# Patient Record
Sex: FEMALE | Race: WHITE | NOT HISPANIC OR LATINO | Employment: OTHER | URBAN - METROPOLITAN AREA
[De-identification: names, ages, dates, MRNs, and addresses within clinical notes are randomized per-mention and may not be internally consistent; named-entity substitution may affect disease eponyms.]

---

## 2017-01-18 ENCOUNTER — TELEPHONE (OUTPATIENT)
Dept: FAMILY MEDICINE | Facility: CLINIC | Age: 52
End: 2017-01-18

## 2017-01-18 NOTE — TELEPHONE ENCOUNTER
"----- Message from Tiff Levin sent at 1/18/2017  3:32 PM CST -----  Contact: Pt   Pt called and stated she needed to speak to the nurse. She stated that her blood sugar meter is not giving her a blood sugar number reading but stating a reading of "high". She also stated she is feeling dizzy and has made an appt to see the doctor in the morning. She can be reached at [phone].    Thanks,  TF     "

## 2017-01-19 ENCOUNTER — OFFICE VISIT (OUTPATIENT)
Dept: FAMILY MEDICINE | Facility: CLINIC | Age: 52
End: 2017-01-19
Payer: MEDICARE

## 2017-01-19 ENCOUNTER — HOSPITAL ENCOUNTER (OUTPATIENT)
Dept: RADIOLOGY | Facility: HOSPITAL | Age: 52
Discharge: HOME | End: 2017-01-19
Attending: FAMILY MEDICINE
Payer: MEDICARE

## 2017-01-19 VITALS
OXYGEN SATURATION: 97 % | TEMPERATURE: 97 F | HEIGHT: 64 IN | BODY MASS INDEX: 29.57 KG/M2 | HEART RATE: 130 BPM | WEIGHT: 173.19 LBS | DIASTOLIC BLOOD PRESSURE: 80 MMHG | SYSTOLIC BLOOD PRESSURE: 134 MMHG
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
## 2017-01-19 DIAGNOSIS — I20.9 ISCHEMIC CHEST PAIN: ICD-10-CM

## 2017-01-19 DIAGNOSIS — F31.4 BIPOLAR DISORDER WITH SEVERE DEPRESSION: ICD-10-CM

## 2017-01-19 DIAGNOSIS — Z79.4 TYPE 2 DIABETES MELLITUS WITH HYPERGLYCEMIA, WITH LONG-TERM CURRENT USE OF INSULIN: ICD-10-CM

## 2017-01-19 DIAGNOSIS — Z79.4 TYPE 2 DIABETES MELLITUS WITH HYPERGLYCEMIA, WITH LONG-TERM CURRENT USE OF INSULIN: Primary | ICD-10-CM

## 2017-01-19 DIAGNOSIS — E11.65 TYPE 2 DIABETES MELLITUS WITH HYPERGLYCEMIA, WITH LONG-TERM CURRENT USE OF INSULIN: Primary | ICD-10-CM

## 2017-01-19 DIAGNOSIS — E11.65 TYPE 2 DIABETES MELLITUS WITH HYPERGLYCEMIA, WITH LONG-TERM CURRENT USE OF INSULIN: ICD-10-CM

## 2017-01-19 DIAGNOSIS — R31.9 HEMATURIA: ICD-10-CM

## 2017-01-19 LAB
BACTERIA #/AREA URNS AUTO: ABNORMAL /HPF
BILIRUB SERPL-MCNC: ABNORMAL MG/DL
BLOOD URINE, POC: ABNORMAL
COLOR, POC UA: ABNORMAL
GLUCOSE UR QL STRIP: ABNORMAL
KETONES UR QL STRIP: ABNORMAL
LEUKOCYTE ESTERASE URINE, POC: ABNORMAL
MICROSCOPIC COMMENT: ABNORMAL
NITRITE, POC UA: ABNORMAL
PH, POC UA: 5
PROTEIN, POC: ABNORMAL
RBC #/AREA URNS AUTO: 1 /HPF (ref 0–4)
SPECIFIC GRAVITY, POC UA: 1.01
SQUAMOUS #/AREA URNS AUTO: 1 /HPF
UROBILINOGEN, POC UA: ABNORMAL
WBC #/AREA URNS AUTO: 1 /HPF (ref 0–5)

## 2017-01-19 PROCEDURE — 1159F MED LIST DOCD IN RCRD: CPT | Mod: S$GLB,,, | Performed by: FAMILY MEDICINE

## 2017-01-19 PROCEDURE — 72100 X-RAY EXAM L-S SPINE 2/3 VWS: CPT | Mod: 26,,, | Performed by: RADIOLOGY

## 2017-01-19 PROCEDURE — 72100 X-RAY EXAM L-S SPINE 2/3 VWS: CPT | Mod: TC,PO

## 2017-01-19 PROCEDURE — 3046F HEMOGLOBIN A1C LEVEL >9.0%: CPT | Mod: S$GLB,,, | Performed by: FAMILY MEDICINE

## 2017-01-19 PROCEDURE — 2022F DILAT RTA XM EVC RTNOPTHY: CPT | Mod: S$GLB,,, | Performed by: FAMILY MEDICINE

## 2017-01-19 PROCEDURE — 4010F ACE/ARB THERAPY RXD/TAKEN: CPT | Mod: S$GLB,,, | Performed by: FAMILY MEDICINE

## 2017-01-19 PROCEDURE — 99499 UNLISTED E&M SERVICE: CPT | Mod: S$GLB,,, | Performed by: FAMILY MEDICINE

## 2017-01-19 PROCEDURE — 81002 URINALYSIS NONAUTO W/O SCOPE: CPT | Mod: S$GLB,,, | Performed by: FAMILY MEDICINE

## 2017-01-19 PROCEDURE — 93010 ELECTROCARDIOGRAM REPORT: CPT | Mod: S$GLB,,, | Performed by: INTERNAL MEDICINE

## 2017-01-19 PROCEDURE — 99999 PR PBB SHADOW E&M-EST. PATIENT-LVL IV: CPT | Mod: PBBFAC,,, | Performed by: FAMILY MEDICINE

## 2017-01-19 PROCEDURE — 99214 OFFICE O/P EST MOD 30 MIN: CPT | Mod: 25,S$GLB,, | Performed by: FAMILY MEDICINE

## 2017-01-19 PROCEDURE — 3075F SYST BP GE 130 - 139MM HG: CPT | Mod: S$GLB,,, | Performed by: FAMILY MEDICINE

## 2017-01-19 PROCEDURE — 93005 ELECTROCARDIOGRAM TRACING: CPT | Mod: S$GLB,,, | Performed by: FAMILY MEDICINE

## 2017-01-19 PROCEDURE — 3079F DIAST BP 80-89 MM HG: CPT | Mod: S$GLB,,, | Performed by: FAMILY MEDICINE

## 2017-01-19 RX ORDER — HYDROCODONE BITARTRATE AND ACETAMINOPHEN 5; 325 MG/1; MG/1
1 TABLET ORAL EVERY 6 HOURS PRN
Qty: 12 TABLET | Refills: 0 | Status: SHIPPED | OUTPATIENT
Start: 2017-01-19 | End: 2018-05-31

## 2017-01-19 RX ORDER — GLUCOSAMINE/CHONDR SU A SOD 167-133 MG
100 CAPSULE ORAL NIGHTLY
COMMUNITY
End: 2018-05-31

## 2017-01-19 RX ORDER — UBIDECARENONE 75 MG
500 CAPSULE ORAL DAILY
COMMUNITY
End: 2018-05-31

## 2017-01-19 RX ORDER — VITAMIN E 268 MG
400 CAPSULE ORAL DAILY
COMMUNITY
End: 2018-05-31

## 2017-01-19 RX ORDER — VENLAFAXINE 75 MG/1
75 TABLET ORAL
COMMUNITY
End: 2018-02-01 | Stop reason: SDUPTHER

## 2017-01-19 NOTE — MR AVS SNAPSHOT
[facility]  [address]  [facility] [address]  Phone: [phone]  Fax: [phone]                  Alexandra Goins   2017 10:00 AM   Office Visit    Description:  Female : 1965   Provider:  Kika Betancur MD   Department:  [facility]           Reason for Visit     Diabetes           Diagnoses this Visit        Comments    Type 2 diabetes mellitus with hyperglycemia, with long-term current use of insulin    -  Primary     Bipolar disorder with severe depression         Fall, initial encounter         Ischemic chest pain                To Do List           Future Appointments        Provider Department Dept Phone    2017 9:00 AM Amelia Santana, SAMSON-C, CDE [facility] [phone]    2017 9:20 AM Nabor Kolb MD [facility] [facility] [phone]      Goals (5 Years of Data)     None       These Medications        Disp Refills Start End    hydrocodone-acetaminophen 5-325mg (NORCO) 5-325 mg per tablet 12 tablet 0 2017     Take 1 tablet by mouth every 6 (six) hours as needed for Pain. - Oral    Pharmacy: [facility] [address] [facility] [address] [facility] #: [phone]         [facility]     [facility] -  Assistance  Registered nurses in the [facility] Center provide clinical advisement, health education, appointment booking, and other advisory services.  Call for this free service at [phone].             Medications           Message regarding Medications     Verify the changes and/or additions to your medication regime listed below are the same as discussed with your clinician today.  If any of these changes or additions are incorrect, please notify your healthcare provider.        START taking these NEW medications        Refills    hydrocodone-acetaminophen 5-325mg (NORCO) 5-325 mg per tablet 0    Sig: Take 1 tablet by mouth every 6 (six)  hours as needed for Pain.    Class: Print    Route: Oral      STOP taking these medications     venlafaxine (EFFEXOR-XR) 37.5 MG 24 hr capsule 75 mg.    venlafaxine (EFFEXOR-XR) 150 MG Cp24 Take 75 mg by mouth once daily.           Verify that the below list of medications is an accurate representation of the medications you are currently taking.  If none reported, the list may be blank. If incorrect, please contact your healthcare provider. Carry this list with you in case of emergency.           Current Medications     aspirin (ECOTRIN) 81 MG EC tablet Take 1 tablet (81 mg total) by mouth once daily.    atorvastatin (LIPITOR) 40 MG tablet Take 1 tablet (40 mg total) by mouth once daily.    cyanocobalamin (VITAMIN B-12) 500 MCG tablet Take 500 mcg by mouth once daily.    glipiZIDE (GLUCOTROL) 10 MG tablet Take 10 mg by mouth once daily.    insulin aspart (NOVOLOG) 100 unit/mL injection As directed    insulin glargine (LANTUS SOLOSTAR) 100 unit/mL (3 mL) InPn pen 38 units sq qhs    levothyroxine (SYNTHROID) 75 MCG tablet Take 75 mcg by mouth before breakfast.     niacin 500 MG Tab Take 100 mg by mouth every evening.    quetiapine (SEROQUEL) 100 MG Tab Take 1 tablet (100 mg total) by mouth once daily.    ramipril (ALTACE) 2.5 MG capsule Take 2.5 mg by mouth once daily.    ranitidine (ZANTAC) 75 MG tablet Take 300 mg by mouth.     rizatriptan (MAXALT) 10 MG tablet One tablet with onset of migraine and may repeat one dose in 2 hours if needed    topiramate (TOPAMAX) 200 MG Tab Take by mouth 2 (two) times daily.    venlafaxine (EFFEXOR) 75 MG tablet Take 75 mg by mouth. Qid    vitamin E 400 UNIT capsule Take 400 Units by mouth once daily.    vitamin E 400 UNIT capsule Take 400 Units by mouth once daily.    hydrocodone-acetaminophen 5-325mg (NORCO) 5-325 mg per tablet Take 1 tablet by mouth every 6 (six) hours as needed for Pain.           Clinical Reference Information           Vital Signs - Last Recorded  Most recent  "update: 1/19/2017 10:03 AM by Ashlie Dozier MA    BP Pulse Temp Ht    134/80 (BP Location: Right arm, Patient Position: Sitting, BP Method: Manual) (!) 130 96.7 °F (35.9 °C) (Tympanic) 5' 4" (1.626 m)    Wt SpO2 BMI    78.5 kg (173 lb 2.7 oz) 97% 29.72 kg/m2      Blood Pressure          Most Recent Value    BP  134/80      Allergies as of 1/19/2017     Celestone [Betamethasone]    Ultram [Tramadol]      Immunizations Administered on Date of Encounter - 1/19/2017     None      Orders Placed During Today's Visit      Normal Orders This Visit    Ambulatory consult to Cardiology     IN OFFICE EKG 12-LEAD (to [place])     POCT URINE DIPSTICK WITHOUT MICROSCOPE     Future Labs/Procedures Expected by Expires    BETA - HYDROXYBUTYRATE, SERUM  1/19/2017 3/20/2018    CBC auto differential  1/19/2017 3/20/2018    Comprehensive metabolic panel  1/19/2017 3/20/2018    Hemoglobin A1c  1/19/2017 3/20/2018    X-Ray Lumbar Spine Ap And Lateral  1/19/2017 4/19/2017    X-Ray Lumbar Spine Ap And Lateral  1/19/2017 4/19/2017 1/19/2017 11:37 AM - Dianne Lynch LPN      Component Results     Component    Color, UA    yellow/hazy    Spec Grav, UA    1.015    pH, UA    5    WBC, UA    neg    Nitrite, UA    neg    Protein, UA    trace    Glucose, UA    1000++    Ketones, UA    neg    Urobilinogen, UA    norm    Bilirubin, UA    neg    Blood, UA    250++            [facility] Sign-Up     Activating your [facility] account is as easy as 1-2-3!     1) Visit [email or web address], select Sign Up Now, enter this activation code and your date of birth, then select Next.  [email or web address]  Expires: 3/5/2017 11:53 AM      2) Create a username and password to use when you visit [facility] in the future and select a security question in case you lose your password and select Next.    3) Enter your e-mail address and click Sign Up!    Additional Information  If you have questions, please e-mail [email or web address] or call [phone] to talk to " our [facility] staff. Remember, [facility] is NOT to be used for urgent needs. For medical emergencies, dial 911.
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
## 2017-01-21 LAB — BACTERIA UR CULT: NORMAL

## 2017-01-23 ENCOUNTER — TELEPHONE (OUTPATIENT)
Dept: FAMILY MEDICINE | Facility: CLINIC | Age: 52
End: 2017-01-23

## 2017-01-23 RX ORDER — SULFAMETHOXAZOLE AND TRIMETHOPRIM 800; 160 MG/1; MG/1
1 TABLET ORAL 2 TIMES DAILY
Qty: 14 TABLET | Refills: 0 | Status: SHIPPED | OUTPATIENT
Start: 2017-01-23 | End: 2017-01-30
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
## 2017-01-23 NOTE — TELEPHONE ENCOUNTER
"For the record :   "The law grants immunity to any citizen who reports in good mitul"   Please send warning letter for being NON compliant   "
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
## 2017-01-24 ENCOUNTER — OFFICE VISIT (OUTPATIENT)
Dept: CARDIOLOGY | Facility: CLINIC | Age: 52
End: 2017-01-24
Payer: MEDICARE

## 2017-01-24 VITALS
DIASTOLIC BLOOD PRESSURE: 64 MMHG | HEIGHT: 64 IN | BODY MASS INDEX: 28.53 KG/M2 | OXYGEN SATURATION: 100 % | SYSTOLIC BLOOD PRESSURE: 110 MMHG | WEIGHT: 167.13 LBS | HEART RATE: 98 BPM

## 2017-01-24 DIAGNOSIS — E78.00 PURE HYPERCHOLESTEROLEMIA: Chronic | ICD-10-CM

## 2017-01-24 DIAGNOSIS — R07.89 OTHER CHEST PAIN: ICD-10-CM

## 2017-01-24 DIAGNOSIS — I10 ESSENTIAL HYPERTENSION: Primary | Chronic | ICD-10-CM

## 2017-01-24 DIAGNOSIS — Z72.0 TOBACCO ABUSE: Chronic | ICD-10-CM

## 2017-01-24 DIAGNOSIS — R06.02 SOB (SHORTNESS OF BREATH): ICD-10-CM

## 2017-01-24 DIAGNOSIS — Z82.49 FAMILY HISTORY OF ARTERIOSCLEROTIC CARDIOVASCULAR DISEASE: ICD-10-CM

## 2017-01-24 DIAGNOSIS — R07.9 CHEST PAIN, MODERATE CORONARY ARTERY RISK: ICD-10-CM

## 2017-01-24 DIAGNOSIS — I10 BENIGN ESSENTIAL HTN: Primary | ICD-10-CM

## 2017-01-24 DIAGNOSIS — E78.2 MIXED HYPERLIPIDEMIA: ICD-10-CM

## 2017-01-24 PROCEDURE — 3078F DIAST BP <80 MM HG: CPT | Mod: S$GLB,,, | Performed by: INTERNAL MEDICINE

## 2017-01-24 PROCEDURE — 99999 PR PBB SHADOW E&M-EST. PATIENT-LVL III: CPT | Mod: PBBFAC,,, | Performed by: INTERNAL MEDICINE

## 2017-01-24 PROCEDURE — 99499 UNLISTED E&M SERVICE: CPT | Mod: S$GLB,,, | Performed by: INTERNAL MEDICINE

## 2017-01-24 PROCEDURE — 1159F MED LIST DOCD IN RCRD: CPT | Mod: S$GLB,,, | Performed by: INTERNAL MEDICINE

## 2017-01-24 PROCEDURE — 99205 OFFICE O/P NEW HI 60 MIN: CPT | Mod: S$GLB,,, | Performed by: INTERNAL MEDICINE

## 2017-01-24 PROCEDURE — 3074F SYST BP LT 130 MM HG: CPT | Mod: S$GLB,,, | Performed by: INTERNAL MEDICINE

## 2017-01-24 NOTE — MR AVS SNAPSHOT
[facility]  [address] [facility] [address]  Phone: [phone]  Fax: [phone]                  Alexandra Goins   2017 9:20 AM   Office Visit    Description:  Female : 1965   Provider:  Nabor Kolb MD   Department:  [facility]           Diagnoses this Visit        Comments    Essential hypertension    -  Primary     Pure hypercholesterolemia         Tobacco abuse         Chest pain, moderate coronary artery risk         Family history of arteriosclerotic cardiovascular disease         SOB (shortness of breath)                To Do List           Future Appointments        Provider Department Dept Phone    2017 10:00 AM ITFFANIE Luna, [facility] - Diabetes [phone]      Goals (5 Years of Data)     None      Follow-Up and Disposition     Return in about 2 months (around 3/24/2017).      [facility]     [facility] On Call Nurse [facility] -  Assistance  Registered nurses in the [facility] On Call Center provide clinical advisement, health education, appointment booking, and other advisory services.  Call for this free service at [phone].             Medications           Message regarding Medications     Verify the changes and/or additions to your medication regime listed below are the same as discussed with your clinician today.  If any of these changes or additions are incorrect, please notify your healthcare provider.             Verify that the below list of medications is an accurate representation of the medications you are currently taking.  If none reported, the list may be blank. If incorrect, please contact your healthcare provider. Carry this list with you in case of emergency.           Current Medications     atorvastatin (LIPITOR) 40 MG tablet Take 1 tablet (40 mg total) by mouth once daily.    cyanocobalamin (VITAMIN B-12) 500 MCG tablet Take 500 mcg by mouth once daily.    glipiZIDE (GLUCOTROL)  "10 MG tablet Take 10 mg by mouth once daily.    hydrocodone-acetaminophen 5-325mg (NORCO) 5-325 mg per tablet Take 1 tablet by mouth every 6 (six) hours as needed for Pain.    insulin aspart (NOVOLOG) 100 unit/mL injection As directed    insulin glargine (LANTUS SOLOSTAR) 100 unit/mL (3 mL) InPn pen 38 units sq qhs    levothyroxine (SYNTHROID) 75 MCG tablet Take 75 mcg by mouth before breakfast.     niacin 500 MG Tab Take 100 mg by mouth every evening.    quetiapine (SEROQUEL) 100 MG Tab Take 1 tablet (100 mg total) by mouth once daily.    ramipril (ALTACE) 2.5 MG capsule Take 2.5 mg by mouth once daily.    ranitidine (ZANTAC) 75 MG tablet Take 300 mg by mouth.     rizatriptan (MAXALT) 10 MG tablet One tablet with onset of migraine and may repeat one dose in 2 hours if needed    sulfamethoxazole-trimethoprim 800-160mg (BACTRIM DS) 800-160 mg Tab Take 1 tablet by mouth 2 (two) times daily.    topiramate (TOPAMAX) 200 MG Tab Take by mouth 2 (two) times daily.    venlafaxine (EFFEXOR) 75 MG tablet Take 75 mg by mouth. Qid    vitamin E 400 UNIT capsule Take 400 Units by mouth once daily.    vitamin E 400 UNIT capsule Take 400 Units by mouth once daily.    aspirin (ECOTRIN) 81 MG EC tablet Take 1 tablet (81 mg total) by mouth once daily.           Clinical Reference Information           Vital Signs - Last Recorded  Most recent update: 1/24/2017  9:26 AM by Woody Lai LPN    BP Pulse Ht Wt SpO2 BMI    110/64 (BP Location: Right arm, Patient Position: Sitting, BP Method: Manual) 98 5' 4" (1.626 m) 75.8 kg (167 lb 1.7 oz) 100% 28.68 kg/m2      Blood Pressure          Most Recent Value    Right Arm BP - Sitting  110/64    Left Arm BP - Sitting  112/70    BP  110/64      Allergies as of 1/24/2017     Celestone [Betamethasone]    Ultram [Tramadol]      Immunizations Administered on Date of Encounter - 1/24/2017     None      [facility] Sign-Up     Activating your [facility] account is as easy as 1-2-3!     1) Visit " [email or web address], select Sign Up Now, enter this activation code and your date of birth, then select Next.  [email or web address]  Expires: 3/5/2017 11:53 AM      2) Create a username and password to use when you visit [facility] in the future and select a security question in case you lose your password and select Next.    3) Enter your e-mail address and click Sign Up!    Additional Information  If you have questions, please e-mail [email or web address] or call [phone] to talk to our [facility] staff. Remember, [facility] is NOT to be used for urgent needs. For medical emergencies, dial 911.

## 2017-01-24 NOTE — LETTER
January 24, 2017      Kika Betancur [place]  [address] [address] [address]           [facility]  [address] [place] [address]  Phone: [phone]  Fax: [phone]          Patient: Alexandra Goins   MR Number: [id number]   YOB: 1965   Date of Visit: 1/24/2017       Dear Dr. Kika Betancur:    Thank you for referring Alexandra Goins to me for evaluation. Attached you will find relevant portions of my assessment and plan of care.    If you have questions, please do not hesitate to call me. I look forward to following Alexandra Goins along with you.    Sincerely,    Nabor Kolb MD    Enclosure  CC:  No Recipients    If you would like to receive this communication electronically, please contact [email or web address] or [phone] to request more information on [facility] access.    For providers and/or their staff who would like to refer a patient to [facility], please contact us through our one-stop-shop provider referral line, [facility], at [phone].    If you feel you have received this communication in error or would no longer like to receive these types of communications, please e-mail [email or web address]

## 2017-01-25 ENCOUNTER — OFFICE VISIT (OUTPATIENT)
Dept: DIABETES | Facility: CLINIC | Age: 52
End: 2017-01-25
Payer: MEDICARE

## 2017-01-25 ENCOUNTER — LAB VISIT (OUTPATIENT)
Dept: LAB | Facility: HOSPITAL | Age: 52
End: 2017-01-25
Attending: NURSE PRACTITIONER
Payer: MEDICARE

## 2017-01-25 VITALS
WEIGHT: 169.63 LBS | BODY MASS INDEX: 28.96 KG/M2 | DIASTOLIC BLOOD PRESSURE: 76 MMHG | HEIGHT: 64 IN | SYSTOLIC BLOOD PRESSURE: 136 MMHG

## 2017-01-25 DIAGNOSIS — I10 ESSENTIAL HYPERTENSION: Chronic | ICD-10-CM

## 2017-01-25 DIAGNOSIS — E11.9 TYPE 2 DIABETES MELLITUS WITHOUT COMPLICATION, WITH LONG-TERM CURRENT USE OF INSULIN: Primary | ICD-10-CM

## 2017-01-25 DIAGNOSIS — E11.9 TYPE 2 DIABETES MELLITUS WITHOUT COMPLICATION, WITH LONG-TERM CURRENT USE OF INSULIN: ICD-10-CM

## 2017-01-25 DIAGNOSIS — Z79.4 TYPE 2 DIABETES MELLITUS WITHOUT COMPLICATION, WITH LONG-TERM CURRENT USE OF INSULIN: Primary | ICD-10-CM

## 2017-01-25 DIAGNOSIS — E78.00 PURE HYPERCHOLESTEROLEMIA: Chronic | ICD-10-CM

## 2017-01-25 DIAGNOSIS — Z79.4 TYPE 2 DIABETES MELLITUS WITHOUT COMPLICATION, WITH LONG-TERM CURRENT USE OF INSULIN: ICD-10-CM

## 2017-01-25 LAB — GLUCOSE SERPL-MCNC: 371 MG/DL (ref 70–110)

## 2017-01-25 PROCEDURE — 3075F SYST BP GE 130 - 139MM HG: CPT | Mod: S$GLB,,, | Performed by: NURSE PRACTITIONER

## 2017-01-25 PROCEDURE — 3046F HEMOGLOBIN A1C LEVEL >9.0%: CPT | Mod: S$GLB,,, | Performed by: NURSE PRACTITIONER

## 2017-01-25 PROCEDURE — 36415 COLL VENOUS BLD VENIPUNCTURE: CPT | Mod: PO

## 2017-01-25 PROCEDURE — 84681 ASSAY OF C-PEPTIDE: CPT

## 2017-01-25 PROCEDURE — 1159F MED LIST DOCD IN RCRD: CPT | Mod: S$GLB,,, | Performed by: NURSE PRACTITIONER

## 2017-01-25 PROCEDURE — 99999 PR PBB SHADOW E&M-EST. PATIENT-LVL IV: CPT | Mod: PBBFAC,,, | Performed by: NURSE PRACTITIONER

## 2017-01-25 PROCEDURE — 99214 OFFICE O/P EST MOD 30 MIN: CPT | Mod: S$GLB,,, | Performed by: NURSE PRACTITIONER

## 2017-01-25 PROCEDURE — 2022F DILAT RTA XM EVC RTNOPTHY: CPT | Mod: S$GLB,,, | Performed by: NURSE PRACTITIONER

## 2017-01-25 PROCEDURE — 3078F DIAST BP <80 MM HG: CPT | Mod: S$GLB,,, | Performed by: NURSE PRACTITIONER

## 2017-01-25 PROCEDURE — 4010F ACE/ARB THERAPY RXD/TAKEN: CPT | Mod: S$GLB,,, | Performed by: NURSE PRACTITIONER

## 2017-01-25 PROCEDURE — 86341 ISLET CELL ANTIBODY: CPT

## 2017-01-25 PROCEDURE — 82948 REAGENT STRIP/BLOOD GLUCOSE: CPT | Mod: S$GLB,,, | Performed by: NURSE PRACTITIONER

## 2017-01-25 PROCEDURE — 86337 INSULIN ANTIBODIES: CPT

## 2017-01-25 RX ORDER — DEXTROSE 4 G
1 TABLET,CHEWABLE ORAL AS DIRECTED
Qty: 1 EACH | Refills: 0 | Status: SHIPPED | OUTPATIENT
Start: 2017-01-25 | End: 2019-05-29 | Stop reason: ALTCHOICE

## 2017-01-25 RX ORDER — LANCETS
1 EACH MISCELLANEOUS 3 TIMES DAILY
Qty: 100 EACH | Refills: 11 | Status: SHIPPED | OUTPATIENT
Start: 2017-01-25 | End: 2019-05-29 | Stop reason: ALTCHOICE
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
## 2017-01-25 NOTE — MR AVS SNAPSHOT
[facility]  [address]  [facility] [address]  Phone: [phone]  Fax: [phone]                  Alexandra Goins   2017 10:00 AM   Office Visit    Description:  Female : 1965   Provider:  TIFFANIE Luna, MAHAD   Department:  [facility]           Reason for Visit     Diabetes           Diagnoses this Visit        Comments    Type 2 diabetes mellitus without complication, with long-term current use of insulin    -  Primary     Essential hypertension         Pure hypercholesterolemia                To Do List           Future Appointments        Provider Department Dept Phone    2017 11:10 AM LABORATORY, [facility]     2/15/2017 10:00 AM TIFFANIE Luna, MAHAD [facility] [phone]    2017 1:00 PM ECHOCARDIOGRAM, [facility] [phone]    2017 2:00 PM CARDIOVASCULAR, [facility] [phone]    3/29/2017 9:00 AM Nabor Kolb MD [facility] [phone]      Goals (5 Years of Data)     None      Follow-Up and Disposition     Return in about 2 weeks (around 2017) for needs meter.      [facility]     [facility] Care [facility] - / Assistance  Registered nurses in the [facility] provide clinical advisement, health education, appointment booking, and other advisory services.  Call for this free service at [phone].             Medications           Message regarding Medications     Verify the changes and/or additions to your medication regime listed below are the same as discussed with your clinician today.  If any of these changes or additions are incorrect, please notify your healthcare provider.             Verify that the below list of medications is an accurate representation of the medications you are currently taking.  If none reported, the list may be blank. If  "incorrect, please contact your healthcare provider. Carry this list with you in case of emergency.           Current Medications     aspirin (ECOTRIN) 81 MG EC tablet Take 1 tablet (81 mg total) by mouth once daily.    atorvastatin (LIPITOR) 40 MG tablet Take 1 tablet (40 mg total) by mouth once daily.    cyanocobalamin (VITAMIN B-12) 500 MCG tablet Take 500 mcg by mouth once daily.    glipiZIDE (GLUCOTROL) 10 MG tablet Take 10 mg by mouth once daily.    hydrocodone-acetaminophen 5-325mg (NORCO) 5-325 mg per tablet Take 1 tablet by mouth every 6 (six) hours as needed for Pain.    insulin aspart (NOVOLOG) 100 unit/mL injection As directed    insulin glargine (LANTUS SOLOSTAR) 100 unit/mL (3 mL) InPn pen 38 units sq qhs    levothyroxine (SYNTHROID) 75 MCG tablet Take 75 mcg by mouth before breakfast.     niacin 500 MG Tab Take 100 mg by mouth every evening.    quetiapine (SEROQUEL) 100 MG Tab Take 1 tablet (100 mg total) by mouth once daily.    ramipril (ALTACE) 2.5 MG capsule Take 2.5 mg by mouth once daily.    ranitidine (ZANTAC) 75 MG tablet Take 300 mg by mouth.     rizatriptan (MAXALT) 10 MG tablet One tablet with onset of migraine and may repeat one dose in 2 hours if needed    sulfamethoxazole-trimethoprim 800-160mg (BACTRIM DS) 800-160 mg Tab Take 1 tablet by mouth 2 (two) times daily.    topiramate (TOPAMAX) 200 MG Tab Take by mouth 2 (two) times daily.    venlafaxine (EFFEXOR) 75 MG tablet Take 75 mg by mouth. Qid    vitamin E 400 UNIT capsule Take 400 Units by mouth once daily.    vitamin E 400 UNIT capsule Take 400 Units by mouth once daily.           Clinical Reference Information           Vital Signs - Last Recorded  Most recent update: 1/25/2017 10:13 AM by Chandrakant Alexis LPN    BP Ht Wt BMI       136/76 5' 4" (1.626 m) 76.9 kg (169 lb 10.3 oz) 29.12 kg/m2       Blood Pressure          Most Recent Value    BP  136/76      Allergies as of 1/25/2017     Celestone [Betamethasone]    Ultram [Tramadol]    "   Immunizations Administered on Date of Encounter - 1/25/2017     None      Orders Placed During Today's Visit      Normal Orders This Visit    Ambulatory referral to Optometry     POCT glucose     Future Labs/Procedures Expected by Expires    C-peptide  1/25/2017 3/26/2018    Glutamic acid decarboxylase  1/25/2017 3/26/2018    Insulin antibody  1/25/2017 3/26/2018         1/25/2017 10:30 AM - Chandrakant Alexis LPN      Component Results     Component Value Flag Ref Range Units Status    POC Glucose 371 (A) 70 - 110 mg/dL Final            [facility] Sign-Up     Activating your [facility] account is as easy as 1-2-3!     1) Visit [email or web address], select Sign Up Now, enter this activation code and your date of birth, then select Next.  [email or web address]  Expires: 3/5/2017 11:53 AM      2) Create a username and password to use when you visit [facility] in the future and select a security question in case you lose your password and select Next.    3) Enter your e-mail address and click Sign Up!    Additional Information  If you have questions, please e-mail [email or web address] or call [phone] to talk to our [facility] staff. Remember, [facility] is NOT to be used for urgent needs. For medical emergencies, dial 911.         Instructions    Intensive insulin Therapy with correction factor:     You are on Intensive insulin therapy with Basal and Bolus insulin. Lantus, is your basal insulin and will help maintain your fasting and between meal blood sugars. Your fast acting or rescue insulin is Novolog insulin and will control your after meal blood sugars.      You will take 40 units of  Lantus once a day. This will be adjusted up or down depending on your fasting blood sugars before breakfast.     Novolog will follow this pre meal schedule: Please take a base unit of 10 units with meals and add correction using the following scale:     If blood sugar is below 70 eat first then check your blood sugar 2 hours later and make  correction.  If blood pre-meal sugar is 151-200 Add +2 units of Novolog;  If blood pre-meal sugar is 201-250 Add +4 units of Novolog;  If blood pre-meal sugar is 251-300 Add +6 units of Novolog;  If blood pre-meal sugar is 301-350 Add +8 units of Novolog;  If blood pre-meal sugar is 351-400  Add +10 units of Novolog;    Also increase water intake and call for appointment.

## 2017-01-25 NOTE — PATIENT INSTRUCTIONS
Intensive insulin Therapy with correction factor:     You are on Intensive insulin therapy with Basal and Bolus insulin. Lantus, is your basal insulin and will help maintain your fasting and between meal blood sugars. Your fast acting or rescue insulin is Novolog insulin and will control your after meal blood sugars.      You will take 40 units of  Lantus once a day. This will be adjusted up or down depending on your fasting blood sugars before breakfast.     Novolog will follow this pre meal schedule: Please take a base unit of 10 units with meals and add correction using the following scale:     If blood sugar is below 70 eat first then check your blood sugar 2 hours later and make correction.  If blood pre-meal sugar is 151-200 Add +2 units of Novolog;  If blood pre-meal sugar is 201-250 Add +4 units of Novolog;  If blood pre-meal sugar is 251-300 Add +6 units of Novolog;  If blood pre-meal sugar is 301-350 Add +8 units of Novolog;  If blood pre-meal sugar is 351-400  Add +10 units of Novolog;    Also increase water intake and call for appointment.

## 2017-01-26 ENCOUNTER — TELEPHONE (OUTPATIENT)
Dept: DIABETES | Facility: CLINIC | Age: 52
End: 2017-01-26

## 2017-01-26 LAB — C PEPTIDE SERPL-MCNC: 3.4 NG/ML
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
## 2017-01-26 NOTE — PROGRESS NOTES
Subjective:   Patient ID:  Alexandra Goins is a 51 y.o. female who presents for follow-up of No chief complaint on file.      Hypertension   This is a chronic problem. The current episode started more than 1 year ago. The problem has been gradually improving since onset. The problem is controlled. Associated symptoms include chest pain and shortness of breath. Pertinent negatives include no palpitations. Past treatments include ACE inhibitors. The current treatment provides moderate improvement. Compliance problems include exercise.    Chest Pain    This is a new problem. The current episode started 1 to 4 weeks ago. The onset quality is gradual. The problem occurs intermittently. The problem has been waxing and waning. The pain is present in the substernal region. The quality of the pain is described as sharp. Associated symptoms include shortness of breath. Pertinent negatives include no dizziness or palpitations. The pain is aggravated by emotional upset. She has tried rest for the symptoms. The treatment provided mild relief.   Pertinent negatives for past medical history include no muscle weakness.   Shortness of Breath   This is a new problem. The current episode started 1 to 4 weeks ago. The problem occurs intermittently. The problem has been gradually improving. Associated symptoms include chest pain. Pertinent negatives include no leg swelling. The symptoms are aggravated by any activity. Risk factors include smoking.       Review of Systems   Constitution: Negative. Negative for weight gain.   HENT: Negative.    Eyes: Negative.    Cardiovascular: Positive for chest pain. Negative for leg swelling and palpitations.   Respiratory: Positive for shortness of breath.    Endocrine: Negative.    Hematologic/Lymphatic: Negative.    Skin: Negative.    Musculoskeletal: Negative for muscle weakness.   Gastrointestinal: Negative.    Genitourinary: Negative.    Neurological: Negative.  Negative for dizziness.    Psychiatric/Behavioral: Negative.    Allergic/Immunologic: Negative.      No family history on file.  Past Medical History   Diagnosis Date    Anxiety     Bipolar 1 disorder     Depression     Diabetes mellitus     Diverticular disease     GERD (gastroesophageal reflux disease)     Hyperlipemia     Hypertension      Current Outpatient Prescriptions on File Prior to Visit   Medication Sig Dispense Refill    atorvastatin (LIPITOR) 40 MG tablet Take 1 tablet (40 mg total) by mouth once daily. 90 tablet 0    cyanocobalamin (VITAMIN B-12) 500 MCG tablet Take 500 mcg by mouth once daily.      glipiZIDE (GLUCOTROL) 10 MG tablet Take 10 mg by mouth once daily.      hydrocodone-acetaminophen 5-325mg (NORCO) 5-325 mg per tablet Take 1 tablet by mouth every 6 (six) hours as needed for Pain. 12 tablet 0    insulin aspart (NOVOLOG) 100 unit/mL injection As directed      insulin glargine (LANTUS SOLOSTAR) 100 unit/mL (3 mL) InPn pen 38 units sq qhs 1 Box 0    levothyroxine (SYNTHROID) 75 MCG tablet Take 75 mcg by mouth before breakfast.       niacin 500 MG Tab Take 100 mg by mouth every evening.      quetiapine (SEROQUEL) 100 MG Tab Take 1 tablet (100 mg total) by mouth once daily. (Patient taking differently: Take 100 mg by mouth. 1/2 tablet q a.m  1 .5 qhs) 30 tablet 0    ramipril (ALTACE) 2.5 MG capsule Take 2.5 mg by mouth once daily.      ranitidine (ZANTAC) 75 MG tablet Take 300 mg by mouth.       rizatriptan (MAXALT) 10 MG tablet One tablet with onset of migraine and may repeat one dose in 2 hours if needed      sulfamethoxazole-trimethoprim 800-160mg (BACTRIM DS) 800-160 mg Tab Take 1 tablet by mouth 2 (two) times daily. 14 tablet 0    topiramate (TOPAMAX) 200 MG Tab Take by mouth 2 (two) times daily.      venlafaxine (EFFEXOR) 75 MG tablet Take 75 mg by mouth. Qid      vitamin E 400 UNIT capsule Take 400 Units by mouth once daily.      vitamin E 400 UNIT capsule Take 400 Units by mouth once  daily.      aspirin (ECOTRIN) 81 MG EC tablet Take 1 tablet (81 mg total) by mouth once daily.  0     No current facility-administered medications on file prior to visit.      Review of patient's allergies indicates:   Allergen Reactions    Celestone [betamethasone] Hives    Ultram [tramadol] Hives       Objective:     Physical Exam   Constitutional: She is oriented to person, place, and time. She appears well-developed and well-nourished.   HENT:   Head: Normocephalic and atraumatic.   Eyes: Conjunctivae and EOM are normal. Pupils are equal, round, and reactive to light.   Neck: Normal range of motion. Neck supple.   Cardiovascular: Normal rate, regular rhythm, normal heart sounds and intact distal pulses.    Pulmonary/Chest: Effort normal and breath sounds normal.   Abdominal: Soft. Bowel sounds are normal.   Musculoskeletal: Normal range of motion.   Neurological: She is alert and oriented to person, place, and time.   Skin: Skin is warm and dry.   Psychiatric: She has a normal mood and affect.   Nursing note and vitals reviewed.      Assessment:     1. Essential hypertension    2. Pure hypercholesterolemia    3. Tobacco abuse    4. Chest pain, moderate coronary artery risk    5. Family history of arteriosclerotic cardiovascular disease    6. SOB (shortness of breath)        Plan:     Essential hypertension    Pure hypercholesterolemia    Tobacco abuse    Chest pain, moderate coronary artery risk    Family history of arteriosclerotic cardiovascular disease    SOB (shortness of breath)    stress test  Echo  Continue statin, niacin-hlp  Continue ramipril-htn     2017
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
## 2017-01-28 LAB — GAD65 AB SER-SCNC: 0.01 NMOL/L

## 2017-02-01 ENCOUNTER — TELEPHONE (OUTPATIENT)
Dept: DIABETES | Facility: CLINIC | Age: 52
End: 2017-02-01

## 2017-02-01 NOTE — TELEPHONE ENCOUNTER
----- Message from Sravani Bernardo sent at 2/1/2017  9:53 AM CST -----  Contact: pt  Pt wants to give blood sugar reading request to speak with nurse, pt can be reached at [phone]//thanksMW

## 2017-02-01 NOTE — TELEPHONE ENCOUNTER
Patient is taking Novolog ( [per sliding scale) and  Lantus to 40 units in the morning  Novolog is dosed as follows :10 units TID AC, and additional correction using the following scale: ( If blood pre-meal sugar is 151 - 200 Add + 2 units of Novolog; If blood pre-meal sugar is 201 - 250 Add + 4 units of Novolog; If blood pre-meal sugar is 251 - 300 Add + 6 units of Novolog; If blood pre-meal sugar is 301 - 350 Add + 8 units of Novolog; If blood pre-meal sugar is 351 - 400 Add + 10 units of Novolog. )      1/26/17  Breakfast 317, Lunch 278, Dinner 207  1/27/17  216, 203, 201  1/28/17  205, 200, 217  1/29/17  217, 173, 200   1/30/17  180, 185, 181  1/31/17  203, 198, 189   2/1/17   203

## 2017-02-02 NOTE — TELEPHONE ENCOUNTER
I am somewhat concerned about the accuracy of these BG readings.  I suspect that these readings are not valid.  When the professional version of FREESTYLE Summer is available, would consider putting patient on CGM.  For now, please advise patient to increase Lantus to 45 units every morning.  She should continue Novolog as prescribed.

## 2017-02-09 LAB — INSULIN AB SER-SCNC: 0 NMOL/L (ref 0–0.02)

## 2017-02-13 RX ORDER — INSULIN GLARGINE 100 [IU]/ML
INJECTION, SOLUTION SUBCUTANEOUS
Qty: 1 BOX | Refills: 3 | Status: SHIPPED | OUTPATIENT
Start: 2017-02-13 | End: 2017-02-15 | Stop reason: ALTCHOICE

## 2017-02-13 RX ORDER — PEN NEEDLE, DIABETIC 30 GX3/16"
1 NEEDLE, DISPOSABLE MISCELLANEOUS 2 TIMES DAILY
Qty: 100 EACH | Refills: 11 | Status: ON HOLD | OUTPATIENT
Start: 2017-02-13 | End: 2019-06-29 | Stop reason: HOSPADM

## 2017-02-13 NOTE — TELEPHONE ENCOUNTER
----- Message from Amara Anderson sent at 2/13/2017 11:28 AM CST -----  Would like a refill on insulin. Pt uses.  [id number] - [place], [place] - [address] [address]  [address] [place] [address]  [place] [address]  Phone: [phone] Fax: [phone]    States she is out. Please call back at [phone]. Thanks//cdb

## 2017-02-13 NOTE — TELEPHONE ENCOUNTER
Patient stated she needed a refill on Lantus and a new prescription for pen needles. Both Rx's sent in per pt's request.

## 2017-02-15 ENCOUNTER — OFFICE VISIT (OUTPATIENT)
Dept: DIABETES | Facility: CLINIC | Age: 52
End: 2017-02-15
Payer: MEDICARE

## 2017-02-15 VITALS
DIASTOLIC BLOOD PRESSURE: 82 MMHG | WEIGHT: 172.38 LBS | BODY MASS INDEX: 29.43 KG/M2 | HEIGHT: 64 IN | SYSTOLIC BLOOD PRESSURE: 102 MMHG

## 2017-02-15 DIAGNOSIS — I10 ESSENTIAL HYPERTENSION: Chronic | ICD-10-CM

## 2017-02-15 DIAGNOSIS — E78.00 PURE HYPERCHOLESTEROLEMIA: Chronic | ICD-10-CM

## 2017-02-15 DIAGNOSIS — E11.9 TYPE 2 DIABETES MELLITUS WITHOUT COMPLICATION, WITH LONG-TERM CURRENT USE OF INSULIN: Primary | ICD-10-CM

## 2017-02-15 DIAGNOSIS — Z72.0 TOBACCO ABUSE: Chronic | ICD-10-CM

## 2017-02-15 DIAGNOSIS — Z79.4 TYPE 2 DIABETES MELLITUS WITHOUT COMPLICATION, WITH LONG-TERM CURRENT USE OF INSULIN: Primary | ICD-10-CM

## 2017-02-15 LAB — GLUCOSE SERPL-MCNC: 277 MG/DL (ref 70–110)

## 2017-02-15 PROCEDURE — 82948 REAGENT STRIP/BLOOD GLUCOSE: CPT | Mod: S$GLB,,, | Performed by: NURSE PRACTITIONER

## 2017-02-15 PROCEDURE — 3046F HEMOGLOBIN A1C LEVEL >9.0%: CPT | Mod: S$GLB,,, | Performed by: NURSE PRACTITIONER

## 2017-02-15 PROCEDURE — 99999 PR PBB SHADOW E&M-EST. PATIENT-LVL III: CPT | Mod: PBBFAC,,, | Performed by: NURSE PRACTITIONER

## 2017-02-15 PROCEDURE — 99214 OFFICE O/P EST MOD 30 MIN: CPT | Mod: S$GLB,,, | Performed by: NURSE PRACTITIONER

## 2017-02-15 PROCEDURE — 3074F SYST BP LT 130 MM HG: CPT | Mod: S$GLB,,, | Performed by: NURSE PRACTITIONER

## 2017-02-15 PROCEDURE — 2022F DILAT RTA XM EVC RTNOPTHY: CPT | Mod: S$GLB,,, | Performed by: NURSE PRACTITIONER

## 2017-02-15 PROCEDURE — 3079F DIAST BP 80-89 MM HG: CPT | Mod: S$GLB,,, | Performed by: NURSE PRACTITIONER

## 2017-02-15 PROCEDURE — 4010F ACE/ARB THERAPY RXD/TAKEN: CPT | Mod: S$GLB,,, | Performed by: NURSE PRACTITIONER

## 2017-02-15 RX ORDER — SYRINGE-NEEDLE,INSULIN,0.5 ML 30 GX5/16"
1 SYRINGE, EMPTY DISPOSABLE MISCELLANEOUS 4 TIMES DAILY
Qty: 200 EACH | Refills: 11 | Status: ON HOLD | OUTPATIENT
Start: 2017-02-15 | End: 2019-06-29 | Stop reason: HOSPADM

## 2017-02-15 RX ORDER — INSULIN GLARGINE 100 [IU]/ML
55 INJECTION, SOLUTION SUBCUTANEOUS NIGHTLY
Qty: 2 VIAL | Refills: 3 | Status: SHIPPED | OUTPATIENT
Start: 2017-02-15 | End: 2017-08-09 | Stop reason: SDUPTHER

## 2017-02-15 NOTE — PROGRESS NOTES
"PCP: Kika Betancur MD    Subjective:     Chief Complaint: Diabetes, follow up    HISTORY OF PRESENT ILLNESS: 51 year old  female presenting to follow up for diabetes.  Patient has had Type II diabetes since 1985 and has the following complications from diabetes: neuropathy,  She  has attended diabetes education in the past.  Has been on Janumet, metformin in past.   Blood glucose testing is performed 3 x a day.  Per records, fasting  - 283; ac lunch 211 - 248 ; ac dinner 110, 237 - 290.     She denies any recent hospital admissions, emergency room visits, hypoglycemia, syncope, or diaphoresis.  Further diabetes review: has noted excessive thirstiness and frequent urination, blurry vision.     Height: 5' 4" (162.6 cm)  //  Weight: 78.2 kg (172 lb 6.4 oz), Body mass index is 29.59 kg/(m^2).  Home Blood Glucose reading this AM: 286 mg/dl fasting  Her blood sugar in clinic today is: 277 mg/dl at 10:11 am     Labs Reviewed. ADA recommends A1C of less than 7 %. Her most recent A1C is:     Lab Results   Component Value Date    HGBA1C 16.8 (H) 01/19/2017      DM MEDICATIONS:   Lantus 48 units at bedtime  Novolog, she is taking 10 units TID ac but often forgets to add sliding scale    REVIEW OF SYSTEMS:  General: Denies fever, nausea, vomiting, chills, or change in appetite.  HEENT: Denies impaired hearing, dysphagia.  Respiratory: Denies shortness of breath, cough, or wheezing.  Cardiovascular: Denies chest pain, palpitations.  GI: Denies hematochezia.  : Denies hematuria, diarrhea, dysuria or frequency.  MS:  Normal gait. Denies difficulty with mobility, muscle or joint pain.  SKIN: Denies rashes and lesions.  Neuro: Has numbness or tingling in the hands or feet.   PSYCH: Has chronic anxiety, Bipolar disorder. No suicidal ideations.  ENDO: See HPI.    STANDARDS OF CARE:  Eye doctor: None. Plans to make an upcoming appointment.   Dental exam: Recommend regular exams; denies gums bleeding.  Podiatry " "doctor: None    ACTIVITY LEVEL: No routine exercise.  MEAL PLANNING: Number of meals per day - 3. Breakfast can be yogurt, mid morning snack can be carrots, celery, mushrooms; lunch can be corn dog OR frozen burrito. Mid afternoon snack can be carrots, apple. Supper can be grilled pork chops with rice, gravy OR 1/2 cup corn. Bedtime snack can be yogurt OR yanira cracker. Number of snacks per day - 3.      Patient is encouraged to consume 45 - 60 grams of carbohydrates in each meal, and 1800 k / gloria per day.  Per dietary recall, patient is not limiting carbohydrates, saturated fats and sodium.     BLOOD GLUCOSE TESTING: Not Self-monitoring, requests a new meter  SOCIAL HISTORY: . Lives with spouse. Disabled, due to Bipolar Disorder, chronic anxiety.  Former smoker.    Objective:     PHYSICAL EXAMINATION:  GENERAL: WDWN  female in no acute distress, ambulatory, responds appropriately. AAO X 3.   NECK: Supple, no thyromegaly, no cervical or supraclavicular lymphadenopathy, no carotid bruit.  HEART: Regular rate and rhythm. No rubs, murmurs or gallops.  LUNGS: CTA bilaterally. Unlabored breathing, no use of accessory muscles.  MUSCULOSKELETAL: Normal gait and muscle strength.  ABDOMEN: Active bowel sounds X 4, no masses or tenderness.   SKIN: Warm, dry skin. No lesions or abrasions. Clean, dry well-healed injection sites.   NEUROLOGIC: Cranial nerves II-XII grossly intact.   EXTREMITIES: Healing 4 X 4 " abrasions to anterior side of bilateral legs.  Posterior side of left leg with three, perfectly demarcated longitudinal incisions about 12 inches long - healing.  FOOT EXAMINATION: Deferred, Last exam 2 weeks ago.     Assessment:     EDUCATIONAL ASSESSMENT:  1. Impediments in learning class environment - NONE.  2. Needs improvement in self-care management skills.    1) Diabetes Type II, neuropathy - per records, uncontrolled on Lantus, Novolog, A1C 16.8  2) Hyperlipidemia - continue Lipitor  3) " Hypertension - continue ramipril  4) Overweight, BMI 29.59  5) Hypothyroidism - on replacement therapy    Plan:     1.) Patient was instructed to monitor blood glucose 2 - 3 x daily, fasting and ac dinner or at bedtime. Discussed ADA goal for fasting blood sugar, 80 - 130 mg/dL; pp blood sugars below 180 mg/dl. Also, discussed prevention of hypoglycemia and the need to adjust goals to higher levels if persistent hypoglycemia.  Reminded to bring blood glucose records or meter to each visit for review.  2.) Reviewed pathophysiology of Type II diabetes, complications related to the disease, importance of annual dilated eye exam and daily foot examination.  3.) We discussed the ADA recommendations: Hemoglobin A1c below 7.0 %.   All patients with diabetes should be on statins unless contraindicated.  ACE or ARB therapy if not contraindicated.    4.) Increase Lantus to 55 units in the morning.  She often forgets to use the sliding scale and would prefer set doses of bolus insulin so advised patient to take Novolog 15 units TID ac.  I advised patient to send BG reading weekly for adjustment of insulin as needed.  Patient voices that she is on a fixed budget and needs insulin that is affordable.  Will try the Lantus vials and see if this option is cheaper.  If not, would consider switching patient to RELI ON brand insulin, which is 25.00 a vial at [facility].    5.) Meal planning teaching: Carbohydrate definition - one serving is 15 gms. Carbohydrate spacing - carbohydrates should be spaced into approximately 3 meals with 2 snacks ( of one carbohydrate ) between meals or at bedtime. Increase vegetable intake to 2 or more cups of vegetables per day as well as 2 fruit servings. Recommended low saturated fat, low sodium diet to aid in control of hypertension and cholesterol.  6.) Discussed activity, benefits, methods, and precautions. Recommended patient start or continue some form of exercise and increase as tolerated to 30  minutes per day to facilitate weight loss and aid in control of BGs.  7.) Return to clinic in 2 months for follow up.  She was explained the above plan and given opportunity to ask questions.  Advised to call clinic with any further questions or concerns.    Amelia Santana, NP-C, CDE

## 2017-04-17 ENCOUNTER — OFFICE VISIT (OUTPATIENT)
Dept: DIABETES | Facility: CLINIC | Age: 52
End: 2017-04-17
Payer: MEDICARE

## 2017-04-17 VITALS
DIASTOLIC BLOOD PRESSURE: 82 MMHG | SYSTOLIC BLOOD PRESSURE: 148 MMHG | HEIGHT: 63 IN | WEIGHT: 167.13 LBS | BODY MASS INDEX: 29.61 KG/M2

## 2017-04-17 DIAGNOSIS — I10 ESSENTIAL HYPERTENSION: Chronic | ICD-10-CM

## 2017-04-17 DIAGNOSIS — E11.9 TYPE 2 DIABETES MELLITUS WITHOUT COMPLICATION, WITH LONG-TERM CURRENT USE OF INSULIN: Primary | ICD-10-CM

## 2017-04-17 DIAGNOSIS — E78.00 PURE HYPERCHOLESTEROLEMIA: Chronic | ICD-10-CM

## 2017-04-17 DIAGNOSIS — Z79.4 TYPE 2 DIABETES MELLITUS WITHOUT COMPLICATION, WITH LONG-TERM CURRENT USE OF INSULIN: Primary | ICD-10-CM

## 2017-04-17 PROCEDURE — 3077F SYST BP >= 140 MM HG: CPT | Mod: S$GLB,,, | Performed by: NURSE PRACTITIONER

## 2017-04-17 PROCEDURE — 1160F RVW MEDS BY RX/DR IN RCRD: CPT | Mod: S$GLB,,, | Performed by: NURSE PRACTITIONER

## 2017-04-17 PROCEDURE — 99999 PR PBB SHADOW E&M-EST. PATIENT-LVL III: CPT | Mod: PBBFAC,,, | Performed by: NURSE PRACTITIONER

## 2017-04-17 PROCEDURE — 99214 OFFICE O/P EST MOD 30 MIN: CPT | Mod: S$GLB,,, | Performed by: NURSE PRACTITIONER

## 2017-04-17 PROCEDURE — 4010F ACE/ARB THERAPY RXD/TAKEN: CPT | Mod: S$GLB,,, | Performed by: NURSE PRACTITIONER

## 2017-04-17 PROCEDURE — 3046F HEMOGLOBIN A1C LEVEL >9.0%: CPT | Mod: S$GLB,,, | Performed by: NURSE PRACTITIONER

## 2017-04-17 PROCEDURE — 3079F DIAST BP 80-89 MM HG: CPT | Mod: S$GLB,,, | Performed by: NURSE PRACTITIONER

## 2017-04-17 NOTE — PROGRESS NOTES
"PCP: Kika Betancur MD    Subjective:     Chief Complaint: Diabetes, follow up    HISTORY OF PRESENT ILLNESS: 51 year old  female presenting to follow up for diabetes.  Patient has had Type II diabetes since 1985 and has the following complications from diabetes: neuropathy.  She  has attended diabetes education in the past.  Has been on Janumet, and plain metformin in past, but was discontinued.   She forgot her meter and BG readings today. Per recall, fasting BG are 70 - 190; afternoon > 200.       She denies any recent hospital admissions, ER visits, hypoglycemia, syncope, or diaphoresis.  However, she does complain of recent nausea after eating at a chinese buffet on Friday.  Further diabetes review: has noted excessive thirstiness and frequent urination, blurry vision.     Of note, for some reason patient self-adjusted her insulin to 38 units. She states that was the dose listed on the prescription box.  However, on her last visit, we discussed increasing to 55 units.      Height: 5' 3" (160 cm)  //  Weight: 75.8 kg (167 lb 1.7 oz), Body mass index is 29.6 kg/(m^2).  Home Blood Glucose reading this AM: Not Taken  Her blood sugar in clinic today is: 370 mg/dl at 10:00 am  ( had maggy ac )     Labs Reviewed. ADA recommends A1C of less than 7 %. Her most recent A1C is:     Lab Results   Component Value Date    HGBA1C 16.8 (H) 01/19/2017      DM MEDICATIONS:   Lantus 38 units at bedtime  Novolog 15 units TID ac    REVIEW OF SYSTEMS:  General: Denies fever, nausea, vomiting, chills, or change in appetite.  HEENT: Denies impaired hearing, dysphagia.  Respiratory: Denies shortness of breath, cough, or wheezing.  Cardiovascular: Denies chest pain, palpitations.  GI: Denies hematochezia.  : Denies hematuria, diarrhea, dysuria or frequency.  MS:  Normal gait. Denies difficulty with mobility, muscle or joint pain.  SKIN: Denies rashes and lesions.  Neuro: Has numbness or tingling in the hands or " feet.   PSYCH: Has chronic anxiety, Bipolar disorder. No suicidal ideations.  ENDO: See HPI.    STANDARDS OF CARE:  Eye doctor: [facility] ( in [place] ) -  Unsure of MD name,  Has cataracts both eyes.   Dental exam: Recommend regular exams; denies gums bleeding.  Podiatry doctor: None    ACTIVITY LEVEL: No routine exercise.  MEAL PLANNING: Number of meals per day - 3. Breakfast can be yogurt, mid morning snack can be carrots, celery, mushrooms; lunch can be corn dog OR frozen burrito. Mid afternoon snack can be carrots, apple. Supper can be grilled pork chops with rice, gravy OR 1/2 cup corn. Bedtime snack can be yogurt OR yanira cracker. Number of snacks per day - 3.      Patient is encouraged to consume 45 - 60 grams of carbohydrates in each meal, and 1800 k / gloria per day.  Per dietary recall, patient is not limiting carbohydrates, saturated fats and sodium.     BLOOD GLUCOSE TESTING: Self-monitoring  SOCIAL HISTORY: .  Lives with spouse.  Disabled, due to Bipolar Disorder, chronic anxiety.  Former smoker.    Objective:     PHYSICAL EXAMINATION:  GENERAL: WDWN  female in no acute distress, ambulatory, responds appropriately. AAO X 3.   NECK: Supple, no thyromegaly, no cervical or supraclavicular lymphadenopathy, no carotid bruit.  HEART: Regular rate and rhythm. No rubs, murmurs or gallops.  LUNGS: CTA bilaterally. Unlabored breathing, no use of accessory muscles.  MUSCULOSKELETAL: Normal gait and muscle strength.  ABDOMEN: Active bowel sounds X 4, no masses or tenderness.   SKIN: Warm, dry skin. No lesions or abrasions. Clean, dry well-healed injection sites.   NEUROLOGIC: Cranial nerves II-XII grossly intact.   FOOT EXAMINATION: Protective Sensation (w/ 10 gram monofilament):  Right: Absent  Left: Absent //  Visual Inspection: Normal -  Bilateral  //  Pedal Pulses:   Right: Present  Left: Present    Assessment:     1) Diabetes Type II, neuropathy - per records, uncontrolled on  Lantus, Novolog, A1C 16.8  2) Hyperlipidemia - continue Lipitor  3) Hypertension - continue ramipril  4) Overweight, BMI 29.60    Plan:     1.) Patient was instructed to monitor blood glucose 2 - 3 x daily, fasting and ac dinner or at bedtime.  Discussed ADA goal for fasting blood sugar, 80 - 130 mg/dL; pp blood sugars below 180 mg/dl. Also, discussed prevention of hypoglycemia and the need to adjust goals to higher levels if persistent hypoglycemia.  Reminded to bring blood glucose records or meter to each visit for review.  2.) Reviewed pathophysiology of Type II diabetes, complications related to the disease, importance of annual dilated eye exam and daily foot examination.  3.) We discussed the ADA recommendations: Hemoglobin A1c below 7.0 %.   All patients with diabetes should be on statins unless contraindicated.  ACE or ARB therapy if not contraindicated.    4.) For some reason, patient adjusted her insulin Lantus to 38 units at bedtime, when she should have been taking 55 units. She agrees to start taking 55 units at bedtime..  Continue Novolog 15 units TID ac. Phone review of BG readings in one week with adjustment of insulin as needed.  In the future, may consider restarting metformin as patient tolerated medication.  5.) Meal planning teaching: Carbohydrate definition - one serving is 15 gms. Carbohydrate spacing - carbohydrates should be spaced into approximately 3 meals with 2 snacks ( of one carbohydrate ) between meals or at bedtime. Increase vegetable intake to 2 or more cups of vegetables per day as well as 2 fruit servings. Recommended low saturated fat, low sodium diet to aid in control of hypertension and cholesterol.  6.) Discussed activity, benefits, methods, and precautions. Recommended patient start or continue some form of exercise and increase as tolerated to 30 minutes per day to facilitate weight loss and aid in control of BGs.  7.) Future Labs: A1C and comprehensive metabolic panel.    8.) Return to clinic in 3 months for follow up.  She was explained the above plan and given opportunity to ask questions.  Advised to call clinic with any further questions or concerns.    Amelia Santana, SAMSON-C, CDE

## 2017-04-19 ENCOUNTER — TELEPHONE (OUTPATIENT)
Dept: DIABETES | Facility: CLINIC | Age: 52
End: 2017-04-19

## 2017-04-19 NOTE — TELEPHONE ENCOUNTER
----- Message from TIFFANIE Luna, MAHAD sent at 4/19/2017  8:49 AM CDT -----  Contact: patient      ----- Message -----     From: Angela Arreola     Sent: 4/19/2017   8:15 AM       To: TIFFANIE Luna, MAHAD    Patient needs to speak with the nurse about her insulin cost of this one, patient wants to discuss a less expensive insulin.  Patient's # is [phone]

## 2017-04-19 NOTE — TELEPHONE ENCOUNTER
Spoke with patient. Stated the Novolog she was prescribed is too much for her to afford, and patient wants to know if she can be on Novolin 70/30 instead since it is a more cost effective option for her. Pt said she knows 70/30 won't work as fast as Novolog, however it is what she can afford to buy at this time. Please advise.

## 2017-04-19 NOTE — TELEPHONE ENCOUNTER
If patient is going to stay on the Lantus, I would suggest doing OTC Novolin R to replace her Novolog.  There is no need to be on mixed 70/30, if she is going to continue the Lantus. However, if she needs to stop both the Lantus and Novolog, then we can switch to mixed Novolin 70/30.

## 2017-04-20 NOTE — TELEPHONE ENCOUNTER
Spoke with patient, and she voiced understanding. Said she will discontinue Novolog next month due to expensive copay, and  Novolin R from [email or web address]. Pt would like to know what dosage of the Novolin R she should take. Please advise.

## 2017-06-23 ENCOUNTER — TELEPHONE (OUTPATIENT)
Dept: FAMILY MEDICINE | Facility: CLINIC | Age: 52
End: 2017-06-23

## 2017-06-23 DIAGNOSIS — E11.9 TYPE 2 DIABETES MELLITUS WITHOUT COMPLICATION: ICD-10-CM

## 2017-06-23 NOTE — TELEPHONE ENCOUNTER
----- Message from Marion Azevedo LPN sent at 6/23/2017 12:16 PM CDT -----  Regarding: Care Else Where  Dr Lee stated this patient had been fired from her clinic. I have orders postponed until July 31st. It will release July 31st and roll back into the bulk ordering at that point. My Supervisor has asked me to ask [place] to please remove Dr Lee as her PCP once seek care elsewhere form was completed. Kalli can you please verify paperwork is in, received, ect? I am not sure where in the process it is. Thank you.

## 2017-07-21 DIAGNOSIS — E11.9 DIABETES MELLITUS WITHOUT COMPLICATION: ICD-10-CM

## 2017-07-24 ENCOUNTER — OUTPATIENT CASE MANAGEMENT (OUTPATIENT)
Dept: ADMINISTRATIVE | Facility: OTHER | Age: 52
End: 2017-07-24

## 2017-07-25 ENCOUNTER — HOSPITAL ENCOUNTER (OUTPATIENT)
Dept: RADIOLOGY | Facility: HOSPITAL | Age: 52
Discharge: HOME | End: 2017-07-25
Attending: FAMILY MEDICINE
Payer: MEDICARE

## 2017-07-25 ENCOUNTER — TELEPHONE (OUTPATIENT)
Dept: FAMILY MEDICINE | Facility: CLINIC | Age: 52
End: 2017-07-25

## 2017-07-25 ENCOUNTER — OFFICE VISIT (OUTPATIENT)
Dept: FAMILY MEDICINE | Facility: CLINIC | Age: 52
End: 2017-07-25
Payer: MEDICARE

## 2017-07-25 VITALS
HEIGHT: 63 IN | HEART RATE: 119 BPM | TEMPERATURE: 97 F | WEIGHT: 175.25 LBS | OXYGEN SATURATION: 97 % | BODY MASS INDEX: 31.05 KG/M2 | SYSTOLIC BLOOD PRESSURE: 122 MMHG | DIASTOLIC BLOOD PRESSURE: 64 MMHG

## 2017-07-25 DIAGNOSIS — E03.4 HYPOTHYROIDISM DUE TO ACQUIRED ATROPHY OF THYROID: ICD-10-CM

## 2017-07-25 DIAGNOSIS — I10 ESSENTIAL HYPERTENSION: ICD-10-CM

## 2017-07-25 DIAGNOSIS — Z79.4 TYPE 2 DIABETES MELLITUS WITHOUT COMPLICATION, WITH LONG-TERM CURRENT USE OF INSULIN: ICD-10-CM

## 2017-07-25 DIAGNOSIS — S99.911A RIGHT ANKLE INJURY, INITIAL ENCOUNTER: ICD-10-CM

## 2017-07-25 DIAGNOSIS — R00.0 SINUS TACHYCARDIA: ICD-10-CM

## 2017-07-25 DIAGNOSIS — R42 DIZZINESS: Primary | ICD-10-CM

## 2017-07-25 DIAGNOSIS — F31.5 BIPOLAR DISORDER, CURRENT EPISODE DEPRESSED, SEVERE, WITH PSYCHOTIC FEATURES: ICD-10-CM

## 2017-07-25 DIAGNOSIS — E11.9 TYPE 2 DIABETES MELLITUS WITHOUT COMPLICATION, WITH LONG-TERM CURRENT USE OF INSULIN: ICD-10-CM

## 2017-07-25 PROCEDURE — 99999 PR PBB SHADOW E&M-EST. PATIENT-LVL III: CPT | Mod: PBBFAC,,, | Performed by: FAMILY MEDICINE

## 2017-07-25 PROCEDURE — 73610 X-RAY EXAM OF ANKLE: CPT | Mod: 26,RT,, | Performed by: RADIOLOGY

## 2017-07-25 PROCEDURE — 99499 UNLISTED E&M SERVICE: CPT | Mod: S$GLB,,, | Performed by: FAMILY MEDICINE

## 2017-07-25 PROCEDURE — 99214 OFFICE O/P EST MOD 30 MIN: CPT | Mod: 25,S$GLB,, | Performed by: FAMILY MEDICINE

## 2017-07-25 PROCEDURE — 3046F HEMOGLOBIN A1C LEVEL >9.0%: CPT | Mod: S$GLB,,, | Performed by: FAMILY MEDICINE

## 2017-07-25 PROCEDURE — 96372 THER/PROPH/DIAG INJ SC/IM: CPT | Mod: S$GLB,,, | Performed by: FAMILY MEDICINE

## 2017-07-25 PROCEDURE — 4010F ACE/ARB THERAPY RXD/TAKEN: CPT | Mod: S$GLB,,, | Performed by: FAMILY MEDICINE

## 2017-07-25 PROCEDURE — 73610 X-RAY EXAM OF ANKLE: CPT | Mod: TC,PO,RT

## 2017-07-25 RX ORDER — CLONAZEPAM 1 MG/1
1 TABLET ORAL 3 TIMES DAILY
COMMUNITY
End: 2018-02-01 | Stop reason: SDUPTHER

## 2017-07-25 RX ORDER — RAMIPRIL 2.5 MG/1
2.5 CAPSULE ORAL DAILY
Qty: 90 CAPSULE | Refills: 3 | Status: SHIPPED | OUTPATIENT
Start: 2017-07-25 | End: 2019-04-11

## 2017-07-25 RX ORDER — ATORVASTATIN CALCIUM 40 MG/1
40 TABLET, FILM COATED ORAL DAILY
Qty: 90 TABLET | Refills: 0 | Status: SHIPPED | OUTPATIENT
Start: 2017-07-25 | End: 2017-11-20 | Stop reason: SINTOL

## 2017-07-25 RX ORDER — KETOROLAC TROMETHAMINE 30 MG/ML
30 INJECTION, SOLUTION INTRAMUSCULAR; INTRAVENOUS
Status: COMPLETED | OUTPATIENT
Start: 2017-07-25 | End: 2017-07-25

## 2017-07-25 RX ADMIN — KETOROLAC TROMETHAMINE 30 MG: 30 INJECTION, SOLUTION INTRAMUSCULAR; INTRAVENOUS at 09:07

## 2017-07-25 NOTE — PROGRESS NOTES
Subjective:       Patient ID: Alexandra Goins is a 51 y.o. female.    Chief Complaint: Establish Care; Annual Exam; and Medication Refill      HPI Comments:     This patient is well-known to us with history of diabetes and neuropathy, however this is my first time seeing her.  She has been seeing Dr. Fragoso but says she wants to switch to me.  During the visit she got somewhat irritated with me when I suggested we try nonsteroidals for her ankle pain (see below).  She also has been followed carefully by Ms. Santana in the diabetic team, last time being in April, when the there was some confusion about the patient's insulin doses.  Patient herself admits that she gets confused a lot.    Today she's complaining of right ankle pain which occurred a few days ago when she missed a step and twisted her ankle.  She has pain on the dorsum of the hindfoot and the lateral malleolus.  She has not sought any care for because she was pretty sure she didn't break it.  However it does hurt to walk.  She described in general some loss of balance particularly last week.  She's also fallen in the shower twice, and though she didn't lose consciousness she didn't really remember going down and wonders if she had blackouts.  When such events occur she checks her blood sugar and she is not hypoglycemic.    She also needs med refills.  She is no longer on a blood pressure medication although our records show that she was recently on an ACE inhibitor, without any clear indication for stopping it.  She says her blood pressure and a pulse of both been elevated at home.  She has chronic constipation and today's complaining of right upper quadrant discomfort she takes Metamucil and stool softeners.  Lifelong her bowel movements have only occurred once or twice a week    She was supposed to have some cardiac tests done earlier this year for shortness of breath and chest pain.  Both of these are occurring less now than he did before.  When she  gets chest pain is usually relieved by taking Klonopin    She's had a few colonoscopies in her life.  Polyps have been found.  She thinks she may be due for her next one.        Review of Systems   Constitutional: Negative for activity change, appetite change, fatigue and fever.   HENT: Negative for sore throat.    Respiratory: Negative for cough, chest tightness and shortness of breath.    Cardiovascular: Negative for chest pain, palpitations and leg swelling.   Gastrointestinal: Positive for constipation. Negative for nausea.   Genitourinary: Negative for difficulty urinating and dysuria.   Musculoskeletal: Negative for back pain.   Neurological: Positive for weakness and light-headedness. Negative for headaches.   Hematological: Negative for adenopathy. Does not bruise/bleed easily.   Psychiatric/Behavioral: Negative for confusion, self-injury and suicidal ideas. The patient is nervous/anxious.        Objective:      Physical Exam   Constitutional: She is oriented to person, place, and time. She appears well-developed and well-nourished. No distress.   HENT:   Head: Normocephalic.   Right Ear: External ear normal.   Left Ear: External ear normal.   Mouth/Throat: Oropharynx is clear and moist. No oropharyngeal exudate.   Eyes: Conjunctivae are normal. Right eye exhibits no discharge. Left eye exhibits no discharge. No scleral icterus.   Neck: Normal range of motion. Neck supple. No JVD present. No thyromegaly present.   Cardiovascular: Normal rate, regular rhythm, normal heart sounds and intact distal pulses.  Exam reveals no gallop and no friction rub.    No murmur heard.  Pulmonary/Chest: Effort normal and breath sounds normal. No respiratory distress. She has no wheezes. She has no rales.   Abdominal: Soft. She exhibits no distension and no mass. There is no hepatosplenomegaly. There is tenderness in the right upper quadrant. There is no rigidity, no rebound, no guarding, no tenderness at McBurney's point and  negative Mccloud's sign.   Musculoskeletal: Normal range of motion. She exhibits no edema.   Lymphadenopathy:     She has no cervical adenopathy.   Neurological: She is alert and oriented to person, place, and time. She has normal strength. No cranial nerve deficit. She displays a negative Romberg sign. Coordination abnormal.   Skin: Skin is warm and dry. No rash noted. She is not diaphoretic.   Psychiatric: She has a normal mood and affect. Her behavior is normal. Judgment and thought content normal.   Nursing note and vitals reviewed.      Assessment:       1. Dizziness    2. Type 2 diabetes mellitus without complication, with long-term current use of insulin    3. Hypothyroidism due to acquired atrophy of thyroid    4. Essential hypertension    5. Right ankle injury, initial encounter    6. Bipolar disorder, current episode depressed, severe, with psychotic features    7. Sinus tachycardia        Plan:   Dizziness  Comments:  likely multifactoral. Check CBC  Orders:  -     CBC auto differential; Future; Expected date: 07/25/2017    Type 2 diabetes mellitus without complication, with long-term current use of insulin  Comments:  f/u with diabetic team soon.    Hypothyroidism due to acquired atrophy of thyroid  Comments:  check TSH  Orders:  -     TSH; Future; Expected date: 07/25/2017    Essential hypertension  Comments:  restart ACE    Right ankle injury, initial encounter  Comments:  xray neg.   Orders:  -     X-Ray Ankle Complete 3 View Right; Future; Expected date: 07/25/2017  -     ketorolac injection 30 mg; Inject 1 mL (30 mg total) into the muscle one time.    Bipolar disorder, current episode depressed, severe, with psychotic features  Comments:  on med    Sinus tachycardia  Comments:  chronic. Card eval pending    Other orders  -     atorvastatin (LIPITOR) 40 MG tablet; Take 1 tablet (40 mg total) by mouth once daily.  Dispense: 90 tablet; Refill: 0  -     ramipril (ALTACE) 2.5 MG capsule; Take 1 capsule  (2.5 mg total) by mouth once daily.  Dispense: 90 capsule; Refill: 3

## 2017-07-25 NOTE — TELEPHONE ENCOUNTER
----- Message from Perez Casiano MD sent at 7/25/2017  1:00 PM CDT -----  Call patient. No fracture

## 2017-07-25 NOTE — TELEPHONE ENCOUNTER
Left message for pt to call back and inform her that her xray this morning showed no fracture. /blc

## 2017-07-25 NOTE — PROGRESS NOTES
Please note the following patient has been assigned to Toyin Mathew RN in Outpatient Case Management for Diabetes Disease Management with a hbA1C greater than 10.0.    Please contact [facility] at Ext. [phone] with any questions.    Thank you,    Allison Davidson, SSC

## 2017-07-25 NOTE — TELEPHONE ENCOUNTER
Spoke with pt and informed her that xray results were negative and scheduled a follow up appt with Amelia Santana for diabetes. Pt verbalized understanding. /blc

## 2017-07-26 ENCOUNTER — TELEPHONE (OUTPATIENT)
Dept: FAMILY MEDICINE | Facility: CLINIC | Age: 52
End: 2017-07-26

## 2017-07-26 RX ORDER — ONDANSETRON 4 MG/1
4 TABLET, FILM COATED ORAL EVERY 6 HOURS PRN
Qty: 20 TABLET | Refills: 0 | Status: SHIPPED | OUTPATIENT
Start: 2017-07-26 | End: 2018-05-31

## 2017-07-26 NOTE — TELEPHONE ENCOUNTER
Informed pt of lab results and was told to continue to take her thyroid medication. Pt states that she is still feeling nauseous after receiving her Toradol shot yesterday and would like something sent in to [place] [facility]. /blc

## 2017-07-26 NOTE — TELEPHONE ENCOUNTER
----- Message from Perez Casiano MD sent at 7/26/2017 10:35 AM CDT -----  Blood work is okay.  Thyroid was just a tiny bit low.   take be sure to thyroid medicine every day, and we will recheck it in a few months

## 2017-07-27 ENCOUNTER — OUTPATIENT CASE MANAGEMENT (OUTPATIENT)
Dept: ADMINISTRATIVE | Facility: OTHER | Age: 52
End: 2017-07-27

## 2017-07-27 NOTE — PROGRESS NOTES
First attempt to perform initial assessment for OPCM/Disease Management; unable to leave voice message because voice mail has not been set up yet.  DAYSI Mathew, OPCM-RN

## 2017-07-28 ENCOUNTER — TELEPHONE (OUTPATIENT)
Dept: ADMINISTRATIVE | Facility: OTHER | Age: 52
End: 2017-07-28

## 2017-07-28 ENCOUNTER — OUTPATIENT CASE MANAGEMENT (OUTPATIENT)
Dept: ADMINISTRATIVE | Facility: OTHER | Age: 52
End: 2017-07-28

## 2017-07-28 NOTE — PROGRESS NOTES
"7/28/17 - Phone contact with pt today for completion of Initial Screen for OPCM. She reports good compliance with diab diet and meds, but states she used to be on an oral anti-diab med along with her Lantus, but Diab NP changed it on last visit to short acting insulin. She voices desire to go back to an oral med with Lantus if that is an option, stating "I don't do good with short acting insulin". She reports performing finger sticks to check blood sugars qod, stating her fingers get sore. She reports blood sugars are running in the 200's recently. She reports ongoing symptoms of nausea. Advised CM will message her ROXANN Santana, Diab NP, to advise her of pt's desire to go back to oral diab agent and will follow up with her next Friday to perform Nursing Assessment for OPCM. Sunni Mccall RN  "

## 2017-08-01 ENCOUNTER — TELEPHONE (OUTPATIENT)
Dept: CARDIOLOGY | Facility: CLINIC | Age: 52
End: 2017-08-01

## 2017-08-04 ENCOUNTER — OUTPATIENT CASE MANAGEMENT (OUTPATIENT)
Dept: ADMINISTRATIVE | Facility: OTHER | Age: 52
End: 2017-08-04

## 2017-08-04 NOTE — PROGRESS NOTES
8/4/17 - Phone contact with pt this AM for completion of Nursing Assessment for OPCM. She completed Nursing Assessment, answering questions with increasingly hostile tone. After completion she became more and more agitated with ongoing conversation, frequently swearing and blaming health care providers for not taking adequate care of her and charging her too much money for the services she needs. CM made repeated attempts to reassure pt that OPCM referral was in support of the care being given by PCP and Kush NP, not in place of. She remained agitated and CM finally requested that she stop yelling and swearing and she told me not to call her back and hung up. CM will d/c from OPCM today and advise Dr Casiano and Kush Lyman NP. Sunni Mccall RN

## 2017-08-07 DIAGNOSIS — E11.9 TYPE 2 DIABETES MELLITUS WITHOUT COMPLICATION: ICD-10-CM

## 2017-08-09 ENCOUNTER — LAB VISIT (OUTPATIENT)
Dept: LAB | Facility: HOSPITAL | Age: 52
End: 2017-08-09
Attending: NURSE PRACTITIONER
Payer: MEDICARE

## 2017-08-09 ENCOUNTER — OFFICE VISIT (OUTPATIENT)
Dept: DIABETES | Facility: CLINIC | Age: 52
End: 2017-08-09
Payer: MEDICARE

## 2017-08-09 VITALS
SYSTOLIC BLOOD PRESSURE: 138 MMHG | WEIGHT: 170.63 LBS | HEIGHT: 64 IN | BODY MASS INDEX: 29.13 KG/M2 | DIASTOLIC BLOOD PRESSURE: 86 MMHG

## 2017-08-09 DIAGNOSIS — E11.9 TYPE 2 DIABETES MELLITUS WITHOUT COMPLICATION, WITH LONG-TERM CURRENT USE OF INSULIN: Primary | ICD-10-CM

## 2017-08-09 DIAGNOSIS — E11.9 TYPE 2 DIABETES MELLITUS WITHOUT COMPLICATION, WITH LONG-TERM CURRENT USE OF INSULIN: ICD-10-CM

## 2017-08-09 DIAGNOSIS — Z79.4 TYPE 2 DIABETES MELLITUS WITHOUT COMPLICATION, WITH LONG-TERM CURRENT USE OF INSULIN: Primary | ICD-10-CM

## 2017-08-09 DIAGNOSIS — E78.00 PURE HYPERCHOLESTEROLEMIA: Chronic | ICD-10-CM

## 2017-08-09 DIAGNOSIS — I10 ESSENTIAL HYPERTENSION: Chronic | ICD-10-CM

## 2017-08-09 DIAGNOSIS — Z79.4 TYPE 2 DIABETES MELLITUS WITHOUT COMPLICATION, WITH LONG-TERM CURRENT USE OF INSULIN: ICD-10-CM

## 2017-08-09 LAB
ALBUMIN SERPL BCP-MCNC: 4.3 G/DL
ALP SERPL-CCNC: 110 U/L
ALT SERPL W/O P-5'-P-CCNC: 40 U/L
ANION GAP SERPL CALC-SCNC: 14 MMOL/L
AST SERPL-CCNC: 27 U/L
BILIRUB SERPL-MCNC: 0.5 MG/DL
BUN SERPL-MCNC: 14 MG/DL
CALCIUM SERPL-MCNC: 10 MG/DL
CHLORIDE SERPL-SCNC: 103 MMOL/L
CHOLEST/HDLC SERPL: 3.9 {RATIO}
CO2 SERPL-SCNC: 21 MMOL/L
CREAT SERPL-MCNC: 1 MG/DL
CREAT UR-MCNC: 82 MG/DL
EST. GFR  (AFRICAN AMERICAN): >60 ML/MIN/1.73 M^2
EST. GFR  (NON AFRICAN AMERICAN): >60 ML/MIN/1.73 M^2
GLUCOSE SERPL-MCNC: 279 MG/DL (ref 70–110)
GLUCOSE SERPL-MCNC: 279 MG/DL (ref 70–110)
GLUCOSE SERPL-MCNC: 305 MG/DL
HDL/CHOLESTEROL RATIO: 25.9 %
HDLC SERPL-MCNC: 189 MG/DL
HDLC SERPL-MCNC: 49 MG/DL
LDLC SERPL CALC-MCNC: 76 MG/DL
MICROALBUMIN UR DL<=1MG/L-MCNC: 40 UG/ML
MICROALBUMIN/CREATININE RATIO: 48.8 UG/MG
NONHDLC SERPL-MCNC: 140 MG/DL
POTASSIUM SERPL-SCNC: 4 MMOL/L
PROT SERPL-MCNC: 8.2 G/DL
SODIUM SERPL-SCNC: 138 MMOL/L
TRIGL SERPL-MCNC: 320 MG/DL

## 2017-08-09 PROCEDURE — 82948 REAGENT STRIP/BLOOD GLUCOSE: CPT | Mod: S$GLB,,, | Performed by: NURSE PRACTITIONER

## 2017-08-09 PROCEDURE — 80061 LIPID PANEL: CPT

## 2017-08-09 PROCEDURE — 80053 COMPREHEN METABOLIC PANEL: CPT

## 2017-08-09 PROCEDURE — 3046F HEMOGLOBIN A1C LEVEL >9.0%: CPT | Mod: S$GLB,,, | Performed by: NURSE PRACTITIONER

## 2017-08-09 PROCEDURE — 99214 OFFICE O/P EST MOD 30 MIN: CPT | Mod: S$GLB,,, | Performed by: NURSE PRACTITIONER

## 2017-08-09 PROCEDURE — 3008F BODY MASS INDEX DOCD: CPT | Mod: S$GLB,,, | Performed by: NURSE PRACTITIONER

## 2017-08-09 PROCEDURE — 36415 COLL VENOUS BLD VENIPUNCTURE: CPT | Mod: PO

## 2017-08-09 PROCEDURE — 4010F ACE/ARB THERAPY RXD/TAKEN: CPT | Mod: S$GLB,,, | Performed by: NURSE PRACTITIONER

## 2017-08-09 PROCEDURE — 83036 HEMOGLOBIN GLYCOSYLATED A1C: CPT

## 2017-08-09 PROCEDURE — 3079F DIAST BP 80-89 MM HG: CPT | Mod: S$GLB,,, | Performed by: NURSE PRACTITIONER

## 2017-08-09 PROCEDURE — 3075F SYST BP GE 130 - 139MM HG: CPT | Mod: S$GLB,,, | Performed by: NURSE PRACTITIONER

## 2017-08-09 PROCEDURE — 99999 PR PBB SHADOW E&M-EST. PATIENT-LVL IV: CPT | Mod: PBBFAC,,, | Performed by: NURSE PRACTITIONER

## 2017-08-09 RX ORDER — METFORMIN HYDROCHLORIDE 500 MG/1
500 TABLET ORAL 2 TIMES DAILY WITH MEALS
Qty: 60 TABLET | Refills: 3 | Status: SHIPPED | OUTPATIENT
Start: 2017-08-09 | End: 2017-11-08 | Stop reason: SDUPTHER

## 2017-08-09 RX ORDER — INSULIN GLARGINE 100 [IU]/ML
60 INJECTION, SOLUTION SUBCUTANEOUS NIGHTLY
Qty: 2 VIAL | Refills: 3 | Status: SHIPPED | OUTPATIENT
Start: 2017-08-09 | End: 2018-08-29 | Stop reason: ALTCHOICE

## 2017-08-09 NOTE — PROGRESS NOTES
"PCP: Perez Casiano MD    Subjective:     Chief Complaint: Diabetes, follow up    HISTORY OF PRESENT ILLNESS: 51 year old  female presenting to follow up for diabetes.  Patient has had Type II diabetes since 1985 and has the following complications from diabetes: neuropathy.  She  has attended diabetes education in the past.  Has been on Janumet, and plain metformin in past, but was discontinued.   Again, she forgot her meter and BG readings today.  Per recall, fasting BG are 200 - 220.   Due to cost, she had to purchase OTC Novolin R.         She denies any recent hospital admissions, ER visits, hypoglycemia, syncope, or diaphoresis.  However, she does complain of occasional nausea.  Has noted excessive thirstiness and frequent urination, blurry vision.     Height: 5' 4" (162.6 cm)  //  Weight: 77.4 kg (170 lb 10.2 oz), Body mass index is 29.29 kg/m².  Home Blood Glucose reading this AM: Not Taken  Her blood sugar in clinic today is: 279 mg/dl at 11:6 am     Labs Reviewed. ADA recommends A1C of less than 7 %. Her most recent A1C is:     Lab Results   Component Value Date    HGBA1C 16.8 (H) 01/19/2017      DM MEDICATIONS:   Lantus 58 units at bedtime  Novolin 12 units TID ac    REVIEW OF SYSTEMS:  General: Denies fever, nausea, vomiting, chills, or change in appetite.  HEENT: Denies impaired hearing, dysphagia.  Respiratory: Denies shortness of breath, cough, or wheezing.  Cardiovascular: Denies chest pain, palpitations.  GI: Denies hematochezia.  : Denies hematuria, diarrhea, dysuria or frequency.  MS:  Normal gait. Denies difficulty with mobility, muscle or joint pain.  SKIN: Denies rashes and lesions.  Neuro: Has numbness or tingling in the hands or feet.   PSYCH: Has chronic anxiety, Bipolar disorder. No suicidal ideations.  ENDO: See HPI.    STANDARDS OF CARE:  Eye doctor: [facility] ( in [place] ) -  Unsure of MD name,  Has cataracts both eyes.   Dental exam: Recommend regular " exams; denies gums bleeding.  Podiatry doctor: None    ACTIVITY LEVEL: No routine exercise.  MEAL PLANNING: Number of meals per day - 3. Breakfast can be yogurt, mid morning snack can be carrots, celery, mushrooms; lunch can be corn dog OR frozen burrito. Mid afternoon snack can be carrots, apple. Supper can be grilled pork chops with rice, gravy OR 1/2 cup corn. Bedtime snack can be yogurt OR yanira cracker. Number of snacks per day - 3.      Patient is encouraged to consume 45 - 60 grams of carbohydrates in each meal, and 1800 k / gloria per day.  Per dietary recall, patient is not limiting carbohydrates, saturated fats and sodium.     BLOOD GLUCOSE TESTING: Self-monitoring  SOCIAL HISTORY: .  Lives with spouse.  Disabled, due to Bipolar Disorder, chronic anxiety.  Former smoker.    Objective:     PHYSICAL EXAMINATION:  GENERAL: WDWN  female in no acute distress, ambulatory, responds appropriately. AAO X 3.   NECK: Supple, no thyromegaly, no cervical or supraclavicular lymphadenopathy, no carotid bruit.  HEART: Regular rate and rhythm. No rubs, murmurs or gallops.  LUNGS: CTA bilaterally. Unlabored breathing, no use of accessory muscles.  MUSCULOSKELETAL: Normal gait and muscle strength.  ABDOMEN: Active bowel sounds X 4, no masses or tenderness.   SKIN: Warm, dry skin. No lesions or abrasions. Clean, dry well-healed injection sites.   NEUROLOGIC: Cranial nerves II-XII grossly intact.   FOOT EXAMINATION: Protective Sensation (w/ 10 gram monofilament):  Right: Absent  Left: Absent //  Visual Inspection: Normal -  Bilateral  //  Pedal Pulses:   Right: Present  Left: Present    Assessment:     1) Diabetes Type II, neuropathy - per records, uncontrolled on Lantus, Novolog, A1C 16.8  2) Hyperlipidemia - continue Lipitor  3) Hypertension - continue ramipril  4) Obese, BMI 29.29    Plan:     1.) Patient was instructed to monitor blood glucose 2 - 3 x daily, fasting and ac dinner or at bedtime.  Discussed ADA  goal for fasting blood sugar, 80 - 130 mg/dL; pp blood sugars below 180 mg/dl. Also, discussed prevention of hypoglycemia and the need to adjust goals to higher levels if persistent hypoglycemia.  Reminded to bring blood glucose records or meter to each visit for review.  2.) Reviewed pathophysiology of Type II diabetes, complications related to the disease, importance of annual dilated eye exam and daily foot examination.  3.) We discussed the ADA recommendations: Hemoglobin A1c below 7.0 %.   All patients with diabetes should be on statins unless contraindicated.  ACE or ARB therapy if not contraindicated.    4.) Increase Lantus 60 units daily, same time every day.  Increase Novolin R 20 units TID ac.  We are going to restart metformin, 500 mg BID.  Phone review of blood sugar readings in one week with adjustment of medications as needed.  5.) Meal planning teaching: Carbohydrate definition - one serving is 15 gms. Carbohydrate spacing - carbohydrates should be spaced into approximately 3 meals with 2 snacks ( of one carbohydrate ) between meals or at bedtime. Increase vegetable intake to 2 or more cups of vegetables per day as well as 2 fruit servings. Recommended low saturated fat, low sodium diet to aid in control of hypertension and cholesterol.  6.) Discussed activity, benefits, methods, and precautions. Recommended patient start or continue some form of exercise and increase as tolerated to 30 minutes per day to facilitate weight loss and aid in control of BGs.  7.) Labs Today: A1C and CMP, Lipids, micral  8.) Return to clinic in 6 weeks for follow up.  She was explained the above plan and given opportunity to ask questions.  Advised to call clinic with any further questions or concerns.    Amelia Santana, SAMSON-C, CDE

## 2017-08-09 NOTE — PATIENT INSTRUCTIONS
Continue Lantus 60 units daily.    Increase Novolin R 20 units with meals    Start metformin 500 twice a day

## 2017-08-10 ENCOUNTER — TELEPHONE (OUTPATIENT)
Dept: DIABETES | Facility: CLINIC | Age: 52
End: 2017-08-10

## 2017-08-10 DIAGNOSIS — Z79.4 TYPE 2 DIABETES MELLITUS WITHOUT COMPLICATION, WITH LONG-TERM CURRENT USE OF INSULIN: Primary | ICD-10-CM

## 2017-08-10 DIAGNOSIS — E11.9 TYPE 2 DIABETES MELLITUS WITHOUT COMPLICATION, WITH LONG-TERM CURRENT USE OF INSULIN: Primary | ICD-10-CM

## 2017-08-10 LAB
ESTIMATED AVG GLUCOSE: ABNORMAL MG/DL
HBA1C MFR BLD HPLC: >14 %

## 2017-08-10 NOTE — TELEPHONE ENCOUNTER
----- Message from Ralf Rivas sent at 8/10/2017 10:16 AM CDT -----  Contact: pt  She's calling in regards to a missed call, please advise, [phone] (cell)

## 2017-08-10 NOTE — TELEPHONE ENCOUNTER
A1C is still very uncontrolled.  Case management has already been consulted, but patient refused to work with them.  Due to our protocol, patient has 2 consecutive A1C > 10 %, so referral placed for endocrinology.

## 2017-08-11 ENCOUNTER — TELEPHONE (OUTPATIENT)
Dept: FAMILY MEDICINE | Facility: CLINIC | Age: 52
End: 2017-08-11

## 2017-08-11 NOTE — TELEPHONE ENCOUNTER
----- Message from Luna Bernardo sent at 8/11/2017 11:19 AM CDT -----  Pt states she is unable to take Rx Lipitor. It has her feeling like she has the flu. Pt is requesting that it be changed. Pt can be reached at [phone]    [facility]  [place], [place] - [address] [address]  [address] [address]  [place] [address]  Phone: [phone] Fax: [phone]

## 2017-08-11 NOTE — TELEPHONE ENCOUNTER
Pt states the Lipitor is making her weak, her muscles hurt, and like she has the flu. Pt would like a different medication. Pt states that she is going to stop taking medication until something different is called in. Pt uses [facility] in [place].

## 2017-08-18 ENCOUNTER — TELEPHONE (OUTPATIENT)
Dept: FAMILY MEDICINE | Facility: CLINIC | Age: 52
End: 2017-08-18

## 2017-08-18 NOTE — TELEPHONE ENCOUNTER
----- Message from Luna Bernardo sent at 8/11/2017 11:19 AM CDT -----  Pt states she is unable to take Rx Lipitor. It has her feeling like she has the flu. Pt is requesting that it be changed. Pt can be reached at [phone]    [facility]  [place], [place] - [address]  [address]  [place] [address]  Phone: [phone] Fax: [phone]

## 2017-08-18 NOTE — TELEPHONE ENCOUNTER
Spoke with pt and advised her that it is okay to stop the medication. Pt verbalized understanding. Dr. Casiano also wanted her to follow up in one month, appt was scheduled.

## 2017-08-18 NOTE — TELEPHONE ENCOUNTER
Pt states the Lipitor is making her weak, her muscles hurt, and like she has the flu. Pt would like a different medication. Pt states that she is going to stop taking medication until something different is called in. Pt uses [facility] in [place].

## 2017-08-22 ENCOUNTER — CLINICAL SUPPORT (OUTPATIENT)
Dept: CARDIOLOGY | Facility: CLINIC | Age: 52
End: 2017-08-22
Payer: MEDICARE

## 2017-08-22 DIAGNOSIS — R06.02 SOB (SHORTNESS OF BREATH): ICD-10-CM

## 2017-08-22 DIAGNOSIS — E78.2 MIXED HYPERLIPIDEMIA: ICD-10-CM

## 2017-08-22 DIAGNOSIS — I10 BENIGN ESSENTIAL HTN: ICD-10-CM

## 2017-08-22 DIAGNOSIS — R07.89 OTHER CHEST PAIN: ICD-10-CM

## 2017-08-22 LAB
DIASTOLIC DYSFUNCTION: NO
RETIRED EF AND QEF - SEE NOTES: 60 (ref 55–65)

## 2017-08-22 PROCEDURE — 93015 CV STRESS TEST SUPVJ I&R: CPT | Mod: S$GLB,,, | Performed by: INTERNAL MEDICINE

## 2017-08-22 PROCEDURE — 93306 TTE W/DOPPLER COMPLETE: CPT | Mod: S$GLB,,, | Performed by: INTERNAL MEDICINE

## 2017-08-23 ENCOUNTER — OFFICE VISIT (OUTPATIENT)
Dept: ENDOCRINOLOGY | Facility: CLINIC | Age: 52
End: 2017-08-23
Payer: MEDICARE

## 2017-08-23 VITALS
BODY MASS INDEX: 22.58 KG/M2 | HEIGHT: 64 IN | WEIGHT: 132.25 LBS | DIASTOLIC BLOOD PRESSURE: 76 MMHG | TEMPERATURE: 96 F | SYSTOLIC BLOOD PRESSURE: 124 MMHG | HEART RATE: 80 BPM

## 2017-08-23 DIAGNOSIS — E11.9 TYPE 2 DIABETES MELLITUS WITHOUT COMPLICATION, WITH LONG-TERM CURRENT USE OF INSULIN: ICD-10-CM

## 2017-08-23 DIAGNOSIS — Z79.4 TYPE 2 DIABETES MELLITUS WITHOUT COMPLICATION, WITH LONG-TERM CURRENT USE OF INSULIN: ICD-10-CM

## 2017-08-23 LAB — DIASTOLIC DYSFUNCTION: NO

## 2017-08-23 PROCEDURE — 3078F DIAST BP <80 MM HG: CPT | Mod: S$GLB,,, | Performed by: INTERNAL MEDICINE

## 2017-08-23 PROCEDURE — 99204 OFFICE O/P NEW MOD 45 MIN: CPT | Mod: S$GLB,,, | Performed by: INTERNAL MEDICINE

## 2017-08-23 PROCEDURE — 3008F BODY MASS INDEX DOCD: CPT | Mod: S$GLB,,, | Performed by: INTERNAL MEDICINE

## 2017-08-23 PROCEDURE — 99499 UNLISTED E&M SERVICE: CPT | Mod: S$GLB,,, | Performed by: INTERNAL MEDICINE

## 2017-08-23 PROCEDURE — 4010F ACE/ARB THERAPY RXD/TAKEN: CPT | Mod: S$GLB,,, | Performed by: INTERNAL MEDICINE

## 2017-08-23 PROCEDURE — 99999 PR PBB SHADOW E&M-EST. PATIENT-LVL III: CPT | Mod: PBBFAC,,, | Performed by: INTERNAL MEDICINE

## 2017-08-23 PROCEDURE — 3046F HEMOGLOBIN A1C LEVEL >9.0%: CPT | Mod: S$GLB,,, | Performed by: INTERNAL MEDICINE

## 2017-08-23 PROCEDURE — 3074F SYST BP LT 130 MM HG: CPT | Mod: S$GLB,,, | Performed by: INTERNAL MEDICINE

## 2017-08-23 NOTE — LETTER
August 23, 2017      Amelia Santana, ABNERC, CDE  [address]  [place] [address]           [facility]  [address]  [address]  [place] [address]  Phone: [phone]  Fax: [phone]          Patient: Alexandra Goins   MR Number: [id number]   YOB: 1965   Date of Visit: 8/23/2017       Dear Amelia Santana:    Thank you for referring Alexandra Goins to me for evaluation. Attached you will find relevant portions of my assessment and plan of care.    If you have questions, please do not hesitate to call me. I look forward to following Alexandra Goins along with you.    Sincerely,    Carlene Langston MD    Enclosure  CC:  No Recipients    If you would like to receive this communication electronically, please contact [email or web address] or [phone] to request more information on [facility] Link access.    For providers and/or their staff who would like to refer a patient to [facility], please contact us through our one-stop-shop provider referral line, [facility], at [phone].    If you feel you have received this communication in error or would no longer like to receive these types of communications, please e-mail [email or web address]

## 2017-08-23 NOTE — PROGRESS NOTES
""This note will be shared with the patient"Subjective:       Patient ID: Alexandra Goins is a 51 y.o. female.    Patient is new to me    Records were reviewed  Chief Complaint: Diabetes Mellitus    HPI   Alexandra Goins is here for initial evaluation of type 2 Diabetes Mellitus    Consultation requested by Amelia Santana    PCP: Perez Casiano MD      Diagnosed: 1987      Lab Results   Component Value Date    HGBA1C >14.0 (H) 08/09/2017    HGBA1C 16.8 (H) 01/19/2017    HGBA1C 14.7 (H) 06/21/2016       Diabetes medications include: Lantus 60 units qhs(states was only taking 38 units because this is what prescription said)  Novolog(using Novolin R due to cost) was on  12 u- increased to 20u tid  Recently  Metformin 500mg bid added recently  Had been on this and janumet in past    Patient became very tearful when discussing her financial situation and says the insulin is very expensive-she has a history of depression, she specifically denies suicidal or homicidal ideation and is under the care of psychiatry and on medication  Diabetes Complications:none    Hypoglycemia: NO      Meal Planning: she either eats healthy or sometimes not enough    Diabetes education: YES:        SMBG: Tests BG Not checking latetly times a day    Log or meter available: no    Home values if available:  FBG:  Pre-lunch:  Pre-dinner:  Bedtime:  Post Breakfast:  Post Lunch  Post Dinner  Ranges:    Exercise: No     STANDARDS of CARE:        ACE inhibitor or angiotensin II receptor blocker:  Yes        Statin drug:  Yes        Eye exam within last year: Yes        Influenza vaccine up to date: Yes        Pneumonia vaccine: Yes -           I have reviewed the past medical, family and social history    Review of Systems   Constitutional: Positive for fatigue. Negative for appetite change, fever and unexpected weight change.   HENT: Negative for sore throat and trouble swallowing.    Eyes: Positive for visual disturbance.   Respiratory: " Negative for shortness of breath and wheezing.    Cardiovascular: Negative for chest pain, palpitations and leg swelling.   Gastrointestinal: Positive for nausea. Negative for diarrhea and vomiting.   Endocrine: Negative for cold intolerance, heat intolerance, polydipsia, polyphagia and polyuria.   Genitourinary: Negative for difficulty urinating, dysuria and menstrual problem.   Musculoskeletal: Negative for arthralgias and joint swelling.   Skin: Negative for rash.   Neurological: Positive for numbness. Negative for dizziness, weakness and headaches.   Psychiatric/Behavioral: Positive for dysphoric mood. Negative for confusion and sleep disturbance.       Objective:      Physical Exam   Constitutional: She is oriented to person, place, and time. She appears well-developed and well-nourished. No distress.   HENT:   Head: Normocephalic and atraumatic.   Right Ear: External ear normal.   Left Ear: External ear normal.   Nose: Nose normal.   Mouth/Throat: Oropharynx is clear and moist. No oropharyngeal exudate.   Eyes: Conjunctivae and EOM are normal. Pupils are equal, round, and reactive to light. No scleral icterus.   Neck: No JVD present. No tracheal deviation present. No thyromegaly present.   Cardiovascular: Normal rate, regular rhythm, normal heart sounds and intact distal pulses.  Exam reveals no gallop and no friction rub.    No murmur heard.  Pulmonary/Chest: Effort normal and breath sounds normal. No respiratory distress. She has no wheezes. She has no rales.   Abdominal: Soft. Bowel sounds are normal. She exhibits no distension and no mass. There is no tenderness. There is no rebound and no guarding. No hernia.   Musculoskeletal: She exhibits no edema or deformity.   Lymphadenopathy:     She has no cervical adenopathy.   Neurological: She is alert and oriented to person, place, and time. She has normal reflexes. No cranial nerve deficit.   Skin: Skin is warm. No rash noted. No erythema.   Psychiatric: She  has a normal mood and affect. Her behavior is normal.   Vitals reviewed.      Protective Sensation (w/ 10 gram monofilament):  Right: Intact  Left: Intact    Visual Inspection:  Normal -  Bilateral    Pedal Pulses:   Right: Present  Left: Present    Posterior tibialis:   Right:Present  Left: Present      Lab Review:     No components found for: AGBA1C  Lab Results   Component Value Date    CHOL 189 08/09/2017    TRIG 320 (H) 08/09/2017    HDL 49 08/09/2017    CHOLHDL 25.9 08/09/2017    TOTALCHOLEST 3.9 08/09/2017    NONHDLCHOL 140 08/09/2017     BMP  Lab Results   Component Value Date     08/09/2017    K 4.0 08/09/2017     08/09/2017    CO2 21 (L) 08/09/2017    BUN 14 08/09/2017    CREATININE 1.0 08/09/2017    CALCIUM 10.0 08/09/2017    ANIONGAP 14 08/09/2017    ESTGFRAFRICA >60.0 08/09/2017    EGFRNONAA >60.0 08/09/2017     Lab Results   Component Value Date    WBC 9.65 07/25/2017    HGB 16.2 (H) 07/25/2017    HCT 48.8 (H) 07/25/2017    MCV 91 07/25/2017     07/25/2017     Lab Results   Component Value Date    CREATRANDUR 82.0 08/09/2017    MICALBCREAT 48.8 (H) 08/09/2017           Assessment:     1. Type 2 diabetes mellitus without complication, with long-term current use of insulin          Discussed the pathophysiology, complications, and management of diabetes and the importance of adhering to treatment goals and plans     Patient is very frustrated and concerned about the cost of the insulin -when I suggested working with our pharmacy team to look into assistance she declined because she says her  makes too much money and they also have bills-she has 2 bottles of Lantus left so she will use which she has until follow up -may have to consider Novolin N    In review of records she was discharged from case management due to hostile reaction    I recommended that she sees her psychiatrist soon and that she must start logging her sugars as well as her insulin doses and that uncontrolled  diabetes may worsen her depression  Plan:   Type 2 diabetes mellitus without complication, with long-term current use of insulin        1.  Rx changes: none  2.  Education: Reviewed ABCs of diabetes management (respective goals in parentheses):  A1C (<7), blood pressure (<130/80), and cholesterol (LDL <100).  3.  Compliance at present is estimated to be inadequate. Efforts to improve compliance (if necessary) will be directed at dietary modifications: eat balanced diet and regular blood sugar monitoring: 3 to 4 times daily.  4. Discussed Hypoglycemia management  5.Discussed meal planning and went over plate method and healthy snack guidelines    Return in about 4 weeks (around 9/20/2017).       Thank you to Amelia Santana for this consultation

## 2017-09-08 ENCOUNTER — TELEPHONE (OUTPATIENT)
Dept: CARDIOLOGY | Facility: CLINIC | Age: 52
End: 2017-09-08

## 2017-09-08 ENCOUNTER — TELEPHONE (OUTPATIENT)
Dept: FAMILY MEDICINE | Facility: CLINIC | Age: 52
End: 2017-09-08

## 2017-09-08 NOTE — TELEPHONE ENCOUNTER
Patient states that she has spoken with the diabetic team and she states that her blood sugar has been under 100, she has a follow up appointment with them on 09/27/17 with Amelia Santana as well as 11/15/17.

## 2017-09-08 NOTE — TELEPHONE ENCOUNTER
----- Message from Ralf Rivas sent at 9/8/2017  9:53 AM CDT -----  Contact: pt  She's caling in regards to her test & lab results, please advise, [phone] (cell)

## 2017-09-08 NOTE — TELEPHONE ENCOUNTER
Spoke with patient and she has concerns in regards to not having taking lipitor any longer due to it making her feel weak and very sick. Patient states that she is also concerned about being taken off of Synthroid a little while back by Dr. Fragoso and still has not been put on any other medication or given an explanation why. Patient also called in regards to echo and stress test, explained to patient that they had tried to call her with results and was unable to leave messages. Gave patient results over the phone, she would like a call to discuss the medication. Please advise.    She has a follow up appointment with you 09/18/2017.       Please call patient back; [phone]

## 2017-09-08 NOTE — TELEPHONE ENCOUNTER
----- Message from Jeanne Gan sent at 9/8/2017 10:24 AM CDT -----  Please contact pt with cardiac test results.

## 2017-09-08 NOTE — TELEPHONE ENCOUNTER
We can go over much of this at her appt.     What is VERY important is that she is working with the diabetic team to get her sugars under control. This is likley the MAIN reason for not feeling well. Did she ever get together with them?

## 2017-09-14 DIAGNOSIS — I10 ESSENTIAL HYPERTENSION: Primary | ICD-10-CM

## 2017-09-14 RX ORDER — RANOLAZINE 500 MG/1
500 TABLET, EXTENDED RELEASE ORAL 2 TIMES DAILY
COMMUNITY
End: 2017-09-14 | Stop reason: SDUPTHER

## 2017-09-14 RX ORDER — RANOLAZINE 500 MG/1
500 TABLET, EXTENDED RELEASE ORAL 2 TIMES DAILY
Qty: 30 TABLET | Refills: 2 | Status: SHIPPED | OUTPATIENT
Start: 2017-09-14 | End: 2017-09-14 | Stop reason: SDUPTHER

## 2017-09-14 NOTE — TELEPHONE ENCOUNTER
----- Message from Bernie Ahumada sent at 9/14/2017  9:03 AM CDT -----  Pt at [phone]///states she had a Stress test done on 8/22/17 and medication for chest pains was suppose to be sent to the pharmacy//// they have not received it//uses//[facility] in [place]/please call to discuss//chance/isac

## 2017-09-15 RX ORDER — RANOLAZINE 500 MG/1
500 TABLET, EXTENDED RELEASE ORAL 2 TIMES DAILY
Qty: 60 TABLET | Refills: 3 | Status: SHIPPED | OUTPATIENT
Start: 2017-09-15 | End: 2017-11-21 | Stop reason: ALTCHOICE

## 2017-09-18 ENCOUNTER — LAB VISIT (OUTPATIENT)
Dept: LAB | Facility: HOSPITAL | Age: 52
End: 2017-09-18
Attending: FAMILY MEDICINE
Payer: MEDICARE

## 2017-09-18 ENCOUNTER — OFFICE VISIT (OUTPATIENT)
Dept: FAMILY MEDICINE | Facility: CLINIC | Age: 52
End: 2017-09-18
Payer: MEDICARE

## 2017-09-18 VITALS
OXYGEN SATURATION: 98 % | DIASTOLIC BLOOD PRESSURE: 78 MMHG | BODY MASS INDEX: 31.09 KG/M2 | HEART RATE: 101 BPM | WEIGHT: 181.13 LBS | SYSTOLIC BLOOD PRESSURE: 130 MMHG | TEMPERATURE: 98 F

## 2017-09-18 DIAGNOSIS — J30.1 CHRONIC ALLERGIC RHINITIS DUE TO POLLEN, UNSPECIFIED SEASONALITY: ICD-10-CM

## 2017-09-18 DIAGNOSIS — E03.4 HYPOTHYROIDISM DUE TO ACQUIRED ATROPHY OF THYROID: Primary | ICD-10-CM

## 2017-09-18 DIAGNOSIS — Z79.4 TYPE 2 DIABETES MELLITUS WITHOUT COMPLICATION, WITH LONG-TERM CURRENT USE OF INSULIN: ICD-10-CM

## 2017-09-18 DIAGNOSIS — E11.9 TYPE 2 DIABETES MELLITUS WITHOUT COMPLICATION: ICD-10-CM

## 2017-09-18 DIAGNOSIS — Z72.0 TOBACCO ABUSE: ICD-10-CM

## 2017-09-18 DIAGNOSIS — E11.9 TYPE 2 DIABETES MELLITUS WITHOUT COMPLICATION, WITH LONG-TERM CURRENT USE OF INSULIN: ICD-10-CM

## 2017-09-18 DIAGNOSIS — I10 ESSENTIAL HYPERTENSION: ICD-10-CM

## 2017-09-18 DIAGNOSIS — R07.9 CHEST PAIN, MODERATE CORONARY ARTERY RISK: ICD-10-CM

## 2017-09-18 DIAGNOSIS — F32.A DEPRESSION, UNSPECIFIED DEPRESSION TYPE: ICD-10-CM

## 2017-09-18 LAB
CHOLEST SERPL-MCNC: 239 MG/DL
CHOLEST/HDLC SERPL: 3.6 {RATIO}
HDLC SERPL-MCNC: 67 MG/DL
HDLC SERPL: 28 %
LDLC SERPL CALC-MCNC: 120 MG/DL
NONHDLC SERPL-MCNC: 172 MG/DL
TRIGL SERPL-MCNC: 260 MG/DL

## 2017-09-18 PROCEDURE — 99214 OFFICE O/P EST MOD 30 MIN: CPT | Mod: S$GLB,,, | Performed by: FAMILY MEDICINE

## 2017-09-18 PROCEDURE — 3046F HEMOGLOBIN A1C LEVEL >9.0%: CPT | Mod: S$GLB,,, | Performed by: FAMILY MEDICINE

## 2017-09-18 PROCEDURE — 3008F BODY MASS INDEX DOCD: CPT | Mod: S$GLB,,, | Performed by: FAMILY MEDICINE

## 2017-09-18 PROCEDURE — 99999 PR PBB SHADOW E&M-EST. PATIENT-LVL III: CPT | Mod: PBBFAC,,, | Performed by: FAMILY MEDICINE

## 2017-09-18 PROCEDURE — 80061 LIPID PANEL: CPT

## 2017-09-18 PROCEDURE — 4010F ACE/ARB THERAPY RXD/TAKEN: CPT | Mod: S$GLB,,, | Performed by: FAMILY MEDICINE

## 2017-09-18 PROCEDURE — 36415 COLL VENOUS BLD VENIPUNCTURE: CPT | Mod: PO

## 2017-09-18 PROCEDURE — 3078F DIAST BP <80 MM HG: CPT | Mod: S$GLB,,, | Performed by: FAMILY MEDICINE

## 2017-09-18 PROCEDURE — 99499 UNLISTED E&M SERVICE: CPT | Mod: S$GLB,,, | Performed by: FAMILY MEDICINE

## 2017-09-18 PROCEDURE — 3075F SYST BP GE 130 - 139MM HG: CPT | Mod: S$GLB,,, | Performed by: FAMILY MEDICINE

## 2017-09-18 RX ORDER — LEVOTHYROXINE SODIUM 75 UG/1
75 TABLET ORAL
Qty: 30 TABLET | Refills: 1 | Status: SHIPPED | OUTPATIENT
Start: 2017-09-18 | End: 2019-06-28

## 2017-09-18 NOTE — PROGRESS NOTES
Subjective:       Patient ID: Alexandra Goins is a 51 y.o. female.    Chief Complaint: Follow-up      HPI Comments:     He is here for follow-up on diabetes, depression,chest pain. Had a treadmill stress test and echo done 3 weeks ago that were basically normal, although she was not able to get above a low workload.  She continues to have atypical chest pain episodes.  She says that she called cardiology about them and was told that they would call in a medication.  She doesn't know what it is or if it was called in.  I see that Ranexa was called in just a few days ago.    The most dramatic change for her is that her blood sugars have improved dramatically since her A1c was found to be above 14 just 2 months ago.  She has seen both diabetic education as well as endocrinologist each once, and reports that her blood sugars are now consistently less than the 100.  She takes Lantus 60 units at bedtime, and regular insulin 20 units 3 times a day.  She says she feels a bit sluggish from time to time on this much tighter diabetic control.  She sees endocrine again in a few days.    She says she stopped her Lipitor because of body aches.  She has not had any more falls but she did hit her head bending over, hitting it on the end of the table and had to have one staple put in, which she removed herself.  She says her mood is good and she is taking Topamax and Klonopin    C/O intermittent bilat ear pain. Chronic sinus issues      Review of Systems   Constitutional: Positive for fatigue. Negative for activity change, appetite change and fever.   HENT: Positive for congestion, ear pain, postnasal drip, rhinorrhea and sinus pressure. Negative for sore throat.    Respiratory: Negative for cough and shortness of breath.    Cardiovascular: Negative for chest pain.   Gastrointestinal: Negative for abdominal pain, constipation and diarrhea.   Genitourinary: Negative for dysuria.   Musculoskeletal: Negative for arthralgias and  myalgias.   Neurological: Negative for dizziness and headaches.   Psychiatric/Behavioral: Negative for dysphoric mood and sleep disturbance. The patient is not nervous/anxious.        Objective:      Physical Exam   Constitutional: She is oriented to person, place, and time. She appears well-developed and well-nourished. No distress.   HENT:   Head: Normocephalic.   Right Ear: Tympanic membrane, external ear and ear canal normal.   Left Ear: Tympanic membrane, external ear and ear canal normal.   Nose: Mucosal edema present.   Mouth/Throat: Posterior oropharyngeal edema present. No oropharyngeal exudate, posterior oropharyngeal erythema or tonsillar abscesses.   Eyes: Conjunctivae are normal. Right eye exhibits no discharge. Left eye exhibits no discharge. No scleral icterus.   Neck: Normal range of motion. Neck supple. No JVD present. No thyromegaly present.   Cardiovascular: Normal rate, regular rhythm, normal heart sounds and intact distal pulses.  Exam reveals no gallop and no friction rub.    No murmur heard.  Pulmonary/Chest: Effort normal and breath sounds normal. No respiratory distress. She has no wheezes. She has no rales.   Abdominal: Soft. She exhibits no distension and no mass. There is no tenderness. There is no guarding.   Musculoskeletal: Normal range of motion. She exhibits no edema.   Lymphadenopathy:     She has no cervical adenopathy.   Neurological: She is alert and oriented to person, place, and time.   Skin: Skin is warm and dry. No rash noted. She is not diaphoretic.   Psychiatric: She has a normal mood and affect. Her behavior is normal. Judgment and thought content normal.   Nursing note and vitals reviewed.      Assessment:       1. Hypothyroidism due to acquired atrophy of thyroid    2. Chronic allergic rhinitis due to pollen, unspecified seasonality    3. Depression, unspecified depression type    4. Chest pain, moderate coronary artery risk    5. Type 2 diabetes mellitus without  complication, with long-term current use of insulin    6. Essential hypertension    7. Tobacco abuse        Plan:   Hypothyroidism due to acquired atrophy of thyroid  Comments:  pt had stopped her med before last TSH. Will restart at prev dose and recheck TSH next visit.   Orders:  -     levothyroxine (SYNTHROID) 75 MCG tablet; Take 1 tablet (75 mcg total) by mouth before breakfast.  Dispense: 30 tablet; Refill: 1    Chronic allergic rhinitis due to pollen, unspecified seasonality  Comments:  claritin and/or coricidin.    Depression, unspecified depression type  Comments:  stable    Chest pain, moderate coronary artery risk  Comments:  persistent. Showed her that ranexa has been rx'd (by cards?)    Type 2 diabetes mellitus without complication, with long-term current use of insulin  Comments:  much improved per pt report. Sees endocrine in few days.     Essential hypertension  Comments:  controlled. f/u 2 mo.    Tobacco abuse  Comments:  quit last year.

## 2017-09-19 ENCOUNTER — TELEPHONE (OUTPATIENT)
Dept: CARDIOLOGY | Facility: CLINIC | Age: 52
End: 2017-09-19

## 2017-09-19 NOTE — TELEPHONE ENCOUNTER
----- Message from Amelia Erazo sent at 9/19/2017  1:44 PM CDT -----  Pt states insurance will not cover medication you prescribe in august. Need something else that insurance will cover.pt can be reach at [phone].

## 2017-09-27 ENCOUNTER — OFFICE VISIT (OUTPATIENT)
Dept: DIABETES | Facility: CLINIC | Age: 52
End: 2017-09-27
Payer: MEDICARE

## 2017-09-27 VITALS
HEIGHT: 64 IN | WEIGHT: 185.19 LBS | SYSTOLIC BLOOD PRESSURE: 116 MMHG | BODY MASS INDEX: 31.62 KG/M2 | DIASTOLIC BLOOD PRESSURE: 76 MMHG

## 2017-09-27 DIAGNOSIS — Z79.4 TYPE 2 DIABETES MELLITUS WITHOUT COMPLICATION, WITH LONG-TERM CURRENT USE OF INSULIN: Primary | ICD-10-CM

## 2017-09-27 DIAGNOSIS — E11.9 TYPE 2 DIABETES MELLITUS WITHOUT COMPLICATION, WITH LONG-TERM CURRENT USE OF INSULIN: Primary | ICD-10-CM

## 2017-09-27 DIAGNOSIS — E78.00 PURE HYPERCHOLESTEROLEMIA: Chronic | ICD-10-CM

## 2017-09-27 DIAGNOSIS — I10 ESSENTIAL HYPERTENSION: Chronic | ICD-10-CM

## 2017-09-27 DIAGNOSIS — Z72.0 TOBACCO ABUSE: Chronic | ICD-10-CM

## 2017-09-27 LAB — GLUCOSE SERPL-MCNC: 134 MG/DL (ref 70–110)

## 2017-09-27 PROCEDURE — 99214 OFFICE O/P EST MOD 30 MIN: CPT | Mod: S$GLB,,, | Performed by: NURSE PRACTITIONER

## 2017-09-27 PROCEDURE — 82948 REAGENT STRIP/BLOOD GLUCOSE: CPT | Mod: S$GLB,,, | Performed by: NURSE PRACTITIONER

## 2017-09-27 PROCEDURE — 3074F SYST BP LT 130 MM HG: CPT | Mod: S$GLB,,, | Performed by: NURSE PRACTITIONER

## 2017-09-27 PROCEDURE — 3008F BODY MASS INDEX DOCD: CPT | Mod: S$GLB,,, | Performed by: NURSE PRACTITIONER

## 2017-09-27 PROCEDURE — 3078F DIAST BP <80 MM HG: CPT | Mod: S$GLB,,, | Performed by: NURSE PRACTITIONER

## 2017-09-27 PROCEDURE — 99999 PR PBB SHADOW E&M-EST. PATIENT-LVL IV: CPT | Mod: PBBFAC,,, | Performed by: NURSE PRACTITIONER

## 2017-09-27 NOTE — PROGRESS NOTES
"PCP: Perez Casiano MD    Subjective:     Chief Complaint: Diabetes, follow up    HISTORY OF PRESENT ILLNESS: 51 year old  female presenting to follow up for diabetes. She has a very flat affect today and is not very engaging today.  Patient has had Type II diabetes since 1985 and has the following complications: neuropathy.  She has attended diabetes education in the past.  She has a history of bipolar disorder and depression, but denies suicidal ideation and is under the care of psychiatry and on medication.     She presents with blood glucose readings today; however, I am concerned about the accuracy of the readings since most end in a 0, 1, or 7. She voices compliance with MDI.  Due to cost, she had to purchase OTC Novolin R.  She continues to take Lantus at bedtime.          She denies any recent hospital admissions, ER visits, hypoglycemia, syncope, or diaphoresis.  However, she does complain of occasional nausea.  Has noted excessive thirstiness and frequent urination, blurry vision.     Height: 5' 4" (162.6 cm)  //  Weight: 84 kg (185 lb 3 oz), Body mass index is 31.79 kg/m².  Home Blood Glucose reading this AM: Not Taken  Her blood sugar in clinic today is: 137 mg/dl at 11:40 am     Labs Reviewed. ADA recommends A1C of less than 7 %. Her most recent A1C is:     Lab Results   Component Value Date    HGBA1C >14.0 (H) 08/09/2017      DM MEDICATIONS:   Lantus 60 units at bedtime  Novolin 20 units TID ac    REVIEW OF SYSTEMS:  General: Denies fever, nausea, vomiting, chills, or change in appetite.  HEENT: Denies impaired hearing, dysphagia.  Respiratory: Denies shortness of breath, cough, or wheezing.  Cardiovascular: Denies chest pain, palpitations.  GI: Denies hematochezia.  : Denies hematuria, diarrhea, dysuria or frequency.  MS:  Normal gait. Denies difficulty with mobility, muscle or joint pain.  SKIN: Denies rashes and lesions.  Neuro: Has numbness or tingling in the hands or feet.   PSYCH: " Has chronic anxiety, Bipolar disorder. No suicidal ideations.  ENDO: See HPI.    STANDARDS OF CARE:  Eye doctor: [facility] ( in [place] ) -  Unsure of MD name,  Has cataracts both eyes.   Dental exam: Recommend regular exams; denies gums bleeding.  Podiatry doctor: None    ACTIVITY LEVEL: No routine exercise.  MEAL PLANNING: Number of meals per day - 3. Breakfast can be yogurt, mid morning snack can be carrots, celery, mushrooms; lunch can be corn dog OR frozen burrito. Mid afternoon snack can be carrots, apple. Supper can be grilled pork chops with rice, gravy OR 1/2 cup corn. Bedtime snack can be yogurt OR yanira cracker. Number of snacks per day - 3.      Patient is encouraged to consume 45 - 60 grams of carbohydrates in each meal, and 1800 k / gloria per day.  Per dietary recall, patient is not limiting carbohydrates, saturated fats and sodium.     BLOOD GLUCOSE TESTING: Self-monitoring  SOCIAL HISTORY: .  Lives with spouse.  Disabled, due to Bipolar Disorder, chronic anxiety.  Former smoker.    Objective:     PHYSICAL EXAMINATION:  GENERAL: WDWN  female in no acute distress, ambulatory, responds appropriately. AAO X 3.   NECK: Supple, no thyromegaly, no cervical or supraclavicular lymphadenopathy, no carotid bruit.  HEART: Regular rate and rhythm. No rubs, murmurs or gallops.  LUNGS: CTA bilaterally. Unlabored breathing, no use of accessory muscles.  MUSCULOSKELETAL: Normal gait and muscle strength.  ABDOMEN: Active bowel sounds X 4, no masses or tenderness.   SKIN: Warm, dry skin. No lesions or abrasions. Clean, dry well-healed injection sites.   NEUROLOGIC: Cranial nerves II-XII grossly intact.   FOOT EXAMINATION: Protective Sensation (w/ 10 gram monofilament):  Right: Absent  Left: Absent //  Visual Inspection: Normal -  Bilateral  //  Pedal Pulses:   Right: Present  Left: Present    Assessment:     1) Diabetes Type II, neuropathy - per records, uncontrolled on Lantus, Novolog,  A1C > 14.0  2) Hyperlipidemia - continue Lipitor  3) Hypertension - continue ramipril  4) Obese, BMI 31.79    Plan:     1.) Patient was instructed to monitor blood glucose 2 - 3 x daily, fasting and ac dinner or at bedtime.  Discussed ADA goal for fasting blood sugar, 80 - 130 mg/dL; pp blood sugars below 180 mg/dl. Also, discussed prevention of hypoglycemia and the need to adjust goals to higher levels if persistent hypoglycemia.  Reminded to bring blood glucose records or meter to each visit for review.  2.) Reviewed pathophysiology of Type II diabetes, complications related to the disease, importance of annual dilated eye exam and daily foot examination.  3.) We discussed the ADA recommendations: Hemoglobin A1c below 7.0 %.   All patients with diabetes should be on statins unless contraindicated.  ACE or ARB therapy if not contraindicated.    4.) Patient presents with BG readings today, but I am concerned about the accuracy of readings.  So, no adjustments were made to insulin.  Continue Lantus 60 units daily, same time every day.  Continue Novolin R 20 units TID ac.  Order placed for a CGM.  Indications for CGMS: Elevated A1C, Unexplained blood glucose excursions, Discrepancy between readings and A1C, and Annual Screening.  Continue metformin, 500 mg BID.  We discussed possibly doing a disposable VGO insulin pump that will at least ensure she is getting basal insulin.  Will send prescription to pharmacy and see if this device covered.  5.) Meal planning teaching: Carbohydrate definition - one serving is 15 gms. Carbohydrate spacing - carbohydrates should be spaced into approximately 3 meals with 2 snacks ( of one carbohydrate ) between meals or at bedtime. Increase vegetable intake to 2 or more cups of vegetables per day as well as 2 fruit servings. Recommended low saturated fat, low sodium diet to aid in control of hypertension and cholesterol.  6.) Discussed activity, benefits, methods, and precautions.  Recommended patient start or continue some form of exercise and increase as tolerated to 30 minutes per day to facilitate weight loss and aid in control of BGs.  8.) Return to clinic in 2 weeks for follow up.  She was explained the above plan and given opportunity to ask questions.  Advised to call clinic with any further questions or concerns.    Amelia Santana, NP-C, CDE

## 2017-10-02 ENCOUNTER — TELEPHONE (OUTPATIENT)
Dept: DIABETES | Facility: CLINIC | Age: 52
End: 2017-10-02

## 2017-10-02 NOTE — TELEPHONE ENCOUNTER
Spoke with patient and she said she cannot afford the V-go because the copay is 300 plus dollars.

## 2017-10-02 NOTE — TELEPHONE ENCOUNTER
----- Message from Amara Bernardo sent at 10/2/2017  9:31 AM CDT -----  Patient requesting a call back regarding her diabetes. Please adv/call [phone].//thanks. cw

## 2017-10-10 ENCOUNTER — TELEPHONE (OUTPATIENT)
Dept: DIABETES | Facility: CLINIC | Age: 52
End: 2017-10-10

## 2017-10-10 NOTE — TELEPHONE ENCOUNTER
----- Message from Moriah White sent at 10/10/2017  9:20 AM CDT -----  Contact: pt  Pt returning nurse call, states she need to speak with nurse before coming to appt tomorrow. Please call pt @ [phone].

## 2017-10-10 NOTE — TELEPHONE ENCOUNTER
----- Message from Amara Bernardo sent at 10/9/2017  2:56 PM CDT -----  Patient have questions regarding her up coming appt. Please adv/call [phone].//thanks. cw

## 2017-10-10 NOTE — TELEPHONE ENCOUNTER
Spoke with patient and informed her CGM is being placed as a nurse visit in clinic, and the copay is free for her and covered by her insurance. Patient voiced understanding.

## 2017-11-08 ENCOUNTER — OFFICE VISIT (OUTPATIENT)
Dept: ENDOCRINOLOGY | Facility: CLINIC | Age: 52
End: 2017-11-08
Payer: MEDICARE

## 2017-11-08 ENCOUNTER — LAB VISIT (OUTPATIENT)
Dept: LAB | Facility: HOSPITAL | Age: 52
End: 2017-11-08
Attending: INTERNAL MEDICINE
Payer: MEDICARE

## 2017-11-08 VITALS
HEIGHT: 64 IN | SYSTOLIC BLOOD PRESSURE: 108 MMHG | HEART RATE: 99 BPM | DIASTOLIC BLOOD PRESSURE: 76 MMHG | TEMPERATURE: 98 F | WEIGHT: 111.13 LBS | BODY MASS INDEX: 18.97 KG/M2

## 2017-11-08 DIAGNOSIS — Z79.4 TYPE 2 DIABETES MELLITUS WITHOUT COMPLICATION, WITH LONG-TERM CURRENT USE OF INSULIN: ICD-10-CM

## 2017-11-08 DIAGNOSIS — E11.9 TYPE 2 DIABETES MELLITUS WITHOUT COMPLICATION, WITH LONG-TERM CURRENT USE OF INSULIN: Primary | ICD-10-CM

## 2017-11-08 DIAGNOSIS — Z79.4 TYPE 2 DIABETES MELLITUS WITHOUT COMPLICATION, WITH LONG-TERM CURRENT USE OF INSULIN: Primary | ICD-10-CM

## 2017-11-08 DIAGNOSIS — E11.9 TYPE 2 DIABETES MELLITUS WITHOUT COMPLICATION, WITH LONG-TERM CURRENT USE OF INSULIN: ICD-10-CM

## 2017-11-08 LAB
ALBUMIN SERPL BCP-MCNC: 3.5 G/DL
ALP SERPL-CCNC: 87 U/L
ALT SERPL W/O P-5'-P-CCNC: 38 U/L
ANION GAP SERPL CALC-SCNC: 10 MMOL/L
AST SERPL-CCNC: 27 U/L
BILIRUB SERPL-MCNC: 0.3 MG/DL
BUN SERPL-MCNC: 16 MG/DL
CALCIUM SERPL-MCNC: 9.6 MG/DL
CHLORIDE SERPL-SCNC: 103 MMOL/L
CO2 SERPL-SCNC: 25 MMOL/L
CREAT SERPL-MCNC: 0.8 MG/DL
EST. GFR  (AFRICAN AMERICAN): >60 ML/MIN/1.73 M^2
EST. GFR  (NON AFRICAN AMERICAN): >60 ML/MIN/1.73 M^2
ESTIMATED AVG GLUCOSE: 237 MG/DL
GLUCOSE SERPL-MCNC: 223 MG/DL
HBA1C MFR BLD HPLC: 9.9 %
POTASSIUM SERPL-SCNC: 4.1 MMOL/L
PROT SERPL-MCNC: 7.7 G/DL
SODIUM SERPL-SCNC: 138 MMOL/L

## 2017-11-08 PROCEDURE — 99499 UNLISTED E&M SERVICE: CPT | Mod: S$GLB,,, | Performed by: INTERNAL MEDICINE

## 2017-11-08 PROCEDURE — 36415 COLL VENOUS BLD VENIPUNCTURE: CPT | Mod: PO

## 2017-11-08 PROCEDURE — 80053 COMPREHEN METABOLIC PANEL: CPT

## 2017-11-08 PROCEDURE — 83036 HEMOGLOBIN GLYCOSYLATED A1C: CPT

## 2017-11-08 PROCEDURE — 99214 OFFICE O/P EST MOD 30 MIN: CPT | Mod: S$GLB,,, | Performed by: INTERNAL MEDICINE

## 2017-11-08 PROCEDURE — 99999 PR PBB SHADOW E&M-EST. PATIENT-LVL III: CPT | Mod: PBBFAC,,, | Performed by: INTERNAL MEDICINE

## 2017-11-08 RX ORDER — AMOXICILLIN 500 MG
2 CAPSULE ORAL DAILY
COMMUNITY
End: 2018-05-31

## 2017-11-08 RX ORDER — METFORMIN HYDROCHLORIDE 500 MG/1
1000 TABLET ORAL 2 TIMES DAILY WITH MEALS
Qty: 120 TABLET | Refills: 3 | Status: SHIPPED | OUTPATIENT
Start: 2017-11-08 | End: 2018-08-15

## 2017-11-08 RX ORDER — CHOLECALCIFEROL (VITAMIN D3) 25 MCG
1000 TABLET ORAL DAILY
Status: ON HOLD | COMMUNITY
End: 2019-06-29 | Stop reason: HOSPADM

## 2017-11-08 NOTE — PROGRESS NOTES
Patient ID: Alexandra Goins is a 52 y.o. female.  Patient is here for follow up        Chief Complaint: Diabetes Mellitus      HPI  Alexandra Goins is here for follow-up of type 2 Diabetes Mellitus    Consultation requested by Amelia Santana    PCP: Perez Casiano MD      Diagnosed:       Lab Results   Component Value Date    HGBA1C >14.0 (H) 2017    HGBA1C 16.8 (H) 2017    HGBA1C 14.7 (H) 2016       Diabetes medications include: Lantus 60 units qhs-she recently increased to 80 units because her sugars were higher last week and she is now taking regular insulin 20 units 3 times a day before meals because she can't afford the NovoLog and she is managing with the Lantus that this is expensive.  She is taking metformin 500 mg twice a day.  Had been on  janumet in the past    She became tearful because she is wanting to know what she can take to help her diabetes because she is thinking her A1c is still 14 but I reminded her that her last A1c was .  Her log of her sugars shows her sugars are much better at least this week however she does admit she is eating mostly soup and not much carbs and she's been having some nausea and stress       She reports she will have to stop seeing her current psychiatrist because of her new insurance but she will be starting with our psychiatry department and has an appointment     In spite of the metformin she suffers from chronic constipation and has tried multiple things including MiraLAX and sodium citrate    Diabetes Complications:none    Hypoglycemia: Yes, once before lunch was 69 this week      Meal Planning: Most recently eating healthy, lower carbs    Diabetes education: YES:        SMBG: Tests BG 3 times a day    Log or meter available: Yes her log for past week and she admits her caloric intake is much less than last week  Home values if available:  [id number]  Pre-lunch:   Pre-dinner:   Bedtime:  Post  Breakfast:  Post Lunch  Post Dinner  Ranges:    Exercise: No     STANDARDS of CARE:        ACE inhibitor or angiotensin II receptor blocker:  Yes        Statin drug:  Yes        Eye exam within last year: Yes        Influenza vaccine up to date: Yes        Pneumonia vaccine: Yes -           I have reviewed the past medical, family and social history    Review of Systems   Constitutional: Positive for fatigue. Negative for appetite change, fever and unexpected weight change.   HENT: Negative for sore throat and trouble swallowing.    Eyes: Negative for visual disturbance.   Respiratory: Negative for shortness of breath and wheezing.    Cardiovascular: Negative for chest pain, palpitations and leg swelling.   Gastrointestinal: Positive for nausea. Negative for diarrhea and vomiting.   Endocrine: Negative for cold intolerance, heat intolerance, polydipsia, polyphagia and polyuria.   Genitourinary: Negative for difficulty urinating, dysuria and menstrual problem.   Musculoskeletal: Negative for arthralgias and joint swelling.   Skin: Negative for rash.   Neurological: Negative for dizziness, weakness, numbness and headaches.   Psychiatric/Behavioral: Positive for dysphoric mood. Negative for confusion and sleep disturbance. The patient is nervous/anxious.        Objective:      Physical Exam   Constitutional: She appears well-developed and well-nourished. No distress.   HENT:   Head: Normocephalic and atraumatic.   Eyes: Conjunctivae are normal.   Musculoskeletal: She exhibits no edema.   Neurological: She is alert. No sensory deficit.        Skin: Skin is warm and dry. No rash noted. She is not diaphoretic. No erythema.   Psychiatric: She has a normal mood and affect. Her behavior is normal.   Vitals reviewed.        Lab Review:   Office Visit on 09/27/2017   Component Date Value    POC Glucose 09/27/2017 134*   Lab Visit on 09/18/2017   Component Date Value    Cholesterol 09/18/2017 239*    Triglycerides 09/18/2017  260*    HDL 09/18/2017 67     LDL Cholesterol 09/18/2017 120.0     HDL/Chol Ratio 09/18/2017 28.0     Total Cholesterol/HDL Ra* 09/18/2017 3.6     Non-HDL Cholesterol 09/18/2017 172        Assessment:     1. Type 2 diabetes mellitus without complication, with long-term current use of insulin  Hemoglobin A1c    Comprehensive metabolic panel    metFORMIN (GLUCOPHAGE) 500 MG tablet        I reassured patient that at least according to the sugars on her log her diabetes seems to have improved and I will check an A1c and chemistries today to confirm.  Because of the constipation she is having and issues with her weight I will increase her metformin to 2 tablets twice a day but cautioned that because her sugars are running so much better that she must look out for hypoglycemia and adjust her regular insulin downward to avoid low sugars    She will fax in a log of her sugars in between visits    She is concerned about her weight so I have asked her to check with her insurance to look into multiple GLP 1 agonist to see which one her insurance prefers and I will discuss these at her follow-up visit  Plan:   Type 2 diabetes mellitus without complication, with long-term current use of insulin  -     Hemoglobin A1c; Future; Expected date: 11/08/2017  -     Comprehensive metabolic panel; Future; Expected date: 11/08/2017  -     metFORMIN (GLUCOPHAGE) 500 MG tablet; Take 2 tablets (1,000 mg total) by mouth 2 (two) times daily with meals.  Dispense: 120 tablet; Refill: 3      A total of 30 minutes were spent face to face with the patient during this encounter and over half of that time was spent on discussing diagnosis, symptoms, and treatment. Also time was spent on counseling and coordination of care.      Return in about 3 months (around 2/1/2018).    Labs prior to appointment? not applicable     Disclaimer:  This note may have been partially prepared using voice recognition software and  it may have not been extensively  proofed, as such there could be errors within the text such as sound alike errors.

## 2017-11-15 ENCOUNTER — TELEPHONE (OUTPATIENT)
Dept: ENDOCRINOLOGY | Facility: CLINIC | Age: 52
End: 2017-11-15

## 2017-11-15 NOTE — TELEPHONE ENCOUNTER
Attempted to inform pt the following:A1c did improve down to 9.9 from greater than 14 and will keep working added along with her efforts in her kidney and liver function remain normal

## 2017-11-15 NOTE — TELEPHONE ENCOUNTER
Pt advised the following, per MD orders:A1c did improve down to 9.9 from greater than 14 and will keep working added along with her efforts in her kidney and liver function remain normal. Pt verbalized understanding.

## 2017-11-20 ENCOUNTER — OFFICE VISIT (OUTPATIENT)
Dept: FAMILY MEDICINE | Facility: CLINIC | Age: 52
End: 2017-11-20
Payer: MEDICARE

## 2017-11-20 ENCOUNTER — LAB VISIT (OUTPATIENT)
Dept: LAB | Facility: HOSPITAL | Age: 52
End: 2017-11-20
Attending: FAMILY MEDICINE
Payer: MEDICARE

## 2017-11-20 VITALS
TEMPERATURE: 96 F | HEIGHT: 64 IN | BODY MASS INDEX: 31.02 KG/M2 | DIASTOLIC BLOOD PRESSURE: 86 MMHG | WEIGHT: 181.69 LBS | SYSTOLIC BLOOD PRESSURE: 138 MMHG | HEART RATE: 102 BPM | OXYGEN SATURATION: 98 %

## 2017-11-20 DIAGNOSIS — K63.5 POLYP OF COLON, UNSPECIFIED PART OF COLON, UNSPECIFIED TYPE: ICD-10-CM

## 2017-11-20 DIAGNOSIS — E03.4 HYPOTHYROIDISM DUE TO ACQUIRED ATROPHY OF THYROID: ICD-10-CM

## 2017-11-20 DIAGNOSIS — E11.9 TYPE 2 DIABETES MELLITUS WITHOUT COMPLICATION, WITH LONG-TERM CURRENT USE OF INSULIN: Primary | ICD-10-CM

## 2017-11-20 DIAGNOSIS — F32.A DEPRESSION, UNSPECIFIED DEPRESSION TYPE: ICD-10-CM

## 2017-11-20 DIAGNOSIS — R11.0 CHRONIC NAUSEA: ICD-10-CM

## 2017-11-20 DIAGNOSIS — I10 ESSENTIAL HYPERTENSION: ICD-10-CM

## 2017-11-20 DIAGNOSIS — Z12.39 SCREENING FOR BREAST CANCER: ICD-10-CM

## 2017-11-20 DIAGNOSIS — Z00.00 ROUTINE HEALTH MAINTENANCE: ICD-10-CM

## 2017-11-20 DIAGNOSIS — L20.9 ATOPIC DERMATITIS, UNSPECIFIED TYPE: ICD-10-CM

## 2017-11-20 DIAGNOSIS — Z79.4 TYPE 2 DIABETES MELLITUS WITHOUT COMPLICATION, WITH LONG-TERM CURRENT USE OF INSULIN: Primary | ICD-10-CM

## 2017-11-20 LAB — TSH SERPL DL<=0.005 MIU/L-ACNC: 3.08 UIU/ML

## 2017-11-20 PROCEDURE — 99499 UNLISTED E&M SERVICE: CPT | Mod: S$GLB,,, | Performed by: FAMILY MEDICINE

## 2017-11-20 PROCEDURE — G0009 ADMIN PNEUMOCOCCAL VACCINE: HCPCS | Mod: S$GLB,,, | Performed by: FAMILY MEDICINE

## 2017-11-20 PROCEDURE — 36415 COLL VENOUS BLD VENIPUNCTURE: CPT | Mod: PO

## 2017-11-20 PROCEDURE — 99999 PR PBB SHADOW E&M-EST. PATIENT-LVL V: CPT | Mod: PBBFAC,,, | Performed by: FAMILY MEDICINE

## 2017-11-20 PROCEDURE — 99214 OFFICE O/P EST MOD 30 MIN: CPT | Mod: S$GLB,,, | Performed by: FAMILY MEDICINE

## 2017-11-20 PROCEDURE — 90732 PPSV23 VACC 2 YRS+ SUBQ/IM: CPT | Mod: S$GLB,,, | Performed by: FAMILY MEDICINE

## 2017-11-20 PROCEDURE — 84443 ASSAY THYROID STIM HORMONE: CPT

## 2017-11-20 RX ORDER — PROMETHAZINE HYDROCHLORIDE 12.5 MG/1
12.5 TABLET ORAL EVERY 6 HOURS PRN
Qty: 30 TABLET | Refills: 0 | Status: SHIPPED | OUTPATIENT
Start: 2017-11-20 | End: 2018-07-03

## 2017-11-20 NOTE — PROGRESS NOTES
Subjective:       Patient ID: Alexandra Goins is a 52 y.o. female.    Chief Complaint: Follow-up      HPI Comments:       Current Outpatient Prescriptions:     blood sugar diagnostic (ACCU-CHEK JOSE PLUS TEST STRP) Strp, 1 strip by Misc.(Non-Drug; Combo Route) route 3 (three) times daily. Accu-chek Strips, Disp: 100 strip, Rfl: 11    blood-glucose meter (ACCU-CHEK JOSE PLUS METER) Misc, 1 each by Misc.(Non-Drug; Combo Route) route as directed. Accu-chek Meter, Disp: 1 each, Rfl: 0    clonazePAM (KLONOPIN) 1 MG tablet, Take 1 mg by mouth 3 (three) times daily., Disp: , Rfl:     cyanocobalamin (VITAMIN B-12) 500 MCG tablet, Take 500 mcg by mouth once daily., Disp: , Rfl:     fish oil-omega-3 fatty acids 300-1,000 mg capsule, Take 2 g by mouth once daily., Disp: , Rfl:     GINGER ROOT, BULK, MISC, by Misc.(Non-Drug; Combo Route) route., Disp: , Rfl:     hydrocodone-acetaminophen 5-325mg (NORCO) 5-325 mg per tablet, Take 1 tablet by mouth every 6 (six) hours as needed for Pain., Disp: 12 tablet, Rfl: 0    insulin aspart (NOVOLOG) 100 unit/mL injection, As directed, Disp: , Rfl:     insulin glargine (LANTUS) 100 unit/mL injection, Inject 60 Units into the skin every evening., Disp: 2 vial, Rfl: 3    insulin syringe-needle U-100 1 mL 30 gauge x 5/16 Syrg, 1 each by Misc.(Non-Drug; Combo Route) route 4 (four) times daily., Disp: 200 each, Rfl: 11    lancets (ACCU-CHEK SOFTCLIX LANCETS) Misc, 1 each by Misc.(Non-Drug; Combo Route) route 3 (three) times daily., Disp: 100 each, Rfl: 11    levothyroxine (SYNTHROID) 75 MCG tablet, Take 1 tablet (75 mcg total) by mouth before breakfast., Disp: 30 tablet, Rfl: 1    metFORMIN (GLUCOPHAGE) 500 MG tablet, Take 2 tablets (1,000 mg total) by mouth 2 (two) times daily with meals., Disp: 120 tablet, Rfl: 3    multivitamin capsule, Take 1 capsule by mouth once daily., Disp: , Rfl:     multivitamin capsule, Take 1 capsule by mouth once daily., Disp: , Rfl:     niacin  "500 MG Tab, Take 100 mg by mouth every evening., Disp: , Rfl:     ondansetron (ZOFRAN) 4 MG tablet, Take 1 tablet (4 mg total) by mouth every 6 (six) hours as needed for Nausea., Disp: 20 tablet, Rfl: 0    pen needle, diabetic (BD ULTRA-FINE ARNOLDO PEN NEEDLES) 32 gauge x 5/32" Ndle, 1 each by Misc.(Non-Drug; Combo Route) route 2 (two) times daily., Disp: 100 each, Rfl: 11    quetiapine (SEROQUEL) 100 MG Tab, Take 1 tablet (100 mg total) by mouth once daily. (Patient taking differently: Take 100 mg by mouth. 1/2 tablet q a.m 1 .5 qhs), Disp: 30 tablet, Rfl: 0    ramipril (ALTACE) 2.5 MG capsule, Take 2.5 mg by mouth once daily., Disp: , Rfl:     ramipril (ALTACE) 2.5 MG capsule, Take 1 capsule (2.5 mg total) by mouth once daily., Disp: 90 capsule, Rfl: 3    ranitidine (ZANTAC) 75 MG tablet, Take 300 mg by mouth. , Disp: , Rfl:     ranolazine (RANEXA) 500 MG Tb12, Take 1 tablet (500 mg total) by mouth 2 (two) times daily., Disp: 60 tablet, Rfl: 3    rizatriptan (MAXALT) 10 MG tablet, One tablet with onset of migraine and may repeat one dose in 2 hours if needed, Disp: , Rfl:     sub-q insulin device, 20 unit Katie, 1 Device by Misc.(Non-Drug; Combo Route) route once daily., Disp: 30 Device, Rfl: 3    topiramate (TOPAMAX) 200 MG Tab, Take by mouth 2 (two) times daily., Disp: , Rfl:     venlafaxine (EFFEXOR) 75 MG tablet, Take 75 mg by mouth. Qid, Disp: , Rfl:     vitamin D 1000 units Tab, Take 1,000 Units by mouth once daily., Disp: , Rfl:     vitamin E 400 UNIT capsule, Take 400 Units by mouth once daily., Disp: , Rfl:     vitamin E 400 UNIT capsule, Take 400 Units by mouth once daily., Disp: , Rfl:     promethazine (PHENERGAN) 12.5 MG Tab, Take 1 tablet (12.5 mg total) by mouth every 6 (six) hours as needed., Disp: 30 tablet, Rfl: 0      Follow-up today on diabetes, hypertension, hyperlipidemia, depression/anxiety, and chest pains.      Today however the patient discussed her digestive issues: She's had " "a "stomach bug off and on the last few weeks, and is left now with nausea throughout the day but very little about vomiting.  However she says the nausea goes back for months on a daily basis.  In the past she's taken Zofran that gives her headache.  Requesting something else today.  She carries no diagnosis of gastroparesis but has had a history of gastric mucosal problems on previous EGD.  Currently she's taking Zantac twice a day on a regular basis and has been doing so for a while.  Her last colonoscopy was about 3 years ago she has a history of polyps.  She's having no diarrhea.  She has a bowel movement every 7-10 days, which she considers a normal interval, but has felt somewhat constipated lately.  Finally she has been on metformin and increase dose from endocrinology, but has not noticed any worsening in her GI symptoms since she is done so.    She still gets some chest pains 2-3 times a week.  The medicine that cardiology called in for her would cost her 100s of dollars out of pocket (this is probably Ranexa) she says she called them to ask for an alternative, and there was no call back..    Shows complains of itching of her right forearm, which he often scratches at night and causes abrasions.  Not using any regular lotion.    She is still off the Lipitor after caused body aches and, she states, bruising.    She is due to change her psychiatrist because of insurance problems.  She is not happy about this because she is doing well with Dr. Guthrie for a few years.  She takes Klonopin 1-3 times a day, as well as Seroquel      Review of Systems   Constitutional: Positive for appetite change. Negative for activity change, fever and unexpected weight change.   HENT: Negative for sore throat.    Respiratory: Negative for cough and shortness of breath.    Cardiovascular: Negative for chest pain.   Gastrointestinal: Positive for abdominal pain, constipation and diarrhea. Negative for nausea and vomiting. " "  Genitourinary: Negative for difficulty urinating and dysuria.   Musculoskeletal: Negative for arthralgias and myalgias.   Skin: Positive for rash.   Neurological: Negative for dizziness and headaches.   Psychiatric/Behavioral: Negative for dysphoric mood. The patient is not nervous/anxious.        Objective:      Vitals:    11/20/17 0942   BP: 138/86   Pulse: 102   Temp: 96.1 °F (35.6 °C)   TempSrc: Tympanic   SpO2: 98%   Weight: 82.4 kg (181 lb 10.5 oz)   Height: 5' 4" (1.626 m)   PainSc: 0-No pain     Physical Exam   Constitutional: She is oriented to person, place, and time. She appears well-developed and well-nourished. No distress.   HENT:   Head: Normocephalic.   Mouth/Throat: No oropharyngeal exudate.   Neck: Neck supple. No thyromegaly present.   Cardiovascular: Normal rate, regular rhythm and normal heart sounds.    No murmur heard.  Pulmonary/Chest: Effort normal and breath sounds normal. She has no wheezes. She has no rales.   Abdominal: Soft. She exhibits no distension. There is no hepatosplenomegaly. There is tenderness in the right upper quadrant and epigastric area.   Musculoskeletal: She exhibits no edema.   Lymphadenopathy:     She has no cervical adenopathy.   Neurological: She is alert and oriented to person, place, and time.   Skin: Skin is warm and dry. She is not diaphoretic.   Excoriations of right forearm   Psychiatric: She has a normal mood and affect. Her behavior is normal. Judgment and thought content normal.   Nursing note and vitals reviewed.      Assessment:       1. Type 2 diabetes mellitus without complication, with long-term current use of insulin    2. Chronic nausea    3. Hypothyroidism due to acquired atrophy of thyroid    4. Essential hypertension    5. Polyp of colon, unspecified part of colon, unspecified type    6. Routine health maintenance    7. Depression, unspecified depression type    8. Screening for breast cancer    9. Atopic dermatitis, unspecified type        Plan: "   Type 2 diabetes mellitus without complication, with long-term current use of insulin  Comments:  Improved control, now followed by endocrinology  Orders:  -     Ambulatory consult to Gastroenterology    Chronic nausea  Comments:  Possible gastroparesis, or disorder gastric mucosa, +/- constipation.  GI referral  Orders:  -     Ambulatory consult to Gastroenterology  -     promethazine (PHENERGAN) 12.5 MG Tab; Take 1 tablet (12.5 mg total) by mouth every 6 (six) hours as needed.  Dispense: 30 tablet; Refill: 0    Hypothyroidism due to acquired atrophy of thyroid  Comments:  Now taking medicine regularly.  Check TSH today  Orders:  -     TSH; Future; Expected date: 11/20/2017    Essential hypertension  Comments:  Controlled.  Follow-up 3 months    Polyp of colon, unspecified part of colon, unspecified type  Comments:  Due for colonoscopy.  GI referral  Orders:  -     Ambulatory consult to Gastroenterology    Routine health maintenance  Comments:  Pneumonia vaccine, mammogram ordered  Orders:  -     Pneumococcal Polysaccharide Vaccine (23 Valent) (SQ/IM)    Depression, unspecified depression type  Comments:  klonipen thru psych.  review ok.    Screening for breast cancer  Comments:  Mammogram ordered  Orders:  -     Mammo Digital Screening Bilat with CAD; Future; Expected date: 11/20/2017    Atopic dermatitis, unspecified type  Comments:  Heavy moisturizer applied twice a day to affected areas

## 2017-11-21 ENCOUNTER — TELEPHONE (OUTPATIENT)
Dept: FAMILY MEDICINE | Facility: CLINIC | Age: 52
End: 2017-11-21

## 2017-11-21 DIAGNOSIS — I10 ESSENTIAL HYPERTENSION: ICD-10-CM

## 2017-11-21 DIAGNOSIS — I20.89 OTHER FORMS OF ANGINA PECTORIS: Primary | ICD-10-CM

## 2017-11-21 NOTE — TELEPHONE ENCOUNTER
----- Message from Katelyn Palmer sent at 11/21/2017  1:55 PM CST -----  Contact: pt   Pt received a missed call from [facility] and she believes it came from this number,,, please call pt back at [phone]

## 2017-11-21 NOTE — TELEPHONE ENCOUNTER
I received message Dr Kolb wanted patient to take Imdur in place of Ranexa since patient stated too expensive.The Rx was sent to patients pharmacy and to dr Kolb to sign.Attempted to reach patient and no answer at her number.Left message of instructions and to check with her pharmacy.

## 2017-11-21 NOTE — TELEPHONE ENCOUNTER
----- Message from Nabor Kolb MD sent at 11/21/2017  4:56 PM CST -----  Add imdur 30 mg q day  ----- Message -----  From: Perez Casiano MD  Sent: 11/21/2017   8:00 AM  To: Nabor Kolb MD    I saw this patient yesterday and she says she has been waiting to hear from y'all about a replacement med for ranexa which was too expensive. She still has chest pains. I see her nuclear study was OK, but apparently you wanted her to be on an angina med.  Thanks.

## 2017-11-22 RX ORDER — ISOSORBIDE MONONITRATE 30 MG/1
30 TABLET, EXTENDED RELEASE ORAL DAILY
Qty: 30 TABLET | Refills: 11 | Status: ON HOLD | OUTPATIENT
Start: 2017-11-22 | End: 2019-09-18 | Stop reason: CLARIF

## 2017-11-27 ENCOUNTER — TELEPHONE (OUTPATIENT)
Dept: CARDIOLOGY | Facility: CLINIC | Age: 52
End: 2017-11-27

## 2017-11-27 NOTE — TELEPHONE ENCOUNTER
Called patient to check if she started her Imdur.She stated yes and we discussed side effects.She was doing well except for a little tiredness. She may change to night schedule and observe.

## 2017-12-11 ENCOUNTER — INITIAL CONSULT (OUTPATIENT)
Dept: GASTROENTEROLOGY | Facility: CLINIC | Age: 52
End: 2017-12-11
Payer: MEDICARE

## 2017-12-11 VITALS
BODY MASS INDEX: 29.99 KG/M2 | WEIGHT: 175.69 LBS | SYSTOLIC BLOOD PRESSURE: 130 MMHG | HEIGHT: 64 IN | DIASTOLIC BLOOD PRESSURE: 80 MMHG | HEART RATE: 98 BPM

## 2017-12-11 DIAGNOSIS — R63.0 ANOREXIA: ICD-10-CM

## 2017-12-11 DIAGNOSIS — Z12.11 COLON CANCER SCREENING: ICD-10-CM

## 2017-12-11 DIAGNOSIS — K57.30 DIVERTICULOSIS OF LARGE INTESTINE WITHOUT HEMORRHAGE: ICD-10-CM

## 2017-12-11 DIAGNOSIS — G43.A0 CYCLICAL VOMITING WITH NAUSEA, INTRACTABILITY OF VOMITING NOT SPECIFIED: ICD-10-CM

## 2017-12-11 DIAGNOSIS — R10.13 EPIGASTRIC ABDOMINAL PAIN: Primary | ICD-10-CM

## 2017-12-11 DIAGNOSIS — R68.81 EARLY SATIETY: ICD-10-CM

## 2017-12-11 PROCEDURE — 99499 UNLISTED E&M SERVICE: CPT | Mod: S$GLB,,, | Performed by: INTERNAL MEDICINE

## 2017-12-11 PROCEDURE — 99204 OFFICE O/P NEW MOD 45 MIN: CPT | Mod: S$GLB,,, | Performed by: INTERNAL MEDICINE

## 2017-12-11 PROCEDURE — 99999 PR PBB SHADOW E&M-EST. PATIENT-LVL III: CPT | Mod: PBBFAC,,, | Performed by: INTERNAL MEDICINE

## 2017-12-11 RX ORDER — SODIUM, POTASSIUM,MAG SULFATES 17.5-3.13G
SOLUTION, RECONSTITUTED, ORAL ORAL
Qty: 254 ML | Refills: 0 | Status: ON HOLD | OUTPATIENT
Start: 2017-12-11 | End: 2018-01-12 | Stop reason: HOSPADM

## 2017-12-11 NOTE — PROGRESS NOTES
Subjective:       Patient ID: Alexandra Goins is a 52 y.o. female.    Chief Complaint: Abdominal Pain and Nausea    The patient has a history of epigastric abdominal pain ongoing for over a year. The pain is cramping in character as well as sharp pains in between. The pain is constant, but at times worse than others. It seems to exacerbate with meals to the point where she has to eat broth instead of usual food. She also has significant nausea and it present almost all the time. There is associated vomiting of food eaten and bilious liquids. There has been no BRBPR or melena. Her appetite is decreased and she says her weight has not decreased despite all this. This is confirmed as she weighed 167 during a visit to cardiology in January, and now she is 175 pounds.     There is an aversion to the smell of food. She also admits to early satiety. She denies NSAID use because they make her nose bleed. They also make her stomach burn. She has a FH of esophageal cancer in her father. The patient says she had a colonoscopy a few years ago; we will send for those records. The patient also is a poorly controlled diabetic but there is no documented associated complications.       Review of Systems   Constitutional: Negative for activity change, appetite change, chills, diaphoresis, fatigue, fever and unexpected weight change.   HENT: Positive for postnasal drip. Negative for congestion, ear discharge, ear pain, hearing loss, nosebleeds and tinnitus.    Eyes: Negative for photophobia and visual disturbance.   Respiratory: Positive for cough and wheezing. Negative for apnea, choking, chest tightness and shortness of breath.    Cardiovascular: Positive for chest pain. Negative for palpitations and leg swelling.   Gastrointestinal: Negative for abdominal distention, abdominal pain, anal bleeding, blood in stool, constipation, diarrhea, nausea, rectal pain and vomiting.   Genitourinary: Negative for difficulty urinating,  dyspareunia, dysuria, flank pain, frequency, hematuria, menstrual problem, pelvic pain, urgency, vaginal bleeding and vaginal discharge.   Musculoskeletal: Negative for arthralgias, back pain, gait problem, joint swelling, myalgias and neck stiffness.   Skin: Negative for pallor and rash.   Neurological: Positive for headaches. Negative for dizziness, tremors, seizures, syncope, speech difficulty, weakness and numbness.   Hematological: Negative for adenopathy.   Psychiatric/Behavioral: Negative for agitation, confusion, hallucinations, sleep disturbance and suicidal ideas.       Objective:      Physical Exam   Constitutional: She is oriented to person, place, and time. She appears well-developed and well-nourished.   HENT:   Head: Normocephalic and atraumatic.   Bilateral turbinate congestion   Eyes: Conjunctivae and EOM are normal. Pupils are equal, round, and reactive to light. Right eye exhibits no discharge. Left eye exhibits no discharge. No scleral icterus.   Neck: Normal range of motion. Neck supple. No JVD present. No thyromegaly present.   Cardiovascular: Normal rate, regular rhythm, normal heart sounds and intact distal pulses.  Exam reveals no gallop and no friction rub.    No murmur heard.  Pulmonary/Chest: Effort normal. No respiratory distress. She has wheezes. She has no rales. She exhibits no tenderness.   Abdominal: Soft. Bowel sounds are normal. She exhibits no distension and no mass. There is tenderness. There is no rebound and no guarding.   Tenderness is diffuse; mainly epigastric, RUQ, LUQ and lindy-umbilical. Very soft; no rigidity or rebound.   Musculoskeletal: Normal range of motion. She exhibits no edema.   Lymphadenopathy:     She has no cervical adenopathy.   Neurological: She is alert and oriented to person, place, and time. She has normal reflexes. She exhibits normal muscle tone. Coordination normal.   Skin: Skin is warm and dry. No rash noted. No erythema. No pallor.   Psychiatric:  She has a normal mood and affect. Her behavior is normal. Judgment and thought content normal.   Vitals reviewed.      Assessment:     Epigastric abdominal pain    Nausea and vomiting     Early satiety    Anorexia    History of colon polyps      No diagnosis found.    Plan:     Obtain records from [facility].    scheduled for EGD and colonoscopy.    Addendum: Received records from [facility].  The patient's last colonoscopy was in March 2005, and was done because of diverticulitis.  The patient had no polyps.  She also has a history of having an EGD in January 2004.  This was done following an upper GI bleed believed to be a result of a Catherine-Parikh tear.  It appeared this lesion had healed by the time her EGD was done.

## 2017-12-11 NOTE — LETTER
December 11, 2017      Perez Casiano MD  [address] [place] [id number]           [facility]  [address] [place] [id number]  Phone: [phone]  Fax: [phone]          Patient: Alexandar Goins   MR Number: [id number]   YOB: 1965   Date of Visit: 12/11/2017       Dear Dr. Perez Casiano:    Thank you for referring Alexandra Goins to me for evaluation. Attached you will find relevant portions of my assessment and plan of care.    If you have questions, please do not hesitate to call me. I look forward to following Alexandra Goins along with you.    Sincerely,    Roque Mahajan III, MD    Enclosure  CC:  No Recipients    If you would like to receive this communication electronically, please contact [email or web address] or [phone] to request more information on [employer] Link access.    For providers and/or their staff who would like to refer a patient to [facility], please contact us through our one-stop-shop provider referral line, [facility], at [phone].    If you feel you have received this communication in error or would no longer like to receive these types of communications, please e-mail [email or web address]

## 2018-01-11 NOTE — H&P
Short Stay Endoscopy History and Physical    PCP - Perez Casiano MD    Procedure - EGD and Colonoscopy  ASA - 2  Mallampati - per anesthesia  History of Anesthesia problems - no  Family history Anesthesia problems -  no     HPI:  This is a 52 y.o.female here for evaluation of : Epigastric Abdominal Pain; Nausea and vomiting; Colon Ca Screen    Reflux - no  Dysphagia - no  Abdominal pain - yes  Diarrhea - no  Anemia - no  GI bleeding - no  Nausea and vomiting-yes  Early satiety-yes  aversion to sight or smell of food-no    ROS:  Constitutional: No fevers, chills, No weight loss  ENT: No allergies  CV: No chest pain  Pulm: No cough, No shortness of breath  Ophtho: No vision changes  GI: see HPI  Derm: No rash  Heme: No lymphadenopathy, No bruising  MSK: No arthritis  : No dysuria, No hematuria  Endo: No hot or cold intolerance  Neuro: No syncope, No seizure  Psych: No anxiety, No depression    Medical History:  Past Medical History:   Diagnosis Date    Anxiety     Bipolar 1 disorder     Depression     Diabetes mellitus     Diabetes mellitus, type 2     Diverticular disease     GERD (gastroesophageal reflux disease)     Hyperlipemia     Hypertension     Hypothyroidism        Surgical History:  Past Surgical History:   Procedure Laterality Date    CHOLECYSTECTOMY      COLON SURGERY      HYSTERECTOMY      TONSILLECTOMY         Family History:  Family History   Problem Relation Age of Onset    Alcohol abuse Mother     COPD Mother     Depression Mother     Drug abuse Mother     Alcohol abuse Father     Arthritis Father     Cancer Father     Heart disease Father     Hypertension Father     COPD Sister     Kidney failure Sister     Heart disease Brother     Hypertension Brother     Cancer Maternal Aunt     Cancer Maternal Uncle     Diabetes Maternal Uncle     Drug abuse Maternal Uncle     Cancer Paternal Aunt     Cancer Paternal Uncle     Arthritis Maternal Grandmother      Hypertension Maternal Grandmother     Arthritis Paternal Grandmother     Cancer Paternal Grandmother     Hypertension Paternal Grandfather        Social History:  Social History     Social History    Marital status:      Spouse name: N/A    Number of children: N/A    Years of education: N/A     Occupational History    Not on file.     Social History Main Topics    Smoking status: Former Smoker     Quit date: 12/19/2016    Smokeless tobacco: Never Used    Alcohol use Yes      Comment: occassionally    Drug use: No    Sexual activity: No     Other Topics Concern    Not on file     Social History Narrative    Minor grandchild living with pt.       Allergies:   Review of patient's allergies indicates:   Allergen Reactions    Ultram [tramadol] Hives and Anaphylaxis    Asa [aspirin]      Nosebleeds  Nosebleeds    Lipitor [atorvastatin]      Leg Cramps    Celestone [betamethasone] Hives, Itching and Rash       Medications:   No current facility-administered medications on file prior to encounter.      Current Outpatient Prescriptions on File Prior to Encounter   Medication Sig Dispense Refill    blood sugar diagnostic (ACCU-CHEK JOSE PLUS TEST STRP) Strp 1 strip by Misc.(Non-Drug; Combo Route) route 3 (three) times daily. Accu-chek Strips 100 strip 11    blood-glucose meter (ACCU-CHEK JOSE PLUS METER) Misc 1 each by Misc.(Non-Drug; Combo Route) route as directed. Accu-chek Meter 1 each 0    clonazePAM (KLONOPIN) 1 MG tablet Take 1 mg by mouth 3 (three) times daily.      cyanocobalamin (VITAMIN B-12) 500 MCG tablet Take 500 mcg by mouth once daily.      fish oil-omega-3 fatty acids 300-1,000 mg capsule Take 2 g by mouth once daily.      GINGER ROOT, BULK, MISC by Misc.(Non-Drug; Combo Route) route.      hydrocodone-acetaminophen 5-325mg (NORCO) 5-325 mg per tablet Take 1 tablet by mouth every 6 (six) hours as needed for Pain. 12 tablet 0    insulin aspart (NOVOLOG) 100 unit/mL injection As  "directed      insulin glargine (LANTUS) 100 unit/mL injection Inject 60 Units into the skin every evening. 2 vial 3    insulin syringe-needle U-100 1 mL 30 gauge x 5/16 Syrg 1 each by Misc.(Non-Drug; Combo Route) route 4 (four) times daily. 200 each 11    isosorbide mononitrate (IMDUR) 30 MG 24 hr tablet Take 1 tablet (30 mg total) by mouth once daily. 30 tablet 11    lancets (ACCU-CHEK SOFTCLIX LANCETS) Misc 1 each by Misc.(Non-Drug; Combo Route) route 3 (three) times daily. 100 each 11    levothyroxine (SYNTHROID) 75 MCG tablet Take 1 tablet (75 mcg total) by mouth before breakfast. 30 tablet 1    metFORMIN (GLUCOPHAGE) 500 MG tablet Take 2 tablets (1,000 mg total) by mouth 2 (two) times daily with meals. 120 tablet 3    multivitamin capsule Take 1 capsule by mouth once daily.      multivitamin capsule Take 1 capsule by mouth once daily.      niacin 500 MG Tab Take 100 mg by mouth every evening.      ondansetron (ZOFRAN) 4 MG tablet Take 1 tablet (4 mg total) by mouth every 6 (six) hours as needed for Nausea. 20 tablet 0    pen needle, diabetic (BD ULTRA-FINE ARNOLDO PEN NEEDLES) 32 gauge x 5/32" Ndle 1 each by Misc.(Non-Drug; Combo Route) route 2 (two) times daily. 100 each 11    promethazine (PHENERGAN) 12.5 MG Tab Take 1 tablet (12.5 mg total) by mouth every 6 (six) hours as needed. 30 tablet 0    quetiapine (SEROQUEL) 100 MG Tab Take 1 tablet (100 mg total) by mouth once daily. (Patient taking differently: Take 100 mg by mouth. 1/2 tablet q a.m  1 .5 qhs) 30 tablet 0    ramipril (ALTACE) 2.5 MG capsule Take 2.5 mg by mouth once daily.      ramipril (ALTACE) 2.5 MG capsule Take 1 capsule (2.5 mg total) by mouth once daily. 90 capsule 3    ranitidine (ZANTAC) 75 MG tablet Take 300 mg by mouth.       rizatriptan (MAXALT) 10 MG tablet One tablet with onset of migraine and may repeat one dose in 2 hours if needed      sodium,potassium,mag sulfates (SUPREP BOWEL PREP KIT) 17.5-3.13-1.6 gram SolR As " directed for colonoscopy 254 mL 0    sub-q insulin device, 20 unit Katie 1 Device by Misc.(Non-Drug; Combo Route) route once daily. 30 Device 3    topiramate (TOPAMAX) 200 MG Tab Take by mouth 2 (two) times daily.      venlafaxine (EFFEXOR) 75 MG tablet Take 75 mg by mouth. Qid      vitamin D 1000 units Tab Take 1,000 Units by mouth once daily.      vitamin E 400 UNIT capsule Take 400 Units by mouth once daily.      vitamin E 400 UNIT capsule Take 400 Units by mouth once daily.         Objective Findings:    Vital Signs:There were no vitals filed for this visit.        Physical Exam:  General Appearance: Well appearing in no acute distress  Eyes:    No scleral icterus  ENT: Neck supple, Lips, mucosa, and tongue normal; teeth and gums normal  Lungs: CTA bilaterally in anterior and posterior fields, no wheezes, no crackles.  Heart:  Regular rate, S1, S2 normal, no murmurs heard.  Abdomen: Soft, non tender, non distended with normal bowel sounds. No hepatosplenomegaly, ascites, or mass.  Extremities: No clubbing, cyanosis or edema  Skin: No rash    Labs:  Reviewed    Plan:EGD and Colonoscopy    I have explained the risks and benefits of endoscopy procedures to the patient including but not limited to bleeding, perforation, infection, and death. The patient wishes to proceed.

## 2018-01-12 ENCOUNTER — ANESTHESIA (OUTPATIENT)
Dept: ENDOSCOPY | Facility: HOSPITAL | Age: 53
End: 2018-01-12
Payer: MEDICARE

## 2018-01-12 ENCOUNTER — SURGERY (OUTPATIENT)
Age: 53
End: 2018-01-12

## 2018-01-12 ENCOUNTER — HOSPITAL ENCOUNTER (OUTPATIENT)
Facility: HOSPITAL | Age: 53
Discharge: HOME | End: 2018-01-12
Attending: INTERNAL MEDICINE | Admitting: INTERNAL MEDICINE
Payer: MEDICARE

## 2018-01-12 ENCOUNTER — ANESTHESIA EVENT (OUTPATIENT)
Dept: ENDOSCOPY | Facility: HOSPITAL | Age: 53
End: 2018-01-12
Payer: MEDICARE

## 2018-01-12 VITALS
TEMPERATURE: 98 F | HEIGHT: 64 IN | DIASTOLIC BLOOD PRESSURE: 66 MMHG | BODY MASS INDEX: 30.56 KG/M2 | OXYGEN SATURATION: 96 % | RESPIRATION RATE: 18 BRPM | SYSTOLIC BLOOD PRESSURE: 125 MMHG | HEART RATE: 76 BPM | WEIGHT: 179 LBS

## 2018-01-12 DIAGNOSIS — K57.30 DIVERTICULOSIS OF LARGE INTESTINE WITHOUT HEMORRHAGE: ICD-10-CM

## 2018-01-12 DIAGNOSIS — R10.13 EPIGASTRIC ABDOMINAL PAIN: ICD-10-CM

## 2018-01-12 DIAGNOSIS — D12.6 ADENOMATOUS POLYP OF COLON, UNSPECIFIED PART OF COLON: Primary | ICD-10-CM

## 2018-01-12 DIAGNOSIS — K29.90 GASTRODUODENITIS: ICD-10-CM

## 2018-01-12 LAB
GLUCOSE SERPL-MCNC: 71 MG/DL (ref 70–110)
POCT GLUCOSE: 71 MG/DL (ref 70–110)

## 2018-01-12 PROCEDURE — 43239 EGD BIOPSY SINGLE/MULTIPLE: CPT | Mod: 51,,, | Performed by: INTERNAL MEDICINE

## 2018-01-12 PROCEDURE — 25000003 PHARM REV CODE 250: Performed by: INTERNAL MEDICINE

## 2018-01-12 PROCEDURE — 63600175 PHARM REV CODE 636 W HCPCS: Performed by: NURSE ANESTHETIST, CERTIFIED REGISTERED

## 2018-01-12 PROCEDURE — 27201012 HC FORCEPS, HOT/COLD, DISP: Performed by: INTERNAL MEDICINE

## 2018-01-12 PROCEDURE — 88305 TISSUE EXAM BY PATHOLOGIST: CPT | Performed by: PATHOLOGY

## 2018-01-12 PROCEDURE — 45380 COLONOSCOPY AND BIOPSY: CPT | Performed by: INTERNAL MEDICINE

## 2018-01-12 PROCEDURE — 45380 COLONOSCOPY AND BIOPSY: CPT | Mod: ,,, | Performed by: INTERNAL MEDICINE

## 2018-01-12 PROCEDURE — 25000003 PHARM REV CODE 250: Performed by: NURSE ANESTHETIST, CERTIFIED REGISTERED

## 2018-01-12 PROCEDURE — 37000008 HC ANESTHESIA 1ST 15 MINUTES: Performed by: INTERNAL MEDICINE

## 2018-01-12 PROCEDURE — 37000009 HC ANESTHESIA EA ADD 15 MINS: Performed by: INTERNAL MEDICINE

## 2018-01-12 PROCEDURE — 43239 EGD BIOPSY SINGLE/MULTIPLE: CPT | Performed by: INTERNAL MEDICINE

## 2018-01-12 PROCEDURE — 88305 TISSUE EXAM BY PATHOLOGIST: CPT | Mod: 26,,, | Performed by: PATHOLOGY

## 2018-01-12 RX ORDER — ONDANSETRON 2 MG/ML
INJECTION INTRAMUSCULAR; INTRAVENOUS
Status: DISCONTINUED | OUTPATIENT
Start: 2018-01-12 | End: 2018-01-12

## 2018-01-12 RX ORDER — PROPOFOL 10 MG/ML
VIAL (ML) INTRAVENOUS
Status: DISCONTINUED | OUTPATIENT
Start: 2018-01-12 | End: 2018-01-12

## 2018-01-12 RX ORDER — SODIUM CHLORIDE, SODIUM LACTATE, POTASSIUM CHLORIDE, CALCIUM CHLORIDE 600; 310; 30; 20 MG/100ML; MG/100ML; MG/100ML; MG/100ML
INJECTION, SOLUTION INTRAVENOUS CONTINUOUS
Status: DISCONTINUED | OUTPATIENT
Start: 2018-01-12 | End: 2018-01-12 | Stop reason: HOSPADM

## 2018-01-12 RX ORDER — LIDOCAINE HYDROCHLORIDE 20 MG/ML
INJECTION, SOLUTION EPIDURAL; INFILTRATION; INTRACAUDAL; PERINEURAL
Status: DISCONTINUED | OUTPATIENT
Start: 2018-01-12 | End: 2018-01-12

## 2018-01-12 RX ORDER — SODIUM CHLORIDE, SODIUM LACTATE, POTASSIUM CHLORIDE, CALCIUM CHLORIDE 600; 310; 30; 20 MG/100ML; MG/100ML; MG/100ML; MG/100ML
INJECTION, SOLUTION INTRAVENOUS CONTINUOUS PRN
Status: DISCONTINUED | OUTPATIENT
Start: 2018-01-12 | End: 2018-01-12

## 2018-01-12 RX ADMIN — PROPOFOL 40 MG: 10 INJECTION, EMULSION INTRAVENOUS at 07:01

## 2018-01-12 RX ADMIN — SODIUM CHLORIDE, SODIUM LACTATE, POTASSIUM CHLORIDE, AND CALCIUM CHLORIDE: 600; 310; 30; 20 INJECTION, SOLUTION INTRAVENOUS at 07:01

## 2018-01-12 RX ADMIN — LIDOCAINE HYDROCHLORIDE 40 MG: 20 INJECTION, SOLUTION EPIDURAL; INFILTRATION; INTRACAUDAL; PERINEURAL at 07:01

## 2018-01-12 RX ADMIN — ONDANSETRON 4 MG: 2 INJECTION, SOLUTION INTRAMUSCULAR; INTRAVENOUS at 07:01

## 2018-01-12 RX ADMIN — SODIUM CHLORIDE, SODIUM LACTATE, POTASSIUM CHLORIDE, AND CALCIUM CHLORIDE: .6; .31; .03; .02 INJECTION, SOLUTION INTRAVENOUS at 06:01

## 2018-01-12 RX ADMIN — PROPOFOL 60 MG: 10 INJECTION, EMULSION INTRAVENOUS at 07:01

## 2018-01-12 RX ADMIN — PROPOFOL 20 MG: 10 INJECTION, EMULSION INTRAVENOUS at 07:01

## 2018-01-12 NOTE — ANESTHESIA POSTPROCEDURE EVALUATION
"Anesthesia Post Evaluation    Patient: Alexandra Goins    Procedure(s) Performed: Procedure(s) (LRB):  ESOPHAGOGASTRODUODENOSCOPY (EGD) (N/A)  COLONOSCOPY (N/A)    Final Anesthesia Type: MAC  Patient location during evaluation: GI PACU  Patient participation: Yes- Able to Participate  Level of consciousness: awake and alert and oriented  Post-procedure vital signs: reviewed and stable  Pain management: adequate  Airway patency: patent  PONV status at discharge: No PONV  Anesthetic complications: no      Cardiovascular status: hemodynamically stable  Respiratory status: unassisted, spontaneous ventilation and room air  Hydration status: euvolemic  Follow-up not needed.        Visit Vitals  /84 (BP Location: Left arm, Patient Position: Lying)   Pulse 87   Temp 36.7 °C (98 °F) (Oral)   Resp 17   Ht 5' 4" (1.626 m)   Wt 81.2 kg (179 lb)   SpO2 (!) 94%   Breastfeeding? No   BMI 30.73 kg/m²       Pain/Chan Score: Pain Assessment Performed: Yes (1/12/2018  6:37 AM)  Presence of Pain: denies (1/12/2018  6:37 AM)      "

## 2018-01-12 NOTE — DISCHARGE SUMMARY
[facility]  Brief Operative Note     SUMMARY     Surgery Date: 1/12/2018     Surgeon(s) and Role:     * Roque Mahajan III, MD - Primary    Assisting Surgeon: None    Pre-op Diagnosis:  Anorexia [R63.0]  Early satiety [R68.81]  Epigastric abdominal pain [R10.13]  Hx of colonic polyps [Z86.010]  Cyclical vomiting with nausea, intractability of vomiting not specified [G43.A0]    Post-op Diagnosis:  Post-Op Diagnosis Codes:     * Anorexia [R63.0]     * Early satiety [R68.81]     * Epigastric abdominal pain [R10.13]     * Hx of colonic polyps [Z86.010]     * Cyclical vomiting with nausea, intractability of vomiting not specified [G43.A0]      - Gastroduodenitis      - Colon Polyp      - Diverticulosis  Procedure(s) (LRB):  ESOPHAGOGASTRODUODENOSCOPY (EGD) (N/A)  COLONOSCOPY (N/A)    Anesthesia: Monitor Anesthesia Care    Description of the findings of the procedure: Procedures completed. See Procedure note for full details.    Findings/Key Components: Procedures completed. See Procedure note for full details.    Prosthetics/Devices: None    Estimated Blood Loss: * No values recorded between 1/12/2018 12:00 AM and 1/12/2018  8:31 AM *         Specimens:   Specimen (12h ago through future)    Start     Ordered    01/12/18 0754  Specimen to Pathology - Surgery  Once     Comments:  1. Antrum biopsies- gastritis, r/o H pylori2. Cecum polyp      01/12/18 0753          Discharge Note    SUMMARY     Admit Date: 1/12/2018    Discharge Date and Time: 1/12/2018    Hospital Course (synopsis of major diagnoses, care, treatment, and services provided during the course of the hospital stay):  Procedures completed. See Procedure note for full details. Discharge patient when discharge criteria met.    Final Diagnosis: Post-Op Diagnosis Codes:     * Anorexia [R63.0]     * Early satiety [R68.81]     * Epigastric abdominal pain [R10.13]     * Hx of colonic polyps [Z86.010]     * Cyclical vomiting with nausea,  intractability of vomiting not specified [G43.A0]      - Gastroduodenitis      - Diverticulosis      - Colon Polyp  Disposition: Discharge patient when discharge criteria met.    Follow Up/Patient Instructions:       Medications:  Reconciled Home Medications: Current Discharge Medication List      CONTINUE these medications which have NOT CHANGED    Details   blood sugar diagnostic (ACCU-CHEK JOSE PLUS TEST STRP) Strp 1 strip by Misc.(Non-Drug; Combo Route) route 3 (three) times daily. Accu-chek Strips  Qty: 100 strip, Refills: 11      blood-glucose meter (ACCU-CHEK JOSE PLUS METER) Misc 1 each by Misc.(Non-Drug; Combo Route) route as directed. Accu-chek Meter  Qty: 1 each, Refills: 0      clonazePAM (KLONOPIN) 1 MG tablet Take 1 mg by mouth 3 (three) times daily.      fish oil-omega-3 fatty acids 300-1,000 mg capsule Take 2 g by mouth once daily.      JENNIFER ROOT, BULK, MISC by Misc.(Non-Drug; Combo Route) route.      insulin aspart (NOVOLOG) 100 unit/mL injection As directed      insulin glargine (LANTUS) 100 unit/mL injection Inject 60 Units into the skin every evening.  Qty: 2 vial, Refills: 3    Associated Diagnoses: Type 2 diabetes mellitus without complication, with long-term current use of insulin      insulin syringe-needle U-100 1 mL 30 gauge x 5/16 Syrg 1 each by Misc.(Non-Drug; Combo Route) route 4 (four) times daily.  Qty: 200 each, Refills: 11    Associated Diagnoses: Type 2 diabetes mellitus without complication, with long-term current use of insulin      isosorbide mononitrate (IMDUR) 30 MG 24 hr tablet Take 1 tablet (30 mg total) by mouth once daily.  Qty: 30 tablet, Refills: 11    Associated Diagnoses: Other forms of angina pectoris; Essential hypertension      lancets (ACCU-CHEK SOFTCLIX LANCETS) Misc 1 each by Misc.(Non-Drug; Combo Route) route 3 (three) times daily.  Qty: 100 each, Refills: 11      levothyroxine (SYNTHROID) 75 MCG tablet Take 1 tablet (75 mcg total) by mouth before  "breakfast.  Qty: 30 tablet, Refills: 1    Associated Diagnoses: Hypothyroidism due to acquired atrophy of thyroid      metFORMIN (GLUCOPHAGE) 500 MG tablet Take 2 tablets (1,000 mg total) by mouth 2 (two) times daily with meals.  Qty: 120 tablet, Refills: 3    Associated Diagnoses: Type 2 diabetes mellitus without complication, with long-term current use of insulin      !! multivitamin capsule Take 1 capsule by mouth once daily.      !! multivitamin capsule Take 1 capsule by mouth once daily.      niacin 500 MG Tab Take 100 mg by mouth every evening.      ondansetron (ZOFRAN) 4 MG tablet Take 1 tablet (4 mg total) by mouth every 6 (six) hours as needed for Nausea.  Qty: 20 tablet, Refills: 0      pen needle, diabetic (BD ULTRA-FINE ARNOLDO PEN NEEDLES) 32 gauge x 5/32" Ndle 1 each by Misc.(Non-Drug; Combo Route) route 2 (two) times daily.  Qty: 100 each, Refills: 11      promethazine (PHENERGAN) 12.5 MG Tab Take 1 tablet (12.5 mg total) by mouth every 6 (six) hours as needed.  Qty: 30 tablet, Refills: 0    Associated Diagnoses: Chronic nausea      quetiapine (SEROQUEL) 100 MG Tab Take 1 tablet (100 mg total) by mouth once daily.  Qty: 30 tablet, Refills: 0      !! ramipril (ALTACE) 2.5 MG capsule Take 2.5 mg by mouth once daily.      !! ramipril (ALTACE) 2.5 MG capsule Take 1 capsule (2.5 mg total) by mouth once daily.  Qty: 90 capsule, Refills: 3      ranitidine (ZANTAC) 75 MG tablet Take 300 mg by mouth.       rizatriptan (MAXALT) 10 MG tablet One tablet with onset of migraine and may repeat one dose in 2 hours if needed      sub-q insulin device, 20 unit Katie 1 Device by Misc.(Non-Drug; Combo Route) route once daily.  Qty: 30 Device, Refills: 3      topiramate (TOPAMAX) 200 MG Tab Take by mouth 2 (two) times daily.      venlafaxine (EFFEXOR) 75 MG tablet Take 75 mg by mouth. Qid      vitamin D 1000 units Tab Take 1,000 Units by mouth once daily.      !! vitamin E 400 UNIT capsule Take 400 Units by mouth once daily. "      !! vitamin E 400 UNIT capsule Take 400 Units by mouth once daily.      cyanocobalamin (VITAMIN B-12) 500 MCG tablet Take 500 mcg by mouth once daily.      hydrocodone-acetaminophen 5-325mg (NORCO) 5-325 mg per tablet Take 1 tablet by mouth every 6 (six) hours as needed for Pain.  Qty: 12 tablet, Refills: 0       !! - Potential duplicate medications found. Please discuss with provider.      STOP taking these medications       sodium,potassium,mag sulfates (SUPREP BOWEL PREP KIT) 17.5-3.13-1.6 gram SolR Comments:   Reason for Stopping:                Discharge Procedure Orders  Diet general     Activity as tolerated

## 2018-01-12 NOTE — ANESTHESIA RELEASE NOTE
"Anesthesia Release from PACU Note    Patient: Alexandra Goins    Procedure(s) Performed: Procedure(s) (LRB):  ESOPHAGOGASTRODUODENOSCOPY (EGD) (N/A)  COLONOSCOPY (N/A)    Anesthesia type: MAC    Post pain: Adequate analgesia    Post assessment: no apparent anesthetic complications and tolerated procedure well    Last Vitals:   Visit Vitals  /84 (BP Location: Left arm, Patient Position: Lying)   Pulse 87   Temp 36.7 °C (98 °F) (Oral)   Resp 17   Ht 5' 4" (1.626 m)   Wt 81.2 kg (179 lb)   SpO2 (!) 94%   Breastfeeding? No   BMI 30.73 kg/m²       Post vital signs: stable    Level of consciousness: awake, alert  and oriented    Nausea/Vomiting: no nausea/no vomiting    Complications: none    Airway Patency: patent    Respiratory: unassisted    Cardiovascular: stable and blood pressure at baseline    Hydration: euvolemic  "

## 2018-01-12 NOTE — ANESTHESIA PREPROCEDURE EVALUATION
01/12/2018  Alexandra Goins is a 52 y.o., female.    Anesthesia Evaluation    I have reviewed the Patient Summary Reports.    I have reviewed the Nursing Notes.   I have reviewed the Medications.     Review of Systems  Anesthesia Hx:  No problems with previous Anesthesia    Social:  No Alcohol Use, Smoker 1/4 pack per day   Hematology/Oncology:  Hematology Normal   Oncology Normal     EENT/Dental:EENT/Dental Normal   Cardiovascular:   Hypertension Angina CONCLUSIONS     1 - Normal left ventricular systolic function (EF 60-65%).     2 - Normal left ventricular diastolic function.     3 - Normal right ventricular systolic function .   Pulmonary:   Asthma moderate Denies Shortness of breath.    Renal/:  Renal/ Normal     Hepatic/GI:   GERD, well controlled    Musculoskeletal:   Arthritis     Neurological:  Neurology Normal    Endocrine:   Diabetes, poorly controlled, type 2 Hypothyroidism    Dermatological:  Skin Normal    Psych:   Psychiatric History anxiety bipolar         Physical Exam  General:  Well nourished    Airway/Jaw/Neck:  Airway Findings: Mouth Opening: Normal Tongue: Normal  General Airway Assessment: Adult  Mallampati: II  TM Distance: Normal, at least 6 cm  Jaw/Neck Findings:  Neck ROM: Normal ROM      Dental:  Dental Findings: upper partial dentures, lower partial denturesPartials at home   Chest/Lungs:  Chest/Lungs Findings: Clear to auscultation, Normal Respiratory Rate     Heart/Vascular:  Heart Findings: Rate: Normal  Rhythm: Regular Rhythm  Sounds: Normal     Abdomen:  Abdomen Findings:  Normal       Mental Status:  Mental Status Findings:  Cooperative, Alert and Oriented         Anesthesia Plan  Type of Anesthesia, risks & benefits discussed:  Anesthesia Type:  MAC  Patient's Preference:   Intra-op Monitoring Plan: standard ASA monitors  Intra-op Monitoring Plan Comments:   Post Op  Pain Control Plan:   Post Op Pain Control Plan Comments:   Induction:   IV  Beta Blocker:  Patient is not currently on a Beta-Blocker (No further documentation required).       Informed Consent: Patient understands risks and agrees with Anesthesia plan.  Questions answered. Anesthesia consent signed with patient.  ASA Score: 2     Day of Surgery Review of History & Physical: I have interviewed and examined the patient. I have reviewed the patient's H&P dated: 01/12/2018. There are no significant changes.          Ready For Surgery From Anesthesia Perspective.

## 2018-01-12 NOTE — TRANSFER OF CARE
"Anesthesia Transfer of Care Note    Patient: Alexandra Goins    Procedure(s) Performed: Procedure(s) (LRB):  ESOPHAGOGASTRODUODENOSCOPY (EGD) (N/A)  COLONOSCOPY (N/A)    Patient location: GI    Anesthesia Type: MAC    Transport from OR: Transported from OR on room air with adequate spontaneous ventilation    Post pain: adequate analgesia    Post assessment: no apparent anesthetic complications and tolerated procedure well    Post vital signs: stable    Level of consciousness: awake, alert and oriented    Nausea/Vomiting: no nausea/vomiting    Complications: none    Transfer of care protocol was followed      Last vitals:   Visit Vitals  /84 (BP Location: Left arm, Patient Position: Lying)   Pulse 87   Temp 36.7 °C (98 °F) (Oral)   Resp 17   Ht 5' 4" (1.626 m)   Wt 81.2 kg (179 lb)   SpO2 (!) 94%   Breastfeeding? No   BMI 30.73 kg/m²     "

## 2018-01-12 NOTE — DISCHARGE INSTRUCTIONS
Diverticulosis    Diverticulosis means that small pouches have formed in the wall of your large intestine (colon). Most often, this problem causes no symptoms and is common as people age. But the pouches in the colon are at risk of becoming infected. When this happens, the condition is called diverticulitis. Although most people with diverticulosis never develop diverticulitis, it is still not uncommon. Rectal bleeding can also occur and in less common situations, a type of colon inflammation called colitis.  While most people do not have symptoms, some people with diverticulosis may have:  · Abdominal cramps and pain  · Bloating  · Constipation  · Change in bowel habits  Causes  The exact cause of diverticulosis (and diverticulitis) has not been proved, but a few things are associated with the condition:  · Low-fiber diet  · Constipation  · Lack of exercise  Your healthcare provider will talk with you about how to manage your condition. Diet changes may be all that are needed to help control diverticulosis and prevent progression to diverticulitis. If you develop diverticulitis, you will likely need other treatments.  Home care  You may be told to take fiber supplements daily. Fiber adds bulk to the stool so that it passes through the colon more easily. Stool softeners may be recommended. You may also be given medications for pain relief. Be sure to take all medications as directed.  In the past, people were told to avoid corn, nuts, and seeds. This is no longer necessary.  Follow these guidelines when caring for yourself at home:  · Eat unprocessed foods that are high in fiber. Whole grains, fruits, and vegetables are good choices.  · Drink 6 to 8 glasses of water every day unless your healthcare provider has you limit how much fluid you should have.  · Watch for changes in your bowel movements. Tell your provider if you notice any changes.  · Begin an exercise program. Ask your provider how to get started.  Generally, walking is the best.  · Get plenty of rest and sleep.  Follow-up care  Follow up with your healthcare provider, or as advised. Regular visits may be needed to check on your health. Sometimes special procedures such as colonoscopy, are needed after an episode of diverticulitis or blooding. Be sure to keep all your appointments.  If a stool sample was taken, or cultures were done, you should be told if they are positive, or if your treatment needs to be changed. You can call as directed for the results.  If X-rays were done, a radiologist will look at them. You will be told if there is a change in your treatment.  If antibiotics were prescribed, be sure to finish them all.  When to seek medical advice  Call your healthcare provider right away if any of these occur:  · Fever of 100.4°F (38°C) or higher, or as directed by your healthcare provider  · Severe cramps in the lower left side of the abdomen or pain that is getting worse  · Tenderness in the lower left side of the abdomen or worsening pain throughout the abdomen  · Diarrhea or constipation that doesn't get better within 24 hours  · Nausea and vomiting  · Bleeding from the rectum  Call 911  Call emergency services if any of the following occur:  · Trouble breathing  · Confusion  · Very drowsy or trouble awakening  · Fainting or loss of consciousness  · Rapid heart rate  · Chest pain  Date Last Reviewed: 12/30/2015 © 2000-2017 [employer]. [address] [place] [address]. All rights reserved. This information is not intended as a substitute for professional medical care. Always follow your healthcare professional's instructions.        Understanding Colon and Rectal Polyps    The colon (also called the large intestine) is a muscular tube that forms the last part of the digestive tract. It absorbs water and stores food waste. The colon is about 4 to 6 feet long. The rectum is the last 6 inches of the colon. The colon and rectum  have a smooth lining composed of millions of cells. Changes in these cells can lead to growths in the colon that can become cancerous and should be removed. Multiple tests are available to screen for colon cancer, but the colonoscopy is the most recommended test. During colonoscopy, these polyps can be removed. How often you need this test depends on many things including your condition, your family history, symptoms, and what the findings were at the previous colonoscopy.   When the colon lining changes  Changes that happen in the cells that line the colon or rectum can lead to growths called polyps. Over a period of years, polyps can turn cancerous. Removing polyps early may prevent cancer from ever forming.  Polyps  Polyps are fleshy clumps of tissue that form on the lining of the colon or rectum. Small polyps are usually benign (not cancerous). However, over time, cells in a polyp can change and become cancerous. Certain types of polyps known as adenomatous polyps are premalignant. The risk for invasive cancer increases with the size of the polyp and certain cell and gene features. This means that they can become cancerous if they're not removed. Hyperplastic polyps are benign. They can grow quite large and not turn cancerous.   Cancer  Almost all colorectal cancers start when polyp cells begin growing abnormally. As a cancerous tumor grows, it may involve more and more of the colon or rectum. In time, cancer can also grow beyond the colon or rectum and spread to nearby organs or to glands called lymph nodes. The cells can also travel to other parts of the body. This is known as metastasis. The earlier a cancerous tumor is removed, the better the chance of preventing its spread.    Date Last Reviewed: 8/1/2016  © 0308-7344 [employer]. [address] [place] [address], [place] [address]. All rights reserved. This information is not intended as a substitute for professional medical care. Always follow your  healthcare professional's instructions.        Esophagitis     With esophagitis, the lining of the esophagus is inflamed.   Do you often have burning pain in your chest? You may have esophagitis. This is when the lining of the esophagus becomes red and swollen (inflamed). The esophagus is the tube that connects your throat to your stomach. This sheet tells you more about esophagitis. It also explains your treatment options.  Main types of esophagitis  Reflux esophagitis. This is the more common type. It is caused by GERD (gastroesophageal reflux disease). Stomach contents with stomach acid flow back up into the esophagus. This happens over and over. It leads to inflammation. Risk factors can include:  · Being overweight  · Asthma  · Smoking  · Pregnancy  · Frequent vomiting  · Certain medicines (such as aspirin and other anti-inflammatories)  · Hiatal hernia  Infectious esophagitis. This is caused by an infection. You are more at risk for this if you have a weakened immune system and poor nutrition. Antibiotic use can also be a factor. The infection is often due to the following:  · A type of fungus (typically candida)  · A virus, such as herpes simplex virus 1 (HSV-1) or cytomegalovirus (CMV)  Eosinophilic esophagitis. Foods or other things around you can give you an allergic reaction. This triggers an immune response and leads to esophagitis.  Pill-induced esophagitis. Certain types of medicines can cause inflammation and ulcers in the esophagus. These include doxycycline, aspirin, NSAIDs, alendronate, potassium, quinidine, iron.  Symptoms of esophagitis  The following symptoms can occur with esophagitis:  · Pain when swallowing, or trouble swallowing  · Pain behind your breastbone (heartburn)  · Acid regurgitation  · Chronic sore throat  · Gum Inflammation  · Cavities  · Bad breath  · Nausea  · Pain in your upper belly (abdomen)  · Bleeding (indicated by bright red vomit or black, tarry stool)  These symptoms  occur more often with reflux esophagitis:  · Coughing, wheezing, or asthma  · Hoarseness  Diagnosis of esophagitis  Your healthcare provider will ask about your health history and symptoms. Youll also be examined. Sometimes certain tests are needed. These may include:  · Upper endoscopy. A thin, flexible tube with a tiny light and camera is used. It is inserted through the mouth down into the esophagus. This lets the provider look for damage. A small sample of tissue (biopsy) may also be removed. The sample is sent to a lab for testing.  · Upper GI X-ray with barium. An X-ray is done after you drink a substance called barium. Barium may make problems in the esophagus easier to see on an x-ray.  · Esophageal pH. A soft, thin tube is passed into the esophagus through the nose or mouth for 24 hours. It measures the acid level in the esophagus.  · Esophageal manometry. A soft, thin tube is passed into the esophagus through the nose or mouth. It measures muscle contractions in the esophagus.  Treatment of esophagitis  Medicines. Different medicines can help treat esophagitis. The medicine used will depend on the type of esophagitis you have. Talk with your healthcare provider.  Lifestyle changes. Making the following changes can help reduce irritation and ease your symptoms:  · Avoid spicy foods (pepper, chili powder, lee). Also avoid hard foods (nuts, crackers, raw vegetables) and acidic foods and drinks (tomatoes, citrus fruits and juices). Other problem foods include chocolate, peppermint, nutmeg, and foods high in fat.  · Until you can swallow without pain, follow a combined liquid and soft diet. Try foods such as cooked cereals, mashed potatoes, and soups.  · Take small bites and chew your food thoroughly.  · Avoid large meals and heavy evening meals. Don't lie down within 2 to 3 hours of eating.  · Get to or stay at a healthy weight.  · Avoid alcohol, caffeine, and smoking or tobacco products.  · Brush and floss  your teeth  · Raise your upper body by 4 to 6 inches when lying in bed. This can be done using a foam wedge. Or put blocks under the legs at the head of your bed.  Surgery. This may be needed for severe reflux esophagitis. Other noninvasive procedures to treat GERD and esophagitis are being studied. Your provider can tell you more.  Why treatment Is important  Without treatment, esophagitis can get worse. This is especially true with severe reflux esophagitis. For instance, continued symptoms can cause scarring of the esophagus. Over time, this can cause a narrowing the esophagus (stricture). This can make it hard to pass food down to the stomach. As symptoms go on they can also cause changes in the lining of the esophagus. These changes can put you at a slightly higher risk of cancer of the esophagus.   Date Last Reviewed: 7/1/2016 © 2000-2017 [employer]. [address] [place] [address]. All rights reserved. This information is not intended as a substitute for professional medical care. Always follow your healthcare professional's instructions.        Gastritis (Adult)    Gastritis is inflammation and irritation of the stomach lining. It can be present for a short time (acute) or be long lasting (chronic). Gastritis is often caused by infection with bacteria called H pylori. More than a third of people in the [place] have this bacteria in their bodies. In many cases, H pylori causes no problems or symptoms. In some people, though, the infection irritates the stomach lining and causes gastritis. Other causes of stomach irritation include drinking alcohol or taking pain-relieving medicines called NSAIDs (such as aspirin or ibuprofen).   Symptoms of gastritis can include:  · Abdominal pain or bloating  · Loss of appetite  · Nausea or vomiting  · Vomiting blood or having black stools  · Feeling more tired than usual  An inflamed and irritated stomach lining is more likely to develop a sore called  an ulcer. To help prevent this, gastritis should be treated.  Home care  If needed, medicines may be prescribed. If you have H pylori infection, treating it will likely relieve your symptoms. Other changes can help reduce stomach irritation and help it heal.  · If you have been prescribed medicines for H pylori infection, take them as directed. Take all of the medicine until it is finished or your healthcare provider tells you to stop, even if you feel better.  · Your healthcare provider may recommend avoiding NSAIDs. If you take daily aspirin for your heart or other medical reasons, do not stop without talking to your healthcare provider first.  · Avoid drinking alcohol.  · Stop smoking. Smoking can irritate the stomach and delay healing. As much as possible, stay away from second hand smoke.  Follow-up care  Follow up with your healthcare provider, or as advised by our staff. Testing may be needed to check for inflammation or an ulcer.  When to seek medical advice  Call your healthcare provider for any of the following:  · Stomach pain that gets worse or moves to the lower right abdomen (appendix area)  · Chest pain that appears or gets worse, or spreads to the back, neck, shoulder, or arm  · Frequent vomiting (cant keep down liquids)  · Blood in the stool or vomit (red or black in color)  · Feeling weak or dizzy  · Fever of 100.4ºF (38ºC) or higher, or as directed by your healthcare provider  Date Last Reviewed: 6/22/2015  © 2151-3770 [employer]. [address] [place] [address], [place] [address]. All rights reserved. This information is not intended as a substitute for professional medical care. Always follow your healthcare professional's instructions.

## 2018-01-12 NOTE — ADDENDUM NOTE
Addendum  created 01/12/18 0808 by Bart Huston CRNA    Anesthesia Intra Meds edited, Orders acknowledged in Narrator
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
## 2018-02-01 ENCOUNTER — OFFICE VISIT (OUTPATIENT)
Dept: PSYCHIATRY | Facility: CLINIC | Age: 53
End: 2018-02-01
Payer: MEDICARE

## 2018-02-01 DIAGNOSIS — F31.4 BIPOLAR DISORDER, CURRENT EPISODE DEPRESSED, SEVERE, WITHOUT PSYCHOTIC FEATURES: Primary | Chronic | ICD-10-CM

## 2018-02-01 DIAGNOSIS — F41.9 ANXIETY: ICD-10-CM

## 2018-02-01 PROCEDURE — 90792 PSYCH DIAG EVAL W/MED SRVCS: CPT | Mod: S$GLB,,, | Performed by: PSYCHIATRY & NEUROLOGY

## 2018-02-01 PROCEDURE — 99499 UNLISTED E&M SERVICE: CPT | Mod: S$GLB,,, | Performed by: PSYCHIATRY & NEUROLOGY

## 2018-02-01 RX ORDER — TOPIRAMATE 200 MG/1
200 TABLET ORAL 2 TIMES DAILY
Qty: 60 TABLET | Refills: 2 | Status: SHIPPED | OUTPATIENT
Start: 2018-02-01 | End: 2018-03-21 | Stop reason: SDUPTHER

## 2018-02-01 RX ORDER — VENLAFAXINE 75 MG/1
75 TABLET ORAL 3 TIMES DAILY
Qty: 90 TABLET | Refills: 2 | Status: SHIPPED | OUTPATIENT
Start: 2018-02-01 | End: 2018-03-21 | Stop reason: SDUPTHER

## 2018-02-01 RX ORDER — QUETIAPINE FUMARATE 200 MG/1
200 TABLET, FILM COATED ORAL NIGHTLY
Qty: 30 TABLET | Refills: 2 | Status: SHIPPED | OUTPATIENT
Start: 2018-02-01 | End: 2018-03-21 | Stop reason: SDUPTHER

## 2018-02-01 RX ORDER — CLONAZEPAM 1 MG/1
1 TABLET ORAL 3 TIMES DAILY
Qty: 90 TABLET | Refills: 1 | Status: SHIPPED | OUTPATIENT
Start: 2018-02-01 | End: 2018-05-31

## 2018-02-15 PROBLEM — F41.9 ANXIETY: Status: ACTIVE | Noted: 2018-02-15

## 2018-02-20 ENCOUNTER — PATIENT OUTREACH (OUTPATIENT)
Dept: ADMINISTRATIVE | Facility: HOSPITAL | Age: 53
End: 2018-02-20

## 2018-02-21 ENCOUNTER — OFFICE VISIT (OUTPATIENT)
Dept: FAMILY MEDICINE | Facility: CLINIC | Age: 53
End: 2018-02-21
Payer: MEDICARE

## 2018-02-21 VITALS
OXYGEN SATURATION: 96 % | TEMPERATURE: 97 F | HEIGHT: 64 IN | DIASTOLIC BLOOD PRESSURE: 86 MMHG | HEART RATE: 118 BPM | SYSTOLIC BLOOD PRESSURE: 133 MMHG

## 2018-02-21 DIAGNOSIS — E11.9 TYPE 2 DIABETES MELLITUS WITHOUT COMPLICATION, WITH LONG-TERM CURRENT USE OF INSULIN: ICD-10-CM

## 2018-02-21 DIAGNOSIS — I10 ESSENTIAL HYPERTENSION: ICD-10-CM

## 2018-02-21 DIAGNOSIS — J01.00 ACUTE NON-RECURRENT MAXILLARY SINUSITIS: ICD-10-CM

## 2018-02-21 DIAGNOSIS — E11.65 TYPE 2 DIABETES MELLITUS WITH HYPERGLYCEMIA, WITH LONG-TERM CURRENT USE OF INSULIN: ICD-10-CM

## 2018-02-21 DIAGNOSIS — Z79.4 TYPE 2 DIABETES MELLITUS WITH HYPERGLYCEMIA, WITH LONG-TERM CURRENT USE OF INSULIN: ICD-10-CM

## 2018-02-21 DIAGNOSIS — Z79.4 TYPE 2 DIABETES MELLITUS WITHOUT COMPLICATION, WITH LONG-TERM CURRENT USE OF INSULIN: ICD-10-CM

## 2018-02-21 DIAGNOSIS — Z00.00 HEALTHCARE MAINTENANCE: ICD-10-CM

## 2018-02-21 DIAGNOSIS — Z12.31 ENCOUNTER FOR SCREENING MAMMOGRAM FOR MALIGNANT NEOPLASM OF BREAST: ICD-10-CM

## 2018-02-21 DIAGNOSIS — R10.13 EPIGASTRIC ABDOMINAL PAIN: ICD-10-CM

## 2018-02-21 DIAGNOSIS — F31.4 BIPOLAR DISORDER, CURRENT EPISODE DEPRESSED, SEVERE, WITHOUT PSYCHOTIC FEATURES: Primary | ICD-10-CM

## 2018-02-21 DIAGNOSIS — E03.4 HYPOTHYROIDISM DUE TO ACQUIRED ATROPHY OF THYROID: ICD-10-CM

## 2018-02-21 DIAGNOSIS — Z72.0 TOBACCO ABUSE: ICD-10-CM

## 2018-02-21 PROCEDURE — 99999 PR PBB SHADOW E&M-EST. PATIENT-LVL III: CPT | Mod: PBBFAC,,, | Performed by: FAMILY MEDICINE

## 2018-02-21 PROCEDURE — 3008F BODY MASS INDEX DOCD: CPT | Mod: S$GLB,,, | Performed by: FAMILY MEDICINE

## 2018-02-21 PROCEDURE — 99215 OFFICE O/P EST HI 40 MIN: CPT | Mod: S$GLB,,, | Performed by: FAMILY MEDICINE

## 2018-02-21 PROCEDURE — 99499 UNLISTED E&M SERVICE: CPT | Mod: S$GLB,,, | Performed by: FAMILY MEDICINE

## 2018-02-21 RX ORDER — PANTOPRAZOLE SODIUM 20 MG/1
20 TABLET, DELAYED RELEASE ORAL DAILY
Qty: 30 TABLET | Refills: 1 | Status: SHIPPED | OUTPATIENT
Start: 2018-02-21 | End: 2019-04-11

## 2018-02-21 RX ORDER — AMOXICILLIN AND CLAVULANATE POTASSIUM 875; 125 MG/1; MG/1
1 TABLET, FILM COATED ORAL EVERY 12 HOURS
Qty: 20 TABLET | Refills: 0 | Status: SHIPPED | OUTPATIENT
Start: 2018-02-21 | End: 2018-05-31

## 2018-02-21 NOTE — PROGRESS NOTES
Subjective:       Patient ID: Alexandra Goins is a 52 y.o. female.    Chief Complaint: Follow-up      HPI Comments:       Current Outpatient Prescriptions:     amoxicillin-clavulanate 875-125mg (AUGMENTIN) 875-125 mg per tablet, Take 1 tablet by mouth every 12 (twelve) hours., Disp: 20 tablet, Rfl: 0    blood sugar diagnostic (ACCU-CHEK JOSE PLUS TEST STRP) Strp, 1 strip by Misc.(Non-Drug; Combo Route) route 3 (three) times daily. Accu-chek Strips, Disp: 100 strip, Rfl: 11    blood-glucose meter (ACCU-CHEK JOSE PLUS METER) Misc, 1 each by Misc.(Non-Drug; Combo Route) route as directed. Accu-chek Meter, Disp: 1 each, Rfl: 0    clonazePAM (KLONOPIN) 1 MG tablet, Take 1 tablet (1 mg total) by mouth 3 (three) times daily., Disp: 90 tablet, Rfl: 1    cyanocobalamin (VITAMIN B-12) 500 MCG tablet, Take 500 mcg by mouth once daily., Disp: , Rfl:     fish oil-omega-3 fatty acids 300-1,000 mg capsule, Take 2 g by mouth once daily., Disp: , Rfl:     GINGER ROOT, BULK, MISC, by Misc.(Non-Drug; Combo Route) route., Disp: , Rfl:     hydrocodone-acetaminophen 5-325mg (NORCO) 5-325 mg per tablet, Take 1 tablet by mouth every 6 (six) hours as needed for Pain., Disp: 12 tablet, Rfl: 0    insulin aspart (NOVOLOG) 100 unit/mL injection, As directed, Disp: , Rfl:     insulin glargine (LANTUS) 100 unit/mL injection, Inject 60 Units into the skin every evening., Disp: 2 vial, Rfl: 3    insulin syringe-needle U-100 1 mL 30 gauge x 5/16 Syrg, 1 each by Misc.(Non-Drug; Combo Route) route 4 (four) times daily., Disp: 200 each, Rfl: 11    isosorbide mononitrate (IMDUR) 30 MG 24 hr tablet, Take 1 tablet (30 mg total) by mouth once daily., Disp: 30 tablet, Rfl: 11    lancets (ACCU-CHEK SOFTCLIX LANCETS) Misc, 1 each by Misc.(Non-Drug; Combo Route) route 3 (three) times daily., Disp: 100 each, Rfl: 11    levothyroxine (SYNTHROID) 75 MCG tablet, Take 1 tablet (75 mcg total) by mouth before breakfast., Disp: 30 tablet, Rfl: 1     "metFORMIN (GLUCOPHAGE) 500 MG tablet, Take 2 tablets (1,000 mg total) by mouth 2 (two) times daily with meals., Disp: 120 tablet, Rfl: 3    multivitamin capsule, Take 1 capsule by mouth once daily., Disp: , Rfl:     multivitamin capsule, Take 1 capsule by mouth once daily., Disp: , Rfl:     niacin 500 MG Tab, Take 100 mg by mouth every evening., Disp: , Rfl:     ondansetron (ZOFRAN) 4 MG tablet, Take 1 tablet (4 mg total) by mouth every 6 (six) hours as needed for Nausea., Disp: 20 tablet, Rfl: 0    pantoprazole (PROTONIX) 20 MG tablet, Take 1 tablet (20 mg total) by mouth once daily., Disp: 30 tablet, Rfl: 1    pen needle, diabetic (BD ULTRA-FINE ARNOLDO PEN NEEDLES) 32 gauge x 5/32" Ndle, 1 each by Misc.(Non-Drug; Combo Route) route 2 (two) times daily., Disp: 100 each, Rfl: 11    promethazine (PHENERGAN) 12.5 MG Tab, Take 1 tablet (12.5 mg total) by mouth every 6 (six) hours as needed., Disp: 30 tablet, Rfl: 0    QUEtiapine (SEROQUEL) 200 MG Tab, Take 1 tablet (200 mg total) by mouth nightly., Disp: 30 tablet, Rfl: 2    ramipril (ALTACE) 2.5 MG capsule, Take 2.5 mg by mouth once daily., Disp: , Rfl:     ramipril (ALTACE) 2.5 MG capsule, Take 1 capsule (2.5 mg total) by mouth once daily., Disp: 90 capsule, Rfl: 3    ranitidine (ZANTAC) 75 MG tablet, Take 300 mg by mouth. , Disp: , Rfl:     rizatriptan (MAXALT) 10 MG tablet, One tablet with onset of migraine and may repeat one dose in 2 hours if needed, Disp: , Rfl:     sub-q insulin device, 20 unit Katie, 1 Device by Misc.(Non-Drug; Combo Route) route once daily., Disp: 30 Device, Rfl: 3    topiramate (TOPAMAX) 200 MG Tab, Take 1 tablet (200 mg total) by mouth 2 (two) times daily., Disp: 60 tablet, Rfl: 2    venlafaxine (EFFEXOR) 75 MG tablet, Take 1 tablet (75 mg total) by mouth 3 (three) times daily., Disp: 90 tablet, Rfl: 2    vitamin D 1000 units Tab, Take 1,000 Units by mouth once daily., Disp: , Rfl:     vitamin E 400 UNIT capsule, Take 400 " "Units by mouth once daily., Disp: , Rfl:     vitamin E 400 UNIT capsule, Take 400 Units by mouth once daily., Disp: , Rfl:     Three-month follow-up:    #1.  GI: Since I saw her last she had both an EGD and colonoscopy.  Only finding was a mild gastritis and a polyp.  Still taking Zantac.  Still having symptoms.  Epigastric and right upper quarter pain.      #2. Sinus: Sinus congestion and cough for the last 2 weeks with green nasal discharge and sputum. Taking over-the-counter medication only.  MAXIMUM TEMPERATURE 102.  Congested, ear pressure, sinus pressure, 3 migraines during this time.  History of asthma and is wheezing.  Uses only nebulizer at home because of the cost of inhaler.    #3.  Diabetes: Blood sugars have been elevated the last 2 weeks because of her sinus symptoms.  Even has registered "high" at times.  Stop the metformin because it was continue cause nausea and diarrhea.  Has an appointment with endocrinology next month. Insurer sent a "home A1C" test, which she sent off.      Review of Systems   Constitutional: Positive for fever. Negative for activity change and appetite change.   HENT: Positive for congestion, postnasal drip, rhinorrhea and sinus pressure. Negative for sore throat.    Respiratory: Positive for cough, shortness of breath and wheezing.    Cardiovascular: Negative for chest pain.   Gastrointestinal: Negative for abdominal pain, diarrhea and nausea.   Genitourinary: Negative for difficulty urinating.   Musculoskeletal: Negative for arthralgias and myalgias.   Neurological: Positive for headaches. Negative for dizziness.   Hematological: Negative for adenopathy.   Psychiatric/Behavioral: Negative for confusion.       Objective:      Vitals:    02/21/18 0921   BP: 133/86   Pulse: (!) 118   Temp: 97.4 °F (36.3 °C)   TempSrc: Tympanic   SpO2: 96%   Height: 5' 4" (1.626 m)     Physical Exam   Constitutional: She is oriented to person, place, and time. She appears well-developed and " well-nourished. She does not appear ill. No distress.   HENT:   Head: Normocephalic.   Right Ear: Tympanic membrane, external ear and ear canal normal.   Left Ear: Tympanic membrane, external ear and ear canal normal.   Nose: No mucosal edema or rhinorrhea. Right sinus exhibits maxillary sinus tenderness. Right sinus exhibits no frontal sinus tenderness. Left sinus exhibits maxillary sinus tenderness. Left sinus exhibits no frontal sinus tenderness.   Mouth/Throat: Mucous membranes are normal. Posterior oropharyngeal edema present. No oropharyngeal exudate or posterior oropharyngeal erythema.   Neck: Neck supple. No thyromegaly present.   Cardiovascular: Normal rate, regular rhythm and normal heart sounds.    No murmur heard.  Pulmonary/Chest: Effort normal. She has rhonchi. She has no rales.   Abdominal: Soft. She exhibits no distension. There is no hepatosplenomegaly. There is tenderness in the right upper quadrant.   Musculoskeletal: She exhibits no edema.   Lymphadenopathy:     She has no cervical adenopathy.   Neurological: She is alert and oriented to person, place, and time.   Skin: Skin is warm and dry. She is not diaphoretic.   Psychiatric: She has a normal mood and affect. Her behavior is normal. Judgment and thought content normal.   Nursing note and vitals reviewed.      Assessment:       1. Bipolar disorder, current episode depressed, severe, without psychotic features    2. Epigastric abdominal pain    3. Essential hypertension    4. Tobacco abuse    5. Type 2 diabetes mellitus without complication, with long-term current use of insulin    6. Healthcare maintenance    7. Acute non-recurrent maxillary sinusitis    8. Encounter for screening mammogram for malignant neoplasm of breast     9. Hypothyroidism due to acquired atrophy of thyroid    10. Type 2 diabetes mellitus with hyperglycemia, with long-term current use of insulin        Plan:   Bipolar disorder, current episode depressed, severe, without  psychotic features  Comments:   review ok. She likes her new psychiatrist.    Epigastric abdominal pain  Comments:  persistent. Change from H2 blocker to PPI    Essential hypertension  Comments:  controlled    Tobacco abuse  Comments:  Now vaping    Type 2 diabetes mellitus without complication, with long-term current use of insulin  Comments:  home A1C test pending    Healthcare maintenance  Comments:  MG re-ordered  Orders:  -     Mammo Digital Screening Bilat with CAD; Future; Expected date: 02/21/2018    Acute non-recurrent maxillary sinusitis  Comments:  augmentin.     Encounter for screening mammogram for malignant neoplasm of breast   -     Mammo Digital Screening Bilat with CAD; Future; Expected date: 02/21/2018    Hypothyroidism due to acquired atrophy of thyroid    Type 2 diabetes mellitus with hyperglycemia, with long-term current use of insulin  Comments:  endocrine f/u soon.    Other orders  -     amoxicillin-clavulanate 875-125mg (AUGMENTIN) 875-125 mg per tablet; Take 1 tablet by mouth every 12 (twelve) hours.  Dispense: 20 tablet; Refill: 0  -     pantoprazole (PROTONIX) 20 MG tablet; Take 1 tablet (20 mg total) by mouth once daily.  Dispense: 30 tablet; Refill: 1

## 2018-03-13 ENCOUNTER — TELEPHONE (OUTPATIENT)
Dept: FAMILY MEDICINE | Facility: CLINIC | Age: 53
End: 2018-03-13

## 2018-03-13 NOTE — TELEPHONE ENCOUNTER
Spoke with patient and informed that she believes she has pulled a muscle in her back, advised her to be seen for an appointment, scheduled it for 03/14/2018 @ 840am. Patient verbalized understanding and did not have any further questions at this time.

## 2018-03-13 NOTE — TELEPHONE ENCOUNTER
----- Message from Juani More sent at 3/13/2018  9:38 AM CDT -----  Contact: pt  States she needs to speak to the nurse, its personal. Please call pt at [phone]. Thank you

## 2018-03-13 NOTE — TELEPHONE ENCOUNTER
----- Message from Harrison Ridley sent at 3/13/2018  9:49 AM CDT -----  The pt is requesting a call back from the staff she recently missed a call.  The pt can be reached at [phone]

## 2018-03-14 ENCOUNTER — OFFICE VISIT (OUTPATIENT)
Dept: FAMILY MEDICINE | Facility: CLINIC | Age: 53
End: 2018-03-14
Payer: MEDICARE

## 2018-03-14 ENCOUNTER — PATIENT OUTREACH (OUTPATIENT)
Dept: ADMINISTRATIVE | Facility: HOSPITAL | Age: 53
End: 2018-03-14

## 2018-03-14 VITALS
OXYGEN SATURATION: 97 % | HEIGHT: 64 IN | BODY MASS INDEX: 30.86 KG/M2 | HEART RATE: 104 BPM | WEIGHT: 180.75 LBS | DIASTOLIC BLOOD PRESSURE: 74 MMHG | TEMPERATURE: 97 F | SYSTOLIC BLOOD PRESSURE: 138 MMHG

## 2018-03-14 DIAGNOSIS — E11.9 TYPE 2 DIABETES MELLITUS WITHOUT COMPLICATION, WITH LONG-TERM CURRENT USE OF INSULIN: ICD-10-CM

## 2018-03-14 DIAGNOSIS — I10 ESSENTIAL HYPERTENSION: ICD-10-CM

## 2018-03-14 DIAGNOSIS — M54.50 ACUTE RIGHT-SIDED LOW BACK PAIN WITHOUT SCIATICA: Primary | ICD-10-CM

## 2018-03-14 DIAGNOSIS — Z79.4 TYPE 2 DIABETES MELLITUS WITHOUT COMPLICATION, WITH LONG-TERM CURRENT USE OF INSULIN: ICD-10-CM

## 2018-03-14 PROCEDURE — 99999 PR PBB SHADOW E&M-EST. PATIENT-LVL V: CPT | Mod: PBBFAC,,, | Performed by: FAMILY MEDICINE

## 2018-03-14 PROCEDURE — 99499 UNLISTED E&M SERVICE: CPT | Mod: S$GLB,,, | Performed by: FAMILY MEDICINE

## 2018-03-14 PROCEDURE — 3075F SYST BP GE 130 - 139MM HG: CPT | Mod: CPTII,S$GLB,, | Performed by: FAMILY MEDICINE

## 2018-03-14 PROCEDURE — 96372 THER/PROPH/DIAG INJ SC/IM: CPT | Mod: S$GLB,,, | Performed by: FAMILY MEDICINE

## 2018-03-14 PROCEDURE — 99214 OFFICE O/P EST MOD 30 MIN: CPT | Mod: 25,S$GLB,, | Performed by: FAMILY MEDICINE

## 2018-03-14 PROCEDURE — 3078F DIAST BP <80 MM HG: CPT | Mod: CPTII,S$GLB,, | Performed by: FAMILY MEDICINE

## 2018-03-14 RX ORDER — TIZANIDINE HYDROCHLORIDE 2 MG/1
2 CAPSULE, GELATIN COATED ORAL 3 TIMES DAILY PRN
Qty: 30 CAPSULE | Refills: 0 | Status: SHIPPED | OUTPATIENT
Start: 2018-03-14 | End: 2018-03-24

## 2018-03-14 RX ORDER — KETOROLAC TROMETHAMINE 30 MG/ML
60 INJECTION, SOLUTION INTRAMUSCULAR; INTRAVENOUS
Status: COMPLETED | OUTPATIENT
Start: 2018-03-14 | End: 2018-03-14

## 2018-03-14 RX ORDER — TRAMADOL HYDROCHLORIDE 50 MG/1
50 TABLET ORAL EVERY 8 HOURS PRN
Qty: 12 TABLET | Refills: 0 | Status: SHIPPED | OUTPATIENT
Start: 2018-03-14 | End: 2018-03-24

## 2018-03-14 RX ORDER — ATORVASTATIN CALCIUM 20 MG/1
20 TABLET, FILM COATED ORAL DAILY
Qty: 90 TABLET | Refills: 3 | Status: SHIPPED | OUTPATIENT
Start: 2018-03-14 | End: 2019-04-11 | Stop reason: SDUPTHER

## 2018-03-14 RX ADMIN — KETOROLAC TROMETHAMINE 60 MG: 30 INJECTION, SOLUTION INTRAMUSCULAR; INTRAVENOUS at 08:03

## 2018-03-14 NOTE — PROGRESS NOTES
He he Subjective:       Patient ID: Alexandra Goins is a 52 y.o. female.    Chief Complaint: Back Pain      HPI Comments:       Current Outpatient Prescriptions:     blood sugar diagnostic (ACCU-CHEK JOSE PLUS TEST STRP) Strp, 1 strip by Misc.(Non-Drug; Combo Route) route 3 (three) times daily. Accu-chek Strips, Disp: 100 strip, Rfl: 11    blood-glucose meter (ACCU-CHEK JOSE PLUS METER) Misc, 1 each by Misc.(Non-Drug; Combo Route) route as directed. Accu-chek Meter, Disp: 1 each, Rfl: 0    clonazePAM (KLONOPIN) 1 MG tablet, Take 1 tablet (1 mg total) by mouth 3 (three) times daily., Disp: 90 tablet, Rfl: 1    cyanocobalamin (VITAMIN B-12) 500 MCG tablet, Take 500 mcg by mouth once daily., Disp: , Rfl:     fish oil-omega-3 fatty acids 300-1,000 mg capsule, Take 2 g by mouth once daily., Disp: , Rfl:     GINGER ROOT, BULK, MISC, by Misc.(Non-Drug; Combo Route) route., Disp: , Rfl:     hydrocodone-acetaminophen 5-325mg (NORCO) 5-325 mg per tablet, Take 1 tablet by mouth every 6 (six) hours as needed for Pain., Disp: 12 tablet, Rfl: 0    insulin aspart (NOVOLOG) 100 unit/mL injection, As directed, Disp: , Rfl:     insulin glargine (LANTUS) 100 unit/mL injection, Inject 60 Units into the skin every evening., Disp: 2 vial, Rfl: 3    insulin syringe-needle U-100 1 mL 30 gauge x 5/16 Syrg, 1 each by Misc.(Non-Drug; Combo Route) route 4 (four) times daily., Disp: 200 each, Rfl: 11    isosorbide mononitrate (IMDUR) 30 MG 24 hr tablet, Take 1 tablet (30 mg total) by mouth once daily., Disp: 30 tablet, Rfl: 11    lancets (ACCU-CHEK SOFTCLIX LANCETS) Misc, 1 each by Misc.(Non-Drug; Combo Route) route 3 (three) times daily., Disp: 100 each, Rfl: 11    levothyroxine (SYNTHROID) 75 MCG tablet, Take 1 tablet (75 mcg total) by mouth before breakfast., Disp: 30 tablet, Rfl: 1    metFORMIN (GLUCOPHAGE) 500 MG tablet, Take 2 tablets (1,000 mg total) by mouth 2 (two) times daily with meals., Disp: 120 tablet, Rfl: 3     "multivitamin capsule, Take 1 capsule by mouth once daily., Disp: , Rfl:     multivitamin capsule, Take 1 capsule by mouth once daily., Disp: , Rfl:     niacin 500 MG Tab, Take 100 mg by mouth every evening., Disp: , Rfl:     ondansetron (ZOFRAN) 4 MG tablet, Take 1 tablet (4 mg total) by mouth every 6 (six) hours as needed for Nausea., Disp: 20 tablet, Rfl: 0    pantoprazole (PROTONIX) 20 MG tablet, Take 1 tablet (20 mg total) by mouth once daily., Disp: 30 tablet, Rfl: 1    pen needle, diabetic (BD ULTRA-FINE ARNOLDO PEN NEEDLES) 32 gauge x 5/32" Ndle, 1 each by Misc.(Non-Drug; Combo Route) route 2 (two) times daily., Disp: 100 each, Rfl: 11    promethazine (PHENERGAN) 12.5 MG Tab, Take 1 tablet (12.5 mg total) by mouth every 6 (six) hours as needed., Disp: 30 tablet, Rfl: 0    QUEtiapine (SEROQUEL) 200 MG Tab, Take 1 tablet (200 mg total) by mouth nightly., Disp: 30 tablet, Rfl: 2    ramipril (ALTACE) 2.5 MG capsule, Take 2.5 mg by mouth once daily., Disp: , Rfl:     ramipril (ALTACE) 2.5 MG capsule, Take 1 capsule (2.5 mg total) by mouth once daily., Disp: 90 capsule, Rfl: 3    ranitidine (ZANTAC) 75 MG tablet, Take 300 mg by mouth. , Disp: , Rfl:     rizatriptan (MAXALT) 10 MG tablet, One tablet with onset of migraine and may repeat one dose in 2 hours if needed, Disp: , Rfl:     sub-q insulin device, 20 unit Katie, 1 Device by Misc.(Non-Drug; Combo Route) route once daily., Disp: 30 Device, Rfl: 3    topiramate (TOPAMAX) 200 MG Tab, Take 1 tablet (200 mg total) by mouth 2 (two) times daily., Disp: 60 tablet, Rfl: 2    venlafaxine (EFFEXOR) 75 MG tablet, Take 1 tablet (75 mg total) by mouth 3 (three) times daily., Disp: 90 tablet, Rfl: 2    vitamin D 1000 units Tab, Take 1,000 Units by mouth once daily., Disp: , Rfl:     vitamin E 400 UNIT capsule, Take 400 Units by mouth once daily., Disp: , Rfl:     vitamin E 400 UNIT capsule, Take 400 Units by mouth once daily., Disp: , Rfl:     " amoxicillin-clavulanate 875-125mg (AUGMENTIN) 875-125 mg per tablet, Take 1 tablet by mouth every 12 (twelve) hours., Disp: 20 tablet, Rfl: 0    atorvastatin (LIPITOR) 20 MG tablet, Take 1 tablet (20 mg total) by mouth once daily., Disp: 90 tablet, Rfl: 3    tiZANidine 2 mg Cap, Take 1 capsule (2 mg total) by mouth 3 (three) times daily as needed., Disp: 30 capsule, Rfl: 0    traMADol (ULTRAM) 50 mg tablet, Take 1 tablet (50 mg total) by mouth every 8 (eight) hours as needed for Pain., Disp: 12 tablet, Rfl: 0  No current facility-administered medications for this visit.     Presents complaining of two-week history of right lumbar pain.  No specific injury.  Had an x-ray done a year ago after she had fallen on her back, which showed some degenerative changes.  Since that time she's had only short courses of pain, until now when it's been more prolonged.  She's tried Naprosyn, ibuprofen, heat.  Seems to be getting worse.  No radiation or bladder/bowel incontinence, or saddle anesthesia.  No urinary symptoms    Recently saw her eye doctor, Dr. dolan on the time away.  Has mammogram scheduled for next week.  He is to be on a statin but was taken off for unclear reason.  Okay with her to restart again given her diabetes.  She's followed by endocrinology otherwise sugars.      Back Pain   This is a new problem. The current episode started 1 to 4 weeks ago. The problem occurs constantly. The problem has been gradually worsening since onset. The pain is present in the lumbar spine. The quality of the pain is described as aching. The pain does not radiate. The pain is at a severity of 9/10. The pain is severe. The pain is the same all the time. The symptoms are aggravated by bending and standing. Pertinent negatives include no abdominal pain, bladder incontinence, bowel incontinence, chest pain, dysuria, fever, headaches, leg pain, numbness, paresis, paresthesias, pelvic pain, perianal numbness, tingling or weakness. She  "has tried heat and NSAIDs for the symptoms. The treatment provided mild relief.     Review of Systems   Constitutional: Negative for activity change, appetite change and fever.   HENT: Negative for sore throat.    Respiratory: Negative for cough and shortness of breath.    Cardiovascular: Negative for chest pain.   Gastrointestinal: Negative for abdominal pain, bowel incontinence, diarrhea and nausea.   Genitourinary: Negative for bladder incontinence, difficulty urinating, dysuria and pelvic pain.   Musculoskeletal: Positive for back pain. Negative for arthralgias and myalgias.   Neurological: Negative for dizziness, tingling, weakness, numbness, headaches and paresthesias.       Objective:      Vitals:    03/14/18 0812 03/14/18 0858   BP: (!) 154/62 138/74   Pulse: 104    Temp: 97.4 °F (36.3 °C)    TempSrc: Tympanic    SpO2: 97%    Weight: 82 kg (180 lb 12.4 oz)    Height: 5' 4" (1.626 m)    PainSc:   9    PainLoc: Back      Physical Exam   Constitutional: She is oriented to person, place, and time. She appears well-developed and well-nourished. No distress.   Very uncomfortable when moving and changing positions   HENT:   Head: Normocephalic.   Mouth/Throat: No oropharyngeal exudate.   Neck: Neck supple. No thyromegaly present.   Cardiovascular: Normal rate, regular rhythm and normal heart sounds.    No murmur heard.  Pulmonary/Chest: Effort normal and breath sounds normal. She has no wheezes. She has no rales.   Abdominal: Soft. She exhibits no distension.   Musculoskeletal: She exhibits no edema.        Lumbar back: She exhibits decreased range of motion, tenderness, pain and spasm. She exhibits no bony tenderness.        Back:    Lymphadenopathy:     She has no cervical adenopathy.   Neurological: She is alert and oriented to person, place, and time.   Skin: Skin is warm and dry. She is not diaphoretic.   Psychiatric: She has a normal mood and affect. Her behavior is normal. Judgment and thought content " normal.   Nursing note and vitals reviewed.      Assessment:       1. Acute right-sided low back pain without sciatica    2. Type 2 diabetes mellitus without complication, with long-term current use of insulin    3. Essential hypertension        Plan:   Acute right-sided low back pain without sciatica  Comments:   review ok.  Toradol IM.  Muscle relaxants use sparingly.  Physical therapy consult.  Acute supply of Ultram (#12) for short-term use only.  Orders:  -     Ambulatory consult to Physical Therapy    Type 2 diabetes mellitus without complication, with long-term current use of insulin  Comments:  add statin. Eye exam UTD. Will obtain records.    Essential hypertension  Comments:  elevated in pain today. F/U 3 mo    Other orders  -     tiZANidine 2 mg Cap; Take 1 capsule (2 mg total) by mouth 3 (three) times daily as needed.  Dispense: 30 capsule; Refill: 0  -     ketorolac injection 60 mg; Inject 2 mLs (60 mg total) into the muscle one time.  -     traMADol (ULTRAM) 50 mg tablet; Take 1 tablet (50 mg total) by mouth every 8 (eight) hours as needed for Pain.  Dispense: 12 tablet; Refill: 0  -     atorvastatin (LIPITOR) 20 MG tablet; Take 1 tablet (20 mg total) by mouth once daily.  Dispense: 90 tablet; Refill: 3

## 2018-03-15 ENCOUNTER — TELEPHONE (OUTPATIENT)
Dept: FAMILY MEDICINE | Facility: CLINIC | Age: 53
End: 2018-03-15

## 2018-03-15 NOTE — TELEPHONE ENCOUNTER
----- Message from Princess Isabel sent at 3/15/2018 11:44 AM CDT -----  Contact: [facility]  J[facility]zy called to speak with the nurse; they aren't in Network with her [employer] insurance. She can be contacted at [phone].    Thanks,  Princess

## 2018-03-16 ENCOUNTER — PATIENT OUTREACH (OUTPATIENT)
Dept: ADMINISTRATIVE | Facility: HOSPITAL | Age: 53
End: 2018-03-16

## 2018-03-16 NOTE — PROGRESS NOTES
Fax for sent for Diabetic Eye Exam result. Received DM Eye Exam from Dr Chay Dubon. Last DM Eye Exam on 03/14/2018. Repeat in 1 year. NDR.

## 2018-03-21 ENCOUNTER — OFFICE VISIT (OUTPATIENT)
Dept: PSYCHIATRY | Facility: CLINIC | Age: 53
End: 2018-03-21
Payer: MEDICARE

## 2018-03-21 DIAGNOSIS — F31.4 BIPOLAR DISORDER, CURRENT EPISODE DEPRESSED, SEVERE, WITHOUT PSYCHOTIC FEATURES: Primary | Chronic | ICD-10-CM

## 2018-03-21 DIAGNOSIS — F41.9 ANXIETY: ICD-10-CM

## 2018-03-21 PROCEDURE — 99499 UNLISTED E&M SERVICE: CPT | Mod: S$GLB,,, | Performed by: PSYCHIATRY & NEUROLOGY

## 2018-03-21 PROCEDURE — 99213 OFFICE O/P EST LOW 20 MIN: CPT | Mod: S$GLB,,, | Performed by: PSYCHIATRY & NEUROLOGY

## 2018-03-21 RX ORDER — VENLAFAXINE 75 MG/1
150 TABLET ORAL 2 TIMES DAILY
Qty: 90 TABLET | Refills: 2 | Status: SHIPPED | OUTPATIENT
Start: 2018-03-21 | End: 2018-05-17 | Stop reason: SDUPTHER

## 2018-03-21 RX ORDER — DIAZEPAM 10 MG/1
10 TABLET ORAL 3 TIMES DAILY PRN
Qty: 90 TABLET | Refills: 1 | Status: SHIPPED | OUTPATIENT
Start: 2018-03-21 | End: 2018-05-17 | Stop reason: SDUPTHER

## 2018-03-21 RX ORDER — QUETIAPINE FUMARATE 200 MG/1
TABLET, FILM COATED ORAL
Qty: 45 TABLET | Refills: 2 | Status: SHIPPED | OUTPATIENT
Start: 2018-03-21 | End: 2018-05-17 | Stop reason: SDUPTHER

## 2018-03-21 RX ORDER — TOPIRAMATE 200 MG/1
200 TABLET ORAL 2 TIMES DAILY
Qty: 60 TABLET | Refills: 2 | Status: SHIPPED | OUTPATIENT
Start: 2018-03-21 | End: 2018-05-17 | Stop reason: SINTOL
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
## 2018-03-28 ENCOUNTER — TELEPHONE (OUTPATIENT)
Dept: FAMILY MEDICINE | Facility: CLINIC | Age: 53
End: 2018-03-28

## 2018-03-28 NOTE — TELEPHONE ENCOUNTER
----- Message from Minnie Ferreira sent at 3/28/2018 12:05 PM CDT -----  Contact: pt   Pt requested a callback in regards to what insurance said in regards to physical therapy callback number is [phone]

## 2018-03-28 NOTE — TELEPHONE ENCOUNTER
Spoke with patient and she has been in contact with her insurance and the closest PT that she could go to is in [place] and is $45 every visit, patient states that she cannot afford that.

## 2018-04-12 ENCOUNTER — PES CALL (OUTPATIENT)
Dept: ADMINISTRATIVE | Facility: CLINIC | Age: 53
End: 2018-04-12

## 2018-04-20 ENCOUNTER — HOSPITAL ENCOUNTER (OUTPATIENT)
Dept: RADIOLOGY | Facility: HOSPITAL | Age: 53
Discharge: HOME | End: 2018-04-20
Attending: FAMILY MEDICINE
Payer: MEDICARE

## 2018-04-20 VITALS — HEIGHT: 64 IN | WEIGHT: 180 LBS | BODY MASS INDEX: 30.73 KG/M2

## 2018-04-20 DIAGNOSIS — Z00.00 HEALTHCARE MAINTENANCE: ICD-10-CM

## 2018-04-20 DIAGNOSIS — Z12.31 ENCOUNTER FOR SCREENING MAMMOGRAM FOR MALIGNANT NEOPLASM OF BREAST: ICD-10-CM

## 2018-04-20 PROCEDURE — 77067 SCR MAMMO BI INCL CAD: CPT | Mod: TC

## 2018-04-20 PROCEDURE — 77063 BREAST TOMOSYNTHESIS BI: CPT | Mod: 26,,, | Performed by: RADIOLOGY

## 2018-04-20 PROCEDURE — 77067 SCR MAMMO BI INCL CAD: CPT | Mod: 26,,, | Performed by: RADIOLOGY

## 2018-05-17 ENCOUNTER — OFFICE VISIT (OUTPATIENT)
Dept: PSYCHIATRY | Facility: CLINIC | Age: 53
End: 2018-05-17
Payer: MEDICARE

## 2018-05-17 DIAGNOSIS — F41.9 ANXIETY: ICD-10-CM

## 2018-05-17 DIAGNOSIS — F31.32 BIPOLAR AFFECTIVE DISORDER, CURRENTLY DEPRESSED, MODERATE: Primary | Chronic | ICD-10-CM

## 2018-05-17 PROCEDURE — 99214 OFFICE O/P EST MOD 30 MIN: CPT | Mod: S$GLB,,, | Performed by: PSYCHIATRY & NEUROLOGY

## 2018-05-17 PROCEDURE — 99499 UNLISTED E&M SERVICE: CPT | Mod: S$GLB,,, | Performed by: PSYCHIATRY & NEUROLOGY

## 2018-05-17 RX ORDER — LAMOTRIGINE 25 MG/1
TABLET ORAL
Qty: 84 TABLET | Refills: 0 | Status: SHIPPED | OUTPATIENT
Start: 2018-05-17 | End: 2018-07-06

## 2018-05-17 RX ORDER — QUETIAPINE FUMARATE 200 MG/1
TABLET, FILM COATED ORAL
Qty: 45 TABLET | Refills: 1 | Status: SHIPPED | OUTPATIENT
Start: 2018-05-17 | End: 2018-07-17 | Stop reason: SDUPTHER

## 2018-05-17 RX ORDER — LAMOTRIGINE 100 MG/1
100 TABLET ORAL DAILY
Qty: 30 TABLET | Refills: 1 | Status: SHIPPED | OUTPATIENT
Start: 2018-06-27 | End: 2018-08-13 | Stop reason: SDUPTHER

## 2018-05-17 RX ORDER — DOXEPIN HYDROCHLORIDE 10 MG/1
10 CAPSULE ORAL NIGHTLY PRN
Qty: 30 CAPSULE | Refills: 2 | Status: SHIPPED | OUTPATIENT
Start: 2018-05-17 | End: 2018-08-13 | Stop reason: SDUPTHER

## 2018-05-17 RX ORDER — DIAZEPAM 10 MG/1
10 TABLET ORAL 3 TIMES DAILY PRN
Qty: 90 TABLET | Refills: 1 | Status: SHIPPED | OUTPATIENT
Start: 2018-05-17 | End: 2018-07-17 | Stop reason: SDUPTHER

## 2018-05-17 RX ORDER — VENLAFAXINE 75 MG/1
150 TABLET ORAL 2 TIMES DAILY
Qty: 120 TABLET | Refills: 2 | Status: SHIPPED | OUTPATIENT
Start: 2018-05-17 | End: 2018-07-06
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
## 2018-05-31 ENCOUNTER — OFFICE VISIT (OUTPATIENT)
Dept: FAMILY MEDICINE | Facility: CLINIC | Age: 53
End: 2018-05-31
Payer: MEDICARE

## 2018-05-31 VITALS
HEART RATE: 102 BPM | SYSTOLIC BLOOD PRESSURE: 109 MMHG | HEIGHT: 64 IN | DIASTOLIC BLOOD PRESSURE: 71 MMHG | RESPIRATION RATE: 20 BRPM | BODY MASS INDEX: 28.19 KG/M2 | TEMPERATURE: 98 F | WEIGHT: 165.13 LBS

## 2018-05-31 DIAGNOSIS — E11.40 TYPE 2 DIABETES MELLITUS WITH DIABETIC NEUROPATHY, WITHOUT LONG-TERM CURRENT USE OF INSULIN: Primary | ICD-10-CM

## 2018-05-31 DIAGNOSIS — E11.69 DYSLIPIDEMIA ASSOCIATED WITH TYPE 2 DIABETES MELLITUS: ICD-10-CM

## 2018-05-31 DIAGNOSIS — E78.5 DYSLIPIDEMIA ASSOCIATED WITH TYPE 2 DIABETES MELLITUS: ICD-10-CM

## 2018-05-31 DIAGNOSIS — F12.10 MARIJUANA ABUSE: Chronic | ICD-10-CM

## 2018-05-31 DIAGNOSIS — F32.A DEPRESSION, UNSPECIFIED DEPRESSION TYPE: Chronic | ICD-10-CM

## 2018-05-31 DIAGNOSIS — F31.70 BIPOLAR AFFECTIVE DISORDER IN REMISSION: Chronic | ICD-10-CM

## 2018-05-31 DIAGNOSIS — Z72.0 TOBACCO ABUSE: Chronic | ICD-10-CM

## 2018-05-31 DIAGNOSIS — I10 ESSENTIAL HYPERTENSION: Chronic | ICD-10-CM

## 2018-05-31 DIAGNOSIS — E03.4 HYPOTHYROIDISM DUE TO ACQUIRED ATROPHY OF THYROID: Chronic | ICD-10-CM

## 2018-05-31 DIAGNOSIS — F41.9 ANXIETY: ICD-10-CM

## 2018-05-31 DIAGNOSIS — Z79.4 TYPE 2 DIABETES MELLITUS WITH HYPERGLYCEMIA, WITH LONG-TERM CURRENT USE OF INSULIN: ICD-10-CM

## 2018-05-31 DIAGNOSIS — E11.65 TYPE 2 DIABETES MELLITUS WITH HYPERGLYCEMIA, WITH LONG-TERM CURRENT USE OF INSULIN: ICD-10-CM

## 2018-05-31 PROCEDURE — 99999 PR PBB SHADOW E&M-EST. PATIENT-LVL V: CPT | Mod: PBBFAC,,, | Performed by: NURSE PRACTITIONER

## 2018-05-31 PROCEDURE — 96160 PT-FOCUSED HLTH RISK ASSMT: CPT | Mod: S$GLB,,, | Performed by: NURSE PRACTITIONER

## 2018-05-31 PROCEDURE — 99499 UNLISTED E&M SERVICE: CPT | Mod: S$GLB,,, | Performed by: NURSE PRACTITIONER

## 2018-06-01 PROBLEM — E11.9 TYPE 2 DIABETES MELLITUS WITHOUT COMPLICATION: Status: RESOLVED | Noted: 2017-01-25 | Resolved: 2018-06-01

## 2018-06-01 NOTE — PROGRESS NOTES
"Alexandra Goins presented for a  Medicare AWV and comprehensive Health Risk Assessment today. The following components were reviewed and updated:    · Medical history  · Family History  · Social history  · Allergies and Current Medications  · Health Risk Assessment  · Health Maintenance  · Care Team     ** See Completed Assessments for Annual Wellness Visit within the encounter summary.**       The following assessments were completed:  · Living Situation  · CAGE  · Depression Screening  · Timed Get Up and Go  · Whisper Test  · Cognitive Function Screening  · Nutrition Screening  · ADL Screening  · PAQ Screening    Vitals:    05/31/18 0951 05/31/18 0952   BP: 109/71    BP Location: Right arm    Patient Position: Sitting    BP Method: Small (Automatic)    Pulse: (!) 112 102   Resp: 20    Temp: 97.6 °F (36.4 °C)    TempSrc: Tympanic    Weight: 74.9 kg (165 lb 2 oz)    Height: 5' 4" (1.626 m)    PF: 96 L/min      Body mass index is 28.34 kg/m².  Physical Exam      Diagnoses and health risks identified today and associated recommendations/orders:    1. Type 2 diabetes mellitus with diabetic neuropathy, without long-term current use of insulin  Uncontrolled. Follow up with DM management     2. Bipolar affective disorder in remission  stable    3. Essential hypertension  stable    4. Hypothyroidism due to acquired atrophy of thyroid  stable    5. Marijuana abuse  Smoking cessation    6. Tobacco abuse  Smoking cessation    7. Type 2 diabetes mellitus with hyperglycemia, with long-term current use of insulin  -uncontrolled.     8. Dyslipidemia associated with type 2 diabetes mellitus  Stable. Continue statin therapy. Low fat diet and exercise    9. Anxiety  stable    10. Depression, unspecified depression type  stable      Provided Alexandra with a 5-10 year written screening schedule and personal prevention plan. Recommendations were developed using the USPSTF age appropriate recommendations. Education, counseling, and " referrals were provided as needed. After Visit Summary printed and given to patient which includes a list of additional screenings\tests needed.    No Follow-up on file.    Barbi Cam NP  I offered to discuss end of life issues, including information on how to make advance directives that the patient could use to name someone who would make medical decisions on their behalf if they became too ill to make themselves.    _X_Patient declined  ___Patient is interested, I provided paper work and offered to discuss.

## 2018-06-18 ENCOUNTER — PATIENT OUTREACH (OUTPATIENT)
Dept: ADMINISTRATIVE | Facility: HOSPITAL | Age: 53
End: 2018-06-18

## 2018-06-20 ENCOUNTER — HOSPITAL ENCOUNTER (INPATIENT)
Facility: HOSPITAL | Age: 53
LOS: 3 days | Discharge: HOME | DRG: 690 | End: 2018-06-23
Attending: EMERGENCY MEDICINE | Admitting: HOSPITALIST
Payer: MEDICARE

## 2018-06-20 DIAGNOSIS — E11.9 TYPE 2 DIABETES MELLITUS: ICD-10-CM

## 2018-06-20 DIAGNOSIS — R01.1 MURMUR: ICD-10-CM

## 2018-06-20 DIAGNOSIS — R31.9 URINARY TRACT INFECTION WITH HEMATURIA, SITE UNSPECIFIED: ICD-10-CM

## 2018-06-20 DIAGNOSIS — R73.9 HYPERGLYCEMIA: ICD-10-CM

## 2018-06-20 DIAGNOSIS — N15.1 RENAL ABSCESS, RIGHT: Primary | ICD-10-CM

## 2018-06-20 DIAGNOSIS — N39.0 URINARY TRACT INFECTION WITH HEMATURIA, SITE UNSPECIFIED: ICD-10-CM

## 2018-06-20 LAB
ALBUMIN SERPL BCP-MCNC: 2.9 G/DL
ALP SERPL-CCNC: 94 U/L
ALT SERPL W/O P-5'-P-CCNC: 13 U/L
AMYLASE SERPL-CCNC: 80 U/L
ANION GAP SERPL CALC-SCNC: 11 MMOL/L
APTT BLDCRRT: 27.9 SEC
AST SERPL-CCNC: 13 U/L
BACTERIA #/AREA URNS HPF: ABNORMAL /HPF
BASOPHILS # BLD AUTO: 0.03 K/UL
BASOPHILS NFR BLD: 0.2 %
BILIRUB SERPL-MCNC: 0.2 MG/DL
BILIRUB UR QL STRIP: NEGATIVE
BUN SERPL-MCNC: 15 MG/DL
CALCIUM SERPL-MCNC: 9.4 MG/DL
CHLORIDE SERPL-SCNC: 100 MMOL/L
CLARITY UR: CLEAR
CO2 SERPL-SCNC: 23 MMOL/L
COLOR UR: YELLOW
CREAT SERPL-MCNC: 0.9 MG/DL
DIFFERENTIAL METHOD: ABNORMAL
EOSINOPHIL # BLD AUTO: 0.2 K/UL
EOSINOPHIL NFR BLD: 1.2 %
ERYTHROCYTE [DISTWIDTH] IN BLOOD BY AUTOMATED COUNT: 13.8 %
EST. GFR  (AFRICAN AMERICAN): >60 ML/MIN/1.73 M^2
EST. GFR  (NON AFRICAN AMERICAN): >60 ML/MIN/1.73 M^2
GLUCOSE SERPL-MCNC: 626 MG/DL
GLUCOSE UR QL STRIP: ABNORMAL
HCT VFR BLD AUTO: 40.2 %
HGB BLD-MCNC: 13.4 G/DL
HGB UR QL STRIP: NEGATIVE
INR PPP: 1
KETONES UR QL STRIP: NEGATIVE
LEUKOCYTE ESTERASE UR QL STRIP: NEGATIVE
LIPASE SERPL-CCNC: 34 U/L
LYMPHOCYTES # BLD AUTO: 3.9 K/UL
LYMPHOCYTES NFR BLD: 30.2 %
MCH RBC QN AUTO: 30.1 PG
MCHC RBC AUTO-ENTMCNC: 33.3 G/DL
MCV RBC AUTO: 90 FL
MICROSCOPIC COMMENT: ABNORMAL
MONOCYTES # BLD AUTO: 1.1 K/UL
MONOCYTES NFR BLD: 8.1 %
NEUTROPHILS # BLD AUTO: 7.8 K/UL
NEUTROPHILS NFR BLD: 60.3 %
NITRITE UR QL STRIP: NEGATIVE
PH UR STRIP: 6 [PH] (ref 5–8)
PLATELET # BLD AUTO: 355 K/UL
PMV BLD AUTO: 10.2 FL
POTASSIUM SERPL-SCNC: 4.2 MMOL/L
PROT SERPL-MCNC: 7.1 G/DL
PROT UR QL STRIP: NEGATIVE
PROTHROMBIN TIME: 10.4 SEC
RBC # BLD AUTO: 4.45 M/UL
RBC #/AREA URNS HPF: 2 /HPF (ref 0–4)
SODIUM SERPL-SCNC: 134 MMOL/L
SP GR UR STRIP: <=1.005 (ref 1–1.03)
URN SPEC COLLECT METH UR: ABNORMAL
UROBILINOGEN UR STRIP-ACNC: NEGATIVE EU/DL
WBC # BLD AUTO: 12.9 K/UL
WBC #/AREA URNS HPF: 7 /HPF (ref 0–5)
YEAST URNS QL MICRO: ABNORMAL

## 2018-06-20 PROCEDURE — 11000001 HC ACUTE MED/SURG PRIVATE ROOM

## 2018-06-20 PROCEDURE — 96372 THER/PROPH/DIAG INJ SC/IM: CPT | Mod: 59

## 2018-06-20 PROCEDURE — 96361 HYDRATE IV INFUSION ADD-ON: CPT

## 2018-06-20 PROCEDURE — 96376 TX/PRO/DX INJ SAME DRUG ADON: CPT

## 2018-06-20 PROCEDURE — 80053 COMPREHEN METABOLIC PANEL: CPT

## 2018-06-20 PROCEDURE — 96375 TX/PRO/DX INJ NEW DRUG ADDON: CPT

## 2018-06-20 PROCEDURE — 81000 URINALYSIS NONAUTO W/SCOPE: CPT

## 2018-06-20 PROCEDURE — 83690 ASSAY OF LIPASE: CPT

## 2018-06-20 PROCEDURE — 25500020 PHARM REV CODE 255: Performed by: EMERGENCY MEDICINE

## 2018-06-20 PROCEDURE — 82962 GLUCOSE BLOOD TEST: CPT

## 2018-06-20 PROCEDURE — 96365 THER/PROPH/DIAG IV INF INIT: CPT

## 2018-06-20 PROCEDURE — 85610 PROTHROMBIN TIME: CPT

## 2018-06-20 PROCEDURE — 63600175 PHARM REV CODE 636 W HCPCS: Performed by: EMERGENCY MEDICINE

## 2018-06-20 PROCEDURE — 96367 TX/PROPH/DG ADDL SEQ IV INF: CPT

## 2018-06-20 PROCEDURE — 82150 ASSAY OF AMYLASE: CPT

## 2018-06-20 PROCEDURE — 85730 THROMBOPLASTIN TIME PARTIAL: CPT

## 2018-06-20 PROCEDURE — 25000003 PHARM REV CODE 250: Performed by: EMERGENCY MEDICINE

## 2018-06-20 PROCEDURE — 99285 EMERGENCY DEPT VISIT HI MDM: CPT | Mod: 25

## 2018-06-20 PROCEDURE — 36415 COLL VENOUS BLD VENIPUNCTURE: CPT

## 2018-06-20 PROCEDURE — 85025 COMPLETE CBC W/AUTO DIFF WBC: CPT

## 2018-06-20 RX ORDER — MORPHINE SULFATE 4 MG/ML
4 INJECTION, SOLUTION INTRAMUSCULAR; INTRAVENOUS
Status: COMPLETED | OUTPATIENT
Start: 2018-06-20 | End: 2018-06-20

## 2018-06-20 RX ORDER — ONDANSETRON 2 MG/ML
4 INJECTION INTRAMUSCULAR; INTRAVENOUS
Status: COMPLETED | OUTPATIENT
Start: 2018-06-20 | End: 2018-06-20

## 2018-06-20 RX ORDER — SODIUM CHLORIDE 9 MG/ML
1000 INJECTION, SOLUTION INTRAVENOUS
Status: COMPLETED | OUTPATIENT
Start: 2018-06-20 | End: 2018-06-21

## 2018-06-20 RX ADMIN — SODIUM CHLORIDE 1000 ML: 0.9 INJECTION, SOLUTION INTRAVENOUS at 10:06

## 2018-06-20 RX ADMIN — IOHEXOL 30 ML: 350 INJECTION, SOLUTION INTRAVENOUS at 10:06

## 2018-06-20 RX ADMIN — INSULIN HUMAN 10 UNITS: 100 INJECTION, SOLUTION PARENTERAL at 11:06

## 2018-06-20 RX ADMIN — ONDANSETRON 4 MG: 2 INJECTION INTRAMUSCULAR; INTRAVENOUS at 10:06

## 2018-06-20 RX ADMIN — MORPHINE SULFATE 4 MG: 4 INJECTION INTRAVENOUS at 10:06

## 2018-06-20 RX ADMIN — SODIUM CHLORIDE 1000 ML: 0.9 INJECTION, SOLUTION INTRAVENOUS at 11:06

## 2018-06-20 RX ADMIN — PROMETHAZINE HYDROCHLORIDE 12.5 MG: 25 INJECTION INTRAMUSCULAR; INTRAVENOUS at 11:06

## 2018-06-21 PROBLEM — N15.1 RENAL ABSCESS, RIGHT: Status: ACTIVE | Noted: 2018-06-21

## 2018-06-21 PROBLEM — G47.00 INSOMNIA: Status: ACTIVE | Noted: 2018-06-21

## 2018-06-21 PROBLEM — E11.9 TYPE 2 DIABETES MELLITUS: Status: ACTIVE | Noted: 2018-06-21

## 2018-06-21 LAB
ALBUMIN SERPL BCP-MCNC: 2.7 G/DL
ANION GAP SERPL CALC-SCNC: 8 MMOL/L
BASOPHILS # BLD AUTO: 0.03 K/UL
BASOPHILS NFR BLD: 0.3 %
BUN SERPL-MCNC: 8 MG/DL
CALCIUM SERPL-MCNC: 8.6 MG/DL
CHLORIDE SERPL-SCNC: 104 MMOL/L
CO2 SERPL-SCNC: 24 MMOL/L
CREAT SERPL-MCNC: 0.7 MG/DL
DIFFERENTIAL METHOD: NORMAL
EOSINOPHIL # BLD AUTO: 0.2 K/UL
EOSINOPHIL NFR BLD: 1.8 %
ERYTHROCYTE [DISTWIDTH] IN BLOOD BY AUTOMATED COUNT: 13.8 %
EST. GFR  (AFRICAN AMERICAN): >60 ML/MIN/1.73 M^2
EST. GFR  (NON AFRICAN AMERICAN): >60 ML/MIN/1.73 M^2
ESTIMATED AVG GLUCOSE: ABNORMAL MG/DL
GLUCOSE SERPL-MCNC: 336 MG/DL (ref 70–110)
GLUCOSE SERPL-MCNC: 341 MG/DL
GLUCOSE SERPL-MCNC: 346 MG/DL (ref 70–110)
HBA1C MFR BLD HPLC: >14 %
HCT VFR BLD AUTO: 39.1 %
HGB BLD-MCNC: 12.9 G/DL
INR PPP: 1
LYMPHOCYTES # BLD AUTO: 3.9 K/UL
LYMPHOCYTES NFR BLD: 35.1 %
MAGNESIUM SERPL-MCNC: 1.4 MG/DL
MCH RBC QN AUTO: 29.8 PG
MCHC RBC AUTO-ENTMCNC: 33 G/DL
MCV RBC AUTO: 90 FL
MONOCYTES # BLD AUTO: 0.8 K/UL
MONOCYTES NFR BLD: 7.1 %
NEUTROPHILS # BLD AUTO: 6.2 K/UL
NEUTROPHILS NFR BLD: 55.7 %
PHOSPHATE SERPL-MCNC: 2.9 MG/DL
PHOSPHATE SERPL-MCNC: 2.9 MG/DL
PLATELET # BLD AUTO: 337 K/UL
PMV BLD AUTO: 10 FL
POCT GLUCOSE: 180 MG/DL (ref 70–110)
POCT GLUCOSE: 284 MG/DL (ref 70–110)
POCT GLUCOSE: 294 MG/DL (ref 70–110)
POCT GLUCOSE: 336 MG/DL (ref 70–110)
POCT GLUCOSE: 346 MG/DL (ref 70–110)
POCT GLUCOSE: >500 MG/DL (ref 70–110)
POTASSIUM SERPL-SCNC: 3.6 MMOL/L
PROTHROMBIN TIME: 10.3 SEC
RBC # BLD AUTO: 4.33 M/UL
SODIUM SERPL-SCNC: 136 MMOL/L
WBC # BLD AUTO: 11.03 K/UL

## 2018-06-21 PROCEDURE — 87075 CULTR BACTERIA EXCEPT BLOOD: CPT

## 2018-06-21 PROCEDURE — 25500020 PHARM REV CODE 255: Performed by: EMERGENCY MEDICINE

## 2018-06-21 PROCEDURE — 99223 1ST HOSP IP/OBS HIGH 75: CPT | Mod: ,,, | Performed by: UROLOGY

## 2018-06-21 PROCEDURE — 63600175 PHARM REV CODE 636 W HCPCS: Performed by: HOSPITALIST

## 2018-06-21 PROCEDURE — 25000003 PHARM REV CODE 250: Performed by: NURSE PRACTITIONER

## 2018-06-21 PROCEDURE — 87077 CULTURE AEROBIC IDENTIFY: CPT

## 2018-06-21 PROCEDURE — 63600175 PHARM REV CODE 636 W HCPCS: Performed by: NURSE PRACTITIONER

## 2018-06-21 PROCEDURE — 25000003 PHARM REV CODE 250: Performed by: INTERNAL MEDICINE

## 2018-06-21 PROCEDURE — 87205 SMEAR GRAM STAIN: CPT

## 2018-06-21 PROCEDURE — 25000003 PHARM REV CODE 250: Performed by: HOSPITALIST

## 2018-06-21 PROCEDURE — 21400001 HC TELEMETRY ROOM

## 2018-06-21 PROCEDURE — 63600175 PHARM REV CODE 636 W HCPCS: Performed by: EMERGENCY MEDICINE

## 2018-06-21 PROCEDURE — 36415 COLL VENOUS BLD VENIPUNCTURE: CPT

## 2018-06-21 PROCEDURE — 87070 CULTURE OTHR SPECIMN AEROBIC: CPT

## 2018-06-21 PROCEDURE — 63600175 PHARM REV CODE 636 W HCPCS: Performed by: INTERNAL MEDICINE

## 2018-06-21 PROCEDURE — 97802 MEDICAL NUTRITION INDIV IN: CPT

## 2018-06-21 PROCEDURE — 25000003 PHARM REV CODE 250: Performed by: EMERGENCY MEDICINE

## 2018-06-21 PROCEDURE — 80069 RENAL FUNCTION PANEL: CPT

## 2018-06-21 PROCEDURE — 63600175 PHARM REV CODE 636 W HCPCS: Performed by: RADIOLOGY

## 2018-06-21 PROCEDURE — 11000001 HC ACUTE MED/SURG PRIVATE ROOM

## 2018-06-21 PROCEDURE — 83036 HEMOGLOBIN GLYCOSYLATED A1C: CPT

## 2018-06-21 PROCEDURE — 87186 SC STD MICRODIL/AGAR DIL: CPT

## 2018-06-21 PROCEDURE — 85610 PROTHROMBIN TIME: CPT

## 2018-06-21 PROCEDURE — 96372 THER/PROPH/DIAG INJ SC/IM: CPT

## 2018-06-21 PROCEDURE — 83735 ASSAY OF MAGNESIUM: CPT

## 2018-06-21 PROCEDURE — 87040 BLOOD CULTURE FOR BACTERIA: CPT

## 2018-06-21 PROCEDURE — 0T9030Z DRAINAGE OF RIGHT KIDNEY WITH DRAINAGE DEVICE, PERCUTANEOUS APPROACH: ICD-10-PCS

## 2018-06-21 PROCEDURE — 85025 COMPLETE CBC W/AUTO DIFF WBC: CPT

## 2018-06-21 RX ORDER — MAGNESIUM SULFATE HEPTAHYDRATE 40 MG/ML
2 INJECTION, SOLUTION INTRAVENOUS ONCE
Status: COMPLETED | OUTPATIENT
Start: 2018-06-21 | End: 2018-06-21

## 2018-06-21 RX ORDER — ACETAMINOPHEN 325 MG/1
650 TABLET ORAL EVERY 4 HOURS PRN
Status: DISCONTINUED | OUTPATIENT
Start: 2018-06-21 | End: 2018-06-23 | Stop reason: HOSPADM

## 2018-06-21 RX ORDER — SODIUM CHLORIDE 9 MG/ML
1000 INJECTION, SOLUTION INTRAVENOUS
Status: COMPLETED | OUTPATIENT
Start: 2018-06-21 | End: 2018-06-21

## 2018-06-21 RX ORDER — QUETIAPINE FUMARATE 100 MG/1
200 TABLET, FILM COATED ORAL DAILY
Status: DISCONTINUED | OUTPATIENT
Start: 2018-06-21 | End: 2018-06-23 | Stop reason: HOSPADM

## 2018-06-21 RX ORDER — SODIUM CHLORIDE 0.9 % (FLUSH) 0.9 %
5 SYRINGE (ML) INJECTION
Status: DISCONTINUED | OUTPATIENT
Start: 2018-06-21 | End: 2018-06-23 | Stop reason: HOSPADM

## 2018-06-21 RX ORDER — HYDROMORPHONE HYDROCHLORIDE 1 MG/ML
0.5 INJECTION, SOLUTION INTRAMUSCULAR; INTRAVENOUS; SUBCUTANEOUS EVERY 6 HOURS PRN
Status: DISCONTINUED | OUTPATIENT
Start: 2018-06-21 | End: 2018-06-21

## 2018-06-21 RX ORDER — DIPHENHYDRAMINE HCL 12.5MG/5ML
12.5 ELIXIR ORAL EVERY 6 HOURS PRN
Status: DISCONTINUED | OUTPATIENT
Start: 2018-06-21 | End: 2018-06-23

## 2018-06-21 RX ORDER — HYDROMORPHONE HYDROCHLORIDE 1 MG/ML
1 INJECTION, SOLUTION INTRAMUSCULAR; INTRAVENOUS; SUBCUTANEOUS EVERY 6 HOURS PRN
Status: DISCONTINUED | OUTPATIENT
Start: 2018-06-21 | End: 2018-06-21

## 2018-06-21 RX ORDER — DOXEPIN HYDROCHLORIDE 10 MG/1
10 CAPSULE ORAL NIGHTLY PRN
Status: DISCONTINUED | OUTPATIENT
Start: 2018-06-21 | End: 2018-06-21

## 2018-06-21 RX ORDER — IBUPROFEN 200 MG
16 TABLET ORAL
Status: DISCONTINUED | OUTPATIENT
Start: 2018-06-21 | End: 2018-06-23 | Stop reason: HOSPADM

## 2018-06-21 RX ORDER — VANCOMYCIN HCL IN 5 % DEXTROSE 1G/250ML
1000 PLASTIC BAG, INJECTION (ML) INTRAVENOUS
Status: DISCONTINUED | OUTPATIENT
Start: 2018-06-21 | End: 2018-06-22

## 2018-06-21 RX ORDER — LAMOTRIGINE 25 MG/1
75 TABLET ORAL DAILY
Status: DISCONTINUED | OUTPATIENT
Start: 2018-06-21 | End: 2018-06-23 | Stop reason: HOSPADM

## 2018-06-21 RX ORDER — LORAZEPAM 1 MG/1
1 TABLET ORAL
Status: COMPLETED | OUTPATIENT
Start: 2018-06-21 | End: 2018-06-21

## 2018-06-21 RX ORDER — ONDANSETRON 2 MG/ML
4 INJECTION INTRAMUSCULAR; INTRAVENOUS EVERY 6 HOURS PRN
Status: DISCONTINUED | OUTPATIENT
Start: 2018-06-21 | End: 2018-06-23 | Stop reason: HOSPADM

## 2018-06-21 RX ORDER — INSULIN ASPART 100 [IU]/ML
1-10 INJECTION, SOLUTION INTRAVENOUS; SUBCUTANEOUS EVERY 6 HOURS PRN
Status: DISCONTINUED | OUTPATIENT
Start: 2018-06-21 | End: 2018-06-23 | Stop reason: HOSPADM

## 2018-06-21 RX ORDER — HYDROCODONE BITARTRATE AND ACETAMINOPHEN 10; 325 MG/1; MG/1
1 TABLET ORAL EVERY 8 HOURS PRN
Status: DISCONTINUED | OUTPATIENT
Start: 2018-06-21 | End: 2018-06-23

## 2018-06-21 RX ORDER — VENLAFAXINE 75 MG/1
150 TABLET ORAL 2 TIMES DAILY
Status: DISCONTINUED | OUTPATIENT
Start: 2018-06-21 | End: 2018-06-23 | Stop reason: HOSPADM

## 2018-06-21 RX ORDER — HYDROMORPHONE HYDROCHLORIDE 1 MG/ML
0.5 INJECTION, SOLUTION INTRAMUSCULAR; INTRAVENOUS; SUBCUTANEOUS EVERY 6 HOURS PRN
Status: DISCONTINUED | OUTPATIENT
Start: 2018-06-21 | End: 2018-06-22

## 2018-06-21 RX ORDER — PANTOPRAZOLE SODIUM 40 MG/1
20 TABLET, DELAYED RELEASE ORAL DAILY
Status: DISCONTINUED | OUTPATIENT
Start: 2018-06-21 | End: 2018-06-23 | Stop reason: HOSPADM

## 2018-06-21 RX ORDER — IBUPROFEN 200 MG
24 TABLET ORAL
Status: DISCONTINUED | OUTPATIENT
Start: 2018-06-21 | End: 2018-06-23 | Stop reason: HOSPADM

## 2018-06-21 RX ORDER — GLUCAGON 1 MG
1 KIT INJECTION
Status: DISCONTINUED | OUTPATIENT
Start: 2018-06-21 | End: 2018-06-23 | Stop reason: HOSPADM

## 2018-06-21 RX ORDER — FENTANYL CITRATE 50 UG/ML
INJECTION, SOLUTION INTRAMUSCULAR; INTRAVENOUS CODE/TRAUMA/SEDATION MEDICATION
Status: COMPLETED | OUTPATIENT
Start: 2018-06-21 | End: 2018-06-21

## 2018-06-21 RX ORDER — ATORVASTATIN CALCIUM 10 MG/1
20 TABLET, FILM COATED ORAL DAILY
Status: DISCONTINUED | OUTPATIENT
Start: 2018-06-21 | End: 2018-06-23 | Stop reason: HOSPADM

## 2018-06-21 RX ORDER — MIDAZOLAM HYDROCHLORIDE 1 MG/ML
INJECTION INTRAMUSCULAR; INTRAVENOUS CODE/TRAUMA/SEDATION MEDICATION
Status: COMPLETED | OUTPATIENT
Start: 2018-06-21 | End: 2018-06-21

## 2018-06-21 RX ORDER — MORPHINE SULFATE 4 MG/ML
4 INJECTION, SOLUTION INTRAMUSCULAR; INTRAVENOUS
Status: COMPLETED | OUTPATIENT
Start: 2018-06-21 | End: 2018-06-21

## 2018-06-21 RX ADMIN — FENTANYL CITRATE 25 MCG: 50 INJECTION, SOLUTION INTRAMUSCULAR; INTRAVENOUS at 12:06

## 2018-06-21 RX ADMIN — PIPERACILLIN AND TAZOBACTAM 4.5 G: 4; .5 INJECTION, POWDER, LYOPHILIZED, FOR SOLUTION INTRAVENOUS; PARENTERAL at 05:06

## 2018-06-21 RX ADMIN — VANCOMYCIN HYDROCHLORIDE 1000 MG: 1 INJECTION, POWDER, LYOPHILIZED, FOR SOLUTION INTRAVENOUS at 10:06

## 2018-06-21 RX ADMIN — LORAZEPAM 1 MG: 1 TABLET ORAL at 02:06

## 2018-06-21 RX ADMIN — PIPERACILLIN AND TAZOBACTAM 4.5 G: 4; .5 INJECTION, POWDER, LYOPHILIZED, FOR SOLUTION INTRAVENOUS; PARENTERAL at 01:06

## 2018-06-21 RX ADMIN — HYDROCODONE BITARTRATE AND ACETAMINOPHEN 1 TABLET: 10; 325 TABLET ORAL at 06:06

## 2018-06-21 RX ADMIN — IOHEXOL 75 ML: 350 INJECTION, SOLUTION INTRAVENOUS at 12:06

## 2018-06-21 RX ADMIN — INSULIN ASPART 4 UNITS: 100 INJECTION, SOLUTION INTRAVENOUS; SUBCUTANEOUS at 03:06

## 2018-06-21 RX ADMIN — PROMETHAZINE HYDROCHLORIDE 12.5 MG: 25 INJECTION INTRAMUSCULAR; INTRAVENOUS at 09:06

## 2018-06-21 RX ADMIN — HYDROMORPHONE HYDROCHLORIDE 1 MG: 1 INJECTION, SOLUTION INTRAMUSCULAR; INTRAVENOUS; SUBCUTANEOUS at 01:06

## 2018-06-21 RX ADMIN — LEVOTHYROXINE SODIUM 75 MCG: 25 TABLET ORAL at 05:06

## 2018-06-21 RX ADMIN — INSULIN ASPART 6 UNITS: 100 INJECTION, SOLUTION INTRAVENOUS; SUBCUTANEOUS at 11:06

## 2018-06-21 RX ADMIN — INSULIN ASPART 3 UNITS: 100 INJECTION, SOLUTION INTRAVENOUS; SUBCUTANEOUS at 10:06

## 2018-06-21 RX ADMIN — MAGNESIUM SULFATE IN WATER 2 G: 40 INJECTION, SOLUTION INTRAVENOUS at 03:06

## 2018-06-21 RX ADMIN — MIDAZOLAM HYDROCHLORIDE 0.5 MG: 1 INJECTION, SOLUTION INTRAMUSCULAR; INTRAVENOUS at 12:06

## 2018-06-21 RX ADMIN — INSULIN DETEMIR 35 UNITS: 100 INJECTION, SOLUTION SUBCUTANEOUS at 06:06

## 2018-06-21 RX ADMIN — SODIUM CHLORIDE 1000 ML: 0.9 INJECTION, SOLUTION INTRAVENOUS at 02:06

## 2018-06-21 RX ADMIN — HYDROMORPHONE HYDROCHLORIDE 1 MG: 1 INJECTION, SOLUTION INTRAMUSCULAR; INTRAVENOUS; SUBCUTANEOUS at 05:06

## 2018-06-21 RX ADMIN — HYDROMORPHONE HYDROCHLORIDE 0.5 MG: 1 INJECTION, SOLUTION INTRAMUSCULAR; INTRAVENOUS; SUBCUTANEOUS at 10:06

## 2018-06-21 RX ADMIN — VENLAFAXINE 150 MG: 75 TABLET ORAL at 08:06

## 2018-06-21 RX ADMIN — PROMETHAZINE HYDROCHLORIDE 12.5 MG: 25 INJECTION INTRAMUSCULAR; INTRAVENOUS at 04:06

## 2018-06-21 RX ADMIN — MORPHINE SULFATE 4 MG: 4 INJECTION INTRAVENOUS at 01:06

## 2018-06-21 RX ADMIN — PIPERACILLIN AND TAZOBACTAM 4.5 G: 4; .5 INJECTION, POWDER, LYOPHILIZED, FOR SOLUTION INTRAVENOUS; PARENTERAL at 08:06

## 2018-06-21 NOTE — ED NOTES
Pt resting in bed with eyes closed. Respirations even and unlabored. Awakens easily to verbal stimuli. Denies any complaints at this time.

## 2018-06-21 NOTE — PROGRESS NOTES
[facility] Hospital Medicine  Progress Note    Patient Name: Alexandra Goins  MRN: [id number]  Patient Class: IP- Inpatient   Admission Date: 6/20/2018  Length of Stay: 0 days  Attending Physician: Dmitri Burton MD  Primary Care Provider: Perez Casiano MD        Subjective:     Principal Problem:Renal abscess, right    HPI:  52F h/o bipolar d/o, T2DM, HTN, and hypothyroidism presents with ab pain x 1 day.   Located in RLQ, gradual onset. Worse with palpation.  Associated with n/v, fever, and chills.  Denies dysuria, hematuria, hematochezia, hematochezia, diarrhea, constipation, vaginal discharge.  In ER, VSS. Labs wnl. UA show many bacteria, 7 wbc, negative nitrite negative leuko.   CT abdomen/pelvis with IV contrast show 5cm right renal lesion with surrounding fat stranding concerning for possible renal abscess.  Dr. Becerril was called in ER and recommended CT ab/pelvis with and without IV contrast and plans to see in AM.  Patient was started on zosyn. Hospital medicine called for admission.       Hospital Course:  6/21  Patient had dental/ear abscess last month . Ordered blood cultures . Perinephric abscess was drained today by ir with catheter . Cultures were send .        Review of Systems   Constitutional: Positive for chills and fever. Negative for diaphoresis, fatigue and unexpected weight change.   HENT: Negative for congestion, facial swelling, sore throat and trouble swallowing.    Eyes: Negative for photophobia, redness and visual disturbance.   Respiratory: Negative for apnea, cough, chest tightness, shortness of breath and wheezing.    Cardiovascular: Negative for chest pain, palpitations and leg swelling.   Gastrointestinal: Positive for abdominal pain and nausea. Negative for abdominal distention, blood in stool, constipation, diarrhea and vomiting.   Endocrine: Negative for polydipsia, polyphagia and polyuria.   Genitourinary: Negative for difficulty urinating, dysuria, flank  pain, frequency, hematuria and urgency.   Musculoskeletal: Negative for arthralgias, back pain, joint swelling, myalgias and neck stiffness.   Skin: Negative for pallor and rash.   Allergic/Immunologic: Negative for immunocompromised state.   Neurological: Negative for dizziness, tremors, syncope, weakness, light-headedness and headaches.   Psychiatric/Behavioral: Negative for agitation, confusion and suicidal ideas.   All other systems reviewed and are negative.    Objective:     Vital Signs (Most Recent):  Temp: 97.9 °F (36.6 °C) (06/21/18 0735)  Pulse: 79 (06/21/18 1242)  Resp: 16 (06/21/18 1242)  BP: 108/71 (06/21/18 1242)  SpO2: 98 % (06/21/18 1242) Vital Signs (24h Range):  Temp:  [97.9 °F (36.6 °C)-98.6 °F (37 °C)] 97.9 °F (36.6 °C)  Pulse:  [] 79  Resp:  [12-20] 16  SpO2:  [92 %-99 %] 98 %  BP: (106-154)/(58-77) 108/71     Weight: 73.4 kg (161 lb 11.3 oz)  Body mass index is 26.91 kg/m².    Intake/Output Summary (Last 24 hours) at 06/21/18 1539  Last data filed at 06/21/18 1150   Gross per 24 hour   Intake             2600 ml   Output                0 ml   Net             2600 ml      Physical Exam   Constitutional: She is oriented to person, place, and time. She appears well-developed and well-nourished. No distress.   HENT:   Head: Normocephalic and atraumatic.   Mouth/Throat: Oropharynx is clear and moist.   Eyes: Conjunctivae and EOM are normal. Pupils are equal, round, and reactive to light. No scleral icterus.   Neck: Normal range of motion. Neck supple. No JVD present. No thyromegaly present.   Cardiovascular: Normal rate, regular rhythm and intact distal pulses.  Exam reveals no gallop and no friction rub.    No murmur heard.  Pulmonary/Chest: Effort normal and breath sounds normal. No respiratory distress. She has no wheezes. She has no rales. She exhibits no tenderness.   Abdominal: Soft. Bowel sounds are normal. She exhibits no distension and no mass. There is tenderness (RLQ). There is no  guarding.   Drain with bloody secretions    Musculoskeletal: Normal range of motion. She exhibits no tenderness.   Neurological: She is alert and oriented to person, place, and time. No cranial nerve deficit.   Skin: Skin is warm and dry. Capillary refill takes 2 to 3 seconds. No rash noted. She is not diaphoretic. No erythema.   Psychiatric: She has a normal mood and affect.   Nursing note and vitals reviewed.      Significant Labs: All pertinent labs within the past 24 hours have been reviewed.    Significant Imaging: I have reviewed all pertinent imaging results/findings within the past 24 hours.    Assessment/Plan:      * Renal abscess, right    - CT abdomen show 5 cm R renal lesion with surrounding fat stranding, concerning for R renal abscess  - Dr. Becerril (urology) called in ER and plans to see in AM    - f/u CT ab/pelv w/wo contrast  - continue zosyn  - keep NPO  - f/u urology recs          Type 2 diabetes mellitus    - glucose 626, was given 10 units iv regular insulin  - hold home meds    - start poc glucose q6h, ssi prn          Hypothyroidism due to acquired atrophy of thyroid    - continue synthroid at home dose        Dyslipidemia associated with type 2 diabetes mellitus    - continue statin        Essential hypertension    - hold home meds  - restart per day team          Depression    - continue venlafaxine        Bipolar disorder    - cont seroquel and lamotrigine            VTE Risk Mitigation         Ordered     Place XIOMARA hose  Until discontinued      06/21/18 0125     IP VTE LOW RISK PATIENT  Once      06/21/18 0125              Nithin De Jesus MD  Department of Hospital Medicine   [facility]

## 2018-06-21 NOTE — SUBJECTIVE & OBJECTIVE
Past Medical History:   Diagnosis Date    Anxiety     Arthritis     Asthma     Bipolar 1 disorder     Depression     Diabetes mellitus     Diabetes mellitus, type 2     Diverticular disease     GERD (gastroesophageal reflux disease)     Hyperlipemia     Hypertension     Hypothyroidism     Pneumonia     Renal manifestation of secondary diabetes mellitus     Tobacco dependence     Trouble in sleeping        Past Surgical History:   Procedure Laterality Date    CHOLECYSTECTOMY      COLON SURGERY      COLONOSCOPY N/A 1/12/2018    Procedure: COLONOSCOPY;  Surgeon: Roque Mahajan III, MD;  Location: [facility];  Service: Endoscopy;  Laterality: N/A;    DENTAL SURGERY  05/21/2018    removal of 8 top teeth    HYSTERECTOMY      TONSILLECTOMY         Review of patient's allergies indicates:   Allergen Reactions    Ultram [tramadol] Rash    Asa [aspirin]      Nosebleeds  Nosebleeds    Lipitor [atorvastatin]      Leg Cramps    Celestone [betamethasone] Hives, Itching and Rash       No current facility-administered medications on file prior to encounter.      Current Outpatient Prescriptions on File Prior to Encounter   Medication Sig    atorvastatin (LIPITOR) 20 MG tablet Take 1 tablet (20 mg total) by mouth once daily.    blood sugar diagnostic (ACCU-CHEK JOSE PLUS TEST STRP) Strp 1 strip by Misc.(Non-Drug; Combo Route) route 3 (three) times daily. Accu-chek Strips    doxepin (SINEQUAN) 10 MG capsule Take 1 capsule (10 mg total) by mouth nightly as needed.    insulin aspart (NOVOLOG) 100 unit/mL injection As directed    insulin glargine (LANTUS) 100 unit/mL injection Inject 60 Units into the skin every evening.    insulin syringe-needle U-100 1 mL 30 gauge x 5/16 Syrg 1 each by Misc.(Non-Drug; Combo Route) route 4 (four) times daily.    [START ON 6/27/2018] lamoTRIgine (LAMICTAL) 100 MG tablet Take 1 tablet (100 mg total) by mouth once daily.    lamoTRIgine (LAMICTAL) 25 MG tablet Take 1 tab  "daily x 14 days x 2 tabs daily x 14 days then 3 tabs daily x 14 days then start 100 mg bottle.    lancets (ACCU-CHEK SOFTCLIX LANCETS) Misc 1 each by Misc.(Non-Drug; Combo Route) route 3 (three) times daily.    levothyroxine (SYNTHROID) 75 MCG tablet Take 1 tablet (75 mcg total) by mouth before breakfast.    metFORMIN (GLUCOPHAGE) 500 MG tablet Take 2 tablets (1,000 mg total) by mouth 2 (two) times daily with meals.    pantoprazole (PROTONIX) 20 MG tablet Take 1 tablet (20 mg total) by mouth once daily.    pen needle, diabetic (BD ULTRA-FINE ARNOLDO PEN NEEDLES) 32 gauge x 5/32" Ndle 1 each by Misc.(Non-Drug; Combo Route) route 2 (two) times daily.    promethazine (PHENERGAN) 12.5 MG Tab Take 1 tablet (12.5 mg total) by mouth every 6 (six) hours as needed.    QUEtiapine (SEROQUEL) 200 MG Tab Take 1 and 1/2 tablets at bedtime    ramipril (ALTACE) 2.5 MG capsule Take 1 capsule (2.5 mg total) by mouth once daily.    rizatriptan (MAXALT) 10 MG tablet One tablet with onset of migraine and may repeat one dose in 2 hours if needed    sub-q insulin device, 20 unit Katie 1 Device by Misc.(Non-Drug; Combo Route) route once daily.    venlafaxine (EFFEXOR) 75 MG tablet Take 2 tablets (150 mg total) by mouth 2 (two) times daily.    vitamin D 1000 units Tab Take 1,000 Units by mouth once daily.    blood-glucose meter (ACCU-CHEK JOSE PLUS METER) Misc 1 each by Misc.(Non-Drug; Combo Route) route as directed. Accu-chek Meter    diazePAM (VALIUM) 10 MG Tab Take 1 tablet (10 mg total) by mouth 3 (three) times daily as needed.    isosorbide mononitrate (IMDUR) 30 MG 24 hr tablet Take 1 tablet (30 mg total) by mouth once daily.     Family History     Problem Relation (Age of Onset)    Alcohol abuse Mother, Father    Arthritis Father, Maternal Grandmother, Paternal Grandmother    Breast cancer Maternal Grandmother    COPD Mother, Sister    Cancer Father, Maternal Aunt, Maternal Uncle, Paternal Aunt, Paternal Uncle, Paternal " Grandmother    Depression Mother    Diabetes Maternal Uncle    Drug abuse Mother, Maternal Uncle    Heart disease Father, Brother    Hypertension Father, Brother, Maternal Grandmother, Paternal Grandfather    Kidney failure Sister        Social History Main Topics    Smoking status: Current Every Day Smoker     Years: 35.00     Types: Cigarettes, Vaping w/o nicotine    Smokeless tobacco: Never Used    Alcohol use Yes      Comment: occassionally    Drug use: Yes     Types: Marijuana    Sexual activity: Yes     Review of Systems   Constitutional: Positive for chills and fever. Negative for diaphoresis, fatigue and unexpected weight change.   HENT: Negative for congestion, facial swelling, sore throat and trouble swallowing.    Eyes: Negative for photophobia, redness and visual disturbance.   Respiratory: Negative for apnea, cough, chest tightness, shortness of breath and wheezing.    Cardiovascular: Negative for chest pain, palpitations and leg swelling.   Gastrointestinal: Positive for abdominal pain and nausea. Negative for abdominal distention, blood in stool, constipation, diarrhea and vomiting.   Endocrine: Negative for polydipsia, polyphagia and polyuria.   Genitourinary: Negative for difficulty urinating, dysuria, flank pain, frequency, hematuria and urgency.   Musculoskeletal: Negative for arthralgias, back pain, joint swelling, myalgias and neck stiffness.   Skin: Negative for pallor and rash.   Allergic/Immunologic: Negative for immunocompromised state.   Neurological: Negative for dizziness, tremors, syncope, weakness, light-headedness and headaches.   Psychiatric/Behavioral: Negative for agitation, confusion and suicidal ideas.   All other systems reviewed and are negative.    Objective:     Vital Signs (Most Recent):  Temp: 98.6 °F (37 °C) (06/20/18 2002)  Pulse: 78 (06/21/18 0116)  Resp: 20 (06/20/18 2002)  BP: 129/71 (06/21/18 0116)  SpO2: 95 % (06/21/18 0116) Vital Signs (24h Range):  Temp:  [98.6  °F (37 °C)] 98.6 °F (37 °C)  Pulse:  [] 78  Resp:  [20] 20  SpO2:  [93 %-97 %] 95 %  BP: (128-154)/(71-77) 129/71     Weight: 73.4 kg (161 lb 11.3 oz)  Body mass index is 26.91 kg/m².    Physical Exam   Constitutional: She is oriented to person, place, and time. She appears well-developed and well-nourished. No distress.   HENT:   Head: Normocephalic and atraumatic.   Mouth/Throat: Oropharynx is clear and moist.   Eyes: Conjunctivae and EOM are normal. Pupils are equal, round, and reactive to light. No scleral icterus.   Neck: Normal range of motion. Neck supple. No JVD present. No thyromegaly present.   Cardiovascular: Normal rate, regular rhythm and intact distal pulses.  Exam reveals no gallop and no friction rub.    No murmur heard.  Pulmonary/Chest: Effort normal and breath sounds normal. No respiratory distress. She has no wheezes. She has no rales. She exhibits no tenderness.   Abdominal: Soft. Bowel sounds are normal. She exhibits no distension and no mass. There is tenderness (RLQ). There is no guarding.   Musculoskeletal: Normal range of motion. She exhibits no tenderness.   Neurological: She is alert and oriented to person, place, and time. No cranial nerve deficit.   Skin: Skin is warm and dry. Capillary refill takes 2 to 3 seconds. No rash noted. She is not diaphoretic. No erythema.   Psychiatric: She has a normal mood and affect.   Nursing note and vitals reviewed.        CRANIAL NERVES     CN III, IV, VI   Pupils are equal, round, and reactive to light.  Extraocular motions are normal.        Significant Labs:   CBC:   Recent Labs  Lab 06/20/18  2228   WBC 12.90*   HGB 13.4   HCT 40.2   *     CMP:   Recent Labs  Lab 06/20/18  2228   *   K 4.2      CO2 23   *   BUN 15   CREATININE 0.9   CALCIUM 9.4   PROT 7.1   ALBUMIN 2.9*   BILITOT 0.2   ALKPHOS 94   AST 13   ALT 13   ANIONGAP 11   EGFRNONAA >60     Lactic Acid: No results for input(s): LACTATE in the last 48  hours.  Magnesium: No results for input(s): MG in the last 48 hours.  Urine Studies:   Recent Labs  Lab 06/20/18  2228   COLORU Yellow   APPEARANCEUA Clear   PHUR 6.0   SPECGRAV <=1.005*   PROTEINUA Negative   GLUCUA 3+*   KETONESU Negative   BILIRUBINUA Negative   OCCULTUA Negative   NITRITE Negative   UROBILINOGEN Negative   LEUKOCYTESUR Negative   RBCUA 2   WBCUA 7*   BACTERIA Many*     All pertinent labs within the past 24 hours have been reviewed.    Significant Imaging: I have reviewed all pertinent imaging results/findings within the past 24 hours.   Imaging Results          CT Abdomen Pelvis With Contrast (In process)

## 2018-06-21 NOTE — HOSPITAL COURSE
6/21  Patient had dental/ear abscess last month . Ordered blood cultures . Perinephric abscess was drained today by ir with catheter . Cultures were send .  6/22  Patient had mri of spine done today no abscess and ct maxillary was negative no dental abscess. Waiting on cultures . Patient feels better today   6/23  Patient was seen and examined today . Stable to discharge home . 14 days of levaquin and follow up on culture s still pending . Blood cultures are negative. Follow up with ros[facility] and remove drain with outpatient ct abd/pelvis.follow up infectious disease and pcp

## 2018-06-21 NOTE — ED PROVIDER NOTES
SCRIBE #1 NOTE: I, Darrion Martínez, am scribing for, and in the presence of, Carolyn Hoffman Do, MD. I have scribed the entire note.      History      Chief Complaint   Patient presents with    Abdominal Pain     pt reports flu like symptoms last week and last night started having R lower abd pain, fever, nausea and vomiting        Review of patient's allergies indicates:   Allergen Reactions    Ultram [tramadol] Rash    Asa [aspirin]      Nosebleeds  Nosebleeds    Lipitor [atorvastatin]      Leg Cramps    Celestone [betamethasone] Hives, Itching and Rash        HPI   HPI    6/20/2018, 10:47 PM   History obtained from the patient      History of Present Illness: Alexandra Goins is a 52 y.o. female patient who presents to the Emergency Department for RLQ abd pain which onset gradually today. Symptoms are constant and moderate in severity. Palpation exacerbates. Nothing mitigates. Associated sxs include n/v, subjective fever, chills. Patient denies any dysuria, hematuria, hematochezia, hematochezia, diarrhea, constipation, vaginal discharge, and all other sxs at this time. No prior tx. She denies appendectomy. No further complaints or concerns at this time.         Arrival mode: Personal vehicle     PCP: Perez Casiano MD       Past Medical History:  Past Medical History:   Diagnosis Date    Anxiety     Arthritis     Asthma     Bipolar 1 disorder     Depression     Diabetes mellitus     Diabetes mellitus, type 2     Diverticular disease     GERD (gastroesophageal reflux disease)     Hyperlipemia     Hypertension     Hypothyroidism     Pneumonia     Renal manifestation of secondary diabetes mellitus     Tobacco dependence     Trouble in sleeping        Past Surgical History:  Past Surgical History:   Procedure Laterality Date    CHOLECYSTECTOMY      COLON SURGERY      COLONOSCOPY N/A 1/12/2018    Procedure: COLONOSCOPY;  Surgeon: Roque Mahajan III, MD;  Location[facility];  Service:  Endoscopy;  Laterality: N/A;    DENTAL SURGERY  05/21/2018    removal of 8 top teeth    HYSTERECTOMY      TONSILLECTOMY           Family History:  Family History   Problem Relation Age of Onset    Alcohol abuse Mother     COPD Mother     Depression Mother     Drug abuse Mother     Alcohol abuse Father     Arthritis Father     Cancer Father     Heart disease Father     Hypertension Father     COPD Sister     Kidney failure Sister     Heart disease Brother     Hypertension Brother     Cancer Maternal Aunt     Cancer Maternal Uncle     Diabetes Maternal Uncle     Drug abuse Maternal Uncle     Cancer Paternal Aunt     Cancer Paternal Uncle     Arthritis Maternal Grandmother     Hypertension Maternal Grandmother     Breast cancer Maternal Grandmother     Arthritis Paternal Grandmother     Cancer Paternal Grandmother     Hypertension Paternal Grandfather        Social History:  Social History     Social History Main Topics    Smoking status: Current Every Day Smoker     Years: 35.00     Types: Cigarettes, Vaping w/o nicotine    Smokeless tobacco: Never Used    Alcohol use Yes      Comment: occassionally    Drug use: Yes     Types: Marijuana    Sexual activity: Yes       ROS   Review of Systems   Constitutional: Positive for chills and fever.   HENT: Negative for sore throat.    Respiratory: Negative for shortness of breath.    Cardiovascular: Negative for chest pain.   Gastrointestinal: Positive for abdominal pain (RLQ), nausea and vomiting. Negative for blood in stool, constipation and diarrhea.   Genitourinary: Negative for difficulty urinating, dysuria and hematuria.   Musculoskeletal: Negative for back pain.   Skin: Negative for rash.   Neurological: Negative for weakness.   Hematological: Does not bruise/bleed easily.   All other systems reviewed and are negative.      Physical Exam      Initial Vitals [06/20/18 2002]   BP Pulse Resp Temp SpO2   139/77 102 20 98.6 °F (37 °C) (!) 93 %  "     MAP       --          Physical Exam  Nursing Notes and Vital Signs Reviewed.  Constitutional: Patient is in mild distress. Well-developed and well-nourished.  Head: Atraumatic. Normocephalic.  Eyes: PERRL. EOM intact. Conjunctivae are not pale. No scleral icterus.  ENT: Mucous membranes are moist. Oropharynx is clear and symmetric.    Neck: Supple. Full ROM. No lymphadenopathy.  Cardiovascular: Regular rate. Regular rhythm. No murmurs, rubs, or gallops. Distal pulses are 2+ and symmetric.  Pulmonary/Chest: No respiratory distress. Clear to auscultation bilaterally. No wheezing or rales.  Abdominal:  Exquisite RLQ tenderness with rebound and some guarding.   Musculoskeletal: Moves all extremities. No obvious deformities. No edema. No calf tenderness.  Skin: Warm and dry. No cellulitis.   Neurological:  Alert, awake, and appropriate.  Normal speech.  No acute focal neurological deficits are appreciated.  Psychiatric: Normal affect. Good eye contact. Appropriate in content.    ED Course    Procedures  ED Vital Signs:  Vitals:    06/20/18 2002 06/20/18 2231 06/20/18 2232 06/20/18 2234   BP: 139/77  (!) 154/71    Pulse: 102 81 82 78   Resp: 20      Temp: 98.6 °F (37 °C)      TempSrc: Axillary      SpO2: (!) 93% (!) 94% 97%    Weight: 73.4 kg (161 lb 11.3 oz)      Height: 5' 5" (1.651 m)       06/21/18 0046 06/21/18 0116   BP: 128/71 129/71   Pulse: 81 78   Resp:     Temp:     TempSrc:     SpO2: 96% 95%   Weight:     Height:         Abnormal Lab Results:  Labs Reviewed   CBC W/ AUTO DIFFERENTIAL - Abnormal; Notable for the following:        Result Value    WBC 12.90 (*)     Platelets 355 (*)     Gran # (ANC) 7.8 (*)     Mono # 1.1 (*)     All other components within normal limits   COMPREHENSIVE METABOLIC PANEL - Abnormal; Notable for the following:     Sodium 134 (*)     Glucose 626 (*)     Albumin 2.9 (*)     All other components within normal limits    Narrative:       GLUC critical result(s) called and verbal " readback obtained from   Jayne Olivares RN, 06/20/2018 23:02   URINALYSIS - Abnormal; Notable for the following:     Specific Gravity, UA <=1.005 (*)     Glucose, UA 3+ (*)     All other components within normal limits   URINALYSIS MICROSCOPIC - Abnormal; Notable for the following:     WBC, UA 7 (*)     Bacteria, UA Many (*)     Yeast, UA Rare (*)     All other components within normal limits   POCT GLUCOSE MONITORING CONTINUOUS - Abnormal; Notable for the following:     POC Glucose 346 (*)     All other components within normal limits   POCT GLUCOSE - Abnormal; Notable for the following:     POCT Glucose 346 (*)     All other components within normal limits   LIPASE   AMYLASE   APTT   PROTIME-INR   APTT   PROTIME-INR        All Lab Results:  Results for orders placed or performed during the hospital encounter of 06/20/18   CBC auto differential   Result Value Ref Range    WBC 12.90 (H) 3.90 - 12.70 K/uL    RBC 4.45 4.00 - 5.40 M/uL    Hemoglobin 13.4 12.0 - 16.0 g/dL    Hematocrit 40.2 37.0 - 48.5 %    MCV 90 82 - 98 fL    MCH 30.1 27.0 - 31.0 pg    MCHC 33.3 32.0 - 36.0 g/dL    RDW 13.8 11.5 - 14.5 %    Platelets 355 (H) 150 - 350 K/uL    MPV 10.2 9.2 - 12.9 fL    Gran # (ANC) 7.8 (H) 1.8 - 7.7 K/uL    Lymph # 3.9 1.0 - 4.8 K/uL    Mono # 1.1 (H) 0.3 - 1.0 K/uL    Eos # 0.2 0.0 - 0.5 K/uL    Baso # 0.03 0.00 - 0.20 K/uL    Gran% 60.3 38.0 - 73.0 %    Lymph% 30.2 18.0 - 48.0 %    Mono% 8.1 4.0 - 15.0 %    Eosinophil% 1.2 0.0 - 8.0 %    Basophil% 0.2 0.0 - 1.9 %    Differential Method Automated    Comprehensive metabolic panel   Result Value Ref Range    Sodium 134 (L) 136 - 145 mmol/L    Potassium 4.2 3.5 - 5.1 mmol/L    Chloride 100 95 - 110 mmol/L    CO2 23 23 - 29 mmol/L    Glucose 626 (HH) 70 - 110 mg/dL    BUN, Bld 15 6 - 20 mg/dL    Creatinine 0.9 0.5 - 1.4 mg/dL    Calcium 9.4 8.7 - 10.5 mg/dL    Total Protein 7.1 6.0 - 8.4 g/dL    Albumin 2.9 (L) 3.5 - 5.2 g/dL    Total Bilirubin 0.2 0.1 - 1.0 mg/dL     Alkaline Phosphatase 94 55 - 135 U/L    AST 13 10 - 40 U/L    ALT 13 10 - 44 U/L    Anion Gap 11 8 - 16 mmol/L    eGFR if African American >60 >60 mL/min/1.73 m^2    eGFR if non African American >60 >60 mL/min/1.73 m^2   Lipase   Result Value Ref Range    Lipase 34 4 - 60 U/L   Amylase   Result Value Ref Range    Amylase 80 20 - 110 U/L   Urinalysis   Result Value Ref Range    Specimen UA Urine, Clean Catch     Color, UA Yellow Yellow, Straw, Joann    Appearance, UA Clear Clear    pH, UA 6.0 5.0 - 8.0    Specific Gravity, UA <=1.005 (A) 1.005 - 1.030    Protein, UA Negative Negative    Glucose, UA 3+ (A) Negative    Ketones, UA Negative Negative    Bilirubin (UA) Negative Negative    Occult Blood UA Negative Negative    Nitrite, UA Negative Negative    Urobilinogen, UA Negative <2.0 EU/dL    Leukocytes, UA Negative Negative   Urinalysis Microscopic   Result Value Ref Range    RBC, UA 2 0 - 4 /hpf    WBC, UA 7 (H) 0 - 5 /hpf    Bacteria, UA Many (A) None-Occ /hpf    Yeast, UA Rare (A) None    Microscopic Comment SEE COMMENT    APTT   Result Value Ref Range    aPTT 27.9 21.0 - 32.0 sec   Protime-INR   Result Value Ref Range    Prothrombin Time 10.4 9.0 - 12.5 sec    INR 1.0 0.8 - 1.2   POCT glucose   Result Value Ref Range    POC Glucose 346 (A) 70 - 110 MG/DL   POCT glucose   Result Value Ref Range    POCT Glucose 346 (H) 70 - 110 mg/dL         Imaging Results:  Imaging Results          CT Abdomen Pelvis With Contrast (In process)                  1:17 AM Per STAT radiology, Pt's abdomen/pelvis CT results: low density 5 cm right renal lesion with peripheral enhancement and adjacent fat stranding is suspicious for abscess. Adjacent 2 cm low density renal lesion is nonspecific.             The Emergency Provider reviewed the vital signs and test results, which are outlined above.    ED Discussion     1:21 AM: Discussed case with Marti Bajwa NP, ([facility]). Dr. Burton agrees with current care and management of  pt and accepts admission.   Admitting Service: Hospital medicine   Admitting Physician: Dr. Burton  Admit to: Med/tele    1:30 AM: Re-evaluated pt. I have discussed test results, shared treatment plan, and the need for admission with patient and family at bedside. Pt and family express understanding at this time and agree with all information. All questions answered. Pt and family have no further questions or concerns at this time. Pt is ready for admit.    1:33 AM: Dr. Washington discussed the pt's case with Dr. Becerril (Urology) who recommends a CT Abdomen/pelvis with and w/o contrast and states that he will see her.       ED Medication(s):  Medications   sodium chloride 0.9% bolus 1,000 mL (0 mLs Intravenous Stopped 6/21/18 0220)   0.9%  NaCl infusion (0 mLs Intravenous Stopped 6/21/18 0220)   morphine injection 4 mg (4 mg Intravenous Given 6/20/18 2244)   ondansetron injection 4 mg (4 mg Intravenous Given 6/20/18 2244)   sodium chloride 0.9% bolus 1,000 mL (0 mLs Intravenous Stopped 6/21/18 0220)   insulin regular injection 10 Units (10 Units Intravenous Given 6/20/18 2327)   promethazine (PHENERGAN) 12.5 mg in dextrose 5 % 50 mL IVPB (0 mg Intravenous Stopped 6/20/18 2354)   omnipaque 350 iohexol 30 mL (30 mLs Oral Given 6/20/18 2245)   omnipaque 350 iohexol 75 mL (75 mLs Intravenous Given 6/21/18 0032)   piperacillin-tazobactam 4.5 g in dextrose 5 % 100 mL IVPB (ready to mix system) (0 g Intravenous Stopped 6/21/18 0201)   morphine injection 4 mg (4 mg Intravenous Given 6/21/18 0130)   0.9%  NaCl infusion (1,000 mLs Intravenous New Bag 6/21/18 0220)   LORazepam tablet 1 mg (1 mg Oral Given 6/21/18 0251)       New Prescriptions    No medications on file             Medical Decision Making    Medical Decision Making:   Clinical Tests:   Lab Tests: Ordered and Reviewed  Radiological Study: Ordered and Reviewed           Scribe Attestation:   Scribe #1: I performed the above scribed service and the documentation accurately  describes the services I performed. I attest to the accuracy of the note.    Attending:   Physician Attestation Statement for Scribe #1: I, Carolyn Hoffman Do, MD, personally performed the services described in this documentation, as scribed by Darrion Martínez, in my presence, and it is both accurate and complete.          Clinical Impression       ICD-10-CM ICD-9-CM   1. Renal abscess, right N15.1 590.2   2. Hyperglycemia R73.9 790.29   3. Urinary tract infection with hematuria, site unspecified N39.0 599.0    R31.9        Disposition:   Disposition: Admitted  Condition: Fair         Carolyn Hoffman Do, MD  06/21/18 9551

## 2018-06-21 NOTE — PLAN OF CARE
Assessment completed.  Met with the patient/ family. CM explained and left info in blue transition of care folder regarding Advance Directives, Living Will ( FULL CODE ) ,  Smoking Cessation - smoke 1 ppd of cigarettes,  Pamphlet on D/C planning on admission and Pharmacy bedside delivery.  The role of CM explained for  transitions of care/ discharge planning. Patient stated admitted because ABD PAIN TO RT LOWER ABD, N/V FEVER 104.Patient his disable due to Bipolar D/O ( nervous break down x2 per the patient stated.   Patient has family support of his or  her spouse and Dtr.  Patient has [employer]  insurance. Patient has no needs at this time.  Plans to return home post hosp stay. CM to f/u for safe transition       06/21/18 0988   Discharge Assessment   Assessment Type Discharge Planning Assessment   Confirmed/corrected address and phone number on facesheet? Yes   Assessment information obtained from? Patient;Medical Record   Expected Length of Stay (days) (TBD)   Communicated expected length of stay with patient/caregiver no   Prior to hospitilization cognitive status: Alert/Oriented   Prior to hospitalization functional status: Independent   Current cognitive status: Alert/Oriented   Current Functional Status: Independent   Lives With spouse   Able to Return to Prior Arrangements yes   Is patient able to care for self after discharge? Yes   Who are your caregiver(s) and their phone number(s)? (Kaiden Goins [phone] ( spouse ) )   Patient's perception of discharge disposition home or selfcare   Readmission Within The Last 30 Days no previous admission in last 30 days   Patient currently being followed by outpatient case management? No   Patient currently receives any other outside agency services? No   Equipment Currently Used at Home none   Do you have any problems affording any of your prescribed medications? No   Is the patient taking medications as prescribed? yes   Does the patient have  transportation home? Yes   Transportation Available family or friend will provide   Does the patient receive services at the [facility]mad[facility] Clinic? No   Discharge Plan A Home with family   Discharge Plan B Home with family   Patient/Family In Agreement With Plan yes

## 2018-06-21 NOTE — PLAN OF CARE
Problem: Patient Care Overview  Goal: Plan of Care Review  Outcome: Ongoing (interventions implemented as appropriate)  Fall precautions maintained. Pt free from falls/injuries.  Patient complains of pain. Pain controlled with PRN meds.   Antibiotics given as prescribed.  Ambulates and repositions independently.   Telemetry monitor running normal sinus rhythm.  Accucheck done, coverage given as needed.  Plan of care and medications discussed with patient.  Patient verbalized understanding.  Bed locked and low, call bell within reach.  Chart check done. Will continue to monitor.

## 2018-06-21 NOTE — SUBJECTIVE & OBJECTIVE
Review of Systems   Constitutional: Positive for chills and fever. Negative for diaphoresis, fatigue and unexpected weight change.   HENT: Negative for congestion, facial swelling, sore throat and trouble swallowing.    Eyes: Negative for photophobia, redness and visual disturbance.   Respiratory: Negative for apnea, cough, chest tightness, shortness of breath and wheezing.    Cardiovascular: Negative for chest pain, palpitations and leg swelling.   Gastrointestinal: Positive for abdominal pain and nausea. Negative for abdominal distention, blood in stool, constipation, diarrhea and vomiting.   Endocrine: Negative for polydipsia, polyphagia and polyuria.   Genitourinary: Negative for difficulty urinating, dysuria, flank pain, frequency, hematuria and urgency.   Musculoskeletal: Negative for arthralgias, back pain, joint swelling, myalgias and neck stiffness.   Skin: Negative for pallor and rash.   Allergic/Immunologic: Negative for immunocompromised state.   Neurological: Negative for dizziness, tremors, syncope, weakness, light-headedness and headaches.   Psychiatric/Behavioral: Negative for agitation, confusion and suicidal ideas.   All other systems reviewed and are negative.    Objective:     Vital Signs (Most Recent):  Temp: 97.9 °F (36.6 °C) (06/21/18 0735)  Pulse: 79 (06/21/18 1242)  Resp: 16 (06/21/18 1242)  BP: 108/71 (06/21/18 1242)  SpO2: 98 % (06/21/18 1242) Vital Signs (24h Range):  Temp:  [97.9 °F (36.6 °C)-98.6 °F (37 °C)] 97.9 °F (36.6 °C)  Pulse:  [] 79  Resp:  [12-20] 16  SpO2:  [92 %-99 %] 98 %  BP: (106-154)/(58-77) 108/71     Weight: 73.4 kg (161 lb 11.3 oz)  Body mass index is 26.91 kg/m².    Intake/Output Summary (Last 24 hours) at 06/21/18 1539  Last data filed at 06/21/18 1150   Gross per 24 hour   Intake             2600 ml   Output                0 ml   Net             2600 ml      Physical Exam   Constitutional: She is oriented to person, place, and time. She appears  well-developed and well-nourished. No distress.   HENT:   Head: Normocephalic and atraumatic.   Mouth/Throat: Oropharynx is clear and moist.   Eyes: Conjunctivae and EOM are normal. Pupils are equal, round, and reactive to light. No scleral icterus.   Neck: Normal range of motion. Neck supple. No JVD present. No thyromegaly present.   Cardiovascular: Normal rate, regular rhythm and intact distal pulses.  Exam reveals no gallop and no friction rub.    No murmur heard.  Pulmonary/Chest: Effort normal and breath sounds normal. No respiratory distress. She has no wheezes. She has no rales. She exhibits no tenderness.   Abdominal: Soft. Bowel sounds are normal. She exhibits no distension and no mass. There is tenderness (RLQ). There is no guarding.   Drain with bloody secretions    Musculoskeletal: Normal range of motion. She exhibits no tenderness.   Neurological: She is alert and oriented to person, place, and time. No cranial nerve deficit.   Skin: Skin is warm and dry. Capillary refill takes 2 to 3 seconds. No rash noted. She is not diaphoretic. No erythema.   Psychiatric: She has a normal mood and affect.   Nursing note and vitals reviewed.      Significant Labs: All pertinent labs within the past 24 hours have been reviewed.    Significant Imaging: I have reviewed all pertinent imaging results/findings within the past 24 hours.

## 2018-06-21 NOTE — ASSESSMENT & PLAN NOTE
- glucose 626, was given 10 units iv regular insulin  - hold home meds    - start poc glucose q6h, ssi prn

## 2018-06-21 NOTE — PLAN OF CARE
Problem: Patient Care Overview  Goal: Plan of Care Review  Outcome: Ongoing (interventions implemented as appropriate)  Recommendations     Recommendation/Intervention: 1. When medically able, ADAT to Diabetic Low Na. 2. If patient does not tolerate oral diet with advancement to at least full liquids within 72 hrs.  Consider Parenteral Nutrition Support ~ PPN, Clinimix E 4.25/5 at 110 ml/hr + 20 % 250 ml lipids daily (1398 kcal, 112 g protein, 1.24 mg/kg/min dextrose infusion rate).  3. Will continue to monitor.   Goals: Diet advancement within 72 hrs or nutrition  support  Nutrition Goal Status: new  Communication of RD Recs:  (POC, sticky note)

## 2018-06-21 NOTE — SEDATION DOCUMENTATION
Pt in ct on table prone with bilateral arms above head. Cm in place, vss.  Pt verbalized understanding of procedure.

## 2018-06-21 NOTE — ASSESSMENT & PLAN NOTE
- CT abdomen show 5 cm R renal lesion with surrounding fat stranding, concerning for R renal abscess  - Dr. Becerril (urology) called in ER and plans to see in AM    - f/u CT ab/pelv w/wo contrast  - continue zosyn  - keep NPO  - f/u urology recs

## 2018-06-21 NOTE — CONSULTS
Alexandra Goins [id number] is a 52 y.o. female who has been consulted for vancomycin dosing.    Dx: Renal abscess, intra-abdominal  Goal trough: 15-20     The patient has the following labs:     Date Creatinine (mg/dl)    BUN WBC Count   6/21/2018 Estimated Creatinine Clearance: 92.2 mL/min (based on SCr of 0.7 mg/dL). Lab Results   Component Value Date    BUN 8 06/21/2018     Lab Results   Component Value Date    WBC 11.03 06/21/2018        Current weight is 73.4 kg (161 lb 11.3 oz)    The patient will be started on vancomycin at a dose of 1,000 mg (16 mg/kg x adj BW) every 12 hours. Patient will be followed by pharmacy for changes in renal function, toxicity, and efficacy.  The vancomycin trough has been ordered for 6/23 at 0430, prior to 4th dose.      Thank you for allowing us to participate in this patient's care.     Radha Chamberlain

## 2018-06-21 NOTE — PROGRESS NOTES
"  [facility]  Adult Nutrition  Consult Note    SUMMARY     Recommendations    Recommendation/Intervention: 1. When medically able, ADAT to Diabetic Low Na. 2. If patient does not tolerate oral diet with advancement to at least full liquids within 72 hrs.  Consider Parenteral Nutrition Support ~ PPN, Clinimix E 4.25/5 at 110 ml/hr + 20 % 250 ml lipids daily (1398 kcal, 112 g protein, 1.24 mg/kg/min dextrose infusion rate).  3. Will continue to monitor.   Goals: Diet advancement within 72 hrs or nutrition  support  Nutrition Goal Status: new  Communication of RD Recs:  (POC, sticky note)    Reason for Assessment    Reason for Assessment: identified at risk by screening criteria (MST score)  Dx:  1. Renal abscess, right    2. Hyperglycemia    3. Urinary tract infection with hematuria, site unspecified      Past Medical History:   Diagnosis Date    Anxiety     Arthritis     Asthma     Bipolar 1 disorder     Depression     Diabetes mellitus     Diabetes mellitus, type 2     Diverticular disease     GERD (gastroesophageal reflux disease)     Hyperlipemia     Hypertension     Hypothyroidism     Pneumonia     Renal manifestation of secondary diabetes mellitus     Tobacco dependence     Trouble in sleeping        General Information Comments: Attempted to talk to the patient today x 3.  Patient was not in the room (CT abdomen/pelvis).  Currently NPO. Noted weight loss of 4 lbs within the last 3 weeks. Noted last A1c ~14.  Will attempt diet education next visit.   Nutrition Discharge Planning: Diabetic, Low Na diet     Nutrition Risk Screen    Nutrition Risk Screen: no indicators present    Nutrition/Diet History    Do you have any cultural, spiritual, Christianity conflicts, given your current situation?: none    Anthropometrics    Temp: 97.9 °F (36.6 °C)  Height Method: Stated  Height: 5' 5" (165.1 cm)  Height (inches): 65 in  Weight Method: Standard Scale  Weight: 73.4 kg (161 lb 11.3 " oz)  Weight (lb): 161.71 lb  Ideal Body Weight (IBW), Female: 125 lb  % Ideal Body Weight, Female (lb): 129.37 lb  BMI (Calculated): 27  Usual Body Weight (UBW), k kg  % Usual Body Weight: 98  % Weight Change From Usual Weight: -2.2 %       Lab/Procedures/Meds    Pertinent Labs Reviewed: reviewed  BMP  Lab Results   Component Value Date     2018    K 3.6 2018     2018    CO2 24 2018    BUN 8 2018    CREATININE 0.7 2018    CALCIUM 8.6 (L) 2018    ANIONGAP 8 2018    ESTGFRAFRICA >60 2018    EGFRNONAA >60 2018     Lab Results   Component Value Date    CALCIUM 8.6 (L) 2018    PHOS 2.9 2018    PHOS 2.9 2018     Lab Results   Component Value Date    ALBUMIN 2.7 (L) 2018       Recent Labs  Lab 18  1119   POCTGLUCOSE 294*     Lab Results   Component Value Date    HGBA1C >14.0 (H) 2018       Pertinent Medications Reviewed: reviewed    Physical Findings/Assessment    Overall Physical Appearance: overweight (per BMI)  Oral/Mouth Cavity:  (dental appliance present)  Skin:  (Brandt Score 18)    Estimated/Assessed Needs    Weight Used For Calorie Calculations: 73.4 kg (161 lb 13.1 oz)  Energy Calorie Requirements (kcal): 1601   Energy Need Method: [place] (x1.2  )  Protein Requirements: 88 - 112 g/day  Weight Used For Protein Calculations: 73.4 kg (161 lb 13.1 oz)     Fluid Need Method: RDA Method (or per MD)  RDA Method (mL): 1601  CHO Requirement: 50 % EEN      Nutrition Prescription Ordered    Current Diet Order: NPO    Evaluation of Received Nutrient/Fluid Intake      Intake/Output Summary (Last 24 hours) at 18 1240  Last data filed at 18 1150   Gross per 24 hour   Intake             2600 ml   Output                0 ml   Net             2600 ml     % Intake of Estimated Energy Needs: 0 - 25 %  % Meal Intake: NPO    Nutrition Risk      2xweekly    Assessment and Plan      Nutrition  Problem  Inadequate energy intake     Related to (etiology):   Inability to consume sufficient energy     Signs and Symptoms (as evidenced by):   Currently NPO, no alternative means of nutrition.      Interventions/Recommendations (treatment strategy):  Please see RD recs above.     Nutrition Diagnosis Status:   New       Monitor and Evaluation    Food and Nutrient Intake: energy intake, food and beverage intake, parenteral nutrition intake  Food and Nutrient Adminstration: enteral and parenteral nutrition administration, diet order  Anthropometric Measurements: weight  Biochemical Data, Medical Tests and Procedures: electrolyte and renal panel, glucose/endocrine profile  Nutrition-Focused Physical Findings: overall appearance     Nutrition Follow-Up    RD Follow-up?: Yes (2xweekly)

## 2018-06-21 NOTE — HPI
52F h/o bipolar d/o, T2DM, HTN, and hypothyroidism presents with ab pain x 1 day.   Located in RLQ, gradual onset. Worse with palpation.  Associated with n/v, fever, and chills.  Denies dysuria, hematuria, hematochezia, hematochezia, diarrhea, constipation, vaginal discharge.  In ER, VSS. Labs wnl. UA show many bacteria, 7 wbc, negative nitrite negative leuko.   CT abdomen/pelvis with IV contrast show 5cm right renal lesion with surrounding fat stranding concerning for possible renal abscess.  Dr. Becerril was called in ER and recommended CT ab/pelvis with and without IV contrast and plans to see in AM.  Patient was started on zosyn. Hospital medicine called for admission.

## 2018-06-21 NOTE — INTERVAL H&P NOTE
Most recent H&P has been reviewed and updated by radiology  Risks and benefits were explained to the patient prior to the procedure.  Written and informed consent was obtained.      Active Hospital Problems    Diagnosis  POA    *Renal abscess, right [N15.1]  Yes    Type 2 diabetes mellitus [E11.9]  Yes    Depression [F32.9]  Yes     Chronic    Essential hypertension [I10]  Yes     Chronic    Hypothyroidism due to acquired atrophy of thyroid [E03.4]  Yes     Chronic    Dyslipidemia associated with type 2 diabetes mellitus [E11.69, E78.5]  Yes    Bipolar disorder [F31.9]  Yes     Chronic      Resolved Hospital Problems    Diagnosis Date Resolved POA   No resolved problems to display.

## 2018-06-21 NOTE — H&P
[facility] Hospital Medicine  History & Physical    Patient Name: Alexandra Goins  MRN: [id number]  Admission Date: 6/20/2018  Attending Physician: Dmitri Burton MD  Primary Care Provider: Perez Casiano MD         Patient information was obtained from patient and ER records.     Subjective:     Principal Problem:Renal abscess, right    Chief Complaint:   Chief Complaint   Patient presents with    Abdominal Pain     pt reports flu like symptoms last week and last night started having R lower abd pain, fever, nausea and vomiting         HPI: 52F h/o bipolar d/o, T2DM, HTN, and hypothyroidism presents with ab pain x 1 day.   Located in RLQ, gradual onset. Worse with palpation.  Associated with n/v, fever, and chills.  Denies dysuria, hematuria, hematochezia, hematochezia, diarrhea, constipation, vaginal discharge.  In ER, VSS. Labs wnl. UA show many bacteria, 7 wbc, negative nitrite negative leuko.   CT abdomen/pelvis with IV contrast show 5cm right renal lesion with surrounding fat stranding concerning for possible renal abscess.  Dr. Becerril was called in ER and recommended CT ab/pelvis with and without IV contrast and plans to see in AM.  Patient was started on zosyn. Hospital medicine called for admission.       Past Medical History:   Diagnosis Date    Anxiety     Arthritis     Asthma     Bipolar 1 disorder     Depression     Diabetes mellitus     Diabetes mellitus, type 2     Diverticular disease     GERD (gastroesophageal reflux disease)     Hyperlipemia     Hypertension     Hypothyroidism     Pneumonia     Renal manifestation of secondary diabetes mellitus     Tobacco dependence     Trouble in sleeping        Past Surgical History:   Procedure Laterality Date    CHOLECYSTECTOMY      COLON SURGERY      COLONOSCOPY N/A 1/12/2018    Procedure: COLONOSCOPY;  Surgeon: Roque Mahajan III, MD;  Location: [facility];  Service: Endoscopy;  Laterality: N/A;    DENTAL SURGERY   "05/21/2018    removal of 8 top teeth    HYSTERECTOMY      TONSILLECTOMY         Review of patient's allergies indicates:   Allergen Reactions    Ultram [tramadol] Rash    Asa [aspirin]      Nosebleeds  Nosebleeds    Lipitor [atorvastatin]      Leg Cramps    Celestone [betamethasone] Hives, Itching and Rash       No current facility-administered medications on file prior to encounter.      Current Outpatient Prescriptions on File Prior to Encounter   Medication Sig    atorvastatin (LIPITOR) 20 MG tablet Take 1 tablet (20 mg total) by mouth once daily.    blood sugar diagnostic (ACCU-CHEK JOSE PLUS TEST STRP) Strp 1 strip by Misc.(Non-Drug; Combo Route) route 3 (three) times daily. Accu-chek Strips    doxepin (SINEQUAN) 10 MG capsule Take 1 capsule (10 mg total) by mouth nightly as needed.    insulin aspart (NOVOLOG) 100 unit/mL injection As directed    insulin glargine (LANTUS) 100 unit/mL injection Inject 60 Units into the skin every evening.    insulin syringe-needle U-100 1 mL 30 gauge x 5/16 Syrg 1 each by Misc.(Non-Drug; Combo Route) route 4 (four) times daily.    [START ON 6/27/2018] lamoTRIgine (LAMICTAL) 100 MG tablet Take 1 tablet (100 mg total) by mouth once daily.    lamoTRIgine (LAMICTAL) 25 MG tablet Take 1 tab daily x 14 days x 2 tabs daily x 14 days then 3 tabs daily x 14 days then start 100 mg bottle.    lancets (ACCU-CHEK SOFTCLIX LANCETS) Misc 1 each by Misc.(Non-Drug; Combo Route) route 3 (three) times daily.    levothyroxine (SYNTHROID) 75 MCG tablet Take 1 tablet (75 mcg total) by mouth before breakfast.    metFORMIN (GLUCOPHAGE) 500 MG tablet Take 2 tablets (1,000 mg total) by mouth 2 (two) times daily with meals.    pantoprazole (PROTONIX) 20 MG tablet Take 1 tablet (20 mg total) by mouth once daily.    pen needle, diabetic (BD ULTRA-FINE ARNOLDO PEN NEEDLES) 32 gauge x 5/32" Ndle 1 each by Misc.(Non-Drug; Combo Route) route 2 (two) times daily.    promethazine (PHENERGAN) " 12.5 MG Tab Take 1 tablet (12.5 mg total) by mouth every 6 (six) hours as needed.    QUEtiapine (SEROQUEL) 200 MG Tab Take 1 and 1/2 tablets at bedtime    ramipril (ALTACE) 2.5 MG capsule Take 1 capsule (2.5 mg total) by mouth once daily.    rizatriptan (MAXALT) 10 MG tablet One tablet with onset of migraine and may repeat one dose in 2 hours if needed    sub-q insulin device, 20 unit Katie 1 Device by Misc.(Non-Drug; Combo Route) route once daily.    venlafaxine (EFFEXOR) 75 MG tablet Take 2 tablets (150 mg total) by mouth 2 (two) times daily.    vitamin D 1000 units Tab Take 1,000 Units by mouth once daily.    blood-glucose meter (ACCU-CHEK JOSE PLUS METER) Misc 1 each by Misc.(Non-Drug; Combo Route) route as directed. Accu-chek Meter    diazePAM (VALIUM) 10 MG Tab Take 1 tablet (10 mg total) by mouth 3 (three) times daily as needed.    isosorbide mononitrate (IMDUR) 30 MG 24 hr tablet Take 1 tablet (30 mg total) by mouth once daily.     Family History     Problem Relation (Age of Onset)    Alcohol abuse Mother, Father    Arthritis Father, Maternal Grandmother, Paternal Grandmother    Breast cancer Maternal Grandmother    COPD Mother, Sister    Cancer Father, Maternal Aunt, Maternal Uncle, Paternal Aunt, Paternal Uncle, Paternal Grandmother    Depression Mother    Diabetes Maternal Uncle    Drug abuse Mother, Maternal Uncle    Heart disease Father, Brother    Hypertension Father, Brother, Maternal Grandmother, Paternal Grandfather    Kidney failure Sister        Social History Main Topics    Smoking status: Current Every Day Smoker     Years: 35.00     Types: Cigarettes, Vaping w/o nicotine    Smokeless tobacco: Never Used    Alcohol use Yes      Comment: occassionally    Drug use: Yes     Types: Marijuana    Sexual activity: Yes     Review of Systems   Constitutional: Positive for chills and fever. Negative for diaphoresis, fatigue and unexpected weight change.   HENT: Negative for congestion,  facial swelling, sore throat and trouble swallowing.    Eyes: Negative for photophobia, redness and visual disturbance.   Respiratory: Negative for apnea, cough, chest tightness, shortness of breath and wheezing.    Cardiovascular: Negative for chest pain, palpitations and leg swelling.   Gastrointestinal: Positive for abdominal pain and nausea. Negative for abdominal distention, blood in stool, constipation, diarrhea and vomiting.   Endocrine: Negative for polydipsia, polyphagia and polyuria.   Genitourinary: Negative for difficulty urinating, dysuria, flank pain, frequency, hematuria and urgency.   Musculoskeletal: Negative for arthralgias, back pain, joint swelling, myalgias and neck stiffness.   Skin: Negative for pallor and rash.   Allergic/Immunologic: Negative for immunocompromised state.   Neurological: Negative for dizziness, tremors, syncope, weakness, light-headedness and headaches.   Psychiatric/Behavioral: Negative for agitation, confusion and suicidal ideas.   All other systems reviewed and are negative.    Objective:     Vital Signs (Most Recent):  Temp: 98.6 °F (37 °C) (06/20/18 2002)  Pulse: 78 (06/21/18 0116)  Resp: 20 (06/20/18 2002)  BP: 129/71 (06/21/18 0116)  SpO2: 95 % (06/21/18 0116) Vital Signs (24h Range):  Temp:  [98.6 °F (37 °C)] 98.6 °F (37 °C)  Pulse:  [] 78  Resp:  [20] 20  SpO2:  [93 %-97 %] 95 %  BP: (128-154)/(71-77) 129/71     Weight: 73.4 kg (161 lb 11.3 oz)  Body mass index is 26.91 kg/m².    Physical Exam   Constitutional: She is oriented to person, place, and time. She appears well-developed and well-nourished. No distress.   HENT:   Head: Normocephalic and atraumatic.   Mouth/Throat: Oropharynx is clear and moist.   Eyes: Conjunctivae and EOM are normal. Pupils are equal, round, and reactive to light. No scleral icterus.   Neck: Normal range of motion. Neck supple. No JVD present. No thyromegaly present.   Cardiovascular: Normal rate, regular rhythm and intact distal  pulses.  Exam reveals no gallop and no friction rub.    No murmur heard.  Pulmonary/Chest: Effort normal and breath sounds normal. No respiratory distress. She has no wheezes. She has no rales. She exhibits no tenderness.   Abdominal: Soft. Bowel sounds are normal. She exhibits no distension and no mass. There is tenderness (RLQ). There is no guarding.   Musculoskeletal: Normal range of motion. She exhibits no tenderness.   Neurological: She is alert and oriented to person, place, and time. No cranial nerve deficit.   Skin: Skin is warm and dry. Capillary refill takes 2 to 3 seconds. No rash noted. She is not diaphoretic. No erythema.   Psychiatric: She has a normal mood and affect.   Nursing note and vitals reviewed.        CRANIAL NERVES     CN III, IV, VI   Pupils are equal, round, and reactive to light.  Extraocular motions are normal.        Significant Labs:   CBC:   Recent Labs  Lab 06/20/18 2228   WBC 12.90*   HGB 13.4   HCT 40.2   *     CMP:   Recent Labs  Lab 06/20/18 2228   *   K 4.2      CO2 23   *   BUN 15   CREATININE 0.9   CALCIUM 9.4   PROT 7.1   ALBUMIN 2.9*   BILITOT 0.2   ALKPHOS 94   AST 13   ALT 13   ANIONGAP 11   EGFRNONAA >60     Lactic Acid: No results for input(s): LACTATE in the last 48 hours.  Magnesium: No results for input(s): MG in the last 48 hours.  Urine Studies:   Recent Labs  Lab 06/20/18 2228   COLORU Yellow   APPEARANCEUA Clear   PHUR 6.0   SPECGRAV <=1.005*   PROTEINUA Negative   GLUCUA 3+*   KETONESU Negative   BILIRUBINUA Negative   OCCULTUA Negative   NITRITE Negative   UROBILINOGEN Negative   LEUKOCYTESUR Negative   RBCUA 2   WBCUA 7*   BACTERIA Many*     All pertinent labs within the past 24 hours have been reviewed.    Significant Imaging: I have reviewed all pertinent imaging results/findings within the past 24 hours.   Imaging Results          CT Abdomen Pelvis With Contrast (In process)                   Assessment/Plan:     * Renal abscess,  right    - CT abdomen show 5 cm R renal lesion with surrounding fat stranding, concerning for R renal abscess  - Dr. Becerril (urology) called in ER and plans to see in AM    - f/u CT ab/pelv w/wo contrast  - continue zosyn  - keep NPO  - f/u urology recs          Type 2 diabetes mellitus    - glucose 626, was given 10 units iv regular insulin  - hold home meds    - start poc glucose q6h, ssi prn          Hypothyroidism due to acquired atrophy of thyroid    - continue synthroid at home dose        Dyslipidemia associated with type 2 diabetes mellitus    - continue statin        Essential hypertension    - hold home meds  - restart per day team          Depression    - continue venlafaxine        Bipolar disorder    - cont seroquel and lamotrigine            VTE Risk Mitigation         Ordered     Place XIOMARA hose  Until discontinued      06/21/18 0125     IP VTE LOW RISK PATIENT  Once      06/21/18 0125             Dmitri Burton MD  Department of Hospital Medicine   [facility]

## 2018-06-21 NOTE — H&P (VIEW-ONLY)
Chief Complaint: Right perirenal abscess    HPI:   6/2/18: 53 yo woman one month ago had a full upper dental prosthetic surgery.  Had an oral abscess after, and two weeks ago had severe right back pain and fevers 103-104 managed at home waxed/waned.  Last two days felt especially poorly and came to ER where a CT showed a right posterior perirenal abscess.  RLQ pain but no other abd/pelvic pain and no exac/rel factors.  No gross hematuria.  No urolithiasis.  No urinary bother.  No  history.  Normal sexual function.    Allergies:  Ultram [tramadol]; Asa [aspirin]; Lipitor [atorvastatin]; and Celestone [betamethasone]    Medications: see MAR    Review of Systems:  General: No fever, chills, fatigability, or weight loss.  Skin: No rashes, itching, or changes in color or texture of skin.  Chest: Denies AYALA, cyanosis, wheezing, cough, and sputum production.  Abdomen: Appetite fine. No weight loss. Denies diarrhea, abdominal pain, hematemesis, or blood in stool.  Musculoskeletal: No joint stiffness or swelling. Denies back pain.  : As above.  All other review of systems negative.    PMH:   has a past medical history of Anxiety; Arthritis; Asthma; Bipolar 1 disorder; Depression; Diabetes mellitus; Diabetes mellitus, type 2; Diverticular disease; GERD (gastroesophageal reflux disease); Hyperlipemia; Hypertension; Hypothyroidism; Pneumonia; Renal manifestation of secondary diabetes mellitus; Tobacco dependence; and Trouble in sleeping.    PSH:   has a past surgical history that includes Hysterectomy; Tonsillectomy; Cholecystectomy; Colon surgery; Colonoscopy (N/A, 1/12/2018); and Dental surgery (05/21/2018).    FamHx: family history includes Alcohol abuse in her father and mother; Arthritis in her father, maternal grandmother, and paternal grandmother; Breast cancer in her maternal grandmother; COPD in her mother and sister; Cancer in her father, maternal aunt, maternal uncle, paternal aunt, paternal grandmother, and  paternal uncle; Depression in her mother; Diabetes in her maternal uncle; Drug abuse in her maternal uncle and mother; Heart disease in her brother and father; Hypertension in her brother, father, maternal grandmother, and paternal grandfather; Kidney failure in her sister.    SocHx:  reports that she has been smoking Cigarettes and Vaping w/o nicotine.  She has smoked for the past 35.00 years. She has never used smokeless tobacco. She reports that she drinks alcohol. She reports that she uses drugs, including Marijuana.     Physical Exam:  Vitals:   Vitals:    06/21/18 0735   BP: 114/63   Pulse: 76   Resp: 20   Temp: 97.9 °F (36.6 °C)     General: A&Ox3. No apparent distress. No deformities.  Neck: No masses. Normal thyroid.  Lungs: normal inspiration. No use of accessory muscles.  Heart: normal pulse. No arrhythmias.  Abdomen: Soft. NT. ND. No masses. No hernias. No hepatosplenomegaly.  Lymphatic: Neck and groin nodes negative.  Skin: The skin is warm and dry. No jaundice.  Ext: No c/c/e.  : deferred    Labs/Studies: see chart    Impression/Plan:   1. Admitted to Hosp Med, broad spectrum Abx.  Ordered percutaneous abscess drainage.  Will follow.

## 2018-06-21 NOTE — OP NOTE
Procedure was performed by radioloigist.  Sterile technique was performed in the right flank, lidocaine was used as a local anesthetic. Utilizing Ct guidance, pigtail catheter was advanced into the perinephric abscess and removed 12 cc of blood tinged purulent fluid.  Pt tolerated the procedure well without immediate complications.  Please see radiologist report for details. F/u with PCP and/or ordering physician.

## 2018-06-22 ENCOUNTER — TELEPHONE (OUTPATIENT)
Dept: UROLOGY | Facility: CLINIC | Age: 53
End: 2018-06-22

## 2018-06-22 DIAGNOSIS — N15.1 RENAL ABSCESS: Primary | ICD-10-CM

## 2018-06-22 PROBLEM — K04.7 DENTAL ABSCESS: Status: ACTIVE | Noted: 2018-06-22

## 2018-06-22 LAB
ALBUMIN SERPL BCP-MCNC: 2.9 G/DL
ANION GAP SERPL CALC-SCNC: 11 MMOL/L
BASOPHILS # BLD AUTO: 0.02 K/UL
BASOPHILS NFR BLD: 0.2 %
BUN SERPL-MCNC: 6 MG/DL
CALCIUM SERPL-MCNC: 9.3 MG/DL
CHLORIDE SERPL-SCNC: 100 MMOL/L
CO2 SERPL-SCNC: 24 MMOL/L
CREAT SERPL-MCNC: 0.7 MG/DL
DIFFERENTIAL METHOD: ABNORMAL
EOSINOPHIL # BLD AUTO: 0.1 K/UL
EOSINOPHIL NFR BLD: 1.1 %
ERYTHROCYTE [DISTWIDTH] IN BLOOD BY AUTOMATED COUNT: 13.8 %
EST. GFR  (AFRICAN AMERICAN): >60 ML/MIN/1.73 M^2
EST. GFR  (NON AFRICAN AMERICAN): >60 ML/MIN/1.73 M^2
ESTIMATED AVG GLUCOSE: ABNORMAL MG/DL
GLUCOSE SERPL-MCNC: 270 MG/DL
GRAM STN SPEC: NORMAL
GRAM STN SPEC: NORMAL
HBA1C MFR BLD HPLC: >14 %
HCT VFR BLD AUTO: 42.6 %
HGB BLD-MCNC: 14.2 G/DL
LYMPHOCYTES # BLD AUTO: 2.3 K/UL
LYMPHOCYTES NFR BLD: 20.2 %
MAGNESIUM SERPL-MCNC: 1.6 MG/DL
MCH RBC QN AUTO: 29.7 PG
MCHC RBC AUTO-ENTMCNC: 33.3 G/DL
MCV RBC AUTO: 89 FL
MONOCYTES # BLD AUTO: 0.9 K/UL
MONOCYTES NFR BLD: 7.7 %
NEUTROPHILS # BLD AUTO: 8.1 K/UL
NEUTROPHILS NFR BLD: 70.8 %
PHOSPHATE SERPL-MCNC: 3.3 MG/DL
PHOSPHATE SERPL-MCNC: 3.3 MG/DL
PLATELET # BLD AUTO: 279 K/UL
PMV BLD AUTO: 10.8 FL
POCT GLUCOSE: 156 MG/DL (ref 70–110)
POCT GLUCOSE: 157 MG/DL (ref 70–110)
POCT GLUCOSE: 249 MG/DL (ref 70–110)
POCT GLUCOSE: 256 MG/DL (ref 70–110)
POCT GLUCOSE: 282 MG/DL (ref 70–110)
POTASSIUM SERPL-SCNC: 3.8 MMOL/L
RBC # BLD AUTO: 4.78 M/UL
SODIUM SERPL-SCNC: 135 MMOL/L
WBC # BLD AUTO: 11.37 K/UL

## 2018-06-22 PROCEDURE — 80069 RENAL FUNCTION PANEL: CPT

## 2018-06-22 PROCEDURE — 96372 THER/PROPH/DIAG INJ SC/IM: CPT | Mod: 59

## 2018-06-22 PROCEDURE — 21400001 HC TELEMETRY ROOM

## 2018-06-22 PROCEDURE — 83036 HEMOGLOBIN GLYCOSYLATED A1C: CPT

## 2018-06-22 PROCEDURE — 25500020 PHARM REV CODE 255: Performed by: INTERNAL MEDICINE

## 2018-06-22 PROCEDURE — 83735 ASSAY OF MAGNESIUM: CPT

## 2018-06-22 PROCEDURE — 63600175 PHARM REV CODE 636 W HCPCS: Performed by: NURSE PRACTITIONER

## 2018-06-22 PROCEDURE — 85025 COMPLETE CBC W/AUTO DIFF WBC: CPT

## 2018-06-22 PROCEDURE — 25000003 PHARM REV CODE 250: Performed by: INTERNAL MEDICINE

## 2018-06-22 PROCEDURE — 63600175 PHARM REV CODE 636 W HCPCS: Performed by: HOSPITALIST

## 2018-06-22 PROCEDURE — 11000001 HC ACUTE MED/SURG PRIVATE ROOM

## 2018-06-22 PROCEDURE — A9585 GADOBUTROL INJECTION: HCPCS | Performed by: INTERNAL MEDICINE

## 2018-06-22 PROCEDURE — 36415 COLL VENOUS BLD VENIPUNCTURE: CPT

## 2018-06-22 PROCEDURE — 25000003 PHARM REV CODE 250: Performed by: HOSPITALIST

## 2018-06-22 PROCEDURE — 25000003 PHARM REV CODE 250: Performed by: NURSE PRACTITIONER

## 2018-06-22 PROCEDURE — 63600175 PHARM REV CODE 636 W HCPCS: Performed by: INTERNAL MEDICINE

## 2018-06-22 RX ORDER — VANCOMYCIN HCL IN 5 % DEXTROSE 1G/250ML
1000 PLASTIC BAG, INJECTION (ML) INTRAVENOUS
Status: DISCONTINUED | OUTPATIENT
Start: 2018-06-23 | End: 2018-06-23

## 2018-06-22 RX ORDER — HYDROMORPHONE HYDROCHLORIDE 1 MG/ML
0.5 INJECTION, SOLUTION INTRAMUSCULAR; INTRAVENOUS; SUBCUTANEOUS 3 TIMES DAILY PRN
Status: DISCONTINUED | OUTPATIENT
Start: 2018-06-22 | End: 2018-06-23

## 2018-06-22 RX ORDER — GADOBUTROL 604.72 MG/ML
7 INJECTION INTRAVENOUS
Status: COMPLETED | OUTPATIENT
Start: 2018-06-22 | End: 2018-06-22

## 2018-06-22 RX ADMIN — QUETIAPINE FUMARATE 200 MG: 100 TABLET ORAL at 09:06

## 2018-06-22 RX ADMIN — VENLAFAXINE 150 MG: 75 TABLET ORAL at 09:06

## 2018-06-22 RX ADMIN — HYDROMORPHONE HYDROCHLORIDE 0.5 MG: 1 INJECTION, SOLUTION INTRAMUSCULAR; INTRAVENOUS; SUBCUTANEOUS at 07:06

## 2018-06-22 RX ADMIN — LAMOTRIGINE 75 MG: 25 TABLET ORAL at 09:06

## 2018-06-22 RX ADMIN — PIPERACILLIN AND TAZOBACTAM 4.5 G: 4; .5 INJECTION, POWDER, LYOPHILIZED, FOR SOLUTION INTRAVENOUS; PARENTERAL at 09:06

## 2018-06-22 RX ADMIN — PANTOPRAZOLE SODIUM 40 MG: 40 TABLET, DELAYED RELEASE ORAL at 09:06

## 2018-06-22 RX ADMIN — HYDROCODONE BITARTRATE AND ACETAMINOPHEN 1 TABLET: 10; 325 TABLET ORAL at 03:06

## 2018-06-22 RX ADMIN — INSULIN ASPART 6 UNITS: 100 INJECTION, SOLUTION INTRAVENOUS; SUBCUTANEOUS at 02:06

## 2018-06-22 RX ADMIN — ATORVASTATIN CALCIUM 20 MG: 10 TABLET, FILM COATED ORAL at 09:06

## 2018-06-22 RX ADMIN — INSULIN ASPART 2 UNITS: 100 INJECTION, SOLUTION INTRAVENOUS; SUBCUTANEOUS at 05:06

## 2018-06-22 RX ADMIN — PROMETHAZINE HYDROCHLORIDE 12.5 MG: 25 INJECTION INTRAMUSCULAR; INTRAVENOUS at 04:06

## 2018-06-22 RX ADMIN — HYDROCODONE BITARTRATE AND ACETAMINOPHEN 1 TABLET: 10; 325 TABLET ORAL at 04:06

## 2018-06-22 RX ADMIN — PIPERACILLIN AND TAZOBACTAM 4.5 G: 4; .5 INJECTION, POWDER, LYOPHILIZED, FOR SOLUTION INTRAVENOUS; PARENTERAL at 01:06

## 2018-06-22 RX ADMIN — PIPERACILLIN AND TAZOBACTAM 4.5 G: 4; .5 INJECTION, POWDER, LYOPHILIZED, FOR SOLUTION INTRAVENOUS; PARENTERAL at 02:06

## 2018-06-22 RX ADMIN — LEVOTHYROXINE SODIUM 75 MCG: 25 TABLET ORAL at 06:06

## 2018-06-22 RX ADMIN — INSULIN DETEMIR 35 UNITS: 100 INJECTION, SOLUTION SUBCUTANEOUS at 09:06

## 2018-06-22 RX ADMIN — HYDROMORPHONE HYDROCHLORIDE 0.5 MG: 1 INJECTION, SOLUTION INTRAMUSCULAR; INTRAVENOUS; SUBCUTANEOUS at 05:06

## 2018-06-22 RX ADMIN — IOHEXOL 75 ML: 350 INJECTION, SOLUTION INTRAVENOUS at 08:06

## 2018-06-22 RX ADMIN — INSULIN ASPART 1 UNITS: 100 INJECTION, SOLUTION INTRAVENOUS; SUBCUTANEOUS at 09:06

## 2018-06-22 RX ADMIN — INSULIN ASPART 4 UNITS: 100 INJECTION, SOLUTION INTRAVENOUS; SUBCUTANEOUS at 06:06

## 2018-06-22 RX ADMIN — GADOBUTROL 7 ML: 604.72 INJECTION INTRAVENOUS at 11:06

## 2018-06-22 RX ADMIN — VANCOMYCIN HYDROCHLORIDE 1000 MG: 1 INJECTION, POWDER, LYOPHILIZED, FOR SOLUTION INTRAVENOUS at 01:06

## 2018-06-22 NOTE — SUBJECTIVE & OBJECTIVE
Past Medical History:   Diagnosis Date    Anxiety     Arthritis     Asthma     Bipolar 1 disorder     Depression     Diabetes mellitus     Diabetes mellitus, type 2     Diverticular disease     GERD (gastroesophageal reflux disease)     Hyperlipemia     Hypertension     Hypothyroidism     Pneumonia     Renal manifestation of secondary diabetes mellitus     Tobacco dependence     Trouble in sleeping        Past Surgical History:   Procedure Laterality Date    CHOLECYSTECTOMY      COLON SURGERY      COLONOSCOPY N/A 1/12/2018    Procedure: COLONOSCOPY;  Surgeon: Roque Mahajan III, MD;  Location: [facility];  Service: Endoscopy;  Laterality: N/A;    DENTAL SURGERY  05/21/2018    removal of 8 top teeth    HYSTERECTOMY      TONSILLECTOMY         Review of patient's allergies indicates:   Allergen Reactions    Ultram [tramadol] Rash    Asa [aspirin]      Nosebleeds  Nosebleeds    Lipitor [atorvastatin]      Leg Cramps    Celestone [betamethasone] Hives, Itching and Rash       Medications:  Prescriptions Prior to Admission   Medication Sig    atorvastatin (LIPITOR) 20 MG tablet Take 1 tablet (20 mg total) by mouth once daily.    blood sugar diagnostic (ACCU-CHEK JOSE PLUS TEST STRP) Strp 1 strip by Misc.(Non-Drug; Combo Route) route 3 (three) times daily. Accu-chek Strips    doxepin (SINEQUAN) 10 MG capsule Take 1 capsule (10 mg total) by mouth nightly as needed.    insulin aspart (NOVOLOG) 100 unit/mL injection As directed    insulin glargine (LANTUS) 100 unit/mL injection Inject 60 Units into the skin every evening.    insulin syringe-needle U-100 1 mL 30 gauge x 5/16 Syrg 1 each by Misc.(Non-Drug; Combo Route) route 4 (four) times daily.    [START ON 6/27/2018] lamoTRIgine (LAMICTAL) 100 MG tablet Take 1 tablet (100 mg total) by mouth once daily.    lamoTRIgine (LAMICTAL) 25 MG tablet Take 1 tab daily x 14 days x 2 tabs daily x 14 days then 3 tabs daily x 14 days then start 100 mg  "bottle.    lancets (ACCU-CHEK SOFTCLIX LANCETS) Misc 1 each by Misc.(Non-Drug; Combo Route) route 3 (three) times daily.    levothyroxine (SYNTHROID) 75 MCG tablet Take 1 tablet (75 mcg total) by mouth before breakfast.    metFORMIN (GLUCOPHAGE) 500 MG tablet Take 2 tablets (1,000 mg total) by mouth 2 (two) times daily with meals.    pantoprazole (PROTONIX) 20 MG tablet Take 1 tablet (20 mg total) by mouth once daily.    pen needle, diabetic (BD ULTRA-FINE ARNOLDO PEN NEEDLES) 32 gauge x 5/32" Ndle 1 each by Misc.(Non-Drug; Combo Route) route 2 (two) times daily.    promethazine (PHENERGAN) 12.5 MG Tab Take 1 tablet (12.5 mg total) by mouth every 6 (six) hours as needed.    QUEtiapine (SEROQUEL) 200 MG Tab Take 1 and 1/2 tablets at bedtime    ramipril (ALTACE) 2.5 MG capsule Take 1 capsule (2.5 mg total) by mouth once daily.    rizatriptan (MAXALT) 10 MG tablet One tablet with onset of migraine and may repeat one dose in 2 hours if needed    sub-q insulin device, 20 unit Katie 1 Device by Misc.(Non-Drug; Combo Route) route once daily.    venlafaxine (EFFEXOR) 75 MG tablet Take 2 tablets (150 mg total) by mouth 2 (two) times daily.    vitamin D 1000 units Tab Take 1,000 Units by mouth once daily.    blood-glucose meter (ACCU-CHEK JOSE PLUS METER) Misc 1 each by Misc.(Non-Drug; Combo Route) route as directed. Accu-chek Meter    diazePAM (VALIUM) 10 MG Tab Take 1 tablet (10 mg total) by mouth 3 (three) times daily as needed.    isosorbide mononitrate (IMDUR) 30 MG 24 hr tablet Take 1 tablet (30 mg total) by mouth once daily.     Antibiotics     Start     Stop Route Frequency Ordered    06/21/18 1730  vancomycin in dextrose 5 % 1 gram/250 mL IVPB 1,000 mg      -- IV Every 12 hours (non-standard times) 06/21/18 1641    06/21/18 0930  piperacillin-tazobactam 4.5 g in dextrose 5 % 100 mL IVPB (ready to mix system)      -- IV Every 8 hours (non-standard times) 06/21/18 0323        Antifungals     None    "     Antivirals     None           Immunization History   Administered Date(s) Administered    Hepatitis B, Pediatric/Adolescent 06/20/2016    Influenza 10/18/2010    Influenza - Quadrivalent - PF 10/26/2011, 09/30/2016, 10/09/2017    Influenza - Trivalent (ADULT) 10/18/2010, 10/26/2011, 11/01/2014    Pneumococcal Conjugate - 13 Valent 07/22/2015    Pneumococcal Polysaccharide - 23 Valent 11/20/2017    Tdap 06/20/2016    influenza - Quadrivalent 10/29/2014       Family History     Problem Relation (Age of Onset)    Alcohol abuse Mother, Father    Arthritis Father, Maternal Grandmother, Paternal Grandmother    Breast cancer Maternal Grandmother    COPD Mother, Sister    Cancer Father, Maternal Aunt, Maternal Uncle, Paternal Aunt, Paternal Uncle, Paternal Grandmother    Depression Mother    Diabetes Maternal Uncle    Drug abuse Mother, Maternal Uncle    Heart disease Father, Brother    Hypertension Father, Brother, Maternal Grandmother, Paternal Grandfather    Kidney failure Sister        Social History     Social History    Marital status:      Spouse name: N/A    Number of children: N/A    Years of education: N/A     Social History Main Topics    Smoking status: Current Every Day Smoker     Years: 35.00     Types: Cigarettes, Vaping w/o nicotine    Smokeless tobacco: Never Used    Alcohol use Yes      Comment: occassionally    Drug use: Yes     Types: Marijuana    Sexual activity: Yes     Other Topics Concern    None     Social History Narrative    Minor grandchild living with pt.     Review of Systems   Constitutional: Positive for chills and fever. Negative for diaphoresis, fatigue and unexpected weight change.   HENT: Negative for congestion, facial swelling, sore throat and trouble swallowing.    Eyes: Negative for photophobia, redness and visual disturbance.   Respiratory: Negative for apnea, cough, chest tightness, shortness of breath and wheezing.    Cardiovascular: Negative for chest  pain, palpitations and leg swelling.   Gastrointestinal: Positive for abdominal pain and nausea. Negative for abdominal distention, blood in stool, constipation, diarrhea and vomiting.   Endocrine: Negative for polydipsia, polyphagia and polyuria.   Genitourinary: Negative for difficulty urinating, dysuria, flank pain, frequency, hematuria and urgency.   Musculoskeletal: Negative for arthralgias, back pain, joint swelling, myalgias and neck stiffness.   Skin: Negative for pallor and rash.   Allergic/Immunologic: Negative for immunocompromised state.   Neurological: Negative for dizziness, tremors, syncope, weakness, light-headedness and headaches.   Psychiatric/Behavioral: Negative for agitation, confusion and suicidal ideas.   All other systems reviewed and are negative.    Objective:     Vital Signs (Most Recent):  Temp: 98 °F (36.7 °C) (06/22/18 0307)  Pulse: 85 (06/22/18 0307)  Resp: 16 (06/22/18 0307)  BP: 124/62 (06/22/18 0307)  SpO2: 95 % (06/22/18 0307) Vital Signs (24h Range):  Temp:  [97.8 °F (36.6 °C)-98.5 °F (36.9 °C)] 98 °F (36.7 °C)  Pulse:  [72-85] 85  Resp:  [12-16] 16  SpO2:  [95 %-99 %] 95 %  BP: (106-143)/(59-72) 124/62     Weight: 73.4 kg (161 lb 11.3 oz)  Body mass index is 26.91 kg/m².    Estimated Creatinine Clearance: 92.2 mL/min (based on SCr of 0.7 mg/dL).    Physical Exam   Constitutional: She is oriented to person, place, and time. She appears well-developed and well-nourished. No distress.   HENT:   Head: Normocephalic and atraumatic.   Mouth/Throat: Oropharynx is clear and moist.   Eyes: Conjunctivae and EOM are normal. Pupils are equal, round, and reactive to light. No scleral icterus.   Neck: Normal range of motion. Neck supple. No JVD present. No thyromegaly present.   Cardiovascular: Normal rate, regular rhythm and intact distal pulses.  Exam reveals no gallop and no friction rub.    No murmur heard.  Pulmonary/Chest: Effort normal and breath sounds normal. No respiratory distress.  She has no wheezes. She has no rales. She exhibits no tenderness.   Abdominal: Soft. Bowel sounds are normal. She exhibits no distension and no mass. There is tenderness (RLQ). There is no guarding.   Drain with bloody secretions    Musculoskeletal: Normal range of motion. She exhibits no tenderness.   Neurological: She is alert and oriented to person, place, and time. No cranial nerve deficit.   Skin: Skin is warm and dry. Capillary refill takes 2 to 3 seconds. No rash noted. She is not diaphoretic. No erythema.   Psychiatric: She has a normal mood and affect.   Nursing note and vitals reviewed.      Significant Labs:   Blood Culture:   Recent Labs  Lab 06/21/18  1549 06/21/18  1555   LABBLOO No Growth to date No Growth to date     BMP:   Recent Labs  Lab 06/22/18  0435   *   *   K 3.8      CO2 24   BUN 6   CREATININE 0.7   CALCIUM 9.3   MG 1.6     CBC:   Recent Labs  Lab 06/20/18  2228 06/21/18  0558 06/22/18  0435   WBC 12.90* 11.03 11.37   HGB 13.4 12.9 14.2   HCT 40.2 39.1 42.6   * 337 279     All pertinent labs within the past 24 hours have been reviewed.    Significant Imaging: I have reviewed all pertinent imaging results/findings within the past 24 hours.

## 2018-06-22 NOTE — TELEPHONE ENCOUNTER
"---Secure Chat from Dr. Becerril---   6/22/18 at 11:06AM - Dr. Nithin De Jesus included  "Looks like the drain was placed. Dispo per dr Dejesus and yourself. CT w and wo 1 wk with rtc after to d/c drain"  "

## 2018-06-22 NOTE — CONSULTS
[facility]  Infectious Disease  Consult Note    Patient Name: Alexandra Goins  MRN: [id number]  Admission Date: 6/20/2018  Hospital Length of Stay: 1 days  Attending Physician: Nithin De Jesus MD  Primary Care Provider: Perez Casiano MD     Isolation Status: No active isolations    Patient information was obtained from patient, past medical records and ER records.      Consults  Assessment/Plan:     * Renal abscess, right    S/p ct guided drainage -will plan to follow cultures to guide therapy .  Will add vanco and continue zosyn.  She presented with back pain and fever -will need to rule out epidural abscess -will do MRI of the lumbar /thoracic region.       Type 2 diabetes mellitus    Will continue insulin levemir and closely monitor glucose.         Bipolar disorder      Continue Venlafaxine,Quetiapine            Thank you for your consult. I will follow-up with patient. Please contact us if you have any additional questions.    Jamie Dejesus MD  Infectious Disease  [facility]     Subjective:     Principal Problem: Renal abscess, right    HPI: 52 year old woman with history of upper dental surgery ,oral abscess ,fever - T max 103-104. .CT scan of the abdomen showed -2 x 3.4 cm hypodense rim enhancing lesion with adjacent fat stranding within the interpolar region of the right kidney concerning for renal abscess. She had  CT-guided abscess drain with placement of 8.5 [place]yan[place] catheter.  All cultures remain negative.      Past Medical History:   Diagnosis Date    Anxiety     Arthritis     Asthma     Bipolar 1 disorder     Depression     Diabetes mellitus     Diabetes mellitus, type 2     Diverticular disease     GERD (gastroesophageal reflux disease)     Hyperlipemia     Hypertension     Hypothyroidism     Pneumonia     Renal manifestation of secondary diabetes mellitus     Tobacco dependence     Trouble in sleeping        Past Surgical History:   Procedure  Laterality Date    CHOLECYSTECTOMY      COLON SURGERY      COLONOSCOPY N/A 1/12/2018    Procedure: COLONOSCOPY;  Surgeon: Roque Mahajan III, MD;  Location: [facility];  Service: Endoscopy;  Laterality: N/A;    DENTAL SURGERY  05/21/2018    removal of 8 top teeth    HYSTERECTOMY      TONSILLECTOMY         Review of patient's allergies indicates:   Allergen Reactions    Ultram [tramadol] Rash    Asa [aspirin]      Nosebleeds  Nosebleeds    Lipitor [atorvastatin]      Leg Cramps    Celestone [betamethasone] Hives, Itching and Rash       Medications:  Prescriptions Prior to Admission   Medication Sig    atorvastatin (LIPITOR) 20 MG tablet Take 1 tablet (20 mg total) by mouth once daily.    blood sugar diagnostic (ACCU-CHEK JOSE PLUS TEST STRP) Strp 1 strip by Misc.(Non-Drug; Combo Route) route 3 (three) times daily. Accu-chek Strips    doxepin (SINEQUAN) 10 MG capsule Take 1 capsule (10 mg total) by mouth nightly as needed.    insulin aspart (NOVOLOG) 100 unit/mL injection As directed    insulin glargine (LANTUS) 100 unit/mL injection Inject 60 Units into the skin every evening.    insulin syringe-needle U-100 1 mL 30 gauge x 5/16 Syrg 1 each by Misc.(Non-Drug; Combo Route) route 4 (four) times daily.    [START ON 6/27/2018] lamoTRIgine (LAMICTAL) 100 MG tablet Take 1 tablet (100 mg total) by mouth once daily.    lamoTRIgine (LAMICTAL) 25 MG tablet Take 1 tab daily x 14 days x 2 tabs daily x 14 days then 3 tabs daily x 14 days then start 100 mg bottle.    lancets (ACCU-CHEK SOFTCLIX LANCETS) Misc 1 each by Misc.(Non-Drug; Combo Route) route 3 (three) times daily.    levothyroxine (SYNTHROID) 75 MCG tablet Take 1 tablet (75 mcg total) by mouth before breakfast.    metFORMIN (GLUCOPHAGE) 500 MG tablet Take 2 tablets (1,000 mg total) by mouth 2 (two) times daily with meals.    pantoprazole (PROTONIX) 20 MG tablet Take 1 tablet (20 mg total) by mouth once daily.    pen needle, diabetic (BD  "ULTRA-FINE ARNOLDO PEN NEEDLES) 32 gauge x 5/32" Ndle 1 each by Misc.(Non-Drug; Combo Route) route 2 (two) times daily.    promethazine (PHENERGAN) 12.5 MG Tab Take 1 tablet (12.5 mg total) by mouth every 6 (six) hours as needed.    QUEtiapine (SEROQUEL) 200 MG Tab Take 1 and 1/2 tablets at bedtime    ramipril (ALTACE) 2.5 MG capsule Take 1 capsule (2.5 mg total) by mouth once daily.    rizatriptan (MAXALT) 10 MG tablet One tablet with onset of migraine and may repeat one dose in 2 hours if needed    sub-q insulin device, 20 unit Katie 1 Device by Misc.(Non-Drug; Combo Route) route once daily.    venlafaxine (EFFEXOR) 75 MG tablet Take 2 tablets (150 mg total) by mouth 2 (two) times daily.    vitamin D 1000 units Tab Take 1,000 Units by mouth once daily.    blood-glucose meter (ACCU-CHEK JOSE PLUS METER) Misc 1 each by Misc.(Non-Drug; Combo Route) route as directed. Accu-chek Meter    diazePAM (VALIUM) 10 MG Tab Take 1 tablet (10 mg total) by mouth 3 (three) times daily as needed.    isosorbide mononitrate (IMDUR) 30 MG 24 hr tablet Take 1 tablet (30 mg total) by mouth once daily.     Antibiotics     Start     Stop Route Frequency Ordered    06/21/18 1730  vancomycin in dextrose 5 % 1 gram/250 mL IVPB 1,000 mg      -- IV Every 12 hours (non-standard times) 06/21/18 1641    06/21/18 0930  piperacillin-tazobactam 4.5 g in dextrose 5 % 100 mL IVPB (ready to mix system)      -- IV Every 8 hours (non-standard times) 06/21/18 0323        Antifungals     None        Antivirals     None           Immunization History   Administered Date(s) Administered    Hepatitis B, Pediatric/Adolescent 06/20/2016    Influenza 10/18/2010    Influenza - Quadrivalent - PF 10/26/2011, 09/30/2016, 10/09/2017    Influenza - Trivalent (ADULT) 10/18/2010, 10/26/2011, 11/01/2014    Pneumococcal Conjugate - 13 Valent 07/22/2015    Pneumococcal Polysaccharide - 23 Valent 11/20/2017    Tdap 06/20/2016    influenza - Quadrivalent " 10/29/2014       Family History     Problem Relation (Age of Onset)    Alcohol abuse Mother, Father    Arthritis Father, Maternal Grandmother, Paternal Grandmother    Breast cancer Maternal Grandmother    COPD Mother, Sister    Cancer Father, Maternal Aunt, Maternal Uncle, Paternal Aunt, Paternal Uncle, Paternal Grandmother    Depression Mother    Diabetes Maternal Uncle    Drug abuse Mother, Maternal Uncle    Heart disease Father, Brother    Hypertension Father, Brother, Maternal Grandmother, Paternal Grandfather    Kidney failure Sister        Social History     Social History    Marital status:      Spouse name: N/A    Number of children: N/A    Years of education: N/A     Social History Main Topics    Smoking status: Current Every Day Smoker     Years: 35.00     Types: Cigarettes, Vaping w/o nicotine    Smokeless tobacco: Never Used    Alcohol use Yes      Comment: occassionally    Drug use: Yes     Types: Marijuana    Sexual activity: Yes     Other Topics Concern    None     Social History Narrative    Minor grandchild living with pt.     Review of Systems   Constitutional: Positive for chills and fever. Negative for diaphoresis, fatigue and unexpected weight change.   HENT: Negative for congestion, facial swelling, sore throat and trouble swallowing.    Eyes: Negative for photophobia, redness and visual disturbance.   Respiratory: Negative for apnea, cough, chest tightness, shortness of breath and wheezing.    Cardiovascular: Negative for chest pain, palpitations and leg swelling.   Gastrointestinal: Positive for abdominal pain and nausea. Negative for abdominal distention, blood in stool, constipation, diarrhea and vomiting.   Endocrine: Negative for polydipsia, polyphagia and polyuria.   Genitourinary: Negative for difficulty urinating, dysuria, flank pain, frequency, hematuria and urgency.   Musculoskeletal: Negative for arthralgias, back pain, joint swelling, myalgias and neck stiffness.    Skin: Negative for pallor and rash.   Allergic/Immunologic: Negative for immunocompromised state.   Neurological: Negative for dizziness, tremors, syncope, weakness, light-headedness and headaches.   Psychiatric/Behavioral: Negative for agitation, confusion and suicidal ideas.   All other systems reviewed and are negative.    Objective:     Vital Signs (Most Recent):  Temp: 98 °F (36.7 °C) (06/22/18 0307)  Pulse: 85 (06/22/18 0307)  Resp: 16 (06/22/18 0307)  BP: 124/62 (06/22/18 0307)  SpO2: 95 % (06/22/18 0307) Vital Signs (24h Range):  Temp:  [97.8 °F (36.6 °C)-98.5 °F (36.9 °C)] 98 °F (36.7 °C)  Pulse:  [72-85] 85  Resp:  [12-16] 16  SpO2:  [95 %-99 %] 95 %  BP: (106-143)/(59-72) 124/62     Weight: 73.4 kg (161 lb 11.3 oz)  Body mass index is 26.91 kg/m².    Estimated Creatinine Clearance: 92.2 mL/min (based on SCr of 0.7 mg/dL).    Physical Exam   Constitutional: She is oriented to person, place, and time. She appears well-developed and well-nourished. No distress.   HENT:   Head: Normocephalic and atraumatic.   Mouth/Throat: Oropharynx is clear and moist.   Eyes: Conjunctivae and EOM are normal. Pupils are equal, round, and reactive to light. No scleral icterus.   Neck: Normal range of motion. Neck supple. No JVD present. No thyromegaly present.   Cardiovascular: Normal rate, regular rhythm and intact distal pulses.  Exam reveals no gallop and no friction rub.    No murmur heard.  Pulmonary/Chest: Effort normal and breath sounds normal. No respiratory distress. She has no wheezes. She has no rales. She exhibits no tenderness.   Abdominal: Soft. Bowel sounds are normal. She exhibits no distension and no mass. There is tenderness (RLQ). There is no guarding.   Drain with bloody secretions    Musculoskeletal: Normal range of motion. She exhibits no tenderness.   Neurological: She is alert and oriented to person, place, and time. No cranial nerve deficit.   Skin: Skin is warm and dry. Capillary refill takes 2 to  3 seconds. No rash noted. She is not diaphoretic. No erythema.   Psychiatric: She has a normal mood and affect.   Nursing note and vitals reviewed.      Significant Labs:   Blood Culture:   Recent Labs  Lab 06/21/18  1549 06/21/18  1555   LABBLOO No Growth to date No Growth to date     BMP:   Recent Labs  Lab 06/22/18  0435   *   *   K 3.8      CO2 24   BUN 6   CREATININE 0.7   CALCIUM 9.3   MG 1.6     CBC:   Recent Labs  Lab 06/20/18  2228 06/21/18  0558 06/22/18  0435   WBC 12.90* 11.03 11.37   HGB 13.4 12.9 14.2   HCT 40.2 39.1 42.6   * 337 279     All pertinent labs within the past 24 hours have been reviewed.    Significant Imaging: I have reviewed all pertinent imaging results/findings within the past 24 hours.

## 2018-06-22 NOTE — PROGRESS NOTES
[facility] Hospital Medicine  Progress Note    Patient Name: Alexandra Goins  MRN: [id number]  Patient Class: IP- Inpatient   Admission Date: 6/20/2018  Length of Stay: 1 days  Attending Physician: Nithin De Jesus MD  Primary Care Provider: Perez Casiano MD        Subjective:     Principal Problem:Renal abscess, right    HPI:  52F h/o bipolar d/o, T2DM, HTN, and hypothyroidism presents with ab pain x 1 day.   Located in RLQ, gradual onset. Worse with palpation.  Associated with n/v, fever, and chills.  Denies dysuria, hematuria, hematochezia, hematochezia, diarrhea, constipation, vaginal discharge.  In ER, VSS. Labs wnl. UA show many bacteria, 7 wbc, negative nitrite negative leuko.   CT abdomen/pelvis with IV contrast show 5cm right renal lesion with surrounding fat stranding concerning for possible renal abscess.  Dr. Becerril was called in ER and recommended CT ab/pelvis with and without IV contrast and plans to see in AM.  Patient was started on zosyn. [facility] medicine called for admission.       Hospital Course:  6/21  Patient had dental/ear abscess last month . Ordered blood cultures . Perinephric abscess was drained today by ir with catheter . Cultures were send .  6/22  Patient had mri of spine done today no abscess and ct maxillary was negative no dental abscess. Waiting on cultures . Patient feels better today         Review of Systems   Constitutional: Positive for chills and fever. Negative for diaphoresis, fatigue and unexpected weight change.   HENT: Negative for congestion, facial swelling, sore throat and trouble swallowing.    Eyes: Negative for photophobia, redness and visual disturbance.   Respiratory: Negative for apnea, cough, chest tightness, shortness of breath and wheezing.    Cardiovascular: Negative for chest pain, palpitations and leg swelling.   Gastrointestinal: Positive for abdominal pain and nausea. Negative for abdominal distention, blood in stool,  constipation, diarrhea and vomiting.   Endocrine: Negative for polydipsia, polyphagia and polyuria.   Genitourinary: Negative for difficulty urinating, dysuria, flank pain, frequency, hematuria and urgency.   Musculoskeletal: Negative for arthralgias, back pain, joint swelling, myalgias and neck stiffness.   Skin: Negative for pallor and rash.   Allergic/Immunologic: Negative for immunocompromised state.   Neurological: Negative for dizziness, tremors, syncope, weakness, light-headedness and headaches.   Psychiatric/Behavioral: Negative for agitation, confusion and suicidal ideas.   All other systems reviewed and are negative.    Objective:     Vital Signs (Most Recent):  Temp: 97.6 °F (36.4 °C) (06/22/18 1315)  Pulse: 73 (06/22/18 1315)  Resp: 12 (06/22/18 1315)  BP: 123/66 (06/22/18 1315)  SpO2: (!) 92 % (06/22/18 1315) Vital Signs (24h Range):  Temp:  [97.6 °F (36.4 °C)-98.5 °F (36.9 °C)] 97.6 °F (36.4 °C)  Pulse:  [72-85] 73  Resp:  [12-16] 12  SpO2:  [92 %-95 %] 92 %  BP: (117-143)/(59-71) 123/66     Weight: 73.4 kg (161 lb 11.3 oz)  Body mass index is 26.91 kg/m².    Intake/Output Summary (Last 24 hours) at 06/22/18 1511  Last data filed at 06/22/18 0749   Gross per 24 hour   Intake             2110 ml   Output             2040 ml   Net               70 ml      Physical Exam   Constitutional: She is oriented to person, place, and time. She appears well-developed and well-nourished. No distress.   HENT:   Head: Normocephalic and atraumatic.   Mouth/Throat: Oropharynx is clear and moist.   Eyes: Conjunctivae and EOM are normal. Pupils are equal, round, and reactive to light. No scleral icterus.   Neck: Normal range of motion. Neck supple. No JVD present. No thyromegaly present.   Cardiovascular: Normal rate, regular rhythm and intact distal pulses.  Exam reveals no gallop and no friction rub.    No murmur heard.  Pulmonary/Chest: Effort normal and breath sounds normal. No respiratory distress. She has no  wheezes. She has no rales. She exhibits no tenderness.   Abdominal: Soft. Bowel sounds are normal. She exhibits no distension and no mass. There is tenderness (RLQ). There is no guarding.   Drain with bloody secretions    Musculoskeletal: Normal range of motion. She exhibits no tenderness.   Neurological: She is alert and oriented to person, place, and time. No cranial nerve deficit.   Skin: Skin is warm and dry. Capillary refill takes 2 to 3 seconds. No rash noted. She is not diaphoretic. No erythema.   Psychiatric: She has a normal mood and affect.   Nursing note and vitals reviewed.      Significant Labs: All pertinent labs within the past 24 hours have been reviewed.    Significant Imaging: I have reviewed all pertinent imaging results/findings within the past 24 hours.    Assessment/Plan:      * Renal abscess, right    - CT abdomen show 5 cm R renal lesion with surrounding fat stranding, concerning for R renal abscess  - Dr. Becerril (urology) called in ER and plans to see in AM    - f/u CT ab/pelv w/wo contrast  - continue zosyn  - keep NPO  - f/u urology recs  6/22  S/p drain of perinephric abcess waiting on culture   Continue with iv antibotics         Type 2 diabetes mellitus    - glucose 626, was given 10 units iv regular insulin  - hold home meds    - start poc glucose q6h, ssi prn          Hypothyroidism due to acquired atrophy of thyroid    - continue synthroid at home dose        Dyslipidemia associated with type 2 diabetes mellitus    - continue statin        Essential hypertension    - hold home meds  - restart per day team          Depression    - continue venlafaxine        Bipolar disorder    - cont seroquel and lamotrigine            VTE Risk Mitigation         Ordered     Place XIOMARA hose  Until discontinued      06/21/18 0125     IP VTE LOW RISK PATIENT  Once      06/21/18 0125              Nithin De Jesus MD  Department of Hospital Medicine   [facility]

## 2018-06-22 NOTE — ASSESSMENT & PLAN NOTE
S/p ct guided drainage -will plan to follow cultures to guide therapy .  Will add vanco and continue zosyn.  She presented with back pain and fever -will need to rule out epidural abscess -will do MRI of the lumbar /thoracic region.

## 2018-06-22 NOTE — ASSESSMENT & PLAN NOTE
- CT abdomen show 5 cm R renal lesion with surrounding fat stranding, concerning for R renal abscess  - Dr. Becerril (urology) called in ER and plans to see in AM    - f/u CT ab/pelv w/wo contrast  - continue zosyn  - keep NPO  - f/u urology recs  6/22  S/p drain of perinephric abcess waiting on culture   Continue with iv antibotics

## 2018-06-22 NOTE — PLAN OF CARE
Problem: Patient Care Overview  Goal: Plan of Care Review  Outcome: Ongoing (interventions implemented as appropriate)  Pt remained free of injury during shift, stable condition, pain adequately controlled with PRN medications, no acute distress, bed alarm set to prevent falls, dressing to ETHAN drain on R flank CDI with serosanguinous milky output, receiving antibiotics, blood glucose monitoring performed, on cardiac monitoring (SR, HR 70-90s), and will continue to monitor. 24hr chart review performed.

## 2018-06-22 NOTE — HPI
52 year old woman with history of upper dental surgery ,oral abscess ,fever - T max 103-104. .CT scan of the abdomen showed -2 x 3.4 cm hypodense rim enhancing lesion with adjacent fat stranding within the interpolar region of the right kidney concerning for renal abscess. She had  CT-guided abscess drain with placement of 8.5 [place] catheter.  All cultures remain negative.

## 2018-06-22 NOTE — ASSESSMENT & PLAN NOTE
S/p ct guided drainage -will plan to follow cultures to guide therapy .  Will add vanco and continue zosyn.

## 2018-06-22 NOTE — SUBJECTIVE & OBJECTIVE
Review of Systems   Constitutional: Positive for chills and fever. Negative for diaphoresis, fatigue and unexpected weight change.   HENT: Negative for congestion, facial swelling, sore throat and trouble swallowing.    Eyes: Negative for photophobia, redness and visual disturbance.   Respiratory: Negative for apnea, cough, chest tightness, shortness of breath and wheezing.    Cardiovascular: Negative for chest pain, palpitations and leg swelling.   Gastrointestinal: Positive for abdominal pain and nausea. Negative for abdominal distention, blood in stool, constipation, diarrhea and vomiting.   Endocrine: Negative for polydipsia, polyphagia and polyuria.   Genitourinary: Negative for difficulty urinating, dysuria, flank pain, frequency, hematuria and urgency.   Musculoskeletal: Negative for arthralgias, back pain, joint swelling, myalgias and neck stiffness.   Skin: Negative for pallor and rash.   Allergic/Immunologic: Negative for immunocompromised state.   Neurological: Negative for dizziness, tremors, syncope, weakness, light-headedness and headaches.   Psychiatric/Behavioral: Negative for agitation, confusion and suicidal ideas.   All other systems reviewed and are negative.    Objective:     Vital Signs (Most Recent):  Temp: 97.6 °F (36.4 °C) (06/22/18 1315)  Pulse: 73 (06/22/18 1315)  Resp: 12 (06/22/18 1315)  BP: 123/66 (06/22/18 1315)  SpO2: (!) 92 % (06/22/18 1315) Vital Signs (24h Range):  Temp:  [97.6 °F (36.4 °C)-98.5 °F (36.9 °C)] 97.6 °F (36.4 °C)  Pulse:  [72-85] 73  Resp:  [12-16] 12  SpO2:  [92 %-95 %] 92 %  BP: (117-143)/(59-71) 123/66     Weight: 73.4 kg (161 lb 11.3 oz)  Body mass index is 26.91 kg/m².    Intake/Output Summary (Last 24 hours) at 06/22/18 1511  Last data filed at 06/22/18 0749   Gross per 24 hour   Intake             2110 ml   Output             2040 ml   Net               70 ml      Physical Exam   Constitutional: She is oriented to person, place, and time. She appears  well-developed and well-nourished. No distress.   HENT:   Head: Normocephalic and atraumatic.   Mouth/Throat: Oropharynx is clear and moist.   Eyes: Conjunctivae and EOM are normal. Pupils are equal, round, and reactive to light. No scleral icterus.   Neck: Normal range of motion. Neck supple. No JVD present. No thyromegaly present.   Cardiovascular: Normal rate, regular rhythm and intact distal pulses.  Exam reveals no gallop and no friction rub.    No murmur heard.  Pulmonary/Chest: Effort normal and breath sounds normal. No respiratory distress. She has no wheezes. She has no rales. She exhibits no tenderness.   Abdominal: Soft. Bowel sounds are normal. She exhibits no distension and no mass. There is tenderness (RLQ). There is no guarding.   Drain with bloody secretions    Musculoskeletal: Normal range of motion. She exhibits no tenderness.   Neurological: She is alert and oriented to person, place, and time. No cranial nerve deficit.   Skin: Skin is warm and dry. Capillary refill takes 2 to 3 seconds. No rash noted. She is not diaphoretic. No erythema.   Psychiatric: She has a normal mood and affect.   Nursing note and vitals reviewed.      Significant Labs: All pertinent labs within the past 24 hours have been reviewed.    Significant Imaging: I have reviewed all pertinent imaging results/findings within the past 24 hours.

## 2018-06-22 NOTE — CONSULTS
[facility]   Infectious Disease  Consult Note    Patient Name: Alexandra Goins  MRN: [id number]  Admission Date: 6/20/2018  Hospital Length of Stay: 1 days  Attending Physician: Nithin De Jesus MD  Primary Care Provider: Preez Casiano MD     Isolation Status: No active isolations    Patient information was obtained from patient, caregiver / friend and ER records.      Consults  Assessment/Plan:     * Renal abscess, right     S/p ct guided drainage -will plan to follow cultures to guide therapy .  Will add vanco and continue zosyn.  She presented with back pain and fever -will need to rule out epidural abscess -will do MRI of the lumbar /thoracic region.               Dental abscess    Will do CT maxillary facial to rule out abscess        Type 2 diabetes mellitus    Will continue insulin levemir and closely monitor glucose.             Thank you for your consult. I will follow-up with patient. Please contact us if you have any additional questions.    Jamie Dejesus MD  Infectious Disease  [facility]    Subjective:     Principal Problem: Renal abscess, right    HPI: 52 year old woman with history of upper dental surgery ,oral abscess ,fever - T max 103-104. .CT scan of the abdomen showed -2 x 3.4 cm hypodense rim enhancing lesion with adjacent fat stranding within the interpolar region of the right kidney concerning for renal abscess. She had  CT-guided abscess drain with placement of 8.5 [place] catheter.  All cultures remain negative.      No new subjective & objective note has been filed under this hospital service since the last note was generated.

## 2018-06-23 VITALS
WEIGHT: 161.69 LBS | BODY MASS INDEX: 26.94 KG/M2 | HEIGHT: 65 IN | OXYGEN SATURATION: 95 % | RESPIRATION RATE: 18 BRPM | HEART RATE: 70 BPM | TEMPERATURE: 97 F | SYSTOLIC BLOOD PRESSURE: 101 MMHG | DIASTOLIC BLOOD PRESSURE: 63 MMHG

## 2018-06-23 LAB
ALBUMIN SERPL BCP-MCNC: 3 G/DL
ANION GAP SERPL CALC-SCNC: 12 MMOL/L
BASOPHILS # BLD AUTO: 0.04 K/UL
BASOPHILS NFR BLD: 0.3 %
BUN SERPL-MCNC: 8 MG/DL
CALCIUM SERPL-MCNC: 9.5 MG/DL
CHLORIDE SERPL-SCNC: 97 MMOL/L
CO2 SERPL-SCNC: 28 MMOL/L
CREAT SERPL-MCNC: 0.8 MG/DL
DIFFERENTIAL METHOD: ABNORMAL
EOSINOPHIL # BLD AUTO: 0.2 K/UL
EOSINOPHIL NFR BLD: 1.8 %
ERYTHROCYTE [DISTWIDTH] IN BLOOD BY AUTOMATED COUNT: 14 %
EST. GFR  (AFRICAN AMERICAN): >60 ML/MIN/1.73 M^2
EST. GFR  (NON AFRICAN AMERICAN): >60 ML/MIN/1.73 M^2
ESTIMATED AVG GLUCOSE: ABNORMAL MG/DL
GLUCOSE SERPL-MCNC: 307 MG/DL
HBA1C MFR BLD HPLC: >14 %
HCT VFR BLD AUTO: 40.7 %
HGB BLD-MCNC: 13.3 G/DL
LYMPHOCYTES # BLD AUTO: 3.2 K/UL
LYMPHOCYTES NFR BLD: 26.1 %
MAGNESIUM SERPL-MCNC: 1.6 MG/DL
MCH RBC QN AUTO: 29.9 PG
MCHC RBC AUTO-ENTMCNC: 32.7 G/DL
MCV RBC AUTO: 92 FL
MONOCYTES # BLD AUTO: 1 K/UL
MONOCYTES NFR BLD: 8.6 %
NEUTROPHILS # BLD AUTO: 7.6 K/UL
NEUTROPHILS NFR BLD: 63.5 %
PHOSPHATE SERPL-MCNC: 3.9 MG/DL
PHOSPHATE SERPL-MCNC: 3.9 MG/DL
PLATELET # BLD AUTO: 401 K/UL
PLATELET BLD QL SMEAR: ABNORMAL
PMV BLD AUTO: 10.4 FL
POCT GLUCOSE: 250 MG/DL (ref 70–110)
POCT GLUCOSE: 349 MG/DL (ref 70–110)
POTASSIUM SERPL-SCNC: 4 MMOL/L
RBC # BLD AUTO: 4.45 M/UL
SODIUM SERPL-SCNC: 137 MMOL/L
VANCOMYCIN TROUGH SERPL-MCNC: 9.4 UG/ML
WBC # BLD AUTO: 12.09 K/UL

## 2018-06-23 PROCEDURE — 25000003 PHARM REV CODE 250: Performed by: HOSPITALIST

## 2018-06-23 PROCEDURE — 36415 COLL VENOUS BLD VENIPUNCTURE: CPT

## 2018-06-23 PROCEDURE — 63600175 PHARM REV CODE 636 W HCPCS: Performed by: NURSE PRACTITIONER

## 2018-06-23 PROCEDURE — 96372 THER/PROPH/DIAG INJ SC/IM: CPT

## 2018-06-23 PROCEDURE — 25000003 PHARM REV CODE 250: Performed by: INTERNAL MEDICINE

## 2018-06-23 PROCEDURE — 63600175 PHARM REV CODE 636 W HCPCS: Performed by: INTERNAL MEDICINE

## 2018-06-23 PROCEDURE — 63600175 PHARM REV CODE 636 W HCPCS: Performed by: HOSPITALIST

## 2018-06-23 PROCEDURE — 25000003 PHARM REV CODE 250: Performed by: NURSE PRACTITIONER

## 2018-06-23 PROCEDURE — 80202 ASSAY OF VANCOMYCIN: CPT

## 2018-06-23 PROCEDURE — 80069 RENAL FUNCTION PANEL: CPT

## 2018-06-23 PROCEDURE — 83735 ASSAY OF MAGNESIUM: CPT

## 2018-06-23 PROCEDURE — 85025 COMPLETE CBC W/AUTO DIFF WBC: CPT

## 2018-06-23 PROCEDURE — 83036 HEMOGLOBIN GLYCOSYLATED A1C: CPT

## 2018-06-23 RX ORDER — HYDROMORPHONE HYDROCHLORIDE 1 MG/ML
0.5 INJECTION, SOLUTION INTRAMUSCULAR; INTRAVENOUS; SUBCUTANEOUS EVERY 6 HOURS PRN
Status: DISCONTINUED | OUTPATIENT
Start: 2018-06-23 | End: 2018-06-23

## 2018-06-23 RX ORDER — HYDROCODONE BITARTRATE AND ACETAMINOPHEN 5; 325 MG/1; MG/1
1 TABLET ORAL EVERY 8 HOURS PRN
Qty: 30 TABLET | Refills: 0 | Status: SHIPPED | OUTPATIENT
Start: 2018-06-23 | End: 2018-07-03

## 2018-06-23 RX ORDER — LEVOFLOXACIN 500 MG/1
500 TABLET, FILM COATED ORAL DAILY
Qty: 14 TABLET | Refills: 0 | Status: SHIPPED | OUTPATIENT
Start: 2018-06-23 | End: 2018-06-25

## 2018-06-23 RX ADMIN — HYDROCODONE BITARTRATE AND ACETAMINOPHEN 1 TABLET: 10; 325 TABLET ORAL at 12:06

## 2018-06-23 RX ADMIN — PROMETHAZINE HYDROCHLORIDE 12.5 MG: 25 INJECTION INTRAMUSCULAR; INTRAVENOUS at 08:06

## 2018-06-23 RX ADMIN — PIPERACILLIN AND TAZOBACTAM 4.5 G: 4; .5 INJECTION, POWDER, LYOPHILIZED, FOR SOLUTION INTRAVENOUS; PARENTERAL at 05:06

## 2018-06-23 RX ADMIN — VANCOMYCIN HYDROCHLORIDE 1000 MG: 1 INJECTION, POWDER, LYOPHILIZED, FOR SOLUTION INTRAVENOUS at 01:06

## 2018-06-23 RX ADMIN — LEVOTHYROXINE SODIUM 75 MCG: 25 TABLET ORAL at 05:06

## 2018-06-23 RX ADMIN — INSULIN ASPART 4 UNITS: 100 INJECTION, SOLUTION INTRAVENOUS; SUBCUTANEOUS at 11:06

## 2018-06-23 RX ADMIN — VENLAFAXINE 150 MG: 75 TABLET ORAL at 08:06

## 2018-06-23 RX ADMIN — HYDROCODONE BITARTRATE AND ACETAMINOPHEN 1 TABLET: 10; 325 TABLET ORAL at 09:06

## 2018-06-23 RX ADMIN — Medication 0.5 MG: at 08:06

## 2018-06-23 RX ADMIN — ATORVASTATIN CALCIUM 20 MG: 10 TABLET, FILM COATED ORAL at 08:06

## 2018-06-23 RX ADMIN — LAMOTRIGINE 75 MG: 25 TABLET ORAL at 08:06

## 2018-06-23 RX ADMIN — INSULIN ASPART 8 UNITS: 100 INJECTION, SOLUTION INTRAVENOUS; SUBCUTANEOUS at 05:06

## 2018-06-23 RX ADMIN — QUETIAPINE FUMARATE 200 MG: 100 TABLET ORAL at 08:06

## 2018-06-23 RX ADMIN — INSULIN DETEMIR 35 UNITS: 100 INJECTION, SOLUTION SUBCUTANEOUS at 08:06

## 2018-06-23 RX ADMIN — PROMETHAZINE HYDROCHLORIDE 12.5 MG: 25 INJECTION INTRAMUSCULAR; INTRAVENOUS at 12:06

## 2018-06-23 RX ADMIN — PANTOPRAZOLE SODIUM 40 MG: 40 TABLET, DELAYED RELEASE ORAL at 08:06

## 2018-06-23 RX ADMIN — Medication 0.5 MG: at 01:06

## 2018-06-23 NOTE — PROGRESS NOTES
Clinical Pharmacy Review    Pharmacy Vancomycin Consult day #3  Vancomycin trough 6/23 at 1137 = 9.4 (drawn approximately 9.5 hours after dose given)  Continue dose of 1,000 mg (15 mg/kg x adj BW) every 12 hours  Next trough due 6/25 at 1200   Indication: renal abscess, dental abscess, r/o epidural abscess  Goal trough 15-20   WBC slight increase to 12.09   Scr stable, 0.8, CrCl 80.7 ml/min  Temp WNL  Patient is also on Zosyn (day #3)  Aerobic culture, right kidney aspirate (6/21): G - rods  Blood cultures x 1 (6/21): NGTD  Anaerobic culture, right kidney aspirate (6/21): NGTD  Gram stain (6/21): rare WBCs    Pharmacy will continue to closely monitor patient and make adjustments as necessary.   Thank you for allowing us to participate in this patient's care,   Radha Chamberlain 6/23/2018 1:00 PM

## 2018-06-23 NOTE — PLAN OF CARE
Problem: Patient Care Overview  Goal: Plan of Care Review  Outcome: Ongoing (interventions implemented as appropriate)  Fall prevention precautions maintained, pt remained free of falls throughout shift, call bell and personal items within reach, pt's pain moderatly controlled by prn pain medication, ETHAN drain care done, Iv antibiotics continued. 24 hour chart check completed. Will continue to monitor

## 2018-06-23 NOTE — NURSING
Discharge instructions, procedure info, and medication info with follow up appointments and medicaton prescriptions reviewed with patient. Gave pt paper rx for Norco 5mg. Pt. Removed IV and heart monitor her self. Pt. Educated pt on drain care. Advised we are leaving it in until her follow up appt. Wheeled out by LOTUS Rainey with all personal belongings to private vehicle. No acute distress noted. Pt asked for additional dose of pain medication before discharge, per Dr. De Jesus pt seems over medicated and hold all pain meds.

## 2018-06-23 NOTE — PLAN OF CARE
06/23/18 1840   Final Note   Assessment Type Final Discharge Note   Discharge Disposition Home   Right Care Referral Info   Post Acute Recommendation No Care

## 2018-06-23 NOTE — DISCHARGE INSTRUCTIONS
Abscess Drainage  An abscess is a pocket of pus that forms around an infection. Pus is a fluid made up of germs (bacteria), white blood cells, and other matter. Draining pus from an infected area or organ inside the body may be needed. This helps heal the infection. The procedure is often done by a specially trained doctor called an interventional radiologist.  How do I get ready for abscess drainage?  Follow any instructions you are given on how to prepare, including:  · Do not eat or drink anything for 6 hours before the procedure.  · Tell the technologist if you are, or could be, pregnant or if you are breastfeeding.  · Tell your doctor and the technologist if you are allergic to X-ray dye (contrast medium) or other medicines.  · Be sure your doctor knows about all medicines you take. You may be told to stop taking some or all of them before the test. This includes:  ¨ All prescription medicines  ¨ Over-the-counter medicines that don't need a prescription  ¨ Any street drugs you may use   ¨ Herbs, vitamins, kelp, seaweed, cough syrups, and other supplements  What happens during abscess drainage?  · You will change into a hospital gown and lie on an X-ray table. You may lie on your back, front, or side, depending on the site of the abscess.  · An IV (intravenous) line is put into a vein to give you fluids and medicines. You may be given medicine through the IV to help you relax.  · The skin over the abscess is cleaned. A local anesthetic is applied to numb the skin.  · Using CT, X-ray, or ultrasound images as a guide, the radiologist puts a needle through the skin and guides it to the abscess. The needle is then replaced with a catheter (thin, flexible tube).  · Pus drains from the abscess through the catheter. A bag or suction bulb will be attached to the catheter to hold the pus as it drains.  · The drainage catheter may be temporarily sutured or taped to your skin to help secure it and prevent it from  moving.  · The entire procedure may take 30 minutes or longer, depending on the location of the abscess.  What happens after abscess drainage?  · A slight fever is normal for the first 24 hours after the procedure.  · The catheter and drainage bag will likely remain in place for several days. Follow any instructions you are given for caring for the catheter and drainage site.  · See your doctor for a follow-up appointment to assess the infection and to have the catheter removed.  When to call the healthcare provider  Call your healthcare provider if you have:  · Fever (1°F above your normal temperature) lasting for 24 to 48 hours, or whatever your healthcare provider told you to report based on your health condition  · You feel new or worsened pain, fluid stops draining from the tube, or the tube moves or comes out.   Date Last Reviewed: 6/19/2015 © 2000-2017 [employer]. [address] [place] [address]. All rights reserved. This information is not intended as a substitute for professional medical care. Always follow your healthcare professional's instructions.        Levofloxacin tablets  What is this medicine?  LEVOFLOXACIN (clara voe FLOX a sin) is a quinolone antibiotic. It is used to treat certain kinds of bacterial infections. It will not work for colds, flu, or other viral infections.  How should I use this medicine?  Take this medicine by mouth with a full glass of water. Follow the directions on the prescription label. This medicine can be taken with or without food. Take your medicine at regular intervals. Do not take your medicine more often than directed. Do not skip doses or stop your medicine early even if you feel better. Do not stop taking except on your doctor's advice.  A special MedGuide will be given to you by the pharmacist with each prescription and refill. Be sure to read this information carefully each time.  Talk to your pediatrician regarding the use of this medicine  in children. While this drug may be prescribed for children as young as 6 months for selected conditions, precautions do apply.  What side effects may I notice from receiving this medicine?  Side effects that you should report to your doctor or health care professional as soon as possible:  · allergic reactions like skin rash or hives, swelling of the face, lips, or tongue  · anxious  · confusion  · depressed mood  · diarrhea  · fast, irregular heartbeat  · hallucination, loss of contact with reality  · joint, muscle, or tendon pain or swelling  · pain, tingling, numbness in the hands or feet  · suicidal thoughts or other mood changes  · sunburn  · unusually weak or tired  Side effects that usually do not require medical attention (report to your doctor or health care professional if they continue or are bothersome):  · dry mouth  · headache  · nausea  · trouble sleeping  What may interact with this medicine?  Do not take this medicine with any of the following medications:  · arsenic trioxide  · chloroquine  · droperidol  · medicines for irregular heart rhythm like amiodarone, disopyramide, dofetilide, flecainide, quinidine, procainamide, sotalol  · some medicines for depression or mental problems like phenothiazines, pimozide, and ziprasidone  This medicine may also interact with the following medications:  · amoxapine  · antacids  · birth control pills  · cisapride  · dairy products  · didanosine (ddI) buffered tablets or powder  · haloperidol  · multivitamins  · NSAIDS, medicines for pain and inflammation, like ibuprofen or naproxen  · retinoid products like tretinoin or isotretinoin  · risperidone  · some other antibiotics like clarithromycin or erythromycin  · sucralfate  · theophylline  · warfarin  What if I miss a dose?  If you miss a dose, take it as soon as you remember. If it is almost time for your next dose, take only that dose. Do not take double or extra doses.  Where should I keep my medicine?  Keep  out of the reach of children.  Store at room temperature between 15 and 30 degrees C (59 and 86 degrees F). Keep in a tightly closed container. Throw away any unused medicine after the expiration date.  What should I tell my health care provider before I take this medicine?  They need to know if you have any of these conditions:  · bone problems  · cerebral disease  · history of low levels of potassium in the blood  · irregular heartbeat  · joint problems  · kidney disease  · myasthenia gravis  · seizures  · tendon problems  · tingling of the fingers or toes, or other nerve disorder  · an unusual or allergic reaction to levofloxacin, other quinolone antibiotics, foods, dyes, or preservatives  · pregnant or trying to get pregnant  · breast-feeding  What should I watch for while using this medicine?  Tell your doctor or health care professional if your symptoms do not improve or if they get worse. Drink several glasses of water a day and cut down on drinks that contain caffeine. You must not get dehydrated while taking this medicine.  You may get drowsy or dizzy. Do not drive, use machinery, or do anything that needs mental alertness until you know how this medicine affects you. Do not sit or stand up quickly, especially if you are an older patient. This reduces the risk of dizzy or fainting spells.  This medicine can make you more sensitive to the sun. Keep out of the sun. If you cannot avoid being in the sun, wear protective clothing and use a sunscreen. Do not use sun lamps or tanning beds/booths. Contact your doctor if you get a sunburn.  If you are a diabetic monitor your blood glucose carefully. If you get an unusual reading stop taking this medicine and call your doctor right away.  Do not treat diarrhea with over-the-counter products. Contact your doctor if you have diarrhea that lasts more than 2 days or if the diarrhea is severe and watery.  Avoid antacids, calcium, iron, and zinc products for 2 hours before  and 2 hours after taking a dose of this medicine.  NOTE:This sheet is a summary. It may not cover all possible information. If you have questions about this medicine, talk to your doctor, pharmacist, or health care provider. Copyright© 2017 [employer]

## 2018-06-24 ENCOUNTER — TELEPHONE (OUTPATIENT)
Dept: HEPATOLOGY | Facility: HOSPITAL | Age: 53
End: 2018-06-24

## 2018-06-24 RX ORDER — AMOXICILLIN AND CLAVULANATE POTASSIUM 875; 125 MG/1; MG/1
1 TABLET, FILM COATED ORAL EVERY 12 HOURS
Qty: 28 TABLET | Refills: 0 | Status: SHIPPED | OUTPATIENT
Start: 2018-06-24 | End: 2018-07-03 | Stop reason: ALTCHOICE

## 2018-06-24 NOTE — TELEPHONE ENCOUNTER
I called miss ronka kelley on her cell phone and ask her to take augmentin  875 bid for 14 days . Advised her not to take levaquin as e coli is resistant it . She agreed and will  the scripts .

## 2018-06-24 NOTE — DISCHARGE SUMMARY
[facility] Hospital Medicine  Discharge Summary      Patient Name: Alexandra Goins  MRN: [id number]  Admission Date: 6/20/2018  Hospital Length of Stay: 2 days  Discharge Date and Time:  06/24/2018 8:07 AM  Attending Physician: Skylar att. providers found   Discharging Provider: Nithin De Jesus MD  Primary Care Provider: Perez Casiano MD      HPI:   52F h/o bipolar d/o, T2DM, HTN, and hypothyroidism presents with ab pain x 1 day.   Located in RLQ, gradual onset. Worse with palpation.  Associated with n/v, fever, and chills.  Denies dysuria, hematuria, hematochezia, hematochezia, diarrhea, constipation, vaginal discharge.  In ER, VSS. Labs wnl. UA show many bacteria, 7 wbc, negative nitrite negative leuko.   CT abdomen/pelvis with IV contrast show 5cm right renal lesion with surrounding fat stranding concerning for possible renal abscess.  Dr. Becerril was called in ER and recommended CT ab/pelvis with and without IV contrast and plans to see in AM.  Patient was started on zosyn. [facility] medicine called for admission.       * No surgery found *      Hospital Course:   6/21  Patient had dental/ear abscess last month . Ordered blood cultures . Perinephric abscess was drained today by ir with catheter . Cultures were send .  6/22  Patient had mri of spine done today no abscess and ct maxillary was negative no dental abscess. Waiting on cultures . Patient feels better today   6/23  Patient was seen and examined today . Stable to discharge home . 14 days of levaquin and follow up on culture s still pending . Blood cultures are negative. Follow up with alison and remove drain with outpatient ct abd/pelvis.follow up infectious disease and pcp     Consults:   Consults         Status Ordering Provider     Inpatient consult to Urology  Once     Provider:  Darrin Becerril IV, MD    Completed [facility] new Assessment & Plan notes have been filed under this hospital service since the  last note was generated.  Service: Hospital Medicine    Final Active Diagnoses:    Diagnosis Date Noted POA    PRINCIPAL PROBLEM:  Renal abscess, right [N15.1] 06/21/2018 Yes    Type 2 diabetes mellitus [E11.9] 06/21/2018 Yes    Depression [F32.9] 08/31/2015 Yes     Chronic    Essential hypertension [I10] 08/31/2015 Yes     Chronic    Hypothyroidism due to acquired atrophy of thyroid [E03.4] 08/31/2015 Yes     Chronic    Dyslipidemia associated with type 2 diabetes mellitus [E11.69, E78.5] 08/31/2015 Yes    Bipolar disorder [F31.9] 08/30/2015 Yes     Chronic      Problems Resolved During this Admission:    Diagnosis Date Noted Date Resolved POA       Discharged Condition: stable    Disposition: Home or Self Care    Follow Up:  Follow-up Information     Perez Casiano MD In 1 week.    Specialty:  Family Medicine  Contact information:  [address] [facility] [address]  [phone]             Jamie Dejesus MD In 1 week.    Specialty:  Infectious Diseases  Contact information:  [id number] [place] [address] [phone]             Darrin Becerril IV, MD In 1 week.    Specialty:  Urology  Contact information:  [address] [facility] [address] [phone]                 Patient Instructions:   No discharge procedures on file.    Significant Diagnostic Studies: Labs:   BMP:   Recent Labs  Lab 06/23/18  0535   *      K 4.0   CL 97   CO2 28   BUN 8   CREATININE 0.8   CALCIUM 9.5   MG 1.6    and CMP   Recent Labs  Lab 06/23/18  0535      K 4.0   CL 97   CO2 28   *   BUN 8   CREATININE 0.8   CALCIUM 9.5   ALBUMIN 3.0*   ANIONGAP 12   ESTGFRAFRICA >60   EGFRNONAA >60     Microbiology: Sputum Culture No results found for: GSRESP, RESPIRATORYC    Pending Diagnostic Studies:     None         Medications:  Reconciled Home Medications:      Medication List      START taking these medications    HYDROcodone-acetaminophen 5-325 mg per tablet  Commonly known  as:  NORCO  Take 1 tablet by mouth every 8 (eight) hours as needed for Pain.     levoFLOXacin 500 MG tablet  Commonly known as:  LEVAQUIN  Take 1 tablet (500 mg total) by mouth once daily. for 14 days        CONTINUE taking these medications    atorvastatin 20 MG tablet  Commonly known as:  LIPITOR  Take 1 tablet (20 mg total) by mouth once daily.     blood sugar diagnostic Strp  Commonly known as:  ACCU-CHEK JOSE PLUS TEST STRP  1 strip by Misc.(Non-Drug; Combo Route) route 3 (three) times daily. Accu-chek Strips     blood-glucose meter Misc  Commonly known as:  ACCU-CHEK JOSE PLUS METER  1 each by Misc.(Non-Drug; Combo Route) route as directed. Accu-chek Meter     diazePAM 10 MG Tab  Commonly known as:  VALIUM  Take 1 tablet (10 mg total) by mouth 3 (three) times daily as needed.     doxepin 10 MG capsule  Commonly known as:  SINEQUAN  Take 1 capsule (10 mg total) by mouth nightly as needed.     insulin aspart U-100 100 unit/mL injection  Commonly known as:  NOVOLOG  As directed     insulin glargine 100 unit/mL injection  Commonly known as:  LANTUS  Inject 60 Units into the skin every evening.     insulin syringe-needle U-100 1 mL 30 gauge x 5/16 Syrg  1 each by Misc.(Non-Drug; Combo Route) route 4 (four) times daily.     isosorbide mononitrate 30 MG 24 hr tablet  Commonly known as:  IMDUR  Take 1 tablet (30 mg total) by mouth once daily.     * lamoTRIgine 25 MG tablet  Commonly known as:  LAMICTAL  Take 1 tab daily x 14 days x 2 tabs daily x 14 days then 3 tabs daily x 14 days then start 100 mg bottle.     * lamoTRIgine 100 MG tablet  Commonly known as:  LAMICTAL  Take 1 tablet (100 mg total) by mouth once daily.  Start taking on:  6/27/2018     lancets Misc  Commonly known as:  ACCU-CHEK SOFTCLIX LANCETS  1 each by Misc.(Non-Drug; Combo Route) route 3 (three) times daily.     levothyroxine 75 MCG tablet  Commonly known as:  SYNTHROID  Take 1 tablet (75 mcg total) by mouth before breakfast.     MAXALT 10 MG  "tablet  Generic drug:  rizatriptan  One tablet with onset of migraine and may repeat one dose in 2 hours if needed     metFORMIN 500 MG tablet  Commonly known as:  GLUCOPHAGE  Take 2 tablets (1,000 mg total) by mouth 2 (two) times daily with meals.     pantoprazole 20 MG tablet  Commonly known as:  PROTONIX  Take 1 tablet (20 mg total) by mouth once daily.     pen needle, diabetic 32 gauge x 5/32" Ndle  Commonly known as:  BD ULTRA-FINE ARNOLDO PEN NEEDLE  1 each by Misc.(Non-Drug; Combo Route) route 2 (two) times daily.     promethazine 12.5 MG Tab  Commonly known as:  PHENERGAN  Take 1 tablet (12.5 mg total) by mouth every 6 (six) hours as needed.     QUEtiapine 200 MG Tab  Commonly known as:  SEROQUEL  Take 1 and 1/2 tablets at bedtime     ramipril 2.5 MG capsule  Commonly known as:  ALTACE  Take 1 capsule (2.5 mg total) by mouth once daily.     sub-q insulin device, 20 unit Katie  1 Device by Misc.(Non-Drug; Combo Route) route once daily.     venlafaxine 75 MG tablet  Commonly known as:  EFFEXOR  Take 2 tablets (150 mg total) by mouth 2 (two) times daily.     vitamin D 1000 units Tab  Take 1,000 Units by mouth once daily.        * This list has 2 medication(s) that are the same as other medications prescribed for you. Read the directions carefully, and ask your doctor or other care provider to review them with you.                Indwelling Lines/Drains at time of discharge:   Lines/Drains/Airways     Drain                 Closed/Suction Drain 06/21/18 1235 Right Back Bulb 8.5 Fr. 2 days                Time spent on the discharge of patient: 40 minutes  Patient was seen and examined on the date of discharge and determined to be suitable for discharge.         Nithin De Jesus MD  Department of Hospital Medicine  [facility]  "

## 2018-06-25 ENCOUNTER — OFFICE VISIT (OUTPATIENT)
Dept: UROLOGY | Facility: CLINIC | Age: 53
End: 2018-06-25
Payer: MEDICARE

## 2018-06-25 VITALS
HEIGHT: 65 IN | HEART RATE: 94 BPM | SYSTOLIC BLOOD PRESSURE: 108 MMHG | BODY MASS INDEX: 26.96 KG/M2 | WEIGHT: 161.81 LBS | DIASTOLIC BLOOD PRESSURE: 62 MMHG

## 2018-06-25 DIAGNOSIS — N15.1 PERIRENAL ABSCESS: Primary | ICD-10-CM

## 2018-06-25 LAB — BACTERIA SPEC AEROBE CULT: NORMAL

## 2018-06-25 PROCEDURE — 99214 OFFICE O/P EST MOD 30 MIN: CPT | Mod: 25,S$GLB,, | Performed by: UROLOGY

## 2018-06-25 PROCEDURE — 3078F DIAST BP <80 MM HG: CPT | Mod: CPTII,S$GLB,, | Performed by: UROLOGY

## 2018-06-25 PROCEDURE — 99999 PR PBB SHADOW E&M-EST. PATIENT-LVL II: CPT | Mod: PBBFAC,,, | Performed by: UROLOGY

## 2018-06-25 PROCEDURE — 3008F BODY MASS INDEX DOCD: CPT | Mod: CPTII,S$GLB,, | Performed by: UROLOGY

## 2018-06-25 PROCEDURE — 3074F SYST BP LT 130 MM HG: CPT | Mod: CPTII,S$GLB,, | Performed by: UROLOGY

## 2018-06-25 NOTE — PROGRESS NOTES
Chief Complaint: Perirenal Abscess    HPI:   6/25/18: Pt was seen in ER last week for right perirenal abscess.  Drain placed and purulent today.  CT planned for later this week.  Still feeling poorly.    Allergies:  Ultram [tramadol]; Asa [aspirin]; Lipitor [atorvastatin]; and Celestone [betamethasone]    Medications: has a current medication list which includes the following prescription(s): amoxicillin-clavulanate 875-125mg, atorvastatin, blood sugar diagnostic, blood-glucose meter, diazepam, doxepin, hydrocodone-acetaminophen, insulin aspart u-100, insulin glargine, insulin syringe-needle u-100, isosorbide mononitrate, lamotrigine, lamotrigine, levothyroxine, metformin, pantoprazole, pen needle, diabetic, promethazine, quetiapine, ramipril, rizatriptan, sub-q insulin device, 20 unit, venlafaxine, vitamin d, and lancets.    Review of Systems:  General: No fever, chills, fatigability, or weight loss.  Skin: No rashes, itching, or changes in color or texture of skin.  Chest: Denies AYALA, cyanosis, wheezing, cough, and sputum production.  Abdomen: Appetite fine. No weight loss. Denies diarrhea, abdominal pain, hematemesis, or blood in stool.  Musculoskeletal: No joint stiffness or swelling. Denies back pain.  : As above.  All other review of systems negative.    PMH:   has a past medical history of Anxiety; Arthritis; Asthma; Bipolar 1 disorder; Depression; Diabetes mellitus; Diabetes mellitus, type 2; Diverticular disease; GERD (gastroesophageal reflux disease); Hyperlipemia; Hypertension; Hypothyroidism; Pneumonia; Renal manifestation of secondary diabetes mellitus; Tobacco dependence; and Trouble in sleeping.    PSH:   has a past surgical history that includes Hysterectomy; Tonsillectomy; Cholecystectomy; Colon surgery; Colonoscopy (N/A, 1/12/2018); and Dental surgery (05/21/2018).    FamHx: family history includes Alcohol abuse in her father and mother; Arthritis in her father, maternal grandmother, and paternal  grandmother; Breast cancer in her maternal grandmother; COPD in her mother and sister; Cancer in her father, maternal aunt, maternal uncle, paternal aunt, paternal grandmother, and paternal uncle; Depression in her mother; Diabetes in her maternal uncle; Drug abuse in her maternal uncle and mother; Heart disease in her brother and father; Hypertension in her brother, father, maternal grandmother, and paternal grandfather; Kidney failure in her sister.    SocHx:  reports that she has been smoking Cigarettes and Vaping w/o nicotine.  She has smoked for the past 35.00 years. She has never used smokeless tobacco. She reports that she drinks alcohol. She reports that she uses drugs, including Marijuana.     Physical Exam:  Vitals:   Vitals:    06/25/18 0942   BP: 108/62   Pulse: 94     General: A&Ox3. No apparent distress. No deformities.  Neck: No masses. Normal thyroid.  Lungs: normal inspiration. No use of accessory muscles.  Heart: normal pulse. No arrhythmias.  Abdomen: Soft. NT. ND.   Skin: The skin is warm and dry. No jaundice.  Ext: No c/c/e.  : deferred    Labs/Studies:     Impression/Plan:   1. Ct Friday and RTC 1 week.  Drain instruction provided.

## 2018-06-25 NOTE — LETTER
June 25, 2018      Nithin De Jseus MD  [address] [facility] [address]           [facility]  [address] [facility] [address]  Phone: [phone]  Fax: [phone]          Patient: Alexandra Goins   MR Number: [id number]   YOB: 1965   Date of Visit: 6/25/2018       Dear Dr. Nithin De Jesus:    Thank you for referring Alexandra Goins to me for evaluation. Attached you will find relevant portions of my assessment and plan of care.    If you have questions, please do not hesitate to call me. I look forward to following Alexandra Goins along with you.    Sincerely,    Darrin Becerril IV, MD    Enclosure  CC:  No Recipients    If you would like to receive this communication electronically, please contact [email or web address] or [phone] to request more information on [facility] access.    For providers and/or their staff who would like to refer a patient to [facility], please contact us through our one-stop-shop provider referral line, [facility], at [phone].    If you feel you have received this communication in error or would no longer like to receive these types of communications, please e-mail [email or web address]

## 2018-06-26 ENCOUNTER — TELEPHONE (OUTPATIENT)
Dept: UROLOGY | Facility: CLINIC | Age: 53
End: 2018-06-26

## 2018-06-26 LAB
BACTERIA BLD CULT: NORMAL
BACTERIA BLD CULT: NORMAL
BACTERIA SPEC ANAEROBE CULT: NORMAL

## 2018-06-28 ENCOUNTER — TELEPHONE (OUTPATIENT)
Dept: FAMILY MEDICINE | Facility: CLINIC | Age: 53
End: 2018-06-28

## 2018-06-28 NOTE — TELEPHONE ENCOUNTER
Spoke to pt regarding hospital f/u with Dr Casiano. Pt states she does not feel she need to see PCP Dr Casiano at this time because she is busy seeing the doctors that seen her in the hospital for the problem she's having and that Dr Casiano can check the records for her progress. I explained to her the reasoning behind seeing PCP for hospital visit 1 week after hospital stay pt verbalized understanding but insisted on  rescheduling  for later date. Pt is rescheduled for 07/24/2018 per pt request.

## 2018-06-28 NOTE — TELEPHONE ENCOUNTER
----- Message from Jade Rodney sent at 6/28/2018  8:47 AM CDT -----  Contact: patient  Calling concerning why she need to have a hospital fu with PCP. Please call patient @ [phone]. Thanksparis

## 2018-06-29 ENCOUNTER — HOSPITAL ENCOUNTER (OUTPATIENT)
Dept: RADIOLOGY | Facility: HOSPITAL | Age: 53
Discharge: HOME | End: 2018-06-29
Attending: UROLOGY
Payer: MEDICARE

## 2018-06-29 DIAGNOSIS — N15.1 RENAL ABSCESS: ICD-10-CM

## 2018-06-29 PROCEDURE — 63600175 PHARM REV CODE 636 W HCPCS: Performed by: RADIOLOGY

## 2018-06-29 PROCEDURE — 74178 CT ABD&PLV WO CNTR FLWD CNTR: CPT | Mod: TC

## 2018-06-29 PROCEDURE — 25500020 PHARM REV CODE 255: Performed by: UROLOGY

## 2018-06-29 RX ORDER — HYDROMORPHONE HYDROCHLORIDE 2 MG/ML
1 INJECTION, SOLUTION INTRAMUSCULAR; INTRAVENOUS; SUBCUTANEOUS ONCE
Status: COMPLETED | OUTPATIENT
Start: 2018-06-29 | End: 2018-06-29

## 2018-06-29 RX ADMIN — HYDROMORPHONE HYDROCHLORIDE 1 MG: 2 INJECTION, SOLUTION INTRAMUSCULAR; INTRAVENOUS; SUBCUTANEOUS at 03:06

## 2018-06-29 RX ADMIN — IOHEXOL 75 ML: 350 INJECTION, SOLUTION INTRAVENOUS at 02:06

## 2018-06-29 NOTE — PROGRESS NOTES
Right flank catheter removed in its entirety.  Gauze and tegaderm dressing applied and pt informed to removed in 48 hours.  Pt demonstrated understanding to return to ER if increase pain or discomfort in this area.

## 2018-07-03 ENCOUNTER — HOSPITAL ENCOUNTER (EMERGENCY)
Facility: HOSPITAL | Age: 53
Discharge: HOME | End: 2018-07-03
Attending: EMERGENCY MEDICINE
Payer: MEDICARE

## 2018-07-03 ENCOUNTER — OFFICE VISIT (OUTPATIENT)
Dept: UROLOGY | Facility: CLINIC | Age: 53
End: 2018-07-03
Payer: MEDICARE

## 2018-07-03 VITALS
DIASTOLIC BLOOD PRESSURE: 88 MMHG | BODY MASS INDEX: 26.63 KG/M2 | TEMPERATURE: 98 F | HEART RATE: 100 BPM | HEIGHT: 65 IN | RESPIRATION RATE: 20 BRPM | WEIGHT: 159.81 LBS | SYSTOLIC BLOOD PRESSURE: 165 MMHG | OXYGEN SATURATION: 96 %

## 2018-07-03 VITALS
HEIGHT: 65 IN | DIASTOLIC BLOOD PRESSURE: 70 MMHG | BODY MASS INDEX: 26.82 KG/M2 | WEIGHT: 161 LBS | SYSTOLIC BLOOD PRESSURE: 118 MMHG | HEART RATE: 103 BPM

## 2018-07-03 DIAGNOSIS — R10.9 RIGHT FLANK PAIN: Primary | ICD-10-CM

## 2018-07-03 DIAGNOSIS — N15.1 PERIRENAL ABSCESS: Primary | ICD-10-CM

## 2018-07-03 DIAGNOSIS — F41.9 ANXIETY: ICD-10-CM

## 2018-07-03 DIAGNOSIS — E11.65 TYPE 2 DIABETES MELLITUS WITH HYPERGLYCEMIA, WITHOUT LONG-TERM CURRENT USE OF INSULIN: ICD-10-CM

## 2018-07-03 LAB
ALBUMIN SERPL BCP-MCNC: 4 G/DL
ALP SERPL-CCNC: 152 U/L
ALT SERPL W/O P-5'-P-CCNC: 38 U/L
ANION GAP SERPL CALC-SCNC: 15 MMOL/L
AST SERPL-CCNC: 30 U/L
BACTERIA #/AREA URNS HPF: ABNORMAL /HPF
BASOPHILS # BLD AUTO: 0.03 K/UL
BASOPHILS NFR BLD: 0.3 %
BILIRUB SERPL-MCNC: 0.3 MG/DL
BILIRUB SERPL-MCNC: NORMAL MG/DL
BILIRUB UR QL STRIP: NEGATIVE
BLOOD URINE, POC: 50
BUN SERPL-MCNC: 10 MG/DL
CALCIUM SERPL-MCNC: 10.3 MG/DL
CHLORIDE SERPL-SCNC: 100 MMOL/L
CLARITY UR: CLEAR
CO2 SERPL-SCNC: 23 MMOL/L
COLOR UR: YELLOW
COLOR, POC UA: YELLOW
CREAT SERPL-MCNC: 0.8 MG/DL
DIFFERENTIAL METHOD: ABNORMAL
EOSINOPHIL # BLD AUTO: 0.3 K/UL
EOSINOPHIL NFR BLD: 2.1 %
ERYTHROCYTE [DISTWIDTH] IN BLOOD BY AUTOMATED COUNT: 14.3 %
EST. GFR  (AFRICAN AMERICAN): >60 ML/MIN/1.73 M^2
EST. GFR  (NON AFRICAN AMERICAN): >60 ML/MIN/1.73 M^2
GLUCOSE SERPL-MCNC: 319 MG/DL
GLUCOSE UR QL STRIP: 500
GLUCOSE UR QL STRIP: ABNORMAL
HCT VFR BLD AUTO: 45 %
HGB BLD-MCNC: 14.9 G/DL
HGB UR QL STRIP: ABNORMAL
KETONES UR QL STRIP: NEGATIVE
KETONES UR QL STRIP: NORMAL
LACTATE SERPL-SCNC: 2.2 MMOL/L
LEUKOCYTE ESTERASE UR QL STRIP: NEGATIVE
LEUKOCYTE ESTERASE URINE, POC: NORMAL
LYMPHOCYTES # BLD AUTO: 5.5 K/UL
LYMPHOCYTES NFR BLD: 46.8 %
MCH RBC QN AUTO: 29.9 PG
MCHC RBC AUTO-ENTMCNC: 33.1 G/DL
MCV RBC AUTO: 90 FL
MICROSCOPIC COMMENT: ABNORMAL
MONOCYTES # BLD AUTO: 0.6 K/UL
MONOCYTES NFR BLD: 5.5 %
NEUTROPHILS # BLD AUTO: 5.3 K/UL
NEUTROPHILS NFR BLD: 45.3 %
NITRITE UR QL STRIP: NEGATIVE
NITRITE, POC UA: NORMAL
PH UR STRIP: 6 [PH] (ref 5–8)
PH, POC UA: 5
PLATELET # BLD AUTO: 320 K/UL
PMV BLD AUTO: 9.9 FL
POTASSIUM SERPL-SCNC: 3.9 MMOL/L
PROCALCITONIN SERPL IA-MCNC: 0.03 NG/ML
PROT SERPL-MCNC: 8.7 G/DL
PROT UR QL STRIP: NEGATIVE
PROTEIN, POC: NORMAL
RBC # BLD AUTO: 4.99 M/UL
RBC #/AREA URNS HPF: 8 /HPF (ref 0–4)
SODIUM SERPL-SCNC: 138 MMOL/L
SP GR UR STRIP: >=1.03 (ref 1–1.03)
SPECIFIC GRAVITY, POC UA: 1.05
SQUAMOUS #/AREA URNS HPF: 5 /HPF
URN SPEC COLLECT METH UR: ABNORMAL
UROBILINOGEN UR STRIP-ACNC: NEGATIVE EU/DL
UROBILINOGEN, POC UA: NORMAL
WBC # BLD AUTO: 11.68 K/UL
WBC #/AREA URNS HPF: 2 /HPF (ref 0–5)
YEAST URNS QL MICRO: ABNORMAL

## 2018-07-03 PROCEDURE — 96375 TX/PRO/DX INJ NEW DRUG ADDON: CPT

## 2018-07-03 PROCEDURE — 99284 EMERGENCY DEPT VISIT MOD MDM: CPT | Mod: 25

## 2018-07-03 PROCEDURE — 99999 PR PBB SHADOW E&M-EST. PATIENT-LVL III: CPT | Mod: PBBFAC,,, | Performed by: UROLOGY

## 2018-07-03 PROCEDURE — 99499 UNLISTED E&M SERVICE: CPT | Mod: S$GLB,,, | Performed by: UROLOGY

## 2018-07-03 PROCEDURE — 96374 THER/PROPH/DIAG INJ IV PUSH: CPT

## 2018-07-03 PROCEDURE — 81000 URINALYSIS NONAUTO W/SCOPE: CPT

## 2018-07-03 PROCEDURE — 25000003 PHARM REV CODE 250: Performed by: EMERGENCY MEDICINE

## 2018-07-03 PROCEDURE — 96361 HYDRATE IV INFUSION ADD-ON: CPT

## 2018-07-03 PROCEDURE — 83605 ASSAY OF LACTIC ACID: CPT

## 2018-07-03 PROCEDURE — 84145 PROCALCITONIN (PCT): CPT

## 2018-07-03 PROCEDURE — 80053 COMPREHEN METABOLIC PANEL: CPT

## 2018-07-03 PROCEDURE — 25500020 PHARM REV CODE 255: Performed by: EMERGENCY MEDICINE

## 2018-07-03 PROCEDURE — 81002 URINALYSIS NONAUTO W/O SCOPE: CPT | Mod: S$GLB,,, | Performed by: UROLOGY

## 2018-07-03 PROCEDURE — 63600175 PHARM REV CODE 636 W HCPCS: Performed by: EMERGENCY MEDICINE

## 2018-07-03 PROCEDURE — 87040 BLOOD CULTURE FOR BACTERIA: CPT

## 2018-07-03 PROCEDURE — 85025 COMPLETE CBC W/AUTO DIFF WBC: CPT

## 2018-07-03 RX ORDER — PROMETHAZINE HYDROCHLORIDE 25 MG/1
25 TABLET ORAL EVERY 6 HOURS PRN
Qty: 15 TABLET | Refills: 0 | Status: SHIPPED | OUTPATIENT
Start: 2018-07-03 | End: 2019-09-16 | Stop reason: SDUPTHER

## 2018-07-03 RX ORDER — PROMETHAZINE HYDROCHLORIDE 25 MG/1
25 TABLET ORAL
Status: COMPLETED | OUTPATIENT
Start: 2018-07-03 | End: 2018-07-03

## 2018-07-03 RX ORDER — ONDANSETRON 2 MG/ML
4 INJECTION INTRAMUSCULAR; INTRAVENOUS
Status: COMPLETED | OUTPATIENT
Start: 2018-07-03 | End: 2018-07-03

## 2018-07-03 RX ORDER — HYDROCODONE BITARTRATE AND ACETAMINOPHEN 5; 325 MG/1; MG/1
1 TABLET ORAL EVERY 8 HOURS PRN
Qty: 15 TABLET | Refills: 0 | Status: SHIPPED | OUTPATIENT
Start: 2018-07-03 | End: 2018-07-13

## 2018-07-03 RX ORDER — MORPHINE SULFATE 4 MG/ML
4 INJECTION, SOLUTION INTRAMUSCULAR; INTRAVENOUS
Status: COMPLETED | OUTPATIENT
Start: 2018-07-03 | End: 2018-07-03

## 2018-07-03 RX ORDER — HYDROCODONE BITARTRATE AND ACETAMINOPHEN 10; 325 MG/1; MG/1
1 TABLET ORAL
Status: COMPLETED | OUTPATIENT
Start: 2018-07-03 | End: 2018-07-03

## 2018-07-03 RX ADMIN — SODIUM CHLORIDE 1000 ML: 0.9 INJECTION, SOLUTION INTRAVENOUS at 04:07

## 2018-07-03 RX ADMIN — MORPHINE SULFATE 4 MG: 4 INJECTION INTRAVENOUS at 05:07

## 2018-07-03 RX ADMIN — IOHEXOL 75 ML: 350 INJECTION, SOLUTION INTRAVENOUS at 06:07

## 2018-07-03 RX ADMIN — PROMETHAZINE HYDROCHLORIDE 25 MG: 25 TABLET ORAL at 07:07

## 2018-07-03 RX ADMIN — HYDROCODONE BITARTRATE AND ACETAMINOPHEN 1 TABLET: 10; 325 TABLET ORAL at 07:07

## 2018-07-03 RX ADMIN — ONDANSETRON 4 MG: 2 INJECTION, SOLUTION INTRAMUSCULAR; INTRAVENOUS at 05:07

## 2018-07-03 NOTE — ED PROVIDER NOTES
SCRIBE #1 NOTE: ISharon, am scribing for, and in the presence of, Amparo Cleary MD. I have scribed the entire note.      History      Chief Complaint   Patient presents with    Flank Pain     Hospitalized last week for for R kidney abscess. Stent removed friday. Pain 10/10 and +fever at home yesterday. Sent here for further eval by Dr. Becerril.        Review of patient's allergies indicates:   Allergen Reactions    Ultram [tramadol] Rash    Asa [aspirin]      Nosebleeds  Nosebleeds    Levaquin [levofloxacin]     Levomenol analogues     Lipitor [atorvastatin]      Leg Cramps    Celestone [betamethasone] Hives, Itching and Rash        HPI   HPI    7/3/2018, 4:13 PM   History obtained from the patient      History of Present Illness: Alexandra Goins is a 52 y.o. female patient who presents to the Emergency Department for R flank pain. Patient had a R perirenal abscess I&D performed by Dr. Becerril (Urology) on 6/20 and had a drain placed. The drain was removed 4 days ago. Patient f/u with Dr. Becerril today, was c/o worsening pain and was sent to the ED for further evaluation. Pain is constant and rated 8/10 in severity. She reports having fever (101.2) last night and today which she treated with Ibuprofen. She also c/o having decreased urine output despite staying hydrated. No mitigating or exacerbating factors reported. Patient denies chills, abd pain, N/V, dysuria, hematuria, and all other sxs at this time. No further complaints or concerns at this time.     Arrival mode: Personal vehicle    PCP: Perez Casiano MD       Past Medical History:  Past Medical History:   Diagnosis Date    Anxiety     Arthritis     Asthma     Bipolar 1 disorder     Depression     Diabetes mellitus     Diabetes mellitus, type 2     Diverticular disease     GERD (gastroesophageal reflux disease)     Hyperlipemia     Hypertension     Hypothyroidism     Pneumonia     Renal manifestation of secondary  diabetes mellitus     Tobacco dependence     Trouble in sleeping        Past Surgical History:  Past Surgical History:   Procedure Laterality Date    CHOLECYSTECTOMY      COLON SURGERY      COLONOSCOPY N/A 1/12/2018    Procedure: COLONOSCOPY;  Surgeon: Roque Mahajan III, MD;  Location: [facility];  Service: Endoscopy;  Laterality: N/A;    DENTAL SURGERY  05/21/2018    removal of 8 top teeth    HYSTERECTOMY      TONSILLECTOMY           Family History:  Family History   Problem Relation Age of Onset    Alcohol abuse Mother     COPD Mother     Depression Mother     Drug abuse Mother     Alcohol abuse Father     Arthritis Father     Cancer Father     Heart disease Father     Hypertension Father     COPD Sister     Kidney failure Sister     Heart disease Brother     Hypertension Brother     Cancer Maternal Aunt     Cancer Maternal Uncle     Diabetes Maternal Uncle     Drug abuse Maternal Uncle     Cancer Paternal Aunt     Cancer Paternal Uncle     Arthritis Maternal Grandmother     Hypertension Maternal Grandmother     Breast cancer Maternal Grandmother     Arthritis Paternal Grandmother     Cancer Paternal Grandmother     Hypertension Paternal Grandfather        Social History:  Social History     Social History Main Topics    Smoking status: Current Every Day Smoker     Years: 35.00     Types: Cigarettes, Vaping w/o nicotine    Smokeless tobacco: Never Used    Alcohol use Yes      Comment: occassionally    Drug use: Yes     Types: Marijuana    Sexual activity: Yes       ROS   Review of Systems   Constitutional: Negative for chills and fever.   HENT: Negative for sore throat.    Respiratory: Negative for shortness of breath.    Cardiovascular: Negative for chest pain.   Gastrointestinal: Negative for abdominal pain, constipation, nausea and vomiting.   Genitourinary: Positive for decreased urine volume and flank pain. Negative for dysuria, frequency and hematuria.  "  Musculoskeletal: Negative for back pain.   Skin: Negative for rash.   Neurological: Negative for weakness.   Hematological: Does not bruise/bleed easily.   All other systems reviewed and are negative.      Physical Exam      Initial Vitals [07/03/18 1606]   BP Pulse Resp Temp SpO2   (!) 165/88 100 20 98.2 °F (36.8 °C) 96 %      MAP       --          Physical Exam  Nursing Notes and Vital Signs Reviewed.  Constitutional: Patient is in no acute distress. Awake and alert. Well-developed and well-nourished.  Head: Atraumatic. Normocephalic.  Eyes: PERRL. EOM intact. Conjunctivae are not pale. No scleral icterus.  ENT: Mucous membranes are moist. Oropharynx is clear and symmetric.    Neck: Supple. Full ROM. No lymphadenopathy.  Cardiovascular: Regular rate. Regular rhythm. No murmurs, rubs, or gallops. Distal pulses are 2+ and symmetric.  Pulmonary/Chest: No respiratory distress. Clear to auscultation bilaterally. No wheezing, rales, or rhonchi.  Abdominal: Soft and non-distended.  There is suprapubic tenderness.  No rebound, guarding, or rigidity.  Good bowel sounds.  :R CVA TTP.  Musculoskeletal: Moves all extremities. No obvious deformities. No edema.   Skin: Warm and dry. The site where patient's drain was on her back appears normal.  Neurological:  Alert, awake, and appropriate.  Normal speech.  No acute focal neurological deficits are appreciated.  Psychiatric: Anxious. Good eye contact. Appropriate in content.    ED Course    Procedures  ED Vital Signs:  Vitals:    07/03/18 1606   BP: (!) 165/88   Pulse: 100   Resp: 20   Temp: 98.2 °F (36.8 °C)   TempSrc: Oral   SpO2: 96%   Weight: 72.5 kg (159 lb 13.3 oz)   Height: 5' 5" (1.651 m)       Abnormal Lab Results:  Labs Reviewed   CBC W/ AUTO DIFFERENTIAL - Abnormal; Notable for the following:        Result Value    Lymph # 5.5 (*)     All other components within normal limits   COMPREHENSIVE METABOLIC PANEL - Abnormal; Notable for the following:     Glucose 319 " (*)     Total Protein 8.7 (*)     Alkaline Phosphatase 152 (*)     All other components within normal limits   URINALYSIS - Abnormal; Notable for the following:     Specific Gravity, UA >=1.030 (*)     Glucose, UA 3+ (*)     Occult Blood UA Trace (*)     All other components within normal limits   URINALYSIS MICROSCOPIC - Abnormal; Notable for the following:     RBC, UA 8 (*)     All other components within normal limits   CULTURE, BLOOD   CULTURE, BLOOD   LACTIC ACID, PLASMA   PROCALCITONIN        All Lab Results:  Results for orders placed or performed during the hospital encounter of 07/03/18   CBC auto differential   Result Value Ref Range    WBC 11.68 3.90 - 12.70 K/uL    RBC 4.99 4.00 - 5.40 M/uL    Hemoglobin 14.9 12.0 - 16.0 g/dL    Hematocrit 45.0 37.0 - 48.5 %    MCV 90 82 - 98 fL    MCH 29.9 27.0 - 31.0 pg    MCHC 33.1 32.0 - 36.0 g/dL    RDW 14.3 11.5 - 14.5 %    Platelets 320 150 - 350 K/uL    MPV 9.9 9.2 - 12.9 fL    Gran # (ANC) 5.3 1.8 - 7.7 K/uL    Lymph # 5.5 (H) 1.0 - 4.8 K/uL    Mono # 0.6 0.3 - 1.0 K/uL    Eos # 0.3 0.0 - 0.5 K/uL    Baso # 0.03 0.00 - 0.20 K/uL    Gran% 45.3 38.0 - 73.0 %    Lymph% 46.8 18.0 - 48.0 %    Mono% 5.5 4.0 - 15.0 %    Eosinophil% 2.1 0.0 - 8.0 %    Basophil% 0.3 0.0 - 1.9 %    Differential Method Automated    Comprehensive metabolic panel   Result Value Ref Range    Sodium 138 136 - 145 mmol/L    Potassium 3.9 3.5 - 5.1 mmol/L    Chloride 100 95 - 110 mmol/L    CO2 23 23 - 29 mmol/L    Glucose 319 (H) 70 - 110 mg/dL    BUN, Bld 10 6 - 20 mg/dL    Creatinine 0.8 0.5 - 1.4 mg/dL    Calcium 10.3 8.7 - 10.5 mg/dL    Total Protein 8.7 (H) 6.0 - 8.4 g/dL    Albumin 4.0 3.5 - 5.2 g/dL    Total Bilirubin 0.3 0.1 - 1.0 mg/dL    Alkaline Phosphatase 152 (H) 55 - 135 U/L    AST 30 10 - 40 U/L    ALT 38 10 - 44 U/L    Anion Gap 15 8 - 16 mmol/L    eGFR if African American >60 >60 mL/min/1.73 m^2    eGFR if non African American >60 >60 mL/min/1.73 m^2   Lactic acid, plasma    Result Value Ref Range    Lactate (Lactic Acid) 2.2 0.5 - 2.2 mmol/L   Procalcitonin   Result Value Ref Range    Procalcitonin 0.03 <0.25 ng/mL   Urinalysis Clean Catch   Result Value Ref Range    Specimen UA Urine, Clean Catch     Color, UA Yellow Yellow, Straw, Joann    Appearance, UA Clear Clear    pH, UA 6.0 5.0 - 8.0    Specific Gravity, UA >=1.030 (A) 1.005 - 1.030    Protein, UA Negative Negative    Glucose, UA 3+ (A) Negative    Ketones, UA Negative Negative    Bilirubin (UA) Negative Negative    Occult Blood UA Trace (A) Negative    Nitrite, UA Negative Negative    Urobilinogen, UA Negative <2.0 EU/dL    Leukocytes, UA Negative Negative   Urinalysis Microscopic   Result Value Ref Range    RBC, UA 8 (H) 0 - 4 /hpf    WBC, UA 2 0 - 5 /hpf    Bacteria, UA Occasional None-Occ /hpf    Yeast, UA None None    Squam Epithel, UA 5 /hpf    Microscopic Comment SEE COMMENT      Imaging Results:  Imaging Results          CT Abdomen Pelvis With Contrast (Final result)  Result time 07/03/18 18:17:59    Final result by Harrison Yanes MD (07/03/18 18:17:59)                 Impression:      1. Previous identified right renal abscess is improved with minimal adjacent residual inflammatory change.  Pigtail drainage catheter has been removed.  2. Diverticulosis descending colon.  Negative for acute diverticulitis.  No change.  All CT scans at [facility] use dose modulation, iterative reconstruction and/or weight based dosing in order to achieve the lowest radiation dose possible.      Electronically signed by: Harrison Yanes MD  Date:    07/03/2018  Time:    18:17             Narrative:    EXAMINATION:  CT ABDOMEN PELVIS WITH CONTRAST    CLINICAL HISTORY:  Abd pain, fever, abscess suspected;right flank pain s/p renal abscess drainage;    TECHNIQUE:  Standard axial imaging performed extending from the lung bases through the pubic symphysis, following administration of intravenous contrast.  Additional  2D  reformatted sagittal and coronal images obtained.    COMPARISON:  06/29/2018.    FINDINGS:  Lung bases are clear.    The liver is normal. Gallbladder is surgically absent.  There is diffuse dilatation of the common bile duct most likely representing postsurgical physiologic dilatation.  Portal vein is patent.    The spleen is normal in size and appearance.  The pancreas is normal.  Diffuse thickening of the left adrenal gland.  The right adrenal gland is normal.  The aorta and IVC are normal.  No retroperitoneal adenopathy.    The left kidney and left ureter are normal.  Previous identified drainage catheter posterior to the right kidney has been removed.  There is minimal residual focal inflammatory change is identified abutting the posterior margins of the mid right kidney.  There is minimal residual low density identified posterior aspect of the mid right kidney, but no evidence of recurrent abscess.  Right ureter is normal.    The stomach is normal.  The small intestine is normal.  The appendix is normal.  Large amount of stool is present throughout the colon.  Diverticulosis of the descending colon but no evidence of acute diverticulitis.  The rectum is normal.    The pelvis demonstrates a collapsed bladder.  Uterus is surgically absent.  Adnexal regions are normal..    Regional bones are normal.  Supporting soft tissues of the abdomen and pelvis are also normal.                                 The Emergency Provider reviewed the vital signs and test results, which are outlined above.    ED Discussion     6:38 PM: Reassessed pt at this time, unclear source of pain at this time, CT shows resolution of abscess with mild inflammation, wbc count normal, urine normal, blood sugar mildly elevated, procal normal, VSS, advised to follow back up with Dr. Becerril or her pcp for re-evaluation. Discussed with pt all pertinent ED information and results. Discussed pt dx and plan of tx. Instructed patient to f/u with  Dr. Becerril (Urology). All questions and concerns were addressed at this time. Pt expresses understanding of information and instructions, and is comfortable with plan to discharge. Pt is stable for discharge.    I discussed with patient and/or family/caretaker that evaluation in the ED does not suggest any emergent or life threatening medical conditions requiring immediate intervention beyond what was provided in the ED, and I believe patient is safe for discharge.  Regardless, an unremarkable evaluation in the ED does not preclude the development or presence of a serious of life threatening condition. As such, patient was instructed to return immediately for any worsening or change in current symptoms.    ED Medication(s):  Medications   morphine injection 4 mg (4 mg Intravenous Given 7/3/18 1705)   ondansetron injection 4 mg (4 mg Intravenous Given 7/3/18 1705)   sodium chloride 0.9% bolus 1,000 mL (1,000 mLs Intravenous New Bag 7/3/18 1655)   omnipaque 350 iohexol 75 mL (75 mLs Intravenous Given 7/3/18 1803)   HYDROcodone-acetaminophen  mg per tablet 1 tablet (1 tablet Oral Given 7/3/18 1903)   promethazine tablet 25 mg (25 mg Oral Given 7/3/18 1903)       New Prescriptions    PROMETHAZINE (PHENERGAN) 25 MG TABLET    Take 1 tablet (25 mg total) by mouth every 6 (six) hours as needed for Nausea.       Follow-up Information     Perez Casiano MD. Schedule an appointment as soon as possible for a visit in 2 days.    Specialty:  Family Medicine  Why:  REturn to the Emergency Room, If symptoms worsen  Contact information:  [address] [facility] [address]  [phone]             Darrin Becerril IV, MD. Schedule an appointment as soon as possible for a visit in 2 days.    Specialty:  Urology  Contact information:  [address] [facility] [address] [phone]                    Medical Decision Making    Medical Decision Making:   Clinical Tests:   Lab Tests: Reviewed and  Ordered  Radiological Study: Ordered and Reviewed           Scribe Attestation:   Scribe #1: I performed the above scribed service and the documentation accurately describes the services I performed. I attest to the accuracy of the note.    Attending:   Physician Attestation Statement for Scribe #1: I, Amparo Cleary MD, personally performed the services described in this documentation, as scribed by Sharon Erazo, in my presence, and it is both accurate and complete.          Clinical Impression       ICD-10-CM ICD-9-CM   1. Right flank pain R10.9 789.09   2. Anxiety F41.9 300.00   3. Type 2 diabetes mellitus with hyperglycemia, without long-term current use of insulin E11.65 250.00     790.29       Disposition:   Disposition: Discharged  Condition: Stable         Amparo Cleary MD  07/03/18 1922

## 2018-07-03 NOTE — PROGRESS NOTES
Chief Complaint: Perirenal Abscess    HPI:   7/3/18: Drain was removed.  Pt complaining of pain.  Says she had a 101 fever last night and this morning.  Says the only reason she isn't crying is because she is holding it together to talk to me.  Putting an ice pack on her back caused shooting pains.    6/25/18: Pt was seen in ER last week for right perirenal abscess.  Drain placed and purulent today.  CT planned for later this week.  Still feeling poorly.    Allergies:  Ultram [tramadol]; Asa [aspirin]; Levaquin [levofloxacin]; Levomenol analogues; Lipitor [atorvastatin]; and Celestone [betamethasone]    Medications: has a current medication list which includes the following prescription(s): atorvastatin, blood sugar diagnostic, blood-glucose meter, diazepam, doxepin, hydrocodone-acetaminophen, insulin aspart u-100, insulin glargine, insulin syringe-needle u-100, isosorbide mononitrate, lamotrigine, lamotrigine, lancets, levothyroxine, metformin, pantoprazole, pen needle, diabetic, promethazine, quetiapine, ramipril, rizatriptan, sub-q insulin device, 20 unit, venlafaxine, and vitamin d.    Review of Systems:  General: No fever, chills, fatigability, or weight loss.  Skin: No rashes, itching, or changes in color or texture of skin.  Chest: Denies AYALA, cyanosis, wheezing, cough, and sputum production.  Abdomen: Appetite fine. No weight loss. Denies diarrhea, abdominal pain, hematemesis, or blood in stool.  Musculoskeletal: No joint stiffness or swelling. Denies back pain.  : As above.  All other review of systems negative.    PMH:   has a past medical history of Anxiety; Arthritis; Asthma; Bipolar 1 disorder; Depression; Diabetes mellitus; Diabetes mellitus, type 2; Diverticular disease; GERD (gastroesophageal reflux disease); Hyperlipemia; Hypertension; Hypothyroidism; Pneumonia; Renal manifestation of secondary diabetes mellitus; Tobacco dependence; and Trouble in sleeping.    PSH:   has a past surgical history  that includes Hysterectomy; Tonsillectomy; Cholecystectomy; Colon surgery; Colonoscopy (N/A, 1/12/2018); and Dental surgery (05/21/2018).    FamHx: family history includes Alcohol abuse in her father and mother; Arthritis in her father, maternal grandmother, and paternal grandmother; Breast cancer in her maternal grandmother; COPD in her mother and sister; Cancer in her father, maternal aunt, maternal uncle, paternal aunt, paternal grandmother, and paternal uncle; Depression in her mother; Diabetes in her maternal uncle; Drug abuse in her maternal uncle and mother; Heart disease in her brother and father; Hypertension in her brother, father, maternal grandmother, and paternal grandfather; Kidney failure in her sister.    SocHx:  reports that she has been smoking Cigarettes and Vaping w/o nicotine.  She has smoked for the past 35.00 years. She has never used smokeless tobacco. She reports that she drinks alcohol. She reports that she uses drugs, including Marijuana.     Physical Exam:  Vitals:   Vitals:    07/03/18 1510   BP: 118/70   Pulse: 103     General: A&Ox3. No apparent distress. No deformities.  Neck: No masses. Normal thyroid.  Lungs: normal inspiration. No use of accessory muscles.  Heart: normal pulse. No arrhythmias.  Abdomen: Soft. NT. ND.   Skin: The skin is warm and dry. No jaundice.  Ext: No c/c/e.  : deferred    Labs/Studies:     Impression/Plan:   1. Pt has a pain level that is more than anticipated for a normal resolution of her complaint.  Fevers are concerning.  Will refer to ER for labs and further management; may need imaging to ensure no recurrent fluid collection.

## 2018-07-06 ENCOUNTER — TELEPHONE (OUTPATIENT)
Dept: FAMILY MEDICINE | Facility: CLINIC | Age: 53
End: 2018-07-06

## 2018-07-06 ENCOUNTER — OFFICE VISIT (OUTPATIENT)
Dept: FAMILY MEDICINE | Facility: CLINIC | Age: 53
End: 2018-07-06
Payer: MEDICARE

## 2018-07-06 VITALS
TEMPERATURE: 97 F | HEIGHT: 65 IN | BODY MASS INDEX: 26.52 KG/M2 | SYSTOLIC BLOOD PRESSURE: 120 MMHG | OXYGEN SATURATION: 97 % | WEIGHT: 159.19 LBS | DIASTOLIC BLOOD PRESSURE: 78 MMHG | HEART RATE: 121 BPM

## 2018-07-06 DIAGNOSIS — Z79.4 TYPE 2 DIABETES MELLITUS WITH COMPLICATION, WITH LONG-TERM CURRENT USE OF INSULIN: Primary | ICD-10-CM

## 2018-07-06 DIAGNOSIS — E11.8 TYPE 2 DIABETES MELLITUS WITH COMPLICATION, WITH LONG-TERM CURRENT USE OF INSULIN: Primary | ICD-10-CM

## 2018-07-06 DIAGNOSIS — F31.70 BIPOLAR AFFECTIVE DISORDER IN REMISSION: ICD-10-CM

## 2018-07-06 DIAGNOSIS — N15.1 RENAL ABSCESS, RIGHT: ICD-10-CM

## 2018-07-06 PROCEDURE — 96372 THER/PROPH/DIAG INJ SC/IM: CPT | Mod: S$GLB,,, | Performed by: FAMILY MEDICINE

## 2018-07-06 PROCEDURE — 3078F DIAST BP <80 MM HG: CPT | Mod: CPTII,S$GLB,, | Performed by: FAMILY MEDICINE

## 2018-07-06 PROCEDURE — 99999 PR PBB SHADOW E&M-EST. PATIENT-LVL III: CPT | Mod: PBBFAC,,, | Performed by: FAMILY MEDICINE

## 2018-07-06 PROCEDURE — 99214 OFFICE O/P EST MOD 30 MIN: CPT | Mod: 25,S$GLB,, | Performed by: FAMILY MEDICINE

## 2018-07-06 PROCEDURE — 99499 UNLISTED E&M SERVICE: CPT | Mod: S$GLB,,, | Performed by: FAMILY MEDICINE

## 2018-07-06 PROCEDURE — 3008F BODY MASS INDEX DOCD: CPT | Mod: CPTII,S$GLB,, | Performed by: FAMILY MEDICINE

## 2018-07-06 PROCEDURE — 3074F SYST BP LT 130 MM HG: CPT | Mod: CPTII,S$GLB,, | Performed by: FAMILY MEDICINE

## 2018-07-06 PROCEDURE — 3046F HEMOGLOBIN A1C LEVEL >9.0%: CPT | Mod: CPTII,S$GLB,, | Performed by: FAMILY MEDICINE

## 2018-07-06 RX ORDER — KETOROLAC TROMETHAMINE 30 MG/ML
30 INJECTION, SOLUTION INTRAMUSCULAR; INTRAVENOUS
Status: COMPLETED | OUTPATIENT
Start: 2018-07-06 | End: 2018-07-06

## 2018-07-06 RX ORDER — PIROXICAM 10 MG/1
10 CAPSULE ORAL DAILY
Qty: 30 CAPSULE | Refills: 0 | Status: SHIPPED | OUTPATIENT
Start: 2018-07-06 | End: 2018-08-02

## 2018-07-06 RX ORDER — KETOROLAC TROMETHAMINE 10 MG/1
10 TABLET, FILM COATED ORAL EVERY 6 HOURS
Qty: 20 TABLET | Refills: 0 | Status: SHIPPED | OUTPATIENT
Start: 2018-07-06 | End: 2019-06-28

## 2018-07-06 RX ADMIN — KETOROLAC TROMETHAMINE 30 MG: 30 INJECTION, SOLUTION INTRAMUSCULAR; INTRAVENOUS at 08:07

## 2018-07-06 NOTE — PROGRESS NOTES
"Subjective:       Patient ID: Alexandra Goins is a 52 y.o. female.    Chief Complaint: Hospital Follow Up      HPI Comments:       Current Outpatient Prescriptions:     atorvastatin (LIPITOR) 20 MG tablet, Take 1 tablet (20 mg total) by mouth once daily., Disp: 90 tablet, Rfl: 3    blood sugar diagnostic (ACCU-CHEK JOSE PLUS TEST STRP) Strp, 1 strip by Misc.(Non-Drug; Combo Route) route 3 (three) times daily. Accu-chek Strips, Disp: 100 strip, Rfl: 11    doxepin (SINEQUAN) 10 MG capsule, Take 1 capsule (10 mg total) by mouth nightly as needed., Disp: 30 capsule, Rfl: 2    HYDROcodone-acetaminophen (NORCO) 5-325 mg per tablet, Take 1 tablet by mouth every 8 (eight) hours as needed for Pain., Disp: 15 tablet, Rfl: 0    insulin aspart (NOVOLOG) 100 unit/mL injection, As directed, Disp: , Rfl:     insulin glargine (LANTUS) 100 unit/mL injection, Inject 60 Units into the skin every evening., Disp: 2 vial, Rfl: 3    insulin syringe-needle U-100 1 mL 30 gauge x 5/16 Syrg, 1 each by Misc.(Non-Drug; Combo Route) route 4 (four) times daily., Disp: 200 each, Rfl: 11    isosorbide mononitrate (IMDUR) 30 MG 24 hr tablet, Take 1 tablet (30 mg total) by mouth once daily., Disp: 30 tablet, Rfl: 11    lamoTRIgine (LAMICTAL) 100 MG tablet, Take 1 tablet (100 mg total) by mouth once daily., Disp: 30 tablet, Rfl: 1    lancets (ACCU-CHEK SOFTCLIX LANCETS) Misc, 1 each by Misc.(Non-Drug; Combo Route) route 3 (three) times daily., Disp: 100 each, Rfl: 11    levothyroxine (SYNTHROID) 75 MCG tablet, Take 1 tablet (75 mcg total) by mouth before breakfast., Disp: 30 tablet, Rfl: 1    metFORMIN (GLUCOPHAGE) 500 MG tablet, Take 2 tablets (1,000 mg total) by mouth 2 (two) times daily with meals., Disp: 120 tablet, Rfl: 3    pantoprazole (PROTONIX) 20 MG tablet, Take 1 tablet (20 mg total) by mouth once daily., Disp: 30 tablet, Rfl: 1    pen needle, diabetic (BD ULTRA-FINE ARNOLDO PEN NEEDLES) 32 gauge x 5/32" Ndle, 1 each by " Misc.(Non-Drug; Combo Route) route 2 (two) times daily., Disp: 100 each, Rfl: 11    promethazine (PHENERGAN) 25 MG tablet, Take 1 tablet (25 mg total) by mouth every 6 (six) hours as needed for Nausea., Disp: 15 tablet, Rfl: 0    QUEtiapine (SEROQUEL) 200 MG Tab, Take 1 and 1/2 tablets at bedtime, Disp: 45 tablet, Rfl: 1    ramipril (ALTACE) 2.5 MG capsule, Take 1 capsule (2.5 mg total) by mouth once daily., Disp: 90 capsule, Rfl: 3    vitamin D 1000 units Tab, Take 1,000 Units by mouth once daily., Disp: , Rfl:     blood-glucose meter (ACCU-CHEK JOSE PLUS METER) Misc, 1 each by Misc.(Non-Drug; Combo Route) route as directed. Accu-chek Meter, Disp: 1 each, Rfl: 0    diazePAM (VALIUM) 10 MG Tab, Take 1 tablet (10 mg total) by mouth 3 (three) times daily as needed., Disp: 90 tablet, Rfl: 1    ketorolac (TORADOL) 10 mg tablet, Take 1 tablet (10 mg total) by mouth every 6 (six) hours., Disp: 20 tablet, Rfl: 0    rizatriptan (MAXALT) 10 MG tablet, One tablet with onset of migraine and may repeat one dose in 2 hours if needed, Disp: , Rfl:     sub-q insulin device, 20 unit Katie, 1 Device by Misc.(Non-Drug; Combo Route) route once daily., Disp: 30 Device, Rfl: 3  No current facility-administered medications for this visit.       Follow-up after her drainage and treatment of a right perinephric abscess last week.  Was discharged on Levaquin but was subsequently changed to Augmentin based on culture results.  She has been having trouble tolerating Augmentin but his continued taking it and has a few more days to go.  Subsequently she went back to the emergency room on 07/03 because of ongoing right flank pain.  CT scan was done and it was felt that the abscess had been adequately treated.  Since then she has had ongoing flank pain but not as severe.  She does have a history of back pain chronically in an adjacent area.  No fever chills.  Feels tired but otherwise a lot better than she did previously.    During  "hospitalization her A1c was found to be 14.  This is where it had been previously last year.  Later in the year we were able to get it down to 9.9.  She has not seen the diabetic team in about 9 months, but made an appointment to see them late August.  She is taking her insulin as instructed although her preprandial doses of regular insulin are only 12 units instead of 20 as outlined by Ms. Reneees in November.  She is taking 70 units of Lantus every evening.  Her blood sugars are generally in the 300s, were in the 500s during her infection.  She takes metformin only once a day because of diarrhea      Review of Systems   Constitutional: Positive for fatigue. Negative for activity change, appetite change and fever.   HENT: Negative for sore throat.    Respiratory: Negative for cough and shortness of breath.    Cardiovascular: Negative for chest pain.   Gastrointestinal: Positive for diarrhea and nausea. Negative for abdominal pain.   Genitourinary: Negative for difficulty urinating.   Musculoskeletal: Positive for back pain. Negative for arthralgias and myalgias.   Neurological: Negative for dizziness and headaches.       Objective:      Vitals:    07/06/18 0751   BP: 120/78   Pulse: (!) 121   Temp: 96.5 °F (35.8 °C)   TempSrc: Tympanic   SpO2: 97%   Weight: 72.2 kg (159 lb 2.8 oz)   Height: 5' 5" (1.651 m)   PainSc:   6   PainLoc: Back     Physical Exam   Constitutional: She is oriented to person, place, and time. She appears well-developed and well-nourished. She does not appear ill. No distress.   HENT:   Head: Normocephalic.   Mouth/Throat: No oropharyngeal exudate.   Neck: Neck supple. No thyromegaly present.   Cardiovascular: Normal rate, regular rhythm and normal heart sounds.    No murmur heard.  Pulmonary/Chest: Effort normal and breath sounds normal. She has no wheezes. She has no rales.   Abdominal: Soft. She exhibits no distension.   Musculoskeletal: She exhibits no edema.        Back:    Lymphadenopathy: "     She has no cervical adenopathy.   Neurological: She is alert and oriented to person, place, and time.   Skin: Skin is warm and dry. She is not diaphoretic.   Psychiatric: She has a normal mood and affect. Her behavior is normal. Judgment and thought content normal.   Nursing note and vitals reviewed.      Assessment:       1. Type 2 diabetes mellitus with complication, with long-term current use of insulin    2. Renal abscess, right    3. Bipolar affective disorder in remission        Plan:   Type 2 diabetes mellitus with complication, with long-term current use of insulin  Comments:  Uncontrolled.  Follow-up with Ms. Santana in late August.  Until then go back to 20 units on the preprandial doses and the 80 units on Lantus    Renal abscess, right  Comments:  Resolving.  Finishing antibiotics in the next few days  Orders:  -     ketorolac injection 30 mg; Inject 1 mL (30 mg total) into the muscle one time.    Bipolar affective disorder in remission  Comments:  Followed closely by Psychiatry.  She is compliant with medications, which no doubt are contributing to her fatigue.    Other orders  -     ketorolac (TORADOL) 10 mg tablet; Take 1 tablet (10 mg total) by mouth every 6 (six) hours.  Dispense: 20 tablet; Refill: 0

## 2018-07-06 NOTE — TELEPHONE ENCOUNTER
----- Message from Abhishek Carl sent at 7/6/2018 12:58 PM CDT -----  Contact: pt   Pt is requesting a call back from the nurse in regards to pt insurance  not coving her med.  [phone] (home)

## 2018-07-09 LAB
BACTERIA BLD CULT: NORMAL
BACTERIA BLD CULT: NORMAL

## 2018-07-16 ENCOUNTER — TELEPHONE (OUTPATIENT)
Dept: FAMILY MEDICINE | Facility: CLINIC | Age: 53
End: 2018-07-16

## 2018-07-16 NOTE — TELEPHONE ENCOUNTER
----- Message from Abhishek Carl sent at 7/16/2018 10:17 AM CDT -----  Contact: pt   Pt is requesting a call back from the nurse in regards to having a lot of kidney pain.  [phone] ([place])

## 2018-07-17 RX ORDER — QUETIAPINE FUMARATE 200 MG/1
TABLET, FILM COATED ORAL
Qty: 45 TABLET | Refills: 0 | Status: SHIPPED | OUTPATIENT
Start: 2018-07-17 | End: 2018-08-13 | Stop reason: SDUPTHER

## 2018-07-17 RX ORDER — DIAZEPAM 10 MG/1
10 TABLET ORAL 3 TIMES DAILY PRN
Qty: 90 TABLET | Refills: 0 | Status: SHIPPED | OUTPATIENT
Start: 2018-07-17 | End: 2018-08-13 | Stop reason: SDUPTHER

## 2018-07-17 NOTE — TELEPHONE ENCOUNTER
Patient informed of the information regarding the tramadol prescription. Patient declined pain management and was upset about not being able to get the tramadol called in. Patient verbalized understanding of the information given.

## 2018-07-17 NOTE — TELEPHONE ENCOUNTER
Patient called in regards to the pain medication that she was prescribed not working and she is still in pain. She also states that the medication has her stomach upset and makes her heart race. Patient wants to know if she can be prescribed the tramadol instead.    Please Advise

## 2018-07-17 NOTE — TELEPHONE ENCOUNTER
----- Message from Trista Mathis sent at 7/17/2018 10:11 AM CDT -----  Contact: pt  She's calling stating that the medication that she was given is not working and she is still in pain, please advise [phone] (home)

## 2018-08-02 ENCOUNTER — OFFICE VISIT (OUTPATIENT)
Dept: FAMILY MEDICINE | Facility: CLINIC | Age: 53
End: 2018-08-02
Payer: MEDICARE

## 2018-08-02 ENCOUNTER — LAB VISIT (OUTPATIENT)
Dept: LAB | Facility: HOSPITAL | Age: 53
End: 2018-08-02
Attending: FAMILY MEDICINE
Payer: MEDICARE

## 2018-08-02 VITALS
DIASTOLIC BLOOD PRESSURE: 76 MMHG | SYSTOLIC BLOOD PRESSURE: 122 MMHG | WEIGHT: 162.81 LBS | HEIGHT: 65 IN | TEMPERATURE: 96 F | BODY MASS INDEX: 27.13 KG/M2 | OXYGEN SATURATION: 98 % | HEART RATE: 135 BPM

## 2018-08-02 DIAGNOSIS — M54.50 CHRONIC RIGHT-SIDED LOW BACK PAIN WITHOUT SCIATICA: ICD-10-CM

## 2018-08-02 DIAGNOSIS — R10.9 RIGHT FLANK PAIN: Primary | ICD-10-CM

## 2018-08-02 DIAGNOSIS — Z79.4 TYPE 2 DIABETES MELLITUS WITH COMPLICATION, WITH LONG-TERM CURRENT USE OF INSULIN: ICD-10-CM

## 2018-08-02 DIAGNOSIS — R10.31 RLQ ABDOMINAL PAIN: ICD-10-CM

## 2018-08-02 DIAGNOSIS — R10.9 RIGHT FLANK PAIN: ICD-10-CM

## 2018-08-02 DIAGNOSIS — G89.29 CHRONIC RIGHT-SIDED LOW BACK PAIN WITHOUT SCIATICA: ICD-10-CM

## 2018-08-02 DIAGNOSIS — E11.8 TYPE 2 DIABETES MELLITUS WITH COMPLICATION, WITH LONG-TERM CURRENT USE OF INSULIN: ICD-10-CM

## 2018-08-02 LAB
ALBUMIN SERPL BCP-MCNC: 4.1 G/DL
ALP SERPL-CCNC: 86 U/L
ALT SERPL W/O P-5'-P-CCNC: 32 U/L
ANION GAP SERPL CALC-SCNC: 13 MMOL/L
AST SERPL-CCNC: 26 U/L
BASOPHILS # BLD AUTO: 0.07 K/UL
BASOPHILS NFR BLD: 0.6 %
BILIRUB SERPL-MCNC: 0.4 MG/DL
BILIRUB SERPL-MCNC: NEGATIVE MG/DL
BLOOD URINE, POC: NEGATIVE
BUN SERPL-MCNC: 17 MG/DL
CALCIUM SERPL-MCNC: 9.7 MG/DL
CHLORIDE SERPL-SCNC: 101 MMOL/L
CO2 SERPL-SCNC: 25 MMOL/L
COLOR, POC UA: YELLOW
CREAT SERPL-MCNC: 0.8 MG/DL
CRP SERPL-MCNC: 5.5 MG/L
DIFFERENTIAL METHOD: ABNORMAL
EOSINOPHIL # BLD AUTO: 0.4 K/UL
EOSINOPHIL NFR BLD: 3.4 %
ERYTHROCYTE [DISTWIDTH] IN BLOOD BY AUTOMATED COUNT: 14.6 %
ERYTHROCYTE [SEDIMENTATION RATE] IN BLOOD BY WESTERGREN METHOD: 13 MM/HR
EST. GFR  (AFRICAN AMERICAN): >60 ML/MIN/1.73 M^2
EST. GFR  (NON AFRICAN AMERICAN): >60 ML/MIN/1.73 M^2
GLUCOSE SERPL-MCNC: 219 MG/DL
GLUCOSE UR QL STRIP: NORMAL
HCT VFR BLD AUTO: 47.8 %
HGB BLD-MCNC: 15.1 G/DL
IMM GRANULOCYTES # BLD AUTO: 0.05 K/UL
IMM GRANULOCYTES NFR BLD AUTO: 0.4 %
KETONES UR QL STRIP: NEGATIVE
LEUKOCYTE ESTERASE URINE, POC: NORMAL
LYMPHOCYTES # BLD AUTO: 4.9 K/UL
LYMPHOCYTES NFR BLD: 43.6 %
MCH RBC QN AUTO: 29.3 PG
MCHC RBC AUTO-ENTMCNC: 31.6 G/DL
MCV RBC AUTO: 93 FL
MONOCYTES # BLD AUTO: 0.5 K/UL
MONOCYTES NFR BLD: 4.7 %
NEUTROPHILS # BLD AUTO: 5.3 K/UL
NEUTROPHILS NFR BLD: 47.3 %
NITRITE, POC UA: NEGATIVE
NRBC BLD-RTO: 0 /100 WBC
PH, POC UA: 5
PLATELET # BLD AUTO: 280 K/UL
PMV BLD AUTO: 9.9 FL
POTASSIUM SERPL-SCNC: 4.2 MMOL/L
PROT SERPL-MCNC: 7.7 G/DL
PROTEIN, POC: NORMAL
RBC # BLD AUTO: 5.16 M/UL
SODIUM SERPL-SCNC: 139 MMOL/L
SPECIFIC GRAVITY, POC UA: 1
UROBILINOGEN, POC UA: NORMAL
WBC # BLD AUTO: 11.25 K/UL

## 2018-08-02 PROCEDURE — 87077 CULTURE AEROBIC IDENTIFY: CPT

## 2018-08-02 PROCEDURE — 85025 COMPLETE CBC W/AUTO DIFF WBC: CPT

## 2018-08-02 PROCEDURE — 3046F HEMOGLOBIN A1C LEVEL >9.0%: CPT | Mod: CPTII,S$GLB,, | Performed by: FAMILY MEDICINE

## 2018-08-02 PROCEDURE — 87086 URINE CULTURE/COLONY COUNT: CPT

## 2018-08-02 PROCEDURE — 87088 URINE BACTERIA CULTURE: CPT

## 2018-08-02 PROCEDURE — 99214 OFFICE O/P EST MOD 30 MIN: CPT | Mod: 25,S$GLB,, | Performed by: FAMILY MEDICINE

## 2018-08-02 PROCEDURE — 3078F DIAST BP <80 MM HG: CPT | Mod: CPTII,S$GLB,, | Performed by: FAMILY MEDICINE

## 2018-08-02 PROCEDURE — 3074F SYST BP LT 130 MM HG: CPT | Mod: CPTII,S$GLB,, | Performed by: FAMILY MEDICINE

## 2018-08-02 PROCEDURE — 80053 COMPREHEN METABOLIC PANEL: CPT

## 2018-08-02 PROCEDURE — 3008F BODY MASS INDEX DOCD: CPT | Mod: CPTII,S$GLB,, | Performed by: FAMILY MEDICINE

## 2018-08-02 PROCEDURE — 85651 RBC SED RATE NONAUTOMATED: CPT

## 2018-08-02 PROCEDURE — 99499 UNLISTED E&M SERVICE: CPT | Mod: S$GLB,,, | Performed by: FAMILY MEDICINE

## 2018-08-02 PROCEDURE — 36415 COLL VENOUS BLD VENIPUNCTURE: CPT | Mod: PO

## 2018-08-02 PROCEDURE — 96372 THER/PROPH/DIAG INJ SC/IM: CPT | Mod: S$GLB,,, | Performed by: FAMILY MEDICINE

## 2018-08-02 PROCEDURE — 99999 PR PBB SHADOW E&M-EST. PATIENT-LVL III: CPT | Mod: PBBFAC,,, | Performed by: FAMILY MEDICINE

## 2018-08-02 PROCEDURE — 86140 C-REACTIVE PROTEIN: CPT

## 2018-08-02 PROCEDURE — 87186 SC STD MICRODIL/AGAR DIL: CPT

## 2018-08-02 PROCEDURE — 81002 URINALYSIS NONAUTO W/O SCOPE: CPT | Mod: S$GLB,,, | Performed by: FAMILY MEDICINE

## 2018-08-02 RX ORDER — KETOROLAC TROMETHAMINE 30 MG/ML
60 INJECTION, SOLUTION INTRAMUSCULAR; INTRAVENOUS ONCE
Status: COMPLETED | OUTPATIENT
Start: 2018-08-02 | End: 2018-08-02

## 2018-08-02 RX ADMIN — KETOROLAC TROMETHAMINE 60 MG: 30 INJECTION, SOLUTION INTRAMUSCULAR; INTRAVENOUS at 08:08

## 2018-08-02 NOTE — PROGRESS NOTES
Subjective:       Patient ID: Alexandra Goins is a 52 y.o. female.    Chief Complaint: Flank Pain (right ); Fatigue; and wants blood work      HPI Comments:       Current Outpatient Prescriptions:     atorvastatin (LIPITOR) 20 MG tablet, Take 1 tablet (20 mg total) by mouth once daily., Disp: 90 tablet, Rfl: 3    blood sugar diagnostic (ACCU-CHEK JOSE PLUS TEST STRP) Strp, 1 strip by Misc.(Non-Drug; Combo Route) route 3 (three) times daily. Accu-chek Strips, Disp: 100 strip, Rfl: 11    diazePAM (VALIUM) 10 MG Tab, Take 1 tablet (10 mg total) by mouth 3 (three) times daily as needed., Disp: 90 tablet, Rfl: 0    doxepin (SINEQUAN) 10 MG capsule, Take 1 capsule (10 mg total) by mouth nightly as needed., Disp: 30 capsule, Rfl: 2    insulin aspart (NOVOLOG) 100 unit/mL injection, As directed, Disp: , Rfl:     insulin glargine (LANTUS) 100 unit/mL injection, Inject 60 Units into the skin every evening., Disp: 2 vial, Rfl: 3    insulin syringe-needle U-100 1 mL 30 gauge x 5/16 Syrg, 1 each by Misc.(Non-Drug; Combo Route) route 4 (four) times daily., Disp: 200 each, Rfl: 11    isosorbide mononitrate (IMDUR) 30 MG 24 hr tablet, Take 1 tablet (30 mg total) by mouth once daily., Disp: 30 tablet, Rfl: 11    ketorolac (TORADOL) 10 mg tablet, Take 1 tablet (10 mg total) by mouth every 6 (six) hours., Disp: 20 tablet, Rfl: 0    lamoTRIgine (LAMICTAL) 100 MG tablet, Take 1 tablet (100 mg total) by mouth once daily., Disp: 30 tablet, Rfl: 1    lancets (ACCU-CHEK SOFTCLIX LANCETS) Misc, 1 each by Misc.(Non-Drug; Combo Route) route 3 (three) times daily., Disp: 100 each, Rfl: 11    levothyroxine (SYNTHROID) 75 MCG tablet, Take 1 tablet (75 mcg total) by mouth before breakfast., Disp: 30 tablet, Rfl: 1    metFORMIN (GLUCOPHAGE) 500 MG tablet, Take 2 tablets (1,000 mg total) by mouth 2 (two) times daily with meals., Disp: 120 tablet, Rfl: 3    pantoprazole (PROTONIX) 20 MG tablet, Take 1 tablet (20 mg total) by mouth  "once daily., Disp: 30 tablet, Rfl: 1    pen needle, diabetic (BD ULTRA-FINE ARNOLDO PEN NEEDLES) 32 gauge x 5/32" Ndle, 1 each by Misc.(Non-Drug; Combo Route) route 2 (two) times daily., Disp: 100 each, Rfl: 11    promethazine (PHENERGAN) 25 MG tablet, Take 1 tablet (25 mg total) by mouth every 6 (six) hours as needed for Nausea., Disp: 15 tablet, Rfl: 0    QUEtiapine (SEROQUEL) 200 MG Tab, Take 1 and 1/2 tablets at bedtime, Disp: 45 tablet, Rfl: 0    rizatriptan (MAXALT) 10 MG tablet, One tablet with onset of migraine and may repeat one dose in 2 hours if needed, Disp: , Rfl:     sub-q insulin device, 20 unit Katie, 1 Device by Misc.(Non-Drug; Combo Route) route once daily., Disp: 30 Device, Rfl: 3    vitamin D 1000 units Tab, Take 1,000 Units by mouth once daily., Disp: , Rfl:     blood-glucose meter (ACCU-CHEK JOSE PLUS METER) Misc, 1 each by Misc.(Non-Drug; Combo Route) route as directed. Accu-chek Meter, Disp: 1 each, Rfl: 0    ramipril (ALTACE) 2.5 MG capsule, Take 1 capsule (2.5 mg total) by mouth once daily., Disp: 90 capsule, Rfl: 3  No current facility-administered medications for this visit.       She continues to have daily right flank pain and now right lower quadrant pain.  More less consistently since her admission for renal abscess.  Intensity is now more of a 6-7/10  instead of 9-10/10 when she was in the hospital.  The anti-inflammatories I gave her do not seem to help.  She refused to see chronic pain management last month when I saw her for ongoing flank pain that I thought was more likely coming from her low back.  Follow-up CT scan later in July showed no residual infection around the kidney.  Subsequently her stent was removed.  She is urinating normally.  She has no fever but thinks she might have chills.  Appetite is diminished, but fluid intake is okay.  She is status post hysterectomy      Review of Systems   Constitutional: Positive for activity change, appetite change and chills. " "Negative for fever.   HENT: Negative for sore throat.    Respiratory: Negative for cough and shortness of breath.    Cardiovascular: Negative for chest pain.   Gastrointestinal: Positive for abdominal pain. Negative for diarrhea and nausea.   Genitourinary: Positive for flank pain. Negative for difficulty urinating, dysuria and urgency.   Musculoskeletal: Negative for arthralgias and myalgias.   Neurological: Negative for dizziness and headaches.       Objective:      Vitals:    08/02/18 0731   BP: 122/76   Pulse: (!) 135   Temp: 96.2 °F (35.7 °C)   TempSrc: Tympanic   SpO2: 98%   Weight: 73.9 kg (162 lb 13 oz)   Height: 5' 5" (1.651 m)   PainSc:   4   PainLoc: Abdomen     Physical Exam   Constitutional: She is oriented to person, place, and time. She appears well-developed and well-nourished.  Non-toxic appearance. She appears ill. No distress.   HENT:   Head: Normocephalic.   Mouth/Throat: No oropharyngeal exudate.   Neck: Neck supple. No thyromegaly present.   Cardiovascular: Normal rate, regular rhythm and normal heart sounds.    No murmur heard.  Pulmonary/Chest: Effort normal and breath sounds normal. She has no wheezes. She has no rales.   Abdominal: Soft. She exhibits no distension. There is no hepatosplenomegaly. There is tenderness in the right lower quadrant, suprapubic area and left lower quadrant. There is CVA tenderness. There is no rigidity, no rebound and no guarding.   Musculoskeletal: She exhibits no edema.        Lumbar back: She exhibits decreased range of motion, tenderness, bony tenderness, pain and spasm.        Back:    Lymphadenopathy:     She has no cervical adenopathy.   Neurological: She is alert and oriented to person, place, and time.   Skin: Skin is warm and dry. She is not diaphoretic.   Psychiatric: She has a normal mood and affect. Her behavior is normal. Judgment and thought content normal.   Nursing note and vitals reviewed.      Assessment:       1. Right flank pain    2. RLQ " abdominal pain    3. Type 2 diabetes mellitus with complication, with long-term current use of insulin    4. Chronic right-sided low back pain without sciatica        Plan:   Right flank pain  Comments:  Recent perinephric abscess.  Follow-up CT was negative.  UA neg. UC sent. Urology follow-up. IM Toradol  Orders:  -     CBC auto differential; Future; Expected date: 08/02/2018  -     Urine culture; Future; Expected date: 08/02/2018  -     Comprehensive metabolic panel; Future; Expected date: 08/02/2018  -     Sedimentation rate; Future; Expected date: 08/02/2018  -     C-reactive protein; Future; Expected date: 08/02/2018  -     POCT URINE DIPSTICK WITHOUT MICROSCOPE  -     ketorolac injection 60 mg; Inject 2 mLs (60 mg total) into the muscle once.    RLQ abdominal pain  Comments:  No signs of acute abdomen. No hematuria  Orders:  -     Urine culture; Future; Expected date: 08/02/2018  -     Sedimentation rate; Future; Expected date: 08/02/2018  -     C-reactive protein; Future; Expected date: 08/02/2018    Type 2 diabetes mellitus with complication, with long-term current use of insulin  Comments:  Uncontrolled.  Sees diabetic education end this month    Chronic right-sided low back pain without sciatica  Comments:  Discussed again pain management.  Will wait till current tests and consults are completed and then refer if needed

## 2018-08-03 ENCOUNTER — TELEPHONE (OUTPATIENT)
Dept: FAMILY MEDICINE | Facility: CLINIC | Age: 53
End: 2018-08-03

## 2018-08-03 NOTE — TELEPHONE ENCOUNTER
----- Message from Perez Casiano MD sent at 8/3/2018  8:15 AM CDT -----  All tests okay.  Follow up with urologist as planned

## 2018-08-05 LAB — BACTERIA UR CULT: NORMAL

## 2018-08-06 RX ORDER — AMOXICILLIN AND CLAVULANATE POTASSIUM 875; 125 MG/1; MG/1
1 TABLET, FILM COATED ORAL EVERY 12 HOURS
Qty: 20 TABLET | Refills: 0 | Status: SHIPPED | OUTPATIENT
Start: 2018-08-06 | End: 2019-06-12

## 2018-08-13 ENCOUNTER — OFFICE VISIT (OUTPATIENT)
Dept: PSYCHIATRY | Facility: CLINIC | Age: 53
End: 2018-08-13
Payer: MEDICARE

## 2018-08-13 DIAGNOSIS — F41.9 ANXIETY: ICD-10-CM

## 2018-08-13 DIAGNOSIS — F31.31 BIPOLAR AFFECTIVE DISORDER, CURRENTLY DEPRESSED, MILD: Primary | Chronic | ICD-10-CM

## 2018-08-13 PROCEDURE — 99212 OFFICE O/P EST SF 10 MIN: CPT | Mod: PBBFAC,PO | Performed by: PSYCHIATRY & NEUROLOGY

## 2018-08-13 PROCEDURE — 99499 UNLISTED E&M SERVICE: CPT | Mod: S$GLB,,, | Performed by: PSYCHIATRY & NEUROLOGY

## 2018-08-13 PROCEDURE — 99213 OFFICE O/P EST LOW 20 MIN: CPT | Mod: S$GLB,,, | Performed by: PSYCHIATRY & NEUROLOGY

## 2018-08-13 PROCEDURE — 99999 PR PBB SHADOW E&M-EST. PATIENT-LVL II: CPT | Mod: PBBFAC,,, | Performed by: PSYCHIATRY & NEUROLOGY

## 2018-08-13 RX ORDER — DOXEPIN HYDROCHLORIDE 10 MG/1
10 CAPSULE ORAL NIGHTLY PRN
Qty: 30 CAPSULE | Refills: 2 | Status: SHIPPED | OUTPATIENT
Start: 2018-08-13 | End: 2018-11-13 | Stop reason: SDUPTHER

## 2018-08-13 RX ORDER — DIAZEPAM 10 MG/1
10 TABLET ORAL 3 TIMES DAILY PRN
Qty: 90 TABLET | Refills: 2 | Status: SHIPPED | OUTPATIENT
Start: 2018-08-13 | End: 2018-11-13 | Stop reason: SDUPTHER

## 2018-08-13 RX ORDER — LAMOTRIGINE 100 MG/1
100 TABLET ORAL DAILY
Qty: 30 TABLET | Refills: 2 | Status: SHIPPED | OUTPATIENT
Start: 2018-08-13 | End: 2018-11-13 | Stop reason: SDUPTHER

## 2018-08-13 RX ORDER — QUETIAPINE FUMARATE 200 MG/1
TABLET, FILM COATED ORAL
Qty: 45 TABLET | Refills: 2 | Status: SHIPPED | OUTPATIENT
Start: 2018-08-13 | End: 2018-11-13 | Stop reason: DRUGHIGH
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
## 2018-08-15 DIAGNOSIS — Z79.4 TYPE 2 DIABETES MELLITUS WITHOUT COMPLICATION, WITH LONG-TERM CURRENT USE OF INSULIN: ICD-10-CM

## 2018-08-15 DIAGNOSIS — E11.9 TYPE 2 DIABETES MELLITUS WITHOUT COMPLICATION, WITH LONG-TERM CURRENT USE OF INSULIN: ICD-10-CM

## 2018-08-15 RX ORDER — METFORMIN HYDROCHLORIDE 500 MG/1
TABLET ORAL
Qty: 60 TABLET | Refills: 0 | Status: SHIPPED | OUTPATIENT
Start: 2018-08-15 | End: 2018-08-29 | Stop reason: SDUPTHER

## 2018-08-20 PROBLEM — F41.9 ANXIETY: Status: ACTIVE | Noted: 2018-08-20

## 2018-08-26 ENCOUNTER — HOSPITAL ENCOUNTER (EMERGENCY)
Facility: HOSPITAL | Age: 53
Discharge: HOME | End: 2018-08-26
Attending: INTERNAL MEDICINE
Payer: MEDICARE

## 2018-08-26 VITALS
BODY MASS INDEX: 26.66 KG/M2 | RESPIRATION RATE: 17 BRPM | TEMPERATURE: 98 F | OXYGEN SATURATION: 97 % | WEIGHT: 160 LBS | HEART RATE: 97 BPM | DIASTOLIC BLOOD PRESSURE: 60 MMHG | SYSTOLIC BLOOD PRESSURE: 126 MMHG | HEIGHT: 65 IN

## 2018-08-26 DIAGNOSIS — R10.9 FLANK PAIN: Primary | ICD-10-CM

## 2018-08-26 DIAGNOSIS — A41.9 SEPSIS: ICD-10-CM

## 2018-08-26 DIAGNOSIS — K59.01 SLOW TRANSIT CONSTIPATION: ICD-10-CM

## 2018-08-26 LAB
ALBUMIN SERPL BCP-MCNC: 3.6 G/DL
ALP SERPL-CCNC: 84 U/L
ALT SERPL W/O P-5'-P-CCNC: 36 U/L
ANION GAP SERPL CALC-SCNC: 11 MMOL/L
AST SERPL-CCNC: 19 U/L
BASOPHILS # BLD AUTO: 0.04 K/UL
BASOPHILS NFR BLD: 0.4 %
BILIRUB SERPL-MCNC: 0.2 MG/DL
BNP SERPL-MCNC: 23 PG/ML
BUN SERPL-MCNC: 15 MG/DL
CALCIUM SERPL-MCNC: 9 MG/DL
CHLORIDE SERPL-SCNC: 109 MMOL/L
CO2 SERPL-SCNC: 19 MMOL/L
CREAT SERPL-MCNC: 0.7 MG/DL
DIFFERENTIAL METHOD: ABNORMAL
EOSINOPHIL # BLD AUTO: 0.4 K/UL
EOSINOPHIL NFR BLD: 4.2 %
ERYTHROCYTE [DISTWIDTH] IN BLOOD BY AUTOMATED COUNT: 15.6 %
EST. GFR  (AFRICAN AMERICAN): >60 ML/MIN/1.73 M^2
EST. GFR  (NON AFRICAN AMERICAN): >60 ML/MIN/1.73 M^2
GLUCOSE SERPL-MCNC: 173 MG/DL
HCT VFR BLD AUTO: 44.4 %
HGB BLD-MCNC: 15 G/DL
LACTATE SERPL-SCNC: 1.9 MMOL/L
LYMPHOCYTES # BLD AUTO: 4.7 K/UL
LYMPHOCYTES NFR BLD: 48.2 %
MCH RBC QN AUTO: 31.6 PG
MCHC RBC AUTO-ENTMCNC: 33.8 G/DL
MCV RBC AUTO: 94 FL
MONOCYTES # BLD AUTO: 0.5 K/UL
MONOCYTES NFR BLD: 4.9 %
NEUTROPHILS # BLD AUTO: 4.1 K/UL
NEUTROPHILS NFR BLD: 42.3 %
PLATELET # BLD AUTO: 250 K/UL
PMV BLD AUTO: 9.6 FL
POTASSIUM SERPL-SCNC: 3.9 MMOL/L
PROCALCITONIN SERPL IA-MCNC: 0.07 NG/ML
PROT SERPL-MCNC: 7 G/DL
RBC # BLD AUTO: 4.75 M/UL
SODIUM SERPL-SCNC: 139 MMOL/L
TROPONIN I SERPL DL<=0.01 NG/ML-MCNC: <0.006 NG/ML
WBC # BLD AUTO: 9.65 K/UL

## 2018-08-26 PROCEDURE — 63600175 PHARM REV CODE 636 W HCPCS: Performed by: INTERNAL MEDICINE

## 2018-08-26 PROCEDURE — 25500020 PHARM REV CODE 255: Performed by: INTERNAL MEDICINE

## 2018-08-26 PROCEDURE — 83880 ASSAY OF NATRIURETIC PEPTIDE: CPT

## 2018-08-26 PROCEDURE — 87040 BLOOD CULTURE FOR BACTERIA: CPT

## 2018-08-26 PROCEDURE — 85025 COMPLETE CBC W/AUTO DIFF WBC: CPT

## 2018-08-26 PROCEDURE — 84145 PROCALCITONIN (PCT): CPT

## 2018-08-26 PROCEDURE — 83605 ASSAY OF LACTIC ACID: CPT

## 2018-08-26 PROCEDURE — 84484 ASSAY OF TROPONIN QUANT: CPT

## 2018-08-26 PROCEDURE — 93005 ELECTROCARDIOGRAM TRACING: CPT

## 2018-08-26 PROCEDURE — 25000003 PHARM REV CODE 250: Performed by: INTERNAL MEDICINE

## 2018-08-26 PROCEDURE — 96365 THER/PROPH/DIAG IV INF INIT: CPT | Mod: 59

## 2018-08-26 PROCEDURE — 80053 COMPREHEN METABOLIC PANEL: CPT

## 2018-08-26 PROCEDURE — 99284 EMERGENCY DEPT VISIT MOD MDM: CPT | Mod: 25

## 2018-08-26 PROCEDURE — 93010 ELECTROCARDIOGRAM REPORT: CPT | Mod: ,,, | Performed by: INTERNAL MEDICINE

## 2018-08-26 PROCEDURE — 96361 HYDRATE IV INFUSION ADD-ON: CPT

## 2018-08-26 PROCEDURE — 96375 TX/PRO/DX INJ NEW DRUG ADDON: CPT

## 2018-08-26 RX ORDER — ONDANSETRON 8 MG/1
8 TABLET, ORALLY DISINTEGRATING ORAL
Status: COMPLETED | OUTPATIENT
Start: 2018-08-26 | End: 2018-08-26

## 2018-08-26 RX ORDER — ZIPRASIDONE HYDROCHLORIDE 20 MG/1
20 CAPSULE ORAL
Status: DISCONTINUED | OUTPATIENT
Start: 2018-08-26 | End: 2018-08-26 | Stop reason: HOSPADM

## 2018-08-26 RX ORDER — HYDROMORPHONE HYDROCHLORIDE 2 MG/ML
1 INJECTION, SOLUTION INTRAMUSCULAR; INTRAVENOUS; SUBCUTANEOUS
Status: COMPLETED | OUTPATIENT
Start: 2018-08-26 | End: 2018-08-26

## 2018-08-26 RX ORDER — HYDROMORPHONE HYDROCHLORIDE 2 MG/ML
1 INJECTION, SOLUTION INTRAMUSCULAR; INTRAVENOUS; SUBCUTANEOUS
Status: DISCONTINUED | OUTPATIENT
Start: 2018-08-26 | End: 2018-08-26

## 2018-08-26 RX ADMIN — IOHEXOL 75 ML: 350 INJECTION, SOLUTION INTRAVENOUS at 11:08

## 2018-08-26 RX ADMIN — SODIUM CHLORIDE 2178 ML: 0.9 INJECTION, SOLUTION INTRAVENOUS at 10:08

## 2018-08-26 RX ADMIN — ONDANSETRON 8 MG: 8 TABLET, ORALLY DISINTEGRATING ORAL at 11:08

## 2018-08-26 RX ADMIN — PROMETHAZINE HYDROCHLORIDE 25 MG: 25 INJECTION INTRAMUSCULAR; INTRAVENOUS at 12:08

## 2018-08-26 RX ADMIN — HYDROMORPHONE HYDROCHLORIDE 1 MG: 2 INJECTION, SOLUTION INTRAMUSCULAR; INTRAVENOUS; SUBCUTANEOUS at 11:08

## 2018-08-26 NOTE — DISCHARGE INSTRUCTIONS
Drink plenty of fluids to make urine look like water     Come back to ER if cannot keep fluid down or with Nausea vomiting or diarrhea or any of the current symptoms get worse  MiraLax for constipation    There is no evidence of recurrent abscess in the right kidney    No evidence of severe infection in the urine or anywhere in the body      tylenol 500 mg 2- tablets every 6 hours for pain

## 2018-08-26 NOTE — ED PROVIDER NOTES
SCRIBE #1 NOTE: ISharon, am scribing for, and in the presence of, Fritz Hampton MD. I have scribed the entire note.      History      Chief Complaint   Patient presents with    Abdominal Pain     c/o right sided abdominal pain , right flank pain, and N/V, patient states she is seeing a urologist for a abscessed kidney        Review of patient's allergies indicates:   Allergen Reactions    Piroxicam Diarrhea and Nausea Only    Ultram [tramadol] Rash    Asa [aspirin]      Nosebleeds  Nosebleeds    Levaquin [levofloxacin]     Levomenol analogues     Lipitor [atorvastatin]      Leg Cramps    Celestone [betamethasone] Hives, Itching and Rash        HPI   HPI    8/26/2018, 9:56 AM   History obtained from the patient      History of Present Illness: Alexandra Goins is a 52 y.o. female patient with HTN, DM, Hx of R renal abscess (dx 2 months ago, followed by Dr. Becerril, Urology) who presents to the Emergency Department for R flank pain radiating to her R groin. She has been having pain since dx with renal abscess 2 months ago. She had a drain placed approx 8 weeks ago which was removed on 6/29. The pain is constant and moderate in severity. No mitigating or exacerbating factors reported.  She has had fever which she treated with OTC meds at home, decreased appetite, and N/V. Patient denies CP, SOB, diarrhea, dysuria, hematuria, dizziness, lightheadedness, syncope, and all other sxs at this time. No further complaints or concerns at this time.        Arrival mode: Personal vehicle    PCP: Perez Casiano MD       Past Medical History:  Past Medical History:   Diagnosis Date    Anxiety     Arthritis     Asthma     Bipolar 1 disorder     Depression     Diabetes mellitus     Diabetes mellitus, type 2     Diverticular disease     GERD (gastroesophageal reflux disease)     Hyperlipemia     Hypertension     Hypothyroidism     Pneumonia     Renal manifestation of secondary diabetes mellitus      Tobacco dependence     Trouble in sleeping        Past Surgical History:  Past Surgical History:   Procedure Laterality Date    CHOLECYSTECTOMY      COLON SURGERY      DENTAL SURGERY  05/21/2018    removal of 8 top teeth    HYSTERECTOMY      TONSILLECTOMY           Family History:  Family History   Problem Relation Age of Onset    Alcohol abuse Mother     COPD Mother     Depression Mother     Drug abuse Mother     Alcohol abuse Father     Arthritis Father     Cancer Father     Heart disease Father     Hypertension Father     COPD Sister     Kidney failure Sister     Heart disease Brother     Hypertension Brother     Cancer Maternal Aunt     Cancer Maternal Uncle     Diabetes Maternal Uncle     Drug abuse Maternal Uncle     Cancer Paternal Aunt     Cancer Paternal Uncle     Arthritis Maternal Grandmother     Hypertension Maternal Grandmother     Breast cancer Maternal Grandmother     Arthritis Paternal Grandmother     Cancer Paternal Grandmother     Hypertension Paternal Grandfather        Social History:  Social History     Tobacco Use    Smoking status: Current Every Day Smoker     Years: 35.00     Types: Cigarettes, Vaping w/o nicotine    Smokeless tobacco: Never Used   Substance and Sexual Activity    Alcohol use: Yes     Comment: occassionally    Drug use: Yes     Types: Marijuana    Sexual activity: Yes       ROS   Review of Systems   Constitutional: Positive for appetite change (decreased) and fever. Negative for chills.   HENT: Negative for sore throat.    Respiratory: Negative for shortness of breath.    Cardiovascular: Negative for chest pain.   Gastrointestinal: Positive for nausea and vomiting. Negative for anal bleeding, blood in stool, constipation and diarrhea.   Genitourinary: Positive for flank pain. Negative for difficulty urinating, dysuria and hematuria.   Musculoskeletal: Negative for back pain.   Skin: Negative for rash.   Neurological: Negative for  "weakness.   Hematological: Does not bruise/bleed easily.   All other systems reviewed and are negative.      Physical Exam      Initial Vitals [08/26/18 0936]   BP Pulse Resp Temp SpO2   124/68 (!) 125 20 98 °F (36.7 °C) 97 %      MAP       --          Physical Exam  Nursing Notes and Vital Signs Reviewed.  Constitutional: Patient is in no acute distress. Awake and alert. Well-developed and well-nourished. Toxic-appearing.  Head: Atraumatic. Normocephalic.  Eyes: PERRL. EOM intact. Conjunctivae are not pale. No scleral icterus.  ENT: Mucous membranes are moist. Oropharynx is clear and symmetric.    Neck: Supple. Full ROM. No lymphadenopathy.  Cardiovascular: Tachycardic. Regular rhythm. No murmurs, rubs, or gallops. Distal pulses are 2+ and symmetric.  Pulmonary/Chest: No respiratory distress. Clear to auscultation bilaterally. No wheezing, rales, or rhonchi.  Abdominal: Soft and non-distended.  There is no tenderness.  No rebound, guarding, or rigidity.  Good bowel sounds.    :TTP to R flank radiating to R groin.  Musculoskeletal: Moves all extremities. No obvious deformities. No edema. No calf tenderness.  Skin: Warm and dry.  Neurological:  Alert, awake, and appropriate.  Normal speech.  No acute focal neurological deficits are appreciated.  Psychiatric: Normal affect. Good eye contact. Appropriate in content.    ED Course    Procedures  ED Vital Signs:  Vitals:    08/26/18 0936 08/26/18 1042 08/26/18 1049 08/26/18 1101   BP: 124/68  (!) 118/57 103/60   Pulse: (!) 125 108 101 101   Resp: 20  (!) 24 (!) 22   Temp: 98 °F (36.7 °C)      TempSrc: Oral      SpO2: 97%      Weight: 72.6 kg (160 lb)      Height: 5' 5" (1.651 m)       08/26/18 1116 08/26/18 1201 08/26/18 1216 08/26/18 1346   BP: 107/65 112/62 (!) 105/59 126/60   Pulse: 98 93 91 97   Resp: (!) 21 16 12 17   Temp:       TempSrc:       SpO2:       Weight:       Height:           Abnormal Lab Results:  Labs Reviewed   CBC W/ AUTO DIFFERENTIAL - Abnormal; " Notable for the following components:       Result Value    MCH 31.6 (*)     RDW 15.6 (*)     Lymph% 48.2 (*)     All other components within normal limits   COMPREHENSIVE METABOLIC PANEL - Abnormal; Notable for the following components:    CO2 19 (*)     Glucose 173 (*)     All other components within normal limits   CULTURE, BLOOD   CULTURE, BLOOD   LACTIC ACID, PLASMA   B-TYPE NATRIURETIC PEPTIDE   TROPONIN I   PROCALCITONIN        All Lab Results:  Results for orders placed or performed during the hospital encounter of 08/26/18   CBC auto differential   Result Value Ref Range    WBC 9.65 3.90 - 12.70 K/uL    RBC 4.75 4.00 - 5.40 M/uL    Hemoglobin 15.0 12.0 - 16.0 g/dL    Hematocrit 44.4 37.0 - 48.5 %    MCV 94 82 - 98 fL    MCH 31.6 (H) 27.0 - 31.0 pg    MCHC 33.8 32.0 - 36.0 g/dL    RDW 15.6 (H) 11.5 - 14.5 %    Platelets 250 150 - 350 K/uL    MPV 9.6 9.2 - 12.9 fL    Gran # (ANC) 4.1 1.8 - 7.7 K/uL    Lymph # 4.7 1.0 - 4.8 K/uL    Mono # 0.5 0.3 - 1.0 K/uL    Eos # 0.4 0.0 - 0.5 K/uL    Baso # 0.04 0.00 - 0.20 K/uL    Gran% 42.3 38.0 - 73.0 %    Lymph% 48.2 (H) 18.0 - 48.0 %    Mono% 4.9 4.0 - 15.0 %    Eosinophil% 4.2 0.0 - 8.0 %    Basophil% 0.4 0.0 - 1.9 %    Differential Method Automated    Comprehensive metabolic panel   Result Value Ref Range    Sodium 139 136 - 145 mmol/L    Potassium 3.9 3.5 - 5.1 mmol/L    Chloride 109 95 - 110 mmol/L    CO2 19 (L) 23 - 29 mmol/L    Glucose 173 (H) 70 - 110 mg/dL    BUN, Bld 15 6 - 20 mg/dL    Creatinine 0.7 0.5 - 1.4 mg/dL    Calcium 9.0 8.7 - 10.5 mg/dL    Total Protein 7.0 6.0 - 8.4 g/dL    Albumin 3.6 3.5 - 5.2 g/dL    Total Bilirubin 0.2 0.1 - 1.0 mg/dL    Alkaline Phosphatase 84 55 - 135 U/L    AST 19 10 - 40 U/L    ALT 36 10 - 44 U/L    Anion Gap 11 8 - 16 mmol/L    eGFR if African American >60 >60 mL/min/1.73 m^2    eGFR if non African American >60 >60 mL/min/1.73 m^2   Lactic acid, plasma #1   Result Value Ref Range    Lactate (Lactic Acid) 1.9 0.5 - 2.2  mmol/L   Brain natriuretic peptide   Result Value Ref Range    BNP 23 0 - 99 pg/mL   Troponin I   Result Value Ref Range    Troponin I <0.006 0.000 - 0.026 ng/mL   Procalcitonin   Result Value Ref Range    Procalcitonin 0.07 <0.25 ng/mL     Imaging Results:  Imaging Results          CT Urogram Abd Pelvis W WO (Final result)  Result time 08/26/18 12:05:35   Procedure changed from CT Abdomen Pelvis With Contrast     Final result by Homer Gomez MD (08/26/18 12:05:35)                 Impression:      No evidence of renal abscess.    Normal appendix.    No evidence of obstructive uropathy.    Moderate constipation.    All CT scans at this facility use dose modulation, iterative reconstruction, and/or weight based dosing when appropriate to reduce radiation dose to as low as reasonable achievable.      Electronically signed by: Homer Gomez MD  Date:    08/26/2018  Time:    12:05             Narrative:    EXAMINATION:  CT UROGRAM ABD PELVIS W WO    CLINICAL HISTORY:  Abd pain, fever, abscess suspected;right kidney abscess;    TECHNIQUE:  Low dose axial, sagittal and coronal reformations were obtained from the lung bases to the pubic symphysis before and following the IV administration of 100 mL of Omnipaque 350.  Timing was optimized for nephrogram and excretory renal phases.    COMPARISON:  06/21/2018    FINDINGS:  Lung bases are clear.  Heart sizes normal.  No pleural effusion or pericardial effusion.    Liver is normal in size with no focal abnormalities.  Decreased attenuation of the liver is consistent with hepatic steatosis.  Gallbladder is surgically absent..  Spleen is normal.  Pancreas are unremarkable.  Bilateral adrenals demonstrates diffuse thickening and decreased attenuation which can be seen with adrenal hyperplasia.  This is a stable finding.    Kidneys are normal in size without mass.  4 mm nonobstructing stone lower pole right kidney.  No evidence of hydronephrosis or ureteral calculus.  Bladder is  nondistended which limits evaluation.    Shotty nodes are seen within the retroperitoneum.  Aorta demonstrates no significant atherosclerotic disease.    Small and large bowel demonstrate no evidence of obstruction or focal abnormality.  Moderate constipation.  Diverticulosis of the descending sigmoid colon without evidence of diverticulitis.  Postoperative changes are suspected within the sigmoid colon.  Appendix is normal.  Small fat containing umbilical hernia.    No evidence of free-fluid or free-air within the abdomen or pelvis.    Bones demonstrate mild degenerative changes.                               X-Ray Chest AP Portable (Final result)  Result time 08/26/18 11:03:02    Final result by Homer Gomez MD (08/26/18 11:03:02)                 Impression:      No acute process seen.      Electronically signed by: Homer Gomez MD  Date:    08/26/2018  Time:    11:03             Narrative:    EXAMINATION:  XR CHEST AP PORTABLE    CLINICAL HISTORY:  Sepsis;    FINDINGS:  Single view of the chest.  Aorta demonstrates atherosclerotic disease.    Cardiac silhouette is normal.  The lungs demonstrate no evidence of active disease.  No evidence of pleural effusion or pneumothorax.  Bones appear intact.                               The EKG was ordered, reviewed, and independently interpreted by the ED provider.  Interpretation time: 10:38  Rate: 110 BPM  Rhythm: sinus tachycardia  Interpretation: No acute ST changes.  No STEMI.  When compared to EKG performed in 2017, there are no significant changes.    The Emergency Provider reviewed the vital signs and test results, which are outlined above.    ED Discussion     10:30 AM: Dr. Hampton at bedside performing US. R kidney is enlarged and swollen. There is suspicion of hydronephrosis. There is no gallbladder visualized. Inferior vena cava collapses on breathing, a sign of dehydration.    1:32 PM: Reassessed pt at this time.  Pt states her condition has improved at this  time. Discussed with pt all pertinent ED information and results. Discussed pt dx and plan of tx. Gave pt all f/u and return to the ED instructions. All questions and concerns were addressed at this time. Pt expresses understanding of information and instructions, and is comfortable with plan to discharge. Pt is stable for discharge.    I discussed with patient and/or family/caretaker that evaluation in the ED does not suggest any emergent or life threatening medical conditions requiring immediate intervention beyond what was provided in the ED, and I believe patient is safe for discharge.  Regardless, an unremarkable evaluation in the ED does not preclude the development or presence of a serious of life threatening condition. As such, patient was instructed to return immediately for any worsening or change in current symptoms.    ED Medication(s):  Medications   sodium chloride 0.9% bolus 2,178 mL (0 mL/kg × 72.6 kg Intravenous Stopped 8/26/18 1342)   ondansetron disintegrating tablet 8 mg (8 mg Oral Given 8/26/18 1111)   hydromorphone (PF) injection 1 mg (1 mg Intravenous Given 8/26/18 1130)   omnipaque 350 iohexol 75 mL (75 mLs Intravenous Given 8/26/18 1158)   promethazine (PHENERGAN) 25 mg in dextrose 5 % 50 mL IVPB (0 mg Intravenous Stopped 8/26/18 1300)       Discharge Medication List as of 8/26/2018  1:30 PM          Follow-up Information     Perez Casiano MD. Schedule an appointment as soon as possible for a visit in 1 week.    Specialty:  Family Medicine  Contact information:  [address] [facility] [id number] [phone]             [facility]    Specialty:  Emergency Medicine  Why:  If symptoms worsen  Contact information:  [address] [facility] [address] [phone]                  Medical Decision Making    Medical Decision Making:   Clinical Tests:   Lab Tests: Reviewed and Ordered  Radiological Study: Reviewed and Ordered  Medical Tests:  Ordered and Reviewed           Scribe Attestation:   Scribe #1: I performed the above scribed service and the documentation accurately describes the services I performed. I attest to the accuracy of the note.    Attending:   Physician Attestation Statement for Scribe #1: I, Fritz Hampton MD, personally performed the services described in this documentation, as scribed by Sharon Erazo, in my presence, and it is both accurate and complete.          Clinical Impression       ICD-10-CM ICD-9-CM   1. Flank pain R10.9 789.09   2. Sepsis A41.9 038.9     995.91   3. Slow transit constipation K59.01 564.01     After detailed evaluation seems like the light sided kidney which is enlarged seems to be recovering from the previous abscess.  Most of the pain is neurological in nature and no evidence of active infection was found in today's evaluation.  Disposition:   Disposition: Discharged  Condition: Stable         Fritz Hampton MD  08/26/18 7158

## 2018-08-29 ENCOUNTER — OFFICE VISIT (OUTPATIENT)
Dept: DIABETES | Facility: CLINIC | Age: 53
End: 2018-08-29
Payer: MEDICARE

## 2018-08-29 VITALS
WEIGHT: 180.31 LBS | HEIGHT: 65 IN | DIASTOLIC BLOOD PRESSURE: 70 MMHG | SYSTOLIC BLOOD PRESSURE: 116 MMHG | BODY MASS INDEX: 30.04 KG/M2

## 2018-08-29 DIAGNOSIS — E11.9 TYPE 2 DIABETES MELLITUS WITHOUT COMPLICATION, WITH LONG-TERM CURRENT USE OF INSULIN: ICD-10-CM

## 2018-08-29 DIAGNOSIS — Z79.4 TYPE 2 DIABETES MELLITUS WITH COMPLICATION, WITH LONG-TERM CURRENT USE OF INSULIN: Primary | ICD-10-CM

## 2018-08-29 DIAGNOSIS — Z79.4 TYPE 2 DIABETES MELLITUS WITHOUT COMPLICATION, WITH LONG-TERM CURRENT USE OF INSULIN: ICD-10-CM

## 2018-08-29 DIAGNOSIS — I10 ESSENTIAL HYPERTENSION: Chronic | ICD-10-CM

## 2018-08-29 DIAGNOSIS — E78.5 DYSLIPIDEMIA ASSOCIATED WITH TYPE 2 DIABETES MELLITUS: ICD-10-CM

## 2018-08-29 DIAGNOSIS — E11.8 TYPE 2 DIABETES MELLITUS WITH COMPLICATION, WITH LONG-TERM CURRENT USE OF INSULIN: Primary | ICD-10-CM

## 2018-08-29 DIAGNOSIS — E11.69 DYSLIPIDEMIA ASSOCIATED WITH TYPE 2 DIABETES MELLITUS: ICD-10-CM

## 2018-08-29 LAB — GLUCOSE SERPL-MCNC: 236 MG/DL (ref 70–110)

## 2018-08-29 PROCEDURE — 99999 PR PBB SHADOW E&M-EST. PATIENT-LVL IV: CPT | Mod: PBBFAC,,, | Performed by: NURSE PRACTITIONER

## 2018-08-29 PROCEDURE — 82948 REAGENT STRIP/BLOOD GLUCOSE: CPT | Mod: S$GLB,,, | Performed by: NURSE PRACTITIONER

## 2018-08-29 PROCEDURE — 99214 OFFICE O/P EST MOD 30 MIN: CPT | Mod: S$GLB,,, | Performed by: NURSE PRACTITIONER

## 2018-08-29 RX ORDER — METFORMIN HYDROCHLORIDE 500 MG/1
1000 TABLET ORAL 2 TIMES DAILY WITH MEALS
Qty: 120 TABLET | Refills: 3 | Status: SHIPPED | OUTPATIENT
Start: 2018-08-29 | End: 2019-04-11 | Stop reason: SDUPTHER

## 2018-08-29 RX ORDER — INSULIN GLARGINE 300 [IU]/ML
50 INJECTION, SOLUTION SUBCUTANEOUS 2 TIMES DAILY
Qty: 4 SYRINGE | Refills: 3 | Status: SHIPPED | OUTPATIENT
Start: 2018-08-29 | End: 2019-04-10 | Stop reason: ALTCHOICE

## 2018-08-29 NOTE — PATIENT INSTRUCTIONS
Stop Lantus.  Start Toujeo 50 units twice a day.    Increase metformin 500 mg, 2 tabs twice a day.    Increase Novolin R 25 units three times a day.      Check blood sugars twice a day before breakfast and before dinner.

## 2018-08-29 NOTE — PROGRESS NOTES
"PCP: Perez Casiano MD    Subjective:     Chief Complaint: Diabetes, follow up    HISTORY OF PRESENT ILLNESS: 52 year old  female presenting, with family member, to follow up for diabetes.  She has not been seen by me or Dr. Langston since 11 / 2017.  Patient has had Type II diabetes since 1985 and has the following complications: neuropathy.  She has attended diabetes education in the past.  She has a history of bipolar disorder and depression, with previous suicide attempt - follows with psychiatry.      Patient reports being in and out of the hospital for the past several months due to kidney abscess with infection.  Has noted excessive thirstiness and frequent urination, blurry vision.     She has a week 's worth of blood glucose readings today. Per records, fasting BG 46, 98 - 315 - not monitoring other times. She voices compliance with MDI, but states that the insulin is not working.  Due to cost, she had to purchase OTC Novolin R, but continues to take Lantus at bedtime.           Height: 5' 5" (165.1 cm)  //  Weight: 81.8 kg (180 lb 5.4 oz), Body mass index is 30.01 kg/m².  Home Blood Glucose reading this AM: 234 mg/dl fasting  Her blood sugar in clinic today is: 236 mg/dl at 9:07 am     Labs Reviewed. ADA recommends A1C of less than 7 %. Her most recent A1C is:     Lab Results   Component Value Date    HGBA1C >14.0 (H) 06/23/2018      DM MEDICATIONS:   Lantus 80 units at bedtime  Novolin 15 - 20 units TID ac  Metformin 500 mg BID    REVIEW OF SYSTEMS:  General: Denies fever, nausea, vomiting, chills, or change in appetite.  HEENT: Denies impaired hearing, dysphagia.  Respiratory: Denies shortness of breath, cough, or wheezing.  Cardiovascular: Denies chest pain, palpitations.  GI: Denies hematochezia.  : Denies hematuria, diarrhea, dysuria or frequency.  MS:  Normal gait. Denies difficulty with mobility, muscle or joint pain.  SKIN: Denies rashes and lesions.  Neuro: Has numbness or tingling in " the hands or feet.   PSYCH: Has chronic anxiety, Bipolar disorder. No suicidal ideations.  ENDO: See HPI.    STANDARDS OF CARE:  Eye doctor: [facility] - Unsure of Optometrist name, Has cataracts -  Last exam 03 / 2018  Dental exam: Recommend regular exams; denies gums bleeding.  Podiatry doctor: None  ACE / ARB: Yes  Statin: Yes    ACTIVITY LEVEL: No routine exercise.  MEAL PLANNING: Number of meals per day - 3. Breakfast can be yogurt, mid morning snack can be carrots, celery, mushrooms; lunch can be corn dog OR frozen burrito. Mid afternoon snack can be carrots, apple. Supper can be grilled pork chops with rice, gravy OR 1/2 cup corn. Bedtime snack can be yogurt OR yanira cracker. Number of snacks per day - 3.      Patient is encouraged to consume 45 - 60 grams of carbohydrates in each meal, and 1800 k / gloria per day.  Per dietary recall, patient is not limiting carbohydrates, saturated fats and sodium.     BLOOD GLUCOSE TESTING: Self-monitoring  SOCIAL HISTORY: .  Lives with spouse.  Disabled, due to Bipolar Disorder, chronic anxiety.  Former smoker.    Objective:     PHYSICAL EXAMINATION:  GENERAL: WDWN  female in no acute distress, ambulatory, responds appropriately. AAO X 3.   NECK: Supple, no thyromegaly, no cervical or supraclavicular lymphadenopathy, no carotid bruit.  HEART: Regular rate and rhythm. No rubs, murmurs or gallops.  LUNGS: CTA bilaterally. Unlabored breathing, no use of accessory muscles.  MUSCULOSKELETAL: Normal gait and muscle strength.  ABDOMEN: Active bowel sounds X 4, no masses or tenderness.   SKIN: Warm, dry skin. No lesions or abrasions. Clean, dry well-healed injection sites.   NEUROLOGIC: Cranial nerves II-XII grossly intact.   FOOT EXAMINATION: Protective Sensation (w/ 10 gram monofilament):  Right: Intact  Left: Intact  // Visual Inspection:  Normal -  Bilateral  //  Pedal Pulses:   Right: Present  Left: Present    Assessment:     1) Diabetes Type II,  neuropathy - per records, uncontrolled on Lantus, Novolin R, A1C > 14.0  2) Hyperlipidemia - continue Lipitor  3) Hypertension - continue ramipril  4) Obese, BMI 30.01    Plan:     1.) Patient was instructed to monitor blood glucose 2 - 3 x daily, fasting and ac dinner or at bedtime.  Discussed ADA goal for fasting blood sugar, 80 - 130 mg/dL; pp blood sugars below 180 mg/dl. Also, discussed prevention of hypoglycemia and the need to adjust goals to higher levels if persistent hypoglycemia.  Reminded to bring blood glucose records or meter to each visit for review.  2.) Reviewed pathophysiology of Type II diabetes, complications related to the disease, importance of annual dilated eye exam and daily foot examination.  3.) We discussed the ADA recommendations: Hemoglobin A1c below 7.0 %.   All patients with diabetes should be on statins unless contraindicated.  ACE or ARB therapy if not contraindicated.    4.) I discussed starting a more concentrated basal insulin.  I explained MOA and side effects.  Stop Lantus.  Start Toujeo 50 units BID.   Due to cost, she had to purchase OTC Novolin R.  She cannot afford GLP-1 class.  Increase Novolin R 25 units TID ac.  Order placed for a CGM.  Indications for CGMS: Elevated A1C, Unexplained blood glucose excursions, Discrepancy between readings and A1C, and Annual Screening.  Increase metformin, 500 mg, 2 tabs BID.  In the past, have attempted to do VGO, but device was too expense - would consider Tier Exception.  Advised patient to call with BG readings in 2 weeks for review and adjustment of meds as needed.  5.) Meal planning teaching: Carbohydrate definition - one serving is 15 gms. Carbohydrate spacing - carbohydrates should be spaced into approximately 3 meals with 2 snacks ( of one carbohydrate ) between meals or at bedtime. Increase vegetable intake to 2 or more cups of vegetables per day as well as 2 fruit servings. Recommended low saturated fat, low sodium diet to aid  in control of hypertension and cholesterol.  6.) Discussed activity, benefits, methods, and precautions. Recommended patient start or continue some form of exercise and increase as tolerated to 30 minutes per day to facilitate weight loss and aid in control of BGs.  8.) Return to clinic in 4 weeks for follow up.  She was explained the above plan and given opportunity to ask questions.  Advised to call clinic with any further questions or concerns.    Amelia Santana, SAMSON-C, CDE

## 2018-08-31 LAB
BACTERIA BLD CULT: NORMAL
BACTERIA BLD CULT: NORMAL

## 2018-09-19 ENCOUNTER — TELEPHONE (OUTPATIENT)
Dept: INTERNAL MEDICINE | Facility: CLINIC | Age: 53
End: 2018-09-19

## 2018-09-19 NOTE — TELEPHONE ENCOUNTER
Spoke w' pt, she explained there was a family emergency which did not allow her to come in today.    Rescheduled to 09/24/2018 @8a

## 2018-09-19 NOTE — TELEPHONE ENCOUNTER
----- Message from Angela Ho sent at 9/19/2018 11:04 AM CDT -----  Patient needs call back to r/s appointment..[phone]

## 2018-09-21 DIAGNOSIS — E11.9 TYPE 2 DIABETES MELLITUS WITHOUT COMPLICATION: ICD-10-CM

## 2018-09-24 ENCOUNTER — OFFICE VISIT (OUTPATIENT)
Dept: PSYCHIATRY | Facility: CLINIC | Age: 53
End: 2018-09-24
Payer: MEDICARE

## 2018-09-24 DIAGNOSIS — F43.10 POSTTRAUMATIC STRESS DISORDER: ICD-10-CM

## 2018-09-24 DIAGNOSIS — F31.32 BIPOLAR AFFECTIVE DISORDER, CURRENTLY DEPRESSED, MODERATE: Primary | ICD-10-CM

## 2018-09-24 PROCEDURE — 90791 PSYCH DIAGNOSTIC EVALUATION: CPT | Mod: S$PBB,,, | Performed by: SOCIAL WORKER

## 2018-09-24 PROCEDURE — 90791 PSYCH DIAGNOSTIC EVALUATION: CPT | Mod: PBBFAC,PO | Performed by: SOCIAL WORKER

## 2018-10-30 ENCOUNTER — TELEPHONE (OUTPATIENT)
Dept: PSYCHIATRY | Facility: CLINIC | Age: 53
End: 2018-10-30

## 2018-11-13 ENCOUNTER — OFFICE VISIT (OUTPATIENT)
Dept: PSYCHIATRY | Facility: CLINIC | Age: 53
End: 2018-11-13
Payer: MEDICARE

## 2018-11-13 DIAGNOSIS — F41.9 ANXIETY: ICD-10-CM

## 2018-11-13 DIAGNOSIS — F31.31 BIPOLAR AFFECTIVE DISORDER, CURRENTLY DEPRESSED, MILD: Primary | Chronic | ICD-10-CM

## 2018-11-13 PROCEDURE — 99499 UNLISTED E&M SERVICE: CPT | Mod: S$GLB,,, | Performed by: PSYCHIATRY & NEUROLOGY

## 2018-11-13 PROCEDURE — 99999 PR PBB SHADOW E&M-EST. PATIENT-LVL I: CPT | Mod: PBBFAC,,, | Performed by: PSYCHIATRY & NEUROLOGY

## 2018-11-13 PROCEDURE — 99213 OFFICE O/P EST LOW 20 MIN: CPT | Mod: S$GLB,,, | Performed by: PSYCHIATRY & NEUROLOGY

## 2018-11-13 RX ORDER — DIAZEPAM 10 MG/1
10 TABLET ORAL 3 TIMES DAILY PRN
Qty: 90 TABLET | Refills: 3 | Status: SHIPPED | OUTPATIENT
Start: 2018-11-13 | End: 2018-12-13

## 2018-11-13 RX ORDER — LAMOTRIGINE 100 MG/1
100 TABLET ORAL DAILY
Qty: 30 TABLET | Refills: 3 | Status: SHIPPED | OUTPATIENT
Start: 2018-11-13 | End: 2019-01-28 | Stop reason: ALTCHOICE

## 2018-11-13 RX ORDER — QUETIAPINE FUMARATE 300 MG/1
300 TABLET, FILM COATED ORAL NIGHTLY
Qty: 30 TABLET | Refills: 3 | Status: SHIPPED | OUTPATIENT
Start: 2018-11-13 | End: 2019-06-17 | Stop reason: SDUPTHER

## 2018-11-13 RX ORDER — DOXEPIN HYDROCHLORIDE 10 MG/1
10 CAPSULE ORAL NIGHTLY PRN
Qty: 30 CAPSULE | Refills: 3 | Status: SHIPPED | OUTPATIENT
Start: 2018-11-13 | End: 2019-04-06 | Stop reason: SDUPTHER
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
## 2019-01-28 ENCOUNTER — OFFICE VISIT (OUTPATIENT)
Dept: PSYCHIATRY | Facility: CLINIC | Age: 54
End: 2019-01-28
Payer: MEDICARE

## 2019-01-28 VITALS
DIASTOLIC BLOOD PRESSURE: 78 MMHG | BODY MASS INDEX: 29.2 KG/M2 | WEIGHT: 175.5 LBS | SYSTOLIC BLOOD PRESSURE: 121 MMHG | HEART RATE: 101 BPM

## 2019-01-28 DIAGNOSIS — F41.9 ANXIETY: ICD-10-CM

## 2019-01-28 DIAGNOSIS — F31.9 BIPOLAR AFFECTIVE DISORDER, REMISSION STATUS UNSPECIFIED: Primary | Chronic | ICD-10-CM

## 2019-01-28 PROCEDURE — 3078F DIAST BP <80 MM HG: CPT | Mod: CPTII,S$GLB,, | Performed by: PSYCHIATRY & NEUROLOGY

## 2019-01-28 PROCEDURE — 3074F PR MOST RECENT SYSTOLIC BLOOD PRESSURE < 130 MM HG: ICD-10-PCS | Mod: CPTII,S$GLB,, | Performed by: PSYCHIATRY & NEUROLOGY

## 2019-01-28 PROCEDURE — 3078F PR MOST RECENT DIASTOLIC BLOOD PRESSURE < 80 MM HG: ICD-10-PCS | Mod: CPTII,S$GLB,, | Performed by: PSYCHIATRY & NEUROLOGY

## 2019-01-28 PROCEDURE — 3008F BODY MASS INDEX DOCD: CPT | Mod: CPTII,S$GLB,, | Performed by: PSYCHIATRY & NEUROLOGY

## 2019-01-28 PROCEDURE — 99999 PR PBB SHADOW E&M-EST. PATIENT-LVL II: CPT | Mod: PBBFAC,,, | Performed by: PSYCHIATRY & NEUROLOGY

## 2019-01-28 PROCEDURE — 99214 PR OFFICE/OUTPT VISIT, EST, LEVL IV, 30-39 MIN: ICD-10-PCS | Mod: S$GLB,,, | Performed by: PSYCHIATRY & NEUROLOGY

## 2019-01-28 PROCEDURE — 3074F SYST BP LT 130 MM HG: CPT | Mod: CPTII,S$GLB,, | Performed by: PSYCHIATRY & NEUROLOGY

## 2019-01-28 PROCEDURE — 99999 PR PBB SHADOW E&M-EST. PATIENT-LVL II: ICD-10-PCS | Mod: PBBFAC,,, | Performed by: PSYCHIATRY & NEUROLOGY

## 2019-01-28 PROCEDURE — 3008F PR BODY MASS INDEX (BMI) DOCUMENTED: ICD-10-PCS | Mod: CPTII,S$GLB,, | Performed by: PSYCHIATRY & NEUROLOGY

## 2019-01-28 PROCEDURE — 99214 OFFICE O/P EST MOD 30 MIN: CPT | Mod: S$GLB,,, | Performed by: PSYCHIATRY & NEUROLOGY
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
## 2019-01-29 RX ORDER — DIAZEPAM 10 MG/1
10 TABLET ORAL 3 TIMES DAILY
Qty: 90 TABLET | Refills: 3 | Status: SHIPPED | OUTPATIENT
Start: 2019-01-29 | End: 2019-06-17 | Stop reason: SDUPTHER

## 2019-01-29 RX ORDER — QUETIAPINE FUMARATE 300 MG/1
300 TABLET, FILM COATED ORAL NIGHTLY
Qty: 30 TABLET | Refills: 2 | Status: SHIPPED | OUTPATIENT
Start: 2019-01-29 | End: 2019-06-17 | Stop reason: SDUPTHER

## 2019-01-29 RX ORDER — VENLAFAXINE HYDROCHLORIDE 75 MG/1
CAPSULE, EXTENDED RELEASE ORAL
Qty: 60 CAPSULE | Refills: 2 | Status: SHIPPED | OUTPATIENT
Start: 2019-01-29 | End: 2019-06-17 | Stop reason: SDUPTHER

## 2019-01-29 RX ORDER — TOPIRAMATE 200 MG/1
TABLET ORAL
Qty: 60 TABLET | Refills: 2 | Status: SHIPPED | OUTPATIENT
Start: 2019-01-29 | End: 2019-06-17 | Stop reason: SDUPTHER

## 2019-02-15 DIAGNOSIS — E11.9 TYPE 2 DIABETES MELLITUS WITHOUT COMPLICATION: ICD-10-CM

## 2019-02-27 ENCOUNTER — PATIENT OUTREACH (OUTPATIENT)
Dept: ADMINISTRATIVE | Facility: HOSPITAL | Age: 54
End: 2019-02-27

## 2019-04-08 RX ORDER — DOXEPIN HYDROCHLORIDE 10 MG/1
CAPSULE ORAL
Qty: 30 CAPSULE | Refills: 1 | Status: SHIPPED | OUTPATIENT
Start: 2019-04-08 | End: 2019-06-17 | Stop reason: SDUPTHER

## 2019-04-10 ENCOUNTER — OFFICE VISIT (OUTPATIENT)
Dept: DIABETES | Facility: CLINIC | Age: 54
End: 2019-04-10
Payer: MEDICARE

## 2019-04-10 ENCOUNTER — LAB VISIT (OUTPATIENT)
Dept: LAB | Facility: HOSPITAL | Age: 54
End: 2019-04-10
Attending: NURSE PRACTITIONER
Payer: MEDICARE

## 2019-04-10 VITALS
HEIGHT: 64 IN | WEIGHT: 157.06 LBS | BODY MASS INDEX: 26.81 KG/M2 | DIASTOLIC BLOOD PRESSURE: 88 MMHG | SYSTOLIC BLOOD PRESSURE: 144 MMHG

## 2019-04-10 DIAGNOSIS — E11.65 TYPE 2 DIABETES MELLITUS WITH HYPERGLYCEMIA, WITH LONG-TERM CURRENT USE OF INSULIN: Primary | ICD-10-CM

## 2019-04-10 DIAGNOSIS — Z79.4 TYPE 2 DIABETES MELLITUS WITH HYPERGLYCEMIA, WITH LONG-TERM CURRENT USE OF INSULIN: Primary | ICD-10-CM

## 2019-04-10 DIAGNOSIS — E11.69 DYSLIPIDEMIA ASSOCIATED WITH TYPE 2 DIABETES MELLITUS: ICD-10-CM

## 2019-04-10 DIAGNOSIS — Z79.4 TYPE 2 DIABETES MELLITUS WITH HYPERGLYCEMIA, WITH LONG-TERM CURRENT USE OF INSULIN: ICD-10-CM

## 2019-04-10 DIAGNOSIS — E78.5 DYSLIPIDEMIA ASSOCIATED WITH TYPE 2 DIABETES MELLITUS: ICD-10-CM

## 2019-04-10 DIAGNOSIS — E11.65 TYPE 2 DIABETES MELLITUS WITH HYPERGLYCEMIA, WITH LONG-TERM CURRENT USE OF INSULIN: ICD-10-CM

## 2019-04-10 DIAGNOSIS — I10 ESSENTIAL HYPERTENSION: Chronic | ICD-10-CM

## 2019-04-10 LAB
ALBUMIN SERPL BCP-MCNC: 4 G/DL (ref 3.5–5.2)
ALP SERPL-CCNC: 109 U/L (ref 55–135)
ALT SERPL W/O P-5'-P-CCNC: 44 U/L (ref 10–44)
ANION GAP SERPL CALC-SCNC: 14 MMOL/L (ref 8–16)
AST SERPL-CCNC: 38 U/L (ref 10–40)
BILIRUB SERPL-MCNC: 0.7 MG/DL (ref 0.1–1)
BUN SERPL-MCNC: 14 MG/DL (ref 6–20)
CALCIUM SERPL-MCNC: 10 MG/DL (ref 8.7–10.5)
CHLORIDE SERPL-SCNC: 97 MMOL/L (ref 95–110)
CHOLEST SERPL-MCNC: 236 MG/DL (ref 120–199)
CHOLEST/HDLC SERPL: 5.4 {RATIO} (ref 2–5)
CO2 SERPL-SCNC: 24 MMOL/L (ref 23–29)
CREAT SERPL-MCNC: 0.9 MG/DL (ref 0.5–1.4)
EST. GFR  (AFRICAN AMERICAN): >60 ML/MIN/1.73 M^2
EST. GFR  (NON AFRICAN AMERICAN): >60 ML/MIN/1.73 M^2
ESTIMATED AVG GLUCOSE: ABNORMAL MG/DL (ref 68–131)
GLUCOSE SERPL-MCNC: 353 MG/DL (ref 70–110)
GLUCOSE SERPL-MCNC: 408 MG/DL (ref 70–110)
HBA1C MFR BLD HPLC: >14 % (ref 4–5.6)
HDLC SERPL-MCNC: 44 MG/DL (ref 40–75)
HDLC SERPL: 18.6 % (ref 20–50)
LDLC SERPL CALC-MCNC: 134.8 MG/DL (ref 63–159)
NONHDLC SERPL-MCNC: 192 MG/DL
POTASSIUM SERPL-SCNC: 4.5 MMOL/L (ref 3.5–5.1)
PROT SERPL-MCNC: 8 G/DL (ref 6–8.4)
SODIUM SERPL-SCNC: 135 MMOL/L (ref 136–145)
TRIGL SERPL-MCNC: 286 MG/DL (ref 30–150)

## 2019-04-10 PROCEDURE — 80053 COMPREHEN METABOLIC PANEL: CPT | Mod: HCNC

## 2019-04-10 PROCEDURE — 82962 GLUCOSE BLOOD TEST: CPT | Mod: HCNC,S$GLB,, | Performed by: NURSE PRACTITIONER

## 2019-04-10 PROCEDURE — 99214 OFFICE O/P EST MOD 30 MIN: CPT | Mod: HCNC,S$GLB,, | Performed by: NURSE PRACTITIONER

## 2019-04-10 PROCEDURE — 99999 PR PBB SHADOW E&M-EST. PATIENT-LVL IV: ICD-10-PCS | Mod: PBBFAC,HCNC,, | Performed by: NURSE PRACTITIONER

## 2019-04-10 PROCEDURE — 36415 COLL VENOUS BLD VENIPUNCTURE: CPT | Mod: HCNC,PO

## 2019-04-10 PROCEDURE — 82962 POCT GLUCOSE, HAND-HELD DEVICE: ICD-10-PCS | Mod: HCNC,S$GLB,, | Performed by: NURSE PRACTITIONER

## 2019-04-10 PROCEDURE — 99999 PR PBB SHADOW E&M-EST. PATIENT-LVL IV: CPT | Mod: PBBFAC,HCNC,, | Performed by: NURSE PRACTITIONER

## 2019-04-10 PROCEDURE — 99214 PR OFFICE/OUTPT VISIT, EST, LEVL IV, 30-39 MIN: ICD-10-PCS | Mod: HCNC,S$GLB,, | Performed by: NURSE PRACTITIONER

## 2019-04-10 PROCEDURE — 83036 HEMOGLOBIN GLYCOSYLATED A1C: CPT | Mod: HCNC

## 2019-04-10 PROCEDURE — 80061 LIPID PANEL: CPT | Mod: HCNC

## 2019-04-10 NOTE — PROGRESS NOTES
"PCP: Perez Casiano MD    Subjective:     Chief Complaint: Diabetes Follow Up    HISTORY OF PRESENT ILLNESS: 53 y.o.   female presenting for diabetes follow up. Patient has had Type II diabetes since 1985 and has the following complications: peripheral neuropathy. She  has attended diabetes education in the past. She has a history of bipolar disorder and depression, with previous suicide attempt - follows with psychiatry. Blood glucose testing is performed at random times.  Per recall, BGs range from 125 - 180.     She denies any recent hospital admissions or emergency room visits. She is having hypoglycemia.  Due to expensive cost, she has been without Toujeo insulin for > 1 month, but continues to take OTC Novolin Regular.     Height: 5' 4" (162.6 cm)  //  Weight: 71.2 kg (157 lb 1.2 oz), Body mass index is 26.96 kg/m².  Home Blood Glucose reading this AM: Not Taken  Her blood sugar in clinic today is: 353 mg/dl at 7:52 am      Labs Reviewed.       Lab Results   Component Value Date    CPEPTIDE 3.4 01/25/2017     Lab Results   Component Value Date    GLUTAMICACID 0.01 01/25/2017     Lab Results   Component Value Date    HUMANINSULIN 0.00 01/25/2017     DM MEDICATIONS:   OTC Novolin Regular, per sliding scale 8 - 10 units before breakfast 15 - 20 units TID ac  Metformin 500 mg BID    Review of Systems   Constitutional: Negative for appetite change, fatigue and unexpected weight change.   HENT: Positive for congestion.    Eyes: Negative for visual disturbance.   Respiratory: Positive for cough.    Gastrointestinal: Negative for abdominal pain, constipation, diarrhea and nausea.   Endocrine: Positive for polydipsia and polyuria. Negative for polyphagia.   Neurological: Negative for dizziness, numbness and headaches.   Psychiatric/Behavioral: Negative for confusion and decreased concentration. The patient is nervous/anxious.        Diabetes Management Status  Statin: Not taking  ACE/ARB: Not " taking    Screening or Prevention Patient's value Goal Complete/Controlled?   HgA1C Testing and Control   Lab Results   Component Value Date    HGBA1C >14.0 (H) 2018      Annually/Less than 8% Yes   Lipid profile : 2017 Annually No   LDL control Lab Results   Component Value Date    LDLCALC 120.0 2017    Annually/Less than 100 mg/dl  No   Nephropathy screening Lab Results   Component Value Date    MICALBCREAT 48.8 (H) 2017     Lab Results   Component Value Date    PROTEINUA Negative 2018    Annually Yes   Blood pressure BP Readings from Last 1 Encounters:   04/10/19 (!) 144/88    Less than 140/90 Yes   Dilated retinal exam : 2019 Annually Yes, Dr. Dubon   Foot exam   : 2018 Annually Yes     ACTIVITY LEVEL: Sedentary. Discussed activities, benefits, methods, and precautions.  MEAL PLANNING: Patient reports number of meals per day to be 3 and number of snacks per day to be 0.   Patient is encouraged to carb count and consume no more than 45 - 60 grams of carbohydrates in each meal, and 1800 k / gloria per day.      BLOOD GLUCOSE TESTIN x daily  SOCIAL HISTORY: .  Lives with spouse.  Disabled, due to Bipolar Disorder, chronic anxiety.  Former smoker.    Objective:      Physical Exam   Constitutional: She appears well-developed and well-nourished.   HENT:   Head: Normocephalic and atraumatic.   Eyes: Pupils are equal, round, and reactive to light. EOM are normal.   Neck: Normal range of motion.   Cardiovascular: Normal rate and regular rhythm.   Pulses:       Dorsalis pedis pulses are 2+ on the right side, and 2+ on the left side.   Pulmonary/Chest: Effort normal and breath sounds normal.   Musculoskeletal: Normal range of motion.   Feet:   Right Foot:   Protective Sensation: 6 sites tested. 6 sites sensed.   Skin Integrity: Negative for ulcer, callus or dry skin.   Left Foot:   Protective Sensation: 6 sites tested. 6 sites sensed.   Skin Integrity: Negative for ulcer,  blister, callus or dry skin.   Skin: Skin is warm and dry. No rash noted.   Psychiatric: She has a normal mood and affect. Her behavior is normal. Judgment and thought content normal.       Assessment / Plan:     1.) Type 2 diabetes mellitus with hyperglycemia, with long-term current use of insulin  Comments:  Uncontrolled.  Patient is having difficulty with affording her long acting insulin. In fact, she stopped her insulin greater than a month ago.  Will stop Toujeo. We discussed starting mixed insulin. I explained MOA and side effects.  Prescription sent for Novolin 70 / 30, 40 units BID ac.  Continue metformin 500 mg BID - she cannot tolerate higher doses.    Orders:  -     POCT Glucose, Hand-Held Device  -     Labs Today: A1C, Lipid, CMP   -     insulin NPH-insulin regular, 70/30, (NOVOLIN 70/30 U-100 INSULIN) 100 unit/mL (70-30) injection; Inject 40 Units into the skin 2 (two) times daily before meals.  Dispense: 3 vial; Refill: 3    2.) Dyslipidemia associated with type 2 diabetes mellitus - continue meds as prescribed    3.) Essential hypertension - continue meds as prescribed    Additional Plan Details:    1.) Continue monitoring blood sugar 2 x daily, fasting and ac dinner or at bedtime. Discussed ADA goal for fasting blood sugar, 80 - 130 mg/dL; pp blood sugars below 180 mg/dl. Also, discussed prevention of hypoglycemia and the need to adjust goals to higher levels if persistent hypoglycemia.  Reminded to bring BG records or meter to each visit for review.  2.) Return to clinic in 4weeks for follow up. The patient was explained the above plan and given opportunity to ask questions.  She understands, chooses and consents to this plan and accepts all the risks, which include but are not limited to the risks mentioned above. She understands the alternative of having no testing, interventions or treatments at this time. She left content and without further questions.     Amelia Santana, NP-C, CDE    A  total of 30 minutes was spent in face to face time, of which over 50 % was spent in counseling patient on disease process, complications, treatment, and side effects of medications.

## 2019-04-11 ENCOUNTER — LAB VISIT (OUTPATIENT)
Dept: LAB | Facility: HOSPITAL | Age: 54
End: 2019-04-11
Attending: FAMILY MEDICINE
Payer: MEDICARE

## 2019-04-11 ENCOUNTER — OFFICE VISIT (OUTPATIENT)
Dept: FAMILY MEDICINE | Facility: CLINIC | Age: 54
End: 2019-04-11
Payer: MEDICARE

## 2019-04-11 ENCOUNTER — TELEPHONE (OUTPATIENT)
Dept: FAMILY MEDICINE | Facility: CLINIC | Age: 54
End: 2019-04-11

## 2019-04-11 VITALS
HEIGHT: 64 IN | DIASTOLIC BLOOD PRESSURE: 67 MMHG | WEIGHT: 158.94 LBS | OXYGEN SATURATION: 98 % | BODY MASS INDEX: 27.13 KG/M2 | TEMPERATURE: 96 F | SYSTOLIC BLOOD PRESSURE: 135 MMHG | HEART RATE: 116 BPM

## 2019-04-11 DIAGNOSIS — R10.13 EPIGASTRIC PAIN: ICD-10-CM

## 2019-04-11 DIAGNOSIS — E03.4 HYPOTHYROIDISM DUE TO ACQUIRED ATROPHY OF THYROID: ICD-10-CM

## 2019-04-11 DIAGNOSIS — Z72.0 TOBACCO ABUSE: ICD-10-CM

## 2019-04-11 DIAGNOSIS — I10 ESSENTIAL HYPERTENSION: ICD-10-CM

## 2019-04-11 DIAGNOSIS — K31.84 DIABETIC GASTROPARESIS: ICD-10-CM

## 2019-04-11 DIAGNOSIS — E11.43 DIABETIC GASTROPARESIS: ICD-10-CM

## 2019-04-11 DIAGNOSIS — E11.65 UNCONTROLLED TYPE 2 DIABETES MELLITUS WITH HYPERGLYCEMIA: Primary | ICD-10-CM

## 2019-04-11 DIAGNOSIS — F31.9 BIPOLAR AFFECTIVE DISORDER, REMISSION STATUS UNSPECIFIED: ICD-10-CM

## 2019-04-11 LAB
BASOPHILS # BLD AUTO: 0.04 K/UL (ref 0–0.2)
BASOPHILS NFR BLD: 0.6 % (ref 0–1.9)
DIFFERENTIAL METHOD: NORMAL
EOSINOPHIL # BLD AUTO: 0 K/UL (ref 0–0.5)
EOSINOPHIL NFR BLD: 0 % (ref 0–8)
ERYTHROCYTE [DISTWIDTH] IN BLOOD BY AUTOMATED COUNT: 13.2 % (ref 11.5–14.5)
HCT VFR BLD AUTO: 47.7 % (ref 37–48.5)
HGB BLD-MCNC: 15.9 G/DL (ref 12–16)
IMM GRANULOCYTES # BLD AUTO: 0.02 K/UL (ref 0–0.04)
IMM GRANULOCYTES NFR BLD AUTO: 0.3 % (ref 0–0.5)
LYMPHOCYTES # BLD AUTO: 3.2 K/UL (ref 1–4.8)
LYMPHOCYTES NFR BLD: 45.6 % (ref 18–48)
MCH RBC QN AUTO: 29.8 PG (ref 27–31)
MCHC RBC AUTO-ENTMCNC: 33.3 G/DL (ref 32–36)
MCV RBC AUTO: 90 FL (ref 82–98)
MONOCYTES # BLD AUTO: 0.5 K/UL (ref 0.3–1)
MONOCYTES NFR BLD: 6.7 % (ref 4–15)
NEUTROPHILS # BLD AUTO: 3.3 K/UL (ref 1.8–7.7)
NEUTROPHILS NFR BLD: 46.8 % (ref 38–73)
NRBC BLD-RTO: 0 /100 WBC
PLATELET # BLD AUTO: 247 K/UL (ref 150–350)
PMV BLD AUTO: 10.8 FL (ref 9.2–12.9)
RBC # BLD AUTO: 5.33 M/UL (ref 4–5.4)
WBC # BLD AUTO: 6.97 K/UL (ref 3.9–12.7)

## 2019-04-11 PROCEDURE — 99214 OFFICE O/P EST MOD 30 MIN: CPT | Mod: HCNC,S$GLB,, | Performed by: FAMILY MEDICINE

## 2019-04-11 PROCEDURE — 99999 PR PBB SHADOW E&M-EST. PATIENT-LVL III: CPT | Mod: PBBFAC,HCNC,, | Performed by: FAMILY MEDICINE

## 2019-04-11 PROCEDURE — 3008F BODY MASS INDEX DOCD: CPT | Mod: HCNC,CPTII,S$GLB, | Performed by: FAMILY MEDICINE

## 2019-04-11 PROCEDURE — 99214 PR OFFICE/OUTPT VISIT, EST, LEVL IV, 30-39 MIN: ICD-10-PCS | Mod: HCNC,S$GLB,, | Performed by: FAMILY MEDICINE

## 2019-04-11 PROCEDURE — 99499 UNLISTED E&M SERVICE: CPT | Mod: HCNC,S$GLB,, | Performed by: FAMILY MEDICINE

## 2019-04-11 PROCEDURE — 99999 PR PBB SHADOW E&M-EST. PATIENT-LVL III: ICD-10-PCS | Mod: PBBFAC,HCNC,, | Performed by: FAMILY MEDICINE

## 2019-04-11 PROCEDURE — 99499 RISK ADDL DX/OHS AUDIT: ICD-10-PCS | Mod: HCNC,S$GLB,, | Performed by: FAMILY MEDICINE

## 2019-04-11 PROCEDURE — 3078F DIAST BP <80 MM HG: CPT | Mod: HCNC,CPTII,S$GLB, | Performed by: FAMILY MEDICINE

## 2019-04-11 PROCEDURE — 3078F PR MOST RECENT DIASTOLIC BLOOD PRESSURE < 80 MM HG: ICD-10-PCS | Mod: HCNC,CPTII,S$GLB, | Performed by: FAMILY MEDICINE

## 2019-04-11 PROCEDURE — 85025 COMPLETE CBC W/AUTO DIFF WBC: CPT | Mod: HCNC

## 2019-04-11 PROCEDURE — 3075F SYST BP GE 130 - 139MM HG: CPT | Mod: HCNC,CPTII,S$GLB, | Performed by: FAMILY MEDICINE

## 2019-04-11 PROCEDURE — 3075F PR MOST RECENT SYSTOLIC BLOOD PRESS GE 130-139MM HG: ICD-10-PCS | Mod: HCNC,CPTII,S$GLB, | Performed by: FAMILY MEDICINE

## 2019-04-11 PROCEDURE — 83690 ASSAY OF LIPASE: CPT | Mod: HCNC

## 2019-04-11 PROCEDURE — 84443 ASSAY THYROID STIM HORMONE: CPT | Mod: HCNC

## 2019-04-11 PROCEDURE — 36415 COLL VENOUS BLD VENIPUNCTURE: CPT | Mod: HCNC,PO

## 2019-04-11 PROCEDURE — 3008F PR BODY MASS INDEX (BMI) DOCUMENTED: ICD-10-PCS | Mod: HCNC,CPTII,S$GLB, | Performed by: FAMILY MEDICINE

## 2019-04-11 RX ORDER — METFORMIN HYDROCHLORIDE 500 MG/1
500 TABLET ORAL 2 TIMES DAILY WITH MEALS
Qty: 180 TABLET | Refills: 3 | Status: SHIPPED | OUTPATIENT
Start: 2019-04-11 | End: 2019-06-12 | Stop reason: SDUPTHER

## 2019-04-11 RX ORDER — LOSARTAN POTASSIUM 25 MG/1
25 TABLET ORAL DAILY
Qty: 90 TABLET | Refills: 3 | Status: SHIPPED | OUTPATIENT
Start: 2019-04-11 | End: 2019-05-23 | Stop reason: SDUPTHER

## 2019-04-11 RX ORDER — ATORVASTATIN CALCIUM 20 MG/1
20 TABLET, FILM COATED ORAL DAILY
Qty: 90 TABLET | Refills: 3 | Status: SHIPPED | OUTPATIENT
Start: 2019-04-11 | End: 2019-05-23

## 2019-04-11 RX ORDER — BUTALBITAL, ACETAMINOPHEN AND CAFFEINE 50; 325; 40 MG/1; MG/1; MG/1
1 TABLET ORAL EVERY 6 HOURS PRN
Qty: 20 TABLET | Refills: 0 | Status: SHIPPED | OUTPATIENT
Start: 2019-04-11 | End: 2019-05-11

## 2019-04-11 NOTE — TELEPHONE ENCOUNTER
----- Message from Moriah White sent at 4/11/2019 12:23 PM CDT -----  Contact: pt  Type:  Test Results    Who Called: Patient  Name of Test (Lab/Mammo/Etc): Lab Results  Date of Test: 4/9/19  Ordering Provider: Dr Casiano  Where the test was performed: [facility]  Would the patient rather a call back or a response via [facility]? Call back  Best Call Back Number: [phone]  Additional Information:  Pt need to know how high was the lab

## 2019-04-11 NOTE — PROGRESS NOTES
"Subjective:       Patient ID: Alexandra Goins is a 53 y.o. female.    Chief Complaint: Annual Exam      HPI Comments:       Current Outpatient Medications:     blood sugar diagnostic (ACCU-CHEK JOSE PLUS TEST STRP) Strp, 1 strip by Misc.(Non-Drug; Combo Route) route 3 (three) times daily. Accu-chek Strips, Disp: 100 strip, Rfl: 11    doxepin (SINEQUAN) 10 MG capsule, TAKE 1 CAPSULE BY MOUTH NIGHTLY AS NEEDED, Disp: 30 capsule, Rfl: 1    insulin aspart (NOVOLOG) 100 unit/mL injection, As directed, Disp: , Rfl:     insulin NPH-insulin regular, 70/30, (NOVOLIN 70/30 U-100 INSULIN) 100 unit/mL (70-30) injection, Inject 40 Units into the skin 2 (two) times daily before meals., Disp: 3 vial, Rfl: 3    insulin syringe-needle U-100 1 mL 30 gauge x 5/16 Syrg, 1 each by Misc.(Non-Drug; Combo Route) route 4 (four) times daily., Disp: 200 each, Rfl: 11    lancets (ACCU-CHEK SOFTCLIX LANCETS) Misc, 1 each by Misc.(Non-Drug; Combo Route) route 3 (three) times daily., Disp: 100 each, Rfl: 11    levothyroxine (SYNTHROID) 75 MCG tablet, Take 1 tablet (75 mcg total) by mouth before breakfast., Disp: 30 tablet, Rfl: 1    metFORMIN (GLUCOPHAGE) 500 MG tablet, Take 1 tablet (500 mg total) by mouth 2 (two) times daily with meals., Disp: 180 tablet, Rfl: 3    pen needle, diabetic (BD ULTRA-FINE ARNOLDO PEN NEEDLES) 32 gauge x 5/32" Ndle, 1 each by Misc.(Non-Drug; Combo Route) route 2 (two) times daily., Disp: 100 each, Rfl: 11    QUEtiapine (SEROQUEL) 300 MG Tab, Take 1 tablet (300 mg total) by mouth every evening., Disp: 30 tablet, Rfl: 3    QUEtiapine (SEROQUEL) 300 MG Tab, Take 1 tablet (300 mg total) by mouth every evening., Disp: 30 tablet, Rfl: 2    topiramate (TOPAMAX) 200 MG Tab, Take 1/2 tablet twice daily for 5 days then 1 tablet daily thereafter, Disp: 60 tablet, Rfl: 2    venlafaxine (EFFEXOR-XR) 75 MG 24 hr capsule, Take 1 capsule daily for seven days then 2 capsules daily thereafter, Disp: 60 capsule, Rfl: 2    " amoxicillin-clavulanate 875-125mg (AUGMENTIN) 875-125 mg per tablet, Take 1 tablet by mouth every 12 (twelve) hours., Disp: 20 tablet, Rfl: 0    atorvastatin (LIPITOR) 20 MG tablet, Take 1 tablet (20 mg total) by mouth once daily., Disp: 90 tablet, Rfl: 3    blood-glucose meter (ACCU-CHEK JOSE PLUS METER) Misc, 1 each by Misc.(Non-Drug; Combo Route) route as directed. Accu-chek Meter, Disp: 1 each, Rfl: 0    butalbital-acetaminophen-caffeine -40 mg (FIORICET, ESGIC) -40 mg per tablet, Take 1 tablet by mouth every 6 (six) hours as needed for Pain., Disp: 20 tablet, Rfl: 0    diazePAM (VALIUM) 10 MG Tab, Take 1 tablet (10 mg total) by mouth 3 (three) times daily., Disp: 90 tablet, Rfl: 3    isosorbide mononitrate (IMDUR) 30 MG 24 hr tablet, Take 1 tablet (30 mg total) by mouth once daily., Disp: 30 tablet, Rfl: 11    ketorolac (TORADOL) 10 mg tablet, Take 1 tablet (10 mg total) by mouth every 6 (six) hours., Disp: 20 tablet, Rfl: 0    losartan (COZAAR) 25 MG tablet, Take 1 tablet (25 mg total) by mouth once daily., Disp: 90 tablet, Rfl: 3    promethazine (PHENERGAN) 25 MG tablet, Take 1 tablet (25 mg total) by mouth every 6 (six) hours as needed for Nausea., Disp: 15 tablet, Rfl: 0    rizatriptan (MAXALT) 10 MG tablet, One tablet with onset of migraine and may repeat one dose in 2 hours if needed, Disp: , Rfl:     vitamin D 1000 units Tab, Take 1,000 Units by mouth once daily., Disp: , Rfl:       She is here to get re-established.  Realizes that she has fallen off the rales with respect to her medications, particularly for diabetes.  Saw diabetic education yesterday we did labs:  A1c was greater than 14.  Spot blood sugar over 400. Plans to get her prescriptions today.  Says she has ran out of everything.  Looks like her thyroid medication has not been filled for a while.  She is also no longer on atorvastatin because of some leg cramps prior.  She is willing to try this again.  No longer on  "an ACE-inhibitor.  I am switching her to an ARB today    Today she complains of chronic epigastric pain with nausea.  Constipation.  Frequent migraine headaches.  Have been helped in the past by Fioricet.  EGD from 2018 head showed chronic gastritis.  I suspect she also has significant amount of gastroparesis.    For follow-up, she says, because of lots of family health problems per    Review of Systems   Constitutional: Negative for activity change, appetite change and fever.   HENT: Negative for sore throat.    Respiratory: Negative for cough and shortness of breath.    Cardiovascular: Negative for chest pain.   Gastrointestinal: Positive for abdominal pain and constipation. Negative for diarrhea and nausea.   Genitourinary: Negative for difficulty urinating.   Musculoskeletal: Negative for arthralgias and myalgias.   Neurological: Positive for headaches. Negative for dizziness.   Psychiatric/Behavioral: Negative for confusion.       Objective:      Vitals:    04/11/19 0751   BP: 135/67   Pulse: (!) 116   Temp: 96.3 °F (35.7 °C)   SpO2: 98%   Weight: 72.1 kg (158 lb 15.2 oz)   Height: 5' 4" (1.626 m)   PainSc: 0-No pain     Physical Exam   Constitutional: She is oriented to person, place, and time. She appears well-developed and well-nourished. No distress.   HENT:   Head: Normocephalic.   Neck: Neck supple. No thyromegaly present.   Cardiovascular: Normal rate, regular rhythm and normal heart sounds.   No murmur heard.  Pulmonary/Chest: Effort normal and breath sounds normal. She has no wheezes. She has no rales.   Abdominal: Soft. She exhibits no distension and no mass. There is no hepatosplenomegaly. There is tenderness in the epigastric area. There is no rigidity and no guarding.   Musculoskeletal: She exhibits no edema.   Lymphadenopathy:     She has no cervical adenopathy.   Neurological: She is alert and oriented to person, place, and time.   Skin: Skin is warm and dry. She is not diaphoretic. "   Psychiatric: She has a normal mood and affect. Her behavior is normal. Judgment and thought content normal.   Nursing note and vitals reviewed.      Assessment:       1. Uncontrolled type 2 diabetes mellitus with hyperglycemia    2. Bipolar affective disorder, remission status unspecified    3. Essential hypertension    4. Tobacco abuse    5. Diabetic gastroparesis    6. Epigastric pain    7. Hypothyroidism due to acquired atrophy of thyroid        Plan:   Uncontrolled type 2 diabetes mellitus with hyperglycemia  Comments:  A1c greater than 14.  Will restart all of her new medication today.  I am lowering her metformin dose to 500 mg b.i.d..  Follow up 1 month with diabetic educati  Orders:  -     metFORMIN (GLUCOPHAGE) 500 MG tablet; Take 1 tablet (500 mg total) by mouth 2 (two) times daily with meals.  Dispense: 180 tablet; Refill: 3    Bipolar affective disorder, remission status unspecified  Comments:  On atypical antipsychotics which will raise her blood sugar.  Followed regularly by Psychiatry and therapist now    Essential hypertension  Comments:  Controlled.  Add low-dose losartan    Tobacco abuse    Diabetic gastroparesis  Comments:  At this point, clinically suspected    Epigastric pain  Comments:  History of gastritis; suspect gastroparesis.  Emphasized glycemic control as he  Orders:  -     CBC auto differential; Future; Expected date: 04/11/2019  -     Lipase; Future; Expected date: 04/11/2019    Hypothyroidism due to acquired atrophy of thyroid  Comments:  Off medication.  TSH today  Orders:  -     TSH; Future; Expected date: 04/11/2019    Other orders  -     atorvastatin (LIPITOR) 20 MG tablet; Take 1 tablet (20 mg total) by mouth once daily.  Dispense: 90 tablet; Refill: 3  -     losartan (COZAAR) 25 MG tablet; Take 1 tablet (25 mg total) by mouth once daily.  Dispense: 90 tablet; Refill: 3  -     butalbital-acetaminophen-caffeine -40 mg (FIORICET, ESGIC) -40 mg per tablet; Take 1  tablet by mouth every 6 (six) hours as needed for Pain.  Dispense: 20 tablet; Refill: 0

## 2019-04-12 LAB
LIPASE SERPL-CCNC: 23 U/L (ref 4–60)
TSH SERPL DL<=0.005 MIU/L-ACNC: 1.8 UIU/ML (ref 0.4–4)

## 2019-04-26 ENCOUNTER — PES CALL (OUTPATIENT)
Dept: ADMINISTRATIVE | Facility: CLINIC | Age: 54
End: 2019-04-26

## 2019-05-22 ENCOUNTER — OFFICE VISIT (OUTPATIENT)
Dept: DIABETES | Facility: CLINIC | Age: 54
End: 2019-05-22
Payer: MEDICARE

## 2019-05-22 VITALS
WEIGHT: 168.13 LBS | DIASTOLIC BLOOD PRESSURE: 70 MMHG | HEIGHT: 64 IN | BODY MASS INDEX: 28.7 KG/M2 | SYSTOLIC BLOOD PRESSURE: 118 MMHG

## 2019-05-22 DIAGNOSIS — E11.69 DYSLIPIDEMIA ASSOCIATED WITH TYPE 2 DIABETES MELLITUS: ICD-10-CM

## 2019-05-22 DIAGNOSIS — Z79.4 TYPE 2 DIABETES MELLITUS WITH COMPLICATION, WITH LONG-TERM CURRENT USE OF INSULIN: Primary | ICD-10-CM

## 2019-05-22 DIAGNOSIS — E11.8 TYPE 2 DIABETES MELLITUS WITH COMPLICATION, WITH LONG-TERM CURRENT USE OF INSULIN: Primary | ICD-10-CM

## 2019-05-22 DIAGNOSIS — I10 ESSENTIAL HYPERTENSION: Chronic | ICD-10-CM

## 2019-05-22 DIAGNOSIS — E78.5 DYSLIPIDEMIA ASSOCIATED WITH TYPE 2 DIABETES MELLITUS: ICD-10-CM

## 2019-05-22 LAB
GLUCOSE SERPL-MCNC: 59 MG/DL (ref 70–110)
GLUCOSE SERPL-MCNC: 59 MG/DL (ref 70–110)

## 2019-05-22 PROCEDURE — 99999 PR PBB SHADOW E&M-EST. PATIENT-LVL IV: ICD-10-PCS | Mod: PBBFAC,HCNC,, | Performed by: NURSE PRACTITIONER

## 2019-05-22 PROCEDURE — 99999 PR PBB SHADOW E&M-EST. PATIENT-LVL IV: CPT | Mod: PBBFAC,HCNC,, | Performed by: NURSE PRACTITIONER

## 2019-05-22 PROCEDURE — 82962 GLUCOSE BLOOD TEST: CPT | Mod: HCNC,S$GLB,, | Performed by: NURSE PRACTITIONER

## 2019-05-22 PROCEDURE — 99214 OFFICE O/P EST MOD 30 MIN: CPT | Mod: HCNC,S$GLB,, | Performed by: NURSE PRACTITIONER

## 2019-05-22 PROCEDURE — 82962 POCT GLUCOSE, HAND-HELD DEVICE: ICD-10-PCS | Mod: HCNC,S$GLB,, | Performed by: NURSE PRACTITIONER

## 2019-05-22 PROCEDURE — 99214 PR OFFICE/OUTPT VISIT, EST, LEVL IV, 30-39 MIN: ICD-10-PCS | Mod: HCNC,S$GLB,, | Performed by: NURSE PRACTITIONER

## 2019-05-22 NOTE — PROGRESS NOTES
"PCP: Perez Casiano MD    Subjective:     Chief Complaint: Diabetes Follow Up    HISTORY OF PRESENT ILLNESS: 53 y.o.   female presenting for diabetes follow up. Patient has had Type II diabetes since 1985 and has the following complications: peripheral neuropathy. She  has attended diabetes education in the past. She has a history of bipolar disorder and depression, with previous suicide attempt - follows with psychiatry.     Blood glucose testing is performed at least 4 times a day. She forgot her records today, but recalls, fasting  - 149 < 279 >; ac lunch 140 s; ac dinner 69 - 160 s.  She recently started a Mediterranean / Atkins eating plan, which is based on a low carb diet and since then sh is having more hypoglycemia, BG in 30 - 60 s, when she treats with caramel popcorn.     She denies any recent hospital admissions or emergency room visits.     Height: 5' 4" (162.6 cm)  //  Weight: 76.2 kg (168 lb 1.6 oz), Body mass index is 28.85 kg/m².  Home Blood Glucose reading this AM: Not Taken  Her blood sugar in clinic today is: 59 mg/dl at 9:01 am; OJ and 2 packs of crackers given, 75 mg/dl ( advised to go eat )      Labs Reviewed.       Lab Results   Component Value Date    CPEPTIDE 3.4 01/25/2017     Lab Results   Component Value Date    GLUTAMICACID 0.01 01/25/2017     Lab Results   Component Value Date    HUMANINSULIN 0.00 01/25/2017     DM MEDICATIONS:   Novolin 70 / 30, 40 units BID  Metformin 500 mg BID    Review of Systems   Constitutional: Negative for appetite change, fatigue and unexpected weight change.   Eyes: Negative for visual disturbance.   Respiratory: Negative for cough.    Gastrointestinal: Negative for abdominal pain, constipation, diarrhea and nausea.   Endocrine: Negative for polydipsia, polyphagia and polyuria.   Neurological: Negative for dizziness, numbness and headaches.   Psychiatric/Behavioral: Negative for confusion and decreased concentration. The patient is " nervous/anxious.        Diabetes Management Status  Statin: Not taking  ACE/ARB: Not taking    Screening or Prevention Patient's value Goal Complete/Controlled?   HgA1C Testing and Control   Lab Results   Component Value Date    HGBA1C >14.0 (H) 04/10/2019      Annually/Less than 8% Yes   Lipid profile : 04/10/2019 Annually No   LDL control Lab Results   Component Value Date    LDLCALC 134.8 04/10/2019    Annually/Less than 100 mg/dl  No   Nephropathy screening Lab Results   Component Value Date    MICALBCREAT 48.8 (H) 2017     Lab Results   Component Value Date    PROTEINUA Negative 2018    Annually Yes   Blood pressure BP Readings from Last 1 Encounters:   19 118/70    Less than 140/90 Yes   Dilated retinal exam : 2019 Annually Yes, Dr. Dubon   Foot exam   : 2019 Annually Yes     ACTIVITY LEVEL: Sedentary. Discussed activities, benefits, methods, and precautions.  MEAL PLANNING: Patient reports number of meals per day to be 3 and number of snacks per day to be 0.   Patient is encouraged to carb count and consume no more than 45 - 60 grams of carbohydrates in each meal, and 1800 k / gloria per day.      BLOOD GLUCOSE TESTIN x daily  SOCIAL HISTORY: .  Lives with spouse.  Disabled, due to Bipolar Disorder, chronic anxiety.  Former smoker.    Objective:      Physical Exam   Constitutional: She appears well-developed and well-nourished.   HENT:   Head: Normocephalic and atraumatic.   Eyes: Pupils are equal, round, and reactive to light. EOM are normal.   Neck: Normal range of motion.   Cardiovascular: Normal rate and regular rhythm.   Pulses:       Dorsalis pedis pulses are 2+ on the right side, and 2+ on the left side.   Pulmonary/Chest: Effort normal and breath sounds normal.   Musculoskeletal: Normal range of motion.   Feet:   Right Foot:   Protective Sensation: 6 sites tested. 6 sites sensed.   Skin Integrity: Negative for ulcer, callus or dry skin.   Left Foot:   Protective  Sensation: 6 sites tested. 6 sites sensed.   Skin Integrity: Negative for ulcer, blister, callus or dry skin.   Skin: Skin is warm and dry. No rash noted.   Psychiatric: She has a normal mood and affect. Her behavior is normal. Judgment and thought content normal.       Assessment / Plan:     1.) Type 2 diabetes mellitus with hyperglycemia, with long-term current use of insulin  Comments:  - Per patient, blood sugars are improving with dietary changes; however, she is experiencing hypoglycemia due to low carb diet plan.  She is following a more Mediterranean / Atkins eating planning which consist of more fish,  vegetables, and beans. For now, will lower Novolin 70 / 30, 30 units BID ac.  Continue metformin 500 mg BID - she cannot tolerate higher doses.     - Order placed for professional [employer] CGM.  Indications for CGMS: Elevated A1C, Hypoglycemic episodes and Annual Screening.  Depending on results, will make changes to diabetes regimen.      Orders:  -     POCT Glucose, Hand-Held Device    2.) Dyslipidemia associated with type 2 diabetes mellitus - continue meds as prescribed    3.) Essential hypertension - continue meds as prescribed    Additional Plan Details:    1.) Continue monitoring blood sugar 4 x daily, fasting and ac dinner or at bedtime. Discussed ADA goal for fasting blood sugar, 80 - 130 mg/dL; pp blood sugars below 180 mg/dl. Also, discussed prevention of hypoglycemia and the need to adjust goals to higher levels if persistent hypoglycemia.  Reminded to bring BG records or meter to each visit for review.  2.) Return to clinic in 2 weeks for follow up, CGM results. The patient was explained the above plan and given opportunity to ask questions.  She understands, chooses and consents to this plan and accepts all the risks, which include but are not limited to the risks mentioned above. She understands the alternative of having no testing, interventions or treatments at this time. She left  content and without further questions.     Amelia Santana, SAMSON-C, CDE    A total of 30 minutes was spent in face to face time, of which over 50 % was spent in counseling patient on disease process, complications, treatment, and side effects of medications.

## 2019-05-23 ENCOUNTER — LAB VISIT (OUTPATIENT)
Dept: LAB | Facility: HOSPITAL | Age: 54
End: 2019-05-23
Attending: FAMILY MEDICINE
Payer: MEDICARE

## 2019-05-23 ENCOUNTER — OFFICE VISIT (OUTPATIENT)
Dept: FAMILY MEDICINE | Facility: CLINIC | Age: 54
End: 2019-05-23
Payer: MEDICARE

## 2019-05-23 VITALS
OXYGEN SATURATION: 96 % | TEMPERATURE: 96 F | HEART RATE: 99 BPM | SYSTOLIC BLOOD PRESSURE: 130 MMHG | HEIGHT: 64 IN | BODY MASS INDEX: 28.86 KG/M2 | WEIGHT: 169.06 LBS | DIASTOLIC BLOOD PRESSURE: 65 MMHG

## 2019-05-23 DIAGNOSIS — E11.8 TYPE 2 DIABETES MELLITUS WITH COMPLICATION, WITH LONG-TERM CURRENT USE OF INSULIN: Primary | ICD-10-CM

## 2019-05-23 DIAGNOSIS — Z79.4 TYPE 2 DIABETES MELLITUS WITH COMPLICATION, WITH LONG-TERM CURRENT USE OF INSULIN: Primary | ICD-10-CM

## 2019-05-23 DIAGNOSIS — I10 ESSENTIAL HYPERTENSION: ICD-10-CM

## 2019-05-23 DIAGNOSIS — E03.4 HYPOTHYROIDISM DUE TO ACQUIRED ATROPHY OF THYROID: ICD-10-CM

## 2019-05-23 DIAGNOSIS — Z12.39 SCREENING FOR BREAST CANCER: ICD-10-CM

## 2019-05-23 PROCEDURE — 99999 PR PBB SHADOW E&M-EST. PATIENT-LVL III: CPT | Mod: PBBFAC,HCNC,, | Performed by: FAMILY MEDICINE

## 2019-05-23 PROCEDURE — 3078F PR MOST RECENT DIASTOLIC BLOOD PRESSURE < 80 MM HG: ICD-10-PCS | Mod: HCNC,CPTII,S$GLB, | Performed by: FAMILY MEDICINE

## 2019-05-23 PROCEDURE — 84443 ASSAY THYROID STIM HORMONE: CPT | Mod: HCNC

## 2019-05-23 PROCEDURE — 3075F PR MOST RECENT SYSTOLIC BLOOD PRESS GE 130-139MM HG: ICD-10-PCS | Mod: HCNC,CPTII,S$GLB, | Performed by: FAMILY MEDICINE

## 2019-05-23 PROCEDURE — 3075F SYST BP GE 130 - 139MM HG: CPT | Mod: HCNC,CPTII,S$GLB, | Performed by: FAMILY MEDICINE

## 2019-05-23 PROCEDURE — 3078F DIAST BP <80 MM HG: CPT | Mod: HCNC,CPTII,S$GLB, | Performed by: FAMILY MEDICINE

## 2019-05-23 PROCEDURE — 3008F BODY MASS INDEX DOCD: CPT | Mod: HCNC,CPTII,S$GLB, | Performed by: FAMILY MEDICINE

## 2019-05-23 PROCEDURE — 3008F PR BODY MASS INDEX (BMI) DOCUMENTED: ICD-10-PCS | Mod: HCNC,CPTII,S$GLB, | Performed by: FAMILY MEDICINE

## 2019-05-23 PROCEDURE — 36415 COLL VENOUS BLD VENIPUNCTURE: CPT | Mod: HCNC,PO

## 2019-05-23 PROCEDURE — 99999 PR PBB SHADOW E&M-EST. PATIENT-LVL III: ICD-10-PCS | Mod: PBBFAC,HCNC,, | Performed by: FAMILY MEDICINE

## 2019-05-23 PROCEDURE — 99214 OFFICE O/P EST MOD 30 MIN: CPT | Mod: HCNC,S$GLB,, | Performed by: FAMILY MEDICINE

## 2019-05-23 PROCEDURE — 99214 PR OFFICE/OUTPT VISIT, EST, LEVL IV, 30-39 MIN: ICD-10-PCS | Mod: HCNC,S$GLB,, | Performed by: FAMILY MEDICINE

## 2019-05-23 RX ORDER — LOSARTAN POTASSIUM 25 MG/1
25 TABLET ORAL DAILY
Qty: 90 TABLET | Refills: 3 | Status: SHIPPED | OUTPATIENT
Start: 2019-05-23 | End: 2019-06-28

## 2019-05-23 RX ORDER — LOVASTATIN 10 MG/1
10 TABLET ORAL NIGHTLY
Qty: 90 TABLET | Refills: 3 | Status: SHIPPED | OUTPATIENT
Start: 2019-05-23 | End: 2019-06-28

## 2019-05-23 NOTE — PROGRESS NOTES
"Subjective:       Patient ID: Alexandra Goins is a 53 y.o. female.    Chief Complaint: Follow-up      HPI Comments:       Current Outpatient Medications:     blood sugar diagnostic (ACCU-CHEK JOSE PLUS TEST STRP) Strp, 1 strip by Misc.(Non-Drug; Combo Route) route 3 (three) times daily. Accu-chek Strips, Disp: 100 strip, Rfl: 11    insulin aspart (NOVOLOG) 100 unit/mL injection, As directed, Disp: , Rfl:     insulin NPH-insulin regular, 70/30, (NOVOLIN 70/30 U-100 INSULIN) 100 unit/mL (70-30) injection, Inject 40 Units into the skin 2 (two) times daily before meals., Disp: 3 vial, Rfl: 3    insulin syringe-needle U-100 1 mL 30 gauge x 5/16 Syrg, 1 each by Misc.(Non-Drug; Combo Route) route 4 (four) times daily., Disp: 200 each, Rfl: 11    lancets (ACCU-CHEK SOFTCLIX LANCETS) Misc, 1 each by Misc.(Non-Drug; Combo Route) route 3 (three) times daily., Disp: 100 each, Rfl: 11    losartan (COZAAR) 25 MG tablet, Take 1 tablet (25 mg total) by mouth once daily., Disp: 90 tablet, Rfl: 3    metFORMIN (GLUCOPHAGE) 500 MG tablet, Take 1 tablet (500 mg total) by mouth 2 (two) times daily with meals., Disp: 180 tablet, Rfl: 3    pen needle, diabetic (BD ULTRA-FINE ARNOLDO PEN NEEDLES) 32 gauge x 5/32" Ndle, 1 each by Misc.(Non-Drug; Combo Route) route 2 (two) times daily., Disp: 100 each, Rfl: 11    QUEtiapine (SEROQUEL) 300 MG Tab, Take 1 tablet (300 mg total) by mouth every evening., Disp: 30 tablet, Rfl: 3    QUEtiapine (SEROQUEL) 300 MG Tab, Take 1 tablet (300 mg total) by mouth every evening., Disp: 30 tablet, Rfl: 2    rizatriptan (MAXALT) 10 MG tablet, One tablet with onset of migraine and may repeat one dose in 2 hours if needed, Disp: , Rfl:     topiramate (TOPAMAX) 200 MG Tab, Take 1/2 tablet twice daily for 5 days then 1 tablet daily thereafter, Disp: 60 tablet, Rfl: 2    venlafaxine (EFFEXOR-XR) 75 MG 24 hr capsule, Take 1 capsule daily for seven days then 2 capsules daily thereafter, Disp: 60 capsule, " Rfl: 2    vitamin D 1000 units Tab, Take 1,000 Units by mouth once daily., Disp: , Rfl:     amoxicillin-clavulanate 875-125mg (AUGMENTIN) 875-125 mg per tablet, Take 1 tablet by mouth every 12 (twelve) hours., Disp: 20 tablet, Rfl: 0    blood-glucose meter (ACCU-CHEK JOSE PLUS METER) Misc, 1 each by Misc.(Non-Drug; Combo Route) route as directed. Accu-chek Meter, Disp: 1 each, Rfl: 0    diazePAM (VALIUM) 10 MG Tab, Take 1 tablet (10 mg total) by mouth 3 (three) times daily., Disp: 90 tablet, Rfl: 3    doxepin (SINEQUAN) 10 MG capsule, TAKE 1 CAPSULE BY MOUTH NIGHTLY AS NEEDED, Disp: 30 capsule, Rfl: 1    isosorbide mononitrate (IMDUR) 30 MG 24 hr tablet, Take 1 tablet (30 mg total) by mouth once daily., Disp: 30 tablet, Rfl: 11    ketorolac (TORADOL) 10 mg tablet, Take 1 tablet (10 mg total) by mouth every 6 (six) hours., Disp: 20 tablet, Rfl: 0    levothyroxine (SYNTHROID) 75 MCG tablet, Take 1 tablet (75 mcg total) by mouth before breakfast., Disp: 30 tablet, Rfl: 1    lovastatin (MEVACOR) 10 MG tablet, Take 1 tablet (10 mg total) by mouth every evening., Disp: 90 tablet, Rfl: 3    promethazine (PHENERGAN) 25 MG tablet, Take 1 tablet (25 mg total) by mouth every 6 (six) hours as needed for Nausea., Disp: 15 tablet, Rfl: 0      She has been back on her medications since I last saw her in April.  At that time she had been off all of her medications for quite some time.  She is now back taking her insulin and metformin.  She has not, however, restarted her Synthroid, since we saw TSH normal last month, presumably off the medication.  We will check this again today.  She also has not been able to tolerate Lipitor and had to stop it because of muscle cramps.  She has also been on 1 other statin in the past which she did not tolerate.  We will try Mevacor.    She has been having some hypoglycemia recently.  Saw diabetic education yesterday who reduced her Novolin 70/30 doses in both the evening and the  "afternoon.  She has been on a Mediterranean Atkins diet for several weeks.  Her stomach is doing somewhat better.  Not as much bloating.  Having better bowel movements on her new diet.  Is taking meant metformin 1000 mg b.i.d..  (Not 500 b.i.d. as per diabetic notes.  This is because she says the bottle tells me to take 2 at a time).  Historically she has had trouble with higher doses of metformin.  Continue with the way she is taking it currently.  In the last 2 weeks he says her blood sugar has never been higher than 180.  She has not had any hypoglycemia since yesterday.  Made some dietary modifications during her diabetic visit yesterday    Review of Systems   Constitutional: Negative for activity change, appetite change and fever.   HENT: Negative for sore throat.    Respiratory: Negative for cough and shortness of breath.    Cardiovascular: Negative for chest pain.   Gastrointestinal: Positive for abdominal distention. Negative for abdominal pain, diarrhea and nausea.   Genitourinary: Negative for difficulty urinating.   Musculoskeletal: Negative for arthralgias and myalgias.   Neurological: Positive for headaches. Negative for dizziness.       Objective:      Vitals:    05/23/19 0847   BP: 130/65   Pulse: 99   Temp: 96.3 °F (35.7 °C)   SpO2: 96%   Weight: 76.7 kg (169 lb 1.5 oz)   Height: 5' 4" (1.626 m)   PainSc: 0-No pain     Physical Exam   Constitutional: She is oriented to person, place, and time. She appears well-developed and well-nourished. No distress.   HENT:   Head: Normocephalic.   Neck: Neck supple. No thyromegaly present.   Cardiovascular: Normal rate, regular rhythm and normal heart sounds.   No murmur heard.  Pulmonary/Chest: Effort normal and breath sounds normal. She has no wheezes. She has no rales.   Abdominal: Soft. She exhibits no distension.   Musculoskeletal: She exhibits no edema.   Lymphadenopathy:     She has no cervical adenopathy.   Neurological: She is alert and oriented to " person, place, and time.   Skin: Skin is warm and dry. She is not diaphoretic.   Psychiatric: She has a normal mood and affect. Her behavior is normal. Judgment and thought content normal.   Nursing note and vitals reviewed.      Assessment:       1. Type 2 diabetes mellitus with complication, with long-term current use of insulin    2. Screening for breast cancer    3. Essential hypertension    4. Hypothyroidism due to acquired atrophy of thyroid        Plan:   Type 2 diabetes mellitus with complication, with long-term current use of insulin  Comments:  Much improved blood sugar reports.  Better diet.  Some hypoglycemia which we are working through.  Follow up with diabetic education in 3 weeks    Screening for breast cancer  Comments:  Mammogram ordered  Orders:  -     Mammo Digital Screening Bilat; Future; Expected date: 05/23/2019    Essential hypertension  Comments:  Controlled.  Once again sent in losartan for renal protection    Hypothyroidism due to acquired atrophy of thyroid  Comments:  Has been off medications for some weeks/months.  Recheck TSH today  Orders:  -     TSH; Future; Expected date: 05/23/2019    Other orders  -     losartan (COZAAR) 25 MG tablet; Take 1 tablet (25 mg total) by mouth once daily.  Dispense: 90 tablet; Refill: 3  -     lovastatin (MEVACOR) 10 MG tablet; Take 1 tablet (10 mg total) by mouth every evening.  Dispense: 90 tablet; Refill: 3

## 2019-05-24 LAB — TSH SERPL DL<=0.005 MIU/L-ACNC: 1.76 UIU/ML (ref 0.4–4)

## 2019-05-29 ENCOUNTER — CLINICAL SUPPORT (OUTPATIENT)
Dept: DIABETES | Facility: CLINIC | Age: 54
End: 2019-05-29
Payer: MEDICARE

## 2019-05-29 DIAGNOSIS — Z79.4 TYPE 2 DIABETES MELLITUS WITH COMPLICATION, WITH LONG-TERM CURRENT USE OF INSULIN: ICD-10-CM

## 2019-05-29 DIAGNOSIS — E11.8 TYPE 2 DIABETES MELLITUS WITH COMPLICATION, WITH LONG-TERM CURRENT USE OF INSULIN: ICD-10-CM

## 2019-05-29 PROCEDURE — 95250 PR GLUCOSE MONITORING,72 HRS,SUB-Q SENSOR: ICD-10-PCS | Mod: HCNC,S$GLB,, | Performed by: NURSE PRACTITIONER

## 2019-05-29 PROCEDURE — 95250 CONT GLUC MNTR PHYS/QHP EQP: CPT | Mod: HCNC,S$GLB,, | Performed by: NURSE PRACTITIONER

## 2019-05-29 RX ORDER — INSULIN PUMP SYRINGE, 3 ML
EACH MISCELLANEOUS
Qty: 1 EACH | Refills: 0 | Status: ON HOLD | OUTPATIENT
Start: 2019-05-29 | End: 2019-06-29 | Stop reason: HOSPADM

## 2019-05-29 RX ORDER — LANCETS 33 GAUGE
1 EACH MISCELLANEOUS 3 TIMES DAILY
Qty: 100 EACH | Refills: 11 | Status: ON HOLD | OUTPATIENT
Start: 2019-05-29 | End: 2019-06-29 | Stop reason: HOSPADM

## 2019-05-29 NOTE — PROGRESS NOTES
Patient is here in clinic today for placement of continuous glucose monitoring system (CGMS) Freestyle Summer. Site on back of patient's right upper arm was cleaned and sensor was placed and started. Explained to patient that this is a blind procedure and that she will not actually see her BG in real time. Instructed pt. she could shower and informed her of need to check blood sugars as prescribed. Patient was also informed to avoid all imaging procedures and acetaminophen during her procedure. Voiced understanding of all instructions given. No further questions. She will come back to have sensor downloaded

## 2019-05-31 ENCOUNTER — TELEPHONE (OUTPATIENT)
Dept: PSYCHIATRY | Facility: CLINIC | Age: 54
End: 2019-05-31

## 2019-06-12 ENCOUNTER — OFFICE VISIT (OUTPATIENT)
Dept: DIABETES | Facility: CLINIC | Age: 54
End: 2019-06-12
Payer: MEDICARE

## 2019-06-12 ENCOUNTER — OFFICE VISIT (OUTPATIENT)
Dept: FAMILY MEDICINE | Facility: CLINIC | Age: 54
End: 2019-06-12
Payer: MEDICARE

## 2019-06-12 ENCOUNTER — CLINICAL SUPPORT (OUTPATIENT)
Dept: DIABETES | Facility: CLINIC | Age: 54
End: 2019-06-12
Payer: MEDICARE

## 2019-06-12 VITALS
OXYGEN SATURATION: 95 % | BODY MASS INDEX: 28.91 KG/M2 | BODY MASS INDEX: 28.79 KG/M2 | DIASTOLIC BLOOD PRESSURE: 80 MMHG | HEART RATE: 105 BPM | TEMPERATURE: 98 F | DIASTOLIC BLOOD PRESSURE: 78 MMHG | WEIGHT: 169.31 LBS | HEIGHT: 64 IN | SYSTOLIC BLOOD PRESSURE: 116 MMHG | SYSTOLIC BLOOD PRESSURE: 132 MMHG | WEIGHT: 168.63 LBS | HEIGHT: 64 IN

## 2019-06-12 DIAGNOSIS — E11.65 UNCONTROLLED TYPE 2 DIABETES MELLITUS WITH HYPERGLYCEMIA: ICD-10-CM

## 2019-06-12 DIAGNOSIS — Z79.4 TYPE 2 DIABETES MELLITUS WITH COMPLICATION, WITH LONG-TERM CURRENT USE OF INSULIN: Primary | ICD-10-CM

## 2019-06-12 DIAGNOSIS — R10.9 RIGHT FLANK PAIN: Primary | ICD-10-CM

## 2019-06-12 DIAGNOSIS — Z72.0 TOBACCO ABUSE: ICD-10-CM

## 2019-06-12 DIAGNOSIS — I10 ESSENTIAL HYPERTENSION: ICD-10-CM

## 2019-06-12 DIAGNOSIS — I10 ESSENTIAL HYPERTENSION: Chronic | ICD-10-CM

## 2019-06-12 DIAGNOSIS — F31.9 BIPOLAR AFFECTIVE DISORDER, REMISSION STATUS UNSPECIFIED: ICD-10-CM

## 2019-06-12 DIAGNOSIS — E11.8 TYPE 2 DIABETES MELLITUS WITH COMPLICATION, WITH LONG-TERM CURRENT USE OF INSULIN: ICD-10-CM

## 2019-06-12 DIAGNOSIS — E78.5 DYSLIPIDEMIA ASSOCIATED WITH TYPE 2 DIABETES MELLITUS: ICD-10-CM

## 2019-06-12 DIAGNOSIS — Z79.4 TYPE 2 DIABETES MELLITUS WITH COMPLICATION, WITH LONG-TERM CURRENT USE OF INSULIN: ICD-10-CM

## 2019-06-12 DIAGNOSIS — Z72.0 TOBACCO ABUSE: Chronic | ICD-10-CM

## 2019-06-12 DIAGNOSIS — E11.69 DYSLIPIDEMIA ASSOCIATED WITH TYPE 2 DIABETES MELLITUS: ICD-10-CM

## 2019-06-12 DIAGNOSIS — E11.8 TYPE 2 DIABETES MELLITUS WITH COMPLICATION, WITH LONG-TERM CURRENT USE OF INSULIN: Primary | ICD-10-CM

## 2019-06-12 DIAGNOSIS — E03.4 HYPOTHYROIDISM DUE TO ACQUIRED ATROPHY OF THYROID: ICD-10-CM

## 2019-06-12 LAB
BILIRUB SERPL-MCNC: NEGATIVE MG/DL
BLOOD URINE, POC: ABNORMAL
COLOR, POC UA: YELLOW
GLUCOSE SERPL-MCNC: 123 MG/DL (ref 70–110)
GLUCOSE UR QL STRIP: NORMAL
KETONES UR QL STRIP: NEGATIVE
LEUKOCYTE ESTERASE URINE, POC: ABNORMAL
NITRITE, POC UA: NEGATIVE
PH, POC UA: 5
PROTEIN, POC: ABNORMAL
SPECIFIC GRAVITY, POC UA: 1.02
UROBILINOGEN, POC UA: NORMAL

## 2019-06-12 PROCEDURE — 99214 OFFICE O/P EST MOD 30 MIN: CPT | Mod: HCNC,S$GLB,, | Performed by: NURSE PRACTITIONER

## 2019-06-12 PROCEDURE — 99214 PR OFFICE/OUTPT VISIT, EST, LEVL IV, 30-39 MIN: ICD-10-PCS | Mod: HCNC,S$GLB,, | Performed by: NURSE PRACTITIONER

## 2019-06-12 PROCEDURE — 3079F PR MOST RECENT DIASTOLIC BLOOD PRESSURE 80-89 MM HG: ICD-10-PCS | Mod: HCNC,CPTII,S$GLB, | Performed by: FAMILY MEDICINE

## 2019-06-12 PROCEDURE — 99999 PR PBB SHADOW E&M-EST. PATIENT-LVL III: CPT | Mod: PBBFAC,HCNC,, | Performed by: FAMILY MEDICINE

## 2019-06-12 PROCEDURE — 99214 PR OFFICE/OUTPT VISIT, EST, LEVL IV, 30-39 MIN: ICD-10-PCS | Mod: HCNC,25,S$GLB, | Performed by: FAMILY MEDICINE

## 2019-06-12 PROCEDURE — 3008F PR BODY MASS INDEX (BMI) DOCUMENTED: ICD-10-PCS | Mod: HCNC,CPTII,S$GLB, | Performed by: FAMILY MEDICINE

## 2019-06-12 PROCEDURE — 99999 PR PBB SHADOW E&M-EST. PATIENT-LVL III: ICD-10-PCS | Mod: PBBFAC,HCNC,, | Performed by: FAMILY MEDICINE

## 2019-06-12 PROCEDURE — 87086 URINE CULTURE/COLONY COUNT: CPT | Mod: HCNC

## 2019-06-12 PROCEDURE — 3008F BODY MASS INDEX DOCD: CPT | Mod: HCNC,CPTII,S$GLB, | Performed by: FAMILY MEDICINE

## 2019-06-12 PROCEDURE — 95251 CONT GLUC MNTR ANALYSIS I&R: CPT | Mod: HCNC,S$GLB,, | Performed by: NURSE PRACTITIONER

## 2019-06-12 PROCEDURE — 87077 CULTURE AEROBIC IDENTIFY: CPT | Mod: HCNC

## 2019-06-12 PROCEDURE — 95251 PR GLUCOSE MONITOR, 72 HOUR, PHYS INTERP: ICD-10-PCS | Mod: HCNC,S$GLB,, | Performed by: NURSE PRACTITIONER

## 2019-06-12 PROCEDURE — 3079F DIAST BP 80-89 MM HG: CPT | Mod: HCNC,CPTII,S$GLB, | Performed by: FAMILY MEDICINE

## 2019-06-12 PROCEDURE — 82962 POCT GLUCOSE, HAND-HELD DEVICE: ICD-10-PCS | Mod: HCNC,S$GLB,, | Performed by: NURSE PRACTITIONER

## 2019-06-12 PROCEDURE — 3074F PR MOST RECENT SYSTOLIC BLOOD PRESSURE < 130 MM HG: ICD-10-PCS | Mod: HCNC,CPTII,S$GLB, | Performed by: FAMILY MEDICINE

## 2019-06-12 PROCEDURE — 87088 URINE BACTERIA CULTURE: CPT | Mod: HCNC

## 2019-06-12 PROCEDURE — 99999 PR PBB SHADOW E&M-EST. PATIENT-LVL IV: CPT | Mod: PBBFAC,HCNC,, | Performed by: NURSE PRACTITIONER

## 2019-06-12 PROCEDURE — 87186 SC STD MICRODIL/AGAR DIL: CPT | Mod: HCNC

## 2019-06-12 PROCEDURE — 81002 POCT URINE DIPSTICK WITHOUT MICROSCOPE: ICD-10-PCS | Mod: HCNC,S$GLB,, | Performed by: FAMILY MEDICINE

## 2019-06-12 PROCEDURE — 99999 PR PBB SHADOW E&M-EST. PATIENT-LVL IV: ICD-10-PCS | Mod: PBBFAC,HCNC,, | Performed by: NURSE PRACTITIONER

## 2019-06-12 PROCEDURE — 3074F SYST BP LT 130 MM HG: CPT | Mod: HCNC,CPTII,S$GLB, | Performed by: FAMILY MEDICINE

## 2019-06-12 PROCEDURE — 99214 OFFICE O/P EST MOD 30 MIN: CPT | Mod: HCNC,25,S$GLB, | Performed by: FAMILY MEDICINE

## 2019-06-12 PROCEDURE — 82962 GLUCOSE BLOOD TEST: CPT | Mod: HCNC,S$GLB,, | Performed by: NURSE PRACTITIONER

## 2019-06-12 PROCEDURE — 81002 URINALYSIS NONAUTO W/O SCOPE: CPT | Mod: HCNC,S$GLB,, | Performed by: FAMILY MEDICINE

## 2019-06-12 RX ORDER — SULFAMETHOXAZOLE AND TRIMETHOPRIM 800; 160 MG/1; MG/1
1 TABLET ORAL 2 TIMES DAILY
Qty: 14 TABLET | Refills: 0 | Status: ON HOLD | OUTPATIENT
Start: 2019-06-12 | End: 2019-06-29 | Stop reason: HOSPADM

## 2019-06-12 RX ORDER — METFORMIN HYDROCHLORIDE 500 MG/1
500 TABLET ORAL 2 TIMES DAILY WITH MEALS
Qty: 180 TABLET | Refills: 1 | Status: SHIPPED | OUTPATIENT
Start: 2019-06-12 | End: 2019-06-12 | Stop reason: SDUPTHER

## 2019-06-12 RX ORDER — METFORMIN HYDROCHLORIDE 500 MG/1
500 TABLET ORAL 2 TIMES DAILY WITH MEALS
Qty: 180 TABLET | Refills: 1 | Status: SHIPPED | OUTPATIENT
Start: 2019-06-12 | End: 2020-02-17 | Stop reason: SDUPTHER

## 2019-06-12 NOTE — PROGRESS NOTES
Subjective:       Patient ID: Alexandra Goins is a 53 y.o. female.    Chief Complaint: Back Pain      HPI Comments:       Current Outpatient Medications:     blood sugar diagnostic (TRUE METRIX GLUCOSE TEST STRIP) Strp, 1 strip by Misc.(Non-Drug; Combo Route) route 3 (three) times daily. True Metrix Strips, Disp: 100 strip, Rfl: 11    blood-glucose meter (TRUE METRIX AIR GLUCOSE METER) kit, Use as instructed- True Metrix Meter, Disp: 1 each, Rfl: 0    diazePAM (VALIUM) 10 MG Tab, Take 1 tablet (10 mg total) by mouth 3 (three) times daily., Disp: 90 tablet, Rfl: 3    doxepin (SINEQUAN) 10 MG capsule, TAKE 1 CAPSULE BY MOUTH NIGHTLY AS NEEDED, Disp: 30 capsule, Rfl: 1    insulin aspart (NOVOLOG) 100 unit/mL injection, As directed, Disp: , Rfl:     insulin NPH-insulin regular, 70/30, (NOVOLIN 70/30 U-100 INSULIN) 100 unit/mL (70-30) injection, Inject 40 Units into the skin 2 (two) times daily before meals., Disp: 3 vial, Rfl: 3    insulin syringe-needle U-100 1 mL 30 gauge x 5/16 Syrg, 1 each by Misc.(Non-Drug; Combo Route) route 4 (four) times daily., Disp: 200 each, Rfl: 11    isosorbide mononitrate (IMDUR) 30 MG 24 hr tablet, Take 1 tablet (30 mg total) by mouth once daily., Disp: 30 tablet, Rfl: 11    ketorolac (TORADOL) 10 mg tablet, Take 1 tablet (10 mg total) by mouth every 6 (six) hours., Disp: 20 tablet, Rfl: 0    lancets (TRUEPLUS LANCETS) 33 gauge Misc, 1 lancet by Misc.(Non-Drug; Combo Route) route 3 (three) times daily., Disp: 100 each, Rfl: 11    levothyroxine (SYNTHROID) 75 MCG tablet, Take 1 tablet (75 mcg total) by mouth before breakfast., Disp: 30 tablet, Rfl: 1    losartan (COZAAR) 25 MG tablet, Take 1 tablet (25 mg total) by mouth once daily., Disp: 90 tablet, Rfl: 3    lovastatin (MEVACOR) 10 MG tablet, Take 1 tablet (10 mg total) by mouth every evening., Disp: 90 tablet, Rfl: 3    metFORMIN (GLUCOPHAGE) 500 MG tablet, Take 1 tablet (500 mg total) by mouth 2 (two) times daily with  "meals., Disp: 180 tablet, Rfl: 1    pen needle, diabetic (BD ULTRA-FINE ARNOLDO PEN NEEDLES) 32 gauge x 5/32" Ndle, 1 each by Misc.(Non-Drug; Combo Route) route 2 (two) times daily., Disp: 100 each, Rfl: 11    promethazine (PHENERGAN) 25 MG tablet, Take 1 tablet (25 mg total) by mouth every 6 (six) hours as needed for Nausea., Disp: 15 tablet, Rfl: 0    QUEtiapine (SEROQUEL) 300 MG Tab, Take 1 tablet (300 mg total) by mouth every evening., Disp: 30 tablet, Rfl: 3    QUEtiapine (SEROQUEL) 300 MG Tab, Take 1 tablet (300 mg total) by mouth every evening., Disp: 30 tablet, Rfl: 2    rizatriptan (MAXALT) 10 MG tablet, One tablet with onset of migraine and may repeat one dose in 2 hours if needed, Disp: , Rfl:     topiramate (TOPAMAX) 200 MG Tab, Take 1/2 tablet twice daily for 5 days then 1 tablet daily thereafter, Disp: 60 tablet, Rfl: 2    venlafaxine (EFFEXOR-XR) 75 MG 24 hr capsule, Take 1 capsule daily for seven days then 2 capsules daily thereafter, Disp: 60 capsule, Rfl: 2    vitamin D 1000 units Tab, Take 1,000 Units by mouth once daily., Disp: , Rfl:     sulfamethoxazole-trimethoprim 800-160mg (BACTRIM DS) 800-160 mg Tab, Take 1 tablet by mouth 2 (two) times daily., Disp: 14 tablet, Rfl: 0      Complains of a 2-3 day history of right-sided flank pain.  Radiates down to the right side and right lower quadrant of abdomen.  No nausea vomiting.  No fever chills.  Having urinary frequency and abdominal pain during urination.  Some change in the odor.  No hematuria or dysuria per se.  Also having some body aches during the same.    Blood sugars have been monitored through the CGM system.  Average blood sugar 116.  Some hypoglycemia reported including symptomatic.  Insulin Adjustments have been made today through diabetic education consult.    Review of Systems   Constitutional: Negative for activity change, appetite change, chills and fever.   HENT: Negative for sore throat.    Respiratory: Negative for cough " "and shortness of breath.    Cardiovascular: Positive for palpitations. Negative for chest pain.   Gastrointestinal: Positive for abdominal pain. Negative for diarrhea, nausea and vomiting.   Genitourinary: Positive for flank pain, frequency and urgency. Negative for difficulty urinating, dysuria and hematuria.   Musculoskeletal: Negative for arthralgias and myalgias.   Neurological: Negative for dizziness and headaches.   Psychiatric/Behavioral: Negative for confusion.       Objective:      Vitals:    06/12/19 0941   BP: 116/80   Pulse: 105   Temp: 97.7 °F (36.5 °C)   TempSrc: Tympanic   SpO2: 95%   Weight: 76.8 kg (169 lb 5 oz)   Height: 5' 4" (1.626 m)   PainSc:   7   PainLoc: Back     Physical Exam   Constitutional: She is oriented to person, place, and time. She appears well-developed and well-nourished. No distress.   HENT:   Head: Normocephalic.   Mouth/Throat: No oropharyngeal exudate.   Neck: Neck supple. No thyromegaly present.   Cardiovascular: Normal rate, regular rhythm and normal heart sounds.   No murmur heard.  Pulmonary/Chest: Effort normal and breath sounds normal. She has no wheezes. She has no rales.   Abdominal: Soft. She exhibits no distension and no mass. There is no hepatosplenomegaly. There is tenderness in the right lower quadrant. There is CVA tenderness. There is no rigidity and no guarding.   Musculoskeletal: She exhibits no edema.        Lumbar back: She exhibits decreased range of motion and tenderness. She exhibits no bony tenderness and no spasm.        Back:    Lymphadenopathy:     She has no cervical adenopathy.   Neurological: She is alert and oriented to person, place, and time.   Skin: Skin is warm and dry. She is not diaphoretic.   Psychiatric: She has a normal mood and affect. Her behavior is normal. Judgment and thought content normal.   Nursing note and vitals reviewed.      Assessment:       1. Right flank pain    2. Bipolar affective disorder, remission status unspecified  "   3. Hypothyroidism due to acquired atrophy of thyroid    4. Type 2 diabetes mellitus with complication, with long-term current use of insulin    5. Essential hypertension    6. Tobacco abuse        Plan:   Right flank pain  Comments:  Urinalysis positive for leukocytes.  Urine culture sent.  Bactrim double strength x7 days.  Increase fluids.  Pyelonephritis precautions given  Orders:  -     Urine culture; Future; Expected date: 06/12/2019  -     POCT URINE DIPSTICK WITHOUT MICROSCOPE    Bipolar affective disorder, remission status unspecified  Comments:  Mood improved with medications and glycemic control    Hypothyroidism due to acquired atrophy of thyroid  Comments:  Euthyroid off medication    Type 2 diabetes mellitus with complication, with long-term current use of insulin  Comments:  Improving glycemic control through closer monitoring.  Some adjustments in insulin today for hypoglycemia    Essential hypertension  Comments:  Controlled    Tobacco abuse    Other orders  -     sulfamethoxazole-trimethoprim 800-160mg (BACTRIM DS) 800-160 mg Tab; Take 1 tablet by mouth 2 (two) times daily.  Dispense: 14 tablet; Refill: 0

## 2019-06-12 NOTE — PROGRESS NOTES
"PCP: Perez Casiano MD    Subjective:     Chief Complaint: Diabetes Follow Up    HISTORY OF PRESENT ILLNESS: 53 y.o.  female presenting for diabetes follow up. Patient has had Type II diabetes since 1985 and has the following complications: peripheral neuropathy. She  has attended diabetes education in the past. She has a history of bipolar disorder and depression, with previous suicide attempt - follows with psychiatry.     The patient professional Freestyle CGM was downloaded and reviewed. The report was technically satisfactory. For the past 14 days, patient average glucose was 116 mg/dL, with standard deviation of 51.1. She was above range 12% of the time, in range 70 % of the time, and below range 18 % of the time. The target range for this patient was 70 - 180 mg/dL.  There were 16 low events with average duration of 242 minutes.  Overall, there was a pattern of nocturnal hypoglycemia, as well as throughout the day, particularly around dinner time 6 pm and bedtime.      She continues to eat a Mediterranean / Atkins eating plan, which is based on a low carb diet, which may be contributing to hypoglycemia.  She denies any recent hospital admissions or emergency room visits.     Height: 5' 4" (162.6 cm)  //  Weight: 76.5 kg (168 lb 10.4 oz), Body mass index is 28.95 kg/m².  Home Blood Glucose reading this AM: Not Taken  Her blood sugar in clinic today is: 123 mg/dl at 9:04 am      Labs Reviewed.       Lab Results   Component Value Date    CPEPTIDE 3.4 01/25/2017     Lab Results   Component Value Date    GLUTAMICACID 0.01 01/25/2017     Lab Results   Component Value Date    HUMANINSULIN 0.00 01/25/2017     DM MEDICATIONS:   Novolin 70 / 30, 30 units BID  Metformin 500 mg BID    Review of Systems   Constitutional: Negative for appetite change, fatigue and unexpected weight change.   Eyes: Negative for visual disturbance.   Respiratory: Negative for cough.    Gastrointestinal: Negative for abdominal pain, " constipation, diarrhea and nausea.   Endocrine: Negative for polydipsia, polyphagia and polyuria.   Genitourinary: Positive for dysuria, frequency and urgency.   Neurological: Negative for dizziness, numbness and headaches.   Psychiatric/Behavioral: Negative for confusion and decreased concentration. The patient is nervous/anxious.        Diabetes Management Status  Statin: Not taking  ACE/ARB: Not taking    Screening or Prevention Patient's value Goal Complete/Controlled?   HgA1C Testing and Control   Lab Results   Component Value Date    HGBA1C >14.0 (H) 04/10/2019      Annually/Less than 8% Yes   Lipid profile : 04/10/2019 Annually No   LDL control Lab Results   Component Value Date    LDLCALC 134.8 04/10/2019    Annually/Less than 100 mg/dl  No   Nephropathy screening Lab Results   Component Value Date    MICALBCREAT 48.8 (H) 2017     Lab Results   Component Value Date    PROTEINUA Negative 2018    Annually Yes   Blood pressure BP Readings from Last 1 Encounters:   19 116/80    Less than 140/90 Yes   Dilated retinal exam : 2019 Annually Yes, Dr. Dubon   Foot exam   : 2019 Annually Yes     ACTIVITY LEVEL: Sedentary. Discussed activities, benefits, methods, and precautions.  MEAL PLANNING: Patient reports number of meals per day to be 3 and number of snacks per day to be 0.   Patient is encouraged to carb count and consume no more than 45 - 60 grams of carbohydrates in each meal, and 1800 k / gloria per day.      BLOOD GLUCOSE TESTIN x daily  SOCIAL HISTORY: .  Lives with spouse.  Disabled, due to Bipolar Disorder, chronic anxiety.  Former smoker.    Objective:      Physical Exam   Constitutional: She appears well-developed and well-nourished.   HENT:   Head: Normocephalic and atraumatic.   Eyes: Pupils are equal, round, and reactive to light. EOM are normal.   Neck: Normal range of motion.   Cardiovascular: Normal rate and regular rhythm.   Pulmonary/Chest: Effort normal and  breath sounds normal.   Musculoskeletal: Normal range of motion.   Skin: Skin is warm and dry. No rash noted.   Psychiatric: She has a normal mood and affect. Her behavior is normal. Judgment and thought content normal.       Assessment / Plan:     1.) Type 2 diabetes mellitus with hyperglycemia, with long-term current use of insulin  Comments:  - Per CGM, patient is having nocturnal hypoglcyemia, as well as throughout the day around dinner and bedtime. She is following a more Mediterranean / Atkins eating planning which consist of more fish,  vegetables, and beans.  Decrease Novolin 70 / 30, 15 units before breakfast, 12 units before lunch, and 12 units before dinner. Continue metformin 500 mg BID - she cannot tolerate higher doses.     - AOB signed for Dexcom.  Scheduled patient to see PCP for evaluation for possible UTI.    Orders:  -     POCT Glucose, Hand-Held Device    2.) Dyslipidemia associated with type 2 diabetes mellitus - continue meds as prescribed    3.) Essential hypertension - continue meds as prescribed    Additional Plan Details:    1.) Continue monitoring blood sugar 4 x daily, fasting and ac dinner or at bedtime. Discussed ADA goal for fasting blood sugar, 80 - 130 mg/dL; pp blood sugars below 180 mg/dl. Also, discussed prevention of hypoglycemia and the need to adjust goals to higher levels if persistent hypoglycemia.  Reminded to bring BG records or meter to each visit for review.  2.) Return to clinic in 4 weeks for follow up, CGM results. The patient was explained the above plan and given opportunity to ask questions.  She understands, chooses and consents to this plan and accepts all the risks, which include but are not limited to the risks mentioned above. She understands the alternative of having no testing, interventions or treatments at this time. She left content and without further questions.     Amelia Santana, [employer], CDE    A total of 30 minutes was spent in face to face time,  of which over 50 % was spent in counseling patient on disease process, complications, treatment, and side effects of medications.

## 2019-06-12 NOTE — PROGRESS NOTES
Patient returned to clinic today to return glucose sensor  used in CMGS procedure. The CGMS sensor will be scanned and downloaded. All reports will be imported into the patient's electronic medical record. VILLA Santana NP will complete data interpretation and make recommendations.

## 2019-06-12 NOTE — PATIENT INSTRUCTIONS
Adjust Novolin 70 / 30, 15 units before breakfast, 12 units lunch, and 12 untis for dinner    Continue metformin.

## 2019-06-13 ENCOUNTER — TELEPHONE (OUTPATIENT)
Dept: FAMILY MEDICINE | Facility: CLINIC | Age: 54
End: 2019-06-13

## 2019-06-13 NOTE — TELEPHONE ENCOUNTER
----- Message from Angela Ho sent at 6/13/2019 10:50 AM CDT -----  Patient needs call back to get test results..[phone] (home)

## 2019-06-13 NOTE — TELEPHONE ENCOUNTER
Spoke to pt and told her I haven't got any results back on her urine yet but when I get them I would call her bk. Pt is wanting something called in for the pain

## 2019-06-14 ENCOUNTER — TELEPHONE (OUTPATIENT)
Dept: FAMILY MEDICINE | Facility: CLINIC | Age: 54
End: 2019-06-14

## 2019-06-14 RX ORDER — HYDROCODONE BITARTRATE AND ACETAMINOPHEN 5; 325 MG/1; MG/1
1 TABLET ORAL EVERY 6 HOURS PRN
Qty: 6 TABLET | Refills: 0 | Status: ON HOLD | OUTPATIENT
Start: 2019-06-14 | End: 2019-06-29 | Stop reason: SDUPTHER

## 2019-06-14 NOTE — TELEPHONE ENCOUNTER
----- Message from Perez Casiano MD sent at 6/14/2019  3:30 PM CDT -----  Contact: Self   Please document that you gave her the ER message  ----- Message -----  From: Amara Lion MA  Sent: 6/14/2019   3:12 PM  To: Perez Casiano MD    Please call pt and speak to her about the results and her needing to go to the er. She said she cant afford to go and is very upset.  ----- Message -----  From: Perez Casiano MD  Sent: 6/14/2019   3:06 PM  To: Amara Lion MA    She should be getting better. Needs to go to ER to get checked out there  ----- Message -----  From: Amara Lion MA  Sent: 6/14/2019   2:10 PM  To: Perez Casiano MD    Pt stated that the pain is in her kidneys. Drinking water and taking the antibiotic you prescribed and taking tylenol and ibuprofen nothing is wrking.  ----- Message -----  From: Barbara Arango  Sent: 6/14/2019   1:51 PM  To: [facility] Staff    Pt calling to speak to a nurse regarding health. [phone]

## 2019-06-14 NOTE — TELEPHONE ENCOUNTER
----- Message from Perez Casiano MD sent at 6/14/2019  3:30 PM CDT -----  Contact: Self   Please document that you gave her the ER message  ----- Message -----  From: Amara Lion MA  Sent: 6/14/2019   3:12 PM  To: Perez Casiano MD    Please call pt and speak to her about the results and her needing to go to the er. She said she cant afford to go and is very upset.  ----- Message -----  From: Perez Casiano MD  Sent: 6/14/2019   3:06 PM  To: Amara Lion MA    She should be getting better. Needs to go to ER to get checked out there  ----- Message -----  From: Amara Lion MA  Sent: 6/14/2019   2:10 PM  To: Perez Casiano MD    Pt stated that the pain is in her kidneys. Drinking water and taking the antibiotic you prescribed and taking tylenol and ibuprofen nothing is wrking.  ----- Message -----  From: Barbara Arango  Sent: 6/14/2019   1:51 PM  To: [facility] Staff    Pt calling to speak to a nurse regarding health. [phone]

## 2019-06-14 NOTE — TELEPHONE ENCOUNTER
Gave her a few hydrocodone.  Please let her know that if the pain gets worse over the weekend, she needs to go to the emergency room

## 2019-06-14 NOTE — TELEPHONE ENCOUNTER
----- Message from Amara Lion MA sent at 6/14/2019  3:12 PM CDT -----  Contact: Self   Please call pt and speak to her about the results and her needing to go to the er. She said she cant afford to go and is very upset.  ----- Message -----  From: Perez Casiano MD  Sent: 6/14/2019   3:06 PM  To: Amara Lion MA    She should be getting better. Needs to go to ER to get checked out there  ----- Message -----  From: Amara Lion MA  Sent: 6/14/2019   2:10 PM  To: Perez Casiano MD    Pt stated that the pain is in her kidneys. Drinking water and taking the antibiotic you prescribed and taking tylenol and ibuprofen nothing is wrking.  ----- Message -----  From: Barbara Arango  Sent: 6/14/2019   1:51 PM  To: [facility] Staff    Pt calling to speak to a nurse regarding health. [phone]

## 2019-06-15 LAB — BACTERIA UR CULT: NORMAL

## 2019-06-17 RX ORDER — DIAZEPAM 10 MG/1
10 TABLET ORAL 3 TIMES DAILY
Qty: 90 TABLET | Refills: 1 | Status: SHIPPED | OUTPATIENT
Start: 2019-06-17 | End: 2019-07-01 | Stop reason: SDUPTHER

## 2019-06-17 RX ORDER — TOPIRAMATE 200 MG/1
TABLET ORAL
Qty: 60 TABLET | Refills: 1 | Status: SHIPPED | OUTPATIENT
Start: 2019-06-17 | End: 2019-07-01 | Stop reason: SDUPTHER

## 2019-06-17 RX ORDER — DOXEPIN HYDROCHLORIDE 10 MG/1
10 CAPSULE ORAL NIGHTLY PRN
Qty: 30 CAPSULE | Refills: 1 | Status: SHIPPED | OUTPATIENT
Start: 2019-06-17 | End: 2019-07-01 | Stop reason: SDUPTHER

## 2019-06-17 RX ORDER — VENLAFAXINE HYDROCHLORIDE 75 MG/1
150 CAPSULE, EXTENDED RELEASE ORAL DAILY
Qty: 60 CAPSULE | Refills: 1 | Status: SHIPPED | OUTPATIENT
Start: 2019-06-17 | End: 2019-07-01 | Stop reason: SDUPTHER

## 2019-06-17 RX ORDER — QUETIAPINE FUMARATE 300 MG/1
300 TABLET, FILM COATED ORAL NIGHTLY
Qty: 30 TABLET | Refills: 1 | Status: SHIPPED | OUTPATIENT
Start: 2019-06-17 | End: 2019-07-01 | Stop reason: SDUPTHER

## 2019-06-27 ENCOUNTER — TELEPHONE (OUTPATIENT)
Dept: FAMILY MEDICINE | Facility: CLINIC | Age: 54
End: 2019-06-27

## 2019-06-27 NOTE — TELEPHONE ENCOUNTER
----- Message from Bobbi Butler sent at 6/27/2019  3:01 PM CDT -----  Contact: pt  She's calling in regards to appt for kidney  Infection ,pls call pt back at [phone] (home)

## 2019-06-28 ENCOUNTER — HOSPITAL ENCOUNTER (OUTPATIENT)
Facility: HOSPITAL | Age: 54
Discharge: HOME | End: 2019-06-29
Attending: EMERGENCY MEDICINE | Admitting: EMERGENCY MEDICINE
Payer: MEDICARE

## 2019-06-28 ENCOUNTER — OFFICE VISIT (OUTPATIENT)
Dept: FAMILY MEDICINE | Facility: CLINIC | Age: 54
End: 2019-06-28
Payer: MEDICARE

## 2019-06-28 VITALS
DIASTOLIC BLOOD PRESSURE: 69 MMHG | OXYGEN SATURATION: 98 % | WEIGHT: 166.44 LBS | HEIGHT: 64 IN | HEART RATE: 116 BPM | TEMPERATURE: 98 F | BODY MASS INDEX: 28.42 KG/M2 | SYSTOLIC BLOOD PRESSURE: 136 MMHG

## 2019-06-28 DIAGNOSIS — E11.8 TYPE 2 DIABETES MELLITUS WITH COMPLICATION, WITH LONG-TERM CURRENT USE OF INSULIN: ICD-10-CM

## 2019-06-28 DIAGNOSIS — Z79.4 TYPE 2 DIABETES MELLITUS WITH COMPLICATION, WITH LONG-TERM CURRENT USE OF INSULIN: ICD-10-CM

## 2019-06-28 DIAGNOSIS — N12 PYELONEPHRITIS: Primary | ICD-10-CM

## 2019-06-28 DIAGNOSIS — R50.9 FEVER: ICD-10-CM

## 2019-06-28 DIAGNOSIS — I10 ESSENTIAL HYPERTENSION: ICD-10-CM

## 2019-06-28 DIAGNOSIS — Z78.9 FAILURE OF OUTPATIENT TREATMENT: ICD-10-CM

## 2019-06-28 DIAGNOSIS — R10.9 RIGHT FLANK PAIN: Primary | ICD-10-CM

## 2019-06-28 LAB
ALBUMIN SERPL BCP-MCNC: 4.3 G/DL (ref 3.5–5.2)
ALP SERPL-CCNC: 80 U/L (ref 55–135)
ALT SERPL W/O P-5'-P-CCNC: 25 U/L (ref 10–44)
ANION GAP SERPL CALC-SCNC: 10 MMOL/L (ref 8–16)
AST SERPL-CCNC: 25 U/L (ref 10–40)
BACTERIA #/AREA URNS HPF: ABNORMAL /HPF
BASOPHILS # BLD AUTO: 0.02 K/UL (ref 0–0.2)
BASOPHILS NFR BLD: 0.2 % (ref 0–1.9)
BILIRUB SERPL-MCNC: 0.3 MG/DL (ref 0.1–1)
BILIRUB UR QL STRIP: NEGATIVE
BUN SERPL-MCNC: 14 MG/DL (ref 6–20)
CALCIUM SERPL-MCNC: 10.3 MG/DL (ref 8.7–10.5)
CHLORIDE SERPL-SCNC: 104 MMOL/L (ref 95–110)
CLARITY UR: CLEAR
CO2 SERPL-SCNC: 25 MMOL/L (ref 23–29)
COLOR UR: YELLOW
CREAT SERPL-MCNC: 0.7 MG/DL (ref 0.5–1.4)
DIFFERENTIAL METHOD: ABNORMAL
EOSINOPHIL # BLD AUTO: 0.1 K/UL (ref 0–0.5)
EOSINOPHIL NFR BLD: 0.9 % (ref 0–8)
ERYTHROCYTE [DISTWIDTH] IN BLOOD BY AUTOMATED COUNT: 15.8 % (ref 11.5–14.5)
EST. GFR  (AFRICAN AMERICAN): >60 ML/MIN/1.73 M^2
EST. GFR  (NON AFRICAN AMERICAN): >60 ML/MIN/1.73 M^2
GLUCOSE SERPL-MCNC: 110 MG/DL (ref 70–110)
GLUCOSE UR QL STRIP: NEGATIVE
HCT VFR BLD AUTO: 45.9 % (ref 37–48.5)
HGB BLD-MCNC: 15.6 G/DL (ref 12–16)
HGB UR QL STRIP: NEGATIVE
KETONES UR QL STRIP: NEGATIVE
LACTATE SERPL-SCNC: 0.9 MMOL/L (ref 0.5–2.2)
LACTATE SERPL-SCNC: 1 MMOL/L (ref 0.5–2.2)
LEUKOCYTE ESTERASE UR QL STRIP: ABNORMAL
LYMPHOCYTES # BLD AUTO: 4.4 K/UL (ref 1–4.8)
LYMPHOCYTES NFR BLD: 37.4 % (ref 18–48)
MCH RBC QN AUTO: 31.2 PG (ref 27–31)
MCHC RBC AUTO-ENTMCNC: 34 G/DL (ref 32–36)
MCV RBC AUTO: 92 FL (ref 82–98)
MICROSCOPIC COMMENT: ABNORMAL
MONOCYTES # BLD AUTO: 0.9 K/UL (ref 0.3–1)
MONOCYTES NFR BLD: 7.6 % (ref 4–15)
NEUTROPHILS # BLD AUTO: 6.3 K/UL (ref 1.8–7.7)
NEUTROPHILS NFR BLD: 54.2 % (ref 38–73)
NITRITE UR QL STRIP: POSITIVE
PH UR STRIP: 6 [PH] (ref 5–8)
PLATELET # BLD AUTO: 333 K/UL (ref 150–350)
PMV BLD AUTO: 9.2 FL (ref 9.2–12.9)
POCT GLUCOSE: 166 MG/DL (ref 70–110)
POCT GLUCOSE: 57 MG/DL (ref 70–110)
POTASSIUM SERPL-SCNC: 3.7 MMOL/L (ref 3.5–5.1)
PROCALCITONIN SERPL IA-MCNC: 0.06 NG/ML
PROT SERPL-MCNC: 8.2 G/DL (ref 6–8.4)
PROT UR QL STRIP: NEGATIVE
RBC # BLD AUTO: 5 M/UL (ref 4–5.4)
SODIUM SERPL-SCNC: 139 MMOL/L (ref 136–145)
SP GR UR STRIP: <=1.005 (ref 1–1.03)
SQUAMOUS #/AREA URNS HPF: 2 /HPF
TSH SERPL DL<=0.005 MIU/L-ACNC: 3.73 UIU/ML (ref 0.4–4)
URN SPEC COLLECT METH UR: ABNORMAL
UROBILINOGEN UR STRIP-ACNC: NEGATIVE EU/DL
WBC # BLD AUTO: 11.62 K/UL (ref 3.9–12.7)
WBC #/AREA URNS HPF: 3 /HPF (ref 0–5)

## 2019-06-28 PROCEDURE — 99214 PR OFFICE/OUTPT VISIT, EST, LEVL IV, 30-39 MIN: ICD-10-PCS | Mod: HCNC,S$GLB,, | Performed by: FAMILY MEDICINE

## 2019-06-28 PROCEDURE — 93010 ELECTROCARDIOGRAM REPORT: CPT | Mod: HCNC,,, | Performed by: INTERNAL MEDICINE

## 2019-06-28 PROCEDURE — 25000003 PHARM REV CODE 250: Mod: HCNC | Performed by: EMERGENCY MEDICINE

## 2019-06-28 PROCEDURE — 3078F PR MOST RECENT DIASTOLIC BLOOD PRESSURE < 80 MM HG: ICD-10-PCS | Mod: HCNC,CPTII,S$GLB, | Performed by: FAMILY MEDICINE

## 2019-06-28 PROCEDURE — 81000 URINALYSIS NONAUTO W/SCOPE: CPT | Mod: HCNC

## 2019-06-28 PROCEDURE — 84443 ASSAY THYROID STIM HORMONE: CPT | Mod: HCNC

## 2019-06-28 PROCEDURE — 83605 ASSAY OF LACTIC ACID: CPT | Mod: 91,HCNC

## 2019-06-28 PROCEDURE — 87040 BLOOD CULTURE FOR BACTERIA: CPT | Mod: HCNC

## 2019-06-28 PROCEDURE — 96361 HYDRATE IV INFUSION ADD-ON: CPT | Mod: HCNC

## 2019-06-28 PROCEDURE — 99499 UNLISTED E&M SERVICE: CPT | Mod: HCNC,S$GLB,, | Performed by: FAMILY MEDICINE

## 2019-06-28 PROCEDURE — 99499 RISK ADDL DX/OHS AUDIT: ICD-10-PCS | Mod: HCNC,S$GLB,, | Performed by: FAMILY MEDICINE

## 2019-06-28 PROCEDURE — 99285 EMERGENCY DEPT VISIT HI MDM: CPT | Mod: 25,HCNC

## 2019-06-28 PROCEDURE — 96375 TX/PRO/DX INJ NEW DRUG ADDON: CPT | Mod: HCNC

## 2019-06-28 PROCEDURE — G0378 HOSPITAL OBSERVATION PER HR: HCPCS | Mod: HCNC

## 2019-06-28 PROCEDURE — 25000003 PHARM REV CODE 250: Mod: HCNC | Performed by: NURSE PRACTITIONER

## 2019-06-28 PROCEDURE — 99999 PR PBB SHADOW E&M-EST. PATIENT-LVL III: CPT | Mod: PBBFAC,HCNC,, | Performed by: FAMILY MEDICINE

## 2019-06-28 PROCEDURE — 3008F PR BODY MASS INDEX (BMI) DOCUMENTED: ICD-10-PCS | Mod: HCNC,CPTII,S$GLB, | Performed by: FAMILY MEDICINE

## 2019-06-28 PROCEDURE — 3075F PR MOST RECENT SYSTOLIC BLOOD PRESS GE 130-139MM HG: ICD-10-PCS | Mod: HCNC,CPTII,S$GLB, | Performed by: FAMILY MEDICINE

## 2019-06-28 PROCEDURE — 99999 PR PBB SHADOW E&M-EST. PATIENT-LVL III: ICD-10-PCS | Mod: PBBFAC,HCNC,, | Performed by: FAMILY MEDICINE

## 2019-06-28 PROCEDURE — 86703 HIV-1/HIV-2 1 RESULT ANTBDY: CPT | Mod: HCNC

## 2019-06-28 PROCEDURE — 84145 PROCALCITONIN (PCT): CPT | Mod: HCNC

## 2019-06-28 PROCEDURE — 83036 HEMOGLOBIN GLYCOSYLATED A1C: CPT | Mod: HCNC

## 2019-06-28 PROCEDURE — 93010 EKG 12-LEAD: ICD-10-PCS | Mod: HCNC,,, | Performed by: INTERNAL MEDICINE

## 2019-06-28 PROCEDURE — 80053 COMPREHEN METABOLIC PANEL: CPT | Mod: HCNC

## 2019-06-28 PROCEDURE — 96368 THER/DIAG CONCURRENT INF: CPT | Performed by: EMERGENCY MEDICINE

## 2019-06-28 PROCEDURE — 96376 TX/PRO/DX INJ SAME DRUG ADON: CPT | Performed by: EMERGENCY MEDICINE

## 2019-06-28 PROCEDURE — 63600175 PHARM REV CODE 636 W HCPCS: Mod: HCNC | Performed by: NURSE PRACTITIONER

## 2019-06-28 PROCEDURE — 3075F SYST BP GE 130 - 139MM HG: CPT | Mod: HCNC,CPTII,S$GLB, | Performed by: FAMILY MEDICINE

## 2019-06-28 PROCEDURE — 3078F DIAST BP <80 MM HG: CPT | Mod: HCNC,CPTII,S$GLB, | Performed by: FAMILY MEDICINE

## 2019-06-28 PROCEDURE — 96365 THER/PROPH/DIAG IV INF INIT: CPT | Performed by: EMERGENCY MEDICINE

## 2019-06-28 PROCEDURE — 99214 OFFICE O/P EST MOD 30 MIN: CPT | Mod: HCNC,S$GLB,, | Performed by: FAMILY MEDICINE

## 2019-06-28 PROCEDURE — 93005 ELECTROCARDIOGRAM TRACING: CPT | Mod: HCNC

## 2019-06-28 PROCEDURE — 63600175 PHARM REV CODE 636 W HCPCS: Mod: HCNC | Performed by: EMERGENCY MEDICINE

## 2019-06-28 PROCEDURE — 96366 THER/PROPH/DIAG IV INF ADDON: CPT | Performed by: EMERGENCY MEDICINE

## 2019-06-28 PROCEDURE — 86803 HEPATITIS C AB TEST: CPT | Mod: HCNC

## 2019-06-28 PROCEDURE — 36415 COLL VENOUS BLD VENIPUNCTURE: CPT | Mod: HCNC

## 2019-06-28 PROCEDURE — 3008F BODY MASS INDEX DOCD: CPT | Mod: HCNC,CPTII,S$GLB, | Performed by: FAMILY MEDICINE

## 2019-06-28 PROCEDURE — 85025 COMPLETE CBC W/AUTO DIFF WBC: CPT | Mod: HCNC

## 2019-06-28 RX ORDER — IBUPROFEN 200 MG
16 TABLET ORAL
Status: DISCONTINUED | OUTPATIENT
Start: 2019-06-28 | End: 2019-06-29 | Stop reason: HOSPADM

## 2019-06-28 RX ORDER — QUETIAPINE FUMARATE 100 MG/1
300 TABLET, FILM COATED ORAL NIGHTLY
Status: DISCONTINUED | OUTPATIENT
Start: 2019-06-28 | End: 2019-06-29 | Stop reason: HOSPADM

## 2019-06-28 RX ORDER — INSULIN ASPART 100 [IU]/ML
1-10 INJECTION, SOLUTION INTRAVENOUS; SUBCUTANEOUS
Status: DISCONTINUED | OUTPATIENT
Start: 2019-06-28 | End: 2019-06-29 | Stop reason: HOSPADM

## 2019-06-28 RX ORDER — SODIUM CHLORIDE 0.9 % (FLUSH) 0.9 %
10 SYRINGE (ML) INJECTION
Status: DISCONTINUED | OUTPATIENT
Start: 2019-06-28 | End: 2019-06-29 | Stop reason: HOSPADM

## 2019-06-28 RX ORDER — HYDROMORPHONE HYDROCHLORIDE 2 MG/ML
1 INJECTION, SOLUTION INTRAMUSCULAR; INTRAVENOUS; SUBCUTANEOUS
Status: COMPLETED | OUTPATIENT
Start: 2019-06-28 | End: 2019-06-28

## 2019-06-28 RX ORDER — DIAZEPAM 5 MG/1
5 TABLET ORAL EVERY 8 HOURS PRN
Status: DISCONTINUED | OUTPATIENT
Start: 2019-06-28 | End: 2019-06-29 | Stop reason: HOSPADM

## 2019-06-28 RX ORDER — METOCLOPRAMIDE 10 MG/1
10 TABLET ORAL EVERY 6 HOURS PRN
Status: DISCONTINUED | OUTPATIENT
Start: 2019-06-28 | End: 2019-06-28

## 2019-06-28 RX ORDER — VENLAFAXINE HYDROCHLORIDE 75 MG/1
150 CAPSULE, EXTENDED RELEASE ORAL DAILY
Status: DISCONTINUED | OUTPATIENT
Start: 2019-06-28 | End: 2019-06-29 | Stop reason: HOSPADM

## 2019-06-28 RX ORDER — GLUCAGON 1 MG
1 KIT INJECTION
Status: DISCONTINUED | OUTPATIENT
Start: 2019-06-28 | End: 2019-06-29 | Stop reason: HOSPADM

## 2019-06-28 RX ORDER — DOXEPIN HYDROCHLORIDE 10 MG/1
10 CAPSULE ORAL NIGHTLY PRN
Status: DISCONTINUED | OUTPATIENT
Start: 2019-06-28 | End: 2019-06-29 | Stop reason: HOSPADM

## 2019-06-28 RX ORDER — HYDROCODONE BITARTRATE AND ACETAMINOPHEN 10; 325 MG/1; MG/1
1 TABLET ORAL EVERY 6 HOURS PRN
Status: DISCONTINUED | OUTPATIENT
Start: 2019-06-28 | End: 2019-06-29 | Stop reason: HOSPADM

## 2019-06-28 RX ORDER — ONDANSETRON 2 MG/ML
4 INJECTION INTRAMUSCULAR; INTRAVENOUS EVERY 8 HOURS PRN
Status: DISCONTINUED | OUTPATIENT
Start: 2019-06-28 | End: 2019-06-28

## 2019-06-28 RX ORDER — IBUPROFEN 200 MG
24 TABLET ORAL
Status: DISCONTINUED | OUTPATIENT
Start: 2019-06-28 | End: 2019-06-29 | Stop reason: HOSPADM

## 2019-06-28 RX ORDER — TOPIRAMATE 100 MG/1
200 TABLET, FILM COATED ORAL DAILY
Status: DISCONTINUED | OUTPATIENT
Start: 2019-06-28 | End: 2019-06-29 | Stop reason: HOSPADM

## 2019-06-28 RX ORDER — HYDROCODONE BITARTRATE AND ACETAMINOPHEN 5; 325 MG/1; MG/1
1 TABLET ORAL EVERY 6 HOURS PRN
Status: DISCONTINUED | OUTPATIENT
Start: 2019-06-28 | End: 2019-06-28

## 2019-06-28 RX ADMIN — HYDROCODONE BITARTRATE AND ACETAMINOPHEN 1 TABLET: 10; 325 TABLET ORAL at 07:06

## 2019-06-28 RX ADMIN — HYDROMORPHONE HYDROCHLORIDE 1 MG: 2 INJECTION, SOLUTION INTRAMUSCULAR; INTRAVENOUS; SUBCUTANEOUS at 11:06

## 2019-06-28 RX ADMIN — VENLAFAXINE HYDROCHLORIDE 150 MG: 75 CAPSULE, EXTENDED RELEASE ORAL at 01:06

## 2019-06-28 RX ADMIN — DOXEPIN HYDROCHLORIDE 10 MG: 10 CAPSULE ORAL at 09:06

## 2019-06-28 RX ADMIN — PROMETHAZINE HYDROCHLORIDE 12.5 MG: 25 INJECTION INTRAMUSCULAR; INTRAVENOUS at 12:06

## 2019-06-28 RX ADMIN — HYDROCODONE BITARTRATE AND ACETAMINOPHEN 1 TABLET: 5; 325 TABLET ORAL at 01:06

## 2019-06-28 RX ADMIN — PROMETHAZINE HYDROCHLORIDE 12.5 MG: 25 INJECTION INTRAMUSCULAR; INTRAVENOUS at 03:06

## 2019-06-28 RX ADMIN — SODIUM CHLORIDE 2202 ML: 0.9 INJECTION, SOLUTION INTRAVENOUS at 09:06

## 2019-06-28 RX ADMIN — CEFTRIAXONE 1 G: 1 INJECTION, SOLUTION INTRAVENOUS at 12:06

## 2019-06-28 RX ADMIN — PROMETHAZINE HYDROCHLORIDE 12.5 MG: 25 INJECTION INTRAMUSCULAR; INTRAVENOUS at 09:06

## 2019-06-28 RX ADMIN — TOPIRAMATE 200 MG: 100 TABLET, FILM COATED ORAL at 01:06

## 2019-06-28 RX ADMIN — QUETIAPINE FUMARATE 300 MG: 100 TABLET ORAL at 08:06

## 2019-06-28 RX ADMIN — DIAZEPAM 5 MG: 5 TABLET ORAL at 09:06

## 2019-06-28 RX ADMIN — DIAZEPAM 5 MG: 5 TABLET ORAL at 01:06

## 2019-06-28 NOTE — ED NOTES
Fall precautions in place at this time: Patient side rails are up x 2, bed is low and locked, call light is in reach of patient, and fall risk band applied to right wrist. patient educated on fall risk and verbalized understanding.

## 2019-06-28 NOTE — H&P
[facility] Hospital Medicine  History & Physical    Patient Name: Alexandra Goins  MRN: [id number]  Admission Date: 6/28/2019  Attending Physician: Louis Plummer MD   Primary Care Provider: Perez Casiano MD         Patient information was obtained from patient and ER records.     Subjective:     Principal Problem:Pyelonephritis    Chief Complaint:   Chief Complaint   Patient presents with    Abdominal Pain     c/o abdominal pain, bilateral flank pain, dysuria and N/V         HPI: Alexandra Goins is a 53 y.o. female patient with a PMHx of DM, HLD, HTN, and GERD who presents to the Emergency Department for evaluation of R sided flank pain with radiation to R abdomen which onset gradually. Patient states she was dx w/ kidney infection 2 weeks ago. Sxs improved with bactrim but returned 3 days ago. Symptoms are constant and moderate in severity. No mitigating or exacerbating factors reported. Associated sxs include n/v, dysuria, and subjective fever. Patient denies any chills, constipation, hematochezia, hematuria, urinary frequency, diarrhea, and all other sxs at this time. ED evaluation resulted positive UA, otherwise labs unremarkable. In the ED patient was given IV rocephin. Hospital medicine called for admission.       Past Medical History:   Diagnosis Date    Anxiety     Arthritis     Asthma     Bipolar 1 disorder     Depression     Diabetes mellitus, type 2     Diverticular disease     GERD (gastroesophageal reflux disease)     Hyperlipemia     Hypertension     Hypothyroidism     Pneumonia     Renal manifestation of secondary diabetes mellitus     Trouble in sleeping        Past Surgical History:   Procedure Laterality Date    CHOLECYSTECTOMY      COLON SURGERY      COLONOSCOPY N/A 1/12/2018    Performed by [facility]    DENTAL SURGERY  05/21/2018    removal of 8 top teeth    ESOPHAGOGASTRODUODENOSCOPY (EGD) N/A 1/12/2018    Performed by Roque  "DOMITILA [facility]ajan III, MD at [facility]    HYSTERECTOMY      TONSILLECTOMY         Review of patient's allergies indicates:   Allergen Reactions    Piroxicam Diarrhea and Nausea Only    Ultram [tramadol] Rash    Aspirin      Nosebleeds  Nosebleeds  Nosebleeds    Levaquin [levofloxacin]     Levomenol analogues     Lipitor [atorvastatin]      Leg Cramps    Celestone [betamethasone] Hives, Itching and Rash       No current facility-administered medications on file prior to encounter.      Current Outpatient Medications on File Prior to Encounter   Medication Sig    blood sugar diagnostic (TRUE METRIX GLUCOSE TEST STRIP) Strp 1 strip by Misc.(Non-Drug; Combo Route) route 3 (three) times daily. True Metrix Strips    blood-glucose meter (TRUE METRIX AIR GLUCOSE METER) kit Use as instructed- True Metrix Meter    diazePAM (VALIUM) 10 MG Tab Take 1 tablet (10 mg total) by mouth 3 (three) times daily.    doxepin (SINEQUAN) 10 MG capsule Take 1 capsule (10 mg total) by mouth nightly as needed.    insulin NPH-insulin regular, 70/30, (NOVOLIN 70/30 U-100 INSULIN) 100 unit/mL (70-30) injection Inject 40 Units into the skin 2 (two) times daily before meals.    insulin syringe-needle U-100 1 mL 30 gauge x 5/16 Syrg 1 each by Misc.(Non-Drug; Combo Route) route 4 (four) times daily.    lancets (TRUEPLUS LANCETS) 33 gauge Misc 1 lancet by Misc.(Non-Drug; Combo Route) route 3 (three) times daily.    metFORMIN (GLUCOPHAGE) 500 MG tablet Take 1 tablet (500 mg total) by mouth 2 (two) times daily with meals.    pen needle, diabetic (BD ULTRA-FINE ARNOLDO PEN NEEDLES) 32 gauge x 5/32" Ndle 1 each by Misc.(Non-Drug; Combo Route) route 2 (two) times daily.    promethazine (PHENERGAN) 25 MG tablet Take 1 tablet (25 mg total) by mouth every 6 (six) hours as needed for Nausea.    QUEtiapine (SEROQUEL) 300 MG Tab Take 1 tablet (300 mg total) by mouth every evening.    sulfamethoxazole-trimethoprim 800-160mg (BACTRIM DS) 800-160 mg Tab " Take 1 tablet by mouth 2 (two) times daily.    topiramate (TOPAMAX) 200 MG Tab Take 1/2 tablet twice daily for 5 days then 1 tablet daily thereafter    venlafaxine (EFFEXOR-XR) 75 MG 24 hr capsule Take 2 capsules (150 mg total) by mouth once daily. Take 1 capsule daily for seven days then 2 capsules daily thereafter    HYDROcodone-acetaminophen (NORCO) 5-325 mg per tablet Take 1 tablet by mouth every 6 (six) hours as needed for Pain.    isosorbide mononitrate (IMDUR) 30 MG 24 hr tablet Take 1 tablet (30 mg total) by mouth once daily.    rizatriptan (MAXALT) 10 MG tablet One tablet with onset of migraine and may repeat one dose in 2 hours if needed    vitamin D 1000 units Tab Take 1,000 Units by mouth once daily.    [DISCONTINUED] insulin aspart (NOVOLOG) 100 unit/mL injection As directed    [DISCONTINUED] ketorolac (TORADOL) 10 mg tablet Take 1 tablet (10 mg total) by mouth every 6 (six) hours.    [DISCONTINUED] levothyroxine (SYNTHROID) 75 MCG tablet Take 1 tablet (75 mcg total) by mouth before breakfast.    [DISCONTINUED] losartan (COZAAR) 25 MG tablet Take 1 tablet (25 mg total) by mouth once daily.    [DISCONTINUED] lovastatin (MEVACOR) 10 MG tablet Take 1 tablet (10 mg total) by mouth every evening.     Family History     Problem Relation (Age of Onset)    Alcohol abuse Mother, Father    Arthritis Father, Maternal Grandmother, Paternal Grandmother    Breast cancer Maternal Grandmother    COPD Mother, Sister    Cancer Father, Maternal Aunt, Maternal Uncle, Paternal Aunt, Paternal Uncle, Paternal Grandmother    Depression Mother    Diabetes Maternal Uncle    Drug abuse Mother, Maternal Uncle    Heart disease Father, Brother    Hypertension Father, Brother, Maternal Grandmother, Paternal Grandfather    Kidney failure Sister        Tobacco Use    Smoking status: Current Every Day Smoker     Years: 35.00     Types: Cigarettes, Vaping w/o nicotine    Smokeless tobacco: Never Used   Substance and Sexual  Activity    Alcohol use: Yes     Comment: occassionally    Drug use: Yes     Types: Marijuana    Sexual activity: Yes     Review of Systems   Constitutional: Positive for fatigue and fever. Negative for activity change and appetite change.   HENT: Negative.    Eyes: Negative.    Respiratory: Negative.  Negative for cough and shortness of breath.    Cardiovascular: Negative.  Negative for chest pain, palpitations and leg swelling.   Gastrointestinal: Positive for nausea and vomiting. Negative for abdominal pain and diarrhea.   Endocrine: Negative.    Genitourinary: Positive for dysuria. Negative for frequency and urgency.   Musculoskeletal: Positive for back pain (Right flank ).   Skin: Negative.    Allergic/Immunologic: Negative.    Neurological: Negative.    Hematological: Negative.    Psychiatric/Behavioral: Negative.      Objective:     Vital Signs (Most Recent):  Temp: 98.2 °F (36.8 °C) (06/28/19 1315)  Pulse: 81 (06/28/19 1315)  Resp: 14 (06/28/19 1315)  BP: 111/65 (06/28/19 1315)  SpO2: 98 % (06/28/19 1315) Vital Signs (24h Range):  Temp:  [97.5 °F (36.4 °C)-98.9 °F (37.2 °C)] 98.2 °F (36.8 °C)  Pulse:  [] 81  Resp:  [14-20] 14  SpO2:  [96 %-98 %] 98 %  BP: (111-155)/(60-79) 111/65     Weight: 74.8 kg (164 lb 14.5 oz)  Body mass index is 28.31 kg/m².    Physical Exam   Constitutional: She is oriented to person, place, and time. She appears well-developed and well-nourished.   HENT:   Head: Normocephalic and atraumatic.   Eyes: Pupils are equal, round, and reactive to light. Conjunctivae and EOM are normal.   Neck: Normal range of motion. Neck supple.   Cardiovascular: Normal rate, regular rhythm, normal heart sounds and intact distal pulses.   Pulmonary/Chest: Effort normal and breath sounds normal.   Abdominal: Soft. Bowel sounds are normal. There is no CVA tenderness.   Musculoskeletal: Normal range of motion.   Neurological: She is alert and oriented to person, place, and time. She has normal  reflexes.   Skin: Skin is warm and dry.   Psychiatric: She has a normal mood and affect. Her behavior is normal. Judgment and thought content normal.   Nursing note and vitals reviewed.       Significant Labs:   Blood Culture: No results for input(s): LABBLOO in the last 48 hours.  BMP:   Recent Labs   Lab 06/28/19  0935         K 3.7      CO2 25   BUN 14   CREATININE 0.7   CALCIUM 10.3     CBC:   Recent Labs   Lab 06/28/19  0935   WBC 11.62   HGB 15.6   HCT 45.9        CMP:   Recent Labs   Lab 06/28/19  0935      K 3.7      CO2 25      BUN 14   CREATININE 0.7   CALCIUM 10.3   PROT 8.2   ALBUMIN 4.3   BILITOT 0.3   ALKPHOS 80   AST 25   ALT 25   ANIONGAP 10   EGFRNONAA >60     Urine Culture: No results for input(s): LABURIN in the last 48 hours.  Urine Studies:   Recent Labs   Lab 06/28/19  1109   COLORU Yellow   APPEARANCEUA Clear   PHUR 6.0   SPECGRAV <=1.005*   PROTEINUA Negative   GLUCUA Negative   KETONESU Negative   BILIRUBINUA Negative   OCCULTUA Negative   NITRITE Positive*   UROBILINOGEN Negative   LEUKOCYTESUR 1+*   WBCUA 3   BACTERIA Many*   SQUAMEPITHEL 2     All pertinent labs within the past 24 hours have been reviewed.    Significant Imaging:   Imaging Results          X-Ray Chest AP Portable (Final result)  Result time 06/28/19 10:58:14    Final result by Homer Gomez MD (06/28/19 10:58:14)                 Impression:      No acute process seen.      Electronically signed by: Homer Gomez MD  Date:    06/28/2019  Time:    10:58             Narrative:    EXAMINATION:  XR CHEST AP PORTABLE    CLINICAL HISTORY:  Sepsis;    FINDINGS:  Single view of the chest.  Aorta demonstrates atherosclerotic disease.    Cardiac silhouette is normal.  The lungs demonstrate no evidence of active disease.  No evidence of pleural effusion or pneumothorax.  Bones appear intact.                               CT Renal Stone Study ABD Pelvis WO (Final result)  Result time  06/28/19 10:46:04    Final result by Adair Guthrie MD (06/28/19 10:46:04)                 Impression:      1. There is no CT evidence of obstructive uropathy.  Again, there is a 3 mm calculus lower pole right kidney.  2. There is no CT evidence of an acute intra-abdominal inflammatory process.  Advanced descending/proximal sigmoid diverticulosis.  Status post partial sigmoidectomy.  Normal appendix.  3. Hysterectomy.  Appendectomy.  Atherosclerosis.  Stable bilateral diffuse adrenal gland thickening characteristic of hyperplasia.  All CT scans at this facility are performed  using dose modulation techniques as appropriate to performed exam including the following:  automated exposure control; adjustment of mA and/or kV according to the patients size (this includes techniques or standardized protocols for targeted exams where dose is matched to indication/reason for exam: i.e. extremities or head);  iterative reconstruction technique.      Electronically signed by: Adair Guthrie MD  Date:    06/28/2019  Time:    10:46             Narrative:    EXAMINATION:  CT RENAL STONE STUDY ABD PELVIS WO    CLINICAL HISTORY:  Flank pain, stone disease suspected;    TECHNIQUE:  Abdominal and pelvic CT performed without contrast.  Coronal and sagittal reformations.    COMPARISON:  08/26/2018    FINDINGS:  Abdominal CT.  The lung bases are clear.  Coronary artery calcification.  Normal size heart.    Noncontrast evaluation liver, spleen, and pancreas unremarkable other than the right LC and figure a shin right lobe liver, 22 cm.  There has been interval resolution of the previously seen marked fatty liver.    There is a stable pattern of diffuse thickening bilateral adrenal glands, characteristic of hyperplasia.    Again, there is a nonobstructing calculus lower pole right kidney, 3 mm.  No obstructive uropathy.  The kidneys are otherwise unremarkable.    Normal caliber atherosclerotic aorta.  No lymphadenopathy.  Cholecystectomy.   Stable common bile duct without significant dilatation.    No bowel obstruction.  The appendix is normal.  There is advanced diverticulosis descending/proximal sigmoid colon with a sigmoid staple line characteristic of partial sigmoidectomy.  The GI tract is otherwise unremarkable.    Mild degenerative lumbar spine.    Pelvic CT.  The pelvic ring is intact.  There is no additional abnormality of the pelvic bowel loops.  Hysterectomy.  Normal size ovaries.  Ureters and urinary bladder are normal.  Numerous pelvic phleboliths.                              Assessment/Plan:     * Pyelonephritis  UA positive for nitrites and leukocytes   Urine culture pending  Blood cx pending   IV rocephin   Ct renal showed a nonobstructing calculus lower pole right kidney. No obstructive uropathy.      Anxiety  Continue Valium prn       Tobacco abuse  Counseled on smoking cessation       Marijuana abuse  Counseled on drug cessation   Social work consult to provide resources on discharge       Depression  Continue effexor       Bipolar disorder  Continue Seroquel       Hyperglycemia due to type 2 diabetes mellitus  Accuchecks   SSI-moderate dose   HA1c pending         VTE Risk Mitigation (From admission, onward)        Ordered     IP VTE LOW RISK PATIENT  Once      06/28/19 1232     Place sequential compression device  Until discontinued      06/28/19 1232     Place XIOMARA hose  Until discontinued      06/28/19 1232             Alexia Triplett NP  Department of Hospital Medicine   [facility]

## 2019-06-28 NOTE — PROGRESS NOTES
Subjective:       Patient ID: Alexandra Goins is a 53 y.o. female.    Chief Complaint: Pyelonephritis and Nausea      HPI Comments:       Current Outpatient Medications:     blood sugar diagnostic (TRUE METRIX GLUCOSE TEST STRIP) Strp, 1 strip by Misc.(Non-Drug; Combo Route) route 3 (three) times daily. True Metrix Strips, Disp: 100 strip, Rfl: 11    blood-glucose meter (TRUE METRIX AIR GLUCOSE METER) kit, Use as instructed- True Metrix Meter, Disp: 1 each, Rfl: 0    diazePAM (VALIUM) 10 MG Tab, Take 1 tablet (10 mg total) by mouth 3 (three) times daily., Disp: 90 tablet, Rfl: 1    doxepin (SINEQUAN) 10 MG capsule, Take 1 capsule (10 mg total) by mouth nightly as needed., Disp: 30 capsule, Rfl: 1    HYDROcodone-acetaminophen (NORCO) 5-325 mg per tablet, Take 1 tablet by mouth every 6 (six) hours as needed for Pain., Disp: 6 tablet, Rfl: 0    insulin aspart (NOVOLOG) 100 unit/mL injection, As directed, Disp: , Rfl:     insulin NPH-insulin regular, 70/30, (NOVOLIN 70/30 U-100 INSULIN) 100 unit/mL (70-30) injection, Inject 40 Units into the skin 2 (two) times daily before meals., Disp: 3 vial, Rfl: 3    insulin syringe-needle U-100 1 mL 30 gauge x 5/16 Syrg, 1 each by Misc.(Non-Drug; Combo Route) route 4 (four) times daily., Disp: 200 each, Rfl: 11    ketorolac (TORADOL) 10 mg tablet, Take 1 tablet (10 mg total) by mouth every 6 (six) hours., Disp: 20 tablet, Rfl: 0    lancets (TRUEPLUS LANCETS) 33 gauge Misc, 1 lancet by Misc.(Non-Drug; Combo Route) route 3 (three) times daily., Disp: 100 each, Rfl: 11    levothyroxine (SYNTHROID) 75 MCG tablet, Take 1 tablet (75 mcg total) by mouth before breakfast., Disp: 30 tablet, Rfl: 1    losartan (COZAAR) 25 MG tablet, Take 1 tablet (25 mg total) by mouth once daily., Disp: 90 tablet, Rfl: 3    lovastatin (MEVACOR) 10 MG tablet, Take 1 tablet (10 mg total) by mouth every evening., Disp: 90 tablet, Rfl: 3    metFORMIN (GLUCOPHAGE) 500 MG tablet, Take 1 tablet (500  "mg total) by mouth 2 (two) times daily with meals., Disp: 180 tablet, Rfl: 1    pen needle, diabetic (BD ULTRA-FINE ARNOLDO PEN NEEDLES) 32 gauge x 5/32" Ndle, 1 each by Misc.(Non-Drug; Combo Route) route 2 (two) times daily., Disp: 100 each, Rfl: 11    promethazine (PHENERGAN) 25 MG tablet, Take 1 tablet (25 mg total) by mouth every 6 (six) hours as needed for Nausea., Disp: 15 tablet, Rfl: 0    QUEtiapine (SEROQUEL) 300 MG Tab, Take 1 tablet (300 mg total) by mouth every evening., Disp: 30 tablet, Rfl: 1    rizatriptan (MAXALT) 10 MG tablet, One tablet with onset of migraine and may repeat one dose in 2 hours if needed, Disp: , Rfl:     sulfamethoxazole-trimethoprim 800-160mg (BACTRIM DS) 800-160 mg Tab, Take 1 tablet by mouth 2 (two) times daily., Disp: 14 tablet, Rfl: 0    topiramate (TOPAMAX) 200 MG Tab, Take 1/2 tablet twice daily for 5 days then 1 tablet daily thereafter, Disp: 60 tablet, Rfl: 1    venlafaxine (EFFEXOR-XR) 75 MG 24 hr capsule, Take 2 capsules (150 mg total) by mouth once daily. Take 1 capsule daily for seven days then 2 capsules daily thereafter, Disp: 60 capsule, Rfl: 1    vitamin D 1000 units Tab, Take 1,000 Units by mouth once daily., Disp: , Rfl:     isosorbide mononitrate (IMDUR) 30 MG 24 hr tablet, Take 1 tablet (30 mg total) by mouth once daily., Disp: 30 tablet, Rfl: 11      Presents in tears today because of a 2-3 day history of worsening right low back/flank pain. Temperature up to 101.2 this morning.  Having nausea, shaking, sweating.  Blood sugars in the 140-200 range.  Some decreased urine output but no irritative symptoms including dysuria, urinary frequency, hematuria.  Some radiation into the right hip but not down the leg.  Denies any significant abdominal pain.  No recent vomiting    Treated 2 weeks ago for presumed UTI.  E coli.  Treated with Bactrim.  Symptoms seem to resolve after treatment, until now.    Review of Systems   Constitutional: Positive for chills, " "diaphoresis and fatigue. Negative for activity change, appetite change and fever.   HENT: Negative for sore throat.    Respiratory: Negative for cough and shortness of breath.    Cardiovascular: Negative for chest pain.   Gastrointestinal: Negative for abdominal pain, diarrhea and nausea.   Genitourinary: Positive for decreased urine volume and flank pain. Negative for difficulty urinating, dysuria, frequency and urgency.   Musculoskeletal: Positive for back pain. Negative for arthralgias and myalgias.   Neurological: Negative for dizziness and headaches.       Objective:      Vitals:    06/28/19 0823   BP: 136/69   Pulse: (!) 116   Temp: 97.5 °F (36.4 °C)   SpO2: 98%   Weight: 75.5 kg (166 lb 7.2 oz)   Height: 5' 4" (1.626 m)   PainSc:   8     Physical Exam   Constitutional: She is oriented to person, place, and time. She appears well-developed and well-nourished. No distress.   Very uncomfortable.  Hurts to move around and moving to a supine and sitting position.  Crying during the examination   HENT:   Head: Normocephalic.   Mouth/Throat: No oropharyngeal exudate.   Neck: Neck supple. No thyromegaly present.   Cardiovascular: Normal rate, regular rhythm and normal heart sounds.   No murmur heard.  Pulmonary/Chest: Effort normal and breath sounds normal. She has no wheezes. She has no rales.   Abdominal: Soft. She exhibits no distension and no mass. There is no hepatosplenomegaly. There is no tenderness. There is CVA tenderness.   Musculoskeletal: She exhibits no edema.        Lumbar back: She exhibits decreased range of motion, tenderness and bony tenderness. She exhibits no swelling.        Back:    Lymphadenopathy:     She has no cervical adenopathy.   Neurological: She is alert and oriented to person, place, and time.   Skin: Skin is warm and dry. She is not diaphoretic.   Psychiatric: She has a normal mood and affect. Her behavior is normal. Judgment and thought content normal.   Nursing note and vitals " reviewed.      Assessment:       1. Right flank pain    2. Type 2 diabetes mellitus with complication, with long-term current use of insulin    3. Essential hypertension        Plan:   Right flank pain  Comments:  With fever.  Concerned about pyelonephritis.  Pt has no other ride, but feels comfortable driving herself directly the emergency room.  Report called to ER    Type 2 diabetes mellitus with complication, with long-term current use of insulin  Comments:  Improved control recently    Essential hypertension  Comments:  Controlled

## 2019-06-28 NOTE — SUBJECTIVE & OBJECTIVE
Past Medical History:   Diagnosis Date    Anxiety     Arthritis     Asthma     Bipolar 1 disorder     Depression     Diabetes mellitus, type 2     Diverticular disease     GERD (gastroesophageal reflux disease)     Hyperlipemia     Hypertension     Hypothyroidism     Pneumonia     Renal manifestation of secondary diabetes mellitus     Trouble in sleeping        Past Surgical History:   Procedure Laterality Date    CHOLECYSTECTOMY      COLON SURGERY      COLONOSCOPY N/A 1/12/2018    Performed by Roque Mahajan III, MD at [facility]    DENTAL SURGERY  05/21/2018    removal of 8 top teeth    ESOPHAGOGASTRODUODENOSCOPY (EGD) N/A 1/12/2018    Performed by Roque Mahajan III, MD at [facility]    HYSTERECTOMY      TONSILLECTOMY         Review of patient's allergies indicates:   Allergen Reactions    Piroxicam Diarrhea and Nausea Only    Ultram [tramadol] Rash    Aspirin      Nosebleeds  Nosebleeds  Nosebleeds    Levaquin [levofloxacin]     Levomenol analogues     Lipitor [atorvastatin]      Leg Cramps    Celestone [betamethasone] Hives, Itching and Rash       No current facility-administered medications on file prior to encounter.      Current Outpatient Medications on File Prior to Encounter   Medication Sig    blood sugar diagnostic (TRUE METRIX GLUCOSE TEST STRIP) Strp 1 strip by Misc.(Non-Drug; Combo Route) route 3 (three) times daily. True Metrix Strips    blood-glucose meter (TRUE METRIX AIR GLUCOSE METER) kit Use as instructed- True Metrix Meter    diazePAM (VALIUM) 10 MG Tab Take 1 tablet (10 mg total) by mouth 3 (three) times daily.    doxepin (SINEQUAN) 10 MG capsule Take 1 capsule (10 mg total) by mouth nightly as needed.    insulin NPH-insulin regular, 70/30, (NOVOLIN 70/30 U-100 INSULIN) 100 unit/mL (70-30) injection Inject 40 Units into the skin 2 (two) times daily before meals.    insulin syringe-needle U-100 1 mL 30 gauge x 5/16 Syrg 1 each by Misc.(Non-Drug; Combo Route)  "route 4 (four) times daily.    lancets (TRUEPLUS LANCETS) 33 gauge Misc 1 lancet by Misc.(Non-Drug; Combo Route) route 3 (three) times daily.    metFORMIN (GLUCOPHAGE) 500 MG tablet Take 1 tablet (500 mg total) by mouth 2 (two) times daily with meals.    pen needle, diabetic (BD ULTRA-FINE ARNOLDO PEN NEEDLES) 32 gauge x 5/32" Ndle 1 each by Misc.(Non-Drug; Combo Route) route 2 (two) times daily.    promethazine (PHENERGAN) 25 MG tablet Take 1 tablet (25 mg total) by mouth every 6 (six) hours as needed for Nausea.    QUEtiapine (SEROQUEL) 300 MG Tab Take 1 tablet (300 mg total) by mouth every evening.    sulfamethoxazole-trimethoprim 800-160mg (BACTRIM DS) 800-160 mg Tab Take 1 tablet by mouth 2 (two) times daily.    topiramate (TOPAMAX) 200 MG Tab Take 1/2 tablet twice daily for 5 days then 1 tablet daily thereafter    venlafaxine (EFFEXOR-XR) 75 MG 24 hr capsule Take 2 capsules (150 mg total) by mouth once daily. Take 1 capsule daily for seven days then 2 capsules daily thereafter    HYDROcodone-acetaminophen (NORCO) 5-325 mg per tablet Take 1 tablet by mouth every 6 (six) hours as needed for Pain.    isosorbide mononitrate (IMDUR) 30 MG 24 hr tablet Take 1 tablet (30 mg total) by mouth once daily.    rizatriptan (MAXALT) 10 MG tablet One tablet with onset of migraine and may repeat one dose in 2 hours if needed    vitamin D 1000 units Tab Take 1,000 Units by mouth once daily.    [DISCONTINUED] insulin aspart (NOVOLOG) 100 unit/mL injection As directed    [DISCONTINUED] ketorolac (TORADOL) 10 mg tablet Take 1 tablet (10 mg total) by mouth every 6 (six) hours.    [DISCONTINUED] levothyroxine (SYNTHROID) 75 MCG tablet Take 1 tablet (75 mcg total) by mouth before breakfast.    [DISCONTINUED] losartan (COZAAR) 25 MG tablet Take 1 tablet (25 mg total) by mouth once daily.    [DISCONTINUED] lovastatin (MEVACOR) 10 MG tablet Take 1 tablet (10 mg total) by mouth every evening.     Family History     Problem " Relation (Age of Onset)    Alcohol abuse Mother, Father    Arthritis Father, Maternal Grandmother, Paternal Grandmother    Breast cancer Maternal Grandmother    COPD Mother, Sister    Cancer Father, Maternal Aunt, Maternal Uncle, Paternal Aunt, Paternal Uncle, Paternal Grandmother    Depression Mother    Diabetes Maternal Uncle    Drug abuse Mother, Maternal Uncle    Heart disease Father, Brother    Hypertension Father, Brother, Maternal Grandmother, Paternal Grandfather    Kidney failure Sister        Tobacco Use    Smoking status: Current Every Day Smoker     Years: 35.00     Types: Cigarettes, Vaping w/o nicotine    Smokeless tobacco: Never Used   Substance and Sexual Activity    Alcohol use: Yes     Comment: occassionally    Drug use: Yes     Types: Marijuana    Sexual activity: Yes     Review of Systems   Constitutional: Positive for fatigue and fever. Negative for activity change and appetite change.   HENT: Negative.    Eyes: Negative.    Respiratory: Negative.  Negative for cough and shortness of breath.    Cardiovascular: Negative.  Negative for chest pain, palpitations and leg swelling.   Gastrointestinal: Positive for nausea and vomiting. Negative for abdominal pain and diarrhea.   Endocrine: Negative.    Genitourinary: Positive for dysuria. Negative for frequency and urgency.   Musculoskeletal: Positive for back pain (Right flank ).   Skin: Negative.    Allergic/Immunologic: Negative.    Neurological: Negative.    Hematological: Negative.    Psychiatric/Behavioral: Negative.      Objective:     Vital Signs (Most Recent):  Temp: 98.2 °F (36.8 °C) (06/28/19 1315)  Pulse: 81 (06/28/19 1315)  Resp: 14 (06/28/19 1315)  BP: 111/65 (06/28/19 1315)  SpO2: 98 % (06/28/19 1315) Vital Signs (24h Range):  Temp:  [97.5 °F (36.4 °C)-98.9 °F (37.2 °C)] 98.2 °F (36.8 °C)  Pulse:  [] 81  Resp:  [14-20] 14  SpO2:  [96 %-98 %] 98 %  BP: (111-155)/(60-79) 111/65     Weight: 74.8 kg (164 lb 14.5 oz)  Body mass  index is 28.31 kg/m².    Physical Exam   Constitutional: She is oriented to person, place, and time. She appears well-developed and well-nourished.   HENT:   Head: Normocephalic and atraumatic.   Eyes: Pupils are equal, round, and reactive to light. Conjunctivae and EOM are normal.   Neck: Normal range of motion. Neck supple.   Cardiovascular: Normal rate, regular rhythm, normal heart sounds and intact distal pulses.   Pulmonary/Chest: Effort normal and breath sounds normal.   Abdominal: Soft. Bowel sounds are normal. There is no CVA tenderness.   Musculoskeletal: Normal range of motion.   Neurological: She is alert and oriented to person, place, and time. She has normal reflexes.   Skin: Skin is warm and dry.   Psychiatric: She has a normal mood and affect. Her behavior is normal. Judgment and thought content normal.   Nursing note and vitals reviewed.       Significant Labs:   Blood Culture: No results for input(s): LABBLOO in the last 48 hours.  BMP:   Recent Labs   Lab 06/28/19  0935         K 3.7      CO2 25   BUN 14   CREATININE 0.7   CALCIUM 10.3     CBC:   Recent Labs   Lab 06/28/19  0935   WBC 11.62   HGB 15.6   HCT 45.9        CMP:   Recent Labs   Lab 06/28/19  0935      K 3.7      CO2 25      BUN 14   CREATININE 0.7   CALCIUM 10.3   PROT 8.2   ALBUMIN 4.3   BILITOT 0.3   ALKPHOS 80   AST 25   ALT 25   ANIONGAP 10   EGFRNONAA >60     Urine Culture: No results for input(s): LABURIN in the last 48 hours.  Urine Studies:   Recent Labs   Lab 06/28/19  1109   COLORU Yellow   APPEARANCEUA Clear   PHUR 6.0   SPECGRAV <=1.005*   PROTEINUA Negative   GLUCUA Negative   KETONESU Negative   BILIRUBINUA Negative   OCCULTUA Negative   NITRITE Positive*   UROBILINOGEN Negative   LEUKOCYTESUR 1+*   WBCUA 3   BACTERIA Many*   SQUAMEPITHEL 2     All pertinent labs within the past 24 hours have been reviewed.    Significant Imaging:   Imaging Results          X-Ray Chest AP  Portable (Final result)  Result time 06/28/19 10:58:14    Final result by Homer Gomez MD (06/28/19 10:58:14)                 Impression:      No acute process seen.      Electronically signed by: Homer Gomez MD  Date:    06/28/2019  Time:    10:58             Narrative:    EXAMINATION:  XR CHEST AP PORTABLE    CLINICAL HISTORY:  Sepsis;    FINDINGS:  Single view of the chest.  Aorta demonstrates atherosclerotic disease.    Cardiac silhouette is normal.  The lungs demonstrate no evidence of active disease.  No evidence of pleural effusion or pneumothorax.  Bones appear intact.                               CT Renal Stone Study ABD Pelvis WO (Final result)  Result time 06/28/19 10:46:04    Final result by Adair Guthrie MD (06/28/19 10:46:04)                 Impression:      1. There is no CT evidence of obstructive uropathy.  Again, there is a 3 mm calculus lower pole right kidney.  2. There is no CT evidence of an acute intra-abdominal inflammatory process.  Advanced descending/proximal sigmoid diverticulosis.  Status post partial sigmoidectomy.  Normal appendix.  3. Hysterectomy.  Appendectomy.  Atherosclerosis.  Stable bilateral diffuse adrenal gland thickening characteristic of hyperplasia.  All CT scans at this facility are performed  using dose modulation techniques as appropriate to performed exam including the following:  automated exposure control; adjustment of mA and/or kV according to the patients size (this includes techniques or standardized protocols for targeted exams where dose is matched to indication/reason for exam: i.e. extremities or head);  iterative reconstruction technique.      Electronically signed by: Adair Guthrie MD  Date:    06/28/2019  Time:    10:46             Narrative:    EXAMINATION:  CT RENAL STONE STUDY ABD PELVIS WO    CLINICAL HISTORY:  Flank pain, stone disease suspected;    TECHNIQUE:  Abdominal and pelvic CT performed without contrast.  Coronal and sagittal  reformations.    COMPARISON:  08/26/2018    FINDINGS:  Abdominal CT.  The lung bases are clear.  Coronary artery calcification.  Normal size heart.    Noncontrast evaluation liver, spleen, and pancreas unremarkable other than the right LC and figure a shin right lobe liver, 22 cm.  There has been interval resolution of the previously seen marked fatty liver.    There is a stable pattern of diffuse thickening bilateral adrenal glands, characteristic of hyperplasia.    Again, there is a nonobstructing calculus lower pole right kidney, 3 mm.  No obstructive uropathy.  The kidneys are otherwise unremarkable.    Normal caliber atherosclerotic aorta.  No lymphadenopathy.  Cholecystectomy.  Stable common bile duct without significant dilatation.    No bowel obstruction.  The appendix is normal.  There is advanced diverticulosis descending/proximal sigmoid colon with a sigmoid staple line characteristic of partial sigmoidectomy.  The GI tract is otherwise unremarkable.    Mild degenerative lumbar spine.    Pelvic CT.  The pelvic ring is intact.  There is no additional abnormality of the pelvic bowel loops.  Hysterectomy.  Normal size ovaries.  Ureters and urinary bladder are normal.  Numerous pelvic phleboliths.

## 2019-06-28 NOTE — HPI
Alexandra Goins is a 53 y.o. female patient with a PMHx of DM, HLD, HTN, and GERD who presents to the Emergency Department for evaluation of R sided flank pain with radiation to R abdomen which onset gradually. Patient states she was dx w/ kidney infection 2 weeks ago. Sxs improved with bactrim but returned 3 days ago. Symptoms are constant and moderate in severity. No mitigating or exacerbating factors reported. Associated sxs include n/v, dysuria, and subjective fever. Patient denies any chills, constipation, hematochezia, hematuria, urinary frequency, diarrhea, and all other sxs at this time. ED evaluation resulted positive UA, otherwise labs unremarkable. In the ED patient was given IV rocephin. Hospital medicine called for admission.

## 2019-06-28 NOTE — ASSESSMENT & PLAN NOTE
UA positive for nitrites and leukocytes   Urine culture pending  Blood cx pending   IV rocephin   Ct renal showed a nonobstructing calculus lower pole right kidney. No obstructive uropathy.

## 2019-06-28 NOTE — ED PROVIDER NOTES
SCRIBE #1 NOTE: I, Marisela Echeverria, am scribing for, and in the presence of, Fracisco Teixeira Jr., MD. I have scribed the entire note.       History     Chief Complaint   Patient presents with    Abdominal Pain     c/o abdominal pain, bilateral flank pain, dysuria and N/V      Review of patient's allergies indicates:   Allergen Reactions    Piroxicam Diarrhea and Nausea Only    Ultram [tramadol] Rash    Aspirin      Nosebleeds  Nosebleeds  Nosebleeds    Levaquin [levofloxacin]     Levomenol analogues     Lipitor [atorvastatin]      Leg Cramps    Celestone [betamethasone] Hives, Itching and Rash         History of Present Illness     HPI    6/28/2019, 9:11 AM  History obtained from the patient      History of Present Illness: Alexandra Goins is a 53 y.o. female patient with a PMHx of DM, HLD, HTN, and GERD who presents to the Emergency Department for evaluation of R sided flank pain with radiation to R abdomen which onset gradually. Patient states she was dx w/ kidney infection 2 weeks ago. Sxs improved with bactrim but returned 3 days ago. Symptoms are constant and moderate in severity. No mitigating or exacerbating factors reported. Associated sxs include n/v, dysuria, and subjective fever. Patient denies any chills, constipation, hematochezia, hematuria, urinary frequency, diarrhea, and all other sxs at this time. Patient is a smoker. No further complaints or concerns at this time.         Arrival mode: Personal vehicle    PCP: Perez Casiano MD        Past Medical History:  Past Medical History:   Diagnosis Date    Anxiety     Arthritis     Asthma     Bipolar 1 disorder     Depression     Diabetes mellitus, type 2     Diverticular disease     GERD (gastroesophageal reflux disease)     Hyperlipemia     Hypertension     Hypothyroidism     Pneumonia     Renal manifestation of secondary diabetes mellitus     Trouble in sleeping        Past Surgical History:  Past Surgical History:   Procedure  Laterality Date    CHOLECYSTECTOMY      COLON SURGERY      COLONOSCOPY N/A 1/12/2018    Performed by Roque Mahajan III, MD at [facility]    DENTAL SURGERY  05/21/2018    removal of 8 top teeth    ESOPHAGOGASTRODUODENOSCOPY (EGD) N/A 1/12/2018    Performed by Roque Mahajan III, MD at [facility] [place] [facility]    HYSTERECTOMY      TONSILLECTOMY           Family History:  Family History   Problem Relation Age of Onset    Alcohol abuse Mother     COPD Mother     Depression Mother     Drug abuse Mother     Alcohol abuse Father     Arthritis Father     Cancer Father     Heart disease Father     Hypertension Father     COPD Sister     Kidney failure Sister     Heart disease Brother     Hypertension Brother     Cancer Maternal Aunt     Cancer Maternal Uncle     Diabetes Maternal Uncle     Drug abuse Maternal Uncle     Cancer Paternal Aunt     Cancer Paternal Uncle     Arthritis Maternal Grandmother     Hypertension Maternal Grandmother     Breast cancer Maternal Grandmother     Arthritis Paternal Grandmother     Cancer Paternal Grandmother     Hypertension Paternal Grandfather        Social History:  Social History     Tobacco Use    Smoking status: Current Every Day Smoker     Years: 35.00     Types: Cigarettes, Vaping w/o nicotine    Smokeless tobacco: Never Used   Substance and Sexual Activity    Alcohol use: Yes     Comment: occassionally    Drug use: Yes     Types: Marijuana    Sexual activity: Yes        Review of Systems     Review of Systems   Constitutional: Positive for fever. Negative for activity change, appetite change, chills, diaphoresis and fatigue.   HENT: Negative for congestion, ear pain, nosebleeds, rhinorrhea, sinus pain, sore throat and trouble swallowing.    Eyes: Negative for pain and discharge.   Respiratory: Negative for cough, chest tightness, shortness of breath, wheezing and stridor.    Cardiovascular: Negative for chest pain, palpitations and leg swelling.    Gastrointestinal: Positive for abdominal pain (R), nausea and vomiting. Negative for abdominal distention, blood in stool, constipation and diarrhea.   Genitourinary: Positive for dysuria and flank pain (R). Negative for difficulty urinating, frequency, hematuria and urgency.   Musculoskeletal: Negative for arthralgias, back pain, myalgias and neck pain.   Skin: Negative for pallor, rash and wound.   Neurological: Negative for dizziness, syncope, weakness, light-headedness, numbness and headaches.   Hematological: Does not bruise/bleed easily.   Psychiatric/Behavioral: Negative for confusion and self-injury.   All other systems reviewed and are negative.     Physical Exam     Initial Vitals [06/28/19 0909]   BP Pulse Resp Temp SpO2   (!) 155/79 105 20 98.9 °F (37.2 °C) 98 %      MAP       --          Physical Exam  Nursing Notes and Vital Signs Reviewed.  Constitutional: Patient is in no acute distress. Well-developed and well-nourished.  Head: Atraumatic. Normocephalic.  Eyes: PERRL. EOM intact. Conjunctivae are not pale. No scleral icterus.  ENT: Mucous membranes are moist. Oropharynx is clear and symmetric.    Neck: Supple. Full ROM. No lymphadenopathy.  Cardiovascular: Regular rate. Regular rhythm. No murmurs, rubs, or gallops. Distal pulses are 2+ and symmetric.  Pulmonary/Chest: No respiratory distress. Clear to auscultation bilaterally. No wheezing or rales.  Abdominal: Soft and non-distended.  There is no tenderness.  No rebound, guarding, or rigidity. Good bowel sounds.  Genitourinary: No CVA tenderness  Musculoskeletal: Moves all extremities. No obvious deformities. No edema. No calf tenderness.  Skin: Warm and dry.  Neurological:  Alert, awake, and appropriate.  Normal speech.  No acute focal neurological deficits are appreciated.  Psychiatric: Normal affect. Good eye contact. Appropriate in content.     ED Course   Procedures  ED Vital Signs:  Vitals:    06/28/19 0909 06/28/19 0944   BP: (!) 155/79  139/72   Pulse: 105 101   Resp: 20    Temp: 98.9 °F (37.2 °C)    TempSrc: Oral    SpO2: 98% 96%   Weight: 73.4 kg (161 lb 13.1 oz)        Abnormal Lab Results:  Labs Reviewed   CBC W/ AUTO DIFFERENTIAL - Abnormal; Notable for the following components:       Result Value    Mean Corpuscular Hemoglobin 31.2 (*)     RDW 15.8 (*)     All other components within normal limits   URINALYSIS, REFLEX TO URINE CULTURE - Abnormal; Notable for the following components:    Specific Gravity, UA <=1.005 (*)     Nitrite, UA Positive (*)     Leukocytes, UA 1+ (*)     All other components within normal limits    Narrative:     Preferred Collection Type->Urine, Clean Catch   URINALYSIS MICROSCOPIC - Abnormal; Notable for the following components:    Bacteria Many (*)     All other components within normal limits    Narrative:     Preferred Collection Type->Urine, Clean Catch   CULTURE, BLOOD   CULTURE, BLOOD   COMPREHENSIVE METABOLIC PANEL   LACTIC ACID, PLASMA   PROCALCITONIN   HIV 1 / 2 ANTIBODY   HEPATITIS C ANTIBODY   LACTIC ACID, PLASMA        All Lab Results:  Results for orders placed or performed during the hospital encounter of 06/28/19   CBC auto differential   Result Value Ref Range    WBC 11.62 3.90 - 12.70 K/uL    RBC 5.00 4.00 - 5.40 M/uL    Hemoglobin 15.6 12.0 - 16.0 g/dL    Hematocrit 45.9 37.0 - 48.5 %    Mean Corpuscular Volume 92 82 - 98 fL    Mean Corpuscular Hemoglobin 31.2 (H) 27.0 - 31.0 pg    Mean Corpuscular Hemoglobin Conc 34.0 32.0 - 36.0 g/dL    RDW 15.8 (H) 11.5 - 14.5 %    Platelets 333 150 - 350 K/uL    MPV 9.2 9.2 - 12.9 fL    Gran # (ANC) 6.3 1.8 - 7.7 K/uL    Lymph # 4.4 1.0 - 4.8 K/uL    Mono # 0.9 0.3 - 1.0 K/uL    Eos # 0.1 0.0 - 0.5 K/uL    Baso # 0.02 0.00 - 0.20 K/uL    Gran% 54.2 38.0 - 73.0 %    Lymph% 37.4 18.0 - 48.0 %    Mono% 7.6 4.0 - 15.0 %    Eosinophil% 0.9 0.0 - 8.0 %    Basophil% 0.2 0.0 - 1.9 %    Differential Method Automated    Comprehensive metabolic panel   Result Value Ref  Range    Sodium 139 136 - 145 mmol/L    Potassium 3.7 3.5 - 5.1 mmol/L    Chloride 104 95 - 110 mmol/L    CO2 25 23 - 29 mmol/L    Glucose 110 70 - 110 mg/dL    BUN, Bld 14 6 - 20 mg/dL    Creatinine 0.7 0.5 - 1.4 mg/dL    Calcium 10.3 8.7 - 10.5 mg/dL    Total Protein 8.2 6.0 - 8.4 g/dL    Albumin 4.3 3.5 - 5.2 g/dL    Total Bilirubin 0.3 0.1 - 1.0 mg/dL    Alkaline Phosphatase 80 55 - 135 U/L    AST 25 10 - 40 U/L    ALT 25 10 - 44 U/L    Anion Gap 10 8 - 16 mmol/L    eGFR if African American >60 >60 mL/min/1.73 m^2    eGFR if non African American >60 >60 mL/min/1.73 m^2   Lactic acid, plasma #1   Result Value Ref Range    Lactate (Lactic Acid) 1.0 0.5 - 2.2 mmol/L   Urinalysis, Reflex to Urine Culture Urine, Clean Catch   Result Value Ref Range    Specimen UA Urine, Clean Catch     Color, UA Yellow Yellow, Straw, Joann    Appearance, UA Clear Clear    pH, UA 6.0 5.0 - 8.0    Specific Gravity, UA <=1.005 (A) 1.005 - 1.030    Protein, UA Negative Negative    Glucose, UA Negative Negative    Ketones, UA Negative Negative    Bilirubin (UA) Negative Negative    Occult Blood UA Negative Negative    Nitrite, UA Positive (A) Negative    Urobilinogen, UA Negative <2.0 EU/dL    Leukocytes, UA 1+ (A) Negative   Procalcitonin   Result Value Ref Range    Procalcitonin 0.06 <0.25 ng/mL   Urinalysis Microscopic   Result Value Ref Range    WBC, UA 3 0 - 5 /hpf    Bacteria Many (A) None-Occ /hpf    Squam Epithel, UA 2 /hpf    Microscopic Comment SEE COMMENT          Imaging Results:  Imaging Results          X-Ray Chest AP Portable (Final result)  Result time 06/28/19 10:58:14    Final result by Homer Gomez MD (06/28/19 10:58:14)                 Impression:      No acute process seen.      Electronically signed by: Homer Gomez MD  Date:    06/28/2019  Time:    10:58             Narrative:    EXAMINATION:  XR CHEST AP PORTABLE    CLINICAL HISTORY:  Sepsis;    FINDINGS:  Single view of the chest.  Aorta demonstrates  atherosclerotic disease.    Cardiac silhouette is normal.  The lungs demonstrate no evidence of active disease.  No evidence of pleural effusion or pneumothorax.  Bones appear intact.                               CT Renal Stone Study ABD Pelvis WO (Final result)  Result time 06/28/19 10:46:04    Final result by Adair Guthrie MD (06/28/19 10:46:04)                 Impression:      1. There is no CT evidence of obstructive uropathy.  Again, there is a 3 mm calculus lower pole right kidney.  2. There is no CT evidence of an acute intra-abdominal inflammatory process.  Advanced descending/proximal sigmoid diverticulosis.  Status post partial sigmoidectomy.  Normal appendix.  3. Hysterectomy.  Appendectomy.  Atherosclerosis.  Stable bilateral diffuse adrenal gland thickening characteristic of hyperplasia.  All CT scans at this facility are performed  using dose modulation techniques as appropriate to performed exam including the following:  automated exposure control; adjustment of mA and/or kV according to the patients size (this includes techniques or standardized protocols for targeted exams where dose is matched to indication/reason for exam: i.e. extremities or head);  iterative reconstruction technique.      Electronically signed by: Adair Guthrie MD  Date:    06/28/2019  Time:    10:46             Narrative:    EXAMINATION:  CT RENAL STONE STUDY ABD PELVIS WO    CLINICAL HISTORY:  Flank pain, stone disease suspected;    TECHNIQUE:  Abdominal and pelvic CT performed without contrast.  Coronal and sagittal reformations.    COMPARISON:  08/26/2018    FINDINGS:  Abdominal CT.  The lung bases are clear.  Coronary artery calcification.  Normal size heart.    Noncontrast evaluation liver, spleen, and pancreas unremarkable other than the right LC and figure a shin right lobe liver, 22 cm.  There has been interval resolution of the previously seen marked fatty liver.    There is a stable pattern of diffuse thickening  bilateral adrenal glands, characteristic of hyperplasia.    Again, there is a nonobstructing calculus lower pole right kidney, 3 mm.  No obstructive uropathy.  The kidneys are otherwise unremarkable.    Normal caliber atherosclerotic aorta.  No lymphadenopathy.  Cholecystectomy.  Stable common bile duct without significant dilatation.    No bowel obstruction.  The appendix is normal.  There is advanced diverticulosis descending/proximal sigmoid colon with a sigmoid staple line characteristic of partial sigmoidectomy.  The GI tract is otherwise unremarkable.    Mild degenerative lumbar spine.    Pelvic CT.  The pelvic ring is intact.  There is no additional abnormality of the pelvic bowel loops.  Hysterectomy.  Normal size ovaries.  Ureters and urinary bladder are normal.  Numerous pelvic phleboliths.                                 The EKG was ordered, reviewed, and independently interpreted by the ED provider.  Interpretation time: 9:22  Rate: 101 BPM  Rhythm: sinus tachycardia  Interpretation: LAD. Pulmonary disease pattern. No STEMI.             The Emergency Provider reviewed the vital signs and test results, which are outlined above.     ED Discussion     11:49 AM: Discussed case with SULY Kingston ([facility]). Dr. Plummer agrees with current care and management of pt and accepts admission.   Admitting Service: [facility]   Admitting Physician: Kavitha  Admit to: obs med surg    11:50 AM: Re-evaluated pt. I have discussed test results, shared treatment plan, and the need for admission with patient and family at bedside. Pt and family express understanding at this time and agree with all information. All questions answered. Pt and family have no further questions or concerns at this time. Pt is ready for admit.    12:11 PM  Patient is stable nontoxic.  She has an outpatient treatment failure on Bactrim which should have treated her symptoms.  She has pyelonephritis clinically with flank pain  that is significant as well as fever to 102 at home.  Lighted as and her underlying diabetes, patient is being admitted for IV antibiotics and outpatient treatment failure pyelonephritis.  She is high risk for discharge.    ED Medication(s):  Medications   cefTRIAXone (ROCEPHIN) 1 g in dextrose 5 % 50 mL IVPB (has no administration in time range)   sodium chloride 0.9% bolus 2,202 mL (0 mLs Intravenous Stopped 6/28/19 1103)   hydromorphone (PF) injection 1 mg (1 mg Intravenous Given 6/28/19 1107)            Medical Decision Making:   Clinical Tests:   Lab Tests: Ordered and Reviewed  Radiological Study: Ordered and Reviewed  Medical Tests: Ordered and Reviewed             Scribe Attestation:   Scribe #1: I performed the above scribed service and the documentation accurately describes the services I performed. I attest to the accuracy of the note.     Attending:   Physician Attestation Statement for Scribe #1: I, Fracisco Teixeira Jr., MD, personally performed the services described in this documentation, as scribed by Marisela Echeverria, in my presence, and it is both accurate and complete.           Clinical Impression       ICD-10-CM ICD-9-CM   1. Pyelonephritis N12 590.80   2. Fever R50.9 780.60   3. Failure of outpatient treatment Z78.9 V49.89       Disposition:   Disposition: Placed in Observation  Condition: Fair         Fracisco Teixeira Jr., MD  06/28/19 1212

## 2019-06-29 VITALS
OXYGEN SATURATION: 97 % | BODY MASS INDEX: 28.15 KG/M2 | HEIGHT: 64 IN | TEMPERATURE: 98 F | WEIGHT: 164.88 LBS | SYSTOLIC BLOOD PRESSURE: 116 MMHG | RESPIRATION RATE: 16 BRPM | DIASTOLIC BLOOD PRESSURE: 66 MMHG | HEART RATE: 87 BPM

## 2019-06-29 PROBLEM — N39.0 UTI (URINARY TRACT INFECTION): Status: ACTIVE | Noted: 2019-06-28

## 2019-06-29 PROBLEM — M54.9 RIGHT-SIDED BACK PAIN: Status: ACTIVE | Noted: 2019-06-29

## 2019-06-29 LAB
ANION GAP SERPL CALC-SCNC: 10 MMOL/L (ref 8–16)
BASOPHILS # BLD AUTO: 0.03 K/UL (ref 0–0.2)
BASOPHILS NFR BLD: 0.4 % (ref 0–1.9)
BUN SERPL-MCNC: 11 MG/DL (ref 6–20)
CALCIUM SERPL-MCNC: 9.2 MG/DL (ref 8.7–10.5)
CHLORIDE SERPL-SCNC: 108 MMOL/L (ref 95–110)
CO2 SERPL-SCNC: 21 MMOL/L (ref 23–29)
CREAT SERPL-MCNC: 0.7 MG/DL (ref 0.5–1.4)
DIFFERENTIAL METHOD: ABNORMAL
EOSINOPHIL # BLD AUTO: 0.2 K/UL (ref 0–0.5)
EOSINOPHIL NFR BLD: 2.8 % (ref 0–8)
ERYTHROCYTE [DISTWIDTH] IN BLOOD BY AUTOMATED COUNT: 15.9 % (ref 11.5–14.5)
EST. GFR  (AFRICAN AMERICAN): >60 ML/MIN/1.73 M^2
EST. GFR  (NON AFRICAN AMERICAN): >60 ML/MIN/1.73 M^2
ESTIMATED AVG GLUCOSE: 163 MG/DL (ref 68–131)
GLUCOSE SERPL-MCNC: 145 MG/DL (ref 70–110)
HBA1C MFR BLD HPLC: 7.3 % (ref 4–5.6)
HCT VFR BLD AUTO: 43.6 % (ref 37–48.5)
HGB BLD-MCNC: 14.1 G/DL (ref 12–16)
LYMPHOCYTES # BLD AUTO: 3.4 K/UL (ref 1–4.8)
LYMPHOCYTES NFR BLD: 43.6 % (ref 18–48)
MCH RBC QN AUTO: 30.4 PG (ref 27–31)
MCHC RBC AUTO-ENTMCNC: 32.3 G/DL (ref 32–36)
MCV RBC AUTO: 94 FL (ref 82–98)
MONOCYTES # BLD AUTO: 0.7 K/UL (ref 0.3–1)
MONOCYTES NFR BLD: 9 % (ref 4–15)
NEUTROPHILS # BLD AUTO: 3.4 K/UL (ref 1.8–7.7)
NEUTROPHILS NFR BLD: 44.3 % (ref 38–73)
PLATELET # BLD AUTO: 239 K/UL (ref 150–350)
PMV BLD AUTO: 9.2 FL (ref 9.2–12.9)
POCT GLUCOSE: 151 MG/DL (ref 70–110)
POCT GLUCOSE: 159 MG/DL (ref 70–110)
POTASSIUM SERPL-SCNC: 3.9 MMOL/L (ref 3.5–5.1)
RBC # BLD AUTO: 4.64 M/UL (ref 4–5.4)
SODIUM SERPL-SCNC: 139 MMOL/L (ref 136–145)
WBC # BLD AUTO: 7.79 K/UL (ref 3.9–12.7)

## 2019-06-29 PROCEDURE — 87088 URINE BACTERIA CULTURE: CPT | Mod: HCNC

## 2019-06-29 PROCEDURE — 87077 CULTURE AEROBIC IDENTIFY: CPT | Mod: HCNC

## 2019-06-29 PROCEDURE — 85025 COMPLETE CBC W/AUTO DIFF WBC: CPT | Mod: HCNC

## 2019-06-29 PROCEDURE — G0378 HOSPITAL OBSERVATION PER HR: HCPCS | Mod: HCNC

## 2019-06-29 PROCEDURE — 36415 COLL VENOUS BLD VENIPUNCTURE: CPT | Mod: HCNC

## 2019-06-29 PROCEDURE — 96376 TX/PRO/DX INJ SAME DRUG ADON: CPT | Performed by: EMERGENCY MEDICINE

## 2019-06-29 PROCEDURE — 63600175 PHARM REV CODE 636 W HCPCS: Mod: HCNC | Performed by: NURSE PRACTITIONER

## 2019-06-29 PROCEDURE — 87186 SC STD MICRODIL/AGAR DIL: CPT | Mod: HCNC

## 2019-06-29 PROCEDURE — 25000003 PHARM REV CODE 250: Mod: HCNC | Performed by: NURSE PRACTITIONER

## 2019-06-29 PROCEDURE — 96372 THER/PROPH/DIAG INJ SC/IM: CPT | Mod: 59 | Performed by: EMERGENCY MEDICINE

## 2019-06-29 PROCEDURE — 87086 URINE CULTURE/COLONY COUNT: CPT | Mod: HCNC

## 2019-06-29 PROCEDURE — 80048 BASIC METABOLIC PNL TOTAL CA: CPT | Mod: HCNC

## 2019-06-29 RX ORDER — AMOXICILLIN AND CLAVULANATE POTASSIUM 875; 125 MG/1; MG/1
1 TABLET, FILM COATED ORAL EVERY 12 HOURS
Qty: 14 TABLET | Refills: 0 | Status: SHIPPED | OUTPATIENT
Start: 2019-06-29 | End: 2019-07-02 | Stop reason: ALTCHOICE

## 2019-06-29 RX ORDER — HYDROCODONE BITARTRATE AND ACETAMINOPHEN 5; 325 MG/1; MG/1
1 TABLET ORAL EVERY 6 HOURS PRN
Qty: 10 TABLET | Refills: 0 | Status: SHIPPED | OUTPATIENT
Start: 2019-06-29 | End: 2019-07-03 | Stop reason: SDUPTHER

## 2019-06-29 RX ADMIN — PROMETHAZINE HYDROCHLORIDE 12.5 MG: 25 INJECTION INTRAMUSCULAR; INTRAVENOUS at 03:06

## 2019-06-29 RX ADMIN — PROMETHAZINE HYDROCHLORIDE 12.5 MG: 25 INJECTION INTRAMUSCULAR; INTRAVENOUS at 08:06

## 2019-06-29 RX ADMIN — INSULIN ASPART 2 UNITS: 100 INJECTION, SOLUTION INTRAVENOUS; SUBCUTANEOUS at 05:06

## 2019-06-29 RX ADMIN — HYDROCODONE BITARTRATE AND ACETAMINOPHEN 1 TABLET: 10; 325 TABLET ORAL at 01:06

## 2019-06-29 RX ADMIN — VENLAFAXINE HYDROCHLORIDE 150 MG: 75 CAPSULE, EXTENDED RELEASE ORAL at 09:06

## 2019-06-29 RX ADMIN — HYDROCODONE BITARTRATE AND ACETAMINOPHEN 1 TABLET: 10; 325 TABLET ORAL at 07:06

## 2019-06-29 RX ADMIN — DIAZEPAM 5 MG: 5 TABLET ORAL at 09:06

## 2019-06-29 RX ADMIN — TOPIRAMATE 200 MG: 100 TABLET, FILM COATED ORAL at 09:06

## 2019-06-29 NOTE — HOSPITAL COURSE
The pt was placed in observation with right lower back pain, UTI and presumed pyelonephritis on IV Rocephin. Pt failed oral Bactrim. WBC normal, afebrile. CT renal stone showed no pyelonephritis, no obstructive uropathy, 3mm calculus lower pole right kidney, and adrenal hyperplasia. Pt reports she has chronic DDD to lumbar area. Lower right back pain likely due to this. Pt will be discharged on oral Augmentin to follow up with Urology.

## 2019-06-29 NOTE — NURSING
Went over discharge instructions with patient.   Stressed importance of making and keeping all follow ups as well as making prescribed medication changes.   Printed prescription x1 provided to pt upon discharge.  Patient verbalized understanding and has no questions in regards to discharge.  IV and tele box removed per primary nurse DARWIN Roblero.  Patient awaiting personal transportation, instructed to call nurse station once ride has arrived.  Primary nurse notified of pt's discharge status.

## 2019-06-29 NOTE — DISCHARGE SUMMARY
[facility] Hospital Medicine  Discharge Summary      Patient Name: Alexandra Goins  MRN: [id number]  Admission Date: 6/28/2019  Hospital Length of Stay: 0 days  Discharge Date and Time:  06/29/2019 3:06 PM  Attending Physician: Dr. Plummer   Discharging Provider: Luma Graves NP  Primary Care Provider: Perez Casiano MD      HPI:   Alexandra Goins is a 53 y.o. female patient with a PMHx of DM, HLD, HTN, and GERD who presents to the Emergency Department for evaluation of R sided flank pain with radiation to R abdomen which onset gradually. Patient states she was dx w/ kidney infection 2 weeks ago. Sxs improved with bactrim but returned 3 days ago. Symptoms are constant and moderate in severity. No mitigating or exacerbating factors reported. Associated sxs include n/v, dysuria, and subjective fever. Patient denies any chills, constipation, hematochezia, hematuria, urinary frequency, diarrhea, and all other sxs at this time. ED evaluation resulted positive UA, otherwise labs unremarkable. In the ED patient was given IV rocephin. Hospital medicine called for admission.       * No surgery found *      Hospital Course:   The pt was placed in observation with right lower back pain, UTI and presumed pyelonephritis on IV Rocephin. Pt failed oral Bactrim. WBC normal, afebrile. CT renal stone showed no pyelonephritis, no obstructive uropathy, 3mm calculus lower pole right kidney, and adrenal hyperplasia. Pt reports she has chronic DDD to lumbar area. Lower right back pain likely due to this. Pt will be discharged on oral Augmentin to follow up with Urology.          Final Active Diagnoses:    Diagnosis Date Noted POA    PRINCIPAL PROBLEM:  UTI (urinary tract infection) [N39.0] 06/28/2019 Yes    Right-sided back pain [M54.9] 06/29/2019 Yes    Anxiety [F41.9] 08/20/2018 Yes    Marijuana abuse [F12.10] 08/31/2015 Yes     Chronic    Tobacco abuse [Z72.0] 08/31/2015 Yes     Chronic    Depression [F32.9]  08/31/2015 Yes     Chronic    Bipolar disorder [F31.9] 08/30/2015 Yes     Chronic    Hyperglycemia due to type 2 diabetes mellitus [E11.65] 08/30/2015 Yes      Problems Resolved During this Admission:       Discharged Condition: stable    Disposition: Home or Self Care    Follow Up:  Follow-up Information     Perez Casiano MD In 3 days.    Specialty:  Family Medicine  Contact information:  [address]  [place] [address]  [phone]             Darrin Becerril IV, MD In 1 week.    Specialty:  Urology  Contact information:  [address] [place] [address] [address] [place] [address]  [phone]                 Patient Instructions:      Ambulatory consult to Urology   Referral Priority: Routine Referral Type: Consultation   Referral Reason: Specialty Services Required   Requested Specialty: Urology   Number of Visits Requested: 1     Diet Cardiac     Activity as tolerated       Significant Diagnostic Studies:   Imaging Results          X-Ray Chest AP Portable (Final result)  Result time 06/28/19 10:58:14    Final result by Homer Gomez MD (06/28/19 10:58:14)                 Impression:      No acute process seen.      Electronically signed by: Homer Gomez MD  Date:    06/28/2019  Time:    10:58             Narrative:    EXAMINATION:  XR CHEST AP PORTABLE    CLINICAL HISTORY:  Sepsis;    FINDINGS:  Single view of the chest.  Aorta demonstrates atherosclerotic disease.    Cardiac silhouette is normal.  The lungs demonstrate no evidence of active disease.  No evidence of pleural effusion or pneumothorax.  Bones appear intact.                               CT Renal Stone Study ABD Pelvis WO (Final result)  Result time 06/28/19 10:46:04    Final result by Adair Guthrie MD (06/28/19 10:46:04)                 Impression:      1. There is no CT evidence of obstructive uropathy.  Again, there is a 3 mm calculus lower pole right kidney.  2. There is no CT evidence of an acute intra-abdominal inflammatory  process.  Advanced descending/proximal sigmoid diverticulosis.  Status post partial sigmoidectomy.  Normal appendix.  3. Hysterectomy.  Appendectomy.  Atherosclerosis.  Stable bilateral diffuse adrenal gland thickening characteristic of hyperplasia.  All CT scans at this facility are performed  using dose modulation techniques as appropriate to performed exam including the following:  automated exposure control; adjustment of mA and/or kV according to the patients size (this includes techniques or standardized protocols for targeted exams where dose is matched to indication/reason for exam: i.e. extremities or head);  iterative reconstruction technique.      Electronically signed by: Adair Guthrie MD  Date:    06/28/2019  Time:    10:46             Narrative:    EXAMINATION:  CT RENAL STONE STUDY ABD PELVIS WO    CLINICAL HISTORY:  Flank pain, stone disease suspected;    TECHNIQUE:  Abdominal and pelvic CT performed without contrast.  Coronal and sagittal reformations.    COMPARISON:  08/26/2018    FINDINGS:  Abdominal CT.  The lung bases are clear.  Coronary artery calcification.  Normal size heart.    Noncontrast evaluation liver, spleen, and pancreas unremarkable other than the right LC and figure a shin right lobe liver, 22 cm.  There has been interval resolution of the previously seen marked fatty liver.    There is a stable pattern of diffuse thickening bilateral adrenal glands, characteristic of hyperplasia.    Again, there is a nonobstructing calculus lower pole right kidney, 3 mm.  No obstructive uropathy.  The kidneys are otherwise unremarkable.    Normal caliber atherosclerotic aorta.  No lymphadenopathy.  Cholecystectomy.  Stable common bile duct without significant dilatation.    No bowel obstruction.  The appendix is normal.  There is advanced diverticulosis descending/proximal sigmoid colon with a sigmoid staple line characteristic of partial sigmoidectomy.  The GI tract is otherwise  unremarkable.    Mild degenerative lumbar spine.    Pelvic CT.  The pelvic ring is intact.  There is no additional abnormality of the pelvic bowel loops.  Hysterectomy.  Normal size ovaries.  Ureters and urinary bladder are normal.  Numerous pelvic phleboliths.                                Pending Diagnostic Studies:     Procedure Component Value Units Date/Time    HIV 1/2 Ag/Ab (4th Gen) [[id number]] Collected:  06/28/19 0935    Order Status:  Sent Lab Status:  In process Updated:  06/28/19 1753    Specimen:  Blood     Hepatitis C antibody [[id number]] Collected:  06/28/19 0935    Order Status:  Sent Lab Status:  In process Updated:  06/28/19 1753    Specimen:  Blood          Medications:  Reconciled Home Medications:      Medication List      START taking these medications    amoxicillin-clavulanate 875-125mg 875-125 mg per tablet  Commonly known as:  AUGMENTIN  Take 1 tablet by mouth every 12 (twelve) hours. for 7 days        CONTINUE taking these medications    diazePAM 10 MG Tab  Commonly known as:  VALIUM  Take 1 tablet (10 mg total) by mouth 3 (three) times daily.     doxepin 10 MG capsule  Commonly known as:  SINEQUAN  Take 1 capsule (10 mg total) by mouth nightly as needed.     HYDROcodone-acetaminophen 5-325 mg per tablet  Commonly known as:  NORCO  Take 1 tablet by mouth every 6 (six) hours as needed for Pain.     insulin NPH-insulin regular (70/30) 100 unit/mL (70-30) injection  Commonly known as:  NovoLIN 70/30 U-100 Insulin  Inject 40 Units into the skin 2 (two) times daily before meals.     isosorbide mononitrate 30 MG 24 hr tablet  Commonly known as:  IMDUR  Take 1 tablet (30 mg total) by mouth once daily.     MAXALT 10 MG tablet  Generic drug:  rizatriptan  One tablet with onset of migraine and may repeat one dose in 2 hours if needed     metFORMIN 500 MG tablet  Commonly known as:  GLUCOPHAGE  Take 1 tablet (500 mg total) by mouth 2 (two) times daily with meals.     promethazine 25 MG  "tablet  Commonly known as:  PHENERGAN  Take 1 tablet (25 mg total) by mouth every 6 (six) hours as needed for Nausea.     QUEtiapine 300 MG Tab  Commonly known as:  SEROQUEL  Take 1 tablet (300 mg total) by mouth every evening.     topiramate 200 MG Tab  Commonly known as:  TOPAMAX  Take 1/2 tablet twice daily for 5 days then 1 tablet daily thereafter     venlafaxine 75 MG 24 hr capsule  Commonly known as:  EFFEXOR-XR  Take 2 capsules (150 mg total) by mouth once daily. Take 1 capsule daily for seven days then 2 capsules daily thereafter        STOP taking these medications    blood sugar diagnostic Strp  Commonly known as:  TRUE METRIX GLUCOSE TEST STRIP     blood-glucose meter kit  Commonly known as:  TRUE METRIX AIR GLUCOSE METER     insulin aspart U-100 100 unit/mL injection  Commonly known as:  NOVOLOG     insulin syringe-needle U-100 1 mL 30 gauge x 5/16 Syrg     ketorolac 10 mg tablet  Commonly known as:  TORADOL     lancets 33 gauge Misc  Commonly known as:  TRUEPLUS LANCETS     levothyroxine 75 MCG tablet  Commonly known as:  SYNTHROID     losartan 25 MG tablet  Commonly known as:  COZAAR     lovastatin 10 MG tablet  Commonly known as:  MEVACOR     pen needle, diabetic 32 gauge x 5/32" Ndle  Commonly known as:  BD ULTRA-FINE ARNOLDO PEN NEEDLE     sulfamethoxazole-trimethoprim 800-160mg 800-160 mg Tab  Commonly known as:  BACTRIM DS     vitamin D 1000 units Tab  Commonly known as:  VITAMIN D3            Indwelling Lines/Drains at time of discharge:   Lines/Drains/Airways          None          Time spent on the discharge of patient: 40 minutes  Patient was seen and examined on the date of discharge and determined to be suitable for discharge.         Luma Graves NP  Department of Hospital Medicine  [facility]  "

## 2019-06-29 NOTE — NURSING
Patient has been up most of the night constantly requesting pain meds, phenergan but does not show signs of nausea, valium and also to seroquel and doxepin first part of the night shift. Patient seems bipolar daughter at the bedside.unsure the details of patient's history with medications due to none seem to make her drowsy etc. To continue to monitor

## 2019-07-01 ENCOUNTER — OFFICE VISIT (OUTPATIENT)
Dept: PSYCHIATRY | Facility: CLINIC | Age: 54
End: 2019-07-01
Payer: MEDICARE

## 2019-07-01 VITALS
SYSTOLIC BLOOD PRESSURE: 136 MMHG | HEART RATE: 92 BPM | BODY MASS INDEX: 28.87 KG/M2 | WEIGHT: 168.19 LBS | DIASTOLIC BLOOD PRESSURE: 83 MMHG

## 2019-07-01 DIAGNOSIS — F41.9 ANXIETY: Primary | ICD-10-CM

## 2019-07-01 LAB
HCV AB SERPL QL IA: NEGATIVE
HIV 1+2 AB+HIV1 P24 AG SERPL QL IA: NEGATIVE

## 2019-07-01 PROCEDURE — 3079F DIAST BP 80-89 MM HG: CPT | Mod: HCNC,CPTII,S$GLB, | Performed by: PSYCHIATRY & NEUROLOGY

## 2019-07-01 PROCEDURE — 3008F PR BODY MASS INDEX (BMI) DOCUMENTED: ICD-10-PCS | Mod: HCNC,CPTII,S$GLB, | Performed by: PSYCHIATRY & NEUROLOGY

## 2019-07-01 PROCEDURE — 3075F PR MOST RECENT SYSTOLIC BLOOD PRESS GE 130-139MM HG: ICD-10-PCS | Mod: HCNC,CPTII,S$GLB, | Performed by: PSYCHIATRY & NEUROLOGY

## 2019-07-01 PROCEDURE — 99213 OFFICE O/P EST LOW 20 MIN: CPT | Mod: HCNC,S$GLB,, | Performed by: PSYCHIATRY & NEUROLOGY

## 2019-07-01 PROCEDURE — 99999 PR PBB SHADOW E&M-EST. PATIENT-LVL II: ICD-10-PCS | Mod: PBBFAC,HCNC,, | Performed by: PSYCHIATRY & NEUROLOGY

## 2019-07-01 PROCEDURE — 99213 PR OFFICE/OUTPT VISIT, EST, LEVL III, 20-29 MIN: ICD-10-PCS | Mod: HCNC,S$GLB,, | Performed by: PSYCHIATRY & NEUROLOGY

## 2019-07-01 PROCEDURE — 99999 PR PBB SHADOW E&M-EST. PATIENT-LVL II: CPT | Mod: PBBFAC,HCNC,, | Performed by: PSYCHIATRY & NEUROLOGY

## 2019-07-01 PROCEDURE — 3079F PR MOST RECENT DIASTOLIC BLOOD PRESSURE 80-89 MM HG: ICD-10-PCS | Mod: HCNC,CPTII,S$GLB, | Performed by: PSYCHIATRY & NEUROLOGY

## 2019-07-01 PROCEDURE — 3008F BODY MASS INDEX DOCD: CPT | Mod: HCNC,CPTII,S$GLB, | Performed by: PSYCHIATRY & NEUROLOGY

## 2019-07-01 PROCEDURE — 3075F SYST BP GE 130 - 139MM HG: CPT | Mod: HCNC,CPTII,S$GLB, | Performed by: PSYCHIATRY & NEUROLOGY

## 2019-07-01 RX ORDER — TOPIRAMATE 200 MG/1
TABLET ORAL
Qty: 30 TABLET | Refills: 2 | Status: SHIPPED | OUTPATIENT
Start: 2019-07-01 | End: 2019-10-04 | Stop reason: SDUPTHER

## 2019-07-01 RX ORDER — DOXEPIN HYDROCHLORIDE 10 MG/1
10 CAPSULE ORAL NIGHTLY PRN
Qty: 30 CAPSULE | Refills: 2 | Status: SHIPPED | OUTPATIENT
Start: 2019-07-01 | End: 2019-10-04 | Stop reason: SDUPTHER

## 2019-07-01 RX ORDER — QUETIAPINE FUMARATE 300 MG/1
300 TABLET, FILM COATED ORAL NIGHTLY
Qty: 30 TABLET | Refills: 2 | Status: SHIPPED | OUTPATIENT
Start: 2019-07-01 | End: 2019-10-04 | Stop reason: SDUPTHER

## 2019-07-01 RX ORDER — DIAZEPAM 10 MG/1
10 TABLET ORAL 3 TIMES DAILY
Qty: 90 TABLET | Refills: 2 | Status: SHIPPED | OUTPATIENT
Start: 2019-07-01 | End: 2019-09-06 | Stop reason: SDUPTHER

## 2019-07-01 RX ORDER — VENLAFAXINE HYDROCHLORIDE 75 MG/1
150 CAPSULE, EXTENDED RELEASE ORAL DAILY
Qty: 60 CAPSULE | Refills: 2 | Status: ON HOLD | OUTPATIENT
Start: 2019-07-01 | End: 2019-09-18
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
## 2019-07-02 LAB — BACTERIA UR CULT: ABNORMAL

## 2019-07-02 RX ORDER — NITROFURANTOIN 25; 75 MG/1; MG/1
100 CAPSULE ORAL 2 TIMES DAILY
Qty: 14 CAPSULE | Refills: 0 | Status: SHIPPED | OUTPATIENT
Start: 2019-07-02 | End: 2019-07-09

## 2019-07-02 NOTE — SIGNIFICANT EVENT
Urine culture from hospitalization grew <50,000 colonies Enterococcus. Pt was discharged on Augmentin. Pt notified that I sent Macrobid to her pharmacy and to discontinue Augmentin. Instructed pt to follow up with PCP and Urology.

## 2019-07-03 ENCOUNTER — OFFICE VISIT (OUTPATIENT)
Dept: FAMILY MEDICINE | Facility: CLINIC | Age: 54
End: 2019-07-03
Payer: MEDICARE

## 2019-07-03 VITALS
OXYGEN SATURATION: 98 % | TEMPERATURE: 98 F | DIASTOLIC BLOOD PRESSURE: 67 MMHG | HEIGHT: 64 IN | SYSTOLIC BLOOD PRESSURE: 120 MMHG | WEIGHT: 165.13 LBS | BODY MASS INDEX: 28.19 KG/M2 | HEART RATE: 98 BPM

## 2019-07-03 DIAGNOSIS — Z79.4 TYPE 2 DIABETES MELLITUS WITH COMPLICATION, WITH LONG-TERM CURRENT USE OF INSULIN: ICD-10-CM

## 2019-07-03 DIAGNOSIS — R10.9 RIGHT FLANK PAIN: Primary | ICD-10-CM

## 2019-07-03 DIAGNOSIS — E11.8 TYPE 2 DIABETES MELLITUS WITH COMPLICATION, WITH LONG-TERM CURRENT USE OF INSULIN: ICD-10-CM

## 2019-07-03 DIAGNOSIS — I10 ESSENTIAL HYPERTENSION: ICD-10-CM

## 2019-07-03 DIAGNOSIS — F31.9 BIPOLAR AFFECTIVE DISORDER, REMISSION STATUS UNSPECIFIED: ICD-10-CM

## 2019-07-03 LAB
BACTERIA BLD CULT: NORMAL
BACTERIA BLD CULT: NORMAL

## 2019-07-03 PROCEDURE — 3045F PR MOST RECENT HEMOGLOBIN A1C LEVEL 7.0-9.0%: CPT | Mod: HCNC,CPTII,S$GLB, | Performed by: FAMILY MEDICINE

## 2019-07-03 PROCEDURE — 3045F PR MOST RECENT HEMOGLOBIN A1C LEVEL 7.0-9.0%: ICD-10-PCS | Mod: HCNC,CPTII,S$GLB, | Performed by: FAMILY MEDICINE

## 2019-07-03 PROCEDURE — 99999 PR PBB SHADOW E&M-EST. PATIENT-LVL IV: CPT | Mod: PBBFAC,HCNC,, | Performed by: FAMILY MEDICINE

## 2019-07-03 PROCEDURE — 99499 RISK ADDL DX/OHS AUDIT: ICD-10-PCS | Mod: HCNC,S$GLB,, | Performed by: FAMILY MEDICINE

## 2019-07-03 PROCEDURE — 99999 PR PBB SHADOW E&M-EST. PATIENT-LVL IV: ICD-10-PCS | Mod: PBBFAC,HCNC,, | Performed by: FAMILY MEDICINE

## 2019-07-03 PROCEDURE — 3008F PR BODY MASS INDEX (BMI) DOCUMENTED: ICD-10-PCS | Mod: HCNC,CPTII,S$GLB, | Performed by: FAMILY MEDICINE

## 2019-07-03 PROCEDURE — 3074F PR MOST RECENT SYSTOLIC BLOOD PRESSURE < 130 MM HG: ICD-10-PCS | Mod: HCNC,CPTII,S$GLB, | Performed by: FAMILY MEDICINE

## 2019-07-03 PROCEDURE — 3078F DIAST BP <80 MM HG: CPT | Mod: HCNC,CPTII,S$GLB, | Performed by: FAMILY MEDICINE

## 2019-07-03 PROCEDURE — 99499 UNLISTED E&M SERVICE: CPT | Mod: HCNC,S$GLB,, | Performed by: FAMILY MEDICINE

## 2019-07-03 PROCEDURE — 3078F PR MOST RECENT DIASTOLIC BLOOD PRESSURE < 80 MM HG: ICD-10-PCS | Mod: HCNC,CPTII,S$GLB, | Performed by: FAMILY MEDICINE

## 2019-07-03 PROCEDURE — 3074F SYST BP LT 130 MM HG: CPT | Mod: HCNC,CPTII,S$GLB, | Performed by: FAMILY MEDICINE

## 2019-07-03 PROCEDURE — 99214 PR OFFICE/OUTPT VISIT, EST, LEVL IV, 30-39 MIN: ICD-10-PCS | Mod: HCNC,S$GLB,, | Performed by: FAMILY MEDICINE

## 2019-07-03 PROCEDURE — 99214 OFFICE O/P EST MOD 30 MIN: CPT | Mod: HCNC,S$GLB,, | Performed by: FAMILY MEDICINE

## 2019-07-03 PROCEDURE — 3008F BODY MASS INDEX DOCD: CPT | Mod: HCNC,CPTII,S$GLB, | Performed by: FAMILY MEDICINE

## 2019-07-03 RX ORDER — HYDROCODONE BITARTRATE AND ACETAMINOPHEN 5; 325 MG/1; MG/1
1 TABLET ORAL EVERY 6 HOURS PRN
Qty: 10 TABLET | Refills: 0 | Status: SHIPPED | OUTPATIENT
Start: 2019-07-03 | End: 2019-08-24

## 2019-07-03 NOTE — PROGRESS NOTES
Subjective:       Patient ID: Alexandra Goins is a 53 y.o. female.    Chief Complaint: Follow-up      HPI Comments:       Current Outpatient Medications:     diazePAM (VALIUM) 10 MG Tab, Take 1 tablet (10 mg total) by mouth 3 (three) times daily., Disp: 90 tablet, Rfl: 2    doxepin (SINEQUAN) 10 MG capsule, Take 1 capsule (10 mg total) by mouth nightly as needed., Disp: 30 capsule, Rfl: 2    HYDROcodone-acetaminophen (NORCO) 5-325 mg per tablet, Take 1 tablet by mouth every 6 (six) hours as needed for Pain., Disp: 10 tablet, Rfl: 0    insulin NPH-insulin regular, 70/30, (NOVOLIN 70/30 U-100 INSULIN) 100 unit/mL (70-30) injection, Inject 40 Units into the skin 2 (two) times daily before meals., Disp: 3 vial, Rfl: 3    metFORMIN (GLUCOPHAGE) 500 MG tablet, Take 1 tablet (500 mg total) by mouth 2 (two) times daily with meals., Disp: 180 tablet, Rfl: 1    nitrofurantoin, macrocrystal-monohydrate, (MACROBID) 100 MG capsule, Take 1 capsule (100 mg total) by mouth 2 (two) times daily. for 7 days, Disp: 14 capsule, Rfl: 0    promethazine (PHENERGAN) 25 MG tablet, Take 1 tablet (25 mg total) by mouth every 6 (six) hours as needed for Nausea., Disp: 15 tablet, Rfl: 0    QUEtiapine (SEROQUEL) 300 MG Tab, Take 1 tablet (300 mg total) by mouth every evening., Disp: 30 tablet, Rfl: 2    rizatriptan (MAXALT) 10 MG tablet, One tablet with onset of migraine and may repeat one dose in 2 hours if needed, Disp: , Rfl:     topiramate (TOPAMAX) 200 MG Tab, 1 tablet daily, Disp: 30 tablet, Rfl: 2    venlafaxine (EFFEXOR-XR) 75 MG 24 hr capsule, Take 2 capsules (150 mg total) by mouth once daily., Disp: 60 capsule, Rfl: 2    isosorbide mononitrate (IMDUR) 30 MG 24 hr tablet, Take 1 tablet (30 mg total) by mouth once daily., Disp: 30 tablet, Rfl: 11    Follow-up overnight observation for right flank pain. Workup did not yield much findings with respect to the right kidney.  Hospital team concluded the problem may be related to  "some degenerative disc disease in the lumbar area.  His she continues to have right flank/low back pain on the right side.  She did grow less than 50,000 Enterobacter in her urine.  She has been switched from Bactrim, to Augmentin, and now to Macrobid by the hospital team is was given IV Rocephin during hospital stay    Having moderate pain on the right side.  Asking for pain medication.  Tylenol and ibuprofen are not helping.  Some nausea, vomiting.  Blood sugars are stable.  Seizure allergy on 08/15.  Sees diabetic education on 07/24    Review of Systems   Constitutional: Negative for activity change, appetite change and fever.   HENT: Negative for sore throat.    Respiratory: Negative for cough and shortness of breath.    Cardiovascular: Negative for chest pain.   Gastrointestinal: Positive for nausea and vomiting. Negative for abdominal pain and diarrhea.   Genitourinary: Positive for flank pain. Negative for difficulty urinating, dysuria and urgency.   Musculoskeletal: Negative for arthralgias and myalgias.   Neurological: Negative for dizziness and headaches.       Objective:      Vitals:    07/03/19 0949   BP: 120/67   Pulse: 98   Temp: 97.6 °F (36.4 °C)   SpO2: 98%   Weight: 74.9 kg (165 lb 2 oz)   Height: 5' 4" (1.626 m)   PainSc:   7     Physical Exam   Constitutional: She is oriented to person, place, and time. She appears well-developed and well-nourished. No distress.   HENT:   Head: Normocephalic.   Neck: Neck supple. No thyromegaly present.   Cardiovascular: Normal rate, regular rhythm and normal heart sounds.   No murmur heard.  Pulmonary/Chest: Effort normal and breath sounds normal. She has no wheezes. She has no rales.   Abdominal: Soft. She exhibits no distension.   Musculoskeletal: She exhibits no edema.        Back:    Lymphadenopathy:     She has no cervical adenopathy.   Neurological: She is alert and oriented to person, place, and time.   Skin: Skin is warm and dry. She is not diaphoretic. "   Psychiatric: She has a normal mood and affect. Her behavior is normal. Judgment and thought content normal.   Nursing note and vitals reviewed.      Assessment:       1. Right flank pain    2. Essential hypertension    3. Type 2 diabetes mellitus with complication, with long-term current use of insulin    4. Bipolar affective disorder, remission status unspecified        Plan:   Right flank pain  Comments:  Likely 2/2 to lumbar DDD.  Has had some growth of bact in the urine.  Abx will continue for now.  Small refill given on Norco.  Short-term use only.  ok  Orders:  -     Ambulatory consult to Pain Clinic    Essential hypertension  Comments:  Controlled    Type 2 diabetes mellitus with complication, with long-term current use of insulin  Comments:  Improving control continues    Bipolar affective disorder, remission status unspecified  Comments:  Followed closely by Psychiatry.  No change in meds    Other orders  -     HYDROcodone-acetaminophen (NORCO) 5-325 mg per tablet; Take 1 tablet by mouth every 6 (six) hours as needed for Pain.  Dispense: 10 tablet; Refill: 0

## 2019-07-03 NOTE — PROGRESS NOTES
The Transitional Care Note  Subjective:       Patient ID: Alexandra Goins is a 53 y.o. female.  Chief Complaint: Follow-up    Family and/or Caretaker present at visit?  Yes.  Diagnostic tests reviewed/disposition: I have reviewed all completed as well as pending diagnostic tests at the time of discharge.  Disease/illness education: DDD Home health/community services discussion/referrals: Patient does not have home health established from hospital visit.  They do not need home health.  If needed, we will set up home health for the patient.   Establishment or re-establishment of referral orders for community resources: No other necessary community resources.   Discussion with other health care providers: No discussion with other health care providers necessary.   HPI  Review of Systems    Objective:      Physical Exam    Assessment:       1. Right flank pain    2. Essential hypertension    3. Type 2 diabetes mellitus with complication, with long-term current use of insulin    4. Bipolar affective disorder, remission status unspecified        Plan:

## 2019-08-22 ENCOUNTER — TELEPHONE (OUTPATIENT)
Dept: PAIN MEDICINE | Facility: CLINIC | Age: 54
End: 2019-08-22

## 2019-08-22 ENCOUNTER — OFFICE VISIT (OUTPATIENT)
Dept: PAIN MEDICINE | Facility: CLINIC | Age: 54
End: 2019-08-22
Payer: MEDICARE

## 2019-08-22 VITALS
DIASTOLIC BLOOD PRESSURE: 74 MMHG | WEIGHT: 158.75 LBS | HEART RATE: 111 BPM | HEIGHT: 64 IN | SYSTOLIC BLOOD PRESSURE: 121 MMHG | BODY MASS INDEX: 27.1 KG/M2

## 2019-08-22 DIAGNOSIS — M79.18 MYOFASCIAL PAIN: Primary | ICD-10-CM

## 2019-08-22 DIAGNOSIS — M54.16 LUMBAR RADICULOPATHY: ICD-10-CM

## 2019-08-22 PROCEDURE — 99204 PR OFFICE/OUTPT VISIT, NEW, LEVL IV, 45-59 MIN: ICD-10-PCS | Mod: HCNC,S$GLB,, | Performed by: PAIN MEDICINE

## 2019-08-22 PROCEDURE — 3078F DIAST BP <80 MM HG: CPT | Mod: HCNC,CPTII,S$GLB, | Performed by: PAIN MEDICINE

## 2019-08-22 PROCEDURE — 3008F PR BODY MASS INDEX (BMI) DOCUMENTED: ICD-10-PCS | Mod: HCNC,CPTII,S$GLB, | Performed by: PAIN MEDICINE

## 2019-08-22 PROCEDURE — 99204 OFFICE O/P NEW MOD 45 MIN: CPT | Mod: HCNC,S$GLB,, | Performed by: PAIN MEDICINE

## 2019-08-22 PROCEDURE — 3074F PR MOST RECENT SYSTOLIC BLOOD PRESSURE < 130 MM HG: ICD-10-PCS | Mod: HCNC,CPTII,S$GLB, | Performed by: PAIN MEDICINE

## 2019-08-22 PROCEDURE — 3008F BODY MASS INDEX DOCD: CPT | Mod: HCNC,CPTII,S$GLB, | Performed by: PAIN MEDICINE

## 2019-08-22 PROCEDURE — 3078F PR MOST RECENT DIASTOLIC BLOOD PRESSURE < 80 MM HG: ICD-10-PCS | Mod: HCNC,CPTII,S$GLB, | Performed by: PAIN MEDICINE

## 2019-08-22 PROCEDURE — 99999 PR PBB SHADOW E&M-EST. PATIENT-LVL V: ICD-10-PCS | Mod: PBBFAC,HCNC,, | Performed by: PAIN MEDICINE

## 2019-08-22 PROCEDURE — 99999 PR PBB SHADOW E&M-EST. PATIENT-LVL V: CPT | Mod: PBBFAC,HCNC,, | Performed by: PAIN MEDICINE

## 2019-08-22 PROCEDURE — 3074F SYST BP LT 130 MM HG: CPT | Mod: HCNC,CPTII,S$GLB, | Performed by: PAIN MEDICINE

## 2019-08-22 RX ORDER — BACLOFEN 10 MG/1
10 TABLET ORAL NIGHTLY PRN
Qty: 30 TABLET | Refills: 3 | Status: SHIPPED | OUTPATIENT
Start: 2019-08-22 | End: 2019-10-23 | Stop reason: SDUPTHER

## 2019-08-22 RX ORDER — IBUPROFEN 800 MG/1
800 TABLET ORAL 3 TIMES DAILY PRN
Qty: 45 TABLET | Refills: 3 | Status: SHIPPED | OUTPATIENT
Start: 2019-08-22 | End: 2019-09-06

## 2019-08-22 NOTE — PATIENT INSTRUCTIONS
-provide a referral for physical therapy  -will prescribed ibuprofen 800 mg 3 times daily  -provided baclofen 10 mg nightly as needed  -will schedule with physiatry for EMG testing of bilateral lower extremities  -follow up in clinic in 8 weeks

## 2019-08-22 NOTE — LETTER
August 22, 2019      Perez Casiano MD  [address] [facility] [facility] [address]           [facility]  [id number] [place] [address]  Phone: [phone]  Fax: [phone]          Patient: Alexandra Goins   MR Number: [id number]   YOB: 1965   Date of Visit: 8/22/2019       Dear Dr. Perez Casiano:    Thank you for referring Alexandra Goins to me for evaluation. Attached you will find relevant portions of my assessment and plan of care.    If you have questions, please do not hesitate to call me. I look forward to following Alexandra Goins along with you.    Sincerely,    Ronaldo Feldman MD    Enclosure  CC:  No Recipients    If you would like to receive this communication electronically, please contact [email or web address] or [phone] to request more information on [employer] access.    For providers and/or their staff who would like to refer a patient to [facility], please contact us through our one-stop-shop provider referral line, [facility], at [phone].    If you feel you have received this communication in error or would no longer like to receive these types of communications, please e-mail [email or web address]
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
## 2019-08-22 NOTE — TELEPHONE ENCOUNTER
Contacted pharmacy regarding error message our department received for prescriptions.  Spoke to Meghan and she stated that IBU and baclofen are currently being filled.

## 2019-08-23 ENCOUNTER — TELEPHONE (OUTPATIENT)
Dept: PAIN MEDICINE | Facility: CLINIC | Age: 54
End: 2019-08-23

## 2019-08-23 NOTE — TELEPHONE ENCOUNTER
Spoke with patient stating the physical therapy she first pick is not in her network of the [employer] plan she has. Stated to the patient Dr Feldman would print an external referral for physical therapy and she can see which one would take her insurance. Patient stated she does not have the time to call around and find a physical therapy place that her insurance company would take so she just won't go to any. No further questions.

## 2019-08-23 NOTE — TELEPHONE ENCOUNTER
----- Message from Stephenie De La Garza sent at 8/23/2019  8:39 AM CDT -----  Contact: [facility]  Jazzy called in stating that they are not in network with patient's [employer] plan. If needed please call Jazzy back at [phone]

## 2019-08-24 ENCOUNTER — OFFICE VISIT (OUTPATIENT)
Dept: DIABETES | Facility: CLINIC | Age: 54
End: 2019-08-24
Payer: MEDICARE

## 2019-08-24 VITALS
SYSTOLIC BLOOD PRESSURE: 114 MMHG | WEIGHT: 158.94 LBS | TEMPERATURE: 98 F | HEIGHT: 64 IN | DIASTOLIC BLOOD PRESSURE: 62 MMHG | HEART RATE: 92 BPM | BODY MASS INDEX: 27.13 KG/M2

## 2019-08-24 DIAGNOSIS — Z72.0 TOBACCO ABUSE: Chronic | ICD-10-CM

## 2019-08-24 DIAGNOSIS — Z79.4 TYPE 2 DIABETES MELLITUS WITH HYPERGLYCEMIA, WITH LONG-TERM CURRENT USE OF INSULIN: Primary | ICD-10-CM

## 2019-08-24 DIAGNOSIS — E11.65 TYPE 2 DIABETES MELLITUS WITH HYPERGLYCEMIA, WITH LONG-TERM CURRENT USE OF INSULIN: Primary | ICD-10-CM

## 2019-08-24 DIAGNOSIS — E78.5 DYSLIPIDEMIA ASSOCIATED WITH TYPE 2 DIABETES MELLITUS: ICD-10-CM

## 2019-08-24 DIAGNOSIS — I10 ESSENTIAL HYPERTENSION: Chronic | ICD-10-CM

## 2019-08-24 DIAGNOSIS — E11.69 DYSLIPIDEMIA ASSOCIATED WITH TYPE 2 DIABETES MELLITUS: ICD-10-CM

## 2019-08-24 LAB — GLUCOSE SERPL-MCNC: 95 MG/DL (ref 70–110)

## 2019-08-24 PROCEDURE — 99999 PR PBB SHADOW E&M-EST. PATIENT-LVL IV: CPT | Mod: PBBFAC,HCNC,, | Performed by: NURSE PRACTITIONER

## 2019-08-24 PROCEDURE — 99214 OFFICE O/P EST MOD 30 MIN: CPT | Mod: HCNC,S$GLB,, | Performed by: NURSE PRACTITIONER

## 2019-08-24 PROCEDURE — 99999 PR PBB SHADOW E&M-EST. PATIENT-LVL IV: ICD-10-PCS | Mod: PBBFAC,HCNC,, | Performed by: NURSE PRACTITIONER

## 2019-08-24 PROCEDURE — 99214 PR OFFICE/OUTPT VISIT, EST, LEVL IV, 30-39 MIN: ICD-10-PCS | Mod: HCNC,S$GLB,, | Performed by: NURSE PRACTITIONER

## 2019-08-24 PROCEDURE — 82962 POCT GLUCOSE, HAND-HELD DEVICE: ICD-10-PCS | Mod: HCNC,S$GLB,, | Performed by: NURSE PRACTITIONER

## 2019-08-24 PROCEDURE — 82962 GLUCOSE BLOOD TEST: CPT | Mod: HCNC,S$GLB,, | Performed by: NURSE PRACTITIONER

## 2019-08-24 RX ORDER — LOSARTAN POTASSIUM 25 MG/1
25 TABLET ORAL DAILY
Refills: 3 | COMMUNITY
Start: 2019-07-07 | End: 2020-10-15 | Stop reason: SDUPTHER

## 2019-08-24 RX ORDER — CALCIUM CITRATE/VITAMIN D3 200MG-6.25
TABLET ORAL
Refills: 11 | COMMUNITY
Start: 2019-08-01 | End: 2020-06-23

## 2019-08-24 NOTE — PROGRESS NOTES
"PCP: Perez Casiano MD    Subjective:     Chief Complaint: Diabetes Follow Up    HISTORY OF PRESENT ILLNESS: 53 y.o.  female presenting for diabetes follow up. Patient has had Type II diabetes since 1985 and has the following complications: peripheral neuropathy. She  has attended diabetes education in the past. She has a history of bipolar disorder and depression, with previous suicide attempt in the past - follows with psychiatry.      She has lost 15 pounds since last visit and continues to eat a Mediterranean / Atkins eating plan, which is based on a low carb diet. In June, patient was seen in the ER for right flank pain, pyelonephritis.    Blood glucose monitoring is performed 3 x daily.  No readings today.  Per recall, fasting BGs are 70 s - 110 s, occasionally 200 s ; ac meals 110 s - 150 s.     Height: 5' 4" (162.6 cm)  //  Weight: 72.1 kg (158 lb 15.2 oz), Body mass index is 27.28 kg/m².  Home Blood Glucose reading this AM: Not Taken  Her blood sugar in clinic today is: 95 mg/dl at 10:03 am      Labs Reviewed.       Lab Results   Component Value Date    CPEPTIDE 3.4 01/25/2017     Lab Results   Component Value Date    GLUTAMICACID 0.01 01/25/2017     Lab Results   Component Value Date    HUMANINSULIN 0.00 01/25/2017     DM MEDICATIONS:   Novolin 70 / 30, < 70, no insulin ; if > 70, 10 units before breakfast;  110 - 150 s, 3 - 5 units at lunch; ac dinner, 110 - 150 s, 10 - 12 units  Metformin 500 mg, 2 tabs  BID    Review of Systems   Constitutional: Negative for appetite change, fatigue and unexpected weight change.   Eyes: Negative for visual disturbance.   Respiratory: Negative for cough.    Gastrointestinal: Positive for diarrhea and nausea. Negative for abdominal pain and constipation.   Endocrine: Negative for polydipsia, polyphagia and polyuria.   Genitourinary: Negative for dysuria, frequency and urgency.   Neurological: Negative for dizziness, numbness and headaches. "   Psychiatric/Behavioral: Negative for confusion, decreased concentration and suicidal ideas. The patient is not nervous/anxious.        Diabetes Management Status  Statin: Not taking  ACE/ARB: Not taking    Screening or Prevention Patient's value Goal Complete/Controlled?   HgA1C Testing and Control   Lab Results   Component Value Date    HGBA1C 7.3 (H) 2019      Annually/Less than 8% Yes   Lipid profile : 04/10/2019 Annually No   LDL control Lab Results   Component Value Date    LDLCALC 134.8 04/10/2019    Annually/Less than 100 mg/dl  No   Nephropathy screening Lab Results   Component Value Date    MICALBCREAT 48.8 (H) 2017     Lab Results   Component Value Date    PROTEINUA Negative 2019    Annually Yes   Blood pressure BP Readings from Last 1 Encounters:   19 114/62    Less than 140/90 Yes   Dilated retinal exam : 2019 Annually Yes, Dr. Dubon   Foot exam   : 2019 Annually Yes     ACTIVITY LEVEL: Sedentary. Discussed activities, benefits, methods, and precautions.  MEAL PLANNING: Patient reports number of meals per day to be 3 and number of snacks per day to be 0.   Patient is encouraged to carb count and consume no more than 45 - 60 grams of carbohydrates in each meal, and 1800 k / gloria per day.      BLOOD GLUCOSE TESTIN x daily  SOCIAL HISTORY: .  Lives with spouse.  Disabled, due to Bipolar Disorder, chronic anxiety.  Former smoker.    Objective:      Physical Exam   Constitutional: She appears well-developed and well-nourished.   HENT:   Head: Normocephalic and atraumatic.   Eyes: Pupils are equal, round, and reactive to light. EOM are normal.   Neck: Normal range of motion.   Cardiovascular: Normal rate and regular rhythm.   Pulses:       Dorsalis pedis pulses are 2+ on the right side, and 2+ on the left side.   Pulmonary/Chest: Effort normal and breath sounds normal.   Musculoskeletal: Normal range of motion.   Feet:   Right Foot:   Protective Sensation: 6 sites  tested. 6 sites sensed.   Skin Integrity: Negative for ulcer, callus or dry skin.   Left Foot:   Protective Sensation: 6 sites tested. 6 sites sensed.   Skin Integrity: Negative for ulcer, blister, callus or dry skin.   Skin: Skin is warm and dry. No rash noted.   Psychiatric: She has a normal mood and affect. Her behavior is normal. Judgment and thought content normal.       Assessment / Plan:     1.) Type 2 diabetes mellitus with hyperglycemia, with long-term current use of insulin  Comments:  - She is following a more Mediterranean / Atkins eating planning which consist of more fish, vegetables, and beans. Currently, she is using mixed Novolin 70 / 30 with a sliding scale which can be problematic because when she  skips her dose, she also forfeits her intermediate acting insulin, which causes rebound fasting hyperglycemia.  We discussed  insulin, which will allow for more flexibility with dosing.    - She plans to purchase both insulins O[facility] at [facility].  Take Novolin N 10 units BID.  She will take Novolin R with meals using this correction scale:  BG 80 - 150:   2 units  //  - 199: 3 units  //  - 249: 4 units  //   - 299: 5 units  //  - 349: 6 units  // BG Over 350:  7 units. Continue metformin 500 mg, 2 tabs BID.        Orders:  -     POCT Glucose, Hand-Held Device  - [facility]    2.) Dyslipidemia associated with type 2 diabetes mellitus - continue meds as prescribed    3.) Essential hypertension - continue meds as prescribed    4.) Tobacco Abuse    Additional Plan Details:    1.) Continue monitoring blood sugar 4 x daily, fasting and ac dinner or at bedtime. Discussed ADA goal for fasting blood sugar, 80 - 130 mg/dL; pp blood sugars below 180 mg/dl. Also, discussed prevention of hypoglycemia and the need to adjust goals to higher levels if persistent hypoglycemia.  Reminded to bring BG records or meter to each visit for review.  2.) Return to clinic in 3  months for follow up, CGM results. The patient was explained the above plan and given opportunity to ask questions.  She understands, chooses and consents to this plan and accepts all the risks, which include but are not limited to the risks mentioned above. She understands the alternative of having no testing, interventions or treatments at this time. She left content and without further questions.     Amelia Santana, ABNERC, CDE    A total of 30 minutes was spent in face to face time, of which over 50 % was spent in counseling patient on disease process, complications, treatment, and side effects of medications.

## 2019-08-24 NOTE — PATIENT INSTRUCTIONS
Continue metformin.    Stop Novolin mixed 70 / 30.    Start Novolin N,  10 units twice a day ( breakfast and dinner )    Take Novolin R with meals as needed using the following scale:  BG 80 - 150:   2 units   - 199: 3 units   - 249: 4 units   - 299: 5 units    - 349: 6 units   BG Over 350:  7 units

## 2019-08-26 ENCOUNTER — LAB VISIT (OUTPATIENT)
Dept: LAB | Facility: HOSPITAL | Age: 54
End: 2019-08-26
Attending: NURSE PRACTITIONER
Payer: MEDICARE

## 2019-08-26 DIAGNOSIS — E11.65 TYPE 2 DIABETES MELLITUS WITH HYPERGLYCEMIA, WITH LONG-TERM CURRENT USE OF INSULIN: ICD-10-CM

## 2019-08-26 DIAGNOSIS — Z79.4 TYPE 2 DIABETES MELLITUS WITH HYPERGLYCEMIA, WITH LONG-TERM CURRENT USE OF INSULIN: ICD-10-CM

## 2019-08-26 LAB
ALBUMIN/CREAT UR: 41.4 UG/MG (ref 0–30)
CREAT UR-MCNC: 70 MG/DL (ref 15–325)
MICROALBUMIN UR DL<=1MG/L-MCNC: 29 UG/ML

## 2019-08-26 PROCEDURE — 82043 UR ALBUMIN QUANTITATIVE: CPT | Mod: HCNC

## 2019-08-28 ENCOUNTER — OFFICE VISIT (OUTPATIENT)
Dept: FAMILY MEDICINE | Facility: CLINIC | Age: 54
End: 2019-08-28
Payer: MEDICARE

## 2019-08-28 VITALS
OXYGEN SATURATION: 98 % | TEMPERATURE: 97 F | WEIGHT: 162.06 LBS | DIASTOLIC BLOOD PRESSURE: 65 MMHG | HEIGHT: 64 IN | BODY MASS INDEX: 27.67 KG/M2 | SYSTOLIC BLOOD PRESSURE: 127 MMHG | HEART RATE: 112 BPM

## 2019-08-28 DIAGNOSIS — F31.9 BIPOLAR AFFECTIVE DISORDER, REMISSION STATUS UNSPECIFIED: ICD-10-CM

## 2019-08-28 DIAGNOSIS — E86.0 MILD DEHYDRATION: ICD-10-CM

## 2019-08-28 DIAGNOSIS — E11.69 DYSLIPIDEMIA ASSOCIATED WITH TYPE 2 DIABETES MELLITUS: ICD-10-CM

## 2019-08-28 DIAGNOSIS — Z72.0 TOBACCO ABUSE: ICD-10-CM

## 2019-08-28 DIAGNOSIS — E11.8 TYPE 2 DIABETES MELLITUS WITH COMPLICATION, WITH LONG-TERM CURRENT USE OF INSULIN: ICD-10-CM

## 2019-08-28 DIAGNOSIS — I10 ESSENTIAL HYPERTENSION: ICD-10-CM

## 2019-08-28 DIAGNOSIS — E78.5 DYSLIPIDEMIA ASSOCIATED WITH TYPE 2 DIABETES MELLITUS: ICD-10-CM

## 2019-08-28 DIAGNOSIS — R30.0 DYSURIA: Primary | ICD-10-CM

## 2019-08-28 DIAGNOSIS — Z79.4 TYPE 2 DIABETES MELLITUS WITH COMPLICATION, WITH LONG-TERM CURRENT USE OF INSULIN: ICD-10-CM

## 2019-08-28 DIAGNOSIS — E03.4 HYPOTHYROIDISM DUE TO ACQUIRED ATROPHY OF THYROID: ICD-10-CM

## 2019-08-28 LAB
BILIRUB SERPL-MCNC: NEGATIVE MG/DL
BLOOD URINE, POC: NEGATIVE
COLOR, POC UA: ABNORMAL
GLUCOSE UR QL STRIP: NORMAL
KETONES UR QL STRIP: NEGATIVE
LEUKOCYTE ESTERASE URINE, POC: ABNORMAL
NITRITE, POC UA: ABNORMAL
PH, POC UA: 5
PROTEIN, POC: ABNORMAL
SPECIFIC GRAVITY, POC UA: 1.02
UROBILINOGEN, POC UA: NORMAL

## 2019-08-28 PROCEDURE — 81002 URINALYSIS NONAUTO W/O SCOPE: CPT | Mod: HCNC,S$GLB,, | Performed by: FAMILY MEDICINE

## 2019-08-28 PROCEDURE — 87088 URINE BACTERIA CULTURE: CPT | Mod: HCNC

## 2019-08-28 PROCEDURE — 87086 URINE CULTURE/COLONY COUNT: CPT | Mod: HCNC

## 2019-08-28 PROCEDURE — 3074F SYST BP LT 130 MM HG: CPT | Mod: HCNC,CPTII,S$GLB, | Performed by: FAMILY MEDICINE

## 2019-08-28 PROCEDURE — 99999 PR PBB SHADOW E&M-EST. PATIENT-LVL III: ICD-10-PCS | Mod: PBBFAC,HCNC,, | Performed by: FAMILY MEDICINE

## 2019-08-28 PROCEDURE — 87077 CULTURE AEROBIC IDENTIFY: CPT | Mod: HCNC

## 2019-08-28 PROCEDURE — 3074F PR MOST RECENT SYSTOLIC BLOOD PRESSURE < 130 MM HG: ICD-10-PCS | Mod: HCNC,CPTII,S$GLB, | Performed by: FAMILY MEDICINE

## 2019-08-28 PROCEDURE — 81002 POCT URINE DIPSTICK WITHOUT MICROSCOPE: ICD-10-PCS | Mod: HCNC,S$GLB,, | Performed by: FAMILY MEDICINE

## 2019-08-28 PROCEDURE — 87186 SC STD MICRODIL/AGAR DIL: CPT | Mod: HCNC

## 2019-08-28 PROCEDURE — 3045F PR MOST RECENT HEMOGLOBIN A1C LEVEL 7.0-9.0%: CPT | Mod: HCNC,CPTII,S$GLB, | Performed by: FAMILY MEDICINE

## 2019-08-28 PROCEDURE — 99214 OFFICE O/P EST MOD 30 MIN: CPT | Mod: 25,HCNC,S$GLB, | Performed by: FAMILY MEDICINE

## 2019-08-28 PROCEDURE — 3008F BODY MASS INDEX DOCD: CPT | Mod: HCNC,CPTII,S$GLB, | Performed by: FAMILY MEDICINE

## 2019-08-28 PROCEDURE — 99999 PR PBB SHADOW E&M-EST. PATIENT-LVL III: CPT | Mod: PBBFAC,HCNC,, | Performed by: FAMILY MEDICINE

## 2019-08-28 PROCEDURE — 3008F PR BODY MASS INDEX (BMI) DOCUMENTED: ICD-10-PCS | Mod: HCNC,CPTII,S$GLB, | Performed by: FAMILY MEDICINE

## 2019-08-28 PROCEDURE — 3045F PR MOST RECENT HEMOGLOBIN A1C LEVEL 7.0-9.0%: ICD-10-PCS | Mod: HCNC,CPTII,S$GLB, | Performed by: FAMILY MEDICINE

## 2019-08-28 PROCEDURE — 99214 PR OFFICE/OUTPT VISIT, EST, LEVL IV, 30-39 MIN: ICD-10-PCS | Mod: 25,HCNC,S$GLB, | Performed by: FAMILY MEDICINE

## 2019-08-28 PROCEDURE — 3078F DIAST BP <80 MM HG: CPT | Mod: HCNC,CPTII,S$GLB, | Performed by: FAMILY MEDICINE

## 2019-08-28 PROCEDURE — 3078F PR MOST RECENT DIASTOLIC BLOOD PRESSURE < 80 MM HG: ICD-10-PCS | Mod: HCNC,CPTII,S$GLB, | Performed by: FAMILY MEDICINE

## 2019-08-28 RX ORDER — SULFAMETHOXAZOLE AND TRIMETHOPRIM 800; 160 MG/1; MG/1
1 TABLET ORAL 2 TIMES DAILY
Qty: 14 TABLET | Refills: 0 | Status: ON HOLD | OUTPATIENT
Start: 2019-08-28 | End: 2019-09-18 | Stop reason: CLARIF

## 2019-08-28 NOTE — PROGRESS NOTES
Subjective:       Patient ID: Alexandra Goins is a 53 y.o. female.    Chief Complaint: Cystitis      HPI Comments:       Current Outpatient Medications:     baclofen (LIORESAL) 10 MG tablet, Take 1 tablet (10 mg total) by mouth nightly as needed., Disp: 30 tablet, Rfl: 3    diazePAM (VALIUM) 10 MG Tab, Take 1 tablet (10 mg total) by mouth 3 (three) times daily., Disp: 90 tablet, Rfl: 2    doxepin (SINEQUAN) 10 MG capsule, Take 1 capsule (10 mg total) by mouth nightly as needed., Disp: 30 capsule, Rfl: 2    ibuprofen (ADVIL,MOTRIN) 800 MG tablet, Take 1 tablet (800 mg total) by mouth 3 (three) times daily as needed for Pain., Disp: 45 tablet, Rfl: 3    insulin NPH (NOVOLIN N NPH U-100 INSULIN) 100 unit/mL injection, Inject 10 Units into the skin 2 (two) times daily before meals., Disp: 1 vial, Rfl: 3    insulin regular (NOVOLIN R REGULAR U-100 INSULN) 100 unit/mL Inj injection, Inject three times daily with meals using the scale: BG 80 - 150:   2 units  //  - 199: 3 units  //  - 249: 4 units  //  - 299: 5 units  //  - 349: 6 units  // BG Over 350:  7 units., Disp: 10 mL, Rfl: 3    losartan (COZAAR) 25 MG tablet, Take 25 mg by mouth once daily., Disp: , Rfl: 3    metFORMIN (GLUCOPHAGE) 500 MG tablet, Take 1 tablet (500 mg total) by mouth 2 (two) times daily with meals., Disp: 180 tablet, Rfl: 1    promethazine (PHENERGAN) 25 MG tablet, Take 1 tablet (25 mg total) by mouth every 6 (six) hours as needed for Nausea., Disp: 15 tablet, Rfl: 0    QUEtiapine (SEROQUEL) 300 MG Tab, Take 1 tablet (300 mg total) by mouth every evening., Disp: 30 tablet, Rfl: 2    rizatriptan (MAXALT) 10 MG tablet, One tablet with onset of migraine and may repeat one dose in 2 hours if needed, Disp: , Rfl:     topiramate (TOPAMAX) 200 MG Tab, 1 tablet daily, Disp: 30 tablet, Rfl: 2    TRUE METRIX GLUCOSE TEST STRIP Strp, USE 1 STRIP TO CHECK GLUCOSE THREE TIMES DAILY, Disp: , Rfl: 11    venlafaxine  "(EFFEXOR-XR) 75 MG 24 hr capsule, Take 2 capsules (150 mg total) by mouth once daily., Disp: 60 capsule, Rfl: 2    isosorbide mononitrate (IMDUR) 30 MG 24 hr tablet, Take 1 tablet (30 mg total) by mouth once daily., Disp: 30 tablet, Rfl: 11    sulfamethoxazole-trimethoprim 800-160mg (BACTRIM DS) 800-160 mg Tab, Take 1 tablet by mouth 2 (two) times daily., Disp: 14 tablet, Rfl: 0      Presents today complaining of not feeling well all during the last week.  Started having some nausea, vomiting, diarrhea at that time.  Last vomiting was this morning.  No diarrhea today.  Within the last few days she has had pain in her bladder area.  With spasms that increase her pain scale from 05/06/2010 to 7-8/10.  No urinary frequency.  Decreased urine volume..  I feel like I can keep up with my fluids.    Was not able to get her physical therapy covered by insurance.  Follows up with a back doctor in October.  Taking ibuprofen and baclofen currently.  Back pain is about the same or better    Review of Systems   Constitutional: Negative for activity change, appetite change and fever.   HENT: Negative for sore throat.    Respiratory: Negative for cough and shortness of breath.    Cardiovascular: Negative for chest pain.   Gastrointestinal: Positive for diarrhea, nausea and vomiting. Negative for abdominal pain.   Genitourinary: Positive for decreased urine volume and dysuria. Negative for difficulty urinating, frequency, hematuria and urgency.   Musculoskeletal: Positive for back pain. Negative for arthralgias and myalgias.   Neurological: Negative for dizziness and headaches.       Objective:      Vitals:    08/28/19 0748   BP: 127/65   Pulse: (!) 112   Temp: 96.7 °F (35.9 °C)   SpO2: 98%   Weight: 73.5 kg (162 lb 0.6 oz)   Height: 5' 4" (1.626 m)   PainSc:   8     Physical Exam   Constitutional: She is oriented to person, place, and time. She appears well-developed and well-nourished. No distress.   HENT:   Head: " Normocephalic.   Mouth/Throat: Mucous membranes are dry. No oropharyngeal exudate, posterior oropharyngeal edema or posterior oropharyngeal erythema.   Neck: Neck supple. No thyromegaly present.   Cardiovascular: Normal rate, regular rhythm and normal heart sounds.   No murmur heard.  Pulmonary/Chest: Effort normal and breath sounds normal. She has no wheezes. She has no rales.   Abdominal: Soft. Bowel sounds are normal. She exhibits no distension and no mass. There is no hepatosplenomegaly. There is tenderness in the suprapubic area. There is no rigidity and no guarding.   Musculoskeletal: She exhibits no edema.   Lymphadenopathy:     She has no cervical adenopathy.   Neurological: She is alert and oriented to person, place, and time.   Skin: Skin is warm and dry. She is not diaphoretic.   Psychiatric: She has a normal mood and affect. Her behavior is normal. Judgment and thought content normal.   Nursing note and vitals reviewed.      Assessment:       1. Dysuria    2. Mild dehydration    3. Essential hypertension    4. Type 2 diabetes mellitus with complication, with long-term current use of insulin    5. Bipolar affective disorder, remission status unspecified    6. Hypothyroidism due to acquired atrophy of thyroid    7. Tobacco abuse    8. Dyslipidemia associated with type 2 diabetes mellitus        Plan:   Dysuria  Comments:  Urinalysis positive for leukocytes and nitrites.  Bactrim double strength x7 days.  Urine culture sent.  Increase fluids!  Orders:  -     POCT URINE DIPSTICK WITHOUT MICROSCOPE  -     Urine culture; Future; Expected date: 08/28/2019    Mild dehydration  Comments:  Increase fluid intake    Essential hypertension  Comments:  Controlled    Type 2 diabetes mellitus with complication, with long-term current use of insulin  Comments:  Much improved A1c.    Bipolar affective disorder, remission status unspecified  Comments:  On current medications    Hypothyroidism due to acquired atrophy of  thyroid  Comments:  Normal off medication    Tobacco abuse    Dyslipidemia associated with type 2 diabetes mellitus    Other orders  -     sulfamethoxazole-trimethoprim 800-160mg (BACTRIM DS) 800-160 mg Tab; Take 1 tablet by mouth 2 (two) times daily.  Dispense: 14 tablet; Refill: 0

## 2019-08-30 LAB — BACTERIA UR CULT: ABNORMAL

## 2019-09-04 RX ORDER — DIAZEPAM 10 MG/1
10 TABLET ORAL 3 TIMES DAILY
Qty: 90 TABLET | Refills: 0 | Status: CANCELLED | OUTPATIENT
Start: 2019-09-04 | End: 2020-01-16

## 2019-09-06 RX ORDER — DIAZEPAM 10 MG/1
10 TABLET ORAL 3 TIMES DAILY
Qty: 90 TABLET | Refills: 0 | Status: ON HOLD | OUTPATIENT
Start: 2019-09-06 | End: 2019-09-19 | Stop reason: HOSPADM

## 2019-09-16 ENCOUNTER — HOSPITAL ENCOUNTER (EMERGENCY)
Facility: HOSPITAL | Age: 54
Discharge: HOME | End: 2019-09-17
Attending: EMERGENCY MEDICINE
Payer: MEDICARE

## 2019-09-16 DIAGNOSIS — E16.2 HYPOGLYCEMIA: ICD-10-CM

## 2019-09-16 DIAGNOSIS — G43.909 MIGRAINE WITHOUT STATUS MIGRAINOSUS, NOT INTRACTABLE, UNSPECIFIED MIGRAINE TYPE: Primary | ICD-10-CM

## 2019-09-16 LAB
ALBUMIN SERPL BCP-MCNC: 4 G/DL (ref 3.5–5.2)
ALP SERPL-CCNC: 82 U/L (ref 55–135)
ALT SERPL W/O P-5'-P-CCNC: 32 U/L (ref 10–44)
ANION GAP SERPL CALC-SCNC: 12 MMOL/L (ref 8–16)
AST SERPL-CCNC: 22 U/L (ref 10–40)
BASOPHILS # BLD AUTO: 0.05 K/UL (ref 0–0.2)
BASOPHILS NFR BLD: 0.4 % (ref 0–1.9)
BILIRUB SERPL-MCNC: 0.2 MG/DL (ref 0.1–1)
BUN SERPL-MCNC: 17 MG/DL (ref 6–20)
CALCIUM SERPL-MCNC: 9.5 MG/DL (ref 8.7–10.5)
CHLORIDE SERPL-SCNC: 106 MMOL/L (ref 95–110)
CO2 SERPL-SCNC: 25 MMOL/L (ref 23–29)
CREAT SERPL-MCNC: 0.9 MG/DL (ref 0.5–1.4)
DIFFERENTIAL METHOD: ABNORMAL
EOSINOPHIL # BLD AUTO: 0.3 K/UL (ref 0–0.5)
EOSINOPHIL NFR BLD: 2.2 % (ref 0–8)
ERYTHROCYTE [DISTWIDTH] IN BLOOD BY AUTOMATED COUNT: 14.5 % (ref 11.5–14.5)
EST. GFR  (AFRICAN AMERICAN): >60 ML/MIN/1.73 M^2
EST. GFR  (NON AFRICAN AMERICAN): >60 ML/MIN/1.73 M^2
GLUCOSE SERPL-MCNC: 38 MG/DL (ref 70–110)
HCT VFR BLD AUTO: 44.3 % (ref 37–48.5)
HGB BLD-MCNC: 14.1 G/DL (ref 12–16)
LYMPHOCYTES # BLD AUTO: 5 K/UL (ref 1–4.8)
LYMPHOCYTES NFR BLD: 38.5 % (ref 18–48)
MCH RBC QN AUTO: 30.3 PG (ref 27–31)
MCHC RBC AUTO-ENTMCNC: 31.8 G/DL (ref 32–36)
MCV RBC AUTO: 95 FL (ref 82–98)
MONOCYTES # BLD AUTO: 0.9 K/UL (ref 0.3–1)
MONOCYTES NFR BLD: 7.3 % (ref 4–15)
NEUTROPHILS # BLD AUTO: 6.7 K/UL (ref 1.8–7.7)
NEUTROPHILS NFR BLD: 51.6 % (ref 38–73)
PLATELET # BLD AUTO: 337 K/UL (ref 150–350)
PMV BLD AUTO: 9.6 FL (ref 9.2–12.9)
POCT GLUCOSE: 101 MG/DL (ref 70–110)
POCT GLUCOSE: 129 MG/DL (ref 70–110)
POCT GLUCOSE: 44 MG/DL (ref 70–110)
POCT GLUCOSE: 46 MG/DL (ref 70–110)
POCT GLUCOSE: 96 MG/DL (ref 70–110)
POTASSIUM SERPL-SCNC: 3.5 MMOL/L (ref 3.5–5.1)
PROT SERPL-MCNC: 7.8 G/DL (ref 6–8.4)
RBC # BLD AUTO: 4.65 M/UL (ref 4–5.4)
SODIUM SERPL-SCNC: 143 MMOL/L (ref 136–145)
WBC # BLD AUTO: 12.92 K/UL (ref 3.9–12.7)

## 2019-09-16 PROCEDURE — 80053 COMPREHEN METABOLIC PANEL: CPT | Mod: HCNC

## 2019-09-16 PROCEDURE — 96375 TX/PRO/DX INJ NEW DRUG ADDON: CPT | Mod: HCNC

## 2019-09-16 PROCEDURE — 36415 COLL VENOUS BLD VENIPUNCTURE: CPT | Mod: HCNC

## 2019-09-16 PROCEDURE — 96366 THER/PROPH/DIAG IV INF ADDON: CPT | Mod: HCNC

## 2019-09-16 PROCEDURE — 96368 THER/DIAG CONCURRENT INF: CPT | Mod: HCNC

## 2019-09-16 PROCEDURE — 96365 THER/PROPH/DIAG IV INF INIT: CPT | Mod: HCNC

## 2019-09-16 PROCEDURE — 63600175 PHARM REV CODE 636 W HCPCS: Mod: HCNC | Performed by: EMERGENCY MEDICINE

## 2019-09-16 PROCEDURE — 85025 COMPLETE CBC W/AUTO DIFF WBC: CPT | Mod: HCNC

## 2019-09-16 PROCEDURE — 82962 GLUCOSE BLOOD TEST: CPT | Mod: HCNC

## 2019-09-16 PROCEDURE — 99284 EMERGENCY DEPT VISIT MOD MDM: CPT | Mod: 25,HCNC

## 2019-09-16 PROCEDURE — 25000003 PHARM REV CODE 250: Mod: HCNC | Performed by: EMERGENCY MEDICINE

## 2019-09-16 RX ORDER — ONDANSETRON 2 MG/ML
8 INJECTION INTRAMUSCULAR; INTRAVENOUS
Status: DISCONTINUED | OUTPATIENT
Start: 2019-09-16 | End: 2019-09-17 | Stop reason: HOSPADM

## 2019-09-16 RX ORDER — KETOROLAC TROMETHAMINE 30 MG/ML
15 INJECTION, SOLUTION INTRAMUSCULAR; INTRAVENOUS
Status: DISCONTINUED | OUTPATIENT
Start: 2019-09-16 | End: 2019-09-16

## 2019-09-16 RX ORDER — PROMETHAZINE HYDROCHLORIDE 25 MG/1
25 TABLET ORAL EVERY 6 HOURS PRN
Qty: 12 TABLET | Refills: 0 | Status: ON HOLD | OUTPATIENT
Start: 2019-09-16 | End: 2019-09-18 | Stop reason: CLARIF

## 2019-09-16 RX ORDER — HALOPERIDOL 5 MG/ML
5 INJECTION INTRAMUSCULAR
Status: COMPLETED | OUTPATIENT
Start: 2019-09-16 | End: 2019-09-16

## 2019-09-16 RX ORDER — KETOROLAC TROMETHAMINE 30 MG/ML
15 INJECTION, SOLUTION INTRAMUSCULAR; INTRAVENOUS
Status: COMPLETED | OUTPATIENT
Start: 2019-09-16 | End: 2019-09-16

## 2019-09-16 RX ORDER — MORPHINE SULFATE 4 MG/ML
4 INJECTION, SOLUTION INTRAMUSCULAR; INTRAVENOUS
Status: COMPLETED | OUTPATIENT
Start: 2019-09-16 | End: 2019-09-16

## 2019-09-16 RX ORDER — ONDANSETRON 2 MG/ML
4 INJECTION INTRAMUSCULAR; INTRAVENOUS
Status: DISCONTINUED | OUTPATIENT
Start: 2019-09-16 | End: 2019-09-16

## 2019-09-16 RX ADMIN — HALOPERIDOL LACTATE 5 MG: 5 INJECTION, SOLUTION INTRAMUSCULAR at 10:09

## 2019-09-16 RX ADMIN — MORPHINE SULFATE 4 MG: 4 INJECTION, SOLUTION INTRAMUSCULAR; INTRAVENOUS at 07:09

## 2019-09-16 RX ADMIN — KETOROLAC TROMETHAMINE 15 MG: 30 INJECTION, SOLUTION INTRAMUSCULAR at 07:09

## 2019-09-16 RX ADMIN — DEXTROSE 250 ML: 10 SOLUTION INTRAVENOUS at 07:09

## 2019-09-16 RX ADMIN — SODIUM CHLORIDE 1000 ML: 0.9 INJECTION, SOLUTION INTRAVENOUS at 06:09

## 2019-09-16 RX ADMIN — PROMETHAZINE HYDROCHLORIDE 12.5 MG: 25 INJECTION INTRAMUSCULAR; INTRAVENOUS at 11:09

## 2019-09-16 RX ADMIN — PROMETHAZINE HYDROCHLORIDE 12.5 MG: 25 INJECTION INTRAMUSCULAR; INTRAVENOUS at 07:09

## 2019-09-16 NOTE — ED NOTES
Confirmed with Dr. Washington that she wants to give IV fluids with patient CBG of 46. States to give the fluids.

## 2019-09-16 NOTE — ED NOTES
Report received from DARWIN Ngo. Pt refused medications ordered for headache and refused to drink juice. Requested for ER tech to repeat blood glucose.

## 2019-09-16 NOTE — ED NOTES
"Patient provided 2 cups of grape juice for CBG. Patient states "I need something for nausea if you want me to drink that"   "

## 2019-09-17 ENCOUNTER — HOSPITAL ENCOUNTER (EMERGENCY)
Facility: HOSPITAL | Age: 54
Discharge: OTHER INSTITUTION | End: 2019-09-17
Attending: EMERGENCY MEDICINE
Payer: MEDICARE

## 2019-09-17 ENCOUNTER — HOSPITAL ENCOUNTER (INPATIENT)
Facility: HOSPITAL | Age: 54
LOS: 2 days | Discharge: HOME | DRG: 300 | End: 2019-09-19
Attending: EMERGENCY MEDICINE | Admitting: PSYCHIATRY & NEUROLOGY
Payer: MEDICARE

## 2019-09-17 VITALS
WEIGHT: 172.38 LBS | DIASTOLIC BLOOD PRESSURE: 68 MMHG | HEART RATE: 89 BPM | SYSTOLIC BLOOD PRESSURE: 137 MMHG | HEIGHT: 64 IN | TEMPERATURE: 99 F | RESPIRATION RATE: 21 BRPM | BODY MASS INDEX: 29.43 KG/M2 | OXYGEN SATURATION: 96 %

## 2019-09-17 VITALS
WEIGHT: 167.56 LBS | DIASTOLIC BLOOD PRESSURE: 53 MMHG | HEART RATE: 65 BPM | TEMPERATURE: 98 F | BODY MASS INDEX: 27.92 KG/M2 | RESPIRATION RATE: 16 BRPM | HEIGHT: 65 IN | OXYGEN SATURATION: 99 % | SYSTOLIC BLOOD PRESSURE: 98 MMHG

## 2019-09-17 DIAGNOSIS — I63.9 ACUTE ISCHEMIC STROKE: ICD-10-CM

## 2019-09-17 DIAGNOSIS — R47.01 APHASIA: ICD-10-CM

## 2019-09-17 DIAGNOSIS — I72.9 ANEURYSM: Primary | ICD-10-CM

## 2019-09-17 DIAGNOSIS — Z72.0 TOBACCO ABUSE: Chronic | ICD-10-CM

## 2019-09-17 DIAGNOSIS — R53.81 DEBILITY: ICD-10-CM

## 2019-09-17 DIAGNOSIS — I63.9 STROKE: ICD-10-CM

## 2019-09-17 DIAGNOSIS — R53.1 ACUTE RIGHT-SIDED WEAKNESS: ICD-10-CM

## 2019-09-17 DIAGNOSIS — I63.9 CEREBROVASCULAR ACCIDENT (CVA), UNSPECIFIED MECHANISM: Primary | ICD-10-CM

## 2019-09-17 PROBLEM — E78.5 HYPERLIPIDEMIA: Status: ACTIVE | Noted: 2019-09-17

## 2019-09-17 PROBLEM — D68.59 HYPERCOAGULABLE STATE: Status: ACTIVE | Noted: 2019-09-17

## 2019-09-17 PROBLEM — Z92.82 S/P ADMN TPA IN DIFF FAC W/N LAST 24 HR BEF ADM TO CRNT FAC: Status: ACTIVE | Noted: 2019-09-17

## 2019-09-17 LAB
ABO + RH BLD: NORMAL
ALBUMIN SERPL BCP-MCNC: 3.9 G/DL (ref 3.5–5.2)
ALP SERPL-CCNC: 104 U/L (ref 55–135)
ALT SERPL W/O P-5'-P-CCNC: 87 U/L (ref 10–44)
ANION GAP SERPL CALC-SCNC: 9 MMOL/L (ref 8–16)
AST SERPL-CCNC: 55 U/L (ref 10–40)
BACTERIA #/AREA URNS AUTO: ABNORMAL /HPF
BASOPHILS # BLD AUTO: 0.08 K/UL (ref 0–0.2)
BASOPHILS NFR BLD: 0.7 % (ref 0–1.9)
BILIRUB SERPL-MCNC: 0.2 MG/DL (ref 0.1–1)
BILIRUB UR QL STRIP: NEGATIVE
BLD GP AB SCN CELLS X3 SERPL QL: NORMAL
BUN SERPL-MCNC: 14 MG/DL (ref 6–20)
CALCIUM SERPL-MCNC: 9.3 MG/DL (ref 8.7–10.5)
CHLORIDE SERPL-SCNC: 105 MMOL/L (ref 95–110)
CHOLEST SERPL-MCNC: 219 MG/DL (ref 120–199)
CHOLEST/HDLC SERPL: 3.2 {RATIO} (ref 2–5)
CLARITY UR REFRACT.AUTO: CLEAR
CO2 SERPL-SCNC: 23 MMOL/L (ref 23–29)
COLOR UR AUTO: YELLOW
CREAT SERPL-MCNC: 0.6 MG/DL (ref 0.5–1.4)
CREAT SERPL-MCNC: 0.7 MG/DL (ref 0.5–1.4)
DIFFERENTIAL METHOD: ABNORMAL
EOSINOPHIL # BLD AUTO: 0.4 K/UL (ref 0–0.5)
EOSINOPHIL NFR BLD: 3.8 % (ref 0–8)
ERYTHROCYTE [DISTWIDTH] IN BLOOD BY AUTOMATED COUNT: 14.4 % (ref 11.5–14.5)
EST. GFR  (AFRICAN AMERICAN): >60 ML/MIN/1.73 M^2
EST. GFR  (NON AFRICAN AMERICAN): >60 ML/MIN/1.73 M^2
ESTIMATED AVG GLUCOSE: 128 MG/DL (ref 68–131)
GLUCOSE SERPL-MCNC: 117 MG/DL (ref 70–110)
GLUCOSE UR QL STRIP: NEGATIVE
HBA1C MFR BLD HPLC: 6.1 % (ref 4–5.6)
HCT VFR BLD AUTO: 44.5 % (ref 37–48.5)
HDLC SERPL-MCNC: 69 MG/DL (ref 40–75)
HDLC SERPL: 31.5 % (ref 20–50)
HGB BLD-MCNC: 13.6 G/DL (ref 12–16)
HGB UR QL STRIP: NEGATIVE
IMM GRANULOCYTES # BLD AUTO: 0.05 K/UL (ref 0–0.04)
IMM GRANULOCYTES NFR BLD AUTO: 0.4 % (ref 0–0.5)
INR PPP: 1 (ref 0.8–1.2)
KETONES UR QL STRIP: NEGATIVE
LDLC SERPL CALC-MCNC: 95.4 MG/DL (ref 63–159)
LEUKOCYTE ESTERASE UR QL STRIP: ABNORMAL
LYMPHOCYTES # BLD AUTO: 4.6 K/UL (ref 1–4.8)
LYMPHOCYTES NFR BLD: 40.1 % (ref 18–48)
MCH RBC QN AUTO: 30 PG (ref 27–31)
MCHC RBC AUTO-ENTMCNC: 30.6 G/DL (ref 32–36)
MCV RBC AUTO: 98 FL (ref 82–98)
MICROSCOPIC COMMENT: ABNORMAL
MONOCYTES # BLD AUTO: 0.7 K/UL (ref 0.3–1)
MONOCYTES NFR BLD: 6.5 % (ref 4–15)
NEUTROPHILS # BLD AUTO: 5.5 K/UL (ref 1.8–7.7)
NEUTROPHILS NFR BLD: 48.5 % (ref 38–73)
NITRITE UR QL STRIP: POSITIVE
NONHDLC SERPL-MCNC: 150 MG/DL
NRBC BLD-RTO: 0 /100 WBC
PH UR STRIP: 6 [PH] (ref 5–8)
PLATELET # BLD AUTO: 310 K/UL (ref 150–350)
PMV BLD AUTO: 9.5 FL (ref 9.2–12.9)
POC PTINR: 0.9 (ref 0.9–1.2)
POC PTINR: 1 (ref 0.9–1.2)
POC PTWBT: 11.4 SEC (ref 9.7–14.3)
POC PTWBT: 11.4 SEC (ref 9.7–14.3)
POCT GLUCOSE: 110 MG/DL (ref 70–110)
POCT GLUCOSE: 111 MG/DL (ref 70–110)
POCT GLUCOSE: 150 MG/DL (ref 70–110)
POTASSIUM SERPL-SCNC: 4 MMOL/L (ref 3.5–5.1)
PROT SERPL-MCNC: 7.1 G/DL (ref 6–8.4)
PROT UR QL STRIP: NEGATIVE
PROTHROMBIN TIME: 10.2 SEC (ref 9–12.5)
RBC # BLD AUTO: 4.54 M/UL (ref 4–5.4)
RBC #/AREA URNS AUTO: 3 /HPF (ref 0–4)
SAMPLE: NORMAL
SODIUM SERPL-SCNC: 137 MMOL/L (ref 136–145)
SP GR UR STRIP: 1.03 (ref 1–1.03)
SQUAMOUS #/AREA URNS AUTO: 0 /HPF
T4 FREE SERPL-MCNC: 0.81 NG/DL (ref 0.71–1.51)
TRIGL SERPL-MCNC: 273 MG/DL (ref 30–150)
TSH SERPL DL<=0.005 MIU/L-ACNC: 4.53 UIU/ML (ref 0.4–4)
URN SPEC COLLECT METH UR: ABNORMAL
WBC # BLD AUTO: 11.37 K/UL (ref 3.9–12.7)
WBC #/AREA URNS AUTO: 4 /HPF (ref 0–5)

## 2019-09-17 PROCEDURE — 99291 CRITICAL CARE FIRST HOUR: CPT | Mod: HCNC,,, | Performed by: NURSE PRACTITIONER

## 2019-09-17 PROCEDURE — 85610 PROTHROMBIN TIME: CPT | Mod: HCNC

## 2019-09-17 PROCEDURE — 93005 ELECTROCARDIOGRAM TRACING: CPT | Mod: HCNC

## 2019-09-17 PROCEDURE — 93010 EKG 12-LEAD: ICD-10-PCS | Mod: HCNC,76,, | Performed by: INTERNAL MEDICINE

## 2019-09-17 PROCEDURE — 99900035 HC TECH TIME PER 15 MIN (STAT): Mod: HCNC

## 2019-09-17 PROCEDURE — 86901 BLOOD TYPING SEROLOGIC RH(D): CPT | Mod: HCNC

## 2019-09-17 PROCEDURE — 93010 ELECTROCARDIOGRAM REPORT: CPT | Mod: HCNC,,, | Performed by: INTERNAL MEDICINE

## 2019-09-17 PROCEDURE — 81001 URINALYSIS AUTO W/SCOPE: CPT | Mod: HCNC

## 2019-09-17 PROCEDURE — 99291 PR CRITICAL CARE, E/M 30-74 MINUTES: ICD-10-PCS | Mod: ,,, | Performed by: EMERGENCY MEDICINE

## 2019-09-17 PROCEDURE — 84443 ASSAY THYROID STIM HORMONE: CPT | Mod: HCNC

## 2019-09-17 PROCEDURE — 20000000 HC ICU ROOM: Mod: HCNC

## 2019-09-17 PROCEDURE — 82803 BLOOD GASES ANY COMBINATION: CPT | Mod: HCNC

## 2019-09-17 PROCEDURE — 93010 EKG 12-LEAD: ICD-10-PCS | Mod: HCNC,,, | Performed by: INTERNAL MEDICINE

## 2019-09-17 PROCEDURE — 84439 ASSAY OF FREE THYROXINE: CPT | Mod: HCNC

## 2019-09-17 PROCEDURE — 63600175 PHARM REV CODE 636 W HCPCS: Mod: HCNC | Performed by: FAMILY MEDICINE

## 2019-09-17 PROCEDURE — 80053 COMPREHEN METABOLIC PANEL: CPT | Mod: HCNC

## 2019-09-17 PROCEDURE — G0508 PR CRITICAL CARE TELEHLTH INITIAL CONSULT 60MIN: ICD-10-PCS | Mod: GT,HCNC,G0, | Performed by: PSYCHIATRY & NEUROLOGY

## 2019-09-17 PROCEDURE — 25000003 PHARM REV CODE 250: Mod: HCNC | Performed by: NURSE PRACTITIONER

## 2019-09-17 PROCEDURE — G0508 CRIT CARE TELEHEA CONSULT 60: HCPCS | Mod: GT,HCNC,G0, | Performed by: PSYCHIATRY & NEUROLOGY

## 2019-09-17 PROCEDURE — 37195 THROMBOLYTIC THERAPY STROKE: CPT | Mod: HCNC

## 2019-09-17 PROCEDURE — 25500020 PHARM REV CODE 255: Mod: HCNC | Performed by: EMERGENCY MEDICINE

## 2019-09-17 PROCEDURE — 99285 EMERGENCY DEPT VISIT HI MDM: CPT | Mod: 25,27,HCNC

## 2019-09-17 PROCEDURE — 82962 GLUCOSE BLOOD TEST: CPT | Mod: HCNC

## 2019-09-17 PROCEDURE — 82565 ASSAY OF CREATININE: CPT | Mod: HCNC

## 2019-09-17 PROCEDURE — 99291 PR CRITICAL CARE, E/M 30-74 MINUTES: ICD-10-PCS | Mod: HCNC,,, | Performed by: NURSE PRACTITIONER

## 2019-09-17 PROCEDURE — 99285 EMERGENCY DEPT VISIT HI MDM: CPT | Mod: 25,HCNC

## 2019-09-17 PROCEDURE — 85025 COMPLETE CBC W/AUTO DIFF WBC: CPT | Mod: HCNC

## 2019-09-17 PROCEDURE — 99291 CRITICAL CARE FIRST HOUR: CPT | Mod: ,,, | Performed by: EMERGENCY MEDICINE

## 2019-09-17 PROCEDURE — 93010 ELECTROCARDIOGRAM REPORT: CPT | Mod: HCNC,76,, | Performed by: INTERNAL MEDICINE

## 2019-09-17 PROCEDURE — 80061 LIPID PANEL: CPT | Mod: HCNC

## 2019-09-17 PROCEDURE — 83036 HEMOGLOBIN GLYCOSYLATED A1C: CPT | Mod: HCNC

## 2019-09-17 RX ORDER — SODIUM CHLORIDE 9 MG/ML
50 INJECTION, SOLUTION INTRAVENOUS ONCE
Status: COMPLETED | OUTPATIENT
Start: 2019-09-17 | End: 2019-09-17

## 2019-09-17 RX ORDER — SODIUM,POTASSIUM PHOSPHATES 280-250MG
2 POWDER IN PACKET (EA) ORAL
Status: DISCONTINUED | OUTPATIENT
Start: 2019-09-17 | End: 2019-09-19 | Stop reason: HOSPADM

## 2019-09-17 RX ORDER — DIAZEPAM 5 MG/1
10 TABLET ORAL 3 TIMES DAILY
Status: DISCONTINUED | OUTPATIENT
Start: 2019-09-18 | End: 2019-09-19 | Stop reason: HOSPADM

## 2019-09-17 RX ORDER — POTASSIUM CHLORIDE 20 MEQ/15ML
40 SOLUTION ORAL
Status: DISCONTINUED | OUTPATIENT
Start: 2019-09-17 | End: 2019-09-19 | Stop reason: HOSPADM

## 2019-09-17 RX ORDER — ACETAMINOPHEN 325 MG/1
650 TABLET ORAL
Status: COMPLETED | OUTPATIENT
Start: 2019-09-17 | End: 2019-09-17

## 2019-09-17 RX ORDER — LANOLIN ALCOHOL/MO/W.PET/CERES
800 CREAM (GRAM) TOPICAL
Status: DISCONTINUED | OUTPATIENT
Start: 2019-09-17 | End: 2019-09-19 | Stop reason: HOSPADM

## 2019-09-17 RX ORDER — GLUCAGON 1 MG
1 KIT INJECTION
Status: DISCONTINUED | OUTPATIENT
Start: 2019-09-17 | End: 2019-09-18

## 2019-09-17 RX ORDER — OXYCODONE HYDROCHLORIDE 5 MG/1
5 TABLET ORAL EVERY 6 HOURS PRN
Status: DISCONTINUED | OUTPATIENT
Start: 2019-09-17 | End: 2019-09-19 | Stop reason: HOSPADM

## 2019-09-17 RX ORDER — POTASSIUM CHLORIDE 20 MEQ/15ML
60 SOLUTION ORAL
Status: DISCONTINUED | OUTPATIENT
Start: 2019-09-17 | End: 2019-09-19 | Stop reason: HOSPADM

## 2019-09-17 RX ORDER — AMOXICILLIN 250 MG
1 CAPSULE ORAL 2 TIMES DAILY
Status: DISCONTINUED | OUTPATIENT
Start: 2019-09-17 | End: 2019-09-19 | Stop reason: HOSPADM

## 2019-09-17 RX ORDER — SODIUM CHLORIDE 0.9 % (FLUSH) 0.9 %
10 SYRINGE (ML) INJECTION
Status: DISCONTINUED | OUTPATIENT
Start: 2019-09-17 | End: 2019-09-19 | Stop reason: HOSPADM

## 2019-09-17 RX ORDER — TOPIRAMATE 200 MG/1
200 TABLET ORAL DAILY
Status: DISCONTINUED | OUTPATIENT
Start: 2019-09-18 | End: 2019-09-19 | Stop reason: HOSPADM

## 2019-09-17 RX ORDER — INSULIN ASPART 100 [IU]/ML
1-10 INJECTION, SOLUTION INTRAVENOUS; SUBCUTANEOUS EVERY 6 HOURS PRN
Status: DISCONTINUED | OUTPATIENT
Start: 2019-09-17 | End: 2019-09-18

## 2019-09-17 RX ADMIN — ALTEPLASE 7 MG: KIT at 04:09

## 2019-09-17 RX ADMIN — ACETAMINOPHEN 650 MG: 325 TABLET ORAL at 09:09

## 2019-09-17 RX ADMIN — SODIUM CHLORIDE 50 ML: 9 INJECTION, SOLUTION INTRAVENOUS at 05:09

## 2019-09-17 RX ADMIN — IOHEXOL 100 ML: 350 INJECTION, SOLUTION INTRAVENOUS at 07:09

## 2019-09-17 RX ADMIN — ALTEPLASE 63 MG: KIT at 04:09

## 2019-09-17 RX ADMIN — SENNOSIDES AND DOCUSATE SODIUM 1 TABLET: 8.6; 5 TABLET ORAL at 09:09

## 2019-09-17 NOTE — ED NOTES
Labs were discontinued. Unable to obtain second IV or labs at this time per primary RN. Dr. Love aware. Will hold off on any additional IV draws at this time per Dr. Love recommendation.

## 2019-09-17 NOTE — ED PROVIDER NOTES
SCRIBE #1 NOTE: I, Rogelio Timmons, am scribing for, and in the presence of, Carolyn Hoffman Do, MD. I have scribed the HPI, ROS, and PEx.     SCRIBE #2 NOTE: I, Rachid Samayoa, am scribing for, and in the presence of,  Milly Wright MD. I have scribed the remaining portions of the note not scribed by Scribe #1.     History      Chief Complaint   Patient presents with    Migraine     headache that feels like pressure behind her eyes    Diarrhea     Unable to hold fluids and liquid    Hypoglycemia       Review of patient's allergies indicates:   Allergen Reactions    Piroxicam Diarrhea and Nausea Only    Ultram [tramadol] Rash    Aspirin      Nosebleeds  Nosebleeds  Nosebleeds    Levaquin [levofloxacin]     Levomenol analogues     Lipitor [atorvastatin]      Leg Cramps    Zofran [ondansetron hcl] Hives and Other (See Comments)     headaches    Celestone [betamethasone] Hives, Itching and Rash        HPI   HPI    9/16/2019, 7:06 PM   History obtained from the patient      History of Present Illness: Alexandra Goins is a 53 y.o. female patient with a PMHx of HLD, HTN, migraines, and DM2 who presents to the Emergency Department for migraine, onset just PTA. Pt states that her migraines are a chronic problem, with episodes occurring 2-3 times a week. Symptoms are constant and moderate in severity. No mitigating or exacerbating factors reported. Associated sxs include photophobia. Patient denies any fever, chills, n/v/d, SOB, CP, weakness, numbness, dizziness, and all other sxs at this time. Prior Tx Fioricet, with no improvement of sxs. Pt states that she had not taken her insulin this morning and c/o hypoglycemia (Pt's blood sugar was 44 at bedside, even after the pt had finished 2 containers of juice). No further complaints or concerns at this time.     Arrival mode: Personal vehicle     PCP: Perez Casiano MD       Past Medical History:  Past Medical History:   Diagnosis Date    Anxiety     Arthritis      Asthma     Bipolar 1 disorder     Depression     Diabetes mellitus, type 2     Diverticular disease     GERD (gastroesophageal reflux disease)     Hyperlipemia     Hypertension     Hypothyroidism     Pneumonia     Renal manifestation of secondary diabetes mellitus     Right-sided back pain 6/29/2019    Trouble in sleeping        Past Surgical History:  Past Surgical History:   Procedure Laterality Date    CHOLECYSTECTOMY      COLON SURGERY      COLONOSCOPY N/A 1/12/2018    Performed by Roque Mahajan III, MD at [facility]    DENTAL SURGERY  05/21/2018    removal of 8 top teeth    ESOPHAGOGASTRODUODENOSCOPY (EGD) N/A 1/12/2018    Performed by Roque Mahajan III, MD at [facility] ENDO    HYSTERECTOMY      TONSILLECTOMY           Family History:  Family History   Problem Relation Age of Onset    Alcohol abuse Mother     COPD Mother     Depression Mother     Drug abuse Mother     Alcohol abuse Father     Arthritis Father     Cancer Father     Heart disease Father     Hypertension Father     COPD Sister     Kidney failure Sister     Heart disease Brother     Hypertension Brother     Cancer Maternal Aunt     Cancer Maternal Uncle     Diabetes Maternal Uncle     Drug abuse Maternal Uncle     Cancer Paternal Aunt     Cancer Paternal Uncle     Arthritis Maternal Grandmother     Hypertension Maternal Grandmother     Breast cancer Maternal Grandmother     Arthritis Paternal Grandmother     Cancer Paternal Grandmother     Hypertension Paternal Grandfather        Social History:  Social History     Tobacco Use    Smoking status: Current Every Day Smoker     Years: 35.00     Types: Cigarettes, Vaping w/o nicotine    Smokeless tobacco: Never Used   Substance and Sexual Activity    Alcohol use: Yes     Comment: occassionally    Drug use: Yes     Types: Marijuana    Sexual activity: Yes       ROS   Review of Systems   Constitutional: Negative for chills, diaphoresis, fatigue and fever.    HENT: Negative for sore throat.    Eyes: Positive for photophobia.   Respiratory: Negative for shortness of breath.    Cardiovascular: Negative for chest pain.   Gastrointestinal: Negative for diarrhea, nausea and vomiting.   Genitourinary: Negative for dysuria.   Musculoskeletal: Negative for back pain.   Skin: Negative for rash and wound.   Neurological: Positive for headaches (migraine). Negative for dizziness, seizures, weakness and light-headedness.   Hematological: Does not bruise/bleed easily.   All other systems reviewed and are negative.    Physical Exam      Initial Vitals [09/16/19 1716]   BP Pulse Resp Temp SpO2   110/68 (!) 112 18 97.6 °F (36.4 °C) 97 %      MAP       --          Physical Exam  Nursing Notes and Vital Signs Reviewed.  Constitutional: Patient is in mild distress. Well-developed and well-nourished.  Head: Atraumatic. Normocephalic.  Eyes: PERRL. EOM intact. Conjunctivae are not pale. No scleral icterus.  ENT: Mucous membranes are moist. Oropharynx is clear and symmetric.    Neck: Supple. Full ROM. No lymphadenopathy.  Cardiovascular: Regular rate. Regular rhythm. No murmurs, rubs, or gallops. Distal pulses are 2+ and symmetric.  Pulmonary/Chest: No respiratory distress. Clear to auscultation bilaterally. No wheezing or rales.  Abdominal: Soft and non-distended.  There is no tenderness.  No rebound, guarding, or rigidity.   Musculoskeletal: Moves all extremities. No obvious deformities. No edema.  Skin: Warm and dry.  Neurological:  Alert, awake, and appropriate.  Normal speech.  No acute focal neurological deficits are appreciated.  Psychiatric: Normal affect. Good eye contact. Appropriate in content.    ED Course    Procedures  ED Vital Signs:  Vitals:    09/16/19 1716 09/16/19 1851 09/16/19 1931 09/16/19 2002   BP: 110/68 129/71 (!) 115/56 121/71   Pulse: (!) 112 91 91 80   Resp: 18 16     Temp: 97.6 °F (36.4 °C)      TempSrc: Oral      SpO2: 97% 97% 99% 100%   Weight: 76 kg (167 lb  "8.8 oz)      Height: 5' 5" (1.651 m)       09/16/19 2301 09/17/19 0033   BP: (!) 120/56 (!) 98/53   Pulse: 77 65   Resp:     Temp:     TempSrc:     SpO2: (!) 93% 99%   Weight:     Height:         Abnormal Lab Results:  Labs Reviewed   CBC W/ AUTO DIFFERENTIAL - Abnormal; Notable for the following components:       Result Value    WBC 12.92 (*)     Mean Corpuscular Hemoglobin Conc 31.8 (*)     Lymph # 5.0 (*)     All other components within normal limits   COMPREHENSIVE METABOLIC PANEL - Abnormal; Notable for the following components:    Glucose 38 (*)     All other components within normal limits    Narrative:     GLU critical result(s) called and verbal readback obtained from   CLOVIS PUGA RN, 09/16/2019 19:47   POCT GLUCOSE - Abnormal; Notable for the following components:    POCT Glucose 46 (*)     All other components within normal limits   POCT GLUCOSE - Abnormal; Notable for the following components:    POCT Glucose 44 (*)     All other components within normal limits   POCT GLUCOSE - Abnormal; Notable for the following components:    POCT Glucose 129 (*)     All other components within normal limits   POCT GLUCOSE   POCT GLUCOSE        All Lab Results:  Results for orders placed or performed during the hospital encounter of 09/16/19   CBC W/ AUTO DIFFERENTIAL   Result Value Ref Range    WBC 12.92 (H) 3.90 - 12.70 K/uL    RBC 4.65 4.00 - 5.40 M/uL    Hemoglobin 14.1 12.0 - 16.0 g/dL    Hematocrit 44.3 37.0 - 48.5 %    Mean Corpuscular Volume 95 82 - 98 fL    Mean Corpuscular Hemoglobin 30.3 27.0 - 31.0 pg    Mean Corpuscular Hemoglobin Conc 31.8 (L) 32.0 - 36.0 g/dL    RDW 14.5 11.5 - 14.5 %    Platelets 337 150 - 350 K/uL    MPV 9.6 9.2 - 12.9 fL    Gran # (ANC) 6.7 1.8 - 7.7 K/uL    Lymph # 5.0 (H) 1.0 - 4.8 K/uL    Mono # 0.9 0.3 - 1.0 K/uL    Eos # 0.3 0.0 - 0.5 K/uL    Baso # 0.05 0.00 - 0.20 K/uL    Gran% 51.6 38.0 - 73.0 %    Lymph% 38.5 18.0 - 48.0 %    Mono% 7.3 4.0 - 15.0 %    Eosinophil% 2.2 0.0 " - 8.0 %    Basophil% 0.4 0.0 - 1.9 %    Differential Method Automated    Comp. Metabolic Panel   Result Value Ref Range    Sodium 143 136 - 145 mmol/L    Potassium 3.5 3.5 - 5.1 mmol/L    Chloride 106 95 - 110 mmol/L    CO2 25 23 - 29 mmol/L    Glucose 38 (LL) 70 - 110 mg/dL    BUN, Bld 17 6 - 20 mg/dL    Creatinine 0.9 0.5 - 1.4 mg/dL    Calcium 9.5 8.7 - 10.5 mg/dL    Total Protein 7.8 6.0 - 8.4 g/dL    Albumin 4.0 3.5 - 5.2 g/dL    Total Bilirubin 0.2 0.1 - 1.0 mg/dL    Alkaline Phosphatase 82 55 - 135 U/L    AST 22 10 - 40 U/L    ALT 32 10 - 44 U/L    Anion Gap 12 8 - 16 mmol/L    eGFR if African American >60 >60 mL/min/1.73 m^2    eGFR if non African American >60 >60 mL/min/1.73 m^2   POCT glucose   Result Value Ref Range    POCT Glucose 46 (LL) 70 - 110 mg/dL   POCT glucose   Result Value Ref Range    POCT Glucose 44 (LL) 70 - 110 mg/dL   POCT glucose   Result Value Ref Range    POCT Glucose 129 (H) 70 - 110 mg/dL   POCT glucose   Result Value Ref Range    POCT Glucose 101 70 - 110 mg/dL   POCT glucose   Result Value Ref Range    POCT Glucose 96 70 - 110 mg/dL              The Emergency Provider reviewed the vital signs and test results, which are outlined above.    ED Discussion     8:00 PM: Dr. Washington transfers care of pt to Dr. Wright.      10:52 PM: Reassessed pt at this time.  Pt states her condition has improved at this time. Discussed with pt all pertinent ED information and results. Discussed pt dx and plan of tx. Gave pt all f/u and return to the ED instructions. All questions and concerns were addressed at this time. Pt expresses understanding of information and instructions, and is comfortable with plan to discharge. Pt is stable for discharge.    I discussed with patient and/or family/caretaker that evaluation in the ED does not suggest any emergent or life threatening medical conditions requiring immediate intervention beyond what was provided in the ED, and I believe patient is safe for discharge.   Regardless, an unremarkable evaluation in the ED does not preclude the development or presence of a serious of life threatening condition. As such, patient was instructed to return immediately for any worsening or change in current symptoms.    Patient's headache is either consistent with previous headache and/or lacks features concerning for emergent or life threatening condition.  I do not suspect SAH, meningitis, increased IC pressure, infectious, toxic, vascular, CNS, or other EMC.  I have discussed this at length with patient and/or family/caretaker.        ED Medication(s):  Medications   sodium chloride 0.9% bolus 1,000 mL (0 mLs Intravenous Stopped 9/16/19 1905)   dextrose 10% (D10W) Bolus (0 g Intravenous Stopped 9/16/19 2030)   promethazine (PHENERGAN) 12.5 mg in dextrose 5 % 50 mL IVPB (0 mg Intravenous Stopped 9/16/19 1950)   ketorolac injection 15 mg (15 mg Intravenous Given 9/16/19 1919)   morphine injection 4 mg (4 mg Intravenous Given 9/16/19 1923)   haloperidol lactate injection 5 mg (5 mg Intravenous Given 9/16/19 2221)   promethazine (PHENERGAN) 12.5 mg in dextrose 5 % 50 mL IVPB (0 mg Intravenous Stopped 9/17/19 0003)     Discharge Medication List as of 9/16/2019 10:52 PM           Medication List      CONTINUE taking these medications    promethazine 25 MG tablet  Commonly known as:  PHENERGAN  Take 1 tablet (25 mg total) by mouth every 6 (six) hours as needed for Nausea.        ASK your doctor about these medications    baclofen 10 MG tablet  Commonly known as:  LIORESAL  Take 1 tablet (10 mg total) by mouth nightly as needed.     diazePAM 10 MG Tab  Commonly known as:  VALIUM  Take 1 tablet (10 mg total) by mouth 3 (three) times daily.     doxepin 10 MG capsule  Commonly known as:  SINEQUAN  Take 1 capsule (10 mg total) by mouth nightly as needed.     insulin  unit/mL injection  Commonly known as:  NovoLIN N NPH U-100 Insulin  Inject 10 Units into the skin 2 (two) times daily before  meals.     insulin regular 100 unit/mL injection  Commonly known as:  NovoLIN R Regular U-100 Insuln  Inject three times daily with meals using the scale: BG 80 - 150:   2 units  //  - 199: 3 units  //  - 249: 4 units  //  - 299: 5 units  //  - 349: 6 units  // BG Over 350:  7 units.     isosorbide mononitrate 30 MG 24 hr tablet  Commonly known as:  IMDUR  Take 1 tablet (30 mg total) by mouth once daily.     losartan 25 MG tablet  Commonly known as:  COZAAR     MAXALT 10 MG tablet  Generic drug:  rizatriptan     metFORMIN 500 MG tablet  Commonly known as:  GLUCOPHAGE  Take 1 tablet (500 mg total) by mouth 2 (two) times daily with meals.     QUEtiapine 300 MG Tab  Commonly known as:  SEROQUEL  Take 1 tablet (300 mg total) by mouth every evening.     sulfamethoxazole-trimethoprim 800-160mg 800-160 mg Tab  Commonly known as:  BACTRIM DS  Take 1 tablet by mouth 2 (two) times daily.     topiramate 200 MG Tab  Commonly known as:  TOPAMAX  1 tablet daily     TRUE METRIX GLUCOSE TEST STRIP Strp  Generic drug:  blood sugar diagnostic     venlafaxine 75 MG 24 hr capsule  Commonly known as:  EFFEXOR-XR  Take 2 capsules (150 mg total) by mouth once daily.           Where to Get Your Medications      You can get these medications from any pharmacy    Bring a paper prescription for each of these medications  · promethazine 25 MG tablet         Follow-up Information     Perez Casiano MD In 2 days.    Specialty:  Family Medicine  Contact information:  [address] [id number] [phone]             [facility] - .    Specialty:  Emergency Medicine  Why:  As needed, If symptoms worsen  Contact information:  [address]  [facility] [address] [phone]                   Medical Decision Making    Medical Decision Making:   Clinical Tests:   Lab Tests: Ordered and Reviewed           Scribe Attestation:   Scribe #1: I performed the above scribed  service and the documentation accurately describes the services I performed. I attest to the accuracy of the note.    Attending:   Physician Attestation Statement for Scribe #1: I, Carolyn Hoffman Do, MD, personally performed the services described in this documentation, as scribed by Rogelio Timmons, in my presence, and it is both accurate and complete.       Scribe Attestation:   Scribe #2: I performed the above scribed service and the documentation accurately describes the services I performed. I attest to the accuracy of the note.    Attending Attestation:           Physician Attestation for Scribe:    Physician Attestation Statement for Scribe #2: I, Milly Wright MD, reviewed documentation, as scribed by Rachid Samayoa in my presence, and it is both accurate and complete. I also acknowledge and confirm the content of the note done by Scribe #1.          Clinical Impression       ICD-10-CM ICD-9-CM   1. Migraine without status migrainosus, not intractable, unspecified migraine type G43.909 346.90   2. Hypoglycemia E16.2 251.2       Disposition:   Disposition: Discharged  Condition: Stable         Milly Wright MD  09/17/19 0527

## 2019-09-17 NOTE — ED NOTES
[facility] aware of delays in [facility] due to not being a critical care truck. Also aware of the lack of labs.

## 2019-09-17 NOTE — ASSESSMENT & PLAN NOTE
Patient with constellation of symptoms, a portion of which seem functional in nature, however she does have ipsilateral weakness, ataxia and sensation. Dysarthria is unusual given her tongue placement and nature of the speech. [place] [facility] [place] in [place] sensitive manner, at this point recommendation is for alteplase despite concern for mimic.  Glucose within normal limits although hypoglycemic last night.  CT w/ hyperdense basilar artery, although unclear if false positive at this time.    Antithrombotics for secondary stroke prevention: Antiplatelets: None: Hold all Antithrombotics x 24 hours after IV t-PA administration    Statins for secondary stroke prevention and hyperlipidemia, if present:   Statins: Atorvastatin- 40 mg daily    Aggressive risk factor modification: HTN     Rehab efforts: The patient has been evaluated by a stroke team provider and the therapy needs have been fully considered based off the presenting complaints and exam findings. The following therapy evaluations are needed: PT evaluate and treat, OT evaluate and treat, SLP evaluate and treat    Diagnostics ordered/pending: CTA Head to assess vasculature , CTA Neck/Arch to assess vasculature, HgbA1C to assess blood glucose levels, Lipid Profile to assess cholesterol levels, MRI head without contrast to assess brain parenchyma, TTE to assess cardiac function/status     VTE prophylaxis: altelpase    BP parameters: Infarct: Post tPA, SBP <180

## 2019-09-17 NOTE — SUBJECTIVE & OBJECTIVE
"  Woke up with symptoms?: no    Recent bleeding noted: no  Does the patient take any Blood Thinners? no  Medications: No relevant medications      Past Medical History: migraine  Past Medical History:   Diagnosis Date    Anxiety     Arthritis     Asthma     Bipolar 1 disorder     Depression     Diabetes mellitus, type 2     Diverticular disease     GERD (gastroesophageal reflux disease)     Hyperlipemia     Hypertension     Hypothyroidism     Pneumonia     Renal manifestation of secondary diabetes mellitus     Right-sided back pain 6/29/2019    Trouble in sleeping        Past Surgical History: no major surgeries within the last 2 weeks    Family History: no relevant history    Social History: no smoking, no drinking, no drugs    Allergies: Piroxicam  Ultram [Tramadol]  Aspirin  Levaquin [Levofloxacin]  Levomenol Analogues  Lipitor [Atorvastatin]  Zofran [Ondansetron Hcl]  Celestone [Betamethasone] No relevant allergies    Review of Systems   Constitutional: Negative for appetite change.   HENT: Negative for congestion.    Eyes: Negative for discharge.   Respiratory: Negative for shortness of breath.    Cardiovascular: Negative for chest pain.   Gastrointestinal: Negative for abdominal pain.   Endocrine: Negative for cold intolerance.   Genitourinary: Negative for difficulty urinating.   Musculoskeletal: Negative for joint swelling.   Skin: Negative for color change.   Neurological: Positive for speech difficulty and numbness.     Objective:   Vitals: Blood pressure (!) 155/75, pulse 84, temperature 98.7 °F (37.1 °C), temperature source Oral, resp. rate 18, height 5' 4" (1.626 m), SpO2 97 %.     CT READ: Yes  No hemmorhage. No mass effect. No early infarct signs.     Physical Exam   Constitutional: No distress.   HENT:   Head: Normocephalic.   Right Ear: External ear normal.   Left Ear: External ear normal.   Eyes: Conjunctivae are normal.   Neck: Normal range of motion.   Pulmonary/Chest: Effort " normal. No stridor.   Abdominal: There is no tenderness.   Skin: Skin is dry. No rash noted.

## 2019-09-17 NOTE — CONSULTS
[facility]  Vascular Neurology  Comprehensive Stroke Center  Tele-Consultation Note      Consults    Consulting Provider: COURTNEY ASHBY  Current Providers  No providers found    Patient Location:  [facility] EMERGENCY DEPARTMENT Emergency Department  Spoke hospital nurse at bedside with patient assisting consultant.     Patient information was obtained from patient.         Assessment/Plan:     STROKE DOCUMENTATION     Acute Stroke Times:   Acute Stroke Times   Last Known Normal Time: 1500  Stroke Team Called Time: 1537  Stroke Team Arrival Time: 1541  CT Interpretation Time: 1543  Decision to Treat Time for Alteplase: 1559    NIH Scale:  1a. Level of Consciousness: 0-->Alert, keenly responsive  1b. LOC Questions: 1-->Answers one question correctly  1c. LOC Commands: 0-->Performs both tasks correctly  2. Best Gaze: 0-->Normal  3. Visual: 1-->Partial hemianopia  4. Facial Palsy: 1-->Minor paralysis (flattened nasolabial fold, asymmetry on smiling)  5a. Motor Arm, Left: 0-->No drift, limb holds 90 (or 45) degrees for full 10 secs  5b. Motor Arm, Right: 1-->Drift, limb holds 90 (or 45) degrees, but drifts down before full 10 secs, does not hit bed or other support  6a. Motor Leg, Left: 0-->No drift, leg holds 30 degree position for full 5 secs  6b. Motor Leg, Right: 1-->Drift, leg falls by the end of the 5-sec period but does not hit bed  7. Limb Ataxia: 1-->Present in one limb  8. Sensory: 1-->Mild-to-moderate sensory loss, patient feels pinprick is less sharp or is dull on the affected side, or there is a loss of superficial pain with pinprick, but patient is aware of being touched  9. Best Language: 0-->No aphasia, normal  10. Dysarthria: 1-->Mild-to-moderate dysarthria, patient slurs at least some words and, at worst, can be understood with some difficulty  11. Extinction and Inattention (formerly Neglect): 0-->No abnormality  Total (NIH Stroke Scale): 8     Modified [employer]   "  [place] Coma Scale:    ABCD2 Score:    [id number] Score:   HAS -BLED Score:   ICH Score:   Hunt & Goodwin Classification:       Diagnoses:   Acute ischemic stroke  Patient with constellation of symptoms, a portion of which seem functional in nature, however she does have ipsilateral weakness, ataxia and sensation. Dysarthria is unusual given her tongue placement and nature of the speech. No acute/rapid MRI available at [facility] to triage mimic in a time sensitive manner, at this point recommendation is for alteplase despite concern for mimic.  Glucose within normal limits although hypoglycemic last night.  CT w/ hyperdense basilar artery, although unclear if false positive at this time.    Antithrombotics for secondary stroke prevention: Antiplatelets: None: Hold all Antithrombotics x 24 hours after IV t-PA administration    Statins for secondary stroke prevention and hyperlipidemia, if present:   Statins: Atorvastatin- 40 mg daily    Aggressive risk factor modification: HTN     Rehab efforts: The patient has been evaluated by a stroke team provider and the therapy needs have been fully considered based off the presenting complaints and exam findings. The following therapy evaluations are needed: PT evaluate and treat, OT evaluate and treat, SLP evaluate and treat    Diagnostics ordered/pending: CTA Head to assess vasculature , CTA Neck/Arch to assess vasculature, HgbA1C to assess blood glucose levels, Lipid Profile to assess cholesterol levels, MRI head without contrast to assess brain parenchyma, TTE to assess cardiac function/status     VTE prophylaxis: altelpase    BP parameters: Infarct: Post tPA, SBP <180          Blood pressure (!) 155/75, pulse 84, temperature 98.7 °F (37.1 °C), temperature source Oral, resp. rate 18, height 5' 4" (1.626 m), weight 78.2 kg (172 lb 6.4 oz), SpO2 97 %.  Alteplase Eligible?: Yes  Alteplase Recommendation:   Alteplase Total Dose:   Total dose: Alteplase 0.9mg/kg (max dose:90mg)    "                   ** based on acquired weight from facility   Bolus Dose:   10% of total Alteplase dose given intravenously over 1 minute   Continuous Infusion Dose:   Remaining 90% of total Alteplase dose infused intravenously over 60 minutes    **infusion must start at the same time as the bolus dose     Additional Recommendations:   1. Neurological assessment and vital signs (except temperature) every 15 minutes during Altaplase infusion.  2. Frequency of BP assessments may need to be increased if systolic BP stays >= 180 mm Hg or diastolic BP stays >= 105 mm Hg. Administer antihypertensive meds as ordered  3. Continue to monitor and control blood pressure and monitor for neurological deterioration every 15 minutes for the first hour after the infusion is stopped. Then every 30 minutes for the next 6 hours. Perform hourly monitoring from the 8th post-infusion hour until 24 hours post-infusion.  4. Temperature every 4 hours or as required.  5. Follow hospital protocol for further orders re: post tPA infusion patient management.  6. No antithrombotics or anticoagulants (including but not limited to: heparin, warfarin, aspirin, clopidigrel, or dipyridamole) for 24 hours, then start antithrombotics as ordered by treating physician    Adapted from the American Heart Association/American Stroke Association (AHA/ASA) and American Association of Neuroscience Nurses (AANN) Guidelines.   Possible Interventional Revascularization Candidate? unlikely based upon symptoms, hyperdense basilar on CT    Disposition Recommendation: transfer to [facility] by  ground  stat    Subjective:     History of Present Illness:  53F w/ tingling in both hands, more on right than left, having a hard time talking feeling like her jaw is locked up, tongue is out of her mouth and slurring, her vision has a halo only in her R eye described a white halo. The aforementioned symptoms have never happened before. There are no identified  "triggers or modifying factors. There have been no recurrent events. There are no other associated symptoms.          Woke up with symptoms?: no    Recent bleeding noted: no  Does the patient take any Blood Thinners? no  Medications: No relevant medications      Past Medical History: migraine  Past Medical History:   Diagnosis Date    Anxiety     Arthritis     Asthma     Bipolar 1 disorder     Depression     Diabetes mellitus, type 2     Diverticular disease     GERD (gastroesophageal reflux disease)     Hyperlipemia     Hypertension     Hypothyroidism     Pneumonia     Renal manifestation of secondary diabetes mellitus     Right-sided back pain 6/29/2019    Trouble in sleeping        Past Surgical History: no major surgeries within the last 2 weeks    Family History: no relevant history    Social History: no smoking, no drinking, no drugs    Allergies: Piroxicam  Ultram [Tramadol]  Aspirin  Levaquin [Levofloxacin]  Levomenol Analogues  Lipitor [Atorvastatin]  Zofran [Ondansetron Hcl]  Celestone [Betamethasone] No relevant allergies    Review of Systems   Constitutional: Negative for appetite change.   HENT: Negative for congestion.    Eyes: Negative for discharge.   Respiratory: Negative for shortness of breath.    Cardiovascular: Negative for chest pain.   Gastrointestinal: Negative for abdominal pain.   Endocrine: Negative for cold intolerance.   Genitourinary: Negative for difficulty urinating.   Musculoskeletal: Negative for joint swelling.   Skin: Negative for color change.   Neurological: Positive for speech difficulty and numbness.     Objective:   Vitals: Blood pressure (!) 155/75, pulse 84, temperature 98.7 °F (37.1 °C), temperature source Oral, resp. rate 18, height 5' 4" (1.626 m), SpO2 97 %.     CT READ: Yes  No hemmorhage. No mass effect. No early infarct signs.     Physical Exam   Constitutional: No distress.   HENT:   Head: Normocephalic.   Right Ear: External ear normal.   Left Ear: " External ear normal.   Eyes: Conjunctivae are normal.   Neck: Normal range of motion.   Pulmonary/Chest: Effort normal. No stridor.   Abdominal: There is no tenderness.   Skin: Skin is dry. No rash noted.             Recommended the emergency room physician to have a brief discussion with the patient and/or family if available regarding the risks and benefits of treatment, and to briefly document the occurrence of that discussion in his clinical encounter note.     The attending portion of this evaluation, treatment, and documentation was performed per Conner Carpenter MD via audiovisual.    Billing code:  (time dependent stroke, complex case, unstable patient, hemorrhages, any intervention, some mimics)    · This patient has a very critical neurological condition/illness, with very high morbidity and mortality.  · There is a very high probability for acute neurological change leading to clinical and possibly life-threatening deterioration requiring highest level of physician preparedness for urgent intervention.  · There is possibility that this condition will require treatment with high risk medications as quickly as possible.  · There is also a possibility that the patient may benefit from further, more advance complex therapies (e.g. endovascular therapy) that will require prompt diagnosis and care.  · Care was coordinated with other physicians involved in the patient's care.  · Radiologic studies and laboratory data were reviewed and interpreted, and plan of care was re-assessed based on the results.  · Diagnosis, treatment options and prognosis may have been discussed with the patient and/or family members or caregiver.  · Further advanced medical management and further evaluation is warranted for his care.      In your opinion, this was a: Tier 1 Van Negative    Consult End Time: 4:03 PM     Conner Carpenter MD  [facility]  Vascular Neurology   [facility]

## 2019-09-17 NOTE — ED NOTES
"Patient has had several episodes of gagging with red sputum. Patient states she drinks "a lot of red koolaid". Dr. Love notified and has checked patient. Continue tPA per her verbal order.   "

## 2019-09-17 NOTE — ED PROVIDER NOTES
SCRIBE #1 NOTE: I, Marisela Echeverria, am scribing for, and in the presence of, Shruthi Love MD. I have scribed the entire note.       History     Chief Complaint   Patient presents with    Aphasia      reports pt could speak at 1505, L sided facial droop and L sided weakness, pt speaking in triage but slurred      Review of patient's allergies indicates:   Allergen Reactions    Ultram [tramadol] Rash    Aspirin      Nosebleeds    Levaquin [levofloxacin]     Levomenol analogues     Lipitor [atorvastatin]      Leg Cramps    Piroxicam Diarrhea and Nausea Only    Zofran [ondansetron hcl] Hives and Other (See Comments)     headaches    Celestone [betamethasone] Hives, Itching and Rash         History of Present Illness     HPI    9/17/2019, 3:56 PM  History obtained from the patient      History of Present Illness: Alexandra Goins is a 53 y.o. female patient with a PMHx of acute ischemic stroke, bipolar 1 disorder, DM, HLD, and HTN  who presents to the Emergency Department for evaluation of aphasia which onset suddenly this afternoon.  reports last normal at 3:05 PM today. Symptoms are constant and moderate in severity. No mitigating or exacerbating factors reported. Associated sxs include R sided facial droop, R sided weakness, and anxious. Patient denies any SOB, CP, palpitations, HA, vision changes, fever, chills, n/v, dizziness, lighteadedness, and all other sxs at this time. No prior Tx. No further complaints or concerns at this time.         Arrival mode: Personal vehicle    PCP: Perez Casiano MD        Past Medical History:  Past Medical History:   Diagnosis Date    Acute ischemic stroke 9/17/2019    Anxiety     Arthritis     Asthma     Bipolar 1 disorder     Cerebral aneurysm, nonruptured 9/19/2019    Depression     Diabetes mellitus, type 2     Diverticular disease     GERD (gastroesophageal reflux disease)     Hyperlipemia     Hypertension     Hypothyroidism      Pneumonia     Renal manifestation of secondary diabetes mellitus     Right-sided back pain 6/29/2019    Trouble in sleeping        Past Surgical History:  Past Surgical History:   Procedure Laterality Date    CHOLECYSTECTOMY      COLON SURGERY      COLONOSCOPY N/A 1/12/2018    [facility]formed [facility] III[facility]    DENTAL SURGERY  05/21/2018    removal of 8 top teeth    ESOPHAGOGASTRODUODENOSCOPY (EGD) N/A 1/12/2018    Performed by Roque [facility]ajan III, MD at [facility] ENDO    HYSTERECTOMY      TONSILLECTOMY           Family History:  Family History   Problem Relation Age of Onset    Alcohol abuse Mother     COPD Mother     Depression Mother     Drug abuse Mother     Alcohol abuse Father     Arthritis Father     Cancer Father     Heart disease Father     Hypertension Father     COPD Sister     Kidney failure Sister     Heart disease Brother     Hypertension Brother     Cancer Maternal Aunt     Cancer Maternal Uncle     Diabetes Maternal Uncle     Drug abuse Maternal Uncle     Cancer Paternal Aunt     Cancer Paternal Uncle     Arthritis Maternal Grandmother     Hypertension Maternal Grandmother     Breast cancer Maternal Grandmother     Arthritis Paternal Grandmother     Cancer Paternal Grandmother     Hypertension Paternal Grandfather        Social History:  Social History     Tobacco Use    Smoking status: Current Every Day Smoker     Years: 35.00     Types: Cigarettes, Vaping w/o nicotine    Smokeless tobacco: Never Used   Substance and Sexual Activity    Alcohol use: Yes     Comment: occassionally    Drug use: Yes     Types: Marijuana    Sexual activity: Yes        Review of Systems     Review of Systems   Constitutional: Negative for activity change, appetite change, chills, diaphoresis, fatigue and fever.   HENT: Negative for congestion, ear pain, nosebleeds, rhinorrhea, sinus pain, sore throat and trouble swallowing.    Eyes: Negative for pain and discharge.    Respiratory: Negative for cough, chest tightness, shortness of breath, wheezing and stridor.    Cardiovascular: Negative for chest pain, palpitations and leg swelling.   Gastrointestinal: Negative for abdominal distention, abdominal pain, blood in stool, constipation, diarrhea, nausea and vomiting.   Genitourinary: Negative for difficulty urinating, dysuria, flank pain, frequency, hematuria and urgency.   Musculoskeletal: Negative for arthralgias, back pain, myalgias and neck pain.   Skin: Negative for pallor, rash and wound.   Neurological: Positive for facial asymmetry, speech difficulty and weakness (L sided). Negative for dizziness, syncope, light-headedness, numbness and headaches.   Hematological: Does not bruise/bleed easily.   Psychiatric/Behavioral: Negative for confusion and self-injury. The patient is nervous/anxious.    All other systems reviewed and are negative.     Physical Exam     Initial Vitals [09/17/19 1523]   BP Pulse Resp Temp SpO2   (!) 155/75 84 18 98.7 °F (37.1 °C) 97 %      MAP       --          Physical Exam  Nursing Notes and Vital Signs Reviewed.  Constitutional: Patient is in no acute distress. Well-developed and well-nourished.  Head: Atraumatic. Normocephalic.  Eyes: PERRL. EOM intact. Conjunctivae are not pale. No scleral icterus.  ENT: Mucous membranes are moist. Oropharynx is clear and symmetric.    Neck: Supple. Full ROM. No lymphadenopathy.  Cardiovascular: Regular rate. Regular rhythm. No murmurs, rubs, or gallops. Distal pulses are 2+ and symmetric.  Pulmonary/Chest: No respiratory distress. Clear to auscultation bilaterally. No wheezing or rales.  Abdominal: Soft and non-distended.  There is no tenderness.  No rebound, guarding, or rigidity. Good bowel sounds.  Genitourinary: No CVA tenderness  Musculoskeletal: Moves all extremities. No obvious deformities. No edema. No calf tenderness.  Skin: Warm and dry.  Neurological:  Alert, awake, and appropriate. R corner of mouth is  "slightly drooped. Minimal deviation of tongue to R. RUE is 3/5 in strength, LUE is 4/5 in strength, and BLE is 4/5 in strength. Reflexes are normal.   Psychiatric: Normal affect. Good eye contact. Appropriate in content.     ED Course   Procedures  ED Vital Signs:  Vitals:    09/17/19 1523 09/17/19 1601 09/17/19 1602 09/17/19 1615   BP: (!) 155/75   138/66   Pulse: 84  94 94   Resp: 18   19   Temp: 98.7 °F (37.1 °C)      TempSrc: Oral      SpO2: 97%   96%   Weight:  78.2 kg (172 lb 6.4 oz)     Height: 5' 4" (1.626 m)       09/17/19 1623 09/17/19 1631 09/17/19 1646 09/17/19 1701   BP: (!) 148/68 (!) 141/66 139/73 (!) 154/77   Pulse: 91 91 92 94   Resp: (!) 21 20 (!) 21 20   Temp:       TempSrc:       SpO2: 97% 98% 98% 97%   Weight:       Height:        09/17/19 1716 09/17/19 1731 09/17/19 1732   BP: (!) 142/69 137/68    Pulse: 85  89   Resp: 16  (!) 21   Temp:   98.5 °F (36.9 °C)   TempSrc:   Oral   SpO2: 97%  96%   Weight:      Height:          Abnormal Lab Results:  Labs Reviewed   POCT GLUCOSE - Abnormal; Notable for the following components:       Result Value    POCT Glucose 150 (*)     All other components within normal limits   ISTAT PROCEDURE        All Lab Results:  Results for orders placed or performed during the hospital encounter of 09/17/19   POCT glucose   Result Value Ref Range    POCT Glucose 150 (H) 70 - 110 mg/dL   ISTAT PROCEDURE   Result Value Ref Range    POC PTWBT 11.4 9.7 - 14.3 sec    POC PTINR 1.0 0.9 - 1.2    Sample unknown          Imaging Results:  Imaging Results          CT Head Without Contrast (Final result)  Result time 09/17/19 15:49:02    Final result by Homer Gomez MD (09/17/19 15:49:02)                 Impression:      No acute abnormality. If there is additional clinical concern for acute infarct, MRI with diffusion weighted imaging recommended.    COMMUNICATION  This critical result was discovered/received at 15:35 hours.  The critical information above was relayed " directly by me  to Dr. Carmichael on 09/17/2019 at 15:45 hours.    All CT scans at this facility use dose modulation, iterative reconstruction, and/or weight based dosing when appropriate to reduce radiation dose to as low as reasonable achievable.      Electronically signed by: Homer Gomez MD  Date:    09/17/2019  Time:    15:49             Narrative:    EXAMINATION:  CT HEAD WITHOUT CONTRAST    CLINICAL HISTORY:  aphasia;    TECHNIQUE:  Low dose axial CT images obtained throughout the head without intravenous contrast. Sagittal and coronal reconstructions were performed.    All CT scans at this facility use dose modulation, iterative reconstruction, and/or weight based dosing when appropriate to reduce radiation dose to as low as reasonable achievable.    COMPARISON:  7/23/15.    FINDINGS:  Intracranial compartment:    The brain parenchyma appears normal. No parenchymal mass, hemorrhage, edema or major vascular distribution infarct.    Ventricles and sulci are normal in size for age without evidence of hydrocephalus.    No extra-axial blood or fluid collections.    Skull/extracranial contents (limited evaluation): No fracture. Mastoid air cells and paranasal sinuses are essentially clear.                                 The EKG was ordered, reviewed, and independently interpreted by the ED provider.  Interpretation time: 16:06  Rate: 92 BPM  Rhythm: normal sinus rhythm  Interpretation: Possible LAE. Low voltage QRS. Septal infarct. No STEMI.         The Emergency Provider reviewed the vital signs and test results, which are outlined above.     ED Discussion     3:56 PM: Dr. Love discussed the pt's case with Dr. Carpenter (tele stroke) who recommends TPA administration and transfer to [facility] for vascular neurology.    5:05 PM: Consult with Dr. Wolf (emergency medicine) at [facility] concerning pt. There are no vascular neurology services, which the patient requires, offered at [facility] at this time. Dr. Wolf expresses understanding and will accept transfer for vascular neurology.  Accepting Facility: [facility]  Accepting Physician: Maryann    5:07 PM: Re-evaluated pt. Informed pt and family that there are no vascular neurology services available at this time. I have discussed test results, shared treatment plan, and the need for transfer with patient and family at bedside. All historical, clinical, radiographic, and laboratory findings were reviewed with the patient/family in detail. Patient will be transferred by [facility] services with neuro checks required en route. Patient understands that there could be unforeseen motor vehicle accidents or loss of vital signs that could result in potential death or permanent disability. Pt and family express understanding at this time and agree with all information. All questions answered. Pt and family have no further questions or concerns at this time. Pt is ready for transfer.        Medical Decision Making:   Clinical Tests:   Lab Tests: Ordered and Reviewed  Radiological Study: Ordered and Reviewed  Medical Tests: Ordered and Reviewed           ED Medication(s):  Medications   alteplase (ACTIVASE) 100 mg injection 7 mg (0 mg/kg × 78.2 kg Intravenous Stopped 9/17/19 1717)   alteplase (ACTIVASE) 100 mg injection 63 mg (0 mg/kg × 78.2 kg Intravenous Stopped 9/17/19 1717)   0.9%  NaCl infusion (50 mLs Intravenous New Bag 9/17/19 1724)       Scribe Attestation:   Scribe #1: I performed the above scribed service and the documentation accurately describes the services I performed. I attest to the accuracy of the note.     Attending:   Physician Attestation Statement for Scribe #1: I, Shruthi Love MD, personally performed the services described in this documentation, as scribed by Marisela Echeverria, in my presence, and it is both accurate and complete.           Clinical Impression       ICD-10-CM ICD-9-CM   1. Cerebrovascular accident (CVA),  unspecified mechanism I63.9 434.91   2. Aphasia R47.01 784.3       Disposition:   Disposition: Transferred  Condition: Stable         Shruthi Love MD  09/20/19 [id number]

## 2019-09-17 NOTE — HPI
53F w/ tingling in both hands, more on right than left, having a hard time talking feeling like her jaw is locked up, tongue is out of her mouth and slurring, her vision has a halo only in her R eye described a white halo. The aforementioned symptoms have never happened before. There are no identified triggers or modifying factors. There have been no recurrent events. There are no other associated symptoms.

## 2019-09-18 PROBLEM — G43.109 MIGRAINE WITH AURA AND WITHOUT STATUS MIGRAINOSUS, NOT INTRACTABLE: Status: ACTIVE | Noted: 2019-09-18

## 2019-09-18 LAB
ABO + RH BLD: NORMAL
ALBUMIN SERPL BCP-MCNC: 4.1 G/DL (ref 3.5–5.2)
ALP SERPL-CCNC: 100 U/L (ref 55–135)
ALT SERPL W/O P-5'-P-CCNC: 75 U/L (ref 10–44)
ANION GAP SERPL CALC-SCNC: 11 MMOL/L (ref 8–16)
ASCENDING AORTA: 3.08 CM
AST SERPL-CCNC: 43 U/L (ref 10–40)
AV INDEX (PROSTH): 0.72
AV MEAN GRADIENT: 1 MMHG
AV PEAK GRADIENT: 2 MMHG
AV VALVE AREA: 2.41 CM2
AV VELOCITY RATIO: 0.87
BASOPHILS # BLD AUTO: 0.07 K/UL (ref 0–0.2)
BASOPHILS NFR BLD: 0.8 % (ref 0–1.9)
BILIRUB SERPL-MCNC: 0.3 MG/DL (ref 0.1–1)
BLD GP AB SCN CELLS X3 SERPL QL: NORMAL
BSA FOR ECHO PROCEDURE: 1.93 M2
BUN SERPL-MCNC: 10 MG/DL (ref 6–20)
CALCIUM SERPL-MCNC: 9.3 MG/DL (ref 8.7–10.5)
CHLORIDE SERPL-SCNC: 108 MMOL/L (ref 95–110)
CO2 SERPL-SCNC: 22 MMOL/L (ref 23–29)
CREAT SERPL-MCNC: 0.7 MG/DL (ref 0.5–1.4)
CV ECHO LV RWT: 0.46 CM
DIFFERENTIAL METHOD: ABNORMAL
DOP CALC AO PEAK VEL: 0.68 M/S
DOP CALC AO VTI: 12.3 CM
DOP CALC LVOT AREA: 3.4 CM2
DOP CALC LVOT DIAMETER: 2.07 CM
DOP CALC LVOT PEAK VEL: 0.59 M/S
DOP CALC LVOT STROKE VOLUME: 29.67 CM3
DOP CALCLVOT PEAK VEL VTI: 8.82 CM
ECHO LV POSTERIOR WALL: 1 CM (ref 0.6–1.1)
EOSINOPHIL # BLD AUTO: 0.4 K/UL (ref 0–0.5)
EOSINOPHIL NFR BLD: 4.4 % (ref 0–8)
ERYTHROCYTE [DISTWIDTH] IN BLOOD BY AUTOMATED COUNT: 14.2 % (ref 11.5–14.5)
EST. GFR  (AFRICAN AMERICAN): >60 ML/MIN/1.73 M^2
EST. GFR  (NON AFRICAN AMERICAN): >60 ML/MIN/1.73 M^2
FRACTIONAL SHORTENING: 26 % (ref 28–44)
GLUCOSE SERPL-MCNC: 107 MG/DL (ref 70–110)
HCT VFR BLD AUTO: 43.7 % (ref 37–48.5)
HGB BLD-MCNC: 13.9 G/DL (ref 12–16)
IMM GRANULOCYTES # BLD AUTO: 0.03 K/UL (ref 0–0.04)
IMM GRANULOCYTES NFR BLD AUTO: 0.3 % (ref 0–0.5)
INTERVENTRICULAR SEPTUM: 1.01 CM (ref 0.6–1.1)
LA MAJOR: 4.72 CM
LA MINOR: 3.01 CM
LA WIDTH: 2.81 CM
LEFT ATRIUM SIZE: 3.14 CM
LEFT ATRIUM VOLUME INDEX: 14.6 ML/M2
LEFT ATRIUM VOLUME: 27.57 CM3
LEFT INTERNAL DIMENSION IN SYSTOLE: 3.24 CM (ref 2.1–4)
LEFT VENTRICLE DIASTOLIC VOLUME INDEX: 45.62 ML/M2
LEFT VENTRICLE DIASTOLIC VOLUME: 86.43 ML
LEFT VENTRICLE MASS INDEX: 78 G/M2
LEFT VENTRICLE SYSTOLIC VOLUME INDEX: 22.3 ML/M2
LEFT VENTRICLE SYSTOLIC VOLUME: 42.21 ML
LEFT VENTRICULAR INTERNAL DIMENSION IN DIASTOLE: 4.37 CM (ref 3.5–6)
LEFT VENTRICULAR MASS: 147.23 G
LYMPHOCYTES # BLD AUTO: 4.2 K/UL (ref 1–4.8)
LYMPHOCYTES NFR BLD: 47.3 % (ref 18–48)
MAGNESIUM SERPL-MCNC: 1.7 MG/DL (ref 1.6–2.6)
MCH RBC QN AUTO: 30.4 PG (ref 27–31)
MCHC RBC AUTO-ENTMCNC: 31.8 G/DL (ref 32–36)
MCV RBC AUTO: 96 FL (ref 82–98)
MONOCYTES # BLD AUTO: 0.6 K/UL (ref 0.3–1)
MONOCYTES NFR BLD: 7.2 % (ref 4–15)
NEUTROPHILS # BLD AUTO: 3.6 K/UL (ref 1.8–7.7)
NEUTROPHILS NFR BLD: 40 % (ref 38–73)
NRBC BLD-RTO: 0 /100 WBC
PHOSPHATE SERPL-MCNC: 3.7 MG/DL (ref 2.7–4.5)
PLATELET # BLD AUTO: 300 K/UL (ref 150–350)
PMV BLD AUTO: 9.4 FL (ref 9.2–12.9)
POCT GLUCOSE: 115 MG/DL (ref 70–110)
POCT GLUCOSE: 188 MG/DL (ref 70–110)
POCT GLUCOSE: 83 MG/DL (ref 70–110)
POTASSIUM SERPL-SCNC: 4.2 MMOL/L (ref 3.5–5.1)
PROT SERPL-MCNC: 7.8 G/DL (ref 6–8.4)
RA MAJOR: 3.74 CM
RA PRESSURE: 3 MMHG
RA WIDTH: 2.72 CM
RBC # BLD AUTO: 4.57 M/UL (ref 4–5.4)
RIGHT VENTRICULAR END-DIASTOLIC DIMENSION: 2.93 CM
SINUS: 2.85 CM
SODIUM SERPL-SCNC: 141 MMOL/L (ref 136–145)
STJ: 2.81 CM
TDI LATERAL: 0.12 M/S
TDI SEPTAL: 0.08 M/S
TDI: 0.1 M/S
TRICUSPID ANNULAR PLANE SYSTOLIC EXCURSION: 2.05 CM
WBC # BLD AUTO: 8.9 K/UL (ref 3.9–12.7)

## 2019-09-18 PROCEDURE — 92610 EVALUATE SWALLOWING FUNCTION: CPT | Mod: HCNC

## 2019-09-18 PROCEDURE — 97162 PT EVAL MOD COMPLEX 30 MIN: CPT | Mod: HCNC

## 2019-09-18 PROCEDURE — 20000000 HC ICU ROOM: Mod: HCNC

## 2019-09-18 PROCEDURE — 63600175 PHARM REV CODE 636 W HCPCS: Mod: HCNC | Performed by: PHYSICIAN ASSISTANT

## 2019-09-18 PROCEDURE — 84100 ASSAY OF PHOSPHORUS: CPT | Mod: HCNC

## 2019-09-18 PROCEDURE — 97165 OT EVAL LOW COMPLEX 30 MIN: CPT | Mod: HCNC

## 2019-09-18 PROCEDURE — 99223 PR INITIAL HOSPITAL CARE,LEVL III: ICD-10-PCS | Mod: HCNC,,, | Performed by: NEUROLOGICAL SURGERY

## 2019-09-18 PROCEDURE — 99222 1ST HOSP IP/OBS MODERATE 55: CPT | Mod: HCNC,,, | Performed by: NURSE PRACTITIONER

## 2019-09-18 PROCEDURE — 94761 N-INVAS EAR/PLS OXIMETRY MLT: CPT | Mod: HCNC

## 2019-09-18 PROCEDURE — 99222 PR INITIAL HOSPITAL CARE,LEVL II: ICD-10-PCS | Mod: HCNC,,, | Performed by: NURSE PRACTITIONER

## 2019-09-18 PROCEDURE — 99223 1ST HOSP IP/OBS HIGH 75: CPT | Mod: HCNC,,, | Performed by: NEUROLOGICAL SURGERY

## 2019-09-18 PROCEDURE — 85025 COMPLETE CBC W/AUTO DIFF WBC: CPT | Mod: HCNC

## 2019-09-18 PROCEDURE — 80053 COMPREHEN METABOLIC PANEL: CPT | Mod: HCNC

## 2019-09-18 PROCEDURE — 99233 SBSQ HOSP IP/OBS HIGH 50: CPT | Mod: HCNC,GC,, | Performed by: PSYCHIATRY & NEUROLOGY

## 2019-09-18 PROCEDURE — 63600175 PHARM REV CODE 636 W HCPCS: Mod: HCNC | Performed by: INTERNAL MEDICINE

## 2019-09-18 PROCEDURE — 99233 PR SUBSEQUENT HOSPITAL CARE,LEVL III: ICD-10-PCS | Mod: HCNC,GC,, | Performed by: PSYCHIATRY & NEUROLOGY

## 2019-09-18 PROCEDURE — 92523 SPEECH SOUND LANG COMPREHEN: CPT | Mod: HCNC

## 2019-09-18 PROCEDURE — 83735 ASSAY OF MAGNESIUM: CPT | Mod: HCNC

## 2019-09-18 PROCEDURE — 25000003 PHARM REV CODE 250: Mod: HCNC | Performed by: NURSE PRACTITIONER

## 2019-09-18 PROCEDURE — 86850 RBC ANTIBODY SCREEN: CPT | Mod: HCNC

## 2019-09-18 RX ORDER — ACETAMINOPHEN 325 MG/1
650 TABLET ORAL EVERY 6 HOURS PRN
Status: DISCONTINUED | OUTPATIENT
Start: 2019-09-18 | End: 2019-09-19 | Stop reason: HOSPADM

## 2019-09-18 RX ORDER — PROCHLORPERAZINE EDISYLATE 5 MG/ML
10 INJECTION INTRAMUSCULAR; INTRAVENOUS EVERY 6 HOURS PRN
Status: DISCONTINUED | OUTPATIENT
Start: 2019-09-18 | End: 2019-09-19 | Stop reason: HOSPADM

## 2019-09-18 RX ORDER — HEPARIN SODIUM 5000 [USP'U]/ML
5000 INJECTION, SOLUTION INTRAVENOUS; SUBCUTANEOUS EVERY 8 HOURS
Status: DISCONTINUED | OUTPATIENT
Start: 2019-09-18 | End: 2019-09-19 | Stop reason: HOSPADM

## 2019-09-18 RX ORDER — IBUPROFEN 200 MG
24 TABLET ORAL
Status: DISCONTINUED | OUTPATIENT
Start: 2019-09-18 | End: 2019-09-19 | Stop reason: HOSPADM

## 2019-09-18 RX ORDER — NAPROXEN SODIUM 220 MG/1
81 TABLET, FILM COATED ORAL DAILY
Status: DISCONTINUED | OUTPATIENT
Start: 2019-09-19 | End: 2019-09-19 | Stop reason: HOSPADM

## 2019-09-18 RX ORDER — ACETAMINOPHEN 325 MG/1
650 TABLET ORAL
Status: ACTIVE | OUTPATIENT
Start: 2019-09-18 | End: 2019-09-19

## 2019-09-18 RX ORDER — INSULIN ASPART 100 [IU]/ML
1-10 INJECTION, SOLUTION INTRAVENOUS; SUBCUTANEOUS
Status: DISCONTINUED | OUTPATIENT
Start: 2019-09-18 | End: 2019-09-19 | Stop reason: HOSPADM

## 2019-09-18 RX ORDER — GLUCAGON 1 MG
1 KIT INJECTION
Status: DISCONTINUED | OUTPATIENT
Start: 2019-09-18 | End: 2019-09-19 | Stop reason: HOSPADM

## 2019-09-18 RX ORDER — IBUPROFEN 200 MG
16 TABLET ORAL
Status: DISCONTINUED | OUTPATIENT
Start: 2019-09-18 | End: 2019-09-19 | Stop reason: HOSPADM

## 2019-09-18 RX ORDER — MAGNESIUM SULFATE HEPTAHYDRATE 40 MG/ML
2 INJECTION, SOLUTION INTRAVENOUS ONCE
Status: COMPLETED | OUTPATIENT
Start: 2019-09-18 | End: 2019-09-18

## 2019-09-18 RX ADMIN — SENNOSIDES AND DOCUSATE SODIUM 1 TABLET: 8.6; 5 TABLET ORAL at 08:09

## 2019-09-18 RX ADMIN — MAGNESIUM SULFATE IN WATER 2 G: 40 INJECTION, SOLUTION INTRAVENOUS at 11:09

## 2019-09-18 RX ADMIN — INSULIN ASPART 2 UNITS: 100 INJECTION, SOLUTION INTRAVENOUS; SUBCUTANEOUS at 04:09

## 2019-09-18 RX ADMIN — DIAZEPAM 10 MG: 5 TABLET ORAL at 08:09

## 2019-09-18 RX ADMIN — PROCHLORPERAZINE EDISYLATE 10 MG: 5 INJECTION INTRAMUSCULAR; INTRAVENOUS at 11:09

## 2019-09-18 RX ADMIN — INSULIN ASPART 1 UNITS: 100 INJECTION, SOLUTION INTRAVENOUS; SUBCUTANEOUS at 09:09

## 2019-09-18 RX ADMIN — OXYCODONE HYDROCHLORIDE 5 MG: 5 TABLET ORAL at 12:09

## 2019-09-18 RX ADMIN — MAGNESIUM OXIDE TAB 400 MG (241.3 MG ELEMENTAL MG) 800 MG: 400 (241.3 MG) TAB at 06:09

## 2019-09-18 RX ADMIN — DIAZEPAM 10 MG: 5 TABLET ORAL at 02:09

## 2019-09-18 RX ADMIN — HEPARIN SODIUM 5000 UNITS: 5000 INJECTION, SOLUTION INTRAVENOUS; SUBCUTANEOUS at 08:09

## 2019-09-18 RX ADMIN — TOPIRAMATE 200 MG: 200 TABLET, FILM COATED ORAL at 08:09

## 2019-09-18 NOTE — ASSESSMENT & PLAN NOTE
S/p tPA for [facility] 8 on tele-consult, concern for stroke mimic  CTA with no LVO, basilar tip aneurysm   Q1hr Neuro Checks, SBP <160  MRI tomorrow, 1300  PT/OT/SLP

## 2019-09-18 NOTE — PLAN OF CARE
09/18/19 1554   Discharge Assessment   Assessment Type Discharge Planning Assessment   Confirmed/corrected address and phone number on facesheet? Yes   Assessment information obtained from? Patient   Expected Length of Stay (days) 3   Communicated expected length of stay with patient/caregiver yes   Prior to hospitilization cognitive status: Alert/Oriented   Prior to hospitalization functional status: Independent   Current cognitive status: Alert/Oriented   Current Functional Status: Needs Assistance   Facility Arrived From: [facility]   Lives With spouse   Able to Return to Prior Arrangements yes   Is patient able to care for self after discharge? Unable to determine at this time (comments)   Who are your caregiver(s) and their phone number(s)? Kaiden Goins (spouse) [phone]   Patient's perception of discharge disposition home or selfcare;rehab facility   Readmission Within the Last 30 Days no previous admission in last 30 days   Patient currently being followed by outpatient case management? No   Patient currently receives any other outside agency services? No   Equipment Currently Used at Home none   Do you have any problems affording any of your prescribed medications? No   Is the patient taking medications as prescribed? yes   Does the patient have transportation home? Yes   Transportation Anticipated family or friend will provide   Does the patient receive services at the [facility]? No   Discharge Plan A Rehab   Discharge Plan B Home Health   DME Needed Upon Discharge  none   Patient/Family in Agreement with Plan yes         Discharge/ My Health Packet Folder Given to patient/family:      yes      PCP:  Perez Casiano MD        Pharmacy:    [id number]  [place], [place] - [address] [address]  [address] [place] [address]  [place] [address]  Phone: [phone] Fax: [phone]        Emergency Contacts:    Extended Emergency Contact Information  Primary Emergency Contact: Kaiden Goins  Address: [address]  [place] [address] [facility] [place]  Home Phone: [phone]  Mobile Phone: [phone]  Relation: Spouse   needed? No  Secondary Emergency Contact: Melanie Montgomery   [place]  Mobile Phone: [phone]  Relation: Daughter    Insurance:  Payor: HU[employer]NA MANAGED MEDICARE / Plan: HU[employer]NA TOTAL CARE ADVANTAGE / Product Type: Medicare Advantage /     Francine Mane RN, CCRN-K, [facility]  Neuro-Critical Care   X [phone]

## 2019-09-18 NOTE — CONSULTS
"  [facility]  Adult Nutrition  Consult Note    SUMMARY     Recommendations    Recommendation/Intervention:   As medically able, advance diet to Cardiac with texture per SLP recommendations.   Add Diabetic restriction if BG levels elevate.     RD to monitor.    Goals: Pt to receive and tolerate >85% EEN and EPN by RD follow up  Nutrition Goal Status: new  Communication of RD Recs: reviewed with RN    Reason for Assessment    Reason For Assessment: consult  Diagnosis: stroke/CVA  Relevant Medical History: T2DM, HTN, HLD, bipolar  Interdisciplinary Rounds: attended  General Information Comments: Pt remains NPO for MRI scan. Passed NASEEM. Pt reports UBW approx 190lb and intentional 15lb weight loss over previous 4 months 2/2 changing diet and reading about Mediterranean diet. Pt reports seeing dietitian or NP (pt unsure of providers title) outpatient to help with diet changes. Pt does not meet criteria for malnutrition at this time.  Nutrition Discharge Planning: adequate po intake for optimal nutrition    Nutrition Risk Screen    Nutrition Risk Screen: no indicators present    Nutrition/Diet History    Spiritual, Cultural Beliefs, Anabaptist Practices, Values that Affect Care: no  Factors Affecting Nutritional Intake: NPO    Anthropometrics    Temp: 98.5 °F (36.9 °C)  Height Method: Stated  Height: 5' 4" (162.6 cm)  Height (inches): 64 in  Weight Method: Bed Scale  Weight: 80 kg (176 lb 5.9 oz)  Weight (lb): 176.37 lb  Ideal Body Weight (IBW), Female: 120 lb  % Ideal Body Weight, Female (lb): 146.98 lb  BMI (Calculated): 30.3  BMI Grade: 30 - 34.9- obesity - grade I  Weight Loss: intentional  Usual Body Weight (UBW), k.3 kg  % Usual Body Weight: 92.89  % Weight Change From Usual Weight: -7.3 %       Lab/Procedures/Meds    Pertinent Labs Reviewed: reviewed  Pertinent Labs Comments: HgbA1c 6.1, POCT Glu , Chol 219  Pertinent Medications Reviewed: reviewed  Pertinent Medications Comments: " insulin    Estimated/Assessed Needs    Weight Used For Calorie Calculations: 80 kg (176 lb 5.9 oz)  Energy Calorie Requirements (kcal): 1725  Energy Need Method: Paulding-St Jeor(PAL 1.25)  Protein Requirements: 80-96g(1.0-1.2g/kg)  Weight Used For Protein Calculations: 80 kg (176 lb 5.9 oz)  Fluid Requirements (mL): 1mL/kcal or per MD     RDA Method (mL): 1725  CHO Requirement: 50% of total kcals      Nutrition Prescription Ordered    Current Diet Order: NPO    Evaluation of Received Nutrient/Fluid Intake       % Intake of Estimated Energy Needs: 0 - 25 %  % Meal Intake: NPO    Nutrition Risk    Level of Risk/Frequency of Follow-up: low(f/u 1x/week)     Assessment and Plan    Nutrition Problem  Inadequate energy intake    Related to (etiology):   NPO    Signs and Symptoms (as evidenced by):   Pt receiving <85% EEN and EPN.     Interventions(treatment strategy):  Collaboration of care with providers    Nutrition Diagnosis Status:   New      Monitor and Evaluation    Food and Nutrient Intake: energy intake, food and beverage intake  Food and Nutrient Adminstration: diet order  Anthropometric Measurements: weight, weight change, body mass index  Biochemical Data, Medical Tests and Procedures: electrolyte and renal panel, gastrointestinal profile, glucose/endocrine profile, inflammatory profile, lipid profile  Nutrition-Focused Physical Findings: overall appearance        Nutrition Follow-Up    RD Follow-up?: Yes

## 2019-09-18 NOTE — NURSING
Pt transferred to and from CT with portable telemetry, ambu mask, and RN x1. No acute events occurred. Pt stable and back in room. Will continue to monitor.

## 2019-09-18 NOTE — ED TRIAGE NOTES
53 year old female presents to the ED via EMS as a transfer from [facility] for evaluation of CVA. Patient presented there with right sided facial droop, right sided weakness and slurred speech. Last known normal was 1505. Tele stroke performed, decided to administer TPA. 7 mg bolus given at 1616, 63 mg infusion started at 1617.

## 2019-09-18 NOTE — PLAN OF CARE
Problem: Adult Inpatient Plan of Care  Goal: Plan of Care Review  Outcome: Ongoing (interventions implemented as appropriate)  POC reviewed with pt at 0500. Pt verbalized understanding. Questions and concerns addressed. No acute events overnight. Pt still has numbness and tingling right side face right arm and right leg. HA resolved after PRN see MAR,MRI scheduled for 1300  in Pt progressing toward goals. Will continue to monitor. See flowsheets for full assessment and VS info

## 2019-09-18 NOTE — ED TRIAGE NOTES
Alexandra Goins, an 53 y.o. female presents to the ED as a tPA transfer from [place] after developing right sided facial droop, aphasia and flaccidity to the right side of her body at 1450 today.  Prior to leaving , patient's daughter states that the patient was able to recognize her and tell her that she loved her.  Per EMS, patient was coming in and out of lucidity en route.  Patient brought immediately to CT for multiphase imaging.        Review of patient's allergies indicates:   Allergen Reactions    Piroxicam Diarrhea and Nausea Only    Ultram [tramadol] Rash    Aspirin      Nosebleeds  Nosebleeds  Nosebleeds    Levaquin [levofloxacin]     Levomenol analogues     Lipitor [atorvastatin]      Leg Cramps    Zofran [ondansetron hcl] Hives and Other (See Comments)     headaches    Celestone [betamethasone] Hives, Itching and Rash     Chief Complaint   Patient presents with    Transfer     TPA transfer from [place]     Past Medical History:   Diagnosis Date    Acute ischemic stroke 9/17/2019    Anxiety     Arthritis     Asthma     Bipolar 1 disorder     Depression     Diabetes mellitus, type 2     Diverticular disease     GERD (gastroesophageal reflux disease)     Hyperlipemia     Hypertension     Hypothyroidism     Pneumonia     Renal manifestation of secondary diabetes mellitus     Right-sided back pain 6/29/2019    Trouble in sleeping

## 2019-09-18 NOTE — NURSING
Pt complains of h/a, states to prochlorpemazine and magnesium oxide did not work. Thomas with NCC notified, states to give a one time dose of tylenol. Will continue to monitor and notify as needed.

## 2019-09-18 NOTE — ED PROVIDER NOTES
Encounter Date: 9/17/2019       History     Chief Complaint   Patient presents with    Transfer     TPA transfer from [place]     53-year-old female transferred to our facility for vascular neurology evaluation of a possible stroke.  Patient was last seen normal around 230 p.m. she developed a right-sided weakness and facial droop with slurred speech.  She was apparently in and out of consciousness.  CT at outside facility showing a possible basilar occlusion.  TPA was administered and completed prior to arrival here.     Upon arrival to our facility she is awake and alert with clear speech.  Moving the right upper extremity but still very weak to the right lower extremity.  Patient does not recall the intermittent LOC year earlier today.  EMS reports multiple times of unconsciousness and route lasting for few seconds.     Stroke team contacted upon patient arrival.  A CTA multiphase upon patient arrival.        Review of patient's allergies indicates:   Allergen Reactions    Piroxicam Diarrhea and Nausea Only    Ultram [tramadol] Rash    Aspirin      Nosebleeds  Nosebleeds  Nosebleeds    Levaquin [levofloxacin]     Levomenol analogues     Lipitor [atorvastatin]      Leg Cramps    Zofran [ondansetron hcl] Hives and Other (See Comments)     headaches    Celestone [betamethasone] Hives, Itching and Rash     Past Medical History:   Diagnosis Date    Acute ischemic stroke 9/17/2019    Anxiety     Arthritis     Asthma     Bipolar 1 disorder     Depression     Diabetes mellitus, type 2     Diverticular disease     GERD (gastroesophageal reflux disease)     Hyperlipemia     Hypertension     Hypothyroidism     Pneumonia     Renal manifestation of secondary diabetes mellitus     Right-sided back pain 6/29/2019    Trouble in sleeping      Past Surgical History:   Procedure Laterality Date    CHOLECYSTECTOMY      COLON SURGERY      COLONOSCOPY N/A 1/12/2018    Performed by Roque [facility]ajan III,  MD at [facility]    DENTAL SURGERY  05/21/2018    removal of 8 top teeth    ESOPHAGOGASTRODUODENOSCOPY (EGD) N/A 1/12/2018    Performed by Roque [facility]ajan III, MD at [facility]    HYSTERECTOMY      TONSILLECTOMY       Family History   Problem Relation Age of Onset    Alcohol abuse Mother     COPD Mother     Depression Mother     Drug abuse Mother     Alcohol abuse Father     Arthritis Father     Cancer Father     Heart disease Father     Hypertension Father     COPD Sister     Kidney failure Sister     Heart disease Brother     Hypertension Brother     Cancer Maternal Aunt     Cancer Maternal Uncle     Diabetes Maternal Uncle     Drug abuse Maternal Uncle     Cancer Paternal Aunt     Cancer Paternal Uncle     Arthritis Maternal Grandmother     Hypertension Maternal Grandmother     Breast cancer Maternal Grandmother     Arthritis Paternal Grandmother     Cancer Paternal Grandmother     Hypertension Paternal Grandfather      Social History     Tobacco Use    Smoking status: Current Every Day Smoker     Years: 35.00     Types: Cigarettes, Vaping w/o nicotine    Smokeless tobacco: Never Used   Substance Use Topics    Alcohol use: Yes     Comment: occassionally    Drug use: Yes     Types: Marijuana     Review of Systems   Constitutional: Negative for fever.   HENT: Negative for sore throat.    Respiratory: Negative for shortness of breath.    Cardiovascular: Negative for chest pain.   Gastrointestinal: Negative for nausea.   Genitourinary: Negative for dysuria.   Musculoskeletal: Negative for back pain.   Skin: Negative for rash.   Neurological: Positive for facial asymmetry, speech difficulty, weakness and headaches.   Hematological: Does not bruise/bleed easily.       Physical Exam     Initial Vitals [09/17/19 1904]   BP Pulse Resp Temp SpO2   123/74 88 16 98.7 °F (37.1 °C) 98 %      MAP       --         Physical Exam    Constitutional: Vital signs are normal. She appears well-developed  and well-nourished. She is not diaphoretic. No distress.   HENT:   Head: Normocephalic and atraumatic.   Eyes: Conjunctivae are normal.   Cardiovascular: Normal rate and regular rhythm.   Pulmonary/Chest: No respiratory distress. She has no wheezes. She has no rhonchi. She has no rales. She exhibits no tenderness.   Abdominal: Soft. Normal appearance and bowel sounds are normal. She exhibits no distension and no mass. There is no tenderness. There is no rebound and no guarding.   Musculoskeletal: Normal range of motion.   Neurological: She is alert and oriented to person, place, and time.   No facial droop,   Normal speech  R side weakness, worse on lower than upper extremity  GCS 15   Skin: Skin is warm and intact.   Psychiatric: She has a normal mood and affect. Her speech is normal and behavior is normal. Cognition and memory are normal.         ED Course   Procedures  Labs Reviewed   POCT GLUCOSE - Abnormal; Notable for the following components:       Result Value    POCT Glucose 111 (*)     All other components within normal limits   CBC W/ AUTO DIFFERENTIAL   COMPREHENSIVE METABOLIC PANEL   PROTIME-INR   TSH   LIPID PANEL   HEMOGLOBIN A1C   POCT GLUCOSE, HAND-HELD DEVICE   TYPE & SCREEN          Imaging Results          CTA STROKE MULTI-PHASE (In process)                  Medical Decision Making:   History:   Old Medical Records: I decided to obtain old medical records.  Old Records Summarized: records from another hospital.  Initial Assessment:   54 yo F who was transferred here from  for stroke team eval after receiving TPA  Differential Diagnosis:   Stroke, ICH, Electrolyte abnormality,   Clinical Tests:   Lab Tests: Ordered and Reviewed  Radiological Study: Ordered and Reviewed  Medical Tests: Ordered and Reviewed  ED Management:  Case discussed with vascular neurology and with neurocritical care  No acute stroke identified on CTA multiphase  CTA does show a saccular aneurysm  Patient will be admitted to  the neuro critical care service  Her exam remains unchanged from arrival   Other:   I discussed test(s) with the performing physician.  I have discussed this case with another health care provider.              Attending Attestation:     Physician Attestation Statement for NP/PA:   I have conducted a face to face encounter with this patient in addition to the NP/PA, due to Medical Complexity    Other NP/PA Attestation Additions:      Medical Decision Making: Stroke symptoms, TPA given prior to arrival.  CTA shows no lesion to intervene on, will admit to neuro critical care.     Attending Critical Care:   Critical Care Times:   ==============================================================  · Total Critical Care Time - exclusive of procedural time: 35 minutes.  ==============================================================  Critical care was necessary to treat or prevent imminent or life-threatening deterioration of the following conditions: stroke.                  Clinical Impression:       ICD-10-CM ICD-9-CM   1. Stroke I63.9 434.91         Disposition:   Disposition: Admitted  Condition: Critical                        Adair Rivera PA-C  09/17/19 2029       Homer Wolf MD  09/19/19 1327

## 2019-09-18 NOTE — SUBJECTIVE & OBJECTIVE
Review of Systems  Constitutional: Negative for fatigue and fever.   HENT: Negative for tinnitus.    Eyes: Negative for visual disturbance.   Respiratory: Negative for apnea and shortness of breath.    Cardiovascular: Negative for chest pain and palpitations.   Gastrointestinal: Negative for abdominal distention, rectal pain and vomiting.   Musculoskeletal: Negative for neck stiffness.   Skin: Negative for color change.   Neurological: Positive for headaches.   Psychiatric/Behavioral: Negative for confusion.     Objective:     Vitals:  Temp: 98.6 °F (37 °C)  Pulse: 100  Rhythm: normal sinus rhythm  BP: 126/68  MAP (mmHg): 90  Resp: (!) 38  SpO2: 96 %  O2 Device (Oxygen Therapy): room air    Temp  Min: 98 °F (36.7 °C)  Max: 98.7 °F (37.1 °C)  Pulse  Min: 71  Max: 100  BP  Min: 109/63  Max: 155/75  MAP (mmHg)  Min: 82  Max: 117  Resp  Min: 13  Max: 45  SpO2  Min: 95 %  Max: 99 %    09/17 0701 - 09/18 0700  In: 60 [P.O.:60]  Out: 1600 [Urine:1600]   Unmeasured Output  Urine Occurrence: 1  Stool Occurrence: 0       Physical Exam     Neurological:     E4 V5 M6  AAO x 4,  minimal dysarthria  PERRLA, EOMI  RUE/RLE weakness 4/5  LUE/LLE 5/5  Paresthesia RUE/RLE, right side of face       Medications:  Continuous Scheduled  diazePAM 10 mg TID   magnesium sulfate IVPB 2 g Once   senna-docusate 8.6-50 mg 1 tablet BID   topiramate 200 mg Daily   PRN  Dextrose 10% Bolus 12.5 g PRN   glucagon (human recombinant) 1 mg PRN   glucose 16 g PRN   glucose 24 g PRN   insulin aspart U-100 1-10 Units QID (AC + HS) PRN   magnesium oxide 800 mg PRN   magnesium oxide 800 mg PRN   oxyCODONE 5 mg Q6H PRN   potassium chloride 10% 40 mEq PRN   potassium chloride 10% 40 mEq PRN   potassium chloride 10% 60 mEq PRN   potassium, sodium phosphates 2 packet PRN   potassium, sodium phosphates 2 packet PRN   potassium, sodium phosphates 2 packet PRN   prochlorperazine 10 mg Q6H PRN   sodium chloride 0.9% 10 mL PRN     Today I personally reviewed  pertinent medications, lines/drains/airways, imaging, cardiology results, laboratory results, microbiology results, notably:    Diet  Diet diabetic [facility]; 2000 Calorie  Diet diabetic [facility]; 2000 Calorie

## 2019-09-18 NOTE — NURSING
Pt given Magnesium oxide 2 g IV to tx headache. 400 mg PO Mg replacement due at the same time. Mariangel TUBBS with [facility] notified, states to hold 400 mg PO Mg at this time. Will continue to monitor and notify as needed.

## 2019-09-18 NOTE — CONSULTS
[facility]  Physical Medicine & Rehab  Consult Note    Patient Name: Alexandra Goins  MRN: [id number]  Admission Date: 9/17/2019  Hospital Length of Stay: 1 days  Attending Physician: Peter Teran MD     Inpatient consult to Physical Medicine & Rehabilitation  Consult performed by: Elvia Melendez NP  Consult requested by:  Peter Teran MD    Collaborating Physician: Ashley Awan MD  Reason for Consult:  assess rehabilitation needs    Consults  Subjective:     Principal Problem: Right-sided weakness s/p tPA    HPI: Alexandra Goins is a 53-year-old female with PMHx of HTN, HLD, DMII, bipolar I disorder, hypothyroidism, arthritis, and tobacco use.  Patient presented to [facility] with acute right-sided weakness, facial droop, slurred speech, and R vision changes.  On arrival, tele-stroke consult completed.  CTH without acute pathology, and IV tPA administered.  Transferred [place] [facility] [place] for further evaluation and management.  Upon admission, CTA multiphase without large vessel occlusion; therefore, intervention not indicated.  MRI brain pending.      Functional History: Patient lives in [place] with her .  Prior to admission, she was (I) with ADLs and mobility.  She is right handed.  DME: cane.    Hospital Course:   9/17/19:  Therapy evaluations pending.   9/18/19:  Swallow evaluation with SLP completed.  SLP recommendation: regular diet and thin liquids.     Past Medical History:   Diagnosis Date    Acute ischemic stroke 9/17/2019    Anxiety     Arthritis     Asthma     Bipolar 1 disorder     Depression     Diabetes mellitus, type 2     Diverticular disease     GERD (gastroesophageal reflux disease)     Hyperlipemia     Hypertension     Hypothyroidism     Pneumonia     Renal manifestation of secondary diabetes mellitus     Right-sided back pain 6/29/2019    Trouble in sleeping      Past Surgical History:   Procedure Laterality Date     CHOLECYSTECTOMY      COLON SURGERY      COLONOSCOPY N/A 1/12/2018    Performed [facility]ajan [facility]O    DENTAL SURGERY  05/21/2018    removal of 8 top teeth    ESOPHAGOGASTRODUODENOSCOPY (EGD) N/A 1/12/2018    Performed by Roque [facility]ajan III, MD at [facility] ENDO    HYSTERECTOMY      TONSILLECTOMY       Review of patient's allergies indicates:   Allergen Reactions    Piroxicam Diarrhea and Nausea Only    Ultram [tramadol] Rash    Aspirin      Nosebleeds  Nosebleeds  Nosebleeds    Levaquin [levofloxacin]     Levomenol analogues     Lipitor [atorvastatin]      Leg Cramps    Zofran [ondansetron hcl] Hives and Other (See Comments)     headaches    Celestone [betamethasone] Hives, Itching and Rash       Scheduled Medications:    diazePAM  10 mg Oral TID    magnesium sulfate IVPB  2 g Intravenous Once    senna-docusate 8.6-50 mg  1 tablet Oral BID    topiramate  200 mg Oral Daily       PRN Medications: Dextrose 10% Bolus, glucagon (human recombinant), glucose, glucose, insulin aspart U-100, magnesium oxide, magnesium oxide, oxyCODONE, potassium chloride 10%, potassium chloride 10%, potassium chloride 10%, potassium, sodium phosphates, potassium, sodium phosphates, potassium, sodium phosphates, prochlorperazine, sodium chloride 0.9%    Family History     Problem Relation (Age of Onset)    Alcohol abuse Mother, Father    Arthritis Father, Maternal Grandmother, Paternal Grandmother    Breast cancer Maternal Grandmother    COPD Mother, Sister    Cancer Father, Maternal Aunt, Maternal Uncle, Paternal Aunt, Paternal Uncle, Paternal Grandmother    Depression Mother    Diabetes Maternal Uncle    Drug abuse Mother, Maternal Uncle    Heart disease Father, Brother    Hypertension Father, Brother, Maternal Grandmother, Paternal Grandfather    Kidney failure Sister        Tobacco Use    Smoking status: Current Every Day Smoker     Years: 35.00     Types: Cigarettes, Vaping w/o nicotine    Smokeless  tobacco: Never Used   Substance and Sexual Activity    Alcohol use: Yes     Comment: occassionally    Drug use: Yes     Types: Marijuana    Sexual activity: Yes     Review of Systems   Constitutional: Positive for activity change. Negative for chills and fever.   HENT: Negative for drooling, hearing loss, trouble swallowing and voice change.    Eyes: Negative for pain and visual disturbance.   Respiratory: Negative for cough and shortness of breath.    Cardiovascular: Negative for chest pain and palpitations.   Gastrointestinal: Negative for abdominal distention, nausea and vomiting.   Genitourinary: Negative for difficulty urinating and flank pain.   Musculoskeletal: Positive for gait problem. Negative for back pain and neck pain.   Skin: Negative for rash and wound.   Neurological: Positive for weakness and numbness. Negative for headaches.   Psychiatric/Behavioral: Negative for agitation and hallucinations. The patient is nervous/anxious.      Objective:     Vital Signs (Most Recent):  Temp: 98.6 °F (37 °C) (09/18/19 1105)  Pulse: 100 (09/18/19 1217)  Resp: (!) 38 (09/18/19 1217)  BP: 126/68 (09/18/19 1217)  SpO2: 96 % (09/18/19 1217)    Vital Signs (24h Range):  Temp:  [98 °F (36.7 °C)-98.7 °F (37.1 °C)] 98.6 °F (37 °C)  Pulse:  [] 100  Resp:  [13-45] 38  SpO2:  [95 %-99 %] 96 %  BP: (109-155)/(57-97) 126/68     Body mass index is 30.27 kg/m².    Physical Exam   Constitutional: She appears well-developed and well-nourished. No distress.   HENT:   Head: Normocephalic and atraumatic.   Right Ear: External ear normal.   Left Ear: External ear normal.   Nose: Nose normal.   Eyes: Right eye exhibits no discharge. Left eye exhibits no discharge. No scleral icterus.   Neck: Normal range of motion.   Cardiovascular: Normal rate and intact distal pulses.   Pulmonary/Chest: Effort normal. No respiratory distress.   Abdominal: Soft. She exhibits no distension. There is no tenderness.   Musculoskeletal: Normal  range of motion. She exhibits no edema or tenderness.   Neurological: She is alert. A sensory deficit is present. She exhibits normal muscle tone.   Exam inconsistent/fluctuating.  Emotional throughout exam.   -  Mental Status:  AAOx3.  Follows commands.   -  Speech and language:  no aphasia or dysarthria.    -  Motor: RUE: 4/5.  LUE: 5/5.  RLE: 2/5.  LLE: 5/5.  Active tremor RUE during finger to nose exam.     Skin: Skin is warm and dry. No rash noted.   Psychiatric: Her mood appears anxious. Cognition and memory are impaired.   Vitals reviewed.    Diagnostic Results:   Labs: Reviewed  X-Ray: Reviewed  US: Reviewed  CT: Reviewed    Assessment/Plan:     Acute right-sided weakness  -  2/2 stroke  -  PT and OT evaluate and treat     Tobacco abuse  -  Stroke risk factor  -  Encourage cessation     Acute ischemic stroke  -  Presented with acute right-sided weakness, facial droop, slurred speech, and R vision changes  -  CTH without acute pathology s/p tPA  -  CTA multiphase without LVO  -  MRI brain pending  -  Vascular Neurology following  See hospital course for functional, cognitive/speech/language, and nutrition/swallow status.    Recommendations  -  Encourage mobility, OOB in chair, and early ambulation as appropriate   -  PT/OT evaluate and treat  -  SLP speech and cognitive evaluate and treat  -  Monitor mood and sleep disturbances  -  Establish consistent sleep-wake cycle  -  Monitor for bowel and bladder dysfunction  -  Monitor for shoulder pain and subluxation  -  Monitor for spasticity  -  DVT prophylaxis  -  Monitor for and prevent skin breakdown and pressure ulcers  · Early mobility, repositioning/weight shifting every 20-30 minutes when sitting, turn patient every 2 hours, proper mattress/overlay and chair cushioning, pressure relief/heel protector boots    Patient with therapy needs.  Emotional with inconsistent exam on evaluation.  MRI pending.  Will follow therapy progress for final post-acute  recommendation.    Thank you for your consult.     JONO Delcid  Department of Physical Medicine & Rehab  [facility]

## 2019-09-18 NOTE — H&P
"[facility]  Neurocritical Care  History & Physical    Admit Date: 9/17/2019  Service Date: 09/17/2019  Length of Stay: 0    Subjective:     Chief Complaint: <principal problem not specified>    History of Present Illness: 53 year old female with past medical hx of DM II, HTN, HLD, bipolar 1 disorder, and migraines presents as transfer s/p tPA for IR eval. She originally present to [place] with complaints of right sided weakness, right facial droop, and slurred speech. LKN @ 1505. Telestroke was completed and TPA administered. There was also concern for hyperdense basilar seen on CT head at [facility]. Patient transferred to [facility] for higher level of neurological care. Per chart review patient was seen yesterday in the ED for complaints of a migraine X 3 days which improved after migraine "cocktail." She was found to have a glucose level of 38. Patients blood glucose normalized and she was discharged home. This afternoon she was talking to her  when  her  also noticed her speech being slurred and a facial droop. Of note during teleconsult there was concern that symptoms might be functional. However, she did have ipsilateral weakness so TPA was administered despite concern for mimic.  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 NIH 7 on my assessment.        Past Medical History:   Diagnosis Date    Acute ischemic stroke 9/17/2019    Anxiety     Arthritis     Asthma     Bipolar 1 disorder     Depression     Diabetes mellitus, type 2     Diverticular disease     GERD (gastroesophageal reflux disease)     Hyperlipemia     Hypertension     Hypothyroidism     Pneumonia     Renal manifestation of secondary diabetes mellitus     Right-sided back pain 6/29/2019    Trouble in sleeping      Past Surgical History:   Procedure Laterality Date    CHOLECYSTECTOMY      COLON SURGERY      COLONOSCOPY N/A 1/12/2018    Performed by [facility], [facility] at [facility] ENDO    DENTAL SURGERY  05/21/2018    removal of 8 top teeth    ESOPHAGOGASTRODUODENOSCOPY (EGD) N/A 1/12/2018    Performed by [facility] III, [facility] at [facility] ENDO    HYSTERECTOMY      TONSILLECTOMY        Current Facility-Administered Medications on File Prior to Encounter   Medication Dose Route Frequency Provider Last Rate Last Dose    [COMPLETED] 0.9%  NaCl infusion  50 mL Intravenous Once Shruthi Love MD 63 mL/hr at 09/17/19 1724 50 mL at 09/17/19 1724    [COMPLETED] alteplase (ACTIVASE) 100 mg injection 63 mg  0.81 mg/kg Intravenous Once Shruthi Love MD   Stopped at 09/17/19 1717    [COMPLETED] alteplase (ACTIVASE) 100 mg injection 7 mg  0.09 mg/kg Intravenous Once Shruthi Love MD   Stopped at 09/17/19 1717    [COMPLETED] promethazine (PHENERGAN) 12.5 mg in dextrose 5 % 50 mL IVPB  12.5 mg Intravenous ED 1 Time Milly Wright MD   Stopped at 09/17/19 0003    [DISCONTINUED] ondansetron injection 8 mg  8 mg Intravenous ED 1 Time Milly Wright MD         Current Outpatient Medications on File Prior to Encounter   Medication Sig Dispense Refill    baclofen (LIORESAL) 10 MG tablet Take 1 tablet (10 mg total) by mouth nightly as needed. 30 tablet 3    diazePAM (VALIUM) 10 MG Tab Take 1 tablet (10 mg total) by mouth 3 (three) times daily. 90 tablet 0    doxepin (SINEQUAN) 10 MG capsule Take 1 capsule (10 mg total) by mouth nightly as needed. 30 capsule 2    insulin NPH (NOVOLIN N NPH U-100 INSULIN) 100 unit/mL injection Inject 10 Units into the skin 2 (two) times daily before meals. 1 vial 3    insulin regular (NOVOLIN R REGULAR U-100 INSULN) 100 unit/mL Inj injection Inject three times daily with meals using the scale: BG 80 - 150:   2 units  //  - 199: 3 units  //  - 249: 4 units  //  - 299: 5 units  //  - 349: 6 units  // BG Over 350:  7 units. 10 mL 3    isosorbide mononitrate (IMDUR) 30 MG 24 hr tablet Take 1 tablet (30 mg total) by mouth once daily. 30  tablet 11    losartan (COZAAR) 25 MG tablet Take 25 mg by mouth once daily.  3    metFORMIN (GLUCOPHAGE) 500 MG tablet Take 1 tablet (500 mg total) by mouth 2 (two) times daily with meals. 180 tablet 1    promethazine (PHENERGAN) 25 MG tablet Take 1 tablet (25 mg total) by mouth every 6 (six) hours as needed for Nausea. 12 tablet 0    QUEtiapine (SEROQUEL) 300 MG Tab Take 1 tablet (300 mg total) by mouth every evening. 30 tablet 2    rizatriptan (MAXALT) 10 MG tablet One tablet with onset of migraine and may repeat one dose in 2 hours if needed      sulfamethoxazole-trimethoprim 800-160mg (BACTRIM DS) 800-160 mg Tab Take 1 tablet by mouth 2 (two) times daily. 14 tablet 0    topiramate (TOPAMAX) 200 MG Tab 1 tablet daily 30 tablet 2    TRUE METRIX GLUCOSE TEST STRIP Strp USE 1 STRIP TO CHECK GLUCOSE THREE TIMES DAILY  11    [DISCONTINUED] venlafaxine (EFFEXOR-XR) 75 MG 24 hr capsule Take 2 capsules (150 mg total) by mouth once daily. 60 capsule 2      Allergies: Piroxicam; Ultram [tramadol]; Aspirin; Levaquin [levofloxacin]; Levomenol analogues; Lipitor [atorvastatin]; Zofran [ondansetron hcl]; and Celestone [betamethasone]    Family History   Problem Relation Age of Onset    Alcohol abuse Mother     COPD Mother     Depression Mother     Drug abuse Mother     Alcohol abuse Father     Arthritis Father     Cancer Father     Heart disease Father     Hypertension Father     COPD Sister     Kidney failure Sister     Heart disease Brother     Hypertension Brother     Cancer Maternal Aunt     Cancer Maternal Uncle     Diabetes Maternal Uncle     Drug abuse Maternal Uncle     Cancer Paternal Aunt     Cancer Paternal Uncle     Arthritis Maternal Grandmother     Hypertension Maternal Grandmother     Breast cancer Maternal Grandmother     Arthritis Paternal Grandmother     Cancer Paternal Grandmother     Hypertension Paternal Grandfather      Social History     Tobacco Use    Smoking status:  Current Every Day Smoker     Years: 35.00     Types: Cigarettes, Vaping w/o nicotine    Smokeless tobacco: Never Used   Substance Use Topics    Alcohol use: Yes     Comment: occassionally    Drug use: Yes     Types: Marijuana     Review of Systems   Constitutional: Negative for fatigue and fever.   HENT: Negative for tinnitus.    Eyes: Negative for visual disturbance.   Respiratory: Negative for apnea and shortness of breath.    Cardiovascular: Negative for chest pain and palpitations.   Gastrointestinal: Negative for abdominal distention, rectal pain and vomiting.   Musculoskeletal: Negative for neck stiffness.   Skin: Negative for color change.   Neurological: Positive for headaches.   Psychiatric/Behavioral: Negative for confusion.     Objective:     Vitals:    Temp: 98.4 °F (36.9 °C)  Pulse: 79  Rhythm: normal sinus rhythm  BP: 121/85  MAP (mmHg): 99  Resp: 18  SpO2: 97 %  O2 Device (Oxygen Therapy): room air    Temp  Min: 98.4 °F (36.9 °C)  Max: 98.7 °F (37.1 °C)  Pulse  Min: 65  Max: 94  BP  Min: 98/53  Max: 155/75  MAP (mmHg)  Min: 71  Max: 99  Resp  Min: 16  Max: 22  SpO2  Min: 96 %  Max: 99 %    No intake/output data recorded.           Physical Exam   Constitutional: She appears well-developed.   Cardiovascular: Normal rate, regular rhythm, normal heart sounds and intact distal pulses.   Pulmonary/Chest: Effort normal and breath sounds normal.   Abdominal: Soft. Bowel sounds are normal.   Neurological:   E4 V5 M6  AAO x 4, dysarthria  PERRLA, EOMI  RUE/RLE weakness 3/5  LUE/LLE 5/5  Ataxia  Paresthesia RUE/RLE, right side of face   Skin: Skin is warm. Capillary refill takes less than 2 seconds.   Vitals reviewed.        Today I personally reviewed pertinent medications, lines/drains/airways, imaging, cardiology results, laboratory results, microbiology results,       Assessment/Plan:     Neuro  Acute ischemic stroke  S/p tPA for NIH 8 on tele-consult, concern for stroke mimic  CTA with no LVO, basilar  tip aneurysm   Q1hr Neuro Checks, SBP <160  MRI tomorrow, 1300  PT/OT/SLP    Psychiatric  Bipolar disorder  topomax and valium resumed. 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    Concern for rupture of aneurysm and hemophage with recent tPA admin     Essential hypertension  SBP <180  EKG/Echo pending  monitor BP given basilar aneurysm     Hyperlipidemia  Allergic to lipitor, need to eval other statins     Hematology  Hypercoagulable state  US Lower to eval for DVT given hypercoag. state    Endocrine  Type 2 diabetes mellitus  ISS, oral antiglycemics held in setting of CT contrast    Orthopedic  Acute right-sided weakness  Pt/OT    Other  Debility  PT/OT          The patient is being Prophylaxed for:  Venous Thromboembolism with: Mechanical  Stress Ulcer with: None  Ventilator Pneumonia with: not applicable    Activity Orders          Diet NPO: NPO starting at 09/17 2044        Full Code   Critical Care: 36 min    Adair Taylor NP  Neurocritical Care  [facility]

## 2019-09-18 NOTE — NURSING
Patient arrived to [facility] from [facility]>> ED >> [facility]    Type of stroke/diagnosis: ischemic L MCA, aneurysm    TPA start and end time: start 1617, end 1717    Thrombectomy start and end time: N/A    Current symptoms: R facial droop, numbness and tingling on RUE and RLE, drift on  RUE and RLE, AAOx4, pupils brisk and reactive    Skin assessment done: Y  Wounds noted: scattered bruising     NCC notified: SAMSON Taylor

## 2019-09-18 NOTE — PLAN OF CARE
Problem: Occupational Therapy Goal  Goal: Occupational Therapy Goal  Goals set on 9/18, with expiration date 9/25:  Patient will increase functional independence with ADLs by performing:    Grooming while standing at sink with Minimal Assistance.  UB Dressing with Minimal Assistance.  LB Dressing with Minimal Assistance.  Toileting from toilet with Contact Guard Assistance for hygiene and clothing management.   Rolling to Bilateral with Stand-by Assistance.   Supine <> Sit with Stand-by Assistance.  Step transfer with Contact Guard Assistance  Toilet transfer to toilet with Minimal Assistance.    Eval complete. Pt tolerated session well. Initiate OT POC.  LELE Orozco  09/18/2019

## 2019-09-18 NOTE — SUBJECTIVE & OBJECTIVE
Medications Prior to Admission   Medication Sig Dispense Refill Last Dose    baclofen (LIORESAL) 10 MG tablet Take 1 tablet (10 mg total) by mouth nightly as needed. 30 tablet 3     diazePAM (VALIUM) 10 MG Tab Take 1 tablet (10 mg total) by mouth 3 (three) times daily. 90 tablet 0     doxepin (SINEQUAN) 10 MG capsule Take 1 capsule (10 mg total) by mouth nightly as needed. 30 capsule 2     losartan (COZAAR) 25 MG tablet Take 25 mg by mouth once daily.  3     metFORMIN (GLUCOPHAGE) 500 MG tablet Take 1 tablet (500 mg total) by mouth 2 (two) times daily with meals. 180 tablet 1     QUEtiapine (SEROQUEL) 300 MG Tab Take 1 tablet (300 mg total) by mouth every evening. 30 tablet 2     topiramate (TOPAMAX) 200 MG Tab 1 tablet daily 30 tablet 2 Taking    [DISCONTINUED] venlafaxine (EFFEXOR-XR) 75 MG 24 hr capsule Take 2 capsules (150 mg total) by mouth once daily. 60 capsule 2 Taking    insulin NPH (NOVOLIN N NPH U-100 INSULIN) 100 unit/mL injection Inject 10 Units into the skin 2 (two) times daily before meals. 1 vial 3 Unknown at Unknown time    insulin regular (NOVOLIN R REGULAR U-100 INSULN) 100 unit/mL Inj injection Inject three times daily with meals using the scale: BG 80 - 150:   2 units  //  - 199: 3 units  //  - 249: 4 units  //  - 299: 5 units  //  - 349: 6 units  // BG Over 350:  7 units. 10 mL 3 Unknown at Unknown time    rizatriptan (MAXALT) 10 MG tablet One tablet with onset of migraine and may repeat one dose in 2 hours if needed   Unknown at Unknown time    TRUE METRIX GLUCOSE TEST STRIP Strp USE 1 STRIP TO CHECK GLUCOSE THREE TIMES DAILY  11 Taking       Review of patient's allergies indicates:   Allergen Reactions    Piroxicam Diarrhea and Nausea Only    Ultram [tramadol] Rash    Aspirin      Nosebleeds  Nosebleeds  Nosebleeds    Levaquin [levofloxacin]     Levomenol analogues     Lipitor [atorvastatin]      Leg Cramps    Zofran [ondansetron hcl] Hives and Other  (See Comments)     headaches    Celestone [betamethasone] Hives, Itching and Rash       Past Medical History:   Diagnosis Date    Acute ischemic stroke 9/17/2019    Anxiety     Arthritis     Asthma     Bipolar 1 disorder     Depression     Diabetes mellitus, type 2     Diverticular disease     GERD (gastroesophageal reflux disease)     Hyperlipemia     Hypertension     Hypothyroidism     Pneumonia     Renal manifestation of secondary diabetes mellitus     Right-sided back pain 6/29/2019    Trouble in sleeping      Past Surgical History:   Procedure Laterality Date    CHOLECYSTECTOMY      COLON SURGERY      COLONOSCOPY N/A 1/12/2018    [facility] ENDO    DENTAL SURGERY  05/21/2018    removal of 8 top teeth    ESOPHAGOGASTRODUODENOSCOPY (EGD) N/A 1/12/2018    Performed by [facility] III, [facility] at [facility] ENDO    HYSTERECTOMY      TONSILLECTOMY       Family History     Problem Relation (Age of Onset)    Alcohol abuse Mother, Father    Arthritis Father, Maternal Grandmother, Paternal Grandmother    Breast cancer Maternal Grandmother    COPD Mother, Sister    Cancer Father, Maternal Aunt, Maternal Uncle, Paternal Aunt, Paternal Uncle, Paternal Grandmother    Depression Mother    Diabetes Maternal Uncle    Drug abuse Mother, Maternal Uncle    Heart disease Father, Brother    Hypertension Father, Brother, Maternal Grandmother, Paternal Grandfather    Kidney failure Sister        Tobacco Use    Smoking status: Current Every Day Smoker     Years: 35.00     Types: Cigarettes, Vaping w/o nicotine    Smokeless tobacco: Never Used   Substance and Sexual Activity    Alcohol use: Yes     Comment: occassionally    Drug use: Yes     Types: Marijuana    Sexual activity: Yes     Review of Systems  Objective:     Weight: 80 kg (176 lb 5.9 oz)  Body mass index is 30.27 kg/m².  Vital Signs (Most Recent):  Temp: 98.7 °F (37.1 °C) (09/18/19 1505)  Pulse: 82 (09/18/19  1517)  Resp: (!) 31 (09/18/19 1517)  BP: (!) 111/59 (09/18/19 1517)  SpO2: 100 % (09/18/19 1517) Vital Signs (24h Range):  Temp:  [98 °F (36.7 °C)-98.7 °F (37.1 °C)] 98.7 °F (37.1 °C)  Pulse:  [] 82  Resp:  [13-49] 31  SpO2:  [95 %-100 %] 100 %  BP: (109-158)/(57-97) 111/59     Date 09/18/19 0700 - 09/19/19 0659   Shift 0868-4792 8832-8680 1870-1472 24 Hour Total   INTAKE   I.V.(mL/kg) 65(0.8)   65(0.8)   Shift Total(mL/kg) 65(0.8)   65(0.8)   OUTPUT   Urine(mL/kg/hr) 300(0.5)   300   Shift Total(mL/kg) 300(3.8)   300(3.8)   Weight (kg) 80 80 80 80                        Neurosurgery Physical Exam  GCS [id number] AOx3  PERRL, EOMI, Face Symmetric, Tongue Midline  RUE: 4/5 LUE 5/5  RLE: 4/5 LLE: 5/5  SILT  No pronator drift    Significant Labs:  Recent Labs   Lab 09/16/19 1830 09/17/19 1913 09/18/19  0331   GLU 38* 117* 107    137 141   K 3.5 4.0 4.2    105 108   CO2 25 23 22*   BUN 17 14 10   CREATININE 0.9 0.7 0.7   CALCIUM 9.5 9.3 9.3   MG  --   --  1.7     Recent Labs   Lab 09/16/19 1830 09/17/19 1913 09/18/19  0331   WBC 12.92* 11.37 8.90   HGB 14.1 13.6 13.9   HCT 44.3 44.5 43.7    310 300     Recent Labs   Lab 09/17/19 1913   INR 1.0     Microbiology Results (last 7 days)     ** No results found for the last 168 hours. **        All pertinent labs from the last 24 hours have been reviewed.    Significant Diagnostics:  I have reviewed all pertinent imaging results/findings within the past 24 hours.

## 2019-09-18 NOTE — ASSESSMENT & PLAN NOTE
Stroke risk factor   On NPH at home  Hypoglycemic episode 9/16 - glucose 38  Management per primary

## 2019-09-18 NOTE — PT/OT/SLP EVAL
"Speech Language Pathology Evaluation  Cognitive/Bedside Swallow    Patient Name:  Alexandra Goins   MRN:  [id number]  Admitting Diagnosis: <principal problem not specified>    Recommendations:                  General Recommendations:  Cognitive-linguistic therapy  Diet recommendations:  Regular, Thin   Aspiration Precautions: Standard aspiration precautions   General Precautions: Standard, aspiration  Communication strategies:  none    History:     Past Medical History:   Diagnosis Date    Acute ischemic stroke 9/17/2019    Anxiety     Arthritis     Asthma     Bipolar 1 disorder     Depression     Diabetes mellitus, type 2     Diverticular disease     GERD (gastroesophageal reflux disease)     Hyperlipemia     Hypertension     Hypothyroidism     Pneumonia     Renal manifestation of secondary diabetes mellitus     Right-sided back pain 6/29/2019    Trouble in sleeping        Past Surgical History:   Procedure Laterality Date    CHOLECYSTECTOMY      COLON SURGERY      COLONOSCOPY N/A 1/12/2018    Performed by [facility]que [facility]ajan III, MD at [facility]    DENTAL SURGERY  05/21/2018    removal of 8 top teeth    ESOPHAGOGASTRODUODENOSCOPY (EGD) N/A 1/12/2018    Performed by Roque Mahajan III, MD at [facility]    HYSTERECTOMY      TONSILLECTOMY         Social History: Patient lives with spouse.      Prior diet: regular with thin        Subjective     "I am not at 100%"  Patient goals: home    Pain/Comfort:  · Pain Rating 1: 0/10  · Pain Rating Post-Intervention 1: 0/10    Objective:     Cognitive Status:    Pt. was oriented x3 with good recall of recent and remote temporal and general information.  Immediate/delayed verbal recall was impaired  with pt. Repeating 5 digits and 4 words without difficulty.    Responses to hypothetical verbal problem solving tasks were accurate and complete.  Pt. Compared and contrasted objects and generated multiple solutions to problems.  Thought organization and " categorization skills were wfl with pt. Naming 17 animals given one minute when 15-20 are wnl.  Pt. Responded to functional math and time calculations with 500% accuracy   Receptive Language:   Comprehension:   Pt. Responded to complex yes/no questions and multi step commands with 100% accuracy and no delays in responding.        Pragmatics:    wfl    Expressive Language:  Verbal:    Verbal language skills were wfl with no evidence of aphasia.  Pt. Expressed their thoughts coherently in conversation with no evidence of confusion or word finding deficits        Motor Speech:  Speech was 100% intelligible in conversation    Voice:   wfl    Visual-Spatial:  tba    Reading:   tba     Written Expression:   tba    Oral Musculature Evaluation  · Oral Musculature: WFL  · Dentition: partials  · Secretion Management: adequate  · Mucosal Quality: adequate  · Mandibular Strength and Mobility: WFL  · Oral Labial Strength and Mobility: WFL  · Lingual Strength and Mobility: WFL  · Velar Elevation: WFL  · Buccal Strength and Mobility: WFL  · Volitional Cough: strong  · Volitional Swallow: no delay  · Voice Prior to PO Intake: wfl    Bedside Swallow Eval:   Consistencies Assessed:  · Thin liquids 4 ounces water  · Puree 1 teaspoon  · Solids 1/4 cracker     Oral Phase:   · WFL    Pharyngeal Phase:   · no overt clinical signs/symptoms of aspiration  · no overt clinical signs/symptoms of pharyngeal dysphagia    Compensatory Strategies  · None    Treatment:     Assessment:     Alexandra Goins is a 53 y.o. female with no s/s of aspiration.  Goals:   Multidisciplinary Problems     SLP Goals        Problem: SLP Goal    Goal Priority Disciplines Outcome   SLP Goal     SLP Ongoing (interventions implemented as appropriate)   Description:  Goals due 9/25  1.  Assess functional reading and writing skills  2.  Respond to mental manipulation tasks with 90% accuracy  3.  Recall memory strategies with no cues                    Plan:      · Patient to be seen:  3 x/week   · Plan of Care expires:  10/16/19  · Plan of Care reviewed with:  patient, daughter   · SLP Follow-Up:  Yes       Discharge recommendations:  Discharge Facility/Level of Care Needs: outpatient speech therapy     Time Tracking:     SLP Treatment Date:   09/18/19  Speech Start Time:  0840  Speech Stop Time:  0900     Speech Total Time (min):  20 min    Billable Minutes: Eval 12  and Eval Swallow and Oral Function 8    Kerri Shine MA, CCC-SLP  09/18/2019

## 2019-09-18 NOTE — ASSESSMENT & PLAN NOTE
-  Presented with acute right-sided weakness, facial droop, slurred speech, and R vision changes  -  CTH without acute pathology s/p tPA  -  CTA multiphase without LVO  -  MRI brain pending  -  Vascular Neurology following  See hospital course for functional, cognitive/speech/language, and nutrition/swallow status.    Recommendations  -  Encourage mobility, OOB in chair, and early ambulation as appropriate   -  PT/OT evaluate and treat  -  SLP speech and cognitive evaluate and treat  -  Monitor mood and sleep disturbances  -  Establish consistent sleep-wake cycle  -  Monitor for bowel and bladder dysfunction  -  Monitor for shoulder pain and subluxation  -  Monitor for spasticity  -  DVT prophylaxis  -  Monitor for and prevent skin breakdown and pressure ulcers  · Early mobility, repositioning/weight shifting every 20-30 minutes when sitting, turn patient every 2 hours, proper mattress/overlay and chair cushioning, pressure relief/heel protector boots

## 2019-09-18 NOTE — HPI
lAexandra Goins is a 53-year-old female with PMHx of HTN, HLD, DMII, bipolar I disorder, hypothyroidism, arthritis, and tobacco use.  Patient presented to [facility] with acute right-sided weakness, facial droop, slurred speech, and R vision changes.  On arrival, tele-stroke consult completed.  CTH without acute pathology, and IV tPA administered.  Transferred to [facility] on 9/17/19 for further evaluation and management.  Upon admission, CTA multiphase without large vessel occlusion; therefore, intervention not indicated.  MRI brain pending.      Functional History: Patient lives in [place] with her .  Prior to admission, she was (I) with ADLs and mobility.  She is right handed.  DME: cane.

## 2019-09-18 NOTE — PT/OT/SLP EVAL
"Occupational Therapy   Evaluation    Name: Alexandra Goins  MRN: [id number]  Admitting Diagnosis:  <principal problem not specified>      Recommendations:     Discharge Recommendations: home health OT  Discharge Equipment Recommendations:  other (see comments)(TBD)  Barriers to discharge:  Decreased caregiver support    Assessment:     Alexandra Goins is a 53 y.o. female with a medical diagnosis of <principal problem not specified>.  She presents with performance deficits affecting function: weakness, impaired endurance, impaired self care skills, impaired balance, impaired functional mobilty, gait instability, impaired cognition, decreased coordination, decreased lower extremity function, decreased safety awareness, impaired cardiopulmonary response to activity.      Rehab Prognosis: Good; patient would benefit from acute skilled OT services to address these deficits and reach maximum level of function.       Plan:     Patient to be seen 3 x/week to address the above listed problems via self-care/home management, therapeutic activities, therapeutic exercises, neuromuscular re-education, cognitive retraining, sensory integration  · Plan of Care Expires: 10/17/19  · Plan of Care Reviewed with: patient, family    Subjective     Chief Complaint: "I just want to know what happened, one doctor walked in and said I had a stroke, the next one says I made it all up"   Patient/Family Comments/goals: safe return home, improved health status    Occupational Profile:  Living Environment: Pt lives with  in [place] in a mobile home w/ 8 TOM and 0 steps within the home, and has a WIS and tub shower combo  Previous level of function: Independent  Roles and Routines: mother, wife, grandmother  Equipment Used at Home:  none  Assistance upon Discharge: supportive family, assistance limited one daughter has seizures and cannot assist with care, other daughter works all days but [employer].    Pain/Comfort:  · Pain Rating 1: " 0/10  · Pain Rating Post-Intervention 1: 0/10    Patients cultural, spiritual, Yazdanism conflicts given the current situation: no    Objective:     Communicated with: RN prior to session.  Patient found supine crying with peripheral IV, bed alarm, SCD, telemetry upon OT entry to room. Family at bedside    General Precautions: Standard, aspiration, fall   Orthopedic Precautions:N/A   Braces: N/A     Occupational Performance:    Bed Mobility:    · Patient completed Rolling/Turning to Left with  contact guard assistance  · Patient completed Scooting/Bridging with contact guard assistance  · Patient completed Supine to Sit with contact guard assistance  · Patient completed Sit to Supine with contact guard assistance    Functional Mobility/Transfers:  · Patient completed Sit <> Stand Transfer with minimum assistance  with  no assistive device and hand-held assist   · Patient completed Bed <> Chair Transfer using Step Transfer technique with minimum assistance with hand-held assist  · Functional Mobility: Stand step transfer into wheelchair with Min A, no LOB just general unsteadiness     Activities of Daily Living:  · Lower Body Dressing: stand by assistance seated EOB, donning doffing socks    Cognitive/Visual Perceptual:  Cognitive/Psychosocial Skills:     -       Oriented to: Person, Place, Time and Situation   -       Follows Commands/attention:Follows one-step commands and teary and upset at difficult conversation with previous doctor  -       Communication: clear/fluent  -       Memory: No Deficits noted  -       Safety awareness/insight to disability: impaired   -       Mood/Affect/Coping skills/emotional control: Appropriate to situation, Tearful and Cooperative  Visual/Perceptual:      -Intact      Physical Exam:  Postural examination/scapula alignment:    -       Rounded shoulders  -       Forward head  Skin integrity: Visible skin intact  Edema:  None noted  Sensation:    -       Intact  Motor Planning:    -      No deficits noted in FM or GM  Dominant hand:    -       R handed  Upper Extremity Range of Motion:     -       Right Upper Extremity: WFL  -       Left Upper Extremity: WFL  Upper Extremity Strength:    -       Right Upper Extremity: WFL, difficulty fully assessing 2* multiple bruises from previous IV attemtps  -       Left Upper Extremity: WFL   Strength:    -       Right Upper Extremity: WFL  -       Left Upper Extremity: WFL    AMPAC 6 Click ADL:  AMPAC Total Score: 18    Treatment & Education:  -Pt education on OT role and POC,  OT Eval complete  -Importance of OOB activity with staff assistance  -Safety during functional transfer and mobility  -White board updated  -Multiple self-care tasks and functional mobility completed -- assistance level noted above  -All questions and concerns answered within OT scope of practice.     Education:    Patient left up in chair with all lines intact, call button in reach, RN notified and family and RN present    GOALS:   Multidisciplinary Problems     Occupational Therapy Goals        Problem: Occupational Therapy Goal    Goal Priority Disciplines Outcome Interventions   Occupational Therapy Goal     OT, PT/OT     Description:  Goals set on 9/18, with expiration date 9/25:  Patient will increase functional independence with ADLs by performing:    Grooming while standing at sink with Minimal Assistance.  UB Dressing with Minimal Assistance.  LB Dressing with Minimal Assistance.  Toileting from toilet with Contact Guard Assistance for hygiene and clothing management.   Rolling to Bilateral with Stand-by Assistance.   Supine <> Sit with Stand-by Assistance.  Step transfer with Contact Guard Assistance  Toilet transfer to toilet with Minimal Assistance.                      History:     Past Medical History:   Diagnosis Date    Acute ischemic stroke 9/17/2019    Anxiety     Arthritis     Asthma     Bipolar 1 disorder     Depression     Diabetes mellitus, type 2      Diverticular disease     GERD (gastroesophageal reflux disease)     Hyperlipemia     Hypertension     Hypothyroidism     Pneumonia     Renal manifestation of secondary diabetes mellitus     Right-sided back pain 6/29/2019    Trouble in sleeping        Past Surgical History:   Procedure Laterality Date    CHOLECYSTECTOMY      COLON SURGERY      COLONOSCOPY N/A 1/12/2018    [facility], [facility] at [facility]    DENTAL SURGERY  05/21/2018    removal of 8 top teeth    ESOPHAGOGASTRODUODENOSCOPY (EGD) N/A 1/12/2018    Performed by Roque [facility]ajan III, [facility] at [facility]    HYSTERECTOMY      TONSILLECTOMY         Time Tracking:     OT Date of Treatment: 09/18/19  OT Start Time: 1342  OT Stop Time: 1401  OT Total Time (min): 19 min    Billable Minutes:Evaluation 19     Ayesha Colmenares, OT  9/18/2019

## 2019-09-18 NOTE — PT/OT/SLP EVAL
Physical Therapy Evaluation    Patient Name:  Alexandra Goins  MRN: [id number]  Recent Surgery: * No surgery found *      Recommendations:     Discharge Recommendations: Inpatient Rehabilitation Facility  (pending progress)  Equipment recommendations: TBD  Barriers to discharge: Inaccessible home, Increased level of skilled assistance required and Fall risk     Assessment:     Alexandra Goins is a 53 y.o. female [facility] with medical diagnosis of Acute ischemic stroke. Pt presents with weakness, impaired balance, altered gait mechanics and impaired functional mobility . Pt states that prior to admin that she was independent with all activities and did not require an AD. Pt demonstrated good functional mobility, however, pt was unable to achieve full AROM on R LE on command. Pt required min A (handheld) during gait activities and increased response time for standing marching, side stepping, and forward/backward stepping. Alexandra Goins would benefit from acute PT intervention to address listed functional deficits, provide patient/caregiver education, reduce fall risk, and maximize (I) and safety with functional mobility. Once medically stable, recommending pt discharge to rehab facility.     Rehab Prognosis: Good; based on positive indicators including strong caregiver support, potential for functional gains within an intensive rehab program , pt motivation to participate and able to tolerate therapy intervention and actively participate    Plan:     During this hospitalization, patient to be seen 4 x/week to address the identified rehab impairments via gait training, therapeutic activities, therapeutic exercises, neuromuscular re-education and progress towards stated goals.     Plan of Care Expires:  10/18/19  Plan of Care reviewed with: patient and family    This plan of care has been discussed with the patient/caregiver, who was included in its development and is in agreement with the  identified goals and treatment plan.     Subjective     Communicated with RN prior to session.  Patient agreeable to participate. Pt found lying supine HOB elevated.    Pain/Comfort:  · Location: Headache (pt did not report pain on numeric scale)    Patients cultural, spiritual, Religion conflicts given the current situation: No known conflicts     Patient History: information obtained from patient and family     Living Environment: Pt lives with  in single story mobile home with 8 TOM.   Prior Level of Function: independent with mobility and ADLs  DME owned: single point cane and shower chair  Caregiver Assistance: Pt states that she has limited assistance from spouse at discharge due to work. Pt states that she will have assistance from family (daughter) and grand-daughters at discharge.    Objective:     Patient found with: peripheral IV , bed alarm, telemetry, blood pressure cuff, SCD and continuous pulse oximeter    General Precautions: Fall and Standard  Orthopedic Precautions: N/A  Braces: N/A  Oxygen Device: room air    Exams:     Cognition:  o Pt is alert and oriented to person, place, time, and situation     Sensation:   o Pt reports diminished sensation on R LE below knee. Pt's light touch and deep pressure sensations intact B LE      Lower Extremity Range of Motion:  o Right Lower Extremity: Deficits: PROM knee, ankle, and hip = WNL; ~10 degree AROM knee ext and ankle DF observed.  o Left Lower Extremity: WFL     Lower Extremity Strength:  o Right Lower Extremity: Deficits: 3/5 hip flex, knee flex/ext, and ankle PF/DF; based on functional assessment pt able to perform sit <> sit transfer  o Left Lower Extremity: WFL    Functional Mobility:    Bed Mobility:  · Supine > Sit: Stand-by Assistance  · Sit > Supine: Stand-by Assistance    Sitting Balance:   · Assistance level: Stand-by Assistance  · Duration: 10min  · Postural deviation(s) noted: Pt able to maintain midline posture    Transfers:    · Sit <> Stand Transfer: Minimal Assistance x 1 trials from EOB with handheld assist    Standing Balance:   · Assistance level: Minimal Assistance with handheld assist                 Gait:  · Distance: Pt demonstrated B LE marching and side stepping to R/L sides requiring min A (handheld) without an AD and increased response time to complete tasks. Pt amb ~4ft forward and back requiring min A (handheld) without an AD.      Outcome Measure: AM-PAC 6 CLICK MOBILITY  Total Score:19     Patient/Caregiver Education:     Therapist educated pt/caregiver regarding:    PT POC and goals for therapy    Pt educated on proper technique during transfers   Pt educated to perform B LE AROM within tolerable limits to prevent negative effects of immobility    Safety with mobility and fall risk    Instruction on use of call button and importance of calling nursing staff for assistance with mobility     Patient/caregiver able to verbalize understanding; will follow-up with pt/caregiver during current admit for additional questions/concerns within scope of practice.     White board updated.     Additional Therapeutic Activity/Exercises:     Patient left HOB elevated with all lines intact, call button in reach, bed alarm on and RN notified.    GOALS:   Multidisciplinary Problems     Physical Therapy Goals        Problem: Physical Therapy Goal    Goal Priority Disciplines Outcome Goal Variances Interventions   Physical Therapy Goal     PT, PT/OT Ongoing (interventions implemented as appropriate)     Description:  Goals to be met by: 19     Patient will increase functional independence with mobility by performin. Sit to stand transfer with Stand-by assistance  2. Gait  x 100 feet with Contact Guard Assistance using LRAD or without an AD.  3. Ascend/descend 8 stairs with Contact Guard Assistance using LRAD or without an AD.  4. Stand for 5 minutes performing static balance activities with Contact Guard Assistance using  LRAD or without an AD.  5. Lower extremity exercise program x15 reps per handout, with assistance as needed                        History:     Past Medical History:   Diagnosis Date    Acute ischemic stroke 9/17/2019    Anxiety     Arthritis     Asthma     Bipolar 1 disorder     Depression     Diabetes mellitus, type 2     Diverticular disease     GERD (gastroesophageal reflux disease)     Hyperlipemia     Hypertension     Hypothyroidism     Pneumonia     Renal manifestation of secondary diabetes mellitus     Right-sided back pain 6/29/2019    Trouble in sleeping        Past Surgical History:   Procedure Laterality Date    CHOLECYSTECTOMY      COLON SURGERY      COLONOSCOPY N/A 1/12/2018    [facility]    DENTAL SURGERY  05/21/2018    removal of 8 top teeth    ESOPHAGOGASTRODUODENOSCOPY (EGD) N/A 1/12/2018    Performed by [facility], [facility] at [facility]    HYSTERECTOMY      TONSILLECTOMY         Time Tracking:     PT Received On: 09/18/19  PT Start Time: 1115     PT Stop Time: 1142  PT Total Time (min): 27 min     Billable Minutes: Evaluation 27    Sherley Pfeiffer [facility] [email or web address]]  09/18/2019

## 2019-09-18 NOTE — ASSESSMENT & PLAN NOTE
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    Neurologically stable  No immediate neurosurgical intervention required  All imaging reviewed, no evidence of subarachnoid hemorrhage to indicate rupture  Recommend neurology workup of possible TIAs to explain ongoing intermittent symptoms reported by the patient.  We will arrange for outpatient follow up with Dr. Levin on a Monday in clinic within the next 2-3 weeks or so  Neurosurgery will continue to follow for now    Discussed with Dr. Wayne and Dr. Levin

## 2019-09-18 NOTE — PLAN OF CARE
Problem: Adult Inpatient Plan of Care  Goal: Plan of Care Review  Outcome: Ongoing (interventions implemented as appropriate)  POC reviewed with pt and family. Pt did not verbalize understanding. Post-tpa monitoring followed. Magnesium oxide 2 g IV and prochlorazine prn given for migraine. Diabetic diet initiated. MRI of head done. Questions and concerns addressed. No acute events today. Pt progressing toward goals. Will continue to monitor. See flowsheets for full assessment and VS info.

## 2019-09-18 NOTE — CONSULTS
Inpatient consult to Physical Medicine Rehab  Consult performed by: JONO Delgado  Consult ordered by: Adair Taylor NP  Reason for consult: assess rehab needs      Consult received.  Reviewed patient history and current admission.  Rehab team following.  Full consult to follow.    JEFFREY Rizvi, DIMAP-C  Physical Medicine & Rehabilitation   09/18/2019  Spectralink: [id number]

## 2019-09-18 NOTE — SUBJECTIVE & OBJECTIVE
Past Medical History:   Diagnosis Date    Acute ischemic stroke 9/17/2019    Anxiety     Arthritis     Asthma     Bipolar 1 disorder     Depression     Diabetes mellitus, type 2     Diverticular disease     GERD (gastroesophageal reflux disease)     Hyperlipemia     Hypertension     Hypothyroidism     Pneumonia     Renal manifestation of secondary diabetes mellitus     Right-sided back pain 6/29/2019    Trouble in sleeping      Past Surgical History:   Procedure Laterality Date    CHOLECYSTECTOMY      COLON SURGERY      COLONOSCOPY N/A 1/12/2018    Performed by [facility]que [facility]ajan III, [facility] at [facility] ENDO    DENTAL SURGERY  05/21/2018    removal of 8 top teeth    ESOPHAGOGASTRODUODENOSCOPY (EGD) N/A 1/12/2018    Performed by Roque [facility]ajan III, MD at [facility] ENDO    HYSTERECTOMY      TONSILLECTOMY       Family History   Problem Relation Age of Onset    Alcohol abuse Mother     COPD Mother     Depression Mother     Drug abuse Mother     Alcohol abuse Father     Arthritis Father     Cancer Father     Heart disease Father     Hypertension Father     COPD Sister     Kidney failure Sister     Heart disease Brother     Hypertension Brother     Cancer Maternal Aunt     Cancer Maternal Uncle     Diabetes Maternal Uncle     Drug abuse Maternal Uncle     Cancer Paternal Aunt     Cancer Paternal Uncle     Arthritis Maternal Grandmother     Hypertension Maternal Grandmother     Breast cancer Maternal Grandmother     Arthritis Paternal Grandmother     Cancer Paternal Grandmother     Hypertension Paternal Grandfather      Social History     Tobacco Use    Smoking status: Current Every Day Smoker     Years: 35.00     Types: Cigarettes, Vaping w/o nicotine    Smokeless tobacco: Never Used   Substance Use Topics    Alcohol use: Yes     Comment: occassionally    Drug use: Yes     Types: Marijuana     Review of patient's allergies indicates:   Allergen Reactions    Piroxicam Diarrhea  and Nausea Only    Ultram [tramadol] Rash    Aspirin      Nosebleeds  Nosebleeds  Nosebleeds    Levaquin [levofloxacin]     Levomenol analogues     Lipitor [atorvastatin]      Leg Cramps    Zofran [ondansetron hcl] Hives and Other (See Comments)     headaches    Celestone [betamethasone] Hives, Itching and Rash       Medications: I have reviewed the current medication administration record.      (Not in a hospital admission)    Review of Systems   Constitutional: Negative for fever.   HENT: Negative for trouble swallowing.    Eyes: Positive for visual disturbance.   Respiratory: Negative for shortness of breath.    Cardiovascular: Negative for chest pain.   Gastrointestinal: Negative for nausea and vomiting.   Neurological: Positive for facial asymmetry, speech difficulty, weakness and headaches.   Psychiatric/Behavioral: Negative for agitation and confusion.     Objective:     Vital Signs (Most Recent):  Temp: 98.7 °F (37.1 °C) (09/17/19 1904)  Pulse: 82 (09/17/19 1940)  Resp: 18 (09/17/19 1923)  BP: (!) 140/97 (09/17/19 1923)  SpO2: 98 % (09/17/19 1940)    Vital Signs Range (Last 24H):  Temp:  [98.5 °F (36.9 °C)-98.7 °F (37.1 °C)]   Pulse:  [65-94]   Resp:  [16-21]   BP: ()/(53-97)   SpO2:  [93 %-100 %]     Physical Exam   Constitutional: She is oriented to person, place, and time.   HENT:   Head: Normocephalic and atraumatic.   Eyes: Pupils are equal, round, and reactive to light. EOM are normal.   Cardiovascular: Normal rate.   Pulmonary/Chest: Effort normal.   Abdominal: Soft.   Musculoskeletal: She exhibits no edema.   Right sided weakness   Neurological: She is alert and oriented to person, place, and time.   Skin: Skin is warm. Capillary refill takes less than 2 seconds.   Psychiatric: She has a normal mood and affect.   Vitals reviewed.      Neurological Exam:   LOC: alert  Attention Span: Good   Language: No aphasia  Articulation: Dysarthria  Orientation: Person, Place, Time   Visual Fields:  Full  EOM (CN III, IV, VI): Full/intact  Pupils (CN II, III): PERRL  Facial Movement (CN VII): Lower facial weakness on the Right  Motor: Arm left  Normal 5/5  Leg left  Normal 5/5  Arm right  Paresis: 4/5  Leg right Paresis: 3/5  Cebellar: Upper Extremity Appendicular Ataxia (Finger Nose Finger)  Right  Sensation: Intact to light touch, temperature and vibration      Laboratory:  CMP: No results for input(s): GLUCOSE, CALCIUM, ALBUMIN, PROT, NA, K, CO2, CL, BUN, CREATININE, ALKPHOS, ALT, AST, BILITOT in the last 24 hours.  CBC:   Recent Labs   Lab 09/17/19 1913   WBC 11.37   RBC 4.54   HGB 13.6   HCT 44.5      MCV 98   MCH 30.0   MCHC 30.6*     Lipid Panel: No results for input(s): CHOL, LDLCALC, HDL, TRIG in the last 168 hours.  Coagulation:   Recent Labs   Lab 09/17/19 1913   INR 1.0     Hgb A1C: No results for input(s): HGBA1C in the last 168 hours.  TSH: No results for input(s): TSH in the last 168 hours.    Diagnostic Results:      Brain imaging:  CTA head and neck 9/17 - official read pending; No LVO    CT head 9/17  No acute abnormality. If there is additional clinical concern for acute infarct, MRI with diffusion weighted imaging recommended.      Cardiac Evaluation:   TTE pending

## 2019-09-18 NOTE — ASSESSMENT & PLAN NOTE
Last seen my psychiatry 7/1/2019  Continue recommend psych medications - topiramate, quetiapine 300 mg po qhs. venlafaxine 75 tid

## 2019-09-18 NOTE — HOSPITAL COURSE
"  53 year old female with past medical hx of DM II, HTN, HLD, bipolar 1 disorder, and migraines presented to [place] with complaints of right sided weakness, right facial droop, and slurred speech. LKN @ 1505. Telestroke completed and TPA administered. Patient complained of right eye "milky halo" which later improved. Of note during teleconsult there was concern that symptoms might be functional. However, she did have ipsilateral weakness so TPA was administered despite concern for mimic.   CTA MP completed and no LVO noted, + 4 mm non ruptured basilar aneurysm. Aneurysm seem on maxillofacial CT in 2018 - appears stable in size. Patient admitted to [facility] s/p TPA administration and close neurological monitoring.   Deficits on exam were not consistent. RUE weakness was noted on exam initially but patient able to move extremity without issue when not being examined. Patient having difficulty raising her RLE initially but was seen moving it spontaneously without issue. When patient questioned about her speech she started to slur her words more. Patient tearful and emotional on exam. On exam 9/18 no difficulty with speech was noticed. MRI Brain performed without evidence for acute infarction. On exam 9/19 No motor deficit was noticed. Pt able to walk around without difficulties. Etiology likely Functional neurological symptom disorder.  Neurosurgery was consulted. No acute intervention needed. Pt discharged on her home psychiatric meds (Effexor, quetiapine, Topamax) with outpatient follow up with Dr. Levin on a Monday vascular clinic within the next 2-3 weeks.   "

## 2019-09-18 NOTE — PLAN OF CARE
Problem: Adult Inpatient Plan of Care  Goal: Plan of Care Review  Outcome: Ongoing (interventions implemented as appropriate)  Nutrition assessment completed. Please see RD note for details.    Recommendation/Intervention:   As medically able, advance diet to Cardiac with texture per SLP recommendations.   Add Diabetic restriction if BG levels elevate.     RD to monitor.    Goals: Pt to receive and tolerate >85% EEN and EPN by RD follow up  Nutrition Goal Status: new  Communication of RD Recs: reviewed with RN

## 2019-09-18 NOTE — ASSESSMENT & PLAN NOTE
"53 year old female with past medical hx of DM II, HTN, HLD, bipolar 1 disorder, and migraines presented to [place] with complaints of right sided weakness, right facial droop, and slurred speech. LKN @ 1505. Telestroke completed and TPA administered. Patient complained of right eye "milky halo" which patient states has now improved. Of note during teleconsult there was concern that symptoms might be functional. However, she did have ipsilateral weakness so TPA was administered despite concern for mimic.   CTA MP completed and no LVO noted. Patient admitted to [facility] s/p TPA administration and close neurological monitoring.   Deficits on exam are not consistent. RUE weakness was noted on exam initially but patient able to move extremity without issue when not being examined. Patient having difficulty raising her RLE initially but was seen moving it spontaneously without issue. When patient questioned about her speech she started to slur her words more. Patient is tearful and emotional on exam. On exam 9/18 no difficulty with speech was noticed.    Antithrombotics for secondary stroke prevention: Antiplatelets: None: Hold all Antithrombotics x 24 hours after IV t-PA administration    Statins for secondary stroke prevention and hyperlipidemia, if present:   Statins: Atorvastatin- 40 mg daily if not contraindicated  (patient reports allergy to statin)    Aggressive risk factor modification: HTN, Smoking, DM, HLD, Diet, Exercise, Obesity     Rehab efforts: The patient has been evaluated by a stroke team provider and the therapy needs have been fully considered based off the presenting complaints and exam findings. The following therapy evaluations are needed: PT evaluate and treat, OT evaluate and treat, SLP evaluate and treat, PM&R evaluate for appropriate placement    Diagnostics ordered/pending:  MRI head negative for stroke, pending final report. TTE to assess cardiac function/status.    VTE prophylaxis: None: Hold " all Antithrombotics x 24 hours after IV t-PA administration; mechanical SCDs    BP parameters: Infarct: Post tPA, SBP <180

## 2019-09-18 NOTE — HPI
"53 year old female with past medical hx of DM II, HTN, HLD, bipolar 1 disorder, and migraines presents as transfer s/p tPA for IR eval. She originally present to [place] with complaints of right sided weakness, right facial droop, and slurred speech. LKN @ 1505. Telestroke was completed and TPA administered. There was also concern for hyperdense basilar seen on CT head at [facility]. Patient transferred to [facility] for higher level of neurological care. Per chart review patient was seen yesterday in the ED for complaints of a migraine X 3 days which improved after migraine "cocktail." She was found to have a glucose level of 38. Patients blood glucose normalized and she was discharged home. This afternoon she was talking to her  when  her  also noticed her speech being slurred and a facial droop. Of note during teleconsult there was concern that symptoms might be functional. However, she did have ipsilateral weakness so TPA was administered despite concern for mimic. CTA on arrival to [facility] showed non ruptured basilar aneurysm, no LVO. NIH 7 on my assessment.      "

## 2019-09-18 NOTE — ASSESSMENT & PLAN NOTE
basilar tip aneurysm not on CTA, did not appear to ruptue  SBP goal 160, follow neuro exam closely given recent tPA admin.

## 2019-09-18 NOTE — SUBJECTIVE & OBJECTIVE
Past Medical History:   Diagnosis Date    Acute ischemic stroke 9/17/2019    Anxiety     Arthritis     Asthma     Bipolar 1 disorder     Depression     Diabetes mellitus, type 2     Diverticular disease     GERD (gastroesophageal reflux disease)     Hyperlipemia     Hypertension     Hypothyroidism     Pneumonia     Renal manifestation of secondary diabetes mellitus     Right-sided back pain 6/29/2019    Trouble in sleeping      Past Surgical History:   Procedure Laterality Date    CHOLECYSTECTOMY      COLON SURGERY      COLONOSCOPY N/A 1/12/2018    Performed by Roque [facility]ajan III, MD at [facility] ENDO    DENTAL SURGERY  05/21/2018    removal of 8 top teeth    ESOPHAGOGASTRODUODENOSCOPY (EGD) N/A 1/12/2018    Performed by Roque [facility]ajan III, MD at [facility] ENDO    HYSTERECTOMY      TONSILLECTOMY       Review of patient's allergies indicates:   Allergen Reactions    Piroxicam Diarrhea and Nausea Only    Ultram [tramadol] Rash    Aspirin      Nosebleeds  Nosebleeds  Nosebleeds    Levaquin [levofloxacin]     Levomenol analogues     Lipitor [atorvastatin]      Leg Cramps    Zofran [ondansetron hcl] Hives and Other (See Comments)     headaches    Celestone [betamethasone] Hives, Itching and Rash       Scheduled Medications:    diazePAM  10 mg Oral TID    magnesium sulfate IVPB  2 g Intravenous Once    senna-docusate 8.6-50 mg  1 tablet Oral BID    topiramate  200 mg Oral Daily       PRN Medications: Dextrose 10% Bolus, glucagon (human recombinant), glucose, glucose, insulin aspart U-100, magnesium oxide, magnesium oxide, oxyCODONE, potassium chloride 10%, potassium chloride 10%, potassium chloride 10%, potassium, sodium phosphates, potassium, sodium phosphates, potassium, sodium phosphates, prochlorperazine, sodium chloride 0.9%    Family History     Problem Relation (Age of Onset)    Alcohol abuse Mother, Father    Arthritis Father, Maternal Grandmother, Paternal Grandmother    Breast cancer  Maternal Grandmother    COPD Mother, Sister    Cancer Father, Maternal Aunt, Maternal Uncle, Paternal Aunt, Paternal Uncle, Paternal Grandmother    Depression Mother    Diabetes Maternal Uncle    Drug abuse Mother, Maternal Uncle    Heart disease Father, Brother    Hypertension Father, Brother, Maternal Grandmother, Paternal Grandfather    Kidney failure Sister        Tobacco Use    Smoking status: Current Every Day Smoker     Years: 35.00     Types: Cigarettes, Vaping w/o nicotine    Smokeless tobacco: Never Used   Substance and Sexual Activity    Alcohol use: Yes     Comment: occassionally    Drug use: Yes     Types: Marijuana    Sexual activity: Yes     Review of Systems   Constitutional: Positive for activity change. Negative for chills and fever.   HENT: Negative for drooling, hearing loss, trouble swallowing and voice change.    Eyes: Negative for pain and visual disturbance.   Respiratory: Negative for cough and shortness of breath.    Cardiovascular: Negative for chest pain and palpitations.   Gastrointestinal: Negative for abdominal distention, nausea and vomiting.   Genitourinary: Negative for difficulty urinating and flank pain.   Musculoskeletal: Positive for gait problem. Negative for back pain and neck pain.   Skin: Negative for rash and wound.   Neurological: Positive for weakness and numbness. Negative for headaches.   Psychiatric/Behavioral: Negative for agitation and hallucinations. The patient is nervous/anxious.      Objective:     Vital Signs (Most Recent):  Temp: 98.6 °F (37 °C) (09/18/19 1105)  Pulse: 100 (09/18/19 1217)  Resp: (!) 38 (09/18/19 1217)  BP: 126/68 (09/18/19 1217)  SpO2: 96 % (09/18/19 1217)    Vital Signs (24h Range):  Temp:  [98 °F (36.7 °C)-98.7 °F (37.1 °C)] 98.6 °F (37 °C)  Pulse:  [] 100  Resp:  [13-45] 38  SpO2:  [95 %-99 %] 96 %  BP: (109-155)/(57-97) 126/68     Body mass index is 30.27 kg/m².    Physical Exam   Constitutional: She appears well-developed and  well-nourished. No distress.   HENT:   Head: Normocephalic and atraumatic.   Right Ear: External ear normal.   Left Ear: External ear normal.   Nose: Nose normal.   Eyes: Right eye exhibits no discharge. Left eye exhibits no discharge. No scleral icterus.   Neck: Normal range of motion.   Cardiovascular: Normal rate and intact distal pulses.   Pulmonary/Chest: Effort normal. No respiratory distress.   Abdominal: Soft. She exhibits no distension. There is no tenderness.   Musculoskeletal: Normal range of motion. She exhibits no edema or tenderness.   Neurological: She is alert. A sensory deficit is present. She exhibits normal muscle tone.   Exam inconsistent/fluctuating.  Emotional throughout exam.   -  Mental Status:  AAOx3.  Follows commands.   -  Speech and language:  no aphasia or dysarthria.    -  Motor: RUE: 4/5.  LUE: 5/5.  RLE: 2/5.  LLE: 5/5.  Active tremor RUE during finger to nose exam.     Skin: Skin is warm and dry. No rash noted.   Psychiatric: Her mood appears anxious. Cognition and memory are impaired.   Vitals reviewed.         Diagnostic Results:   Labs: Reviewed  X-Ray: Reviewed  US: Reviewed  CT: Reviewed

## 2019-09-18 NOTE — PLAN OF CARE
Problem: Physical Therapy Goal  Goal: Physical Therapy Goal  Goals to be met by: 19     Patient will increase functional independence with mobility by performin. Sit to stand transfer with Stand-by assistance  2. Gait  x 100 feet with Contact Guard Assistance using LRAD or without an AD.  3. Ascend/descend 8 stairs with Contact Guard Assistance using LRAD or without an AD.  4. Stand for 5 minutes performing static balance activities with Contact Guard Assistance using LRAD or without an AD.  5. Lower extremity exercise program x15 reps per handout, with assistance as needed    Outcome: Ongoing (interventions implemented as appropriate)  PT evaluation completed - see note for details. Goals established and POC initiated.     JORDON Ladneros  2019

## 2019-09-18 NOTE — NURSING
Pt complains of H/A. Mariangel TUBBS with NCC notified, states to hold OxyContin prn until after rounds. Will continue to monitor and notify as needed.

## 2019-09-18 NOTE — HOSPITAL COURSE
9/17/19:  Therapy evaluations pending.   9/18/19:  Evaluated by PT and OT.  Bed mobility SBA-CGA.  EOB SBA.  Sit to stand and transfers Bren.  Standing balance Bren.  Ambulated ~4 ft forward and backwards Bren.  LBD SBA.  Swallow evaluation with SLP completed.  SLP recommendation: regular diet and thin liquids.

## 2019-09-18 NOTE — SUBJECTIVE & OBJECTIVE
Neurologic Chief Complaint: RSW, visual and speech difficulties.    Subjective:     Interval History: Patient is seen for follow-up neurological assessment and treatment recommendations:   LINDA. Pt main compliant is headache described as right temporal shooting pain radiated to right frontal area. CTA MP negative for LVO, showed 4mm basilar aneurysm.  MRI Brain and TTE pending.    HPI, Past Medical, Family, and Social History remains the same as documented in the initial encounter.     Review of Systems  Scheduled Meds:   diazePAM  10 mg Oral TID    magnesium sulfate IVPB  2 g Intravenous Once    senna-docusate 8.6-50 mg  1 tablet Oral BID    topiramate  200 mg Oral Daily     Continuous Infusions:  PRN Meds:Dextrose 10% Bolus, glucagon (human recombinant), glucose, glucose, insulin aspart U-100, magnesium oxide, magnesium oxide, oxyCODONE, potassium chloride 10%, potassium chloride 10%, potassium chloride 10%, potassium, sodium phosphates, potassium, sodium phosphates, potassium, sodium phosphates, prochlorperazine, sodium chloride 0.9%    Objective:     Vital Signs (Most Recent):  Temp: 98.6 °F (37 °C) (09/18/19 1105)  Pulse: 100 (09/18/19 1217)  Resp: (!) 38 (09/18/19 1217)  BP: 126/68 (09/18/19 1217)  SpO2: 96 % (09/18/19 1217)  BP Location: Left arm    Vital Signs Range (Last 24H):  Temp:  [98 °F (36.7 °C)-98.7 °F (37.1 °C)]   Pulse:  []   Resp:  [13-45]   BP: (109-155)/(57-97)   SpO2:  [95 %-99 %]   BP Location: Left arm    Physical Exam   Constitutional: She is oriented to person, place, and time.   HENT:   Head: Normocephalic and atraumatic.   Eyes: Pupils are equal, round, and reactive to light. EOM are normal.   Cardiovascular: Normal rate.   Pulmonary/Chest: Effort normal.   Abdominal: Soft.   Musculoskeletal: She exhibits no edema.   Right sided weakness   Neurological: She is alert and oriented to person, place, and time.   Skin: Skin is warm. Capillary refill takes less than 2 seconds.    Psychiatric: She has a normal mood and affect    Neurological Exam:   LOC: alert  Attention Span: Good   Language: No aphasia  Articulation: No Dysarthria  Orientation: Person, Place, Time   Visual Fields: Full  EOM (CN III, IV, VI): Full/intact  Pupils (CN II, III): PERRL  Facial Movement (CN VII): no asymmetry noticed, however pt not full cooperative  Motor: Arm left  Normal 5/5  Leg left  Normal 5/5  Arm right  Paresis: 4/5  Leg right Paresis: 3/5  Cebellar: No Appendicular Ataxia   Sensation: Decreased to light touch in RUE/RLE    Laboratory:  CMP:   Recent Labs   Lab 09/18/19  0331   CALCIUM 9.3   ALBUMIN 4.1   PROT 7.8      K 4.2   CO2 22*      BUN 10   CREATININE 0.7   ALKPHOS 100   ALT 75*   AST 43*   BILITOT 0.3     CBC:   Recent Labs   Lab 09/18/19 0331   WBC 8.90   RBC 4.57   HGB 13.9   HCT 43.7      MCV 96   MCH 30.4   MCHC 31.8*     Lipid Panel:   Recent Labs   Lab 09/17/19 1913   CHOL 219*   LDLCALC 95.4   HDL 69   TRIG 273*     Hgb A1C:   Recent Labs   Lab 09/17/19 1913   HGBA1C 6.1*     TSH:   Recent Labs   Lab 09/17/19 1913   TSH 4.530*       Diagnostic Results     Brain Imaging     Brain MRI pending    CT head 9/17  No acute abnormality. If there is additional clinical concern for acute infarct, MRI with diffusion weighted imaging recommended.     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  No CT finding of subarachnoid hemorrhage or hydrocephalus.  Dedicated conventional angiogram is suggested.    No major vascular distribution infarct, focal high-grade stenosis, or occlusion.    Cardiac Imaging   TTE pending

## 2019-09-18 NOTE — ASSESSMENT & PLAN NOTE
Last seen my psychiatry 7/1/2019  Continue recommend psych medications - topriamate, quetiapine 300 mg po qhs. venlafaxine 75 tid

## 2019-09-18 NOTE — SUBJECTIVE & OBJECTIVE
Past Medical History:   Diagnosis Date    Acute ischemic stroke 9/17/2019    Anxiety     Arthritis     Asthma     Bipolar 1 disorder     Depression     Diabetes mellitus, type 2     Diverticular disease     GERD (gastroesophageal reflux disease)     Hyperlipemia     Hypertension     Hypothyroidism     Pneumonia     Renal manifestation of secondary diabetes mellitus     Right-sided back pain 6/29/2019    Trouble in sleeping      Past Surgical History:   Procedure Laterality Date    CHOLECYSTECTOMY      COLON SURGERY      COLONOSCOPY N/A 1/12/2018    Performed by Roque Mahajan III, MD at [facility] ENDO    DENTAL SURGERY  05/21/2018    removal of 8 top teeth    ESOPHAGOGASTRODUODENOSCOPY (EGD) N/A 1/12/2018    Performed by Roque Mahajan III, MD at [facility] ENDO    HYSTERECTOMY      TONSILLECTOMY        Current Facility-Administered Medications on File Prior to Encounter   Medication Dose Route Frequency Provider Last Rate Last Dose    [COMPLETED] 0.9%  NaCl infusion  50 mL Intravenous Once Shruthi Love MD 63 mL/hr at 09/17/19 1724 50 mL at 09/17/19 1724    [COMPLETED] alteplase (ACTIVASE) 100 mg injection 63 mg  0.81 mg/kg Intravenous Once Shruthi Love MD   Stopped at 09/17/19 1717    [COMPLETED] alteplase (ACTIVASE) 100 mg injection 7 mg  0.09 mg/kg Intravenous Once Shruthi Love MD   Stopped at 09/17/19 1717    [COMPLETED] promethazine (PHENERGAN) 12.5 mg in dextrose 5 % 50 mL IVPB  12.5 mg Intravenous ED 1 Time Milly Wright MD   Stopped at 09/17/19 0003    [DISCONTINUED] ondansetron injection 8 mg  8 mg Intravenous ED 1 Time Milly Wright MD         Current Outpatient Medications on File Prior to Encounter   Medication Sig Dispense Refill    baclofen (LIORESAL) 10 MG tablet Take 1 tablet (10 mg total) by mouth nightly as needed. 30 tablet 3    diazePAM (VALIUM) 10 MG Tab Take 1 tablet (10 mg total) by mouth 3 (three) times daily. 90 tablet 0    doxepin (SINEQUAN) 10 MG  capsule Take 1 capsule (10 mg total) by mouth nightly as needed. 30 capsule 2    insulin NPH (NOVOLIN N NPH U-100 INSULIN) 100 unit/mL injection Inject 10 Units into the skin 2 (two) times daily before meals. 1 vial 3    insulin regular (NOVOLIN R REGULAR U-100 INSULN) 100 unit/mL Inj injection Inject three times daily with meals using the scale: BG 80 - 150:   2 units  //  - 199: 3 units  //  - 249: 4 units  //  - 299: 5 units  //  - 349: 6 units  // BG Over 350:  7 units. 10 mL 3    isosorbide mononitrate (IMDUR) 30 MG 24 hr tablet Take 1 tablet (30 mg total) by mouth once daily. 30 tablet 11    losartan (COZAAR) 25 MG tablet Take 25 mg by mouth once daily.  3    metFORMIN (GLUCOPHAGE) 500 MG tablet Take 1 tablet (500 mg total) by mouth 2 (two) times daily with meals. 180 tablet 1    promethazine (PHENERGAN) 25 MG tablet Take 1 tablet (25 mg total) by mouth every 6 (six) hours as needed for Nausea. 12 tablet 0    QUEtiapine (SEROQUEL) 300 MG Tab Take 1 tablet (300 mg total) by mouth every evening. 30 tablet 2    rizatriptan (MAXALT) 10 MG tablet One tablet with onset of migraine and may repeat one dose in 2 hours if needed      sulfamethoxazole-trimethoprim 800-160mg (BACTRIM DS) 800-160 mg Tab Take 1 tablet by mouth 2 (two) times daily. 14 tablet 0    topiramate (TOPAMAX) 200 MG Tab 1 tablet daily 30 tablet 2    TRUE METRIX GLUCOSE TEST STRIP Strp USE 1 STRIP TO CHECK GLUCOSE THREE TIMES DAILY  11    [DISCONTINUED] venlafaxine (EFFEXOR-XR) 75 MG 24 hr capsule Take 2 capsules (150 mg total) by mouth once daily. 60 capsule 2      Allergies: Piroxicam; Ultram [tramadol]; Aspirin; Levaquin [levofloxacin]; Levomenol analogues; Lipitor [atorvastatin]; Zofran [ondansetron hcl]; and Celestone [betamethasone]    Family History   Problem Relation Age of Onset    Alcohol abuse Mother     COPD Mother     Depression Mother     Drug abuse Mother     Alcohol abuse Father     Arthritis  Father     Cancer Father     Heart disease Father     Hypertension Father     COPD Sister     Kidney failure Sister     Heart disease Brother     Hypertension Brother     Cancer Maternal Aunt     Cancer Maternal Uncle     Diabetes Maternal Uncle     Drug abuse Maternal Uncle     Cancer Paternal Aunt     Cancer Paternal Uncle     Arthritis Maternal Grandmother     Hypertension Maternal Grandmother     Breast cancer Maternal Grandmother     Arthritis Paternal Grandmother     Cancer Paternal Grandmother     Hypertension Paternal Grandfather      Social History     Tobacco Use    Smoking status: Current Every Day Smoker     Years: 35.00     Types: Cigarettes, Vaping w/o nicotine    Smokeless tobacco: Never Used   Substance Use Topics    Alcohol use: Yes     Comment: occassionally    Drug use: Yes     Types: Marijuana     Review of Systems   Constitutional: Negative for fatigue and fever.   HENT: Negative for tinnitus.    Eyes: Negative for visual disturbance.   Respiratory: Negative for apnea and shortness of breath.    Cardiovascular: Negative for chest pain and palpitations.   Gastrointestinal: Negative for abdominal distention, rectal pain and vomiting.   Musculoskeletal: Negative for neck stiffness.   Skin: Negative for color change.   Neurological: Positive for headaches.   Psychiatric/Behavioral: Negative for confusion.     Objective:     Vitals:    Temp: 98.4 °F (36.9 °C)  Pulse: 79  Rhythm: normal sinus rhythm  BP: 121/85  MAP (mmHg): 99  Resp: 18  SpO2: 97 %  O2 Device (Oxygen Therapy): room air    Temp  Min: 98.4 °F (36.9 °C)  Max: 98.7 °F (37.1 °C)  Pulse  Min: 65  Max: 94  BP  Min: 98/53  Max: 155/75  MAP (mmHg)  Min: 71  Max: 99  Resp  Min: 16  Max: 22  SpO2  Min: 96 %  Max: 99 %    No intake/output data recorded.           Physical Exam   Constitutional: She appears well-developed.   Cardiovascular: Normal rate, regular rhythm, normal heart sounds and intact distal pulses.    Pulmonary/Chest: Effort normal and breath sounds normal.   Abdominal: Soft. Bowel sounds are normal.   Neurological:   E4 V5 M6  AAO x 4, dysarthria  PERRLA, EOMI  RUE/RLE weakness 3/5  LUE/LLE 5/5  Ataxia  Paresthesia RUE/RLE, right side of face   Skin: Skin is warm. Capillary refill takes less than 2 seconds.   Vitals reviewed.        Today I personally reviewed pertinent medications, lines/drains/airways, imaging, cardiology results, laboratory results, microbiology results,

## 2019-09-18 NOTE — HPI
53 F with history of HTN, HLD, DM had a witnessed sudden onset of right sided hemiplegia, right facial droop, and slurred speech yesterday. Her  reportedly saw this happen and got her to the ED within 11 minutes. She received tPA almost immediately upon arrival with significant improvement of her symptoms. 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 She has a family history of aneurysm in both her mother and daughter. She does report some ongoing intermittent symptoms during which she develops worsening dysarthria and some facial droop, but this reportedly resolves after a few mintues.

## 2019-09-18 NOTE — MEDICAL/APP STUDENT
"ICU Progress Note  Neurocritical Care    Admit Date: 9/17/2019  LOS: 1    CC: <principal problem not specified>    SUBJECTIVE:     Chief Complaint: <principal problem not specified>     History of Present Illness: 53 year old female with past medical hx of DM II, HTN, HLD, bipolar 1 disorder, and migraines presents as transfer s/p tPA + IR evaluation on 9/17/19. She originally present to [facility] with complaints of right sided weakness, right facial droop, and slurred speech. LKN @ 1505. Telestroke was completed and TPA administered. There was also concern for hyperdense basilar seen on CT head at [facility]. Patient transferred to [facility] for higher level of neurological care. Per chart review patient was seen yesterday in the ED for complaints of a migraine X 3 days which improved after migraine "cocktail." She was found to have a glucose level of 38. Patients blood glucose normalized and she was discharged home. On 9/17 in the afternoon she was talking to her  when  her  also noticed her speech being slurred and a facial droop. Of note during teleconsult there was concern that symptoms might be functional. However, she did have R sided weakness so TPA was administered despite concern for mimic.  CTA on arrival to [facility] showed non ruptured basilar aneurysm, no LVO. NIH 7 on 9/17 assessment. It was 4 in the AM today.     Review of patient's allergies indicates:   Allergen Reactions    Piroxicam Diarrhea and Nausea Only    Ultram [tramadol] Rash    Aspirin         Nosebleeds  Nosebleeds  Nosebleeds    Levaquin [levofloxacin]      Levomenol analogues      Lipitor [atorvastatin]         Leg Cramps    Zofran [ondansetron hcl] Hives and Other (See Comments)       headaches    Celestone [betamethasone] Hives, Itching and Rash           Past Medical History:   Diagnosis Date    Acute ischemic stroke 9/17/2019    Anxiety      Arthritis      Asthma      Bipolar 1 disorder      Depression      Diabetes " mellitus, type 2      Diverticular disease      GERD (gastroesophageal reflux disease)      Hyperlipemia      Hypertension      Hypothyroidism      Pneumonia      Renal manifestation of secondary diabetes mellitus      Right-sided back pain 6/29/2019    Trouble in sleeping              Past Surgical History:   Procedure Laterality Date    CHOLECYSTECTOMY        COLON SURGERY        COLONOSCOPY N/A 1/12/2018     Performed by [facility] at [facility] ENDO    DENTAL SURGERY   05/21/2018     removal of 8 top teeth    ESOPHAGOGASTRODUODENOSCOPY (EGD) N/A 1/12/2018     Performed by Ro[facility]ajan III, MD at [facility] ENDO    HYSTERECTOMY        TONSILLECTOMY                Family History   Problem Relation Age of Onset    Alcohol abuse Mother      COPD Mother      Depression Mother      Drug abuse Mother      Alcohol abuse Father      Arthritis Father      Cancer Father      Heart disease Father      Hypertension Father      COPD Sister      Kidney failure Sister      Heart disease Brother      Hypertension Brother      Cancer Maternal Aunt      Cancer Maternal Uncle      Diabetes Maternal Uncle      Drug abuse Maternal Uncle      Cancer Paternal Aunt      Cancer Paternal Uncle      Arthritis Maternal Grandmother      Hypertension Maternal Grandmother      Breast cancer Maternal Grandmother      Arthritis Paternal Grandmother      Cancer Paternal Grandmother      Hypertension Paternal Grandfather        Social History            Tobacco Use    Smoking status: Current Every Day Smoker       Years: 35.00       Types: Cigarettes, Vaping w/o nicotine    Smokeless tobacco: Never Used   Substance Use Topics    Alcohol use: Yes       Comment: occassionally    Drug use: Yes       Types: Marijuana        Review of Symptoms:   Constitutional: Denies fevers or chills.  Pulmonary: Denies shortness of breath or cough.  Cardiology: Denies chest pain or palpitations.  GI: Denies  abdominal pain or constipation.  Neurologic: Denies new weakness, headaches, or paresthesias. States her weakness is improving. Reports increasing emotional lability.    Medications:  Continuous Infusions:  Scheduled Meds:   diazePAM  10 mg Oral TID    senna-docusate 8.6-50 mg  1 tablet Oral BID    topiramate  200 mg Oral Daily     PRN Meds:.Dextrose 10% Bolus, glucagon (human recombinant), insulin aspart U-100, magnesium oxide, magnesium oxide, oxyCODONE, potassium chloride 10%, potassium chloride 10%, potassium chloride 10%, potassium, sodium phosphates, potassium, sodium phosphates, potassium, sodium phosphates, sodium chloride 0.9%    OBJECTIVE:     I & O (24h):    Intake/Output Summary (Last 24 hours) at 9/18/2019 0804  Last data filed at 9/18/2019 0617  Gross per 24 hour   Intake 60 ml   Output 1600 ml   Net -1540 ml       Physical Exam:  Last Vitals:  Vitals:    09/18/19 0747   BP:    Pulse: 76   Resp: 18   Temp:      Vital Signs (24h Range):   Temp:  [98 °F (36.7 °C)-98.7 °F (37.1 °C)] 98 °F (36.7 °C)  Pulse:  [71-94] 76  Resp:  [13-39] 18  SpO2:  [96 %-99 %] 96 %  BP: (109-155)/(57-97) 122/64  GA: Alert, comfortable, no acute distress.   HEENT: No scleral icterus or JVD.   Pulmonary: Clear to auscultation A/P/L. No wheezing, crackles, or rhonchi.  Cardiac: RRR S1 & S2 w/o rubs/murmurs/gallops.   Abdominal: Bowel sounds present x 4. No appreciable hepatosplenomegaly.  Skin: No jaundice, rashes, or visible lesions.  Neuro:  --GCS: 15  --Mental Status:  Alert and oriented x 4.  --CN II-XII grossly intact.   --Pupils 5mm, PERRL.   --Corneal reflex, gag, cough intact.  --LUE strength: 5/5  --RUE strength: 4/5  --LLE strength: 5/5  --RLE strength: 4/5 (does not dorsiflex, but can deon/invert R foot)  --Sensory: Reports altered sensation throughout the R side of body and face. Exam findings are inconsistent     Other: Patient states her migraine aura are different from the R sided halo she has been  "experiencing since yesterday. There is FHx of migraine. Of note, patient is a long term smoker but is not on HRT.    Vent Settings:      Vent Day: **    Invasive Lines:  --Central Line:      --Arterial Line:      --Surgical Drains/Rose:      Nutrition:     Labs:  ABG: No results for input(s): PH, PO2, PCO2, HCO3, POCSATURATED, BE in the last 24 hours.    BMP:  Recent Labs   Lab 09/18/19  0331      K 4.2      CO2 22*   BUN 10   CREATININE 0.7      MG 1.7   PHOS 3.7       LFT:   Lab Results   Component Value Date    AST 43 (H) 09/18/2019    ALT 75 (H) 09/18/2019    ALKPHOS 100 09/18/2019    BILITOT 0.3 09/18/2019    ALBUMIN 4.1 09/18/2019    PROT 7.8 09/18/2019       CBC:   Lab Results   Component Value Date    WBC 8.90 09/18/2019    HGB 13.9 09/18/2019    HCT 43.7 09/18/2019    MCV 96 09/18/2019     09/18/2019       Microbiology x 7d:   Microbiology Results (last 7 days)     ** No results found for the last 168 hours. 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  No CT finding of subarachnoid hemorrhage or hydrocephalus.  Dedicated conventional angiogram is suggested.    No major vascular distribution infarct, focal high-grade stenosis, or occlusion."    ASSESSMENT/PLAN:     Patient Active Problem List   Diagnosis    Suicide attempt    Hyperglycemia due to type 2 diabetes mellitus    Bipolar disorder    Depression    Essential hypertension    Dyslipidemia associated with type 2 diabetes mellitus    Hypothyroidism due to acquired atrophy of thyroid    Marijuana abuse    Tobacco abuse    Chest pain, moderate coronary artery risk    Family history of arteriosclerotic cardiovascular disease    SOB (shortness of breath)    Epigastric abdominal pain    Renal abscess, right    Type 2 diabetes mellitus    Anxiety    UTI (urinary tract infection)    Right-sided back pain "    Acute ischemic stroke    S/P admn tPA in diff fac w/n last 24 hr bef adm to crnt fac    Acute right-sided weakness    Stroke    Hypercoagulable state    Aneurysm    Debility    Hyperlipidemia     Neuro  Hx of bipolar disorder and migraine HA, now presenting s/p TPA for suspected stroke.    CTA shows 4mm basilar aneurysm   Exam this AM is incosistent (weakness has been improving; only mild drift of R arm this AM + inability to dorsiflex R foot (but can deon/invert). Altered sensation throughout R side)  S/p tPA for NIH 8 on tele-consult, concern for stroke mimic  CTA with no LVO, basilar tip aneurysm   Q1hr Neuro Checks, SBP <160  MRI tomorrow, 1300  PT/OT/SLP  Concern for migraine symptoms contributing to current findings (patient has strong Hx + FHx of migraine)      --MRI recommended to evaluate basilar anneurysm  --Topomax and valium resumed   --Regular diet with thin liquids recommended (per SLP). Currently NPO    Cardiac  -155 SBP <180  EKG/Echo pending  monitor BP given basilar aneurysm   HLD:  Chol 219  Allergic to lipitor,     --Need to eval other statins          Endocrine  T2DM   On gabbapentin  HgbA1c 6.1, POCT Glu ,     --ISS, oral antiglycemics held in setting of CT contrast    Prophylaxis:  Venous Thromboembolism with: Mechanical (Hospital stay + familial migraine + smoking Hx)    Stress Ulcer with: None    Ventilator Pneumonia with: not applicable    Roc Dunlap  09/18/2019 11:00 AM

## 2019-09-18 NOTE — PLAN OF CARE
Problem: SLP Goal  Goal: SLP Goal  Outcome: Ongoing (interventions implemented as appropriate)  Regular diet with thin liquids recommended  Kerri Shine MA/CARMELO-SLP  Speech Language Pathologist  Pager [phone]  9/18/2019

## 2019-09-18 NOTE — PROGRESS NOTES
"[facility]  Vascular Neurology  Comprehensive Stroke Center  Progress Note    Assessment/Plan:     Acute right-sided weakness  53 year old female with past medical hx of DM II, HTN, HLD, bipolar 1 disorder, and migraines presented to [place] with complaints of right sided weakness, right facial droop, and slurred speech. LKN @ 1505. Telestroke completed and TPA administered. Patient complained of right eye "milky halo" which patient states has now improved. Of note during teleconsult there was concern that symptoms might be functional. However, she did have ipsilateral weakness so TPA was administered despite concern for mimic.   CTA MP completed and no LVO noted. Patient admitted to [facility] s/p TPA administration and close neurological monitoring.   Deficits on exam are not consistent. RUE weakness was noted on exam initially but patient able to move extremity without issue when not being examined. Patient having difficulty raising her RLE initially but was seen moving it spontaneously without issue. When patient questioned about her speech she started to slur her words more. Patient is tearful and emotional on exam. On exam 9/18 no difficulty with speech was noticed.    Antithrombotics for secondary stroke prevention: Antiplatelets: None: Hold all Antithrombotics x 24 hours after IV t-PA administration    Statins for secondary stroke prevention and hyperlipidemia, if present:   Statins: Atorvastatin- 40 mg daily if not contraindicated  (patient reports allergy to statin)    Aggressive risk factor modification: HTN, Smoking, DM, HLD, Diet, Exercise, Obesity     Rehab efforts: The patient has been evaluated by a stroke team provider and the therapy needs have been fully considered based off the presenting complaints and exam findings. The following therapy evaluations are needed: PT evaluate and treat, OT evaluate and treat, SLP evaluate and treat, PM&R evaluate for appropriate " placement    Diagnostics ordered/pending:  MRI head negative for stroke, pending final report. TTE to assess cardiac function/status.    VTE prophylaxis: None: Hold all Antithrombotics x 24 hours after IV t-PA administration; mechanical SCDs    BP parameters: Infarct: Post tPA, SBP <180            S/P admn tPA in diff fac w/n last 24 hr bef adm to crnt fac  Admit to [facility] s/p TPA administration for 24 hours   TPA ended 17:17 9/17      Type 2 diabetes mellitus  Stroke risk factor   On NPH at home  Hypoglycemic episode 9/16 - glucose 38  Management per primary     Tobacco abuse  Stroke risk factor  Encourage smoking cessation  Nicotine patch as needed     Hypothyroidism due to acquired atrophy of thyroid  Stroke risk factor  TSH 4.53 Free T4 0.81      Dyslipidemia associated with type 2 diabetes mellitus  Stroke risk factor  Chol 19 Trig 273 HDL 69 LDL 95  Patient reports allergy to Lipitor     Essential hypertension  Stroke risk factor  SBP <180  Currently not requiring antihypertensives at this time    Depression  Continue Effexor     Bipolar disorder  Last seen my psychiatry 7/1/2019  Continue recommend psych medications - topiramate, quetiapine 300 mg po qhs. venlafaxine 75 tid         9/18 Inconsistent PE. CTA MP no LVO, showed 4mm basilar aneurysm. Pending brain MRI.     STROKE DOCUMENTATION   Acute Stroke Times   Last Known Normal Date: 09/17/19  Last Known Normal Time: 1505  Symptom Onset Date: 09/17/19  Symptom Onset Time: 1505  Stroke Team Called Date: 09/17/19  Stroke Team Called Time: 1905  Stroke Team Arrival Date: 09/17/19  Stroke Team Arrival Time: 1910  CT Interpretation Time: 1930  Decision to Treat Time for Alteplase: (NA)  Decision to Treat Time for IR: (NA)    NIH Scale:  1a. Level of Consciousness: 0-->Alert, keenly responsive  1b. LOC Questions: 0-->Answers both questions correctly  1c. LOC Commands: 0-->Performs both tasks correctly  2. Best Gaze: 0-->Normal  3. Visual: 0-->No visual loss  4.  Facial Palsy: 0-->Normal symmetrical movements  5a. Motor Arm, Left: 0-->No drift, limb holds 90 (or 45) degrees for full 10 secs  5b. Motor Arm, Right: 0-->No drift, limb holds 90 (or 45) degrees for full 10 secs  6a. Motor Leg, Left: 0-->No drift, leg holds 30 degree position for full 5 secs  6b. Motor Leg, Right: 2-->Some effort against gravity, leg falls to bed by 5 secs, but has some effort against gravity  7. Limb Ataxia: 0-->Absent  8. Sensory: 1-->Mild-to-moderate sensory loss, patient feels pinprick is less sharp or is dull on the affected side, or there is a loss of superficial pain with pinprick, but patient is aware of being touched  9. Best Language: 0-->No aphasia, normal  10. Dysarthria: 0-->Normal  11. Extinction and Inattention (formerly Neglect): 0-->No abnormality  Total (NIH Stroke Scale): 3       Modified [place] Score: 0  Inga Coma Scale:    ABCD2 Score:    [id number] Score:   HAS -BLED Score:   ICH Score:   Hunt & Goodwin Classification:      Hemorrhagic change of an Ischemic Stroke: Does this patient have an ischemic stroke with hemorrhagic changes? No     Neurologic Chief Complaint: RSW, visual and speech difficulties.    Subjective:     Interval History: Patient is seen for follow-up neurological assessment and treatment recommendations:   KAROLYNEON. Pt main compliant is headache described as right temporal shooting pain radiated to right frontal area. CTA MP negative for LVO, showed 4mm basilar aneurysm.  MRI Brain and TTE pending.    HPI, Past Medical, Family, and Social History remains the same as documented in the initial encounter.     Review of Systems  Scheduled Meds:   diazePAM  10 mg Oral TID    magnesium sulfate IVPB  2 g Intravenous Once    senna-docusate 8.6-50 mg  1 tablet Oral BID    topiramate  200 mg Oral Daily     Continuous Infusions:  PRN Meds:Dextrose 10% Bolus, glucagon (human recombinant), glucose, glucose, insulin aspart U-100, magnesium oxide, magnesium oxide,  oxyCODONE, potassium chloride 10%, potassium chloride 10%, potassium chloride 10%, potassium, sodium phosphates, potassium, sodium phosphates, potassium, sodium phosphates, prochlorperazine, sodium chloride 0.9%    Objective:     Vital Signs (Most Recent):  Temp: 98.6 °F (37 °C) (09/18/19 1105)  Pulse: 100 (09/18/19 1217)  Resp: (!) 38 (09/18/19 1217)  BP: 126/68 (09/18/19 1217)  SpO2: 96 % (09/18/19 1217)  BP Location: Left arm    Vital Signs Range (Last 24H):  Temp:  [98 °F (36.7 °C)-98.7 °F (37.1 °C)]   Pulse:  []   Resp:  [13-45]   BP: (109-155)/(57-97)   SpO2:  [95 %-99 %]   BP Location: Left arm    Physical Exam   Constitutional: She is oriented to person, place, and time.   HENT:   Head: Normocephalic and atraumatic.   Eyes: Pupils are equal, round, and reactive to light. EOM are normal.   Cardiovascular: Normal rate.   Pulmonary/Chest: Effort normal.   Abdominal: Soft.   Musculoskeletal: She exhibits no edema.   Right sided weakness   Neurological: She is alert and oriented to person, place, and time.   Skin: Skin is warm. Capillary refill takes less than 2 seconds.   Psychiatric: She has a normal mood and affect    Neurological Exam:   LOC: alert  Attention Span: Good   Language: No aphasia  Articulation: No Dysarthria  Orientation: Person, Place, Time   Visual Fields: Full  EOM (CN III, IV, VI): Full/intact  Pupils (CN II, III): PERRL  Facial Movement (CN VII): no asymmetry noticed, however pt not full cooperative  Motor: Arm left  Normal 5/5  Leg left  Normal 5/5  Arm right  Paresis: 4/5  Leg right Paresis: 3/5  Cebellar: No Appendicular Ataxia   Sensation: Decreased to light touch in RUE/RLE    Laboratory:  CMP:   Recent Labs   Lab 09/18/19  0331   CALCIUM 9.3   ALBUMIN 4.1   PROT 7.8      K 4.2   CO2 22*      BUN 10   CREATININE 0.7   ALKPHOS 100   ALT 75*   AST 43*   BILITOT 0.3     CBC:   Recent Labs   Lab 09/18/19  0331   WBC 8.90   RBC 4.57   HGB 13.9   HCT 43.7      MCV 96    MCH 30.4   MCHC 31.8*     Lipid Panel:   Recent Labs   Lab 09/17/19 1913   CHOL 219*   LDLCALC 95.4   HDL 69   TRIG 273*     Hgb A1C:   Recent Labs   Lab 09/17/19 1913   HGBA1C 6.1*     TSH:   Recent Labs   Lab 09/17/19 1913   TSH 4.530*       Diagnostic Results     Brain Imaging     Brain MRI pending    CT head 9/17  No acute abnormality. If there is additional clinical concern for acute infarct, MRI with diffusion weighted imaging recommended.     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  No CT finding of subarachnoid hemorrhage or hydrocephalus.  Dedicated conventional angiogram is suggested.    No major vascular distribution infarct, focal high-grade stenosis, or occlusion.    Cardiac Imaging   TTE pending      Joanne Salas MD  Comprehensive Stroke Center  Department of Vascular Neurology   [facility]

## 2019-09-18 NOTE — CONSULTS
"[facility]  Vascular Neurology  Comprehensive Stroke Center  Consult Note    Inpatient consult to Vascular (Stroke) Neurology  Consult performed by: Marisela Christensen NP  Consult ordered by: Adair Rivera PA-C  Reason for consult: stroke         Assessment/Plan:     Patient is a 53 y.o. year old female with:    Acute right-sided weakness  53 year old female with past medical hx of DM II, HTN, HLD, bipolar 1 disorder, and migraines presented to [place] with complaints of right sided weakness, right facial droop, and slurred speech. LKN @ 1505. Telestroke completed and TPA administered. Patient complained of right eye "milky halo" which patient states has now improved. Of note during teleconsult there was concern that symptoms might be functional. However, she did have ipsilateral weakness so TPA was administered despite concern for mimic.     CTA MP completed and no LVO noted. Patient to be admitted to [facility] s/p TPA administration and close neurological monitoring.     Of note deficits on exam are not consistent. RUE weakness is noted on exam but patient able to move extremity without issue when not being examined. Patient having difficulty raising her RLE but is seen moving it spontaneously without issue. Per daughter patients speech does seem to be more slurred than baseline. When patient questioned about her speech she started to slur her words more. Patient is tearful and emotional on exam.     Antithrombotics for secondary stroke prevention: Antiplatelets: None: Hold all Antithrombotics x 24 hours after IV t-PA administration    Statins for secondary stroke prevention and hyperlipidemia, if present:   Statins: Atorvastatin- 40 mg daily if not contraindicated  (patient reports allergy to statin)    Aggressive risk factor modification: HTN, Smoking, DM, HLD, Diet, Exercise, Obesity     Rehab efforts: The patient has been evaluated by a stroke team provider and the therapy needs have been " fully considered based off the presenting complaints and exam findings. The following therapy evaluations are needed: PT evaluate and treat, OT evaluate and treat, SLP evaluate and treat, PM&R evaluate for appropriate placement    Diagnostics ordered/pending: HgbA1C to assess blood glucose levels, Lipid Profile to assess cholesterol levels, MRI head without contrast to assess brain parenchyma, TTE to assess cardiac function/status , TSH to assess thyroid function    VTE prophylaxis: None: Hold all Antithrombotics x 24 hours after IV t-PA administration; mechanical SCDs    BP parameters: Infarct: Post tPA, SBP <180            S/P admn tPA in diff fac w/n last 24 hr bef adm to crnt fac  Admit to [facility] s/p TPA administration for 24 hours     Type 2 diabetes mellitus  Stroke risk factor   On NPH at home  Hypoglycemic episode 9/16 - glucose 38  Management per primary     Tobacco abuse  Stroke risk factor  Encourage smoking cessation  Nicotine patch as needed     Hypothyroidism due to acquired atrophy of thyroid  Stroke risk factor  TSH pending      Dyslipidemia associated with type 2 diabetes mellitus  Stroke risk factor  LDL pending  Patient reports allergy to Lipitor     Essential hypertension  Stroke risk factor  SBP <180  Currently not requiring antihypertensives at this time    Depression  Continue Effexor     Bipolar disorder  Last seen my psychiatry 7/1/2019  Continue recommend psych medications - topriamate, quetiapine 300 mg po qhs. venlafaxine 75 tid        STROKE DOCUMENTATION     Acute Stroke Times   Last Known Normal Date: 09/17/19  Last Known Normal Time: 1505  Symptom Onset Date: 09/17/19  Symptom Onset Time: 1505  Stroke Team Called Date: 09/17/19  Stroke Team Called Time: 1905  Stroke Team Arrival Date: 09/17/19  Stroke Team Arrival Time: 1910  CT Interpretation Time: 1930  Decision to Treat Time for Alteplase: (NA)  Decision to Treat Time for IR: (NA)    NIH Scale:  1a. Level of Consciousness: 0-->Alert,  keenly responsive  1b. LOC Questions: 0-->Answers both questions correctly  1c. LOC Commands: 0-->Performs both tasks correctly  2. Best Gaze: 0-->Normal  3. Visual: 0-->No visual loss  4. Facial Palsy: 0-->Normal symmetrical movements  5a. Motor Arm, Left: 0-->No drift, limb holds 90 (or 45) degrees for full 10 secs  5b. Motor Arm, Right: 1-->Drift, limb holds 90 (or 45) degrees, but drifts down before full 10 secs, does not hit bed or other support  6a. Motor Leg, Left: 0-->No drift, leg holds 30 degree position for full 5 secs  6b. Motor Leg, Right: 2-->Some effort against gravity, leg falls to bed by 5 secs, but has some effort against gravity  7. Limb Ataxia: 1-->Present in one limb  8. Sensory: 0-->Normal, no sensory loss  9. Best Language: 0-->No aphasia, normal  10. Dysarthria: 1-->Mild-to-moderate dysarthria, patient slurs at least some words and, at worst, can be understood with some difficulty  11. Extinction and Inattention (formerly Neglect): 0-->No abnormality  Total (NIH Stroke Scale): 5    Modified Galdino Score: 0  Inga Coma Scale:    ABCD2 Score:    [id number] Score:   HAS -BLED Score:   ICH Score:   Hunt & Goodwin Classification:       Thrombolysis Candidate? Yes, given prior to arrival at outside hospital    Delays to Thrombolysis?  No    Interventional Revascularization Candidate?   Is the patient eligible for mechanical endovascular reperfusion (SADIE)?  No; No large vessel occlusion      Hemorrhagic change of an Ischemic Stroke: Does this patient have an ischemic stroke with hemorrhagic changes? No     Subjective:     History of Present Illness:  53 year old female with past medical hx of DM II, HTN, HLD, bipolar 1 disorder, and migraines presented to [place] with complaints of right sided weakness, right facial droop, and slurred speech. LKN @ 1505. Telestroke was completed and TPA administered. There was also concern for hyperdense basilar seen on CT head at [facility]. Patient transferred to  "[facility] for higher level of neurological care. Per chart review patient was seen yesterday in the ED for complaints of a migraine X 3 days which improved after migraine "cocktail." She was found to have a glucose level of 38. Patients blood glucose normalized and she was discharged home. This afternoon she was talking to her  when she noted her speech sounded different. She reports her  also noticed her speech being slurred and a facial droop. Of note during teleconsult there was concern that symptoms might be functional. However, she did have ipsilateral weakness so TPA was administered despite concern for mimic.         Past Medical History:   Diagnosis Date    Acute ischemic stroke 9/17/2019    Anxiety     Arthritis     Asthma     Bipolar 1 disorder     Depression     Diabetes mellitus, type 2     Diverticular disease     GERD (gastroesophageal reflux disease)     Hyperlipemia     Hypertension     Hypothyroidism     Pneumonia     Renal manifestation of secondary diabetes mellitus     Right-sided back pain 6/29/2019    Trouble in sleeping      Past Surgical History:   Procedure Laterality Date    CHOLECYSTECTOMY      COLON SURGERY      COLONOSCOPY N/A 1/12/2018    Performed by [facility] III, [facility] at [facility]O    DENTAL SURGERY  05/21/2018    removal of 8 top teeth    ESOPHAGOGASTRODUODENOSCOPY (EGD) N/A 1/12/2018    Performed by [facility] III[facility] at [facility] ENDO    HYSTERECTOMY      TONSILLECTOMY       Family History   Problem Relation Age of Onset    Alcohol abuse Mother     COPD Mother     Depression Mother     Drug abuse Mother     Alcohol abuse Father     Arthritis Father     Cancer Father     Heart disease Father     Hypertension Father     COPD Sister     Kidney failure Sister     Heart disease Brother     Hypertension Brother     Cancer Maternal Aunt     Cancer Maternal Uncle     Diabetes Maternal Uncle     Drug abuse Maternal Uncle     Cancer " Paternal Aunt     Cancer Paternal Uncle     Arthritis Maternal Grandmother     Hypertension Maternal Grandmother     Breast cancer Maternal Grandmother     Arthritis Paternal Grandmother     Cancer Paternal Grandmother     Hypertension Paternal Grandfather      Social History     Tobacco Use    Smoking status: Current Every Day Smoker     Years: 35.00     Types: Cigarettes, Vaping w/o nicotine    Smokeless tobacco: Never Used   Substance Use Topics    Alcohol use: Yes     Comment: occassionally    Drug use: Yes     Types: Marijuana     Review of patient's allergies indicates:   Allergen Reactions    Piroxicam Diarrhea and Nausea Only    Ultram [tramadol] Rash    Aspirin      Nosebleeds  Nosebleeds  Nosebleeds    Levaquin [levofloxacin]     Levomenol analogues     Lipitor [atorvastatin]      Leg Cramps    Zofran [ondansetron hcl] Hives and Other (See Comments)     headaches    Celestone [betamethasone] Hives, Itching and Rash       Medications: I have reviewed the current medication administration record.      (Not in a hospital admission)    Review of Systems   Constitutional: Negative for fever.   HENT: Negative for trouble swallowing.    Eyes: Positive for visual disturbance.   Respiratory: Negative for shortness of breath.    Cardiovascular: Negative for chest pain.   Gastrointestinal: Negative for nausea and vomiting.   Neurological: Positive for facial asymmetry, speech difficulty, weakness and headaches.   Psychiatric/Behavioral: Negative for agitation and confusion.     Objective:     Vital Signs (Most Recent):  Temp: 98.7 °F (37.1 °C) (09/17/19 1904)  Pulse: 82 (09/17/19 1940)  Resp: 18 (09/17/19 1923)  BP: (!) 140/97 (09/17/19 1923)  SpO2: 98 % (09/17/19 1940)    Vital Signs Range (Last 24H):  Temp:  [98.5 °F (36.9 °C)-98.7 °F (37.1 °C)]   Pulse:  [65-94]   Resp:  [16-21]   BP: ()/(53-97)   SpO2:  [93 %-100 %]     Physical Exam   Constitutional: She is oriented to person, place, and  time.   HENT:   Head: Normocephalic and atraumatic.   Eyes: Pupils are equal, round, and reactive to light. EOM are normal.   Cardiovascular: Normal rate.   Pulmonary/Chest: Effort normal.   Abdominal: Soft.   Musculoskeletal: She exhibits no edema.   Right sided weakness   Neurological: She is alert and oriented to person, place, and time.   Skin: Skin is warm. Capillary refill takes less than 2 seconds.   Psychiatric: She has a normal mood and affect.   Vitals reviewed.      Neurological Exam:   LOC: alert  Attention Span: Good   Language: No aphasia  Articulation: Dysarthria  Orientation: Person, Place, Time   Visual Fields: Full  EOM (CN III, IV, VI): Full/intact  Pupils (CN II, III): PERRL  Facial Movement (CN VII): Lower facial weakness on the Right  Motor: Arm left  Normal 5/5  Leg left  Normal 5/5  Arm right  Paresis: 4/5  Leg right Paresis: 3/5  Cebellar: Upper Extremity Appendicular Ataxia (Finger Nose Finger)  Right  Sensation: Intact to light touch, temperature and vibration      Laboratory:  CMP: No results for input(s): GLUCOSE, CALCIUM, ALBUMIN, PROT, NA, K, CO2, CL, BUN, CREATININE, ALKPHOS, ALT, AST, BILITOT in the last 24 hours.  CBC:   Recent Labs   Lab 09/17/19 1913   WBC 11.37   RBC 4.54   HGB 13.6   HCT 44.5      MCV 98   MCH 30.0   MCHC 30.6*     Lipid Panel: No results for input(s): CHOL, LDLCALC, HDL, TRIG in the last 168 hours.  Coagulation:   Recent Labs   Lab 09/17/19 1913   INR 1.0     Hgb A1C: No results for input(s): HGBA1C in the last 168 hours.  TSH: No results for input(s): TSH in the last 168 hours.    Diagnostic Results:      Brain imaging:  CTA head and neck 9/17 - official read pending; No LVO    CT head 9/17  No acute abnormality. If there is additional clinical concern for acute infarct, MRI with diffusion weighted imaging recommended.      Cardiac Evaluation:   TTE pending       Marisela Christensen NP  [facility] Stroke [place]  Department of Vascular Neurology    [facility]

## 2019-09-18 NOTE — ASSESSMENT & PLAN NOTE
"53 year old female with past medical hx of DM II, HTN, HLD, bipolar 1 disorder, and migraines presented to [place] with complaints of right sided weakness, right facial droop, and slurred speech. LKN @ 1505. Telestroke completed and TPA administered. Patient complained of right eye "milky halo" which patient states has now improved. Of note during teleconsult there was concern that symptoms might be functional. However, she did have ipsilateral weakness so TPA was administered despite concern for mimic.     CTA MP completed and no LVO noted. Patient to be admitted to [facility] s/p TPA administration and close neurological monitoring.     Of note deficits on exam are not consistent. RUE weakness is noted on exam but patient able to move extremity without issue when not being examined. Patient having difficulty raising her RLE but is seen moving it spontaneously without issue. Per daughter patients speech does seem to be more slurred than baseline. When patient questioned about her speech she started to slur her words more. Patient is tearful and emotional on exam.     Antithrombotics for secondary stroke prevention: Antiplatelets: None: Hold all Antithrombotics x 24 hours after IV t-PA administration    Statins for secondary stroke prevention and hyperlipidemia, if present:   Statins: Atorvastatin- 40 mg daily if not contraindicated  (patient reports allergy to statin)    Aggressive risk factor modification: HTN, Smoking, DM, HLD, Diet, Exercise, Obesity     Rehab efforts: The patient has been evaluated by a stroke team provider and the therapy needs have been fully considered based off the presenting complaints and exam findings. The following therapy evaluations are needed: PT evaluate and treat, OT evaluate and treat, SLP evaluate and treat, PM&R evaluate for appropriate placement    Diagnostics ordered/pending: HgbA1C to assess blood glucose levels, Lipid Profile to assess cholesterol levels, MRI head without " contrast to assess brain parenchyma, TTE to assess cardiac function/status , TSH to assess thyroid function    VTE prophylaxis: None: Hold all Antithrombotics x 24 hours after IV t-PA administration; mechanical SCDs    BP parameters: Infarct: Post tPA, SBP <180

## 2019-09-18 NOTE — HPI
"53 year old female with past medical hx of DM II, HTN, HLD, bipolar 1 disorder, and migraines presented to [place] with complaints of right sided weakness, right facial droop, and slurred speech. LKN @ 1505. Telestroke was completed and TPA administered. There was also concern for hyperdense basilar seen on CT head at [facility]. Patient transferred to [facility] for higher level of neurological care. Per chart review patient was seen yesterday in the ED for complaints of a migraine X 3 days which improved after migraine "cocktail." She was found to have a glucose level of 38. Patients blood glucose normalized and she was discharged home. This afternoon she was talking to her  when she noted her speech sounded different. She reports her  also noticed her speech being slurred and a facial droop. Of note during teleconsult there was concern that symptoms might be functional. However, she did have ipsilateral weakness so TPA was administered despite concern for mimic.   "

## 2019-09-18 NOTE — ED NOTES
Patient sitting up in bed. Tearful at times. Call light in reach. I will continue to stay at bedside. Denies needs at this time.

## 2019-09-18 NOTE — PROGRESS NOTES
"[facility]  Neurocritical Care  Progress Note    Admit Date: 9/17/2019  Service Date: 09/18/2019  Length of Stay: 1    Subjective:     Chief Complaint: <principal problem not specified>    History of Present Illness: 53 year old female with past medical hx of DM II, HTN, HLD, bipolar 1 disorder, and migraines presents as transfer s/p tPA for IR eval. She originally present to [facility] with complaints of right sided weakness, right facial droop, and slurred speech. LKN @ 1505. Telestroke was completed and TPA administered. There was also concern for hyperdense basilar seen on CT head at [facility]. [place] [facility] [place] level of neurological care. Per chart review patient was seen yesterday in the ED for complaints of a migraine X 3 days which improved after migraine "cocktail." She was found to have a glucose level of 38. Patients blood glucose normalized and she was discharged home. This afternoon she was talking to her  when  her  also noticed her speech being slurred and a facial droop. Of note during teleconsult there was concern that symptoms might be functional. However, she did have ipsilateral weakness so TPA was administered despite concern for mimic.  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 NIH 7 on my assessment.        Hospital Course: 9/17: admit NCC s/p tPA      Review of Systems  Constitutional: Negative for fatigue and fever.   HENT: Negative for tinnitus.    Eyes: Negative for visual disturbance.   Respiratory: Negative for apnea and shortness of breath.    Cardiovascular: Negative for chest pain and palpitations.   Gastrointestinal: Negative for abdominal distention, rectal pain and vomiting.   Musculoskeletal: Negative for neck stiffness.   Skin: Negative for color change.   Neurological: Positive for headaches.   Psychiatric/Behavioral: Negative for confusion.     Objective:     Vitals:  Temp: 98.6 °F (37 °C)  Pulse: " 100  Rhythm: normal sinus rhythm  BP: 126/68  MAP (mmHg): 90  Resp: (!) 38  SpO2: 96 %  O2 Device (Oxygen Therapy): room air    Temp  Min: 98 °F (36.7 °C)  Max: 98.7 °F (37.1 °C)  Pulse  Min: 71  Max: 100  BP  Min: 109/63  Max: 155/75  MAP (mmHg)  Min: 82  Max: 117  Resp  Min: 13  Max: 45  SpO2  Min: 95 %  Max: 99 %    09/17 0701 - 09/18 0700  In: 60 [P.O.:60]  Out: 1600 [Urine:1600]   Unmeasured Output  Urine Occurrence: 1  Stool Occurrence: 0       Physical Exam     Neurological:     E4 V5 M6  AAO x 4,  minimal dysarthria  PERRLA, EOMI  RUE/RLE weakness 4/5  LUE/LLE 5/5  Paresthesia RUE/RLE, right side of face       Medications:  Continuous Scheduled  diazePAM 10 mg TID   magnesium sulfate IVPB 2 g Once   senna-docusate 8.6-50 mg 1 tablet BID   topiramate 200 mg Daily   PRN  Dextrose 10% Bolus 12.5 g PRN   glucagon (human recombinant) 1 mg PRN   glucose 16 g PRN   glucose 24 g PRN   insulin aspart U-100 1-10 Units QID (AC + HS) PRN   magnesium oxide 800 mg PRN   magnesium oxide 800 mg PRN   oxyCODONE 5 mg Q6H PRN   potassium chloride 10% 40 mEq PRN   potassium chloride 10% 40 mEq PRN   potassium chloride 10% 60 mEq PRN   potassium, sodium phosphates 2 packet PRN   potassium, sodium phosphates 2 packet PRN   potassium, sodium phosphates 2 packet PRN   prochlorperazine 10 mg Q6H PRN   sodium chloride 0.9% 10 mL PRN     Today I personally reviewed pertinent medications, lines/drains/airways, imaging, cardiology results, laboratory results, microbiology results, notably:    Diet  Diet diabetic [facility]; 2000 Calorie  Diet diabetic [facility]; 2000 Calorie        Assessment/Plan:     Neuro  Acute ischemic stroke  S/p tPA for NIH 8 on tele-consult, concern for stroke Vs stroke mimic  CTA with no LVO, basilar tip aneurysm noted  Q1hr Neuro Checks, SBP <160  MRI Pending  PT/OT/SLP     Psychiatric  Bipolar disorder  topomax and valium resumed.     Cardiac/Vascular  Aneurysm  Incidental basilar tip aneurysm  not on CTA.  SBP goal <160.    Neurosurgery consulted     Essential hypertension  SBP <160  EKG/Echo pending  monitor BP given basilar aneurysm      Hyperlipidemia  Allergic to lipitor, need to eval other statins      Hematology  Hypercoagulable state  US B/L LE with no DVT     Endocrine  Type 2 diabetes mellitus  ISS, oral antiglycemics held in setting of CT contrast     Orthopedic  Acute right-sided weakness  Pt/OT     Other  Debility  PT/OT        The patient is being Prophylaxed for:  Venous Thromboembolism with: Mechanical  Stress Ulcer with: None  Ventilator Pneumonia with: not applicable    Activity Orders          Diet diabetic [facility]; 2000 Calorie: Diabetic starting at 09/18 1037        Full Code    Rene Esquivel MD  Neurocritical Care  [facility]

## 2019-09-19 VITALS
DIASTOLIC BLOOD PRESSURE: 73 MMHG | OXYGEN SATURATION: 99 % | HEIGHT: 66 IN | SYSTOLIC BLOOD PRESSURE: 152 MMHG | WEIGHT: 176 LBS | BODY MASS INDEX: 28.28 KG/M2 | TEMPERATURE: 99 F | HEART RATE: 79 BPM | RESPIRATION RATE: 40 BRPM

## 2019-09-19 PROBLEM — I67.1 CEREBRAL ANEURYSM, NONRUPTURED: Status: ACTIVE | Noted: 2019-09-19

## 2019-09-19 PROBLEM — E86.1 HYPOVOLEMIA: Status: ACTIVE | Noted: 2019-09-19

## 2019-09-19 LAB
ALBUMIN SERPL BCP-MCNC: 4 G/DL (ref 3.5–5.2)
ALP SERPL-CCNC: 96 U/L (ref 55–135)
ALT SERPL W/O P-5'-P-CCNC: 54 U/L (ref 10–44)
AMPHET+METHAMPHET UR QL: NEGATIVE
ANION GAP SERPL CALC-SCNC: 12 MMOL/L (ref 8–16)
AST SERPL-CCNC: 22 U/L (ref 10–40)
BARBITURATES UR QL SCN>200 NG/ML: NORMAL
BASOPHILS # BLD AUTO: 0.07 K/UL (ref 0–0.2)
BASOPHILS NFR BLD: 0.9 % (ref 0–1.9)
BENZODIAZ UR QL SCN>200 NG/ML: NORMAL
BILIRUB SERPL-MCNC: 0.3 MG/DL (ref 0.1–1)
BUN SERPL-MCNC: 13 MG/DL (ref 6–20)
BZE UR QL SCN: NEGATIVE
CALCIUM SERPL-MCNC: 9.3 MG/DL (ref 8.7–10.5)
CANNABINOIDS UR QL SCN: NORMAL
CHLORIDE SERPL-SCNC: 108 MMOL/L (ref 95–110)
CO2 SERPL-SCNC: 22 MMOL/L (ref 23–29)
CREAT SERPL-MCNC: 0.8 MG/DL (ref 0.5–1.4)
CREAT UR-MCNC: 102 MG/DL (ref 15–325)
DIFFERENTIAL METHOD: ABNORMAL
EOSINOPHIL # BLD AUTO: 0.3 K/UL (ref 0–0.5)
EOSINOPHIL NFR BLD: 3.3 % (ref 0–8)
ERYTHROCYTE [DISTWIDTH] IN BLOOD BY AUTOMATED COUNT: 14.2 % (ref 11.5–14.5)
EST. GFR  (AFRICAN AMERICAN): >60 ML/MIN/1.73 M^2
EST. GFR  (NON AFRICAN AMERICAN): >60 ML/MIN/1.73 M^2
ETHANOL UR-MCNC: <10 MG/DL
GLUCOSE SERPL-MCNC: 180 MG/DL (ref 70–110)
HCT VFR BLD AUTO: 44.8 % (ref 37–48.5)
HGB BLD-MCNC: 14.1 G/DL (ref 12–16)
IMM GRANULOCYTES # BLD AUTO: 0.03 K/UL (ref 0–0.04)
IMM GRANULOCYTES NFR BLD AUTO: 0.4 % (ref 0–0.5)
LYMPHOCYTES # BLD AUTO: 3.7 K/UL (ref 1–4.8)
LYMPHOCYTES NFR BLD: 45.8 % (ref 18–48)
MAGNESIUM SERPL-MCNC: 2 MG/DL (ref 1.6–2.6)
MCH RBC QN AUTO: 30.1 PG (ref 27–31)
MCHC RBC AUTO-ENTMCNC: 31.5 G/DL (ref 32–36)
MCV RBC AUTO: 96 FL (ref 82–98)
METHADONE UR QL SCN>300 NG/ML: NEGATIVE
MONOCYTES # BLD AUTO: 0.7 K/UL (ref 0.3–1)
MONOCYTES NFR BLD: 8.5 % (ref 4–15)
NEUTROPHILS # BLD AUTO: 3.3 K/UL (ref 1.8–7.7)
NEUTROPHILS NFR BLD: 41.1 % (ref 38–73)
NRBC BLD-RTO: 0 /100 WBC
OPIATES UR QL SCN: NEGATIVE
PCP UR QL SCN>25 NG/ML: NEGATIVE
PHOSPHATE SERPL-MCNC: 3.6 MG/DL (ref 2.7–4.5)
PLATELET # BLD AUTO: 280 K/UL (ref 150–350)
PMV BLD AUTO: 9.4 FL (ref 9.2–12.9)
POCT GLUCOSE: 191 MG/DL (ref 70–110)
POCT GLUCOSE: 192 MG/DL (ref 70–110)
POCT GLUCOSE: 195 MG/DL (ref 70–110)
POTASSIUM SERPL-SCNC: 3.9 MMOL/L (ref 3.5–5.1)
PROT SERPL-MCNC: 7.7 G/DL (ref 6–8.4)
RBC # BLD AUTO: 4.68 M/UL (ref 4–5.4)
SODIUM SERPL-SCNC: 142 MMOL/L (ref 136–145)
TOXICOLOGY INFORMATION: NORMAL
WBC # BLD AUTO: 7.97 K/UL (ref 3.9–12.7)

## 2019-09-19 PROCEDURE — 99232 PR SUBSEQUENT HOSPITAL CARE,LEVL II: ICD-10-PCS | Mod: HCNC,GC,, | Performed by: ANESTHESIOLOGY

## 2019-09-19 PROCEDURE — 80053 COMPREHEN METABOLIC PANEL: CPT | Mod: HCNC

## 2019-09-19 PROCEDURE — 80307 DRUG TEST PRSMV CHEM ANLYZR: CPT | Mod: HCNC

## 2019-09-19 PROCEDURE — 85025 COMPLETE CBC W/AUTO DIFF WBC: CPT | Mod: HCNC

## 2019-09-19 PROCEDURE — 83735 ASSAY OF MAGNESIUM: CPT | Mod: HCNC

## 2019-09-19 PROCEDURE — 99232 SBSQ HOSP IP/OBS MODERATE 35: CPT | Mod: HCNC,,, | Performed by: NURSE PRACTITIONER

## 2019-09-19 PROCEDURE — 84100 ASSAY OF PHOSPHORUS: CPT | Mod: HCNC

## 2019-09-19 PROCEDURE — 99232 SBSQ HOSP IP/OBS MODERATE 35: CPT | Mod: HCNC,,, | Performed by: NEUROLOGICAL SURGERY

## 2019-09-19 PROCEDURE — 63600175 PHARM REV CODE 636 W HCPCS: Mod: HCNC | Performed by: PHYSICIAN ASSISTANT

## 2019-09-19 PROCEDURE — 99232 PR SUBSEQUENT HOSPITAL CARE,LEVL II: ICD-10-PCS | Mod: HCNC,,, | Performed by: NURSE PRACTITIONER

## 2019-09-19 PROCEDURE — 99232 PR SUBSEQUENT HOSPITAL CARE,LEVL II: ICD-10-PCS | Mod: HCNC,,, | Performed by: NEUROLOGICAL SURGERY

## 2019-09-19 PROCEDURE — 97535 SELF CARE MNGMENT TRAINING: CPT | Mod: HCNC

## 2019-09-19 PROCEDURE — 99238 PR HOSPITAL DISCHARGE DAY,<30 MIN: ICD-10-PCS | Mod: HCNC,,, | Performed by: PSYCHIATRY & NEUROLOGY

## 2019-09-19 PROCEDURE — 99232 SBSQ HOSP IP/OBS MODERATE 35: CPT | Mod: HCNC,GC,, | Performed by: ANESTHESIOLOGY

## 2019-09-19 PROCEDURE — 25000003 PHARM REV CODE 250: Mod: HCNC | Performed by: NURSE PRACTITIONER

## 2019-09-19 PROCEDURE — 99238 HOSP IP/OBS DSCHRG MGMT 30/<: CPT | Mod: HCNC,,, | Performed by: PSYCHIATRY & NEUROLOGY

## 2019-09-19 PROCEDURE — 97530 THERAPEUTIC ACTIVITIES: CPT | Mod: HCNC

## 2019-09-19 PROCEDURE — 25000003 PHARM REV CODE 250: Mod: HCNC | Performed by: PHYSICIAN ASSISTANT

## 2019-09-19 RX ORDER — QUETIAPINE FUMARATE 300 MG/1
300 TABLET, FILM COATED ORAL NIGHTLY
Status: DISCONTINUED | OUTPATIENT
Start: 2019-09-19 | End: 2019-09-19 | Stop reason: HOSPADM

## 2019-09-19 RX ORDER — VENLAFAXINE HYDROCHLORIDE 75 MG/1
150 CAPSULE, EXTENDED RELEASE ORAL DAILY
Qty: 60 CAPSULE | Refills: 2 | Status: SHIPPED | OUTPATIENT
Start: 2019-09-19 | End: 2019-11-14 | Stop reason: SDUPTHER

## 2019-09-19 RX ADMIN — SODIUM CHLORIDE 500 ML: 0.9 INJECTION, SOLUTION INTRAVENOUS at 11:09

## 2019-09-19 RX ADMIN — INSULIN ASPART 2 UNITS: 100 INJECTION, SOLUTION INTRAVENOUS; SUBCUTANEOUS at 06:09

## 2019-09-19 RX ADMIN — SENNOSIDES AND DOCUSATE SODIUM 1 TABLET: 8.6; 5 TABLET ORAL at 08:09

## 2019-09-19 RX ADMIN — TOPIRAMATE 200 MG: 200 TABLET, FILM COATED ORAL at 08:09

## 2019-09-19 RX ADMIN — HEPARIN SODIUM 5000 UNITS: 5000 INJECTION, SOLUTION INTRAVENOUS; SUBCUTANEOUS at 06:09

## 2019-09-19 RX ADMIN — DIAZEPAM 10 MG: 5 TABLET ORAL at 08:09

## 2019-09-19 RX ADMIN — DIAZEPAM 10 MG: 5 TABLET ORAL at 02:09

## 2019-09-19 RX ADMIN — ACETAMINOPHEN 650 MG: 325 TABLET ORAL at 12:09

## 2019-09-19 RX ADMIN — ACETAMINOPHEN 650 MG: 325 TABLET ORAL at 06:09

## 2019-09-19 RX ADMIN — INSULIN ASPART 2 UNITS: 100 INJECTION, SOLUTION INTRAVENOUS; SUBCUTANEOUS at 11:09

## 2019-09-19 RX ADMIN — ASPIRIN 81 MG CHEWABLE TABLET 81 MG: 81 TABLET CHEWABLE at 08:09

## 2019-09-19 NOTE — PLAN OF CARE
Problem: Occupational Therapy Goal  Goal: Occupational Therapy Goal  Goals set on 9/18, with expiration date 9/25:  Patient will increase functional independence with ADLs by performing:    Grooming while standing at sink with Minimal Assistance. MET: now with SBA  UB Dressing with Minimal Assistance. MET: now with SBA  LB Dressing with Minimal Assistance. MET: now with SBA  Toileting from toilet with Contact Guard Assistance for hygiene and clothing management.  MET: now with SBA  Rolling to Bilateral with Stand-by Assistance.  MET: now with SBA  Supine <> Sit with Stand-by Assistance. MET  Step transfer with Contact Guard Assistance MET: now with SBA  Toilet transfer to toilet with Minimal Assistance. MET: now with SBA      Outcome: Outcome(s) achieved Date Met: 09/19/19  Goals Met. No OT needs at this time.  Safe to return home when medically clear.  Reorder OT if status deteriorates.  LELE Orozco  09/19/2019

## 2019-09-19 NOTE — ASSESSMENT & PLAN NOTE
"53 year old female with past medical hx of DM II, HTN, HLD, bipolar 1 disorder, and migraines presented to [facility] with complaints of right sided weakness, right facial droop, and slurred speech. LKN @ 1505. Telestroke completed and TPA administered. Patient complained of right eye "milky halo" which patient states has now improved. Of note during teleconsult there was concern that symptoms might be functional. However, she did have ipsilateral weakness so TPA was administered despite concern for mimic.   CTA MP completed and no LVO noted. Patient admitted to [facility] s/p TPA administration and close neurological monitoring.   Deficits on exam are not consistent. RUE weakness was noted on exam initially but patient able to move extremity without issue when not being examined. Patient having difficulty raising her RLE initially but was seen moving it spontaneously without issue. When patient questioned about her speech she started to slur her words more. Patient is tearful and emotional on exam. On exam 9/18 no difficulty with speech was noticed. MRI Brain performed without evidence for acute infarction. Etiology: Functional neurological symptom disorder.    Antithrombotics for secondary stroke prevention: Antiplatelets: None    Statins for secondary stroke prevention and hyperlipidemia, if present:   Statins: Atorvastatin- 40 mg daily if not contraindicated  (patient reports allergy to statin)    Aggressive risk factor modification: HTN, Smoking, DM, HLD, Diet, Exercise, Obesity     Rehab efforts: The patient has been evaluated by a stroke team provider and the therapy needs have been fully considered based off the presenting complaints and exam findings. The following therapy evaluations are needed: PT evaluate and treat, OT evaluate and treat, SLP evaluate and treat, PM&R evaluate for appropriate placement. Pending final recommendations.    Diagnostics ordered/pending:  none  VTE prophylaxis: None: Hold all " Antithrombotics x 24 hours after IV t-PA administration; mechanical SCDs    BP parameters: Infarct: Post tPA, SBP <180

## 2019-09-19 NOTE — PROGRESS NOTES
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  Pt with Fx Hx of aneurysm (grandmother) Also her daughter has Hereditary hemorrhagic telangiectasia and is S/p surgical treatment of a non ruptured aneurysm  on 1/19.  Neurosurgery was consulted. No acute intervention. They will arrange for outpatient follow up with Dr. Levin on a Monday vascular clinic within the next 2-3 weeks or so.   Family is also considering f/u with Neurosurgery at [place] (with her daugther neurosurgeon)     Acute right-sided weakness  53 year old female with past medical hx of DM II, HTN, HLD, bipolar 1 disorder, and migraines presented to [place] with complaints of right sided weakness, right facial droop, and slurred speech. LKN @ 1505. Telestroke completed and TPA administered. Patient complained of right eye "milky halo" which patient states has now improved. Of note during teleconsult there was concern that symptoms might be functional. However, she did have ipsilateral weakness so TPA was administered despite concern for mimic.   CTA MP completed and no LVO noted. Patient admitted to [facility] s/p TPA administration and close neurological monitoring.   Deficits on exam are not consistent. RUE weakness was noted on exam initially but patient able to move extremity without issue when not being examined. Patient having difficulty raising her RLE initially but was seen moving it spontaneously without issue. When patient questioned about her speech she started to slur her words more. Patient is tearful and emotional on exam. On exam 9/18 no difficulty with speech was noticed. MRI Brain performed without evidence for acute infarction. On exam 9/19 No motor deficit was noticed. Pt able to walk around without difficulties. Etiology: Functional neurological symptom disorder.    Antithrombotics " for secondary stroke prevention: Antiplatelets: None    Statins for secondary stroke prevention and hyperlipidemia, if present:   Statins: Pt will benefit with hyperlipidemia treatment. She does report atorvastatin gave her cramps in the past. Will defer to PCP trial with other statin.    Aggressive risk factor modification: HTN, Smoking, DM, HLD, Diet, Exercise, Obesity     Rehab efforts: The patient has been evaluated by a stroke team provider and the therapy needs have been fully considered based off the presenting complaints and exam findings. The following therapy evaluations are needed: PT evaluate and treat, OT evaluate and treat, SLP evaluate and treat, PM&R evaluate for appropriate placement. Pending final recommendations.    Diagnostics ordered/pending:  none  VTE prophylaxis: None: Hold all Antithrombotics x 24 hours after IV t-PA administration; mechanical SCDs    BP parameters: Infarct: Post tPA, SBP <180            S/P admn tPA in diff fac w/n last 24 hr bef adm to crnt fac  Admit to NCC s/p TPA administration for 24 hours   TPA ended 17:17 9/17  No complications      Type 2 diabetes mellitus  Stroke risk factor   On NPH at home  Hypoglycemic episode 9/16 - glucose 38      Tobacco abuse  Stroke risk factor  Encouraged smoking cessation  Nicotine patch as needed     Hypothyroidism due to acquired atrophy of thyroid  Stroke risk factor  TSH 4.53 Free T4 0.81      Dyslipidemia associated with type 2 diabetes mellitus  Stroke risk factor  Chol 19 Trig 273 HDL 69 LDL 95  Patient reports allergy to Lipitor     Essential hypertension  Stroke risk factor  SBP <180  Currently not requiring antihypertensives at this time    Depression  Continue Effexor     Bipolar disorder  Last seen my psychiatry 7/1/2019  Continue recommend psych medications - topiramate, quetiapine 300 mg po qhs. venlafaxine 75 tid  Pt counseled in keep her appt with Psychiatry         9/18 Inconsistent PE. CTA MP no LVO, showed 4mm basilar  aneurysm. Pending brain MRI.     STROKE DOCUMENTATION   Acute Stroke Times   Last Known Normal Date: 09/17/19  Last Known Normal Time: 1505  Symptom Onset Date: 09/17/19  Symptom Onset Time: 1505  Stroke Team Called Date: 09/17/19  Stroke Team Called Time: 1905  Stroke Team Arrival Date: 09/17/19  Stroke Team Arrival Time: 1910  CT Interpretation Time: 1930  Decision to Treat Time for Alteplase: (NA)  Decision to Treat Time for IR: (NA)    NIH Scale:  1a. Level of Consciousness: 0-->Alert, keenly responsive  1b. LOC Questions: 0-->Answers both questions correctly  1c. LOC Commands: 0-->Performs both tasks correctly  2. Best Gaze: 0-->Normal  3. Visual: 0-->No visual loss  4. Facial Palsy: 0-->Normal symmetrical movements  5a. Motor Arm, Left: 0-->No drift, limb holds 90 (or 45) degrees for full 10 secs  5b. Motor Arm, Right: 0-->No drift, limb holds 90 (or 45) degrees for full 10 secs  6a. Motor Leg, Left: 0-->No drift, leg holds 30 degree position for full 5 secs  6b. Motor Leg, Right: 0-->No drift, leg holds 30 degree position for full 5 secs  7. Limb Ataxia: 0-->Absent  8. Sensory: 0-->Normal, no sensory loss  9. Best Language: 0-->No aphasia, normal  10. Dysarthria: 0-->Normal  11. Extinction and Inattention (formerly Neglect): 0-->No abnormality  Total (NIH Stroke Scale): 0       Modified Polk Score: 0  Inga Coma Scale:    ABCD2 Score:    [id number] Score:   HAS -BLED Score:   ICH Score:   Hunt & Goodwin Classification:      Hemorrhagic change of an Ischemic Stroke: Does this patient have an ischemic stroke with hemorrhagic changes? No     Neurologic Chief Complaint: RSW, visual and speech difficulties.    Subjective:     Interval History: Patient is seen for follow-up neurological assessment and treatment recommendations:   NAEON. MRI negative for Stroke. Denies any weakness or difficulty with speech. NS will work her up as out patient.    HPI, Past Medical, Family, and Social History remains the same as  documented in the initial encounter.     Review of Systems    Scheduled Meds:   aspirin  81 mg Oral Daily    diazePAM  10 mg Oral TID    heparin (porcine)  5,000 Units Subcutaneous Q8H    QUEtiapine  300 mg Oral QHS    senna-docusate 8.6-50 mg  1 tablet Oral BID    sodium chloride 0.9%  500 mL Intravenous Once    topiramate  200 mg Oral Daily     Continuous Infusions:  PRN Meds:acetaminophen, Dextrose 10% Bolus, glucagon (human recombinant), glucose, glucose, insulin aspart U-100, magnesium oxide, magnesium oxide, oxyCODONE, potassium chloride 10%, potassium chloride 10%, potassium chloride 10%, potassium, sodium phosphates, potassium, sodium phosphates, potassium, sodium phosphates, prochlorperazine, sodium chloride 0.9%    Objective:     Vital Signs (Most Recent):  Temp: 98.5 °F (36.9 °C) (09/19/19 0705)  Pulse: 86 (09/19/19 0905)  Resp: (!) 29 (09/19/19 0905)  BP: 128/77 (09/19/19 0905)  SpO2: 96 % (09/19/19 0905)  BP Location: Right arm    Vital Signs Range (Last 24H):  Temp:  [98.2 °F (36.8 °C)-98.7 °F (37.1 °C)]   Pulse:  []   Resp:  [13-49]   BP: (111-158)/(56-81)   SpO2:  [94 %-100 %]   BP Location: Right arm    Physical Exam     Constitutional: She is oriented to person, place, and time.   HENT:   Head: Normocephalic and atraumatic.   Eyes: Pupils are equal, round, and reactive to light. EOM are normal.   Cardiovascular: Normal rate.   Pulmonary/Chest: Effort normal.   Abdominal: Soft.   Musculoskeletal: She exhibits no edema.   Right sided weakness   Neurological: She is alert and oriented to person, place, and time.   Skin: Skin is warm. Capillary refill takes less than 2 seconds.   Psychiatric: She has a normal mood and affect    Neurological Exam:   LOC: alert  Attention Span: Good   Language: No aphasia  Articulation: No Dysarthria  Orientation: Person, Place, Time   Visual Fields: Full  EOM (CN III, IV, VI): Full/intact  Pupils (CN II, III): PERRL  Facial Movement (CN VII): no asymmetry  noticed, however pt not full cooperative  Motor: Arm left  Normal 5/5  Leg left  Normal 5/5  Arm right 5/5  Leg right 5/5  Cebellar: No Appendicular Ataxia   Sensation: Normal to light touch in 4 extremities    Laboratory:  CMP:   Recent Labs   Lab 09/19/19 0056   CALCIUM 9.3   ALBUMIN 4.0   PROT 7.7      K 3.9   CO2 22*      BUN 13   CREATININE 0.8   ALKPHOS 96   ALT 54*   AST 22   BILITOT 0.3     CBC:   Recent Labs   Lab 09/19/19 0056   WBC 7.97   RBC 4.68   HGB 14.1   HCT 44.8      MCV 96   MCH 30.1   MCHC 31.5*     Lipid Panel:   Recent Labs   Lab 09/17/19 1913   CHOL 219*   LDLCALC 95.4   HDL 69   TRIG 273*     Hgb A1C:   Recent Labs   Lab 09/17/19 1913   HGBA1C 6.1*     TSH:   Recent Labs   Lab 09/17/19 1913   TSH 4.530*       Diagnostic Results     Brain Imaging     Brain MRI 9/18/19  Unremarkable MRI brain without evidence for acute infarction    CT head 9/17  No acute abnormality. If there is additional clinical concern for acute infarct, MRI with diffusion weighted imaging recommended.     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  No CT finding of subarachnoid hemorrhage or hydrocephalus.  Dedicated conventional angiogram is suggested.    No major vascular distribution infarct, focal high-grade stenosis, or occlusion.    US Lower Extremity Veins Bilateral  No evidence of deep venous thrombosis in either lower extremity.    Cardiac Imaging   TTE   · Concentric left ventricular remodeling. Normal left ventricular systolic function. The estimated ejection fraction is 55%  · Normal LV diastolic function.  · Normal right ventricular systolic function.  · Normal central venous pressure (3 mm Hg).        Joanne Salas MD  Comprehensive Stroke Center  Department of Vascular Neurology   [facility]

## 2019-09-19 NOTE — ASSESSMENT & PLAN NOTE
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  Pt with Fx Hx of aneurysm (grandmother) Also her daughter has Hereditary hemorrhagic telangiectasia and is S/p surgical treatment of a non ruptured aneurysm  on 1/19.  Neurosurgery was consulted. No acute intervention. They will arrange for outpatient follow up with Dr. Levin on a Monday vascular clinic within the next 2-3 weeks or so.   Family is also considering f/u with Neurosurgery at [place] (with her daugther neurosurgeon)

## 2019-09-19 NOTE — PT/OT/SLP PROGRESS
Occupational Therapy   Treatment    Name: Alexandra Goins  MRN: [id number]  Admitting Diagnosis:  R sided weakness       Recommendations:     Discharge Recommendations: home  Discharge Equipment Recommendations:  none  Barriers to discharge:  None    Assessment:     Alexandra Goins is a 53 y.o. female with a medical diagnosis of R sided weakness.  She presents with performance deficits affecting function are impaired endurance, weakness, impaired cognition.     Rehab Prognosis:  Good; patient would benefit from acute skilled OT services to address these deficits and reach maximum level of function.       Plan:     Patient to be seen 3 x/week to address the above listed problems via self-care/home management, therapeutic activities, therapeutic exercises  · Plan of Care Expires: 10/17/19  · Plan of Care Reviewed with: patient, daughter    Subjective     Pain/Comfort:  · Pain Rating 1: 0/10  · Pain Rating Post-Intervention 1: 0/10    Objective:     Communicated with: RN prior to session.  Patient found supine with pulse ox (continuous), SCD, telemetry, peripheral IV upon OT entry to room. Daughter at bedside.     General Precautions: Standard, aspiration, fall   Orthopedic Precautions:N/A   Braces: N/A     Occupational Performance:     Bed Mobility:    · Patient completed Rolling/Turning to Left with  stand by assistance  · Patient completed Scooting/Bridging with stand by assistance  · Patient completed Supine to Sit with stand by assistance  · Patient completed Sit to Supine with stand by assistance     Functional Mobility/Transfers:  · Patient completed Sit <> Stand Transfer with stand by assistance  with  no assistive device   · Patient completed Toilet Transfer Step Transfer technique with stand by assistance with  no AD  · Functional Mobility: Pt ambulated with SBA and no AD with SBA.     Activities of Daily Living:  · Grooming: stand by assistance in standing at sink, brushing teeth and washing  face  · Toileting: stand by assistance at toilet level      [facility] 6 Click ADL: 24    Treatment & Education:  -Pt education on OT role and POC   -Re-Assessment of pt, pt SBA and supervision - No further OT needs at this time   -Importance of OOB activity with staff assistance  -Safety during functional transfer and mobility  -White board updated  -Multiple self-care tasks and functional mobility completed -- assistance level noted above  -All questions and concerns answered within OT scope of practice.       Patient left supine with all lines intact, call button in reach, bed alarm on and RN notifiedEducation:      GOALS:   Pt with no further OT needs, please reconsult if medically necessary.   Multidisciplinary Problems     Occupational Therapy Goals        Problem: Occupational Therapy Goal    Goal Priority Disciplines Outcome Interventions   Occupational Therapy Goal     OT, PT/OT     Description:  Goals set on 9/18, with expiration date 9/25:  Patient will increase functional independence with ADLs by performing:    Grooming while standing at sink with Minimal Assistance. MET: now with SBA  UB Dressing with Minimal Assistance. MET: now with SBA  LB Dressing with Minimal Assistance. MET: now with SBA  Toileting from toilet with Contact Guard Assistance for hygiene and clothing management.  MET: now with SBA  Rolling to Bilateral with Stand-by Assistance.  MET: now with SBA  Supine <> Sit with Stand-by Assistance. MET  Step transfer with Contact Guard Assistance MET: now with SBA  Toilet transfer to toilet with Minimal Assistance. MET: now with SBA                       Time Tracking:     OT Date of Treatment: 09/19/19  OT Start Time: 1040  OT Stop Time: 1103  OT Total Time (min): 23 min    Billable Minutes:Self Care/Home Management 23    Ayesha Colmenares OT  9/19/2019

## 2019-09-19 NOTE — PLAN OF CARE
Problem: Adult Inpatient Plan of Care  Goal: Plan of Care Review  POC reviewed with pt and daughter at 2300. Pt and daughter both verbalized understanding. PRN tylenol given X 1 for pain. Questions and concerns addressed. No acute events overnight. Pt progressing toward goals. Will continue to monitor. See flowsheets for full assessment and VS info.

## 2019-09-19 NOTE — PT/OT/SLP PROGRESS
Physical Therapy   Progress Note    Patient Name:  Alexandra Goins  MRN: [id number]  Recent Surgery: * No surgery found *      Recommendations:     Discharge Recommendations: Home, no PT needs anticipated  Equipment recommendations: none  Barriers to discharge: none    Assessment:     Alexandra Goins is a 53 y.o. female [place] [facility] [place] with medical diagnosis of right sided weakness. Therapist re-assessed pt's functional mobility and strength this visit-- pt noted to demonstrate 5/5 strength on RLE via MMT which is significantly improved from initial evaluation. Pt able to perform bed mobility and transfers with independence. Supervision provided during gait and dynamic standing activities with no AD use required. Pt upset and tearful during session, expressing concerns re: uncertainty of medical diagnosis and fears for discharge and follow/up treatment-- due to emotional state, therapist providing close SBA during standing activities due to multiple medical lines and risk of falls.  However, no acute gait deficits identified and anticipating pt to mobilize well upon discharge. Therapist provided active listening and re-directed pt to reduce anxiety and relayed pt's concerns with RN. RN present and assumed care of patient at end of session. Also discussed pt's concerns with case management team.  Alexandra Goins would benefit from continued acute PT intervention to address increase pt's independence with functional mobility, provide patient/caregiver education, and reduce fall risk. Once medically stable, recommending pt discharge home with on further PT needs anticipated.     Rehab Prognosis: Good    Plan:     During this hospitalization, patient to be seen 2 x/week to address the identified rehab impairments via gait training, therapeutic activities, therapeutic exercises, neuromuscular re-education and progress towards stated goals.     Plan of Care Expires:  10/18/19  Plan of Care reviewed with:  "patient and daughter    This plan of care has been discussed with the patient/caregiver, who was included in its development and is in agreement with the identified goals and treatment plan.     Subjective     Communicated with RN prior to session.  Patient agreeable to participate. Pt's daughter at bedside.     Patient comments/goals: "What if I go home and die from this aneurysm, I want to talk to neurosurgery before I leave here." "I'm so confused" -- RN aware of pt's concerns     Pain/Comfort:  · Location: pt reported no pain throughout  · Pain Ratin/10      Patients cultural, spiritual, Jainism conflicts given the current situation: No known conflicts     Objective:     Patient found with: peripheral IV , telemetry, blood pressure cuff and continuous pulse oximeter    General Precautions: Fall and Standard  Orthopedic Precautions: N/A  Braces: N/A  Oxygen Device: pt on room air     Cognition:   Pt is alert and oriented x 4   Able to follow commands appropriately     Functional Mobility:    Bed Mobility:  · Supine > Sit: Independent  · Sit > Supine: N/T    Sitting Balance:   · Assistance level: Independent    Transfers:   · Sit <> Stand Transfer: Supervision from EOB x 3 trials with no AD     Standing Balance:   · Assistance level: Supervision   · Maintained static and dynamic standing balance with Super                 Gait:  · Distance: ~30 ft.   · Assistance level: Stand-by Assistance  · Assistive Device: none  · Gait Assessment: pt ambulating with reciprocal gait pattern, no LOB. Mild instability noted during gait but no overt LOB or need for UE support.     Outcome Measure: AM-PAC 6 CLICK MOBILITY  Total Score:21     Patient/Caregiver Education and Therapeutic Activities/Exercises     Discussed therapy recommendations, provided active listening to address pt's concerns within scope of practice.     Patient/caregiver able to verbalize understanding; will follow-up with pt/caregiver during current " admit for additional questions/concerns within scope of practice.     White board updated.     Patient left up in chair with all lines intact, call button in reach and RN and MD present.    Goals:     Multidisciplinary Problems     Physical Therapy Goals        Problem: Physical Therapy Goal    Goal Priority Disciplines Outcome Goal Variances Interventions   Physical Therapy Goal     PT, PT/OT Ongoing (interventions implemented as appropriate)     Description:  Goals to be met by: 19     Patient will increase functional independence with mobility by performin. Sit to stand transfer with Stand-by assistance -- MET   2. Gait  x 100 feet with supervision using LRAD or without an AD.  3. Ascend/descend 8 stairs with supervision using LRAD or without an AD.  4. Stand for 5 minutes performing static balance activities with Contact Guard Assistance using LRAD or without an AD.   5. Lower extremity exercise program x15 reps per handout, with assistance as needed                       Time Tracking:       PT Received On: 19  PT Start Time: 1237     PT Stop Time: 1250  PT Total Time (min): 13 min     Billable Minutes: Therapeutic Exercise 13 min    Vane Mcintyre, PT  2019

## 2019-09-19 NOTE — SUBJECTIVE & OBJECTIVE
Interval History: NAEON. Nonfocal on exam this AM. Tearful regarding new aneurysm diagnosis, significant family history of aneurysms in patient's mother, daughter.     Medications:  Continuous Infusions:  Scheduled Meds:   aspirin  81 mg Oral Daily    diazePAM  10 mg Oral TID    heparin (porcine)  5,000 Units Subcutaneous Q8H    senna-docusate 8.6-50 mg  1 tablet Oral BID    topiramate  200 mg Oral Daily     PRN Meds:acetaminophen, Dextrose 10% Bolus, glucagon (human recombinant), glucose, glucose, insulin aspart U-100, magnesium oxide, magnesium oxide, oxyCODONE, potassium chloride 10%, potassium chloride 10%, potassium chloride 10%, potassium, sodium phosphates, potassium, sodium phosphates, potassium, sodium phosphates, prochlorperazine, sodium chloride 0.9%     Review of Systems  Objective:     Weight: 79.8 kg (176 lb)  Body mass index is 28.41 kg/m².  Vital Signs (Most Recent):  Temp: 98.5 °F (36.9 °C) (09/19/19 0705)  Pulse: 86 (09/19/19 0905)  Resp: (!) 29 (09/19/19 0905)  BP: 128/77 (09/19/19 0905)  SpO2: 96 % (09/19/19 0905) Vital Signs (24h Range):  Temp:  [98.2 °F (36.8 °C)-98.7 °F (37.1 °C)] 98.5 °F (36.9 °C)  Pulse:  [] 86  Resp:  [13-49] 29  SpO2:  [94 %-100 %] 96 %  BP: (111-158)/(56-81) 128/77                   Neurosurgery Physical Exam   GCS [id number] AOx3  PERRL, EOMI, Face Symmetric, Tongue Midline  RUE: 5/5 LUE 5/5  RLE: 5/5 LLE: 5/5  SILT  No pronator drift    Significant Labs:  Recent Labs   Lab 09/17/19  1913 09/18/19  0331 09/19/19  0056   * 107 180*    141 142   K 4.0 4.2 3.9    108 108   CO2 23 22* 22*   BUN 14 10 13   CREATININE 0.7 0.7 0.8   CALCIUM 9.3 9.3 9.3   MG  --  1.7 2.0     Recent Labs   Lab 09/17/19 1913 09/18/19  0331 09/19/19  0056   WBC 11.37 8.90 7.97   HGB 13.6 13.9 14.1   HCT 44.5 43.7 44.8    300 280     Recent Labs   Lab 09/17/19 1913   INR 1.0       Significant Diagnostics:  Mri Brain Without Contrast    Result Date:  9/18/2019  Unremarkable MRI brain as detailed above specifically without evidence for acute infarction. Incidental partial fluid opacification mastoid air cells bilaterally. Electronically signed by: Darrick Fernandez DO Date:    09/18/2019 Time:    15:01    Us Lower Extremity Veins Bilateral    Result Date: 9/18/2019  No evidence of deep venous thrombosis in either lower extremity. Electronically signed by resident: Milvia Bojorquez Date:    09/18/2019 Time:    11:10 Electronically signed by: Darrion Triplett MD Date:    09/18/2019 Time:    11:52

## 2019-09-19 NOTE — PROGRESS NOTES
"[facility]  Neurocritical Care  Progress Note    Admit Date: 9/17/2019  Service Date: 09/19/2019  Length of Stay: 2    Subjective:     Chief Complaint: <principal problem not specified>    History of Present Illness: 53 year old female with past medical hx of DM II, HTN, HLD, bipolar 1 disorder, and migraines presents as transfer s/p tPA for IR eval. She originally present to [facility] with complaints of right sided weakness, right facial droop, and slurred speech. LKN @ 1505. Telestroke was completed and TPA administered. There was also concern for hyperdense basilar seen on CT head at [facility]. Patient transferred to [facility] for higher level of neurological care. Per chart review patient was seen yesterday in the ED for complaints of a migraine X 3 days which improved after migraine "cocktail." She was found to have a glucose level of 38. Patients blood glucose normalized and she was discharged home. This afternoon she was talking to her  when  her  also noticed her speech being slurred and a facial droop. Of note during teleconsult there was concern that symptoms might be functional. However, she did have ipsilateral weakness so TPA was administered despite concern for mimic.  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 NIH 7 on my assessment.        Hospital Course: 9/17: admit NCC s/p tPA   09/19/2019  No stroke on workup, per NSGY no immediate neurosurgical intervention required, patient stable, ASA restarted.    No new subjective & objective note has been filed under this hospital service since the last note was generated.    Assessment/Plan:     Neuro  Acute ischemic stroke  S/p tPA for NIH 8 on tele-consult, concern for stroke Vs stroke mimic  CTA with no LVO, basilar tip aneurysm noted  Q1hr Neuro Checks, SBP <160 (for aneurysm)  MRI No acute stroke  VN following  NSGY following, arranging foe outpatient follow up  Was transferred to VN floor yesterday, to " complete workup and discharge planing, waiting on floor room.  ASA, pending [facility] neuro recs for statin recs, since patient endorsed allergic reaction to lipitor in the past  PT/OT/SLP     Psychiatric  Bipolar disorder  topomax and valium resumed.    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  SBP goal <160.    Neurosurgery following   No immediate intervention, follow up in clinic     Essential hypertension  SBP <160  EKG/Echo pending  monitor BP given basilar aneurysm      Hyperlipidemia  Allergic to lipitor, need to eval other statins      Hematology  Hypercoagulable state  US B/L LE with no DVT     Endocrine  Type 2 diabetes mellitus  ISS, oral antiglycemics held     Orthopedic  Acute right-sided weakness  Pt/OT     Other  Debility  PT/OT        The patient is being Prophylaxed for:  Venous Thromboembolism with: Mechanical  Stress Ulcer with: None  Ventilator Pneumonia with: not applicable    Activity Orders          Diet diabetic [facility]; 2000 Calorie: Diabetic starting at 09/18 1037        Full Code    Rene Esquivel MD  Neurocritical Care  [facility]

## 2019-09-19 NOTE — PLAN OF CARE
Problem: Physical Therapy Goal  Goal: Physical Therapy Goal  Goals to be met by: 19     Patient will increase functional independence with mobility by performin. Sit to stand transfer with Stand-by assistance -- MET   2. Gait  x 100 feet with supervision using LRAD or without an AD.  3. Ascend/descend 8 stairs with supervision using LRAD or without an AD.  4. Stand for 5 minutes performing static balance activities with Contact Guard Assistance using LRAD or without an AD.   5. Lower extremity exercise program x15 reps per handout, with assistance as needed     Outcome: Ongoing (interventions implemented as appropriate)  Goals updated. Following re-assessment this date, discharge recommendations modified to home with no PT needs anticipated at this time.      Vane Mcintyre PT, DPT   2019  Pager: [phone]

## 2019-09-19 NOTE — PROGRESS NOTES
[facility]  Physical Medicine & Rehab  Progress Note    Patient Name: Alexandra Goins  MRN: [id number]  Admission Date: 9/17/2019  Length of Stay: 2 days  Attending Physician: Virgil John MD    Subjective:     Principal Problem: Right-sided weakness    Hospital Course:   9/17/19:  Therapy evaluations pending.   9/18/19:  Evaluated by PT and OT.  Bed mobility SBA-CGA.  EOB SBA.  Sit to stand and transfers Bren.  Standing balance Bren.  Ambulated ~4 ft forward and backwards Bren.  LBD SBA.  Swallow evaluation with SLP completed.  SLP recommendation: regular diet and thin liquids.     Interval History 9/19/2019:  Patient is seen for follow-up rehab evaluation and recommendations: Feels back to baseline.  Restarted on psych meds.      HPI, Past Medical, Family, and Social History remains the same as documented in the initial encounter.    Scheduled Medications:    aspirin  81 mg Oral Daily    diazePAM  10 mg Oral TID    heparin (porcine)  5,000 Units Subcutaneous Q8H    QUEtiapine  300 mg Oral QHS    senna-docusate 8.6-50 mg  1 tablet Oral BID    topiramate  200 mg Oral Daily     PRN Medications: acetaminophen, Dextrose 10% Bolus, glucagon (human recombinant), glucose, glucose, insulin aspart U-100, magnesium oxide, magnesium oxide, oxyCODONE, potassium chloride 10%, potassium chloride 10%, potassium chloride 10%, potassium, sodium phosphates, potassium, sodium phosphates, potassium, sodium phosphates, prochlorperazine, sodium chloride 0.9%    Review of Systems   Constitutional: Positive for activity change. Negative for chills and fever.   HENT: Negative for trouble swallowing and voice change.    Eyes: Negative for pain and visual disturbance.   Respiratory: Negative for cough and shortness of breath.    Cardiovascular: Negative for chest pain and palpitations.   Gastrointestinal: Negative for abdominal distention, nausea and vomiting.   Genitourinary: Negative for difficulty urinating and  flank pain.   Musculoskeletal: Negative for back pain and neck pain.   Skin: Negative for rash and wound.   Neurological: Negative for weakness, numbness and headaches.   Psychiatric/Behavioral: Negative for agitation. The patient is nervous/anxious.      Objective:     Vital Signs (Most Recent):  Temp: 98.6 °F (37 °C) (09/19/19 1105)  Pulse: 79 (09/19/19 1205)  Resp: (!) 40 (09/19/19 1205)  BP: (!) 152/73 (09/19/19 1205)  SpO2: 99 % (09/19/19 1205)    Vital Signs (24h Range):  Temp:  [98.2 °F (36.8 °C)-98.7 °F (37.1 °C)] 98.6 °F (37 °C)  Pulse:  [65-94] 79  Resp:  [13-49] 40  SpO2:  [94 %-100 %] 99 %  BP: (111-158)/(56-81) 152/73     Physical Exam   Constitutional: She is oriented to person, place, and time. She appears well-developed and well-nourished. No distress.   HENT:   Head: Normocephalic and atraumatic.   Right Ear: External ear normal.   Left Ear: External ear normal.   Nose: Nose normal.   Eyes: Right eye exhibits no discharge. Left eye exhibits no discharge. No scleral icterus.   Neck: Normal range of motion.   Cardiovascular: Normal rate and intact distal pulses.   Pulmonary/Chest: Effort normal. No respiratory distress.   Abdominal: Soft. She exhibits no distension. There is no tenderness.   Musculoskeletal: Normal range of motion. She exhibits no edema or tenderness.   Neurological: She is alert and oriented to person, place, and time. She has normal strength. She displays no tremor. No sensory deficit. She exhibits normal muscle tone.   Skin: Skin is warm and dry. No rash noted.   Psychiatric: Her behavior is normal. Her mood appears anxious.   Vitals reviewed.    Diagnostic Results:   Labs: Reviewed  X-Ray: Reviewed  US: Reviewed  CT: Reviewed    Assessment/Plan:      Debility  -  See acute right-sided weakness    Acute right-sided weakness  -  Presented with acute right-sided weakness, facial droop, slurred speech, and R vision changes  -  CTH without acute pathology s/p tPA  -  CTA multiphase  without LVO  -  MRI brain unremarkable   -  Vascular Neurology following  See hospital course for functional, cognitive/speech/language, and nutrition/swallow status.    Recommendations  -  Encourage mobility, OOB in chair, and early ambulation as appropriate   -  PT/OT evaluate and treat  -  SLP speech and cognitive evaluate and treat  -  Monitor mood and sleep disturbances  -  Establish consistent sleep-wake cycle  -  Monitor for bowel and bladder dysfunction  -  Monitor for shoulder pain and subluxation  -  Monitor for spasticity  -  DVT prophylaxis  -  Monitor for and prevent skin breakdown and pressure ulcers  · Early mobility, repositioning/weight shifting every 20-30 minutes when sitting, turn patient every 2 hours, proper mattress/overlay and chair cushioning, pressure relief/heel protector boots    Acute ischemic stroke  -  See acute right-sided weakness    Tobacco abuse  -  Stroke risk factor  -  Encourage cessation     MRI negative, exam improving.  Unlikely to require post-acute placement.  Will follow therapy progress for final post-acute recommendation.    JONO Delcid  Department of Physical Medicine & Rehab   [facility]

## 2019-09-19 NOTE — ASSESSMENT & PLAN NOTE
Last seen my psychiatry 7/1/2019  Continue recommend psych medications - topiramate, quetiapine 300 mg po qhs. venlafaxine 75 tid  Pt counseled in keep her appt with Psychiatry

## 2019-09-19 NOTE — NURSING
Pt scheduled for low dose aspirin, chart states that pt is allergic to aspirin. Asked the pt what her reaction to low dose aspirin is. Pt states she used to get nosebleeds but that it doesn't happen anymore. Notified Ruthann TUBBS with N[facility], states that it is ok to give the low dose aspirin to the pt. Will continue to monitor and notify as needed.

## 2019-09-19 NOTE — SUBJECTIVE & OBJECTIVE
Interval History 9/19/2019:  Patient is seen for follow-up rehab evaluation and recommendations: Feels back to baseline.  Restarted on psych meds.      HPI, Past Medical, Family, and Social History remains the same as documented in the initial encounter.    Scheduled Medications:    aspirin  81 mg Oral Daily    diazePAM  10 mg Oral TID    heparin (porcine)  5,000 Units Subcutaneous Q8H    QUEtiapine  300 mg Oral QHS    senna-docusate 8.6-50 mg  1 tablet Oral BID    topiramate  200 mg Oral Daily     PRN Medications: acetaminophen, Dextrose 10% Bolus, glucagon (human recombinant), glucose, glucose, insulin aspart U-100, magnesium oxide, magnesium oxide, oxyCODONE, potassium chloride 10%, potassium chloride 10%, potassium chloride 10%, potassium, sodium phosphates, potassium, sodium phosphates, potassium, sodium phosphates, prochlorperazine, sodium chloride 0.9%    Review of Systems   Constitutional: Positive for activity change. Negative for chills and fever.   HENT: Negative for trouble swallowing and voice change.    Eyes: Negative for pain and visual disturbance.   Respiratory: Negative for cough and shortness of breath.    Cardiovascular: Negative for chest pain and palpitations.   Gastrointestinal: Negative for abdominal distention, nausea and vomiting.   Genitourinary: Negative for difficulty urinating and flank pain.   Musculoskeletal: Negative for back pain and neck pain.   Skin: Negative for rash and wound.   Neurological: Negative for weakness, numbness and headaches.   Psychiatric/Behavioral: Negative for agitation. The patient is nervous/anxious.      Objective:     Vital Signs (Most Recent):  Temp: 98.6 °F (37 °C) (09/19/19 1105)  Pulse: 79 (09/19/19 1205)  Resp: (!) 40 (09/19/19 1205)  BP: (!) 152/73 (09/19/19 1205)  SpO2: 99 % (09/19/19 1205)    Vital Signs (24h Range):  Temp:  [98.2 °F (36.8 °C)-98.7 °F (37.1 °C)] 98.6 °F (37 °C)  Pulse:  [65-94] 79  Resp:  [13-49] 40  SpO2:  [94 %-100 %] 99  %  BP: (111-158)/(56-81) 152/73     Physical Exam   Constitutional: She is oriented to person, place, and time. She appears well-developed and well-nourished. No distress.   HENT:   Head: Normocephalic and atraumatic.   Right Ear: External ear normal.   Left Ear: External ear normal.   Nose: Nose normal.   Eyes: Right eye exhibits no discharge. Left eye exhibits no discharge. No scleral icterus.   Neck: Normal range of motion.   Cardiovascular: Normal rate and intact distal pulses.   Pulmonary/Chest: Effort normal. No respiratory distress.   Abdominal: Soft. She exhibits no distension. There is no tenderness.   Musculoskeletal: Normal range of motion. She exhibits no edema or tenderness.   Neurological: She is alert and oriented to person, place, and time. She has normal strength. She displays no tremor. No sensory deficit. She exhibits normal muscle tone.   Skin: Skin is warm and dry. No rash noted.   Psychiatric: Her behavior is normal. Her mood appears anxious.   Vitals reviewed.    Diagnostic Results:   Labs: Reviewed  X-Ray: Reviewed  US: Reviewed  CT: Reviewed    NEUROLOGICAL EXAMINATION:     MENTAL STATUS   Oriented to person, place, and time.     MOTOR EXAM     Strength   Strength 5/5 throughout.

## 2019-09-19 NOTE — PLAN OF CARE
IMM in chart.  Signed on admit 09/17/19 at 19:37 pm.        09/19/19 1304   Medicare Message   Important Message from Medicare regarding Discharge Appeal Rights Signed/date by patient/caregiver   Date IMM was signed 09/17/19   Time IMM was signed 1937       Francine Mane, RN, CCRN-K, [facility]  Neuro-Critical Care   [phone]

## 2019-09-19 NOTE — ASSESSMENT & PLAN NOTE
Admit to [facility] s/p TPA administration for 24 hours   TPA ended 17:17 9/17  No complications

## 2019-09-19 NOTE — ASSESSMENT & PLAN NOTE
-  Presented with acute right-sided weakness, facial droop, slurred speech, and R vision changes  -  CTH without acute pathology s/p tPA  -  CTA multiphase without LVO  -  MRI brain unremarkable   -  Vascular Neurology following  See hospital course for functional, cognitive/speech/language, and nutrition/swallow status.    Recommendations  -  Encourage mobility, OOB in chair, and early ambulation as appropriate   -  PT/OT evaluate and treat  -  SLP speech and cognitive evaluate and treat  -  Monitor mood and sleep disturbances  -  Establish consistent sleep-wake cycle  -  Monitor for bowel and bladder dysfunction  -  Monitor for shoulder pain and subluxation  -  Monitor for spasticity  -  DVT prophylaxis  -  Monitor for and prevent skin breakdown and pressure ulcers  · Early mobility, repositioning/weight shifting every 20-30 minutes when sitting, turn patient every 2 hours, proper mattress/overlay and chair cushioning, pressure relief/heel protector boots

## 2019-09-19 NOTE — NURSING
Pt discharged home via wheelchair with 2 daughters. Pt adamant about not wanting to stay in room and wait for ride.  on his way to pick pt up. Discharge instructions and AVS reviewed with pt. Pt aware of at home meds and follow appointments.

## 2019-09-19 NOTE — PLAN OF CARE
09/19/19 1512   Final Note   Assessment Type Final Discharge Note   Anticipated Discharge Disposition Home   What phone number can be called within the next 1-3 days to see how you are doing after discharge? [phone]   Hospital Follow Up  Appt(s) scheduled? Yes   Discharge plans and expectations educations in teach back method with documentation complete? Yes   Right Care Referral Info   Post Acute Recommendation No Care       Future Appointments   Date Time Provider Department Center   9/25/2019  1:40 PM Perez Casiano MD [facility]   10/1/2019 10:30 AM Chang Mullen MD [facility] [place]   10/10/2019  9:45 AM Chance Levin MD [facility] NEUROS7 [facility]   10/17/2019  1:20 PM Jennifer Mauricio, PA-C [facility]   11/27/2019  9:00 AM Amelia Santana, PhD, NP-C [facility]         Follow-up Information     Perez Casiano MD. Go on 9/25/2019.    Specialty:  Family Medicine  Why:  Hospital discharge f/u 1:40 pm   Contact information:  [address] [facility] [address]  [phone]             Chance Levin MD On 10/10/2019.    Specialty:  Neurosurgery  Why:  Hospital follow up at 9:45 am   Contact information:  [address]  [facility] [address]  [phone]                   Francine Mane RN, CCRN-K, [facility]  Neuro-Critical Care   [phone]

## 2019-09-19 NOTE — PT/OT/SLP DISCHARGE
Occupational Therapy Discharge Summary    Alexandra Goins  MRN: [id number]   Principal Problem: R sided weakness    Patient Discharged from acute Occupational Therapy on 9/19.  Please refer to prior OT note dated 9/19 for functional status.    Assessment:      Patient has met all goals and is not appropriate for therapy.    Objective:     GOALS:   Multidisciplinary Problems     Occupational Therapy Goals     Not on file          Multidisciplinary Problems (Resolved)        Problem: Occupational Therapy Goal    Goal Priority Disciplines Outcome Interventions   Occupational Therapy Goal   (Resolved)     OT, PT/OT Outcome(s) achieved    Description:  Goals set on 9/18, with expiration date 9/25:  Patient will increase functional independence with ADLs by performing:    Grooming while standing at sink with Minimal Assistance. MET: now with SBA  UB Dressing with Minimal Assistance. MET: now with SBA  LB Dressing with Minimal Assistance. MET: now with SBA  Toileting from toilet with Contact Guard Assistance for hygiene and clothing management.  MET: now with SBA  Rolling to Bilateral with Stand-by Assistance.  MET: now with SBA  Supine <> Sit with Stand-by Assistance. MET  Step transfer with Contact Guard Assistance MET: now with SBA  Toilet transfer to toilet with Minimal Assistance. MET: now with SBA                       Reasons for Discontinuation of Therapy Services  Satisfactory goal achievement.      Plan:     Patient Discharged to: Home no OT services needed    Ayesha Colmenares, OT  9/19/2019

## 2019-09-19 NOTE — PROGRESS NOTES
[facility]  Neurosurgery  Progress Note    Subjective:     History of Present Illness: 53 F with history of HTN, HLD, DM had a witnessed sudden onset of right sided hemiplegia, right facial droop, and slurred speech yesterday. Her  reportedly saw this happen and got her to the ED within 11 minutes. She received tPA almost immediately upon arrival with significant improvement of her symptoms. 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 She has a family history of aneurysm in both her mother and daughter. She does report some ongoing intermittent symptoms during which she develops worsening dysarthria and some facial droop, but this reportedly resolves after a few mintues.    Post-Op Info:  * No surgery found *         Interval History: NAEON. Nonfocal on exam this AM. Tearful regarding new aneurysm diagnosis, significant family history of aneurysms in patient's mother, daughter.     Medications:  Continuous Infusions:  Scheduled Meds:   aspirin  81 mg Oral Daily    diazePAM  10 mg Oral TID    heparin (porcine)  5,000 Units Subcutaneous Q8H    senna-docusate 8.6-50 mg  1 tablet Oral BID    topiramate  200 mg Oral Daily     PRN Meds:acetaminophen, Dextrose 10% Bolus, glucagon (human recombinant), glucose, glucose, insulin aspart U-100, magnesium oxide, magnesium oxide, oxyCODONE, potassium chloride 10%, potassium chloride 10%, potassium chloride 10%, potassium, sodium phosphates, potassium, sodium phosphates, potassium, sodium phosphates, prochlorperazine, sodium chloride 0.9%     Review of Systems  Objective:     Weight: 79.8 kg (176 lb)  Body mass index is 28.41 kg/m².  Vital Signs (Most Recent):  Temp: 98.5 °F (36.9 °C) (09/19/19 0705)  Pulse: 86 (09/19/19 0905)  Resp: (!) 29 (09/19/19 0905)  BP: 128/77 (09/19/19 0905)  SpO2: 96 % (09/19/19 0905) Vital Signs (24h Range):  Temp:  [98.2 °F (36.8 °C)-98.7 °F (37.1 °C)] 98.5 °F (36.9  °C)  Pulse:  [] 86  Resp:  [13-49] 29  SpO2:  [94 %-100 %] 96 %  BP: (111-158)/(56-81) 128/77                   Neurosurgery Physical Exam   GCS [id number] AOx3  PERRL, EOMI, Face Symmetric, Tongue Midline  RUE: 5/5 LUE 5/5  RLE: 5/5 LLE: 5/5  SILT  No pronator drift    Significant Labs:  Recent Labs   Lab 09/17/19 1913 09/18/19  0331 09/19/19  0056   * 107 180*    141 142   K 4.0 4.2 3.9    108 108   CO2 23 22* 22*   BUN 14 10 13   CREATININE 0.7 0.7 0.8   CALCIUM 9.3 9.3 9.3   MG  --  1.7 2.0     Recent Labs   Lab 09/17/19 1913 09/18/19 0331 09/19/19  0056   WBC 11.37 8.90 7.97   HGB 13.6 13.9 14.1   HCT 44.5 43.7 44.8    300 280     Recent Labs   Lab 09/17/19 1913   INR 1.0       Significant Diagnostics:  Mri Brain Without Contrast    Result Date: 9/18/2019  Unremarkable MRI brain as detailed above specifically without evidence for acute infarction. Incidental partial fluid opacification mastoid air cells bilaterally. Electronically signed by: Darrick Fernandez DO Date:    09/18/2019 Time:    15:01    Us Lower Extremity Veins Bilateral    Result Date: 9/18/2019  No evidence of deep venous thrombosis in either lower extremity. 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    Neurologically stable  No immediate neurosurgical intervention required  All imaging reviewed, no evidence of subarachnoid hemorrhage to indicate rupture  Recommend neurology workup of possible TIAs to explain ongoing intermittent symptoms reported by the patient.  We will arrange for outpatient follow up with Dr. Levin on a Monday vascular clinic within the next 2-3 weeks or so  Neurosurgery will sign off, please call  w/questions.     Discussed with Dr. Wayne and Dr. Ollie Arnold MD  Neurosurgery  [facility]

## 2019-09-19 NOTE — SUBJECTIVE & OBJECTIVE
Neurologic Chief Complaint: RSW, visual and speech difficulties.    Subjective:     Interval History: Patient is seen for follow-up neurological assessment and treatment recommendations:   LINDA. MRI negative for Stroke. Denies any weakness or difficulty with speech. NS will work her up as out patient.    HPI, Past Medical, Family, and Social History remains the same as documented in the initial encounter.     Review of Systems    Scheduled Meds:   aspirin  81 mg Oral Daily    diazePAM  10 mg Oral TID    heparin (porcine)  5,000 Units Subcutaneous Q8H    QUEtiapine  300 mg Oral QHS    senna-docusate 8.6-50 mg  1 tablet Oral BID    sodium chloride 0.9%  500 mL Intravenous Once    topiramate  200 mg Oral Daily     Continuous Infusions:  PRN Meds:acetaminophen, Dextrose 10% Bolus, glucagon (human recombinant), glucose, glucose, insulin aspart U-100, magnesium oxide, magnesium oxide, oxyCODONE, potassium chloride 10%, potassium chloride 10%, potassium chloride 10%, potassium, sodium phosphates, potassium, sodium phosphates, potassium, sodium phosphates, prochlorperazine, sodium chloride 0.9%    Objective:     Vital Signs (Most Recent):  Temp: 98.5 °F (36.9 °C) (09/19/19 0705)  Pulse: 86 (09/19/19 0905)  Resp: (!) 29 (09/19/19 0905)  BP: 128/77 (09/19/19 0905)  SpO2: 96 % (09/19/19 0905)  BP Location: Right arm    Vital Signs Range (Last 24H):  Temp:  [98.2 °F (36.8 °C)-98.7 °F (37.1 °C)]   Pulse:  []   Resp:  [13-49]   BP: (111-158)/(56-81)   SpO2:  [94 %-100 %]   BP Location: Right arm    Physical Exam     Constitutional: She is oriented to person, place, and time.   HENT:   Head: Normocephalic and atraumatic.   Eyes: Pupils are equal, round, and reactive to light. EOM are normal.   Cardiovascular: Normal rate.   Pulmonary/Chest: Effort normal.   Abdominal: Soft.   Musculoskeletal: She exhibits no edema.   Right sided weakness   Neurological: She is alert and oriented to person, place, and time.   Skin:  Skin is warm. Capillary refill takes less than 2 seconds.   Psychiatric: She has a normal mood and affect    Neurological Exam:   LOC: alert  Attention Span: Good   Language: No aphasia  Articulation: No Dysarthria  Orientation: Person, Place, Time   Visual Fields: Full  EOM (CN III, IV, VI): Full/intact  Pupils (CN II, III): PERRL  Facial Movement (CN VII): no asymmetry noticed, however pt not full cooperative  Motor: Arm left  Normal 5/5  Leg left  Normal 5/5  Arm right 5/5  Leg right 5/5  Cebellar: No Appendicular Ataxia   Sensation: Normal to light touch in 4 extremities    Laboratory:  CMP:   Recent Labs   Lab 09/19/19  0056   CALCIUM 9.3   ALBUMIN 4.0   PROT 7.7      K 3.9   CO2 22*      BUN 13   CREATININE 0.8   ALKPHOS 96   ALT 54*   AST 22   BILITOT 0.3     CBC:   Recent Labs   Lab 09/19/19 0056   WBC 7.97   RBC 4.68   HGB 14.1   HCT 44.8      MCV 96   MCH 30.1   MCHC 31.5*     Lipid Panel:   Recent Labs   Lab 09/17/19 1913   CHOL 219*   LDLCALC 95.4   HDL 69   TRIG 273*     Hgb A1C:   Recent Labs   Lab 09/17/19 1913   HGBA1C 6.1*     TSH:   Recent Labs   Lab 09/17/19 1913   TSH 4.530*       Diagnostic Results     Brain Imaging     Brain MRI 9/18/19  Unremarkable MRI brain without evidence for acute infarction    CT head 9/17  No acute abnormality. If there is additional clinical concern for acute infarct, MRI with diffusion weighted imaging recommended.     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  No CT finding of subarachnoid hemorrhage or hydrocephalus.  Dedicated conventional angiogram is suggested.    No major vascular distribution infarct, focal high-grade stenosis, or occlusion.    US Lower Extremity Veins Bilateral  No evidence of deep venous thrombosis in either lower extremity.    Cardiac Imaging   TTE   · Concentric left ventricular remodeling. Normal left ventricular systolic  function. The estimated ejection fraction is 55%  · Normal LV diastolic function.  · Normal right ventricular systolic function.  · Normal central venous pressure (3 mm Hg).

## 2019-09-19 NOTE — PLAN OF CARE
Problem: Occupational Therapy Goal  Goal: Occupational Therapy Goal  Goals set on 9/18, with expiration date 9/25:  Patient will increase functional independence with ADLs by performing:    Grooming while standing at sink with Minimal Assistance. MET: now with SBA  UB Dressing with Minimal Assistance. MET: now with SBA  LB Dressing with Minimal Assistance. MET: now with SBA  Toileting from toilet with Contact Guard Assistance for hygiene and clothing management.  MET: now with SBA  Rolling to Bilateral with Stand-by Assistance.  MET: now with SBA  Supine <> Sit with Stand-by Assistance. MET  Step transfer with Contact Guard Assistance MET: now with SBA  Toilet transfer to toilet with Minimal Assistance. MET: now with SBA     Pt progressing towards goals. Cont OT POC, upgraded 2* improvement in patient performance of ADLs.   Ayesha Colmenares, ROSANAR  09/19/2019

## 2019-09-19 NOTE — ASSESSMENT & PLAN NOTE
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    Neurologically stable  No immediate neurosurgical intervention required  All imaging reviewed, no evidence of subarachnoid hemorrhage to indicate rupture  Recommend neurology workup of possible TIAs to explain ongoing intermittent symptoms reported by the patient.  We will arrange for outpatient follow up with Dr. Levin on a Monday vascular clinic within the next 2-3 weeks or so  Neurosurgery will sign off, please call w/questions.     Discussed with Dr. Wayne and Dr. Levin

## 2019-09-20 NOTE — ASSESSMENT & PLAN NOTE
Stroke risk factor  Chol 19 Trig 273 HDL 69 LDL 95  Patient reports legs cramps while on Lipitor. Deferred to PCP trial with another statin.

## 2019-09-20 NOTE — DISCHARGE SUMMARY
"[facility]  Vascular Neurology  Comprehensive Stroke Center  Discharge Summary     Summary:     Admit Date: 9/17/2019  7:04 PM    Discharge Date and Time: 9/19/2019  3:00 PM    Attending Physician: Dr. Muir.    Discharge Provider: Joanne Salas MD    History of Present Illness: 53 year old female with past medical hx of DM II, HTN, HLD, bipolar 1 disorder, and migraines presented to [facility] with complaints of right sided weakness, right facial droop, and slurred speech. LKN @ 1505. Telestroke was completed and TPA administered. There was also concern for hyperdense basilar seen on CT head at [facility]. Patient transferred to [facility] for higher level of neurological care. Per chart review patient was seen yesterday in the ED for complaints of a migraine X 3 days which improved after migraine "cocktail." She was found to have a glucose level of 38. Patients blood glucose normalized and she was discharged home. This afternoon she was talking to her  when she noted her speech sounded different. She reports her  also noticed her speech being slurred and a facial droop. Of note during teleconsult there was concern that symptoms might be functional. However, she did have ipsilateral weakness so TPA was administered despite concern for mimic.     Hospital Course (synopsis of major diagnoses, care, treatment, and services provided during the course of the hospital stay):   53 year old female with past medical hx of DM II, HTN, HLD, bipolar 1 disorder, and migraines presented to [facility] with complaints of right sided weakness, right facial droop, and slurred speech. LKN @ 1505. Telestroke completed and TPA administered. Patient complained of right eye "milky halo" which later improved. Of note during teleconsult there was concern that symptoms might be functional. However, she did have ipsilateral weakness so TPA was administered despite concern for mimic.   CTA MP completed and no " LVO noted, + 4 mm non ruptured basilar aneurysm. Aneurysm seem on maxillofacial CT in 2018 - appears stable in size. Patient admitted to [facility] s/p TPA administration and close neurological monitoring.   Deficits on exam were not consistent. RUE weakness was noted on exam initially but patient able to move extremity without issue when not being examined. Patient having difficulty raising her RLE initially but was seen moving it spontaneously without issue. When patient questioned about her speech she started to slur her words more. Patient tearful and emotional on exam. On exam 9/18 no difficulty with speech was noticed. MRI Brain performed without evidence for acute infarction. On exam 9/19 No motor deficit was noticed. Pt able to walk around without difficulties. Etiology  likely Functional neurological symptom disorder.  Neurosurgery was consulted. No acute intervention needed. Pt discharged on her home psychiatric meds (Effexor, quetiapine, Topamax) with outpatient follow up with Dr. Levin on a Monday vascular clinic within the next 2-3 weeks.     Stroke Etiology: Not Applicable/Not Ischemic Stroke    STROKE DOCUMENTATION   Acute Stroke Times   Last Known Normal Date: 09/17/19  Last Known Normal Time: 1505  Symptom Onset Date: 09/17/19  Symptom Onset Time: 1505  Stroke Team Called Date: 09/17/19  Stroke Team Called Time: 1905  Stroke Team Arrival Date: 09/17/19  Stroke Team Arrival Time: 1910  CT Interpretation Time: 1930  Decision to Treat Time for Alteplase: (NA)  Decision to Treat Time for IR: (NA)     NIH Scale:  1a. Level of Consciousness: 0-->Alert, keenly responsive  1b. LOC Questions: 0-->Answers both questions correctly  1c. LOC Commands: 0-->Performs both tasks correctly  2. Best Gaze: 0-->Normal  3. Visual: 0-->No visual loss  4. Facial Palsy: 0-->Normal symmetrical movements  5a. Motor Arm, Left: 0-->No drift, limb holds 90 (or 45) degrees for full 10 secs  5b. Motor Arm, Right: 0-->No drift, limb holds  90 (or 45) degrees for full 10 secs  6a. Motor Leg, Left: 0-->No drift, leg holds 30 degree position for full 5 secs  6b. Motor Leg, Right: 0-->No drift, leg holds 30 degree position for full 5 secs  7. Limb Ataxia: 0-->Absent  8. Sensory: 0-->Normal, no sensory loss  9. Best Language: 0-->No aphasia, normal  10. Dysarthria: 0-->Normal  11. Extinction and Inattention (formerly Neglect): 0-->No abnormality  Total (NIH Stroke Scale): 0        Modified [place] Score: 0  [place] Coma Scale:    ABCD2 Score:    [id number] Score:   HAS -BLED Score:   ICH Score:   Hunt & Goodwin Classification:       Assessment/Plan:     Diagnostic Results:      Brain MRI 9/18/19  Unremarkable MRI brain without evidence for acute infarction     CT head 9/17  No acute abnormality. If there is additional clinical concern for acute infarct, MRI with diffusion weighted imaging recommended.     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  No CT finding of subarachnoid hemorrhage or hydrocephalus.  Dedicated conventional angiogram is suggested.    No major vascular distribution infarct, focal high-grade stenosis, or occlusion.     US Lower Extremity Veins Bilateral  No evidence of deep venous thrombosis in either lower extremity.     Cardiac Imaging   TTE   · Concentric left ventricular remodeling. Normal left ventricular systolic function. The estimated ejection fraction is 55%  · Normal LV diastolic function.  · Normal right ventricular systolic function.  · Normal central venous pressure (3 mm Hg).    Interventions: IV t-PA    Complications: None    Disposition: Home or Self Care    Final Active Diagnoses:    Diagnosis Date Noted POA    PRINCIPAL PROBLEM:  Aneurysm [I72.9] 09/17/2019 Yes    Hypovolemia [E86.1] 09/19/2019 Yes    Cerebral aneurysm, nonruptured [I67.1] 09/19/2019 Unknown    Migraine with aura and without status migrainosus, not intractable  "[G43.109] 09/18/2019 Yes    S/P admn tPA in diff fac w/n last 24 hr bef adm to crnt fac [Z92.82] 09/17/2019 Not Applicable    Acute right-sided weakness [M62.89] 09/17/2019 Yes    Stroke [I63.9] 09/17/2019 Yes    Hypercoagulable state [D68.59] 09/17/2019 Yes    Acute ischemic stroke [I63.9] 09/17/2019 Yes    Debility [R53.81] 09/17/2019 Yes    Hyperlipidemia [E78.5] 09/17/2019 Yes    Type 2 diabetes mellitus [E11.9] 06/21/2018 Yes    Depression [F32.9] 08/31/2015 Yes     Chronic    Essential hypertension [I10] 08/31/2015 Yes     Chronic    Hypothyroidism due to acquired atrophy of thyroid [E03.4] 08/31/2015 Yes     Chronic    Tobacco abuse [Z72.0] 08/31/2015 Yes     Chronic    Dyslipidemia associated with type 2 diabetes mellitus [E11.69, E78.5] 08/31/2015 Yes    Bipolar disorder [F31.9] 08/30/2015 Yes     Chronic      Problems Resolved During this Admission:     Acute right-sided weakness  53 year old female with past medical hx of DM II, HTN, HLD, bipolar 1 disorder, and migraines presented to [facility] with complaints of right sided weakness, right facial droop, and slurred speech. LKN @ 1505. Telestroke completed and TPA administered. Patient complained of right eye "milky halo" which patient states has now improved. Of note during teleconsult there was concern that symptoms might be functional. However, she did have ipsilateral weakness so TPA was administered despite concern for mimic.   CTA MP completed and no LVO noted. Patient admitted to [facility] s/p TPA administration and close neurological monitoring.   Deficits on exam are not consistent. RUE weakness was noted on exam initially but patient able to move extremity without issue when not being examined. Patient having difficulty raising her RLE initially but was seen moving it spontaneously without issue. When patient questioned about her speech she started to slur her words more. Patient is tearful and emotional on exam. On exam 9/18 no " difficulty with speech was noticed. MRI Brain performed without evidence for acute infarction. Etiology: Functional neurological symptom disorder.    Antithrombotics for secondary stroke prevention: Antiplatelets: None    Statins for secondary stroke prevention and hyperlipidemia, if present:   Statins: Atorvastatin- 40 mg daily if not contraindicated  (patient reports allergy to statin)    Aggressive risk factor modification: HTN, Smoking, DM, HLD, Diet, Exercise, Obesity     Rehab efforts: The patient has been evaluated by a stroke team provider and the therapy needs have been fully considered based off the presenting complaints and exam findings. The following therapy evaluations are needed: PT evaluate and treat, OT evaluate and treat, SLP evaluate and treat, PM&R evaluate for appropriate placement. Final recommendations:Home, no PT needed    Diagnostics ordered/pending:  none  VTE prophylaxis: None: Hold all Antithrombotics x 24 hours after IV t-PA administration; mechanical SCDs    BP parameters: Post tPA, SBP <180            Tobacco abuse  Stroke risk factor  Encouraged smoking cessation      Hypothyroidism due to acquired atrophy of thyroid  Stroke risk factor  TSH 4.53 Free T4 0.81  Continue home med      Dyslipidemia associated with type 2 diabetes mellitus  Stroke risk factor  Chol 19 Trig 273 HDL 69 LDL 95  Patient reports legs cramps while on Lipitor. Deferred to PCP trial with another statin.    Essential hypertension  Stroke risk factor  SBP <180  Not requiring antihypertensives while inpatient  Resume home med    Depression  Continue home psych meds and f/u with psych    Bipolar disorder  Last seen my psychiatry 7/1/2019  Continue recommend psych medications - topiramate, quetiapine 300 mg po qhs. venlafaxine 75 tid  Pt counseled in keep her appt with Psychiatry      Recommendations:     Post-discharge complication risks: none    Stroke Education given to: patient and family    Follow-up with NS in 2  weeks  Follow up with PCP in 1 week  Follow up with Psych    Discharge Plan:  Smoking Cessation    Follow Up:  Follow-up Information     Perez Casiano MD. Go on 9/25/2019.    Specialty:  Family Medicine  Why:  Hospital discharge f/u 1:40 pm , Hospital follow up  Contact information:  [address]  [place] [address]  [phone]             Chance Levin MD On 10/10/2019.    Specialty:  Neurosurgery  Why:  Hospital follow up at 9:45 am   Contact information:  [address] [facility]  [facility] [address]  [phone]                   Patient Instructions:      Notify your health care provider if you experience any of the following:  increased confusion or weakness     Notify your health care provider if you experience any of the following:  persistent dizziness, light-headedness, or visual disturbances     Notify your health care provider if you experience any of the following:  severe persistent headache     Notify your health care provider if you experience any of the following:  persistent nausea and vomiting or diarrhea     Activity as tolerated       Medications:  Reconciled Home Medications:      Medication List      CONTINUE taking these medications    baclofen 10 MG tablet  Commonly known as:  LIORESAL  Take 1 tablet (10 mg total) by mouth nightly as needed.     doxepin 10 MG capsule  Commonly known as:  SINEQUAN  Take 1 capsule (10 mg total) by mouth nightly as needed.     insulin  unit/mL injection  Commonly known as:  NovoLIN N NPH U-100 Insulin  Inject 10 Units into the skin 2 (two) times daily before meals.     insulin regular 100 unit/mL injection  Commonly known as:  NovoLIN R Regular U-100 Insuln  Inject three times daily with meals using the scale: BG 80 - 150:   2 units  //  - 199: 3 units  //  - 249: 4 units  //  - 299: 5 units  //  - 349: 6 units  // BG Over 350:  7 units.     losartan 25 MG tablet  Commonly known as:  COZAAR  Take 25 mg by mouth once  daily.     metFORMIN 500 MG tablet  Commonly known as:  GLUCOPHAGE  Take 1 tablet (500 mg total) by mouth 2 (two) times daily with meals.     QUEtiapine 300 MG Tab  Commonly known as:  SEROQUEL  Take 1 tablet (300 mg total) by mouth every evening.     topiramate 200 MG Tab  Commonly known as:  TOPAMAX  1 tablet daily     TRUE METRIX GLUCOSE TEST STRIP Strp  Generic drug:  blood sugar diagnostic  USE 1 STRIP TO CHECK GLUCOSE THREE TIMES DAILY     venlafaxine 75 MG 24 hr capsule  Commonly known as:  EFFEXOR-XR  Take 2 capsules (150 mg total) by mouth once daily.        STOP taking these medications    diazePAM 10 MG Tab  Commonly known as:  VALIUM     MAXALT 10 MG tablet  Generic drug:  rizatriptan            Joanne Salas MD  Comprehensive Stroke Center  Department of Vascular Neurology   [facility]

## 2019-09-20 NOTE — ASSESSMENT & PLAN NOTE
"53 year old female with past medical hx of DM II, HTN, HLD, bipolar 1 disorder, and migraines presented to [place] with complaints of right sided weakness, right facial droop, and slurred speech. LKN @ 1505. Telestroke completed and TPA administered. Patient complained of right eye "milky halo" which patient states has now improved. Of note during teleconsult there was concern that symptoms might be functional. However, she did have ipsilateral weakness so TPA was administered despite concern for mimic.   CTA MP completed and no LVO noted. Patient admitted to [facility] s/p TPA administration and close neurological monitoring.   Deficits on exam are not consistent. RUE weakness was noted on exam initially but patient able to move extremity without issue when not being examined. Patient having difficulty raising her RLE initially but was seen moving it spontaneously without issue. When patient questioned about her speech she started to slur her words more. Patient is tearful and emotional on exam. On exam 9/18 no difficulty with speech was noticed. MRI Brain performed without evidence for acute infarction. Etiology: Functional neurological symptom disorder.    Antithrombotics for secondary stroke prevention: Antiplatelets: None    Statins for secondary stroke prevention and hyperlipidemia, if present:   Statins: Atorvastatin- 40 mg daily if not contraindicated  (patient reports allergy to statin)    Aggressive risk factor modification: HTN, Smoking, DM, HLD, Diet, Exercise, Obesity     Rehab efforts: The patient has been evaluated by a stroke team provider and the therapy needs have been fully considered based off the presenting complaints and exam findings. The following therapy evaluations are needed: PT evaluate and treat, OT evaluate and treat, SLP evaluate and treat, PM&R evaluate for appropriate placement. Final recommendations:Home, no PT needed    Diagnostics ordered/pending:  none  VTE prophylaxis: None: Hold " all Antithrombotics x 24 hours after IV t-PA administration; mechanical SCDs    BP parameters: Post tPA, SBP <180

## 2019-09-23 ENCOUNTER — PATIENT OUTREACH (OUTPATIENT)
Dept: ADMINISTRATIVE | Facility: HOSPITAL | Age: 54
End: 2019-09-23

## 2019-09-23 ENCOUNTER — PATIENT OUTREACH (OUTPATIENT)
Dept: ADMINISTRATIVE | Facility: CLINIC | Age: 54
End: 2019-09-23

## 2019-09-23 ENCOUNTER — NURSE TRIAGE (OUTPATIENT)
Dept: ADMINISTRATIVE | Facility: CLINIC | Age: 54
End: 2019-09-23

## 2019-09-23 NOTE — PROGRESS NOTES
"During [facility] post discharge call, patient stated that she has had multiple TIAs (patient called them AMINA) and started the conversation off with slurred/delayed speech.  Triage encounter was initiated.  Patient then became angry and frustrated about where she would go to the hospital because they "gave her nothing" for medications to deal with her problems.  Patient speech was fast at this point and no longer slurred.  Patient then became tearful and described that she is terrified to go to the hospital, describing multiple family members that  around the same chronobiological age as herself, after which she gave the phone to her "daughter" (neighbor across the street's daughter that she considers her daughter), Mary Severio.  Sunni denies any witnessed symptoms of a stroke and had mentioned that the patient probably took Valium.  Triage disposition is to go to the office now, and disposition given to Sunni. Sunni stated that she needs the appointment on 2019 @ 1340 rescheduled so that she can go to the appointment with the patient.  Sunni was then advised that the doctor's staff would be notified of this issue, and probably will call the patient to reschedule.  Sunni then stated that she will be there all of today to monitor patient for new symptoms.    "

## 2019-09-23 NOTE — TELEPHONE ENCOUNTER
Reason for Disposition   Neurologic deficit that was brief (now gone), ANY of the following: * Weakness of the face, arm, or leg on one side of the body * Numbness of the face, arm, or leg on one side of the body * Loss of speech or garbled speech    Additional Information   Negative: Difficult to awaken or acting confused (e.g., disoriented, slurred speech)   Negative: New neurologic deficit that is present NOW, sudden onset of ANY of the following: * Weakness of the face, arm, or leg on one side of the body * Numbness of the face, arm, or leg on one side of the body * Loss of speech or garbled speech   Negative: Sounds like a life-threatening emergency to the triager   Negative: Confusion, disorientation, or hallucinations is the main symptom   Negative: Dizziness is the main symptom   Negative: Followed a head injury within last 3 days   Negative: Headache (with neurologic deficit)   Negative: Unable to urinate (or only a few drops) and bladder feels very full   Negative: Loss of control of bowel or bladder (i.e., incontinence) of new onset   Negative: Back pain with numbness (loss of sensation) in groin or rectal area   Negative: Patient sounds very sick or weak to the triager   Negative: Neurologic deficit of gradual onset, ANY of the following: * Weakness of the face, arm, or leg on one side of the body * Numbness of the face, arm, or leg on one side of the body * Loss of speech or garbled speech   Negative: [place] palsy suspected (i.e., weakness only one side of the face, developing over hours to days, no other symptoms)   Negative: Tingling (e.g., pins and needles) of the face, arm or leg on one side of the body, that is  present now    Protocols used: NEUROLOGIC DEFICIT-A-[place]

## 2019-09-23 NOTE — PATIENT INSTRUCTIONS
Discharge Instructions for Stroke  You have been diagnosed with stroke, also known as a brain attack. During a stroke, blood stops flowing to part of your brain. This can damage areas in the brain that control other parts of the body. Symptoms after a stroke depend on which part of the brain has been affected.  Stroke Risk Factors  Once youve had a stroke, youre at greater risk for another one. Listed below are some other factors that can increase your risk for another stroke:  High blood pressure  High blood cholesterol  Cigarette or cigar smoking  Diabetes  Carotid or other artery disease  Atrial fibrillation, atrial flutter, or other heart disease  Physical inactivity  Obesity  Certain blood disorders (such as sickle cell anemia)  Excessive alcohol use  Abuse of illegal drugs  Race  Gender  Diet high in salty, fried, or greasy foods  Changes in Daily Living  Performing your regular tasks may be difficult after youve had a stroke, but you can learn new ways to manage your daily activities. In fact, doing daily activities may help you to regain muscle strength and bring back function to affected limbs. Be patient, give yourself time to adjust, and appreciate the progress you make.  Daily Activities  You may be at risk of falling. Make changes to your home to help you walk more easily. A therapist will decide if you need an assistive device to walk safely.  You may need to see an occupational or physical therapist to learn new ways of doing things. For example, you may need to make adjustments when bathing or dressing.  Try the following tips for showering or bathing:  Test the water temperature with a hand or foot that was not affected by the stroke.  Use grab bars, a shower seat, a hand-held showerhead, and a long-handled brush.  Try the following tips for dressing:  Dress while sitting, starting with the affected side or limb.  Wear shirts that pull easily over your head and pants or skirts with elastic  waistbands.  Use zippers with loops attached to the pull tabs.  Lifestyle Changes  Take your medications exactly as directed. Dont skip doses.  Change your diet if your doctor tells you to. Your doctor may suggest that you cut back on salt. If so, here are some tips:  Limit canned, dried, packaged, and fast foods. These tend to be high in salt.  When you cook, season foods with herbs instead of salt.  Dont add salt to your food at the table.  Begin an exercise program. Ask your doctor how to get started. You can benefit from simple activities such as walking or gardening.  Have no more than 2 alcoholic drinks a day.  Know your cholesterol level. Follow your doctors recommendations about how to keep cholesterol under control.  If you are a smoker, break the smoking habit. Enroll in a stop-smoking program to improve your chances of success. Ask your doctor about medications or other methods to help you quit.  Follow-Up  Keep your medical appointments. Close follow-up is important to stroke rehabilitation and recovery.  Some medications require blood tests to check for progress or problems. Keep follow-up appointments for any blood tests ordered by your doctors.    When to Seek Medical Care  Call 911 right away if you have any of the following:  Weakness, tingling, or loss of feeling on one side of your face or body  Sudden double vision or trouble seeing in one or both eyes  Sudden trouble talking or slurred speech  Trouble understanding others  Sudden, severe headache  Dizziness, loss of balance, or a sense of falling  Blackouts   © 1952-8916 [employer], [address] [place] [address], [place] [address]. All rights reserved. This information is not intended as a substitute for professional medical care. Always follow your healthcare professional's instructions.

## 2019-09-24 ENCOUNTER — OFFICE VISIT (OUTPATIENT)
Dept: FAMILY MEDICINE | Facility: CLINIC | Age: 54
End: 2019-09-24
Payer: MEDICARE

## 2019-09-24 VITALS
BODY MASS INDEX: 26.96 KG/M2 | HEART RATE: 87 BPM | TEMPERATURE: 97 F | OXYGEN SATURATION: 97 % | DIASTOLIC BLOOD PRESSURE: 58 MMHG | SYSTOLIC BLOOD PRESSURE: 117 MMHG | HEIGHT: 66 IN | WEIGHT: 167.75 LBS

## 2019-09-24 DIAGNOSIS — R53.1 ACUTE RIGHT-SIDED WEAKNESS: Primary | ICD-10-CM

## 2019-09-24 DIAGNOSIS — Z86.69 H/O MIGRAINE: ICD-10-CM

## 2019-09-24 DIAGNOSIS — I72.5 BASILAR ARTERY ANEURYSM: ICD-10-CM

## 2019-09-24 DIAGNOSIS — Z79.4 TYPE 2 DIABETES MELLITUS WITH COMPLICATION, WITH LONG-TERM CURRENT USE OF INSULIN: ICD-10-CM

## 2019-09-24 DIAGNOSIS — R26.81 GAIT INSTABILITY: ICD-10-CM

## 2019-09-24 DIAGNOSIS — I69.952 HEMIPLEGIA AND HEMIPARESIS FOLLOWING UNSPECIFIED CEREBROVASCULAR DISEASE AFFECTING LEFT DOMINANT SIDE: ICD-10-CM

## 2019-09-24 DIAGNOSIS — I10 ESSENTIAL HYPERTENSION: ICD-10-CM

## 2019-09-24 DIAGNOSIS — Z12.39 SCREENING FOR BREAST CANCER: ICD-10-CM

## 2019-09-24 DIAGNOSIS — Z72.0 TOBACCO ABUSE: ICD-10-CM

## 2019-09-24 DIAGNOSIS — E11.8 TYPE 2 DIABETES MELLITUS WITH COMPLICATION, WITH LONG-TERM CURRENT USE OF INSULIN: ICD-10-CM

## 2019-09-24 DIAGNOSIS — F31.9 BIPOLAR AFFECTIVE DISORDER, REMISSION STATUS UNSPECIFIED: ICD-10-CM

## 2019-09-24 PROBLEM — D68.59 HYPERCOAGULABLE STATE: Status: RESOLVED | Noted: 2019-09-17 | Resolved: 2019-09-24

## 2019-09-24 PROCEDURE — 3008F BODY MASS INDEX DOCD: CPT | Mod: HCNC,CPTII,S$GLB, | Performed by: FAMILY MEDICINE

## 2019-09-24 PROCEDURE — 3078F DIAST BP <80 MM HG: CPT | Mod: HCNC,CPTII,S$GLB, | Performed by: FAMILY MEDICINE

## 2019-09-24 PROCEDURE — 3074F PR MOST RECENT SYSTOLIC BLOOD PRESSURE < 130 MM HG: ICD-10-PCS | Mod: HCNC,CPTII,S$GLB, | Performed by: FAMILY MEDICINE

## 2019-09-24 PROCEDURE — 3078F PR MOST RECENT DIASTOLIC BLOOD PRESSURE < 80 MM HG: ICD-10-PCS | Mod: HCNC,CPTII,S$GLB, | Performed by: FAMILY MEDICINE

## 2019-09-24 PROCEDURE — 99999 PR PBB SHADOW E&M-EST. PATIENT-LVL IV: CPT | Mod: PBBFAC,HCNC,, | Performed by: FAMILY MEDICINE

## 2019-09-24 PROCEDURE — 99499 RISK ADDL DX/OHS AUDIT: ICD-10-PCS | Mod: HCNC,S$GLB,, | Performed by: FAMILY MEDICINE

## 2019-09-24 PROCEDURE — 99214 PR OFFICE/OUTPT VISIT, EST, LEVL IV, 30-39 MIN: ICD-10-PCS | Mod: 25,HCNC,S$GLB, | Performed by: FAMILY MEDICINE

## 2019-09-24 PROCEDURE — G0008 FLU VACCINE (QUAD) GREATER THAN OR EQUAL TO 3YO PRESERVATIVE FREE IM: ICD-10-PCS | Mod: HCNC,S$GLB,, | Performed by: FAMILY MEDICINE

## 2019-09-24 PROCEDURE — 99999 PR PBB SHADOW E&M-EST. PATIENT-LVL IV: ICD-10-PCS | Mod: PBBFAC,HCNC,, | Performed by: FAMILY MEDICINE

## 2019-09-24 PROCEDURE — 3044F PR MOST RECENT HEMOGLOBIN A1C LEVEL <7.0%: ICD-10-PCS | Mod: HCNC,CPTII,S$GLB, | Performed by: FAMILY MEDICINE

## 2019-09-24 PROCEDURE — 3074F SYST BP LT 130 MM HG: CPT | Mod: HCNC,CPTII,S$GLB, | Performed by: FAMILY MEDICINE

## 2019-09-24 PROCEDURE — 3044F HG A1C LEVEL LT 7.0%: CPT | Mod: HCNC,CPTII,S$GLB, | Performed by: FAMILY MEDICINE

## 2019-09-24 PROCEDURE — 90686 IIV4 VACC NO PRSV 0.5 ML IM: CPT | Mod: HCNC,S$GLB,, | Performed by: FAMILY MEDICINE

## 2019-09-24 PROCEDURE — G0008 ADMIN INFLUENZA VIRUS VAC: HCPCS | Mod: HCNC,S$GLB,, | Performed by: FAMILY MEDICINE

## 2019-09-24 PROCEDURE — 90686 FLU VACCINE (QUAD) GREATER THAN OR EQUAL TO 3YO PRESERVATIVE FREE IM: ICD-10-PCS | Mod: HCNC,S$GLB,, | Performed by: FAMILY MEDICINE

## 2019-09-24 PROCEDURE — 3008F PR BODY MASS INDEX (BMI) DOCUMENTED: ICD-10-PCS | Mod: HCNC,CPTII,S$GLB, | Performed by: FAMILY MEDICINE

## 2019-09-24 PROCEDURE — 99214 OFFICE O/P EST MOD 30 MIN: CPT | Mod: 25,HCNC,S$GLB, | Performed by: FAMILY MEDICINE

## 2019-09-24 PROCEDURE — 99499 UNLISTED E&M SERVICE: CPT | Mod: HCNC,S$GLB,, | Performed by: FAMILY MEDICINE

## 2019-09-24 RX ORDER — PROMETHAZINE HYDROCHLORIDE 12.5 MG/1
12.5 TABLET ORAL EVERY 6 HOURS PRN
Qty: 20 TABLET | Refills: 0 | Status: SHIPPED | OUTPATIENT
Start: 2019-09-24 | End: 2019-10-22 | Stop reason: SDUPTHER

## 2019-09-24 NOTE — PROGRESS NOTES
Subjective:       Patient ID: Alexandra Goins is a 54 y.o. female.    Chief Complaint: Headache      HPI Comments:       Current Outpatient Medications:     doxepin (SINEQUAN) 10 MG capsule, Take 1 capsule (10 mg total) by mouth nightly as needed., Disp: 30 capsule, Rfl: 2    insulin NPH (NOVOLIN N NPH U-100 INSULIN) 100 unit/mL injection, Inject 10 Units into the skin 2 (two) times daily before meals., Disp: 1 vial, Rfl: 3    insulin regular (NOVOLIN R REGULAR U-100 INSULN) 100 unit/mL Inj injection, Inject three times daily with meals using the scale: BG 80 - 150:   2 units  //  - 199: 3 units  //  - 249: 4 units  //  - 299: 5 units  //  - 349: 6 units  // BG Over 350:  7 units., Disp: 10 mL, Rfl: 3    losartan (COZAAR) 25 MG tablet, Take 25 mg by mouth once daily., Disp: , Rfl: 3    metFORMIN (GLUCOPHAGE) 500 MG tablet, Take 1 tablet (500 mg total) by mouth 2 (two) times daily with meals., Disp: 180 tablet, Rfl: 1    QUEtiapine (SEROQUEL) 300 MG Tab, Take 1 tablet (300 mg total) by mouth every evening., Disp: 30 tablet, Rfl: 2    topiramate (TOPAMAX) 200 MG Tab, 1 tablet daily, Disp: 30 tablet, Rfl: 2    TRUE METRIX GLUCOSE TEST STRIP Strp, USE 1 STRIP TO CHECK GLUCOSE THREE TIMES DAILY, Disp: , Rfl: 11    venlafaxine (EFFEXOR-XR) 75 MG 24 hr capsule, Take 2 capsules (150 mg total) by mouth once daily., Disp: 60 capsule, Rfl: 2    baclofen (LIORESAL) 10 MG tablet, Take 1 tablet (10 mg total) by mouth nightly as needed., Disp: 30 tablet, Rfl: 3    promethazine (PHENERGAN) 12.5 MG Tab, Take 1 tablet (12.5 mg total) by mouth every 6 (six) hours as needed., Disp: 20 tablet, Rfl: 0      Hospital follow-up after developing acute right-sided weakness.  Was given tPA.  No infarct seen on CT scan.  Regained full strength prior to discharge. Has a neurologic follow-up in [place] on 10/10.  Was not discharged on aspirin, apparently because she has a basilar artery aneurysm was found  "incidentally.    Today she is complaining nausea vomiting.  Particularly yesterday.  Borrowed some of her 's Phenergan which helped.  Today she ate some toast.  Blood sugar was mid 200s.  Has had some hypoglycemia recently as well.  Getting around at the home with a cane.  But does not feel like she has regained her balance.  Her speech is slightly altered:  Slower but not particularly slurred    Review of Systems   Constitutional: Negative for activity change, appetite change and fever.   HENT: Negative for sore throat.    Respiratory: Negative for cough and shortness of breath.    Cardiovascular: Negative for chest pain.   Gastrointestinal: Positive for nausea and vomiting. Negative for abdominal pain and diarrhea.   Genitourinary: Negative for difficulty urinating.   Musculoskeletal: Positive for gait problem. Negative for arthralgias and myalgias.   Neurological: Positive for speech difficulty. Negative for dizziness, facial asymmetry and headaches.   Hematological: Bruises/bleeds easily.       Objective:      Vitals:    09/24/19 0804   BP: (!) 117/58   Pulse: 87   Temp: 96.8 °F (36 °C)   SpO2: 97%   Weight: 76.1 kg (167 lb 12.3 oz)   Height: 5' 6" (1.676 m)   PainSc: 0-No pain     Physical Exam   Constitutional: She is oriented to person, place, and time. She appears well-developed and well-nourished. No distress.   HENT:   Head: Normocephalic.   Mouth/Throat: No oropharyngeal exudate.   Neck: Neck supple. No thyromegaly present.   Cardiovascular: Normal rate, regular rhythm and normal heart sounds.   No murmur heard.  Pulmonary/Chest: Effort normal and breath sounds normal. She has no wheezes. She has no rales.   Abdominal: Soft. She exhibits no distension.   Musculoskeletal: She exhibits no edema.   Lymphadenopathy:     She has no cervical adenopathy.   Neurological: She is alert and oriented to person, place, and time. She has normal strength. She displays no tremor. No cranial nerve deficit. She " exhibits normal muscle tone. Coordination abnormal.   Positive Romberg.  No arm drift.  Strength 4+/5 in all extremities, proximally and distally   Skin: Skin is warm and dry. She is not diaphoretic.   Multiple ecchymoses on limbs and abdomen from subcu injections during hospitalization   Psychiatric: She has a normal mood and affect. Her behavior is normal. Judgment and thought content normal.   Nursing note and vitals reviewed.      Assessment:       1. Acute right-sided weakness    2. Essential hypertension    3. Type 2 diabetes mellitus with complication, with long-term current use of insulin    4. Tobacco abuse    5. Bipolar affective disorder, remission status unspecified    6. Basilar artery aneurysm    7. H/O migraine    8. Screening for breast cancer    9. Gait instability    10. Hemiplegia and hemiparesis following unspecified cerebrovascular disease affecting left dominant side         Plan:   Acute right-sided weakness  Comments:  s/p TPA.  No focal weakness currently.  Follow-up with Neurology in [facility] 10/10.  Carotid ultrasounds ordered  Orders:  -     Cardiology Lab Carotid US Bilateral; Future    Essential hypertension  Comments:  Controlled.  Follow-up 3 weeks    Type 2 diabetes mellitus with complication, with long-term current use of insulin  Comments:  Fluctuating blood sugars.  Not fully back on diet    Tobacco abuse  Comments:  Flu shot today    Bipolar affective disorder, remission status unspecified  Comments:  Follow-up appointment with Psychiatry in 1 week    Basilar artery aneurysm  Comments:  No intervention needed at this time.  Patient was discharged without aspirin    H/O migraine    Screening for breast cancer  Comments:  Mammogram rescheduled    Gait instability  Comments:  Physical therapy consult  Orders:  -     Ambulatory consult to Physical Therapy    Hemiplegia and hemiparesis following unspecified cerebrovascular disease affecting left dominant side    Comments:  Resolved  Orders:  -     Cardiology Lab Carotid US Bilateral; Future    Other orders  -     promethazine (PHENERGAN) 12.5 MG Tab; Take 1 tablet (12.5 mg total) by mouth every 6 (six) hours as needed.  Dispense: 20 tablet; Refill: 0

## 2019-09-25 ENCOUNTER — TELEPHONE (OUTPATIENT)
Dept: FAMILY MEDICINE | Facility: CLINIC | Age: 54
End: 2019-09-25

## 2019-09-25 NOTE — TELEPHONE ENCOUNTER
----- Message from Lorena Pride sent at 9/25/2019  1:22 PM CDT -----  Contact: self [phone]  Pt would like return call from nurse regarding question about follow-up appt. Please call back at [phone].  Md Haroldo

## 2019-10-04 RX ORDER — DOXEPIN HYDROCHLORIDE 10 MG/1
10 CAPSULE ORAL NIGHTLY PRN
Qty: 30 CAPSULE | Refills: 1 | Status: SHIPPED | OUTPATIENT
Start: 2019-10-04 | End: 2019-11-14 | Stop reason: SDUPTHER

## 2019-10-04 RX ORDER — TOPIRAMATE 200 MG/1
TABLET ORAL
Qty: 30 TABLET | Refills: 1 | Status: SHIPPED | OUTPATIENT
Start: 2019-10-04 | End: 2020-06-04 | Stop reason: SDUPTHER

## 2019-10-04 RX ORDER — QUETIAPINE FUMARATE 300 MG/1
300 TABLET, FILM COATED ORAL NIGHTLY
Qty: 30 TABLET | Refills: 1 | Status: SHIPPED | OUTPATIENT
Start: 2019-10-04 | End: 2019-11-14 | Stop reason: SDUPTHER

## 2019-10-08 ENCOUNTER — PATIENT OUTREACH (OUTPATIENT)
Dept: ADMINISTRATIVE | Facility: OTHER | Age: 54
End: 2019-10-08

## 2019-10-08 RX ORDER — DIAZEPAM 10 MG/1
10 TABLET ORAL 3 TIMES DAILY PRN
Qty: 90 TABLET | Refills: 1 | Status: SHIPPED | OUTPATIENT
Start: 2019-10-08 | End: 2019-11-14 | Stop reason: SDUPTHER

## 2019-10-10 ENCOUNTER — OFFICE VISIT (OUTPATIENT)
Dept: NEUROSURGERY | Facility: CLINIC | Age: 54
End: 2019-10-10
Payer: MEDICARE

## 2019-10-10 VITALS
HEART RATE: 103 BPM | BODY MASS INDEX: 24.91 KG/M2 | TEMPERATURE: 99 F | DIASTOLIC BLOOD PRESSURE: 71 MMHG | SYSTOLIC BLOOD PRESSURE: 107 MMHG | WEIGHT: 155 LBS | HEIGHT: 66 IN

## 2019-10-10 DIAGNOSIS — I67.1 CEREBRAL ANEURYSM: Primary | ICD-10-CM

## 2019-10-10 PROCEDURE — 99214 OFFICE O/P EST MOD 30 MIN: CPT | Mod: HCNC,S$GLB,, | Performed by: NEUROLOGICAL SURGERY

## 2019-10-10 PROCEDURE — 99214 PR OFFICE/OUTPT VISIT, EST, LEVL IV, 30-39 MIN: ICD-10-PCS | Mod: HCNC,S$GLB,, | Performed by: NEUROLOGICAL SURGERY

## 2019-10-10 PROCEDURE — 3074F PR MOST RECENT SYSTOLIC BLOOD PRESSURE < 130 MM HG: ICD-10-PCS | Mod: HCNC,CPTII,S$GLB, | Performed by: NEUROLOGICAL SURGERY

## 2019-10-10 PROCEDURE — 3078F DIAST BP <80 MM HG: CPT | Mod: HCNC,CPTII,S$GLB, | Performed by: NEUROLOGICAL SURGERY

## 2019-10-10 PROCEDURE — 99999 PR PBB SHADOW E&M-EST. PATIENT-LVL III: ICD-10-PCS | Mod: PBBFAC,HCNC,, | Performed by: NEUROLOGICAL SURGERY

## 2019-10-10 PROCEDURE — 3078F PR MOST RECENT DIASTOLIC BLOOD PRESSURE < 80 MM HG: ICD-10-PCS | Mod: HCNC,CPTII,S$GLB, | Performed by: NEUROLOGICAL SURGERY

## 2019-10-10 PROCEDURE — 3008F PR BODY MASS INDEX (BMI) DOCUMENTED: ICD-10-PCS | Mod: HCNC,CPTII,S$GLB, | Performed by: NEUROLOGICAL SURGERY

## 2019-10-10 PROCEDURE — 99999 PR PBB SHADOW E&M-EST. PATIENT-LVL III: CPT | Mod: PBBFAC,HCNC,, | Performed by: NEUROLOGICAL SURGERY

## 2019-10-10 PROCEDURE — 3074F SYST BP LT 130 MM HG: CPT | Mod: HCNC,CPTII,S$GLB, | Performed by: NEUROLOGICAL SURGERY

## 2019-10-10 PROCEDURE — 3008F BODY MASS INDEX DOCD: CPT | Mod: HCNC,CPTII,S$GLB, | Performed by: NEUROLOGICAL SURGERY

## 2019-10-10 RX ORDER — VIT C/E/ZN/COPPR/LUTEIN/ZEAXAN 250MG-90MG
CAPSULE ORAL DAILY
COMMUNITY
End: 2021-02-11

## 2019-10-10 NOTE — PROGRESS NOTES
Alexandra Goins was seen in neurosurgical consultation at the office this morning.  She is a 54-year-old lady with a history of diabetes and hypertension and also suffers from psychiatric disease.  She has a history of migraine and presented to [facility] last month complaining of a severe right-sided headache and some right eye visual disturbance.  She was treated with medication and discharged But on 09/17/2019 began to have difficulty with her speech and complained of right-sided weakness she was seen back in the emergency room CT was seen by telemedicine it was recommended that she receive tPA. She was then transferred to [facility] for further care.  Her daughter says that she had some repeated attacks of weakness during transit and in the hospital to examination here she remained neurologically intact MRI showed no evidence for acute infarction and she was discharged to home. CTA had been done and there was no significant carotid stenosis but a small basilar bifurcation aneurysm was noted.  At discharge it was recommended that she follow up with Neurosurgery concerning the aneurysm.  At this point she is neurologically stable she does have a fairly strong family history of aneurysm and she has both a smoking history and hypertension which are risk factors for aneurysm rupture.  Past medical history as noted include insulin requiring diabetes and hypertension bipolar disorder and anxiety she is disabled from work and lives at home review of systems is otherwise noncontributory    On physical examination she is a well-developed well-nourished white lady who was awake and cooperative examination of the head shows mild tenderness over the right side of the head showed full extraocular movements there is no nystagmus pupils are equal reactive to light from today show clear disc margins vision seems generally intact hearing is intact to finger rubbing neck is supple neurologic examination she  provides a reasonably good history her daughter is with her today of finger-to-nose was done well in gait is unremarkable cranial nerves otherwise intact she has normal facial sensation and movement tongue protrudes in the midline she shows good strength extremities normal sensation is symmetrical deep tendon reflexes    CT a of the head shows no evidence for carotid stenosis there is a small 3-4 mm aneurysm of the basilar artery bifurcation which is somewhat irregular.  MRI of the brain done on 09/18/2019 shows no evidence for cerebral infarction and is otherwise unremarkable.  The small basilar artery aneurysm can be recognized on T2 studies.     Impression: basilar artery aneurysm     Recommendation:  With a strong family history of aneurysm and history of hypertension and smoking she is at some risk for subarachnoid hemorrhage.  I have told them that about 2% of the population has an aneurysm the statistical probability of an aneurysm this size the rupturing is about 1-2% per year.  I have recommended that we proceed with cerebral angiography to better define the aneurysm and see if treatment is possible.

## 2019-10-18 ENCOUNTER — TELEPHONE (OUTPATIENT)
Dept: NEUROSURGERY | Facility: CLINIC | Age: 54
End: 2019-10-18

## 2019-10-18 DIAGNOSIS — I67.1 CEREBRAL ANEURYSM: Primary | ICD-10-CM

## 2019-10-18 NOTE — TELEPHONE ENCOUNTER
----- Message from Elsi Rubio sent at 10/18/2019 10:58 AM CDT -----  Contact: pt @ [phone]  Calling to speak with someone in Dr. Levin regarding her getting scheduled for surgery. Please call.

## 2019-10-22 ENCOUNTER — OFFICE VISIT (OUTPATIENT)
Dept: FAMILY MEDICINE | Facility: CLINIC | Age: 54
End: 2019-10-22
Payer: MEDICARE

## 2019-10-22 VITALS
TEMPERATURE: 97 F | BODY MASS INDEX: 27.72 KG/M2 | OXYGEN SATURATION: 98 % | DIASTOLIC BLOOD PRESSURE: 68 MMHG | HEIGHT: 66 IN | WEIGHT: 172.5 LBS | HEART RATE: 85 BPM | SYSTOLIC BLOOD PRESSURE: 132 MMHG

## 2019-10-22 DIAGNOSIS — F31.9 BIPOLAR AFFECTIVE DISORDER, REMISSION STATUS UNSPECIFIED: ICD-10-CM

## 2019-10-22 DIAGNOSIS — I72.5 BASILAR ARTERY ANEURYSM: ICD-10-CM

## 2019-10-22 DIAGNOSIS — Z86.69 HX OF MIGRAINE HEADACHES: ICD-10-CM

## 2019-10-22 DIAGNOSIS — E11.8 TYPE 2 DIABETES MELLITUS WITH COMPLICATION, WITH LONG-TERM CURRENT USE OF INSULIN: ICD-10-CM

## 2019-10-22 DIAGNOSIS — Z72.0 TOBACCO ABUSE: ICD-10-CM

## 2019-10-22 DIAGNOSIS — Z79.4 TYPE 2 DIABETES MELLITUS WITH COMPLICATION, WITH LONG-TERM CURRENT USE OF INSULIN: ICD-10-CM

## 2019-10-22 DIAGNOSIS — I10 ESSENTIAL HYPERTENSION: Primary | ICD-10-CM

## 2019-10-22 PROCEDURE — 99499 RISK ADDL DX/OHS AUDIT: ICD-10-PCS | Mod: HCNC,S$GLB,, | Performed by: FAMILY MEDICINE

## 2019-10-22 PROCEDURE — 3044F HG A1C LEVEL LT 7.0%: CPT | Mod: HCNC,CPTII,S$GLB, | Performed by: FAMILY MEDICINE

## 2019-10-22 PROCEDURE — 3008F PR BODY MASS INDEX (BMI) DOCUMENTED: ICD-10-PCS | Mod: HCNC,CPTII,S$GLB, | Performed by: FAMILY MEDICINE

## 2019-10-22 PROCEDURE — 3075F PR MOST RECENT SYSTOLIC BLOOD PRESS GE 130-139MM HG: ICD-10-PCS | Mod: HCNC,CPTII,S$GLB, | Performed by: FAMILY MEDICINE

## 2019-10-22 PROCEDURE — 99214 PR OFFICE/OUTPT VISIT, EST, LEVL IV, 30-39 MIN: ICD-10-PCS | Mod: HCNC,S$GLB,, | Performed by: FAMILY MEDICINE

## 2019-10-22 PROCEDURE — 99999 PR PBB SHADOW E&M-EST. PATIENT-LVL III: ICD-10-PCS | Mod: PBBFAC,HCNC,, | Performed by: FAMILY MEDICINE

## 2019-10-22 PROCEDURE — 99214 OFFICE O/P EST MOD 30 MIN: CPT | Mod: HCNC,S$GLB,, | Performed by: FAMILY MEDICINE

## 2019-10-22 PROCEDURE — 99999 PR PBB SHADOW E&M-EST. PATIENT-LVL III: CPT | Mod: PBBFAC,HCNC,, | Performed by: FAMILY MEDICINE

## 2019-10-22 PROCEDURE — 3008F BODY MASS INDEX DOCD: CPT | Mod: HCNC,CPTII,S$GLB, | Performed by: FAMILY MEDICINE

## 2019-10-22 PROCEDURE — 3078F PR MOST RECENT DIASTOLIC BLOOD PRESSURE < 80 MM HG: ICD-10-PCS | Mod: HCNC,CPTII,S$GLB, | Performed by: FAMILY MEDICINE

## 2019-10-22 PROCEDURE — 3078F DIAST BP <80 MM HG: CPT | Mod: HCNC,CPTII,S$GLB, | Performed by: FAMILY MEDICINE

## 2019-10-22 PROCEDURE — 3075F SYST BP GE 130 - 139MM HG: CPT | Mod: HCNC,CPTII,S$GLB, | Performed by: FAMILY MEDICINE

## 2019-10-22 PROCEDURE — 3044F PR MOST RECENT HEMOGLOBIN A1C LEVEL <7.0%: ICD-10-PCS | Mod: HCNC,CPTII,S$GLB, | Performed by: FAMILY MEDICINE

## 2019-10-22 PROCEDURE — 99499 UNLISTED E&M SERVICE: CPT | Mod: HCNC,S$GLB,, | Performed by: FAMILY MEDICINE

## 2019-10-22 RX ORDER — PROMETHAZINE HYDROCHLORIDE 12.5 MG/1
12.5 TABLET ORAL EVERY 6 HOURS PRN
Qty: 20 TABLET | Refills: 0 | Status: ON HOLD | OUTPATIENT
Start: 2019-10-22 | End: 2023-06-15 | Stop reason: SDUPTHER

## 2019-10-22 NOTE — PROGRESS NOTES
Subjective:       Patient ID: Alexandra Goins is a 54 y.o. female.    Chief Complaint: Follow-up      HPI Comments:       Current Outpatient Medications:     cholecalciferol, vitamin D3, (VITAMIN D3) 1,000 unit capsule, Take by mouth., Disp: , Rfl:     diazePAM (VALIUM) 10 MG Tab, Take 1 tablet (10 mg total) by mouth 3 (three) times daily as needed., Disp: 90 tablet, Rfl: 1    doxepin (SINEQUAN) 10 MG capsule, Take 1 capsule (10 mg total) by mouth nightly as needed., Disp: 30 capsule, Rfl: 1    insulin NPH-insulin regular, 70/30, (NOVOLIN 70/30) 100 unit/mL (70-30) injection, Inject into the skin 2 (two) times daily., Disp: , Rfl:     losartan (COZAAR) 25 MG tablet, Take 25 mg by mouth once daily., Disp: , Rfl: 3    metFORMIN (GLUCOPHAGE) 500 MG tablet, Take 1 tablet (500 mg total) by mouth 2 (two) times daily with meals. (Patient taking differently: Take 500 mg by mouth 2 (two) times daily with meals. Takes 1000mg am and 1000mg pm.), Disp: 180 tablet, Rfl: 1    promethazine (PHENERGAN) 12.5 MG Tab, Take 1 tablet (12.5 mg total) by mouth every 6 (six) hours as needed., Disp: 20 tablet, Rfl: 0    QUEtiapine (SEROQUEL) 300 MG Tab, Take 1 tablet (300 mg total) by mouth every evening., Disp: 30 tablet, Rfl: 1    ranitidine (ZANTAC) 300 MG tablet, Take 300 mg by mouth every evening., Disp: , Rfl:     topiramate (TOPAMAX) 200 MG Tab, 1 tablet daily, Disp: 30 tablet, Rfl: 1    TRUE METRIX GLUCOSE TEST STRIP Strp, USE 1 STRIP TO CHECK GLUCOSE THREE TIMES DAILY, Disp: , Rfl: 11    venlafaxine (EFFEXOR-XR) 75 MG 24 hr capsule, Take 2 capsules (150 mg total) by mouth once daily., Disp: 60 capsule, Rfl: 2    baclofen (LIORESAL) 10 MG tablet, Take 1 tablet (10 mg total) by mouth nightly as needed., Disp: 30 tablet, Rfl: 3    insulin NPH (NOVOLIN N NPH U-100 INSULIN) 100 unit/mL injection, Inject 10 Units into the skin 2 (two) times daily before meals. (Patient not taking: Reported on 10/22/2019), Disp: 1 vial,  "Rfl: 3    insulin regular (NOVOLIN R REGULAR U-100 INSULN) 100 unit/mL Inj injection, Inject three times daily with meals using the scale: BG 80 - 150:   2 units  //  - 199: 3 units  //  - 249: 4 units  //  - 299: 5 units  //  - 349: 6 units  // BG Over 350:  7 units. (Patient not taking: Reported on 10/22/2019), Disp: 10 mL, Rfl: 3      The 3 week follow-up.  Generally doing better.  Blood sugars are stable.    She has an appointment on 10/28 for a cerebral angiogram to further evaluate her aneurysm.  She is asking about treatment for migraine headaches.  In the past she has gotten Fioricet.  I have asked her to review the use of this medication with neuro surgery on the appointment date in November to see if this medication is safe.  She is asking for refill on Phenergan today which he still takes about 3 or 4 times a week.    She has not had any more focal weakness.  She has been afraid to start driving again.  Today she had to take 1 of her Valium so in order to drive here.  She says she is safe to return home    Review of Systems   Constitutional: Negative for activity change, appetite change and fever.   HENT: Negative for sore throat.    Respiratory: Negative for cough and shortness of breath.    Cardiovascular: Negative for chest pain.   Gastrointestinal: Negative for abdominal pain, diarrhea and nausea.   Genitourinary: Negative for difficulty urinating.   Musculoskeletal: Negative for arthralgias and myalgias.   Neurological: Negative for dizziness and headaches.       Objective:      Vitals:    10/22/19 0758 10/22/19 0807   BP: (!) 140/64 132/68   Pulse: 85    Temp: 97.1 °F (36.2 °C)    TempSrc: Temporal    SpO2: 98%    Weight: 78.3 kg (172 lb 8.2 oz)    Height: 5' 6" (1.676 m)    PainSc: 0-No pain      Physical Exam   Constitutional: She is oriented to person, place, and time. She appears well-developed and well-nourished. No distress.   HENT:   Head: Normocephalic.   Neck: Neck " supple. No thyromegaly present.   Cardiovascular: Normal rate, regular rhythm and normal heart sounds.   No murmur heard.  Pulmonary/Chest: Effort normal and breath sounds normal. She has no wheezes. She has no rales.   Abdominal: Soft. She exhibits no distension.   Musculoskeletal: She exhibits no edema.   Lymphadenopathy:     She has no cervical adenopathy.   Neurological: She is alert and oriented to person, place, and time.   Skin: Skin is warm and dry. She is not diaphoretic.   Psychiatric: She has a normal mood and affect. Her behavior is normal. Judgment and thought content normal.   Nursing note and vitals reviewed.      Assessment:       1. Essential hypertension    2. Type 2 diabetes mellitus with complication, with long-term current use of insulin    3. Tobacco abuse    4. Basilar artery aneurysm    5. Bipolar affective disorder, remission status unspecified    6. Hx of migraine headaches        Plan:   Essential hypertension  Comments:  Controlled.  Follow-up 2 months    Type 2 diabetes mellitus with complication, with long-term current use of insulin  Comments:  Improved control over the last few months.  Follow-up with diabetic education next month as scheduled    Tobacco abuse  Comments:  Still not interested in quitting    Basilar artery aneurysm  Comments:  Follow-up with neuro surgery and angiogram in upcoming weeks    Bipolar affective disorder, remission status unspecified  Comments:  Followed by Psychiatry.  Compliant with medications    Hx of migraine headaches  Comments:  Need to review safe treatment options with Neurosurgery.  Consider Neurology consult    Other orders  -     promethazine (PHENERGAN) 12.5 MG Tab; Take 1 tablet (12.5 mg total) by mouth every 6 (six) hours as needed.  Dispense: 20 tablet; Refill: 0

## 2019-10-23 ENCOUNTER — OFFICE VISIT (OUTPATIENT)
Dept: PAIN MEDICINE | Facility: CLINIC | Age: 54
End: 2019-10-23
Payer: MEDICARE

## 2019-10-23 VITALS
HEART RATE: 88 BPM | SYSTOLIC BLOOD PRESSURE: 96 MMHG | WEIGHT: 172.63 LBS | BODY MASS INDEX: 27.74 KG/M2 | HEIGHT: 66 IN | DIASTOLIC BLOOD PRESSURE: 67 MMHG

## 2019-10-23 DIAGNOSIS — M54.16 LUMBAR RADICULOPATHY: ICD-10-CM

## 2019-10-23 DIAGNOSIS — M47.816 LUMBAR SPONDYLOSIS: ICD-10-CM

## 2019-10-23 DIAGNOSIS — M54.9 MID BACK PAIN: ICD-10-CM

## 2019-10-23 DIAGNOSIS — M79.18 MYOFASCIAL PAIN: Primary | ICD-10-CM

## 2019-10-23 PROCEDURE — 3008F BODY MASS INDEX DOCD: CPT | Mod: HCNC,CPTII,S$GLB, | Performed by: PHYSICIAN ASSISTANT

## 2019-10-23 PROCEDURE — 3078F DIAST BP <80 MM HG: CPT | Mod: HCNC,CPTII,S$GLB, | Performed by: PHYSICIAN ASSISTANT

## 2019-10-23 PROCEDURE — 99214 OFFICE O/P EST MOD 30 MIN: CPT | Mod: HCNC,S$GLB,, | Performed by: PHYSICIAN ASSISTANT

## 2019-10-23 PROCEDURE — 99999 PR PBB SHADOW E&M-EST. PATIENT-LVL IV: CPT | Mod: PBBFAC,HCNC,, | Performed by: PHYSICIAN ASSISTANT

## 2019-10-23 PROCEDURE — 99999 PR PBB SHADOW E&M-EST. PATIENT-LVL IV: ICD-10-PCS | Mod: PBBFAC,HCNC,, | Performed by: PHYSICIAN ASSISTANT

## 2019-10-23 PROCEDURE — 3008F PR BODY MASS INDEX (BMI) DOCUMENTED: ICD-10-PCS | Mod: HCNC,CPTII,S$GLB, | Performed by: PHYSICIAN ASSISTANT

## 2019-10-23 PROCEDURE — 99214 PR OFFICE/OUTPT VISIT, EST, LEVL IV, 30-39 MIN: ICD-10-PCS | Mod: HCNC,S$GLB,, | Performed by: PHYSICIAN ASSISTANT

## 2019-10-23 PROCEDURE — 3078F PR MOST RECENT DIASTOLIC BLOOD PRESSURE < 80 MM HG: ICD-10-PCS | Mod: HCNC,CPTII,S$GLB, | Performed by: PHYSICIAN ASSISTANT

## 2019-10-23 PROCEDURE — 3074F SYST BP LT 130 MM HG: CPT | Mod: HCNC,CPTII,S$GLB, | Performed by: PHYSICIAN ASSISTANT

## 2019-10-23 PROCEDURE — 3074F PR MOST RECENT SYSTOLIC BLOOD PRESSURE < 130 MM HG: ICD-10-PCS | Mod: HCNC,CPTII,S$GLB, | Performed by: PHYSICIAN ASSISTANT

## 2019-10-23 RX ORDER — BACLOFEN 10 MG/1
10 TABLET ORAL 2 TIMES DAILY
Qty: 60 TABLET | Refills: 0 | Status: SHIPPED | OUTPATIENT
Start: 2019-10-23 | End: 2021-01-11
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
## 2019-10-25 ENCOUNTER — TELEPHONE (OUTPATIENT)
Dept: INTERVENTIONAL RADIOLOGY/VASCULAR | Facility: HOSPITAL | Age: 54
End: 2019-10-25

## 2019-10-25 DIAGNOSIS — I67.89 OTHER CEREBROVASCULAR DISEASE: ICD-10-CM

## 2019-10-25 DIAGNOSIS — I67.1 CEREBRAL ANEURYSM: Primary | ICD-10-CM

## 2019-10-25 DIAGNOSIS — I10 ESSENTIAL HYPERTENSION: Chronic | ICD-10-CM

## 2019-10-25 RX ORDER — HEPARIN SODIUM 1000 [USP'U]/ML
3000 INJECTION, SOLUTION INTRAVENOUS; SUBCUTANEOUS ONCE
Status: CANCELLED | OUTPATIENT
Start: 2019-10-25 | End: 2019-10-25

## 2019-10-25 RX ORDER — FENTANYL CITRATE 50 UG/ML
50 INJECTION, SOLUTION INTRAMUSCULAR; INTRAVENOUS
Status: CANCELLED | OUTPATIENT
Start: 2019-10-25

## 2019-10-25 RX ORDER — MIDAZOLAM HYDROCHLORIDE 1 MG/ML
1 INJECTION INTRAMUSCULAR; INTRAVENOUS
Status: CANCELLED | OUTPATIENT
Start: 2019-10-25

## 2019-10-28 ENCOUNTER — TELEPHONE (OUTPATIENT)
Dept: INTERVENTIONAL RADIOLOGY/VASCULAR | Facility: CLINIC | Age: 54
End: 2019-10-28

## 2019-10-28 ENCOUNTER — HOSPITAL ENCOUNTER (OUTPATIENT)
Facility: HOSPITAL | Age: 54
Discharge: HOME | End: 2019-10-28
Attending: NEUROLOGICAL SURGERY | Admitting: NEUROLOGICAL SURGERY
Payer: MEDICARE

## 2019-10-28 VITALS
DIASTOLIC BLOOD PRESSURE: 56 MMHG | TEMPERATURE: 98 F | SYSTOLIC BLOOD PRESSURE: 121 MMHG | WEIGHT: 172 LBS | RESPIRATION RATE: 17 BRPM | OXYGEN SATURATION: 100 % | HEART RATE: 69 BPM | BODY MASS INDEX: 27.64 KG/M2 | HEIGHT: 66 IN

## 2019-10-28 DIAGNOSIS — I67.1 CEREBRAL ANEURYSM: ICD-10-CM

## 2019-10-28 LAB — POCT GLUCOSE: 167 MG/DL (ref 70–110)

## 2019-10-28 PROCEDURE — 82962 GLUCOSE BLOOD TEST: CPT | Mod: HCNC

## 2019-10-28 PROCEDURE — 63600175 PHARM REV CODE 636 W HCPCS: Mod: HCNC | Performed by: FAMILY MEDICINE

## 2019-10-28 PROCEDURE — 25500020 PHARM REV CODE 255: Mod: HCNC | Performed by: NEUROLOGICAL SURGERY

## 2019-10-28 PROCEDURE — 63600175 PHARM REV CODE 636 W HCPCS: Mod: HCNC | Performed by: PSYCHIATRY & NEUROLOGY

## 2019-10-28 RX ORDER — FENTANYL CITRATE 50 UG/ML
INJECTION, SOLUTION INTRAMUSCULAR; INTRAVENOUS CODE/TRAUMA/SEDATION MEDICATION
Status: COMPLETED | OUTPATIENT
Start: 2019-10-28 | End: 2019-10-28

## 2019-10-28 RX ORDER — MIDAZOLAM HYDROCHLORIDE 1 MG/ML
INJECTION INTRAMUSCULAR; INTRAVENOUS CODE/TRAUMA/SEDATION MEDICATION
Status: COMPLETED | OUTPATIENT
Start: 2019-10-28 | End: 2019-10-28

## 2019-10-28 RX ORDER — SODIUM CHLORIDE 9 MG/ML
INJECTION, SOLUTION INTRAVENOUS CONTINUOUS
Status: DISCONTINUED | OUTPATIENT
Start: 2019-10-28 | End: 2019-10-28 | Stop reason: HOSPADM

## 2019-10-28 RX ORDER — IODIXANOL 320 MG/ML
200 INJECTION, SOLUTION INTRAVASCULAR
Status: COMPLETED | OUTPATIENT
Start: 2019-10-28 | End: 2019-10-28

## 2019-10-28 RX ORDER — PROCHLORPERAZINE EDISYLATE 5 MG/ML
10 INJECTION INTRAMUSCULAR; INTRAVENOUS ONCE
Status: DISCONTINUED | OUTPATIENT
Start: 2019-10-28 | End: 2019-10-28 | Stop reason: HOSPADM

## 2019-10-28 RX ORDER — SODIUM CHLORIDE 0.9 % (FLUSH) 0.9 %
10 SYRINGE (ML) INJECTION
Status: DISCONTINUED | OUTPATIENT
Start: 2019-10-28 | End: 2019-10-28 | Stop reason: HOSPADM

## 2019-10-28 RX ADMIN — MIDAZOLAM HYDROCHLORIDE 1 MG: 1 INJECTION, SOLUTION INTRAMUSCULAR; INTRAVENOUS at 10:10

## 2019-10-28 RX ADMIN — FENTANYL CITRATE 50 MCG: 50 INJECTION, SOLUTION INTRAMUSCULAR; INTRAVENOUS at 10:10

## 2019-10-28 RX ADMIN — SODIUM CHLORIDE: 0.9 INJECTION, SOLUTION INTRAVENOUS at 08:10

## 2019-10-28 RX ADMIN — IODIXANOL 80 ML: 320 INJECTION, SOLUTION INTRAVASCULAR at 10:10

## 2019-10-28 NOTE — DISCHARGE SUMMARY
Radiology Discharge Summary      Hospital Course: No complications    Admit Date: 10/28/2019  Discharge Date: 10/28/2019     Instructions Given to Patient: Yes  Diet: Resume prior diet  Activity: activity as tolerated    Description of Condition on Discharge: Stable  Vital Signs (Most Recent): Temp: 98.9 °F (37.2 °C) (10/28/19 0819)  Pulse: 76 (10/28/19 1020)  Resp: 17 (10/28/19 1020)  BP: 124/67 (10/28/19 1020)  SpO2: 99 % (10/28/19 1020)    Discharge Disposition: Home    Discharge Diagnosis: basilar tip aneurysm     Follow-up: Dr. Levin in clinic.        Dru Looney MD  Vascular and Interventional Neurology Staff  Director of [facility]  [facility]  [phone]

## 2019-10-28 NOTE — NURSING
Pt to [facility] post procedure bay #4. Recd bedside report from DARWIN Raines. Pt arrived a/o and c/o nausea. Pt denies pain, VSS, procedure site w/o bleeding or hematoma and dsg are CDI. Pt denies headache. Pt instructed to lay flat and not to flex right knee. Pt v/u. Spoke to Dr. Looney about pt c/o nausea and recd orders (see MAR). Pt in no acute distress. Will continue to monitor.

## 2019-10-28 NOTE — PLAN OF CARE
Pt to IR-190. Order,consent and labs reviewed. Pt. Moved self to procedure table. Monitored. Right groin prepped and draped per tech. Safe and comfortable.

## 2019-10-28 NOTE — H&P
Radiology History & Physical      SUBJECTIVE:     Chief Complaint: Cerebral aneurysm    History of Present Illness:  Alexandra Goins is a 54 y.o. female who presents for diagnostic cerebral angiogram.    Past Medical History:   Diagnosis Date    Acute ischemic stroke 9/17/2019    Aneurysm     Anxiety     Arthritis     Asthma     Bipolar 1 disorder     Cerebral aneurysm, nonruptured 9/19/2019    Depression     Diabetes mellitus, type 2     Diverticular disease     GERD (gastroesophageal reflux disease)     Hyperlipemia     Hypertension     Hypothyroidism     Pneumonia     Renal manifestation of secondary diabetes mellitus     Right-sided back pain 6/29/2019    Stroke     Trouble in sleeping      Past Surgical History:   Procedure Laterality Date    CHOLECYSTECTOMY      COLON SURGERY      COLONOSCOPY N/A 1/12/2018    Procedure: COLONOSCOPY;  Surgeon: Roque Mahajan III, MD;  Location: [facility];  Service: Endoscopy;  Laterality: N/A;    DENTAL SURGERY  05/21/2018    removal of 8 top teeth    HYSTERECTOMY      TONSILLECTOMY         Home Meds:   Prior to Admission medications    Medication Sig Start Date End Date Taking? Authorizing Provider   baclofen (LIORESAL) 10 MG tablet Take 1 tablet (10 mg total) by mouth 2 (two) times daily. 10/23/19 11/22/19 Yes Jennifer Mauricio PA-C   cholecalciferol, vitamin D3, (VITAMIN D3) 1,000 unit capsule Take by mouth.   Yes Historical Provider, MD   diazePAM (VALIUM) 10 MG Tab Take 1 tablet (10 mg total) by mouth 3 (three) times daily as needed. 10/8/19 11/7/19 Yes Chang Mullen MD   doxepin (SINEQUAN) 10 MG capsule Take 1 capsule (10 mg total) by mouth nightly as needed. 10/4/19  Yes Chang Mullen MD   insulin NPH-insulin regular, 70/30, (NOVOLIN 70/30) 100 unit/mL (70-30) injection Inject into the skin 2 (two) times daily.   Yes Historical Provider, MD   losartan (COZAAR) 25 MG tablet Take 25 mg by mouth once daily. 7/7/19  Yes  Historical Provider, MD   metFORMIN (GLUCOPHAGE) 500 MG tablet Take 1 tablet (500 mg total) by mouth 2 (two) times daily with meals.  Patient taking differently: Take 500 mg by mouth 2 (two) times daily with meals. Takes 1000mg am and 1000mg pm. 6/12/19  Yes Amelia Santana, PhD, NP-C   promethazine (PHENERGAN) 12.5 MG Tab Take 1 tablet (12.5 mg total) by mouth every 6 (six) hours as needed. 10/22/19  Yes Perez Casiano MD   QUEtiapine (SEROQUEL) 300 MG Tab Take 1 tablet (300 mg total) by mouth every evening. 10/4/19 10/3/20 Yes Chang Mullen MD   ranitidine (ZANTAC) 300 MG tablet Take 300 mg by mouth every evening.   Yes Historical Provider, MD   topiramate (TOPAMAX) 200 MG Tab 1 tablet daily 10/4/19  Yes Chang Mullen MD   venlafaxine (EFFEXOR-XR) 75 MG 24 hr capsule Take 2 capsules (150 mg total) by mouth once daily. 9/19/19  Yes Darwin Hancock NP   insulin NPH (NOVOLIN N NPH U-100 INSULIN) 100 unit/mL injection Inject 10 Units into the skin 2 (two) times daily before meals. 8/24/19 10/28/19 Yes Amelia Santana, PhD, NP-C   TRUE METRIX GLUCOSE TEST STRIP Strp USE 1 STRIP TO CHECK GLUCOSE THREE TIMES DAILY 8/1/19   Historical Provider, MD   insulin regular (NOVOLIN R REGULAR U-100 INSULN) 100 unit/mL Inj injection Inject three times daily with meals using the scale: BG 80 - 150:   2 units  //  - 199: 3 units  //  - 249: 4 units  //  - 299: 5 units  //  - 349: 6 units  // BG Over 350:  7 units. 8/24/19 10/28/19  Amelia Santana, PhD, NP-C     Anticoagulants/Antiplatelets: no anticoagulation    Allergies:   Review of patient's allergies indicates:   Allergen Reactions    Adhesive Blisters     Pt states can't tolerate paper tape    Aspirin      Nosebleeds    Levaquin [levofloxacin]     Levomenol analogues     Lipitor [atorvastatin]      Leg Cramps    Piroxicam Diarrhea and Nausea Only    Zofran [ondansetron hcl] Hives and Other (See  Comments)     headaches    Celestone [betamethasone] Hives, Itching and Rash     Sedation History:  no adverse reactions    Review of Systems:   Hematological: no known coagulopathies  Respiratory: no shortness of breath  Cardiovascular: no chest pain  Gastrointestinal: no abdominal pain  Genito-Urinary: no dysuria  Musculoskeletal: negative  Neurological: negative         OBJECTIVE:     Vital Signs (Most Recent)  Temp: 98.9 °F (37.2 °C) (10/28/19 0819)  Pulse: 72 (10/28/19 0944)  Resp: 19 (10/28/19 0944)  BP: (!) 149/70 (10/28/19 0944)  SpO2: 97 % (10/28/19 0819)    Physical Exam:  ASA: 2  Mallampati: 2    General: no acute distress  Mental Status: alert and oriented to person, place and time  HEENT: normocephalic, atraumatic  Chest: unlabored breathing  Heart: regular heart rate  Abdomen: nondistended  Extremity: moves all extremities    Laboratory  Lab Results   Component Value Date    INR 1.0 10/28/2019       Lab Results   Component Value Date    WBC 11.57 10/28/2019    HGB 15.0 10/28/2019    HCT 46.9 10/28/2019    MCV 95 10/28/2019     10/28/2019      Lab Results   Component Value Date     (H) 10/28/2019     10/28/2019    K 4.6 10/28/2019     10/28/2019    CO2 23 10/28/2019    BUN 14 10/28/2019    CREATININE 0.8 10/28/2019    CALCIUM 10.0 10/28/2019    MG 2.0 09/19/2019    ALT 22 10/28/2019    AST 17 10/28/2019    ALBUMIN 4.2 10/28/2019    BILITOT 0.2 10/28/2019       ASSESSMENT/PLAN:     Sedation Plan: Up to moderate sedation.  Patient will undergo diagnostic cerebral angiogram.      BELTRAN Woods.  Radiology Department/ PGY-2.  [facility].

## 2019-10-28 NOTE — DISCHARGE INSTRUCTIONS
For scheduling: Call Soraya at [phone]    For questions or non emergent concerns call: Radiology clinic  MON-FRI 8 AM- 5PM @ [phone].     For immediate emergent concerns call Radiology resident on call @ [phone].

## 2019-10-28 NOTE — PROCEDURES
Radiology Post-Procedure Note    Pre Op Diagnosis: Basilar tip aneutysm    Post Op Diagnosis: same    Procedure: Cerebral angiogram    Procedure performed by: Dru Looney MD    Written Informed Consent Obtained: Yes    Specimen Removed: NO    Estimated Blood Loss: Minimal    Procedure report:     A 6F sheath was placed into the right femoral artery and a 5F Kulwinder catheter was advanced into the aortic arch.  The bilateral common and internal carotid arteries and bilateral vertebral arteries were subselected and angiography of the brain was performed after injection into each of these vessels.    Preliminary interpretation: basilar tip aneurysm.  Please see Imaging report for full details.    A right femoral artery angiogram was performed, the sheath removed and hemostasis achieved using angioseal.  No hematoma was present at the time of hemostasis.    The patient tolerated the procedure well.     Dru Looney MD  Vascular and Interventional Neurology Staff  Director of [facility]  [facility]  [phone]

## 2019-10-28 NOTE — PLAN OF CARE
Procedure complete. VSS. Pt moved to stretcher via slideboard. Reminded to keep head down and right leg straight. Transferred to [facility] and report to RN.

## 2019-10-28 NOTE — TELEPHONE ENCOUNTER
Return call to patient. Patient had IR cerebral angiogram procedure today 10/28/19 and wanted to know how long she need to keeps bandage on. Explained to pt she can removed bandage on tomorrow with no bending or lifting. Pt understands.

## 2019-10-28 NOTE — PROGRESS NOTES
Cerebral Angiogram is complete. Pt tolerated well. Discharge instructions and handouts provided. Pt verbalized understanding.Pt escorted to lobby via wheelchair.

## 2019-11-04 ENCOUNTER — TELEPHONE (OUTPATIENT)
Dept: NEUROSURGERY | Facility: CLINIC | Age: 54
End: 2019-11-04

## 2019-11-04 NOTE — TELEPHONE ENCOUNTER
----- Message from Analy Max sent at 11/4/2019  2:31 PM CST -----  Contact: Self  Pt stated that she can not make her upcoming appointment and would like to be rescheduled for the week of Thanksgiving when she will have transportation     Pt can be reached @ [phone]

## 2019-11-14 ENCOUNTER — OFFICE VISIT (OUTPATIENT)
Dept: PSYCHIATRY | Facility: CLINIC | Age: 54
End: 2019-11-14
Payer: MEDICARE

## 2019-11-14 VITALS
WEIGHT: 170.88 LBS | DIASTOLIC BLOOD PRESSURE: 93 MMHG | SYSTOLIC BLOOD PRESSURE: 135 MMHG | BODY MASS INDEX: 27.58 KG/M2 | HEART RATE: 102 BPM

## 2019-11-14 DIAGNOSIS — F31.9 BIPOLAR 1 DISORDER: Primary | ICD-10-CM

## 2019-11-14 DIAGNOSIS — G47.00 INSOMNIA, UNSPECIFIED TYPE: ICD-10-CM

## 2019-11-14 DIAGNOSIS — F41.9 ANXIETY: ICD-10-CM

## 2019-11-14 PROCEDURE — 3075F SYST BP GE 130 - 139MM HG: CPT | Mod: HCNC,CPTII,S$GLB, | Performed by: PSYCHIATRY & NEUROLOGY

## 2019-11-14 PROCEDURE — 99999 PR PBB SHADOW E&M-EST. PATIENT-LVL II: CPT | Mod: PBBFAC,HCNC,, | Performed by: PSYCHIATRY & NEUROLOGY

## 2019-11-14 PROCEDURE — 3080F DIAST BP >= 90 MM HG: CPT | Mod: HCNC,CPTII,S$GLB, | Performed by: PSYCHIATRY & NEUROLOGY

## 2019-11-14 PROCEDURE — 99214 OFFICE O/P EST MOD 30 MIN: CPT | Mod: HCNC,S$GLB,, | Performed by: PSYCHIATRY & NEUROLOGY

## 2019-11-14 PROCEDURE — 3080F PR MOST RECENT DIASTOLIC BLOOD PRESSURE >= 90 MM HG: ICD-10-PCS | Mod: HCNC,CPTII,S$GLB, | Performed by: PSYCHIATRY & NEUROLOGY

## 2019-11-14 PROCEDURE — 3008F PR BODY MASS INDEX (BMI) DOCUMENTED: ICD-10-PCS | Mod: HCNC,CPTII,S$GLB, | Performed by: PSYCHIATRY & NEUROLOGY

## 2019-11-14 PROCEDURE — 99214 PR OFFICE/OUTPT VISIT, EST, LEVL IV, 30-39 MIN: ICD-10-PCS | Mod: HCNC,S$GLB,, | Performed by: PSYCHIATRY & NEUROLOGY

## 2019-11-14 PROCEDURE — 3008F BODY MASS INDEX DOCD: CPT | Mod: HCNC,CPTII,S$GLB, | Performed by: PSYCHIATRY & NEUROLOGY

## 2019-11-14 PROCEDURE — 99999 PR PBB SHADOW E&M-EST. PATIENT-LVL II: ICD-10-PCS | Mod: PBBFAC,HCNC,, | Performed by: PSYCHIATRY & NEUROLOGY

## 2019-11-14 PROCEDURE — 3075F PR MOST RECENT SYSTOLIC BLOOD PRESS GE 130-139MM HG: ICD-10-PCS | Mod: HCNC,CPTII,S$GLB, | Performed by: PSYCHIATRY & NEUROLOGY

## 2019-11-14 RX ORDER — DIAZEPAM 10 MG/1
10 TABLET ORAL 3 TIMES DAILY PRN
Qty: 90 TABLET | Refills: 2 | Status: SHIPPED | OUTPATIENT
Start: 2019-11-14 | End: 2020-03-04 | Stop reason: SDUPTHER

## 2019-11-14 RX ORDER — DOXEPIN HYDROCHLORIDE 10 MG/1
CAPSULE ORAL
Qty: 60 CAPSULE | Refills: 2 | Status: SHIPPED | OUTPATIENT
Start: 2019-11-14 | End: 2020-03-04 | Stop reason: SDUPTHER

## 2019-11-14 RX ORDER — QUETIAPINE FUMARATE 300 MG/1
300 TABLET, FILM COATED ORAL NIGHTLY
Qty: 30 TABLET | Refills: 2 | Status: SHIPPED | OUTPATIENT
Start: 2019-11-14 | End: 2020-03-04 | Stop reason: SDUPTHER

## 2019-11-14 RX ORDER — VENLAFAXINE HYDROCHLORIDE 75 MG/1
150 CAPSULE, EXTENDED RELEASE ORAL DAILY
Qty: 60 CAPSULE | Refills: 2 | Status: SHIPPED | OUTPATIENT
Start: 2019-11-14 | End: 2020-03-04 | Stop reason: SDUPTHER
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
## 2019-11-20 ENCOUNTER — PATIENT OUTREACH (OUTPATIENT)
Dept: ADMINISTRATIVE | Facility: OTHER | Age: 54
End: 2019-11-20

## 2019-11-25 ENCOUNTER — OFFICE VISIT (OUTPATIENT)
Dept: NEUROSURGERY | Facility: CLINIC | Age: 54
End: 2019-11-25
Payer: MEDICARE

## 2019-11-25 VITALS
HEART RATE: 113 BPM | SYSTOLIC BLOOD PRESSURE: 96 MMHG | WEIGHT: 172.88 LBS | HEIGHT: 66 IN | TEMPERATURE: 98 F | BODY MASS INDEX: 27.78 KG/M2 | DIASTOLIC BLOOD PRESSURE: 56 MMHG

## 2019-11-25 DIAGNOSIS — I67.1 CEREBRAL ANEURYSM: Primary | ICD-10-CM

## 2019-11-25 PROCEDURE — 3078F DIAST BP <80 MM HG: CPT | Mod: HCNC,CPTII,S$GLB, | Performed by: NEUROLOGICAL SURGERY

## 2019-11-25 PROCEDURE — 99999 PR PBB SHADOW E&M-EST. PATIENT-LVL III: ICD-10-PCS | Mod: PBBFAC,HCNC,, | Performed by: NEUROLOGICAL SURGERY

## 2019-11-25 PROCEDURE — 3008F PR BODY MASS INDEX (BMI) DOCUMENTED: ICD-10-PCS | Mod: HCNC,CPTII,S$GLB, | Performed by: NEUROLOGICAL SURGERY

## 2019-11-25 PROCEDURE — 99499 UNLISTED E&M SERVICE: CPT | Mod: HCNC,S$GLB,, | Performed by: NEUROLOGICAL SURGERY

## 2019-11-25 PROCEDURE — 3078F PR MOST RECENT DIASTOLIC BLOOD PRESSURE < 80 MM HG: ICD-10-PCS | Mod: HCNC,CPTII,S$GLB, | Performed by: NEUROLOGICAL SURGERY

## 2019-11-25 PROCEDURE — 99213 PR OFFICE/OUTPT VISIT, EST, LEVL III, 20-29 MIN: ICD-10-PCS | Mod: HCNC,S$GLB,, | Performed by: NEUROLOGICAL SURGERY

## 2019-11-25 PROCEDURE — 99499 RISK ADDL DX/OHS AUDIT: ICD-10-PCS | Mod: HCNC,S$GLB,, | Performed by: NEUROLOGICAL SURGERY

## 2019-11-25 PROCEDURE — 3074F PR MOST RECENT SYSTOLIC BLOOD PRESSURE < 130 MM HG: ICD-10-PCS | Mod: HCNC,CPTII,S$GLB, | Performed by: NEUROLOGICAL SURGERY

## 2019-11-25 PROCEDURE — 3008F BODY MASS INDEX DOCD: CPT | Mod: HCNC,CPTII,S$GLB, | Performed by: NEUROLOGICAL SURGERY

## 2019-11-25 PROCEDURE — 99999 PR PBB SHADOW E&M-EST. PATIENT-LVL III: CPT | Mod: PBBFAC,HCNC,, | Performed by: NEUROLOGICAL SURGERY

## 2019-11-25 PROCEDURE — 3074F SYST BP LT 130 MM HG: CPT | Mod: HCNC,CPTII,S$GLB, | Performed by: NEUROLOGICAL SURGERY

## 2019-11-25 PROCEDURE — 99213 OFFICE O/P EST LOW 20 MIN: CPT | Mod: HCNC,S$GLB,, | Performed by: NEUROLOGICAL SURGERY

## 2019-11-25 NOTE — PROGRESS NOTES
This office note has been dictated.  Dictation #1  MRN:[id number]  [facility]:[id number]

## 2019-12-01 NOTE — TELEPHONE ENCOUNTER
Returned pts call x 2 times, no answer. Left message informing pt to go to urgent care and have BS taken if her monitor will not  reading.    Self

## 2019-12-05 NOTE — PROGRESS NOTES
Alexandra Goins returned in neurosurgical followup to the office today.  She has   been reasonably stable over the past six weeks.  She continues to have some   episodes of lightheadedness, particularly when she stands up.  She has had no   persistent focal weakness, speech difficulty or other symptoms.    On brief examination today, she is alert and oriented and speaking clearly.  She   has full extraocular movements and equal pupils.  She is moving all extremities   well and seems generally neurologically intact.    Cerebral angiography was done by Dr. Looney on 10/28/19.  Again, there is seen a   small 4 mm basilar apex aneurysm.  The neck is relatively wide and the aneurysm   points posteriorly.    The angiogram was reviewed with Dr. Dong from Neuroradiology.  It was felt   that this could be managed with a stent across the left PCA from the basilar   artery and then coils into the aneurysm.  However, the aneurysm is quite small   and represents a low risk of subarachnoid hemorrhage.  After discussing this   with the patient and her , we will have MRA repeated in about one year to   see if there is growth of the aneurysm.  She takes Fioricet to manage her   headaches.  I see no contraindication to taking Fioricet relative to aneurysm   and subarachnoid hemorrhage.  I will see her back in one year with a followup   MRA.        DOUGLAS/JOSE  dd: 11/25/2019 13:39:36 (CST)  td: 11/26/2019 01:30:14 (CST)  Doc ID   #[id number]  Job ID #[id number]    CC: Alexandra Goins

## 2020-01-21 ENCOUNTER — TELEPHONE (OUTPATIENT)
Dept: FAMILY MEDICINE | Facility: CLINIC | Age: 55
End: 2020-01-21

## 2020-01-21 ENCOUNTER — HOSPITAL ENCOUNTER (OUTPATIENT)
Dept: RADIOLOGY | Facility: HOSPITAL | Age: 55
Discharge: HOME | End: 2020-01-21
Attending: FAMILY MEDICINE
Payer: MEDICARE

## 2020-01-21 ENCOUNTER — OFFICE VISIT (OUTPATIENT)
Dept: FAMILY MEDICINE | Facility: CLINIC | Age: 55
End: 2020-01-21
Payer: MEDICARE

## 2020-01-21 VITALS
WEIGHT: 184.19 LBS | HEART RATE: 113 BPM | HEIGHT: 66 IN | BODY MASS INDEX: 29.6 KG/M2 | TEMPERATURE: 97 F | DIASTOLIC BLOOD PRESSURE: 76 MMHG | OXYGEN SATURATION: 98 % | SYSTOLIC BLOOD PRESSURE: 108 MMHG

## 2020-01-21 DIAGNOSIS — I63.9 CEREBROVASCULAR ACCIDENT (CVA), UNSPECIFIED MECHANISM: ICD-10-CM

## 2020-01-21 DIAGNOSIS — E11.8 TYPE 2 DIABETES MELLITUS WITH COMPLICATION, WITH LONG-TERM CURRENT USE OF INSULIN: ICD-10-CM

## 2020-01-21 DIAGNOSIS — M65.30 TRIGGER FINGER OF RIGHT HAND, UNSPECIFIED FINGER: ICD-10-CM

## 2020-01-21 DIAGNOSIS — Z79.4 TYPE 2 DIABETES MELLITUS WITH COMPLICATION, WITH LONG-TERM CURRENT USE OF INSULIN: ICD-10-CM

## 2020-01-21 DIAGNOSIS — M79.642 BILATERAL HAND PAIN: ICD-10-CM

## 2020-01-21 DIAGNOSIS — R53.83 FATIGUE, UNSPECIFIED TYPE: ICD-10-CM

## 2020-01-21 DIAGNOSIS — Z72.0 TOBACCO ABUSE: ICD-10-CM

## 2020-01-21 DIAGNOSIS — F31.9 BIPOLAR AFFECTIVE DISORDER, REMISSION STATUS UNSPECIFIED: ICD-10-CM

## 2020-01-21 DIAGNOSIS — Z86.69 HX OF MIGRAINE HEADACHES: ICD-10-CM

## 2020-01-21 DIAGNOSIS — M79.641 BILATERAL HAND PAIN: Primary | ICD-10-CM

## 2020-01-21 DIAGNOSIS — I72.5 BASILAR ARTERY ANEURYSM: ICD-10-CM

## 2020-01-21 DIAGNOSIS — M79.641 BILATERAL HAND PAIN: ICD-10-CM

## 2020-01-21 DIAGNOSIS — I10 ESSENTIAL HYPERTENSION: ICD-10-CM

## 2020-01-21 DIAGNOSIS — M79.642 BILATERAL HAND PAIN: Primary | ICD-10-CM

## 2020-01-21 PROBLEM — I69.952: Status: ACTIVE | Noted: 2020-01-21

## 2020-01-21 PROCEDURE — 73130 X-RAY EXAM OF HAND: CPT | Mod: TC,50,HCNC,PO

## 2020-01-21 PROCEDURE — 3074F SYST BP LT 130 MM HG: CPT | Mod: HCNC,CPTII,S$GLB, | Performed by: FAMILY MEDICINE

## 2020-01-21 PROCEDURE — 3074F PR MOST RECENT SYSTOLIC BLOOD PRESSURE < 130 MM HG: ICD-10-PCS | Mod: HCNC,CPTII,S$GLB, | Performed by: FAMILY MEDICINE

## 2020-01-21 PROCEDURE — 99215 OFFICE O/P EST HI 40 MIN: CPT | Mod: HCNC,S$GLB,, | Performed by: FAMILY MEDICINE

## 2020-01-21 PROCEDURE — 99999 PR PBB SHADOW E&M-EST. PATIENT-LVL III: ICD-10-PCS | Mod: PBBFAC,HCNC,, | Performed by: FAMILY MEDICINE

## 2020-01-21 PROCEDURE — 3008F PR BODY MASS INDEX (BMI) DOCUMENTED: ICD-10-PCS | Mod: HCNC,CPTII,S$GLB, | Performed by: FAMILY MEDICINE

## 2020-01-21 PROCEDURE — 73130 X-RAY EXAM OF HAND: CPT | Mod: 26,50,HCNC, | Performed by: RADIOLOGY

## 2020-01-21 PROCEDURE — 99499 UNLISTED E&M SERVICE: CPT | Mod: HCNC,S$GLB,, | Performed by: FAMILY MEDICINE

## 2020-01-21 PROCEDURE — 3008F BODY MASS INDEX DOCD: CPT | Mod: HCNC,CPTII,S$GLB, | Performed by: FAMILY MEDICINE

## 2020-01-21 PROCEDURE — 3078F DIAST BP <80 MM HG: CPT | Mod: HCNC,CPTII,S$GLB, | Performed by: FAMILY MEDICINE

## 2020-01-21 PROCEDURE — 3078F PR MOST RECENT DIASTOLIC BLOOD PRESSURE < 80 MM HG: ICD-10-PCS | Mod: HCNC,CPTII,S$GLB, | Performed by: FAMILY MEDICINE

## 2020-01-21 PROCEDURE — 99215 PR OFFICE/OUTPT VISIT, EST, LEVL V, 40-54 MIN: ICD-10-PCS | Mod: HCNC,S$GLB,, | Performed by: FAMILY MEDICINE

## 2020-01-21 PROCEDURE — 99499 RISK ADDL DX/OHS AUDIT: ICD-10-PCS | Mod: HCNC,S$GLB,, | Performed by: FAMILY MEDICINE

## 2020-01-21 PROCEDURE — 99999 PR PBB SHADOW E&M-EST. PATIENT-LVL III: CPT | Mod: PBBFAC,HCNC,, | Performed by: FAMILY MEDICINE

## 2020-01-21 PROCEDURE — 73130 XR HAND COMPLETE 3 VIEWS BILATERAL: ICD-10-PCS | Mod: 26,50,HCNC, | Performed by: RADIOLOGY

## 2020-01-21 PROCEDURE — 3044F HG A1C LEVEL LT 7.0%: CPT | Mod: HCNC,CPTII,S$GLB, | Performed by: FAMILY MEDICINE

## 2020-01-21 PROCEDURE — 3044F PR MOST RECENT HEMOGLOBIN A1C LEVEL <7.0%: ICD-10-PCS | Mod: HCNC,CPTII,S$GLB, | Performed by: FAMILY MEDICINE

## 2020-01-21 RX ORDER — BACLOFEN 10 MG/1
TABLET ORAL
Refills: 0 | OUTPATIENT
Start: 2020-01-21

## 2020-01-21 RX ORDER — GABAPENTIN 300 MG/1
300 CAPSULE ORAL 3 TIMES DAILY
Qty: 90 CAPSULE | Refills: 1 | Status: SHIPPED | OUTPATIENT
Start: 2020-01-21 | End: 2020-02-04

## 2020-01-21 NOTE — TELEPHONE ENCOUNTER
----- Message from Bobbi Butler sent at 1/21/2020 10:10 AM CST -----  Contact: pt   She's calling in regards to Rx       ,pls call pt back at [phone] (home)

## 2020-01-21 NOTE — PROGRESS NOTES
Subjective:       Patient ID: Alexandra Goins is a 54 y.o. female.    Chief Complaint: pain in hands      HPI Comments:       Current Outpatient Medications:     baclofen (LIORESAL) 10 MG tablet, Take 1 tablet (10 mg total) by mouth 2 (two) times daily., Disp: 60 tablet, Rfl: 0    cholecalciferol, vitamin D3, (VITAMIN D3) 1,000 unit capsule, Take by mouth., Disp: , Rfl:     diazePAM (VALIUM) 10 MG Tab, Take 1 tablet (10 mg total) by mouth 3 (three) times daily as needed., Disp: 90 tablet, Rfl: 2    doxepin (SINEQUAN) 10 MG capsule, Take 1 to 2 tablets at bedtime as needed for sleep, Disp: 60 capsule, Rfl: 2    gabapentin (NEURONTIN) 300 MG capsule, Take 1 capsule (300 mg total) by mouth 3 (three) times daily., Disp: 90 capsule, Rfl: 1    insulin NPH-insulin regular, 70/30, (NOVOLIN 70/30) 100 unit/mL (70-30) injection, Inject into the skin 2 (two) times daily., Disp: , Rfl:     losartan (COZAAR) 25 MG tablet, Take 25 mg by mouth once daily., Disp: , Rfl: 3    metFORMIN (GLUCOPHAGE) 500 MG tablet, Take 1 tablet (500 mg total) by mouth 2 (two) times daily with meals. (Patient taking differently: Take 500 mg by mouth 2 (two) times daily with meals. Takes 1000mg am and 1000mg pm.), Disp: 180 tablet, Rfl: 1    promethazine (PHENERGAN) 12.5 MG Tab, Take 1 tablet (12.5 mg total) by mouth every 6 (six) hours as needed., Disp: 20 tablet, Rfl: 0    QUEtiapine (SEROQUEL) 300 MG Tab, Take 1 tablet (300 mg total) by mouth every evening., Disp: 30 tablet, Rfl: 2    ranitidine (ZANTAC) 300 MG tablet, Take 300 mg by mouth every evening., Disp: , Rfl:     topiramate (TOPAMAX) 200 MG Tab, 1 tablet daily, Disp: 30 tablet, Rfl: 1    TRUE METRIX GLUCOSE TEST STRIP Strp, USE 1 STRIP TO CHECK GLUCOSE THREE TIMES DAILY, Disp: , Rfl: 11    venlafaxine (EFFEXOR-XR) 75 MG 24 hr capsule, Take 2 capsules (150 mg total) by mouth once daily., Disp: 60 capsule, Rfl: 2      Complains of a one-month history of severe pain in her hands  "bilaterally.  Affects the entire hand.  Says it feels like hot cold.  Has been treated with gabapentin years ago but that was for post herpetic neuralgia.  Never been diagnosed or treated for diabetic neuropathy that she knows of.  Says the has get swollen and can't fit her rings on at times.  Also has difficulty holding things are picking things up.  Took high-dose ibuprofen without any relief.  Seemed to begin with some fire ant bites but 1 May resolved she was convinced something else was going on.  Also has noticed a few trigger finger in her right 3rd finger over the last week or so.  Left thumb catches as well.    Neurosurgeon okayed Fioricet for her migraine headaches.  She takes 2 to reduce the headache pain. This happens 2 to 3 times a week.    Also complains of severe fatigue over about the same 1 month period as the hand pain. Sleeping all the time she says some leg cramps and her feet hurt as well.    Says blood sugars continue to do well at home he    Review of Systems   Constitutional: Positive for fatigue. Negative for activity change, appetite change and fever.   HENT: Negative for sore throat.    Respiratory: Negative for cough and shortness of breath.    Cardiovascular: Negative for chest pain.   Gastrointestinal: Negative for abdominal pain, diarrhea and nausea.   Genitourinary: Negative for difficulty urinating.   Musculoskeletal: Positive for arthralgias. Negative for myalgias.   Neurological: Negative for dizziness and headaches.       Objective:      Vitals:    01/21/20 0901   BP: 108/76   Pulse: (!) 113   Temp: 96.6 °F (35.9 °C)   TempSrc: Tympanic   SpO2: 98%   Weight: 83.6 kg (184 lb 3.1 oz)   Height: 5' 6" (1.676 m)   PainSc:   8   PainLoc: Hand     Physical Exam   Constitutional: She is oriented to person, place, and time. She appears well-developed and well-nourished. No distress.   HENT:   Head: Normocephalic.   Neck: Neck supple. No thyromegaly present.   Cardiovascular: Normal rate, " regular rhythm and normal heart sounds.   No murmur heard.  Pulmonary/Chest: Effort normal and breath sounds normal. She has no wheezes. She has no rales.   Abdominal: Soft. She exhibits no distension.   Musculoskeletal: She exhibits no edema.        Right hand: She exhibits decreased range of motion, tenderness and bony tenderness. She exhibits normal capillary refill and no deformity.        Left hand: She exhibits decreased range of motion, tenderness and bony tenderness.        Hands:  Lymphadenopathy:     She has no cervical adenopathy.   Neurological: She is alert and oriented to person, place, and time.   Skin: Skin is warm and dry. She is not diaphoretic.   Psychiatric: She has a normal mood and affect. Her behavior is normal. Judgment and thought content normal.   Nursing note and vitals reviewed.      Assessment:       1. Bilateral hand pain    2. Tobacco abuse    3. Essential hypertension    4. Type 2 diabetes mellitus with complication, with long-term current use of insulin    5. Basilar artery aneurysm    6. Hx of migraine headaches    7. Bipolar affective disorder, remission status unspecified    8. Cerebrovascular accident (CVA), unspecified mechanism    9. Fatigue, unspecified type    10. Trigger finger of right hand, unspecified finger        Plan:   Bilateral hand pain  Comments:  Inflammatory markers in x-rays.  Most likely diabetic neuropathy.  Gabapentin 300 mg q.day-t.i.d.  As tolerated/needed.  Follow-up 2 weeks  Orders:  -     Cancel: X-Ray Hand 2 View Bilat; Future; Expected date: 01/21/2020  -     Sedimentation rate; Future; Expected date: 01/21/2020  -     C-reactive protein; Future; Expected date: 01/21/2020    Tobacco abuse    Essential hypertension  Comments:  Controlled    Type 2 diabetes mellitus with complication, with long-term current use of insulin  Comments:  Recently better controlled.  A1c today.  Sees diabetic education in February  Orders:  -     Hemoglobin A1c; Future;  Expected date: 01/21/2020    Basilar artery aneurysm  Comments:  Followed by neuro surgery.  MRA in 1 year    Hx of migraine headaches  Comments:  Takes Fioricet p.r.n.    Bipolar affective disorder, remission status unspecified  Comments:  Followed by Psychiatry.  No recent change in medications    Cerebrovascular accident (CVA), unspecified mechanism    Fatigue, unspecified type  Comments:  Last TSH was slightly elevated.  We have taken her off her Synthroid in the past.  CBC, CMP  Orders:  -     Comprehensive metabolic panel; Future; Expected date: 01/21/2020  -     CBC auto differential; Future; Expected date: 01/21/2020  -     TSH; Future; Expected date: 01/21/2020    Trigger finger of right hand, unspecified finger  Comments:  Ortho consult after evaluation/testing of hand symptoms    Other orders  -     gabapentin (NEURONTIN) 300 MG capsule; Take 1 capsule (300 mg total) by mouth 3 (three) times daily.  Dispense: 90 capsule; Refill: 1

## 2020-01-22 ENCOUNTER — TELEPHONE (OUTPATIENT)
Dept: FAMILY MEDICINE | Facility: CLINIC | Age: 55
End: 2020-01-22

## 2020-01-22 NOTE — TELEPHONE ENCOUNTER
----- Message from Rios Hoyt sent at 1/22/2020  4:49 PM CST -----  Contact: Pt   Type:  Patient Returning Call    Who Called: pt   Who Left Message for Patient:nurse   Does the patient know what this is regarding?:test results   Would the patient rather a call back or a response via [facility]? Phone   Best Call Back Number: [phone]  Additional Information:

## 2020-01-22 NOTE — TELEPHONE ENCOUNTER
Gave pt results of all blood work. Patient verbalized understanding and will keep appointment on 2/17/20 with Amelia Santana. Patient was also given results of x-rays. Patient verbalized understanding

## 2020-01-23 ENCOUNTER — TELEPHONE (OUTPATIENT)
Dept: FAMILY MEDICINE | Facility: CLINIC | Age: 55
End: 2020-01-23

## 2020-01-23 RX ORDER — BUTALBITAL, ACETAMINOPHEN AND CAFFEINE 50; 325; 40 MG/1; MG/1; MG/1
1 TABLET ORAL EVERY 4 HOURS PRN
Qty: 30 TABLET | Refills: 0 | Status: SHIPPED | OUTPATIENT
Start: 2020-01-23 | End: 2020-02-22

## 2020-01-23 NOTE — TELEPHONE ENCOUNTER
Pt informed medication request has been sent to physician for approval. Pt verbalized understanding of information.

## 2020-01-23 NOTE — TELEPHONE ENCOUNTER
----- Message from Alona Navarro sent at 1/23/2020 10:19 AM CST -----  Contact: pt   Pt stated she call to check to the status of her medication refill, she can be reached at  [phone] Thanks

## 2020-01-23 NOTE — TELEPHONE ENCOUNTER
----- Message from Sravani Bernardo sent at 1/23/2020  1:23 PM CST -----  Contact: pt  1. What is the name of the medication you are requesting? Migraine medication  2. What is the dose? n/a  3. How do you take the medication? Orally, topically, etc? n/a  4. How often do you take this medication? n/a  5. Do you need a 30 day or 90 day supply? n/a  6. How many refills are you requesting? n/a  7. What is your preferred pharmacy and location of the pharmacy? [facility] in [place]  8. Who can we contact with further questions? The pt at [phone], the pt is at the pharmacy now.

## 2020-01-23 NOTE — TELEPHONE ENCOUNTER
Pt states generic Fioricet has to be called in for insurance to cover meds.     Pt states burning and nerve pain has stopped with gabapentin,  but joint pain continues.

## 2020-01-23 NOTE — TELEPHONE ENCOUNTER
----- Message from Sravani Bernardo sent at 1/23/2020  8:25 AM CST -----  Contact: pt  The pt request a return call, no additional info given and can be reached at [phone]///[id number]

## 2020-01-27 ENCOUNTER — HOSPITAL ENCOUNTER (OUTPATIENT)
Dept: RADIOLOGY | Facility: CLINIC | Age: 55
Discharge: HOME | End: 2020-01-27
Attending: PHYSICIAN ASSISTANT
Payer: MEDICARE

## 2020-01-27 ENCOUNTER — OFFICE VISIT (OUTPATIENT)
Dept: URGENT CARE | Facility: CLINIC | Age: 55
End: 2020-01-27
Payer: MEDICARE

## 2020-01-27 ENCOUNTER — HOSPITAL ENCOUNTER (OUTPATIENT)
Facility: HOSPITAL | Age: 55
Discharge: HOME | End: 2020-01-28
Attending: EMERGENCY MEDICINE | Admitting: INTERNAL MEDICINE
Payer: MEDICARE

## 2020-01-27 VITALS
BODY MASS INDEX: 30.59 KG/M2 | SYSTOLIC BLOOD PRESSURE: 122 MMHG | DIASTOLIC BLOOD PRESSURE: 64 MMHG | WEIGHT: 190.38 LBS | HEART RATE: 99 BPM | TEMPERATURE: 98 F | OXYGEN SATURATION: 96 % | HEIGHT: 66 IN

## 2020-01-27 DIAGNOSIS — S80.212A ABRASION OF LEFT KNEE, INITIAL ENCOUNTER: ICD-10-CM
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
## 2020-01-27 DIAGNOSIS — S09.90XA CLOSED HEAD INJURY, INITIAL ENCOUNTER: ICD-10-CM

## 2020-01-27 DIAGNOSIS — M79.632 LEFT FOREARM PAIN: ICD-10-CM

## 2020-01-27 DIAGNOSIS — N30.00 ACUTE CYSTITIS WITHOUT HEMATURIA: ICD-10-CM

## 2020-01-27 DIAGNOSIS — S80.212D ABRASION OF LEFT KNEE, SUBSEQUENT ENCOUNTER: ICD-10-CM

## 2020-01-27 DIAGNOSIS — M25.532 LEFT WRIST PAIN: ICD-10-CM

## 2020-01-27 DIAGNOSIS — M25.562 ACUTE PAIN OF LEFT KNEE: ICD-10-CM

## 2020-01-27 DIAGNOSIS — M79.642 LEFT HAND PAIN: Primary | ICD-10-CM

## 2020-01-27 DIAGNOSIS — R07.9 CHEST PAIN: Primary | ICD-10-CM

## 2020-01-27 DIAGNOSIS — S63.502D SPRAIN OF LEFT WRIST, SUBSEQUENT ENCOUNTER: ICD-10-CM

## 2020-01-27 DIAGNOSIS — R55 SYNCOPE: ICD-10-CM

## 2020-01-27 DIAGNOSIS — R40.20 LOSS OF CONSCIOUSNESS: ICD-10-CM

## 2020-01-27 LAB
ALBUMIN SERPL BCP-MCNC: 3.9 G/DL (ref 3.5–5.2)
ALP SERPL-CCNC: 77 U/L (ref 55–135)
ALT SERPL W/O P-5'-P-CCNC: 35 U/L (ref 10–44)
ANION GAP SERPL CALC-SCNC: 11 MMOL/L (ref 8–16)
AST SERPL-CCNC: 27 U/L (ref 10–40)
BACTERIA #/AREA URNS HPF: ABNORMAL /HPF
BASOPHILS # BLD AUTO: 0.07 K/UL (ref 0–0.2)
BASOPHILS NFR BLD: 0.6 % (ref 0–1.9)
BILIRUB SERPL-MCNC: 0.3 MG/DL (ref 0.1–1)
BILIRUB UR QL STRIP: NEGATIVE
BNP SERPL-MCNC: 37 PG/ML (ref 0–99)
BUN SERPL-MCNC: 12 MG/DL (ref 6–20)
CALCIUM SERPL-MCNC: 9.3 MG/DL (ref 8.7–10.5)
CHLORIDE SERPL-SCNC: 108 MMOL/L (ref 95–110)
CK SERPL-CCNC: 107 U/L (ref 20–180)
CLARITY UR: CLEAR
CO2 SERPL-SCNC: 23 MMOL/L (ref 23–29)
COLOR UR: YELLOW
CREAT SERPL-MCNC: 0.8 MG/DL (ref 0.5–1.4)
DIFFERENTIAL METHOD: ABNORMAL
EOSINOPHIL # BLD AUTO: 0.2 K/UL (ref 0–0.5)
EOSINOPHIL NFR BLD: 2.1 % (ref 0–8)
ERYTHROCYTE [DISTWIDTH] IN BLOOD BY AUTOMATED COUNT: 14.6 % (ref 11.5–14.5)
EST. GFR  (AFRICAN AMERICAN): >60 ML/MIN/1.73 M^2
EST. GFR  (NON AFRICAN AMERICAN): >60 ML/MIN/1.73 M^2
GLUCOSE SERPL-MCNC: 77 MG/DL (ref 70–110)
GLUCOSE UR QL STRIP: NEGATIVE
HCT VFR BLD AUTO: 41.9 % (ref 37–48.5)
HGB BLD-MCNC: 13.1 G/DL (ref 12–16)
HGB UR QL STRIP: NEGATIVE
IMM GRANULOCYTES # BLD AUTO: 0.03 K/UL (ref 0–0.04)
IMM GRANULOCYTES NFR BLD AUTO: 0.3 % (ref 0–0.5)
KETONES UR QL STRIP: NEGATIVE
LEUKOCYTE ESTERASE UR QL STRIP: ABNORMAL
LYMPHOCYTES # BLD AUTO: 5.4 K/UL (ref 1–4.8)
LYMPHOCYTES NFR BLD: 47 % (ref 18–48)
MCH RBC QN AUTO: 30 PG (ref 27–31)
MCHC RBC AUTO-ENTMCNC: 31.3 G/DL (ref 32–36)
MCV RBC AUTO: 96 FL (ref 82–98)
MICROSCOPIC COMMENT: ABNORMAL
MONOCYTES # BLD AUTO: 0.9 K/UL (ref 0.3–1)
MONOCYTES NFR BLD: 7.6 % (ref 4–15)
NEUTROPHILS # BLD AUTO: 4.8 K/UL (ref 1.8–7.7)
NEUTROPHILS NFR BLD: 42.4 % (ref 38–73)
NITRITE UR QL STRIP: POSITIVE
NRBC BLD-RTO: 0 /100 WBC
PH UR STRIP: 8 [PH] (ref 5–8)
PLATELET # BLD AUTO: 313 K/UL (ref 150–350)
PMV BLD AUTO: 9.7 FL (ref 9.2–12.9)
POCT GLUCOSE: 103 MG/DL (ref 70–110)
POTASSIUM SERPL-SCNC: 3.8 MMOL/L (ref 3.5–5.1)
PROT SERPL-MCNC: 7.6 G/DL (ref 6–8.4)
PROT UR QL STRIP: NEGATIVE
RBC # BLD AUTO: 4.37 M/UL (ref 4–5.4)
SODIUM SERPL-SCNC: 142 MMOL/L (ref 136–145)
SP GR UR STRIP: 1.01 (ref 1–1.03)
TROPONIN I SERPL DL<=0.01 NG/ML-MCNC: <0.006 NG/ML (ref 0–0.03)
URN SPEC COLLECT METH UR: ABNORMAL
UROBILINOGEN UR STRIP-ACNC: NEGATIVE EU/DL
WBC # BLD AUTO: 11.41 K/UL (ref 3.9–12.7)
WBC #/AREA URNS HPF: 5 /HPF (ref 0–5)

## 2020-01-27 PROCEDURE — 73090 XR FOREARM LEFT: ICD-10-PCS | Mod: LT,S$GLB,, | Performed by: RADIOLOGY

## 2020-01-27 PROCEDURE — 93010 EKG 12-LEAD: ICD-10-PCS | Mod: 76,HCNC,, | Performed by: INTERNAL MEDICINE

## 2020-01-27 PROCEDURE — 99214 OFFICE O/P EST MOD 30 MIN: CPT | Mod: S$GLB,,, | Performed by: PHYSICIAN ASSISTANT

## 2020-01-27 PROCEDURE — 80053 COMPREHEN METABOLIC PANEL: CPT | Mod: HCNC

## 2020-01-27 PROCEDURE — 29125 APPL SHORT ARM SPLINT STATIC: CPT

## 2020-01-27 PROCEDURE — G0378 HOSPITAL OBSERVATION PER HR: HCPCS | Mod: HCNC

## 2020-01-27 PROCEDURE — 93005 ELECTROCARDIOGRAM TRACING: CPT | Mod: HCNC

## 2020-01-27 PROCEDURE — 63600175 PHARM REV CODE 636 W HCPCS: Mod: HCNC | Performed by: EMERGENCY MEDICINE

## 2020-01-27 PROCEDURE — 73110 X-RAY EXAM OF WRIST: CPT | Mod: LT,S$GLB,, | Performed by: RADIOLOGY

## 2020-01-27 PROCEDURE — 83880 ASSAY OF NATRIURETIC PEPTIDE: CPT | Mod: HCNC

## 2020-01-27 PROCEDURE — 73562 X-RAY EXAM OF KNEE 3: CPT | Mod: LT,S$GLB,, | Performed by: RADIOLOGY

## 2020-01-27 PROCEDURE — 81000 URINALYSIS NONAUTO W/SCOPE: CPT | Mod: HCNC

## 2020-01-27 PROCEDURE — 82550 ASSAY OF CK (CPK): CPT | Mod: HCNC

## 2020-01-27 PROCEDURE — 25000003 PHARM REV CODE 250: Mod: HCNC | Performed by: EMERGENCY MEDICINE

## 2020-01-27 PROCEDURE — 99214 PR OFFICE/OUTPT VISIT, EST, LEVL IV, 30-39 MIN: ICD-10-PCS | Mod: S$GLB,,, | Performed by: PHYSICIAN ASSISTANT

## 2020-01-27 PROCEDURE — 73090 X-RAY EXAM OF FOREARM: CPT | Mod: LT,S$GLB,, | Performed by: RADIOLOGY

## 2020-01-27 PROCEDURE — 99285 EMERGENCY DEPT VISIT HI MDM: CPT | Mod: 25,HCNC

## 2020-01-27 PROCEDURE — 93010 ELECTROCARDIOGRAM REPORT: CPT | Mod: 76,HCNC,, | Performed by: INTERNAL MEDICINE

## 2020-01-27 PROCEDURE — 93010 ELECTROCARDIOGRAM REPORT: CPT | Mod: HCNC,,, | Performed by: INTERNAL MEDICINE

## 2020-01-27 PROCEDURE — 93005 ELECTROCARDIOGRAM TRACING: CPT | Mod: HCNC,59

## 2020-01-27 PROCEDURE — 25000003 PHARM REV CODE 250: Mod: HCNC | Performed by: INTERNAL MEDICINE

## 2020-01-27 PROCEDURE — 96374 THER/PROPH/DIAG INJ IV PUSH: CPT | Mod: HCNC

## 2020-01-27 PROCEDURE — 73110 XR WRIST COMPLETE 3 VIEWS LEFT: ICD-10-PCS | Mod: LT,S$GLB,, | Performed by: RADIOLOGY

## 2020-01-27 PROCEDURE — 84484 ASSAY OF TROPONIN QUANT: CPT | Mod: HCNC

## 2020-01-27 PROCEDURE — 82962 GLUCOSE BLOOD TEST: CPT | Mod: HCNC

## 2020-01-27 PROCEDURE — 73130 XR HAND COMPLETE 3 VIEW LEFT: ICD-10-PCS | Mod: LT,S$GLB,, | Performed by: RADIOLOGY

## 2020-01-27 PROCEDURE — 73562 XR KNEE 3 VIEW LEFT: ICD-10-PCS | Mod: LT,S$GLB,, | Performed by: RADIOLOGY

## 2020-01-27 PROCEDURE — 85025 COMPLETE CBC W/AUTO DIFF WBC: CPT | Mod: HCNC

## 2020-01-27 PROCEDURE — 73130 X-RAY EXAM OF HAND: CPT | Mod: LT,S$GLB,, | Performed by: RADIOLOGY

## 2020-01-27 RX ORDER — QUETIAPINE FUMARATE 100 MG/1
300 TABLET, FILM COATED ORAL NIGHTLY
Status: DISCONTINUED | OUTPATIENT
Start: 2020-01-28 | End: 2020-01-28 | Stop reason: HOSPADM

## 2020-01-27 RX ORDER — DIAZEPAM 5 MG/1
10 TABLET ORAL 3 TIMES DAILY
Status: DISCONTINUED | OUTPATIENT
Start: 2020-01-27 | End: 2020-01-28 | Stop reason: HOSPADM

## 2020-01-27 RX ORDER — NITROGLYCERIN 0.4 MG/1
0.4 TABLET SUBLINGUAL EVERY 5 MIN PRN
Status: DISCONTINUED | OUTPATIENT
Start: 2020-01-27 | End: 2020-01-28 | Stop reason: HOSPADM

## 2020-01-27 RX ORDER — MORPHINE SULFATE 4 MG/ML
4 INJECTION, SOLUTION INTRAMUSCULAR; INTRAVENOUS
Status: COMPLETED | OUTPATIENT
Start: 2020-01-27 | End: 2020-01-27

## 2020-01-27 RX ORDER — HYDROCODONE BITARTRATE AND ACETAMINOPHEN 10; 325 MG/1; MG/1
1 TABLET ORAL EVERY 6 HOURS PRN
Status: DISCONTINUED | OUTPATIENT
Start: 2020-01-27 | End: 2020-01-28 | Stop reason: HOSPADM

## 2020-01-27 RX ORDER — MUPIROCIN 20 MG/G
OINTMENT TOPICAL
Status: DISCONTINUED | OUTPATIENT
Start: 2020-01-27 | End: 2020-01-27 | Stop reason: HOSPADM

## 2020-01-27 RX ORDER — NAPROXEN SODIUM 220 MG/1
81 TABLET, FILM COATED ORAL
Status: COMPLETED | OUTPATIENT
Start: 2020-01-27 | End: 2020-01-27

## 2020-01-27 RX ORDER — ALPRAZOLAM 0.25 MG/1
0.25 TABLET ORAL
Status: COMPLETED | OUTPATIENT
Start: 2020-01-27 | End: 2020-01-27

## 2020-01-27 RX ORDER — SULFAMETHOXAZOLE AND TRIMETHOPRIM 800; 160 MG/1; MG/1
1 TABLET ORAL
Status: COMPLETED | OUTPATIENT
Start: 2020-01-27 | End: 2020-01-27

## 2020-01-27 RX ADMIN — MORPHINE SULFATE 4 MG: 4 INJECTION INTRAVENOUS at 04:01

## 2020-01-27 RX ADMIN — DIAZEPAM 10 MG: 5 TABLET ORAL at 11:01

## 2020-01-27 RX ADMIN — HYDROCODONE BITARTRATE AND ACETAMINOPHEN 1 TABLET: 10; 325 TABLET ORAL at 11:01

## 2020-01-27 RX ADMIN — ASPIRIN 81 MG 81 MG: 81 TABLET ORAL at 06:01

## 2020-01-27 RX ADMIN — ALPRAZOLAM 0.25 MG: 0.25 TABLET ORAL at 07:01

## 2020-01-27 RX ADMIN — MUPIROCIN: 20 OINTMENT TOPICAL at 11:01

## 2020-01-27 RX ADMIN — SULFAMETHOXAZOLE AND TRIMETHOPRIM 1 TABLET: 800; 160 TABLET ORAL at 09:01

## 2020-01-27 NOTE — ED NOTES
Patient called for nurse, reports feeling chest pain feeling tight. O2 applied at 2 % nasal canula.   MD notified

## 2020-01-27 NOTE — PATIENT INSTRUCTIONS
Go to the ER at [facility] immediately for further evaluation of head injury and loss of consciousness.    Rest, apply ice intermittently, compress with ace wrap and elevate as much as possible.    You will need to follow-up with orthopedics if your symptoms persist, as we discussed.    Altered Level of Consciousness  Level of consciousness (LOC) is a measure of a persons ability to interact with other people and to react to the surroundings. A person with an altered level of consciousness may not respond to touch or voices. He or she may look vacant or blank and may not make eye contact with others. He or she may be limp and may not move for a long time or show little interest in moving. He or she may also be confused.  There are many causes of altered LOC. They include low blood sugar, infection, medicines, injuries, or other medical problems.  Altered LOC is a medical emergency. The healthcare provider will do tests to help find the cause. These may include blood tests and imaging tests. The person is treated so breathing and heart rate are stable. An intravenous (IV) line may be put into a vein in the arm or hand to give medicines. Once the cause of altered LOC is found, the goal is to treat the cause.  In almost all cases, the person will be admitted to the hospital for observation.  Home care  When your loved one is released from the hospital, you will be given guidelines for caring for him or her. In general:  · Follow the healthcare provider's instructions for giving any prescribed medicines to your child.  · Stay with your loved one or have another responsible adult look after him or her. Watch carefully for any return of symptoms or changes in behavior.  · If the person has diabetes, make sure that any approved medicines are given on time and as prescribed.  Follow-up care  Follow up with the healthcare provider or our staff.  When to seek medical advice  Call the healthcare provider right away for  any of the following:  · Symptoms of altered LOC return  · New symptoms appear  Date Last Reviewed: 8/23/2015  © 6756-6856 [employer]. [address] [place] [address], [place] [address]. All rights reserved. This information is not intended as a substitute for professional medical care. Always follow your healthcare professional's instructions.        Upper Extremity Contusion  You have a contusion (bruise) of an upper extremity (arm, wrist, hand, or fingers). Symptoms include pain, swelling, and skin discoloration. No bones are broken. This injury may take from a few days to a few weeks to heal.  During that time, the bruise may change from reddish in color, to purple-blue, to green-yellow, to yellow-brown.  Home care  · Unless another medicine was prescribed, you can take acetaminophen, ibuprofen, or naproxen to control pain. (If you have chronic liver or kidney disease or ever had a stomach ulcer or gastrointestinal bleeding, talk with your doctor before using these medicines.)  · Elevate the injured area to reduce pain and swelling. As much as possible, sit or lie down with the injured area raised about the level of your heart. This is especially important during the first 48 hours.  · Ice the injured area to help reduce pain and swelling. Wrap a cold source (ice pack or ice cubes in a plastic bag) in a thin towel. Apply to the bruised area for 20 minutes every 1 to 2 hours the first day. Continue this 3 to 4 times a day until the pain and swelling goes away.  · If a sling was provided, you may remove it to shower or bathe. To prevent joint stiffness, do not wear it for more than 1 week.  Follow-up care  Follow up with your healthcare provide, or as advised. Call if you are not improving within the next 1 to 2 weeks.  When to seek medical advice   Call your healthcare provider right away if any of these occur:  · Increased pain or swelling  · Hand or fingers become cold, blue, numb or tingly  · Signs of  infection: Warmth, drainage, or increased redness or pain around the injury  · Inability to move the injured body part   · Frequent bruising for unknown reasons  Date Last Reviewed: 2/1/2017 © 2000-2017 [employer]. [address] [place] [address], [place] [address]. All rights reserved. This information is not intended as a substitute for professional medical care. Always follow your healthcare professional's instructions.        Hand Contusion  You have a contusion. This is also called a bruise. There is swelling and some bleeding under the skin, but no broken bones. This injury generally takes a few days to a few weeks to heal.  During that time, the bruise will typically change in color from reddish, to purple-blue, to greenish-yellow, then to yellow-brown.  Home care  · Elevate the hand to reduce pain and swelling. As much as possible, sit or lie down with the hand raised about the level of your heart. This is especially important during the first 48 hours.  · Ice the hand to help reduce pain and swelling. Wrap a cold source (ice pack or ice cubes in a plastic bag) in a thin towel. Apply to the bruised area for 20 minutes every 1 to 2 hours the first day. Continue this 3 to 4 times a day until the pain and swelling goes away.  · Unless another medicine was prescribed, you can take acetaminophen, ibuprofen, or naproxen to control pain. (If you have chronic liver or kidney disease or ever had a stomach ulcer or gastrointestinal bleeding, talk with your doctor before using these medicines.)  Follow up  Follow up with your healthcare provider or our staff as advised. Call if you are not improving within 1 to 2 weeks.  When to seek medical advice   Call your healthcare provider right away if you have any of the following:  · Increased pain or swelling  · Arm becomes cold, blue, numb or tingly  · Signs of infection: Warmth, drainage, or increased redness or pain around the bruise  · Inability to move the  injured hand   · Frequent bruising for unknown reasons  Date Last Reviewed: 2/1/2017  © 2451-1408 [employer]. [address] [place] [address], [place] [address]. All rights reserved. This information is not intended as a substitute for professional medical care. Always follow your healthcare professional's instructions.

## 2020-01-27 NOTE — ED PROVIDER NOTES
SCRIBE #1 NOTE: IRachid, am scribing for, and in the presence of, Luis Carmichael MD. I have scribed the H&P.     SCRIBE #2 NOTE: I, Ian Ramires, am scribing for, and in the presence of, Yanna Varghese DO.      History     Chief Complaint   Patient presents with    Loss of Consciousness     passed out saturday due to low blood glucose.     Review of patient's allergies indicates:   Allergen Reactions    Adhesive Blisters     Pt states can't tolerate paper tape    Aspirin      Nosebleeds    Levaquin [levofloxacin]     Levomenol analogues     Lipitor [atorvastatin]      Leg Cramps    Piroxicam Diarrhea and Nausea Only    Zofran [ondansetron hcl] Hives and Other (See Comments)     headaches    Celestone [betamethasone] Hives, Itching and Rash         History of Present Illness     HPI    1/27/2020, 3:20 PM  History obtained from the patient      History of Present Illness: Alexandra Goins is a 54 y.o. female patient with a h/o DM who presents to the Emergency Department for evaluation of syncope and collapse secondary to hypoglycemia which onset 2 days ago. Patient states she was walking down the stairs at onset. Patient reports taking 30 units of 70/30 insulin morning prior to LOC. Patient then took 25 additional units of 70/30 after blood sugar reading of 280. Patient report blood sugar was 81 after LOC. Patient c/o left hand pain. Symptoms are constant and moderate in severity. No mitigating or exacerbating factors reported. Associated sxs include left knee pain and bilateral leg swelling (onset several weeks ago). Patient denies any head injury, HA, dizziness, back/neck pain, CP, SOB, abd pain, and all other sxs at this time. No prior Tx reported. No further complaints or concerns at this time. Patient endorses prior history of syncope and collapse due to hypoglycemia. Patient states she does not keep log of blood sugar at home.  Tetanus up-to-date.    Arrival mode: Personal  transportation     PCP: Perez Casiano MD      Past Medical History:  Past Medical History:   Diagnosis Date    Acute ischemic stroke 9/17/2019    Aneurysm     Anxiety     Arthritis     Asthma     Bipolar 1 disorder     Cerebral aneurysm, nonruptured 9/19/2019    Depression     Diabetes mellitus, type 2     Diverticular disease     GERD (gastroesophageal reflux disease)     Hyperlipemia     Hypertension     Hypothyroidism     Pneumonia     Renal manifestation of secondary diabetes mellitus     Right-sided back pain 6/29/2019    Stroke     Trouble in sleeping        Past Surgical History:  Past Surgical History:   Procedure Laterality Date    CEREBRAL ANGIOGRAM N/A 10/28/2019    Procedure: ANGIOGRAM-CEREBRAL;  Surgeon: [facility];  Location: [address];  Service: Radiology;  Laterality: N/A;  Rm 190/Aayush    CHOLECYSTECTOMY      COLON SURGERY      COLONOSCOPY N/A 1/12/2018    Procedure: COLONOSCOPY;  Surgeon: Roque Mahajan III, MD;  Location: [facility];  Service: Endoscopy;  Laterality: N/A;    DENTAL SURGERY  05/21/2018    removal of 8 top teeth    HYSTERECTOMY      TONSILLECTOMY           Family History:  Family History   Problem Relation Age of Onset    Alcohol abuse Mother     COPD Mother     Depression Mother     Drug abuse Mother     Alcohol abuse Father     Arthritis Father     Cancer Father     Heart disease Father     Hypertension Father     COPD Sister     Kidney failure Sister     Heart disease Brother     Hypertension Brother     Cancer Maternal Aunt     Cancer Maternal Uncle     Diabetes Maternal Uncle     Drug abuse Maternal Uncle     Cancer Paternal Aunt     Cancer Paternal Uncle     Arthritis Maternal Grandmother     Hypertension Maternal Grandmother     Breast cancer Maternal Grandmother     Arthritis Paternal Grandmother     Cancer Paternal Grandmother     Hypertension Paternal Grandfather        Social History:   Social  History     Tobacco Use    Smoking status: Current Every Day Smoker     Years: 35.00     Types: Cigarettes, Vaping w/o nicotine    Smokeless tobacco: Never Used   Substance and Sexual Activity    Alcohol use: Yes     Comment: occassionally    Drug use: Yes     Types: Marijuana    Sexual activity: Yes        Review of Systems     Review of Systems   Constitutional: Negative for fever.   HENT: Negative for sore throat.    Respiratory: Negative for shortness of breath.    Cardiovascular: Positive for leg swelling. Negative for chest pain.   Gastrointestinal: Negative for abdominal pain and nausea.   Genitourinary: Negative for dysuria.   Musculoskeletal: Positive for arthralgias. Negative for back pain and neck pain.        + hand pain   Skin: Negative for rash.   Neurological: Positive for syncope. Negative for dizziness, weakness and headaches.   Hematological: Does not bruise/bleed easily.   All other systems reviewed and are negative.       Physical Exam     Initial Vitals [01/27/20 1311]   BP Pulse Resp Temp SpO2   120/60 97 18 98.1 °F (36.7 °C) (!) 94 %      MAP       --          Physical Exam  Nursing Notes and Vital Signs Reviewed.  Constitutional: Well-developed and well-nourished. NAD  Head: Mild contusion under left eye. Normocephalic.  Eyes: PERRL. EOM intact. Conjunctivae are not pale. No scleral icterus.  ENT: Mucous membranes are moist. Oropharynx is clear and symmetric.    Neck: Supple. Full ROM. No lymphadenopathy.  Cardiovascular: Regular rate. Regular rhythm. No murmurs, rubs, or gallops. Distal pulses are 2+ and symmetric.  Pulmonary/Chest: No respiratory distress. Clear to auscultation bilaterally. No wheezing or rales.  Abdominal: Soft and non-distended.  There is no tenderness.  No rebound, guarding, or rigidity. Good bowel sounds.  Genitourinary: No CVA tenderness  Musculoskeletal: Moves all extremities. No obvious deformities. No calf tenderness.  Skin: Warm and dry.  Neurological:  Alert,  awake, and appropriate.  Normal speech.  No acute focal neurological deficits are appreciated.  Psychiatric: Normal affect. Good eye contact. Appropriate in content.  Left Hand: Swelling to left hand. Splint in place. Full flexion and extension of the wrist. Radial, median, and ulnar nerves are intact. Radial and ulnar pulses are 2+. Normal capillary refill.  Distal sensation is intact. NVI distally.          ED Course   Splint Application  Date/Time: 2020 4:23 PM  Performed by: Yanna Varghese DO  Authorized by: Yanna Varghese DO   Consent Done: Yes  Consent: Verbal consent obtained.  Risks and benefits: risks, benefits and alternatives were discussed  Consent given by: patient  Patient understanding: patient states understanding of the procedure being performed  Patient consent: the patient's understanding of the procedure matches consent given  Required items: required blood products, implants, devices, and special equipment available  Patient identity confirmed:  and MRN  Location details: left wrist  Splint type: velcro splint.  Post-procedure: The splinted body part was neurovascularly unchanged following the procedure.  Patient tolerance: Patient tolerated the procedure well with no immediate complications        ED Vital Signs:  Vitals:    20 1643 20 1831 20 1834 20 1845   BP: 111/66  (!) 116/55    Pulse: 81 83 84 86   Resp: 18 15 15 14   Temp:       TempSrc:       SpO2: 98% 97% 98% 95%   Weight:       Height:        20 1902 20 2102 20 2132   BP: 123/61 125/60 (!) 121/59 (!) 120/55   Pulse: 87 84 90 92   Resp: 11 16 14 13   Temp:       TempSrc:       SpO2: 96% 98% 95% 96%   Weight:       Height:        20 2142 20 2209 20 2300 20 2337   BP:  (!) 117/59  (!) 115/57   Pulse:  87 80 82   Resp:  18  18   Temp: 98.6 °F (37 °C) 97.5 °F (36.4 °C)  97.6 °F (36.4 °C)   TempSrc: Oral Oral  Oral   SpO2:  95%  97%   Weight:     83.5 kg (184 lb 1.4 oz)   Height:        01/28/20 0100 01/28/20 0300 01/28/20 0343   BP:   (!) 109/57   Pulse: 76 78 80   Resp:   18   Temp:   97.8 °F (36.6 °C)   TempSrc:   Oral   SpO2:   (!) 93%   Weight:      Height:          Abnormal Lab Results:  Labs Reviewed   CBC W/ AUTO DIFFERENTIAL - Abnormal; Notable for the following components:       Result Value    Mean Corpuscular Hemoglobin Conc 31.3 (*)     RDW 14.6 (*)     Lymph # 5.4 (*)     All other components within normal limits   URINALYSIS, REFLEX TO URINE CULTURE - Abnormal; Notable for the following components:    Nitrite, UA Positive (*)     Leukocytes, UA 2+ (*)     All other components within normal limits    Narrative:     Preferred Collection Type->Urine, Clean Catch   URINALYSIS MICROSCOPIC - Abnormal; Notable for the following components:    Bacteria Many (*)     All other components within normal limits    Narrative:     Preferred Collection Type->Urine, Clean Catch   COMPREHENSIVE METABOLIC PANEL   B-TYPE NATRIURETIC PEPTIDE   CK   TROPONIN I   POCT GLUCOSE        All Lab Results:  Results for orders placed or performed during the hospital encounter of 01/27/20   CBC auto differential   Result Value Ref Range    WBC 11.41 3.90 - 12.70 K/uL    RBC 4.37 4.00 - 5.40 M/uL    Hemoglobin 13.1 12.0 - 16.0 g/dL    Hematocrit 41.9 37.0 - 48.5 %    Mean Corpuscular Volume 96 82 - 98 fL    Mean Corpuscular Hemoglobin 30.0 27.0 - 31.0 pg    Mean Corpuscular Hemoglobin Conc 31.3 (L) 32.0 - 36.0 g/dL    RDW 14.6 (H) 11.5 - 14.5 %    Platelets 313 150 - 350 K/uL    MPV 9.7 9.2 - 12.9 fL    Immature Granulocytes 0.3 0.0 - 0.5 %    Gran # (ANC) 4.8 1.8 - 7.7 K/uL    Immature Grans (Abs) 0.03 0.00 - 0.04 K/uL    Lymph # 5.4 (H) 1.0 - 4.8 K/uL    Mono # 0.9 0.3 - 1.0 K/uL    Eos # 0.2 0.0 - 0.5 K/uL    Baso # 0.07 0.00 - 0.20 K/uL    nRBC 0 0 /100 WBC    Gran% 42.4 38.0 - 73.0 %    Lymph% 47.0 18.0 - 48.0 %    Mono% 7.6 4.0 - 15.0 %    Eosinophil% 2.1 0.0 - 8.0 %     Basophil% 0.6 0.0 - 1.9 %    Differential Method Automated    Comprehensive metabolic panel   Result Value Ref Range    Sodium 142 136 - 145 mmol/L    Potassium 3.8 3.5 - 5.1 mmol/L    Chloride 108 95 - 110 mmol/L    CO2 23 23 - 29 mmol/L    Glucose 77 70 - 110 mg/dL    BUN, Bld 12 6 - 20 mg/dL    Creatinine 0.8 0.5 - 1.4 mg/dL    Calcium 9.3 8.7 - 10.5 mg/dL    Total Protein 7.6 6.0 - 8.4 g/dL    Albumin 3.9 3.5 - 5.2 g/dL    Total Bilirubin 0.3 0.1 - 1.0 mg/dL    Alkaline Phosphatase 77 55 - 135 U/L    AST 27 10 - 40 U/L    ALT 35 10 - 44 U/L    Anion Gap 11 8 - 16 mmol/L    eGFR if African American >60 >60 mL/min/1.73 m^2    eGFR if non African American >60 >60 mL/min/1.73 m^2   Urinalysis, Reflex to Urine Culture Urine, Clean Catch   Result Value Ref Range    Specimen UA Urine, Clean Catch     Color, UA Yellow Yellow, Straw, Joann    Appearance, UA Clear Clear    pH, UA 8.0 5.0 - 8.0    Specific Gravity, UA 1.015 1.005 - 1.030    Protein, UA Negative Negative    Glucose, UA Negative Negative    Ketones, UA Negative Negative    Bilirubin (UA) Negative Negative    Occult Blood UA Negative Negative    Nitrite, UA Positive (A) Negative    Urobilinogen, UA Negative <2.0 EU/dL    Leukocytes, UA 2+ (A) Negative   Brain natriuretic peptide   Result Value Ref Range    BNP 37 0 - 99 pg/mL   CK   Result Value Ref Range     20 - 180 U/L   Troponin I   Result Value Ref Range    Troponin I <0.006 0.000 - 0.026 ng/mL   Urinalysis Microscopic   Result Value Ref Range    WBC, UA 5 0 - 5 /hpf    Bacteria Many (A) None-Occ /hpf    Microscopic Comment SEE COMMENT    POCT glucose   Result Value Ref Range    POCT Glucose 103 70 - 110 mg/dL         Imaging Results          X-Ray Chest AP Portable (Final result)  Result time 01/27/20 17:12:27    Final result by Jamie Burns MD (01/27/20 17:12:27)                 Impression:      No acute findings.      Electronically signed by: Jamie Burns  MD  Date:    01/27/2020  Time:    17:12             Narrative:    EXAMINATION:  XR CHEST AP PORTABLE    CLINICAL HISTORY:  syncope;    TECHNIQUE:  Single frontal view of the chest was performed.    COMPARISON:  09/17/2019    FINDINGS:  The cardiomediastinal silhouette is normal.  Aortic atherosclerosis    The lungs are clear.  No effusions    Bones are unremarkable.                               CT Head Without Contrast (Final result)  Result time 01/27/20 14:14:34    Final result by Jamie Hess MD (01/27/20 14:14:34)                 Impression:      No acute intracranial findings.    All CT scans at this facility are performed  using dose modulation techniques as appropriate to performed exam including the following:  automated exposure control; adjustment of mA and/or kV according to the patients size (this includes techniques or standardized protocols for targeted exams where dose is matched to indication/reason for exam: i.e. extremities or head);  iterative reconstruction technique.      Electronically signed by: Jamie Hess  Date:    01/27/2020  Time:    14:14             Narrative:    EXAMINATION:  CT HEAD WITHOUT CONTRAST    CLINICAL HISTORY:  Head trauma, headache;    COMPARISON:  MRI dated 09/18/2019    FINDINGS:  No intracranial hemorrhage or acute findings are demonstrated. The visualized paranasal sinuses are clear. The calvarium is intact.                                 The EKG was ordered, reviewed, and independently interpreted by the ED provider.  Interpretation time: 1315  Rate: 99 BPM  Rhythm: NSR  Interpretation: right superior axis deviation. Low voltage QRS. No STEMI.    The EKG was ordered, reviewed, and independently interpreted by the ED provider.  Interpretation time: 17:53  Rate: 87 BPM  Rhythm: normal sinus rhythm  Interpretation: Left axis deviation. Cannot r/u Anterior infarct. Abnormal ECG. No STEMI.    The Emergency Provider reviewed the vital signs and test results, which are  outlined above.     ED Discussion     4:00 PM: Dr. Carmichael transfers care of pt to Dr. Varghese pending lab and imaging results.    5:46 PM: Pt is complaining of CP at this time.  Chest pain in the center of the chest.  It radiates to left arm.    6:50 PM: Re-evaluated pt. Pt is resting comfortably and is in no acute distress.  Pt states she has had mid-sternal CP in last week. Pt states she has had a heart work-up before but has never had a stress test. Discussed recommendation to admit to the hospital for observation as well as her moderate risk for heart dz. D/w pt the risk of leaving and is awaiting decision on whether or not to leave. D/w pt any concerns expressed at this time. Answered all questions. Pt expresses understanding at this time.    7:29 PM: Re-evaluated pt. Pt is resting comfortably and is in no acute distress.  Pt denies CP.  D/w pt all pertinent results. D/w pt any concerns expressed at this time. Answered all questions. Pt expresses understanding at this time.      7:49 PM: Discussed case with Marti Bajwa NP (Hospital Medicine). Dr. Dejesus agrees with current care and management of pt and accepts admission.   Admitting Service: Hospital Medicine  Admitting Physician: Dr. Dejesus  Admit to: OBS    7:50 PM: Re-evaluated pt. I have discussed test results, shared treatment plan, and the need for admission with patient and family at bedside. Pt and family express understanding at this time and agree with all information. All questions answered. Pt and family have no further questions or concerns at this time. Pt is ready for admit.       MDM        Medical Decision Making:   Clinical Tests:   Lab Tests: Ordered and Reviewed  Radiological Study: Ordered and Reviewed  Medical Tests: Ordered and Reviewed     Additional MDM:   Heart Score:    History:          Moderately suspicious.  ECG:             Normal  Age:               45-65 years  Risk factors: >= 3 risk factors or history of atherosclerotic  disease  Troponin:       Less than or equal to normal limit  Final Score: 4       MIPS Measure #415:  Emergency Department Utilization of CT for Minor Blunt Head Trauma for Patients age 18 Years  and  Older.    Measure exclusions:  · The patient has a ventricular shunt?   No  · The patient has a brain tumor? No  · The patient has multi-system trauma?  No  · The patient is pregnant? N/A  · The patient is taking anti platelet medication excluding aspirin?  No  · Age 65 years and older?  No  Patient is 54 y.o.    A head CT was ordered? Yes:   The CT was ordered by the emergency provider?  Yes    A head CT was ordered by emergency care provider, and some the indications for ordering the head CT included:    · Signs and symptoms   Patient's Inga coma score was less than 15?  No   Focal neurologic deficit?  No   Severe headache?  No   Vomiting? No   Physical signs of basilar skull fracture? No   Coagulopathy? No    Thrombocytopenia? No  Patient is suspected of taking in anticoagulant medication? No   Dangerous mechanism of injury?  No    · Patient had Loss of Consciousness:  Yes OR Posttraumatic Amnesia? No  and:   Headache?  No   Is 60 years of age or older?   No : Patient is 54 y.o.   Drug or alcohol intoxication?  No   Short term memory deficits? No  Evidence of trauma above the clavicles, any visible or detected trauma to the head or neck including lacerations, abrasions, bruising, swelling, or fracture?  Yes    Posttraumatic seizure? No    Other indications: CT was performed secondary to syncopal event.           ED Medication(s):  Medications   nitroGLYCERIN SL tablet 0.4 mg (has no administration in time range)   diazePAM tablet 10 mg (10 mg Oral Given 1/27/20 2309)   HYDROcodone-acetaminophen  mg per tablet 1 tablet (1 tablet Oral Given 1/27/20 2308)   QUEtiapine tablet 300 mg (has no administration in time range)   morphine injection 4 mg (4 mg Intravenous Given 1/27/20 1626)   aspirin chewable tablet 81  mg (81 mg Oral Given 1/27/20 1850)   ALPRAZolam tablet 0.25 mg (0.25 mg Oral Given 1/27/20 1919)   sulfamethoxazole-trimethoprim 800-160mg per tablet 1 tablet (1 tablet Oral Given 1/27/20 2117)   morphine injection 2 mg (2 mg Intravenous Given 1/28/20 0250)       Current Discharge Medication List                  Scribe Attestation:   Scribe #1: I performed the above scribed service and the documentation accurately describes the services I performed. I attest to the accuracy of the note.     Attending:   Physician Attestation Statement for Scribe #1: I, Luis Carmichael MD, personally performed the services described in this documentation, as scribed by Rachid Samayoa, in my presence, and it is both accurate and complete.       Scribe Attestation:   Scribe #2: I performed the above scribed service and the documentation accurately describes the services I performed. I attest to the accuracy of the note.    Attending Attestation:           Physician Attestation for Scribe:    Physician Attestation Statement for Scribe #2: I, Yanna Varghese DO, reviewed documentation, as scribed by Ian Ramires in my presence, and it is both accurate and complete. I also acknowledge and confirm the content of the note done by Scribe #1.           Clinical Impression       ICD-10-CM ICD-9-CM   1. Acute cystitis without hematuria N30.00 595.0   2. Syncope R55 780.2   3. Chest pain R07.9 786.50   4. Sprain of left wrist, subsequent encounter S63.502D V58.89     842.00   5. Abrasion of left knee, subsequent encounter S80.212D V58.89       Disposition:   Disposition: Placed in Observation  Condition: Fair         Yanna Varghese,   01/28/20 0424       Yanna Varhgese,   01/28/20 0431

## 2020-01-28 ENCOUNTER — TELEPHONE (OUTPATIENT)
Dept: ORTHOPEDICS | Facility: CLINIC | Age: 55
End: 2020-01-28

## 2020-01-28 VITALS
DIASTOLIC BLOOD PRESSURE: 63 MMHG | HEART RATE: 80 BPM | TEMPERATURE: 98 F | HEIGHT: 66 IN | RESPIRATION RATE: 20 BRPM | BODY MASS INDEX: 29.58 KG/M2 | WEIGHT: 184.06 LBS | SYSTOLIC BLOOD PRESSURE: 136 MMHG | OXYGEN SATURATION: 92 %

## 2020-01-28 LAB
ANION GAP SERPL CALC-SCNC: 7 MMOL/L (ref 8–16)
BUN SERPL-MCNC: 13 MG/DL (ref 6–20)
CALCIUM SERPL-MCNC: 8.9 MG/DL (ref 8.7–10.5)
CHLORIDE SERPL-SCNC: 109 MMOL/L (ref 95–110)
CHOLEST SERPL-MCNC: 213 MG/DL (ref 120–199)
CHOLEST/HDLC SERPL: 2.9 {RATIO} (ref 2–5)
CO2 SERPL-SCNC: 23 MMOL/L (ref 23–29)
CREAT SERPL-MCNC: 0.8 MG/DL (ref 0.5–1.4)
EST. GFR  (AFRICAN AMERICAN): >60 ML/MIN/1.73 M^2
EST. GFR  (NON AFRICAN AMERICAN): >60 ML/MIN/1.73 M^2
ESTIMATED AVG GLUCOSE: 180 MG/DL (ref 68–131)
GLUCOSE SERPL-MCNC: 175 MG/DL (ref 70–110)
HBA1C MFR BLD HPLC: 7.9 % (ref 4–5.6)
HDLC SERPL-MCNC: 74 MG/DL (ref 40–75)
HDLC SERPL: 34.7 % (ref 20–50)
LDLC SERPL CALC-MCNC: 110.4 MG/DL (ref 63–159)
NONHDLC SERPL-MCNC: 139 MG/DL
POCT GLUCOSE: 164 MG/DL (ref 70–110)
POCT GLUCOSE: 197 MG/DL (ref 70–110)
POTASSIUM SERPL-SCNC: 4.2 MMOL/L (ref 3.5–5.1)
SODIUM SERPL-SCNC: 139 MMOL/L (ref 136–145)
TRIGL SERPL-MCNC: 143 MG/DL (ref 30–150)
TROPONIN I SERPL DL<=0.01 NG/ML-MCNC: <0.006 NG/ML (ref 0–0.03)

## 2020-01-28 PROCEDURE — 99203 OFFICE O/P NEW LOW 30 MIN: CPT | Mod: HCNC,,, | Performed by: INTERNAL MEDICINE

## 2020-01-28 PROCEDURE — 99203 PR OFFICE/OUTPT VISIT, NEW, LEVL III, 30-44 MIN: ICD-10-PCS | Mod: HCNC,,, | Performed by: INTERNAL MEDICINE

## 2020-01-28 PROCEDURE — 63600175 PHARM REV CODE 636 W HCPCS: Mod: HCNC | Performed by: NURSE PRACTITIONER

## 2020-01-28 PROCEDURE — G0378 HOSPITAL OBSERVATION PER HR: HCPCS | Mod: HCNC

## 2020-01-28 PROCEDURE — 96376 TX/PRO/DX INJ SAME DRUG ADON: CPT

## 2020-01-28 PROCEDURE — 25000003 PHARM REV CODE 250: Mod: HCNC | Performed by: NURSE PRACTITIONER

## 2020-01-28 PROCEDURE — 96375 TX/PRO/DX INJ NEW DRUG ADDON: CPT

## 2020-01-28 PROCEDURE — 84484 ASSAY OF TROPONIN QUANT: CPT | Mod: HCNC

## 2020-01-28 PROCEDURE — 25000003 PHARM REV CODE 250: Mod: HCNC | Performed by: INTERNAL MEDICINE

## 2020-01-28 PROCEDURE — 94761 N-INVAS EAR/PLS OXIMETRY MLT: CPT | Mod: HCNC

## 2020-01-28 PROCEDURE — 36415 COLL VENOUS BLD VENIPUNCTURE: CPT | Mod: HCNC

## 2020-01-28 PROCEDURE — 83036 HEMOGLOBIN GLYCOSYLATED A1C: CPT | Mod: HCNC

## 2020-01-28 PROCEDURE — 80048 BASIC METABOLIC PNL TOTAL CA: CPT | Mod: HCNC

## 2020-01-28 PROCEDURE — 80061 LIPID PANEL: CPT | Mod: HCNC

## 2020-01-28 RX ORDER — GLUCAGON 1 MG
1 KIT INJECTION
Status: DISCONTINUED | OUTPATIENT
Start: 2020-01-28 | End: 2020-01-28 | Stop reason: HOSPADM

## 2020-01-28 RX ORDER — IBUPROFEN 200 MG
24 TABLET ORAL
Status: DISCONTINUED | OUTPATIENT
Start: 2020-01-28 | End: 2020-01-28 | Stop reason: HOSPADM

## 2020-01-28 RX ORDER — ACETAMINOPHEN 325 MG/1
650 TABLET ORAL EVERY 6 HOURS PRN
Status: DISCONTINUED | OUTPATIENT
Start: 2020-01-28 | End: 2020-01-28 | Stop reason: HOSPADM

## 2020-01-28 RX ORDER — VENLAFAXINE HYDROCHLORIDE 75 MG/1
150 CAPSULE, EXTENDED RELEASE ORAL DAILY
Status: DISCONTINUED | OUTPATIENT
Start: 2020-01-28 | End: 2020-01-28 | Stop reason: HOSPADM

## 2020-01-28 RX ORDER — IBUPROFEN 200 MG
16 TABLET ORAL
Status: DISCONTINUED | OUTPATIENT
Start: 2020-01-28 | End: 2020-01-28 | Stop reason: HOSPADM

## 2020-01-28 RX ORDER — PANTOPRAZOLE SODIUM 40 MG/1
40 TABLET, DELAYED RELEASE ORAL DAILY
Status: DISCONTINUED | OUTPATIENT
Start: 2020-01-28 | End: 2020-01-28 | Stop reason: HOSPADM

## 2020-01-28 RX ORDER — PROMETHAZINE HYDROCHLORIDE 12.5 MG/1
12.5 TABLET ORAL EVERY 6 HOURS PRN
Status: DISCONTINUED | OUTPATIENT
Start: 2020-01-28 | End: 2020-01-28 | Stop reason: HOSPADM

## 2020-01-28 RX ORDER — INSULIN ASPART 100 [IU]/ML
0-5 INJECTION, SOLUTION INTRAVENOUS; SUBCUTANEOUS
Status: DISCONTINUED | OUTPATIENT
Start: 2020-01-28 | End: 2020-01-28 | Stop reason: HOSPADM

## 2020-01-28 RX ORDER — GABAPENTIN 300 MG/1
300 CAPSULE ORAL 3 TIMES DAILY
Status: DISCONTINUED | OUTPATIENT
Start: 2020-01-28 | End: 2020-01-28 | Stop reason: HOSPADM

## 2020-01-28 RX ORDER — LOSARTAN POTASSIUM 25 MG/1
25 TABLET ORAL DAILY
Status: DISCONTINUED | OUTPATIENT
Start: 2020-01-28 | End: 2020-01-28 | Stop reason: HOSPADM

## 2020-01-28 RX ORDER — IPRATROPIUM BROMIDE AND ALBUTEROL SULFATE 2.5; .5 MG/3ML; MG/3ML
3 SOLUTION RESPIRATORY (INHALATION) EVERY 4 HOURS PRN
Status: DISCONTINUED | OUTPATIENT
Start: 2020-01-28 | End: 2020-01-28 | Stop reason: HOSPADM

## 2020-01-28 RX ORDER — TOPIRAMATE 100 MG/1
200 TABLET, FILM COATED ORAL DAILY
Status: DISCONTINUED | OUTPATIENT
Start: 2020-01-28 | End: 2020-01-28 | Stop reason: HOSPADM

## 2020-01-28 RX ORDER — AMOXICILLIN AND CLAVULANATE POTASSIUM 875; 125 MG/1; MG/1
1 TABLET, FILM COATED ORAL EVERY 12 HOURS
Qty: 14 TABLET | Refills: 0 | Status: SHIPPED | OUTPATIENT
Start: 2020-01-28 | End: 2020-02-04

## 2020-01-28 RX ORDER — ASPIRIN 81 MG/1
81 TABLET ORAL DAILY
Status: DISCONTINUED | OUTPATIENT
Start: 2020-01-28 | End: 2020-01-28 | Stop reason: HOSPADM

## 2020-01-28 RX ORDER — TRAMADOL HYDROCHLORIDE 50 MG/1
50 TABLET ORAL EVERY 6 HOURS
Qty: 12 TABLET | Refills: 0 | Status: SHIPPED | OUTPATIENT
Start: 2020-01-28 | End: 2021-01-11

## 2020-01-28 RX ORDER — MORPHINE SULFATE 2 MG/ML
2 INJECTION, SOLUTION INTRAMUSCULAR; INTRAVENOUS ONCE
Status: COMPLETED | OUTPATIENT
Start: 2020-01-28 | End: 2020-01-28

## 2020-01-28 RX ORDER — ENOXAPARIN SODIUM 100 MG/ML
40 INJECTION SUBCUTANEOUS EVERY 24 HOURS
Status: DISCONTINUED | OUTPATIENT
Start: 2020-01-28 | End: 2020-01-28 | Stop reason: HOSPADM

## 2020-01-28 RX ORDER — BACLOFEN 10 MG/1
10 TABLET ORAL 2 TIMES DAILY
Status: DISCONTINUED | OUTPATIENT
Start: 2020-01-28 | End: 2020-01-28 | Stop reason: HOSPADM

## 2020-01-28 RX ORDER — METOCLOPRAMIDE HYDROCHLORIDE 5 MG/ML
10 INJECTION INTRAMUSCULAR; INTRAVENOUS ONCE
Status: COMPLETED | OUTPATIENT
Start: 2020-01-28 | End: 2020-01-28

## 2020-01-28 RX ADMIN — ASPIRIN 81 MG: 81 TABLET, COATED ORAL at 08:01

## 2020-01-28 RX ADMIN — MORPHINE SULFATE 2 MG: 2 INJECTION, SOLUTION INTRAMUSCULAR; INTRAVENOUS at 02:01

## 2020-01-28 RX ADMIN — GABAPENTIN 300 MG: 300 CAPSULE ORAL at 08:01

## 2020-01-28 RX ADMIN — LOSARTAN POTASSIUM 25 MG: 25 TABLET ORAL at 08:01

## 2020-01-28 RX ADMIN — DIAZEPAM 10 MG: 5 TABLET ORAL at 08:01

## 2020-01-28 RX ADMIN — TOPIRAMATE 200 MG: 100 TABLET, FILM COATED ORAL at 08:01

## 2020-01-28 RX ADMIN — HYDROCODONE BITARTRATE AND ACETAMINOPHEN 1 TABLET: 10; 325 TABLET ORAL at 01:01

## 2020-01-28 RX ADMIN — HYDROCODONE BITARTRATE AND ACETAMINOPHEN 1 TABLET: 10; 325 TABLET ORAL at 06:01

## 2020-01-28 RX ADMIN — BACLOFEN 10 MG: 10 TABLET ORAL at 08:01

## 2020-01-28 RX ADMIN — METOCLOPRAMIDE 10 MG: 5 INJECTION, SOLUTION INTRAMUSCULAR; INTRAVENOUS at 01:01

## 2020-01-28 RX ADMIN — VENLAFAXINE HYDROCHLORIDE 150 MG: 75 CAPSULE, EXTENDED RELEASE ORAL at 08:01

## 2020-01-28 RX ADMIN — PANTOPRAZOLE SODIUM 40 MG: 40 TABLET, DELAYED RELEASE ORAL at 08:01

## 2020-01-28 NOTE — SUBJECTIVE & OBJECTIVE
Review of Systems   Constitution: Negative for diaphoresis, malaise/fatigue, weight gain and weight loss.   HENT: Negative for congestion and nosebleeds.    Cardiovascular: Positive for chest pain ( worse with palpation of chest wall ). Negative for claudication, cyanosis, dyspnea on exertion, irregular heartbeat, leg swelling, near-syncope, orthopnea, palpitations, paroxysmal nocturnal dyspnea and syncope.   Respiratory: Negative for cough, hemoptysis, shortness of breath, sleep disturbances due to breathing, snoring, sputum production and wheezing.    Hematologic/Lymphatic: Negative for bleeding problem. Does not bruise/bleed easily.   Skin: Negative for rash.   Musculoskeletal: Negative for arthritis, back pain, falls, joint pain, muscle cramps and muscle weakness.        Left wrist pain      Gastrointestinal: Negative for abdominal pain, constipation, diarrhea, heartburn, hematemesis, hematochezia, melena, nausea and vomiting.   Genitourinary: Negative for dysuria, hematuria and nocturia.   Neurological: Negative for excessive daytime sleepiness, dizziness, headaches, light-headedness, loss of balance, numbness, vertigo and weakness.     Objective:     Vital Signs (Most Recent):  Temp: 98.2 °F (36.8 °C) (01/28/20 0729)  Pulse: 87 (01/28/20 0831)  Resp: 18 (01/28/20 0831)  BP: (!) 113/55 (01/28/20 0729)  SpO2: (!) 92 % (01/28/20 0831) Vital Signs (24h Range):  Temp:  [97.5 °F (36.4 °C)-98.6 °F (37 °C)] 98.2 °F (36.8 °C)  Pulse:  [71-97] 87  Resp:  [11-22] 18  SpO2:  [92 %-100 %] 92 %  BP: (109-136)/(55-70) 113/55     Weight: 83.5 kg (184 lb 1.4 oz)  Body mass index is 29.71 kg/m².     SpO2: (!) 92 %  O2 Device (Oxygen Therapy): room air      Intake/Output Summary (Last 24 hours) at 1/28/2020 1130  Last data filed at 1/28/2020 0600  Gross per 24 hour   Intake 450 ml   Output 700 ml   Net -250 ml       Lines/Drains/Airways     Peripheral Intravenous Line                 Peripheral IV - Single Lumen 01/27/20  1615 22 G Right Forearm less than 1 day                Physical Exam   Constitutional: She is oriented to person, place, and time. She appears well-developed and well-nourished.   Neck: Neck supple. No JVD present.   Cardiovascular: Normal rate, regular rhythm, normal heart sounds and normal pulses. Exam reveals no friction rub.   No murmur heard.  Pulmonary/Chest: Effort normal and breath sounds normal. No respiratory distress. She has no wheezes. She has no rales.   Chest wall tender to palpation      Abdominal: Soft. Bowel sounds are normal. She exhibits no distension.   Musculoskeletal: She exhibits no edema or tenderness.   Left wrist splint      Neurological: She is alert and oriented to person, place, and time.   Skin: Skin is warm and dry. No rash noted.   Psychiatric: She has a normal mood and affect. Her behavior is normal.   Nursing note and vitals reviewed.      Significant Labs:   All pertinent lab results from the last 24 hours have been reviewed. and   Recent Lab Results       01/28/20  0555   01/28/20  0548   01/27/20  1933   01/27/20  1615   01/27/20  1318        Albumin       3.9       Alkaline Phosphatase       77       ALT       35       Anion Gap   7   11       Appearance, UA     Clear         AST       27       Bacteria, UA     Many         Baso #       0.07       Basophil%       0.6       Bilirubin (UA)     Negative         BILIRUBIN TOTAL       0.3  Comment:  For infants and newborns, interpretation of results should be based  on gestational age, weight and in agreement with clinical  observations.  Premature Infant recommended reference ranges:  Up to 24 hours.............<8.0 mg/dL  Up to 48 hours............<12.0 mg/dL  3-5 days..................<15.0 mg/dL  6-29 days.................<15.0 mg/dL         BNP       37  Comment:  Values of less than 100 pg/ml are consistent with non-CHF populations.       BUN, Bld   13   12       Calcium   8.9   9.3       Chloride   109   108        Cholesterol   213  Comment:  The National Cholesterol Education Program (NCEP) has set the  following guidelines (reference ranges) for Cholesterol:  Optimal.....................<200 mg/dL  Borderline High.............200-239 mg/dL  High........................> or = 240 mg/dL             CO2   23   23       Color, UA     Yellow         CPK       107       Creatinine   0.8   0.8       Differential Method       Automated       eGFR if    >60   >60       eGFR if non    >60  Comment:  Calculation used to obtain the estimated glomerular filtration  rate (eGFR) is the CKD-EPI equation.      >60  Comment:  Calculation used to obtain the estimated glomerular filtration  rate (eGFR) is the CKD-EPI equation.          Eos #       0.2       Eosinophil%       2.1       Estimated Avg Glucose   180           Glucose   175   77       Glucose, UA     Negative         Gran # (ANC)       4.8       Gran%       42.4       HDL   74  Comment:  The National Cholesterol Education Program (NCEP) has set the  following guidelines (reference values) for HDL Cholesterol:  Low...............<40 mg/dL  Optimal...........>60 mg/dL             Hdl/Cholesterol Ratio   34.7           Hematocrit       41.9       Hemoglobin       13.1       Hemoglobin A1C External   7.9  Comment:  ADA Screening Guidelines:  5.7-6.4%  Consistent with prediabetes  >or=6.5%  Consistent with diabetes  High levels of fetal hemoglobin interfere with the HbA1C  assay. Heterozygous hemoglobin variants (HbS, HgC, etc)do  not significantly interfere with this assay.   However, presence of multiple variants may affect accuracy.             Immature Grans (Abs)       0.03  Comment:  Mild elevation in immature granulocytes is non specific and   can be seen in a variety of conditions including stress response,   acute inflammation, trauma and pregnancy. Correlation with other   laboratory and clinical findings is essential.         Immature  Granulocytes       0.3       Ketones, UA     Negative         LDL Cholesterol External   110.4  Comment:  The National Cholesterol Education Program (NCEP) has set the  following guidelines (reference values) for LDL Cholesterol:  Optimal.......................<130 mg/dL  Borderline High...............130-159 mg/dL  High..........................160-189 mg/dL  Very High.....................>190 mg/dL             Leukocytes, UA     2+         Lymph #       5.4       Lymph%       47.0       MCH       30.0       MCHC       31.3       MCV       96       Microscopic Comment     SEE COMMENT  Comment:  Other formed elements not mentioned in the report are not   present in the microscopic examination.            Mono #       0.9       Mono%       7.6       MPV       9.7       NITRITE UA     Positive         Non-HDL Cholesterol   139  Comment:  Risk category and Non-HDL cholesterol goals:  Coronary heart disease (CHD)or equivalent (10-year risk of CHD >20%):  Non-HDL cholesterol goal     <130 mg/dL  Two or more CHD risk factors and 10-year risk of CHD <= 20%:  Non-HDL cholesterol goal     <160 mg/dL  0 to 1 CHD risk factor:  Non-HDL cholesterol goal     <190 mg/dL             nRBC       0       Occult Blood UA     Negative         pH, UA     8.0         Platelets       313       POCT Glucose 164       103     Potassium   4.2   3.8       PROTEIN TOTAL       7.6       Protein, UA     Negative  Comment:  Recommend a 24 hour urine protein or a urine   protein/creatinine ratio if globulin induced proteinuria is  clinically suspected.           RBC       4.37       RDW       14.6       Sodium   139   142       Specific [place], UA     1.015         Specimen UA     Urine, Clean Catch         Total Cholesterol/HDL Ratio   2.9           Triglycerides   143  Comment:  The National Cholesterol Education Program (NCEP) has set the  following guidelines (reference values) for triglycerides:  Normal......................<150  mg/dL  Borderline High.............150-199 mg/dL  High........................200-499 mg/dL             Troponin I   <0.006  Comment:  The reference interval for Troponin I represents the 99th percentile   cutoff   for our facility and is consistent with 3rd generation assay   performance.     <0.006  Comment:  The reference interval for Troponin I represents the 99th percentile   cutoff   for our facility and is consistent with 3rd generation assay   performance.         UROBILINOGEN UA     Negative         WBC, UA     5         WBC       11.41                            Significant Imaging: Echocardiogram:   2D echo with color flow doppler:   Results for orders placed or performed during the hospital encounter of 06/20/18   2D echo with color flow doppler    Narrative    This study is in progress....

## 2020-01-28 NOTE — PROGRESS NOTES
Denies nausea. Pain improved. Discharge instructions given. Script sent to [facility] in [place]. Instructed on starting augmentin today. No distress noted. Pt asked about pain meds for home for lt wrist. Message sent to armin Ricketts np awaiting response. Iv already removed per alicen,rn along with tele box. Pt calling [employer].

## 2020-01-28 NOTE — NURSING
Pt currently sitting on couch in room with bedside table in front of her finishing breakfast at this time. LOTUS Woodard currently changing bed linens. Pt states she does not want her bed alarm on, stating that when she needs to go to the bathroom she needs to go right then and does not have time to wait for staff to assist her. Educated pt on fall risk precaution and that the bed alarm is for her safety and to alert staff when she is up without assistance. Also educated on call light usage to have staff come assist her to the bathroom. Pt states she still does not want her bed alarm on and that she will use call light when she needs to get out of bed.

## 2020-01-28 NOTE — PROGRESS NOTES
To room to talk about pain med. Pt up by elevator. Instructed that lilia brito np wants pt to take tylenol or ibuprofen as directed prn pain. Notified pt. Instructed to call md if pain worsens. Pt does have tingling to finger,pulses palpable. Brace to arm. Pt refused wheelchair. Steady gait with standby assist to [employer] vehicle. No distress noted.

## 2020-01-28 NOTE — NURSING
Pt arrived to unit from ED in no acute distress. VSS. Supplemental oxygen via NC in place. Tele monitor #[id number] applied to pt and verified with monitor tech. PIV saline locked. Bed low, wheels locked, side rails up x2, call light in reach, non-slip footwear socks in place, bed alarm armed. Pt oriented to room and unit protocols as well as fall risk precautions. Educated pt on importance of collecting accurate I&Os. Urine hat placed in toilet. Communication board updated with current plan of care. Pt has no complaints at this time, instructed to call for assistance.

## 2020-01-28 NOTE — CONSULTS
[facility] -   Cardiology  Consult Note    Patient Name: Alexandra Goins  MRN: [id number]  Admission Date: 1/27/2020  Hospital Length of Stay: 0 days  Code Status: Full Code   Attending Provider: Bishop Manuel MD   Consulting Provider: JONO Poole  Primary Care Physician: Perez Casiano MD  Principal Problem:Chest pain    Patient information was obtained from patient and ER records.     Inpatient consult to Cardiology  Consult performed by: JONO Nicholson  Consult ordered by: JONO Bhakta  Reason for consult: chest pain        Subjective:     Chief Complaint:  Chest pain      HPI:   Ms. Goins is a 55yo female with a PMHx of HTN, HLD, h/o CVA, DM II, asthma, GERD, bipolar disorder, family h/o premature CAD, and tobacco use.   Had syncopal event last week due to hypoglycemia. Fell and landed on her left arm and chest.  While in the ED, patient had substernal chest pain that was relieved by SL NTG.  Pain was tight in nature, moderate in intensity, nonradiating, and nonexertional.  Denies any SOB, N/V, diaphoresis, neck or jaw pain, numbness/tingling.   ED workup included negative EKG, head CT negative, Troponin negative x2. BNP 37.   Echo in Sept 2019 showed normal LVF   Negative stress test in August 2017         Review of Systems   Constitution: Negative for diaphoresis, malaise/fatigue, weight gain and weight loss.   HENT: Negative for congestion and nosebleeds.    Cardiovascular: Positive for chest pain ( worse with palpation of chest wall ). Negative for claudication, cyanosis, dyspnea on exertion, irregular heartbeat, leg swelling, near-syncope, orthopnea, palpitations, paroxysmal nocturnal dyspnea and syncope.   Respiratory: Negative for cough, hemoptysis, shortness of breath, sleep disturbances due to breathing, snoring, sputum production and wheezing.    Hematologic/Lymphatic: Negative for bleeding problem. Does not bruise/bleed easily.   Skin: Negative for rash.    Musculoskeletal: Negative for arthritis, back pain, falls, joint pain, muscle cramps and muscle weakness.        Left wrist pain      Gastrointestinal: Negative for abdominal pain, constipation, diarrhea, heartburn, hematemesis, hematochezia, melena, nausea and vomiting.   Genitourinary: Negative for dysuria, hematuria and nocturia.   Neurological: Negative for excessive daytime sleepiness, dizziness, headaches, light-headedness, loss of balance, numbness, vertigo and weakness.     Objective:     Vital Signs (Most Recent):  Temp: 98.2 °F (36.8 °C) (01/28/20 0729)  Pulse: 87 (01/28/20 0831)  Resp: 18 (01/28/20 0831)  BP: (!) 113/55 (01/28/20 0729)  SpO2: (!) 92 % (01/28/20 0831) Vital Signs (24h Range):  Temp:  [97.5 °F (36.4 °C)-98.6 °F (37 °C)] 98.2 °F (36.8 °C)  Pulse:  [71-97] 87  Resp:  [11-22] 18  SpO2:  [92 %-100 %] 92 %  BP: (109-136)/(55-70) 113/55     Weight: 83.5 kg (184 lb 1.4 oz)  Body mass index is 29.71 kg/m².     SpO2: (!) 92 %  O2 Device (Oxygen Therapy): room air      Intake/Output Summary (Last 24 hours) at 1/28/2020 1130  Last data filed at 1/28/2020 0600  Gross per 24 hour   Intake 450 ml   Output 700 ml   Net -250 ml       Lines/Drains/Airways     Peripheral Intravenous Line                 Peripheral IV - Single Lumen 01/27/20 1615 22 G Right Forearm less than 1 day                Physical Exam   Constitutional: She is oriented to person, place, and time. She appears well-developed and well-nourished.   Neck: Neck supple. No JVD present.   Cardiovascular: Normal rate, regular rhythm, normal heart sounds and normal pulses. Exam reveals no friction rub.   No murmur heard.  Pulmonary/Chest: Effort normal and breath sounds normal. No respiratory distress. She has no wheezes. She has no rales.   Chest wall tender to palpation      Abdominal: Soft. Bowel sounds are normal. She exhibits no distension.   Musculoskeletal: She exhibits no edema or tenderness.   Left wrist splint      Neurological: She  is alert and oriented to person, place, and time.   Skin: Skin is warm and dry. No rash noted.   Psychiatric: She has a normal mood and affect. Her behavior is normal.   Nursing note and vitals reviewed.      Significant Labs:   All pertinent lab results from the last 24 hours have been reviewed. and   Recent Lab Results       01/28/20  0555   01/28/20  0548   01/27/20  1933   01/27/20  1615   01/27/20  1318        Albumin       3.9       Alkaline Phosphatase       77       ALT       35       Anion Gap   7   11       Appearance, UA     Clear         AST       27       Bacteria, UA     Many         Baso #       0.07       Basophil%       0.6       Bilirubin (UA)     Negative         BILIRUBIN TOTAL       0.3  Comment:  For infants and newborns, interpretation of results should be based  on gestational age, weight and in agreement with clinical  observations.  Premature Infant recommended reference ranges:  Up to 24 hours.............<8.0 mg/dL  Up to 48 hours............<12.0 mg/dL  3-5 days..................<15.0 mg/dL  6-29 days.................<15.0 mg/dL         BNP       37  Comment:  Values of less than 100 pg/ml are consistent with non-CHF populations.       BUN, Bld   13   12       Calcium   8.9   9.3       Chloride   109   108       Cholesterol   213  Comment:  The National Cholesterol Education Program (NCEP) has set the  following guidelines (reference ranges) for Cholesterol:  Optimal.....................<200 mg/dL  Borderline High.............200-239 mg/dL  High........................> or = 240 mg/dL             CO2   23   23       Color, UA     Yellow         CPK       107       Creatinine   0.8   0.8       Differential Method       Automated       eGFR if    >60   >60       eGFR if non    >60  Comment:  Calculation used to obtain the estimated glomerular filtration  rate (eGFR) is the CKD-EPI equation.      >60  Comment:  Calculation used to obtain the estimated  glomerular filtration  rate (eGFR) is the CKD-EPI equation.          Eos #       0.2       Eosinophil%       2.1       Estimated Avg Glucose   180           Glucose   175   77       Glucose, UA     Negative         Gran # (ANC)       4.8       Gran%       42.4       HDL   74  Comment:  The National Cholesterol Education Program (NCEP) has set the  following guidelines (reference values) for HDL Cholesterol:  Low...............<40 mg/dL  Optimal...........>60 mg/dL             Hdl/Cholesterol Ratio   34.7           Hematocrit       41.9       Hemoglobin       13.1       Hemoglobin A1C External   7.9  Comment:  ADA Screening Guidelines:  5.7-6.4%  Consistent with prediabetes  >or=6.5%  Consistent with diabetes  High levels of fetal hemoglobin interfere with the HbA1C  assay. Heterozygous hemoglobin variants (HbS, HgC, etc)do  not significantly interfere with this assay.   However, presence of multiple variants may affect accuracy.             Immature Grans (Abs)       0.03  Comment:  Mild elevation in immature granulocytes is non specific and   can be seen in a variety of conditions including stress response,   acute inflammation, trauma and pregnancy. Correlation with other   laboratory and clinical findings is essential.         Immature Granulocytes       0.3       Ketones, UA     Negative         LDL Cholesterol External   110.4  Comment:  The National Cholesterol Education Program (NCEP) has set the  following guidelines (reference values) for LDL Cholesterol:  Optimal.......................<130 mg/dL  Borderline High...............130-159 mg/dL  High..........................160-189 mg/dL  Very High.....................>190 mg/dL             Leukocytes, UA     2+         Lymph #       5.4       Lymph%       47.0       MCH       30.0       MCHC       31.3       MCV       96       Microscopic Comment     SEE COMMENT  Comment:  Other formed elements not mentioned in the report are not   present in the microscopic  examination.            Mono #       0.9       Mono%       7.6       MPV       9.7       NITRITE UA     Positive         Non-HDL Cholesterol   139  Comment:  Risk category and Non-HDL cholesterol goals:  Coronary heart disease (CHD)or equivalent (10-year risk of CHD >20%):  Non-HDL cholesterol goal     <130 mg/dL  Two or more CHD risk factors and 10-year risk of CHD <= 20%:  Non-HDL cholesterol goal     <160 mg/dL  0 to 1 CHD risk factor:  Non-HDL cholesterol goal     <190 mg/dL             nRBC       0       Occult Blood UA     Negative         pH, UA     8.0         Platelets       313       POCT Glucose 164       103     Potassium   4.2   3.8       PROTEIN TOTAL       7.6       Protein, UA     Negative  Comment:  Recommend a 24 hour urine protein or a urine   protein/creatinine ratio if globulin induced proteinuria is  clinically suspected.           RBC       4.37       RDW       14.6       Sodium   139   142       Specific [place], UA     1.015         Specimen UA     Urine, Clean Catch         Total Cholesterol/HDL Ratio   2.9           Triglycerides   143  Comment:  The National Cholesterol Education Program (NCEP) has set the  following guidelines (reference values) for triglycerides:  Normal......................<150 mg/dL  Borderline High.............150-199 mg/dL  High........................200-499 mg/dL             Troponin I   <0.006  Comment:  The reference interval for Troponin I represents the 99th percentile   cutoff   for our facility and is consistent with 3rd generation assay   performance.     <0.006  Comment:  The reference interval for Troponin I represents the 99th percentile   cutoff   for our facility and is consistent with 3rd generation assay   performance.         UROBILINOGEN UA     Negative         WBC, UA     5         WBC       11.41                            Significant Imaging: Echocardiogram:   2D echo with color flow doppler:   Results for orders placed or performed during the  hospital encounter of 06/20/18   2D echo with color flow doppler    Narrative    This study is in progress....     Assessment and Plan:     * Chest pain  -Chest pain in patient reproducible to palpitation of chest wall   -Patient believes chest pain also related to anxiety attack. Recalls having a bid disagreement with her daughter prior to episode   -Patient had a fall last week and landed on chest and left arm  -Troponin negative x2  -EKG unrevealing  -Echo in Sept 2019-normal   -Recommend OP Cardiology follow up.   -Will be available PRN           VTE Risk Mitigation (From admission, onward)         Ordered     enoxaparin injection 40 mg  Daily      01/28/20 0532     IP VTE HIGH RISK PATIENT  Once      01/28/20 0532     Place sequential compression device  Until discontinued      01/28/20 0532              Chart reviewed. Patient examined by Dr. Kolb and agrees with plan that has been outlined.     Thank you for your consult. I will sign off. Please contact us if you have any additional questions.    Elizabeth Ferrari, JONO  Cardiology   [facility]

## 2020-01-28 NOTE — PROGRESS NOTES
Pt with nausea. No meds ordered. Message sent thru secure chat to gilda swenson np. Pt requested something for pain. lortab 10 po prn. Pain to lt arm. From fall.

## 2020-01-28 NOTE — NURSING
Patient assessed for diabetes educational needs following chart review  She reports she was diagnosed over 22 years ago   She is followed by the [facility]  She has a home glucose monitor  She is prescribed 70/30 insulin  She admits getting off track for the holidays recently    She admits to skipping meals.   She also thinks she may have taken an extra half dose of insulin on her recent hypoglycemic event.   After discussing the times and peaks of her insulin it coincides with her hypo event.  Reviewed signs, symptoms, causes and treatment of hypoglycemia  She verbalizes understanding

## 2020-01-28 NOTE — PLAN OF CARE
Problem: Adult Inpatient Plan of Care  Goal: Plan of Care Review  Outcome: Ongoing, Progressing  Pt AAOx4. VSS. Pt remained free of falls this shift. Pain controlled with PRN medications. Medications administered as ordered. Pt is NSR on monitor. PIV intact, saline locked. Oxygen therapy continued. Hourly rounding completed. Pt instructed to call for assistance. POC reviewed. Pt verbalized understanding. Will continue to monitor.

## 2020-01-28 NOTE — DISCHARGE SUMMARY
[facility] Hospital Medicine  Discharge Summary      Patient Name: Alexandra Goins  MRN: [id number]  Admission Date: 1/27/2020  Hospital Length of Stay: 0 days  Discharge Date and Time:  01/28/2020 5:07 PM  Attending Physician: Skylar att. providers found   Discharging Provider: JONO Tomlin  Primary Care Provider: Perez Casiano MD      HPI:   Ms. Goins is a 55yo female with a PMHx of HTN, HLD, h/o CVA, DM II, asthma, GERD, bipolar disorder, family h/o premature CAD, and tobacco use.  She originally presented to the ED with c/o left wrist pain that started after suffering a syncopal event last week due to hypoglycemia.  Imaging ruled out any acute process with LUE.  While in the ED, patient developed substernal chest pain that was relieved by SL NTG.  Pain was tight in nature, moderate in intensity, nonradiating, and nonexertional.  Denies any SOB, N/V, diaphoresis, neck or jaw pain, numbness/tingling, ABD pain, dysuria, back pain, HA, lightheadedness, dizziness, syncope, weakness, fever or chills.  Work-up in the ED was unremarkable with negative troponin, unrevealing EKG, negative CXR and CT of the head.  Hospital Medicine was called for admission.  Given HEART score of 4, patient was placed in Observation to r/o AMI.  Currently, patient appears comfortable in NAD.  Denies any further CP or SOB.      * No surgery found *      Hospital Course:   Alexandra Goins is a 54 year old female placed on observation for chest pain. Serial troponin < 0.006 x 2. EKG unrevealing. Hx of DM, HTN, HLD, and tobacco abuse -- consulted Cardiology. Cardiology recommended OP stress test. Pt currently denies chest pain and/or equivalent. Ready for discharge. She will be discharged with Augmentin 875/125 PO BID x 7 days for tx of UTI. Pt will follow up with PCP within 2-3 days after discharge for hospital follow up. Pt will also follow up with Cardiology within 1 week after discharge for hospital follow up. This  patient was seen and examined on the date of discharge and determined suitable for discharge.      Consults:   Consults (From admission, onward)        Status Ordering Provider     Inpatient consult to Cardiology  Once     Provider:  Nabor Kolb MD    Completed KEON SKY          Final Active Diagnoses:    Diagnosis Date Noted POA    PRINCIPAL PROBLEM:  Chest pain [R07.9] 01/27/2020 Yes      Problems Resolved During this Admission:       Discharged Condition: stable    Disposition: Home or Self Care    Follow Up:  Follow-up Information     Perez Casiano MD. Schedule an appointment as soon as possible for a visit in 3 days.    Specialty:  Family Medicine  Why:  hospital follow up  Contact information:  [address] [facility] [address]  [phone]             JONO Poole. Schedule an appointment as soon as possible for a visit in 1 week.    Specialty:  Cardiology  Why:  hospital follow up/OP stress test  Contact information:  [address]  [place] [address]  [phone]                 Patient Instructions:      Ambulatory Referral to Cardiology   Referral Priority: Routine Referral Type: Consultation   Referral Reason: Specialty Services Required   Referred to Provider: TOYA MARRERO Requested Specialty: Cardiology   Number of Visits Requested: 1     Notify your health care provider if you experience any of the following:  increased confusion or weakness     Notify your health care provider if you experience any of the following:  persistent dizziness, light-headedness, or visual disturbances     Notify your health care provider if you experience any of the following:  difficulty breathing or increased cough     Notify your health care provider if you experience any of the following:  severe uncontrolled pain     Activity as tolerated       Significant Diagnostic Studies: Labs:   BMP:   Recent Labs   Lab 01/27/20  1615 01/28/20  0548   GLU 77 175*     139   K 3.8 4.2    109   CO2 23 23   BUN 12 13   CREATININE 0.8 0.8   CALCIUM 9.3 8.9    and Troponin   Recent Labs   Lab 01/28/20  0548   TROPONINI <0.006     Radiology:   Imaging Results          X-Ray Chest AP Portable (Final result)  Result time 01/27/20 17:12:27    Final result by Jamie Burns MD (01/27/20 17:12:27)                 Impression:      No acute findings.      Electronically signed by: Jamie Burns MD  Date:    01/27/2020  Time:    17:12             Narrative:    EXAMINATION:  XR CHEST AP PORTABLE    CLINICAL HISTORY:  syncope;    TECHNIQUE:  Single frontal view of the chest was performed.    COMPARISON:  09/17/2019    FINDINGS:  The cardiomediastinal silhouette is normal.  Aortic atherosclerosis    The lungs are clear.  No effusions    Bones are unremarkable.                               CT Head Without Contrast (Final result)  Result time 01/27/20 14:14:34    Final result by Jamie Hess MD (01/27/20 14:14:34)                 Impression:      No acute intracranial findings.    All CT scans at this facility are performed  using dose modulation techniques as appropriate to performed exam including the following:  automated exposure control; adjustment of mA and/or kV according to the patients size (this includes techniques or standardized protocols for targeted exams where dose is matched to indication/reason for exam: i.e. extremities or head);  iterative reconstruction technique.      Electronically signed by: Jamie Hess  Date:    01/27/2020  Time:    14:14             Narrative:    EXAMINATION:  CT HEAD WITHOUT CONTRAST    CLINICAL HISTORY:  Head trauma, headache;    COMPARISON:  MRI dated 09/18/2019    FINDINGS:  No intracranial hemorrhage or acute findings are demonstrated. The visualized paranasal sinuses are clear. The calvarium is intact.                                Pending Diagnostic Studies:     None         Medications:  Reconciled Home Medications:      Medication List       START taking these medications    amoxicillin-clavulanate 875-125mg 875-125 mg per tablet  Commonly known as:  AUGMENTIN  Take 1 tablet by mouth every 12 (twelve) hours. for 7 days           CHANGE how you take these medications    metFORMIN 500 MG tablet  Commonly known as:  GLUCOPHAGE  Take 1 tablet (500 mg total) by mouth 2 (two) times daily with meals.  What changed:  additional instructions        CONTINUE taking these medications    baclofen 10 MG tablet  Commonly known as:  LIORESAL  Take 1 tablet (10 mg total) by mouth 2 (two) times daily.     butalbital-acetaminophen-caffeine -40 mg -40 mg per tablet  Commonly known as:  FIORICET, ESGIC  Take 1 tablet by mouth every 4 (four) hours as needed for Pain.     cholecalciferol (vitamin D3) 25 mcg (1,000 unit) capsule  Commonly known as:  VITAMIN D3  Take by mouth.     diazePAM 10 MG Tab  Commonly known as:  VALIUM  Take 1 tablet (10 mg total) by mouth 3 (three) times daily as needed.     doxepin 10 MG capsule  Commonly known as:  SINEQUAN  Take 1 to 2 tablets at bedtime as needed for sleep     gabapentin 300 MG capsule  Commonly known as:  NEURONTIN  Take 1 capsule (300 mg total) by mouth 3 (three) times daily.     insulin NPH-insulin regular (70/30) 100 unit/mL (70-30) injection  Commonly known as:  NOVOLIN 70/30  Inject into the skin 2 (two) times daily.     losartan 25 MG tablet  Commonly known as:  COZAAR  Take 25 mg by mouth once daily.     promethazine 12.5 MG Tab  Commonly known as:  PHENERGAN  Take 1 tablet (12.5 mg total) by mouth every 6 (six) hours as needed.     QUEtiapine 300 MG Tab  Commonly known as:  SEROQUEL  Take 1 tablet (300 mg total) by mouth every evening.     ranitidine 300 MG tablet  Commonly known as:  ZANTAC  Take 300 mg by mouth every evening.     topiramate 200 MG Tab  Commonly known as:  Topamax  1 tablet daily     True Metrix Glucose Test Strip Strp  Generic drug:  blood sugar diagnostic  USE 1 STRIP TO CHECK GLUCOSE  THREE TIMES DAILY     venlafaxine 75 MG 24 hr capsule  Commonly known as:  EFFEXOR-XR  Take 2 capsules (150 mg total) by mouth once daily.            Indwelling Lines/Drains at time of discharge:   Lines/Drains/Airways     None                 Time spent on the discharge of patient: 35 minutes  Patient was seen and examined on the date of discharge and determined to be suitable for discharge.         JONO Tomlin  Department of Hospital Medicine  [facility]

## 2020-01-28 NOTE — HOSPITAL COURSE
Alexandra Goins is a 54 year old female placed on observation for chest pain. Serial troponin < 0.006 x 2. EKG unrevealing. Hx of DM, HTN, HLD, and tobacco abuse -- consulted Cardiology. Cardiology recommended OP stress test. Pt currently denies chest pain and/or equivalent. Ready for discharge. She will be discharged with Augmentin 875/125 PO BID x 7 days for tx of UTI. Pt will follow up with PCP within 2-3 days after discharge for hospital follow up. Pt will also follow up with Cardiology within 1 week after discharge for hospital follow up. This patient was seen and examined on the date of discharge and determined suitable for discharge.

## 2020-01-28 NOTE — ED NOTES
Spoke to Dr Varghese about pt inability to void, she wishes to reassess and the follow up with orders

## 2020-01-28 NOTE — NURSING
Pt expresses concerns about her oldest daughter Twila having medical information about the pt. Per the pt, there was a verbal altercation between the two of them last night in the ED and the pt is very upset about it and does not want her daughter knowing anything in regards to her medical care. The pt states she does not think the daughter will come to the hospital again because in the past when they have an argument the daughter just ignores her. I offered and fully explained to the patient the options of changing her status to confidential or password protecting her information. The pt declined either and stated she did not think either were necessary at this time. Pt instructed to ask to speak with charge nurse if she changes her mind or has any other concerns.

## 2020-01-28 NOTE — HPI
Ms. Goins is a 55yo female with a PMHx of HTN, HLD, h/o CVA, DM II, asthma, GERD, bipolar disorder, family h/o premature CAD, and tobacco use.   Had syncopal event last week due to hypoglycemia. Fell and landed on her left arm and chest.  While in the ED, patient had substernal chest pain that was relieved by SL NTG.  Pain was tight in nature, moderate in intensity, nonradiating, and nonexertional.  Denies any SOB, N/V, diaphoresis, neck or jaw pain, numbness/tingling.   ED workup included negative EKG, head CT negative, Troponin negative x2. BNP 37.   Echo in Sept 2019 showed normal LVF   Negative stress test in August 2017

## 2020-01-28 NOTE — PROGRESS NOTES
Pt noted up oob. Bed alarm on applied upon assessment this am and pt was instructed on fall risk and precautions do to fall. Pt states she talked with charge nurse about keeping alarm off but to call when needing to get oob. Pt was up oob without assist . reinstructed about fall.

## 2020-01-28 NOTE — H&P
[facility] Hospital Medicine  History & Physical    Patient Name: Alexandra Goins  MRN: [id number]  Admission Date: 1/27/2020  Attending Physician: Jamie Dejesus MD   Primary Care Provider: Perez Casiano MD         Patient information was obtained from patient and ER records.     Subjective:     Principal Problem:Chest pain    Chief Complaint:   Chief Complaint   Patient presents with    Loss of Consciousness     passed out saturday due to low blood glucose.        HPI: Ms. Goins is a 55yo female with a PMHx of HTN, HLD, h/o CVA, DM II, asthma, GERD, bipolar disorder, family h/o premature CAD, and tobacco use.  She originally presented to the ED with c/o left wrist pain that started after suffering a syncopal event last week due to hypoglycemia.  Imaging ruled out any acute process with LUE.  While in the ED, patient developed substernal chest pain that was relieved by SL NTG.  Pain was tight in nature, moderate in intensity, nonradiating, and nonexertional.  Denies any SOB, N/V, diaphoresis, neck or jaw pain, numbness/tingling, ABD pain, dysuria, back pain, HA, lightheadedness, dizziness, syncope, weakness, fever or chills.  Work-up in the ED was unremarkable with negative troponin, unrevealing EKG, negative CXR and CT of the head.  Hospital Medicine was called for admission.  Given HEART score of 4, patient was placed in Observation to r/o AMI.  Currently, patient appears comfortable in NAD.  Denies any further CP or SOB.      Past Medical History:   Diagnosis Date    Acute ischemic stroke 9/17/2019    Aneurysm     Anxiety     Arthritis     Asthma     Bipolar 1 disorder     Cerebral aneurysm, nonruptured 9/19/2019    Depression     Diabetes mellitus, type 2     Diverticular disease     GERD (gastroesophageal reflux disease)     Hyperlipemia     Hypertension     Hypothyroidism     Pneumonia     Renal manifestation of secondary diabetes mellitus     Right-sided back pain  6/29/2019    Stroke     Trouble in sleeping        Past Surgical History:   Procedure Laterality Date    CEREBRAL ANGIOGRAM N/A 10/28/2019    Procedure: ANGIOGRAM-CEREBRAL;  Surgeon: Dahiana Diagnostic Provider;  Location: [facility] OR [address] [place] [address];  Service: Radiology;  Laterality: N/A;  Rm 190/Aayush    CHOLECYSTECTOMY      COLON SURGERY      COLONOSCOPY N/A 1/12/2018    Procedure: COLONOSCOPY;  Surgeon: Roque Mahajan III, MD;  Location: [facility];  Service: Endoscopy;  Laterality: N/A;    DENTAL SURGERY  05/21/2018    removal of 8 top teeth    HYSTERECTOMY      TONSILLECTOMY         Review of patient's allergies indicates:   Allergen Reactions    Adhesive Blisters     Pt states can't tolerate paper tape    Aspirin      Nosebleeds    Levaquin [levofloxacin]     Levomenol analogues     Lipitor [atorvastatin]      Leg Cramps    Piroxicam Diarrhea and Nausea Only    Zofran [ondansetron hcl] Hives and Other (See Comments)     headaches    Celestone [betamethasone] Hives, Itching and Rash       No current facility-administered medications on file prior to encounter.      Current Outpatient Medications on File Prior to Encounter   Medication Sig    butalbital-acetaminophen-caffeine -40 mg (FIORICET, ESGIC) -40 mg per tablet Take 1 tablet by mouth every 4 (four) hours as needed for Pain.    cholecalciferol, vitamin D3, (VITAMIN D3) 1,000 unit capsule Take by mouth.    diazePAM (VALIUM) 10 MG Tab Take 1 tablet (10 mg total) by mouth 3 (three) times daily as needed.    doxepin (SINEQUAN) 10 MG capsule Take 1 to 2 tablets at bedtime as needed for sleep    gabapentin (NEURONTIN) 300 MG capsule Take 1 capsule (300 mg total) by mouth 3 (three) times daily.    insulin NPH-insulin regular, 70/30, (NOVOLIN 70/30) 100 unit/mL (70-30) injection Inject into the skin 2 (two) times daily.    losartan (COZAAR) 25 MG tablet Take 25 mg by mouth once daily.    metFORMIN (GLUCOPHAGE) 500 MG tablet Take 1 tablet  (500 mg total) by mouth 2 (two) times daily with meals. (Patient taking differently: Take 500 mg by mouth 2 (two) times daily with meals. Takes 1000mg am and 1000mg pm.)    promethazine (PHENERGAN) 12.5 MG Tab Take 1 tablet (12.5 mg total) by mouth every 6 (six) hours as needed.    QUEtiapine (SEROQUEL) 300 MG Tab Take 1 tablet (300 mg total) by mouth every evening.    topiramate (TOPAMAX) 200 MG Tab 1 tablet daily    TRUE METRIX GLUCOSE TEST STRIP Strp USE 1 STRIP TO CHECK GLUCOSE THREE TIMES DAILY    venlafaxine (EFFEXOR-XR) 75 MG 24 hr capsule Take 2 capsules (150 mg total) by mouth once daily.    baclofen (LIORESAL) 10 MG tablet Take 1 tablet (10 mg total) by mouth 2 (two) times daily.    ranitidine (ZANTAC) 300 MG tablet Take 300 mg by mouth every evening.     Family History     Problem Relation (Age of Onset)    Alcohol abuse Mother, Father    Arthritis Father, Maternal Grandmother, Paternal Grandmother    Breast cancer Maternal Grandmother    COPD Mother, Sister    Cancer Father, Maternal Aunt, Maternal Uncle, Paternal Aunt, Paternal Uncle, Paternal Grandmother    Depression Mother    Diabetes Maternal Uncle    Drug abuse Mother, Maternal Uncle    Heart disease Father, Brother    Hypertension Father, Brother, Maternal Grandmother, Paternal Grandfather    Kidney failure Sister        Tobacco Use    Smoking status: Current Every Day Smoker     Years: 35.00     Types: Cigarettes, Vaping w/o nicotine    Smokeless tobacco: Never Used   Substance and Sexual Activity    Alcohol use: Yes     Comment: occassionally    Drug use: Yes     Types: Marijuana    Sexual activity: Yes     Review of Systems   Constitutional: Negative for chills, diaphoresis, fatigue and fever.   HENT: Negative for congestion, postnasal drip, rhinorrhea, sinus pressure and sore throat.    Respiratory: Negative for cough, shortness of breath and wheezing.    Cardiovascular: Positive for chest pain. Negative for palpitations and leg  swelling.   Gastrointestinal: Negative for abdominal distention, abdominal pain, blood in stool, constipation, diarrhea, nausea and vomiting.   Genitourinary: Negative for difficulty urinating, dysuria, frequency, hematuria and urgency.   Musculoskeletal: Positive for arthralgias (LUE). Negative for back pain, gait problem, joint swelling, myalgias and neck pain.   Skin: Negative for pallor and rash.   Neurological: Positive for syncope. Negative for dizziness, facial asymmetry, speech difficulty, weakness, light-headedness, numbness and headaches.   All other systems reviewed and are negative.    Objective:     Vital Signs (Most Recent):  Temp: 97.5 °F (36.4 °C)  Pulse: 87   Resp: 18   BP: (!) 117/59  SpO2: 95 %  Vital Signs (24h Range):  Temp:  [97.5 °F (36.4 °C)-98.6 °F (37 °C)] 97.5 °F (36.4 °C)  Pulse:  [79-99] 87  Resp:  [11-22] 18  SpO2:  [94 %-100 %] 95 %  BP: (111-136)/(55-70) 117/59     Weight: 86.2 kg (190 lb 0.6 oz)  Body mass index is 30.67 kg/m².    Physical Exam   Constitutional: She is oriented to person, place, and time. She appears well-developed and well-nourished. No distress.   HENT:   Head: Normocephalic and atraumatic.   Eyes: Conjunctivae are normal.   PERRL; EOM intact.   Neck: Normal range of motion. Neck supple.   Cardiovascular: Normal rate, regular rhythm, S1 normal, S2 normal and intact distal pulses.  No extrasystoles are present. Exam reveals no gallop and no friction rub.   No murmur heard.  Pulses:       Radial pulses are 2+ on the right side, and 2+ on the left side.        Dorsalis pedis pulses are 2+ on the right side, and 2+ on the left side.        Posterior tibial pulses are 2+ on the right side, and 2+ on the left side.   Pulmonary/Chest: Effort normal and breath sounds normal. No accessory muscle usage. No tachypnea. No respiratory distress. She has no wheezes. She has no rhonchi. She has no rales.   Abdominal: Soft. Bowel sounds are normal. She exhibits no distension. There  is no tenderness. There is no rebound, no guarding and no CVA tenderness.   Musculoskeletal: Normal range of motion. She exhibits edema (mild swelling to left hand/wrist) and tenderness (LUE). She exhibits no deformity.   Neurological: She is alert and oriented to person, place, and time. No cranial nerve deficit or sensory deficit.   Skin: Skin is warm and dry. Capillary refill takes less than 2 seconds. Abrasion (LLE) noted. No rash noted. She is not diaphoretic. No cyanosis or erythema.   Psychiatric: She has a normal mood and affect. Her speech is normal and behavior is normal. Cognition and memory are normal.   Nursing note and vitals reviewed.          Significant Labs:  Results for orders placed or performed during the hospital encounter of 01/27/20   CBC auto differential   Result Value Ref Range    WBC 11.41 3.90 - 12.70 K/uL    RBC 4.37 4.00 - 5.40 M/uL    Hemoglobin 13.1 12.0 - 16.0 g/dL    Hematocrit 41.9 37.0 - 48.5 %    Mean Corpuscular Volume 96 82 - 98 fL    Mean Corpuscular Hemoglobin 30.0 27.0 - 31.0 pg    Mean Corpuscular Hemoglobin Conc 31.3 (L) 32.0 - 36.0 g/dL    RDW 14.6 (H) 11.5 - 14.5 %    Platelets 313 150 - 350 K/uL    MPV 9.7 9.2 - 12.9 fL    Immature Granulocytes 0.3 0.0 - 0.5 %    Gran # (ANC) 4.8 1.8 - 7.7 K/uL    Immature Grans (Abs) 0.03 0.00 - 0.04 K/uL    Lymph # 5.4 (H) 1.0 - 4.8 K/uL    Mono # 0.9 0.3 - 1.0 K/uL    Eos # 0.2 0.0 - 0.5 K/uL    Baso # 0.07 0.00 - 0.20 K/uL    nRBC 0 0 /100 WBC    Gran% 42.4 38.0 - 73.0 %    Lymph% 47.0 18.0 - 48.0 %    Mono% 7.6 4.0 - 15.0 %    Eosinophil% 2.1 0.0 - 8.0 %    Basophil% 0.6 0.0 - 1.9 %    Differential Method Automated    Comprehensive metabolic panel   Result Value Ref Range    Sodium 142 136 - 145 mmol/L    Potassium 3.8 3.5 - 5.1 mmol/L    Chloride 108 95 - 110 mmol/L    CO2 23 23 - 29 mmol/L    Glucose 77 70 - 110 mg/dL    BUN, Bld 12 6 - 20 mg/dL    Creatinine 0.8 0.5 - 1.4 mg/dL    Calcium 9.3 8.7 - 10.5 mg/dL    Total Protein  7.6 6.0 - 8.4 g/dL    Albumin 3.9 3.5 - 5.2 g/dL    Total Bilirubin 0.3 0.1 - 1.0 mg/dL    Alkaline Phosphatase 77 55 - 135 U/L    AST 27 10 - 40 U/L    ALT 35 10 - 44 U/L    Anion Gap 11 8 - 16 mmol/L    eGFR if African American >60 >60 mL/min/1.73 m^2    eGFR if non African American >60 >60 mL/min/1.73 m^2   Urinalysis, Reflex to Urine Culture Urine, Clean Catch   Result Value Ref Range    Specimen UA Urine, Clean Catch     Color, UA Yellow Yellow, Straw, Joann    Appearance, UA Clear Clear    pH, UA 8.0 5.0 - 8.0    Specific Gravity, UA 1.015 1.005 - 1.030    Protein, UA Negative Negative    Glucose, UA Negative Negative    Ketones, UA Negative Negative    Bilirubin (UA) Negative Negative    Occult Blood UA Negative Negative    Nitrite, UA Positive (A) Negative    Urobilinogen, UA Negative <2.0 EU/dL    Leukocytes, UA 2+ (A) Negative   Brain natriuretic peptide   Result Value Ref Range    BNP 37 0 - 99 pg/mL   CK   Result Value Ref Range     20 - 180 U/L   Troponin I   Result Value Ref Range    Troponin I <0.006 0.000 - 0.026 ng/mL   Urinalysis Microscopic   Result Value Ref Range    WBC, UA 5 0 - 5 /hpf    Bacteria Many (A) None-Occ /hpf    Microscopic Comment SEE COMMENT    POCT glucose   Result Value Ref Range    POCT Glucose 103 70 - 110 mg/dL      All pertinent labs within the past 24 hours have been reviewed.    Significant Imaging:  Imaging Results          X-Ray Chest AP Portable (Final result)  Result time 01/27/20 17:12:27    Final result by Jamie Burns MD (01/27/20 17:12:27)                 Impression:      No acute findings.      Electronically signed by: Jamie Burns MD  Date:    01/27/2020  Time:    17:12             Narrative:    EXAMINATION:  XR CHEST AP PORTABLE    CLINICAL HISTORY:  syncope;    TECHNIQUE:  Single frontal view of the chest was performed.    COMPARISON:  09/17/2019    FINDINGS:  The cardiomediastinal silhouette is normal.  Aortic atherosclerosis    The lungs are clear.   No effusions    Bones are unremarkable.                               CT Head Without Contrast (Final result)  Result time 01/27/20 14:14:34    Final result by Jamie Hess MD (01/27/20 14:14:34)                 Impression:      No acute intracranial findings.    All CT scans at this facility are performed  using dose modulation techniques as appropriate to performed exam including the following:  automated exposure control; adjustment of mA and/or kV according to the patients size (this includes techniques or standardized protocols for targeted exams where dose is matched to indication/reason for exam: i.e. extremities or head);  iterative reconstruction technique.      Electronically signed by: Jamie Hess  Date:    01/27/2020  Time:    14:14             Narrative:    EXAMINATION:  CT HEAD WITHOUT CONTRAST    CLINICAL HISTORY:  Head trauma, headache;    COMPARISON:  MRI dated 09/18/2019    FINDINGS:  No intracranial hemorrhage or acute findings are demonstrated. The visualized paranasal sinuses are clear. The calvarium is intact.                               I have reviewed all pertinent imaging results/findings within the past 24 hours.     EKG: (personally reviewed)  NSR, LAD, no acute ischemic ST-T changes.  No significant change from previous tracings.             Assessment/Plan:     * Chest pain  - Chest pain free on admission.  EKG unrevealing.  Troponin negative x 1.  - Trend serial cardiac enzymes.  - Daily ASA 81 mg.  - Check FLP and add statin if warranted.  - Further recs pending clinical course.      VTE Risk Mitigation (From admission, onward)         Ordered     enoxaparin injection 40 mg  Daily      01/28/20 0532     IP VTE HIGH RISK PATIENT  Once      01/28/20 0532     Place sequential compression device  Until discontinued      01/28/20 0532                   Marti Bajwa NP  Department of Hospital Medicine   [facility]

## 2020-01-28 NOTE — SUBJECTIVE & OBJECTIVE
Past Medical History:   Diagnosis Date    Acute ischemic stroke 9/17/2019    Aneurysm     Anxiety     Arthritis     Asthma     Bipolar 1 disorder     Cerebral aneurysm, nonruptured 9/19/2019    Depression     Diabetes mellitus, type 2     Diverticular disease     GERD (gastroesophageal reflux disease)     Hyperlipemia     Hypertension     Hypothyroidism     Pneumonia     Renal manifestation of secondary diabetes mellitus     Right-sided back pain 6/29/2019    Stroke     Trouble in sleeping        Past Surgical History:   Procedure Laterality Date    CEREBRAL ANGIOGRAM N/A 10/28/2019    Procedure: ANGIOGRAM-CEREBRAL;  [facility];  Location: [facility] OR [address] [place] [address];  Service: Radiology;  Laterality: N/A;   190/Aayush    CHOLECYSTECTOMY      COLON SURGERY      COLONOSCOPY N/A 1/12/2018    Procedure: COLONOSCOPY;  Surgeon: Roque Mahajan III, MD;  Location: [facility];  Service: Endoscopy;  Laterality: N/A;    DENTAL SURGERY  05/21/2018    removal of 8 top teeth    HYSTERECTOMY      TONSILLECTOMY         Review of patient's allergies indicates:   Allergen Reactions    Adhesive Blisters     Pt states can't tolerate paper tape    Aspirin      Nosebleeds    Levaquin [levofloxacin]     Levomenol analogues     Lipitor [atorvastatin]      Leg Cramps    Piroxicam Diarrhea and Nausea Only    Zofran [ondansetron hcl] Hives and Other (See Comments)     headaches    Celestone [betamethasone] Hives, Itching and Rash       No current facility-administered medications on file prior to encounter.      Current Outpatient Medications on File Prior to Encounter   Medication Sig    butalbital-acetaminophen-caffeine -40 mg (FIORICET, ESGIC) -40 mg per tablet Take 1 tablet by mouth every 4 (four) hours as needed for Pain.    cholecalciferol, vitamin D3, (VITAMIN D3) 1,000 unit capsule Take by mouth.    diazePAM (VALIUM) 10 MG Tab Take 1 tablet (10 mg total) by mouth 3 (three)  times daily as needed.    doxepin (SINEQUAN) 10 MG capsule Take 1 to 2 tablets at bedtime as needed for sleep    gabapentin (NEURONTIN) 300 MG capsule Take 1 capsule (300 mg total) by mouth 3 (three) times daily.    insulin NPH-insulin regular, 70/30, (NOVOLIN 70/30) 100 unit/mL (70-30) injection Inject into the skin 2 (two) times daily.    losartan (COZAAR) 25 MG tablet Take 25 mg by mouth once daily.    metFORMIN (GLUCOPHAGE) 500 MG tablet Take 1 tablet (500 mg total) by mouth 2 (two) times daily with meals. (Patient taking differently: Take 500 mg by mouth 2 (two) times daily with meals. Takes 1000mg am and 1000mg pm.)    promethazine (PHENERGAN) 12.5 MG Tab Take 1 tablet (12.5 mg total) by mouth every 6 (six) hours as needed.    QUEtiapine (SEROQUEL) 300 MG Tab Take 1 tablet (300 mg total) by mouth every evening.    topiramate (TOPAMAX) 200 MG Tab 1 tablet daily    TRUE METRIX GLUCOSE TEST STRIP Strp USE 1 STRIP TO CHECK GLUCOSE THREE TIMES DAILY    venlafaxine (EFFEXOR-XR) 75 MG 24 hr capsule Take 2 capsules (150 mg total) by mouth once daily.    baclofen (LIORESAL) 10 MG tablet Take 1 tablet (10 mg total) by mouth 2 (two) times daily.    ranitidine (ZANTAC) 300 MG tablet Take 300 mg by mouth every evening.     Family History     Problem Relation (Age of Onset)    Alcohol abuse Mother, Father    Arthritis Father, Maternal Grandmother, Paternal Grandmother    Breast cancer Maternal Grandmother    COPD Mother, Sister    Cancer Father, Maternal Aunt, Maternal Uncle, Paternal Aunt, Paternal Uncle, Paternal Grandmother    Depression Mother    Diabetes Maternal Uncle    Drug abuse Mother, Maternal Uncle    Heart disease Father, Brother    Hypertension Father, Brother, Maternal Grandmother, Paternal Grandfather    Kidney failure Sister        Tobacco Use    Smoking status: Current Every Day Smoker     Years: 35.00     Types: Cigarettes, Vaping w/o nicotine    Smokeless tobacco: Never Used   Substance and  Sexual Activity    Alcohol use: Yes     Comment: occassionally    Drug use: Yes     Types: Marijuana    Sexual activity: Yes     Review of Systems   Constitutional: Negative for chills, diaphoresis, fatigue and fever.   HENT: Negative for congestion, postnasal drip, rhinorrhea, sinus pressure and sore throat.    Respiratory: Negative for cough, shortness of breath and wheezing.    Cardiovascular: Positive for chest pain. Negative for palpitations and leg swelling.   Gastrointestinal: Negative for abdominal distention, abdominal pain, blood in stool, constipation, diarrhea, nausea and vomiting.   Genitourinary: Negative for difficulty urinating, dysuria, frequency, hematuria and urgency.   Musculoskeletal: Positive for arthralgias (LUE). Negative for back pain, gait problem, joint swelling, myalgias and neck pain.   Skin: Negative for pallor and rash.   Neurological: Positive for syncope. Negative for dizziness, facial asymmetry, speech difficulty, weakness, light-headedness, numbness and headaches.   All other systems reviewed and are negative.    Objective:     Vital Signs (Most Recent):  Temp: 97.5 °F (36.4 °C)  Pulse: 87   Resp: 18   BP: (!) 117/59  SpO2: 95 %  Vital Signs (24h Range):  Temp:  [97.5 °F (36.4 °C)-98.6 °F (37 °C)] 97.5 °F (36.4 °C)  Pulse:  [79-99] 87  Resp:  [11-22] 18  SpO2:  [94 %-100 %] 95 %  BP: (111-136)/(55-70) 117/59     Weight: 86.2 kg (190 lb 0.6 oz)  Body mass index is 30.67 kg/m².    Physical Exam   Constitutional: She is oriented to person, place, and time. She appears well-developed and well-nourished. No distress.   HENT:   Head: Normocephalic and atraumatic.   Eyes: Conjunctivae are normal.   PERRL; EOM intact.   Neck: Normal range of motion. Neck supple.   Cardiovascular: Normal rate, regular rhythm, S1 normal, S2 normal and intact distal pulses.  No extrasystoles are present. Exam reveals no gallop and no friction rub.   No murmur heard.  Pulses:       Radial pulses are 2+ on  the right side, and 2+ on the left side.        Dorsalis pedis pulses are 2+ on the right side, and 2+ on the left side.        Posterior tibial pulses are 2+ on the right side, and 2+ on the left side.   Pulmonary/Chest: Effort normal and breath sounds normal. No accessory muscle usage. No tachypnea. No respiratory distress. She has no wheezes. She has no rhonchi. She has no rales.   Abdominal: Soft. Bowel sounds are normal. She exhibits no distension. There is no tenderness. There is no rebound, no guarding and no CVA tenderness.   Musculoskeletal: Normal range of motion. She exhibits edema (mild swelling to left hand/wrist) and tenderness (LUE). She exhibits no deformity.   Neurological: She is alert and oriented to person, place, and time. No cranial nerve deficit or sensory deficit.   Skin: Skin is warm and dry. Capillary refill takes less than 2 seconds. Abrasion (LLE) noted. No rash noted. She is not diaphoretic. No cyanosis or erythema.   Psychiatric: She has a normal mood and affect. Her speech is normal and behavior is normal. Cognition and memory are normal.   Nursing note and vitals reviewed.          Significant Labs:  Results for orders placed or performed during the hospital encounter of 01/27/20   CBC auto differential   Result Value Ref Range    WBC 11.41 3.90 - 12.70 K/uL    RBC 4.37 4.00 - 5.40 M/uL    Hemoglobin 13.1 12.0 - 16.0 g/dL    Hematocrit 41.9 37.0 - 48.5 %    Mean Corpuscular Volume 96 82 - 98 fL    Mean Corpuscular Hemoglobin 30.0 27.0 - 31.0 pg    Mean Corpuscular Hemoglobin Conc 31.3 (L) 32.0 - 36.0 g/dL    RDW 14.6 (H) 11.5 - 14.5 %    Platelets 313 150 - 350 K/uL    MPV 9.7 9.2 - 12.9 fL    Immature Granulocytes 0.3 0.0 - 0.5 %    Gran # (ANC) 4.8 1.8 - 7.7 K/uL    Immature Grans (Abs) 0.03 0.00 - 0.04 K/uL    Lymph # 5.4 (H) 1.0 - 4.8 K/uL    Mono # 0.9 0.3 - 1.0 K/uL    Eos # 0.2 0.0 - 0.5 K/uL    Baso # 0.07 0.00 - 0.20 K/uL    nRBC 0 0 /100 WBC    Gran% 42.4 38.0 - 73.0 %     Lymph% 47.0 18.0 - 48.0 %    Mono% 7.6 4.0 - 15.0 %    Eosinophil% 2.1 0.0 - 8.0 %    Basophil% 0.6 0.0 - 1.9 %    Differential Method Automated    Comprehensive metabolic panel   Result Value Ref Range    Sodium 142 136 - 145 mmol/L    Potassium 3.8 3.5 - 5.1 mmol/L    Chloride 108 95 - 110 mmol/L    CO2 23 23 - 29 mmol/L    Glucose 77 70 - 110 mg/dL    BUN, Bld 12 6 - 20 mg/dL    Creatinine 0.8 0.5 - 1.4 mg/dL    Calcium 9.3 8.7 - 10.5 mg/dL    Total Protein 7.6 6.0 - 8.4 g/dL    Albumin 3.9 3.5 - 5.2 g/dL    Total Bilirubin 0.3 0.1 - 1.0 mg/dL    Alkaline Phosphatase 77 55 - 135 U/L    AST 27 10 - 40 U/L    ALT 35 10 - 44 U/L    Anion Gap 11 8 - 16 mmol/L    eGFR if African American >60 >60 mL/min/1.73 m^2    eGFR if non African American >60 >60 mL/min/1.73 m^2   Urinalysis, Reflex to Urine Culture Urine, Clean Catch   Result Value Ref Range    Specimen UA Urine, Clean Catch     Color, UA Yellow Yellow, Straw, Joann    Appearance, UA Clear Clear    pH, UA 8.0 5.0 - 8.0    Specific Gravity, UA 1.015 1.005 - 1.030    Protein, UA Negative Negative    Glucose, UA Negative Negative    Ketones, UA Negative Negative    Bilirubin (UA) Negative Negative    Occult Blood UA Negative Negative    Nitrite, UA Positive (A) Negative    Urobilinogen, UA Negative <2.0 EU/dL    Leukocytes, UA 2+ (A) Negative   Brain natriuretic peptide   Result Value Ref Range    BNP 37 0 - 99 pg/mL   CK   Result Value Ref Range     20 - 180 U/L   Troponin I   Result Value Ref Range    Troponin I <0.006 0.000 - 0.026 ng/mL   Urinalysis Microscopic   Result Value Ref Range    WBC, UA 5 0 - 5 /hpf    Bacteria Many (A) None-Occ /hpf    Microscopic Comment SEE COMMENT    POCT glucose   Result Value Ref Range    POCT Glucose 103 70 - 110 mg/dL      All pertinent labs within the past 24 hours have been reviewed.    Significant Imaging:  Imaging Results          X-Ray Chest AP Portable (Final result)  Result time 01/27/20 17:12:27    Final result  by Jamie Burns MD (01/27/20 17:12:27)                 Impression:      No acute findings.      Electronically signed by: aJmie Burns MD  Date:    01/27/2020  Time:    17:12             Narrative:    EXAMINATION:  XR CHEST AP PORTABLE    CLINICAL HISTORY:  syncope;    TECHNIQUE:  Single frontal view of the chest was performed.    COMPARISON:  09/17/2019    FINDINGS:  The cardiomediastinal silhouette is normal.  Aortic atherosclerosis    The lungs are clear.  No effusions    Bones are unremarkable.                               CT Head Without Contrast (Final result)  Result time 01/27/20 14:14:34    Final result by Jamie Hess MD (01/27/20 14:14:34)                 Impression:      No acute intracranial findings.    All CT scans at this facility are performed  using dose modulation techniques as appropriate to performed exam including the following:  automated exposure control; adjustment of mA and/or kV according to the patients size (this includes techniques or standardized protocols for targeted exams where dose is matched to indication/reason for exam: i.e. extremities or head);  iterative reconstruction technique.      Electronically signed by: Jamie Hess  Date:    01/27/2020  Time:    14:14             Narrative:    EXAMINATION:  CT HEAD WITHOUT CONTRAST    CLINICAL HISTORY:  Head trauma, headache;    COMPARISON:  MRI dated 09/18/2019    FINDINGS:  No intracranial hemorrhage or acute findings are demonstrated. The visualized paranasal sinuses are clear. The calvarium is intact.                               I have reviewed all pertinent imaging results/findings within the past 24 hours.     EKG: (personally reviewed)  NSR, LAD, no acute ischemic ST-T changes.  No significant change from previous tracings.

## 2020-01-28 NOTE — PLAN OF CARE
01/28/20 1426   Discharge Assessment   Assessment Type Discharge Planning Assessment   Confirmed/corrected address and phone number on facesheet? Yes   Assessment information obtained from? Patient   Prior to hospitilization cognitive status: Alert/Oriented   Prior to hospitalization functional status: Independent   Current cognitive status: Alert/Oriented   Current Functional Status: Independent   Lives With spouse   Able to Return to Prior Arrangements yes   Is patient able to care for self after discharge? Yes   Who are your caregiver(s) and their phone number(s)? Kaiden Goins   Patient's perception of discharge disposition home or selfcare   Readmission Within the Last 30 Days no previous admission in last 30 days   Patient currently being followed by outpatient case management? No   Patient currently receives any other outside agency services? No   Equipment Currently Used at Home none   Do you have any problems affording any of your prescribed medications? No   Is the patient taking medications as prescribed? yes   Does the patient have transportation home? Yes   Transportation Anticipated family or friend will provide   Does the patient receive services at the [facility]madin Clinic? No   Discharge Plan A Home with family   DME Needed Upon Discharge  none   Patient/Family in Agreement with Plan yes     Met with pt at bedside for DC assessment. Pt lives with her  and uses a glucometer at home and no other DME. No CM DC needs are identified at this time. SWer provided a transitional care folder, information on advanced directives, information on pharmacy bedside delivery, and discharge planning begins on admission with contact information for any needs/questions. Pt opted out of bedside medication delivery. Her typical pharmacy is     [facility] [address] [place], [place] - [address]  [address]  [place] [address]  Phone: [phone] Fax: [phone]    PCP: MD Daniel Bear  DEBBIE Ahumada 1/28/2020 2:33 PM

## 2020-01-28 NOTE — HPI
Ms. Goins is a 55yo female with a PMHx of HTN, HLD, h/o CVA, DM II, asthma, GERD, bipolar disorder, family h/o premature CAD, and tobacco use.  She originally presented to the ED with c/o left wrist pain that started after suffering a syncopal event last week due to hypoglycemia.  Imaging ruled out any acute process with LUE.  While in the ED, patient developed substernal chest pain that was relieved by SL NTG.  Pain was tight in nature, moderate in intensity, nonradiating, and nonexertional.  Denies any SOB, N/V, diaphoresis, neck or jaw pain, numbness/tingling, ABD pain, dysuria, back pain, HA, lightheadedness, dizziness, syncope, weakness, fever or chills.  Work-up in the ED was unremarkable with negative troponin, unrevealing EKG, negative CXR and CT of the head.  Hospital Medicine was called for admission.  Given HEART score of 4, patient was placed in Observation to r/o AMI.  Currently, patient appears comfortable in NAD.  Denies any further CP or SOB.

## 2020-01-29 ENCOUNTER — TELEPHONE (OUTPATIENT)
Dept: URGENT CARE | Facility: CLINIC | Age: 55
End: 2020-01-29

## 2020-01-29 NOTE — TELEPHONE ENCOUNTER
Spoke with patient and was informed of being admitted to the hospital. Patient indicated has more swelling to the area with additional testing performed.  Patient indicated EKG was done and stayed over night in the hospital. Pt indicated was placed on oxygen. Pt was released from the hospital on 01/28/2020. Pt was informed to follow up with primary care, cardiologist and orthopedic.

## 2020-02-01 ENCOUNTER — TELEPHONE (OUTPATIENT)
Dept: URGENT CARE | Facility: CLINIC | Age: 55
End: 2020-02-01

## 2020-02-01 NOTE — TELEPHONE ENCOUNTER
Spoke with pt stated that she is not good. Asked pt if we can help. Pt stated she have an appt on the 4th she will be alright. Informed pt that if she need us to give the clinic a call. Pt stated thanks for calling

## 2020-02-04 ENCOUNTER — OFFICE VISIT (OUTPATIENT)
Dept: FAMILY MEDICINE | Facility: CLINIC | Age: 55
End: 2020-02-04
Payer: MEDICARE

## 2020-02-04 ENCOUNTER — TELEPHONE (OUTPATIENT)
Dept: OPHTHALMOLOGY | Facility: CLINIC | Age: 55
End: 2020-02-04

## 2020-02-04 VITALS
TEMPERATURE: 98 F | HEIGHT: 66 IN | HEART RATE: 108 BPM | SYSTOLIC BLOOD PRESSURE: 112 MMHG | OXYGEN SATURATION: 96 % | WEIGHT: 188.38 LBS | BODY MASS INDEX: 30.28 KG/M2 | DIASTOLIC BLOOD PRESSURE: 62 MMHG

## 2020-02-04 DIAGNOSIS — M65.331 TRIGGER MIDDLE FINGER OF RIGHT HAND: ICD-10-CM

## 2020-02-04 DIAGNOSIS — M79.642 BILATERAL HAND PAIN: ICD-10-CM

## 2020-02-04 DIAGNOSIS — I10 ESSENTIAL HYPERTENSION: ICD-10-CM

## 2020-02-04 DIAGNOSIS — I72.5 BASILAR ARTERY ANEURYSM: ICD-10-CM

## 2020-02-04 DIAGNOSIS — F31.9 BIPOLAR AFFECTIVE DISORDER, REMISSION STATUS UNSPECIFIED: ICD-10-CM

## 2020-02-04 DIAGNOSIS — E11.42 DIABETIC POLYNEUROPATHY ASSOCIATED WITH TYPE 2 DIABETES MELLITUS: ICD-10-CM

## 2020-02-04 DIAGNOSIS — E11.8 TYPE 2 DIABETES MELLITUS WITH COMPLICATION, WITH LONG-TERM CURRENT USE OF INSULIN: ICD-10-CM

## 2020-02-04 DIAGNOSIS — S63.502D SPRAIN OF LEFT WRIST, SUBSEQUENT ENCOUNTER: Primary | ICD-10-CM

## 2020-02-04 DIAGNOSIS — Z79.4 TYPE 2 DIABETES MELLITUS WITH COMPLICATION, WITH LONG-TERM CURRENT USE OF INSULIN: ICD-10-CM

## 2020-02-04 DIAGNOSIS — M79.641 BILATERAL HAND PAIN: ICD-10-CM

## 2020-02-04 PROCEDURE — 96372 THER/PROPH/DIAG INJ SC/IM: CPT | Mod: HCNC,S$GLB,, | Performed by: FAMILY MEDICINE

## 2020-02-04 PROCEDURE — 99499 RISK ADDL DX/OHS AUDIT: ICD-10-PCS | Mod: HCNC,S$GLB,, | Performed by: FAMILY MEDICINE

## 2020-02-04 PROCEDURE — 3008F PR BODY MASS INDEX (BMI) DOCUMENTED: ICD-10-PCS | Mod: HCNC,CPTII,S$GLB, | Performed by: FAMILY MEDICINE

## 2020-02-04 PROCEDURE — 99999 PR PBB SHADOW E&M-EST. PATIENT-LVL IV: ICD-10-PCS | Mod: PBBFAC,HCNC,, | Performed by: FAMILY MEDICINE

## 2020-02-04 PROCEDURE — 3074F SYST BP LT 130 MM HG: CPT | Mod: HCNC,CPTII,S$GLB, | Performed by: FAMILY MEDICINE

## 2020-02-04 PROCEDURE — 99214 PR OFFICE/OUTPT VISIT, EST, LEVL IV, 30-39 MIN: ICD-10-PCS | Mod: HCNC,25,S$GLB, | Performed by: FAMILY MEDICINE

## 2020-02-04 PROCEDURE — 3078F DIAST BP <80 MM HG: CPT | Mod: HCNC,CPTII,S$GLB, | Performed by: FAMILY MEDICINE

## 2020-02-04 PROCEDURE — 99999 PR PBB SHADOW E&M-EST. PATIENT-LVL IV: CPT | Mod: PBBFAC,HCNC,, | Performed by: FAMILY MEDICINE

## 2020-02-04 PROCEDURE — 99499 UNLISTED E&M SERVICE: CPT | Mod: HCNC,S$GLB,, | Performed by: FAMILY MEDICINE

## 2020-02-04 PROCEDURE — 3074F PR MOST RECENT SYSTOLIC BLOOD PRESSURE < 130 MM HG: ICD-10-PCS | Mod: HCNC,CPTII,S$GLB, | Performed by: FAMILY MEDICINE

## 2020-02-04 PROCEDURE — 3078F PR MOST RECENT DIASTOLIC BLOOD PRESSURE < 80 MM HG: ICD-10-PCS | Mod: HCNC,CPTII,S$GLB, | Performed by: FAMILY MEDICINE

## 2020-02-04 PROCEDURE — 99214 OFFICE O/P EST MOD 30 MIN: CPT | Mod: HCNC,25,S$GLB, | Performed by: FAMILY MEDICINE

## 2020-02-04 PROCEDURE — 96372 PR INJECTION,THERAP/PROPH/DIAG2ST, IM OR SUBCUT: ICD-10-PCS | Mod: HCNC,S$GLB,, | Performed by: FAMILY MEDICINE

## 2020-02-04 PROCEDURE — 3008F BODY MASS INDEX DOCD: CPT | Mod: HCNC,CPTII,S$GLB, | Performed by: FAMILY MEDICINE

## 2020-02-04 RX ORDER — GABAPENTIN 600 MG/1
600 TABLET ORAL 3 TIMES DAILY
Qty: 90 TABLET | Refills: 1 | Status: SHIPPED | OUTPATIENT
Start: 2020-02-04 | End: 2020-03-29

## 2020-02-04 RX ORDER — KETOROLAC TROMETHAMINE 30 MG/ML
30 INJECTION, SOLUTION INTRAMUSCULAR; INTRAVENOUS
Status: DISCONTINUED | OUTPATIENT
Start: 2020-02-04 | End: 2020-02-04

## 2020-02-04 RX ORDER — KETOROLAC TROMETHAMINE 30 MG/ML
30 INJECTION, SOLUTION INTRAMUSCULAR; INTRAVENOUS
Status: COMPLETED | OUTPATIENT
Start: 2020-02-04 | End: 2020-02-04

## 2020-02-04 RX ADMIN — KETOROLAC TROMETHAMINE 30 MG: 30 INJECTION, SOLUTION INTRAMUSCULAR; INTRAVENOUS at 09:02

## 2020-02-04 NOTE — PROGRESS NOTES
Subjective:       Patient ID: Alexandra Goins is a 54 y.o. female.    Chief Complaint: Med Change Follow Up and Hospital Follow Up      HPI Comments:       Current Outpatient Medications:     amoxicillin-clavulanate 875-125mg (AUGMENTIN) 875-125 mg per tablet, Take 1 tablet by mouth every 12 (twelve) hours. for 7 days, Disp: 14 tablet, Rfl: 0    baclofen (LIORESAL) 10 MG tablet, Take 1 tablet (10 mg total) by mouth 2 (two) times daily., Disp: 60 tablet, Rfl: 0    butalbital-acetaminophen-caffeine -40 mg (FIORICET, ESGIC) -40 mg per tablet, Take 1 tablet by mouth every 4 (four) hours as needed for Pain., Disp: 30 tablet, Rfl: 0    cholecalciferol, vitamin D3, (VITAMIN D3) 1,000 unit capsule, Take by mouth., Disp: , Rfl:     diazePAM (VALIUM) 10 MG Tab, Take 1 tablet (10 mg total) by mouth 3 (three) times daily as needed., Disp: 90 tablet, Rfl: 2    doxepin (SINEQUAN) 10 MG capsule, Take 1 to 2 tablets at bedtime as needed for sleep, Disp: 60 capsule, Rfl: 2    gabapentin (NEURONTIN) 600 MG tablet, Take 1 tablet (600 mg total) by mouth 3 (three) times daily., Disp: 90 tablet, Rfl: 1    insulin NPH-insulin regular, 70/30, (NOVOLIN 70/30) 100 unit/mL (70-30) injection, Inject into the skin 2 (two) times daily., Disp: , Rfl:     losartan (COZAAR) 25 MG tablet, Take 25 mg by mouth once daily., Disp: , Rfl: 3    metFORMIN (GLUCOPHAGE) 500 MG tablet, Take 1 tablet (500 mg total) by mouth 2 (two) times daily with meals. (Patient taking differently: Take 500 mg by mouth 2 (two) times daily with meals. Takes 1000mg am and 1000mg pm.), Disp: 180 tablet, Rfl: 1    promethazine (PHENERGAN) 12.5 MG Tab, Take 1 tablet (12.5 mg total) by mouth every 6 (six) hours as needed., Disp: 20 tablet, Rfl: 0    QUEtiapine (SEROQUEL) 300 MG Tab, Take 1 tablet (300 mg total) by mouth every evening., Disp: 30 tablet, Rfl: 2    ranitidine (ZANTAC) 300 MG tablet, Take 300 mg by mouth every evening., Disp: , Rfl:      "topiramate (TOPAMAX) 200 MG Tab, 1 tablet daily, Disp: 30 tablet, Rfl: 1    traMADol (ULTRAM) 50 mg tablet, Take 1 tablet (50 mg total) by mouth every 6 (six) hours., Disp: 12 tablet, Rfl: 0    TRUE METRIX GLUCOSE TEST STRIP Strp, USE 1 STRIP TO CHECK GLUCOSE THREE TIMES DAILY, Disp: , Rfl: 11    venlafaxine (EFFEXOR-XR) 75 MG 24 hr capsule, Take 2 capsules (150 mg total) by mouth once daily., Disp: 60 capsule, Rfl: 2    Current Facility-Administered Medications:     ketorolac injection 30 mg, 30 mg, Intramuscular, 1 time in Clinic/HOD, Perez Casiano MD    Still having a great deal of left wrist/hand pain after a fall last week.  Went to the ER and had x-rays done.  They were negative for fracture.  Was put in a splint but she feels like it needs to be modified to extend her fingers form maximum comfort.  She is taking Motrin 800 mg off and on.  Using  heat and ice    Says the gabapentin work better when she doubled up on the dose for her neuropathy.  Would like to continue at 600 mg per dose    A1c was done in the hospital was down to 7.5.  I encouraged her to follow up with diabetic education in 2 weeks as is already scheduled.    Unlike her neuropathic pain.  Her joint pain in her hands is not getting any better.  Will set her up to see Rheumatology for this    Review of Systems   Constitutional: Negative for activity change, appetite change and fever.   HENT: Negative for sore throat.    Respiratory: Negative for cough and shortness of breath.    Cardiovascular: Negative for chest pain.   Gastrointestinal: Negative for abdominal pain, diarrhea and nausea.   Genitourinary: Negative for difficulty urinating.   Musculoskeletal: Positive for arthralgias. Negative for myalgias.   Neurological: Negative for dizziness and headaches.       Objective:      Vitals:    02/04/20 0906   BP: 112/62   Pulse: 108   Temp: 97.9 °F (36.6 °C)   TempSrc: Tympanic   SpO2: 96%   Weight: 85.5 kg (188 lb 6.1 oz)   Height: 5' 6" " (1.676 m)   PainSc:   8   PainLoc: Hand     Physical Exam   Constitutional: She is oriented to person, place, and time. She appears well-developed and well-nourished. No distress.   HENT:   Head: Normocephalic.   Neck: Neck supple. No thyromegaly present.   Cardiovascular: Normal rate, regular rhythm and normal heart sounds.   No murmur heard.  Pulmonary/Chest: Effort normal and breath sounds normal. She has no wheezes. She has no rales.   Abdominal: Soft. She exhibits no distension.   Musculoskeletal: She exhibits no edema.        Left wrist: She exhibits decreased range of motion, tenderness, bony tenderness and swelling.        Left hand: She exhibits decreased range of motion and bony tenderness. Decreased strength noted. She exhibits finger abduction, thumb/finger opposition and wrist extension trouble.        Hands:  Lymphadenopathy:     She has no cervical adenopathy.   Neurological: She is alert and oriented to person, place, and time.   Skin: Skin is warm and dry. She is not diaphoretic.   Psychiatric: She has a normal mood and affect. Her behavior is normal. Judgment and thought content normal.   Nursing note and vitals reviewed.      Assessment:       1. Sprain of left wrist, subsequent encounter    2. Essential hypertension    3. Type 2 diabetes mellitus with complication, with long-term current use of insulin    4. Basilar artery aneurysm    5. Bipolar affective disorder, remission status unspecified    6. Diabetic polyneuropathy associated with type 2 diabetes mellitus    7. Trigger middle finger of right hand    8. Bilateral hand pain        Plan:   Sprain of left wrist, subsequent encounter  Comments:  Very little relief after 1 week of splinting and anti-inflammatories.  Orthopedic consult for more definitive management  Orders:  -     Ambulatory referral/consult to Orthopedics; Future; Expected date: 02/11/2020  -     Discontinue: ketorolac injection 30 mg    Essential  hypertension  Comments:  Controlled    Type 2 diabetes mellitus with complication, with long-term current use of insulin  Comments:  Uncontrolled.  Follow-up diabetic education in 2 weeks    Basilar artery aneurysm    Bipolar affective disorder, remission status unspecified  Comments:  Followed by Psychiatry on a regular basis    Diabetic polyneuropathy associated with type 2 diabetes mellitus  Comments:  Increase gabapentin to 600 mg t.i.d..  Continue to work on improving glycemic control.  Follow-up with diabetic education as scheduled    Trigger middle finger of right hand  Comments:  Orthopedic consult  Orders:  -     Ambulatory referral/consult to Orthopedics; Future; Expected date: 02/11/2020    Bilateral hand pain  Comments:  Rheumatology consult.  Toradol 30 mg IM today  Orders:  -     Ambulatory referral/consult to Rheumatology; Future; Expected date: 02/11/2020    Other orders  -     gabapentin (NEURONTIN) 600 MG tablet; Take 1 tablet (600 mg total) by mouth 3 (three) times daily.  Dispense: 90 tablet; Refill: 1  -     ketorolac injection 30 mg

## 2020-02-04 NOTE — TELEPHONE ENCOUNTER
----- Message from Kasia Kwong sent at 2/4/2020  2:43 PM CST -----  Contact: Pt   Pt is calling regarding requesting to have nurse  call Pt back. Pt  States  that call is In reference to getting appt schedule. .[phone] (home)           .Thank You  Kasia Kwong

## 2020-02-06 ENCOUNTER — OFFICE VISIT (OUTPATIENT)
Dept: ORTHOPEDICS | Facility: CLINIC | Age: 55
End: 2020-02-06
Payer: MEDICARE

## 2020-02-06 VITALS
WEIGHT: 188.5 LBS | DIASTOLIC BLOOD PRESSURE: 74 MMHG | HEART RATE: 80 BPM | BODY MASS INDEX: 30.29 KG/M2 | SYSTOLIC BLOOD PRESSURE: 111 MMHG | HEIGHT: 66 IN

## 2020-02-06 DIAGNOSIS — E11.69 TYPE 2 DIABETES MELLITUS WITH OTHER SPECIFIED COMPLICATION, WITH LONG-TERM CURRENT USE OF INSULIN: ICD-10-CM

## 2020-02-06 DIAGNOSIS — M65.331 TRIGGER FINGER, RIGHT MIDDLE FINGER: ICD-10-CM

## 2020-02-06 DIAGNOSIS — Z79.4 TYPE 2 DIABETES MELLITUS WITH OTHER SPECIFIED COMPLICATION, WITH LONG-TERM CURRENT USE OF INSULIN: ICD-10-CM

## 2020-02-06 DIAGNOSIS — R20.0 BILATERAL HAND NUMBNESS: Primary | ICD-10-CM

## 2020-02-06 DIAGNOSIS — S63.502D SPRAIN OF LEFT WRIST, SUBSEQUENT ENCOUNTER: ICD-10-CM

## 2020-02-06 PROCEDURE — 3051F PR MOST RECENT HEMOGLOBIN A1C LEVEL 7.0 - < 8.0%: ICD-10-PCS | Mod: HCNC,CPTII,S$GLB, | Performed by: PHYSICIAN ASSISTANT

## 2020-02-06 PROCEDURE — 3074F PR MOST RECENT SYSTOLIC BLOOD PRESSURE < 130 MM HG: ICD-10-PCS | Mod: HCNC,CPTII,S$GLB, | Performed by: PHYSICIAN ASSISTANT

## 2020-02-06 PROCEDURE — 3051F HG A1C>EQUAL 7.0%<8.0%: CPT | Mod: HCNC,CPTII,S$GLB, | Performed by: PHYSICIAN ASSISTANT

## 2020-02-06 PROCEDURE — 3008F PR BODY MASS INDEX (BMI) DOCUMENTED: ICD-10-PCS | Mod: HCNC,CPTII,S$GLB, | Performed by: PHYSICIAN ASSISTANT

## 2020-02-06 PROCEDURE — 3078F DIAST BP <80 MM HG: CPT | Mod: HCNC,CPTII,S$GLB, | Performed by: PHYSICIAN ASSISTANT

## 2020-02-06 PROCEDURE — 99203 OFFICE O/P NEW LOW 30 MIN: CPT | Mod: HCNC,S$GLB,, | Performed by: PHYSICIAN ASSISTANT

## 2020-02-06 PROCEDURE — 3008F BODY MASS INDEX DOCD: CPT | Mod: HCNC,CPTII,S$GLB, | Performed by: PHYSICIAN ASSISTANT

## 2020-02-06 PROCEDURE — 3074F SYST BP LT 130 MM HG: CPT | Mod: HCNC,CPTII,S$GLB, | Performed by: PHYSICIAN ASSISTANT

## 2020-02-06 PROCEDURE — 3078F PR MOST RECENT DIASTOLIC BLOOD PRESSURE < 80 MM HG: ICD-10-PCS | Mod: HCNC,CPTII,S$GLB, | Performed by: PHYSICIAN ASSISTANT

## 2020-02-06 PROCEDURE — 99999 PR PBB SHADOW E&M-EST. PATIENT-LVL V: CPT | Mod: PBBFAC,HCNC,, | Performed by: PHYSICIAN ASSISTANT

## 2020-02-06 PROCEDURE — 99203 PR OFFICE/OUTPT VISIT, NEW, LEVL III, 30-44 MIN: ICD-10-PCS | Mod: HCNC,S$GLB,, | Performed by: PHYSICIAN ASSISTANT

## 2020-02-06 PROCEDURE — 99999 PR PBB SHADOW E&M-EST. PATIENT-LVL V: ICD-10-PCS | Mod: PBBFAC,HCNC,, | Performed by: PHYSICIAN ASSISTANT

## 2020-02-06 RX ORDER — IBUPROFEN 800 MG/1
800 TABLET ORAL 3 TIMES DAILY PRN
COMMUNITY
Start: 2020-01-17 | End: 2020-03-03 | Stop reason: ALTCHOICE

## 2020-02-06 RX ORDER — HYDROCODONE BITARTRATE AND ACETAMINOPHEN 5; 325 MG/1; MG/1
1 TABLET ORAL EVERY 4 HOURS PRN
Qty: 10 TABLET | Refills: 0 | Status: SHIPPED | OUTPATIENT
Start: 2020-02-06 | End: 2020-03-04

## 2020-02-06 NOTE — LETTER
February 6, 2020      Perez Casiano MD  [address] [facility] [address]           [facility]  [address] [place] [address]  Phone: [phone]  Fax: [phone]          Patient: Alexandra Goins   MR Number: [id number]   YOB: 1965   Date of Visit: 2/6/2020       Dear Dr. Perez Casiano:    Thank you for referring Alexandra Goins to me for evaluation. Attached you will find relevant portions of my assessment and plan of care.    If you have questions, please do not hesitate to call me. I look forward to following Alexandra Goins along with you.    Sincerely,    RAND Parkerosure  CC:  No Recipients    If you would like to receive this communication electronically, please contact [email or web address] or [phone] to request more information on [employer] access.    For providers and/or their staff who would like to refer a patient to [facility], please contact us through our one-stop-shop provider referral line, [facility], at [phone].    If you feel you have received this communication in error or would no longer like to receive these types of communications, please e-mail [email or web address]

## 2020-02-06 NOTE — PROGRESS NOTES
Subjective:      Patient ID: Alexandra Goins is a 54 y.o. female.    Chief Complaint: Pain of the Left Hand; Pain of the Right Hand; and Pain of the Left Wrist      HPI: Alexandra Goins  is a 54 y.o. female who c/o Pain of the Left Hand; Pain of the Right Hand; and Pain of the Left Wrist   for duration of nearly 2 weeks for the left wrist. She had a fall at home when she got dizzy and passed out. She was seen in the emergency room and admitted for observation.  She tells me they did some x-rays on her hand and wrist on the left side but did not find anything wrong.  They put her into a cock-up wrist splint and advised her to follow-up with Orthopaedics in an outpatient setting.  She actually had today's appointment previously for consultation regarding a right middle finger trigger finger.  Her more pressing complaint today is obviously the left wrist and hand.  Pain level 8/10.  Quality sharp, burning, shooting.  She complains of associated numbness and tingling bilaterally.  Worsened nighttime and with movement.    Past Medical History:   Diagnosis Date    Acute ischemic stroke 9/17/2019    Aneurysm     Anxiety     Arthritis     Asthma     Bipolar 1 disorder     Cerebral aneurysm, nonruptured 9/19/2019    Depression     Diabetes mellitus, type 2     Diverticular disease     GERD (gastroesophageal reflux disease)     Hyperlipemia     Hypertension     Hypothyroidism     Pneumonia     Renal manifestation of secondary diabetes mellitus     Right-sided back pain 6/29/2019    Stroke     Trouble in sleeping      Past Surgical History:   Procedure Laterality Date    CEREBRAL ANGIOGRAM N/A 10/28/2019    Procedure: ANGIOGRAM-CEREBRAL;  Surgeon: [facility] Diagnostic Provider;  Location: [facility] OR [address] [place] [address];  Service: Radiology;  Laterality: N/A;  Rm 190/Aayush    CHOLECYSTECTOMY      COLON SURGERY      COLONOSCOPY N/A 1/12/2018    Procedure: COLONOSCOPY;  Surgeon: Roque Mahajan III, MD;  Location: [facility]  ENDO;  Service: Endoscopy;  Laterality: N/A;    DENTAL SURGERY  05/21/2018    removal of 8 top teeth    HYSTERECTOMY      TONSILLECTOMY       Family History   Problem Relation Age of Onset    Alcohol abuse Mother     COPD Mother     Depression Mother     Drug abuse Mother     Alcohol abuse Father     Arthritis Father     Cancer Father     Heart disease Father     Hypertension Father     COPD Sister     Kidney failure Sister     Heart disease Brother     Hypertension Brother     Cancer Maternal Aunt     Cancer Maternal Uncle     Diabetes Maternal Uncle     Drug abuse Maternal Uncle     Cancer Paternal Aunt     Cancer Paternal Uncle     Arthritis Maternal Grandmother     Hypertension Maternal Grandmother     Breast cancer Maternal Grandmother     Arthritis Paternal Grandmother     Cancer Paternal Grandmother     Hypertension Paternal Grandfather      Social History     Socioeconomic History    Marital status:      Spouse name: Not on file    Number of children: Not on file    Years of education: Not on file    Highest education level: Not on file   Occupational History    Not on file   Social Needs    Financial resource strain: Not on file    Food insecurity:     Worry: Not on file     Inability: Not on file    Transportation needs:     Medical: Not on file     Non-medical: Not on file   Tobacco Use    Smoking status: Current Every Day Smoker     Packs/day: 0.50     Years: 35.00     Pack years: 17.50     Types: Cigarettes, Vaping w/o nicotine    Smokeless tobacco: Never Used   Substance and Sexual Activity    Alcohol use: Yes     Comment: occassionally    Drug use: Yes     Types: Marijuana    Sexual activity: Yes   Lifestyle    Physical activity:     Days per week: Not on file     Minutes per session: Not on file    Stress: Not on file   Relationships    Social connections:     Talks on phone: Not on file     Gets together: Not on file     Attends [employer] service:  Not on file     Active member of club or organization: Not on file     Attends meetings of clubs or organizations: Not on file     Relationship status: Not on file   Other Topics Concern    Not on file   Social History Narrative    Minor grandchild living with pt.     Medication List with Changes/Refills   New Medications    HYDROCODONE-ACETAMINOPHEN (NORCO) 5-325 MG PER TABLET    Take 1 tablet by mouth every 4 (four) hours as needed for Pain.   Current Medications    BACLOFEN (LIORESAL) 10 MG TABLET    Take 1 tablet (10 mg total) by mouth 2 (two) times daily.    BUTALBITAL-ACETAMINOPHEN-CAFFEINE -40 MG (FIORICET, ESGIC) -40 MG PER TABLET    Take 1 tablet by mouth every 4 (four) hours as needed for Pain.    CHOLECALCIFEROL, VITAMIN D3, (VITAMIN D3) 1,000 UNIT CAPSULE    Take by mouth once daily.     DIAZEPAM (VALIUM) 10 MG TAB    Take 1 tablet (10 mg total) by mouth 3 (three) times daily as needed.    DOXEPIN (SINEQUAN) 10 MG CAPSULE    Take 1 to 2 tablets at bedtime as needed for sleep    GABAPENTIN (NEURONTIN) 600 MG TABLET    Take 1 tablet (600 mg total) by mouth 3 (three) times daily.    IBUPROFEN (ADVIL,MOTRIN) 800 MG TABLET    Take 800 mg by mouth 3 (three) times daily as needed.    INSULIN NPH-INSULIN REGULAR, 70/30, (NOVOLIN 70/30) 100 UNIT/ML (70-30) INJECTION    Inject into the skin 2 (two) times daily.    LOSARTAN (COZAAR) 25 MG TABLET    Take 25 mg by mouth once daily.    METFORMIN (GLUCOPHAGE) 500 MG TABLET    Take 1 tablet (500 mg total) by mouth 2 (two) times daily with meals.    PROMETHAZINE (PHENERGAN) 12.5 MG TAB    Take 1 tablet (12.5 mg total) by mouth every 6 (six) hours as needed.    QUETIAPINE (SEROQUEL) 300 MG TAB    Take 1 tablet (300 mg total) by mouth every evening.    RANITIDINE (ZANTAC) 300 MG TABLET    Take 300 mg by mouth every evening.    TOPIRAMATE (TOPAMAX) 200 MG TAB    1 tablet daily    TRAMADOL (ULTRAM) 50 MG TABLET    Take 1 tablet (50 mg total) by mouth every 6  (six) hours.    TRUE METRIX GLUCOSE TEST STRIP STRP    USE 1 STRIP TO CHECK GLUCOSE THREE TIMES DAILY    VENLAFAXINE (EFFEXOR-XR) 75 MG 24 HR CAPSULE    Take 2 capsules (150 mg total) by mouth once daily.     Review of patient's allergies indicates:   Allergen Reactions    Adhesive Blisters     Pt states can't tolerate paper tape    Aspirin      Nosebleeds    Levaquin [levofloxacin]     Levomenol analogues     Lipitor [atorvastatin]      Leg Cramps    Piroxicam Diarrhea and Nausea Only    Zofran [ondansetron hcl] Hives and Other (See Comments)     headaches    Celestone [betamethasone] Hives, Itching and Rash       Review of Systems   Constitution: Negative for fever.   Cardiovascular: Negative for chest pain.   Respiratory: Negative for cough and shortness of breath.    Skin: Positive for color change. Negative for rash.   Musculoskeletal: Positive for falls, joint pain, joint swelling and stiffness.   Gastrointestinal: Negative for heartburn.   Neurological: Positive for numbness (bilateral hands). Negative for headaches.         Objective:        General    Nursing note and vitals reviewed.  Constitutional: She is oriented to person, place, and time. She appears well-developed and well-nourished.   HENT:   Head: Normocephalic and atraumatic.   Eyes: EOM are normal.   Neck: Normal range of motion.   Cardiovascular: Normal rate and regular rhythm.    Pulmonary/Chest: Effort normal. No respiratory distress.   Abdominal: Soft.   Neurological: She is alert and oriented to person, place, and time.   Psychiatric: She has a normal mood and affect. Her behavior is normal.             Right Hand/Wrist Exam     Inspection   Scars: Hand -  absent  Effusion: Hand -  absent  Bruising: Hand -  absent  Deformity: Hand -  deformity    Tenderness   The patient is tender to palpation of the maloney area.    Range of Motion     Wrist   Extension: normal   Flexion: normal   Pronation: normal   Supination: normal     Tests    Phalens sign: positive  Tinel's sign (median nerve): positive  Finkelstein's test: negative    Atrophy   Thenar:  negative  Hypothenar:  negative  Intrinsic:  negative  1st Dorsal Interosseous: negative    Other     Neuorologic Exam    Median Distribution: normal  Ulnar Distribution: normal  Radial Distribution: normal    Comments:  2+ radial pulse  TTP A1 pulley MF  Active triggering on exam      Left Hand/Wrist Exam     Inspection   Effusion: Hand -  present  Bruising: Wrist - present Hand -  present    Range of Motion     Wrist   Extension: abnormal   Flexion: abnormal   Pronation: normal   Supination: normal     Tests     Atrophy  Thenar:  Negative  Hypothenar:  negative  Intrinsic: negative  1st Dorsal Interosseous:  negative    Other     Sensory Exam  Median Distribution: normal  Ulnar Distribution: normal  Radial Distribution: normal    Comments:  2+ radial pulse  Comp soft  Extensive ecchymosis dorsal hand and volar wrist  Tender to palpation TFCC.  TTP 5th MC proximally  Painful range of motion wrist and hand.  Strength not assessed due to pain      Right Elbow Exam     Tests   Tinel's sign (cubital tunnel): negative          Muscle Strength   Right Upper Extremity   Wrist extension: 5/5/5   Wrist flexion: 5/5/5   : 5/5/5   Pinch Mechanism: 5/5    Vascular Exam       Capillary Refill  Right Hand: normal capillary refill  Left Hand: normal capillary refill              Xray images and report were reviewed today.  I agree with the radiologist's interpretation.    XR HAND COMPLETE 3 VIEW LEFT   Order: [id number]   Status:  Final result   Visible to patient:  No (Not Released)   Next appt:  02/10/2020 at 01:00 PM in Psychiatry (Chang Pryor MD)   Dx:  Left hand pain   Details     Reading Physician Reading Date Result Priority   Kip Mensah MD 1/27/2020       Narrative     EXAMINATION:  XR WRIST COMPLETE 3 VIEWS LEFT; XR HAND COMPLETE 3 VIEW LEFT    CLINICAL HISTORY:  left wrist injury;;  left hand injury; Pain in left wrist; Pain in left hand    TECHNIQUE:  PA, lateral, and oblique views of the left hand/wrist were performed.    COMPARISON:  None    FINDINGS:  Dorsal hand/wrist soft tissue edema present.  No fracture or dislocation appreciated.  Correlate clinically and with appropriate follow-up.      Impression       As above      Electronically signed by: Kip Mensah MD  Date: 01/27/2020  Time: 11:13           X-Ray Hand 3 View Bilateral   Order: [id number]   Status:  Final result   Visible to patient:  No (Not Released)   Next appt:  02/10/2020 at 01:00 PM in Psychiatry (Chang Pryor MD)   Dx:  Bilateral hand pain   Details     Reading Physician Reading Date Result Priority   Meir Smith DO 1/21/2020 Routine      Narrative     EXAMINATION:  XR HAND COMPLETE 3 VIEWS BILATERAL    CLINICAL HISTORY:  . Pain in right hand    TECHNIQUE:  PA, lateral, and oblique views of both hands were performed.    COMPARISON:  None    FINDINGS:  No acute fracture or dislocation.  Minimal joint space narrowing seen scattered throughout the interphalangeal joints.  Very minimal degenerative changes noted at the bilateral 1st CMC joints.      Impression       As above      Electronically signed by: Meir Smith DO  Date: 01/21/2020  Time: 09:34               Assessment:       Encounter Diagnoses   Name Primary?    Sprain of left wrist, subsequent encounter     Bilateral hand numbness Yes    Trigger finger, right middle finger     Type 2 diabetes mellitus with other specified complication, with long-term current use of insulin           Plan:       Alexandra was seen today for pain, pain and pain.    Diagnoses and all orders for this visit:    Bilateral hand numbness  -     Nerve conduction test; Future    Sprain of left wrist, subsequent encounter  Comments:  Very little relief after 1 week of splinting and anti-inflammatories.  Orthopedic consult for more definitive management  Orders:  -      Ambulatory referral/consult to Orthopedics  -     MRI Arthrogram Wrist W/ Contrast Left; Future  -     X-Ray Arthrogram Wrist Left with MRI to follow; Future  -     HYDROcodone-acetaminophen (NORCO) 5-325 mg per tablet; Take 1 tablet by mouth every 4 (four) hours as needed for Pain.    Trigger finger, right middle finger    Type 2 diabetes mellitus with other specified complication, with long-term current use of insulin        Alexandra Goins is a new pt who comes in today for the above problems.  I would like to get an MR arthrogram of the left wrist to evaluate for a TFCC tear. Additionally, I would like her to follow up with Dr. Fields after those are done.  She needs to get an EMG nerve conduction study to evaluate for bilateral carpal tunnel syndrome.  I will notify her of those results when they become available.  Lastly, she has a trigger finger in the right middle finger.  We discussed risks and benefits of a corticosteroid injection. She goes on to tell me that her blood sugar this morning when she woke up with 300.  I would recommend holding off on that until blood sugar is a little better control as I would expect her blood sugar to increase post injection. I have put her into a TKO splint for the left wrist to include the ring and small fingers.  I have given her a 1 time prescription for Norco 5.  I have reviewed her p.m. P and it is appropriate.  According to her medical records, she had a prescription for tramadol printed for her.  I advised her against taking a considering she is also on Seroquel and like to minimize risk of side effects.  Patient verbalizes understanding and agrees with the above plan.    Follow up for MRI results and EMG results.          The patient understands, chooses and consents to this plan and accepts all   the risks which include but are not limited to the risks mentioned above.     Disclaimer: This note was prepared using a voice recognition system and is likely to have  sound alike errors within the text.

## 2020-02-16 NOTE — PROGRESS NOTES
"PCP: Perez Casiano MD    Subjective:     Chief Complaint: Diabetes Follow Up    HISTORY OF PRESENT ILLNESS: 54 y.o.  female presenting for diabetes follow up. Patient has had Type II diabetes since 1985 and has the following complications: peripheral neuropathy. She  has attended diabetes education in the past. She has a history of bipolar disorder and depression - follows with psychiatry.  On today, patient voices that she is no longer following a Mediterranean diet.    Last month, patient was seen in the emergency room for hypoglycemia ( LOC ) and chest pain.  Blood glucose monitoring is performed 3 x daily.  Per recall, fasting BGs are 200 s; ac meals around 150 s - 170 s.     Height: 5' 6" (167.6 cm)  //  Weight: 83.4 kg (183 lb 15.6 oz), Body mass index is 29.69 kg/m².  She has gained 25 pounds since last visit.     Her blood sugar in clinic today is: 221 mg/dl       Labs Reviewed.       Lab Results   Component Value Date    CPEPTIDE 3.4 01/25/2017     Lab Results   Component Value Date    GLUTAMICACID 0.01 01/25/2017     Lab Results   Component Value Date    HUMANINSULIN 0.00 01/25/2017     DM MEDICATIONS:   Novolin 70 / 30, 25 units BID ac   Metformin 500 mg, 2 tabs BID    Review of Systems   Constitutional: Negative for appetite change, fatigue and unexpected weight change.   Eyes: Negative for visual disturbance.   Respiratory: Negative for cough.    Gastrointestinal: Positive for diarrhea and nausea. Negative for abdominal pain and constipation.   Endocrine: Negative for polydipsia, polyphagia and polyuria.   Genitourinary: Negative for dysuria, frequency and urgency.   Neurological: Negative for dizziness, numbness and headaches.   Psychiatric/Behavioral: Positive for dysphoric mood (tearful). Negative for confusion, decreased concentration and suicidal ideas. The patient is not nervous/anxious.        Diabetes Management Status  Statin: Not taking  ACE/ARB: Not taking    Screening or " Prevention Patient's value Goal Complete/Controlled?   HgA1C Testing and Control   Lab Results   Component Value Date    HGBA1C 7.9 (H) 01/28/2020      Annually/Less than 8% Yes   Lipid profile : 01/28/2020 Annually No   LDL control Lab Results   Component Value Date    LDLCALC 110.4 01/28/2020    Annually/Less than 100 mg/dl  No   Nephropathy screening Lab Results   Component Value Date    MICALBCREAT 41.4 (H) 08/26/2019     Lab Results   Component Value Date    PROTEINUA Negative 01/27/2020    Annually Yes   Blood pressure BP Readings from Last 1 Encounters:   02/17/20 122/72    Less than 140/90 Yes   Dilated retinal exam : 02/25/2019 Annually Yes, Dr. Dubon   Foot exam   : 02/17/2020 Annually Yes     ACTIVITY LEVEL: Sedentary. Discussed activities, benefits, methods, and precautions.  MEAL PLANNING: Patient reports number of meals per day to be 3 and number of snacks per day to be 0.   Patient is encouraged to carb count and consume no more than 45 - 60 grams of carbohydrates in each meal, and 1800 k / gloria per day.      BLOOD GLUCOSE TESTING: 3  times daily  SOCIAL HISTORY: .  Lives with spouse.  Disabled, due to Bipolar Disorder, chronic anxiety.  Former smoker.    Objective:      Physical Exam   Constitutional: She appears well-developed and well-nourished.   HENT:   Head: Normocephalic and atraumatic.   Eyes: Pupils are equal, round, and reactive to light. EOM are normal.   Neck: Normal range of motion.   Cardiovascular: Normal rate and regular rhythm.   Pulses:       Dorsalis pedis pulses are 2+ on the right side, and 2+ on the left side.   Pulmonary/Chest: Effort normal and breath sounds normal.   Musculoskeletal: Normal range of motion.   Feet:   Right Foot:   Protective Sensation: 6 sites tested. 6 sites sensed.   Skin Integrity: Negative for ulcer, callus or dry skin.   Left Foot:   Protective Sensation: 6 sites tested. 6 sites sensed.   Skin Integrity: Negative for ulcer, blister, callus or dry  skin.   Skin: Skin is warm and dry. No rash noted.   Psychiatric: She has a normal mood and affect. Her behavior is normal. Judgment and thought content normal.   tearful       Assessment / Plan:     1.) Type 2 diabetes mellitus with hyperglycemia, with long-term current use of insulin  Comments:  - Condition uncontrolled.  Patient is no longer following a  Mediterranean / Atkins eating planning which consist of more fish, vegetables, and beans; therefore, she has gained weight and blood sugars have increased. Increase  Novolin 70 / 30, 35 units BID ac.  Continue metformin 500 mg, 2 tabs BID.      - We discussed starting SGLT-2 inhibitor.  I explained MOA and potential side effects, including dehydration, UTI and yeast infections.  Start Farxiga 10 mg daily.  I suggested patient drink at least 64 oz of water per day.      Orders:   -     POCT Glucose, Hand-Held Device  - Ambulatory Referral to Optometry - appointment scheduled    2.) Dyslipidemia associated with type 2 diabetes mellitus - continue meds as prescribed    3.) Essential hypertension - continue meds as prescribed    Additional Plan Details:    1.) Continue monitoring blood sugar 3 x daily, fasting and ac dinner or at bedtime. Discussed ADA goal for fasting blood sugar, 80 - 130 mg/dL; pp blood sugars below 180 mg/dl. Also, discussed prevention of hypoglycemia and the need to adjust goals to higher levels if persistent hypoglycemia.  Reminded to bring BG records or meter to each visit for review.  2.) Return to clinic in 8 weeks for follow up. The patient was explained the above plan and given opportunity to ask questions.  She understands, chooses and consents to this plan and accepts all the risks, which include but are not limited to the risks mentioned above. She understands the alternative of having no testing, interventions or treatments at this time. She left content and without further questions.     Amelia Santana, NP-C, CDE    A total of  30 minutes was spent in face to face time, of which over 50 % was spent in counseling patient on disease process, complications, treatment, and side effects of medications.

## 2020-02-17 ENCOUNTER — OFFICE VISIT (OUTPATIENT)
Dept: DIABETES | Facility: CLINIC | Age: 55
End: 2020-02-17
Payer: MEDICARE

## 2020-02-17 VITALS
HEIGHT: 66 IN | BODY MASS INDEX: 29.57 KG/M2 | OXYGEN SATURATION: 96 % | TEMPERATURE: 99 F | SYSTOLIC BLOOD PRESSURE: 122 MMHG | HEART RATE: 101 BPM | WEIGHT: 184 LBS | DIASTOLIC BLOOD PRESSURE: 72 MMHG

## 2020-02-17 DIAGNOSIS — E11.65 UNCONTROLLED TYPE 2 DIABETES MELLITUS WITH HYPERGLYCEMIA: Primary | ICD-10-CM

## 2020-02-17 DIAGNOSIS — I10 ESSENTIAL HYPERTENSION: Chronic | ICD-10-CM

## 2020-02-17 DIAGNOSIS — Z79.4 TYPE 2 DIABETES MELLITUS WITH OTHER SPECIFIED COMPLICATION, WITH LONG-TERM CURRENT USE OF INSULIN: ICD-10-CM

## 2020-02-17 DIAGNOSIS — E11.69 TYPE 2 DIABETES MELLITUS WITH OTHER SPECIFIED COMPLICATION, WITH LONG-TERM CURRENT USE OF INSULIN: ICD-10-CM

## 2020-02-17 DIAGNOSIS — E78.5 HYPERLIPIDEMIA, UNSPECIFIED HYPERLIPIDEMIA TYPE: ICD-10-CM

## 2020-02-17 LAB — GLUCOSE SERPL-MCNC: 221 MG/DL (ref 70–110)

## 2020-02-17 PROCEDURE — 3051F PR MOST RECENT HEMOGLOBIN A1C LEVEL 7.0 - < 8.0%: ICD-10-PCS | Mod: HCNC,CPTII,S$GLB, | Performed by: NURSE PRACTITIONER

## 2020-02-17 PROCEDURE — 99999 PR PBB SHADOW E&M-EST. PATIENT-LVL V: CPT | Mod: PBBFAC,HCNC,, | Performed by: NURSE PRACTITIONER

## 2020-02-17 PROCEDURE — 82962 GLUCOSE BLOOD TEST: CPT | Mod: HCNC,S$GLB,, | Performed by: NURSE PRACTITIONER

## 2020-02-17 PROCEDURE — 99214 OFFICE O/P EST MOD 30 MIN: CPT | Mod: HCNC,S$GLB,, | Performed by: NURSE PRACTITIONER

## 2020-02-17 PROCEDURE — 82962 POCT GLUCOSE, HAND-HELD DEVICE: ICD-10-PCS | Mod: HCNC,S$GLB,, | Performed by: NURSE PRACTITIONER

## 2020-02-17 PROCEDURE — 99214 PR OFFICE/OUTPT VISIT, EST, LEVL IV, 30-39 MIN: ICD-10-PCS | Mod: HCNC,S$GLB,, | Performed by: NURSE PRACTITIONER

## 2020-02-17 PROCEDURE — 3051F HG A1C>EQUAL 7.0%<8.0%: CPT | Mod: HCNC,CPTII,S$GLB, | Performed by: NURSE PRACTITIONER

## 2020-02-17 PROCEDURE — 99999 PR PBB SHADOW E&M-EST. PATIENT-LVL V: ICD-10-PCS | Mod: PBBFAC,HCNC,, | Performed by: NURSE PRACTITIONER

## 2020-02-17 RX ORDER — METFORMIN HYDROCHLORIDE 500 MG/1
1000 TABLET ORAL 2 TIMES DAILY WITH MEALS
Qty: 120 TABLET | Refills: 3 | Status: SHIPPED | OUTPATIENT
Start: 2020-02-17 | End: 2020-02-17 | Stop reason: SDUPTHER

## 2020-02-17 RX ORDER — DAPAGLIFLOZIN 10 MG/1
10 TABLET, FILM COATED ORAL DAILY
Qty: 30 TABLET | Refills: 3 | Status: SHIPPED | OUTPATIENT
Start: 2020-02-17 | End: 2020-02-18

## 2020-02-17 RX ORDER — METFORMIN HYDROCHLORIDE 500 MG/1
1000 TABLET ORAL 2 TIMES DAILY WITH MEALS
Qty: 120 TABLET | Refills: 3 | Status: SHIPPED | OUTPATIENT
Start: 2020-02-17 | End: 2020-08-18

## 2020-02-18 ENCOUNTER — TELEPHONE (OUTPATIENT)
Dept: DIABETES | Facility: CLINIC | Age: 55
End: 2020-02-18

## 2020-02-18 NOTE — TELEPHONE ENCOUNTER
----- Message from Alona Navarro sent at 2/18/2020  8:31 AM CST -----  Contact: pt   Stated she's calling about the cost of her medication (insulin) being to high and wants to know if she can get something else, she can be reached at [phone] Thanks       [facility] [id number]  [place], [place] - [address] [address] [facility]  [address] [facility]  [place] [address]  Phone: [phone] Fax: [phone]

## 2020-02-19 ENCOUNTER — TELEPHONE (OUTPATIENT)
Dept: ORTHOPEDICS | Facility: CLINIC | Age: 55
End: 2020-02-19

## 2020-02-19 NOTE — TELEPHONE ENCOUNTER
Left voicemail for patient asking for her to call back so we can get her rescheduled for a different day with Dr. Fields because she canceled her MRI arthrogram and we were supposed to be reviewing those results on her Fri 2/21/2020 visit.

## 2020-02-27 ENCOUNTER — PATIENT OUTREACH (OUTPATIENT)
Dept: ADMINISTRATIVE | Facility: OTHER | Age: 55
End: 2020-02-27

## 2020-03-02 DIAGNOSIS — E11.69 TYPE 2 DIABETES MELLITUS WITH OTHER SPECIFIED COMPLICATION, WITH LONG-TERM CURRENT USE OF INSULIN: Primary | ICD-10-CM

## 2020-03-02 DIAGNOSIS — Z79.4 TYPE 2 DIABETES MELLITUS WITH OTHER SPECIFIED COMPLICATION, WITH LONG-TERM CURRENT USE OF INSULIN: Primary | ICD-10-CM

## 2020-03-03 ENCOUNTER — OFFICE VISIT (OUTPATIENT)
Dept: RHEUMATOLOGY | Facility: CLINIC | Age: 55
End: 2020-03-03
Payer: MEDICARE

## 2020-03-03 ENCOUNTER — HOSPITAL ENCOUNTER (OUTPATIENT)
Dept: RADIOLOGY | Facility: HOSPITAL | Age: 55
Discharge: HOME | End: 2020-03-03
Attending: INTERNAL MEDICINE
Payer: MEDICARE

## 2020-03-03 VITALS
DIASTOLIC BLOOD PRESSURE: 78 MMHG | HEART RATE: 97 BPM | BODY MASS INDEX: 30.55 KG/M2 | WEIGHT: 189.25 LBS | SYSTOLIC BLOOD PRESSURE: 121 MMHG

## 2020-03-03 DIAGNOSIS — M19.042 INFLAMMATION OF JOINT OF BOTH HANDS: ICD-10-CM

## 2020-03-03 DIAGNOSIS — M19.041 INFLAMMATION OF JOINT OF BOTH HANDS: ICD-10-CM

## 2020-03-03 DIAGNOSIS — M79.642 BILATERAL HAND PAIN: Primary | ICD-10-CM

## 2020-03-03 DIAGNOSIS — R53.83 FATIGUE, UNSPECIFIED TYPE: ICD-10-CM

## 2020-03-03 DIAGNOSIS — M79.641 BILATERAL HAND PAIN: Primary | ICD-10-CM

## 2020-03-03 PROCEDURE — 3074F SYST BP LT 130 MM HG: CPT | Mod: HCNC,CPTII,S$GLB, | Performed by: INTERNAL MEDICINE

## 2020-03-03 PROCEDURE — 73130 X-RAY EXAM OF HAND: CPT | Mod: TC,HCNC,RT

## 2020-03-03 PROCEDURE — 99999 PR PBB SHADOW E&M-EST. PATIENT-LVL IV: ICD-10-PCS | Mod: PBBFAC,HCNC,, | Performed by: INTERNAL MEDICINE

## 2020-03-03 PROCEDURE — 99205 OFFICE O/P NEW HI 60 MIN: CPT | Mod: HCNC,S$GLB,, | Performed by: INTERNAL MEDICINE

## 2020-03-03 PROCEDURE — 3078F DIAST BP <80 MM HG: CPT | Mod: HCNC,CPTII,S$GLB, | Performed by: INTERNAL MEDICINE

## 2020-03-03 PROCEDURE — 73130 XR HAND COMPLETE 3 VIEW RIGHT: ICD-10-PCS | Mod: 26,HCNC,RT, | Performed by: RADIOLOGY

## 2020-03-03 PROCEDURE — 73130 X-RAY EXAM OF HAND: CPT | Mod: 26,HCNC,RT, | Performed by: RADIOLOGY

## 2020-03-03 PROCEDURE — 3074F PR MOST RECENT SYSTOLIC BLOOD PRESSURE < 130 MM HG: ICD-10-PCS | Mod: HCNC,CPTII,S$GLB, | Performed by: INTERNAL MEDICINE

## 2020-03-03 PROCEDURE — 99205 PR OFFICE/OUTPT VISIT, NEW, LEVL V, 60-74 MIN: ICD-10-PCS | Mod: HCNC,S$GLB,, | Performed by: INTERNAL MEDICINE

## 2020-03-03 PROCEDURE — 99999 PR PBB SHADOW E&M-EST. PATIENT-LVL IV: CPT | Mod: PBBFAC,HCNC,, | Performed by: INTERNAL MEDICINE

## 2020-03-03 PROCEDURE — 3008F PR BODY MASS INDEX (BMI) DOCUMENTED: ICD-10-PCS | Mod: HCNC,CPTII,S$GLB, | Performed by: INTERNAL MEDICINE

## 2020-03-03 PROCEDURE — 3078F PR MOST RECENT DIASTOLIC BLOOD PRESSURE < 80 MM HG: ICD-10-PCS | Mod: HCNC,CPTII,S$GLB, | Performed by: INTERNAL MEDICINE

## 2020-03-03 PROCEDURE — 3008F BODY MASS INDEX DOCD: CPT | Mod: HCNC,CPTII,S$GLB, | Performed by: INTERNAL MEDICINE

## 2020-03-03 RX ORDER — INDOMETHACIN 75 MG/1
75 CAPSULE, EXTENDED RELEASE ORAL 2 TIMES DAILY WITH MEALS
Qty: 60 CAPSULE | Refills: 0 | Status: SHIPPED | OUTPATIENT
Start: 2020-03-03 | End: 2021-01-11

## 2020-03-03 NOTE — PROGRESS NOTES
CC:  Chief Complaint   Patient presents with    New Patient     Pain & swelling in both hands      Referred by Perez Casiano MD      History of Present Illness:  Alexandra Andres a 54 y.o.yo female presents for further evaluation of pain and swelling in both hands  Left greater than right   She also hurts in both her wrists, along with swelling  Duration few months  Left 2,3 fingers get numb numb  Stiffness worse in a.m. and around 2:00 a.m. in evening  She currently uses Motrin 800 mg 3 times a day and his use Naprosyn in the past  Does not get much relief  She continues to smoke  She is also a known diabetic  Ft do not bother her  She also notes trigger finger and right 3rd middle and left thumb  She had a recent trauma in January when she fell on the left hand and wrist and this aggravated her pain    Denies any rash    She had an allergy reaction to steroids in the past    + family history of lupus in her mother    Review of Systems:  Constitutional: Denies fever, chills. No recent weight changes.   Fatigue: yes  Muscle weakness: no  Headaches: no new headaches  Eyes: No redness .  No recent visual changes.  ENT:  No oral or nasal ulcers.  Card: No chest pain.  Resp: chronic cough   Gastro: No nausea or vomiting.  No heartburn.  Constipation: no  Diarrhea: no  Genito:  No dysuria.  No genital ulcers.  Skin: No rash.  Raynauds:no  Neuro: per HPI   Psych: + anxiety  Endo:  no excess thirst.  Heme: no abnormal bleeding or bruising  Clots:none   No miscarriages     Past Medical History:   Diagnosis Date    Acute ischemic stroke 9/17/2019    Aneurysm     Anxiety     Arthritis     Asthma     Bipolar 1 disorder     Cerebral aneurysm, nonruptured 9/19/2019    Depression     Diabetes mellitus, type 2     Diverticular disease     GERD (gastroesophageal reflux disease)     Hyperlipemia     Hypertension     Hypothyroidism     Pneumonia     Renal manifestation of secondary diabetes mellitus      Right-sided back pain 6/29/2019    Stroke     Trouble in sleeping        Past Surgical History:   Procedure Laterality Date    CEREBRAL ANGIOGRAM N/A 10/28/2019    Procedure: ANGIOGRAM-CEREBRAL;  Surgeon: Dosesperanza Diagnostic Provider;  Location: [facility] OR [address] [place] [address];  Service: Radiology;  Laterality: N/A;  Rm 190/Aayush    CHOLECYSTECTOMY      COLON SURGERY      COLONOSCOPY N/A 1/12/2018    Procedure: COLONOSCOPY;  Surgeon: Roque Mahajan III, MD;  Location: [facility];  Service: Endoscopy;  Laterality: N/A;    DENTAL SURGERY  05/21/2018    removal of 8 top teeth    HYSTERECTOMY      TONSILLECTOMY           Social History     Tobacco Use    Smoking status: Current Every Day Smoker     Packs/day: 0.50     Years: 35.00     Pack years: 17.50     Types: Cigarettes, Vaping w/o nicotine    Smokeless tobacco: Never Used   Substance Use Topics    Alcohol use: Yes     Comment: occassionally    Drug use: Yes     Types: Marijuana       Family History   Problem Relation Age of Onset    Alcohol abuse Mother     COPD Mother     Depression Mother     Drug abuse Mother     Alcohol abuse Father     Arthritis Father     Cancer Father     Heart disease Father     Hypertension Father     COPD Sister     Kidney failure Sister     Heart disease Brother     Hypertension Brother     Cancer Maternal Aunt     Cancer Maternal Uncle     Diabetes Maternal Uncle     Drug abuse Maternal Uncle     Cancer Paternal Aunt     Cancer Paternal Uncle     Arthritis Maternal Grandmother     Hypertension Maternal Grandmother     Breast cancer Maternal Grandmother     Arthritis Paternal Grandmother     Cancer Paternal Grandmother     Hypertension Paternal Grandfather        Review of patient's allergies indicates:   Allergen Reactions    Adhesive Blisters     Pt states can't tolerate paper tape    Levaquin [levofloxacin]     Levomenol analogues     Lipitor [atorvastatin]      Leg Cramps    Piroxicam Diarrhea and Nausea Only     Zofran [ondansetron hcl] Hives and Other (See Comments)     headaches    Celestone [betamethasone] Hives, Itching and Rash           OBJECTIVE:     Vital Signs   Vitals:    03/03/20 1252   BP: 121/78   Pulse: 97     Physical Exam:  General Appearance:  NAD.   Gait: not favoring.  HEENT: PERRL.  Eyes not dry or injected.  No nasal ulcers.  Mouth not dry, no oral lesions.  Lymph: cervical, supraclavicular or axillary nodes: none abnormal   Cardio: no irregularity of S1 or S2.  No gallops or rubs.   Resp: Normal respiratory motion. Clear to auscultation bilaterally.   No abnormal chest conformation.  Abd: Soft, non-tender, nondistended.  No masses.   Skin: Head and neck,  and extremities examined. No rashes.   Neuro: Ox3.   Cranial nerves II-XII grossly intact.   Sensation intact  in both distal LE and upper extremities to light touch.  Musculoskeletal Exam:    Left wrist swollen and tender with pain on range of motion  Right wrist tender    Right 3rd PIP swollen and tender  Left 4th MCP, PIP swollen and tender  Left 3rd PIP tender      Laboratory: I have reviewed all of the patient's relevant lab work available in the medical record and have utilized this in my evaluation and management recommendations today    Imaging : I have reviewed all of the patient's diagnostic/imaging results available in the medical record and have utilized this in my evaluation and management recommendations today.    Notes reviewed  Other procedures:    ASSESSMENT/PLAN:     Bilateral hand pain  Comments:  Rheumatology consult.  Toradol 30 mg IM today  Orders:  -     Ambulatory referral/consult to Rheumatology  -     MARKO Screen w/Reflex; Standing  -     Uric acid; Standing  -     Rheumatoid factor; Future; Expected date: 03/03/2020  -     Cyclic citrul peptide antibody, IgG; Future; Expected date: 03/03/2020  -     Hepatitis Panel, Acute; Future  -     Quantiferon Gold TB; Future; Expected date: 03/03/2020  -     Anti-scleroderma  antibody; Future; Expected date: 03/03/2020  -     indomethacin (INDOCIN SR) 75 mg CpSR CR capsule; Take 1 capsule (75 mg total) by mouth 2 (two) times daily with meals.  Dispense: 60 capsule; Refill: 0    Fatigue, unspecified type  -     Hepatitis Panel, Acute; Future    Inflammation of joint of both hands  -     CK; Standing  -     Aldolase; Standing  -     X-Ray Hand Complete Right; Future; Expected date: 03/03/2020      + inflammation in bilateral hands  Check RF/CCP/MARKO to rule out any possible autoimmune arthritis  Cannot do a trial of steroids due to allergy  Stop ibuprofen  Start Indocin 75 mg p.o. B.i.d., stop if any side effects or intolerance issues noted    Check labs and x-rays as above    2 week return for review    Thank you for the referral    Risks vs Benefits and potential side effects of medication prescribed today were discussed with patient. Medication literature given to patient up discharge  Went over uptodate information /literature on the meds prescribed today    Patient advised to hold DMARD and/or biologic therapy for signs of infection or for surgery. If you are unsure what to do please call our office for instruction. [facility] [phone]        Disclaimer: This note was prepared using voice recognition system and is likely to have sound alike errors and is not proof read.  Please call me with any questions.

## 2020-03-03 NOTE — LETTER
March 3, 2020      Perez Casiano MD  [address] [facility] [place] [address]           [facility]  [address] [place] [address] [address] CHRISTINE VICTOR [address]  Phone: [phone]  Fax: [phone]          Patient: Alexandra Goins   MR Number: [id number]   YOB: 1965   Date of Visit: 3/3/2020       Dear Dr. Perez Casiano:    Thank you for referring Alexandra Goins to me for evaluation. Attached you will find relevant portions of my assessment and plan of care.    If you have questions, please do not hesitate to call me. I look forward to following Alexandra Goins along with you.    Sincerely,    Shellie Machuca MD    Enclosure  CC:  No Recipients    If you would like to receive this communication electronically, please contact [email or web address] or [phone] to request more information on [facility] access.    For providers and/or their staff who would like to refer a patient to [facility], please contact us through our one-stop-shop provider referral line, [facility], at [phone].    If you feel you have received this communication in error or would no longer like to receive these types of communications, please e-mail [email or web address]

## 2020-03-04 ENCOUNTER — OFFICE VISIT (OUTPATIENT)
Dept: PSYCHIATRY | Facility: CLINIC | Age: 55
End: 2020-03-04
Payer: MEDICARE

## 2020-03-04 VITALS
SYSTOLIC BLOOD PRESSURE: 118 MMHG | WEIGHT: 188.25 LBS | BODY MASS INDEX: 30.39 KG/M2 | HEART RATE: 100 BPM | DIASTOLIC BLOOD PRESSURE: 77 MMHG

## 2020-03-04 DIAGNOSIS — F31.9 BIPOLAR 1 DISORDER: Primary | ICD-10-CM

## 2020-03-04 DIAGNOSIS — F41.9 ANXIETY: ICD-10-CM

## 2020-03-04 DIAGNOSIS — G47.00 INSOMNIA, UNSPECIFIED TYPE: ICD-10-CM

## 2020-03-04 PROCEDURE — 99214 OFFICE O/P EST MOD 30 MIN: CPT | Mod: HCNC,S$GLB,, | Performed by: PSYCHIATRY & NEUROLOGY

## 2020-03-04 PROCEDURE — 99999 PR PBB SHADOW E&M-EST. PATIENT-LVL II: ICD-10-PCS | Mod: PBBFAC,HCNC,, | Performed by: PSYCHIATRY & NEUROLOGY

## 2020-03-04 PROCEDURE — 99499 RISK ADDL DX/OHS AUDIT: ICD-10-PCS | Mod: HCNC,S$GLB,, | Performed by: PSYCHIATRY & NEUROLOGY

## 2020-03-04 PROCEDURE — 99499 UNLISTED E&M SERVICE: CPT | Mod: HCNC,S$GLB,, | Performed by: PSYCHIATRY & NEUROLOGY

## 2020-03-04 PROCEDURE — 3078F DIAST BP <80 MM HG: CPT | Mod: HCNC,CPTII,S$GLB, | Performed by: PSYCHIATRY & NEUROLOGY

## 2020-03-04 PROCEDURE — 99214 PR OFFICE/OUTPT VISIT, EST, LEVL IV, 30-39 MIN: ICD-10-PCS | Mod: HCNC,S$GLB,, | Performed by: PSYCHIATRY & NEUROLOGY

## 2020-03-04 PROCEDURE — 3074F PR MOST RECENT SYSTOLIC BLOOD PRESSURE < 130 MM HG: ICD-10-PCS | Mod: HCNC,CPTII,S$GLB, | Performed by: PSYCHIATRY & NEUROLOGY

## 2020-03-04 PROCEDURE — 3078F PR MOST RECENT DIASTOLIC BLOOD PRESSURE < 80 MM HG: ICD-10-PCS | Mod: HCNC,CPTII,S$GLB, | Performed by: PSYCHIATRY & NEUROLOGY

## 2020-03-04 PROCEDURE — 3008F PR BODY MASS INDEX (BMI) DOCUMENTED: ICD-10-PCS | Mod: HCNC,CPTII,S$GLB, | Performed by: PSYCHIATRY & NEUROLOGY

## 2020-03-04 PROCEDURE — 3008F BODY MASS INDEX DOCD: CPT | Mod: HCNC,CPTII,S$GLB, | Performed by: PSYCHIATRY & NEUROLOGY

## 2020-03-04 PROCEDURE — 99999 PR PBB SHADOW E&M-EST. PATIENT-LVL II: CPT | Mod: PBBFAC,HCNC,, | Performed by: PSYCHIATRY & NEUROLOGY

## 2020-03-04 PROCEDURE — 3074F SYST BP LT 130 MM HG: CPT | Mod: HCNC,CPTII,S$GLB, | Performed by: PSYCHIATRY & NEUROLOGY

## 2020-03-04 RX ORDER — OXCARBAZEPINE 300 MG/1
TABLET, FILM COATED ORAL
Qty: 30 TABLET | Refills: 2 | Status: SHIPPED | OUTPATIENT
Start: 2020-03-04 | End: 2020-06-04

## 2020-03-04 RX ORDER — DOXEPIN HYDROCHLORIDE 10 MG/1
CAPSULE ORAL
Qty: 60 CAPSULE | Refills: 2 | Status: SHIPPED | OUTPATIENT
Start: 2020-03-04 | End: 2020-06-04 | Stop reason: SDUPTHER

## 2020-03-04 RX ORDER — QUETIAPINE FUMARATE 300 MG/1
300 TABLET, FILM COATED ORAL NIGHTLY
Qty: 30 TABLET | Refills: 2 | Status: SHIPPED | OUTPATIENT
Start: 2020-03-04 | End: 2020-06-04 | Stop reason: SDUPTHER

## 2020-03-04 RX ORDER — VENLAFAXINE HYDROCHLORIDE 75 MG/1
150 CAPSULE, EXTENDED RELEASE ORAL DAILY
Qty: 60 CAPSULE | Refills: 2 | Status: SHIPPED | OUTPATIENT
Start: 2020-03-04 | End: 2020-06-04 | Stop reason: SDUPTHER

## 2020-03-04 RX ORDER — DIAZEPAM 10 MG/1
10 TABLET ORAL 3 TIMES DAILY PRN
Qty: 90 TABLET | Refills: 2 | Status: SHIPPED | OUTPATIENT
Start: 2020-03-04 | End: 2020-06-04 | Stop reason: SDUPTHER
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
## 2020-03-09 ENCOUNTER — TELEPHONE (OUTPATIENT)
Dept: RHEUMATOLOGY | Facility: CLINIC | Age: 55
End: 2020-03-09

## 2020-03-09 NOTE — TELEPHONE ENCOUNTER
Called pt to advise of Dr. Machuca's msg, no answer LM with info & number to call with any questions.

## 2020-03-09 NOTE — TELEPHONE ENCOUNTER
----- Message from Jenn Shaver sent at 3/9/2020  3:15 PM CDT -----  Contact: Patient  .Type:  Patient Returning Call    Who Called:Patient  Who Left Message for Patient: Sneha  Does the patient know what this is regarding?:   Would the patient rather a call back or a response via [employer]? Call  Best Call Back Number: .[phone] (home)   Additional Information:

## 2020-03-09 NOTE — TELEPHONE ENCOUNTER
----- Message from Shellie Machuca MD sent at 3/9/2020  9:41 AM CDT -----  Please let her know x-rays and labs look good so far

## 2020-03-12 ENCOUNTER — TELEPHONE (OUTPATIENT)
Dept: RHEUMATOLOGY | Facility: CLINIC | Age: 55
End: 2020-03-12

## 2020-03-12 NOTE — TELEPHONE ENCOUNTER
----- Message from Shellie Machuca MD sent at 3/12/2020 12:14 PM CDT -----  Please let her know so far all the labs look good  No autoimmune antibodies noted so far

## 2020-03-18 ENCOUNTER — TELEPHONE (OUTPATIENT)
Dept: DIABETES | Facility: CLINIC | Age: 55
End: 2020-03-18

## 2020-03-18 ENCOUNTER — TELEPHONE (OUTPATIENT)
Dept: RHEUMATOLOGY | Facility: CLINIC | Age: 55
End: 2020-03-18

## 2020-03-18 NOTE — TELEPHONE ENCOUNTER
Returned call to pt, states indomethacin ER- 75mg is causing nausea & vomitting. Has been taking for about 2 wks now. Please advise

## 2020-03-18 NOTE — TELEPHONE ENCOUNTER
----- Message from Benjamin Au sent at 3/18/2020  9:29 AM CDT -----  Contact: MARGARET COSME [[id number]]  Name of Who is Calling: MARGARET COSME [[id number]]      What is the request in detail: Would like to speak with staff in regards to medication and COVID_19, Please advise      Can the clinic reply by [facility]: no      What Number [facility] Back [facility] [phone]

## 2020-03-18 NOTE — TELEPHONE ENCOUNTER
----- Message from Benjamin Au sent at 3/18/2020  9:27 AM CDT -----  Contact: MARGARET COSME [[id number]]  Name of Who is Calling: MARGARET COSME [[id number]]      What is the request in detail: Would like to speak with staff in regards to medication and COVID_19, Please advise      Can the clinic reply by [facility]: no      [facility]: [phone]

## 2020-03-18 NOTE — TELEPHONE ENCOUNTER
Spoke with patient. Wanted to reschedule appt, due to Covid-19 concerns. Appointment has been rescheduled.

## 2020-03-18 NOTE — TELEPHONE ENCOUNTER
Advised pt of Dr. Machuca's note, and that she needs to reevaluate her hands. Pt request to be seen only @ [facility]- appt scheduled 3/31/20 @ 10:30 pt verbalized understanding

## 2020-03-29 RX ORDER — GABAPENTIN 600 MG/1
TABLET ORAL
Qty: 90 TABLET | Refills: 2 | Status: SHIPPED | OUTPATIENT
Start: 2020-03-29 | End: 2020-07-05

## 2020-03-31 ENCOUNTER — TELEPHONE (OUTPATIENT)
Dept: FAMILY MEDICINE | Facility: CLINIC | Age: 55
End: 2020-03-31

## 2020-03-31 NOTE — TELEPHONE ENCOUNTER
----- Message from Tiff Levin sent at 3/31/2020 12:36 PM CDT -----  Contact: Pt   Pt called and stated she needs to speak to the nurse regarding her medication and did not give further details. She can be reached at [phone] (home)     Thanks,  TF

## 2020-04-01 RX ORDER — BUTALBITAL, ACETAMINOPHEN AND CAFFEINE 50; 325; 40 MG/1; MG/1; MG/1
TABLET ORAL
Qty: 30 TABLET | Refills: 0 | Status: SHIPPED | OUTPATIENT
Start: 2020-04-01 | End: 2020-06-12 | Stop reason: SDUPTHER

## 2020-04-15 ENCOUNTER — TELEPHONE (OUTPATIENT)
Dept: RHEUMATOLOGY | Facility: CLINIC | Age: 55
End: 2020-04-15

## 2020-04-15 NOTE — TELEPHONE ENCOUNTER
Called pt in ref to 4/28/20 appt & to see if we can set up for virtual, facetime or telephone visit. No answer left msg to return call.

## 2020-04-15 NOTE — TELEPHONE ENCOUNTER
----- Message from Sravani Bernardo sent at 4/15/2020 10:37 AM CDT -----  Contact: pt  .Type:  Patient Returning Call    Who Called: the pt  Who Left Message for Patient: unknown  Does the patient know what this is regarding?: no  Would the patient rather a call back or a response via [facility]? Call back  Best Call Back Number: [phone]  Additional Information: n/a

## 2020-04-16 ENCOUNTER — TELEPHONE (OUTPATIENT)
Dept: RHEUMATOLOGY | Facility: CLINIC | Age: 55
End: 2020-04-16

## 2020-04-16 NOTE — TELEPHONE ENCOUNTER
----- Message from Cata Palmer sent at 4/16/2020  9:57 AM CDT -----  Contact: [phone]  Would like to consult with the nurse, patient was told to call the Office when she got her My chart set up, patient would like to speak with the nurse concerning this, please call back at   [phone], Thanks sj

## 2020-04-27 ENCOUNTER — PATIENT OUTREACH (OUTPATIENT)
Dept: ADMINISTRATIVE | Facility: OTHER | Age: 55
End: 2020-04-27

## 2020-04-28 ENCOUNTER — TELEPHONE (OUTPATIENT)
Dept: RHEUMATOLOGY | Facility: CLINIC | Age: 55
End: 2020-04-28

## 2020-04-28 ENCOUNTER — OFFICE VISIT (OUTPATIENT)
Dept: RHEUMATOLOGY | Facility: CLINIC | Age: 55
End: 2020-04-28
Payer: MEDICARE

## 2020-04-28 DIAGNOSIS — M19.90 INFLAMMATORY ARTHRITIS: Primary | ICD-10-CM

## 2020-04-28 DIAGNOSIS — Z72.0 TOBACCO ABUSE: ICD-10-CM

## 2020-04-28 PROCEDURE — 99214 PR OFFICE/OUTPT VISIT, EST, LEVL IV, 30-39 MIN: ICD-10-PCS | Mod: HCNC,95,, | Performed by: INTERNAL MEDICINE

## 2020-04-28 PROCEDURE — 99214 OFFICE O/P EST MOD 30 MIN: CPT | Mod: HCNC,95,, | Performed by: INTERNAL MEDICINE

## 2020-04-28 RX ORDER — SULFASALAZINE 500 MG/1
1000 TABLET, DELAYED RELEASE ORAL 2 TIMES DAILY
Qty: 120 TABLET | Refills: 3 | Status: SHIPPED | OUTPATIENT
Start: 2020-04-28 | End: 2021-01-11

## 2020-04-28 NOTE — PROGRESS NOTES
The patient location is: Home   The chief complaint leading to consultation is:  Hand arthritis and trigger fingers  Visit type: audiovisual  Total time spent with patient: 30 min   Each patient to whom he or she provides medical services by telemedicine is:  (1) informed of the relationship between the physician and patient and the respective role of any other health care provider with respect to management of the patient; and (2) notified that he or she may decline to receive medical services by telemedicine and may withdraw from such care at any time.    Referred by Perez Casiano MD      History of Present Illness:  Alexandra Andres a 54 y.o.yo female presents for further evaluation of bilateral hand arthritis  Left greater than right   She also hurts in both her wrists, along with swelling  Duration few months  Left 2,3 fingers get numb numb  Stiffness worse in a.m. and around 2:00 a.m. in evening  She currently uses Motrin 800 mg 3 times a day and his use Naprosyn in the past  Does not get much relief  She continues to smoke  She is also a known diabetic  Ft do not bother her  She also notes trigger finger and right 3rd middle and left thumb  She had a recent trauma in January when she fell on the left hand and wrist and this aggravated her pain    Since last visit she had negative autoimmune workup including RF/CCP/MARKO and normal uric acid  No evidence of erosions noted on x-rays    Last visit she was started on Indocin without any significant relief  No steroid trial was given nor steroid injections given for trigger fingers due to history of steroid allergy    Denies any rash    She had an allergy reaction to steroids in the past    + family history of lupus in her mother    Review of Systems:  Constitutional: Denies fever, chills. No recent weight changes.   Fatigue: yes  Muscle weakness: no  Headaches: no new headaches  Eyes: No redness .  No recent visual changes.  ENT:  No oral or nasal  ulcers.  Card: No chest pain.  Resp: chronic cough   Gastro: No nausea or vomiting.  No heartburn.  Constipation: no  Diarrhea: no  Genito:  No dysuria.  No genital ulcers.  Skin: No rash.  Raynauds:no  Neuro: per HPI   Psych: + anxiety  Endo:  no excess thirst.  Heme: no abnormal bleeding or bruising  Clots:none   No miscarriages     Past Medical History:   Diagnosis Date    Acute ischemic stroke 9/17/2019    Aneurysm     Anxiety     Arthritis     Asthma     Bipolar 1 disorder     Cerebral aneurysm, nonruptured 9/19/2019    Depression     Diabetes mellitus, type 2     Diverticular disease     GERD (gastroesophageal reflux disease)     Hyperlipemia     Hypertension     Hypothyroidism     Pneumonia     Renal manifestation of secondary diabetes mellitus     Right-sided back pain 6/29/2019    Stroke     Trouble in sleeping        Past Surgical History:   Procedure Laterality Date    CEREBRAL ANGIOGRAM N/A 10/28/2019    Procedure: ANGIOGRAM-CEREBRAL;  Surgeon: [facility] Provider;  Location: [address];  Service: Radiology;  Laterality: N/A;  Rm 190/Aayush    CHOLECYSTECTOMY      COLON SURGERY      COLONOSCOPY N/A 1/12/2018    Procedure: COLONOSCOPY;  Surgeon: Roque Mahajan III, MD;  [facility];  Service: Endoscopy;  Laterality: N/A;    DENTAL SURGERY  05/21/2018    removal of 8 top teeth    HYSTERECTOMY      TONSILLECTOMY           Social History     Tobacco Use    Smoking status: Current Every Day Smoker     Packs/day: 0.50     Years: 35.00     Pack years: 17.50     Types: Cigarettes, Vaping w/o nicotine    Smokeless tobacco: Never Used   Substance Use Topics    Alcohol use: Yes     Comment: occassionally    Drug use: Yes     Types: Marijuana       Family History   Problem Relation Age of Onset    Alcohol abuse Mother     COPD Mother     Depression Mother     Drug abuse Mother     Alcohol abuse Father     Arthritis Father     Cancer Father     Heart disease  Father     Hypertension Father     COPD Sister     Kidney failure Sister     Heart disease Brother     Hypertension Brother     Cancer Maternal Aunt     Cancer Maternal Uncle     Diabetes Maternal Uncle     Drug abuse Maternal Uncle     Cancer Paternal Aunt     Cancer Paternal Uncle     Arthritis Maternal Grandmother     Hypertension Maternal Grandmother     Breast cancer Maternal Grandmother     Arthritis Paternal Grandmother     Cancer Paternal Grandmother     Hypertension Paternal Grandfather        Review of patient's allergies indicates:   Allergen Reactions    Adhesive Blisters     Pt states can't tolerate paper tape    Levaquin [levofloxacin]     Levomenol analogues     Lipitor [atorvastatin]      Leg Cramps    Piroxicam Diarrhea and Nausea Only    Zofran [ondansetron hcl] Hives and Other (See Comments)     headaches    Celestone [betamethasone] Hives, Itching and Rash           OBJECTIVE:     Vital Signs   There were no vitals filed for this visit.  Physical Exam:  General Appearance:  NAD.     Musculoskeletal Exam:  Reviewed from last visit    Left wrist swollen and tender with pain on range of motion  Right wrist tender    Right 3rd PIP swollen and tender  Left 4th MCP, PIP swollen and tender  Left 3rd PIP tender      Laboratory: I have reviewed all of the patient's relevant lab work available in the medical record and have utilized this in my evaluation and management recommendations today    Imaging : I have reviewed all of the patient's diagnostic/imaging results available in the medical record and have utilized this in my evaluation and management recommendations today.    Notes reviewed  Other procedures:    ASSESSMENT/PLAN:     Inflammatory arthritis  -     sulfaSALAzine (AZULFIDINE) 500 MG TbEC; Take 2 tablets (1,000 mg total) by mouth 2 (two) times daily.  Dispense: 120 tablet; Refill: 3  -     MRI Hand Fingers W WO Contrast Right; Future; Expected date: 04/28/2020  -      MRI Hand Fingers W WO Contrast Left; Future; Expected date: 04/28/2020    Tobacco abuse      Inflammatory arthritis versus seronegative RA  + inflammation in bilateral hands  RF/CCP/MARKO negative  Cannot do a trial of steroids due to allergy  Stop Indocin  Start sulfasalazine 500 mg p.o. b.i.d. for 2 weeks then increase to 1000 mg p.o. b.i.d. if well tolerated  MRI bilateral hands, to r/o synovitis , will schedule after May 17    Always hold sulfasalazine for any infection or prior to surgery  Watch for any possible allergy reaction for sulfasalazine and immediately stop and notify us    Counseled on the need to quit smoking      4 week return     Risks vs Benefits and potential side effects of medication prescribed today were discussed with patient. Medication literature given to patient up discharge  Went over uptodate information /literature on the meds prescribed today    Patient advised to hold DMARD and/or biologic therapy for signs of infection or for surgery. If you are unsure what to do please call our office for instruction. [facility] [phone]        Disclaimer: This note was prepared using voice recognition system and is likely to have sound alike errors and is not proof read.  Please call me with any questions.

## 2020-04-28 NOTE — TELEPHONE ENCOUNTER
Returned call to pt, states was able to get in to virtual visit scheduled f/u & MRI @ [facility] as requested

## 2020-04-28 NOTE — TELEPHONE ENCOUNTER
----- Message from Natty Campuzano sent at 4/28/2020 12:01 PM CDT -----  Type: Needs Medical Advice  Who Called:  patient  Best Call Back Number: [phone]  Additional Information: patient had VV scheduled for today at 11:30am. She states that she has been trying to log on since 11am. Please give call back

## 2020-05-08 ENCOUNTER — PATIENT OUTREACH (OUTPATIENT)
Dept: ADMINISTRATIVE | Facility: HOSPITAL | Age: 55
End: 2020-05-08

## 2020-05-08 ENCOUNTER — TELEPHONE (OUTPATIENT)
Dept: ORTHOPEDICS | Facility: CLINIC | Age: 55
End: 2020-05-08

## 2020-05-08 NOTE — TELEPHONE ENCOUNTER
Left message for pt to call back regarding scheduling her an apt.          ----- Message from Trinidad Kannan sent at 5/8/2020  8:50 AM CDT -----  Contact: spouse  Type: Needs Medical Advice    Who Called:      Best Call Back Number:     Additional Information: Requesting a call back from Nurse, regarding access to an appt for today pt maybe have broke her finger or sprained it ,please call back with advice on cell

## 2020-05-11 ENCOUNTER — TELEPHONE (OUTPATIENT)
Dept: FAMILY MEDICINE | Facility: CLINIC | Age: 55
End: 2020-05-11

## 2020-05-11 ENCOUNTER — PATIENT OUTREACH (OUTPATIENT)
Dept: ADMINISTRATIVE | Facility: OTHER | Age: 55
End: 2020-05-11

## 2020-05-11 ENCOUNTER — TELEPHONE (OUTPATIENT)
Dept: ORTHOPEDICS | Facility: CLINIC | Age: 55
End: 2020-05-11

## 2020-05-11 DIAGNOSIS — M79.642 LEFT HAND PAIN: Primary | ICD-10-CM

## 2020-05-11 NOTE — TELEPHONE ENCOUNTER
----- Message from Dion Clifford sent at 5/11/2020 10:52 AM CDT -----  Contact: [phone]   .Type:  Needs Medical Advice    Who Called: Alexandra Goins    Symptoms (please be specific): broke finger and sprang hand    How long has patient had these symptoms:  3-4days   Pharmacy name and phone #: .  [facility] [address] [place], [place] - [address]  [address]  [place] [address]  Phone: [phone] Fax: [phone]      Would the patient rather a call back or a response via [facility]? Call back   Best Call Back Number: .[phone] (home)   Additional Information:     Thank You ,   Dion Clifford

## 2020-05-11 NOTE — TELEPHONE ENCOUNTER
----- Message from Theodora Montero sent at 5/11/2020 12:23 PM CDT -----  Contact: Self  Pt has a displaced fracture of a finger x4 days and would like to be seen today.    She can be reached at [phone].    Thank you.

## 2020-05-11 NOTE — TELEPHONE ENCOUNTER
Spoke to patient and she explained to us what happened and that she went to the urgent care an got xrays an they explained to her that her left ring finger was broken and that she needed to see an Ortho doctor. I got her scheduled with Dr. Fields for tomorrow 5/12/2020 @ 7:30AM for xrays and to see Dr. Fields right after patient verbalized understanding.

## 2020-05-11 NOTE — TELEPHONE ENCOUNTER
Advised pt to be [facility]. Also offered pt first appt available with Dr. Casiano. Pt verbalized understanding when offered urgent care.

## 2020-05-12 ENCOUNTER — OFFICE VISIT (OUTPATIENT)
Dept: ORTHOPEDICS | Facility: CLINIC | Age: 55
End: 2020-05-12
Payer: MEDICARE

## 2020-05-12 ENCOUNTER — HOSPITAL ENCOUNTER (OUTPATIENT)
Dept: RADIOLOGY | Facility: HOSPITAL | Age: 55
Discharge: HOME | End: 2020-05-12
Attending: ORTHOPAEDIC SURGERY
Payer: MEDICARE

## 2020-05-12 VITALS
DIASTOLIC BLOOD PRESSURE: 76 MMHG | WEIGHT: 188 LBS | HEART RATE: 101 BPM | BODY MASS INDEX: 30.22 KG/M2 | SYSTOLIC BLOOD PRESSURE: 126 MMHG | HEIGHT: 66 IN

## 2020-05-12 DIAGNOSIS — M79.642 LEFT HAND PAIN: ICD-10-CM

## 2020-05-12 DIAGNOSIS — S62.625A CLOSED DISPLACED FRACTURE OF MIDDLE PHALANX OF LEFT RING FINGER, INITIAL ENCOUNTER: Primary | ICD-10-CM

## 2020-05-12 DIAGNOSIS — M79.642 LEFT HAND PAIN: Primary | ICD-10-CM

## 2020-05-12 PROCEDURE — 73130 XR HAND COMPLETE 3 VIEW LEFT: ICD-10-PCS | Mod: 26,HCNC,LT, | Performed by: RADIOLOGY

## 2020-05-12 PROCEDURE — 3074F PR MOST RECENT SYSTOLIC BLOOD PRESSURE < 130 MM HG: ICD-10-PCS | Mod: HCNC,CPTII,S$GLB, | Performed by: ORTHOPAEDIC SURGERY

## 2020-05-12 PROCEDURE — 3078F PR MOST RECENT DIASTOLIC BLOOD PRESSURE < 80 MM HG: ICD-10-PCS | Mod: HCNC,CPTII,S$GLB, | Performed by: ORTHOPAEDIC SURGERY

## 2020-05-12 PROCEDURE — 73130 X-RAY EXAM OF HAND: CPT | Mod: 26,HCNC,LT, | Performed by: RADIOLOGY

## 2020-05-12 PROCEDURE — 26725 TREAT FINGER FRACTURE EACH: CPT | Mod: HCNC,F3,S$GLB, | Performed by: ORTHOPAEDIC SURGERY

## 2020-05-12 PROCEDURE — 99213 OFFICE O/P EST LOW 20 MIN: CPT | Mod: HCNC,57,S$GLB, | Performed by: ORTHOPAEDIC SURGERY

## 2020-05-12 PROCEDURE — 3078F DIAST BP <80 MM HG: CPT | Mod: HCNC,CPTII,S$GLB, | Performed by: ORTHOPAEDIC SURGERY

## 2020-05-12 PROCEDURE — 3074F SYST BP LT 130 MM HG: CPT | Mod: HCNC,CPTII,S$GLB, | Performed by: ORTHOPAEDIC SURGERY

## 2020-05-12 PROCEDURE — 26725 PR CLOSE RX PROX/MID FING SHFT FX,MANIP: ICD-10-PCS | Mod: HCNC,F3,S$GLB, | Performed by: ORTHOPAEDIC SURGERY

## 2020-05-12 PROCEDURE — 73130 X-RAY EXAM OF HAND: CPT | Mod: TC,HCNC,LT

## 2020-05-12 PROCEDURE — 99999 PR PBB SHADOW E&M-EST. PATIENT-LVL III: ICD-10-PCS | Mod: PBBFAC,HCNC,, | Performed by: ORTHOPAEDIC SURGERY

## 2020-05-12 PROCEDURE — 99999 PR PBB SHADOW E&M-EST. PATIENT-LVL III: CPT | Mod: PBBFAC,HCNC,, | Performed by: ORTHOPAEDIC SURGERY

## 2020-05-12 PROCEDURE — 3008F PR BODY MASS INDEX (BMI) DOCUMENTED: ICD-10-PCS | Mod: HCNC,CPTII,S$GLB, | Performed by: ORTHOPAEDIC SURGERY

## 2020-05-12 PROCEDURE — 99213 PR OFFICE/OUTPT VISIT, EST, LEVL III, 20-29 MIN: ICD-10-PCS | Mod: HCNC,57,S$GLB, | Performed by: ORTHOPAEDIC SURGERY

## 2020-05-12 PROCEDURE — 3008F BODY MASS INDEX DOCD: CPT | Mod: HCNC,CPTII,S$GLB, | Performed by: ORTHOPAEDIC SURGERY

## 2020-05-12 RX ORDER — HYDROCODONE BITARTRATE AND ACETAMINOPHEN 10; 325 MG/1; MG/1
1 TABLET ORAL EVERY 6 HOURS PRN
Qty: 28 TABLET | Refills: 0 | Status: SHIPPED | OUTPATIENT
Start: 2020-05-12 | End: 2020-06-01
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
## 2020-05-20 ENCOUNTER — PATIENT OUTREACH (OUTPATIENT)
Dept: ADMINISTRATIVE | Facility: OTHER | Age: 55
End: 2020-05-20

## 2020-05-22 ENCOUNTER — HOSPITAL ENCOUNTER (OUTPATIENT)
Dept: RADIOLOGY | Facility: HOSPITAL | Age: 55
Discharge: HOME | End: 2020-05-22
Attending: ORTHOPAEDIC SURGERY
Payer: MEDICARE

## 2020-05-22 ENCOUNTER — OFFICE VISIT (OUTPATIENT)
Dept: ORTHOPEDICS | Facility: CLINIC | Age: 55
End: 2020-05-22
Payer: MEDICARE

## 2020-05-22 VITALS
WEIGHT: 188 LBS | HEART RATE: 90 BPM | BODY MASS INDEX: 30.22 KG/M2 | SYSTOLIC BLOOD PRESSURE: 114 MMHG | HEIGHT: 66 IN | DIASTOLIC BLOOD PRESSURE: 69 MMHG

## 2020-05-22 DIAGNOSIS — M79.642 LEFT HAND PAIN: ICD-10-CM

## 2020-05-22 DIAGNOSIS — S62.625D CLOSED DISPLACED FRACTURE OF MIDDLE PHALANX OF LEFT RING FINGER WITH ROUTINE HEALING, SUBSEQUENT ENCOUNTER: Primary | ICD-10-CM

## 2020-05-22 PROCEDURE — 99024 POSTOP FOLLOW-UP VISIT: CPT | Mod: HCNC,S$GLB,, | Performed by: ORTHOPAEDIC SURGERY

## 2020-05-22 PROCEDURE — 99999 PR PBB SHADOW E&M-EST. PATIENT-LVL III: CPT | Mod: PBBFAC,HCNC,, | Performed by: ORTHOPAEDIC SURGERY

## 2020-05-22 PROCEDURE — 99999 PR PBB SHADOW E&M-EST. PATIENT-LVL III: ICD-10-PCS | Mod: PBBFAC,HCNC,, | Performed by: ORTHOPAEDIC SURGERY

## 2020-05-22 PROCEDURE — 3078F PR MOST RECENT DIASTOLIC BLOOD PRESSURE < 80 MM HG: ICD-10-PCS | Mod: HCNC,CPTII,S$GLB, | Performed by: ORTHOPAEDIC SURGERY

## 2020-05-22 PROCEDURE — 73130 X-RAY EXAM OF HAND: CPT | Mod: 26,HCNC,LT, | Performed by: RADIOLOGY

## 2020-05-22 PROCEDURE — 73130 XR HAND COMPLETE 3 VIEW LEFT: ICD-10-PCS | Mod: 26,HCNC,LT, | Performed by: RADIOLOGY

## 2020-05-22 PROCEDURE — 3074F PR MOST RECENT SYSTOLIC BLOOD PRESSURE < 130 MM HG: ICD-10-PCS | Mod: HCNC,CPTII,S$GLB, | Performed by: ORTHOPAEDIC SURGERY

## 2020-05-22 PROCEDURE — 99024 PR POST-OP FOLLOW-UP VISIT: ICD-10-PCS | Mod: HCNC,S$GLB,, | Performed by: ORTHOPAEDIC SURGERY

## 2020-05-22 PROCEDURE — 3008F PR BODY MASS INDEX (BMI) DOCUMENTED: ICD-10-PCS | Mod: HCNC,CPTII,S$GLB, | Performed by: ORTHOPAEDIC SURGERY

## 2020-05-22 PROCEDURE — 73130 X-RAY EXAM OF HAND: CPT | Mod: TC,HCNC,LT

## 2020-05-22 PROCEDURE — 3074F SYST BP LT 130 MM HG: CPT | Mod: HCNC,CPTII,S$GLB, | Performed by: ORTHOPAEDIC SURGERY

## 2020-05-22 PROCEDURE — 29075 PR APPLY FOREARM CAST: ICD-10-PCS | Mod: HCNC,LT,S$GLB, | Performed by: ORTHOPAEDIC SURGERY

## 2020-05-22 PROCEDURE — 29075 APPL CST ELBW FNGR SHORT ARM: CPT | Mod: HCNC,LT,S$GLB, | Performed by: ORTHOPAEDIC SURGERY

## 2020-05-22 PROCEDURE — 3008F BODY MASS INDEX DOCD: CPT | Mod: HCNC,CPTII,S$GLB, | Performed by: ORTHOPAEDIC SURGERY

## 2020-05-22 PROCEDURE — 3078F DIAST BP <80 MM HG: CPT | Mod: HCNC,CPTII,S$GLB, | Performed by: ORTHOPAEDIC SURGERY

## 2020-05-22 NOTE — PROGRESS NOTES
Subjective:     Patient ID: Alexandra Goins is a 54 y.o. female.    Chief Complaint: Pain, Injury, and Swelling of the Left Hand    The patient is a 54-year-old female status post left ring finger middle phalanx oblique fracture date of injury was 05/07/2020.  She has been splinted for 2 weeks.      Past Medical History:   Diagnosis Date    Acute ischemic stroke 9/17/2019    Aneurysm     Anxiety     Arthritis     Asthma     Bipolar 1 disorder     Cerebral aneurysm, nonruptured 9/19/2019    Depression     Diabetes mellitus, type 2     Diverticular disease     GERD (gastroesophageal reflux disease)     Hyperlipemia     Hypertension     Hypothyroidism     Pneumonia     Renal manifestation of secondary diabetes mellitus     Right-sided back pain 6/29/2019    Stroke     Trouble in sleeping      Past Surgical History:   Procedure Laterality Date    CEREBRAL ANGIOGRAM N/A 10/28/2019    Procedure: ANGIOGRAM-CEREBRAL;  Surgeon: Dahiana Diagnostic Provider;  Location: [address];  Service: Radiology;  Laterality: N/A;  Rm 190/Aayush    CHOLECYSTECTOMY      COLON SURGERY      COLONOSCOPY N/A 1/12/2018    Procedure: COLONOSCOPY;  Surgeon: Roque Mahajan III, MD;  Location: [facility];  Service: Endoscopy;  Laterality: N/A;    DENTAL SURGERY  05/21/2018    removal of 8 top teeth    HYSTERECTOMY      TONSILLECTOMY       Family History   Problem Relation Age of Onset    Alcohol abuse Mother     COPD Mother     Depression Mother     Drug abuse Mother     Alcohol abuse Father     Arthritis Father     Cancer Father     Heart disease Father     Hypertension Father     COPD Sister     Kidney failure Sister     Heart disease Brother     Hypertension Brother     Cancer Maternal Aunt     Cancer Maternal Uncle     Diabetes Maternal Uncle     Drug abuse Maternal Uncle     Cancer Paternal Aunt     Cancer Paternal Uncle     Arthritis Maternal Grandmother     Hypertension Maternal Grandmother      Breast cancer Maternal Grandmother     Arthritis Paternal Grandmother     Cancer Paternal Grandmother     Hypertension Paternal Grandfather      Social History     Socioeconomic History    Marital status:      Spouse name: Not on file    Number of children: Not on file    Years of education: Not on file    Highest education level: Not on file   Occupational History    Not on file   Social Needs    Financial resource strain: Not on file    Food insecurity:     Worry: Not on file     Inability: Not on file    Transportation needs:     Medical: Not on file     Non-medical: Not on file   Tobacco Use    Smoking status: Current Every Day Smoker     Packs/day: 0.50     Years: 35.00     Pack years: 17.50     Types: Cigarettes, Vaping w/o nicotine    Smokeless tobacco: Never Used   Substance and Sexual Activity    Alcohol use: Yes     Comment: occassionally    Drug use: Yes     Types: Marijuana    Sexual activity: Yes   Lifestyle    Physical activity:     Days per week: Not on file     Minutes per session: Not on file    Stress: Not on file   Relationships    Social connections:     Talks on phone: Not on file     Gets together: Not on file     Attends Sabianism service: Not on file     Active member of club or organization: Not on file     Attends meetings of clubs or organizations: Not on file     Relationship status: Not on file   Other Topics Concern    Not on file   Social History Narrative    Minor grandchild living with pt.     Medication List with Changes/Refills   Current Medications    BACLOFEN (LIORESAL) 10 MG TABLET    Take 1 tablet (10 mg total) by mouth 2 (two) times daily.    BUTALBITAL-ACETAMINOPHEN-CAFFEINE -40 MG (FIORICET, ESGIC) -40 MG PER TABLET    TAKE 1 TABLET BY MOUTH EVERY 4 HOURS AS NEEDED FOR PAIN    CHOLECALCIFEROL, VITAMIN D3, (VITAMIN D3) 1,000 UNIT CAPSULE    Take by mouth once daily.     DIAZEPAM (VALIUM) 10 MG TAB    Take 1 tablet (10 mg total) by  mouth 3 (three) times daily as needed.    DOXEPIN (SINEQUAN) 10 MG CAPSULE    Take 1 to 2 tablets at bedtime as needed for sleep    GABAPENTIN (NEURONTIN) 600 MG TABLET    TAKE 1 TABLET BY MOUTH THREE TIMES DAILY    HYDROCODONE-ACETAMINOPHEN (NORCO)  MG PER TABLET    Take 1 tablet by mouth every 6 (six) hours as needed for Pain.    INDOMETHACIN (INDOCIN SR) 75 MG CPSR CR CAPSULE    Take 1 capsule (75 mg total) by mouth 2 (two) times daily with meals.    INSULIN NPH-INSULIN REGULAR, 70/30, (NOVOLIN 70/30) 100 UNIT/ML (70-30) INJECTION    Inject into the skin 2 (two) times daily.    LOSARTAN (COZAAR) 25 MG TABLET    Take 25 mg by mouth once daily.    METFORMIN (GLUCOPHAGE) 500 MG TABLET    Take 2 tablets (1,000 mg total) by mouth 2 (two) times daily with meals.    OXCARBAZEPINE (TRILEPTAL) 300 MG TAB    Take 1/2 twice daily for 7 days then 1 twice daily.    PROMETHAZINE (PHENERGAN) 12.5 MG TAB    Take 1 tablet (12.5 mg total) by mouth every 6 (six) hours as needed.    QUETIAPINE (SEROQUEL) 300 MG TAB    Take 1 tablet (300 mg total) by mouth every evening.    RANITIDINE (ZANTAC) 300 MG TABLET    Take 300 mg by mouth every evening.    SULFASALAZINE (AZULFIDINE) 500 MG TBEC    Take 2 tablets (1,000 mg total) by mouth 2 (two) times daily.    TOPIRAMATE (TOPAMAX) 200 MG TAB    1 tablet daily    TRAMADOL (ULTRAM) 50 MG TABLET    Take 1 tablet (50 mg total) by mouth every 6 (six) hours.    TRUE METRIX GLUCOSE TEST STRIP STRP    USE 1 STRIP TO CHECK GLUCOSE THREE TIMES DAILY    VENLAFAXINE (EFFEXOR-XR) 75 MG 24 HR CAPSULE    Take 2 capsules (150 mg total) by mouth once daily.     Review of patient's allergies indicates:   Allergen Reactions    Adhesive Blisters     Pt states can't tolerate paper tape    Levaquin [levofloxacin]     Levomenol analogues     Lipitor [atorvastatin]      Leg Cramps    Piroxicam Diarrhea and Nausea Only    Zofran [ondansetron hcl] Hives and Other (See Comments)     headaches     Celestone [betamethasone] Hives, Itching and Rash     Review of Systems   Constitution: Negative for malaise/fatigue.   HENT: Negative for hearing loss.    Eyes: Negative for double vision and visual disturbance.   Cardiovascular: Positive for chest pain.   Respiratory: Negative for shortness of breath.    Endocrine: Negative for cold intolerance.   Hematologic/Lymphatic: Does not bruise/bleed easily.   Skin: Negative for poor wound healing and suspicious lesions.   Musculoskeletal: Positive for back pain and muscle weakness. Negative for gout, joint pain and joint swelling.   Gastrointestinal: Positive for abdominal pain. Negative for nausea and vomiting.   Genitourinary: Positive for dysuria.   Neurological: Positive for focal weakness. Negative for numbness, paresthesias and sensory change.   Psychiatric/Behavioral: Positive for altered mental status, depression, substance abuse and suicidal ideas. Negative for memory loss. The patient is nervous/anxious.    Allergic/Immunologic: Negative for persistent infections.       Objective:   Body mass index is 30.34 kg/m².  Vitals:    05/22/20 1105   BP: 114/69   Pulse: 90                General    Constitutional: She is oriented to person, place, and time. She appears well-developed and well-nourished. No distress.   HENT:   Head: Normocephalic.   Eyes: EOM are normal.   Neck: Normal range of motion.   Pulmonary/Chest: Effort normal.   Neurological: She is oriented to person, place, and time.   Psychiatric: She has a normal mood and affect.         Left Hand/Wrist Exam     Inspection   Scars: Wrist - absent Hand -  absent  Effusion: Wrist - absent Hand -  absent    Other     Sensory Exam  Median Distribution: normal  Ulnar Distribution: normal  Radial Distribution: normal    Comments:  Patient's left ring finger middle phalanx is in anatomic position.  There is local swelling.  There are no motor or sensory deficits.          Vascular Exam       Capillary Refill  Left  Hand: normal capillary refill      Relevant imaging results reviewed and interpreted by me, discussed with the patient and / or family today radiographs left ring finger middle phalanx fracture showed it to be in anatomic position on multiple views.  Assessment:     Encounter Diagnosis   Name Primary?    Closed displaced fracture of middle phalanx of left ring finger with routine healing, subsequent encounter Yes        Plan:       The patient had the short-arm cast with out  involving index long ring and small finger in the clam shell position was performed.  She was well-padded.  She will return in 2 weeks for cast removal and re-x-ray and hopefully start range of motion and brenda taping at that time              Disclaimer: This note was prepared using a voice recognition system and is likely to have sound alike errors within the text.

## 2020-05-26 ENCOUNTER — TELEPHONE (OUTPATIENT)
Dept: ORTHOPEDICS | Facility: CLINIC | Age: 55
End: 2020-05-26

## 2020-05-26 NOTE — TELEPHONE ENCOUNTER
-I called the patient and she stated that (she felt that her cast was cutting her circulation off so she decided to cut it off she stated that she wrapped up her ring finger to her pinky finger (4th finger from Thumb) with coban to stabilize them together. I let her know that Dr. Fields was off for the week and I would let him what she stated when he comes back on Tuesday 6/7/2020 she would like to know if she doesn't  have to have another cast placed.  Text Dr. Fields  ---- Message from Altagracia Chen sent at 5/26/2020 10:00 AM CDT -----  Contact: self [phone]  Pt called to consult with nurse about her upcoming appointment on 6/19/20. For pt states she wrapped her two fingers with tape use to use to draw blood.  Please call pt back [phone]. Thanks T/P

## 2020-05-29 ENCOUNTER — TELEPHONE (OUTPATIENT)
Dept: ORTHOPEDICS | Facility: CLINIC | Age: 55
End: 2020-05-29

## 2020-05-29 NOTE — TELEPHONE ENCOUNTER
Spoke with this patient and notified her of Dr. Fields's verbal recommendations 1. Pt can see Dr. Crooks, Dr. Jean-Paul Meadows, or get a splint. The patient states she will be in [place] until Monday and she does not want a cast replace at all. She will call me on Monday and place a splint .  A splint was her choice.

## 2020-05-29 NOTE — TELEPHONE ENCOUNTER
Patient also states she has an metal splint on that she got from [facility] on the affected finger and is brenda taped to the next finger. I also spoke with Richard Prieto and notified her so she could tell me what type of splint when the patient comes back in town  Verbal readback orders:  1. fit her for an TKO splint ulnar gutter splint. Pt is advise and verbalizes understanding.

## 2020-06-01 ENCOUNTER — HOSPITAL ENCOUNTER (OUTPATIENT)
Dept: RADIOLOGY | Facility: HOSPITAL | Age: 55
Discharge: HOME | End: 2020-06-01
Attending: PHYSICIAN ASSISTANT
Payer: MEDICARE

## 2020-06-01 ENCOUNTER — OFFICE VISIT (OUTPATIENT)
Dept: ORTHOPEDICS | Facility: CLINIC | Age: 55
End: 2020-06-01
Payer: MEDICARE

## 2020-06-01 VITALS
WEIGHT: 188.06 LBS | SYSTOLIC BLOOD PRESSURE: 119 MMHG | DIASTOLIC BLOOD PRESSURE: 78 MMHG | HEIGHT: 66 IN | BODY MASS INDEX: 30.22 KG/M2 | HEART RATE: 111 BPM

## 2020-06-01 DIAGNOSIS — M79.642 LEFT HAND PAIN: Primary | ICD-10-CM

## 2020-06-01 DIAGNOSIS — M79.642 LEFT HAND PAIN: ICD-10-CM

## 2020-06-01 DIAGNOSIS — S62.625D CLOSED DISPLACED FRACTURE OF MIDDLE PHALANX OF LEFT RING FINGER WITH ROUTINE HEALING, SUBSEQUENT ENCOUNTER: ICD-10-CM

## 2020-06-01 DIAGNOSIS — M79.642 PAIN OF LEFT HAND: Primary | ICD-10-CM

## 2020-06-01 PROCEDURE — 99024 POSTOP FOLLOW-UP VISIT: CPT | Mod: HCNC,S$GLB,, | Performed by: PHYSICIAN ASSISTANT

## 2020-06-01 PROCEDURE — 73130 XR HAND COMPLETE 3 VIEW LEFT: ICD-10-PCS | Mod: 26,HCNC,LT, | Performed by: RADIOLOGY

## 2020-06-01 PROCEDURE — 73130 X-RAY EXAM OF HAND: CPT | Mod: 26,HCNC,LT, | Performed by: RADIOLOGY

## 2020-06-01 PROCEDURE — 3078F PR MOST RECENT DIASTOLIC BLOOD PRESSURE < 80 MM HG: ICD-10-PCS | Mod: HCNC,CPTII,S$GLB, | Performed by: PHYSICIAN ASSISTANT

## 2020-06-01 PROCEDURE — 73130 X-RAY EXAM OF HAND: CPT | Mod: TC,HCNC,LT

## 2020-06-01 PROCEDURE — 99024 PR POST-OP FOLLOW-UP VISIT: ICD-10-PCS | Mod: HCNC,S$GLB,, | Performed by: PHYSICIAN ASSISTANT

## 2020-06-01 PROCEDURE — 3008F BODY MASS INDEX DOCD: CPT | Mod: HCNC,CPTII,S$GLB, | Performed by: PHYSICIAN ASSISTANT

## 2020-06-01 PROCEDURE — 3008F PR BODY MASS INDEX (BMI) DOCUMENTED: ICD-10-PCS | Mod: HCNC,CPTII,S$GLB, | Performed by: PHYSICIAN ASSISTANT

## 2020-06-01 PROCEDURE — 99999 PR PBB SHADOW E&M-EST. PATIENT-LVL IV: ICD-10-PCS | Mod: PBBFAC,HCNC,, | Performed by: PHYSICIAN ASSISTANT

## 2020-06-01 PROCEDURE — 3074F PR MOST RECENT SYSTOLIC BLOOD PRESSURE < 130 MM HG: ICD-10-PCS | Mod: HCNC,CPTII,S$GLB, | Performed by: PHYSICIAN ASSISTANT

## 2020-06-01 PROCEDURE — 3078F DIAST BP <80 MM HG: CPT | Mod: HCNC,CPTII,S$GLB, | Performed by: PHYSICIAN ASSISTANT

## 2020-06-01 PROCEDURE — 99999 PR PBB SHADOW E&M-EST. PATIENT-LVL IV: CPT | Mod: PBBFAC,HCNC,, | Performed by: PHYSICIAN ASSISTANT

## 2020-06-01 PROCEDURE — 3074F SYST BP LT 130 MM HG: CPT | Mod: HCNC,CPTII,S$GLB, | Performed by: PHYSICIAN ASSISTANT

## 2020-06-01 RX ORDER — HYDROCODONE BITARTRATE AND ACETAMINOPHEN 5; 325 MG/1; MG/1
1 TABLET ORAL EVERY 8 HOURS PRN
Qty: 15 TABLET | Refills: 0 | Status: SHIPPED | OUTPATIENT
Start: 2020-06-01 | End: 2021-01-11

## 2020-06-01 NOTE — PROGRESS NOTES
Patient ID: Alexandra Goins is a 54 y.o. female.    Chief Complaint: Pain of the Left Hand      HPI: Alexandra Goins  is a 54 y.o. female who c/o Pain of the Left Hand   for duration of 3 and half weeks.  Dr. Fields has been treating her left ring finger intermediate phalanx fracture.  She comes today, because she has extensive pain.  She got the cast wet and took it off last week.  She was unable to come in last week to discuss re-applying the cast.  Instead, we gave her instructions on brenda taping or using a metal finger splint which she could buy over-the-counter.  She has been doing that but has extensive pain 8/10 in severity.  Quality is sharp and dull.  Improved with nothing.  Aggravating factors include movement.  She has done Norco in the past for this which did help.  She is out.    Past Medical History:   Diagnosis Date    Acute ischemic stroke 9/17/2019    Aneurysm     Anxiety     Arthritis     Asthma     Bipolar 1 disorder     Cerebral aneurysm, nonruptured 9/19/2019    Depression     Diabetes mellitus, type 2     Diverticular disease     GERD (gastroesophageal reflux disease)     Hyperlipemia     Hypertension     Hypothyroidism     Pneumonia     Renal manifestation of secondary diabetes mellitus     Right-sided back pain 6/29/2019    Stroke     Trouble in sleeping      Past Surgical History:   Procedure Laterality Date    CEREBRAL ANGIOGRAM N/A 10/28/2019    Procedure: ANGIOGRAM-CEREBRAL;  Surgeon: Dahiana Diagnostic Provider;  Location: [address];  Service: Radiology;  Laterality: N/A;  Rm 190/Aayush    CHOLECYSTECTOMY      COLON SURGERY      COLONOSCOPY N/A 1/12/2018    Procedure: COLONOSCOPY;  Surgeon: Roque Mahajan III, MD;  Location: [facility];  Service: Endoscopy;  Laterality: N/A;    DENTAL SURGERY  05/21/2018    removal of 8 top teeth    HYSTERECTOMY      TONSILLECTOMY       Family History   Problem Relation Age of Onset    Alcohol abuse Mother     COPD  Mother     Depression Mother     Drug abuse Mother     Alcohol abuse Father     Arthritis Father     Cancer Father     Heart disease Father     Hypertension Father     COPD Sister     Kidney failure Sister     Heart disease Brother     Hypertension Brother     Cancer Maternal Aunt     Cancer Maternal Uncle     Diabetes Maternal Uncle     Drug abuse Maternal Uncle     Cancer Paternal Aunt     Cancer Paternal Uncle     Arthritis Maternal Grandmother     Hypertension Maternal Grandmother     Breast cancer Maternal Grandmother     Arthritis Paternal Grandmother     Cancer Paternal Grandmother     Hypertension Paternal Grandfather      Social History     Socioeconomic History    Marital status:      Spouse name: Not on file    Number of children: Not on file    Years of education: Not on file    Highest education level: Not on file   Occupational History    Not on file   Social Needs    Financial resource strain: Not on file    Food insecurity:     Worry: Not on file     Inability: Not on file    Transportation needs:     Medical: Not on file     Non-medical: Not on file   Tobacco Use    Smoking status: Current Every Day Smoker     Packs/day: 0.50     Years: 35.00     Pack years: 17.50     Types: Cigarettes, Vaping w/o nicotine    Smokeless tobacco: Never Used   Substance and Sexual Activity    Alcohol use: Yes     Comment: occassionally    Drug use: Yes     Types: Marijuana    Sexual activity: Yes   Lifestyle    Physical activity:     Days per week: Not on file     Minutes per session: Not on file    Stress: Not on file   Relationships    Social connections:     Talks on phone: Not on file     Gets together: Not on file     Attends Alevism service: Not on file     Active member of club or organization: Not on file     Attends meetings of clubs or organizations: Not on file     Relationship status: Not on file   Other Topics Concern    Not on file   Social History  Narrative    Minor grandchild living with pt.     Medication List with Changes/Refills   New Medications    HYDROCODONE-ACETAMINOPHEN (NORCO) 5-325 MG PER TABLET    Take 1 tablet by mouth every 8 (eight) hours as needed for Pain.   Current Medications    BACLOFEN (LIORESAL) 10 MG TABLET    Take 1 tablet (10 mg total) by mouth 2 (two) times daily.    BUTALBITAL-ACETAMINOPHEN-CAFFEINE -40 MG (FIORICET, ESGIC) -40 MG PER TABLET    TAKE 1 TABLET BY MOUTH EVERY 4 HOURS AS NEEDED FOR PAIN    CHOLECALCIFEROL, VITAMIN D3, (VITAMIN D3) 1,000 UNIT CAPSULE    Take by mouth once daily.     DIAZEPAM (VALIUM) 10 MG TAB    Take 1 tablet (10 mg total) by mouth 3 (three) times daily as needed.    DOXEPIN (SINEQUAN) 10 MG CAPSULE    Take 1 to 2 tablets at bedtime as needed for sleep    GABAPENTIN (NEURONTIN) 600 MG TABLET    TAKE 1 TABLET BY MOUTH THREE TIMES DAILY    INDOMETHACIN (INDOCIN SR) 75 MG CPSR CR CAPSULE    Take 1 capsule (75 mg total) by mouth 2 (two) times daily with meals.    INSULIN NPH-INSULIN REGULAR, 70/30, (NOVOLIN 70/30) 100 UNIT/ML (70-30) INJECTION    Inject into the skin 2 (two) times daily.    LOSARTAN (COZAAR) 25 MG TABLET    Take 25 mg by mouth once daily.    METFORMIN (GLUCOPHAGE) 500 MG TABLET    Take 2 tablets (1,000 mg total) by mouth 2 (two) times daily with meals.    OXCARBAZEPINE (TRILEPTAL) 300 MG TAB    Take 1/2 twice daily for 7 days then 1 twice daily.    PROMETHAZINE (PHENERGAN) 12.5 MG TAB    Take 1 tablet (12.5 mg total) by mouth every 6 (six) hours as needed.    QUETIAPINE (SEROQUEL) 300 MG TAB    Take 1 tablet (300 mg total) by mouth every evening.    RANITIDINE (ZANTAC) 300 MG TABLET    Take 300 mg by mouth every evening.    SULFASALAZINE (AZULFIDINE) 500 MG TBEC    Take 2 tablets (1,000 mg total) by mouth 2 (two) times daily.    TOPIRAMATE (TOPAMAX) 200 MG TAB    1 tablet daily    TRAMADOL (ULTRAM) 50 MG TABLET    Take 1 tablet (50 mg total) by mouth every 6 (six) hours.    TRUE  METRIX GLUCOSE TEST STRIP STRP    USE 1 STRIP TO CHECK GLUCOSE THREE TIMES DAILY    VENLAFAXINE (EFFEXOR-XR) 75 MG 24 HR CAPSULE    Take 2 capsules (150 mg total) by mouth once daily.   Discontinued Medications    HYDROCODONE-ACETAMINOPHEN (NORCO)  MG PER TABLET    Take 1 tablet by mouth every 6 (six) hours as needed for Pain.     Review of patient's allergies indicates:   Allergen Reactions    Adhesive Blisters     Pt states can't tolerate paper tape    Levaquin [levofloxacin]     Levomenol analogues     Lipitor [atorvastatin]      Leg Cramps    Piroxicam Diarrhea and Nausea Only    Zofran [ondansetron hcl] Hives and Other (See Comments)     headaches    Celestone [betamethasone] Hives, Itching and Rash       Objective:        General    Nursing note and vitals reviewed.  Constitutional: She is oriented to person, place, and time. She appears well-developed and well-nourished.   HENT:   Head: Normocephalic and atraumatic.   Eyes: EOM are normal.   Cardiovascular: Normal rate and regular rhythm.    Pulmonary/Chest: Effort normal.   Abdominal: Soft.   Neurological: She is alert and oriented to person, place, and time.   Psychiatric: She has a normal mood and affect. Her behavior is normal.         Left Hand/Wrist Exam     Comments:     Inspection   Scars: Wrist - absent Hand -  absent  Effusion: Wrist - absent Hand -  absent     Sensory Exam  Median Distribution: normal  Ulnar Distribution: normal  Radial Distribution: normal     Comments:  Patient's left ring finger middle phalanx is in anatomic position.  There is local swelling.  There are no motor or sensory deficits.  She has stiffness PIP and DIP ring finger            Xray Images and report were reviewed today.  I agree with the radiologist's interpretation.    X-Ray Hand Complete Left  Narrative: EXAMINATION:  XR HAND COMPLETE 3 VIEW LEFT    CLINICAL HISTORY:  . Pain in left hand    TECHNIQUE:  PA, lateral, and oblique views of the left hand were  performed.    COMPARISON:  05/22/2020    FINDINGS:  There is an obliquely oriented fracture seen involving the shaft of the 2nd metatarsal.  No significant displacement.  The alignment is stable when compared to the prior exam.  The fracture margins are more blurred.  No significant callus formation noted.  Impression: As above    Electronically signed by: Meir Smith DO  Date:    06/01/2020  Time:    10:19        Assessment:       Encounter Diagnoses   Name Primary?    Left hand pain Yes    Closed displaced fracture of middle phalanx of left ring finger with routine healing, subsequent encounter           Plan:       Alexandra was seen today for pain.    Diagnoses and all orders for this visit:    Left hand pain  -     X-Ray Hand Complete Left; Future  -     HYDROcodone-acetaminophen (NORCO) 5-325 mg per tablet; Take 1 tablet by mouth every 8 (eight) hours as needed for Pain.    Closed displaced fracture of middle phalanx of left ring finger with routine healing, subsequent encounter  -     X-Ray Hand Complete Left; Future  -     HYDROcodone-acetaminophen (NORCO) 5-325 mg per tablet; Take 1 tablet by mouth every 8 (eight) hours as needed for Pain.        Alexandra Goins is an established pt who comes in today for evaluation of the above.  This is a new problem to my clinic with this established patient.  X-rays are stable compared with prior x-rays.  As this fracture is going on 3-week-old, I have advised against getting back into a cast.  Instead I put her into a TKO splint.  She should use this through the end of the week and then transition to buddy taping to allow for some gentle range of motion.  She will keep her follow-up appointment as scheduled with Dr. Fields in approximately 2 weeks.  I have given her 1 last refill of Norco as above.  I have reviewed her BMP which is appropriate.  She verbalizes understanding and agrees.    Follow up in about 2 weeks (around 6/15/2020) for f/u with   Sameer.    The patient understands, chooses and consents to this plan and accepts all   the risks which include but are not limited to the risks mentioned above.     Disclaimer: This note was prepared using a voice recognition system and is likely to have sound alike errors within the text.

## 2020-06-04 ENCOUNTER — OFFICE VISIT (OUTPATIENT)
Dept: PSYCHIATRY | Facility: CLINIC | Age: 55
End: 2020-06-04
Payer: MEDICARE

## 2020-06-04 DIAGNOSIS — F31.32 BIPOLAR AFFECTIVE DISORDER, CURRENTLY DEPRESSED, MODERATE: Primary | ICD-10-CM

## 2020-06-04 DIAGNOSIS — F41.9 ANXIETY: ICD-10-CM

## 2020-06-04 PROCEDURE — 99214 PR OFFICE/OUTPT VISIT, EST, LEVL IV, 30-39 MIN: ICD-10-PCS | Mod: HCNC,95,, | Performed by: PSYCHIATRY & NEUROLOGY

## 2020-06-04 PROCEDURE — 99214 OFFICE O/P EST MOD 30 MIN: CPT | Mod: HCNC,95,, | Performed by: PSYCHIATRY & NEUROLOGY

## 2020-06-04 PROCEDURE — 99499 RISK ADDL DX/OHS AUDIT: ICD-10-PCS | Mod: HCNC,95,, | Performed by: PSYCHIATRY & NEUROLOGY

## 2020-06-04 PROCEDURE — 99499 UNLISTED E&M SERVICE: CPT | Mod: HCNC,95,, | Performed by: PSYCHIATRY & NEUROLOGY

## 2020-06-04 RX ORDER — DIAZEPAM 10 MG/1
10 TABLET ORAL 3 TIMES DAILY PRN
Qty: 90 TABLET | Refills: 2 | Status: SHIPPED | OUTPATIENT
Start: 2020-06-04 | End: 2020-09-03 | Stop reason: SDUPTHER

## 2020-06-04 RX ORDER — VENLAFAXINE HYDROCHLORIDE 75 MG/1
150 CAPSULE, EXTENDED RELEASE ORAL DAILY
Qty: 60 CAPSULE | Refills: 2 | Status: SHIPPED | OUTPATIENT
Start: 2020-06-04 | End: 2020-09-03 | Stop reason: SDUPTHER

## 2020-06-04 RX ORDER — TOPIRAMATE 200 MG/1
TABLET ORAL
Qty: 60 TABLET | Refills: 2 | Status: SHIPPED | OUTPATIENT
Start: 2020-06-04 | End: 2020-09-03 | Stop reason: SDUPTHER

## 2020-06-04 RX ORDER — QUETIAPINE FUMARATE 300 MG/1
300 TABLET, FILM COATED ORAL NIGHTLY
Qty: 30 TABLET | Refills: 2 | Status: SHIPPED | OUTPATIENT
Start: 2020-06-04 | End: 2020-10-12

## 2020-06-04 RX ORDER — DOXEPIN HYDROCHLORIDE 10 MG/1
CAPSULE ORAL
Qty: 60 CAPSULE | Refills: 2 | Status: SHIPPED | OUTPATIENT
Start: 2020-06-04 | End: 2020-09-03 | Stop reason: SDUPTHER
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
## 2020-06-08 ENCOUNTER — TELEPHONE (OUTPATIENT)
Dept: OPHTHALMOLOGY | Facility: CLINIC | Age: 55
End: 2020-06-08

## 2020-06-08 NOTE — TELEPHONE ENCOUNTER
Called patient no answer, left message for patient to call back to schedule a appointment to see doctor.

## 2020-06-12 RX ORDER — BUTALBITAL, ACETAMINOPHEN AND CAFFEINE 50; 325; 40 MG/1; MG/1; MG/1
1 TABLET ORAL EVERY 4 HOURS PRN
Qty: 30 TABLET | Refills: 0 | Status: SHIPPED | OUTPATIENT
Start: 2020-06-12 | End: 2020-10-16 | Stop reason: SDUPTHER

## 2020-06-13 NOTE — PROGRESS NOTES
Transitional Care Note  Subjective:       Patient ID: Alexandra Goins is a 54 y.o. female.  Chief Complaint: Headache    Family and/or Caretaker present at visit?  Yes.  Diagnostic tests reviewed/disposition: No diagnosic tests pending after this hospitalization.  Disease/illness education:  Diabetes, possible TIA  Home health/community services discussion/referrals: Patient does not have home health established from hospital visit.  They do not need home health.  If needed, we will set up home health for the patient.   Establishment or re-establishment of referral orders for community resources: Physical therapy.   Discussion with other health care providers: No discussion with other health care providers necessary.   HPI  Review of Systems    Objective:      Physical Exam    Assessment:       1. Acute right-sided weakness    2. Essential hypertension    3. Type 2 diabetes mellitus with complication, with long-term current use of insulin    4. Tobacco abuse    5. Bipolar affective disorder, remission status unspecified    6. Basilar artery aneurysm    7. H/O migraine    8. Screening for breast cancer    9. Gait instability    10. Hemiplegia and hemiparesis following unspecified cerebrovascular disease affecting left dominant side         Plan:        14-Jun-2020 11:04

## 2020-06-15 ENCOUNTER — PATIENT OUTREACH (OUTPATIENT)
Dept: ADMINISTRATIVE | Facility: OTHER | Age: 55
End: 2020-06-15

## 2020-06-15 NOTE — PROGRESS NOTES
Chart reviewed.   Immunizations: Triggered Imm Registry     Orders placed: n/a  Upcoming appts to satisfy ROD topics: n/a

## 2020-06-16 ENCOUNTER — HOSPITAL ENCOUNTER (OUTPATIENT)
Dept: RADIOLOGY | Facility: HOSPITAL | Age: 55
Discharge: HOME | End: 2020-06-16
Attending: PHYSICIAN ASSISTANT
Payer: MEDICARE

## 2020-06-16 ENCOUNTER — OFFICE VISIT (OUTPATIENT)
Dept: ORTHOPEDICS | Facility: CLINIC | Age: 55
End: 2020-06-16
Payer: MEDICARE

## 2020-06-16 VITALS
BODY MASS INDEX: 30.22 KG/M2 | WEIGHT: 188.06 LBS | SYSTOLIC BLOOD PRESSURE: 120 MMHG | HEART RATE: 111 BPM | DIASTOLIC BLOOD PRESSURE: 65 MMHG | HEIGHT: 66 IN

## 2020-06-16 DIAGNOSIS — S62.625D CLOSED DISPLACED FRACTURE OF MIDDLE PHALANX OF LEFT RING FINGER WITH ROUTINE HEALING, SUBSEQUENT ENCOUNTER: Primary | ICD-10-CM

## 2020-06-16 DIAGNOSIS — M79.642 PAIN OF LEFT HAND: ICD-10-CM

## 2020-06-16 PROCEDURE — 3074F PR MOST RECENT SYSTOLIC BLOOD PRESSURE < 130 MM HG: ICD-10-PCS | Mod: HCNC,CPTII,S$GLB, | Performed by: ORTHOPAEDIC SURGERY

## 2020-06-16 PROCEDURE — 3078F DIAST BP <80 MM HG: CPT | Mod: HCNC,CPTII,S$GLB, | Performed by: ORTHOPAEDIC SURGERY

## 2020-06-16 PROCEDURE — 99024 PR POST-OP FOLLOW-UP VISIT: ICD-10-PCS | Mod: HCNC,S$GLB,, | Performed by: ORTHOPAEDIC SURGERY

## 2020-06-16 PROCEDURE — 73130 XR HAND COMPLETE 3 VIEW LEFT: ICD-10-PCS | Mod: 26,HCNC,LT, | Performed by: RADIOLOGY

## 2020-06-16 PROCEDURE — 99999 PR PBB SHADOW E&M-EST. PATIENT-LVL IV: ICD-10-PCS | Mod: PBBFAC,HCNC,, | Performed by: ORTHOPAEDIC SURGERY

## 2020-06-16 PROCEDURE — 99999 PR PBB SHADOW E&M-EST. PATIENT-LVL IV: CPT | Mod: PBBFAC,HCNC,, | Performed by: ORTHOPAEDIC SURGERY

## 2020-06-16 PROCEDURE — 73130 X-RAY EXAM OF HAND: CPT | Mod: 26,HCNC,LT, | Performed by: RADIOLOGY

## 2020-06-16 PROCEDURE — 99024 POSTOP FOLLOW-UP VISIT: CPT | Mod: HCNC,S$GLB,, | Performed by: ORTHOPAEDIC SURGERY

## 2020-06-16 PROCEDURE — 3074F SYST BP LT 130 MM HG: CPT | Mod: HCNC,CPTII,S$GLB, | Performed by: ORTHOPAEDIC SURGERY

## 2020-06-16 PROCEDURE — 3078F PR MOST RECENT DIASTOLIC BLOOD PRESSURE < 80 MM HG: ICD-10-PCS | Mod: HCNC,CPTII,S$GLB, | Performed by: ORTHOPAEDIC SURGERY

## 2020-06-16 PROCEDURE — 73130 X-RAY EXAM OF HAND: CPT | Mod: TC,HCNC,LT

## 2020-06-16 PROCEDURE — 3008F PR BODY MASS INDEX (BMI) DOCUMENTED: ICD-10-PCS | Mod: HCNC,CPTII,S$GLB, | Performed by: ORTHOPAEDIC SURGERY

## 2020-06-16 PROCEDURE — 3008F BODY MASS INDEX DOCD: CPT | Mod: HCNC,CPTII,S$GLB, | Performed by: ORTHOPAEDIC SURGERY

## 2020-06-16 NOTE — PROGRESS NOTES
Subjective:     Patient ID: Alexandra Goins is a 54 y.o. female.    Chief Complaint: Pain and Injury of the Left Hand    The patient is a 54-year-old female with a fracture left ring finger middle phalanx, nondisplaced.  Date of injury was 05/07/2020.  She has been treated in a ulnar gutter splint.      Past Medical History:   Diagnosis Date    Acute ischemic stroke 9/17/2019    Aneurysm     Anxiety     Arthritis     Asthma     Bipolar 1 disorder     Cerebral aneurysm, nonruptured 9/19/2019    Depression     Diabetes mellitus, type 2     Diverticular disease     GERD (gastroesophageal reflux disease)     Hyperlipemia     Hypertension     Hypothyroidism     Pneumonia     Renal manifestation of secondary diabetes mellitus     Right-sided back pain 6/29/2019    Stroke     Trouble in sleeping      Past Surgical History:   Procedure Laterality Date    CEREBRAL ANGIOGRAM N/A 10/28/2019    Procedure: ANGIOGRAM-CEREBRAL;  Surgeon: Dahiana Diagnostic Provider;  Location: [address];  Service: Radiology;  Laterality: N/A;  Rm 190/Aayush    CHOLECYSTECTOMY      COLON SURGERY      COLONOSCOPY N/A 1/12/2018    Procedure: COLONOSCOPY;  Surgeon: Roque Mahajan III, MD;  Location: [facility];  Service: Endoscopy;  Laterality: N/A;    DENTAL SURGERY  05/21/2018    removal of 8 top teeth    HYSTERECTOMY      TONSILLECTOMY       Family History   Problem Relation Age of Onset    Alcohol abuse Mother     COPD Mother     Depression Mother     Drug abuse Mother     Alcohol abuse Father     Arthritis Father     Cancer Father     Heart disease Father     Hypertension Father     COPD Sister     Kidney failure Sister     Heart disease Brother     Hypertension Brother     Cancer Maternal Aunt     Cancer Maternal Uncle     Diabetes Maternal Uncle     Drug abuse Maternal Uncle     Cancer Paternal Aunt     Cancer Paternal Uncle     Arthritis Maternal Grandmother     Hypertension Maternal  Grandmother     Breast cancer Maternal Grandmother     Arthritis Paternal Grandmother     Cancer Paternal Grandmother     Hypertension Paternal Grandfather      Social History     Socioeconomic History    Marital status:      Spouse name: Not on file    Number of children: Not on file    Years of education: Not on file    Highest education level: Not on file   Occupational History    Not on file   Social Needs    Financial resource strain: Not on file    Food insecurity     Worry: Not on file     Inability: Not on file    Transportation needs     Medical: Not on file     Non-medical: Not on file   Tobacco Use    Smoking status: Current Every Day Smoker     Packs/day: 0.50     Years: 35.00     Pack years: 17.50     Types: Cigarettes, Vaping w/o nicotine    Smokeless tobacco: Never Used   Substance and Sexual Activity    Alcohol use: Yes     Comment: occassionally    Drug use: Yes     Types: Marijuana    Sexual activity: Yes   Lifestyle    Physical activity     Days per week: Not on file     Minutes per session: Not on file    Stress: Not on file   Relationships    Social connections     Talks on phone: Not on file     Gets together: Not on file     Attends Jain service: Not on file     Active member of club or organization: Not on file     Attends meetings of clubs or organizations: Not on file     Relationship status: Not on file   Other Topics Concern    Not on file   Social History Narrative    Minor grandchild living with pt.     Medication List with Changes/Refills   Current Medications    BACLOFEN (LIORESAL) 10 MG TABLET    Take 1 tablet (10 mg total) by mouth 2 (two) times daily.    BUTALBITAL-ACETAMINOPHEN-CAFFEINE -40 MG (FIORICET, ESGIC) -40 MG PER TABLET    Take 1 tablet by mouth every 4 (four) hours as needed. for pain.    CHOLECALCIFEROL, VITAMIN D3, (VITAMIN D3) 1,000 UNIT CAPSULE    Take by mouth once daily.     DIAZEPAM (VALIUM) 10 MG TAB    Take 1 tablet  (10 mg total) by mouth 3 (three) times daily as needed.    DOXEPIN (SINEQUAN) 10 MG CAPSULE    Take 1 to 2 tablets at bedtime as needed for sleep    GABAPENTIN (NEURONTIN) 600 MG TABLET    TAKE 1 TABLET BY MOUTH THREE TIMES DAILY    HYDROCODONE-ACETAMINOPHEN (NORCO) 5-325 MG PER TABLET    Take 1 tablet by mouth every 8 (eight) hours as needed for Pain.    INDOMETHACIN (INDOCIN SR) 75 MG CPSR CR CAPSULE    Take 1 capsule (75 mg total) by mouth 2 (two) times daily with meals.    INSULIN NPH-INSULIN REGULAR, 70/30, (NOVOLIN 70/30) 100 UNIT/ML (70-30) INJECTION    Inject into the skin 2 (two) times daily.    LOSARTAN (COZAAR) 25 MG TABLET    Take 25 mg by mouth once daily.    METFORMIN (GLUCOPHAGE) 500 MG TABLET    Take 2 tablets (1,000 mg total) by mouth 2 (two) times daily with meals.    PROMETHAZINE (PHENERGAN) 12.5 MG TAB    Take 1 tablet (12.5 mg total) by mouth every 6 (six) hours as needed.    QUETIAPINE (SEROQUEL) 300 MG TAB    Take 1 tablet (300 mg total) by mouth every evening.    RANITIDINE (ZANTAC) 300 MG TABLET    Take 300 mg by mouth every evening.    SULFASALAZINE (AZULFIDINE) 500 MG TBEC    Take 2 tablets (1,000 mg total) by mouth 2 (two) times daily.    TOPIRAMATE (TOPAMAX) 200 MG TAB    1 tablet daily    TRAMADOL (ULTRAM) 50 MG TABLET    Take 1 tablet (50 mg total) by mouth every 6 (six) hours.    TRUE METRIX GLUCOSE TEST STRIP STRP    USE 1 STRIP TO CHECK GLUCOSE THREE TIMES DAILY    VENLAFAXINE (EFFEXOR-XR) 75 MG 24 HR CAPSULE    Take 2 capsules (150 mg total) by mouth once daily.     Review of patient's allergies indicates:   Allergen Reactions    Adhesive Blisters     Pt states can't tolerate paper tape    Levaquin [levofloxacin]     Levomenol analogues     Lipitor [atorvastatin]      Leg Cramps    Piroxicam Diarrhea and Nausea Only    Zofran [ondansetron hcl] Hives and Other (See Comments)     headaches    Celestone [betamethasone] Hives, Itching and Rash     Review of Systems    Constitution: Negative for malaise/fatigue.   HENT: Negative for hearing loss.    Eyes: Negative for double vision and visual disturbance.   Cardiovascular: Positive for chest pain.   Respiratory: Negative for shortness of breath.    Endocrine: Negative for cold intolerance.   Hematologic/Lymphatic: Does not bruise/bleed easily.   Skin: Negative for poor wound healing and suspicious lesions.   Musculoskeletal: Positive for arthritis, back pain, joint pain, joint swelling and muscle weakness. Negative for gout.   Gastrointestinal: Negative for nausea and vomiting.   Genitourinary: Negative for dysuria.   Neurological: Positive for focal weakness. Negative for numbness, paresthesias and sensory change.   Psychiatric/Behavioral: Positive for altered mental status, depression and substance abuse. Negative for memory loss. The patient is nervous/anxious.    Allergic/Immunologic: Negative for persistent infections.       Objective:   Body mass index is 30.35 kg/m².  Vitals:    06/16/20 1543   BP: 120/65   Pulse: (!) 111                General    Constitutional: She is oriented to person, place, and time. She appears well-developed and well-nourished. No distress.   HENT:   Head: Normocephalic.   Eyes: EOM are normal.   Neck: Normal range of motion.   Pulmonary/Chest: Effort normal.   Neurological: She is oriented to person, place, and time.   Psychiatric: She has a normal mood and affect.         Left Hand/Wrist Exam     Inspection   Scars: Wrist - absent Hand -  absent  Effusion: Wrist - absent Hand -  absent    Other     Sensory Exam  Median Distribution: normal  Ulnar Distribution: normal  Radial Distribution: normal    Comments:  The alignment of the left ring finger middle phalanx is good.  She has 0-90 degrees motion metacarpophalangeal joints and good motion of the PIP joint and a 15 degree extensor lag of the DIP joint.  There are no motor or sensory deficits.          Vascular Exam       Capillary Refill  Left  Hand: normal capillary refill      Relevant imaging results reviewed and interpreted by me, discussed with the patient and / or family today re-x-ray left ring finger showed anatomic position of the fracture and interval signs of healing  Assessment:     Left ring finger middle phalanx long oblique fracture    Plan:       The patient will start brenda taping the ring and small finger and work on motion.  She declines referral to physical therapy.  She was instructed in exercises.  She will return in 3 months for re-evaluation.              Disclaimer: This note was prepared using a voice recognition system and is likely to have sound alike errors within the text.

## 2020-06-29 ENCOUNTER — PATIENT OUTREACH (OUTPATIENT)
Dept: ADMINISTRATIVE | Facility: OTHER | Age: 55
End: 2020-06-29

## 2020-06-29 ENCOUNTER — OFFICE VISIT (OUTPATIENT)
Dept: OPHTHALMOLOGY | Facility: CLINIC | Age: 55
End: 2020-06-29
Payer: MEDICARE

## 2020-06-29 DIAGNOSIS — Z79.4 TYPE 2 DIABETES MELLITUS WITH OTHER SPECIFIED COMPLICATION, WITH LONG-TERM CURRENT USE OF INSULIN: ICD-10-CM

## 2020-06-29 DIAGNOSIS — E11.3299 BDR (BACKGROUND DIABETIC RETINOPATHY): Primary | ICD-10-CM

## 2020-06-29 DIAGNOSIS — E11.69 TYPE 2 DIABETES MELLITUS WITH OTHER SPECIFIED COMPLICATION, WITH LONG-TERM CURRENT USE OF INSULIN: ICD-10-CM

## 2020-06-29 PROCEDURE — 99999 PR PBB SHADOW E&M-EST. PATIENT-LVL III: ICD-10-PCS | Mod: PBBFAC,HCNC,, | Performed by: OPHTHALMOLOGY

## 2020-06-29 PROCEDURE — 99999 PR PBB SHADOW E&M-EST. PATIENT-LVL III: CPT | Mod: PBBFAC,HCNC,, | Performed by: OPHTHALMOLOGY

## 2020-06-29 PROCEDURE — 92004 COMPRE OPH EXAM NEW PT 1/>: CPT | Mod: HCNC,S$GLB,, | Performed by: OPHTHALMOLOGY

## 2020-06-29 PROCEDURE — 92004 PR EYE EXAM, NEW PATIENT,COMPREHESV: ICD-10-PCS | Mod: HCNC,S$GLB,, | Performed by: OPHTHALMOLOGY

## 2020-06-29 NOTE — PROGRESS NOTES
HPI     Pt C/O: Lost her most current pair of glasses a few weeks ago and needs a   new RX for glasses , prior to loosing glasses patient noticed distance VA   had decreased.  Pt feels after stroke the left eye drifted some, yet she feels it seemed   to resolve after- also no complaints of diplopia at this time       NP   FBS: 175 this morning  Hemoglobin A1C       Date                     Value               Ref Range             Status                01/28/2020               7.9 (H)             4.0 - 5.6 %           Final          [employer] referral    HX of FB removal ?  DX with diabetes 1995(approx)          Last edited by Rene Muniz MD on 6/29/2020 10:54 AM. (History)            Assessment /Plan     For exam results, see Encounter Report.      ICD-10-CM ICD-9-CM    1. BDR (background diabetic retinopathy)  E11.3299 250.50 Diabetic retinopathy requiring further evaluation and follow up. Refer to retina for evaluation and management. Emphasize importance of follow up, avoid tobacco use, optimize glucose control, and severity of condition emphasized including risks of visual loss without treatment.       362.01    2. Type 2 diabetes mellitus with other specified complication, with long-term current use of insulin  E11.69 250.80 See above     Z79.4 V58.67        3   Refraction   Optional MR - pt reqt MR today and says frames give her headache , defers [facility] for [facility]t       RETURN TO CLINIC with Dr Marvin for anna

## 2020-06-29 NOTE — LETTER
June 29, 2020      Amelia Santana, PhD, NP-C  [address]  [place] [address]           [facility]  [address] [place] [id number]  Phone: [phone]  Fax: [phone]          Patient: Alexandra Goins   MR Number: [id number]   YOB: 1965   Date of Visit: 6/29/2020       Dear Amelia Santana:    Thank you for referring Alexandra Goins to me for evaluation. Attached you will find relevant portions of my assessment and plan of care.    If you have questions, please do not hesitate to call me. I look forward to following Alexandra Goins along with you.    Sincerely,    Rene Muniz MD    Enclosure  CC:  No Recipients    If you would like to receive this communication electronically, please contact [email or web address] or [phone] to request more information on [facility] [facility] access.    For providers and/or their staff who would like to refer a patient to [facility], please contact us through our one-stop-shop provider referral line, [facility], at [phone].    If you feel you have received this communication in error or would no longer like to receive these types of communications, please e-mail [email or web address]

## 2020-07-30 DIAGNOSIS — M79.642 BILATERAL HAND PAIN: Primary | ICD-10-CM

## 2020-07-30 DIAGNOSIS — M79.641 BILATERAL HAND PAIN: Primary | ICD-10-CM

## 2020-08-02 ENCOUNTER — PATIENT OUTREACH (OUTPATIENT)
Dept: ADMINISTRATIVE | Facility: OTHER | Age: 55
End: 2020-08-02

## 2020-08-02 DIAGNOSIS — Z12.31 ENCOUNTER FOR SCREENING MAMMOGRAM FOR MALIGNANT NEOPLASM OF BREAST: Primary | ICD-10-CM

## 2020-08-03 NOTE — PROGRESS NOTES
Requested updates within Care Everywhere.  Patient's chart was reviewed for overdue ROD topics.  Immunizations reconciled.    Orders placed: mammogram

## 2020-08-07 DIAGNOSIS — E11.9 TYPE 2 DIABETES MELLITUS WITHOUT COMPLICATION: ICD-10-CM

## 2020-08-07 RX ORDER — GABAPENTIN 600 MG/1
600 TABLET ORAL 3 TIMES DAILY
Qty: 90 TABLET | Refills: 1 | Status: SHIPPED | OUTPATIENT
Start: 2020-08-07 | End: 2020-10-02 | Stop reason: SDUPTHER

## 2020-08-21 ENCOUNTER — OFFICE VISIT (OUTPATIENT)
Dept: PSYCHIATRY | Facility: CLINIC | Age: 55
End: 2020-08-21
Payer: MEDICARE

## 2020-08-21 DIAGNOSIS — R56.9 SEIZURE-LIKE ACTIVITY: ICD-10-CM

## 2020-08-21 DIAGNOSIS — F41.9 ANXIETY: ICD-10-CM

## 2020-08-21 DIAGNOSIS — F31.9 BIPOLAR 1 DISORDER: Primary | ICD-10-CM

## 2020-08-21 PROCEDURE — 99499 UNLISTED E&M SERVICE: CPT | Mod: HCNC,95,, | Performed by: PSYCHIATRY & NEUROLOGY

## 2020-08-21 PROCEDURE — 99499 RISK ADDL DX/OHS AUDIT: ICD-10-PCS | Mod: HCNC,95,, | Performed by: PSYCHIATRY & NEUROLOGY

## 2020-08-21 PROCEDURE — 99214 OFFICE O/P EST MOD 30 MIN: CPT | Mod: HCNC,95,, | Performed by: PSYCHIATRY & NEUROLOGY

## 2020-08-21 PROCEDURE — 99214 PR OFFICE/OUTPT VISIT, EST, LEVL IV, 30-39 MIN: ICD-10-PCS | Mod: HCNC,95,, | Performed by: PSYCHIATRY & NEUROLOGY
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
## 2020-08-24 ENCOUNTER — OFFICE VISIT (OUTPATIENT)
Dept: FAMILY MEDICINE | Facility: CLINIC | Age: 55
End: 2020-08-24
Payer: MEDICARE

## 2020-08-24 ENCOUNTER — HOSPITAL ENCOUNTER (OUTPATIENT)
Dept: RADIOLOGY | Facility: HOSPITAL | Age: 55
Discharge: HOME | End: 2020-08-24
Attending: FAMILY MEDICINE
Payer: MEDICARE

## 2020-08-24 VITALS
TEMPERATURE: 98 F | BODY MASS INDEX: 31.71 KG/M2 | SYSTOLIC BLOOD PRESSURE: 132 MMHG | HEART RATE: 92 BPM | WEIGHT: 185.75 LBS | HEIGHT: 64 IN | DIASTOLIC BLOOD PRESSURE: 70 MMHG

## 2020-08-24 DIAGNOSIS — Z79.4 TYPE 2 DIABETES MELLITUS WITH COMPLICATION, WITH LONG-TERM CURRENT USE OF INSULIN: ICD-10-CM

## 2020-08-24 DIAGNOSIS — R40.20 LOC (LOSS OF CONSCIOUSNESS): ICD-10-CM

## 2020-08-24 DIAGNOSIS — M19.90 INFLAMMATORY ARTHRITIS: ICD-10-CM

## 2020-08-24 DIAGNOSIS — R41.82 ALTERED MENTAL STATUS, UNSPECIFIED ALTERED MENTAL STATUS TYPE: Primary | ICD-10-CM

## 2020-08-24 DIAGNOSIS — F31.9 BIPOLAR AFFECTIVE DISORDER, REMISSION STATUS UNSPECIFIED: ICD-10-CM

## 2020-08-24 DIAGNOSIS — Z12.39 SCREENING FOR BREAST CANCER: ICD-10-CM

## 2020-08-24 DIAGNOSIS — Z12.31 ENCOUNTER FOR SCREENING MAMMOGRAM FOR MALIGNANT NEOPLASM OF BREAST: ICD-10-CM

## 2020-08-24 DIAGNOSIS — E11.8 TYPE 2 DIABETES MELLITUS WITH COMPLICATION, WITH LONG-TERM CURRENT USE OF INSULIN: ICD-10-CM

## 2020-08-24 DIAGNOSIS — G47.30 SLEEP-DISORDERED BREATHING: ICD-10-CM

## 2020-08-24 DIAGNOSIS — I10 ESSENTIAL HYPERTENSION: ICD-10-CM

## 2020-08-24 DIAGNOSIS — E11.319 DIABETIC RETINOPATHY ASSOCIATED WITH TYPE 2 DIABETES MELLITUS, MACULAR EDEMA PRESENCE UNSPECIFIED, UNSPECIFIED LATERALITY, UNSPECIFIED RETINOPATHY SEVERITY: ICD-10-CM

## 2020-08-24 DIAGNOSIS — R07.89 CHEST WALL PAIN: ICD-10-CM

## 2020-08-24 PROCEDURE — 96372 THER/PROPH/DIAG INJ SC/IM: CPT | Mod: HCNC,S$GLB,, | Performed by: FAMILY MEDICINE

## 2020-08-24 PROCEDURE — 3051F HG A1C>EQUAL 7.0%<8.0%: CPT | Mod: HCNC,CPTII,S$GLB, | Performed by: FAMILY MEDICINE

## 2020-08-24 PROCEDURE — 99999 PR PBB SHADOW E&M-EST. PATIENT-LVL V: CPT | Mod: PBBFAC,HCNC,, | Performed by: FAMILY MEDICINE

## 2020-08-24 PROCEDURE — 99214 OFFICE O/P EST MOD 30 MIN: CPT | Mod: HCNC,25,S$GLB, | Performed by: FAMILY MEDICINE

## 2020-08-24 PROCEDURE — 3075F SYST BP GE 130 - 139MM HG: CPT | Mod: HCNC,CPTII,S$GLB, | Performed by: FAMILY MEDICINE

## 2020-08-24 PROCEDURE — 3051F PR MOST RECENT HEMOGLOBIN A1C LEVEL 7.0 - < 8.0%: ICD-10-PCS | Mod: HCNC,CPTII,S$GLB, | Performed by: FAMILY MEDICINE

## 2020-08-24 PROCEDURE — 99999 PR PBB SHADOW E&M-EST. PATIENT-LVL V: ICD-10-PCS | Mod: PBBFAC,HCNC,, | Performed by: FAMILY MEDICINE

## 2020-08-24 PROCEDURE — 3075F PR MOST RECENT SYSTOLIC BLOOD PRESS GE 130-139MM HG: ICD-10-PCS | Mod: HCNC,CPTII,S$GLB, | Performed by: FAMILY MEDICINE

## 2020-08-24 PROCEDURE — 71120 XR STERNUM PA AND LATERAL: ICD-10-PCS | Mod: 26,HCNC,, | Performed by: RADIOLOGY

## 2020-08-24 PROCEDURE — 3008F BODY MASS INDEX DOCD: CPT | Mod: HCNC,CPTII,S$GLB, | Performed by: FAMILY MEDICINE

## 2020-08-24 PROCEDURE — 71120 X-RAY EXAM BREASTBONE 2/>VWS: CPT | Mod: TC,HCNC,PO

## 2020-08-24 PROCEDURE — 96372 PR INJECTION,THERAP/PROPH/DIAG2ST, IM OR SUBCUT: ICD-10-PCS | Mod: HCNC,S$GLB,, | Performed by: FAMILY MEDICINE

## 2020-08-24 PROCEDURE — 3008F PR BODY MASS INDEX (BMI) DOCUMENTED: ICD-10-PCS | Mod: HCNC,CPTII,S$GLB, | Performed by: FAMILY MEDICINE

## 2020-08-24 PROCEDURE — 99214 PR OFFICE/OUTPT VISIT, EST, LEVL IV, 30-39 MIN: ICD-10-PCS | Mod: HCNC,25,S$GLB, | Performed by: FAMILY MEDICINE

## 2020-08-24 PROCEDURE — 3078F DIAST BP <80 MM HG: CPT | Mod: HCNC,CPTII,S$GLB, | Performed by: FAMILY MEDICINE

## 2020-08-24 PROCEDURE — 71120 X-RAY EXAM BREASTBONE 2/>VWS: CPT | Mod: 26,HCNC,, | Performed by: RADIOLOGY

## 2020-08-24 PROCEDURE — 99499 UNLISTED E&M SERVICE: CPT | Mod: HCNC,S$GLB,, | Performed by: FAMILY MEDICINE

## 2020-08-24 PROCEDURE — 99499 RISK ADDL DX/OHS AUDIT: ICD-10-PCS | Mod: HCNC,S$GLB,, | Performed by: FAMILY MEDICINE

## 2020-08-24 PROCEDURE — 3078F PR MOST RECENT DIASTOLIC BLOOD PRESSURE < 80 MM HG: ICD-10-PCS | Mod: HCNC,CPTII,S$GLB, | Performed by: FAMILY MEDICINE

## 2020-08-24 RX ORDER — KETOROLAC TROMETHAMINE 30 MG/ML
30 INJECTION, SOLUTION INTRAMUSCULAR; INTRAVENOUS ONCE
Status: COMPLETED | OUTPATIENT
Start: 2020-08-24 | End: 2020-08-24

## 2020-08-24 RX ADMIN — KETOROLAC TROMETHAMINE 30 MG: 30 INJECTION, SOLUTION INTRAMUSCULAR; INTRAVENOUS at 09:08

## 2020-08-24 NOTE — PROGRESS NOTES
Subjective:       Patient ID: Alexandra Goins is a 54 y.o. female.    Chief Complaint: Hospital Follow Up      HPI Comments:       Current Outpatient Medications:     butalbital-acetaminophen-caffeine -40 mg (FIORICET, ESGIC) -40 mg per tablet, Take 1 tablet by mouth every 4 (four) hours as needed. for pain., Disp: 30 tablet, Rfl: 0    cholecalciferol, vitamin D3, (VITAMIN D3) 1,000 unit capsule, Take by mouth once daily. , Disp: , Rfl:     doxepin (SINEQUAN) 10 MG capsule, Take 1 to 2 tablets at bedtime as needed for sleep, Disp: 60 capsule, Rfl: 2    gabapentin (NEURONTIN) 600 MG tablet, Take 1 tablet (600 mg total) by mouth 3 (three) times daily., Disp: 90 tablet, Rfl: 1    HYDROcodone-acetaminophen (NORCO) 5-325 mg per tablet, Take 1 tablet by mouth every 8 (eight) hours as needed for Pain., Disp: 15 tablet, Rfl: 0    insulin NPH-insulin regular, 70/30, (NOVOLIN 70/30) 100 unit/mL (70-30) injection, Inject into the skin 2 (two) times daily., Disp: , Rfl:     losartan (COZAAR) 25 MG tablet, Take 25 mg by mouth once daily., Disp: , Rfl: 3    metFORMIN (GLUCOPHAGE) 500 MG tablet, TAKE 2 TABLETS BY MOUTH TWICE DAILY WITH MEALS, Disp: 120 tablet, Rfl: 0    promethazine (PHENERGAN) 12.5 MG Tab, Take 1 tablet (12.5 mg total) by mouth every 6 (six) hours as needed., Disp: 20 tablet, Rfl: 0    topiramate (TOPAMAX) 200 MG Tab, 1 tablet daily, Disp: 60 tablet, Rfl: 2    TRUE METRIX GLUCOSE TEST STRIP Strp, USE 1 STRIP TO CHECK GLUCOSE THREE TIMES DAILY, Disp: 100 strip, Rfl: 3    venlafaxine (EFFEXOR-XR) 75 MG 24 hr capsule, Take 2 capsules (150 mg total) by mouth once daily., Disp: 60 capsule, Rfl: 2    baclofen (LIORESAL) 10 MG tablet, Take 1 tablet (10 mg total) by mouth 2 (two) times daily., Disp: 60 tablet, Rfl: 0    diazePAM (VALIUM) 10 MG Tab, Take 1 tablet (10 mg total) by mouth 3 (three) times daily as needed., Disp: 90 tablet, Rfl: 2    indomethacin (INDOCIN SR) 75 mg CpSR CR capsule,  Take 1 capsule (75 mg total) by mouth 2 (two) times daily with meals., Disp: 60 capsule, Rfl: 0    insulin  unit/mL injection, Inject into the skin., Disp: , Rfl:     QUEtiapine (SEROQUEL) 300 MG Tab, Take 1 tablet (300 mg total) by mouth every evening. (Patient not taking: Reported on 8/24/2020), Disp: 30 tablet, Rfl: 2    ranitidine (ZANTAC) 300 MG tablet, Take 300 mg by mouth every evening., Disp: , Rfl:     sulfaSALAzine (AZULFIDINE) 500 MG TbEC, Take 2 tablets (1,000 mg total) by mouth 2 (two) times daily., Disp: 120 tablet, Rfl: 3    traMADol (ULTRAM) 50 mg tablet, Take 1 tablet (50 mg total) by mouth every 6 (six) hours., Disp: 12 tablet, Rfl: 0    Current Facility-Administered Medications:     ketorolac injection 30 mg, 30 mg, Intramuscular, Once, Perez Casiano MD      Hospital follow-up.  Was sent there for altered level of consciousness.  Today her daughter comes in with her.  She says that she lost consciousness and was not breathing after having some absent like spells at home, followed by arching of her back.  Family initiated CPR.  When these paramedics arrived they stop the CPR.  Patient currently complains of pain in her sternum and ribcage anteriorly.  X-rays at the hospital were negative.  I will x-ray her sternum today.  Still having a lot of pain.  Says Tylenol and anti-inflammatories on helping very much.    Subsequent to the hospitalization she was seen by Psychiatry.  He has stopped her Seroquel.    No other etiology or problems were found during hospitalization.  This was not felt to be a stroke or a TIA.  Her EEG was negative.  Daughters given says she is having some kind of seizure activity and wonders whether not she has hypoxia from time to time that may be causing seizure-like activity.    Last visit with diabetes found that her sugar control was not as good as previously.  Mammogram in July was canceled.  She needs to make appointment to follow-up with neuro surgery in  "the number for a small brain aneurysm is been followed conservatively to date.    Daughter says she still has staring spells time to time at home, since hospitalization    Review of Systems   Constitutional: Negative for activity change, appetite change and fever.   HENT: Negative for sore throat.    Respiratory: Negative for cough and shortness of breath.    Cardiovascular: Positive for chest pain.   Gastrointestinal: Negative for abdominal pain, diarrhea and nausea.   Genitourinary: Negative for difficulty urinating.   Musculoskeletal: Negative for arthralgias and myalgias.   Neurological: Negative for dizziness and headaches.       Objective:      Vitals:    08/24/20 0902   BP: 132/70   Pulse: 92   Temp: 98.1 °F (36.7 °C)   TempSrc: Tympanic   Weight: 84.2 kg (185 lb 11.8 oz)   Height: 5' 4" (1.626 m)   PainSc:   8   PainLoc: Rib Cage     Physical Exam  Vitals signs and nursing note reviewed.   Constitutional:       General: She is not in acute distress.     Appearance: She is well-developed. She is not diaphoretic.   HENT:      Head: Normocephalic.      Mouth/Throat:      Pharynx: No oropharyngeal exudate.   Neck:      Musculoskeletal: Neck supple.      Thyroid: No thyromegaly.   Cardiovascular:      Rate and Rhythm: Normal rate and regular rhythm.      Heart sounds: Normal heart sounds. No murmur.   Pulmonary:      Effort: Pulmonary effort is normal.      Breath sounds: Normal breath sounds. No wheezing or rales.   Abdominal:      General: There is no distension.      Palpations: Abdomen is soft.   Lymphadenopathy:      Cervical: No cervical adenopathy.   Skin:     General: Skin is warm and dry.   Neurological:      Mental Status: She is alert and oriented to person, place, and time.   Psychiatric:         Attention and Perception: She is inattentive.         Mood and Affect: Affect is blunt.         Speech: Speech is slurred.         Behavior: Behavior is slowed.         Thought Content: Thought content " normal.         Judgment: Judgment normal.       the   Assessment:       1. Altered mental status, unspecified altered mental status type    2. Essential hypertension    3. Type 2 diabetes mellitus with complication, with long-term current use of insulin    4. Bipolar affective disorder, remission status unspecified    5. Diabetic retinopathy associated with type 2 diabetes mellitus, macular edema presence unspecified, unspecified laterality, unspecified retinopathy severity    6. Inflammatory arthritis    7. Screening for breast cancer    8. Encounter for screening mammogram for malignant neoplasm of breast     9. Sleep-disordered breathing    10. Chest wall pain    11. LOC (loss of consciousness)        Plan:   Altered mental status, unspecified altered mental status type  Comments:  Etiology unclear.  She has psychomotor slowing currently.  Possible conversion disorder    Essential hypertension  Comments:  Controlled    Type 2 diabetes mellitus with complication, with long-term current use of insulin  Comments:  A1c trending in the wrong direction.  Recheck today and follow-up with diabetic education asap  Orders:  -     Hemoglobin A1C; Future; Expected date: 08/24/2020    Bipolar affective disorder, remission status unspecified  Comments:  Followed by Psychiatry.  Some polypharmacy in the past    Diabetic retinopathy associated with type 2 diabetes mellitus, macular edema presence unspecified, unspecified laterality, unspecified retinopathy severity  Comments:  Per recent Ophthalmology notes    Inflammatory arthritis  Comments:  Seen by hematology.  Sulfasalazine started in April.  Not sure if patient is taking    Screening for breast cancer  Comments:  Mammogram rescheduled  Orders:  -     Mammo Digital Screening Bilat; Future; Expected date: 08/24/2020    Encounter for screening mammogram for malignant neoplasm of breast   -     Mammo Digital Screening Bilat; Future; Expected date:  08/24/2020    Sleep-disordered breathing  Comments:  Pulmonology for sleep evaluation  Orders:  -     Ambulatory referral/consult to Pulmonology; Future; Expected date: 08/31/2020    Chest wall pain  Comments:  Toradol IM.  X-ray of sternum  Orders:  -     ketorolac injection 30 mg  -     X-Ray Sternum; Future; Expected date: 08/24/2020    LOC (loss of consciousness)  Comments:  Per family.  Not documented in the medical record.  Cardiology consult  Orders:  -     Ambulatory referral/consult to Cardiology; Future; Expected date: 08/31/2020

## 2020-09-01 RX ORDER — DIAZEPAM 10 MG/1
10 TABLET ORAL 3 TIMES DAILY PRN
Qty: 90 TABLET | Refills: 2 | OUTPATIENT
Start: 2020-09-01 | End: 2020-10-01

## 2020-09-03 ENCOUNTER — PATIENT OUTREACH (OUTPATIENT)
Dept: ADMINISTRATIVE | Facility: OTHER | Age: 55
End: 2020-09-03

## 2020-09-03 RX ORDER — TOPIRAMATE 200 MG/1
TABLET ORAL
Qty: 60 TABLET | Refills: 2 | Status: SHIPPED | OUTPATIENT
Start: 2020-09-03 | End: 2020-10-12 | Stop reason: SDUPTHER

## 2020-09-03 RX ORDER — VENLAFAXINE HYDROCHLORIDE 75 MG/1
150 CAPSULE, EXTENDED RELEASE ORAL DAILY
Qty: 60 CAPSULE | Refills: 2 | Status: SHIPPED | OUTPATIENT
Start: 2020-09-03 | End: 2020-10-12 | Stop reason: SDUPTHER

## 2020-09-03 RX ORDER — DIAZEPAM 10 MG/1
10 TABLET ORAL 3 TIMES DAILY PRN
Qty: 90 TABLET | Refills: 2 | Status: SHIPPED | OUTPATIENT
Start: 2020-09-03 | End: 2020-10-12 | Stop reason: SDUPTHER

## 2020-09-03 RX ORDER — DOXEPIN HYDROCHLORIDE 10 MG/1
CAPSULE ORAL
Qty: 60 CAPSULE | Refills: 2 | Status: SHIPPED | OUTPATIENT
Start: 2020-09-03 | End: 2020-12-04

## 2020-09-04 ENCOUNTER — OFFICE VISIT (OUTPATIENT)
Dept: CARDIOLOGY | Facility: CLINIC | Age: 55
End: 2020-09-04
Payer: MEDICARE

## 2020-09-04 VITALS
WEIGHT: 187.63 LBS | OXYGEN SATURATION: 95 % | BODY MASS INDEX: 32.2 KG/M2 | SYSTOLIC BLOOD PRESSURE: 116 MMHG | HEART RATE: 114 BPM | DIASTOLIC BLOOD PRESSURE: 70 MMHG

## 2020-09-04 DIAGNOSIS — Z72.0 TOBACCO ABUSE: Chronic | ICD-10-CM

## 2020-09-04 DIAGNOSIS — E78.5 DYSLIPIDEMIA ASSOCIATED WITH TYPE 2 DIABETES MELLITUS: ICD-10-CM

## 2020-09-04 DIAGNOSIS — R40.20 LOC (LOSS OF CONSCIOUSNESS): ICD-10-CM

## 2020-09-04 DIAGNOSIS — R07.82 INTERCOSTAL PAIN: Primary | ICD-10-CM

## 2020-09-04 DIAGNOSIS — R55 SYNCOPE AND COLLAPSE: ICD-10-CM

## 2020-09-04 DIAGNOSIS — E11.69 DYSLIPIDEMIA ASSOCIATED WITH TYPE 2 DIABETES MELLITUS: ICD-10-CM

## 2020-09-04 DIAGNOSIS — I10 ESSENTIAL HYPERTENSION: Chronic | ICD-10-CM

## 2020-09-04 PROCEDURE — 99999 PR PBB SHADOW E&M-EST. PATIENT-LVL V: ICD-10-PCS | Mod: PBBFAC,HCNC,, | Performed by: INTERNAL MEDICINE

## 2020-09-04 PROCEDURE — 3046F PR MOST RECENT HEMOGLOBIN A1C LEVEL > 9.0%: ICD-10-PCS | Mod: HCNC,CPTII,S$GLB, | Performed by: INTERNAL MEDICINE

## 2020-09-04 PROCEDURE — 3078F DIAST BP <80 MM HG: CPT | Mod: HCNC,CPTII,S$GLB, | Performed by: INTERNAL MEDICINE

## 2020-09-04 PROCEDURE — 3008F PR BODY MASS INDEX (BMI) DOCUMENTED: ICD-10-PCS | Mod: HCNC,CPTII,S$GLB, | Performed by: INTERNAL MEDICINE

## 2020-09-04 PROCEDURE — 99999 PR PBB SHADOW E&M-EST. PATIENT-LVL V: CPT | Mod: PBBFAC,HCNC,, | Performed by: INTERNAL MEDICINE

## 2020-09-04 PROCEDURE — 99215 OFFICE O/P EST HI 40 MIN: CPT | Mod: HCNC,S$GLB,, | Performed by: INTERNAL MEDICINE

## 2020-09-04 PROCEDURE — 3074F SYST BP LT 130 MM HG: CPT | Mod: HCNC,CPTII,S$GLB, | Performed by: INTERNAL MEDICINE

## 2020-09-04 PROCEDURE — 3046F HEMOGLOBIN A1C LEVEL >9.0%: CPT | Mod: HCNC,CPTII,S$GLB, | Performed by: INTERNAL MEDICINE

## 2020-09-04 PROCEDURE — 99215 PR OFFICE/OUTPT VISIT, EST, LEVL V, 40-54 MIN: ICD-10-PCS | Mod: HCNC,S$GLB,, | Performed by: INTERNAL MEDICINE

## 2020-09-04 PROCEDURE — 3078F PR MOST RECENT DIASTOLIC BLOOD PRESSURE < 80 MM HG: ICD-10-PCS | Mod: HCNC,CPTII,S$GLB, | Performed by: INTERNAL MEDICINE

## 2020-09-04 PROCEDURE — 3008F BODY MASS INDEX DOCD: CPT | Mod: HCNC,CPTII,S$GLB, | Performed by: INTERNAL MEDICINE

## 2020-09-04 PROCEDURE — 3074F PR MOST RECENT SYSTOLIC BLOOD PRESSURE < 130 MM HG: ICD-10-PCS | Mod: HCNC,CPTII,S$GLB, | Performed by: INTERNAL MEDICINE

## 2020-09-04 NOTE — PROGRESS NOTES
Subjective:   Patient ID:  Alexandra Goins is a 54 y.o. female who presents for evaluation of No chief complaint on file.       53 yo female, came in for the eval. Of syncope  PMH HTN, HLD, h/o CVA, DM II, asthma, GERD, bipolar disorder, family h/o premature CAD, and tobacco. As wellbas brain anuerysm   In , episode of syncope. Jerking, myoclonic, HR dropped to 40 bpm. famiyl member did CPR, called EMR and added ambu bag for breathing.   Sent to [facility] and Neurology consult did not feel this was consistent with a CVA. MRI MRA were unremarkable. MRA of the brain revealed small aneurysm that is not new per family. Additional work-up including chemistries, blood glucose, thyroid studies, CBC, syphilis work-up, respiratory viral panel including COVID-19, EEG, and echo were unrevealing.  Seizure could be a possible diagnosis, neurology recommends close follow-up in the outpatient setting to monitor for any further episodes.  Troponin negative   ECHO normal EF  Had similar episode 1 yr ago.   Few faint/syncope episodes and had hand injury          Past Medical History:   Diagnosis Date    Acute ischemic stroke 9/17/2019    Aneurysm     Anxiety     Arthritis     Asthma     Bipolar 1 disorder     Cerebral aneurysm, nonruptured 9/19/2019    Depression     Diabetes mellitus, type 2     Diverticular disease     GERD (gastroesophageal reflux disease)     Hyperlipemia     Hypertension     Hypothyroidism     Pneumonia     Renal manifestation of secondary diabetes mellitus     Right-sided back pain 6/29/2019    Stroke     Trouble in sleeping        Past Surgical History:   Procedure Laterality Date    CEREBRAL ANGIOGRAM N/A 10/28/2019    Procedure: ANGIOGRAM-CEREBRAL;  Surgeon: Dahiana Diagnostic Provider;  Location: [facility] OR [address] [place] [address];  Service: Radiology;  Laterality: N/A;  Rm 190/Aayush    CHOLECYSTECTOMY      COLON SURGERY      COLONOSCOPY N/A 1/12/2018    Procedure: COLONOSCOPY;  Surgeon: Roque  DOMITILA Mahajan III, MD;  Location: [facility];  Service: Endoscopy;  Laterality: N/A;    DENTAL SURGERY  05/21/2018    removal of 8 top teeth    HYSTERECTOMY      TONSILLECTOMY         Social History     Tobacco Use    Smoking status: Current Every Day Smoker     Packs/day: 0.50     Years: 35.00     Pack years: 17.50     Types: Cigarettes, Vaping w/o nicotine    Smokeless tobacco: Never Used   Substance Use Topics    Alcohol use: Yes     Comment: occassionally    Drug use: Yes     Types: Marijuana       Family History   Problem Relation Age of Onset    Alcohol abuse Mother     COPD Mother     Depression Mother     Drug abuse Mother     Alcohol abuse Father     Arthritis Father     Cancer Father     Heart disease Father     Hypertension Father     COPD Sister     Kidney failure Sister     Heart disease Brother     Hypertension Brother     Cancer Maternal Aunt     Cancer Maternal Uncle     Diabetes Maternal Uncle     Drug abuse Maternal Uncle     Cancer Paternal Aunt     Cancer Paternal Uncle     Arthritis Maternal Grandmother     Hypertension Maternal Grandmother     Breast cancer Maternal Grandmother     Arthritis Paternal Grandmother     Cancer Paternal Grandmother     Hypertension Paternal Grandfather        Review of Systems   Constitution: Negative for decreased appetite, diaphoresis, fever, malaise/fatigue and night sweats.   HENT: Negative for nosebleeds.    Eyes: Negative for blurred vision and double vision.   Cardiovascular: Positive for chest pain and syncope. Negative for claudication, dyspnea on exertion, irregular heartbeat, leg swelling, near-syncope, palpitations and paroxysmal nocturnal dyspnea.   Respiratory: Negative for cough, shortness of breath, sleep disturbances due to breathing, snoring, sputum production and wheezing.    Endocrine: Negative for cold intolerance and polyuria.   Hematologic/Lymphatic: Does not bruise/bleed easily.   Skin: Negative for rash.    Musculoskeletal: Negative for back pain, falls, joint pain, joint swelling and neck pain.   Gastrointestinal: Negative for abdominal pain, heartburn, nausea and vomiting.   Genitourinary: Negative for dysuria, frequency and hematuria.   Neurological: Positive for dizziness. Negative for difficulty with concentration, focal weakness, headaches, light-headedness, numbness, seizures and weakness.   Psychiatric/Behavioral: Negative for depression, memory loss and substance abuse. The patient does not have insomnia.    Allergic/Immunologic: Negative for HIV exposure and hives.       Objective:   Physical Exam   Constitutional: She is oriented to person, place, and time. She appears well-nourished.   HENT:   Head: Normocephalic.   Eyes: Pupils are equal, round, and reactive to light.   Neck: Normal carotid pulses and no JVD present. Carotid bruit is not present. No thyromegaly present.   Cardiovascular: Normal rate, regular rhythm, normal heart sounds and normal pulses.  No extrasystoles are present. PMI is not displaced. Exam reveals no gallop and no S3.   No murmur heard.  Pulmonary/Chest: Breath sounds normal. No stridor. No respiratory distress.   Abdominal: Soft. Bowel sounds are normal. There is no abdominal tenderness. There is no rebound.   Musculoskeletal: Normal range of motion.   Neurological: She is alert and oriented to person, place, and time.   Skin: Skin is intact. No rash noted.   Psychiatric: Her behavior is normal.       Lab Results   Component Value Date    CHOL 213 (H) 01/28/2020    CHOL 219 (H) 09/17/2019    CHOL 236 (H) 04/10/2019     Lab Results   Component Value Date    HDL 74 01/28/2020    HDL 69 09/17/2019    HDL 44 04/10/2019     Lab Results   Component Value Date    LDLCALC 110.4 01/28/2020    LDLCALC 95.4 09/17/2019    LDLCALC 134.8 04/10/2019     Lab Results   Component Value Date    TRIG 143 01/28/2020    TRIG 273 (H) 09/17/2019    TRIG 286 (H) 04/10/2019     Lab Results   Component Value  Date    CHOLHDL 34.7 01/28/2020    CHOLHDL 31.5 09/17/2019    CHOLHDL 18.6 (L) 04/10/2019       Chemistry        Component Value Date/Time     01/28/2020 0548    K 4.2 01/28/2020 0548     01/28/2020 0548    CO2 23 01/28/2020 0548    BUN 13 01/28/2020 0548    CREATININE 0.8 01/28/2020 0548     (H) 01/28/2020 0548        Component Value Date/Time    CALCIUM 8.9 01/28/2020 0548    ALKPHOS 77 01/27/2020 1615    AST 27 01/27/2020 1615    ALT 35 01/27/2020 1615    BILITOT 0.3 01/27/2020 1615    ESTGFRAFRICA >60 01/28/2020 0548    EGFRNONAA >60 01/28/2020 0548          Lab Results   Component Value Date    HGBA1C 9.4 (H) 08/24/2020     Lab Results   Component Value Date    TSH 2.531 01/21/2020     Lab Results   Component Value Date    INR 1.0 10/28/2019    INR 1.0 09/17/2019    INR 1.0 06/21/2018     Lab Results   Component Value Date    WBC 11.41 01/27/2020    HGB 13.1 01/27/2020    HCT 41.9 01/27/2020    MCV 96 01/27/2020     01/27/2020     BNP  @LABRCNTIP(BNP,BNPTRIAGEBLO)@  CrCl cannot be calculated (Patient's most recent lab result is older than the maximum 7 days allowed.).  No results found in the last 24 hours.  No results found in the last 24 hours.  No results found in the last 24 hours.    Assessment:      1. Intercostal pain    2. LOC (loss of consciousness)    3. Syncope and collapse    4. Tobacco abuse    5. Dyslipidemia associated with type 2 diabetes mellitus        Plan:   ZIO to r/o olvin  MPI r/o ischemia  Continue Losartan  DM Rx per PCP  Counseled DASH  Check Lipid profile in 6 months  Recommend heart-healthy diet, weight control and regular exercise.  Norman. Risk modification.   F/u with pcp    I have reviewed all pertinent labs and cardiac studies independently. Plans and recommendations have been formulated under my direct supervision. All questions answered and patient voiced understanding.   If symptoms persist go to the ED

## 2020-09-04 NOTE — LETTER
September 7, 2020      Perez Casiano MD  [address] [facility] [address]           [facility]  [address] [place] [id number]  Phone: [phone]  Fax: [phone]          Patient: Alexandra Goins   MR Number: [id number]   YOB: 1965   Date of Visit: 9/4/2020       Dear Dr. Perez Casiano:    Thank you for referring Alexandra Goins to me for evaluation. Attached you will find relevant portions of my assessment and plan of care.    If you have questions, please do not hesitate to call me. I look forward to following Alexandra Goins along with you.    Sincerely,    Brennon Chakraborty MD    Enclosure  CC:  No Recipients    If you would like to receive this communication electronically, please contact [email or web address] or [phone] to request more information on hopscout Link access.    For providers and/or their staff who would like to refer a patient to [facility], please contact us through our one-stop-shop provider referral line, [facility], at [phone].    If you feel you have received this communication in error or would no longer like to receive these types of communications, please e-mail [email or web address]

## 2020-09-11 NOTE — PROGRESS NOTES
54 year old  female presenting, with , for diabetes follow up. Patient presented to clinic today with hypoglyecmia, BG in the 32.  Initially, she was responsive and talking. Therefore, patient was given OJ and crackers.  After eating OJ and crackers, patient went into a blank and un-responsive stare.       reports that patient was recently diagnosed with seizures ( has been evaluated at hospital ), and periodically has these episodes at home. Seroquel was discontinued and she is followed by a neurologist.  After about 5 - 8 minutes, patient became alert again, and was oriented to person, place, and time.  In the meantime, EMS was called.     When EMS arrived, patient was AAOx3, and she refused to go by ambulance to ER. Clinic appointment was canceled, and patient left clinic with  KATHLEEN.    Her blood sugar in clinic today is:  32 mg/dl at 14:05 pm; OJ and 2 packs of crackers give; 14:24 pm re-check 75 mg/dl;  14:40 pm re-chcek, 88 mg/dl ( when she left clinic ).   stated they were going to get something to eat.     Patient  was advised to monitor blood sugars 4 times daily and was given a diary to record readings. Appointment was rescheduled and  plans to bring readings to clinic so we can send paperwork for Dexcom CGM.  No adjustments to medications were made. I advised to always eat within 15 minutes of taking Novolin 70/30.

## 2020-09-12 ENCOUNTER — OFFICE VISIT (OUTPATIENT)
Dept: DIABETES | Facility: CLINIC | Age: 55
End: 2020-09-12
Payer: MEDICARE

## 2020-09-12 VITALS
WEIGHT: 190.94 LBS | HEIGHT: 66 IN | DIASTOLIC BLOOD PRESSURE: 54 MMHG | BODY MASS INDEX: 30.69 KG/M2 | SYSTOLIC BLOOD PRESSURE: 96 MMHG

## 2020-09-12 DIAGNOSIS — E11.69 DYSLIPIDEMIA ASSOCIATED WITH TYPE 2 DIABETES MELLITUS: ICD-10-CM

## 2020-09-12 DIAGNOSIS — Z79.4 TYPE 2 DIABETES MELLITUS WITH OTHER SPECIFIED COMPLICATION, WITH LONG-TERM CURRENT USE OF INSULIN: Primary | ICD-10-CM

## 2020-09-12 DIAGNOSIS — E11.69 TYPE 2 DIABETES MELLITUS WITH OTHER SPECIFIED COMPLICATION, WITH LONG-TERM CURRENT USE OF INSULIN: Primary | ICD-10-CM

## 2020-09-12 DIAGNOSIS — I10 ESSENTIAL HYPERTENSION: Chronic | ICD-10-CM

## 2020-09-12 DIAGNOSIS — E78.5 DYSLIPIDEMIA ASSOCIATED WITH TYPE 2 DIABETES MELLITUS: ICD-10-CM

## 2020-09-12 LAB — GLUCOSE SERPL-MCNC: 75 MG/DL (ref 70–110)

## 2020-09-12 PROCEDURE — 99499 NO LOS: ICD-10-PCS | Mod: HCNC,S$GLB,, | Performed by: NURSE PRACTITIONER

## 2020-09-12 PROCEDURE — 99499 UNLISTED E&M SERVICE: CPT | Mod: HCNC,S$GLB,, | Performed by: NURSE PRACTITIONER

## 2020-09-12 PROCEDURE — 82962 POCT GLUCOSE, HAND-HELD DEVICE: ICD-10-PCS | Mod: HCNC,S$GLB,, | Performed by: NURSE PRACTITIONER

## 2020-09-12 PROCEDURE — 82962 GLUCOSE BLOOD TEST: CPT | Mod: HCNC,S$GLB,, | Performed by: NURSE PRACTITIONER

## 2020-09-12 PROCEDURE — 99999 PR PBB SHADOW E&M-EST. PATIENT-LVL IV: CPT | Mod: PBBFAC,HCNC,, | Performed by: NURSE PRACTITIONER

## 2020-09-12 PROCEDURE — 99999 PR PBB SHADOW E&M-EST. PATIENT-LVL IV: ICD-10-PCS | Mod: PBBFAC,HCNC,, | Performed by: NURSE PRACTITIONER

## 2020-09-21 ENCOUNTER — HOSPITAL ENCOUNTER (OUTPATIENT)
Dept: RADIOLOGY | Facility: HOSPITAL | Age: 55
Discharge: HOME | End: 2020-09-21
Attending: INTERNAL MEDICINE
Payer: MEDICARE

## 2020-09-21 ENCOUNTER — HOSPITAL ENCOUNTER (OUTPATIENT)
Dept: CARDIOLOGY | Facility: HOSPITAL | Age: 55
Discharge: HOME | End: 2020-09-21
Attending: INTERNAL MEDICINE
Payer: MEDICARE

## 2020-09-21 DIAGNOSIS — R40.20 LOC (LOSS OF CONSCIOUSNESS): ICD-10-CM

## 2020-09-21 DIAGNOSIS — R55 SYNCOPE AND COLLAPSE: ICD-10-CM

## 2020-09-21 LAB
CV STRESS BASE HR: 94 BPM
DIASTOLIC BLOOD PRESSURE: 64 MMHG
NUC REST EJECTION FRACTION: 60
NUC STRESS EJECTION FRACTION: 60 %
OHS CV CPX 85 PERCENT MAX PREDICTED HEART RATE MALE: 133
OHS CV CPX MAX PREDICTED HEART RATE: 157
OHS CV CPX PATIENT IS FEMALE: 1
OHS CV CPX PATIENT IS MALE: 0
OHS CV CPX PEAK DIASTOLIC BLOOD PRESSURE: 62 MMHG
OHS CV CPX PEAK HEAR RATE: 142 BPM
OHS CV CPX PEAK RATE PRESSURE PRODUCT: NORMAL
OHS CV CPX PEAK SYSTOLIC BLOOD PRESSURE: 142 MMHG
OHS CV CPX PERCENT MAX PREDICTED HEART RATE ACHIEVED: 91
OHS CV CPX RATE PRESSURE PRODUCT PRESENTING: NORMAL
STRESS ECHO POST EXERCISE DUR MIN: 1 MINUTES
STRESS ECHO POST EXERCISE DUR SEC: 14 SECONDS
SUMMED DIFFERENCE: 0
SUMMED REST SCORE: 0
SUMMED STRESS SCORE: 0
SYSTOLIC BLOOD PRESSURE: 121 MMHG

## 2020-09-21 PROCEDURE — 78452 STRESS TEST WITH MYOCARDIAL PERFUSION (CUPID ONLY): ICD-10-PCS | Mod: 26,HCNC,, | Performed by: INTERNAL MEDICINE

## 2020-09-21 PROCEDURE — 93017 CV STRESS TEST TRACING ONLY: CPT | Mod: HCNC

## 2020-09-21 PROCEDURE — 0296T CV CARDIAC MONITOR - 3-14 DAY ADULT (CUPID ONLY): CPT | Mod: HCNC,,, | Performed by: INTERNAL MEDICINE

## 2020-09-21 PROCEDURE — 93016 STRESS TEST WITH MYOCARDIAL PERFUSION (CUPID ONLY): ICD-10-PCS | Mod: HCNC,,, | Performed by: INTERNAL MEDICINE

## 2020-09-21 PROCEDURE — 0296T CV CARDIAC MONITOR - 3-14 DAY ADULT (CUPID ONLY): ICD-10-PCS | Mod: HCNC,,, | Performed by: INTERNAL MEDICINE

## 2020-09-21 PROCEDURE — 93018 CV STRESS TEST I&R ONLY: CPT | Mod: HCNC,,, | Performed by: INTERNAL MEDICINE

## 2020-09-21 PROCEDURE — 93018 STRESS TEST WITH MYOCARDIAL PERFUSION (CUPID ONLY): ICD-10-PCS | Mod: HCNC,,, | Performed by: INTERNAL MEDICINE

## 2020-09-21 PROCEDURE — 78452 HT MUSCLE IMAGE SPECT MULT: CPT | Mod: 26,HCNC,, | Performed by: INTERNAL MEDICINE

## 2020-09-21 PROCEDURE — 93016 CV STRESS TEST SUPVJ ONLY: CPT | Mod: HCNC,,, | Performed by: INTERNAL MEDICINE

## 2020-09-21 PROCEDURE — 0298T CV CARDIAC MONITOR - 3-14 DAY ADULT (CUPID ONLY): ICD-10-PCS | Mod: HCNC,,, | Performed by: INTERNAL MEDICINE

## 2020-09-21 PROCEDURE — A9502 TC99M TETROFOSMIN: HCPCS | Mod: HCNC

## 2020-09-21 PROCEDURE — 0298T CV CARDIAC MONITOR - 3-14 DAY ADULT (CUPID ONLY): CPT | Mod: HCNC,,, | Performed by: INTERNAL MEDICINE

## 2020-09-21 RX ORDER — AMINOPHYLLINE 25 MG/ML
75 INJECTION, SOLUTION INTRAVENOUS ONCE
Status: COMPLETED | OUTPATIENT
Start: 2020-09-21 | End: 2020-09-21

## 2020-09-21 RX ORDER — REGADENOSON 0.08 MG/ML
0.4 INJECTION, SOLUTION INTRAVENOUS ONCE
Status: COMPLETED | OUTPATIENT
Start: 2020-09-21 | End: 2020-09-21

## 2020-09-21 RX ADMIN — REGADENOSON 0.4 MG: 0.08 INJECTION, SOLUTION INTRAVENOUS at 09:09

## 2020-09-21 RX ADMIN — AMINOPHYLLINE 75 MG: 25 INJECTION, SOLUTION INTRAVENOUS at 09:09

## 2020-09-24 ENCOUNTER — TELEPHONE (OUTPATIENT)
Dept: CARDIOLOGY | Facility: CLINIC | Age: 55
End: 2020-09-24

## 2020-09-24 NOTE — TELEPHONE ENCOUNTER
Pt was notified that her nuclear was WNL and no new orders were received. Pt asked if she could take the monitor off. Advised that she is to complete the test as ordered. pb    ----- Message from Juani More sent at 9/24/2020  2:55 PM CDT -----  Type:  Patient Returning Call    Who Called:pt  Who Left Message for Patient:nurse  Does the patient know what this is regarding?:maybe test results  Would the patient rather a call back or a response via [facility]? Call back  Best Call Back Number:[phone]  Additional Information: .    Thank you

## 2020-09-24 NOTE — TELEPHONE ENCOUNTER
Pt contacted, lvm for pt to call back.      ----- Message from Brennon Chakraborty MD sent at 9/23/2020  9:14 PM CDT -----  NUKE STRESS TEST NORMAL

## 2020-09-29 ENCOUNTER — PATIENT MESSAGE (OUTPATIENT)
Dept: OTHER | Facility: OTHER | Age: 55
End: 2020-09-29

## 2020-10-02 RX ORDER — GABAPENTIN 600 MG/1
600 TABLET ORAL 3 TIMES DAILY
Qty: 90 TABLET | Refills: 1 | Status: SHIPPED | OUTPATIENT
Start: 2020-10-02 | End: 2020-11-30

## 2020-10-02 NOTE — TELEPHONE ENCOUNTER
----- Message from Hoa Shirley sent at 10/2/2020  9:17 AM CDT -----  Type:  RX Refill Request    Who Called: Alexandra  Refill or New Rx:refill  RX Name and Strength:Gabaptin  How is the patient currently taking it? (ex. 1XDay):  Is this a 30 day or 90 day RX:  Preferred Pharmacy with phone number:  [facility] [address] [place], [place] - [address]  [address]  [place] [address]  Phone: [phone] Fax: [phone]        Local or Mail Order:local  Ordering Provider:Oh  Would the patient rather a call back or a response via [facility]? call  Best Call Back Number:[phone]    Additional Information:

## 2020-10-12 ENCOUNTER — OFFICE VISIT (OUTPATIENT)
Dept: PSYCHIATRY | Facility: CLINIC | Age: 55
End: 2020-10-12
Payer: MEDICARE

## 2020-10-12 DIAGNOSIS — F31.32 BIPOLAR AFFECTIVE DISORDER, CURRENTLY DEPRESSED, MODERATE: Primary | ICD-10-CM

## 2020-10-12 DIAGNOSIS — G47.00 INSOMNIA, UNSPECIFIED TYPE: ICD-10-CM

## 2020-10-12 DIAGNOSIS — F41.9 ANXIETY: ICD-10-CM

## 2020-10-12 PROCEDURE — 99213 PR OFFICE/OUTPT VISIT, EST, LEVL III, 20-29 MIN: ICD-10-PCS | Mod: HCNC,95,, | Performed by: PSYCHIATRY & NEUROLOGY

## 2020-10-12 PROCEDURE — 99213 OFFICE O/P EST LOW 20 MIN: CPT | Mod: HCNC,95,, | Performed by: PSYCHIATRY & NEUROLOGY

## 2020-10-12 PROCEDURE — 99499 RISK ADDL DX/OHS AUDIT: ICD-10-PCS | Mod: 95,,, | Performed by: PSYCHIATRY & NEUROLOGY

## 2020-10-12 PROCEDURE — 99499 UNLISTED E&M SERVICE: CPT | Mod: 95,,, | Performed by: PSYCHIATRY & NEUROLOGY

## 2020-10-12 RX ORDER — TOPIRAMATE 200 MG/1
TABLET ORAL
Qty: 60 TABLET | Refills: 2 | Status: SHIPPED | OUTPATIENT
Start: 2020-10-12 | End: 2020-12-04 | Stop reason: SDUPTHER

## 2020-10-12 RX ORDER — DIAZEPAM 10 MG/1
10 TABLET ORAL 3 TIMES DAILY PRN
Qty: 90 TABLET | Refills: 2 | Status: SHIPPED | OUTPATIENT
Start: 2020-10-12 | End: 2020-12-04 | Stop reason: SDUPTHER

## 2020-10-12 RX ORDER — MIRTAZAPINE 15 MG/1
TABLET, FILM COATED ORAL
Qty: 60 TABLET | Refills: 2 | Status: SHIPPED | OUTPATIENT
Start: 2020-10-12 | End: 2020-10-12 | Stop reason: SDUPTHER

## 2020-10-12 RX ORDER — MIRTAZAPINE 15 MG/1
TABLET, FILM COATED ORAL
Qty: 60 TABLET | Refills: 2 | Status: SHIPPED | OUTPATIENT
Start: 2020-10-12 | End: 2020-12-04 | Stop reason: SDUPTHER

## 2020-10-12 RX ORDER — VENLAFAXINE HYDROCHLORIDE 75 MG/1
150 CAPSULE, EXTENDED RELEASE ORAL DAILY
Qty: 60 CAPSULE | Refills: 2 | Status: SHIPPED | OUTPATIENT
Start: 2020-10-12 | End: 2020-12-04 | Stop reason: SDUPTHER
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
## 2020-10-15 RX ORDER — LOSARTAN POTASSIUM 25 MG/1
25 TABLET ORAL DAILY
Qty: 90 TABLET | Refills: 3 | Status: SHIPPED | OUTPATIENT
Start: 2020-10-15 | End: 2021-10-20 | Stop reason: SDUPTHER

## 2020-10-15 NOTE — TELEPHONE ENCOUNTER
Last visit 8/24/2020. Last refill for losartan was 7/7/19 and last refill for fiorcet was 6/12/2020.  No future appts scheduled,.

## 2020-10-15 NOTE — TELEPHONE ENCOUNTER
----- Message from Jenn Shaver sent at 10/15/2020  1:02 PM CDT -----  Regarding: medication  Contact: Patient  .Type:  RX Refill Request    Who Called:  Patient  Refill or New Rx: Refill  RX Name and Strength:  losartan (COZAAR) 25 MG tablet , butalbital-acetaminophen-caffeine -40 mg (FIORICET, ESGIC) -40 mg per tablet   How is the patient currently taking it? (ex. 1XDay):  Is this a 30 day or 90 day RX:  Preferred Pharmacy with phone number: .    [facility] [address] [place], [place] - [address]  [address]  [place] [address]  Phone: [phone] Fax: [phone]    Local or Mail Order: Local  Ordering Provider: Dr Casiano, Dr Cummins  Would the patient rather a call back or a response via [facility]? Call  Best Call Back Number: .[phone] (home)     Additional Information:

## 2020-10-16 RX ORDER — BUTALBITAL, ACETAMINOPHEN AND CAFFEINE 50; 325; 40 MG/1; MG/1; MG/1
1 TABLET ORAL EVERY 4 HOURS PRN
Qty: 30 TABLET | Refills: 0 | Status: SHIPPED | OUTPATIENT
Start: 2020-10-16 | End: 2020-12-10 | Stop reason: SDUPTHER

## 2020-10-17 ENCOUNTER — OFFICE VISIT (OUTPATIENT)
Dept: DIABETES | Facility: CLINIC | Age: 55
End: 2020-10-17
Payer: MEDICARE

## 2020-10-17 VITALS
WEIGHT: 184.94 LBS | SYSTOLIC BLOOD PRESSURE: 96 MMHG | HEIGHT: 66 IN | DIASTOLIC BLOOD PRESSURE: 66 MMHG | BODY MASS INDEX: 29.72 KG/M2

## 2020-10-17 DIAGNOSIS — E78.5 HYPERLIPIDEMIA, UNSPECIFIED HYPERLIPIDEMIA TYPE: ICD-10-CM

## 2020-10-17 DIAGNOSIS — E11.69 TYPE 2 DIABETES MELLITUS WITH OTHER SPECIFIED COMPLICATION, WITH LONG-TERM CURRENT USE OF INSULIN: Primary | ICD-10-CM

## 2020-10-17 DIAGNOSIS — I10 ESSENTIAL HYPERTENSION: Chronic | ICD-10-CM

## 2020-10-17 DIAGNOSIS — Z79.4 TYPE 2 DIABETES MELLITUS WITH OTHER SPECIFIED COMPLICATION, WITH LONG-TERM CURRENT USE OF INSULIN: Primary | ICD-10-CM

## 2020-10-17 LAB — GLUCOSE SERPL-MCNC: 99 MG/DL (ref 70–110)

## 2020-10-17 PROCEDURE — 99214 PR OFFICE/OUTPT VISIT, EST, LEVL IV, 30-39 MIN: ICD-10-PCS | Mod: HCNC,S$GLB,, | Performed by: NURSE PRACTITIONER

## 2020-10-17 PROCEDURE — 82962 POCT GLUCOSE, HAND-HELD DEVICE: ICD-10-PCS | Mod: HCNC,S$GLB,, | Performed by: NURSE PRACTITIONER

## 2020-10-17 PROCEDURE — 99214 OFFICE O/P EST MOD 30 MIN: CPT | Mod: HCNC,S$GLB,, | Performed by: NURSE PRACTITIONER

## 2020-10-17 PROCEDURE — 3008F BODY MASS INDEX DOCD: CPT | Mod: HCNC,CPTII,S$GLB, | Performed by: NURSE PRACTITIONER

## 2020-10-17 PROCEDURE — 3008F PR BODY MASS INDEX (BMI) DOCUMENTED: ICD-10-PCS | Mod: HCNC,CPTII,S$GLB, | Performed by: NURSE PRACTITIONER

## 2020-10-17 PROCEDURE — 99999 PR PBB SHADOW E&M-EST. PATIENT-LVL IV: ICD-10-PCS | Mod: PBBFAC,HCNC,, | Performed by: NURSE PRACTITIONER

## 2020-10-17 PROCEDURE — 82962 GLUCOSE BLOOD TEST: CPT | Mod: HCNC,S$GLB,, | Performed by: NURSE PRACTITIONER

## 2020-10-17 PROCEDURE — 99999 PR PBB SHADOW E&M-EST. PATIENT-LVL IV: CPT | Mod: PBBFAC,HCNC,, | Performed by: NURSE PRACTITIONER

## 2020-10-17 NOTE — PROGRESS NOTES
"PCP: Peerz Casiano MD    Subjective:     Chief Complaint: Diabetes Follow Up    HISTORY OF PRESENT ILLNESS: 55 y.o.  female presenting, with , for diabetes follow up. Patient has had Type II diabetes since 1985 and has the following complications: peripheral neuropathy. She  has attended diabetes education in the past. Has history of bipolar disorder and depression ( follows psych ) and new onset seizures that may be related to hypoglycemia. She reports seeing neurology and having numerous procedures and diagnostics that were all normal.     Blood glucose monitoring is performed.  Per records, fasting BG s are 93 - 261; ac lunch 49, 65, 95 - 168; ac dinner 140 - 216; pp dinner 116 -178.    Blood glucose in clinic today: 64 mg/dl ( patient took 20 units of Novolin 70/30 and ate ham and cheese ); OJ and crackers given in clinic today; recheck 99 mg/dl      Height: 5' 6" (167.6 cm)  //  Weight: 83.9 kg (184 lb 15.5 oz), Body mass index is 29.85 kg/m².  She has gained 1 pound since last visit.     Her blood sugar in clinic today is: 99 mg/dl at 2:24 pm      Labs Reviewed.       Lab Results   Component Value Date    CPEPTIDE 3.4 01/25/2017     Lab Results   Component Value Date    GLUTAMICACID 0.01 01/25/2017     Lab Results   Component Value Date    HUMANINSULIN 0.00 01/25/2017     DM MEDICATIONS:   Novolin 70 / 30, 25 units BID ac   Metformin 500 mg, 2 tabs BID    Review of Systems   Constitutional: Negative for appetite change, fatigue and unexpected weight change.   Eyes: Negative for visual disturbance.   Respiratory: Negative for cough.    Gastrointestinal: Positive for diarrhea and nausea. Negative for abdominal pain and constipation.   Endocrine: Negative for polydipsia, polyphagia and polyuria.   Genitourinary: Negative for dysuria, frequency and urgency.   Neurological: Negative for dizziness, numbness and headaches.   Psychiatric/Behavioral: Positive for dysphoric mood (tearful). Negative " for confusion, decreased concentration and suicidal ideas. The patient is not nervous/anxious.        Diabetes Management Status  Statin: Not taking  ACE/ARB: Not taking    Screening or Prevention Patient's value Goal Complete/Controlled?   HgA1C Testing and Control   Lab Results   Component Value Date    HGBA1C 9.4 (H) 2020      Annually/Less than 8% Yes   Lipid profile : 2020 Annually No   LDL control Lab Results   Component Value Date    LDLCALC 110.4 2020    Annually/Less than 100 mg/dl  No   Nephropathy screening Lab Results   Component Value Date    MICALBCREAT 41.4 (H) 2019     Lab Results   Component Value Date    PROTEINUA Negative 2020    Annually Yes   Blood pressure BP Readings from Last 1 Encounters:   10/17/20 96/66    Less than 140/90 Yes   Dilated retinal exam : 2020 Annually Yes, Dr. Dubon   Foot exam   : 10/17/2020 Annually Yes     ACTIVITY LEVEL: Sedentary. Discussed activities, benefits, methods, and precautions.  MEAL PLANNING: Patient reports number of meals per day to be 3 and number of snacks per day to be 0.   Patient is encouraged to carb count and consume no more than 45 - 60 grams of carbohydrates in each meal, and 1800 k / gloria per day.      BLOOD GLUCOSE TESTIN  times daily  SOCIAL HISTORY: .  Lives with spouse.  Disabled, due to Bipolar Disorder, chronic anxiety.  Former smoker.    Objective:      Physical Exam  Constitutional:       Appearance: She is well-developed.   HENT:      Head: Normocephalic and atraumatic.   Eyes:      Pupils: Pupils are equal, round, and reactive to light.   Neck:      Musculoskeletal: Normal range of motion and neck supple.   Cardiovascular:      Rate and Rhythm: Normal rate and regular rhythm.      Pulses:           Dorsalis pedis pulses are 2+ on the right side and 2+ on the left side.   Pulmonary:      Effort: Pulmonary effort is normal.      Breath sounds: Normal breath sounds.   Abdominal:      General: Bowel  sounds are normal.   Musculoskeletal: Normal range of motion.   Feet:      Right foot:      Protective Sensation: 6 sites tested. 6 sites sensed.      Skin integrity: No ulcer, callus or dry skin.      Left foot:      Protective Sensation: 6 sites tested. 6 sites sensed.      Skin integrity: No ulcer, blister, callus or dry skin.   Skin:     General: Skin is warm and dry.      Findings: No rash.   Neurological:      Mental Status: She is alert and oriented to person, place, and time.   Psychiatric:         Behavior: Behavior normal.         Thought Content: Thought content normal.         Judgment: Judgment normal.         Assessment / Plan:     1.) Type 2 diabetes mellitus with hyperglycemia, with long-term current use of insulin  Comments:  - Condition uncontrolled.  We discussed splitting mixed insulin into separate injections ( MDI with Novolin R and Novolin N ) to allow for more flexibility with dosing.  She agrees tot take Novolin 25 units BID and Novolin R, 1 unit for every 15 gms of  carb.   Continue metformin 500 mg, 2 tabs BID.  AOB signed for Dexcom.    - Patient is frequently adjusting insulin based on blood sugar readings and overall glucose patterns.      Orders:   -     POCT Glucose, Hand-Held Device    2.) Dyslipidemia associated with type 2 diabetes mellitus - continue meds as prescribed    3.) Essential hypertension - continue meds as prescribed    Additional Plan Details:    1.) Continue monitoring blood sugar 4 x daily, fasting and ac dinner or at bedtime. Discussed ADA goal for fasting blood sugar, 80 - 130 mg/dL; pp blood sugars below 180 mg/dl. Also, discussed prevention of hypoglycemia and the need to adjust goals to higher levels if persistent hypoglycemia.  Reminded to bring BG records or meter to each visit for review.  2.) Return to clinic in 8 weeks for follow up. The patient was explained the above plan and given opportunity to ask questions.  She understands, chooses and consents to  this plan and accepts all the risks, which include but are not limited to the risks mentioned above. She understands the alternative of having no testing, interventions or treatments at this time. She left content and without further questions.     Amelia Santana, SAMSON-C, CDE    A total of 30 minutes was spent in face to face time, of which over 50 % was spent in counseling patient on disease process, complications, treatment, and side effects of medications.

## 2020-10-17 NOTE — PATIENT INSTRUCTIONS
Continue metformin 500 mg, 2 tabs twice a day.    Start Novolin N 25 units twice a day ( am / pm )    Take Novolin R with each meal , 1 unit for every 15 grams of carb

## 2020-10-21 ENCOUNTER — TELEPHONE (OUTPATIENT)
Dept: CARDIOLOGY | Facility: CLINIC | Age: 55
End: 2020-10-21

## 2020-10-21 RX ORDER — METOPROLOL SUCCINATE 25 MG/1
25 TABLET, EXTENDED RELEASE ORAL DAILY
Qty: 30 TABLET | Refills: 5 | Status: SHIPPED | OUTPATIENT
Start: 2020-10-21 | End: 2021-06-02 | Stop reason: SDUPTHER

## 2020-10-22 ENCOUNTER — TELEPHONE (OUTPATIENT)
Dept: CARDIOLOGY | Facility: CLINIC | Age: 55
End: 2020-10-22

## 2020-10-22 NOTE — TELEPHONE ENCOUNTER
Patient contacted and was advised that her  ZIO showed 1% PVCs  Add ToprolXL 25 mg daily  RTC in 4 weeks  The patient stated understanding with no questions or concerns. Up coming appointment scheduled for 11/25/2020.

## 2020-10-23 ENCOUNTER — TELEPHONE (OUTPATIENT)
Dept: DIABETES | Facility: CLINIC | Age: 55
End: 2020-10-23

## 2020-10-23 NOTE — TELEPHONE ENCOUNTER
Received fax from [facility] stating chart notes must state patient is frequently adjusting her insulin. Please addend chart notes and manually sign them. Thank you!

## 2020-11-04 ENCOUNTER — PATIENT OUTREACH (OUTPATIENT)
Dept: ADMINISTRATIVE | Facility: OTHER | Age: 55
End: 2020-11-04

## 2020-11-04 ENCOUNTER — TELEPHONE (OUTPATIENT)
Dept: ADMINISTRATIVE | Facility: HOSPITAL | Age: 55
End: 2020-11-04

## 2020-11-04 DIAGNOSIS — Z12.31 ENCOUNTER FOR SCREENING MAMMOGRAM FOR MALIGNANT NEOPLASM OF BREAST: ICD-10-CM

## 2020-11-04 DIAGNOSIS — Z12.39 ENCOUNTER FOR SPECIAL SCREENING EXAMINATION FOR NEOPLASM OF BREAST: Primary | ICD-10-CM

## 2020-11-19 ENCOUNTER — HOSPITAL ENCOUNTER (OUTPATIENT)
Dept: RADIOLOGY | Facility: HOSPITAL | Age: 55
Discharge: HOME | End: 2020-11-19
Attending: FAMILY MEDICINE
Payer: MEDICARE

## 2020-11-19 DIAGNOSIS — Z12.39 ENCOUNTER FOR SPECIAL SCREENING EXAMINATION FOR NEOPLASM OF BREAST: ICD-10-CM

## 2020-11-19 DIAGNOSIS — Z12.31 ENCOUNTER FOR SCREENING MAMMOGRAM FOR MALIGNANT NEOPLASM OF BREAST: ICD-10-CM

## 2020-11-19 PROCEDURE — 77067 SCR MAMMO BI INCL CAD: CPT | Mod: 26,HCNC,, | Performed by: RADIOLOGY

## 2020-11-19 PROCEDURE — 77063 BREAST TOMOSYNTHESIS BI: CPT | Mod: 26,HCNC,, | Performed by: RADIOLOGY

## 2020-11-19 PROCEDURE — 77067 MAMMO DIGITAL SCREENING BILAT WITH TOMO: ICD-10-PCS | Mod: 26,HCNC,, | Performed by: RADIOLOGY

## 2020-11-19 PROCEDURE — 77067 SCR MAMMO BI INCL CAD: CPT | Mod: TC,HCNC

## 2020-11-19 PROCEDURE — 77063 MAMMO DIGITAL SCREENING BILAT WITH TOMO: ICD-10-PCS | Mod: 26,HCNC,, | Performed by: RADIOLOGY

## 2020-11-27 DIAGNOSIS — R55 SYNCOPE AND COLLAPSE: Primary | ICD-10-CM

## 2020-12-02 ENCOUNTER — OFFICE VISIT (OUTPATIENT)
Dept: CARDIOLOGY | Facility: CLINIC | Age: 55
End: 2020-12-02
Payer: MEDICARE

## 2020-12-02 ENCOUNTER — CLINICAL SUPPORT (OUTPATIENT)
Dept: CARDIOLOGY | Facility: CLINIC | Age: 55
End: 2020-12-02
Payer: MEDICARE

## 2020-12-02 VITALS
OXYGEN SATURATION: 98 % | HEART RATE: 94 BPM | SYSTOLIC BLOOD PRESSURE: 128 MMHG | WEIGHT: 186.06 LBS | BODY MASS INDEX: 30.03 KG/M2 | DIASTOLIC BLOOD PRESSURE: 70 MMHG

## 2020-12-02 DIAGNOSIS — I63.9 CEREBROVASCULAR ACCIDENT (CVA), UNSPECIFIED MECHANISM: Primary | ICD-10-CM

## 2020-12-02 DIAGNOSIS — E78.5 HYPERLIPIDEMIA, UNSPECIFIED HYPERLIPIDEMIA TYPE: ICD-10-CM

## 2020-12-02 DIAGNOSIS — E11.69 DYSLIPIDEMIA ASSOCIATED WITH TYPE 2 DIABETES MELLITUS: ICD-10-CM

## 2020-12-02 DIAGNOSIS — I10 ESSENTIAL HYPERTENSION: Chronic | ICD-10-CM

## 2020-12-02 DIAGNOSIS — E11.69 TYPE 2 DIABETES MELLITUS WITH OTHER SPECIFIED COMPLICATION, WITH LONG-TERM CURRENT USE OF INSULIN: ICD-10-CM

## 2020-12-02 DIAGNOSIS — R06.02 SOB (SHORTNESS OF BREATH): ICD-10-CM

## 2020-12-02 DIAGNOSIS — Z79.4 TYPE 2 DIABETES MELLITUS WITH OTHER SPECIFIED COMPLICATION, WITH LONG-TERM CURRENT USE OF INSULIN: ICD-10-CM

## 2020-12-02 DIAGNOSIS — R55 SYNCOPE AND COLLAPSE: ICD-10-CM

## 2020-12-02 DIAGNOSIS — Z78.9 STATIN INTOLERANCE: ICD-10-CM

## 2020-12-02 DIAGNOSIS — E78.5 DYSLIPIDEMIA ASSOCIATED WITH TYPE 2 DIABETES MELLITUS: ICD-10-CM

## 2020-12-02 PROCEDURE — 3008F PR BODY MASS INDEX (BMI) DOCUMENTED: ICD-10-PCS | Mod: HCNC,CPTII,S$GLB, | Performed by: INTERNAL MEDICINE

## 2020-12-02 PROCEDURE — 3046F HEMOGLOBIN A1C LEVEL >9.0%: CPT | Mod: HCNC,CPTII,S$GLB, | Performed by: INTERNAL MEDICINE

## 2020-12-02 PROCEDURE — 3046F PR MOST RECENT HEMOGLOBIN A1C LEVEL > 9.0%: ICD-10-PCS | Mod: HCNC,CPTII,S$GLB, | Performed by: INTERNAL MEDICINE

## 2020-12-02 PROCEDURE — 3078F DIAST BP <80 MM HG: CPT | Mod: HCNC,CPTII,S$GLB, | Performed by: INTERNAL MEDICINE

## 2020-12-02 PROCEDURE — 3074F PR MOST RECENT SYSTOLIC BLOOD PRESSURE < 130 MM HG: ICD-10-PCS | Mod: HCNC,CPTII,S$GLB, | Performed by: INTERNAL MEDICINE

## 2020-12-02 PROCEDURE — 93010 EKG 12-LEAD: ICD-10-PCS | Mod: HCNC,S$GLB,, | Performed by: INTERNAL MEDICINE

## 2020-12-02 PROCEDURE — 3008F BODY MASS INDEX DOCD: CPT | Mod: HCNC,CPTII,S$GLB, | Performed by: INTERNAL MEDICINE

## 2020-12-02 PROCEDURE — 99214 PR OFFICE/OUTPT VISIT, EST, LEVL IV, 30-39 MIN: ICD-10-PCS | Mod: HCNC,S$GLB,, | Performed by: INTERNAL MEDICINE

## 2020-12-02 PROCEDURE — 93005 ELECTROCARDIOGRAM TRACING: CPT | Mod: HCNC,S$GLB,, | Performed by: INTERNAL MEDICINE

## 2020-12-02 PROCEDURE — 99999 PR PBB SHADOW E&M-EST. PATIENT-LVL IV: ICD-10-PCS | Mod: PBBFAC,HCNC,, | Performed by: INTERNAL MEDICINE

## 2020-12-02 PROCEDURE — 93005 EKG 12-LEAD: ICD-10-PCS | Mod: HCNC,S$GLB,, | Performed by: INTERNAL MEDICINE

## 2020-12-02 PROCEDURE — 3078F PR MOST RECENT DIASTOLIC BLOOD PRESSURE < 80 MM HG: ICD-10-PCS | Mod: HCNC,CPTII,S$GLB, | Performed by: INTERNAL MEDICINE

## 2020-12-02 PROCEDURE — 99214 OFFICE O/P EST MOD 30 MIN: CPT | Mod: HCNC,S$GLB,, | Performed by: INTERNAL MEDICINE

## 2020-12-02 PROCEDURE — 3074F SYST BP LT 130 MM HG: CPT | Mod: HCNC,CPTII,S$GLB, | Performed by: INTERNAL MEDICINE

## 2020-12-02 PROCEDURE — 99999 PR PBB SHADOW E&M-EST. PATIENT-LVL IV: CPT | Mod: PBBFAC,HCNC,, | Performed by: INTERNAL MEDICINE

## 2020-12-02 PROCEDURE — 93010 ELECTROCARDIOGRAM REPORT: CPT | Mod: HCNC,S$GLB,, | Performed by: INTERNAL MEDICINE

## 2020-12-02 RX ORDER — ASPIRIN 81 MG/1
81 TABLET ORAL DAILY
Start: 2020-12-02 | End: 2022-12-16

## 2020-12-02 NOTE — PROGRESS NOTES
Subjective:   Patient ID:  Alexandra Goins is a 55 y.o. female who presents for follow up of Dizziness      54 yo female, came in for 3 months f/u  PMH PVCs, h/o syncope, HTN, HLD, h/o CVA, DM II, asthma, GERD, bipolar disorder, family h/o premature CAD, and tobacco. As wellbas brain anuerysm   In , episode of syncope. Jerking, myoclonic, HR dropped to 40 bpm. Family member did CPR, called EMR and added ambu bag for breathing.   Sent to [facility] and Neurology consult did not feel this was consistent with a CVA. MRI MRA were unremarkable. MRA of the brain revealed small aneurysm that is not new per family. Additional work-up including chemistries, blood glucose, thyroid studies, CBC, syphilis work-up, respiratory viral panel including COVID-19, EEG, and echo were unrevealing.  Seizure could be a possible diagnosis, neurology recommends close follow-up in the outpatient setting to monitor for any further episodes.  Troponin negative   ECHO normal EF  Had similar episode 1 yr ago.   Few faint/syncope episodes and had hand injury     MPI no ischemia and ZIOPATCH showed 1% PVCs  Last syncope episode of staring for 10 minutes with hands shaking in . Standing up and no collapsed. Could sit and BP/HR low. Recovered spontaneously and only confused for 30 seconds\  No chest pain   SOB due to smoking  Today EKG NSR        04/26/2021 held Repatha due to side effect      Past Medical History:   Diagnosis Date    Acute ischemic stroke 9/17/2019    Aneurysm     Anxiety     Arthritis     Asthma     Bipolar 1 disorder     Cerebral aneurysm, nonruptured 9/19/2019    Depression     Diabetes mellitus, type 2     Diverticular disease     GERD (gastroesophageal reflux disease)     Hyperlipemia     Hypertension     Hypothyroidism     Pneumonia     Renal manifestation of secondary diabetes mellitus     Right-sided back pain 6/29/2019    Stroke     Trouble in sleeping        Past Surgical History:    Procedure Laterality Date    CEREBRAL ANGIOGRAM N/A 10/28/2019    Procedure: ANGIOGRAM-CEREBRAL;  Surgeon: Dahiana Diagnostic Provider;  [facility];  Service: Radiology;  Laterality: N/A;  Rm 190/Aayuhs    CHOLECYSTECTOMY      COLON SURGERY      COLONOSCOPY N/A 1/12/2018    Procedure: COLONOSCOPY;  Surgeon: Roque Mahajan III, MD;  Location: [facility];  Service: Endoscopy;  Laterality: N/A;    DENTAL SURGERY  05/21/2018    removal of 8 top teeth    HYSTERECTOMY      TONSILLECTOMY         Social History     Tobacco Use    Smoking status: Current Every Day Smoker     Packs/day: 0.50     Years: 35.00     Pack years: 17.50     Types: Cigarettes, Vaping w/o nicotine    Smokeless tobacco: Never Used   Substance Use Topics    Alcohol use: Yes     Comment: occassionally    Drug use: Yes     Types: Marijuana       Family History   Problem Relation Age of Onset    Alcohol abuse Mother     COPD Mother     Depression Mother     Drug abuse Mother     Alcohol abuse Father     Arthritis Father     Cancer Father     Heart disease Father     Hypertension Father     COPD Sister     Kidney failure Sister     Heart disease Brother     Hypertension Brother     Cancer Maternal Aunt     Cancer Maternal Uncle     Diabetes Maternal Uncle     Drug abuse Maternal Uncle     Cancer Paternal Aunt     Cancer Paternal Uncle     Arthritis Maternal Grandmother     Hypertension Maternal Grandmother     Breast cancer Maternal Grandmother     Arthritis Paternal Grandmother     Cancer Paternal Grandmother     Hypertension Paternal Grandfather          Review of Systems   Constitution: Negative for decreased appetite, diaphoresis, fever, malaise/fatigue and night sweats.   HENT: Negative for nosebleeds.    Eyes: Negative for blurred vision and double vision.   Cardiovascular: Positive for chest pain and syncope. Negative for claudication, dyspnea on exertion, irregular heartbeat, leg swelling,  near-syncope, palpitations and paroxysmal nocturnal dyspnea.   Respiratory: Negative for cough, shortness of breath, sleep disturbances due to breathing, snoring, sputum production and wheezing.    Endocrine: Negative for cold intolerance and polyuria.   Hematologic/Lymphatic: Does not bruise/bleed easily.   Skin: Negative for rash.   Musculoskeletal: Negative for back pain, falls, joint pain, joint swelling and neck pain.   Gastrointestinal: Negative for abdominal pain, heartburn, nausea and vomiting.   Genitourinary: Negative for dysuria, frequency and hematuria.   Neurological: Positive for dizziness. Negative for difficulty with concentration, focal weakness, headaches, light-headedness, numbness, seizures and weakness.   Psychiatric/Behavioral: Negative for depression, memory loss and substance abuse. The patient does not have insomnia.    Allergic/Immunologic: Negative for HIV exposure and hives.       Objective:   Physical Exam   Constitutional: She is oriented to person, place, and time. She appears well-nourished.   HENT:   Head: Normocephalic.   Eyes: Pupils are equal, round, and reactive to light.   Neck: Normal carotid pulses and no JVD present. Carotid bruit is not present. No thyromegaly present.   Cardiovascular: Normal rate, regular rhythm, normal heart sounds and normal pulses.  No extrasystoles are present. PMI is not displaced. Exam reveals no gallop and no S3.   No murmur heard.  Pulses:       Carotid pulses are 2+ on the right side and 2+ on the left side.  Pulmonary/Chest: Breath sounds normal. No stridor. No respiratory distress.   Abdominal: Soft. Bowel sounds are normal. There is no abdominal tenderness. There is no rebound.   Musculoskeletal: Normal range of motion.   Neurological: She is alert and oriented to person, place, and time.   Skin: Skin is intact. No rash noted.   Psychiatric: Her behavior is normal.       Lab Results   Component Value Date    CHOL 213 (H) 01/28/2020    CHOL 219  (H) 09/17/2019    CHOL 236 (H) 04/10/2019     Lab Results   Component Value Date    HDL 74 01/28/2020    HDL 69 09/17/2019    HDL 44 04/10/2019     Lab Results   Component Value Date    LDLCALC 110.4 01/28/2020    LDLCALC 95.4 09/17/2019    LDLCALC 134.8 04/10/2019     Lab Results   Component Value Date    TRIG 143 01/28/2020    TRIG 273 (H) 09/17/2019    TRIG 286 (H) 04/10/2019     Lab Results   Component Value Date    CHOLHDL 34.7 01/28/2020    CHOLHDL 31.5 09/17/2019    CHOLHDL 18.6 (L) 04/10/2019       Chemistry        Component Value Date/Time     01/28/2020 0548    K 4.2 01/28/2020 0548     01/28/2020 0548    CO2 23 01/28/2020 0548    BUN 13 01/28/2020 0548    CREATININE 0.8 01/28/2020 0548     (H) 01/28/2020 0548        Component Value Date/Time    CALCIUM 8.9 01/28/2020 0548    ALKPHOS 77 01/27/2020 1615    AST 27 01/27/2020 1615    ALT 35 01/27/2020 1615    BILITOT 0.3 01/27/2020 1615    ESTGFRAFRICA >60 01/28/2020 0548    EGFRNONAA >60 01/28/2020 0548          Lab Results   Component Value Date    HGBA1C 9.4 (H) 08/24/2020     Lab Results   Component Value Date    TSH 2.531 01/21/2020     Lab Results   Component Value Date    INR 1.0 10/28/2019    INR 1.0 09/17/2019    INR 1.0 06/21/2018     Lab Results   Component Value Date    WBC 11.41 01/27/2020    HGB 13.1 01/27/2020    HCT 41.9 01/27/2020    MCV 96 01/27/2020     01/27/2020     BMP  Sodium   Date Value Ref Range Status   01/28/2020 139 136 - 145 mmol/L Final     Potassium   Date Value Ref Range Status   01/28/2020 4.2 3.5 - 5.1 mmol/L Final     Chloride   Date Value Ref Range Status   01/28/2020 109 95 - 110 mmol/L Final     CO2   Date Value Ref Range Status   01/28/2020 23 23 - 29 mmol/L Final     BUN   Date Value Ref Range Status   01/28/2020 13 6 - 20 mg/dL Final     Creatinine   Date Value Ref Range Status   01/28/2020 0.8 0.5 - 1.4 mg/dL Final     Calcium   Date Value Ref Range Status   01/28/2020 8.9 8.7 - 10.5 mg/dL  Final     Anion Gap   Date Value Ref Range Status   01/28/2020 7 (L) 8 - 16 mmol/L Final     eGFR if    Date Value Ref Range Status   01/28/2020 >60 >60 mL/min/1.73 m^2 Final     eGFR if non    Date Value Ref Range Status   01/28/2020 >60 >60 mL/min/1.73 m^2 Final     Comment:     Calculation used to obtain the estimated glomerular filtration  rate (eGFR) is the CKD-EPI equation.        BNP  @LABRCNTIP(BNP,BNPTRIAGEBLO)@  @LABRCNTIP(troponini)@  CrCl cannot be calculated (Patient's most recent lab result is older than the maximum 7 days allowed.).  No results found in the last 24 hours.  No results found in the last 24 hours.  No results found in the last 24 hours.    Assessment:      1. Cerebrovascular accident (CVA), unspecified mechanism    2. Essential hypertension    3. Hyperlipidemia, unspecified hyperlipidemia type    4. Dyslipidemia associated with type 2 diabetes mellitus    5. Type 2 diabetes mellitus with other specified complication, with long-term current use of insulin    6. SOB (shortness of breath)    7. Statin intolerance      Non cardiac related mental change    Plan:   Add ASA 81mg  Continue TOprolXl and losartan  F/u neurology at [facility]  Smoking cessation  Add Repatha     DM Rx per PCP  Counseled DASH  Check Lipid profile in 6 months  Recommend heart-healthy diet, weight control and regular exercise.  [place]. Risk modification.   I have reviewed all pertinent labs and cardiac studies independently. Plans and recommendations have been formulated under my direct supervision. All questions answered and patient voiced understanding.   If symptoms persist go to the ED  RTC in 6 months

## 2020-12-03 ENCOUNTER — PATIENT MESSAGE (OUTPATIENT)
Dept: CARDIOLOGY | Facility: CLINIC | Age: 55
End: 2020-12-03

## 2020-12-04 ENCOUNTER — OFFICE VISIT (OUTPATIENT)
Dept: PSYCHIATRY | Facility: CLINIC | Age: 55
End: 2020-12-04
Payer: MEDICARE

## 2020-12-04 DIAGNOSIS — F31.9 BIPOLAR 1 DISORDER: Primary | ICD-10-CM

## 2020-12-04 DIAGNOSIS — F41.9 ANXIETY: ICD-10-CM

## 2020-12-04 DIAGNOSIS — G47.00 INSOMNIA, UNSPECIFIED TYPE: ICD-10-CM

## 2020-12-04 PROCEDURE — 99499 UNLISTED E&M SERVICE: CPT | Mod: 95,,, | Performed by: PSYCHIATRY & NEUROLOGY

## 2020-12-04 PROCEDURE — 99499 RISK ADDL DX/OHS AUDIT: ICD-10-PCS | Mod: 95,,, | Performed by: PSYCHIATRY & NEUROLOGY

## 2020-12-04 PROCEDURE — 99213 OFFICE O/P EST LOW 20 MIN: CPT | Mod: HCNC,95,, | Performed by: PSYCHIATRY & NEUROLOGY

## 2020-12-04 PROCEDURE — 99213 PR OFFICE/OUTPT VISIT, EST, LEVL III, 20-29 MIN: ICD-10-PCS | Mod: HCNC,95,, | Performed by: PSYCHIATRY & NEUROLOGY

## 2020-12-04 RX ORDER — TOPIRAMATE 200 MG/1
TABLET ORAL
Qty: 60 TABLET | Refills: 2 | Status: SHIPPED | OUTPATIENT
Start: 2020-12-04 | End: 2021-03-01

## 2020-12-04 RX ORDER — VENLAFAXINE HYDROCHLORIDE 75 MG/1
150 CAPSULE, EXTENDED RELEASE ORAL DAILY
Qty: 60 CAPSULE | Refills: 2 | Status: SHIPPED | OUTPATIENT
Start: 2020-12-04 | End: 2021-03-01

## 2020-12-04 RX ORDER — MIRTAZAPINE 15 MG/1
TABLET, FILM COATED ORAL
Qty: 60 TABLET | Refills: 2 | Status: SHIPPED | OUTPATIENT
Start: 2020-12-04 | End: 2021-03-01 | Stop reason: SDUPTHER

## 2020-12-04 RX ORDER — DIAZEPAM 10 MG/1
10 TABLET ORAL 3 TIMES DAILY PRN
Qty: 90 TABLET | Refills: 2 | Status: SHIPPED | OUTPATIENT
Start: 2020-12-04 | End: 2021-03-01 | Stop reason: SDUPTHER
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
## 2020-12-07 ENCOUNTER — TELEPHONE (OUTPATIENT)
Dept: FAMILY MEDICINE | Facility: CLINIC | Age: 55
End: 2020-12-07

## 2020-12-07 RX ORDER — NYSTATIN 100000 [USP'U]/ML
6 SUSPENSION ORAL 4 TIMES DAILY
Qty: 240 ML | Refills: 0 | Status: SHIPPED | OUTPATIENT
Start: 2020-12-07 | End: 2020-12-17

## 2020-12-07 NOTE — TELEPHONE ENCOUNTER
----- Message from Bernie Ahumada sent at 12/7/2020 12:58 PM CST -----  Type:  Needs Medical Advice    Who Called:  Pt  Alexandra   Symptoms (please be specific):  Pt states she has Thrush in her mouth and would like to have med sent in//   How long has patient had these symptoms:     Pharmacy name and phone #:     [facility] in [place]   Would the patient rather a call back or a response via [facility]?    Callback   Best Call Back Number:   [phone]  Additional Information:   please call//thanks/[facility]

## 2020-12-09 ENCOUNTER — TELEPHONE (OUTPATIENT)
Dept: NEUROSURGERY | Facility: CLINIC | Age: 55
End: 2020-12-09

## 2020-12-09 DIAGNOSIS — I67.1 CEREBRAL ANEURYSM: Primary | ICD-10-CM

## 2020-12-10 RX ORDER — BUTALBITAL, ACETAMINOPHEN AND CAFFEINE 50; 325; 40 MG/1; MG/1; MG/1
1 TABLET ORAL EVERY 4 HOURS PRN
Qty: 30 TABLET | Refills: 0 | Status: SHIPPED | OUTPATIENT
Start: 2020-12-10 | End: 2021-02-18 | Stop reason: SDUPTHER

## 2020-12-10 NOTE — TELEPHONE ENCOUNTER
----- Message from Justin Sutherland sent at 12/10/2020 12:23 PM CST -----  Contact: self  Type:  RX Refill Request    Who Called: Alexandra Goins   Refill or New Rx:refill  RX Name and Strength:Ferrocet -40 mg  How is the patient currently taking it? (ex. 1XDay):as needed  Is this a 30 day or 90 day RX:30  Preferred Pharmacy with phone number:  [facility] [address] [place], [place] - [address] [place] [address]  [address] [place] [address]  [place] [address]  Phone: [phone] Fax: [phone]  Local or Mail Order:local  Ordering Provider:Oh  Would the patient rather a call back or a response via [facility]? Call back  Best Call Back Number:[phone]  Additional Information:

## 2020-12-11 ENCOUNTER — PATIENT MESSAGE (OUTPATIENT)
Dept: OTHER | Facility: OTHER | Age: 55
End: 2020-12-11

## 2020-12-15 ENCOUNTER — SPECIALTY PHARMACY (OUTPATIENT)
Dept: PHARMACY | Facility: CLINIC | Age: 55
End: 2020-12-15

## 2020-12-29 ENCOUNTER — TELEPHONE (OUTPATIENT)
Dept: FAMILY MEDICINE | Facility: CLINIC | Age: 55
End: 2020-12-29

## 2020-12-29 DIAGNOSIS — Z78.9 STATIN INTOLERANCE: ICD-10-CM

## 2020-12-29 DIAGNOSIS — E78.5 HYPERLIPIDEMIA, UNSPECIFIED HYPERLIPIDEMIA TYPE: ICD-10-CM

## 2020-12-29 DIAGNOSIS — I63.9 CEREBROVASCULAR ACCIDENT (CVA), UNSPECIFIED MECHANISM: ICD-10-CM

## 2020-12-29 RX ORDER — GABAPENTIN 600 MG/1
TABLET ORAL
Qty: 90 TABLET | Refills: 0 | Status: SHIPPED | OUTPATIENT
Start: 2020-12-29 | End: 2020-12-30 | Stop reason: SDUPTHER

## 2020-12-29 NOTE — TELEPHONE ENCOUNTER
Needs it called in pb      ----- Message from Hoa Shirley sent at 12/29/2020 11:10 AM CST -----  Pt is calling regarding needing prescription repatha 140 mg. Insurance company has approved. Pt would like to speak with office about medication G[place]in. Please call back at [phone]          [facility] [place] - [address] [facility]  [address] [place] [facility]  [place] [address]  Phone: [phone] Fax: [phone]

## 2020-12-29 NOTE — TELEPHONE ENCOUNTER
----- Message from Stepheniedipak De La Garza sent at 12/29/2020 11:25 AM CST -----  Contact: pt  Pt requesting a call back regarding her prescription for gabapentin. She is needing a refill but she would like to speak to nurse about the strength. Please call pt back at [phone]

## 2020-12-29 NOTE — TELEPHONE ENCOUNTER
S/w pt. Pt stated that she takes gabapentin 600mg TID but her last dose usually doesn't hold her off through the night and she wakes up in a lot of pain in hands/feet/legs. Pt is wanting to know if the dosage can be increased in mg and for her to still take it 3 times daily. Told pt that I would discuss with Dr. Casiano and return her call. Pt verbalized understanding.

## 2020-12-30 RX ORDER — GABAPENTIN 600 MG/1
TABLET ORAL
Qty: 120 TABLET | Refills: 0 | Status: SHIPPED | OUTPATIENT
Start: 2020-12-30 | End: 2021-02-18 | Stop reason: SDUPTHER

## 2020-12-30 NOTE — TELEPHONE ENCOUNTER
Notified pt of new rx directions. She stated that she picked up rx for gabapentin but it was only for disp #90. Informed pt that she picked up her rx with the previous directions. Told pt that I would contact pharmacy to see if the remainder #30 tablets can be given today since she just picked up the rx for disp  #90. Told pt that I would return her call.

## 2020-12-30 NOTE — TELEPHONE ENCOUNTER
Sent in refill with new sig:  Take 2 at bedtime, 1 tablet in morning and afternoon.  Rx adjusted accordingly

## 2021-01-11 ENCOUNTER — HOSPITAL ENCOUNTER (OUTPATIENT)
Dept: RADIOLOGY | Facility: HOSPITAL | Age: 56
Discharge: HOME | End: 2021-01-11
Attending: NEUROLOGICAL SURGERY
Payer: MEDICARE

## 2021-01-11 ENCOUNTER — OFFICE VISIT (OUTPATIENT)
Dept: NEUROSURGERY | Facility: CLINIC | Age: 56
End: 2021-01-11
Payer: MEDICARE

## 2021-01-11 VITALS
SYSTOLIC BLOOD PRESSURE: 133 MMHG | BODY MASS INDEX: 29.89 KG/M2 | WEIGHT: 186 LBS | TEMPERATURE: 97 F | HEART RATE: 89 BPM | DIASTOLIC BLOOD PRESSURE: 78 MMHG | HEIGHT: 66 IN

## 2021-01-11 DIAGNOSIS — I67.1 CEREBRAL ANEURYSM: Primary | ICD-10-CM

## 2021-01-11 DIAGNOSIS — I67.1 CEREBRAL ANEURYSM: ICD-10-CM

## 2021-01-11 PROCEDURE — 99999 PR PBB SHADOW E&M-EST. PATIENT-LVL IV: ICD-10-PCS | Mod: PBBFAC,HCNC,, | Performed by: NEUROLOGICAL SURGERY

## 2021-01-11 PROCEDURE — 70544 MR ANGIOGRAPHY HEAD W/O DYE: CPT | Mod: TC,HCNC

## 2021-01-11 PROCEDURE — 3075F PR MOST RECENT SYSTOLIC BLOOD PRESS GE 130-139MM HG: ICD-10-PCS | Mod: HCNC,CPTII,S$GLB, | Performed by: NEUROLOGICAL SURGERY

## 2021-01-11 PROCEDURE — 99999 PR PBB SHADOW E&M-EST. PATIENT-LVL IV: CPT | Mod: PBBFAC,HCNC,, | Performed by: NEUROLOGICAL SURGERY

## 2021-01-11 PROCEDURE — 70544 MRA BRAIN WITHOUT CONTRAST: ICD-10-PCS | Mod: 26,HCNC,, | Performed by: RADIOLOGY

## 2021-01-11 PROCEDURE — 3075F SYST BP GE 130 - 139MM HG: CPT | Mod: HCNC,CPTII,S$GLB, | Performed by: NEUROLOGICAL SURGERY

## 2021-01-11 PROCEDURE — 1126F PR PAIN SEVERITY QUANTIFIED, NO PAIN PRESENT: ICD-10-PCS | Mod: HCNC,S$GLB,, | Performed by: NEUROLOGICAL SURGERY

## 2021-01-11 PROCEDURE — 99499 UNLISTED E&M SERVICE: CPT | Mod: HCNC,S$GLB,, | Performed by: NEUROLOGICAL SURGERY

## 2021-01-11 PROCEDURE — 99213 PR OFFICE/OUTPT VISIT, EST, LEVL III, 20-29 MIN: ICD-10-PCS | Mod: HCNC,S$GLB,, | Performed by: NEUROLOGICAL SURGERY

## 2021-01-11 PROCEDURE — 99213 OFFICE O/P EST LOW 20 MIN: CPT | Mod: HCNC,S$GLB,, | Performed by: NEUROLOGICAL SURGERY

## 2021-01-11 PROCEDURE — 99499 RISK ADDL DX/OHS AUDIT: ICD-10-PCS | Mod: HCNC,S$GLB,, | Performed by: NEUROLOGICAL SURGERY

## 2021-01-11 PROCEDURE — 3078F PR MOST RECENT DIASTOLIC BLOOD PRESSURE < 80 MM HG: ICD-10-PCS | Mod: HCNC,CPTII,S$GLB, | Performed by: NEUROLOGICAL SURGERY

## 2021-01-11 PROCEDURE — 3078F DIAST BP <80 MM HG: CPT | Mod: HCNC,CPTII,S$GLB, | Performed by: NEUROLOGICAL SURGERY

## 2021-01-11 PROCEDURE — 3008F PR BODY MASS INDEX (BMI) DOCUMENTED: ICD-10-PCS | Mod: HCNC,CPTII,S$GLB, | Performed by: NEUROLOGICAL SURGERY

## 2021-01-11 PROCEDURE — 1126F AMNT PAIN NOTED NONE PRSNT: CPT | Mod: HCNC,S$GLB,, | Performed by: NEUROLOGICAL SURGERY

## 2021-01-11 PROCEDURE — 3008F BODY MASS INDEX DOCD: CPT | Mod: HCNC,CPTII,S$GLB, | Performed by: NEUROLOGICAL SURGERY

## 2021-01-11 PROCEDURE — 70544 MR ANGIOGRAPHY HEAD W/O DYE: CPT | Mod: 26,HCNC,, | Performed by: RADIOLOGY

## 2021-01-11 RX ORDER — MAGNESIUM 200 MG
1 TABLET ORAL DAILY
COMMUNITY
End: 2021-02-11

## 2021-01-11 RX ORDER — MECLIZINE HYDROCHLORIDE 25 MG/1
TABLET ORAL
COMMUNITY
Start: 2021-01-03 | End: 2021-08-25

## 2021-01-13 ENCOUNTER — SPECIALTY PHARMACY (OUTPATIENT)
Dept: PHARMACY | Facility: CLINIC | Age: 56
End: 2021-01-13

## 2021-01-13 ENCOUNTER — PATIENT MESSAGE (OUTPATIENT)
Dept: NEUROSURGERY | Facility: CLINIC | Age: 56
End: 2021-01-13

## 2021-02-04 ENCOUNTER — SPECIALTY PHARMACY (OUTPATIENT)
Dept: PHARMACY | Facility: CLINIC | Age: 56
End: 2021-02-04

## 2021-02-05 RX ORDER — CALCIUM CITRATE/VITAMIN D3 200MG-6.25
TABLET ORAL
Qty: 100 STRIP | Refills: 11 | Status: SHIPPED | OUTPATIENT
Start: 2021-02-05 | End: 2021-02-08 | Stop reason: SDUPTHER

## 2021-02-08 RX ORDER — CALCIUM CITRATE/VITAMIN D3 200MG-6.25
TABLET ORAL
Qty: 100 STRIP | Refills: 3 | Status: SHIPPED | OUTPATIENT
Start: 2021-02-08 | End: 2021-11-10 | Stop reason: SDUPTHER

## 2021-02-11 ENCOUNTER — OFFICE VISIT (OUTPATIENT)
Dept: FAMILY MEDICINE | Facility: CLINIC | Age: 56
End: 2021-02-11
Payer: MEDICARE

## 2021-02-11 ENCOUNTER — HOSPITAL ENCOUNTER (OUTPATIENT)
Facility: HOSPITAL | Age: 56
Discharge: LEFT BEFORE DISCHARGE | End: 2021-02-12
Attending: FAMILY MEDICINE | Admitting: INTERNAL MEDICINE
Payer: MEDICARE

## 2021-02-11 DIAGNOSIS — Z72.0 TOBACCO ABUSE: ICD-10-CM

## 2021-02-11 DIAGNOSIS — I10 ESSENTIAL HYPERTENSION: ICD-10-CM

## 2021-02-11 DIAGNOSIS — J96.01 ACUTE RESPIRATORY FAILURE WITH HYPOXIA: Primary | ICD-10-CM

## 2021-02-11 DIAGNOSIS — R07.9 CHEST PAIN: ICD-10-CM

## 2021-02-11 DIAGNOSIS — Z86.69 HX OF MIGRAINE HEADACHES: ICD-10-CM

## 2021-02-11 DIAGNOSIS — E11.8 TYPE 2 DIABETES MELLITUS WITH COMPLICATION, WITH LONG-TERM CURRENT USE OF INSULIN: ICD-10-CM

## 2021-02-11 DIAGNOSIS — R06.02 SOB (SHORTNESS OF BREATH): ICD-10-CM

## 2021-02-11 DIAGNOSIS — I72.5 BASILAR ARTERY ANEURYSM: ICD-10-CM

## 2021-02-11 DIAGNOSIS — I95.9 HYPOTENSION, UNSPECIFIED HYPOTENSION TYPE: ICD-10-CM

## 2021-02-11 DIAGNOSIS — R06.02 SOB (SHORTNESS OF BREATH): Primary | ICD-10-CM

## 2021-02-11 DIAGNOSIS — F31.9 BIPOLAR AFFECTIVE DISORDER, REMISSION STATUS UNSPECIFIED: ICD-10-CM

## 2021-02-11 DIAGNOSIS — Z79.4 TYPE 2 DIABETES MELLITUS WITH COMPLICATION, WITH LONG-TERM CURRENT USE OF INSULIN: ICD-10-CM

## 2021-02-11 DIAGNOSIS — E11.42 DIABETIC POLYNEUROPATHY ASSOCIATED WITH TYPE 2 DIABETES MELLITUS: ICD-10-CM

## 2021-02-11 DIAGNOSIS — E03.4 HYPOTHYROIDISM DUE TO ACQUIRED ATROPHY OF THYROID: ICD-10-CM

## 2021-02-11 PROBLEM — E83.42 HYPOMAGNESEMIA: Status: ACTIVE | Noted: 2021-02-11

## 2021-02-11 PROBLEM — J44.1 COPD EXACERBATION: Status: ACTIVE | Noted: 2021-02-11

## 2021-02-11 PROBLEM — Z86.73 HISTORY OF STROKE: Status: ACTIVE | Noted: 2021-02-11

## 2021-02-11 PROBLEM — E87.20 LACTIC ACIDOSIS: Status: ACTIVE | Noted: 2021-02-11

## 2021-02-11 LAB
ALBUMIN SERPL BCP-MCNC: 3.6 G/DL (ref 3.5–5.2)
ALP SERPL-CCNC: 91 U/L (ref 55–135)
ALT SERPL W/O P-5'-P-CCNC: 52 U/L (ref 10–44)
ANION GAP SERPL CALC-SCNC: 8 MMOL/L (ref 8–16)
AST SERPL-CCNC: 59 U/L (ref 10–40)
BACTERIA #/AREA URNS HPF: ABNORMAL /HPF
BASOPHILS # BLD AUTO: 0.08 K/UL (ref 0–0.2)
BASOPHILS NFR BLD: 0.7 % (ref 0–1.9)
BILIRUB SERPL-MCNC: 0.2 MG/DL (ref 0.1–1)
BILIRUB UR QL STRIP: NEGATIVE
BNP SERPL-MCNC: 12 PG/ML (ref 0–99)
BUN SERPL-MCNC: 31 MG/DL (ref 6–20)
CALCIUM SERPL-MCNC: 9 MG/DL (ref 8.7–10.5)
CHLORIDE SERPL-SCNC: 106 MMOL/L (ref 95–110)
CLARITY UR: CLEAR
CO2 SERPL-SCNC: 27 MMOL/L (ref 23–29)
COLOR UR: YELLOW
CREAT SERPL-MCNC: 1 MG/DL (ref 0.5–1.4)
D DIMER PPP IA.FEU-MCNC: 0.35 MG/L FEU
DIFFERENTIAL METHOD: ABNORMAL
EOSINOPHIL # BLD AUTO: 0.5 K/UL (ref 0–0.5)
EOSINOPHIL NFR BLD: 3.8 % (ref 0–8)
ERYTHROCYTE [DISTWIDTH] IN BLOOD BY AUTOMATED COUNT: 13.9 % (ref 11.5–14.5)
EST. GFR  (AFRICAN AMERICAN): >60 ML/MIN/1.73 M^2
EST. GFR  (NON AFRICAN AMERICAN): >60 ML/MIN/1.73 M^2
GLUCOSE SERPL-MCNC: 230 MG/DL (ref 70–110)
GLUCOSE UR QL STRIP: NEGATIVE
HCT VFR BLD AUTO: 40.9 % (ref 37–48.5)
HCV AB SERPL QL IA: NEGATIVE
HGB BLD-MCNC: 13 G/DL (ref 12–16)
HGB UR QL STRIP: NEGATIVE
HIV 1+2 AB+HIV1 P24 AG SERPL QL IA: NEGATIVE
IMM GRANULOCYTES # BLD AUTO: 0.05 K/UL (ref 0–0.04)
IMM GRANULOCYTES NFR BLD AUTO: 0.4 % (ref 0–0.5)
INFLUENZA A, MOLECULAR: NEGATIVE
INFLUENZA B, MOLECULAR: NEGATIVE
KETONES UR QL STRIP: NEGATIVE
LACTATE SERPL-SCNC: 2.3 MMOL/L (ref 0.5–2.2)
LACTATE SERPL-SCNC: 2.4 MMOL/L (ref 0.5–2.2)
LEUKOCYTE ESTERASE UR QL STRIP: ABNORMAL
LYMPHOCYTES # BLD AUTO: 4.4 K/UL (ref 1–4.8)
LYMPHOCYTES NFR BLD: 37.1 % (ref 18–48)
MAGNESIUM SERPL-MCNC: 1.5 MG/DL (ref 1.6–2.6)
MCH RBC QN AUTO: 30.7 PG (ref 27–31)
MCHC RBC AUTO-ENTMCNC: 31.8 G/DL (ref 32–36)
MCV RBC AUTO: 97 FL (ref 82–98)
MICROSCOPIC COMMENT: ABNORMAL
MONOCYTES # BLD AUTO: 0.8 K/UL (ref 0.3–1)
MONOCYTES NFR BLD: 6.7 % (ref 4–15)
NEUTROPHILS # BLD AUTO: 6 K/UL (ref 1.8–7.7)
NEUTROPHILS NFR BLD: 51.3 % (ref 38–73)
NITRITE UR QL STRIP: NEGATIVE
NRBC BLD-RTO: 0 /100 WBC
PH UR STRIP: 6 [PH] (ref 5–8)
PLATELET # BLD AUTO: 236 K/UL (ref 150–350)
PMV BLD AUTO: 10.2 FL (ref 9.2–12.9)
POCT GLUCOSE: 257 MG/DL (ref 70–110)
POTASSIUM SERPL-SCNC: 3.7 MMOL/L (ref 3.5–5.1)
PROCALCITONIN SERPL IA-MCNC: 0.15 NG/ML
PROT SERPL-MCNC: 6.8 G/DL (ref 6–8.4)
PROT UR QL STRIP: NEGATIVE
RBC # BLD AUTO: 4.23 M/UL (ref 4–5.4)
SARS-COV-2 RDRP RESP QL NAA+PROBE: NEGATIVE
SODIUM SERPL-SCNC: 141 MMOL/L (ref 136–145)
SP GR UR STRIP: 1.02 (ref 1–1.03)
SPECIMEN SOURCE: NORMAL
TROPONIN I SERPL DL<=0.01 NG/ML-MCNC: <0.006 NG/ML (ref 0–0.03)
URN SPEC COLLECT METH UR: ABNORMAL
UROBILINOGEN UR STRIP-ACNC: NEGATIVE EU/DL
WBC # BLD AUTO: 11.75 K/UL (ref 3.9–12.7)
WBC #/AREA URNS HPF: 2 /HPF (ref 0–5)

## 2021-02-11 PROCEDURE — 63600175 PHARM REV CODE 636 W HCPCS: Performed by: FAMILY MEDICINE

## 2021-02-11 PROCEDURE — 93005 ELECTROCARDIOGRAM TRACING: CPT

## 2021-02-11 PROCEDURE — 25000003 PHARM REV CODE 250: Performed by: INTERNAL MEDICINE

## 2021-02-11 PROCEDURE — 81000 URINALYSIS NONAUTO W/SCOPE: CPT

## 2021-02-11 PROCEDURE — 3046F HEMOGLOBIN A1C LEVEL >9.0%: CPT | Mod: CPTII,95,, | Performed by: FAMILY MEDICINE

## 2021-02-11 PROCEDURE — 96361 HYDRATE IV INFUSION ADD-ON: CPT

## 2021-02-11 PROCEDURE — 63600175 PHARM REV CODE 636 W HCPCS: Performed by: INTERNAL MEDICINE

## 2021-02-11 PROCEDURE — 99291 CRITICAL CARE FIRST HOUR: CPT | Mod: 25

## 2021-02-11 PROCEDURE — 83605 ASSAY OF LACTIC ACID: CPT

## 2021-02-11 PROCEDURE — G0378 HOSPITAL OBSERVATION PER HR: HCPCS

## 2021-02-11 PROCEDURE — 85379 FIBRIN DEGRADATION QUANT: CPT

## 2021-02-11 PROCEDURE — 36415 COLL VENOUS BLD VENIPUNCTURE: CPT

## 2021-02-11 PROCEDURE — 96375 TX/PRO/DX INJ NEW DRUG ADDON: CPT

## 2021-02-11 PROCEDURE — 25000242 PHARM REV CODE 250 ALT 637 W/ HCPCS: Performed by: FAMILY MEDICINE

## 2021-02-11 PROCEDURE — 25000003 PHARM REV CODE 250: Performed by: FAMILY MEDICINE

## 2021-02-11 PROCEDURE — 86703 HIV-1/HIV-2 1 RESULT ANTBDY: CPT

## 2021-02-11 PROCEDURE — 84145 PROCALCITONIN (PCT): CPT

## 2021-02-11 PROCEDURE — 87502 INFLUENZA DNA AMP PROBE: CPT

## 2021-02-11 PROCEDURE — 83735 ASSAY OF MAGNESIUM: CPT

## 2021-02-11 PROCEDURE — 94640 AIRWAY INHALATION TREATMENT: CPT

## 2021-02-11 PROCEDURE — 86803 HEPATITIS C AB TEST: CPT

## 2021-02-11 PROCEDURE — 99214 OFFICE O/P EST MOD 30 MIN: CPT | Mod: 95,,, | Performed by: FAMILY MEDICINE

## 2021-02-11 PROCEDURE — 96367 TX/PROPH/DG ADDL SEQ IV INF: CPT

## 2021-02-11 PROCEDURE — 84484 ASSAY OF TROPONIN QUANT: CPT

## 2021-02-11 PROCEDURE — U0002 COVID-19 LAB TEST NON-CDC: HCPCS

## 2021-02-11 PROCEDURE — 99214 PR OFFICE/OUTPT VISIT, EST, LEVL IV, 30-39 MIN: ICD-10-PCS | Mod: 95,,, | Performed by: FAMILY MEDICINE

## 2021-02-11 PROCEDURE — 96372 THER/PROPH/DIAG INJ SC/IM: CPT | Mod: 59

## 2021-02-11 PROCEDURE — 80053 COMPREHEN METABOLIC PANEL: CPT

## 2021-02-11 PROCEDURE — 93010 ELECTROCARDIOGRAM REPORT: CPT | Mod: 76,,, | Performed by: INTERNAL MEDICINE

## 2021-02-11 PROCEDURE — 87040 BLOOD CULTURE FOR BACTERIA: CPT

## 2021-02-11 PROCEDURE — 93010 ELECTROCARDIOGRAM REPORT: CPT | Mod: ,,, | Performed by: INTERNAL MEDICINE

## 2021-02-11 PROCEDURE — 83880 ASSAY OF NATRIURETIC PEPTIDE: CPT

## 2021-02-11 PROCEDURE — 93010 EKG 12-LEAD: ICD-10-PCS | Mod: 76,,, | Performed by: INTERNAL MEDICINE

## 2021-02-11 PROCEDURE — 96365 THER/PROPH/DIAG IV INF INIT: CPT

## 2021-02-11 PROCEDURE — 85025 COMPLETE CBC W/AUTO DIFF WBC: CPT

## 2021-02-11 PROCEDURE — 83036 HEMOGLOBIN GLYCOSYLATED A1C: CPT

## 2021-02-11 PROCEDURE — 96376 TX/PRO/DX INJ SAME DRUG ADON: CPT

## 2021-02-11 PROCEDURE — 3046F PR MOST RECENT HEMOGLOBIN A1C LEVEL > 9.0%: ICD-10-PCS | Mod: CPTII,95,, | Performed by: FAMILY MEDICINE

## 2021-02-11 RX ORDER — MAGNESIUM SULFATE 1 G/100ML
1 INJECTION INTRAVENOUS ONCE
Status: COMPLETED | OUTPATIENT
Start: 2021-02-11 | End: 2021-02-11

## 2021-02-11 RX ORDER — DIPHENHYDRAMINE HCL 25 MG
25 CAPSULE ORAL EVERY 6 HOURS PRN
Status: DISCONTINUED | OUTPATIENT
Start: 2021-02-11 | End: 2021-02-12 | Stop reason: HOSPADM

## 2021-02-11 RX ORDER — METOPROLOL SUCCINATE 25 MG/1
25 TABLET, EXTENDED RELEASE ORAL DAILY
Status: DISCONTINUED | OUTPATIENT
Start: 2021-02-12 | End: 2021-02-11

## 2021-02-11 RX ORDER — LOSARTAN POTASSIUM 25 MG/1
25 TABLET ORAL DAILY
Status: DISCONTINUED | OUTPATIENT
Start: 2021-02-12 | End: 2021-02-11

## 2021-02-11 RX ORDER — MAG HYDROX/ALUMINUM HYD/SIMETH 200-200-20
30 SUSPENSION, ORAL (FINAL DOSE FORM) ORAL EVERY 6 HOURS PRN
Status: DISCONTINUED | OUTPATIENT
Start: 2021-02-11 | End: 2021-02-12 | Stop reason: HOSPADM

## 2021-02-11 RX ORDER — INSULIN ASPART 100 [IU]/ML
1-10 INJECTION, SOLUTION INTRAVENOUS; SUBCUTANEOUS
Status: DISCONTINUED | OUTPATIENT
Start: 2021-02-11 | End: 2021-02-12 | Stop reason: HOSPADM

## 2021-02-11 RX ORDER — LORAZEPAM 2 MG/ML
0.5 INJECTION INTRAMUSCULAR
Status: COMPLETED | OUTPATIENT
Start: 2021-02-11 | End: 2021-02-11

## 2021-02-11 RX ORDER — IPRATROPIUM BROMIDE AND ALBUTEROL SULFATE 2.5; .5 MG/3ML; MG/3ML
3 SOLUTION RESPIRATORY (INHALATION)
Status: COMPLETED | OUTPATIENT
Start: 2021-02-11 | End: 2021-02-11

## 2021-02-11 RX ORDER — SODIUM CHLORIDE 9 MG/ML
INJECTION, SOLUTION INTRAVENOUS CONTINUOUS
Status: DISCONTINUED | OUTPATIENT
Start: 2021-02-11 | End: 2021-02-12 | Stop reason: HOSPADM

## 2021-02-11 RX ORDER — KETOROLAC TROMETHAMINE 10 MG/1
TABLET, FILM COATED ORAL
Status: ON HOLD | COMMUNITY
Start: 2021-02-08 | End: 2022-08-02 | Stop reason: HOSPADM

## 2021-02-11 RX ORDER — DEXCHLORPHENIRAMINE MALEATE, DEXTROMETHORPHAN HBR, PHENYLEPHRINE HCL 1; 10; 5 MG/5ML; MG/5ML; MG/5ML
SYRUP ORAL
COMMUNITY
Start: 2021-02-08 | End: 2021-08-25

## 2021-02-11 RX ORDER — ALBUTEROL SULFATE 90 UG/1
AEROSOL, METERED RESPIRATORY (INHALATION)
COMMUNITY
Start: 2021-02-08 | End: 2021-08-19 | Stop reason: SDUPTHER

## 2021-02-11 RX ORDER — GUAIFENESIN 100 MG/5ML
200 SOLUTION ORAL EVERY 4 HOURS PRN
Status: DISCONTINUED | OUTPATIENT
Start: 2021-02-11 | End: 2021-02-12 | Stop reason: HOSPADM

## 2021-02-11 RX ORDER — IBUPROFEN 200 MG
24 TABLET ORAL
Status: DISCONTINUED | OUTPATIENT
Start: 2021-02-11 | End: 2021-02-12 | Stop reason: HOSPADM

## 2021-02-11 RX ORDER — DIAZEPAM 5 MG/1
10 TABLET ORAL EVERY 12 HOURS PRN
Status: DISCONTINUED | OUTPATIENT
Start: 2021-02-11 | End: 2021-02-12 | Stop reason: HOSPADM

## 2021-02-11 RX ORDER — IBUPROFEN 200 MG
16 TABLET ORAL
Status: DISCONTINUED | OUTPATIENT
Start: 2021-02-11 | End: 2021-02-12 | Stop reason: HOSPADM

## 2021-02-11 RX ORDER — ACETAMINOPHEN 325 MG/1
650 TABLET ORAL EVERY 6 HOURS PRN
Status: DISCONTINUED | OUTPATIENT
Start: 2021-02-11 | End: 2021-02-12 | Stop reason: HOSPADM

## 2021-02-11 RX ORDER — ENOXAPARIN SODIUM 100 MG/ML
40 INJECTION SUBCUTANEOUS EVERY 24 HOURS
Status: DISCONTINUED | OUTPATIENT
Start: 2021-02-11 | End: 2021-02-12 | Stop reason: HOSPADM

## 2021-02-11 RX ORDER — ASPIRIN 325 MG
325 TABLET ORAL
Status: COMPLETED | OUTPATIENT
Start: 2021-02-11 | End: 2021-02-11

## 2021-02-11 RX ORDER — GLUCAGON 1 MG
1 KIT INJECTION
Status: DISCONTINUED | OUTPATIENT
Start: 2021-02-11 | End: 2021-02-12 | Stop reason: HOSPADM

## 2021-02-11 RX ORDER — ASPIRIN 81 MG/1
81 TABLET ORAL DAILY
Status: DISCONTINUED | OUTPATIENT
Start: 2021-02-12 | End: 2021-02-12 | Stop reason: HOSPADM

## 2021-02-11 RX ORDER — IPRATROPIUM BROMIDE AND ALBUTEROL SULFATE 2.5; .5 MG/3ML; MG/3ML
3 SOLUTION RESPIRATORY (INHALATION) EVERY 6 HOURS
Status: DISCONTINUED | OUTPATIENT
Start: 2021-02-11 | End: 2021-02-12 | Stop reason: HOSPADM

## 2021-02-11 RX ORDER — TOPIRAMATE 100 MG/1
200 TABLET, FILM COATED ORAL DAILY
Status: DISCONTINUED | OUTPATIENT
Start: 2021-02-12 | End: 2021-02-12 | Stop reason: HOSPADM

## 2021-02-11 RX ORDER — ONDANSETRON 2 MG/ML
4 INJECTION INTRAMUSCULAR; INTRAVENOUS EVERY 8 HOURS PRN
Status: DISCONTINUED | OUTPATIENT
Start: 2021-02-11 | End: 2021-02-11

## 2021-02-11 RX ORDER — VENLAFAXINE HYDROCHLORIDE 75 MG/1
150 CAPSULE, EXTENDED RELEASE ORAL DAILY
Status: DISCONTINUED | OUTPATIENT
Start: 2021-02-12 | End: 2021-02-12 | Stop reason: HOSPADM

## 2021-02-11 RX ORDER — GABAPENTIN 300 MG/1
600 CAPSULE ORAL 2 TIMES DAILY
Status: DISCONTINUED | OUTPATIENT
Start: 2021-02-12 | End: 2021-02-12 | Stop reason: HOSPADM

## 2021-02-11 RX ADMIN — INSULIN ASPART 3 UNITS: 100 INJECTION, SOLUTION INTRAVENOUS; SUBCUTANEOUS at 09:02

## 2021-02-11 RX ADMIN — DIAZEPAM 10 MG: 5 TABLET ORAL at 09:02

## 2021-02-11 RX ADMIN — SODIUM CHLORIDE, SODIUM LACTATE, POTASSIUM CHLORIDE, AND CALCIUM CHLORIDE 2670 ML: .6; .31; .03; .02 INJECTION, SOLUTION INTRAVENOUS at 05:02

## 2021-02-11 RX ADMIN — IPRATROPIUM BROMIDE AND ALBUTEROL SULFATE 3 ML: .5; 3 SOLUTION RESPIRATORY (INHALATION) at 06:02

## 2021-02-11 RX ADMIN — METHYLPREDNISOLONE SODIUM SUCCINATE 40 MG: 40 INJECTION, POWDER, FOR SOLUTION INTRAMUSCULAR; INTRAVENOUS at 09:02

## 2021-02-11 RX ADMIN — CEFTRIAXONE SODIUM 1 G: 1 INJECTION, POWDER, FOR SOLUTION INTRAMUSCULAR; INTRAVENOUS at 05:02

## 2021-02-11 RX ADMIN — IPRATROPIUM BROMIDE AND ALBUTEROL SULFATE 3 ML: .5; 3 SOLUTION RESPIRATORY (INHALATION) at 04:02

## 2021-02-11 RX ADMIN — LORAZEPAM 0.5 MG: 2 INJECTION INTRAMUSCULAR; INTRAVENOUS at 06:02

## 2021-02-11 RX ADMIN — MAGNESIUM SULFATE 1 G: 1 INJECTION INTRAVENOUS at 05:02

## 2021-02-11 RX ADMIN — DIPHENHYDRAMINE HYDROCHLORIDE 25 MG: 25 CAPSULE ORAL at 09:02

## 2021-02-11 RX ADMIN — AZITHROMYCIN MONOHYDRATE 500 MG: 500 INJECTION, POWDER, LYOPHILIZED, FOR SOLUTION INTRAVENOUS at 06:02

## 2021-02-11 RX ADMIN — ASPIRIN 325 MG ORAL TABLET 325 MG: 325 PILL ORAL at 06:02

## 2021-02-11 RX ADMIN — ENOXAPARIN SODIUM 40 MG: 40 INJECTION SUBCUTANEOUS at 07:02

## 2021-02-11 RX ADMIN — SODIUM CHLORIDE: 0.9 INJECTION, SOLUTION INTRAVENOUS at 08:02

## 2021-02-11 RX ADMIN — LORAZEPAM 0.5 MG: 2 INJECTION INTRAMUSCULAR; INTRAVENOUS at 04:02

## 2021-02-12 VITALS
TEMPERATURE: 96 F | OXYGEN SATURATION: 96 % | DIASTOLIC BLOOD PRESSURE: 60 MMHG | BODY MASS INDEX: 33.49 KG/M2 | HEIGHT: 64 IN | WEIGHT: 196.19 LBS | HEART RATE: 78 BPM | SYSTOLIC BLOOD PRESSURE: 126 MMHG | RESPIRATION RATE: 18 BRPM

## 2021-02-12 LAB
ANION GAP SERPL CALC-SCNC: 7 MMOL/L (ref 8–16)
BASOPHILS # BLD AUTO: 0.06 K/UL (ref 0–0.2)
BASOPHILS NFR BLD: 0.7 % (ref 0–1.9)
BUN SERPL-MCNC: 15 MG/DL (ref 6–20)
CALCIUM SERPL-MCNC: 8.2 MG/DL (ref 8.7–10.5)
CHLORIDE SERPL-SCNC: 113 MMOL/L (ref 95–110)
CO2 SERPL-SCNC: 25 MMOL/L (ref 23–29)
CREAT SERPL-MCNC: 0.8 MG/DL (ref 0.5–1.4)
DIFFERENTIAL METHOD: ABNORMAL
EOSINOPHIL # BLD AUTO: 0.3 K/UL (ref 0–0.5)
EOSINOPHIL NFR BLD: 3.9 % (ref 0–8)
ERYTHROCYTE [DISTWIDTH] IN BLOOD BY AUTOMATED COUNT: 13.9 % (ref 11.5–14.5)
EST. GFR  (AFRICAN AMERICAN): >60 ML/MIN/1.73 M^2
EST. GFR  (NON AFRICAN AMERICAN): >60 ML/MIN/1.73 M^2
ESTIMATED AVG GLUCOSE: 243 MG/DL (ref 68–131)
GLUCOSE SERPL-MCNC: 203 MG/DL (ref 70–110)
HBA1C MFR BLD: 10.1 % (ref 4–5.6)
HCT VFR BLD AUTO: 42.2 % (ref 37–48.5)
HGB BLD-MCNC: 12.9 G/DL (ref 12–16)
IMM GRANULOCYTES # BLD AUTO: 0.04 K/UL (ref 0–0.04)
IMM GRANULOCYTES NFR BLD AUTO: 0.5 % (ref 0–0.5)
LYMPHOCYTES # BLD AUTO: 2.6 K/UL (ref 1–4.8)
LYMPHOCYTES NFR BLD: 29.8 % (ref 18–48)
MAGNESIUM SERPL-MCNC: 1.4 MG/DL (ref 1.6–2.6)
MCH RBC QN AUTO: 30.3 PG (ref 27–31)
MCHC RBC AUTO-ENTMCNC: 30.6 G/DL (ref 32–36)
MCV RBC AUTO: 99 FL (ref 82–98)
MONOCYTES # BLD AUTO: 0.5 K/UL (ref 0.3–1)
MONOCYTES NFR BLD: 5.7 % (ref 4–15)
NEUTROPHILS # BLD AUTO: 5.2 K/UL (ref 1.8–7.7)
NEUTROPHILS NFR BLD: 59.4 % (ref 38–73)
NRBC BLD-RTO: 0 /100 WBC
PLATELET # BLD AUTO: 210 K/UL (ref 150–350)
PMV BLD AUTO: 10.2 FL (ref 9.2–12.9)
POCT GLUCOSE: 234 MG/DL (ref 70–110)
POTASSIUM SERPL-SCNC: 4.1 MMOL/L (ref 3.5–5.1)
RBC # BLD AUTO: 4.26 M/UL (ref 4–5.4)
SODIUM SERPL-SCNC: 145 MMOL/L (ref 136–145)
WBC # BLD AUTO: 8.66 K/UL (ref 3.9–12.7)

## 2021-02-12 PROCEDURE — 25000003 PHARM REV CODE 250: Performed by: NURSE PRACTITIONER

## 2021-02-12 PROCEDURE — 85025 COMPLETE CBC W/AUTO DIFF WBC: CPT

## 2021-02-12 PROCEDURE — 25000242 PHARM REV CODE 250 ALT 637 W/ HCPCS: Performed by: INTERNAL MEDICINE

## 2021-02-12 PROCEDURE — 80048 BASIC METABOLIC PNL TOTAL CA: CPT

## 2021-02-12 PROCEDURE — 63600175 PHARM REV CODE 636 W HCPCS: Performed by: INTERNAL MEDICINE

## 2021-02-12 PROCEDURE — 83735 ASSAY OF MAGNESIUM: CPT

## 2021-02-12 PROCEDURE — 36415 COLL VENOUS BLD VENIPUNCTURE: CPT

## 2021-02-12 PROCEDURE — 94640 AIRWAY INHALATION TREATMENT: CPT

## 2021-02-12 PROCEDURE — 96376 TX/PRO/DX INJ SAME DRUG ADON: CPT

## 2021-02-12 PROCEDURE — 94761 N-INVAS EAR/PLS OXIMETRY MLT: CPT

## 2021-02-12 PROCEDURE — G0378 HOSPITAL OBSERVATION PER HR: HCPCS

## 2021-02-12 PROCEDURE — 96372 THER/PROPH/DIAG INJ SC/IM: CPT | Mod: 59

## 2021-02-12 RX ORDER — BUTALBITAL, ACETAMINOPHEN AND CAFFEINE 50; 325; 40 MG/1; MG/1; MG/1
1 TABLET ORAL EVERY 8 HOURS PRN
Status: DISCONTINUED | OUTPATIENT
Start: 2021-02-12 | End: 2021-02-12 | Stop reason: HOSPADM

## 2021-02-12 RX ORDER — TALC
6 POWDER (GRAM) TOPICAL NIGHTLY PRN
Status: DISCONTINUED | OUTPATIENT
Start: 2021-02-12 | End: 2021-02-12 | Stop reason: HOSPADM

## 2021-02-12 RX ORDER — MAGNESIUM SULFATE HEPTAHYDRATE 40 MG/ML
2 INJECTION, SOLUTION INTRAVENOUS ONCE
Status: DISCONTINUED | OUTPATIENT
Start: 2021-02-12 | End: 2021-02-12 | Stop reason: HOSPADM

## 2021-02-12 RX ORDER — MIRTAZAPINE 15 MG/1
30 TABLET, FILM COATED ORAL NIGHTLY
Status: DISCONTINUED | OUTPATIENT
Start: 2021-02-12 | End: 2021-02-12 | Stop reason: HOSPADM

## 2021-02-12 RX ORDER — SODIUM CHLORIDE 0.9 % (FLUSH) 0.9 %
10 SYRINGE (ML) INJECTION
Status: DISCONTINUED | OUTPATIENT
Start: 2021-02-12 | End: 2021-02-12 | Stop reason: HOSPADM

## 2021-02-12 RX ADMIN — IPRATROPIUM BROMIDE AND ALBUTEROL SULFATE 3 ML: .5; 3 SOLUTION RESPIRATORY (INHALATION) at 01:02

## 2021-02-12 RX ADMIN — INSULIN ASPART 4 UNITS: 100 INJECTION, SOLUTION INTRAVENOUS; SUBCUTANEOUS at 05:02

## 2021-02-12 RX ADMIN — BUTALBITAL, ACETAMINOPHEN AND CAFFEINE 1 TABLET: 50; 325; 40 TABLET ORAL at 06:02

## 2021-02-12 RX ADMIN — METHYLPREDNISOLONE SODIUM SUCCINATE 40 MG: 40 INJECTION, POWDER, FOR SOLUTION INTRAMUSCULAR; INTRAVENOUS at 05:02

## 2021-02-12 RX ADMIN — MIRTAZAPINE 30 MG: 15 TABLET, FILM COATED ORAL at 02:02

## 2021-02-16 LAB — BACTERIA BLD CULT: NORMAL

## 2021-02-17 LAB — BACTERIA BLD CULT: NORMAL

## 2021-02-18 RX ORDER — BUTALBITAL, ACETAMINOPHEN AND CAFFEINE 50; 325; 40 MG/1; MG/1; MG/1
1 TABLET ORAL EVERY 4 HOURS PRN
Qty: 30 TABLET | Refills: 0 | Status: SHIPPED | OUTPATIENT
Start: 2021-02-18 | End: 2021-06-11 | Stop reason: SDUPTHER

## 2021-02-18 RX ORDER — GABAPENTIN 600 MG/1
TABLET ORAL
Qty: 120 TABLET | Refills: 0 | Status: SHIPPED | OUTPATIENT
Start: 2021-02-18 | End: 2021-03-17 | Stop reason: SDUPTHER

## 2021-02-18 NOTE — CONSULTS
Basic Information  Gestational age assessment: _  Gestation age by dates: _30 weeks _5 days  Gestational age today:  CGA: 35.4w;      Internal Events  Date: _2018  Describe: _Occasional CSCPE - last on 18      one self limited event reported 18     Health Status  Allergies: NKDA     Review/Management  Intake and Output: _u/o - adequate ; BM - adequate  Lab Results: _  Infant blood type: O+_  Gold (direct): _Neg        Diagnostic Imaging:  _  Neonatologist X-Ray Interpretation: normal to TTN type picture ; no features of RDS noted. _  Medications: _  Lines and Tubes: _  BCPAP +_6 and 21% - d/c'd in am on 8/10 > RA; RA on   PIV d/c'd     Findings/Physical Exam  VS/Measurements: Please refer to GEMS charting  Current Weight: 2365g up 35gms  Growth Parameters at birth: _  Weight: 1510 grams, _ kg  Length: 40.6 cms  Head circumference:  29 cms  Infant blood type: Opos  Gold (direct): Negative         Normal Exam Notable Findings   General:   No acute distress , playful.  x  normal exam    Eye: PERRL, normal conjunctiva, anterior vascular capsule.      HENT:   Normocephalic, TM's clear, normal hearing, moist oral mucosa, no pharyngeal erythema, anterior fontanelle open/soft/flat, ear canals present, ears normally set and rotated, no sinus tenderness.  X     Neck: Supple, nontender, no carotid bruit, no lymphadenopathy, no thyromegaly, full range of motion.  X     Respiratory:  Lungs CTA, non-labored respiration, BS equal, symmetrical expansion, no chest wall tenderness.   X     Cardiovascular:  Normal rate, regular rhythm, no murmur, no gallop, good pulses in all extremities, normal peripheral perfusion, no edema.  X     Gastrointestinal:  Soft, non-tender, non-distended, normal bowel sounds, no organomegaly, anus patent.   X     Genitourinary: No CVA tenderness, normal genitalia for age & sex, no scrotal tenderness, no inguinal tenderness, no lesions.   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 Her  reportedly saw this happen and got her to the ED within 11 minutes. She received tPA almost immediately upon arrival with significant improvement of her symptoms. 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 She has a family history of aneurysm in both her mother and daughter. She does report some ongoing intermittent symptoms during which she develops worsening dysarthria and some facial droop, but this reportedly resolves after a few mintues.    Medications Prior to Admission   Medication Sig Dispense Refill Last Dose    baclofen (LIORESAL) 10 MG tablet Take 1 tablet (10 mg total) by mouth nightly as needed. 30 tablet 3     diazePAM (VALIUM) 10 MG Tab Take 1 tablet (10 mg total) by mouth 3 (three) times daily. 90 tablet 0     doxepin (SINEQUAN) 10 MG capsule Take 1 capsule (10 mg total) by mouth nightly as needed. 30 capsule 2     losartan (COZAAR) 25 MG tablet Take 25 mg by mouth once daily.  3     metFORMIN (GLUCOPHAGE) 500 MG tablet Take 1 tablet (500 mg total) by mouth 2 (two) times daily with meals. 180 tablet 1     QUEtiapine (SEROQUEL) 300 MG Tab Take 1 tablet (300 mg total) by mouth every evening. 30 tablet 2     topiramate (TOPAMAX) 200 MG Tab 1 tablet daily 30 tablet 2 Taking    [DISCONTINUED] venlafaxine (EFFEXOR-XR) 75 MG 24 hr capsule Take 2 capsules (150 mg total) by mouth once daily. 60 capsule 2 Taking    insulin NPH (NOVOLIN N NPH U-100 INSULIN) 100 unit/mL injection Inject 10 Units into the skin 2 (two) times daily before meals. 1 vial 3 Unknown at Unknown time    insulin regular (NOVOLIN R REGULAR U-100 INSULN) 100  descended bilaterally   Musculoskeletal:  Normal ROM, normal strength, no tenderness, no swelling, no deformity, no hip clicks.   X     Integumentary:  Warm, dry, pink, intact, moist, no pallor, no rash.   X       Neurologic:  Alert, normal sensory, normal motor, normal DTR's, no focal deficits, Maicol, rooting, sucking normal, muscle tone+.   X        Impression and Plan  #1: Respiratory distress/TTN - RESOLVED    A. 1. Resp distress since birth requiring BCPAP +5 and 21%; d/c'd  on DOL #2 on 8/10        2. CXR consistent with normal or mild TTN; no evidencee of RDS       3. CBG - wnl       4. D/C BCPAP - after about 21 h to Room air   P: 1. on RA       2. Follow Clinically .    #2:  BB - 30.5 weeks  A: 1.  30.5 w; came in 9 cm dilated. one dose of betamethasone but only a few hours PTD       2. h/o HSV in mom but no active lesions.     P: 1. Supportive care.     #3: Presumptive Sepsis Not proven   A. 1.  with Resp distress. so ABX are indicated regardless of EOS Calculator score.          2. CBC - wnl          3. BC - NGTD          4. Amp/Gent X 36 hrs completed  P: 1. sepsis ruled out    #4: FF Jaundice-RESOLVED       1. Mom A+; Baby O+ DC neg        2. bili on .........6.9                      .........8.3                      .........10                      .........11.2                     ......12.0       3.  - no icterus noted   P. 1.  follow clinically.     #5: AOP:  A: 1. Numerous CSCPEs on  - much improved after caffeine bolus. Last documented clinically significant event on , self limited event        2. off maintenance caffeine as of 18        P: 1. Follow clinically off of caffeine therapy       #6: Hypocalcemia RESOLVED  A: 1. Improving    2. Ca 18 - 7.0      3. Ca 18 - 8.4  P: Resolved                #7: FEN  Diagnosis: _Preterm 30.5 weeks   Total Fluid Goal: 156 ml/kg/day     _ ml/day  Diet:  _NPO     Breast Milk     x Fortified  unit/mL Inj injection Inject three times daily with meals using the scale: BG 80 - 150:   2 units  //  - 199: 3 units  //  - 249: 4 units  //  - 299: 5 units  //  - 349: 6 units  // BG Over 350:  7 units. 10 mL 3 Unknown at Unknown time    rizatriptan (MAXALT) 10 MG tablet One tablet with onset of migraine and may repeat one dose in 2 hours if needed   Unknown at Unknown time    TRUE METRIX GLUCOSE TEST STRIP Strp USE 1 STRIP TO CHECK GLUCOSE THREE TIMES DAILY  11 Taking       Review of patient's allergies indicates:   Allergen Reactions    Piroxicam Diarrhea and Nausea Only    Ultram [tramadol] Rash    Aspirin      Nosebleeds  Nosebleeds  Nosebleeds    Levaquin [levofloxacin]     Levomenol analogues     Lipitor [atorvastatin]      Leg Cramps    Zofran [ondansetron hcl] Hives and Other (See Comments)     headaches    Celestone [betamethasone] Hives, Itching and Rash       Past Medical History:   Diagnosis Date    Acute ischemic stroke 9/17/2019    Anxiety     Arthritis     Asthma     Bipolar 1 disorder     Depression     Diabetes mellitus, type 2     Diverticular disease     GERD (gastroesophageal reflux disease)     Hyperlipemia     Hypertension     Hypothyroidism     Pneumonia     Renal manifestation of secondary diabetes mellitus     Right-sided back pain 6/29/2019    Trouble in sleeping      Past Surgical History:   Procedure Laterality Date    CHOLECYSTECTOMY      COLON SURGERY      COLONOSCOPY N/A 1/12/2018    [facility]    DENTAL SURGERY  05/21/2018    removal of 8 top teeth    ESOPHAGOGASTRODUODENOSCOPY (EGD) N/A 1/12/2018    Performed by Ro[facility]ajan III[facility] at [facility] ENDO    HYSTERECTOMY      TONSILLECTOMY       Family History     Problem Relation (Age of Onset)    Alcohol abuse Mother, Father    Arthritis Father, Maternal Grandmother, Paternal Grandmother    Breast cancer Maternal Grandmother    COPD Mother,  Breast Milk     Donor Breast Milk   X Formula: Neosure 22cal  Parenteral Nutrition:  _ TPN     _ IL     _ PPN  Prescribed Caloric Intake:   FEN Status:  X Stable     _ Improving     _ Unchanged     _ Worsening  A:  1. unable to insert a UVC on admission        2. PIV- d/c'd        3. Feeds: EBM24/DBM24  at 45ml q3h PO/ NG.        4. BMP PRN        5.  ml/kg/day      6. PO% - 63% as of 18        7. HMF 24 started 18 and changed to Neosure on   P: 1.  feeds to 45ml BM22/neosure PO/NG Q3h       2. TF  156ml/kg/day. Continue to offer PO with cues.       3. PVS with Fe QD       4. DVS        #8- Eye Discharge / Conjunctivitis   A: 1. Eye d/c first noted on  - on  and  d/c but no signs of inflammation as conjuntiva are not injected and no inreased tearing etc.        2. eye culture from - rare Staph with few WBC and rare epithelial cells (Probable skin akil)        3. StartedTobramycin eye drops 18,  1 drop each eye q4h x 5 days( 18)  P: Resolved.            #9 - ff ROP           A:1) Baby < 31 weeks           P:1) EE on 18  -   stage 0. F/u in 3 weeks          Social  Plan discussed with:  x Attending     x Care Team     x_ Nurse     _ Pharmacist     _   Family: _ Family Present     X_ Family not present     _Family Informed _ Family Participated in Rounds  Consult:  _ Social Service consult     _ Child protective services consult  Primary Physician Updated:  Date: _at d/c   D/W RNs   spoke to mother frequently at bedside      Health Maintenance  Care Practices:  Immunizations+, hearing screen+, cardiac screen+,  screen - 18 and 18 and 18, circ deferred, carseat testing+         Sister    Cancer Father, Maternal Aunt, Maternal Uncle, Paternal Aunt, Paternal Uncle, Paternal Grandmother    Depression Mother    Diabetes Maternal Uncle    Drug abuse Mother, Maternal Uncle    Heart disease Father, Brother    Hypertension Father, Brother, Maternal Grandmother, Paternal Grandfather    Kidney failure Sister        Tobacco Use    Smoking status: Current Every Day Smoker     Years: 35.00     Types: Cigarettes, Vaping w/o nicotine    Smokeless tobacco: Never Used   Substance and Sexual Activity    Alcohol use: Yes     Comment: occassionally    Drug use: Yes     Types: Marijuana    Sexual activity: Yes     Review of Systems  Objective:     Weight: 80 kg (176 lb 5.9 oz)  Body mass index is 30.27 kg/m².  Vital Signs (Most Recent):  Temp: 98.7 °F (37.1 °C) (09/18/19 1505)  Pulse: 82 (09/18/19 1517)  Resp: (!) 31 (09/18/19 1517)  BP: (!) 111/59 (09/18/19 1517)  SpO2: 100 % (09/18/19 1517) Vital Signs (24h Range):  Temp:  [98 °F (36.7 °C)-98.7 °F (37.1 °C)] 98.7 °F (37.1 °C)  Pulse:  [] 82  Resp:  [13-49] 31  SpO2:  [95 %-100 %] 100 %  BP: (109-158)/(57-97) 111/59     Date 09/18/19 0700 - 09/19/19 0659   Shift 2255-8561 1262-3277 6922-1329 24 Hour Total   INTAKE   I.V.(mL/kg) 65(0.8)   65(0.8)   Shift Total(mL/kg) 65(0.8)   65(0.8)   OUTPUT   Urine(mL/kg/hr) 300(0.5)   300   Shift Total(mL/kg) 300(3.8)   300(3.8)   Weight (kg) 80 80 80 80                        Neurosurgery Physical Exam  GCS [id number] AOx3  PERRL, EOMI, Face Symmetric, Tongue Midline  RUE: 4/5 LUE 5/5  RLE: 4/5 LLE: 5/5  SILT  No pronator drift    Significant Labs:  Recent Labs   Lab 09/16/19  1830 09/17/19 1913 09/18/19  0331   GLU 38* 117* 107    137 141   K 3.5 4.0 4.2    105 108   CO2 25 23 22*   BUN 17 14 10   CREATININE 0.9 0.7 0.7   CALCIUM 9.5 9.3 9.3   MG  --   --  1.7     Recent Labs   Lab 09/16/19  1830 09/17/19 1913 09/18/19  0331   WBC 12.92* 11.37 8.90   HGB 14.1 13.6 13.9   HCT 44.3 44.5 43.7     310 300     Recent Labs   Lab 09/17/19  1913   INR 1.0     Microbiology Results (last 7 days)     ** No results found for the last 168 hours. **        All pertinent labs from the last 24 hours have been reviewed.    Significant Diagnostics:  I have reviewed all pertinent imaging results/findings within the past 24 hours.    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    Neurologically stable  No immediate neurosurgical intervention required  All imaging reviewed, no evidence of subarachnoid hemorrhage to indicate rupture  Recommend neurology workup of possible TIAs to explain ongoing intermittent symptoms reported by the patient.  We will arrange for outpatient follow up with Dr. Levin on a Monday in clinic within the next 2-3 weeks or so  Neurosurgery will continue to follow for now    Discussed with Dr. Wayne and Dr. eLvin        Thank you for your consult. I will follow-up with patient. Please contact us if you have any additional questions.    Yefri Sutherland MD  Neurosurgery  [facility]

## 2021-02-23 ENCOUNTER — PATIENT OUTREACH (OUTPATIENT)
Dept: ADMINISTRATIVE | Facility: HOSPITAL | Age: 56
End: 2021-02-23

## 2021-03-01 ENCOUNTER — OFFICE VISIT (OUTPATIENT)
Dept: PSYCHIATRY | Facility: CLINIC | Age: 56
End: 2021-03-01
Payer: MEDICARE

## 2021-03-01 ENCOUNTER — PATIENT MESSAGE (OUTPATIENT)
Dept: PSYCHIATRY | Facility: CLINIC | Age: 56
End: 2021-03-01

## 2021-03-01 DIAGNOSIS — F31.32 BIPOLAR AFFECTIVE DISORDER, CURRENTLY DEPRESSED, MODERATE: Primary | ICD-10-CM

## 2021-03-01 DIAGNOSIS — G47.00 INSOMNIA, UNSPECIFIED TYPE: ICD-10-CM

## 2021-03-01 DIAGNOSIS — F41.9 ANXIETY: ICD-10-CM

## 2021-03-01 PROCEDURE — 99499 RISK ADDL DX/OHS AUDIT: ICD-10-PCS | Mod: 95,,, | Performed by: PSYCHIATRY & NEUROLOGY

## 2021-03-01 PROCEDURE — 99214 OFFICE O/P EST MOD 30 MIN: CPT | Mod: 95,,, | Performed by: PSYCHIATRY & NEUROLOGY

## 2021-03-01 PROCEDURE — 99499 UNLISTED E&M SERVICE: CPT | Mod: 95,,, | Performed by: PSYCHIATRY & NEUROLOGY

## 2021-03-01 PROCEDURE — 99214 PR OFFICE/OUTPT VISIT, EST, LEVL IV, 30-39 MIN: ICD-10-PCS | Mod: 95,,, | Performed by: PSYCHIATRY & NEUROLOGY

## 2021-03-01 RX ORDER — MIRTAZAPINE 15 MG/1
TABLET, FILM COATED ORAL
Qty: 60 TABLET | Refills: 1 | Status: SHIPPED | OUTPATIENT
Start: 2021-03-01 | End: 2021-04-30 | Stop reason: SDUPTHER

## 2021-03-01 RX ORDER — DIAZEPAM 10 MG/1
10 TABLET ORAL 3 TIMES DAILY PRN
Qty: 90 TABLET | Refills: 1 | Status: SHIPPED | OUTPATIENT
Start: 2021-03-01 | End: 2021-04-30 | Stop reason: SDUPTHER

## 2021-03-01 RX ORDER — RISPERIDONE 1 MG/1
1 TABLET ORAL 2 TIMES DAILY
Qty: 60 TABLET | Refills: 1 | Status: SHIPPED | OUTPATIENT
Start: 2021-03-01 | End: 2021-04-30 | Stop reason: SDUPTHER

## 2021-03-05 ENCOUNTER — SPECIALTY PHARMACY (OUTPATIENT)
Dept: PHARMACY | Facility: CLINIC | Age: 56
End: 2021-03-05

## 2021-03-05 ENCOUNTER — PATIENT MESSAGE (OUTPATIENT)
Dept: PHARMACY | Facility: CLINIC | Age: 56
End: 2021-03-05

## 2021-03-16 PROBLEM — E11.42 DIABETIC POLYNEUROPATHY ASSOCIATED WITH TYPE 2 DIABETES MELLITUS: Status: ACTIVE | Noted: 2021-03-16

## 2021-03-16 PROBLEM — Z79.4 TYPE 2 DIABETES MELLITUS WITH COMPLICATION, WITH LONG-TERM CURRENT USE OF INSULIN: Status: ACTIVE | Noted: 2018-06-21

## 2021-03-16 PROBLEM — G72.0 STATIN MYOPATHY: Status: ACTIVE | Noted: 2021-03-16

## 2021-03-16 PROBLEM — T46.6X5A STATIN MYOPATHY: Status: ACTIVE | Noted: 2021-03-16

## 2021-03-16 PROBLEM — E11.8 TYPE 2 DIABETES MELLITUS WITH COMPLICATION, WITH LONG-TERM CURRENT USE OF INSULIN: Status: ACTIVE | Noted: 2018-06-21

## 2021-03-17 ENCOUNTER — OFFICE VISIT (OUTPATIENT)
Dept: FAMILY MEDICINE | Facility: CLINIC | Age: 56
End: 2021-03-17
Payer: MEDICARE

## 2021-03-17 ENCOUNTER — LAB VISIT (OUTPATIENT)
Dept: LAB | Facility: HOSPITAL | Age: 56
End: 2021-03-17
Attending: FAMILY MEDICINE
Payer: MEDICARE

## 2021-03-17 ENCOUNTER — PATIENT MESSAGE (OUTPATIENT)
Dept: FAMILY MEDICINE | Facility: CLINIC | Age: 56
End: 2021-03-17

## 2021-03-17 VITALS
HEIGHT: 64 IN | BODY MASS INDEX: 30.7 KG/M2 | DIASTOLIC BLOOD PRESSURE: 68 MMHG | TEMPERATURE: 98 F | RESPIRATION RATE: 16 BRPM | WEIGHT: 179.81 LBS | OXYGEN SATURATION: 97 % | HEART RATE: 100 BPM | SYSTOLIC BLOOD PRESSURE: 120 MMHG

## 2021-03-17 DIAGNOSIS — R40.20 LOC (LOSS OF CONSCIOUSNESS): ICD-10-CM

## 2021-03-17 DIAGNOSIS — Z72.0 TOBACCO ABUSE: ICD-10-CM

## 2021-03-17 DIAGNOSIS — I10 ESSENTIAL HYPERTENSION: ICD-10-CM

## 2021-03-17 DIAGNOSIS — E11.8 TYPE 2 DIABETES MELLITUS WITH COMPLICATION, WITH LONG-TERM CURRENT USE OF INSULIN: ICD-10-CM

## 2021-03-17 DIAGNOSIS — E11.69 DYSLIPIDEMIA ASSOCIATED WITH TYPE 2 DIABETES MELLITUS: ICD-10-CM

## 2021-03-17 DIAGNOSIS — I20.9 ISCHEMIC CHEST PAIN: ICD-10-CM

## 2021-03-17 DIAGNOSIS — T46.6X5A STATIN MYOPATHY: ICD-10-CM

## 2021-03-17 DIAGNOSIS — F31.9 BIPOLAR AFFECTIVE DISORDER, REMISSION STATUS UNSPECIFIED: ICD-10-CM

## 2021-03-17 DIAGNOSIS — R10.9 RIGHT FLANK PAIN: Primary | ICD-10-CM

## 2021-03-17 DIAGNOSIS — R56.9 SEIZURE-LIKE ACTIVITY: ICD-10-CM

## 2021-03-17 DIAGNOSIS — E78.5 DYSLIPIDEMIA ASSOCIATED WITH TYPE 2 DIABETES MELLITUS: ICD-10-CM

## 2021-03-17 DIAGNOSIS — G72.0 STATIN MYOPATHY: ICD-10-CM

## 2021-03-17 DIAGNOSIS — E03.4 HYPOTHYROIDISM DUE TO ACQUIRED ATROPHY OF THYROID: ICD-10-CM

## 2021-03-17 DIAGNOSIS — Z79.4 TYPE 2 DIABETES MELLITUS WITH COMPLICATION, WITH LONG-TERM CURRENT USE OF INSULIN: ICD-10-CM

## 2021-03-17 DIAGNOSIS — I69.952 HEMIPLEGIA AND HEMIPARESIS FOLLOWING UNSPECIFIED CEREBROVASCULAR DISEASE AFFECTING LEFT DOMINANT SIDE: ICD-10-CM

## 2021-03-17 DIAGNOSIS — R10.9 RIGHT FLANK PAIN: ICD-10-CM

## 2021-03-17 DIAGNOSIS — E11.42 DIABETIC POLYNEUROPATHY ASSOCIATED WITH TYPE 2 DIABETES MELLITUS: ICD-10-CM

## 2021-03-17 LAB
BACTERIA #/AREA URNS AUTO: ABNORMAL /HPF
BILIRUB UR QL STRIP: NEGATIVE
CLARITY UR REFRACT.AUTO: ABNORMAL
COLOR UR AUTO: ABNORMAL
GLUCOSE UR QL STRIP: ABNORMAL
HGB UR QL STRIP: NEGATIVE
KETONES UR QL STRIP: NEGATIVE
LEUKOCYTE ESTERASE UR QL STRIP: ABNORMAL
MICROSCOPIC COMMENT: ABNORMAL
NITRITE UR QL STRIP: POSITIVE
PH UR STRIP: 5 [PH] (ref 5–8)
PROT UR QL STRIP: NEGATIVE
RBC #/AREA URNS AUTO: 2 /HPF (ref 0–4)
SP GR UR STRIP: >=1.03 (ref 1–1.03)
SQUAMOUS #/AREA URNS AUTO: 6 /HPF
URN SPEC COLLECT METH UR: ABNORMAL
WBC #/AREA URNS AUTO: 11 /HPF (ref 0–5)
YEAST UR QL AUTO: ABNORMAL

## 2021-03-17 PROCEDURE — 99214 PR OFFICE/OUTPT VISIT, EST, LEVL IV, 30-39 MIN: ICD-10-PCS | Mod: S$GLB,,, | Performed by: FAMILY MEDICINE

## 2021-03-17 PROCEDURE — 99999 PR PBB SHADOW E&M-EST. PATIENT-LVL V: CPT | Mod: PBBFAC,,, | Performed by: FAMILY MEDICINE

## 2021-03-17 PROCEDURE — 3008F BODY MASS INDEX DOCD: CPT | Mod: CPTII,S$GLB,, | Performed by: FAMILY MEDICINE

## 2021-03-17 PROCEDURE — 99499 UNLISTED E&M SERVICE: CPT | Mod: S$GLB,,, | Performed by: FAMILY MEDICINE

## 2021-03-17 PROCEDURE — 1126F PR PAIN SEVERITY QUANTIFIED, NO PAIN PRESENT: ICD-10-PCS | Mod: S$GLB,,, | Performed by: FAMILY MEDICINE

## 2021-03-17 PROCEDURE — 99214 OFFICE O/P EST MOD 30 MIN: CPT | Mod: S$GLB,,, | Performed by: FAMILY MEDICINE

## 2021-03-17 PROCEDURE — 87086 URINE CULTURE/COLONY COUNT: CPT | Performed by: FAMILY MEDICINE

## 2021-03-17 PROCEDURE — 3078F DIAST BP <80 MM HG: CPT | Mod: CPTII,S$GLB,, | Performed by: FAMILY MEDICINE

## 2021-03-17 PROCEDURE — 99999 PR PBB SHADOW E&M-EST. PATIENT-LVL V: ICD-10-PCS | Mod: PBBFAC,,, | Performed by: FAMILY MEDICINE

## 2021-03-17 PROCEDURE — 99499 RISK ADDL DX/OHS AUDIT: ICD-10-PCS | Mod: S$GLB,,, | Performed by: FAMILY MEDICINE

## 2021-03-17 PROCEDURE — 1126F AMNT PAIN NOTED NONE PRSNT: CPT | Mod: S$GLB,,, | Performed by: FAMILY MEDICINE

## 2021-03-17 PROCEDURE — 3074F PR MOST RECENT SYSTOLIC BLOOD PRESSURE < 130 MM HG: ICD-10-PCS | Mod: CPTII,S$GLB,, | Performed by: FAMILY MEDICINE

## 2021-03-17 PROCEDURE — 3074F SYST BP LT 130 MM HG: CPT | Mod: CPTII,S$GLB,, | Performed by: FAMILY MEDICINE

## 2021-03-17 PROCEDURE — 81001 URINALYSIS AUTO W/SCOPE: CPT | Performed by: FAMILY MEDICINE

## 2021-03-17 PROCEDURE — 3046F PR MOST RECENT HEMOGLOBIN A1C LEVEL > 9.0%: ICD-10-PCS | Mod: CPTII,S$GLB,, | Performed by: FAMILY MEDICINE

## 2021-03-17 PROCEDURE — 3046F HEMOGLOBIN A1C LEVEL >9.0%: CPT | Mod: CPTII,S$GLB,, | Performed by: FAMILY MEDICINE

## 2021-03-17 PROCEDURE — 87077 CULTURE AEROBIC IDENTIFY: CPT | Performed by: FAMILY MEDICINE

## 2021-03-17 PROCEDURE — 87088 URINE BACTERIA CULTURE: CPT | Performed by: FAMILY MEDICINE

## 2021-03-17 PROCEDURE — 3078F PR MOST RECENT DIASTOLIC BLOOD PRESSURE < 80 MM HG: ICD-10-PCS | Mod: CPTII,S$GLB,, | Performed by: FAMILY MEDICINE

## 2021-03-17 PROCEDURE — 87186 SC STD MICRODIL/AGAR DIL: CPT | Performed by: FAMILY MEDICINE

## 2021-03-17 PROCEDURE — 3008F PR BODY MASS INDEX (BMI) DOCUMENTED: ICD-10-PCS | Mod: CPTII,S$GLB,, | Performed by: FAMILY MEDICINE

## 2021-03-17 RX ORDER — GABAPENTIN 600 MG/1
TABLET ORAL
Qty: 120 TABLET | Refills: 2 | Status: SHIPPED | OUTPATIENT
Start: 2021-03-17 | End: 2021-06-11

## 2021-03-18 RX ORDER — SULFAMETHOXAZOLE AND TRIMETHOPRIM 800; 160 MG/1; MG/1
1 TABLET ORAL 2 TIMES DAILY
Qty: 14 TABLET | Refills: 0 | Status: SHIPPED | OUTPATIENT
Start: 2021-03-18 | End: 2021-08-25

## 2021-03-19 ENCOUNTER — TELEPHONE (OUTPATIENT)
Dept: FAMILY MEDICINE | Facility: CLINIC | Age: 56
End: 2021-03-19

## 2021-03-20 ENCOUNTER — PATIENT MESSAGE (OUTPATIENT)
Dept: FAMILY MEDICINE | Facility: CLINIC | Age: 56
End: 2021-03-20

## 2021-03-20 LAB
BACTERIA UR CULT: ABNORMAL
BACTERIA UR CULT: ABNORMAL

## 2021-03-22 DIAGNOSIS — R39.89 BLADDER PAIN: Primary | ICD-10-CM

## 2021-03-22 RX ORDER — PHENAZOPYRIDINE HYDROCHLORIDE 200 MG/1
200 TABLET, FILM COATED ORAL 3 TIMES DAILY PRN
Qty: 6 TABLET | Refills: 0 | Status: SHIPPED | OUTPATIENT
Start: 2021-03-22 | End: 2021-04-01

## 2021-03-29 ENCOUNTER — TELEPHONE (OUTPATIENT)
Dept: PSYCHIATRY | Facility: CLINIC | Age: 56
End: 2021-03-29

## 2021-03-29 ENCOUNTER — TELEPHONE (OUTPATIENT)
Dept: CARDIOLOGY | Facility: CLINIC | Age: 56
End: 2021-03-29

## 2021-03-29 ENCOUNTER — SPECIALTY PHARMACY (OUTPATIENT)
Dept: PHARMACY | Facility: CLINIC | Age: 56
End: 2021-03-29

## 2021-04-27 ENCOUNTER — SPECIALTY PHARMACY (OUTPATIENT)
Dept: PHARMACY | Facility: CLINIC | Age: 56
End: 2021-04-27

## 2021-04-27 ENCOUNTER — TELEPHONE (OUTPATIENT)
Dept: CARDIOLOGY | Facility: CLINIC | Age: 56
End: 2021-04-27

## 2021-04-28 ENCOUNTER — OFFICE VISIT (OUTPATIENT)
Dept: CARDIOLOGY | Facility: CLINIC | Age: 56
End: 2021-04-28
Payer: MEDICARE

## 2021-04-28 VITALS
BODY MASS INDEX: 32.26 KG/M2 | SYSTOLIC BLOOD PRESSURE: 110 MMHG | DIASTOLIC BLOOD PRESSURE: 64 MMHG | WEIGHT: 187.94 LBS | OXYGEN SATURATION: 94 % | HEART RATE: 108 BPM

## 2021-04-28 DIAGNOSIS — E78.2 MIXED HYPERLIPIDEMIA: ICD-10-CM

## 2021-04-28 DIAGNOSIS — E78.5 HYPERLIPIDEMIA, UNSPECIFIED HYPERLIPIDEMIA TYPE: ICD-10-CM

## 2021-04-28 DIAGNOSIS — Z78.9 STATIN INTOLERANCE: ICD-10-CM

## 2021-04-28 DIAGNOSIS — E78.5 DYSLIPIDEMIA ASSOCIATED WITH TYPE 2 DIABETES MELLITUS: Primary | ICD-10-CM

## 2021-04-28 DIAGNOSIS — I63.9 CEREBROVASCULAR ACCIDENT (CVA), UNSPECIFIED MECHANISM: ICD-10-CM

## 2021-04-28 DIAGNOSIS — J44.1 COPD EXACERBATION: ICD-10-CM

## 2021-04-28 DIAGNOSIS — I73.9 PVD (PERIPHERAL VASCULAR DISEASE): ICD-10-CM

## 2021-04-28 DIAGNOSIS — E11.69 DYSLIPIDEMIA ASSOCIATED WITH TYPE 2 DIABETES MELLITUS: Primary | ICD-10-CM

## 2021-04-28 DIAGNOSIS — I10 ESSENTIAL HYPERTENSION: Chronic | ICD-10-CM

## 2021-04-28 DIAGNOSIS — Z72.0 TOBACCO ABUSE: Chronic | ICD-10-CM

## 2021-04-28 PROCEDURE — 99214 OFFICE O/P EST MOD 30 MIN: CPT | Mod: S$GLB,,, | Performed by: INTERNAL MEDICINE

## 2021-04-28 PROCEDURE — 99999 PR PBB SHADOW E&M-EST. PATIENT-LVL V: ICD-10-PCS | Mod: PBBFAC,,, | Performed by: INTERNAL MEDICINE

## 2021-04-28 PROCEDURE — 3008F BODY MASS INDEX DOCD: CPT | Mod: CPTII,S$GLB,, | Performed by: INTERNAL MEDICINE

## 2021-04-28 PROCEDURE — 1125F PR PAIN SEVERITY QUANTIFIED, PAIN PRESENT: ICD-10-PCS | Mod: S$GLB,,, | Performed by: INTERNAL MEDICINE

## 2021-04-28 PROCEDURE — 1125F AMNT PAIN NOTED PAIN PRSNT: CPT | Mod: S$GLB,,, | Performed by: INTERNAL MEDICINE

## 2021-04-28 PROCEDURE — 99499 RISK ADDL DX/OHS AUDIT: ICD-10-PCS | Mod: HCNC,S$GLB,, | Performed by: INTERNAL MEDICINE

## 2021-04-28 PROCEDURE — 99214 PR OFFICE/OUTPT VISIT, EST, LEVL IV, 30-39 MIN: ICD-10-PCS | Mod: S$GLB,,, | Performed by: INTERNAL MEDICINE

## 2021-04-28 PROCEDURE — 99499 UNLISTED E&M SERVICE: CPT | Mod: HCNC,S$GLB,, | Performed by: INTERNAL MEDICINE

## 2021-04-28 PROCEDURE — 3008F PR BODY MASS INDEX (BMI) DOCUMENTED: ICD-10-PCS | Mod: CPTII,S$GLB,, | Performed by: INTERNAL MEDICINE

## 2021-04-28 PROCEDURE — 99999 PR PBB SHADOW E&M-EST. PATIENT-LVL V: CPT | Mod: PBBFAC,,, | Performed by: INTERNAL MEDICINE

## 2021-04-28 RX ORDER — FUROSEMIDE 40 MG/1
40 TABLET ORAL
Qty: 15 TABLET | Refills: 11 | Status: SHIPPED | OUTPATIENT
Start: 2021-04-28 | End: 2022-05-04 | Stop reason: SDUPTHER

## 2021-04-30 ENCOUNTER — PATIENT MESSAGE (OUTPATIENT)
Dept: PSYCHIATRY | Facility: CLINIC | Age: 56
End: 2021-04-30

## 2021-04-30 DIAGNOSIS — E11.9 TYPE 2 DIABETES MELLITUS WITHOUT COMPLICATION: ICD-10-CM

## 2021-04-30 RX ORDER — RISPERIDONE 1 MG/1
1 TABLET ORAL 2 TIMES DAILY
Qty: 60 TABLET | Refills: 0 | Status: SHIPPED | OUTPATIENT
Start: 2021-04-30 | End: 2021-05-06 | Stop reason: SDUPTHER

## 2021-04-30 RX ORDER — MIRTAZAPINE 15 MG/1
TABLET, FILM COATED ORAL
Qty: 60 TABLET | Refills: 0 | Status: SHIPPED | OUTPATIENT
Start: 2021-04-30 | End: 2021-05-06 | Stop reason: SDUPTHER

## 2021-04-30 RX ORDER — DIAZEPAM 10 MG/1
10 TABLET ORAL 3 TIMES DAILY PRN
Qty: 90 TABLET | Refills: 0 | Status: SHIPPED | OUTPATIENT
Start: 2021-04-30 | End: 2021-05-06 | Stop reason: SDUPTHER

## 2021-05-06 ENCOUNTER — OFFICE VISIT (OUTPATIENT)
Dept: PSYCHIATRY | Facility: CLINIC | Age: 56
End: 2021-05-06
Payer: MEDICARE

## 2021-05-06 DIAGNOSIS — Z03.89 NO DIAGNOSIS ON AXIS I: Primary | ICD-10-CM

## 2021-05-06 PROCEDURE — 99499 UNLISTED E&M SERVICE: CPT | Mod: 95,,, | Performed by: PSYCHIATRY & NEUROLOGY

## 2021-05-06 PROCEDURE — 99499 NO LOS: ICD-10-PCS | Mod: 95,,, | Performed by: PSYCHIATRY & NEUROLOGY

## 2021-05-06 RX ORDER — RISPERIDONE 1 MG/1
1 TABLET ORAL 2 TIMES DAILY
Qty: 60 TABLET | Refills: 2 | Status: SHIPPED | OUTPATIENT
Start: 2021-05-06 | End: 2021-07-09 | Stop reason: SDUPTHER

## 2021-05-06 RX ORDER — DIAZEPAM 10 MG/1
10 TABLET ORAL 3 TIMES DAILY PRN
Qty: 90 TABLET | Refills: 2 | Status: SHIPPED | OUTPATIENT
Start: 2021-05-06 | End: 2021-07-09 | Stop reason: SDUPTHER

## 2021-05-06 RX ORDER — MIRTAZAPINE 15 MG/1
TABLET, FILM COATED ORAL
Qty: 60 TABLET | Refills: 2 | Status: SHIPPED | OUTPATIENT
Start: 2021-05-06 | End: 2021-07-09 | Stop reason: SDUPTHER

## 2021-05-06 RX ORDER — BUPROPION HYDROCHLORIDE 150 MG/1
150 TABLET ORAL DAILY
Qty: 30 TABLET | Refills: 3 | Status: SHIPPED | OUTPATIENT
Start: 2021-05-06 | End: 2021-07-09 | Stop reason: SDUPTHER

## 2021-05-19 ENCOUNTER — PATIENT MESSAGE (OUTPATIENT)
Dept: PHARMACY | Facility: CLINIC | Age: 56
End: 2021-05-19

## 2021-05-20 ENCOUNTER — SPECIALTY PHARMACY (OUTPATIENT)
Dept: PHARMACY | Facility: CLINIC | Age: 56
End: 2021-05-20

## 2021-05-25 ENCOUNTER — PATIENT OUTREACH (OUTPATIENT)
Dept: ADMINISTRATIVE | Facility: HOSPITAL | Age: 56
End: 2021-05-25

## 2021-05-25 ENCOUNTER — PATIENT MESSAGE (OUTPATIENT)
Dept: ADMINISTRATIVE | Facility: HOSPITAL | Age: 56
End: 2021-05-25

## 2021-06-02 ENCOUNTER — TELEPHONE (OUTPATIENT)
Dept: CARDIOLOGY | Facility: CLINIC | Age: 56
End: 2021-06-02

## 2021-06-02 ENCOUNTER — OFFICE VISIT (OUTPATIENT)
Dept: CARDIOLOGY | Facility: CLINIC | Age: 56
End: 2021-06-02
Payer: MEDICARE

## 2021-06-02 VITALS
OXYGEN SATURATION: 92 % | SYSTOLIC BLOOD PRESSURE: 108 MMHG | BODY MASS INDEX: 31.83 KG/M2 | HEART RATE: 111 BPM | WEIGHT: 185.44 LBS | DIASTOLIC BLOOD PRESSURE: 60 MMHG

## 2021-06-02 DIAGNOSIS — I73.9 PVD (PERIPHERAL VASCULAR DISEASE): ICD-10-CM

## 2021-06-02 DIAGNOSIS — E11.8 TYPE 2 DIABETES MELLITUS WITH COMPLICATION, WITH LONG-TERM CURRENT USE OF INSULIN: ICD-10-CM

## 2021-06-02 DIAGNOSIS — Z78.9 STATIN INTOLERANCE: ICD-10-CM

## 2021-06-02 DIAGNOSIS — Z79.4 TYPE 2 DIABETES MELLITUS WITH COMPLICATION, WITH LONG-TERM CURRENT USE OF INSULIN: ICD-10-CM

## 2021-06-02 DIAGNOSIS — E78.5 DYSLIPIDEMIA ASSOCIATED WITH TYPE 2 DIABETES MELLITUS: Primary | ICD-10-CM

## 2021-06-02 DIAGNOSIS — I10 ESSENTIAL HYPERTENSION: Chronic | ICD-10-CM

## 2021-06-02 DIAGNOSIS — J44.1 COPD EXACERBATION: ICD-10-CM

## 2021-06-02 DIAGNOSIS — Z72.0 TOBACCO ABUSE: Chronic | ICD-10-CM

## 2021-06-02 DIAGNOSIS — I63.9 CEREBROVASCULAR ACCIDENT (CVA), UNSPECIFIED MECHANISM: ICD-10-CM

## 2021-06-02 DIAGNOSIS — I63.9 CEREBROVASCULAR ACCIDENT (CVA), UNSPECIFIED MECHANISM: Primary | ICD-10-CM

## 2021-06-02 DIAGNOSIS — E11.69 DYSLIPIDEMIA ASSOCIATED WITH TYPE 2 DIABETES MELLITUS: Primary | ICD-10-CM

## 2021-06-02 PROCEDURE — 1126F PR PAIN SEVERITY QUANTIFIED, NO PAIN PRESENT: ICD-10-PCS | Mod: S$GLB,,, | Performed by: INTERNAL MEDICINE

## 2021-06-02 PROCEDURE — 99999 PR PBB SHADOW E&M-EST. PATIENT-LVL IV: CPT | Mod: PBBFAC,,, | Performed by: INTERNAL MEDICINE

## 2021-06-02 PROCEDURE — 93000 ELECTROCARDIOGRAM COMPLETE: CPT | Mod: S$GLB,,, | Performed by: INTERNAL MEDICINE

## 2021-06-02 PROCEDURE — 99999 PR PBB SHADOW E&M-EST. PATIENT-LVL IV: ICD-10-PCS | Mod: PBBFAC,,, | Performed by: INTERNAL MEDICINE

## 2021-06-02 PROCEDURE — 1126F AMNT PAIN NOTED NONE PRSNT: CPT | Mod: S$GLB,,, | Performed by: INTERNAL MEDICINE

## 2021-06-02 PROCEDURE — 3008F PR BODY MASS INDEX (BMI) DOCUMENTED: ICD-10-PCS | Mod: CPTII,S$GLB,, | Performed by: INTERNAL MEDICINE

## 2021-06-02 PROCEDURE — 3008F BODY MASS INDEX DOCD: CPT | Mod: CPTII,S$GLB,, | Performed by: INTERNAL MEDICINE

## 2021-06-02 PROCEDURE — 99214 PR OFFICE/OUTPT VISIT, EST, LEVL IV, 30-39 MIN: ICD-10-PCS | Mod: 25,S$GLB,, | Performed by: INTERNAL MEDICINE

## 2021-06-02 PROCEDURE — 93000 EKG 12-LEAD: ICD-10-PCS | Mod: S$GLB,,, | Performed by: INTERNAL MEDICINE

## 2021-06-02 PROCEDURE — 99214 OFFICE O/P EST MOD 30 MIN: CPT | Mod: 25,S$GLB,, | Performed by: INTERNAL MEDICINE

## 2021-06-02 RX ORDER — EZETIMIBE 10 MG/1
10 TABLET ORAL DAILY
Qty: 30 TABLET | Refills: 11 | Status: SHIPPED | OUTPATIENT
Start: 2021-06-02 | End: 2022-11-02 | Stop reason: SDUPTHER

## 2021-06-02 RX ORDER — METOPROLOL SUCCINATE 25 MG/1
25 TABLET, EXTENDED RELEASE ORAL DAILY
Qty: 90 TABLET | Refills: 3 | Status: SHIPPED | OUTPATIENT
Start: 2021-06-02 | End: 2022-08-09

## 2021-06-04 ENCOUNTER — PATIENT MESSAGE (OUTPATIENT)
Dept: CARDIOLOGY | Facility: CLINIC | Age: 56
End: 2021-06-04

## 2021-06-09 ENCOUNTER — TELEPHONE (OUTPATIENT)
Dept: SMOKING CESSATION | Facility: CLINIC | Age: 56
End: 2021-06-09

## 2021-06-11 RX ORDER — GABAPENTIN 600 MG/1
TABLET ORAL
Qty: 120 TABLET | Refills: 0 | Status: SHIPPED | OUTPATIENT
Start: 2021-06-11 | End: 2021-07-12 | Stop reason: SDUPTHER

## 2021-06-11 RX ORDER — GABAPENTIN 600 MG/1
TABLET ORAL
Qty: 120 TABLET | Refills: 0 | OUTPATIENT
Start: 2021-06-11

## 2021-06-11 RX ORDER — BUTALBITAL, ACETAMINOPHEN AND CAFFEINE 50; 325; 40 MG/1; MG/1; MG/1
1 TABLET ORAL EVERY 4 HOURS PRN
Qty: 30 TABLET | Refills: 0 | Status: SHIPPED | OUTPATIENT
Start: 2021-06-11 | End: 2022-03-14 | Stop reason: SDUPTHER

## 2021-06-16 ENCOUNTER — TELEPHONE (OUTPATIENT)
Dept: SMOKING CESSATION | Facility: CLINIC | Age: 56
End: 2021-06-16

## 2021-06-28 ENCOUNTER — PES CALL (OUTPATIENT)
Dept: ADMINISTRATIVE | Facility: CLINIC | Age: 56
End: 2021-06-28

## 2021-07-09 ENCOUNTER — OFFICE VISIT (OUTPATIENT)
Dept: PSYCHIATRY | Facility: CLINIC | Age: 56
End: 2021-07-09
Payer: MEDICARE

## 2021-07-09 DIAGNOSIS — F31.9 BIPOLAR 1 DISORDER: Primary | ICD-10-CM

## 2021-07-09 DIAGNOSIS — G47.00 INSOMNIA, UNSPECIFIED TYPE: ICD-10-CM

## 2021-07-09 DIAGNOSIS — F41.9 ANXIETY: ICD-10-CM

## 2021-07-09 PROCEDURE — 99999 PR PBB SHADOW E&M-EST. PATIENT-LVL II: CPT | Mod: PBBFAC,,, | Performed by: PSYCHIATRY & NEUROLOGY

## 2021-07-09 PROCEDURE — 99999 PR PBB SHADOW E&M-EST. PATIENT-LVL II: ICD-10-PCS | Mod: PBBFAC,,, | Performed by: PSYCHIATRY & NEUROLOGY

## 2021-07-09 PROCEDURE — 99213 OFFICE O/P EST LOW 20 MIN: CPT | Mod: S$GLB,,, | Performed by: PSYCHIATRY & NEUROLOGY

## 2021-07-09 PROCEDURE — 99213 PR OFFICE/OUTPT VISIT, EST, LEVL III, 20-29 MIN: ICD-10-PCS | Mod: S$GLB,,, | Performed by: PSYCHIATRY & NEUROLOGY

## 2021-07-09 RX ORDER — MIRTAZAPINE 15 MG/1
TABLET, FILM COATED ORAL
Qty: 60 TABLET | Refills: 2 | Status: SHIPPED | OUTPATIENT
Start: 2021-07-09 | End: 2021-10-06 | Stop reason: SDUPTHER

## 2021-07-09 RX ORDER — RISPERIDONE 1 MG/1
1 TABLET ORAL 2 TIMES DAILY
Qty: 60 TABLET | Refills: 2 | Status: SHIPPED | OUTPATIENT
Start: 2021-07-09 | End: 2021-10-06 | Stop reason: SDUPTHER

## 2021-07-09 RX ORDER — BUPROPION HYDROCHLORIDE 150 MG/1
150 TABLET ORAL DAILY
Qty: 30 TABLET | Refills: 3 | Status: SHIPPED | OUTPATIENT
Start: 2021-07-09 | End: 2021-10-06 | Stop reason: SDUPTHER

## 2021-07-09 RX ORDER — DIAZEPAM 10 MG/1
10 TABLET ORAL 3 TIMES DAILY PRN
Qty: 90 TABLET | Refills: 2 | Status: SHIPPED | OUTPATIENT
Start: 2021-07-09 | End: 2021-10-06 | Stop reason: SDUPTHER

## 2021-07-12 RX ORDER — GABAPENTIN 600 MG/1
TABLET ORAL
Qty: 120 TABLET | Refills: 0 | Status: SHIPPED | OUTPATIENT
Start: 2021-07-12 | End: 2021-08-12 | Stop reason: SDUPTHER

## 2021-07-20 ENCOUNTER — TELEPHONE (OUTPATIENT)
Dept: ADMINISTRATIVE | Facility: HOSPITAL | Age: 56
End: 2021-07-20

## 2021-08-04 ENCOUNTER — PATIENT MESSAGE (OUTPATIENT)
Dept: ADMINISTRATIVE | Facility: HOSPITAL | Age: 56
End: 2021-08-04

## 2021-08-16 ENCOUNTER — PATIENT MESSAGE (OUTPATIENT)
Dept: FAMILY MEDICINE | Facility: CLINIC | Age: 56
End: 2021-08-16

## 2021-08-16 RX ORDER — GABAPENTIN 600 MG/1
TABLET ORAL
Qty: 120 TABLET | Refills: 3 | Status: SHIPPED | OUTPATIENT
Start: 2021-08-16 | End: 2021-12-15 | Stop reason: SDUPTHER

## 2021-08-16 RX ORDER — GABAPENTIN 600 MG/1
TABLET ORAL
Qty: 120 TABLET | Refills: 2 | OUTPATIENT
Start: 2021-08-16

## 2021-08-16 RX ORDER — GABAPENTIN 600 MG/1
TABLET ORAL
Qty: 120 TABLET | Refills: 2 | Status: CANCELLED | OUTPATIENT
Start: 2021-08-16

## 2021-08-19 ENCOUNTER — TELEPHONE (OUTPATIENT)
Dept: FAMILY MEDICINE | Facility: CLINIC | Age: 56
End: 2021-08-19

## 2021-08-19 ENCOUNTER — PATIENT MESSAGE (OUTPATIENT)
Dept: FAMILY MEDICINE | Facility: CLINIC | Age: 56
End: 2021-08-19

## 2021-08-19 RX ORDER — ALBUTEROL SULFATE 90 UG/1
AEROSOL, METERED RESPIRATORY (INHALATION)
Qty: 18 G | Refills: 3 | Status: SHIPPED | OUTPATIENT
Start: 2021-08-19

## 2021-08-25 ENCOUNTER — TELEPHONE (OUTPATIENT)
Dept: FAMILY MEDICINE | Facility: CLINIC | Age: 56
End: 2021-08-25

## 2021-08-25 RX ORDER — IPRATROPIUM BROMIDE AND ALBUTEROL SULFATE 2.5; .5 MG/3ML; MG/3ML
3 SOLUTION RESPIRATORY (INHALATION) EVERY 6 HOURS PRN
COMMUNITY
End: 2021-08-25

## 2021-08-26 RX ORDER — IPRATROPIUM BROMIDE AND ALBUTEROL SULFATE 2.5; .5 MG/3ML; MG/3ML
3 SOLUTION RESPIRATORY (INHALATION) EVERY 6 HOURS PRN
Qty: 1 BOX | Refills: 0 | Status: SHIPPED | OUTPATIENT
Start: 2021-08-26 | End: 2024-03-27

## 2021-09-03 ENCOUNTER — PATIENT MESSAGE (OUTPATIENT)
Dept: NEUROSURGERY | Facility: CLINIC | Age: 56
End: 2021-09-03

## 2021-10-06 ENCOUNTER — OFFICE VISIT (OUTPATIENT)
Dept: PSYCHIATRY | Facility: CLINIC | Age: 56
End: 2021-10-06
Payer: MEDICARE

## 2021-10-06 DIAGNOSIS — F31.9 BIPOLAR 1 DISORDER: Primary | ICD-10-CM

## 2021-10-06 PROCEDURE — 4010F ACE/ARB THERAPY RXD/TAKEN: CPT | Mod: HCNC,CPTII,S$GLB, | Performed by: PSYCHIATRY & NEUROLOGY

## 2021-10-06 PROCEDURE — 99999 PR PBB SHADOW E&M-EST. PATIENT-LVL II: CPT | Mod: PBBFAC,HCNC,, | Performed by: PSYCHIATRY & NEUROLOGY

## 2021-10-06 PROCEDURE — 1159F MED LIST DOCD IN RCRD: CPT | Mod: HCNC,CPTII,S$GLB, | Performed by: PSYCHIATRY & NEUROLOGY

## 2021-10-06 PROCEDURE — 99214 OFFICE O/P EST MOD 30 MIN: CPT | Mod: HCNC,S$GLB,, | Performed by: PSYCHIATRY & NEUROLOGY

## 2021-10-06 PROCEDURE — 99999 PR PBB SHADOW E&M-EST. PATIENT-LVL II: ICD-10-PCS | Mod: PBBFAC,HCNC,, | Performed by: PSYCHIATRY & NEUROLOGY

## 2021-10-06 PROCEDURE — 99214 PR OFFICE/OUTPT VISIT, EST, LEVL IV, 30-39 MIN: ICD-10-PCS | Mod: HCNC,S$GLB,, | Performed by: PSYCHIATRY & NEUROLOGY

## 2021-10-06 PROCEDURE — 1159F PR MEDICATION LIST DOCUMENTED IN MEDICAL RECORD: ICD-10-PCS | Mod: HCNC,CPTII,S$GLB, | Performed by: PSYCHIATRY & NEUROLOGY

## 2021-10-06 PROCEDURE — 99499 RISK ADDL DX/OHS AUDIT: ICD-10-PCS | Mod: HCNC,S$GLB,, | Performed by: PSYCHIATRY & NEUROLOGY

## 2021-10-06 PROCEDURE — 1160F PR REVIEW ALL MEDS BY PRESCRIBER/CLIN PHARMACIST DOCUMENTED: ICD-10-PCS | Mod: HCNC,CPTII,S$GLB, | Performed by: PSYCHIATRY & NEUROLOGY

## 2021-10-06 PROCEDURE — 99499 UNLISTED E&M SERVICE: CPT | Mod: HCNC,S$GLB,, | Performed by: PSYCHIATRY & NEUROLOGY

## 2021-10-06 PROCEDURE — 3046F HEMOGLOBIN A1C LEVEL >9.0%: CPT | Mod: HCNC,CPTII,S$GLB, | Performed by: PSYCHIATRY & NEUROLOGY

## 2021-10-06 PROCEDURE — 1160F RVW MEDS BY RX/DR IN RCRD: CPT | Mod: HCNC,CPTII,S$GLB, | Performed by: PSYCHIATRY & NEUROLOGY

## 2021-10-06 PROCEDURE — 3046F PR MOST RECENT HEMOGLOBIN A1C LEVEL > 9.0%: ICD-10-PCS | Mod: HCNC,CPTII,S$GLB, | Performed by: PSYCHIATRY & NEUROLOGY

## 2021-10-06 PROCEDURE — 4010F PR ACE/ARB THEARPY RXD/TAKEN: ICD-10-PCS | Mod: HCNC,CPTII,S$GLB, | Performed by: PSYCHIATRY & NEUROLOGY

## 2021-10-06 RX ORDER — BENZTROPINE MESYLATE 0.5 MG/1
0.5 TABLET ORAL 2 TIMES DAILY
Qty: 60 TABLET | Refills: 2 | Status: SHIPPED | OUTPATIENT
Start: 2021-10-06 | End: 2022-01-05 | Stop reason: SDUPTHER

## 2021-10-06 RX ORDER — RISPERIDONE 1 MG/1
1 TABLET ORAL 2 TIMES DAILY
Qty: 60 TABLET | Refills: 2 | Status: SHIPPED | OUTPATIENT
Start: 2021-10-06 | End: 2022-01-05 | Stop reason: SDUPTHER

## 2021-10-06 RX ORDER — DIAZEPAM 10 MG/1
10 TABLET ORAL 3 TIMES DAILY PRN
Qty: 90 TABLET | Refills: 2 | Status: SHIPPED | OUTPATIENT
Start: 2021-10-06 | End: 2022-01-31

## 2021-10-06 RX ORDER — MIRTAZAPINE 15 MG/1
TABLET, FILM COATED ORAL
Qty: 60 TABLET | Refills: 2 | Status: SHIPPED | OUTPATIENT
Start: 2021-10-06 | End: 2022-01-05

## 2021-10-06 RX ORDER — BUPROPION HYDROCHLORIDE 150 MG/1
150 TABLET ORAL DAILY
Qty: 30 TABLET | Refills: 2 | Status: SHIPPED | OUTPATIENT
Start: 2021-10-06 | End: 2022-01-05 | Stop reason: SDUPTHER

## 2021-10-13 DIAGNOSIS — E11.9 TYPE 2 DIABETES MELLITUS WITHOUT COMPLICATION: ICD-10-CM

## 2021-10-18 ENCOUNTER — PATIENT MESSAGE (OUTPATIENT)
Dept: FAMILY MEDICINE | Facility: CLINIC | Age: 56
End: 2021-10-18
Payer: MEDICARE

## 2021-10-19 ENCOUNTER — PATIENT MESSAGE (OUTPATIENT)
Dept: FAMILY MEDICINE | Facility: CLINIC | Age: 56
End: 2021-10-19
Payer: MEDICARE

## 2021-10-20 ENCOUNTER — OFFICE VISIT (OUTPATIENT)
Dept: FAMILY MEDICINE | Facility: CLINIC | Age: 56
End: 2021-10-20
Payer: MEDICARE

## 2021-10-20 ENCOUNTER — LAB VISIT (OUTPATIENT)
Dept: LAB | Facility: HOSPITAL | Age: 56
End: 2021-10-20
Attending: INTERNAL MEDICINE
Payer: MEDICARE

## 2021-10-20 VITALS
HEART RATE: 94 BPM | HEIGHT: 64 IN | WEIGHT: 197.44 LBS | DIASTOLIC BLOOD PRESSURE: 60 MMHG | OXYGEN SATURATION: 95 % | SYSTOLIC BLOOD PRESSURE: 132 MMHG | TEMPERATURE: 98 F | BODY MASS INDEX: 33.71 KG/M2 | RESPIRATION RATE: 16 BRPM

## 2021-10-20 DIAGNOSIS — I10 ESSENTIAL HYPERTENSION: Primary | ICD-10-CM

## 2021-10-20 DIAGNOSIS — E78.5 DYSLIPIDEMIA ASSOCIATED WITH TYPE 2 DIABETES MELLITUS: ICD-10-CM

## 2021-10-20 DIAGNOSIS — Z12.39 ENCOUNTER FOR SCREENING FOR MALIGNANT NEOPLASM OF BREAST, UNSPECIFIED SCREENING MODALITY: ICD-10-CM

## 2021-10-20 DIAGNOSIS — Z79.4 TYPE 2 DIABETES MELLITUS WITH COMPLICATION, WITH LONG-TERM CURRENT USE OF INSULIN: ICD-10-CM

## 2021-10-20 DIAGNOSIS — R30.0 DYSURIA: ICD-10-CM

## 2021-10-20 DIAGNOSIS — Z12.31 ENCOUNTER FOR SCREENING MAMMOGRAM FOR MALIGNANT NEOPLASM OF BREAST: ICD-10-CM

## 2021-10-20 DIAGNOSIS — I67.1 CEREBRAL ANEURYSM, NONRUPTURED: ICD-10-CM

## 2021-10-20 DIAGNOSIS — E11.8 TYPE 2 DIABETES MELLITUS WITH COMPLICATION, WITH LONG-TERM CURRENT USE OF INSULIN: ICD-10-CM

## 2021-10-20 DIAGNOSIS — E03.4 HYPOTHYROIDISM DUE TO ACQUIRED ATROPHY OF THYROID: ICD-10-CM

## 2021-10-20 DIAGNOSIS — E11.9 TYPE 2 DIABETES MELLITUS WITHOUT COMPLICATION: ICD-10-CM

## 2021-10-20 DIAGNOSIS — E11.69 DYSLIPIDEMIA ASSOCIATED WITH TYPE 2 DIABETES MELLITUS: ICD-10-CM

## 2021-10-20 DIAGNOSIS — Z72.0 TOBACCO ABUSE: ICD-10-CM

## 2021-10-20 DIAGNOSIS — F31.9 BIPOLAR AFFECTIVE DISORDER, REMISSION STATUS UNSPECIFIED: ICD-10-CM

## 2021-10-20 LAB
ALBUMIN SERPL BCP-MCNC: 3.5 G/DL (ref 3.5–5.2)
ALP SERPL-CCNC: 71 U/L (ref 55–135)
ALT SERPL W/O P-5'-P-CCNC: 52 U/L (ref 10–44)
ANION GAP SERPL CALC-SCNC: 11 MMOL/L (ref 8–16)
AST SERPL-CCNC: 42 U/L (ref 10–40)
BILIRUB SERPL-MCNC: 0.4 MG/DL (ref 0.1–1)
BUN SERPL-MCNC: 10 MG/DL (ref 6–20)
CALCIUM SERPL-MCNC: 9.4 MG/DL (ref 8.7–10.5)
CHLORIDE SERPL-SCNC: 99 MMOL/L (ref 95–110)
CHOLEST SERPL-MCNC: 145 MG/DL (ref 120–199)
CHOLEST/HDLC SERPL: 2.4 {RATIO} (ref 2–5)
CO2 SERPL-SCNC: 27 MMOL/L (ref 23–29)
CREAT SERPL-MCNC: 0.7 MG/DL (ref 0.5–1.4)
EST. GFR  (AFRICAN AMERICAN): >60 ML/MIN/1.73 M^2
EST. GFR  (NON AFRICAN AMERICAN): >60 ML/MIN/1.73 M^2
GLUCOSE SERPL-MCNC: 307 MG/DL (ref 70–110)
HDLC SERPL-MCNC: 61 MG/DL (ref 40–75)
HDLC SERPL: 42.1 % (ref 20–50)
LDLC SERPL CALC-MCNC: 55 MG/DL (ref 63–159)
NONHDLC SERPL-MCNC: 84 MG/DL
POTASSIUM SERPL-SCNC: 4.3 MMOL/L (ref 3.5–5.1)
PROT SERPL-MCNC: 7 G/DL (ref 6–8.4)
SODIUM SERPL-SCNC: 137 MMOL/L (ref 136–145)
TRIGL SERPL-MCNC: 145 MG/DL (ref 30–150)

## 2021-10-20 PROCEDURE — 99999 PR PBB SHADOW E&M-EST. PATIENT-LVL IV: CPT | Mod: PBBFAC,HCNC,, | Performed by: FAMILY MEDICINE

## 2021-10-20 PROCEDURE — 36415 COLL VENOUS BLD VENIPUNCTURE: CPT | Mod: HCNC,PO | Performed by: INTERNAL MEDICINE

## 2021-10-20 PROCEDURE — 99499 UNLISTED E&M SERVICE: CPT | Mod: S$GLB,,, | Performed by: FAMILY MEDICINE

## 2021-10-20 PROCEDURE — 3075F SYST BP GE 130 - 139MM HG: CPT | Mod: HCNC,CPTII,S$GLB, | Performed by: FAMILY MEDICINE

## 2021-10-20 PROCEDURE — 3046F HEMOGLOBIN A1C LEVEL >9.0%: CPT | Mod: HCNC,CPTII,S$GLB, | Performed by: FAMILY MEDICINE

## 2021-10-20 PROCEDURE — 3008F PR BODY MASS INDEX (BMI) DOCUMENTED: ICD-10-PCS | Mod: HCNC,CPTII,S$GLB, | Performed by: FAMILY MEDICINE

## 2021-10-20 PROCEDURE — 99214 OFFICE O/P EST MOD 30 MIN: CPT | Mod: HCNC,S$GLB,, | Performed by: FAMILY MEDICINE

## 2021-10-20 PROCEDURE — 3075F PR MOST RECENT SYSTOLIC BLOOD PRESS GE 130-139MM HG: ICD-10-PCS | Mod: HCNC,CPTII,S$GLB, | Performed by: FAMILY MEDICINE

## 2021-10-20 PROCEDURE — 80061 LIPID PANEL: CPT | Mod: HCNC | Performed by: INTERNAL MEDICINE

## 2021-10-20 PROCEDURE — 1159F PR MEDICATION LIST DOCUMENTED IN MEDICAL RECORD: ICD-10-PCS | Mod: HCNC,CPTII,S$GLB, | Performed by: FAMILY MEDICINE

## 2021-10-20 PROCEDURE — 99499 RISK ADDL DX/OHS AUDIT: ICD-10-PCS | Mod: S$GLB,,, | Performed by: FAMILY MEDICINE

## 2021-10-20 PROCEDURE — 83036 HEMOGLOBIN GLYCOSYLATED A1C: CPT | Mod: HCNC | Performed by: FAMILY MEDICINE

## 2021-10-20 PROCEDURE — 3078F PR MOST RECENT DIASTOLIC BLOOD PRESSURE < 80 MM HG: ICD-10-PCS | Mod: HCNC,CPTII,S$GLB, | Performed by: FAMILY MEDICINE

## 2021-10-20 PROCEDURE — 4010F ACE/ARB THERAPY RXD/TAKEN: CPT | Mod: HCNC,CPTII,S$GLB, | Performed by: FAMILY MEDICINE

## 2021-10-20 PROCEDURE — 3008F BODY MASS INDEX DOCD: CPT | Mod: HCNC,CPTII,S$GLB, | Performed by: FAMILY MEDICINE

## 2021-10-20 PROCEDURE — 1159F MED LIST DOCD IN RCRD: CPT | Mod: HCNC,CPTII,S$GLB, | Performed by: FAMILY MEDICINE

## 2021-10-20 PROCEDURE — 3078F DIAST BP <80 MM HG: CPT | Mod: HCNC,CPTII,S$GLB, | Performed by: FAMILY MEDICINE

## 2021-10-20 PROCEDURE — 80053 COMPREHEN METABOLIC PANEL: CPT | Mod: HCNC | Performed by: INTERNAL MEDICINE

## 2021-10-20 PROCEDURE — 3046F PR MOST RECENT HEMOGLOBIN A1C LEVEL > 9.0%: ICD-10-PCS | Mod: HCNC,CPTII,S$GLB, | Performed by: FAMILY MEDICINE

## 2021-10-20 PROCEDURE — 99214 PR OFFICE/OUTPT VISIT, EST, LEVL IV, 30-39 MIN: ICD-10-PCS | Mod: HCNC,S$GLB,, | Performed by: FAMILY MEDICINE

## 2021-10-20 PROCEDURE — 99999 PR PBB SHADOW E&M-EST. PATIENT-LVL IV: ICD-10-PCS | Mod: PBBFAC,HCNC,, | Performed by: FAMILY MEDICINE

## 2021-10-20 PROCEDURE — 4010F PR ACE/ARB THEARPY RXD/TAKEN: ICD-10-PCS | Mod: HCNC,CPTII,S$GLB, | Performed by: FAMILY MEDICINE

## 2021-10-20 RX ORDER — LOSARTAN POTASSIUM 25 MG/1
25 TABLET ORAL DAILY
Qty: 90 TABLET | Refills: 3 | Status: SHIPPED | OUTPATIENT
Start: 2021-10-20 | End: 2022-11-16

## 2021-10-21 LAB
ESTIMATED AVG GLUCOSE: ABNORMAL MG/DL (ref 68–131)
HBA1C MFR BLD: >14 % (ref 4–5.6)

## 2021-10-27 ENCOUNTER — TELEPHONE (OUTPATIENT)
Dept: CARDIOLOGY | Facility: CLINIC | Age: 56
End: 2021-10-27
Payer: MEDICARE

## 2021-11-05 ENCOUNTER — TELEPHONE (OUTPATIENT)
Dept: NEUROSURGERY | Facility: CLINIC | Age: 56
End: 2021-11-05
Payer: MEDICARE

## 2021-11-05 DIAGNOSIS — I67.1 CEREBRAL ANEURYSM, NONRUPTURED: ICD-10-CM

## 2021-11-13 ENCOUNTER — PATIENT OUTREACH (OUTPATIENT)
Dept: ADMINISTRATIVE | Facility: OTHER | Age: 56
End: 2021-11-13
Payer: MEDICARE

## 2021-11-16 ENCOUNTER — OFFICE VISIT (OUTPATIENT)
Dept: OPHTHALMOLOGY | Facility: CLINIC | Age: 56
End: 2021-11-16
Payer: MEDICARE

## 2021-11-16 DIAGNOSIS — H52.7 REFRACTIVE ERRORS: ICD-10-CM

## 2021-11-16 DIAGNOSIS — E11.9 DIABETES MELLITUS TYPE 2 WITHOUT RETINOPATHY: Primary | ICD-10-CM

## 2021-11-16 DIAGNOSIS — E11.36 DIABETIC CATARACT: ICD-10-CM

## 2021-11-16 PROCEDURE — 92015 PR REFRACTION: ICD-10-PCS | Mod: HCNC,S$GLB,, | Performed by: OPTOMETRIST

## 2021-11-16 PROCEDURE — 2023F PR DILATED RETINAL EXAM W/O EVID OF RETINOPATHY: ICD-10-PCS | Mod: HCNC,CPTII,S$GLB, | Performed by: OPTOMETRIST

## 2021-11-16 PROCEDURE — 3066F PR DOCUMENTATION OF TREATMENT FOR NEPHROPATHY: ICD-10-PCS | Mod: HCNC,CPTII,S$GLB, | Performed by: OPTOMETRIST

## 2021-11-16 PROCEDURE — 3060F PR POS MICROALBUMINURIA RESULT DOCUMENTED/REVIEW: ICD-10-PCS | Mod: HCNC,CPTII,S$GLB, | Performed by: OPTOMETRIST

## 2021-11-16 PROCEDURE — 2023F DILAT RTA XM W/O RTNOPTHY: CPT | Mod: HCNC,CPTII,S$GLB, | Performed by: OPTOMETRIST

## 2021-11-16 PROCEDURE — 1159F MED LIST DOCD IN RCRD: CPT | Mod: HCNC,CPTII,S$GLB, | Performed by: OPTOMETRIST

## 2021-11-16 PROCEDURE — 4010F ACE/ARB THERAPY RXD/TAKEN: CPT | Mod: HCNC,CPTII,S$GLB, | Performed by: OPTOMETRIST

## 2021-11-16 PROCEDURE — 3046F HEMOGLOBIN A1C LEVEL >9.0%: CPT | Mod: HCNC,CPTII,S$GLB, | Performed by: OPTOMETRIST

## 2021-11-16 PROCEDURE — 3066F NEPHROPATHY DOC TX: CPT | Mod: HCNC,CPTII,S$GLB, | Performed by: OPTOMETRIST

## 2021-11-16 PROCEDURE — 3046F PR MOST RECENT HEMOGLOBIN A1C LEVEL > 9.0%: ICD-10-PCS | Mod: HCNC,CPTII,S$GLB, | Performed by: OPTOMETRIST

## 2021-11-16 PROCEDURE — 1159F PR MEDICATION LIST DOCUMENTED IN MEDICAL RECORD: ICD-10-PCS | Mod: HCNC,CPTII,S$GLB, | Performed by: OPTOMETRIST

## 2021-11-16 PROCEDURE — 92004 COMPRE OPH EXAM NEW PT 1/>: CPT | Mod: HCNC,S$GLB,, | Performed by: OPTOMETRIST

## 2021-11-16 PROCEDURE — 99999 PR PBB SHADOW E&M-EST. PATIENT-LVL III: CPT | Mod: PBBFAC,HCNC,, | Performed by: OPTOMETRIST

## 2021-11-16 PROCEDURE — 99999 PR PBB SHADOW E&M-EST. PATIENT-LVL III: ICD-10-PCS | Mod: PBBFAC,HCNC,, | Performed by: OPTOMETRIST

## 2021-11-16 PROCEDURE — 3060F POS MICROALBUMINURIA REV: CPT | Mod: HCNC,CPTII,S$GLB, | Performed by: OPTOMETRIST

## 2021-11-16 PROCEDURE — 4010F PR ACE/ARB THEARPY RXD/TAKEN: ICD-10-PCS | Mod: HCNC,CPTII,S$GLB, | Performed by: OPTOMETRIST

## 2021-11-16 PROCEDURE — 92015 DETERMINE REFRACTIVE STATE: CPT | Mod: HCNC,S$GLB,, | Performed by: OPTOMETRIST

## 2021-11-16 PROCEDURE — 92004 PR EYE EXAM, NEW PATIENT,COMPREHESV: ICD-10-PCS | Mod: HCNC,S$GLB,, | Performed by: OPTOMETRIST

## 2021-12-03 DIAGNOSIS — I10 ESSENTIAL HYPERTENSION: Primary | ICD-10-CM

## 2021-12-08 ENCOUNTER — CLINICAL SUPPORT (OUTPATIENT)
Dept: CARDIOLOGY | Facility: CLINIC | Age: 56
End: 2021-12-08
Payer: MEDICARE

## 2021-12-08 ENCOUNTER — OFFICE VISIT (OUTPATIENT)
Dept: CARDIOLOGY | Facility: CLINIC | Age: 56
End: 2021-12-08
Payer: MEDICARE

## 2021-12-08 VITALS
HEART RATE: 79 BPM | BODY MASS INDEX: 34.11 KG/M2 | SYSTOLIC BLOOD PRESSURE: 122 MMHG | WEIGHT: 198.75 LBS | DIASTOLIC BLOOD PRESSURE: 56 MMHG | OXYGEN SATURATION: 96 %

## 2021-12-08 DIAGNOSIS — E78.5 DYSLIPIDEMIA ASSOCIATED WITH TYPE 2 DIABETES MELLITUS: ICD-10-CM

## 2021-12-08 DIAGNOSIS — Z72.0 TOBACCO ABUSE: Chronic | ICD-10-CM

## 2021-12-08 DIAGNOSIS — I10 ESSENTIAL HYPERTENSION: Primary | Chronic | ICD-10-CM

## 2021-12-08 DIAGNOSIS — J44.1 COPD EXACERBATION: ICD-10-CM

## 2021-12-08 DIAGNOSIS — R07.9 CHEST PAIN, MODERATE CORONARY ARTERY RISK: ICD-10-CM

## 2021-12-08 DIAGNOSIS — I10 ESSENTIAL HYPERTENSION: ICD-10-CM

## 2021-12-08 DIAGNOSIS — E11.69 DYSLIPIDEMIA ASSOCIATED WITH TYPE 2 DIABETES MELLITUS: ICD-10-CM

## 2021-12-08 DIAGNOSIS — Z79.4 TYPE 2 DIABETES MELLITUS WITH COMPLICATION, WITH LONG-TERM CURRENT USE OF INSULIN: ICD-10-CM

## 2021-12-08 DIAGNOSIS — E11.8 TYPE 2 DIABETES MELLITUS WITH COMPLICATION, WITH LONG-TERM CURRENT USE OF INSULIN: ICD-10-CM

## 2021-12-08 DIAGNOSIS — Z92.82 S/P ADMN TPA IN DIFF FAC W/N LAST 24 HR BEF ADM TO CRNT FAC: ICD-10-CM

## 2021-12-08 DIAGNOSIS — I73.9 PVD (PERIPHERAL VASCULAR DISEASE): ICD-10-CM

## 2021-12-08 PROCEDURE — 3060F POS MICROALBUMINURIA REV: CPT | Mod: HCNC,CPTII,S$GLB, | Performed by: INTERNAL MEDICINE

## 2021-12-08 PROCEDURE — 3060F PR POS MICROALBUMINURIA RESULT DOCUMENTED/REVIEW: ICD-10-PCS | Mod: HCNC,CPTII,S$GLB, | Performed by: INTERNAL MEDICINE

## 2021-12-08 PROCEDURE — 3066F NEPHROPATHY DOC TX: CPT | Mod: HCNC,CPTII,S$GLB, | Performed by: INTERNAL MEDICINE

## 2021-12-08 PROCEDURE — 93000 EKG 12-LEAD: ICD-10-PCS | Mod: HCNC,S$GLB,, | Performed by: INTERNAL MEDICINE

## 2021-12-08 PROCEDURE — 99999 PR PBB SHADOW E&M-EST. PATIENT-LVL IV: ICD-10-PCS | Mod: PBBFAC,HCNC,, | Performed by: INTERNAL MEDICINE

## 2021-12-08 PROCEDURE — 93000 ELECTROCARDIOGRAM COMPLETE: CPT | Mod: HCNC,S$GLB,, | Performed by: INTERNAL MEDICINE

## 2021-12-08 PROCEDURE — 4010F ACE/ARB THERAPY RXD/TAKEN: CPT | Mod: HCNC,CPTII,S$GLB, | Performed by: INTERNAL MEDICINE

## 2021-12-08 PROCEDURE — 4010F PR ACE/ARB THEARPY RXD/TAKEN: ICD-10-PCS | Mod: HCNC,CPTII,S$GLB, | Performed by: INTERNAL MEDICINE

## 2021-12-08 PROCEDURE — 99214 OFFICE O/P EST MOD 30 MIN: CPT | Mod: HCNC,25,S$GLB, | Performed by: INTERNAL MEDICINE

## 2021-12-08 PROCEDURE — 3066F PR DOCUMENTATION OF TREATMENT FOR NEPHROPATHY: ICD-10-PCS | Mod: HCNC,CPTII,S$GLB, | Performed by: INTERNAL MEDICINE

## 2021-12-08 PROCEDURE — 99214 PR OFFICE/OUTPT VISIT, EST, LEVL IV, 30-39 MIN: ICD-10-PCS | Mod: HCNC,25,S$GLB, | Performed by: INTERNAL MEDICINE

## 2021-12-08 PROCEDURE — 99999 PR PBB SHADOW E&M-EST. PATIENT-LVL IV: CPT | Mod: PBBFAC,HCNC,, | Performed by: INTERNAL MEDICINE

## 2021-12-08 RX ORDER — ISOSORBIDE MONONITRATE 30 MG/1
30 TABLET, EXTENDED RELEASE ORAL DAILY
Qty: 30 TABLET | Refills: 5 | Status: SHIPPED | OUTPATIENT
Start: 2021-12-08 | End: 2022-06-08

## 2021-12-17 RX ORDER — GABAPENTIN 600 MG/1
TABLET ORAL
Qty: 120 TABLET | Refills: 0 | OUTPATIENT
Start: 2021-12-17

## 2021-12-17 RX ORDER — GABAPENTIN 600 MG/1
TABLET ORAL
Qty: 120 TABLET | Refills: 3 | Status: SHIPPED | OUTPATIENT
Start: 2021-12-17 | End: 2022-04-14

## 2022-01-04 ENCOUNTER — TELEPHONE (OUTPATIENT)
Dept: PSYCHIATRY | Facility: CLINIC | Age: 57
End: 2022-01-04
Payer: MEDICARE

## 2022-01-05 ENCOUNTER — OFFICE VISIT (OUTPATIENT)
Dept: PSYCHIATRY | Facility: CLINIC | Age: 57
End: 2022-01-05
Payer: MEDICARE

## 2022-01-05 DIAGNOSIS — F31.9 BIPOLAR 1 DISORDER: Primary | ICD-10-CM

## 2022-01-05 DIAGNOSIS — H91.90 ACQUIRED HEARING LOSS: ICD-10-CM

## 2022-01-05 DIAGNOSIS — F41.9 ANXIETY: ICD-10-CM

## 2022-01-05 PROCEDURE — 99213 PR OFFICE/OUTPT VISIT, EST, LEVL III, 20-29 MIN: ICD-10-PCS | Mod: HCNC,S$GLB,, | Performed by: PSYCHIATRY & NEUROLOGY

## 2022-01-05 PROCEDURE — 3072F LOW RISK FOR RETINOPATHY: CPT | Mod: HCNC,CPTII,S$GLB, | Performed by: PSYCHIATRY & NEUROLOGY

## 2022-01-05 PROCEDURE — 99499 UNLISTED E&M SERVICE: CPT | Mod: S$GLB,,, | Performed by: PSYCHIATRY & NEUROLOGY

## 2022-01-05 PROCEDURE — 99499 RISK ADDL DX/OHS AUDIT: ICD-10-PCS | Mod: S$GLB,,, | Performed by: PSYCHIATRY & NEUROLOGY

## 2022-01-05 PROCEDURE — 99999 PR PBB SHADOW E&M-EST. PATIENT-LVL II: ICD-10-PCS | Mod: PBBFAC,HCNC,, | Performed by: PSYCHIATRY & NEUROLOGY

## 2022-01-05 PROCEDURE — 99213 OFFICE O/P EST LOW 20 MIN: CPT | Mod: HCNC,S$GLB,, | Performed by: PSYCHIATRY & NEUROLOGY

## 2022-01-05 PROCEDURE — 3072F PR LOW RISK FOR RETINOPATHY: ICD-10-PCS | Mod: HCNC,CPTII,S$GLB, | Performed by: PSYCHIATRY & NEUROLOGY

## 2022-01-05 PROCEDURE — 99999 PR PBB SHADOW E&M-EST. PATIENT-LVL II: CPT | Mod: PBBFAC,HCNC,, | Performed by: PSYCHIATRY & NEUROLOGY

## 2022-01-05 RX ORDER — BENZTROPINE MESYLATE 0.5 MG/1
0.5 TABLET ORAL 2 TIMES DAILY
Qty: 60 TABLET | Refills: 2 | Status: SHIPPED | OUTPATIENT
Start: 2022-01-05 | End: 2022-04-05 | Stop reason: SDUPTHER

## 2022-01-05 RX ORDER — MIRTAZAPINE 45 MG/1
45 TABLET, FILM COATED ORAL NIGHTLY
Qty: 30 TABLET | Refills: 2 | Status: SHIPPED | OUTPATIENT
Start: 2022-01-05 | End: 2022-04-05

## 2022-01-05 RX ORDER — RISPERIDONE 1 MG/1
1 TABLET ORAL 2 TIMES DAILY
Qty: 60 TABLET | Refills: 2 | Status: SHIPPED | OUTPATIENT
Start: 2022-01-05 | End: 2022-04-05 | Stop reason: SDUPTHER

## 2022-01-05 RX ORDER — BUPROPION HYDROCHLORIDE 150 MG/1
150 TABLET ORAL DAILY
Qty: 30 TABLET | Refills: 2 | Status: SHIPPED | OUTPATIENT
Start: 2022-01-05 | End: 2022-04-05 | Stop reason: SDUPTHER
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
## 2022-01-06 ENCOUNTER — TELEPHONE (OUTPATIENT)
Dept: CARDIOLOGY | Facility: CLINIC | Age: 57
End: 2022-01-06
Payer: MEDICARE

## 2022-01-06 NOTE — TELEPHONE ENCOUNTER
I called and spoke with Patient about appointment. She was under the impression that she was getting an Angiogram done on the 19th. Informed her that it is okay for patient to come alone, it is just a follow up appt.      ----- Message from Daria Dudley sent at 1/6/2022  8:08 AM CST -----  Pt called in questions to her audio gram being at her appt on 01/19/2022 , please give her a call back at .[phone]

## 2022-01-08 ENCOUNTER — TELEPHONE (OUTPATIENT)
Dept: DIABETES | Facility: CLINIC | Age: 57
End: 2022-01-08
Payer: MEDICARE

## 2022-01-08 RX ORDER — METFORMIN HYDROCHLORIDE 500 MG/1
500 TABLET, EXTENDED RELEASE ORAL 2 TIMES DAILY WITH MEALS
Qty: 180 TABLET | Refills: 0 | Status: SHIPPED | OUTPATIENT
Start: 2022-01-08 | End: 2022-04-13

## 2022-01-08 NOTE — TELEPHONE ENCOUNTER
I spoke with patient via telephone this afternoon and explained that the [facility] is no longer open on Saturdays, which is why we had to cancel her appointment.  Offered to see her sooner at the [facility] location on a Saturday, but patient prefers to see me in [place] on Wednesday.  My next available appointment is in May, but will place her on the wait list if anyone cancels.      In the meantime, patient states that BGs have been persistently elevated throughout the entire day, ranging from 20 s - 300 s.  Will send prescription to pharmacyfor metformin  mg,1 tablet BID. I advised to adjust Novolin 70/30 to the following, 70 units before breakfast and 60 units before dinner.  She voiced understanding.  Encouraged lifestyle changes with diet, less carbs and more vegetables and protein.

## 2022-01-19 ENCOUNTER — OFFICE VISIT (OUTPATIENT)
Dept: CARDIOLOGY | Facility: CLINIC | Age: 57
End: 2022-01-19
Payer: MEDICARE

## 2022-01-19 VITALS
HEART RATE: 94 BPM | BODY MASS INDEX: 35.46 KG/M2 | WEIGHT: 206.56 LBS | DIASTOLIC BLOOD PRESSURE: 60 MMHG | OXYGEN SATURATION: 96 % | SYSTOLIC BLOOD PRESSURE: 98 MMHG

## 2022-01-19 DIAGNOSIS — I10 ESSENTIAL HYPERTENSION: Chronic | ICD-10-CM

## 2022-01-19 DIAGNOSIS — E78.2 MIXED HYPERLIPIDEMIA: ICD-10-CM

## 2022-01-19 DIAGNOSIS — I73.9 PVD (PERIPHERAL VASCULAR DISEASE): ICD-10-CM

## 2022-01-19 DIAGNOSIS — Z78.9 STATIN INTOLERANCE: ICD-10-CM

## 2022-01-19 DIAGNOSIS — I25.118 CORONARY ARTERY DISEASE OF NATIVE ARTERY OF NATIVE HEART WITH STABLE ANGINA PECTORIS: Primary | ICD-10-CM

## 2022-01-19 DIAGNOSIS — R07.9 CHEST PAIN, MODERATE CORONARY ARTERY RISK: ICD-10-CM

## 2022-01-19 DIAGNOSIS — E11.69 DYSLIPIDEMIA ASSOCIATED WITH TYPE 2 DIABETES MELLITUS: ICD-10-CM

## 2022-01-19 DIAGNOSIS — E78.5 DYSLIPIDEMIA ASSOCIATED WITH TYPE 2 DIABETES MELLITUS: ICD-10-CM

## 2022-01-19 DIAGNOSIS — Z72.0 TOBACCO ABUSE: Chronic | ICD-10-CM

## 2022-01-19 PROCEDURE — 3008F PR BODY MASS INDEX (BMI) DOCUMENTED: ICD-10-PCS | Mod: HCNC,CPTII,S$GLB, | Performed by: INTERNAL MEDICINE

## 2022-01-19 PROCEDURE — 99499 RISK ADDL DX/OHS AUDIT: ICD-10-PCS | Mod: HCNC,S$GLB,, | Performed by: INTERNAL MEDICINE

## 2022-01-19 PROCEDURE — 1160F PR REVIEW ALL MEDS BY PRESCRIBER/CLIN PHARMACIST DOCUMENTED: ICD-10-PCS | Mod: HCNC,CPTII,S$GLB, | Performed by: INTERNAL MEDICINE

## 2022-01-19 PROCEDURE — 1159F MED LIST DOCD IN RCRD: CPT | Mod: HCNC,CPTII,S$GLB, | Performed by: INTERNAL MEDICINE

## 2022-01-19 PROCEDURE — 99999 PR PBB SHADOW E&M-EST. PATIENT-LVL IV: ICD-10-PCS | Mod: PBBFAC,HCNC,, | Performed by: INTERNAL MEDICINE

## 2022-01-19 PROCEDURE — 3072F PR LOW RISK FOR RETINOPATHY: ICD-10-PCS | Mod: HCNC,CPTII,S$GLB, | Performed by: INTERNAL MEDICINE

## 2022-01-19 PROCEDURE — 3008F BODY MASS INDEX DOCD: CPT | Mod: HCNC,CPTII,S$GLB, | Performed by: INTERNAL MEDICINE

## 2022-01-19 PROCEDURE — 99215 PR OFFICE/OUTPT VISIT, EST, LEVL V, 40-54 MIN: ICD-10-PCS | Mod: HCNC,S$GLB,, | Performed by: INTERNAL MEDICINE

## 2022-01-19 PROCEDURE — 1160F RVW MEDS BY RX/DR IN RCRD: CPT | Mod: HCNC,CPTII,S$GLB, | Performed by: INTERNAL MEDICINE

## 2022-01-19 PROCEDURE — 99215 OFFICE O/P EST HI 40 MIN: CPT | Mod: HCNC,S$GLB,, | Performed by: INTERNAL MEDICINE

## 2022-01-19 PROCEDURE — 99499 UNLISTED E&M SERVICE: CPT | Mod: HCNC,S$GLB,, | Performed by: INTERNAL MEDICINE

## 2022-01-19 PROCEDURE — 3078F PR MOST RECENT DIASTOLIC BLOOD PRESSURE < 80 MM HG: ICD-10-PCS | Mod: HCNC,CPTII,S$GLB, | Performed by: INTERNAL MEDICINE

## 2022-01-19 PROCEDURE — 3074F PR MOST RECENT SYSTOLIC BLOOD PRESSURE < 130 MM HG: ICD-10-PCS | Mod: HCNC,CPTII,S$GLB, | Performed by: INTERNAL MEDICINE

## 2022-01-19 PROCEDURE — 99999 PR PBB SHADOW E&M-EST. PATIENT-LVL IV: CPT | Mod: PBBFAC,HCNC,, | Performed by: INTERNAL MEDICINE

## 2022-01-19 PROCEDURE — 3074F SYST BP LT 130 MM HG: CPT | Mod: HCNC,CPTII,S$GLB, | Performed by: INTERNAL MEDICINE

## 2022-01-19 PROCEDURE — 1159F PR MEDICATION LIST DOCUMENTED IN MEDICAL RECORD: ICD-10-PCS | Mod: HCNC,CPTII,S$GLB, | Performed by: INTERNAL MEDICINE

## 2022-01-19 PROCEDURE — 3072F LOW RISK FOR RETINOPATHY: CPT | Mod: HCNC,CPTII,S$GLB, | Performed by: INTERNAL MEDICINE

## 2022-01-19 PROCEDURE — 3078F DIAST BP <80 MM HG: CPT | Mod: HCNC,CPTII,S$GLB, | Performed by: INTERNAL MEDICINE

## 2022-01-19 NOTE — PROGRESS NOTES
Subjective:   Patient ID:  Alexandra Goins is a 56 y.o. female who presents for follow up of No chief complaint on file.      57 yo female, came in for 2 months f/u  PMH CAD, PVCs, h/o syncope, HTN, HLD, h/o CVA, DM II, asthma, GERD, bipolar disorder, family h/o premature CAD, and tobacco. As well as brain anuerysm   In , episode of syncope. Jerking, myoclonic, HR dropped to 40 bpm. Family member did CPR, called EMR and added ambu bag for breathing.   Sent to [facility] and Neurology consult did not feel this was consistent with a CVA. MRI MRA were unremarkable. MRA of the brain revealed small aneurysm that is not new per family. Additional work-up including chemistries, blood glucose, thyroid studies, CBC, syphilis work-up, respiratory viral panel including COVID-19, EEG, and echo were unrevealing.  Seizure could be a possible diagnosis, neurology recommends close follow-up in the outpatient setting to monitor for any further episodes.  Troponin negative   ECHO normal EF  Had similar episode 1 yr ago.   Few faint/syncope episodes and had hand injury     MPI no ischemia and ZIOPATCH showed 1% PVCs  Last syncope episode of staring for 10 minutes with hands shaking in . Standing up and no collapsed. Could sit and BP/HR low. Recovered spontaneously and only confused for 30 seconds\  No chest pain. SOB due to smoking. Today EKG NSR     visit, Had repatha rx for 2 months and c/o swelling of feet and hands for 5 days. With pain on feet. Last dose was 4 weeks ago.  AYALA someday. Has COPD and smoking. Breathing Rx as needed  Sleeps as elevated.  No chest pain  No recurrent syncope    06/2021 visit  Feels sick after resumed repatha  No recurrent syncope. Chest heaviness with SOB when waking up. No those symptoms at daytime.  Today EKG NSR PVC and  bpm    12/2021 visit  3 weeks ago, episode of syncope with low BP 70 mmHG. ? UTI  Chronic left chest pain, related with anxeity. Chronic SOB,  smoking and on breathing Rx.  ekg NSR no acute stt change. BP controlled. A1C > 14 in .    Interval history  The chest pain improved a lot after added Imdur 30 mg daily at last visit. The episode of syncope occurred in  with vision loss at sitting. The family helped her laying down and woke up in few munites. BP was low. Continues to smoke.  Will do brain MRI soon for brain aneurysm        Past Medical History:   Diagnosis Date    Acute ischemic stroke 9/17/2019    Acute respiratory failure with hypoxia 2/11/2021    Aneurysm     Anxiety     Arthritis     Asthma     Bipolar 1 disorder     Cerebral aneurysm, nonruptured 9/19/2019    Coronary artery disease of native artery of native heart with stable angina pectoris 1/19/2022    Depression     Diabetes mellitus, type 2     Diverticular disease     GERD (gastroesophageal reflux disease)     Hyperlipemia     Hypertension     Hypothyroidism     Pneumonia     PVD (peripheral vascular disease) 4/28/2021    Renal manifestation of secondary diabetes mellitus     Right-sided back pain 6/29/2019    Stroke     Trouble in sleeping        Past Surgical History:   Procedure Laterality Date    CEREBRAL ANGIOGRAM N/A 10/28/2019    Procedure: ANGIOGRAM-CEREBRAL;  [facility];  Location: [address];  Service: Radiology;  Laterality: N/A;  Rm 190/Aayush    CHOLECYSTECTOMY      COLON SURGERY      COLONOSCOPY N/A 1/12/2018    Procedure: COLONOSCOPY;  Surgeon: Roque Mahajan III, MD;  Location: [facility];  Service: Endoscopy;  Laterality: N/A;    DENTAL SURGERY  05/21/2018    removal of 8 top teeth    HYSTERECTOMY      TONSILLECTOMY         Social History     Tobacco Use    Smoking status: Current Every Day Smoker     Packs/day: 0.50     Years: 35.00     Pack years: 17.50     Types: Cigarettes, Vaping w/o nicotine    Smokeless tobacco: Never Used   Substance Use Topics    Alcohol use: Not Currently     Comment:  occassionally    Drug use: Yes     Types: Marijuana       Family History   Problem Relation Age of Onset    Alcohol abuse Mother     COPD Mother     Depression Mother     Drug abuse Mother     Alcohol abuse Father     Arthritis Father     Cancer Father     Heart disease Father     Hypertension Father     COPD Sister     Kidney failure Sister     Heart disease Brother     Hypertension Brother     Cancer Maternal Aunt     Cancer Maternal Uncle     Diabetes Maternal Uncle     Drug abuse Maternal Uncle     Cancer Paternal Aunt     Cancer Paternal Uncle     Arthritis Maternal Grandmother     Hypertension Maternal Grandmother     Breast cancer Maternal Grandmother     Arthritis Paternal Grandmother     Cancer Paternal Grandmother     Hypertension Paternal Grandfather          Review of Systems   Constitutional: Negative for decreased appetite, diaphoresis, fever, malaise/fatigue and night sweats.   HENT: Negative for nosebleeds.    Eyes: Negative for blurred vision and double vision.   Cardiovascular: Positive for chest pain, leg swelling and syncope. Negative for claudication, dyspnea on exertion, irregular heartbeat, near-syncope, palpitations and paroxysmal nocturnal dyspnea.   Respiratory: Negative for cough, shortness of breath, sleep disturbances due to breathing, snoring, sputum production and wheezing.    Endocrine: Negative for cold intolerance and polyuria.   Hematologic/Lymphatic: Does not bruise/bleed easily.   Skin: Negative for rash.   Musculoskeletal: Negative for back pain, falls, joint pain, joint swelling and neck pain.   Gastrointestinal: Negative for abdominal pain, heartburn, nausea and vomiting.   Genitourinary: Negative for dysuria, frequency and hematuria.   Neurological: Positive for dizziness. Negative for difficulty with concentration, focal weakness, headaches, light-headedness, numbness, seizures and weakness.   Psychiatric/Behavioral: Negative for depression, memory  loss and substance abuse. The patient does not have insomnia.    Allergic/Immunologic: Negative for HIV exposure and hives.       Objective:   Physical Exam  HENT:      Head: Normocephalic.   Eyes:      Pupils: Pupils are equal, round, and reactive to light.   Neck:      Thyroid: No thyromegaly.      Vascular: Normal carotid pulses. No carotid bruit or JVD.   Cardiovascular:      Rate and Rhythm: Normal rate and regular rhythm.  No extrasystoles are present.     Chest Wall: PMI is not displaced.      Pulses: Normal pulses.           Carotid pulses are 2+ on the right side and 2+ on the left side.     Heart sounds: Normal heart sounds. No murmur heard.  No gallop. No S3 sounds.    Pulmonary:      Effort: No respiratory distress.      Breath sounds: Normal breath sounds. No stridor.   Abdominal:      General: Bowel sounds are normal.      Palpations: Abdomen is soft.      Tenderness: There is no abdominal tenderness. There is no rebound.   Musculoskeletal:         General: Normal range of motion.   Skin:     Findings: No rash.   Neurological:      Mental Status: She is alert and oriented to person, place, and time.   Psychiatric:         Behavior: Behavior normal.         Lab Results   Component Value Date    CHOL 145 10/20/2021    CHOL 213 (H) 01/28/2020    CHOL 219 (H) 09/17/2019     Lab Results   Component Value Date    HDL 61 10/20/2021    HDL 74 01/28/2020    HDL 69 09/17/2019     Lab Results   Component Value Date    LDLCALC 55.0 (L) 10/20/2021    LDLCALC 110.4 01/28/2020    LDLCALC 95.4 09/17/2019     Lab Results   Component Value Date    TRIG 145 10/20/2021    TRIG 143 01/28/2020    TRIG 273 (H) 09/17/2019     Lab Results   Component Value Date    CHOLHDL 42.1 10/20/2021    CHOLHDL 34.7 01/28/2020    CHOLHDL 31.5 09/17/2019       Chemistry        Component Value Date/Time     10/20/2021 0916    K 4.3 10/20/2021 0916    CL 99 10/20/2021 0916    CO2 27 10/20/2021 0916    BUN 10 10/20/2021 0916     CREATININE 0.7 10/20/2021 0916     (H) 10/20/2021 0916        Component Value Date/Time    CALCIUM 9.4 10/20/2021 0916    ALKPHOS 71 10/20/2021 0916    AST 42 (H) 10/20/2021 0916    ALT 52 (H) 10/20/2021 0916    BILITOT 0.4 10/20/2021 0916    ESTGFRAFRICA >60.0 10/20/2021 0916    EGFRNONAA >60.0 10/20/2021 0916          Lab Results   Component Value Date    HGBA1C >14.0 (H) 10/20/2021     Lab Results   Component Value Date    TSH 2.531 01/21/2020     Lab Results   Component Value Date    INR 1.0 10/28/2019    INR 1.0 09/17/2019    INR 1.0 06/21/2018     Lab Results   Component Value Date    WBC 8.66 02/12/2021    HGB 12.9 02/12/2021    HCT 42.2 02/12/2021    MCV 99 (H) 02/12/2021     02/12/2021     BMP  Sodium   Date Value Ref Range Status   10/20/2021 137 136 - 145 mmol/L Final     Potassium   Date Value Ref Range Status   10/20/2021 4.3 3.5 - 5.1 mmol/L Final     Chloride   Date Value Ref Range Status   10/20/2021 99 95 - 110 mmol/L Final     CO2   Date Value Ref Range Status   10/20/2021 27 23 - 29 mmol/L Final     BUN   Date Value Ref Range Status   10/20/2021 10 6 - 20 mg/dL Final     Creatinine   Date Value Ref Range Status   10/20/2021 0.7 0.5 - 1.4 mg/dL Final     Calcium   Date Value Ref Range Status   10/20/2021 9.4 8.7 - 10.5 mg/dL Final     Anion Gap   Date Value Ref Range Status   10/20/2021 11 8 - 16 mmol/L Final     eGFR if    Date Value Ref Range Status   10/20/2021 >60.0 >60 mL/min/1.73 m^2 Final     eGFR if non    Date Value Ref Range Status   10/20/2021 >60.0 >60 mL/min/1.73 m^2 Final     Comment:     Calculation used to obtain the estimated glomerular filtration  rate (eGFR) is the CKD-EPI equation.        BNP  @LABRCNTIP(BNP,BNPTRIAGEBLO)@  @LABRCNTIP(troponini)@  CrCl cannot be calculated (Patient's most recent lab result is older than the maximum 7 days allowed.).  No results found in the last 24 hours.  No results found in the last 24 hours.  No  results found in the last 24 hours.    Assessment:      1. Coronary artery disease of native artery of native heart with stable angina pectoris    2. Essential hypertension    3. Dyslipidemia associated with type 2 diabetes mellitus    4. Chest pain, moderate coronary artery risk    5. Mixed hyperlipidemia    6. PVD (peripheral vascular disease)    7. Statin intolerance    8. Tobacco abuse      Angina pain improved after Imdur 30 mg daily added  DM and smoker high risk for CAD  A1C > 14. LDL controlled  Plan:   Phar. NUKE stress test ordered for the ischemia load  Continue Imdur metoprolol, losartan zetia and ASA  Continue lasix as needed for PVD  Smoking cessation    DM Rx per PCP  Counseled DASH  Check Lipid profile in 6 months  Recommend heart-healthy diet, weight control and regular exercise.  Norman. Risk modification.   I have reviewed all pertinent labs and cardiac studies independently. Plans and recommendations have been formulated under my direct supervision. All questions answered and patient voiced understanding.   If symptoms persist go to the ED  RTC in 3 months

## 2022-02-02 DIAGNOSIS — E11.9 TYPE 2 DIABETES MELLITUS WITHOUT COMPLICATION: ICD-10-CM

## 2022-02-14 ENCOUNTER — HOSPITAL ENCOUNTER (OUTPATIENT)
Dept: PULMONOLOGY | Facility: HOSPITAL | Age: 57
Discharge: HOME | End: 2022-02-14
Attending: INTERNAL MEDICINE
Payer: MEDICARE

## 2022-02-14 ENCOUNTER — HOSPITAL ENCOUNTER (OUTPATIENT)
Dept: RADIOLOGY | Facility: HOSPITAL | Age: 57
Discharge: HOME | End: 2022-02-14
Attending: INTERNAL MEDICINE
Payer: MEDICARE

## 2022-02-14 DIAGNOSIS — R07.9 CHEST PAIN, MODERATE CORONARY ARTERY RISK: ICD-10-CM

## 2022-02-14 DIAGNOSIS — I25.118 CORONARY ARTERY DISEASE OF NATIVE ARTERY OF NATIVE HEART WITH STABLE ANGINA PECTORIS: ICD-10-CM

## 2022-02-14 PROCEDURE — A9502 TC99M TETROFOSMIN: HCPCS | Mod: HCNC

## 2022-02-15 ENCOUNTER — HOSPITAL ENCOUNTER (OUTPATIENT)
Dept: RADIOLOGY | Facility: HOSPITAL | Age: 57
Discharge: HOME | End: 2022-02-15
Attending: INTERNAL MEDICINE
Payer: MEDICARE

## 2022-02-15 VITALS
HEIGHT: 64 IN | SYSTOLIC BLOOD PRESSURE: 131 MMHG | WEIGHT: 206 LBS | DIASTOLIC BLOOD PRESSURE: 64 MMHG | HEART RATE: 90 BPM | BODY MASS INDEX: 35.17 KG/M2

## 2022-02-15 LAB
CV STRESS BASE HR: 89 BPM
DIASTOLIC BLOOD PRESSURE: 64 MMHG
EJECTION FRACTION- HIGH: 73 %
END DIASTOLIC INDEX-HIGH: 165 ML/M2
END DIASTOLIC INDEX-LOW: 101 ML/M2
END SYSTOLIC INDEX-HIGH: 64 ML/M2
END SYSTOLIC INDEX-LOW: 28 ML/M2
NUC REST EJECTION FRACTION: 71
NUC STRESS EJECTION FRACTION: 71 %
OHS CV CPX 85 PERCENT MAX PREDICTED HEART RATE MALE: 132
OHS CV CPX MAX PREDICTED HEART RATE: 155
OHS CV CPX PATIENT IS FEMALE: 1
OHS CV CPX PATIENT IS MALE: 0
OHS CV CPX PEAK DIASTOLIC BLOOD PRESSURE: 64 MMHG
OHS CV CPX PEAK HEAR RATE: 107 BPM
OHS CV CPX PEAK RATE PRESSURE PRODUCT: NORMAL
OHS CV CPX PEAK SYSTOLIC BLOOD PRESSURE: 131 MMHG
OHS CV CPX PERCENT MAX PREDICTED HEART RATE ACHIEVED: 69
OHS CV CPX RATE PRESSURE PRODUCT PRESENTING: NORMAL
RETIRED EF AND QEF - SEE NOTES: 59 %
SYSTOLIC BLOOD PRESSURE: 131 MMHG

## 2022-02-15 PROCEDURE — 63600175 PHARM REV CODE 636 W HCPCS: Mod: HCNC | Performed by: INTERNAL MEDICINE

## 2022-02-15 PROCEDURE — 93018 PR CARDIAC STRESS TST,INTERP/REPT ONLY: ICD-10-PCS | Mod: ,,, | Performed by: INTERNAL MEDICINE

## 2022-02-15 PROCEDURE — 93016 CV STRESS TEST SUPVJ ONLY: CPT | Mod: ,,, | Performed by: INTERNAL MEDICINE

## 2022-02-15 PROCEDURE — 78452 HT MUSCLE IMAGE SPECT MULT: CPT | Mod: 26,,, | Performed by: INTERNAL MEDICINE

## 2022-02-15 PROCEDURE — 78452 CHG MYOCARDIAL SPECT MULTIPLE STUDIES: ICD-10-PCS | Mod: 26,,, | Performed by: INTERNAL MEDICINE

## 2022-02-15 PROCEDURE — 93017 CV STRESS TEST TRACING ONLY: CPT

## 2022-02-15 PROCEDURE — 93018 CV STRESS TEST I&R ONLY: CPT | Mod: ,,, | Performed by: INTERNAL MEDICINE

## 2022-02-15 PROCEDURE — 93016 PR CARDIAC STRESS TST,DR SUPERV ONLY: ICD-10-PCS | Mod: ,,, | Performed by: INTERNAL MEDICINE

## 2022-02-15 RX ORDER — REGADENOSON 0.08 MG/ML
0.4 INJECTION, SOLUTION INTRAVENOUS ONCE
Status: COMPLETED | OUTPATIENT
Start: 2022-02-15 | End: 2022-02-15

## 2022-02-15 RX ADMIN — REGADENOSON 0.4 MG: 0.08 INJECTION, SOLUTION INTRAVENOUS at 12:02

## 2022-02-17 ENCOUNTER — TELEPHONE (OUTPATIENT)
Dept: CARDIOLOGY | Facility: CLINIC | Age: 57
End: 2022-02-17
Payer: MEDICARE

## 2022-02-17 NOTE — TELEPHONE ENCOUNTER
LVm to let pt know the nuclear stress test came back normal.    ----- Message from Brennon Chakraborty MD sent at 2/16/2022  9:39 PM CST -----  Nuke stress test normal

## 2022-03-14 ENCOUNTER — OFFICE VISIT (OUTPATIENT)
Dept: NEUROSURGERY | Facility: CLINIC | Age: 57
End: 2022-03-14
Payer: MEDICARE

## 2022-03-14 ENCOUNTER — HOSPITAL ENCOUNTER (OUTPATIENT)
Dept: RADIOLOGY | Facility: HOSPITAL | Age: 57
Discharge: HOME | End: 2022-03-14
Attending: NEUROLOGICAL SURGERY
Payer: MEDICARE

## 2022-03-14 VITALS
WEIGHT: 206 LBS | SYSTOLIC BLOOD PRESSURE: 139 MMHG | DIASTOLIC BLOOD PRESSURE: 77 MMHG | HEART RATE: 89 BPM | HEIGHT: 64 IN | BODY MASS INDEX: 35.17 KG/M2

## 2022-03-14 DIAGNOSIS — I67.1 CEREBRAL ANEURYSM: Primary | ICD-10-CM

## 2022-03-14 DIAGNOSIS — I67.1 CEREBRAL ANEURYSM, NONRUPTURED: ICD-10-CM

## 2022-03-14 PROCEDURE — 3075F SYST BP GE 130 - 139MM HG: CPT | Mod: CPTII,S$GLB,, | Performed by: NEUROLOGICAL SURGERY

## 2022-03-14 PROCEDURE — 3008F BODY MASS INDEX DOCD: CPT | Mod: CPTII,S$GLB,, | Performed by: NEUROLOGICAL SURGERY

## 2022-03-14 PROCEDURE — 99999 PR PBB SHADOW E&M-EST. PATIENT-LVL V: ICD-10-PCS | Mod: PBBFAC,,, | Performed by: NEUROLOGICAL SURGERY

## 2022-03-14 PROCEDURE — 70544 MR ANGIOGRAPHY HEAD W/O DYE: CPT | Mod: 26,,, | Performed by: RADIOLOGY

## 2022-03-14 PROCEDURE — 70544 MRA BRAIN WITHOUT CONTRAST: ICD-10-PCS | Mod: 26,,, | Performed by: RADIOLOGY

## 2022-03-14 PROCEDURE — 3072F LOW RISK FOR RETINOPATHY: CPT | Mod: CPTII,S$GLB,, | Performed by: NEUROLOGICAL SURGERY

## 2022-03-14 PROCEDURE — 3072F PR LOW RISK FOR RETINOPATHY: ICD-10-PCS | Mod: CPTII,S$GLB,, | Performed by: NEUROLOGICAL SURGERY

## 2022-03-14 PROCEDURE — 3078F DIAST BP <80 MM HG: CPT | Mod: CPTII,S$GLB,, | Performed by: NEUROLOGICAL SURGERY

## 2022-03-14 PROCEDURE — 3008F PR BODY MASS INDEX (BMI) DOCUMENTED: ICD-10-PCS | Mod: CPTII,S$GLB,, | Performed by: NEUROLOGICAL SURGERY

## 2022-03-14 PROCEDURE — 4010F PR ACE/ARB THEARPY RXD/TAKEN: ICD-10-PCS | Mod: CPTII,S$GLB,, | Performed by: NEUROLOGICAL SURGERY

## 2022-03-14 PROCEDURE — 4010F ACE/ARB THERAPY RXD/TAKEN: CPT | Mod: CPTII,S$GLB,, | Performed by: NEUROLOGICAL SURGERY

## 2022-03-14 PROCEDURE — 3075F PR MOST RECENT SYSTOLIC BLOOD PRESS GE 130-139MM HG: ICD-10-PCS | Mod: CPTII,S$GLB,, | Performed by: NEUROLOGICAL SURGERY

## 2022-03-14 PROCEDURE — 70544 MR ANGIOGRAPHY HEAD W/O DYE: CPT | Mod: TC

## 2022-03-14 PROCEDURE — 1159F PR MEDICATION LIST DOCUMENTED IN MEDICAL RECORD: ICD-10-PCS | Mod: CPTII,S$GLB,, | Performed by: NEUROLOGICAL SURGERY

## 2022-03-14 PROCEDURE — 99213 PR OFFICE/OUTPT VISIT, EST, LEVL III, 20-29 MIN: ICD-10-PCS | Mod: S$GLB,,, | Performed by: NEUROLOGICAL SURGERY

## 2022-03-14 PROCEDURE — 1159F MED LIST DOCD IN RCRD: CPT | Mod: CPTII,S$GLB,, | Performed by: NEUROLOGICAL SURGERY

## 2022-03-14 PROCEDURE — 3078F PR MOST RECENT DIASTOLIC BLOOD PRESSURE < 80 MM HG: ICD-10-PCS | Mod: CPTII,S$GLB,, | Performed by: NEUROLOGICAL SURGERY

## 2022-03-14 PROCEDURE — 1160F RVW MEDS BY RX/DR IN RCRD: CPT | Mod: CPTII,S$GLB,, | Performed by: NEUROLOGICAL SURGERY

## 2022-03-14 PROCEDURE — 99213 OFFICE O/P EST LOW 20 MIN: CPT | Mod: S$GLB,,, | Performed by: NEUROLOGICAL SURGERY

## 2022-03-14 PROCEDURE — 99999 PR PBB SHADOW E&M-EST. PATIENT-LVL V: CPT | Mod: PBBFAC,,, | Performed by: NEUROLOGICAL SURGERY

## 2022-03-14 PROCEDURE — 1160F PR REVIEW ALL MEDS BY PRESCRIBER/CLIN PHARMACIST DOCUMENTED: ICD-10-PCS | Mod: CPTII,S$GLB,, | Performed by: NEUROLOGICAL SURGERY

## 2022-03-14 RX ORDER — BUTALBITAL, ACETAMINOPHEN AND CAFFEINE 50; 325; 40 MG/1; MG/1; MG/1
1 TABLET ORAL EVERY 4 HOURS PRN
Qty: 30 TABLET | Refills: 0 | Status: SHIPPED | OUTPATIENT
Start: 2022-03-14 | End: 2022-08-29 | Stop reason: SDUPTHER

## 2022-03-14 NOTE — PROGRESS NOTES
Alexandra Goins was seen in neurosurgical follow-up at the office this afternoon.  Overall she has been doing about the same over the past year. She continues with intermittent headaches. She has had no new focal neurological symptomatology.  She continues to receive psychiatric care for depression.  Her teenage granddaughter is living with hers and her  now and helping with her medications.    On brief examination today she is alert and cooperative.  Extraocular movements are good and pupils equal.  She is speaking clearly.  She shows no focal neurological deficit.    MRA of the brain was repeated [facility] today and compared to her last MRA and previous angiogram.  Again is seen a small 4 mm wide necked aneurysm at the basilar apex pointing posteriorly.  This is unchanged in size and shape and appears quite stable.  The wide neck would not lend itself to endovascular therapy.  I have told her that about 2% of the population has a cerebral aneurysm and this small aneurysm carries less than 1% per year chance of hemorrhage.    I will have her return in about 2 years with follow-up MRA to continue to monitor the lesion.  A prescription for Fioricet was refilled today.

## 2022-04-05 ENCOUNTER — OFFICE VISIT (OUTPATIENT)
Dept: PSYCHIATRY | Facility: CLINIC | Age: 57
End: 2022-04-05
Payer: MEDICARE

## 2022-04-05 DIAGNOSIS — G47.00 INSOMNIA, UNSPECIFIED TYPE: ICD-10-CM

## 2022-04-05 DIAGNOSIS — F31.9 BIPOLAR 1 DISORDER: Primary | ICD-10-CM

## 2022-04-05 DIAGNOSIS — F41.9 ANXIETY: ICD-10-CM

## 2022-04-05 PROCEDURE — 1159F PR MEDICATION LIST DOCUMENTED IN MEDICAL RECORD: ICD-10-PCS | Mod: CPTII,S$GLB,, | Performed by: PSYCHIATRY & NEUROLOGY

## 2022-04-05 PROCEDURE — 3072F PR LOW RISK FOR RETINOPATHY: ICD-10-PCS | Mod: CPTII,S$GLB,, | Performed by: PSYCHIATRY & NEUROLOGY

## 2022-04-05 PROCEDURE — 4010F PR ACE/ARB THEARPY RXD/TAKEN: ICD-10-PCS | Mod: CPTII,S$GLB,, | Performed by: PSYCHIATRY & NEUROLOGY

## 2022-04-05 PROCEDURE — 1159F MED LIST DOCD IN RCRD: CPT | Mod: CPTII,S$GLB,, | Performed by: PSYCHIATRY & NEUROLOGY

## 2022-04-05 PROCEDURE — 99499 UNLISTED E&M SERVICE: CPT | Mod: S$GLB,,, | Performed by: PSYCHIATRY & NEUROLOGY

## 2022-04-05 PROCEDURE — 99999 PR PBB SHADOW E&M-EST. PATIENT-LVL II: ICD-10-PCS | Mod: PBBFAC,,, | Performed by: PSYCHIATRY & NEUROLOGY

## 2022-04-05 PROCEDURE — 4010F ACE/ARB THERAPY RXD/TAKEN: CPT | Mod: CPTII,S$GLB,, | Performed by: PSYCHIATRY & NEUROLOGY

## 2022-04-05 PROCEDURE — 1160F RVW MEDS BY RX/DR IN RCRD: CPT | Mod: CPTII,S$GLB,, | Performed by: PSYCHIATRY & NEUROLOGY

## 2022-04-05 PROCEDURE — 1160F PR REVIEW ALL MEDS BY PRESCRIBER/CLIN PHARMACIST DOCUMENTED: ICD-10-PCS | Mod: CPTII,S$GLB,, | Performed by: PSYCHIATRY & NEUROLOGY

## 2022-04-05 PROCEDURE — 3072F LOW RISK FOR RETINOPATHY: CPT | Mod: CPTII,S$GLB,, | Performed by: PSYCHIATRY & NEUROLOGY

## 2022-04-05 PROCEDURE — 99499 RISK ADDL DX/OHS AUDIT: ICD-10-PCS | Mod: S$GLB,,, | Performed by: PSYCHIATRY & NEUROLOGY

## 2022-04-05 PROCEDURE — 99999 PR PBB SHADOW E&M-EST. PATIENT-LVL II: CPT | Mod: PBBFAC,,, | Performed by: PSYCHIATRY & NEUROLOGY

## 2022-04-05 PROCEDURE — 99214 PR OFFICE/OUTPT VISIT, EST, LEVL IV, 30-39 MIN: ICD-10-PCS | Mod: S$GLB,,, | Performed by: PSYCHIATRY & NEUROLOGY

## 2022-04-05 PROCEDURE — 99214 OFFICE O/P EST MOD 30 MIN: CPT | Mod: S$GLB,,, | Performed by: PSYCHIATRY & NEUROLOGY

## 2022-04-05 RX ORDER — BENZTROPINE MESYLATE 0.5 MG/1
0.5 TABLET ORAL 2 TIMES DAILY
Qty: 60 TABLET | Refills: 2 | Status: SHIPPED | OUTPATIENT
Start: 2022-04-05 | End: 2022-07-05 | Stop reason: SDUPTHER

## 2022-04-05 RX ORDER — BUPROPION HYDROCHLORIDE 150 MG/1
150 TABLET ORAL DAILY
Qty: 30 TABLET | Refills: 2 | Status: SHIPPED | OUTPATIENT
Start: 2022-04-05 | End: 2022-07-05 | Stop reason: SDUPTHER

## 2022-04-05 RX ORDER — DOXEPIN HYDROCHLORIDE 10 MG/1
CAPSULE ORAL
Qty: 60 CAPSULE | Refills: 2 | Status: SHIPPED | OUTPATIENT
Start: 2022-04-05 | End: 2022-07-05 | Stop reason: SDUPTHER

## 2022-04-05 RX ORDER — RISPERIDONE 1 MG/1
1 TABLET ORAL 2 TIMES DAILY
Qty: 60 TABLET | Refills: 2 | Status: SHIPPED | OUTPATIENT
Start: 2022-04-05 | End: 2022-07-05 | Stop reason: SDUPTHER

## 2022-04-05 RX ORDER — DIAZEPAM 10 MG/1
10 TABLET ORAL 3 TIMES DAILY PRN
Qty: 90 TABLET | Refills: 2 | Status: SHIPPED | OUTPATIENT
Start: 2022-04-05 | End: 2022-07-05 | Stop reason: SDUPTHER
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
## 2022-04-12 NOTE — TELEPHONE ENCOUNTER
No new care gaps identified.  Powered by [employer]. Reference number: [id number].   4/12/2022 2:21:02 PM CDT

## 2022-04-14 RX ORDER — GABAPENTIN 600 MG/1
TABLET ORAL
Qty: 120 TABLET | Refills: 0 | Status: SHIPPED | OUTPATIENT
Start: 2022-04-14 | End: 2022-05-23

## 2022-04-20 ENCOUNTER — PATIENT OUTREACH (OUTPATIENT)
Dept: ADMINISTRATIVE | Facility: HOSPITAL | Age: 57
End: 2022-04-20
Payer: MEDICARE

## 2022-04-26 ENCOUNTER — PATIENT MESSAGE (OUTPATIENT)
Dept: ADMINISTRATIVE | Facility: HOSPITAL | Age: 57
End: 2022-04-26
Payer: MEDICARE

## 2022-05-04 ENCOUNTER — OFFICE VISIT (OUTPATIENT)
Dept: CARDIOLOGY | Facility: CLINIC | Age: 57
End: 2022-05-04
Payer: MEDICARE

## 2022-05-04 VITALS
BODY MASS INDEX: 35.99 KG/M2 | DIASTOLIC BLOOD PRESSURE: 60 MMHG | SYSTOLIC BLOOD PRESSURE: 122 MMHG | OXYGEN SATURATION: 96 % | WEIGHT: 209.69 LBS | HEART RATE: 89 BPM

## 2022-05-04 DIAGNOSIS — I73.9 PVD (PERIPHERAL VASCULAR DISEASE): ICD-10-CM

## 2022-05-04 DIAGNOSIS — I10 ESSENTIAL HYPERTENSION: Chronic | ICD-10-CM

## 2022-05-04 DIAGNOSIS — Z78.9 STATIN INTOLERANCE: ICD-10-CM

## 2022-05-04 DIAGNOSIS — E78.5 DYSLIPIDEMIA ASSOCIATED WITH TYPE 2 DIABETES MELLITUS: ICD-10-CM

## 2022-05-04 DIAGNOSIS — E11.69 DYSLIPIDEMIA ASSOCIATED WITH TYPE 2 DIABETES MELLITUS: ICD-10-CM

## 2022-05-04 DIAGNOSIS — Z79.4 TYPE 2 DIABETES MELLITUS WITH COMPLICATION, WITH LONG-TERM CURRENT USE OF INSULIN: ICD-10-CM

## 2022-05-04 DIAGNOSIS — I25.118 CORONARY ARTERY DISEASE OF NATIVE ARTERY OF NATIVE HEART WITH STABLE ANGINA PECTORIS: Primary | ICD-10-CM

## 2022-05-04 DIAGNOSIS — I63.9 CEREBROVASCULAR ACCIDENT (CVA), UNSPECIFIED MECHANISM: ICD-10-CM

## 2022-05-04 DIAGNOSIS — E11.8 TYPE 2 DIABETES MELLITUS WITH COMPLICATION, WITH LONG-TERM CURRENT USE OF INSULIN: ICD-10-CM

## 2022-05-04 PROCEDURE — 99999 PR PBB SHADOW E&M-EST. PATIENT-LVL IV: ICD-10-PCS | Mod: PBBFAC,,, | Performed by: INTERNAL MEDICINE

## 2022-05-04 PROCEDURE — 3074F PR MOST RECENT SYSTOLIC BLOOD PRESSURE < 130 MM HG: ICD-10-PCS | Mod: CPTII,S$GLB,, | Performed by: INTERNAL MEDICINE

## 2022-05-04 PROCEDURE — 3078F PR MOST RECENT DIASTOLIC BLOOD PRESSURE < 80 MM HG: ICD-10-PCS | Mod: CPTII,S$GLB,, | Performed by: INTERNAL MEDICINE

## 2022-05-04 PROCEDURE — 4010F PR ACE/ARB THEARPY RXD/TAKEN: ICD-10-PCS | Mod: CPTII,S$GLB,, | Performed by: INTERNAL MEDICINE

## 2022-05-04 PROCEDURE — 1160F PR REVIEW ALL MEDS BY PRESCRIBER/CLIN PHARMACIST DOCUMENTED: ICD-10-PCS | Mod: CPTII,S$GLB,, | Performed by: INTERNAL MEDICINE

## 2022-05-04 PROCEDURE — 3072F PR LOW RISK FOR RETINOPATHY: ICD-10-PCS | Mod: CPTII,S$GLB,, | Performed by: INTERNAL MEDICINE

## 2022-05-04 PROCEDURE — 3072F LOW RISK FOR RETINOPATHY: CPT | Mod: CPTII,S$GLB,, | Performed by: INTERNAL MEDICINE

## 2022-05-04 PROCEDURE — 3078F DIAST BP <80 MM HG: CPT | Mod: CPTII,S$GLB,, | Performed by: INTERNAL MEDICINE

## 2022-05-04 PROCEDURE — 4010F ACE/ARB THERAPY RXD/TAKEN: CPT | Mod: CPTII,S$GLB,, | Performed by: INTERNAL MEDICINE

## 2022-05-04 PROCEDURE — 1160F RVW MEDS BY RX/DR IN RCRD: CPT | Mod: CPTII,S$GLB,, | Performed by: INTERNAL MEDICINE

## 2022-05-04 PROCEDURE — 3008F BODY MASS INDEX DOCD: CPT | Mod: CPTII,S$GLB,, | Performed by: INTERNAL MEDICINE

## 2022-05-04 PROCEDURE — 3008F PR BODY MASS INDEX (BMI) DOCUMENTED: ICD-10-PCS | Mod: CPTII,S$GLB,, | Performed by: INTERNAL MEDICINE

## 2022-05-04 PROCEDURE — 3074F SYST BP LT 130 MM HG: CPT | Mod: CPTII,S$GLB,, | Performed by: INTERNAL MEDICINE

## 2022-05-04 PROCEDURE — 99214 OFFICE O/P EST MOD 30 MIN: CPT | Mod: S$GLB,,, | Performed by: INTERNAL MEDICINE

## 2022-05-04 PROCEDURE — 1159F PR MEDICATION LIST DOCUMENTED IN MEDICAL RECORD: ICD-10-PCS | Mod: CPTII,S$GLB,, | Performed by: INTERNAL MEDICINE

## 2022-05-04 PROCEDURE — 99214 PR OFFICE/OUTPT VISIT, EST, LEVL IV, 30-39 MIN: ICD-10-PCS | Mod: S$GLB,,, | Performed by: INTERNAL MEDICINE

## 2022-05-04 PROCEDURE — 99999 PR PBB SHADOW E&M-EST. PATIENT-LVL IV: CPT | Mod: PBBFAC,,, | Performed by: INTERNAL MEDICINE

## 2022-05-04 PROCEDURE — 1159F MED LIST DOCD IN RCRD: CPT | Mod: CPTII,S$GLB,, | Performed by: INTERNAL MEDICINE

## 2022-05-04 RX ORDER — FUROSEMIDE 40 MG/1
40 TABLET ORAL DAILY
Qty: 90 TABLET | Refills: 2 | Status: SHIPPED | OUTPATIENT
Start: 2022-05-04 | End: 2022-06-08

## 2022-05-04 NOTE — PROGRESS NOTES
Subjective:   Patient ID:  Alexandra Goins is a 56 y.o. female who presents for follow up of No chief complaint on file.      55 yo female, came in for 3 months f/u  PMH CAD, PVCs, h/o syncope, HTN, HLD, cerebral aneurysm, h/o CVA, DM II, asthma, GERD, bipolar disorder, family h/o premature CAD, and tobacco. As well as brain anuerysm   In , episode of syncope. Jerking, myoclonic, HR dropped to 40 bpm. Family member did CPR, called EMR and added ambu bag for breathing.   Sent to [facility] and Neurology consult did not feel this was consistent with a CVA. MRI MRA were unremarkable. MRA of the brain revealed small aneurysm that is not new per family. Additional work-up including chemistries, blood glucose, thyroid studies, CBC, syphilis work-up, respiratory viral panel including COVID-19, EEG, and echo were unrevealing.  Seizure could be a possible diagnosis, neurology recommends close follow-up in the outpatient setting to monitor for any further episodes.  Troponin negative   ECHO normal EF  Had similar episode 1 yr ago.   Few faint/syncope episodes and had hand injury     MPI no ischemia and ZIOPATCH showed 1% PVCs  Last syncope episode of staring for 10 minutes with hands shaking in . Standing up and no collapsed. Could sit and BP/HR low. Recovered spontaneously and only confused for 30 seconds\  No chest pain. SOB due to smoking. Today EKG NSR     visit, Had repatha rx for 2 months and c/o swelling of feet and hands for 5 days. With pain on feet. Last dose was 4 weeks ago.  AYALA someday. Has COPD and smoking. Breathing Rx as needed  Sleeps as elevated.  No chest pain  No recurrent syncope    06/2021 visit  Feels sick after resumed repatha  No recurrent syncope. Chest heaviness with SOB when waking up. No those symptoms at daytime.  Today EKG NSR PVC and  bpm    12/2021 visit  3 weeks ago, episode of syncope with low BP 70 mmHG. ? UTI  Chronic left chest pain, related with  anxeity. Chronic SOB, smoking and on breathing Rx.  ekg NSR no acute stt change. BP controlled. A1C > 14 in .     visit  The chest pain improved a lot after added Imdur 30 mg daily at last visit. The episode of syncope occurred in  with vision loss at sitting. The family helped her laying down and woke up in few munites. BP was low. Continues to smoke.  Will do brain MRI soon for brain aneurysm    Interval history  The chest pain improved, no recurrent syncope. Occasional dizzziness   Phar.MPI no ischemia,. Normal EF  F/u neurosurgery at [facility] and brain aneurysm small and 2yrs f/u          Past Medical History:   Diagnosis Date    Acute ischemic stroke 9/17/2019    Acute respiratory failure with hypoxia 2/11/2021    Aneurysm     Anxiety     Arthritis     Asthma     Bipolar 1 disorder     Cerebral aneurysm, nonruptured 9/19/2019    Coronary artery disease of native artery of native heart with stable angina pectoris 1/19/2022    Depression     Diabetes mellitus, type 2     Diverticular disease     GERD (gastroesophageal reflux disease)     Hyperlipemia     Hypertension     Hypothyroidism     Pneumonia     PVD (peripheral vascular disease) 4/28/2021    Renal manifestation of secondary diabetes mellitus     Right-sided back pain 6/29/2019    Stroke     Trouble in sleeping        Past Surgical History:   Procedure Laterality Date    CEREBRAL ANGIOGRAM N/A 10/28/2019    Procedure: ANGIOGRAM-CEREBRAL;  Surgeon: [facility] Diagnostic Provider;  Location: [facility] OR [address] [place] [address];  Service: Radiology;  Laterality: N/A;  Rm 190/Aayush    CHOLECYSTECTOMY      COLON SURGERY      COLONOSCOPY N/A 1/12/2018    Procedure: COLONOSCOPY;  Surgeon: Roque Mahajan III, MD;  Location: [facility];  Service: Endoscopy;  Laterality: N/A;    DENTAL SURGERY  05/21/2018    removal of 8 top teeth    HYSTERECTOMY      TONSILLECTOMY         Social History     Tobacco Use    Smoking status: Current Every  Day Smoker     Packs/day: 0.50     Years: 35.00     Pack years: 17.50     Types: Cigarettes, Vaping w/o nicotine    Smokeless tobacco: Never Used   Substance Use Topics    Alcohol use: Not Currently     Comment: occassionally    Drug use: Yes     Types: Marijuana       Family History   Problem Relation Age of Onset    Alcohol abuse Mother     COPD Mother     Depression Mother     Drug abuse Mother     Alcohol abuse Father     Arthritis Father     Cancer Father     Heart disease Father     Hypertension Father     COPD Sister     Kidney failure Sister     Heart disease Brother     Hypertension Brother     Cancer Maternal Aunt     Cancer Maternal Uncle     Diabetes Maternal Uncle     Drug abuse Maternal Uncle     Cancer Paternal Aunt     Cancer Paternal Uncle     Arthritis Maternal Grandmother     Hypertension Maternal Grandmother     Breast cancer Maternal Grandmother     Arthritis Paternal Grandmother     Cancer Paternal Grandmother     Hypertension Paternal Grandfather          Review of Systems   Constitutional: Negative for decreased appetite, diaphoresis, fever, malaise/fatigue and night sweats.   HENT: Negative for nosebleeds.    Eyes: Negative for blurred vision and double vision.   Cardiovascular: Positive for leg swelling and syncope. Negative for claudication, dyspnea on exertion, irregular heartbeat, near-syncope, palpitations and paroxysmal nocturnal dyspnea.   Respiratory: Negative for cough, shortness of breath, sleep disturbances due to breathing, snoring, sputum production and wheezing.    Endocrine: Negative for cold intolerance and polyuria.   Hematologic/Lymphatic: Does not bruise/bleed easily.   Skin: Negative for rash.   Musculoskeletal: Negative for back pain, falls, joint pain, joint swelling and neck pain.   Gastrointestinal: Negative for abdominal pain, heartburn, nausea and vomiting.   Genitourinary: Negative for dysuria, frequency and hematuria.   Neurological:  Positive for dizziness. Negative for difficulty with concentration, focal weakness, headaches, light-headedness, numbness, seizures and weakness.   Psychiatric/Behavioral: Negative for depression, memory loss and substance abuse. The patient does not have insomnia.    Allergic/Immunologic: Negative for HIV exposure and hives.       Objective:   Physical Exam  HENT:      Head: Normocephalic.   Eyes:      Pupils: Pupils are equal, round, and reactive to light.   Neck:      Thyroid: No thyromegaly.      Vascular: Normal carotid pulses. No carotid bruit or JVD.   Cardiovascular:      Rate and Rhythm: Normal rate and regular rhythm.  No extrasystoles are present.     Chest Wall: PMI is not displaced.      Pulses: Normal pulses.           Carotid pulses are 2+ on the right side and 2+ on the left side.     Heart sounds: Normal heart sounds. No murmur heard.    No gallop. No S3 sounds.   Pulmonary:      Effort: No respiratory distress.      Breath sounds: Normal breath sounds. No stridor.   Abdominal:      General: Bowel sounds are normal.      Palpations: Abdomen is soft.      Tenderness: There is no abdominal tenderness. There is no rebound.   Musculoskeletal:         General: Normal range of motion.   Skin:     Findings: No rash.   Neurological:      Mental Status: She is alert and oriented to person, place, and time.   Psychiatric:         Behavior: Behavior normal.         Lab Results   Component Value Date    CHOL 145 10/20/2021    CHOL 213 (H) 01/28/2020    CHOL 219 (H) 09/17/2019     Lab Results   Component Value Date    HDL 61 10/20/2021    HDL 74 01/28/2020    HDL 69 09/17/2019     Lab Results   Component Value Date    LDLCALC 55.0 (L) 10/20/2021    LDLCALC 110.4 01/28/2020    LDLCALC 95.4 09/17/2019     Lab Results   Component Value Date    TRIG 145 10/20/2021    TRIG 143 01/28/2020    TRIG 273 (H) 09/17/2019     Lab Results   Component Value Date    CHOLHDL 42.1 10/20/2021    CHOLHDL 34.7 01/28/2020    CHOLHDL  31.5 09/17/2019       Chemistry        Component Value Date/Time     10/20/2021 0916    K 4.3 10/20/2021 0916    CL 99 10/20/2021 0916    CO2 27 10/20/2021 0916    BUN 10 10/20/2021 0916    CREATININE 0.7 10/20/2021 0916     (H) 10/20/2021 0916        Component Value Date/Time    CALCIUM 9.4 10/20/2021 0916    ALKPHOS 71 10/20/2021 0916    AST 42 (H) 10/20/2021 0916    ALT 52 (H) 10/20/2021 0916    BILITOT 0.4 10/20/2021 0916    ESTGFRAFRICA >60.0 10/20/2021 0916    EGFRNONAA >60.0 10/20/2021 0916          Lab Results   Component Value Date    HGBA1C >14.0 (H) 10/20/2021     Lab Results   Component Value Date    TSH 2.531 01/21/2020     Lab Results   Component Value Date    INR 1.0 10/28/2019    INR 1.0 09/17/2019    INR 1.0 06/21/2018     Lab Results   Component Value Date    WBC 8.66 02/12/2021    HGB 12.9 02/12/2021    HCT 42.2 02/12/2021    MCV 99 (H) 02/12/2021     02/12/2021     BMP  Sodium   Date Value Ref Range Status   10/20/2021 137 136 - 145 mmol/L Final     Potassium   Date Value Ref Range Status   10/20/2021 4.3 3.5 - 5.1 mmol/L Final     Chloride   Date Value Ref Range Status   10/20/2021 99 95 - 110 mmol/L Final     CO2   Date Value Ref Range Status   10/20/2021 27 23 - 29 mmol/L Final     BUN   Date Value Ref Range Status   10/20/2021 10 6 - 20 mg/dL Final     Creatinine   Date Value Ref Range Status   10/20/2021 0.7 0.5 - 1.4 mg/dL Final     Calcium   Date Value Ref Range Status   10/20/2021 9.4 8.7 - 10.5 mg/dL Final     Anion Gap   Date Value Ref Range Status   10/20/2021 11 8 - 16 mmol/L Final     eGFR if    Date Value Ref Range Status   10/20/2021 >60.0 >60 mL/min/1.73 m^2 Final     eGFR if non    Date Value Ref Range Status   10/20/2021 >60.0 >60 mL/min/1.73 m^2 Final     Comment:     Calculation used to obtain the estimated glomerular filtration  rate (eGFR) is the CKD-EPI equation.         BNP  @LABRCNTIP(BNP,BNPTRIAGEBLO)@  @LABRCNTIP(troponini)@  CrCl cannot be calculated (Patient's most recent lab result is older than the maximum 7 days allowed.).  No results found in the last 24 hours.  No results found in the last 24 hours.  No results found in the last 24 hours.    Assessment:      1. Coronary artery disease of native artery of native heart with stable angina pectoris    2. Essential hypertension    3. Dyslipidemia associated with type 2 diabetes mellitus    4. PVD (peripheral vascular disease)    5. Type 2 diabetes mellitus with complication, with long-term current use of insulin    6. Statin intolerance    7. Cerebrovascular accident (CVA), unspecified mechanism      A1C > 14  Angina controlled  LDL 55    Plan:   Carotid US and SIMIN  Increase lasix to 40 mg daily   Continue Imdur metoprolol, losartan zetia and ASA  Smoking cessation  DM Rx per PCP  Counseled DASH  Check Lipid profile in 6 months  Recommend heart-healthy diet, weight control and regular exercise.  Norman. Risk modification.   I have reviewed all pertinent labs and cardiac studies independently. Plans and recommendations have been formulated under my direct supervision. All questions answered and patient voiced understanding.   If symptoms persist go to the ED  RTC in 6 months

## 2022-05-05 ENCOUNTER — PATIENT MESSAGE (OUTPATIENT)
Dept: ADMINISTRATIVE | Facility: HOSPITAL | Age: 57
End: 2022-05-05
Payer: MEDICARE

## 2022-05-26 ENCOUNTER — TELEPHONE (OUTPATIENT)
Dept: CARDIOLOGY | Facility: HOSPITAL | Age: 57
End: 2022-05-26
Payer: MEDICARE

## 2022-05-31 ENCOUNTER — LAB VISIT (OUTPATIENT)
Dept: LAB | Facility: HOSPITAL | Age: 57
End: 2022-05-31
Attending: FAMILY MEDICINE
Payer: MEDICARE

## 2022-05-31 ENCOUNTER — OFFICE VISIT (OUTPATIENT)
Dept: FAMILY MEDICINE | Facility: CLINIC | Age: 57
End: 2022-05-31
Payer: MEDICARE

## 2022-05-31 VITALS
DIASTOLIC BLOOD PRESSURE: 60 MMHG | OXYGEN SATURATION: 95 % | HEART RATE: 103 BPM | HEIGHT: 64 IN | SYSTOLIC BLOOD PRESSURE: 120 MMHG | TEMPERATURE: 98 F | WEIGHT: 205 LBS | BODY MASS INDEX: 35 KG/M2

## 2022-05-31 DIAGNOSIS — E03.4 HYPOTHYROIDISM DUE TO ACQUIRED ATROPHY OF THYROID: ICD-10-CM

## 2022-05-31 DIAGNOSIS — F31.9 BIPOLAR AFFECTIVE DISORDER, REMISSION STATUS UNSPECIFIED: ICD-10-CM

## 2022-05-31 DIAGNOSIS — E66.01 SEVERE OBESITY (BMI 35.0-39.9) WITH COMORBIDITY: ICD-10-CM

## 2022-05-31 DIAGNOSIS — Z79.4 TYPE 2 DIABETES MELLITUS WITH COMPLICATION, WITH LONG-TERM CURRENT USE OF INSULIN: ICD-10-CM

## 2022-05-31 DIAGNOSIS — I10 ESSENTIAL HYPERTENSION: Primary | ICD-10-CM

## 2022-05-31 DIAGNOSIS — I69.952 HEMIPLEGIA AND HEMIPARESIS FOLLOWING UNSPECIFIED CEREBROVASCULAR DISEASE AFFECTING LEFT DOMINANT SIDE: ICD-10-CM

## 2022-05-31 DIAGNOSIS — E11.8 TYPE 2 DIABETES MELLITUS WITH COMPLICATION, WITH LONG-TERM CURRENT USE OF INSULIN: ICD-10-CM

## 2022-05-31 DIAGNOSIS — R56.9 SEIZURE-LIKE ACTIVITY: ICD-10-CM

## 2022-05-31 DIAGNOSIS — F32.A DEPRESSION, UNSPECIFIED DEPRESSION TYPE: ICD-10-CM

## 2022-05-31 DIAGNOSIS — I10 ESSENTIAL HYPERTENSION: ICD-10-CM

## 2022-05-31 DIAGNOSIS — J44.1 COPD EXACERBATION: ICD-10-CM

## 2022-05-31 DIAGNOSIS — I67.1 CEREBRAL ANEURYSM, NONRUPTURED: ICD-10-CM

## 2022-05-31 DIAGNOSIS — L98.9 SKIN LESIONS: ICD-10-CM

## 2022-05-31 LAB
ALBUMIN SERPL BCP-MCNC: 3.6 G/DL (ref 3.5–5.2)
ALP SERPL-CCNC: 81 U/L (ref 55–135)
ALT SERPL W/O P-5'-P-CCNC: 45 U/L (ref 10–44)
ANION GAP SERPL CALC-SCNC: 12 MMOL/L (ref 8–16)
AST SERPL-CCNC: 42 U/L (ref 10–40)
BASOPHILS # BLD AUTO: 0.05 K/UL (ref 0–0.2)
BASOPHILS NFR BLD: 0.6 % (ref 0–1.9)
BILIRUB SERPL-MCNC: 0.4 MG/DL (ref 0.1–1)
BUN SERPL-MCNC: 19 MG/DL (ref 6–20)
CALCIUM SERPL-MCNC: 9.5 MG/DL (ref 8.7–10.5)
CHLORIDE SERPL-SCNC: 96 MMOL/L (ref 95–110)
CO2 SERPL-SCNC: 31 MMOL/L (ref 23–29)
CREAT SERPL-MCNC: 1.1 MG/DL (ref 0.5–1.4)
DIFFERENTIAL METHOD: ABNORMAL
EOSINOPHIL # BLD AUTO: 0.4 K/UL (ref 0–0.5)
EOSINOPHIL NFR BLD: 4.1 % (ref 0–8)
ERYTHROCYTE [DISTWIDTH] IN BLOOD BY AUTOMATED COUNT: 13.2 % (ref 11.5–14.5)
EST. GFR  (AFRICAN AMERICAN): >60 ML/MIN/1.73 M^2
EST. GFR  (NON AFRICAN AMERICAN): 55.5 ML/MIN/1.73 M^2
ESTIMATED AVG GLUCOSE: 278 MG/DL (ref 68–131)
GLUCOSE SERPL-MCNC: 246 MG/DL (ref 70–110)
HBA1C MFR BLD: 11.3 % (ref 4–5.6)
HCT VFR BLD AUTO: 54.9 % (ref 37–48.5)
HGB BLD-MCNC: 17.1 G/DL (ref 12–16)
IMM GRANULOCYTES # BLD AUTO: 0.02 K/UL (ref 0–0.04)
IMM GRANULOCYTES NFR BLD AUTO: 0.2 % (ref 0–0.5)
LYMPHOCYTES # BLD AUTO: 2.9 K/UL (ref 1–4.8)
LYMPHOCYTES NFR BLD: 33.5 % (ref 18–48)
MCH RBC QN AUTO: 31 PG (ref 27–31)
MCHC RBC AUTO-ENTMCNC: 31.1 G/DL (ref 32–36)
MCV RBC AUTO: 100 FL (ref 82–98)
MONOCYTES # BLD AUTO: 0.7 K/UL (ref 0.3–1)
MONOCYTES NFR BLD: 8.4 % (ref 4–15)
NEUTROPHILS # BLD AUTO: 4.6 K/UL (ref 1.8–7.7)
NEUTROPHILS NFR BLD: 53.2 % (ref 38–73)
NRBC BLD-RTO: 0 /100 WBC
PLATELET # BLD AUTO: 180 K/UL (ref 150–450)
PMV BLD AUTO: 11.1 FL (ref 9.2–12.9)
POTASSIUM SERPL-SCNC: 4.3 MMOL/L (ref 3.5–5.1)
PROT SERPL-MCNC: 7.3 G/DL (ref 6–8.4)
RBC # BLD AUTO: 5.51 M/UL (ref 4–5.4)
SODIUM SERPL-SCNC: 139 MMOL/L (ref 136–145)
T4 FREE SERPL-MCNC: 0.93 NG/DL (ref 0.71–1.51)
TSH SERPL DL<=0.005 MIU/L-ACNC: 5.01 UIU/ML (ref 0.4–4)
WBC # BLD AUTO: 8.57 K/UL (ref 3.9–12.7)

## 2022-05-31 PROCEDURE — 3008F BODY MASS INDEX DOCD: CPT | Mod: CPTII,S$GLB,, | Performed by: FAMILY MEDICINE

## 2022-05-31 PROCEDURE — 1159F PR MEDICATION LIST DOCUMENTED IN MEDICAL RECORD: ICD-10-PCS | Mod: CPTII,S$GLB,, | Performed by: FAMILY MEDICINE

## 2022-05-31 PROCEDURE — 99999 PR PBB SHADOW E&M-EST. PATIENT-LVL V: CPT | Mod: PBBFAC,,, | Performed by: FAMILY MEDICINE

## 2022-05-31 PROCEDURE — 3074F PR MOST RECENT SYSTOLIC BLOOD PRESSURE < 130 MM HG: ICD-10-PCS | Mod: CPTII,S$GLB,, | Performed by: FAMILY MEDICINE

## 2022-05-31 PROCEDURE — 4010F ACE/ARB THERAPY RXD/TAKEN: CPT | Mod: CPTII,S$GLB,, | Performed by: FAMILY MEDICINE

## 2022-05-31 PROCEDURE — 3008F PR BODY MASS INDEX (BMI) DOCUMENTED: ICD-10-PCS | Mod: CPTII,S$GLB,, | Performed by: FAMILY MEDICINE

## 2022-05-31 PROCEDURE — 3072F PR LOW RISK FOR RETINOPATHY: ICD-10-PCS | Mod: CPTII,S$GLB,, | Performed by: FAMILY MEDICINE

## 2022-05-31 PROCEDURE — 80053 COMPREHEN METABOLIC PANEL: CPT | Performed by: FAMILY MEDICINE

## 2022-05-31 PROCEDURE — 83036 HEMOGLOBIN GLYCOSYLATED A1C: CPT | Performed by: FAMILY MEDICINE

## 2022-05-31 PROCEDURE — 3078F PR MOST RECENT DIASTOLIC BLOOD PRESSURE < 80 MM HG: ICD-10-PCS | Mod: CPTII,S$GLB,, | Performed by: FAMILY MEDICINE

## 2022-05-31 PROCEDURE — 3078F DIAST BP <80 MM HG: CPT | Mod: CPTII,S$GLB,, | Performed by: FAMILY MEDICINE

## 2022-05-31 PROCEDURE — 99215 OFFICE O/P EST HI 40 MIN: CPT | Mod: S$GLB,,, | Performed by: FAMILY MEDICINE

## 2022-05-31 PROCEDURE — 84439 ASSAY OF FREE THYROXINE: CPT | Performed by: FAMILY MEDICINE

## 2022-05-31 PROCEDURE — 99499 RISK ADDL DX/OHS AUDIT: ICD-10-PCS | Mod: HCNC,S$GLB,, | Performed by: FAMILY MEDICINE

## 2022-05-31 PROCEDURE — 3072F LOW RISK FOR RETINOPATHY: CPT | Mod: CPTII,S$GLB,, | Performed by: FAMILY MEDICINE

## 2022-05-31 PROCEDURE — 85025 COMPLETE CBC W/AUTO DIFF WBC: CPT | Performed by: FAMILY MEDICINE

## 2022-05-31 PROCEDURE — 36415 COLL VENOUS BLD VENIPUNCTURE: CPT | Mod: PO | Performed by: FAMILY MEDICINE

## 2022-05-31 PROCEDURE — 3074F SYST BP LT 130 MM HG: CPT | Mod: CPTII,S$GLB,, | Performed by: FAMILY MEDICINE

## 2022-05-31 PROCEDURE — 99499 UNLISTED E&M SERVICE: CPT | Mod: HCNC,S$GLB,, | Performed by: FAMILY MEDICINE

## 2022-05-31 PROCEDURE — 99999 PR PBB SHADOW E&M-EST. PATIENT-LVL V: ICD-10-PCS | Mod: PBBFAC,,, | Performed by: FAMILY MEDICINE

## 2022-05-31 PROCEDURE — 84443 ASSAY THYROID STIM HORMONE: CPT | Performed by: FAMILY MEDICINE

## 2022-05-31 PROCEDURE — 4010F PR ACE/ARB THEARPY RXD/TAKEN: ICD-10-PCS | Mod: CPTII,S$GLB,, | Performed by: FAMILY MEDICINE

## 2022-05-31 PROCEDURE — 1159F MED LIST DOCD IN RCRD: CPT | Mod: CPTII,S$GLB,, | Performed by: FAMILY MEDICINE

## 2022-05-31 PROCEDURE — 99215 PR OFFICE/OUTPT VISIT, EST, LEVL V, 40-54 MIN: ICD-10-PCS | Mod: S$GLB,,, | Performed by: FAMILY MEDICINE

## 2022-05-31 RX ORDER — METFORMIN HYDROCHLORIDE 500 MG/1
500 TABLET, EXTENDED RELEASE ORAL 2 TIMES DAILY WITH MEALS
Qty: 180 TABLET | Refills: 1 | Status: SHIPPED | OUTPATIENT
Start: 2022-05-31 | End: 2023-02-06

## 2022-05-31 NOTE — PROGRESS NOTES
Subjective:       Patient ID: Alexandra Goins is a 56 y.o. female.    Chief Complaint: Diabetes      HPI Comments:       Current Outpatient Medications:     albuterol (PROVENTIL/VENTOLIN HFA) 90 mcg/actuation inhaler, INHALE 2 TO 4 PUFFS BY MOUTH THREE TIMES DAILY AS NEEDED FOR WHEEZING, Disp: 18 g, Rfl: 3    albuterol-ipratropium (DUO-NEB) 2.5 mg-0.5 mg/3 mL nebulizer solution, Take 3 mLs by nebulization every 6 (six) hours as needed for Wheezing. Rescue, Disp: 1 Box, Rfl: 0    aspirin (ECOTRIN) 81 MG EC tablet, Take 1 tablet (81 mg total) by mouth once daily. (Patient taking differently: Take 81 mg by mouth every other day.), Disp: , Rfl:     benztropine (COGENTIN) 0.5 MG tablet, Take 1 tablet (0.5 mg total) by mouth 2 (two) times daily., Disp: 60 tablet, Rfl: 2    blood sugar diagnostic (TRUE METRIX GLUCOSE TEST STRIP) Strp, USE 1 STRIP TO CHECK GLUCOSE THREE TIMES DAILY, Disp: 100 strip, Rfl: 3    buPROPion (WELLBUTRIN XL) 150 MG TB24 tablet, Take 1 tablet (150 mg total) by mouth once daily., Disp: 30 tablet, Rfl: 2    butalbital-acetaminophen-caffeine -40 mg (FIORICET, ESGIC) -40 mg per tablet, Take 1 tablet by mouth every 4 (four) hours as needed. for pain., Disp: 30 tablet, Rfl: 0    diazepam (VALIUM ORAL), Take by mouth 3 (three) times daily as needed for Anxiety. Do not exceed 3 timed a day, Disp: , Rfl:     diazePAM (VALIUM) 10 MG Tab, Take 1 tablet (10 mg total) by mouth 3 (three) times daily as needed., Disp: 90 tablet, Rfl: 2    doxepin (SINEQUAN) 10 MG capsule, Take 1 to 2 capsules at bedtime, Disp: 60 capsule, Rfl: 2    ezetimibe (ZETIA) 10 mg tablet, Take 1 tablet (10 mg total) by mouth once daily., Disp: 30 tablet, Rfl: 11    furosemide (LASIX) 40 MG tablet, Take 1 tablet (40 mg total) by mouth once daily., Disp: 90 tablet, Rfl: 2    gabapentin (NEURONTIN) 600 MG tablet, TAKE 1 TABLET BY MOUTH ONCE DAILY IN THE MORNING AND TAKE 1 TABLET BY MOUTH IN THE AFTERNOON THEN TAKE 2  TABLETS BY MOUTH AT BEDTIME, Disp: 120 tablet, Rfl: 0    insulin  unit/mL injection, Inject 25 Units into the skin 2 (two) times daily before meals., Disp: 20 mL, Rfl: 0    insulin regular, human (NOVOLIN R INJ), , Disp: , Rfl:     isosorbide mononitrate (IMDUR) 30 MG 24 hr tablet, Take 1 tablet (30 mg total) by mouth once daily., Disp: 30 tablet, Rfl: 5    ketorolac (TORADOL) 10 mg tablet, TAKE 1 TABLET BY MOUTH EVERY 6 HOURS FOR 5 DAYS, Disp: , Rfl:     losartan (COZAAR) 25 MG tablet, Take 1 tablet (25 mg total) by mouth once daily., Disp: 90 tablet, Rfl: 3    metoprolol succinate (TOPROL-XL) 25 MG 24 hr tablet, Take 1 tablet (25 mg total) by mouth once daily., Disp: 90 tablet, Rfl: 3    promethazine (PHENERGAN) 12.5 MG Tab, Take 1 tablet (12.5 mg total) by mouth every 6 (six) hours as needed., Disp: 20 tablet, Rfl: 0    risperiDONE (RISPERDAL) 1 MG tablet, Take 1 tablet (1 mg total) by mouth 2 (two) times daily., Disp: 60 tablet, Rfl: 2    metFORMIN (GLUCOPHAGE-XR) 500 MG ER 24hr tablet, Take 1 tablet (500 mg total) by mouth 2 (two) times daily with meals., Disp: 180 tablet, Rfl: 1        Six month follow-up.  Says she has good days and bad days.  Today is a relatively good day.    Saw cardiology.  Increased her Lasix.  Still has carotid and SIMIN studies pending.  Still waiting to get her mammogram.  She says she is going to reschedule all of these tests soon.    Depression is stable with current medications.    Has some spots on her face that she would like to have looked at.    Says the gabapentin helps her feet symptoms for the most part.    Saw neuro surgery who once every 2 year MRAs to follow her brain aneurysm.  It is been stable the    Still sees psychiatry for bipolar disorder.  Risperdal, Wellbutrin, Valium, Remeron the the    LDL 55.  Nuclear stress test negative in January     Occasional low blood pressures.  Hold her medicine when this happens  The  Review of Systems   Constitutional:  "Negative for activity change, appetite change and fever.   HENT: Negative for sore throat.    Respiratory: Negative for cough and shortness of breath.    Cardiovascular: Negative for chest pain.   Gastrointestinal: Negative for abdominal pain, diarrhea and nausea.   Genitourinary: Negative for difficulty urinating.   Musculoskeletal: Negative for arthralgias and myalgias.   Neurological: Negative for dizziness and headaches.   Psychiatric/Behavioral: Positive for dysphoric mood.       Objective:      Vitals:    05/31/22 0932   BP: 120/60   Pulse: 103   Temp: 98.3 °F (36.8 °C)   TempSrc: Temporal   SpO2: 95%   Weight: 93 kg (205 lb 0.4 oz)   Height: 5' 4" (1.626 m)   PainSc: 0-No pain     Physical Exam  Vitals and nursing note reviewed.   Constitutional:       General: She is not in acute distress.     Appearance: She is well-developed. She is not diaphoretic.   HENT:      Head: Normocephalic.   Neck:      Thyroid: No thyromegaly.   Cardiovascular:      Rate and Rhythm: Normal rate and regular rhythm.      Pulses:           Dorsalis pedis pulses are 1+ on the right side and 1+ on the left side.        Posterior tibial pulses are 1+ on the right side and 1+ on the left side.      Heart sounds: Normal heart sounds. No murmur heard.  Pulmonary:      Effort: Pulmonary effort is normal.      Breath sounds: Normal breath sounds. No wheezing or rales.   Abdominal:      General: There is no distension.      Palpations: Abdomen is soft.   Musculoskeletal:      Cervical back: Neck supple.   Feet:      Right foot:      Protective Sensation: 7 sites tested. 7 sites sensed.      Skin integrity: Skin integrity normal.      Left foot:      Protective Sensation: 7 sites tested. 7 sites sensed.      Skin integrity: Skin integrity normal.   Lymphadenopathy:      Cervical: No cervical adenopathy.   Skin:     General: Skin is warm and dry.   Neurological:      Mental Status: She is alert and oriented to person, place, and time. "   Psychiatric:         Mood and Affect: Mood normal.         Behavior: Behavior normal.         Thought Content: Thought content normal.         Judgment: Judgment normal.         Assessment:       1. Essential hypertension    2. Type 2 diabetes mellitus with complication, with long-term current use of insulin    3. Bipolar affective disorder, remission status unspecified    4. Hypothyroidism due to acquired atrophy of thyroid    5. Cerebral aneurysm, nonruptured    6. Depression, unspecified depression type    7. Skin lesions    8. Severe obesity (BMI 35.0-39.9) with comorbidity    9. Seizure-like activity    10. Hemiplegia and hemiparesis following unspecified cerebrovascular disease affecting left dominant side    11. COPD exacerbation        Plan:   Essential hypertension  Comments:  Controlled.  Follow-up 6 months.  Occasional low blood pressure.  Holds her medication in response  Orders:  -     Comprehensive Metabolic Panel; Future; Expected date: 05/31/2022  -     CBC Auto Differential; Future; Expected date: 05/31/2022    Type 2 diabetes mellitus with complication, with long-term current use of insulin  Comments:  Not well controlled recently.  A1c and microalbumin today.  Reach out made to diabetic education for follow-up  Orders:  -     Hemoglobin A1C; Future; Expected date: 05/31/2022    Bipolar affective disorder, remission status unspecified  Comments:  Stable on current medications.  Followed by Psychiatry    Hypothyroidism due to acquired atrophy of thyroid  Comments:  TSH today  Orders:  -     TSH; Future; Expected date: 05/31/2022    Cerebral aneurysm, nonruptured  Comments:  Stable per neuro surgery    Depression, unspecified depression type  Comments:  Stable.  Followed by Psychiatry    Skin lesions  Comments:  Derm consult  Orders:  -     Ambulatory referral/consult to Dermatology; Future; Expected date: 06/07/2022    Severe obesity (BMI 35.0-39.9) with comorbidity  Comments:  Weight up about 5  lb since last visit    Seizure-like activity  Comments:  Stable    Hemiplegia and hemiparesis following unspecified cerebrovascular disease affecting left dominant side  Comments:  Stable neurologically    COPD exacerbation  Comments:  Currently without symptoms    Other orders  -     metFORMIN (GLUCOPHAGE-XR) 500 MG ER 24hr tablet; Take 1 tablet (500 mg total) by mouth 2 (two) times daily with meals.  Dispense: 180 tablet; Refill: 1

## 2022-06-10 ENCOUNTER — HOSPITAL ENCOUNTER (OUTPATIENT)
Dept: CARDIOLOGY | Facility: HOSPITAL | Age: 57
Discharge: HOME | End: 2022-06-10
Attending: INTERNAL MEDICINE
Payer: MEDICARE

## 2022-06-10 VITALS
HEART RATE: 73 BPM | HEART RATE: 73 BPM | BODY MASS INDEX: 35 KG/M2 | BODY MASS INDEX: 35 KG/M2 | WEIGHT: 205 LBS | SYSTOLIC BLOOD PRESSURE: 148 MMHG | HEIGHT: 64 IN | DIASTOLIC BLOOD PRESSURE: 65 MMHG | HEIGHT: 64 IN | WEIGHT: 205 LBS | SYSTOLIC BLOOD PRESSURE: 148 MMHG | DIASTOLIC BLOOD PRESSURE: 65 MMHG

## 2022-06-10 DIAGNOSIS — I25.118 CORONARY ARTERY DISEASE OF NATIVE ARTERY OF NATIVE HEART WITH STABLE ANGINA PECTORIS: ICD-10-CM

## 2022-06-10 LAB
LEFT ABI: 1.03
LEFT ARM BP: 148 MMHG
LEFT ARM DIASTOLIC BLOOD PRESSURE: 65 MMHG
LEFT ARM SYSTOLIC BLOOD PRESSURE: 148 MMHG
LEFT CBA DIAS: 9 CM/S
LEFT CBA SYS: 65 CM/S
LEFT CCA DIST DIAS: 12 CM/S
LEFT CCA DIST SYS: 55 CM/S
LEFT CCA MID DIAS: 10 CM/S
LEFT CCA MID SYS: 70 CM/S
LEFT CCA PROX DIAS: 8 CM/S
LEFT CCA PROX SYS: 73 CM/S
LEFT DORSALIS PEDIS: 133 MMHG
LEFT ECA DIAS: 7 CM/S
LEFT ECA SYS: 118 CM/S
LEFT ICA DIST DIAS: 15 CM/S
LEFT ICA DIST SYS: 65 CM/S
LEFT ICA MID DIAS: 15 CM/S
LEFT ICA MID SYS: 67 CM/S
LEFT ICA PROX DIAS: 11 CM/S
LEFT ICA PROX SYS: 49 CM/S
LEFT POSTERIOR TIBIAL: 153 MMHG
LEFT TBI: 0.86
LEFT TOE PRESSURE: 128 MMHG
LEFT VERTEBRAL DIAS: 10 CM/S
LEFT VERTEBRAL SYS: 49 CM/S
OHS CV CAROTID RIGHT ICA EDV HIGHEST: 18
OHS CV CAROTID ULTRASOUND LEFT ICA/CCA RATIO: 1.22
OHS CV CAROTID ULTRASOUND RIGHT ICA/CCA RATIO: 1.35
OHS CV PV CAROTID LEFT HIGHEST CCA: 73
OHS CV PV CAROTID LEFT HIGHEST ICA: 67
OHS CV PV CAROTID RIGHT HIGHEST CCA: 73
OHS CV PV CAROTID RIGHT HIGHEST ICA: 69
OHS CV US CAROTID LEFT HIGHEST EDV: 15
RIGHT ABI: 1.03
RIGHT CBA DIAS: 8 CM/S
RIGHT CBA SYS: 48 CM/S
RIGHT CCA DIST DIAS: 9 CM/S
RIGHT CCA DIST SYS: 51 CM/S
RIGHT CCA MID DIAS: 9 CM/S
RIGHT CCA MID SYS: 60 CM/S
RIGHT CCA PROX DIAS: 9 CM/S
RIGHT CCA PROX SYS: 73 CM/S
RIGHT DORSALIS PEDIS: 150 MMHG
RIGHT ECA DIAS: 4 CM/S
RIGHT ECA SYS: 61 CM/S
RIGHT ICA DIST DIAS: 18 CM/S
RIGHT ICA DIST SYS: 69 CM/S
RIGHT ICA MID DIAS: 18 CM/S
RIGHT ICA MID SYS: 60 CM/S
RIGHT ICA PROX DIAS: 11 CM/S
RIGHT ICA PROX SYS: 37 CM/S
RIGHT POSTERIOR TIBIAL: 152 MMHG
RIGHT TBI: 0.92
RIGHT TOE PRESSURE: 136 MMHG
RIGHT VERTEBRAL DIAS: 8 CM/S
RIGHT VERTEBRAL SYS: 52 CM/S

## 2022-06-10 PROCEDURE — 93922 ANKLE BRACHIAL INDICES (ABI): ICD-10-PCS | Mod: 26,,, | Performed by: INTERNAL MEDICINE

## 2022-06-10 PROCEDURE — 93880 EXTRACRANIAL BILAT STUDY: CPT

## 2022-06-10 PROCEDURE — 93880 CV US DOPPLER CAROTID (CUPID ONLY): ICD-10-PCS | Mod: 26,,, | Performed by: INTERNAL MEDICINE

## 2022-06-10 PROCEDURE — 93922 UPR/L XTREMITY ART 2 LEVELS: CPT

## 2022-06-10 PROCEDURE — 93880 EXTRACRANIAL BILAT STUDY: CPT | Mod: 26,,, | Performed by: INTERNAL MEDICINE

## 2022-06-10 PROCEDURE — 93922 UPR/L XTREMITY ART 2 LEVELS: CPT | Mod: 26,,, | Performed by: INTERNAL MEDICINE

## 2022-06-11 ENCOUNTER — PATIENT MESSAGE (OUTPATIENT)
Dept: CARDIOLOGY | Facility: CLINIC | Age: 57
End: 2022-06-11
Payer: MEDICARE

## 2022-06-13 ENCOUNTER — TELEPHONE (OUTPATIENT)
Dept: CARDIOLOGY | Facility: CLINIC | Age: 57
End: 2022-06-13
Payer: MEDICARE

## 2022-06-13 NOTE — TELEPHONE ENCOUNTER
Pt contacted about results, pt verbalized understanding.          ----- Message from Brennon Chakraborty MD sent at 6/13/2022  1:40 PM CDT -----  SIMIN normal. No leg arterial blockage  Carotid us normal

## 2022-07-05 ENCOUNTER — OFFICE VISIT (OUTPATIENT)
Dept: PSYCHIATRY | Facility: CLINIC | Age: 57
End: 2022-07-05
Payer: MEDICARE

## 2022-07-05 DIAGNOSIS — F41.9 ANXIETY: ICD-10-CM

## 2022-07-05 DIAGNOSIS — G47.00 INSOMNIA, UNSPECIFIED TYPE: ICD-10-CM

## 2022-07-05 DIAGNOSIS — F31.9 BIPOLAR 1 DISORDER: Primary | ICD-10-CM

## 2022-07-05 PROCEDURE — 99214 PR OFFICE/OUTPT VISIT, EST, LEVL IV, 30-39 MIN: ICD-10-PCS | Mod: 95,,, | Performed by: PSYCHIATRY & NEUROLOGY

## 2022-07-05 PROCEDURE — 3072F LOW RISK FOR RETINOPATHY: CPT | Mod: CPTII,95,, | Performed by: PSYCHIATRY & NEUROLOGY

## 2022-07-05 PROCEDURE — 3072F PR LOW RISK FOR RETINOPATHY: ICD-10-PCS | Mod: CPTII,95,, | Performed by: PSYCHIATRY & NEUROLOGY

## 2022-07-05 PROCEDURE — 99499 RISK ADDL DX/OHS AUDIT: ICD-10-PCS | Mod: 95,,, | Performed by: PSYCHIATRY & NEUROLOGY

## 2022-07-05 PROCEDURE — 3046F HEMOGLOBIN A1C LEVEL >9.0%: CPT | Mod: CPTII,95,, | Performed by: PSYCHIATRY & NEUROLOGY

## 2022-07-05 PROCEDURE — 3046F PR MOST RECENT HEMOGLOBIN A1C LEVEL > 9.0%: ICD-10-PCS | Mod: CPTII,95,, | Performed by: PSYCHIATRY & NEUROLOGY

## 2022-07-05 PROCEDURE — 4010F PR ACE/ARB THEARPY RXD/TAKEN: ICD-10-PCS | Mod: CPTII,95,, | Performed by: PSYCHIATRY & NEUROLOGY

## 2022-07-05 PROCEDURE — 99499 UNLISTED E&M SERVICE: CPT | Mod: 95,,, | Performed by: PSYCHIATRY & NEUROLOGY

## 2022-07-05 PROCEDURE — 99214 OFFICE O/P EST MOD 30 MIN: CPT | Mod: 95,,, | Performed by: PSYCHIATRY & NEUROLOGY

## 2022-07-05 PROCEDURE — 4010F ACE/ARB THERAPY RXD/TAKEN: CPT | Mod: CPTII,95,, | Performed by: PSYCHIATRY & NEUROLOGY

## 2022-07-05 RX ORDER — RISPERIDONE 1 MG/1
1 TABLET ORAL 2 TIMES DAILY
Qty: 60 TABLET | Refills: 2 | Status: SHIPPED | OUTPATIENT
Start: 2022-07-05 | End: 2022-10-04 | Stop reason: SDUPTHER

## 2022-07-05 RX ORDER — BUPROPION HYDROCHLORIDE 150 MG/1
150 TABLET ORAL DAILY
Qty: 30 TABLET | Refills: 2 | Status: SHIPPED | OUTPATIENT
Start: 2022-07-05 | End: 2022-10-04 | Stop reason: SDUPTHER

## 2022-07-05 RX ORDER — DOXEPIN HYDROCHLORIDE 10 MG/1
CAPSULE ORAL
Qty: 60 CAPSULE | Refills: 2 | Status: SHIPPED | OUTPATIENT
Start: 2022-07-05 | End: 2022-10-04 | Stop reason: SDUPTHER

## 2022-07-05 RX ORDER — DIAZEPAM 10 MG/1
10 TABLET ORAL 3 TIMES DAILY PRN
Qty: 90 TABLET | Refills: 2 | Status: SHIPPED | OUTPATIENT
Start: 2022-07-05 | End: 2022-09-26

## 2022-07-05 RX ORDER — BENZTROPINE MESYLATE 0.5 MG/1
0.5 TABLET ORAL 2 TIMES DAILY PRN
Qty: 60 TABLET | Refills: 2 | Status: SHIPPED | OUTPATIENT
Start: 2022-07-05 | End: 2022-10-04 | Stop reason: SDUPTHER
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
## 2022-07-19 ENCOUNTER — OFFICE VISIT (OUTPATIENT)
Dept: DERMATOLOGY | Facility: CLINIC | Age: 57
End: 2022-07-19
Payer: MEDICARE

## 2022-07-19 DIAGNOSIS — L98.9 SKIN LESIONS: ICD-10-CM

## 2022-07-19 DIAGNOSIS — D48.5 NEOPLASM OF UNCERTAIN BEHAVIOR OF SKIN: Primary | ICD-10-CM

## 2022-07-19 DIAGNOSIS — L82.1 SEBORRHEIC KERATOSIS: ICD-10-CM

## 2022-07-19 DIAGNOSIS — D18.01 CHERRY ANGIOMA: ICD-10-CM

## 2022-07-19 PROCEDURE — 88305 TISSUE EXAM BY PATHOLOGIST: ICD-10-PCS | Mod: 26,,, | Performed by: PATHOLOGY

## 2022-07-19 PROCEDURE — 88342 IMHCHEM/IMCYTCHM 1ST ANTB: CPT | Mod: 26,,, | Performed by: PATHOLOGY

## 2022-07-19 PROCEDURE — 1159F MED LIST DOCD IN RCRD: CPT | Mod: CPTII,S$GLB,, | Performed by: DERMATOLOGY

## 2022-07-19 PROCEDURE — 99202 OFFICE O/P NEW SF 15 MIN: CPT | Mod: 25,S$GLB,, | Performed by: DERMATOLOGY

## 2022-07-19 PROCEDURE — 1160F PR REVIEW ALL MEDS BY PRESCRIBER/CLIN PHARMACIST DOCUMENTED: ICD-10-PCS | Mod: CPTII,S$GLB,, | Performed by: DERMATOLOGY

## 2022-07-19 PROCEDURE — 11102 PR TANGENTIAL BIOPSY, SKIN, SINGLE LESION: ICD-10-PCS | Mod: S$GLB,,, | Performed by: DERMATOLOGY

## 2022-07-19 PROCEDURE — 88342 IMHCHEM/IMCYTCHM 1ST ANTB: CPT | Performed by: PATHOLOGY

## 2022-07-19 PROCEDURE — 99999 PR PBB SHADOW E&M-EST. PATIENT-LVL IV: CPT | Mod: PBBFAC,,, | Performed by: DERMATOLOGY

## 2022-07-19 PROCEDURE — 4010F PR ACE/ARB THEARPY RXD/TAKEN: ICD-10-PCS | Mod: CPTII,S$GLB,, | Performed by: DERMATOLOGY

## 2022-07-19 PROCEDURE — 1159F PR MEDICATION LIST DOCUMENTED IN MEDICAL RECORD: ICD-10-PCS | Mod: CPTII,S$GLB,, | Performed by: DERMATOLOGY

## 2022-07-19 PROCEDURE — 3046F HEMOGLOBIN A1C LEVEL >9.0%: CPT | Mod: CPTII,S$GLB,, | Performed by: DERMATOLOGY

## 2022-07-19 PROCEDURE — 3046F PR MOST RECENT HEMOGLOBIN A1C LEVEL > 9.0%: ICD-10-PCS | Mod: CPTII,S$GLB,, | Performed by: DERMATOLOGY

## 2022-07-19 PROCEDURE — 1160F RVW MEDS BY RX/DR IN RCRD: CPT | Mod: CPTII,S$GLB,, | Performed by: DERMATOLOGY

## 2022-07-19 PROCEDURE — 11102 TANGNTL BX SKIN SINGLE LES: CPT | Mod: S$GLB,,, | Performed by: DERMATOLOGY

## 2022-07-19 PROCEDURE — 3072F PR LOW RISK FOR RETINOPATHY: ICD-10-PCS | Mod: CPTII,S$GLB,, | Performed by: DERMATOLOGY

## 2022-07-19 PROCEDURE — 4010F ACE/ARB THERAPY RXD/TAKEN: CPT | Mod: CPTII,S$GLB,, | Performed by: DERMATOLOGY

## 2022-07-19 PROCEDURE — 99202 PR OFFICE/OUTPT VISIT, NEW, LEVL II, 15-29 MIN: ICD-10-PCS | Mod: 25,S$GLB,, | Performed by: DERMATOLOGY

## 2022-07-19 PROCEDURE — 88305 TISSUE EXAM BY PATHOLOGIST: CPT | Performed by: PATHOLOGY

## 2022-07-19 PROCEDURE — 88305 TISSUE EXAM BY PATHOLOGIST: CPT | Mod: 26,,, | Performed by: PATHOLOGY

## 2022-07-19 PROCEDURE — 99999 PR PBB SHADOW E&M-EST. PATIENT-LVL IV: ICD-10-PCS | Mod: PBBFAC,,, | Performed by: DERMATOLOGY

## 2022-07-19 PROCEDURE — 88342 CHG IMMUNOCYTOCHEMISTRY: ICD-10-PCS | Mod: 26,,, | Performed by: PATHOLOGY

## 2022-07-19 PROCEDURE — 3072F LOW RISK FOR RETINOPATHY: CPT | Mod: CPTII,S$GLB,, | Performed by: DERMATOLOGY

## 2022-07-19 NOTE — PROGRESS NOTES
Subjective:       Patient ID:  Alexandra Goins is a 56 y.o. female who presents for   History of Present Illness: The patient presents with chief complaint of spots.  Location: face, ear  Duration: 4-5 years  Signs/Symptoms: none; notes that she picks at the spot on her ear but it doesn't itch    Prior treatments: none    Denies personal h/o skin cancer.  Father had skin cancer.        Review of Systems   Skin: Positive for activity-related sunscreen use. Negative for daily sunscreen use and recent sunburn.   Hematologic/Lymphatic: Bruises/bleeds easily (asa).        Objective:    Physical Exam   Constitutional: She appears well-developed and well-nourished. No distress.   Neurological: She is alert and oriented to person, place, and time. She is not disoriented.   Psychiatric: She has a normal mood and affect.   Skin:   Areas Examined (abnormalities noted in diagram):   Head / Face Inspection Performed              Diagram Legend     Erythematous scaling macule/papule c/w actinic keratosis       Vascular papule c/w angioma      Pigmented verrucoid papule/plaque c/w seborrheic keratosis      Yellow umbilicated papule c/w sebaceous hyperplasia      Irregularly shaped tan macule c/w lentigo     1-2 mm smooth white papules consistent with Milia      Movable subcutaneous cyst with punctum c/w epidermal inclusion cyst      Subcutaneous movable cyst c/w pilar cyst      Firm pink to brown papule c/w dermatofibroma      Pedunculated fleshy papule(s) c/w skin tag(s)      Evenly pigmented macule c/w junctional nevus     Mildly variegated pigmented, slightly irregular-bordered macule c/w mildly atypical nevus      Flesh colored to evenly pigmented papule c/w intradermal nevus       Pink pearly papule/plaque c/w basal cell carcinoma      Erythematous hyperkeratotic cursted plaque c/w SCC      Surgical scar with no sign of skin cancer recurrence      Open and closed comedones      Inflammatory papules and pustules       "Verrucoid papule consistent consistent with wart     Erythematous eczematous patches and plaques     Dystrophic onycholytic nail with subungual debris c/w onychomycosis     Umbilicated papule    Erythematous-base heme-crusted tan verrucoid plaque consistent with inflamed seborrheic keratosis     Erythematous Silvery Scaling Plaque c/w Psoriasis     See annotation          Assessment / Plan:      Pathology Orders:     Normal Orders This Visit    Specimen to Pathology, Dermatology     Questions:    Procedure Type: Dermatology and skin neoplasms    Number of Specimens: 1    ------------------------: -------------------------    Spec 1 Procedure: Biopsy    Spec 1 Clinical Impression: macular SK vs lentigo r/o LMM    Spec 1 Source: R cheek    Release to patient:         Neoplasm of uncertain behavior of skin  -     Specimen to Pathology, Dermatology    Shave biopsy procedure note:    Shave biopsy performed after verbal consent including risk of infection, scar, recurrence, need for additional treatment of site. Area prepped with alcohol, anesthetized with approximately 1.0cc of 1% lidocaine with epinephrine. Lesional tissue shaved with razor blade. Hemostasis achieved with application of aluminum chloride followed by hyfrecation. No complications. Dressing applied. Wound care explained.      Asher angioma  This is a benign vascular lesion. Reassurance given. No treatment required.     Seborrheic keratosis  These are benign inherited growths without a malignant potential. Reassurance given to patient. No treatment is necessary.   Recommended OTC Amlactin BID   Offered cryotherapy for "test spot" to one lesion (L helix) and treated after discussion of r/b/ae including blister, pain, recurrence, permanent discoloration.  If additional treatment desired, gave cosmetic pricing list.        Discussed and offered TBSE given family history, pt declined        RTC pending path    "

## 2022-07-19 NOTE — PATIENT INSTRUCTIONS
Shave Biopsy Wound Care    Your doctor has performed a shave biopsy today.  A band aid and vaseline ointment has been placed over the site.  This should remain in place for 24 hours.  It is recommended that you keep the area dry for the first 24 hours.  After 24 hours, you may remove the band aid and wash the area with warm soap and water and apply Vaseline jelly.  Many patients prefer to use Neosporin or Bacitracin ointment.  This is acceptable; however, know that you can develop an allergy to this medication even if you have used it safely for years.  It is important to keep the area moist.  Letting it dry out and get air slows healing time, and will worsen the scar.  Band aid is optional after first 24 hours.      If you notice increasing redness, tenderness, pain, or yellow drainage at the biopsy site, please notify your doctor.  These are signs of an infection.    If your biopsy site is bleeding, apply firm pressure for 15 minutes straight.  Repeat for another 15 minutes, if it is still bleeding.   If the surgical site continues to bleed, then please contact your doctor.      [facility] [place] [address]/ [phone] FAX/ [email or web address]           CRYOSURGERY      Your doctor has used a method called cryosurgery to treat your skin condition. Cryosurgery refers to the use of very cold substances to treat a variety of skin conditions such as warts, pre-skin cancers, molluscum contagiosum, sun spots, and several benign growths. The substance we use in cryosurgery is liquid nitrogen and is so cold (-195 degrees Celsius) that it burns when administered.     Following treatment in the office, the skin may immediately burn and become red. You may find the area around the lesion is affected as well. It is sometimes necessary to treat not only the lesion, but a small area of the surrounding normal skin to achieve a good response.     A blister, and even a blood filled blister, may  form after treatment.   This is a normal response. If the blister is painful, it is acceptable to sterilize a needle with rubbing alcohol and gently pop the blister. It is important that you gently wash the area with soap and warm water as the blister fluid may contain wart virus if a wart was treated. Do not remove the roof of the blister.     The area treated can take anywhere from 1-3 weeks to heal. Healing time depends on the kind of skin lesion treated, the location, and how aggressively the lesion was treated. It is recommended that the areas treated are covered with Vaseline or bacitracin ointment and a band-aid. If a band-aid is not practical, just ointment applied several times per day will do. Keeping these areas moist will speed the healing time.    Treatment with liquid nitrogen can leave a scar. In dark skin, it may be a light or dark scar, in light skin it may be a white or pink scar. These will generally fade with time, but may never go away completely.     If you have any concerns after your treatment, please feel free to call the office.       [facility] [place] [address]/ [phone] FAX/ [email or web address]

## 2022-07-24 ENCOUNTER — HOSPITAL ENCOUNTER (INPATIENT)
Facility: HOSPITAL | Age: 57
LOS: 9 days | Discharge: HOME HEALTH | DRG: 871 | End: 2022-08-02
Attending: EMERGENCY MEDICINE | Admitting: INTERNAL MEDICINE
Payer: MEDICARE

## 2022-07-24 DIAGNOSIS — R65.21 SEPTIC SHOCK: ICD-10-CM

## 2022-07-24 DIAGNOSIS — J44.1 COPD EXACERBATION: ICD-10-CM

## 2022-07-24 DIAGNOSIS — R41.82 ALTERED MENTAL STATUS: ICD-10-CM

## 2022-07-24 DIAGNOSIS — N39.0 URINARY TRACT INFECTION WITH HEMATURIA, SITE UNSPECIFIED: ICD-10-CM

## 2022-07-24 DIAGNOSIS — R78.81 SALMONELLA BACTEREMIA: ICD-10-CM

## 2022-07-24 DIAGNOSIS — A41.9 SEVERE SEPSIS: ICD-10-CM

## 2022-07-24 DIAGNOSIS — R31.9 URINARY TRACT INFECTION WITH HEMATURIA, SITE UNSPECIFIED: ICD-10-CM

## 2022-07-24 DIAGNOSIS — R09.02 HYPOXIA: ICD-10-CM

## 2022-07-24 DIAGNOSIS — J44.0 CHRONIC OBSTRUCTIVE PULMONARY DISEASE WITH ACUTE LOWER RESPIRATORY INFECTION: ICD-10-CM

## 2022-07-24 DIAGNOSIS — R65.20 SEVERE SEPSIS: ICD-10-CM

## 2022-07-24 DIAGNOSIS — A41.9 SEPTIC SHOCK: ICD-10-CM

## 2022-07-24 DIAGNOSIS — I50.9 CHF (CONGESTIVE HEART FAILURE): Primary | ICD-10-CM

## 2022-07-24 DIAGNOSIS — N17.9 ACUTE RENAL FAILURE, UNSPECIFIED ACUTE RENAL FAILURE TYPE: ICD-10-CM

## 2022-07-24 PROBLEM — J44.9 COPD (CHRONIC OBSTRUCTIVE PULMONARY DISEASE): Status: ACTIVE | Noted: 2022-07-24

## 2022-07-24 PROBLEM — E88.09 HYPOALBUMINEMIA: Status: ACTIVE | Noted: 2022-07-24

## 2022-07-24 PROBLEM — Z79.4 TYPE 2 DIABETES MELLITUS WITH COMPLICATION, WITH LONG-TERM CURRENT USE OF INSULIN: Chronic | Status: ACTIVE | Noted: 2018-06-21

## 2022-07-24 PROBLEM — I25.118 CORONARY ARTERY DISEASE OF NATIVE ARTERY OF NATIVE HEART WITH STABLE ANGINA PECTORIS: Chronic | Status: ACTIVE | Noted: 2022-01-19

## 2022-07-24 PROBLEM — E87.1 HYPONATREMIA: Status: ACTIVE | Noted: 2022-07-24

## 2022-07-24 PROBLEM — E11.8 TYPE 2 DIABETES MELLITUS WITH COMPLICATION, WITH LONG-TERM CURRENT USE OF INSULIN: Chronic | Status: ACTIVE | Noted: 2018-06-21

## 2022-07-24 PROBLEM — Z86.73 HX OF ARTERIAL ISCHEMIC STROKE: Chronic | Status: ACTIVE | Noted: 2019-09-17

## 2022-07-24 PROBLEM — E66.01 SEVERE OBESITY (BMI 35.0-39.9) WITH COMORBIDITY: Chronic | Status: ACTIVE | Noted: 2022-05-31

## 2022-07-24 PROBLEM — R19.7 DIARRHEA: Status: ACTIVE | Noted: 2022-07-24

## 2022-07-24 LAB
ALBUMIN SERPL BCP-MCNC: 2.1 G/DL (ref 3.5–5.2)
ALLENS TEST: ABNORMAL
ALP SERPL-CCNC: 66 U/L (ref 55–135)
ALT SERPL W/O P-5'-P-CCNC: 34 U/L (ref 10–44)
AMPHET+METHAMPHET UR QL: NEGATIVE
ANION GAP SERPL CALC-SCNC: 12 MMOL/L (ref 8–16)
ANION GAP SERPL CALC-SCNC: 13 MMOL/L (ref 8–16)
AST SERPL-CCNC: 38 U/L (ref 10–40)
BACTERIA #/AREA URNS HPF: ABNORMAL /HPF
BARBITURATES UR QL SCN>200 NG/ML: NEGATIVE
BASOPHILS # BLD AUTO: 0.06 K/UL (ref 0–0.2)
BASOPHILS NFR BLD: 0.3 % (ref 0–1.9)
BENZODIAZ UR QL SCN>200 NG/ML: ABNORMAL
BILIRUB SERPL-MCNC: 0.8 MG/DL (ref 0.1–1)
BILIRUB UR QL STRIP: NEGATIVE
BNP SERPL-MCNC: 60 PG/ML (ref 0–99)
BUN SERPL-MCNC: 53 MG/DL (ref 6–20)
BUN SERPL-MCNC: 53 MG/DL (ref 6–20)
BZE UR QL SCN: NEGATIVE
CALCIUM SERPL-MCNC: 6.4 MG/DL (ref 8.7–10.5)
CALCIUM SERPL-MCNC: 7.8 MG/DL (ref 8.7–10.5)
CANNABINOIDS UR QL SCN: ABNORMAL
CHLORIDE SERPL-SCNC: 94 MMOL/L (ref 95–110)
CHLORIDE SERPL-SCNC: 98 MMOL/L (ref 95–110)
CLARITY UR: ABNORMAL
CO2 SERPL-SCNC: 21 MMOL/L (ref 23–29)
CO2 SERPL-SCNC: 27 MMOL/L (ref 23–29)
COLOR UR: YELLOW
CREAT SERPL-MCNC: 3 MG/DL (ref 0.5–1.4)
CREAT SERPL-MCNC: 3.7 MG/DL (ref 0.5–1.4)
CREAT UR-MCNC: 144.4 MG/DL (ref 15–325)
CTP QC/QA: YES
DELSYS: ABNORMAL
DIFFERENTIAL METHOD: ABNORMAL
EOSINOPHIL # BLD AUTO: 0.1 K/UL (ref 0–0.5)
EOSINOPHIL NFR BLD: 0.6 % (ref 0–8)
ERYTHROCYTE [DISTWIDTH] IN BLOOD BY AUTOMATED COUNT: 14.6 % (ref 11.5–14.5)
EST. GFR  (AFRICAN AMERICAN): 15 ML/MIN/1.73 M^2
EST. GFR  (AFRICAN AMERICAN): 19 ML/MIN/1.73 M^2
EST. GFR  (NON AFRICAN AMERICAN): 13 ML/MIN/1.73 M^2
EST. GFR  (NON AFRICAN AMERICAN): 16 ML/MIN/1.73 M^2
FIO2: 40
FLOW: 5
GLUCOSE SERPL-MCNC: 201 MG/DL (ref 70–110)
GLUCOSE SERPL-MCNC: 340 MG/DL (ref 70–110)
GLUCOSE UR QL STRIP: NEGATIVE
HCO3 UR-SCNC: 29.9 MMOL/L (ref 24–28)
HCT VFR BLD AUTO: 40.9 % (ref 37–48.5)
HCV AB SERPL QL IA: NEGATIVE
HGB BLD-MCNC: 13.2 G/DL (ref 12–16)
HGB UR QL STRIP: ABNORMAL
HIV 1+2 AB+HIV1 P24 AG SERPL QL IA: NEGATIVE
HYALINE CASTS #/AREA URNS LPF: 0 /LPF
IMM GRANULOCYTES # BLD AUTO: 0.25 K/UL (ref 0–0.04)
IMM GRANULOCYTES NFR BLD AUTO: 1.3 % (ref 0–0.5)
KETONES UR QL STRIP: NEGATIVE
LACTATE SERPL-SCNC: 0.7 MMOL/L (ref 0.5–2.2)
LACTATE SERPL-SCNC: 1 MMOL/L (ref 0.5–2.2)
LACTATE SERPL-SCNC: 1.1 MMOL/L (ref 0.5–2.2)
LACTATE SERPL-SCNC: 1.4 MMOL/L (ref 0.5–2.2)
LEUKOCYTE ESTERASE UR QL STRIP: ABNORMAL
LIPASE SERPL-CCNC: <3 U/L (ref 4–60)
LYMPHOCYTES # BLD AUTO: 2 K/UL (ref 1–4.8)
LYMPHOCYTES NFR BLD: 10.4 % (ref 18–48)
MAGNESIUM SERPL-MCNC: 1 MG/DL (ref 1.6–2.6)
MAGNESIUM SERPL-MCNC: 1.5 MG/DL (ref 1.6–2.6)
MCH RBC QN AUTO: 30.5 PG (ref 27–31)
MCHC RBC AUTO-ENTMCNC: 32.3 G/DL (ref 32–36)
MCV RBC AUTO: 95 FL (ref 82–98)
METHADONE UR QL SCN>300 NG/ML: NEGATIVE
MICROSCOPIC COMMENT: ABNORMAL
MODE: ABNORMAL
MONOCYTES # BLD AUTO: 1.3 K/UL (ref 0.3–1)
MONOCYTES NFR BLD: 7.1 % (ref 4–15)
NEUTROPHILS # BLD AUTO: 15 K/UL (ref 1.8–7.7)
NEUTROPHILS NFR BLD: 80.3 % (ref 38–73)
NITRITE UR QL STRIP: NEGATIVE
NRBC BLD-RTO: 0 /100 WBC
OPIATES UR QL SCN: NEGATIVE
PCO2 BLDA: 55.3 MMHG (ref 35–45)
PCP UR QL SCN>25 NG/ML: NEGATIVE
PH SMN: 7.34 [PH] (ref 7.35–7.45)
PH UR STRIP: 5 [PH] (ref 5–8)
PHOSPHATE SERPL-MCNC: 3.9 MG/DL (ref 2.7–4.5)
PLATELET # BLD AUTO: 139 K/UL (ref 150–450)
PMV BLD AUTO: 12.3 FL (ref 9.2–12.9)
PO2 BLDA: 70 MMHG (ref 80–100)
POC BE: 4 MMOL/L
POC SATURATED O2: 92 % (ref 95–100)
POCT GLUCOSE: 213 MG/DL (ref 70–110)
POCT GLUCOSE: 285 MG/DL (ref 70–110)
POCT GLUCOSE: 358 MG/DL (ref 70–110)
POTASSIUM SERPL-SCNC: 4.4 MMOL/L (ref 3.5–5.1)
POTASSIUM SERPL-SCNC: 4.5 MMOL/L (ref 3.5–5.1)
PROCALCITONIN SERPL IA-MCNC: 56.46 NG/ML
PROT SERPL-MCNC: 6.2 G/DL (ref 6–8.4)
PROT UR QL STRIP: ABNORMAL
RBC # BLD AUTO: 4.33 M/UL (ref 4–5.4)
RBC #/AREA URNS HPF: 11 /HPF (ref 0–4)
SAMPLE: ABNORMAL
SARS-COV-2 RDRP RESP QL NAA+PROBE: NEGATIVE
SITE: ABNORMAL
SODIUM SERPL-SCNC: 132 MMOL/L (ref 136–145)
SODIUM SERPL-SCNC: 133 MMOL/L (ref 136–145)
SP GR UR STRIP: 1.02 (ref 1–1.03)
TOXICOLOGY INFORMATION: ABNORMAL
TROPONIN I SERPL DL<=0.01 NG/ML-MCNC: 0.01 NG/ML (ref 0–0.03)
TROPONIN I SERPL DL<=0.01 NG/ML-MCNC: 0.01 NG/ML (ref 0–0.03)
URN SPEC COLLECT METH UR: ABNORMAL
UROBILINOGEN UR STRIP-ACNC: ABNORMAL EU/DL
WBC # BLD AUTO: 18.7 K/UL (ref 3.9–12.7)
WBC #/AREA URNS HPF: >100 /HPF (ref 0–5)
WBC CLUMPS URNS QL MICRO: ABNORMAL

## 2022-07-24 PROCEDURE — 80307 DRUG TEST PRSMV CHEM ANLYZR: CPT | Performed by: NURSE PRACTITIONER

## 2022-07-24 PROCEDURE — 25000003 PHARM REV CODE 250: Performed by: EMERGENCY MEDICINE

## 2022-07-24 PROCEDURE — 83735 ASSAY OF MAGNESIUM: CPT | Mod: 91 | Performed by: NURSE PRACTITIONER

## 2022-07-24 PROCEDURE — 83605 ASSAY OF LACTIC ACID: CPT | Mod: 91 | Performed by: NURSE PRACTITIONER

## 2022-07-24 PROCEDURE — 99291 CRITICAL CARE FIRST HOUR: CPT | Mod: 25

## 2022-07-24 PROCEDURE — 27000221 HC OXYGEN, UP TO 24 HOURS

## 2022-07-24 PROCEDURE — 63600175 PHARM REV CODE 636 W HCPCS: Performed by: NURSE PRACTITIONER

## 2022-07-24 PROCEDURE — 63600175 PHARM REV CODE 636 W HCPCS: Performed by: ANESTHESIOLOGY

## 2022-07-24 PROCEDURE — 20000000 HC ICU ROOM

## 2022-07-24 PROCEDURE — 83605 ASSAY OF LACTIC ACID: CPT | Performed by: EMERGENCY MEDICINE

## 2022-07-24 PROCEDURE — 80053 COMPREHEN METABOLIC PANEL: CPT | Performed by: EMERGENCY MEDICINE

## 2022-07-24 PROCEDURE — 36556 INSERT NON-TUNNEL CV CATH: CPT | Mod: 59

## 2022-07-24 PROCEDURE — 99291 PR CRITICAL CARE, E/M 30-74 MINUTES: ICD-10-PCS | Mod: ,,, | Performed by: NURSE PRACTITIONER

## 2022-07-24 PROCEDURE — 63600175 PHARM REV CODE 636 W HCPCS: Performed by: EMERGENCY MEDICINE

## 2022-07-24 PROCEDURE — 94640 AIRWAY INHALATION TREATMENT: CPT

## 2022-07-24 PROCEDURE — 87040 BLOOD CULTURE FOR BACTERIA: CPT | Performed by: EMERGENCY MEDICINE

## 2022-07-24 PROCEDURE — 81000 URINALYSIS NONAUTO W/SCOPE: CPT | Performed by: EMERGENCY MEDICINE

## 2022-07-24 PROCEDURE — 63600175 PHARM REV CODE 636 W HCPCS: Performed by: INTERNAL MEDICINE

## 2022-07-24 PROCEDURE — 85025 COMPLETE CBC W/AUTO DIFF WBC: CPT | Performed by: EMERGENCY MEDICINE

## 2022-07-24 PROCEDURE — 25000003 PHARM REV CODE 250: Performed by: INTERNAL MEDICINE

## 2022-07-24 PROCEDURE — 99900035 HC TECH TIME PER 15 MIN (STAT)

## 2022-07-24 PROCEDURE — 87186 SC STD MICRODIL/AGAR DIL: CPT | Performed by: EMERGENCY MEDICINE

## 2022-07-24 PROCEDURE — 25000242 PHARM REV CODE 250 ALT 637 W/ HCPCS: Performed by: NURSE PRACTITIONER

## 2022-07-24 PROCEDURE — 96367 TX/PROPH/DG ADDL SEQ IV INF: CPT

## 2022-07-24 PROCEDURE — 87077 CULTURE AEROBIC IDENTIFY: CPT | Performed by: EMERGENCY MEDICINE

## 2022-07-24 PROCEDURE — 25000003 PHARM REV CODE 250: Performed by: ANESTHESIOLOGY

## 2022-07-24 PROCEDURE — 82803 BLOOD GASES ANY COMBINATION: CPT

## 2022-07-24 PROCEDURE — 36415 COLL VENOUS BLD VENIPUNCTURE: CPT | Performed by: NURSE PRACTITIONER

## 2022-07-24 PROCEDURE — 87086 URINE CULTURE/COLONY COUNT: CPT | Performed by: EMERGENCY MEDICINE

## 2022-07-24 PROCEDURE — 36600 WITHDRAWAL OF ARTERIAL BLOOD: CPT

## 2022-07-24 PROCEDURE — 84100 ASSAY OF PHOSPHORUS: CPT | Performed by: EMERGENCY MEDICINE

## 2022-07-24 PROCEDURE — 86803 HEPATITIS C AB TEST: CPT | Performed by: EMERGENCY MEDICINE

## 2022-07-24 PROCEDURE — 25000003 PHARM REV CODE 250: Performed by: NURSE PRACTITIONER

## 2022-07-24 PROCEDURE — S0030 INJECTION, METRONIDAZOLE: HCPCS | Performed by: INTERNAL MEDICINE

## 2022-07-24 PROCEDURE — 84484 ASSAY OF TROPONIN QUANT: CPT | Mod: 91 | Performed by: NURSE PRACTITIONER

## 2022-07-24 PROCEDURE — 99291 CRITICAL CARE FIRST HOUR: CPT | Mod: ,,, | Performed by: NURSE PRACTITIONER

## 2022-07-24 PROCEDURE — 80048 BASIC METABOLIC PNL TOTAL CA: CPT | Mod: XB | Performed by: NURSE PRACTITIONER

## 2022-07-24 PROCEDURE — 84484 ASSAY OF TROPONIN QUANT: CPT | Performed by: INTERNAL MEDICINE

## 2022-07-24 PROCEDURE — 96361 HYDRATE IV INFUSION ADD-ON: CPT

## 2022-07-24 PROCEDURE — 83735 ASSAY OF MAGNESIUM: CPT | Performed by: EMERGENCY MEDICINE

## 2022-07-24 PROCEDURE — 87389 HIV-1 AG W/HIV-1&-2 AB AG IA: CPT | Performed by: EMERGENCY MEDICINE

## 2022-07-24 PROCEDURE — 96366 THER/PROPH/DIAG IV INF ADDON: CPT

## 2022-07-24 PROCEDURE — 83880 ASSAY OF NATRIURETIC PEPTIDE: CPT | Performed by: EMERGENCY MEDICINE

## 2022-07-24 PROCEDURE — 83690 ASSAY OF LIPASE: CPT | Performed by: INTERNAL MEDICINE

## 2022-07-24 PROCEDURE — 96365 THER/PROPH/DIAG IV INF INIT: CPT

## 2022-07-24 PROCEDURE — U0002 COVID-19 LAB TEST NON-CDC: HCPCS | Performed by: EMERGENCY MEDICINE

## 2022-07-24 PROCEDURE — 84145 PROCALCITONIN (PCT): CPT | Performed by: EMERGENCY MEDICINE

## 2022-07-24 RX ORDER — NOREPINEPHRINE BITARTRATE/D5W 4MG/250ML
0.05 PLASTIC BAG, INJECTION (ML) INTRAVENOUS CONTINUOUS
Status: DISCONTINUED | OUTPATIENT
Start: 2022-07-24 | End: 2022-07-25

## 2022-07-24 RX ORDER — SODIUM CHLORIDE 9 MG/ML
INJECTION, SOLUTION INTRAVENOUS
Status: DISCONTINUED | OUTPATIENT
Start: 2022-07-24 | End: 2022-08-02 | Stop reason: HOSPADM

## 2022-07-24 RX ORDER — METRONIDAZOLE 500 MG/100ML
500 INJECTION, SOLUTION INTRAVENOUS
Status: DISCONTINUED | OUTPATIENT
Start: 2022-07-24 | End: 2022-07-25

## 2022-07-24 RX ORDER — PROCHLORPERAZINE EDISYLATE 5 MG/ML
2.5 INJECTION INTRAMUSCULAR; INTRAVENOUS EVERY 6 HOURS PRN
Status: DISCONTINUED | OUTPATIENT
Start: 2022-07-24 | End: 2022-08-02 | Stop reason: HOSPADM

## 2022-07-24 RX ORDER — ACETAMINOPHEN 325 MG/1
650 TABLET ORAL EVERY 4 HOURS PRN
Status: DISCONTINUED | OUTPATIENT
Start: 2022-07-24 | End: 2022-07-25

## 2022-07-24 RX ORDER — ARFORMOTEROL TARTRATE 15 UG/2ML
15 SOLUTION RESPIRATORY (INHALATION) 2 TIMES DAILY
Status: DISCONTINUED | OUTPATIENT
Start: 2022-07-24 | End: 2022-08-02 | Stop reason: HOSPADM

## 2022-07-24 RX ORDER — HYDROCODONE BITARTRATE AND ACETAMINOPHEN 5; 325 MG/1; MG/1
1 TABLET ORAL EVERY 6 HOURS PRN
Status: DISCONTINUED | OUTPATIENT
Start: 2022-07-24 | End: 2022-08-02 | Stop reason: HOSPADM

## 2022-07-24 RX ORDER — CEFEPIME HYDROCHLORIDE 1 G/50ML
1 INJECTION, SOLUTION INTRAVENOUS
Status: DISCONTINUED | OUTPATIENT
Start: 2022-07-24 | End: 2022-07-24

## 2022-07-24 RX ORDER — ASPIRIN 81 MG/1
81 TABLET ORAL DAILY
Status: DISCONTINUED | OUTPATIENT
Start: 2022-07-25 | End: 2022-08-02 | Stop reason: HOSPADM

## 2022-07-24 RX ORDER — MAGNESIUM SULFATE HEPTAHYDRATE 40 MG/ML
2 INJECTION, SOLUTION INTRAVENOUS
Status: COMPLETED | OUTPATIENT
Start: 2022-07-24 | End: 2022-07-24

## 2022-07-24 RX ORDER — MAGNESIUM SULFATE 1 G/100ML
1 INJECTION INTRAVENOUS ONCE
Status: COMPLETED | OUTPATIENT
Start: 2022-07-24 | End: 2022-07-24

## 2022-07-24 RX ORDER — IBUPROFEN 800 MG/1
800 TABLET ORAL
Status: COMPLETED | OUTPATIENT
Start: 2022-07-24 | End: 2022-07-24

## 2022-07-24 RX ORDER — SODIUM CHLORIDE 0.9 % (FLUSH) 0.9 %
10 SYRINGE (ML) INJECTION
Status: DISCONTINUED | OUTPATIENT
Start: 2022-07-24 | End: 2022-08-02 | Stop reason: HOSPADM

## 2022-07-24 RX ORDER — IPRATROPIUM BROMIDE AND ALBUTEROL SULFATE 2.5; .5 MG/3ML; MG/3ML
3 SOLUTION RESPIRATORY (INHALATION) EVERY 4 HOURS
Status: DISCONTINUED | OUTPATIENT
Start: 2022-07-24 | End: 2022-07-24

## 2022-07-24 RX ORDER — SODIUM CHLORIDE 9 MG/ML
1000 INJECTION, SOLUTION INTRAVENOUS
Status: COMPLETED | OUTPATIENT
Start: 2022-07-24 | End: 2022-07-24

## 2022-07-24 RX ORDER — IBUPROFEN 200 MG
24 TABLET ORAL
Status: DISCONTINUED | OUTPATIENT
Start: 2022-07-24 | End: 2022-07-25

## 2022-07-24 RX ORDER — BISACODYL 10 MG
10 SUPPOSITORY, RECTAL RECTAL DAILY PRN
Status: DISCONTINUED | OUTPATIENT
Start: 2022-07-24 | End: 2022-08-02 | Stop reason: HOSPADM

## 2022-07-24 RX ORDER — CALCIUM GLUCONATE 20 MG/ML
1 INJECTION, SOLUTION INTRAVENOUS ONCE
Status: COMPLETED | OUTPATIENT
Start: 2022-07-24 | End: 2022-07-24

## 2022-07-24 RX ORDER — IBUPROFEN 200 MG
16 TABLET ORAL
Status: DISCONTINUED | OUTPATIENT
Start: 2022-07-24 | End: 2022-07-25

## 2022-07-24 RX ORDER — SODIUM CHLORIDE 9 MG/ML
INJECTION, SOLUTION INTRAVENOUS CONTINUOUS
Status: DISCONTINUED | OUTPATIENT
Start: 2022-07-24 | End: 2022-07-27

## 2022-07-24 RX ORDER — DOCUSATE SODIUM 100 MG/1
100 CAPSULE, LIQUID FILLED ORAL EVERY OTHER DAY
Status: DISCONTINUED | OUTPATIENT
Start: 2022-07-25 | End: 2022-07-28

## 2022-07-24 RX ORDER — CEFEPIME HYDROCHLORIDE 1 G/50ML
2 INJECTION, SOLUTION INTRAVENOUS
Status: DISCONTINUED | OUTPATIENT
Start: 2022-07-24 | End: 2022-07-26

## 2022-07-24 RX ORDER — GLUCAGON 1 MG
1 KIT INJECTION
Status: DISCONTINUED | OUTPATIENT
Start: 2022-07-24 | End: 2022-07-25

## 2022-07-24 RX ORDER — METRONIDAZOLE 500 MG/100ML
500 INJECTION, SOLUTION INTRAVENOUS
Status: DISCONTINUED | OUTPATIENT
Start: 2022-07-24 | End: 2022-07-24

## 2022-07-24 RX ORDER — NOREPINEPHRINE BITARTRATE/D5W 4MG/250ML
0.05 PLASTIC BAG, INJECTION (ML) INTRAVENOUS CONTINUOUS
Status: DISCONTINUED | OUTPATIENT
Start: 2022-07-24 | End: 2022-07-24 | Stop reason: SDUPTHER

## 2022-07-24 RX ORDER — MUPIROCIN 20 MG/G
OINTMENT TOPICAL 2 TIMES DAILY
Status: COMPLETED | OUTPATIENT
Start: 2022-07-24 | End: 2022-07-29

## 2022-07-24 RX ORDER — BUDESONIDE 0.5 MG/2ML
0.5 INHALANT ORAL EVERY 12 HOURS
Status: DISCONTINUED | OUTPATIENT
Start: 2022-07-24 | End: 2022-08-02 | Stop reason: HOSPADM

## 2022-07-24 RX ORDER — FAMOTIDINE 20 MG/1
20 TABLET, FILM COATED ORAL DAILY
Status: DISCONTINUED | OUTPATIENT
Start: 2022-07-24 | End: 2022-07-27

## 2022-07-24 RX ORDER — INSULIN ASPART 100 [IU]/ML
0-5 INJECTION, SOLUTION INTRAVENOUS; SUBCUTANEOUS
Status: DISCONTINUED | OUTPATIENT
Start: 2022-07-24 | End: 2022-07-25

## 2022-07-24 RX ORDER — IPRATROPIUM BROMIDE AND ALBUTEROL SULFATE 2.5; .5 MG/3ML; MG/3ML
3 SOLUTION RESPIRATORY (INHALATION)
Status: DISCONTINUED | OUTPATIENT
Start: 2022-07-24 | End: 2022-08-02 | Stop reason: HOSPADM

## 2022-07-24 RX ORDER — ENOXAPARIN SODIUM 100 MG/ML
40 INJECTION SUBCUTANEOUS EVERY 24 HOURS
Status: DISCONTINUED | OUTPATIENT
Start: 2022-07-24 | End: 2022-07-24

## 2022-07-24 RX ORDER — ACETAMINOPHEN 500 MG
1000 TABLET ORAL
Status: COMPLETED | OUTPATIENT
Start: 2022-07-24 | End: 2022-07-24

## 2022-07-24 RX ORDER — IBUPROFEN 200 MG
1 TABLET ORAL DAILY
Status: DISCONTINUED | OUTPATIENT
Start: 2022-07-25 | End: 2022-08-02 | Stop reason: HOSPADM

## 2022-07-24 RX ORDER — BUPROPION HYDROCHLORIDE 150 MG/1
150 TABLET ORAL DAILY
Status: DISCONTINUED | OUTPATIENT
Start: 2022-07-25 | End: 2022-08-02 | Stop reason: HOSPADM

## 2022-07-24 RX ADMIN — CALCIUM GLUCONATE 1 G: 20 INJECTION, SOLUTION INTRAVENOUS at 08:07

## 2022-07-24 RX ADMIN — METRONIDAZOLE 500 MG: 5 INJECTION, SOLUTION INTRAVENOUS at 07:07

## 2022-07-24 RX ADMIN — MUPIROCIN: 20 OINTMENT TOPICAL at 08:07

## 2022-07-24 RX ADMIN — MAGNESIUM SULFATE 1 G: 1 INJECTION INTRAVENOUS at 09:07

## 2022-07-24 RX ADMIN — VANCOMYCIN HYDROCHLORIDE 2000 MG: 500 INJECTION, POWDER, LYOPHILIZED, FOR SOLUTION INTRAVENOUS at 03:07

## 2022-07-24 RX ADMIN — ARFORMOTEROL TARTRATE 15 MCG: 15 SOLUTION RESPIRATORY (INHALATION) at 07:07

## 2022-07-24 RX ADMIN — CEFEPIME HYDROCHLORIDE 2 G: 2 INJECTION, SOLUTION INTRAVENOUS at 06:07

## 2022-07-24 RX ADMIN — IBUPROFEN 800 MG: 800 TABLET, FILM COATED ORAL at 12:07

## 2022-07-24 RX ADMIN — ACETAMINOPHEN 1000 MG: 500 TABLET ORAL at 12:07

## 2022-07-24 RX ADMIN — BUDESONIDE 0.5 MG: 0.5 INHALANT ORAL at 07:07

## 2022-07-24 RX ADMIN — HYDROCODONE BITARTRATE AND ACETAMINOPHEN 1 TABLET: 5; 325 TABLET ORAL at 07:07

## 2022-07-24 RX ADMIN — SODIUM CHLORIDE 1000 ML: 0.9 INJECTION, SOLUTION INTRAVENOUS at 12:07

## 2022-07-24 RX ADMIN — SODIUM CHLORIDE: 0.9 INJECTION, SOLUTION INTRAVENOUS at 06:07

## 2022-07-24 RX ADMIN — Medication 0.05 MCG/KG/MIN: at 01:07

## 2022-07-24 RX ADMIN — PIPERACILLIN SODIUM AND TAZOBACTAM SODIUM 4.5 G: 4; .5 INJECTION, POWDER, LYOPHILIZED, FOR SOLUTION INTRAVENOUS at 12:07

## 2022-07-24 RX ADMIN — INSULIN ASPART 3 UNITS: 100 INJECTION, SOLUTION INTRAVENOUS; SUBCUTANEOUS at 08:07

## 2022-07-24 RX ADMIN — SODIUM CHLORIDE 1000 ML: 0.9 INJECTION, SOLUTION INTRAVENOUS at 01:07

## 2022-07-24 RX ADMIN — INSULIN ASPART 3 UNITS: 100 INJECTION, SOLUTION INTRAVENOUS; SUBCUTANEOUS at 05:07

## 2022-07-24 RX ADMIN — SODIUM CHLORIDE: 0.9 INJECTION, SOLUTION INTRAVENOUS at 04:07

## 2022-07-24 RX ADMIN — FAMOTIDINE 20 MG: 20 TABLET ORAL at 05:07

## 2022-07-24 RX ADMIN — MAGNESIUM SULFATE HEPTAHYDRATE 2 G: 40 INJECTION, SOLUTION INTRAVENOUS at 02:07

## 2022-07-24 RX ADMIN — IPRATROPIUM BROMIDE AND ALBUTEROL SULFATE 3 ML: 2.5; .5 SOLUTION RESPIRATORY (INHALATION) at 07:07

## 2022-07-24 NOTE — HOSPITAL COURSE
"7/24 - Patient seen in ED awaiting ICU admit.  She is lethargic but easily aroused with orientation X 3 in no distress on low flow NC O2.  BP improved post 2L IVF and on low dose Levophed infusion.  Family reportedly requested DNR status but patient has declined DNR and wants to be full code.  Dr. Gibbs at bed side also for exam and patient request of Full Code status.   7/25 - Upright in bed sleeping this AM easily awakened and responsive with orientation and no distress on high flow NC O2.  Not required Levophed since ICU admit yesterday evening.  States she feels much better.   7/27 - Transferred back to ICU this AM due to Alt MS and ABG w/ worsening hypoxia and Hypecapnea.  More alert in ICU still confused in no distress on NPPV.  Daughter at bed side.  I/O balance + 5.4 L since admit and shock resolved with VSS and CXR increased diffuse interstitial infiltrates.   7/28 - awake and alert; crying to go home but remains on HFNC 30L 90%; diuresed well -3.6L last 24 hours  7/29 - awake and alert; reports feeling a bit better and less "scared". Insomnia reported. Continues to require HFNC 25L/90% daytime with nocturnal NIPPV. Less wheezing on exam today  7/30 - awake and alert this am, more confused; reported restless agitation and not wanting NIPPV overnight.   Oxygen demand improving, HFNC at 25L 75% and am CXR show improved aeration. Hyperglycemia trend remains uncontrolled  7/31 On nasal cannula , improving oxygenation  08/01: seen and examined: 2 -3 LPM, pending Discharge. T max 98.7F,   "

## 2022-07-24 NOTE — ED PROVIDER NOTES
SCRIBE #1 NOTE: I, Jamie Guthrie, am scribing for, and in the presence of, Carolyn Hoffman Do, MD. I have scribed the entire note.       History     Chief Complaint   Patient presents with    Fever     Since Tuesday morning. recent skin cancer removed from face    Altered Mental Status     lethargic since this morning     Review of patient's allergies indicates:   Allergen Reactions    Seroquel [quetiapine] Other (See Comments)     TC SEIZURES    Adhesive Blisters     Pt states can't tolerate paper tape    Levaquin [levofloxacin]     Levomenol analogues     Lipitor [atorvastatin]      Leg Cramps    Repatha pushtronex [evolocumab]      Feels sick    Zofran [ondansetron hcl] Hives and Other (See Comments)     headaches    Celestone [betamethasone] Hives, Itching and Rash    Piroxicam Diarrhea and Nausea Only         History of Present Illness     HPI    7/24/2022, 12:11 PM  History obtained from the spouse and patient    History of Present Illness: Alexandra Goins is a 56 y.o. female patient with a PMHx of COPD, HTN, HLP, T2DM, pneumonia, non ruptured cerebral aneurysm, stroke, PVD, and CAD who presents to the Emergency Department for evaluation of AMS which onset gradually this morning. Per , pt came in 5 days PTA to have biopsy of suspected skin cancer.  states pt had a slight fever beginning Tuesday and that the fever got higher over the next 3-4 days.  states pt fell on Friday but appeared to be okay.  states pt was very lethargic this morning, weak, extremely hard to wake and was speaking nonsense upon waking up.  states pt has COPD and is not on oxygen at home. Symptoms are constant and moderate in severity. No mitigating or exacerbating factors reported. Associated sxs include SOB, fever and weakness. Patient is unable to deny other sxs due to AMS at this time.  No further complaints or concerns at this time. HPI limited to AMS.    Arrival mode: Personal vehicle      PCP: Perez Casiano MD        Past Medical History:  Past Medical History:   Diagnosis Date    Acute ischemic stroke 9/17/2019    Acute respiratory failure with hypoxia 2/11/2021    Aneurysm     Anxiety     Arthritis     Asthma     Bipolar 1 disorder     Cerebral aneurysm, nonruptured 9/19/2019    Coronary artery disease of native artery of native heart with stable angina pectoris 1/19/2022    Depression     Diabetes mellitus, type 2     Diverticular disease     GERD (gastroesophageal reflux disease)     Hyperlipemia     Hypertension     Hyponatremia 7/24/2022    Hypothyroidism     Pneumonia     PVD (peripheral vascular disease) 4/28/2021    Renal manifestation of secondary diabetes mellitus     Right-sided back pain 6/29/2019    Severe obesity (BMI 35.0-39.9) with comorbidity 5/31/2022    Stroke     Trouble in sleeping        Past Surgical History:  Past Surgical History:   Procedure Laterality Date    CEREBRAL ANGIOGRAM N/A 10/28/2019    Procedure: ANGIOGRAM-CEREBRAL;  Surgeon: Dahiana Diagnostic Provider;  Location: [address];  Service: Radiology;  Laterality: N/A;  Rm 190/Aayush    CHOLECYSTECTOMY      COLON SURGERY      COLONOSCOPY N/A 1/12/2018    Procedure: COLONOSCOPY;  Surgeon: Roque Mahajan III, MD;  Location: [facility];  Service: Endoscopy;  Laterality: N/A;    DENTAL SURGERY  05/21/2018    removal of 8 top teeth    HYSTERECTOMY      TONSILLECTOMY           Family History:  Family History   Problem Relation Age of Onset    Alcohol abuse Mother     COPD Mother     Depression Mother     Drug abuse Mother     Alcohol abuse Father     Arthritis Father     Cancer Father     Heart disease Father     Hypertension Father     COPD Sister     Kidney failure Sister     Heart disease Brother     Hypertension Brother     Cancer Maternal Aunt     Cancer Maternal Uncle     Diabetes Maternal Uncle     Drug abuse Maternal Uncle     Cancer Paternal Aunt      Cancer Paternal Uncle     Arthritis Maternal Grandmother     Hypertension Maternal Grandmother     Breast cancer Maternal Grandmother     Arthritis Paternal Grandmother     Cancer Paternal Grandmother     Hypertension Paternal Grandfather        Social History:  Social History     Tobacco Use    Smoking status: Current Every Day Smoker     Packs/day: 0.50     Years: 35.00     Pack years: 17.50     Types: Cigarettes, Vaping w/o nicotine    Smokeless tobacco: Never Used   Substance and Sexual Activity    Alcohol use: Not Currently     Comment: occassionally    Drug use: Yes     Types: Marijuana    Sexual activity: Yes        Review of Systems     Review of Systems   Constitutional: Positive for fever.   HENT: Negative for sore throat.    Respiratory: Positive for shortness of breath.    Cardiovascular: Negative for chest pain.   Gastrointestinal: Negative for nausea.   Genitourinary: Negative for dysuria.   Musculoskeletal: Negative for back pain.   Skin: Negative for rash.   Neurological: Negative for weakness.   Hematological: Does not bruise/bleed easily.   Psychiatric/Behavioral: Positive for confusion.   All other systems reviewed and are negative.    ROS limited due to AMS.   Physical Exam     Initial Vitals [07/24/22 1108]   BP Pulse Resp Temp SpO2   (!) 68/44 (!) 115 18 (!) 100.5 °F (38.1 °C) 99 %      MAP       --          Physical Exam  Nursing Notes and Vital Signs Reviewed.  Constitutional: Patient is in mild distress. Appears to be septic.  Head: Atraumatic. Normocephalic.  Eyes: PERRL. EOM intact. Conjunctivae are not pale. No scleral icterus.  ENT: Mucous membranes are moist. Oropharynx is clear and symmetric.    Neck: Supple. Full ROM. No lymphadenopathy.  Cardiovascular: Regular rate. Regular rhythm. No murmurs, rubs, or gallops. Distal pulses are 2+ and symmetric.  Pulmonary/Chest: No respiratory distress. Clear to auscultation bilaterally. No wheezing or rales.  Abdominal: Soft and  non-distended.  There is no tenderness.  No rebound, guarding, or rigidity. Good bowel sounds.  Genitourinary: No CVA tenderness  Musculoskeletal: Moves all extremities. No obvious deformities. No edema. No calf tenderness.  Skin: Warm and dry.  Neurological:  Alert, awake, and appropriate.  Normal speech.  No acute focal neurological deficits are appreciated.  Psychiatric: Normal affect. Good eye contact. Appropriate in content.     ED Course   Central Line    Date/Time: 7/24/2022 12:11 PM  Performed by: Carolyn Hoffman Do, MD  Authorized by: Carolyn Hoffman Do, MD     Location procedure was performed:  [facility] EMERGENCY DEPARTMENT  Assisting Provider:  Nurse  Pre-operative diagnosis:  Septic shock  Post-operative diagnosis:  Septic shock  Consent Done ?:  Yes  Time out complete?: Verified correct patient, procedure, equipment, staff, and site/side    Indications:  Med administration  Anesthesia:  Local infiltration  Local anesthetic:  Lidocaine 1% without epinephrine  Anesthetic total (ml):  10  Description of findings:  None   Preparation:  Skin prepped with ChloraPrep  Skin prep agent dried: Skin prep agent completely dried prior to procedure    Sterile barriers: All five maximal sterile barriers used - gloves, gown, cap, mask and large sterile sheet    Hand hygiene: Hand hygiene performed immediately prior to central venous catheter insertion    Location:  Right femoral  Site selection rationale:  IJ diameter was small to flat  Catheter type:  Triple lumen  Catheter size:  7.5 Fr  Inserted Catheter Length (cm):  16  Ultrasound guidance: Yes    Vessel Caliber:  Small   not patent  Comprressibility:  Poor  Needle advanced into vessel with real time ultrasound guidance.    Guidewire confirmed in vessel.    Steril sheath on probe.    Sterile gel used.  Manometry: No    Number of attempts:  2  Securement:  Line sutured, sterile dressing applied, chlorhexidine patch and blood return through all ports  Technical Procedures  Used:  None  Significant surgical tasks conducted by the assistant(s):  None   Complications: Yes (specify) (Could not thread the needle with the IJ and switched to the right femoral central line at the 3 attempts with the IJ)    Estimated blood loss (mL):  0  Specimens: No    Implants: No    XRay:  No pneumothorax on x-ray  Adverse Events:  None  Other Complications:  IJ central line was unsuccessful, moved to right femoral central line   IJ central line was unsuccessful, moved to right femoral central lineTermination sites: none   Critical Care    Date/Time: 7/24/2022 2:13 PM  Performed by: Carolyn Hoffman Do, MD  Authorized by: Carolyn Hoffman Do, MD   Direct patient critical care time: 22 minutes  Additional history critical care time: 5 minutes  Ordering / reviewing critical care time: 9 minutes  Documentation critical care time: 5 minutes  Consulting other physicians critical care time: 5 minutes  Consult with family critical care time: 4 minutes  Other critical care time: 0 minutes  Total critical care time (exclusive of procedural time) : 50 minutes  Critical care time was exclusive of separately billable procedures and treating other patients and teaching time.  Critical care was necessary to treat or prevent imminent or life-threatening deterioration of the following conditions: renal failure and sepsis (Severe UTI).  Critical care was time spent personally by me on the following activities: blood draw for specimens, development of treatment plan with patient or surrogate, discussions with consultants, interpretation of cardiac output measurements, evaluation of patient's response to treatment, examination of patient, ordering and review of laboratory studies, obtaining history from patient or surrogate, ordering and performing treatments and interventions, ordering and review of radiographic studies, pulse oximetry, re-evaluation of patient's condition and review of old charts.    Central Line    Date/Time:  7/24/2022 3:52 PM  Performed by: Carolyn Hoffman Do, MD  Authorized by: Trevor Gibbs MD     Location procedure was performed:  [facility] EMERGENCY DEPARTMENT  Assisting Provider:  Nurse  Pre-operative diagnosis:  Septic shock  Post-operative diagnosis:  Septic shock  Consent Done ?:  Yes  Time out complete?: Verified correct patient, procedure, equipment, staff, and site/side    Indications:  Med administration and vascular access  Anesthesia:  Local infiltration  Local anesthetic:  Lidocaine 1% without epinephrine  Anesthetic total (ml):  10  Description of findings:  None   Preparation:  Skin prepped with ChloraPrep  Skin prep agent dried: Skin prep agent completely dried prior to procedure    Sterile barriers: All five maximal sterile barriers used - gloves, gown, cap, mask and large sterile sheet (Her ultrasound jelly was also sterile)    Hand hygiene: Hand hygiene performed immediately prior to central venous catheter insertion    Location:  Right femoral  Site selection rationale:  Unsuccessful right internal jugular (Unable to perform the right IJ)  Catheter type:  Triple lumen  Catheter size:  7 Fr  Inserted Catheter Length (cm):  16  Ultrasound guidance: Yes    Vessel Caliber:  Medium  Comprressibility:  Normal  Doppler:  Not done  Needle advanced into vessel with real time ultrasound guidance.    Guidewire confirmed in vessel.    Steril sheath on probe.    Sterile gel used.  Manometry: No    Number of attempts:  1  Securement:  Line sutured, chlorhexidine patch, sterile dressing applied and blood return through all ports  Technical Procedures Used:  None  Significant surgical tasks conducted by the assistant(s):  None   Complications: No    Estimated blood loss (mL):  0  Specimens: No    Implants: No    Adverse Events:  NoneTermination Site: inferior vena cava      ED Vital Signs:  Vitals:    07/24/22 1114 07/24/22 1130 07/24/22 1200 07/24/22 1230   BP:  (!) 81/46 (!) 92/55 (!) 91/53   Pulse:  106 104  101   Resp:  18 18 (!) 22   Temp:       TempSrc:       SpO2:  (!) 89% (!) 92% (!) 92%   Weight: 96.9 kg (213 lb 9.6 oz)      Height:        07/24/22 1300 07/24/22 1315 07/24/22 1327 07/24/22 1330   BP: (!) 82/46 (!) 86/47  (!) 92/51   Pulse: 98 101 102 101   Resp: 17 19 17 17   Temp:   99.4 °F (37.4 °C)    TempSrc:   Oral    SpO2: (!) 91% (!) 89% (!) 90% (!) 92%   Weight:       Height:        07/24/22 1345 07/24/22 1357 07/24/22 1402 07/24/22 1406   BP: (!) 105/52 (!) 102/51 (!) 99/51 (!) 98/51   Pulse: 105 104 103 103   Resp: (!) 26 18 19 18   Temp:       TempSrc:       SpO2: (!) 90% (!) 91% (!) 91% (!) 90%   Weight:       Height:        07/24/22 1412 07/24/22 1416 07/24/22 1458   BP: (!) 103/52 (!) 101/51    Pulse: 101 101 95   Resp: (!) 23 20    Temp:      TempSrc:      SpO2: (!) 91% (!) 91%    Weight:      Height:          Abnormal Lab Results:  Labs Reviewed   CBC W/ AUTO DIFFERENTIAL - Abnormal; Notable for the following components:       Result Value    WBC 18.70 (*)     RDW 14.6 (*)     Platelets 139 (*)     Immature Granulocytes 1.3 (*)     Gran # (ANC) 15.0 (*)     Immature Grans (Abs) 0.25 (*)     Mono # 1.3 (*)     Gran % 80.3 (*)     Lymph % 10.4 (*)     All other components within normal limits    Narrative:     Release to patient->Immediate   COMPREHENSIVE METABOLIC PANEL - Abnormal; Notable for the following components:    Sodium 133 (*)     Chloride 94 (*)     Glucose 201 (*)     BUN 53 (*)     Creatinine 3.7 (*)     Calcium 7.8 (*)     Albumin 2.1 (*)     eGFR if  15 (*)     eGFR if non  13 (*)     All other components within normal limits    Narrative:     Release to patient->Immediate   MAGNESIUM - Abnormal; Notable for the following components:    Magnesium 1.0 (*)     All other components within normal limits    Narrative:     Release to patient->Immediate   URINALYSIS, REFLEX TO URINE CULTURE - Abnormal; Notable for the following components:    Appearance, UA  Cloudy (*)     Protein, UA 2+ (*)     Occult Blood UA 3+ (*)     Urobilinogen, UA 2.0-3.0 (*)     Leukocytes, UA 3+ (*)     All other components within normal limits    Narrative:     Specimen Source->Urine   PROCALCITONIN - Abnormal; Notable for the following components:    Procalcitonin 56.46 (*)     All other components within normal limits   URINALYSIS MICROSCOPIC - Abnormal; Notable for the following components:    RBC, UA 11 (*)     WBC, UA >100 (*)     WBC Clumps, UA Many (*)     Bacteria Moderate (*)     All other components within normal limits    Narrative:     Specimen Source->Urine   ISTAT PROCEDURE - Abnormal; Notable for the following components:    POC PH 7.341 (*)     POC PCO2 55.3 (*)     POC PO2 70 (*)     POC HCO3 29.9 (*)     POC SATURATED O2 92 (*)     All other components within normal limits   POCT GLUCOSE - Abnormal; Notable for the following components:    POCT Glucose 213 (*)     All other components within normal limits   CULTURE, BLOOD   CULTURE, BLOOD   CULTURE, URINE   CULTURE, METHICILLIN-RESISTANT STAPHYLOCOCCUS AUREUS   CULTURE, RESPIRATORY   CULTURE, STOOL   CLOSTRIDIUM DIFFICILE   HIV 1 / 2 ANTIBODY    Narrative:     Release to patient->Immediate   HEPATITIS C ANTIBODY    Narrative:     Release to patient->Immediate   LACTIC ACID, PLASMA   PHOSPHORUS    Narrative:     Release to patient->Immediate   B-TYPE NATRIURETIC PEPTIDE   HEP C VIRUS HOLD SPECIMEN   TROPONIN I   DRUG SCREEN PANEL, URINE EMERGENCY   BASIC METABOLIC PANEL   MAGNESIUM   STOOL EXAM-OVA,CYSTS,PARASITES   WBC, STOOL   GIARDIA/CRYPTOSPORIDIUM (EIA)   ROTAVIRUS ANTIGEN, STOOL   LACTIC ACID, PLASMA   LACTIC ACID, PLASMA   LIPASE   TROPONIN I   SARS-COV-2 RDRP GENE    Narrative:     This test utilizes isothermal nucleic acid amplification   technology to detect the SARS-CoV-2 RdRp nucleic acid segment.   The analytical sensitivity (limit of detection) is 125 genome   equivalents/mL.   A POSITIVE result implies  infection with the SARS-CoV-2 virus;   the patient is presumed to be contagious.     A NEGATIVE result means that SARS-CoV-2 nucleic acids are not   present above the limit of detection. A NEGATIVE result should be   treated as presumptive. It does not rule out the possibility of   COVID-19 and should not be the sole basis for treatment decisions.   If COVID-19 is strongly suspected based on clinical and exposure   history, re-testing using an alternate molecular assay should be   considered.   This test is only for use under the Food and Drug   Administration s Emergency Use Authorization (EUA).   Commercial kits are provided by [employer]   Performance characteristics of the EUA have been independently   verified by [facility] Department of   Pathology and Laboratory Medicine.   _________________________________________________________________   The authorized Fact Sheet for Healthcare Providers and the authorized Fact   Sheet for Patients of the ID NOW COVID-19 are available on the FDA   website:     https://www.fda.gov/media/924808/download  https://www.fda.gov/media/057622/download          POCT GLUCOSE MONITORING CONTINUOUS        All Lab Results:  Results for orders placed or performed during the hospital encounter of 07/24/22   HIV 1/2 Ag/Ab (4th Gen)   Result Value Ref Range    HIV 1/2 Ag/Ab Negative Negative   Hepatitis C Antibody   Result Value Ref Range    Hepatitis C Ab Negative Negative   CBC auto differential   Result Value Ref Range    WBC 18.70 (H) 3.90 - 12.70 K/uL    RBC 4.33 4.00 - 5.40 M/uL    Hemoglobin 13.2 12.0 - 16.0 g/dL    Hematocrit 40.9 37.0 - 48.5 %    MCV 95 82 - 98 fL    MCH 30.5 27.0 - 31.0 pg    MCHC 32.3 32.0 - 36.0 g/dL    RDW 14.6 (H) 11.5 - 14.5 %    Platelets 139 (L) 150 - 450 K/uL    MPV 12.3 9.2 - 12.9 fL    Immature Granulocytes 1.3 (H) 0.0 - 0.5 %    Gran # (ANC) 15.0 (H) 1.8 - 7.7 K/uL    Immature Grans (Abs) 0.25 (H) 0.00 - 0.04 K/uL    Lymph # 2.0  1.0 - 4.8 K/uL    Mono # 1.3 (H) 0.3 - 1.0 K/uL    Eos # 0.1 0.0 - 0.5 K/uL    Baso # 0.06 0.00 - 0.20 K/uL    nRBC 0 0 /100 WBC    Gran % 80.3 (H) 38.0 - 73.0 %    Lymph % 10.4 (L) 18.0 - 48.0 %    Mono % 7.1 4.0 - 15.0 %    Eosinophil % 0.6 0.0 - 8.0 %    Basophil % 0.3 0.0 - 1.9 %    Differential Method Automated    Comprehensive metabolic panel   Result Value Ref Range    Sodium 133 (L) 136 - 145 mmol/L    Potassium 4.4 3.5 - 5.1 mmol/L    Chloride 94 (L) 95 - 110 mmol/L    CO2 27 23 - 29 mmol/L    Glucose 201 (H) 70 - 110 mg/dL    BUN 53 (H) 6 - 20 mg/dL    Creatinine 3.7 (H) 0.5 - 1.4 mg/dL    Calcium 7.8 (L) 8.7 - 10.5 mg/dL    Total Protein 6.2 6.0 - 8.4 g/dL    Albumin 2.1 (L) 3.5 - 5.2 g/dL    Total Bilirubin 0.8 0.1 - 1.0 mg/dL    Alkaline Phosphatase 66 55 - 135 U/L    AST 38 10 - 40 U/L    ALT 34 10 - 44 U/L    Anion Gap 12 8 - 16 mmol/L    eGFR if African American 15 (A) >60 mL/min/1.73 m^2    eGFR if non African American 13 (A) >60 mL/min/1.73 m^2   Lactic Acid, Plasma #2   Result Value Ref Range    Lactate (Lactic Acid) 0.7 0.5 - 2.2 mmol/L   Magnesium   Result Value Ref Range    Magnesium 1.0 (L) 1.6 - 2.6 mg/dL   Phosphorus   Result Value Ref Range    Phosphorus 3.9 2.7 - 4.5 mg/dL   Urinalysis, Reflex to Urine Culture Urine, Clean Catch    Specimen: Urine   Result Value Ref Range    Specimen UA Urine, Catheterized     Color, UA Yellow Yellow, Straw, Joann    Appearance, UA Cloudy (A) Clear    pH, UA 5.0 5.0 - 8.0    Specific Gravity, UA 1.020 1.005 - 1.030    Protein, UA 2+ (A) Negative    Glucose, UA Negative Negative    Ketones, UA Negative Negative    Bilirubin (UA) Negative Negative    Occult Blood UA 3+ (A) Negative    Nitrite, UA Negative Negative    Urobilinogen, UA 2.0-3.0 (A) <2.0 EU/dL    Leukocytes, UA 3+ (A) Negative   Brain natriuretic peptide   Result Value Ref Range    BNP 60 0 - 99 pg/mL   Procalcitonin   Result Value Ref Range    Procalcitonin 56.46 (H) <0.25 ng/mL   Urinalysis  Microscopic   Result Value Ref Range    RBC, UA 11 (H) 0 - 4 /hpf    WBC, UA >100 (H) 0 - 5 /hpf    WBC Clumps, UA Many (A) None-Rare    Bacteria Moderate (A) None-Occ /hpf    Hyaline Casts, UA 0 0-1/lpf /lpf    Microscopic Comment SEE COMMENT    POCT COVID-19 Rapid Screening   Result Value Ref Range    POC Rapid COVID Negative Negative     Acceptable Yes    ISTAT PROCEDURE   Result Value Ref Range    POC PH 7.341 (L) 7.35 - 7.45    POC PCO2 55.3 (H) 35 - 45 mmHg    POC PO2 70 (L) 80 - 100 mmHg    POC HCO3 29.9 (H) 24 - 28 mmol/L    POC BE 4 -2 to 2 mmol/L    POC SATURATED O2 92 (L) 95 - 100 %    Sample ARTERIAL     Site RB     Allens Test Pass     DelSys Nasal Can     Mode SPONT     Flow 5     FiO2 40    POCT glucose   Result Value Ref Range    POCT Glucose 213 (H) 70 - 110 mg/dL         Imaging Results:  Imaging Results          X-Ray Chest 1 View (Final result)  Result time 07/24/22 12:25:02    Final result by Cam Killian MD (07/24/22 12:25:02)                 Impression:      Mild bilateral interstitial prominence, most notably in the lower right lung.  Small right pleural effusion.  Pulmonary edema versus atypical infection.      Electronically signed by: Cam Killian  Date:    07/24/2022  Time:    12:25             Narrative:    EXAMINATION:  XR CHEST 1 VIEW    CLINICAL HISTORY:  Sepsis;    TECHNIQUE:  Single frontal view of the chest was performed.    COMPARISON:  Multiple prior chest x-rays, most recent dated 02/11/2021    FINDINGS:  Enlarged cardiac silhouette likely accentuated by portable technique.  Trachea is midline.  Mild bilateral interstitial prominence predominantly in the lower right lung.  Small right pleural effusion.  No significant pneumothorax.  No acute bony abnormality.  Cholecystectomy clips project over the right upper quadrant.  No central line visualized.                                 The EKG was ordered, reviewed, and independently interpreted by the ED  provider.  Interpretation time: 11:19  Rate: 111 BPM  Rhythm: sinus tachycardia  Interpretation: Left axis deviation. Anterior infarct, age undetermined. No acute ST changes. No STEMI.             The Emergency Provider reviewed the vital signs and test results, which are outlined above.     ED Discussion       2:10 PM: Had conversation with  Mr. Kaiden Goins and daughter Ligia Goins. Both agreed to put DNR in place.      15:20  However patient reports she is not DNR so will not place a DR the patient.  Dr. Gibbs was in the room at that time.  Will notify family of the patient's wishes.  Patient will be admitted to ICU and a consult for critical care has been placed.      3:49 PM  I did talk to the patient's  and daughter and they aware that the patient is not DNR at this time.  Patient is still on Levophed and getting IV fluids antibiotics.  Patient O2 sat was in the mid 80s when she came in she does a history of COPD.  She did not have COVID.  Could not do a CTA of the chest to rule out PE as a BUN creatinine is too elevated.  Dr. Gibbs and the medicine team is aware of this.  She is doing very well on 5 L of oxygen.                     ED Medication(s):  Medications   NORepinephrine 4 mg in dextrose 5% 250 mL infusion (premix) (titrating) (0.05 mcg/kg/min × 96.9 kg Intravenous New Bag 7/24/22 1322)   vancomycin - pharmacy to dose (has no administration in time range)   vancomycin 2 g in dextrose 5 % 500 mL IVPB (2,000 mg Intravenous New Bag 7/24/22 1546)   sodium chloride 0.9% flush 10 mL (has no administration in time range)   acetaminophen tablet 650 mg (has no administration in time range)   famotidine tablet 20 mg (has no administration in time range)   prochlorperazine injection Soln 2.5 mg (has no administration in time range)   mupirocin 2 % ointment (has no administration in time range)   glucose chewable tablet 16 g (has no administration in time range)   glucose chewable  tablet 24 g (has no administration in time range)   glucagon (human recombinant) injection 1 mg (has no administration in time range)   dextrose 10% bolus 125 mL (has no administration in time range)   dextrose 10% bolus 250 mL (has no administration in time range)   insulin aspart U-100 pen 0-5 Units (has no administration in time range)   metronidazole IVPB 500 mg (has no administration in time range)   albuterol-ipratropium 2.5 mg-0.5 mg/3 mL nebulizer solution 3 mL (has no administration in time range)   0.9%  NaCl infusion (has no administration in time range)   aspirin EC tablet 81 mg (has no administration in time range)   docusate sodium capsule 100 mg (has no administration in time range)   bisacodyL suppository 10 mg (has no administration in time range)   budesonide nebulizer solution 0.5 mg (has no administration in time range)   arformoteroL nebulizer solution 15 mcg (has no administration in time range)   buPROPion TB24 tablet 150 mg (has no administration in time range)   nicotine 21 mg/24 hr 1 patch (has no administration in time range)   cefepime in dextrose 5 % IVPB 2 g (has no administration in time range)   piperacillin-tazobactam 4.5 g in dextrose 5 % 100 mL IVPB (ready to mix system) (0 g Intravenous Stopped 7/24/22 1245)   ibuprofen tablet 800 mg (800 mg Oral Given 7/24/22 1221)   acetaminophen tablet 1,000 mg (1,000 mg Oral Given 7/24/22 1221)   sodium chloride 0.9% bolus 1,000 mL (0 mLs Intravenous Stopped 7/24/22 1302)   magnesium sulfate 2g in water 50mL IVPB (premix) (0 g Intravenous Stopped 7/24/22 1542)   0.9%  NaCl infusion (1,000 mLs Intravenous New Bag 7/24/22 1352)       New Prescriptions    No medications on file               Scribe Attestation:   Scribe #1: I performed the above scribed service and the documentation accurately describes the services I performed. I attest to the accuracy of the note.     Attending:   Physician Attestation Statement for Scribe #1: ICarolyn  MD Padmini, personally performed the services described in this documentation, as scribed by Jamie Guthrie, in my presence, and it is both accurate and complete.           Clinical Impression       ICD-10-CM ICD-9-CM   1. Septic shock  A41.9 038.9    R65.21 785.52     995.92   2. Altered mental status  R41.82 780.97   3. Acute renal failure, unspecified acute renal failure type  N17.9 584.9   4. Urinary tract infection with hematuria, site unspecified  N39.0 599.0    R31.9 599.70   5. Hypoxia  R09.02 799.02       Disposition:   Disposition: Admitted  Condition: Critical         Carolyn Hoffman Do, MD  07/24/22 1602

## 2022-07-24 NOTE — HPI
Ms Goins is a obese 57 yo WF with a PMH of COPD, CVA, CAD, BiPolar D/O, DM2 on insulin, HLD, HTN, PVD and Hypothyroidism with multiple allergies.  She presented to [facility] ED about noon today via personal vehicle confused with family concern of lethargy, malaise and Alt MS progressive since this AM and noted intermittent fever last 6 days.  In ED confused with temp 100.5, BP 68/44, WBC 18, creatinine 3.7, Mg 1 and UA+.  Given IVFs, IVAB, Levophed infusion, Mg, Tylenol, Ibuprofen and CL placed in ED.  Admitted to ICU.

## 2022-07-24 NOTE — ASSESSMENT & PLAN NOTE
Telemetry  Monitor O2 sats, supplemental O2 as needed  Duonebs q 4 hours  Possible allergy to pulmicort

## 2022-07-24 NOTE — CONSULTS
[facility] - Emergency Dept.  Critical Care Medicine  Consult Note    Patient Name: Alexandra Goins  MRN: [id number]  Admission Date: 7/24/2022  Hospital Length of Stay: 0 days  Code Status: Full Code  Attending Physician: Trevor Gibbs MD   Primary Care Provider: Perez Casiano MD   Principal Problem: Septic shock    [unfilled]  Subjective:     HPI:  Ms Goins is a obese 57 yo WF with a PMH of COPD, CVA, CAD, BiPolar D/O, DM2 on insulin, HLD, HTN, PVD and Hypothyroidism with multiple allergies.  She presented to [facility] ED about noon today via personal vehicle confused with family concern of lethargy, malaise and Alt MS progressive since this AM and noted intermittent fever last 6 days.  In ED confused with temp 100.5, BP 68/44, WBC 18, creatinine 3.7, Mg 1 and UA+.  Given IVFs, IVAB, Levophed infusion, Mg, Tylenol, Ibuprofen and CL placed in ED.  Admitted to ICU.      Hospital/ICU Course:  7/24 - Patient seen in ED awaiting ICU admit.  She is lethargic but easily aroused with orientation X 3 in no distress on low flow NC O2.  BP improved post 2L IVF and on low dose Levophed infusion.  Family reportedly requested DNR status but patient has declined DNR and wants to be full code.  Dr. Gibbs at bed side also for exam and patient request of Full Code status.       Past Medical History:   Diagnosis Date    Acute ischemic stroke 9/17/2019    Acute respiratory failure with hypoxia 2/11/2021    Aneurysm     Anxiety     Arthritis     Asthma     Bipolar 1 disorder     Cerebral aneurysm, nonruptured 9/19/2019    Coronary artery disease of native artery of native heart with stable angina pectoris 1/19/2022    Depression     Diabetes mellitus, type 2     Diverticular disease     GERD (gastroesophageal reflux disease)     Hyperlipemia     Hypertension     Hypothyroidism     Pneumonia     PVD (peripheral vascular disease) 4/28/2021    Renal manifestation of secondary diabetes mellitus      Right-sided back pain 6/29/2019    Severe obesity (BMI 35.0-39.9) with comorbidity 5/31/2022    Stroke     Trouble in sleeping        Past Surgical History:   Procedure Laterality Date    CEREBRAL ANGIOGRAM N/A 10/28/2019    Procedure: ANGIOGRAM-CEREBRAL;  Surgeon: Dahiana Diagnostic Provider;  Location: [address];  Service: Radiology;  Laterality: N/A;  Rm 190/Aayush    CHOLECYSTECTOMY      COLON SURGERY      COLONOSCOPY N/A 1/12/2018    Procedure: COLONOSCOPY;  Surgeon: Roque Mahajan III, MD;  Location: [facility];  Service: Endoscopy;  Laterality: N/A;    DENTAL SURGERY  05/21/2018    removal of 8 top teeth    HYSTERECTOMY      TONSILLECTOMY         Review of patient's allergies indicates:   Allergen Reactions    Seroquel [quetiapine] Other (See Comments)     TC SEIZURES    Adhesive Blisters     Pt states can't tolerate paper tape    Levaquin [levofloxacin]     Levomenol analogues     Lipitor [atorvastatin]      Leg Cramps    Repatha pushtronex [evolocumab]      Feels sick    Zofran [ondansetron hcl] Hives and Other (See Comments)     headaches    Celestone [betamethasone] Hives, Itching and Rash    Piroxicam Diarrhea and Nausea Only       Family History       Problem Relation (Age of Onset)    Alcohol abuse Mother, Father    Arthritis Father, Maternal Grandmother, Paternal Grandmother    Breast cancer Maternal Grandmother    COPD Mother, Sister    Cancer Father, Maternal Aunt, Maternal Uncle, Paternal Aunt, Paternal Uncle, Paternal Grandmother    Depression Mother    Diabetes Maternal Uncle    Drug abuse Mother, Maternal Uncle    Heart disease Father, Brother    Hypertension Father, Brother, Maternal Grandmother, Paternal Grandfather    Kidney failure Sister          Tobacco Use    Smoking status: Current Every Day Smoker     Packs/day: 0.50     Years: 35.00     Pack years: 17.50     Types: Cigarettes, Vaping w/o nicotine    Smokeless tobacco: Never Used   Substance and Sexual Activity     Alcohol use: Not Currently     Comment: occassionally    Drug use: Yes     Types: Marijuana    Sexual activity: Yes         Review of Systems   Constitutional:  Positive for activity change, appetite change, fatigue and fever. Negative for chills and diaphoresis.   HENT:  Negative for congestion.    Respiratory:  Negative for apnea, cough, shortness of breath and wheezing.    Cardiovascular:  Negative for chest pain and leg swelling.   Gastrointestinal:  Negative for abdominal pain, diarrhea, nausea and vomiting.   Endocrine: Negative for polydipsia.   Genitourinary:  Negative for difficulty urinating.   Musculoskeletal:  Positive for back pain and myalgias.   Skin:  Negative for color change and wound.   Allergic/Immunologic: Negative for immunocompromised state.   Neurological:  Negative for dizziness, syncope and weakness.   Hematological:  Does not bruise/bleed easily.   Psychiatric/Behavioral:  Positive for confusion. Negative for agitation and hallucinations. The patient is not nervous/anxious.    Objective:     Vital Signs (Most Recent):  Temp: 99.4 °F (37.4 °C) (07/24/22 1327)  Pulse: 95 (07/24/22 1458)  Resp: 20 (07/24/22 1416)  BP: (!) 101/51 (07/24/22 1416)  SpO2: (!) 91 % (07/24/22 1416)   Vital Signs (24h Range):  Temp:  [99.4 °F (37.4 °C)-100.5 °F (38.1 °C)] 99.4 °F (37.4 °C)  Pulse:  [] 95  Resp:  [17-26] 20  SpO2:  [89 %-99 %] 91 %  BP: ()/(44-55) 101/51     Weight: 96.9 kg (213 lb 9.6 oz)  Body mass index is 36.66 kg/m².    No intake or output data in the 24 hours ending 07/24/22 1459    Physical Exam  Vitals and nursing note reviewed.   Constitutional:       General: She is awake. She is not in acute distress.     Appearance: She is well-developed. She is morbidly obese. She is ill-appearing. She is not toxic-appearing or diaphoretic.      Interventions: She is not intubated.Nasal cannula in place.   HENT:      Head: Normocephalic and atraumatic.      Mouth/Throat:      Mouth:  Mucous membranes are dry.   Eyes:      General: Lids are normal.      Pupils: Pupils are equal, round, and reactive to light.   Neck:      Trachea: Trachea normal.      Comments: Obese  Cardiovascular:      Rate and Rhythm: Normal rate and regular rhythm.      Pulses:           Radial pulses are 1+ on the right side and 1+ on the left side.        Dorsalis pedis pulses are 1+ on the right side and 1+ on the left side.      Heart sounds: Normal heart sounds.   Pulmonary:      Effort: Pulmonary effort is normal. No tachypnea, accessory muscle usage or respiratory distress. She is not intubated.      Breath sounds: Examination of the right-lower field reveals decreased breath sounds. Examination of the left-lower field reveals decreased breath sounds. Decreased breath sounds present.   Chest:      Chest wall: No deformity or tenderness.   Abdominal:      General: Bowel sounds are decreased. There is no distension.      Palpations: Abdomen is soft.      Tenderness: There is no abdominal tenderness.      Comments: Obese   Genitourinary:     Comments: Rose in place  Musculoskeletal:         General: Normal range of motion.      Cervical back: Normal range of motion.      Right lower leg: No edema.      Left lower leg: No edema.      Right foot: No deformity.      Left foot: No deformity.   Lymphadenopathy:      Cervical: No cervical adenopathy.   Skin:     General: Skin is warm and dry.      Capillary Refill: Capillary refill takes less than 2 seconds.      Findings: No rash.          Neurological:      General: No focal deficit present.      Mental Status: She is oriented to person, place, and time and easily aroused. She is lethargic.      GCS: GCS eye subscore is 4. GCS verbal subscore is 5. GCS motor subscore is 6.   Psychiatric:         Mood and Affect: Mood is depressed.         Speech: Speech is delayed.         Behavior: Behavior is slowed. Behavior is cooperative.         Thought Content: Thought content normal.          Cognition and Memory: Cognition normal.         Judgment: Judgment normal.       Vents:  Oxygen Concentration (%): 40 (07/24/22 1156)    Lines/Drains/Airways       Central Venous Catheter Line  Duration             Percutaneous Central Line Insertion/Assessment - Triple Lumen  07/24/22 1200 right femoral vein <1 day              Drain  Duration             Female External Urinary Catheter 07/24/22 1307 <1 day                    Significant Labs:    CBC/Anemia Profile:  Recent Labs   Lab 07/24/22  1128   WBC 18.70*   HGB 13.2   HCT 40.9   *   MCV 95   RDW 14.6*        Chemistries:  Recent Labs   Lab 07/24/22  1207   *   K 4.4   CL 94*   CO2 27   BUN 53*   CREATININE 3.7*   CALCIUM 7.8*   ALBUMIN 2.1*   PROT 6.2   BILITOT 0.8   ALKPHOS 66   ALT 34   AST 38   MG 1.0*   PHOS 3.9       ABGs:   Recent Labs   Lab 07/24/22  1156   PH 7.341*   PCO2 55.3*   HCO3 29.9*   POCSATURATED 92*   BE 4     Lactic Acid: No results for input(s): LACTATE in the last 48 hours.  Urine Studies:   Recent Labs   Lab 07/24/22  1255   COLORU Yellow   APPEARANCEUA Cloudy*   PHUR 5.0   SPECGRAV 1.020   PROTEINUA 2+*   GLUCUA Negative   KETONESU Negative   BILIRUBINUA Negative   OCCULTUA 3+*   NITRITE Negative   UROBILINOGEN 2.0-3.0*   LEUKOCYTESUR 3+*   RBCUA 11*   WBCUA >100*   BACTERIA Moderate*   HYALINECASTS 0     All pertinent labs within the past 24 hours have been reviewed.    Significant Imaging:   I have reviewed all pertinent imaging results/findings within the past 24 hours.  CXR: I have reviewed all pertinent results/findings within the past 24 hours and my personal findings are:  hazy right base possible infiltrate      ABG  Recent Labs   Lab 07/24/22  1156   PH 7.341*   PO2 70*   PCO2 55.3*   HCO3 29.9*   BE 4     Assessment/Plan:     Neuro  Hx of arterial ischemic stroke  Cont ASA  Reported hx aneurysm and follows with NS with [facility] every 2 years    Psychiatric  Bipolar disorder  Hx Depression and hx SA on admit  in 2015  Follows with Psych cont post discharge  Cont Wellbutrin     Pulmonary  Acute respiratory failure with hypoxia  Reported COPD from last admit Feb 2021 but no pulm records found of PFTs  Smokes 1 ppd  COPD exacerbation vs atelectasis vs PNA  Cont IVAB and low flow NC O2  Not on home O2  Start nebs  Follow up CXR in 2 days    Cardiac/Vascular  Coronary artery disease of native artery of native heart with stable angina pectoris  Follows with Dr. Chakraborty in clinic  Cont ASA  Resume Toprol and Imdur once BP stable  Cont cardiac monitoring  Trend troponin  Denies CP    Renal/  Acute urinary tract infection  Urine and Blood cultures pending  Cont IVAB and IVFs  Check CT renal - reported recent hx of renal abscess per patient but no records of this at [facility] or outside [facility] records for last 2 years    MAGDA (acute kidney injury)  Cont IVFs   Follow up labs   Accurate I/Os  Roes in place  Avoid nephrotoxic meds and renal dose IVAB per pharmacy    ID  * Septic shock  Source UTI w/ possible PNA  Zosyn and Vanc given in ED  Cont Cefepime, Flagyl and Vanc on admit  Blood and Urine cultures pending  No sputum produced  IVF bolus in ED and cont maintenance IVFs  Wean Levophed infusion for MAP > 65  ICU hemodynamic monitoring  Follow up LA    Endocrine  Severe obesity (BMI 35.0-39.9) with comorbidity  Will encourage weight loss once stable from critical illness    Type 2 diabetes mellitus with complication, with long-term current use of insulin  Add SSI  Check A1C  ADA diet    Other  Tobacco abuse  Encouraged tobacco cessation  Recommend outpt pulm follow up with complete PFTs  Add ICS and LABA with PRN DARIUS       Preventive Measures and Monitoring:   Stress Ulcer: Pepcid  Nutrition: ADA diet  Glucose control: SSI  Bowel prophylaxis: Colace and PRN Dulcolax  DVT prophylaxis: SCDs  Hx CAD on B-Blocker: none due to shock  Head of Bed/Reposition: Elevate HOB and turn Q1-2 hours   Early Mobility: bed rest  Central Line Right  Femoral Day: #1  Rose Day: #1  IVAB Day: #1  Code Status: Full    Counseling/Consultation:I have discussed the care of this patient in detail with the bedside nursing staff and Dr. Espinoza and Dr. Gibbs and Dr. Washington    Critical Care Time: 62 minutes  Critical secondary to Patient has a condition that poses threat to life and bodily function: Acute Renal Failure and Septic Shock  Patient is currently on drug therapy requiring intensive monitoring for toxicity: Levophed infusion     Critical care was time spent personally by me on the following activities: development of treatment plan with patient or surrogate and bedside caregivers, discussions with consultants, evaluation of patient's response to treatment, examination of patient, ordering and performing treatments and interventions, ordering and review of laboratory studies, ordering and review of radiographic studies, pulse oximetry, re-evaluation of patient's condition. This critical care time did not overlap with that of any other provider or involve time for any procedures.    Thank you for your consult. I will follow-up with patient. Please contact us if you have any additional questions.     Jamie Velasquez NP  Critical Care Medicine  [facility] - Emergency Dept.

## 2022-07-24 NOTE — HPI
Fever       Since Tuesday morning. recent skin cancer removed from face    Altered Mental Status       lethargic since this morning      Per ER- This is a 56 y.o. female patient with a PMHx of  COPD, HTN, HLP, T2DM, pneumonia, nicotine dependence, non ruptured cerebral aneurysm, stroke, PVD, anxiety, depression, diverticular disease, GERD, hypothyroidism, PVD, severe obesity, arthritis, asthma, Bipolar 1 disorder, and CAD who presents to the Emergency Department for evaluation of AMS which onset gradually this morning. Per , pt came in 5 days PTA to have biopsy of suspected skin cancer.  states pt had a slight fever beginning Tuesday and that the fever got higher over the next 3-4 days.  states pt fell on Friday but appeared to be okay.  states pt was very lethargic this morning, weak, extremely hard to wake and was speaking nonsense upon waking up.  states pt has COPD and is not on oxygen at home. Symptoms are constant and moderate in severity. No mitigating or exacerbating factors reported. Associated sxs include SOB, fever and weakness. Patient is unable to deny other sxs due to AMS at this time.  No further complaints or concerns at this time. HPI limited to AMS.      Patient evaluated by ER and found to have Septic shock. Patient to be admitted to ICU on [facility].     Patient currently awake, alert, oriented to person, place, time, and situation. Code status discussed with patient  and her current  wishes are to be a FULL CODE.

## 2022-07-24 NOTE — ASSESSMENT & PLAN NOTE
Hold home diuretics  Currently receiving IVF bolus  Monitor  Trend BMP  Renal dose all medications

## 2022-07-24 NOTE — ASSESSMENT & PLAN NOTE
Reported COPD from last admit Feb 2021 but no pulm records found of PFTs  Smokes 1 ppd  COPD exacerbation vs atelectasis vs PNA  Cont IVAB and low flow NC O2  Not on home O2  Start nebs  Follow up CXR in 2 days

## 2022-07-24 NOTE — SUBJECTIVE & OBJECTIVE
Past Medical History:   Diagnosis Date    Acute ischemic stroke 9/17/2019    Acute respiratory failure with hypoxia 2/11/2021    Aneurysm     Anxiety     Arthritis     Asthma     Bipolar 1 disorder     Cerebral aneurysm, nonruptured 9/19/2019    Coronary artery disease of native artery of native heart with stable angina pectoris 1/19/2022    Depression     Diabetes mellitus, type 2     Diverticular disease     GERD (gastroesophageal reflux disease)     Hyperlipemia     Hypertension     Hypothyroidism     Pneumonia     PVD (peripheral vascular disease) 4/28/2021    Renal manifestation of secondary diabetes mellitus     Right-sided back pain 6/29/2019    Severe obesity (BMI 35.0-39.9) with comorbidity 5/31/2022    Stroke     Trouble in sleeping        Past Surgical History:   Procedure Laterality Date    CEREBRAL ANGIOGRAM N/A 10/28/2019    Procedure: ANGIOGRAM-CEREBRAL;  Surgeon: [facility];  Location: [facility] OR [address] [place] [address];  Service: Radiology;  Laterality: N/A;   190/Aayush    CHOLECYSTECTOMY      COLON SURGERY      COLONOSCOPY N/A 1/12/2018    Procedure: COLONOSCOPY;  Surgeon: Roque Mahajan III, MD;  Location: [facility];  Service: Endoscopy;  Laterality: N/A;    DENTAL SURGERY  05/21/2018    removal of 8 top teeth    HYSTERECTOMY      TONSILLECTOMY         Review of patient's allergies indicates:   Allergen Reactions    Seroquel [quetiapine] Other (See Comments)     TC SEIZURES    Adhesive Blisters     Pt states can't tolerate paper tape    Levaquin [levofloxacin]     Levomenol analogues     Lipitor [atorvastatin]      Leg Cramps    Repatha pushtronex [evolocumab]      Feels sick    Zofran [ondansetron hcl] Hives and Other (See Comments)     headaches    Celestone [betamethasone] Hives, Itching and Rash    Piroxicam Diarrhea and Nausea Only       No current facility-administered medications on file prior to encounter.     Current Outpatient Medications on File Prior to Encounter   Medication Sig     aspirin (ECOTRIN) 81 MG EC tablet Take 1 tablet (81 mg total) by mouth once daily. (Patient taking differently: Take 81 mg by mouth every other day.)    benztropine (COGENTIN) 0.5 MG tablet Take 1 tablet (0.5 mg total) by mouth 2 (two) times daily as needed (dystonia).    buPROPion (WELLBUTRIN XL) 150 MG TB24 tablet Take 1 tablet (150 mg total) by mouth once daily.    albuterol (PROVENTIL/VENTOLIN HFA) 90 mcg/actuation inhaler INHALE 2 TO 4 PUFFS BY MOUTH THREE TIMES DAILY AS NEEDED FOR WHEEZING    albuterol-ipratropium (DUO-NEB) 2.5 mg-0.5 mg/3 mL nebulizer solution Take 3 mLs by nebulization every 6 (six) hours as needed for Wheezing. Rescue    blood sugar diagnostic (TRUE METRIX GLUCOSE TEST STRIP) Strp USE 1 STRIP TO CHECK GLUCOSE THREE TIMES DAILY    butalbital-acetaminophen-caffeine -40 mg (FIORICET, ESGIC) -40 mg per tablet Take 1 tablet by mouth every 4 (four) hours as needed. for pain.    diazepam (VALIUM ORAL) Take by mouth 3 (three) times daily as needed for Anxiety. Do not exceed 3 timed a day    diazePAM (VALIUM) 10 MG Tab Take 1 tablet (10 mg total) by mouth 3 (three) times daily as needed.    doxepin (SINEQUAN) 10 MG capsule Take 1 to 2 capsules at bedtime    ezetimibe (ZETIA) 10 mg tablet Take 1 tablet (10 mg total) by mouth once daily.    furosemide (LASIX) 40 MG tablet TAKE 1 TABLET BY MOUTH EVERY MONDAY, WEDNESDAY, AND FRIDAY AS NEEDED    gabapentin (NEURONTIN) 600 MG tablet TAKE 1 TABLET BY MOUTH IN THE MORNING AND  TAKE  1  TABLET  BY  MOUTH  IN  THE  AFTERNOON  THEN  TAKE  2  TABLETS  BY  MOUTH  AT  BEDTIME    insulin  unit/mL injection Inject 25 Units into the skin 2 (two) times daily before meals.    insulin regular, human (NOVOLIN R INJ)     isosorbide mononitrate (IMDUR) 30 MG 24 hr tablet Take 1 tablet by mouth once daily    ketorolac (TORADOL) 10 mg tablet TAKE 1 TABLET BY MOUTH EVERY 6 HOURS FOR 5 DAYS    losartan (COZAAR) 25 MG tablet Take 1 tablet (25 mg total) by  mouth once daily.    metFORMIN (GLUCOPHAGE-XR) 500 MG ER 24hr tablet Take 1 tablet (500 mg total) by mouth 2 (two) times daily with meals.    metoprolol succinate (TOPROL-XL) 25 MG 24 hr tablet Take 1 tablet (25 mg total) by mouth once daily.    promethazine (PHENERGAN) 12.5 MG Tab Take 1 tablet (12.5 mg total) by mouth every 6 (six) hours as needed.    risperiDONE (RISPERDAL) 1 MG tablet Take 1 tablet (1 mg total) by mouth 2 (two) times daily.     Family History       Problem Relation (Age of Onset)    Alcohol abuse Mother, Father    Arthritis Father, Maternal Grandmother, Paternal Grandmother    Breast cancer Maternal Grandmother    COPD Mother, Sister    Cancer Father, Maternal Aunt, Maternal Uncle, Paternal Aunt, Paternal Uncle, Paternal Grandmother    Depression Mother    Diabetes Maternal Uncle    Drug abuse Mother, Maternal Uncle    Heart disease Father, Brother    Hypertension Father, Brother, Maternal Grandmother, Paternal Grandfather    Kidney failure Sister          Tobacco Use    Smoking status: Current Every Day Smoker     Packs/day: 0.50     Years: 35.00     Pack years: 17.50     Types: Cigarettes, Vaping w/o nicotine    Smokeless tobacco: Never Used   Substance and Sexual Activity    Alcohol use: Not Currently     Comment: occassionally    Drug use: Yes     Types: Marijuana    Sexual activity: Yes     Review of Systems   Constitutional:  Positive for activity change, appetite change, chills, fatigue and fever. Negative for diaphoresis.   HENT:  Negative for ear discharge, ear pain, facial swelling and hearing loss.    Eyes:  Negative for pain and redness.   Respiratory:  Positive for cough. Negative for shortness of breath.    Gastrointestinal:  Positive for abdominal distention and diarrhea. Negative for nausea and vomiting.        Reports diarrhea past x weeks  LBM 3 days ago   Endocrine: Negative for polydipsia and polyphagia.   Genitourinary:  Negative for difficulty urinating, dysuria and flank  pain.   Musculoskeletal:  Negative for neck pain and neck stiffness.   Skin:  Positive for pallor.   Allergic/Immunologic: Negative for food allergies.   Neurological:  Positive for weakness. Negative for seizures, facial asymmetry and speech difficulty.   Hematological:  Does not bruise/bleed easily.   Psychiatric/Behavioral:  Negative for agitation, behavioral problems, hallucinations and suicidal ideas. The patient is not nervous/anxious.    Objective:     Vital Signs (Most Recent):  Temp: 99.4 °F (37.4 °C) (07/24/22 1327)  Pulse: 95 (07/24/22 1458)  Resp: 20 (07/24/22 1416)  BP: (!) 101/51 (07/24/22 1416)  SpO2: (!) 91 % (07/24/22 1416)   Vital Signs (24h Range):  Temp:  [99.4 °F (37.4 °C)-100.5 °F (38.1 °C)] 99.4 °F (37.4 °C)  Pulse:  [] 95  Resp:  [17-26] 20  SpO2:  [89 %-99 %] 91 %  BP: ()/(44-55) 101/51     Weight: 96.9 kg (213 lb 9.6 oz)  Body mass index is 36.66 kg/m².    Physical Exam  Constitutional:       Appearance: She is obese. She is ill-appearing and toxic-appearing. She is not diaphoretic.   HENT:      Head: Normocephalic and atraumatic.      Mouth/Throat:      Mouth: Mucous membranes are dry.   Eyes:      General:         Right eye: No discharge.         Left eye: No discharge.      Extraocular Movements: Extraocular movements intact.      Conjunctiva/sclera: Conjunctivae normal.      Pupils: Pupils are equal, round, and reactive to light.   Cardiovascular:      Rate and Rhythm: Regular rhythm. Tachycardia present.      Heart sounds: Normal heart sounds. No murmur heard.     Comments: Right groin central line noted  Pulmonary:      Effort: Pulmonary effort is normal. No respiratory distress.      Comments: BBS diminished  Abdominal:      General: Bowel sounds are normal. There is distension.      Palpations: Abdomen is soft.      Tenderness: There is abdominal tenderness. There is guarding.      Comments: Right abd/ flank pain   Genitourinary:     Comments: Rose catheter with cloudy  vandana colored urine noted- very mild blood tinged (very mild pink)  Musculoskeletal:         General: Normal range of motion.      Cervical back: Normal range of motion and neck supple. No rigidity or tenderness.      Right lower leg: No edema.      Left lower leg: No edema.      Comments: Generalized weakness   Skin:     General: Skin is dry.      Capillary Refill: Capillary refill takes 2 to 3 seconds.      Coloration: Skin is pale.      Comments: Decreased skin turgor   Neurological:      General: No focal deficit present.      Mental Status: She is alert and oriented to person, place, and time. Mental status is at baseline.      Cranial Nerves: No cranial nerve deficit.   Psychiatric:         Mood and Affect: Mood normal.         Behavior: Behavior normal.         Thought Content: Thought content normal.         Judgment: Judgment normal.         CRANIAL NERVES     CN III, IV, VI   Pupils are equal, round, and reactive to light.         Results for orders placed or performed during the hospital encounter of 07/24/22   HIV 1/2 Ag/Ab (4th Gen)   Result Value Ref Range    HIV 1/2 Ag/Ab Negative Negative   Hepatitis C Antibody   Result Value Ref Range    Hepatitis C Ab Negative Negative   CBC auto differential   Result Value Ref Range    WBC 18.70 (H) 3.90 - 12.70 K/uL    RBC 4.33 4.00 - 5.40 M/uL    Hemoglobin 13.2 12.0 - 16.0 g/dL    Hematocrit 40.9 37.0 - 48.5 %    MCV 95 82 - 98 fL    MCH 30.5 27.0 - 31.0 pg    MCHC 32.3 32.0 - 36.0 g/dL    RDW 14.6 (H) 11.5 - 14.5 %    Platelets 139 (L) 150 - 450 K/uL    MPV 12.3 9.2 - 12.9 fL    Immature Granulocytes 1.3 (H) 0.0 - 0.5 %    Gran # (ANC) 15.0 (H) 1.8 - 7.7 K/uL    Immature Grans (Abs) 0.25 (H) 0.00 - 0.04 K/uL    Lymph # 2.0 1.0 - 4.8 K/uL    Mono # 1.3 (H) 0.3 - 1.0 K/uL    Eos # 0.1 0.0 - 0.5 K/uL    Baso # 0.06 0.00 - 0.20 K/uL    nRBC 0 0 /100 WBC    Gran % 80.3 (H) 38.0 - 73.0 %    Lymph % 10.4 (L) 18.0 - 48.0 %    Mono % 7.1 4.0 - 15.0 %    Eosinophil %  0.6 0.0 - 8.0 %    Basophil % 0.3 0.0 - 1.9 %    Differential Method Automated    Comprehensive metabolic panel   Result Value Ref Range    Sodium 133 (L) 136 - 145 mmol/L    Potassium 4.4 3.5 - 5.1 mmol/L    Chloride 94 (L) 95 - 110 mmol/L    CO2 27 23 - 29 mmol/L    Glucose 201 (H) 70 - 110 mg/dL    BUN 53 (H) 6 - 20 mg/dL    Creatinine 3.7 (H) 0.5 - 1.4 mg/dL    Calcium 7.8 (L) 8.7 - 10.5 mg/dL    Total Protein 6.2 6.0 - 8.4 g/dL    Albumin 2.1 (L) 3.5 - 5.2 g/dL    Total Bilirubin 0.8 0.1 - 1.0 mg/dL    Alkaline Phosphatase 66 55 - 135 U/L    AST 38 10 - 40 U/L    ALT 34 10 - 44 U/L    Anion Gap 12 8 - 16 mmol/L    eGFR if African American 15 (A) >60 mL/min/1.73 m^2    eGFR if non African American 13 (A) >60 mL/min/1.73 m^2   Magnesium   Result Value Ref Range    Magnesium 1.0 (L) 1.6 - 2.6 mg/dL   Phosphorus   Result Value Ref Range    Phosphorus 3.9 2.7 - 4.5 mg/dL   Urinalysis, Reflex to Urine Culture Urine, Clean Catch    Specimen: Urine   Result Value Ref Range    Specimen UA Urine, Catheterized     Color, UA Yellow Yellow, Straw, Joann    Appearance, UA Cloudy (A) Clear    pH, UA 5.0 5.0 - 8.0    Specific Gravity, UA 1.020 1.005 - 1.030    Protein, UA 2+ (A) Negative    Glucose, UA Negative Negative    Ketones, UA Negative Negative    Bilirubin (UA) Negative Negative    Occult Blood UA 3+ (A) Negative    Nitrite, UA Negative Negative    Urobilinogen, UA 2.0-3.0 (A) <2.0 EU/dL    Leukocytes, UA 3+ (A) Negative   Brain natriuretic peptide   Result Value Ref Range    BNP 60 0 - 99 pg/mL   Procalcitonin   Result Value Ref Range    Procalcitonin 56.46 (H) <0.25 ng/mL   Urinalysis Microscopic   Result Value Ref Range    RBC, UA 11 (H) 0 - 4 /hpf    WBC, UA >100 (H) 0 - 5 /hpf    WBC Clumps, UA Many (A) None-Rare    Bacteria Moderate (A) None-Occ /hpf    Hyaline Casts, UA 0 0-1/lpf /lpf    Microscopic Comment SEE COMMENT    POCT COVID-19 Rapid Screening   Result Value Ref Range    POC Rapid COVID Negative  Negative     Acceptable Yes    ISTAT PROCEDURE   Result Value Ref Range    POC PH 7.341 (L) 7.35 - 7.45    POC PCO2 55.3 (H) 35 - 45 mmHg    POC PO2 70 (L) 80 - 100 mmHg    POC HCO3 29.9 (H) 24 - 28 mmol/L    POC BE 4 -2 to 2 mmol/L    POC SATURATED O2 92 (L) 95 - 100 %    Sample ARTERIAL     Site RB     Allens Test Pass     DelSys Nasal Can     Mode SPONT     Flow 5     FiO2 40    POCT glucose   Result Value Ref Range    POCT Glucose 213 (H) 70 - 110 mg/dL       Imaging Results              X-Ray Chest 1 View (Final result)  Result time 07/24/22 12:25:02      Final result by Cam Killian MD (07/24/22 12:25:02)                   Impression:      Mild bilateral interstitial prominence, most notably in the lower right lung.  Small right pleural effusion.  Pulmonary edema versus atypical infection.      Electronically signed by: Cam Killian  Date:    07/24/2022  Time:    12:25               Narrative:    EXAMINATION:  XR CHEST 1 VIEW    CLINICAL HISTORY:  Sepsis;    TECHNIQUE:  Single frontal view of the chest was performed.    COMPARISON:  Multiple prior chest x-rays, most recent dated 02/11/2021    FINDINGS:  Enlarged cardiac silhouette likely accentuated by portable technique.  Trachea is midline.  Mild bilateral interstitial prominence predominantly in the lower right lung.  Small right pleural effusion.  No significant pneumothorax.  No acute bony abnormality.  Cholecystectomy clips project over the right upper quadrant.  No central line visualized.

## 2022-07-24 NOTE — ASSESSMENT & PLAN NOTE
"This patient does have evidence of infective focus  My overall impression is Septic shock . Vital signs were reviewed and noted in progress note.  Antibiotics given-   Antibiotics (From admission, onward)            Start     Stop Route Frequency Ordered    07/24/22 2100  mupirocin 2 % ointment         07/29 2059 Nasl 2 times daily 07/24/22 1445    07/24/22 1830  metronidazole IVPB 500 mg         -- IV Every 8 hours (non-standard times) 07/24/22 1456    07/24/22 1800  cefepime in dextrose 5 % 1 gram/50 mL IVPB 1 g         -- IV Every 24 hours (non-standard times) 07/24/22 1456    07/24/22 1500  vancomycin 2 g in dextrose 5 % 500 mL IVPB         -- IV Once 07/24/22 1350    07/24/22 1442  vancomycin - pharmacy to dose  (vancomycin IVPB)        "And" Linked Group Details    -- IV pharmacy to manage frequency 07/24/22 1342        Cultures were taken-   Microbiology Results (last 7 days)     Procedure Component Value Units Date/Time    Blood culture x two cultures. Draw prior to antibiotics [[id number]] Collected: 07/24/22 1200    Order Status: Sent Specimen: Blood from Line, Femoral, Right Updated: 07/24/22 1458    Blood culture x two cultures. Draw prior to antibiotics [[id number]] Collected: 07/24/22 1135    Order Status: Sent Specimen: Blood from Peripheral, Antecubital, Right Updated: 07/24/22 1457    Culture, Respiratory with Gram Stain [[id number]]     Order Status: No result Specimen: Respiratory     Culture, MRSA [[id number]]     Order Status: No result Specimen: MRSA source     Urine culture [[id number]] Collected: 07/24/22 1255    Order Status: No result Specimen: Urine Updated: 07/24/22 1335        Lactic acid level pending    Organ dysfunction indicated by Acute kidney injury, Encephalopathy  and Acute respiratory failure     Source- UTI, diarrhea- work up in progress    Source control Achieved by- Iv ABx    Currently on Levophed  "

## 2022-07-24 NOTE — CONSULTS
Pharmacokinetic Initial Assessment: IV Vancomycin    Assessment/Plan:    Initiate intravenous vancomycin with loading dose of 2000 mg once with subsequent doses when random concentrations are less than 20 mcg/mL. Desired empiric serum trough concentration is 15 to 20 mcg/mL. Draw vancomycin random level on 7/25 at 0930.    Pharmacy will continue to follow and monitor vancomycin.      Please contact pharmacy at [phone] with any questions regarding this assessment.     Thank you for the consult,   Nic Streetre, PharmD 7/24/2022 3:55 PM         Patient brief summary:  Alexandra Goins is a 56 y.o. female initiated on antimicrobial therapy with IV Vancomycin for treatment of suspected UTI vs lower respiratory infection.    Drug Allergies:   Review of patient's allergies indicates:   Allergen Reactions    Seroquel [quetiapine] Other (See Comments)     TC SEIZURES    Adhesive Blisters     Pt states can't tolerate paper tape    Levaquin [levofloxacin]     Levomenol analogues     Lipitor [atorvastatin]      Leg Cramps    Repatha pushtronex [evolocumab]      Feels sick    Zofran [ondansetron hcl] Hives and Other (See Comments)     headaches    Celestone [betamethasone] Hives, Itching and Rash    Piroxicam Diarrhea and Nausea Only       Actual Body Weight:   96.9 kg    Renal Function:   Estimated Creatinine Clearance: 18.7 mL/min (A) (based on SCr of 3.7 mg/dL (H)).,     Dialysis Method (if applicable):  N/A    CBC (last 72 hours):  Recent Labs   Lab Result Units 07/24/22  1128   WBC K/uL 18.70*   Hemoglobin g/dL 13.2   Hematocrit % 40.9   Platelets K/uL 139*   Gran % % 80.3*   Lymph % % 10.4*   Mono % % 7.1   Eosinophil % % 0.6   Basophil % % 0.3   Differential Method  Automated       Metabolic Panel (last 72 hours):  Recent Labs   Lab Result Units 07/24/22  1207 07/24/22  1255   Sodium mmol/L 133*  --    Potassium mmol/L 4.4  --    Chloride mmol/L 94*  --    CO2 mmol/L 27  --    Glucose mg/dL 201*  --    Glucose, UA    --  Negative   BUN mg/dL 53*  --    Creatinine mg/dL 3.7*  --    Albumin g/dL 2.1*  --    Total Bilirubin mg/dL 0.8  --    Alkaline Phosphatase U/L 66  --    AST U/L 38  --    ALT U/L 34  --    Magnesium mg/dL 1.0*  --    Phosphorus mg/dL 3.9  --        Drug levels (last 3 results):  No results for input(s): VANCOMYCINRA, VANCORANDOM, VANCOMYCINPE, VANCOPEAK, VANCOMYCINTR, VANCOTROUGH in the last 72 hours.    Microbiologic Results:  Microbiology Results (last 7 days)       Procedure Component Value Units Date/Time    Stool culture [[id number]]     Order Status: No result Specimen: Stool     Clostridium difficile EIA [[id number]]     Order Status: No result Specimen: Stool     Blood culture x two cultures. Draw prior to antibiotics [[id number]] Collected: 07/24/22 1200    Order Status: Sent Specimen: Blood from Line, Femoral, Right Updated: 07/24/22 1458    Blood culture x two cultures. Draw prior to antibiotics [[id number]] Collected: 07/24/22 1135    Order Status: Sent Specimen: Blood from Peripheral, Antecubital, Right Updated: 07/24/22 1457    Culture, Respiratory with Gram Stain [[id number]]     Order Status: No result Specimen: Respiratory     Culture, MRSA [[id number]]     Order Status: No result Specimen: MRSA source     Urine culture [[id number]] Collected: 07/24/22 1255    Order Status: No result Specimen: Urine Updated: 07/24/22 1335

## 2022-07-24 NOTE — ASSESSMENT & PLAN NOTE
Encouraged tobacco cessation  Recommend outpt pulm follow up with complete PFTs  Add ICS and LABA with PRN DARIUS

## 2022-07-24 NOTE — ASSESSMENT & PLAN NOTE
Dm diet once tolerating po intake  Accuchecks with [facility]  Lab Results   Component Value Date    HGBA1C 11.3 (H) 05/31/2022     Consult dietician and DM educator on patient stabilized

## 2022-07-24 NOTE — ASSESSMENT & PLAN NOTE
Body mass index is 36.66 kg/m². Morbid obesity complicates all aspects of disease management from diagnostic modalities to treatment. Weight loss encouraged and health benefits explained to patient.

## 2022-07-24 NOTE — ASSESSMENT & PLAN NOTE
Cont IVFs   Follow up labs   Accurate I/Os  Rose in place  Avoid nephrotoxic meds and renal dose IVAB per pharmacy

## 2022-07-24 NOTE — SUBJECTIVE & OBJECTIVE
Past Medical History:   Diagnosis Date    Acute ischemic stroke 9/17/2019    Acute respiratory failure with hypoxia 2/11/2021    Aneurysm     Anxiety     Arthritis     Asthma     Bipolar 1 disorder     Cerebral aneurysm, nonruptured 9/19/2019    Coronary artery disease of native artery of native heart with stable angina pectoris 1/19/2022    Depression     Diabetes mellitus, type 2     Diverticular disease     GERD (gastroesophageal reflux disease)     Hyperlipemia     Hypertension     Hypothyroidism     Pneumonia     PVD (peripheral vascular disease) 4/28/2021    Renal manifestation of secondary diabetes mellitus     Right-sided back pain 6/29/2019    Severe obesity (BMI 35.0-39.9) with comorbidity 5/31/2022    Stroke     Trouble in sleeping        Past Surgical History:   Procedure Laterality Date    CEREBRAL ANGIOGRAM N/A 10/28/2019    Procedure: ANGIOGRAM-CEREBRAL;  Surgeon: [facility];  Location: [facility] OR [address] [place] [address];  Service: Radiology;  Laterality: N/A;   190/Aayush    CHOLECYSTECTOMY      COLON SURGERY      COLONOSCOPY N/A 1/12/2018    Procedure: COLONOSCOPY;  Surgeon: Roque Mahajan III, MD;  Location: [facility];  Service: Endoscopy;  Laterality: N/A;    DENTAL SURGERY  05/21/2018    removal of 8 top teeth    HYSTERECTOMY      TONSILLECTOMY         Review of patient's allergies indicates:   Allergen Reactions    Seroquel [quetiapine] Other (See Comments)     TC SEIZURES    Adhesive Blisters     Pt states can't tolerate paper tape    Levaquin [levofloxacin]     Levomenol analogues     Lipitor [atorvastatin]      Leg Cramps    Repatha pushtronex [evolocumab]      Feels sick    Zofran [ondansetron hcl] Hives and Other (See Comments)     headaches    Celestone [betamethasone] Hives, Itching and Rash    Piroxicam Diarrhea and Nausea Only       Family History       Problem Relation (Age of Onset)    Alcohol abuse Mother, Father    Arthritis Father, Maternal Grandmother, Paternal Grandmother    Breast  cancer Maternal Grandmother    COPD Mother, Sister    Cancer Father, Maternal Aunt, Maternal Uncle, Paternal Aunt, Paternal Uncle, Paternal Grandmother    Depression Mother    Diabetes Maternal Uncle    Drug abuse Mother, Maternal Uncle    Heart disease Father, Brother    Hypertension Father, Brother, Maternal Grandmother, Paternal Grandfather    Kidney failure Sister          Tobacco Use    Smoking status: Current Every Day Smoker     Packs/day: 0.50     Years: 35.00     Pack years: 17.50     Types: Cigarettes, Vaping w/o nicotine    Smokeless tobacco: Never Used   Substance and Sexual Activity    Alcohol use: Not Currently     Comment: occassionally    Drug use: Yes     Types: Marijuana    Sexual activity: Yes         Review of Systems   Constitutional:  Positive for activity change, appetite change, fatigue and fever. Negative for chills and diaphoresis.   HENT:  Negative for congestion.    Respiratory:  Negative for apnea, cough, shortness of breath and wheezing.    Cardiovascular:  Negative for chest pain and leg swelling.   Gastrointestinal:  Negative for abdominal pain, diarrhea, nausea and vomiting.   Endocrine: Negative for polydipsia.   Genitourinary:  Negative for difficulty urinating.   Musculoskeletal:  Positive for back pain and myalgias.   Skin:  Negative for color change and wound.   Allergic/Immunologic: Negative for immunocompromised state.   Neurological:  Negative for dizziness, syncope and weakness.   Hematological:  Does not bruise/bleed easily.   Psychiatric/Behavioral:  Positive for confusion. Negative for agitation and hallucinations. The patient is not nervous/anxious.    Objective:     Vital Signs (Most Recent):  Temp: 99.4 °F (37.4 °C) (07/24/22 1327)  Pulse: 95 (07/24/22 1458)  Resp: 20 (07/24/22 1416)  BP: (!) 101/51 (07/24/22 1416)  SpO2: (!) 91 % (07/24/22 1416)   Vital Signs (24h Range):  Temp:  [99.4 °F (37.4 °C)-100.5 °F (38.1 °C)] 99.4 °F (37.4 °C)  Pulse:  [] 95  Resp:   [17-26] 20  SpO2:  [89 %-99 %] 91 %  BP: ()/(44-55) 101/51     Weight: 96.9 kg (213 lb 9.6 oz)  Body mass index is 36.66 kg/m².    No intake or output data in the 24 hours ending 07/24/22 1459    Physical Exam  Vitals and nursing note reviewed.   Constitutional:       General: She is awake. She is not in acute distress.     Appearance: She is well-developed. She is morbidly obese. She is ill-appearing. She is not toxic-appearing or diaphoretic.      Interventions: She is not intubated.Nasal cannula in place.   HENT:      Head: Normocephalic and atraumatic.      Mouth/Throat:      Mouth: Mucous membranes are dry.   Eyes:      General: Lids are normal.      Pupils: Pupils are equal, round, and reactive to light.   Neck:      Trachea: Trachea normal.      Comments: Obese  Cardiovascular:      Rate and Rhythm: Normal rate and regular rhythm.      Pulses:           Radial pulses are 1+ on the right side and 1+ on the left side.        Dorsalis pedis pulses are 1+ on the right side and 1+ on the left side.      Heart sounds: Normal heart sounds.   Pulmonary:      Effort: Pulmonary effort is normal. No tachypnea, accessory muscle usage or respiratory distress. She is not intubated.      Breath sounds: Examination of the right-lower field reveals decreased breath sounds. Examination of the left-lower field reveals decreased breath sounds. Decreased breath sounds present.   Chest:      Chest wall: No deformity or tenderness.   Abdominal:      General: Bowel sounds are decreased. There is no distension.      Palpations: Abdomen is soft.      Tenderness: There is no abdominal tenderness.      Comments: Obese   Genitourinary:     Comments: Rose in place  Musculoskeletal:         General: Normal range of motion.      Cervical back: Normal range of motion.      Right lower leg: No edema.      Left lower leg: No edema.      Right foot: No deformity.      Left foot: No deformity.   Lymphadenopathy:      Cervical: No cervical  adenopathy.   Skin:     General: Skin is warm and dry.      Capillary Refill: Capillary refill takes less than 2 seconds.      Findings: No rash.          Neurological:      General: No focal deficit present.      Mental Status: She is oriented to person, place, and time and easily aroused. She is lethargic.      GCS: GCS eye subscore is 4. GCS verbal subscore is 5. GCS motor subscore is 6.   Psychiatric:         Mood and Affect: Mood is depressed.         Speech: Speech is delayed.         Behavior: Behavior is slowed. Behavior is cooperative.         Thought Content: Thought content normal.         Cognition and Memory: Cognition normal.         Judgment: Judgment normal.       Vents:  Oxygen Concentration (%): 40 (07/24/22 1156)    Lines/Drains/Airways       Central Venous Catheter Line  Duration             Percutaneous Central Line Insertion/Assessment - Triple Lumen  07/24/22 1200 right femoral vein <1 day              Drain  Duration             Female External Urinary Catheter 07/24/22 1307 <1 day                    Significant Labs:    CBC/Anemia Profile:  Recent Labs   Lab 07/24/22  1128   WBC 18.70*   HGB 13.2   HCT 40.9   *   MCV 95   RDW 14.6*        Chemistries:  Recent Labs   Lab 07/24/22  1207   *   K 4.4   CL 94*   CO2 27   BUN 53*   CREATININE 3.7*   CALCIUM 7.8*   ALBUMIN 2.1*   PROT 6.2   BILITOT 0.8   ALKPHOS 66   ALT 34   AST 38   MG 1.0*   PHOS 3.9       ABGs:   Recent Labs   Lab 07/24/22  1156   PH 7.341*   PCO2 55.3*   HCO3 29.9*   POCSATURATED 92*   BE 4     Lactic Acid: No results for input(s): LACTATE in the last 48 hours.  Urine Studies:   Recent Labs   Lab 07/24/22  1255   COLORU Yellow   APPEARANCEUA Cloudy*   PHUR 5.0   SPECGRAV 1.020   PROTEINUA 2+*   GLUCUA Negative   KETONESU Negative   BILIRUBINUA Negative   OCCULTUA 3+*   NITRITE Negative   UROBILINOGEN 2.0-3.0*   LEUKOCYTESUR 3+*   RBCUA 11*   WBCUA >100*   BACTERIA Moderate*   HYALINECASTS 0     All pertinent labs  within the past 24 hours have been reviewed.    Significant Imaging:   I have reviewed all pertinent imaging results/findings within the past 24 hours.  CXR: I have reviewed all pertinent results/findings within the past 24 hours and my personal findings are:  hazy right base possible infiltrate

## 2022-07-24 NOTE — ASSESSMENT & PLAN NOTE
Urine and Blood cultures pending  Cont IVAB and IVFs  Check CT renal - reported recent hx of renal abscess per patient but no records of this at [facility] or outside [facility] records for last 2 years

## 2022-07-24 NOTE — ASSESSMENT & PLAN NOTE
Hx Depression and hx SA on admit in 2015  Follows with Psych cont post discharge  Cont Wellbutrin

## 2022-07-24 NOTE — H&P
[facility] - Emergency Dept.  [facility] Medicine  History & Physical    Patient Name: Alexandra Goins  MRN: [id number]  Patient Class: IP- Inpatient  Admission Date: 7/24/2022  Attending Physician: Trevor Gibbs MD   Primary Care Provider: Perez Casiano MD     Patient seen in ER    Patient information was obtained from patient and ER records.     Subjective:     Principal Problem:Septic shock    Chief Complaint:   Chief Complaint   Patient presents with    Fever     Since Tuesday morning. recent skin cancer removed from face    Altered Mental Status     lethargic since this morning        HPI:    Fever       Since Tuesday morning. recent skin cancer removed from face    Altered Mental Status       lethargic since this morning      Per ER- This is a 56 y.o. female patient with a PMHx of  COPD, HTN, HLP, T2DM, pneumonia, nicotine dependence, non ruptured cerebral aneurysm, stroke, PVD, anxiety, depression, diverticular disease, GERD, hypothyroidism, PVD, severe obesity, arthritis, asthma, Bipolar 1 disorder, and CAD who presents to the Emergency Department for evaluation of AMS which onset gradually this morning. Per , pt came in 5 days PTA to have biopsy of suspected skin cancer.  states pt had a slight fever beginning Tuesday and that the fever got higher over the next 3-4 days.  states pt fell on Friday but appeared to be okay.  states pt was very lethargic this morning, weak, extremely hard to wake and was speaking nonsense upon waking up.  states pt has COPD and is not on oxygen at home. Symptoms are constant and moderate in severity. No mitigating or exacerbating factors reported. Associated sxs include SOB, fever and weakness. Patient is unable to deny other sxs due to AMS at this time.  No further complaints or concerns at this time. HPI limited to AMS.      Patient evaluated by ER and found to have Septic shock. Patient to be admitted to ICU on Iv [facility]ers.     Patient  currently awake, alert, oriented to person, place, time, and situation. Code status discussed with patient  and her current  wishes are to be a FULL CODE.             Past Medical History:   Diagnosis Date    Acute ischemic stroke 9/17/2019    Acute respiratory failure with hypoxia 2/11/2021    Aneurysm     Anxiety     Arthritis     Asthma     Bipolar 1 disorder     Cerebral aneurysm, nonruptured 9/19/2019    Coronary artery disease of native artery of native heart with stable angina pectoris 1/19/2022    Depression     Diabetes mellitus, type 2     Diverticular disease     GERD (gastroesophageal reflux disease)     Hyperlipemia     Hypertension     Hypothyroidism     Pneumonia     PVD (peripheral vascular disease) 4/28/2021    Renal manifestation of secondary diabetes mellitus     Right-sided back pain 6/29/2019    Severe obesity (BMI 35.0-39.9) with comorbidity 5/31/2022    Stroke     Trouble in sleeping        Past Surgical History:   Procedure Laterality Date    CEREBRAL ANGIOGRAM N/A 10/28/2019    Procedure: ANGIOGRAM-CEREBRAL;  Surgeon: Dahiana Diagnostic Provider;  Location: [facility] OR [address] [place] [address];  Service: Radiology;  Laterality: N/A;   190/Aayush    CHOLECYSTECTOMY      COLON SURGERY      COLONOSCOPY N/A 1/12/2018    Procedure: COLONOSCOPY;  Surgeon: Roque Mahajan III, MD;  Location: [facility];  Service: Endoscopy;  Laterality: N/A;    DENTAL SURGERY  05/21/2018    removal of 8 top teeth    HYSTERECTOMY      TONSILLECTOMY         Review of patient's allergies indicates:   Allergen Reactions    Seroquel [quetiapine] Other (See Comments)     TC SEIZURES    Adhesive Blisters     Pt states can't tolerate paper tape    Levaquin [levofloxacin]     Levomenol analogues     Lipitor [atorvastatin]      Leg Cramps    Repatha pushtronex [evolocumab]      Feels sick    Zofran [ondansetron hcl] Hives and Other (See Comments)     headaches    Celestone [betamethasone] Hives, Itching and  Rash    Piroxicam Diarrhea and Nausea Only       No current facility-administered medications on file prior to encounter.     Current Outpatient Medications on File Prior to Encounter   Medication Sig    aspirin (ECOTRIN) 81 MG EC tablet Take 1 tablet (81 mg total) by mouth once daily. (Patient taking differently: Take 81 mg by mouth every other day.)    benztropine (COGENTIN) 0.5 MG tablet Take 1 tablet (0.5 mg total) by mouth 2 (two) times daily as needed (dystonia).    buPROPion (WELLBUTRIN XL) 150 MG TB24 tablet Take 1 tablet (150 mg total) by mouth once daily.    albuterol (PROVENTIL/VENTOLIN HFA) 90 mcg/actuation inhaler INHALE 2 TO 4 PUFFS BY MOUTH THREE TIMES DAILY AS NEEDED FOR WHEEZING    albuterol-ipratropium (DUO-NEB) 2.5 mg-0.5 mg/3 mL nebulizer solution Take 3 mLs by nebulization every 6 (six) hours as needed for Wheezing. Rescue    blood sugar diagnostic (TRUE METRIX GLUCOSE TEST STRIP) Strp USE 1 STRIP TO CHECK GLUCOSE THREE TIMES DAILY    butalbital-acetaminophen-caffeine -40 mg (FIORICET, ESGIC) -40 mg per tablet Take 1 tablet by mouth every 4 (four) hours as needed. for pain.    diazepam (VALIUM ORAL) Take by mouth 3 (three) times daily as needed for Anxiety. Do not exceed 3 timed a day    diazePAM (VALIUM) 10 MG Tab Take 1 tablet (10 mg total) by mouth 3 (three) times daily as needed.    doxepin (SINEQUAN) 10 MG capsule Take 1 to 2 capsules at bedtime    ezetimibe (ZETIA) 10 mg tablet Take 1 tablet (10 mg total) by mouth once daily.    furosemide (LASIX) 40 MG tablet TAKE 1 TABLET BY MOUTH EVERY MONDAY, WEDNESDAY, AND FRIDAY AS NEEDED    gabapentin (NEURONTIN) 600 MG tablet TAKE 1 TABLET BY MOUTH IN THE MORNING AND  TAKE  1  TABLET  BY  MOUTH  IN  THE  AFTERNOON  THEN  TAKE  2  TABLETS  BY  MOUTH  AT  BEDTIME    insulin  unit/mL injection Inject 25 Units into the skin 2 (two) times daily before meals.    insulin regular, human (NOVOLIN R INJ)     isosorbide  mononitrate (IMDUR) 30 MG 24 hr tablet Take 1 tablet by mouth once daily    ketorolac (TORADOL) 10 mg tablet TAKE 1 TABLET BY MOUTH EVERY 6 HOURS FOR 5 DAYS    losartan (COZAAR) 25 MG tablet Take 1 tablet (25 mg total) by mouth once daily.    metFORMIN (GLUCOPHAGE-XR) 500 MG ER 24hr tablet Take 1 tablet (500 mg total) by mouth 2 (two) times daily with meals.    metoprolol succinate (TOPROL-XL) 25 MG 24 hr tablet Take 1 tablet (25 mg total) by mouth once daily.    promethazine (PHENERGAN) 12.5 MG Tab Take 1 tablet (12.5 mg total) by mouth every 6 (six) hours as needed.    risperiDONE (RISPERDAL) 1 MG tablet Take 1 tablet (1 mg total) by mouth 2 (two) times daily.     Family History       Problem Relation (Age of Onset)    Alcohol abuse Mother, Father    Arthritis Father, Maternal Grandmother, Paternal Grandmother    Breast cancer Maternal Grandmother    COPD Mother, Sister    Cancer Father, Maternal Aunt, Maternal Uncle, Paternal Aunt, Paternal Uncle, Paternal Grandmother    Depression Mother    Diabetes Maternal Uncle    Drug abuse Mother, Maternal Uncle    Heart disease Father, Brother    Hypertension Father, Brother, Maternal Grandmother, Paternal Grandfather    Kidney failure Sister          Tobacco Use    Smoking status: Current Every Day Smoker     Packs/day: 0.50     Years: 35.00     Pack years: 17.50     Types: Cigarettes, Vaping w/o nicotine    Smokeless tobacco: Never Used   Substance and Sexual Activity    Alcohol use: Not Currently     Comment: occassionally    Drug use: Yes     Types: Marijuana    Sexual activity: Yes     Review of Systems   Constitutional:  Positive for activity change, appetite change, chills, fatigue and fever. Negative for diaphoresis.   HENT:  Negative for ear discharge, ear pain, facial swelling and hearing loss.    Eyes:  Negative for pain and redness.   Respiratory:  Positive for cough. Negative for shortness of breath.    Gastrointestinal:  Positive for abdominal  distention and diarrhea. Negative for nausea and vomiting.        Reports diarrhea past x weeks  LBM 3 days ago   Endocrine: Negative for polydipsia and polyphagia.   Genitourinary:  Negative for difficulty urinating, dysuria and flank pain.   Musculoskeletal:  Negative for neck pain and neck stiffness.   Skin:  Positive for pallor.   Allergic/Immunologic: Negative for food allergies.   Neurological:  Positive for weakness. Negative for seizures, facial asymmetry and speech difficulty.   Hematological:  Does not bruise/bleed easily.   Psychiatric/Behavioral:  Negative for agitation, behavioral problems, hallucinations and suicidal ideas. The patient is not nervous/anxious.    Objective:     Vital Signs (Most Recent):  Temp: 99.4 °F (37.4 °C) (07/24/22 1327)  Pulse: 95 (07/24/22 1458)  Resp: 20 (07/24/22 1416)  BP: (!) 101/51 (07/24/22 1416)  SpO2: (!) 91 % (07/24/22 1416)   Vital Signs (24h Range):  Temp:  [99.4 °F (37.4 °C)-100.5 °F (38.1 °C)] 99.4 °F (37.4 °C)  Pulse:  [] 95  Resp:  [17-26] 20  SpO2:  [89 %-99 %] 91 %  BP: ()/(44-55) 101/51     Weight: 96.9 kg (213 lb 9.6 oz)  Body mass index is 36.66 kg/m².    Physical Exam  Constitutional:       Appearance: She is obese. She is ill-appearing and toxic-appearing. She is not diaphoretic.   HENT:      Head: Normocephalic and atraumatic.      Mouth/Throat:      Mouth: Mucous membranes are dry.   Eyes:      General:         Right eye: No discharge.         Left eye: No discharge.      Extraocular Movements: Extraocular movements intact.      Conjunctiva/sclera: Conjunctivae normal.      Pupils: Pupils are equal, round, and reactive to light.   Cardiovascular:      Rate and Rhythm: Regular rhythm. Tachycardia present.      Heart sounds: Normal heart sounds. No murmur heard.     Comments: Right groin central line noted  Pulmonary:      Effort: Pulmonary effort is normal. No respiratory distress.      Comments: BBS diminished  Abdominal:      General: Bowel  sounds are normal. There is distension.      Palpations: Abdomen is soft.      Tenderness: There is abdominal tenderness. There is guarding.      Comments: Right abd/ flank pain   Genitourinary:     Comments: Rose catheter with cloudy vandana colored urine noted- very mild blood tinged (very mild pink)  Musculoskeletal:         General: Normal range of motion.      Cervical back: Normal range of motion and neck supple. No rigidity or tenderness.      Right lower leg: No edema.      Left lower leg: No edema.      Comments: Generalized weakness   Skin:     General: Skin is dry.      Capillary Refill: Capillary refill takes 2 to 3 seconds.      Coloration: Skin is pale.      Comments: Decreased skin turgor   Neurological:      General: No focal deficit present.      Mental Status: She is alert and oriented to person, place, and time. Mental status is at baseline.      Cranial Nerves: No cranial nerve deficit.   Psychiatric:         Mood and Affect: Mood normal.         Behavior: Behavior normal.         Thought Content: Thought content normal.         Judgment: Judgment normal.         CRANIAL NERVES     CN III, IV, VI   Pupils are equal, round, and reactive to light.         Results for orders placed or performed during the hospital encounter of 07/24/22   HIV 1/2 Ag/Ab (4th Gen)   Result Value Ref Range    HIV 1/2 Ag/Ab Negative Negative   Hepatitis C Antibody   Result Value Ref Range    Hepatitis C Ab Negative Negative   CBC auto differential   Result Value Ref Range    WBC 18.70 (H) 3.90 - 12.70 K/uL    RBC 4.33 4.00 - 5.40 M/uL    Hemoglobin 13.2 12.0 - 16.0 g/dL    Hematocrit 40.9 37.0 - 48.5 %    MCV 95 82 - 98 fL    MCH 30.5 27.0 - 31.0 pg    MCHC 32.3 32.0 - 36.0 g/dL    RDW 14.6 (H) 11.5 - 14.5 %    Platelets 139 (L) 150 - 450 K/uL    MPV 12.3 9.2 - 12.9 fL    Immature Granulocytes 1.3 (H) 0.0 - 0.5 %    Gran # (ANC) 15.0 (H) 1.8 - 7.7 K/uL    Immature Grans (Abs) 0.25 (H) 0.00 - 0.04 K/uL    Lymph # 2.0 1.0 -  4.8 K/uL    Mono # 1.3 (H) 0.3 - 1.0 K/uL    Eos # 0.1 0.0 - 0.5 K/uL    Baso # 0.06 0.00 - 0.20 K/uL    nRBC 0 0 /100 WBC    Gran % 80.3 (H) 38.0 - 73.0 %    Lymph % 10.4 (L) 18.0 - 48.0 %    Mono % 7.1 4.0 - 15.0 %    Eosinophil % 0.6 0.0 - 8.0 %    Basophil % 0.3 0.0 - 1.9 %    Differential Method Automated    Comprehensive metabolic panel   Result Value Ref Range    Sodium 133 (L) 136 - 145 mmol/L    Potassium 4.4 3.5 - 5.1 mmol/L    Chloride 94 (L) 95 - 110 mmol/L    CO2 27 23 - 29 mmol/L    Glucose 201 (H) 70 - 110 mg/dL    BUN 53 (H) 6 - 20 mg/dL    Creatinine 3.7 (H) 0.5 - 1.4 mg/dL    Calcium 7.8 (L) 8.7 - 10.5 mg/dL    Total Protein 6.2 6.0 - 8.4 g/dL    Albumin 2.1 (L) 3.5 - 5.2 g/dL    Total Bilirubin 0.8 0.1 - 1.0 mg/dL    Alkaline Phosphatase 66 55 - 135 U/L    AST 38 10 - 40 U/L    ALT 34 10 - 44 U/L    Anion Gap 12 8 - 16 mmol/L    eGFR if African American 15 (A) >60 mL/min/1.73 m^2    eGFR if non African American 13 (A) >60 mL/min/1.73 m^2   Magnesium   Result Value Ref Range    Magnesium 1.0 (L) 1.6 - 2.6 mg/dL   Phosphorus   Result Value Ref Range    Phosphorus 3.9 2.7 - 4.5 mg/dL   Urinalysis, Reflex to Urine Culture Urine, Clean Catch    Specimen: Urine   Result Value Ref Range    Specimen UA Urine, Catheterized     Color, UA Yellow Yellow, Straw, Joann    Appearance, UA Cloudy (A) Clear    pH, UA 5.0 5.0 - 8.0    Specific Gravity, UA 1.020 1.005 - 1.030    Protein, UA 2+ (A) Negative    Glucose, UA Negative Negative    Ketones, UA Negative Negative    Bilirubin (UA) Negative Negative    Occult Blood UA 3+ (A) Negative    Nitrite, UA Negative Negative    Urobilinogen, UA 2.0-3.0 (A) <2.0 EU/dL    Leukocytes, UA 3+ (A) Negative   Brain natriuretic peptide   Result Value Ref Range    BNP 60 0 - 99 pg/mL   Procalcitonin   Result Value Ref Range    Procalcitonin 56.46 (H) <0.25 ng/mL   Urinalysis Microscopic   Result Value Ref Range    RBC, UA 11 (H) 0 - 4 /hpf    WBC, UA >100 (H) 0 - 5 /hpf    WBC  Clumps, UA Many (A) None-Rare    Bacteria Moderate (A) None-Occ /hpf    Hyaline Casts, UA 0 0-1/lpf /lpf    Microscopic Comment SEE COMMENT    POCT COVID-19 Rapid Screening   Result Value Ref Range    POC Rapid COVID Negative Negative     Acceptable Yes    ISTAT PROCEDURE   Result Value Ref Range    POC PH 7.341 (L) 7.35 - 7.45    POC PCO2 55.3 (H) 35 - 45 mmHg    POC PO2 70 (L) 80 - 100 mmHg    POC HCO3 29.9 (H) 24 - 28 mmol/L    POC BE 4 -2 to 2 mmol/L    POC SATURATED O2 92 (L) 95 - 100 %    Sample ARTERIAL     Site RB     Allens Test Pass     DelSys Nasal Can     Mode SPONT     Flow 5     FiO2 40    POCT glucose   Result Value Ref Range    POCT Glucose 213 (H) 70 - 110 mg/dL       Imaging Results              X-Ray Chest 1 View (Final result)  Result time 07/24/22 12:25:02      Final result by Cam Killian MD (07/24/22 12:25:02)                   Impression:      Mild bilateral interstitial prominence, most notably in the lower right lung.  Small right pleural effusion.  Pulmonary edema versus atypical infection.      Electronically signed by: Cam Killian  Date:    07/24/2022  Time:    12:25               Narrative:    EXAMINATION:  XR CHEST 1 VIEW    CLINICAL HISTORY:  Sepsis;    TECHNIQUE:  Single frontal view of the chest was performed.    COMPARISON:  Multiple prior chest x-rays, most recent dated 02/11/2021    FINDINGS:  Enlarged cardiac silhouette likely accentuated by portable technique.  Trachea is midline.  Mild bilateral interstitial prominence predominantly in the lower right lung.  Small right pleural effusion.  No significant pneumothorax.  No acute bony abnormality.  Cholecystectomy clips project over the right upper quadrant.  No central line visualized.                                        Assessment/Plan:     * Septic shock  This patient does have evidence of infective focus  My overall impression is Septic shock . Vital signs were reviewed and noted in progress  "note.  Antibiotics given-   Antibiotics (From admission, onward)            Start     Stop Route Frequency Ordered    07/24/22 2100  mupirocin 2 % ointment         07/29 2059 Nasl 2 times daily 07/24/22 1445    07/24/22 1830  metronidazole IVPB 500 mg         -- IV Every 8 hours (non-standard times) 07/24/22 1456    07/24/22 1800  cefepime in dextrose 5 % 1 gram/50 mL IVPB 1 g         -- IV Every 24 hours (non-standard times) 07/24/22 1456    07/24/22 1500  vancomycin 2 g in dextrose 5 % 500 mL IVPB         -- IV Once 07/24/22 1350    07/24/22 1442  vancomycin - pharmacy to dose  (vancomycin IVPB)        "And" Linked Group Details    -- IV pharmacy to manage frequency 07/24/22 1342        Cultures were taken-   Microbiology Results (last 7 days)     Procedure Component Value Units Date/Time    Blood culture x two cultures. Draw prior to antibiotics [[id number]] Collected: 07/24/22 1200    Order Status: Sent Specimen: Blood from Line, Femoral, Right Updated: 07/24/22 1458    Blood culture x two cultures. Draw prior to antibiotics [[id number]] Collected: 07/24/22 1135    Order Status: Sent Specimen: Blood from Peripheral, Antecubital, Right Updated: 07/24/22 1457    Culture, Respiratory with Gram Stain [[id number]]     Order Status: No result Specimen: Respiratory     Culture, MRSA [[id number]]     Order Status: No result Specimen: MRSA source     Urine culture [[id number]] Collected: 07/24/22 1255    Order Status: No result Specimen: Urine Updated: 07/24/22 1335        Lactic acid level pending    Organ dysfunction indicated by Acute kidney injury, Encephalopathy  and Acute respiratory failure     Source- UTI, diarrhea- work up in progress    Source control Achieved by- Iv ABx    Currently on Levophed    Hypoalbuminemia  Monitor      Hyponatremia  Monitor      Diarrhea  Check stools  Monitor  Check Ct scan      COPD (chronic obstructive pulmonary disease)  Monitor O2 sats, supplemental O2 as needed  Add duonebs q 4 " hours      MAGDA (acute kidney injury) secondary to dehydration  Hold home diuretics  Currently receiving IVF bolus  Monitor  Trend BMP  Renal dose all medications           Severe obesity (BMI 35.0-39.9) with comorbidity  Body mass index is 36.66 kg/m². Morbid obesity complicates all aspects of disease management from diagnostic modalities to treatment. Weight loss encouraged and health benefits explained to patient.         Coronary artery disease of native artery of native heart with stable angina pectoris  Telemetry  Trend troponins      Acute respiratory failure with hypoxia  Telemetry  Monitor O2 sats, supplemental O2 as needed  Duonebs q 4 hours  Possible allergy to pulmicort       Debility  Fall and skin precautions  Turn Q 2 hours           Hx of arterial ischemic stroke  An non ruptuured aneursym  Monitor  Neurochecks q 4 hours  Check Ct scan brain      Acute urinary tract infection  Check urine culture  Continue Iv ABx      Type 2 diabetes mellitus with complication, with long-term current use of insulin   Dm diet once tolerating po intake  Accuchecks with correctional SSI  Lab Results   Component Value Date    HGBA1C 11.3 (H) 05/31/2022     Consult dietician and DM educator on patient stabilized      Hx of Renal abscess, right  Check Ct abd/ pelvis      Nicotine dependence  Smoking cessation education > 3 minutes  Add nicotine patch      Bipolar disorder with anxiety and depression  Resume home medications once stabilized  Check urine drug screen        VTE Risk Mitigation (From admission, onward)         Ordered     IP VTE HIGH RISK PATIENT  Once         07/24/22 1444     Place sequential compression device  Until discontinued         07/24/22 1444                 Time spent seeing patient( greater than 1/2 spent in direct contact) : 86 minutes      JONO Kaufman  Department of Hospital Medicine   [facility] - Emergency Dept.

## 2022-07-24 NOTE — ASSESSMENT & PLAN NOTE
Follows with Dr. Chakraborty in clinic  Cont ASA  Resume Toprol and Imdur once BP stable  Cont cardiac monitoring  Trend troponin  Denies CP

## 2022-07-24 NOTE — ASSESSMENT & PLAN NOTE
Source UTI w/ possible PNA  Zosyn and Vanc given in ED  Cont Cefepime, Flagyl and Vanc on admit  Blood and Urine cultures pending  No sputum produced  IVF bolus in ED and cont maintenance IVFs  Wean Levophed infusion for MAP > 65  ICU hemodynamic monitoring  Follow up [place]

## 2022-07-24 NOTE — PROGRESS NOTES
Pharmacist Renal Dose Adjustment Note    Alexandra Goins is a 56 y.o. female being treated with the medication cefepime.     Patient Data:    Vital Signs (Most Recent):  Temp: 99.4 °F (37.4 °C) (07/24/22 1327)  Pulse: 101 (07/24/22 1416)  Resp: 20 (07/24/22 1416)  BP: (!) 101/51 (07/24/22 1416)  SpO2: (!) 91 % (07/24/22 1416)   Vital Signs (72h Range):  Temp:  [99.4 °F (37.4 °C)-100.5 °F (38.1 °C)]   Pulse:  []   Resp:  [17-26]   BP: ()/(44-55)   SpO2:  [89 %-99 %]      Recent Labs   Lab 07/24/22  1207   CREATININE 3.7*     Serum creatinine: 3.7 mg/dL (H) 07/24/22 1207  Estimated creatinine clearance: 18.7 mL/min (A)    Cefepime 1 g q12 h has been changed to 1 g q24 h per [facility]'s renal dose adjustment protocol.     Pharmacist's Name: Nic Streeter, PharmD 7/24/2022 2:50 PM  Pharmacist's Extension: [phone]

## 2022-07-24 NOTE — PHARMACY MED REC
"Admission Medication History     The home medication history was taken by Wilfredo Bernardo.    You may go to "Admission" then "Reconcile Home Medications" tabs to review and/or act upon these items.      The home medication list has been updated by the Pharmacy department.    Please read ALL comments highlighted in yellow.    Please address this information as you see fit.     Feel free to contact us if you have any questions or require assistance.      Medications listed below were obtained from: Analytic software- [employer] and Medical records  (Not in a hospital admission)      Wilfredo Bernardo  [id number]    Current Outpatient Medications on File Prior to Encounter   Medication Sig Dispense Refill Last Dose    aspirin (ECOTRIN) 81 MG EC tablet Take 1 tablet (81 mg total) by mouth once daily. (Patient taking differently: Take 81 mg by mouth every other day.)   7/23/2022    benztropine (COGENTIN) 0.5 MG tablet Take 1 tablet (0.5 mg total) by mouth 2 (two) times daily as needed (dystonia). 60 tablet 2 7/23/2022    buPROPion (WELLBUTRIN XL) 150 MG TB24 tablet Take 1 tablet (150 mg total) by mouth once daily. 30 tablet 2 7/23/2022    albuterol (PROVENTIL/VENTOLIN HFA) 90 mcg/actuation inhaler INHALE 2 TO 4 PUFFS BY MOUTH THREE TIMES DAILY AS NEEDED FOR WHEEZING 18 g 3     albuterol-ipratropium (DUO-NEB) 2.5 mg-0.5 mg/3 mL nebulizer solution Take 3 mLs by nebulization every 6 (six) hours as needed for Wheezing. Rescue 1 Box 0     blood sugar diagnostic (TRUE METRIX GLUCOSE TEST STRIP) Strp USE 1 STRIP TO CHECK GLUCOSE THREE TIMES DAILY 100 strip 3     butalbital-acetaminophen-caffeine -40 mg (FIORICET, ESGIC) -40 mg per tablet Take 1 tablet by mouth every 4 (four) hours as needed. for pain. 30 tablet 0     diazepam (VALIUM ORAL) Take by mouth 3 (three) times daily as needed for Anxiety. Do not exceed 3 timed a day       diazePAM (VALIUM) 10 MG Tab Take 1 tablet (10 mg total) by mouth 3 " (three) times daily as needed. 90 tablet 2     doxepin (SINEQUAN) 10 MG capsule Take 1 to 2 capsules at bedtime 60 capsule 2     ezetimibe (ZETIA) 10 mg tablet Take 1 tablet (10 mg total) by mouth once daily. 30 tablet 11     furosemide (LASIX) 40 MG tablet TAKE 1 TABLET BY MOUTH EVERY MONDAY, WEDNESDAY, AND FRIDAY AS NEEDED 45 tablet 2     gabapentin (NEURONTIN) 600 MG tablet TAKE 1 TABLET BY MOUTH IN THE MORNING AND  TAKE  1  TABLET  BY  MOUTH  IN  THE  AFTERNOON  THEN  TAKE  2  TABLETS  BY  MOUTH  AT  BEDTIME 120 tablet 0     insulin  unit/mL injection Inject 25 Units into the skin 2 (two) times daily before meals. 20 mL 0     insulin regular, human (NOVOLIN R INJ)        isosorbide mononitrate (IMDUR) 30 MG 24 hr tablet Take 1 tablet by mouth once daily 90 tablet 2     ketorolac (TORADOL) 10 mg tablet TAKE 1 TABLET BY MOUTH EVERY 6 HOURS FOR 5 DAYS       losartan (COZAAR) 25 MG tablet Take 1 tablet (25 mg total) by mouth once daily. 90 tablet 3     metFORMIN (GLUCOPHAGE-XR) 500 MG ER 24hr tablet Take 1 tablet (500 mg total) by mouth 2 (two) times daily with meals. 180 tablet 1     metoprolol succinate (TOPROL-XL) 25 MG 24 hr tablet Take 1 tablet (25 mg total) by mouth once daily. 90 tablet 3     promethazine (PHENERGAN) 12.5 MG Tab Take 1 tablet (12.5 mg total) by mouth every 6 (six) hours as needed. 20 tablet 0     risperiDONE (RISPERDAL) 1 MG tablet Take 1 tablet (1 mg total) by mouth 2 (two) times daily. 60 tablet 2                          .

## 2022-07-25 PROBLEM — R65.20 SEVERE SEPSIS: Status: ACTIVE | Noted: 2022-07-24

## 2022-07-25 LAB
ALBUMIN SERPL BCP-MCNC: 1.9 G/DL (ref 3.5–5.2)
ALP SERPL-CCNC: 73 U/L (ref 55–135)
ALT SERPL W/O P-5'-P-CCNC: 30 U/L (ref 10–44)
ANION GAP SERPL CALC-SCNC: 13 MMOL/L (ref 8–16)
AST SERPL-CCNC: 33 U/L (ref 10–40)
BACTERIA UR CULT: NORMAL
BACTERIA UR CULT: NORMAL
BASOPHILS # BLD AUTO: 0.03 K/UL (ref 0–0.2)
BASOPHILS NFR BLD: 0.2 % (ref 0–1.9)
BILIRUB SERPL-MCNC: 0.6 MG/DL (ref 0.1–1)
BUN SERPL-MCNC: 50 MG/DL (ref 6–20)
CALCIUM SERPL-MCNC: 7.3 MG/DL (ref 8.7–10.5)
CHLORIDE SERPL-SCNC: 100 MMOL/L (ref 95–110)
CO2 SERPL-SCNC: 20 MMOL/L (ref 23–29)
CREAT SERPL-MCNC: 2.4 MG/DL (ref 0.5–1.4)
DIFFERENTIAL METHOD: ABNORMAL
EOSINOPHIL # BLD AUTO: 0 K/UL (ref 0–0.5)
EOSINOPHIL NFR BLD: 0 % (ref 0–8)
ERYTHROCYTE [DISTWIDTH] IN BLOOD BY AUTOMATED COUNT: 14.9 % (ref 11.5–14.5)
EST. GFR  (AFRICAN AMERICAN): 25 ML/MIN/1.73 M^2
EST. GFR  (NON AFRICAN AMERICAN): 22 ML/MIN/1.73 M^2
ESTIMATED AVG GLUCOSE: 275 MG/DL (ref 68–131)
GLUCOSE SERPL-MCNC: 284 MG/DL (ref 70–110)
HBA1C MFR BLD: 11.2 % (ref 4–5.6)
HCT VFR BLD AUTO: 36 % (ref 37–48.5)
HGB BLD-MCNC: 11.4 G/DL (ref 12–16)
IMM GRANULOCYTES # BLD AUTO: 0.17 K/UL (ref 0–0.04)
IMM GRANULOCYTES NFR BLD AUTO: 1.2 % (ref 0–0.5)
LYMPHOCYTES # BLD AUTO: 0.6 K/UL (ref 1–4.8)
LYMPHOCYTES NFR BLD: 4 % (ref 18–48)
MAGNESIUM SERPL-MCNC: 1.7 MG/DL (ref 1.6–2.6)
MCH RBC QN AUTO: 30.5 PG (ref 27–31)
MCHC RBC AUTO-ENTMCNC: 31.7 G/DL (ref 32–36)
MCV RBC AUTO: 96 FL (ref 82–98)
MONOCYTES # BLD AUTO: 1.3 K/UL (ref 0.3–1)
MONOCYTES NFR BLD: 9 % (ref 4–15)
NEUTROPHILS # BLD AUTO: 12.4 K/UL (ref 1.8–7.7)
NEUTROPHILS NFR BLD: 85.6 % (ref 38–73)
NRBC BLD-RTO: 0 /100 WBC
PLATELET # BLD AUTO: 160 K/UL (ref 150–450)
PMV BLD AUTO: 11.2 FL (ref 9.2–12.9)
POCT GLUCOSE: 187 MG/DL (ref 70–110)
POCT GLUCOSE: 203 MG/DL (ref 70–110)
POCT GLUCOSE: 229 MG/DL (ref 70–110)
POCT GLUCOSE: 276 MG/DL (ref 70–110)
POTASSIUM SERPL-SCNC: 4.8 MMOL/L (ref 3.5–5.1)
PROT SERPL-MCNC: 5.9 G/DL (ref 6–8.4)
RBC # BLD AUTO: 3.74 M/UL (ref 4–5.4)
SODIUM SERPL-SCNC: 133 MMOL/L (ref 136–145)
VANCOMYCIN SERPL-MCNC: 17.7 UG/ML
WBC # BLD AUTO: 14.48 K/UL (ref 3.9–12.7)

## 2022-07-25 PROCEDURE — 25000242 PHARM REV CODE 250 ALT 637 W/ HCPCS: Performed by: NURSE PRACTITIONER

## 2022-07-25 PROCEDURE — 97163 PT EVAL HIGH COMPLEX 45 MIN: CPT

## 2022-07-25 PROCEDURE — S0030 INJECTION, METRONIDAZOLE: HCPCS | Performed by: NURSE PRACTITIONER

## 2022-07-25 PROCEDURE — 25000003 PHARM REV CODE 250: Performed by: NURSE PRACTITIONER

## 2022-07-25 PROCEDURE — 83036 HEMOGLOBIN GLYCOSYLATED A1C: CPT | Performed by: NURSE PRACTITIONER

## 2022-07-25 PROCEDURE — 27100171 HC OXYGEN HIGH FLOW UP TO 24 HOURS

## 2022-07-25 PROCEDURE — 97530 THERAPEUTIC ACTIVITIES: CPT

## 2022-07-25 PROCEDURE — 80202 ASSAY OF VANCOMYCIN: CPT | Performed by: NURSE PRACTITIONER

## 2022-07-25 PROCEDURE — 99291 CRITICAL CARE FIRST HOUR: CPT | Mod: ,,, | Performed by: NURSE PRACTITIONER

## 2022-07-25 PROCEDURE — 99900035 HC TECH TIME PER 15 MIN (STAT)

## 2022-07-25 PROCEDURE — 25000003 PHARM REV CODE 250: Performed by: ANESTHESIOLOGY

## 2022-07-25 PROCEDURE — 94640 AIRWAY INHALATION TREATMENT: CPT

## 2022-07-25 PROCEDURE — C9399 UNCLASSIFIED DRUGS OR BIOLOG: HCPCS | Performed by: INTERNAL MEDICINE

## 2022-07-25 PROCEDURE — 63600175 PHARM REV CODE 636 W HCPCS: Performed by: INTERNAL MEDICINE

## 2022-07-25 PROCEDURE — 85025 COMPLETE CBC W/AUTO DIFF WBC: CPT | Performed by: NURSE PRACTITIONER

## 2022-07-25 PROCEDURE — S0030 INJECTION, METRONIDAZOLE: HCPCS | Performed by: INTERNAL MEDICINE

## 2022-07-25 PROCEDURE — 80053 COMPREHEN METABOLIC PANEL: CPT | Performed by: NURSE PRACTITIONER

## 2022-07-25 PROCEDURE — 97166 OT EVAL MOD COMPLEX 45 MIN: CPT

## 2022-07-25 PROCEDURE — 63600175 PHARM REV CODE 636 W HCPCS: Performed by: NURSE PRACTITIONER

## 2022-07-25 PROCEDURE — 94761 N-INVAS EAR/PLS OXIMETRY MLT: CPT

## 2022-07-25 PROCEDURE — 83735 ASSAY OF MAGNESIUM: CPT | Performed by: NURSE PRACTITIONER

## 2022-07-25 PROCEDURE — 25000003 PHARM REV CODE 250: Performed by: INTERNAL MEDICINE

## 2022-07-25 PROCEDURE — 99291 PR CRITICAL CARE, E/M 30-74 MINUTES: ICD-10-PCS | Mod: ,,, | Performed by: NURSE PRACTITIONER

## 2022-07-25 PROCEDURE — 11000001 HC ACUTE MED/SURG PRIVATE ROOM

## 2022-07-25 PROCEDURE — 94760 N-INVAS EAR/PLS OXIMETRY 1: CPT

## 2022-07-25 RX ORDER — IBUPROFEN 200 MG
24 TABLET ORAL
Status: DISCONTINUED | OUTPATIENT
Start: 2022-07-25 | End: 2022-08-02 | Stop reason: HOSPADM

## 2022-07-25 RX ORDER — ACETAMINOPHEN 325 MG/1
650 TABLET ORAL EVERY 4 HOURS PRN
Status: DISCONTINUED | OUTPATIENT
Start: 2022-07-25 | End: 2022-08-02 | Stop reason: HOSPADM

## 2022-07-25 RX ORDER — IBUPROFEN 200 MG
16 TABLET ORAL
Status: DISCONTINUED | OUTPATIENT
Start: 2022-07-25 | End: 2022-08-02 | Stop reason: HOSPADM

## 2022-07-25 RX ORDER — GLUCAGON 1 MG
1 KIT INJECTION
Status: DISCONTINUED | OUTPATIENT
Start: 2022-07-25 | End: 2022-08-02 | Stop reason: HOSPADM

## 2022-07-25 RX ORDER — METOPROLOL SUCCINATE 25 MG/1
25 TABLET, EXTENDED RELEASE ORAL DAILY
Status: DISCONTINUED | OUTPATIENT
Start: 2022-07-25 | End: 2022-08-02 | Stop reason: HOSPADM

## 2022-07-25 RX ORDER — LANOLIN ALCOHOL/MO/W.PET/CERES
400 CREAM (GRAM) TOPICAL ONCE
Status: COMPLETED | OUTPATIENT
Start: 2022-07-25 | End: 2022-07-25

## 2022-07-25 RX ORDER — INSULIN ASPART 100 [IU]/ML
1-10 INJECTION, SOLUTION INTRAVENOUS; SUBCUTANEOUS
Status: DISCONTINUED | OUTPATIENT
Start: 2022-07-25 | End: 2022-08-02 | Stop reason: HOSPADM

## 2022-07-25 RX ADMIN — FAMOTIDINE 20 MG: 20 TABLET ORAL at 08:07

## 2022-07-25 RX ADMIN — IPRATROPIUM BROMIDE AND ALBUTEROL SULFATE 3 ML: 2.5; .5 SOLUTION RESPIRATORY (INHALATION) at 08:07

## 2022-07-25 RX ADMIN — MAGNESIUM OXIDE TAB 400 MG (241.3 MG ELEMENTAL MG) 400 MG: 400 (241.3 MG) TAB at 08:07

## 2022-07-25 RX ADMIN — HYDROCODONE BITARTRATE AND ACETAMINOPHEN 1 TABLET: 5; 325 TABLET ORAL at 01:07

## 2022-07-25 RX ADMIN — INSULIN ASPART 1 UNITS: 100 INJECTION, SOLUTION INTRAVENOUS; SUBCUTANEOUS at 09:07

## 2022-07-25 RX ADMIN — BUPROPION HYDROCHLORIDE 150 MG: 150 TABLET, FILM COATED, EXTENDED RELEASE ORAL at 08:07

## 2022-07-25 RX ADMIN — MUPIROCIN: 20 OINTMENT TOPICAL at 09:07

## 2022-07-25 RX ADMIN — INSULIN DETEMIR 15 UNITS: 100 INJECTION, SOLUTION SUBCUTANEOUS at 09:07

## 2022-07-25 RX ADMIN — INSULIN ASPART 4 UNITS: 100 INJECTION, SOLUTION INTRAVENOUS; SUBCUTANEOUS at 10:07

## 2022-07-25 RX ADMIN — ACETAMINOPHEN 650 MG: 325 TABLET ORAL at 09:07

## 2022-07-25 RX ADMIN — ARFORMOTEROL TARTRATE 15 MCG: 15 SOLUTION RESPIRATORY (INHALATION) at 08:07

## 2022-07-25 RX ADMIN — METOPROLOL SUCCINATE 25 MG: 25 TABLET, EXTENDED RELEASE ORAL at 01:07

## 2022-07-25 RX ADMIN — IPRATROPIUM BROMIDE AND ALBUTEROL SULFATE 3 ML: 2.5; .5 SOLUTION RESPIRATORY (INHALATION) at 02:07

## 2022-07-25 RX ADMIN — ACETAMINOPHEN 650 MG: 325 TABLET ORAL at 05:07

## 2022-07-25 RX ADMIN — INSULIN ASPART 4 UNITS: 100 INJECTION, SOLUTION INTRAVENOUS; SUBCUTANEOUS at 04:07

## 2022-07-25 RX ADMIN — DOCUSATE SODIUM 100 MG: 100 CAPSULE, LIQUID FILLED ORAL at 08:07

## 2022-07-25 RX ADMIN — BUDESONIDE 0.5 MG: 0.5 INHALANT ORAL at 08:07

## 2022-07-25 RX ADMIN — INSULIN ASPART 3 UNITS: 100 INJECTION, SOLUTION INTRAVENOUS; SUBCUTANEOUS at 05:07

## 2022-07-25 RX ADMIN — METRONIDAZOLE 500 MG: 5 INJECTION, SOLUTION INTRAVENOUS at 01:07

## 2022-07-25 RX ADMIN — METRONIDAZOLE 500 MG: 5 INJECTION, SOLUTION INTRAVENOUS at 09:07

## 2022-07-25 RX ADMIN — CEFEPIME HYDROCHLORIDE 2 G: 2 INJECTION, SOLUTION INTRAVENOUS at 05:07

## 2022-07-25 RX ADMIN — SODIUM CHLORIDE: 0.9 INJECTION, SOLUTION INTRAVENOUS at 11:07

## 2022-07-25 RX ADMIN — ASPIRIN 81 MG: 81 TABLET, COATED ORAL at 08:07

## 2022-07-25 RX ADMIN — INSULIN DETEMIR 15 UNITS: 100 INJECTION, SOLUTION SUBCUTANEOUS at 10:07

## 2022-07-25 NOTE — PT/OT/SLP EVAL
Physical Therapy Evaluation    Patient Name:  Alexandra Goins   MRN:  [id number]    Recommendations:     Discharge Recommendations:  rehabilitation facility   Discharge Equipment Recommendations:  (TBD WITH PROGRESS)   Barriers to discharge: None    Assessment:     Alexandra Goins is a 56 y.o. female admitted with a medical diagnosis of Severe sepsis.  She presents with the following impairments/functional limitations:  weakness, impaired endurance, impaired cognition, impaired cardiopulmonary response to activity, impaired balance, gait instability, impaired functional mobility, decreased lower extremity function, decreased safety awareness, decreased coordination.    Rehab Prognosis: Good; patient would benefit from acute skilled PT services to address these deficits and reach maximum level of function.    Recent Surgery: * No surgery found *     Plan:     During this hospitalization, patient to be seen 3 x/week to address the identified rehab impairments via gait training, therapeutic activities, therapeutic exercises and progress toward the following goals:    · Plan of Care Expires:  08/08/22    Subjective     Chief Complaint:   Patient/Family Comments/goals:   Pain/Comfort:  · Pain Rating 1:  (NO NON-VERBAL INDICATORS OF PAIN)    Patients cultural, spiritual, Congregational conflicts given the current situation:      Living Environment:  PT LIVES WITH  1 STORY HOUSE 5 STEPS TO ENTER, AMB INDEP IN HOME, DOES NOT DRIVE OR WORK, INDEP WITH ADL'S  Prior to admission, patients level of function was INDEP.  Equipment used at home: walker, rolling, cane, straight.  DME owned (not currently used): none.  Upon discharge, patient will have assistance from .    Objective:     Communicated with NURSE JOHNSON prior to session.  Patient found supine with SCD, telemetry, pulse ox (continuous), peripheral IV, oxygen, bed alarm, blood pressure cuff  upon PT entry to room.    General Precautions: Standard, fall,  "respiratory, diabetic   Orthopedic Precautions:N/A   Braces: N/A  Respiratory Status: 15L HF NC- PT WITH DE-SAT TO 88% WITH MINIMAL EXERTION, ABLE TO RECOVER TO OVER 90% WITH DEEP BREATHING ENCOURAGED    Exams:  · Cognitive Exam:  Patient is oriented to Person and PT CONFUSED/DISORIENTED BUT ABLE TO ANSWER SOME QUESTIONS APPROPRIATELY, ABLE TO FOLLOW COMMANDS WITH CUES TO STAY ON TASK  · Postural Exam:  Patient presented with the following abnormalities:    · -       Rounded shoulders  · -       POSTERIOR LEAN IN SITTING, CONSTANT VERBAL AND TACTILE CUES FOR UPRIGHT POSTURE  · Sensation:    · -       Intact  · RLE ROM: WFL  · RLE Strength: GROSSLY 3+/5  · LLE ROM: WFL  · LLE Strength: GROSSLY 3+/5    Functional Mobility:  · Bed Mobility:     · Rolling Left:  maximal assistance and of 2 persons  · Scooting: maximal assistance and of 2 persons  · Supine to Sit: maximal assistance and of 2 persons  · Transfers:     · Sit to Stand:  moderate assistance and of 2 persons with no AD and hand-held assist  · Bed to Chair: moderate assistance and of 2 persons with  no AD and hand-held assist  using  Step Transfer  · Balance: POOR    Therapeutic Activities and Exercises:   PT EDUCATED IN ROLE OF P.T. AND POC, ENCOURAGED TO INCREASE TIME OOB IN CHAIR TO TOLERANCE, EDUCATED IN AND ENCOURAGED TO PERFORM BLE THEREX WHILE SEATED OR SUPINE THROUGHOUT THE DAY TO TOLERANCE: HIP FLEX/EXT, QUAD SET, LAQ, HEEL SLIDES, AP'S.  PT AGREEABLE TO REQUESTS.  PT SLOW MOVING WITH ALL MOBILITY, DIFFICULT TO STAY FOCUSED SO CUES TO STAY ON TASK, SLOW/DELAYED  BUT FAIR EFFORT, PT RE-ORIENTED AS NEEDED  PT EDUCATED ON RISK FOR FALLS DUE TO GENERALIZED WEAKNESS, EDUCATED ON "CALL DON'T FALL", ENCOURAGED TO CALL FOR ASSISTANCE WITH ALL NEEDS SUCH AS BED<>CHAIR TRANSFERS OR TRIPS TO BATHROOM, PT AGREEABLE TO SAFETY PRECAUTIONS    AM-PAC 6 CLICK MOBILITY  Total Score:10     Patient left up in chair with all lines intact, call button in reach, chair alarm " on and NURSE notified.    GOALS:   Multidisciplinary Problems     Physical Therapy Goals        Problem: Physical Therapy    Goal Priority Disciplines Outcome Goal Variances Interventions   Physical Therapy Goal     PT, PT/OT      Description: LTG'S TO BE MET IN 14 DAYS (8-8-22)  PT WILL REQUIRE [facility] FOR BED MOBILITY  PT WILL REQUIRE [facility] FOR BED<>CHAIR TF'S  PT WILL AMB 50 FEET WITH RW AND [facility]                     History:     Past Medical History:   Diagnosis Date    Acute ischemic stroke 9/17/2019    Acute respiratory failure with hypoxia 2/11/2021    Aneurysm     Anxiety     Arthritis     Asthma     Bipolar 1 disorder     Cerebral aneurysm, nonruptured 9/19/2019    Coronary artery disease of native artery of native heart with stable angina pectoris 1/19/2022    Depression     Diabetes mellitus, type 2     Diverticular disease     GERD (gastroesophageal reflux disease)     Hyperlipemia     Hypertension     Hyponatremia 7/24/2022    Hypothyroidism     Pneumonia     PVD (peripheral vascular disease) 4/28/2021    Renal manifestation of secondary diabetes mellitus     Right-sided back pain 6/29/2019    Severe obesity (BMI 35.0-39.9) with comorbidity 5/31/2022    Stroke     Trouble in sleeping        Past Surgical History:   Procedure Laterality Date    CEREBRAL ANGIOGRAM N/A 10/28/2019    Procedure: ANGIOGRAM-CEREBRAL;  Surgeon: Dahiana Diagnostic Provider;  Location: [address];  Service: Radiology;  Laterality: N/A;  Rm 190/Aayush    CHOLECYSTECTOMY      COLON SURGERY      COLONOSCOPY N/A 1/12/2018    Procedure: COLONOSCOPY;  Surgeon: Roque Mahajan III, MD;  Location: [facility];  Service: Endoscopy;  Laterality: N/A;    DENTAL SURGERY  05/21/2018    removal of 8 top teeth    HYSTERECTOMY      TONSILLECTOMY         Time Tracking:     PT Received On: 07/25/22  PT Start Time: 0945     PT Stop Time: 1010  PT Total Time (min): 25 min     Billable Minutes: Evaluation 15 and Therapeutic  Activity 10    07/25/2022

## 2022-07-25 NOTE — PLAN OF CARE
Plan of care discussed with pt. Pt verbalized understanding. Free from injury. No s/s of distress noted. Vitals stable. IV infusing NS @75. Meds as ordered. Pain controlled. Diet tolerated. Tele monitor in place. Q2 hour rounding. No complaints. Will continue to monitor pt. 12 hour chart review.

## 2022-07-25 NOTE — SUBJECTIVE & OBJECTIVE
Review of Systems   Constitutional:  Positive for malaise/fatigue (improved). Negative for chills and fever.   HENT:  Negative for congestion.    Eyes:  Negative for blurred vision.   Respiratory:  Positive for shortness of breath (AYALA). Negative for cough and sputum production.    Cardiovascular:  Negative for chest pain and leg swelling.   Gastrointestinal:  Negative for abdominal pain, nausea and vomiting.   Genitourinary:  Negative for dysuria.   Musculoskeletal:  Positive for back pain and myalgias.   Skin:  Negative for rash.   Neurological:  Negative for dizziness, weakness and headaches.   Endo/Heme/Allergies:  Does not bruise/bleed easily.   Psychiatric/Behavioral:  The patient is not nervous/anxious.        Objective:     Vital Signs (Most Recent):  Temp: (!) 101.8 °F (38.8 °C) (07/25/22 0508)  Pulse: (!) 116 (07/25/22 0600)  Resp: (!) 22 (07/25/22 0600)  BP: (!) 109/55 (07/25/22 0600)  SpO2: (!) 90 % (07/25/22 0600)   Vital Signs (24h Range):  Temp:  [97.5 °F (36.4 °C)-101.8 °F (38.8 °C)] 101.8 °F (38.8 °C)  Pulse:  [] 116  Resp:  [9-34] 22  SpO2:  [83 %-99 %] 90 %  BP: ()/(44-88) 109/55     Weight: 96.1 kg (211 lb 13.8 oz)  Body mass index is 36.37 kg/m².      Intake/Output Summary (Last 24 hours) at 7/25/2022 0759  Last data filed at 7/25/2022 0600  Gross per 24 hour   Intake 3627.86 ml   Output 1385 ml   Net 2242.86 ml       Physical Exam  Vitals and nursing note reviewed.   Constitutional:       General: She is awake. She is not in acute distress.     Appearance: She is well-developed. She is morbidly obese. She is ill-appearing. She is not toxic-appearing or diaphoretic.      Interventions: She is not intubated.Nasal cannula in place.   HENT:      Head: Normocephalic and atraumatic.      Mouth/Throat:      Mouth: Mucous membranes are dry.   Eyes:      General: Lids are normal.      Pupils: Pupils are equal, round, and reactive to light.   Neck:      Trachea: Trachea normal.      Comments:  Obese  Cardiovascular:      Rate and Rhythm: Regular rhythm. Tachycardia present.      Pulses:           Radial pulses are 1+ on the right side and 1+ on the left side.        Dorsalis pedis pulses are 1+ on the right side and 1+ on the left side.      Heart sounds: Normal heart sounds.   Pulmonary:      Effort: Pulmonary effort is normal. No tachypnea, accessory muscle usage or respiratory distress. She is not intubated.      Breath sounds: Examination of the right-lower field reveals decreased breath sounds. Examination of the left-lower field reveals decreased breath sounds. Decreased breath sounds present.   Chest:      Chest wall: No deformity or tenderness.   Abdominal:      General: Bowel sounds are decreased. There is no distension.      Palpations: Abdomen is soft.      Tenderness: There is no abdominal tenderness.      Comments: Obese   Genitourinary:     Comments: Rose in place  Musculoskeletal:         General: Normal range of motion.      Cervical back: Normal range of motion.      Right lower leg: No edema.      Left lower leg: No edema.      Right foot: No deformity.      Left foot: No deformity.   Lymphadenopathy:      Cervical: No cervical adenopathy.   Skin:     General: Skin is warm and dry.      Capillary Refill: Capillary refill takes less than 2 seconds.      Findings: No rash.          Neurological:      General: No focal deficit present.      Mental Status: She is alert, oriented to person, place, and time and easily aroused.      GCS: GCS eye subscore is 4. GCS verbal subscore is 5. GCS motor subscore is 6.   Psychiatric:         Attention and Perception: Attention normal.         Mood and Affect: Affect is flat.         Speech: Speech normal.         Behavior: Behavior normal. Behavior is cooperative.         Thought Content: Thought content normal.         Cognition and Memory: Cognition normal.         Judgment: Judgment normal.       Vents:  Oxygen Concentration (%): 40 (07/24/22  1156)    Lines/Drains/Airways       Central Venous Catheter Line  Duration             Percutaneous Central Line Insertion/Assessment - Triple Lumen  07/24/22 1200 right femoral vein <1 day              Drain  Duration                  Urethral Catheter 07/24/22 1647 <1 day              Peripheral Intravenous Line  Duration                  Peripheral IV - Single Lumen 07/24/22 1537 22 G Left Hand <1 day                    Significant Labs:    CBC/Anemia Profile:  Recent Labs   Lab 07/24/22  1128 07/25/22  0510   WBC 18.70* 14.48*   HGB 13.2 11.4*   HCT 40.9 36.0*   * 160   MCV 95 96   RDW 14.6* 14.9*        Chemistries:  Recent Labs   Lab 07/24/22  1207 07/24/22 2002 07/25/22  0510   * 132* 133*   K 4.4 4.5 4.8   CL 94* 98 100   CO2 27 21* 20*   BUN 53* 53* 50*   CREATININE 3.7* 3.0* 2.4*   CALCIUM 7.8* 6.4* 7.3*   ALBUMIN 2.1*  --  1.9*   PROT 6.2  --  5.9*   BILITOT 0.8  --  0.6   ALKPHOS 66  --  73   ALT 34  --  30   AST 38  --  33   MG 1.0* 1.5* 1.7   PHOS 3.9  --   --        POCT Glucose:   Recent Labs   Lab 07/24/22  1702 07/24/22 2003 07/25/22  0508   POCTGLUCOSE 285* 358* 276*     All pertinent labs within the past 24 hours have been reviewed.    Significant Imaging:  I have reviewed all pertinent imaging results/findings within the past 24 hours.  CT: I have reviewed all pertinent results/findings within the past 24 hours and my personal findings are:  CT Renal: Left ureteral fullness with mild left-sided pelviectasis.  Punctate nonobstructing right renal calculi.  Hepatomegaly.  Left colonic diverticulosis.  Bibasilar atelectasis/consolidation suggestive of pneumonia.  Atherosclerotic changes.  Cholecystectomy.  No drainable fluid collection is identified

## 2022-07-25 NOTE — ASSESSMENT & PLAN NOTE
Reported COPD from last admit Feb 2021 but no pulm records found of PFTs  Cont IVAB and high flow NC O2  Not on home O2  Cont nebs  Follow up CXR in AM

## 2022-07-25 NOTE — PROGRESS NOTES
Clinical Pharmacy: Vancomycin Brief Progress Note & Consult Signoff Note    Vancomycin random level @ 0918 (~18-hr random level) = 17.7 mcg/ml  However, discussed antibiotic regimen with MD & we will stop vancomycin therapy. We will continue cefepime alone & adjust further based on culture updates.   Therapy with vancomycin complete and/or consult discontinued by provider.  Pharmacy will sign off, please re-consult as needed.    Thank you for allowing us to participate in this patient's care.   Katherine McArdle, Pharm.D. 7/25/2022 11:33 AM      Lactation Monisha Abreu RN, BSN

## 2022-07-25 NOTE — PROGRESS NOTES
[facility] - Intensive Care [facility] Medicine  Progress Note    Patient Name: Alexandra Goins  MRN: [id number]  Patient Class: IP- Inpatient   Admission Date: 7/24/2022  Length of Stay: 1 days  Attending Physician: Trevor Gibbs MD  Primary Care Provider: Perez Casiano MD        Subjective:     Principal Problem:Severe sepsis        HPI:   Fever       Since Tuesday morning. recent skin cancer removed from face    Altered Mental Status       lethargic since this morning      Per ER- This is a 56 y.o. female patient with a PMHx of  COPD, HTN, HLP, T2DM, pneumonia, nicotine dependence, non ruptured cerebral aneurysm, stroke, PVD, anxiety, depression, diverticular disease, GERD, hypothyroidism, PVD, severe obesity, arthritis, asthma, Bipolar 1 disorder, and CAD who presents to the Emergency Department for evaluation of AMS which onset gradually this morning. Per , pt came in 5 days PTA to have biopsy of suspected skin cancer.  states pt had a slight fever beginning Tuesday and that the fever got higher over the next 3-4 days.  states pt fell on Friday but appeared to be okay.  states pt was very lethargic this morning, weak, extremely hard to wake and was speaking nonsense upon waking up.  states pt has COPD and is not on oxygen at home. Symptoms are constant and moderate in severity. No mitigating or exacerbating factors reported. Associated sxs include SOB, fever and weakness. Patient is unable to deny other sxs due to AMS at this time.  No further complaints or concerns at this time. HPI limited to AMS.      Patient evaluated by ER and found to have Septic shock. Patient to be admitted to ICU on Iv Pressers.     Patient currently awake, alert, oriented to person, place, time, and situation. Code status discussed with patient  and her current  wishes are to be a FULL CODE.             Overview/Hospital Course:  No notes on file    Interval History: Remained  hemodynamically stable overnight off Vasopressors.  Fatigued and somnolent but improved.  Urine no longer purulent.  Blood sugars remain very high.  Increasing sliding scale and adding Levemir.  Transfer out of ICU.    Review of Systems   Constitutional:  Positive for fatigue. Negative for chills and fever.   HENT:  Negative for congestion and sore throat.    Eyes:  Negative for visual disturbance.   Respiratory:  Negative for cough, shortness of breath and wheezing.    Cardiovascular:  Negative for chest pain, palpitations and leg swelling.   Gastrointestinal:  Negative for abdominal pain, blood in stool, constipation, diarrhea, nausea and vomiting.   Genitourinary:  Positive for flank pain and pelvic pain. Negative for dysuria and hematuria.   Musculoskeletal:  Negative for arthralgias and back pain.   Skin:  Negative for rash and wound.   Neurological:  Negative for dizziness, weakness, light-headedness and numbness.   Hematological:  Negative for adenopathy.   Objective:     Vital Signs (Most Recent):  Temp: 99.5 °F (37.5 °C) (07/25/22 0800)  Pulse: 109 (07/25/22 0814)  Resp: (!) 22 (07/25/22 0814)  BP: (!) 110/53 (07/25/22 0800)  SpO2: (!) 89 % (07/25/22 0814)   Vital Signs (24h Range):  Temp:  [97.5 °F (36.4 °C)-101.8 °F (38.8 °C)] 99.5 °F (37.5 °C)  Pulse:  [] 109  Resp:  [9-34] 22  SpO2:  [83 %-99 %] 89 %  BP: ()/(44-88) 110/53     Weight: 96.1 kg (211 lb 13.8 oz)  Body mass index is 36.37 kg/m².    Intake/Output Summary (Last 24 hours) at 7/25/2022 1018  Last data filed at 7/25/2022 0700  Gross per 24 hour   Intake 3893.33 ml   Output 1385 ml   Net 2508.33 ml      Physical Exam  Vitals and nursing note reviewed.   Constitutional:       General: She is not in acute distress.     Appearance: She is well-developed.   HENT:      Head: Normocephalic and atraumatic.   Eyes:      Conjunctiva/sclera: Conjunctivae normal.      Pupils: Pupils are equal, round, and reactive to light.   Neck:      Thyroid:  No thyromegaly.      Vascular: No JVD.   Cardiovascular:      Rate and Rhythm: Normal rate and regular rhythm.      Heart sounds: No murmur heard.    No friction rub. No gallop.   Pulmonary:      Effort: Pulmonary effort is normal.      Breath sounds: Normal breath sounds. No wheezing or rales.   Abdominal:      General: Bowel sounds are normal. There is no distension.      Palpations: Abdomen is soft.      Tenderness: There is no abdominal tenderness. There is no guarding or rebound.   Genitourinary:     Comments: Rose catheter in place draining clear yellow urine  Musculoskeletal:         General: No deformity. Normal range of motion.      Cervical back: Neck supple.   Lymphadenopathy:      Cervical: No cervical adenopathy.   Skin:     General: Skin is warm and dry.      Findings: No rash.   Neurological:      Mental Status: She is alert and oriented to person, place, and time.      Deep Tendon Reflexes: Reflexes are normal and symmetric.   Psychiatric:         Behavior: Behavior normal.         Thought Content: Thought content normal.         Judgment: Judgment normal.       Significant Labs: All pertinent labs within the past 24 hours have been reviewed.    Significant Imaging: I have reviewed all pertinent imaging results/findings within the past 24 hours.      Assessment/Plan:      * Severe sepsis  This patient does have evidence of infective focus  My overall impression is Septic shock . Vital signs were reviewed and noted in progress note.  Antibiotics given-   Antibiotics (From admission, onward)            Start     Stop Route Frequency Ordered    07/24/22 2100  mupirocin 2 % ointment         07/29 2059 Nasl 2 times daily 07/24/22 1445    07/24/22 1830  metronidazole IVPB 500 mg         -- IV Every 8 hours (non-standard times) 07/24/22 1456    07/24/22 1800  cefepime in dextrose 5 % IVPB 2 g         -- IV Every 24 hours (non-standard times) 07/24/22 1555    07/24/22 1442  vancomycin - pharmacy to dose   "(vancomycin IVPB)        "And" Linked Group Details    -- IV pharmacy to manage frequency 07/24/22 1342        Cultures were taken-   Microbiology Results (last 7 days)     Procedure Component Value Units Date/Time    Blood culture x two cultures. Draw prior to antibiotics [[id number]] Collected: 07/24/22 1200    Order Status: Completed Specimen: Blood from Line, Femoral, Right Updated: 07/25/22 0715     Blood Culture, Routine No Growth to date    Narrative:      Aerobic and anaerobic    Blood culture x two cultures. Draw prior to antibiotics [[id number]] Collected: 07/24/22 1135    Order Status: Completed Specimen: Blood from Peripheral, Antecubital, Right Updated: 07/25/22 0715     Blood Culture, Routine No Growth to date    Narrative:      Aerobic and anaerobic    Stool culture [[id number]]     Order Status: No result Specimen: Stool     Clostridium difficile EIA [[id number]]     Order Status: Canceled Specimen: Stool     Culture, Respiratory with Gram Stain [[id number]]     Order Status: No result Specimen: Respiratory     Culture, MRSA [[id number]]     Order Status: No result Specimen: MRSA source     Urine culture [[id number]] Collected: 07/24/22 1255    Order Status: No result Specimen: Urine Updated: 07/24/22 1335        Lactic acid level pending    Organ dysfunction indicated by Acute kidney injury, Encephalopathy  and Acute respiratory failure     Source- UTI, diarrhea- work up in progress    Source control Achieved by- IV ABx    Stabilized and transferred out of ICU 25 July.    Hypoalbuminemia  Monitor      Hyponatremia  Monitor      Diarrhea  Check stools  Monitor  Check Ct scan      COPD (chronic obstructive pulmonary disease)  Monitor O2 sats, supplemental O2 as needed  Add duonebs q 4 hours      MAGDA (acute kidney injury) secondary to dehydration  Hold home diuretics  Currently receiving IVF bolus  Monitor  Trend BMP  Renal dose all medications           Severe obesity (BMI 35.0-39.9) with comorbidity  Body " mass index is 36.66 kg/m². Morbid obesity complicates all aspects of disease management from diagnostic modalities to treatment. Weight loss encouraged and health benefits explained to patient.         Coronary artery disease of native artery of native heart with stable angina pectoris  Telemetry  Trend troponins      Acute respiratory failure with hypoxia  Telemetry  Monitor O2 sats, supplemental O2 as needed  Duonebs q 4 hours  Possible allergy to pulmicort       Debility  Fall and skin precautions  Turn Q 2 hours           Hx of arterial ischemic stroke  An non ruptuured aneursym  Monitor  Neurochecks q 4 hours  Check Ct scan brain      Acute urinary tract infection  Check urine culture  Continue Iv ABx      Type 2 diabetes mellitus with complication, with long-term current use of insulin   Dm diet once tolerating po intake  Accuchecks with correctional SSI  Lab Results   Component Value Date    HGBA1C 11.3 (H) 05/31/2022     Consult dietician and DM educator on patient stabilized      Hx of Renal abscess, right  Check Ct abd/ pelvis      Nicotine dependence  Smoking cessation education > 3 minutes  Add nicotine patch      Bipolar disorder with anxiety and depression  Resume home medications once stabilized  Check urine drug screen        VTE Risk Mitigation (From admission, onward)         Ordered     IP VTE HIGH RISK PATIENT  Once         07/24/22 1444     Place sequential compression device  Until discontinued         07/24/22 1444                Discharge Planning   NOÉ:      Code Status: Full Code   Is the patient medically ready for discharge?:     Reason for patient still in hospital (select all that apply): Patient trending condition and Treatment               Critical care time spent on the evaluation and treatment of severe organ dysfunction, review of pertinent labs and imaging studies, discussions with consulting providers and discussions with patient/family: 30 minutes.      Trevor Gibbs,  MD  Department of Hospital Medicine   [facility] - [facility] ([facility])

## 2022-07-25 NOTE — ASSESSMENT & PLAN NOTE
Encouraged tobacco cessation  Recommend outpt pulm follow up with complete PFTs  Cont ICS and LABA and DARIUS

## 2022-07-25 NOTE — PLAN OF CARE
[facility] - Intensive Care (Hospital)  Initial Discharge Assessment       Primary Care Provider: Perez Casiano MD    Admission Diagnosis: Altered mental status [R41.82]  Hypoxia [R09.02]  Septic shock [A41.9, R65.21]  Urinary tract infection with hematuria, site unspecified [N39.0, R31.9]  Acute renal failure, unspecified acute renal failure type [N17.9]  Severe sepsis [A41.9, R65.20]    Admission Date: 7/24/2022  Expected Discharge Date:     Discharge Barriers Identified: None    Payor: HUMANA MANAGED MEDICARE / Plan: HUMANA TOTAL CARE ADVANTAGE / Product Type: Medicare Advantage /     Extended Emergency Contact Information  Primary Emergency Contact: Kaiden Goins  Address: [address] [place] [address] [facility]  Home Phone: [phone]  Mobile Phone: [phone]  Relation: Spouse   needed? No  Secondary Emergency Contact: Joelle Goins   [place]  Mobile Phone: [phone]  Relation: Daughter    Discharge Plan A: Home with family  Discharge Plan B: Home Health      [facility] [id number]  [place] - [address] [place] [address]  [address] [place] [address]  [place] [address]  Phone: [phone] Fax: [phone]    [id number] - [place] - [address] [address] [facility] [address]  [address]  [address]  [place] [address]  Phone: [phone] Fax: [phone]      Initial Assessment (most recent)     Adult Discharge Assessment - 07/25/22 1037        Discharge Assessment    Assessment Type Discharge Planning Assessment     Confirmed/corrected address, phone number and insurance Yes     Confirmed Demographics Correct on Facesheet     Source of Information patient     When was your last doctors appointment? 05/31/22     Communicated NOÉ with patient/caregiver Date not available/Unable to determine     Reason For Admission Severe Sepsis     Lives With spouse     Facility Arrived From: home     Do you expect to return to your current living  situation? Yes     Do you have help at home or someone to help you manage your care at home? Yes     Who are your caregiver(s) and their phone number(s)? Kaiden Goins (Spouse)     Prior to hospitilization cognitive status: Alert/Oriented     Current cognitive status: Not Oriented to Time;Not Oriented to Place     Walking or Climbing Stairs Difficulty ambulation difficulty, requires equipment     Mobility Management cane, rolling walker     Dressing/Bathing Difficulty none     Home Accessibility stairs to enter home     Number of Stairs, Main Entrance five     Stair Railings, Main Entrance railings safe and in good condition     Equipment Currently Used at Home cane, quad;walker, rolling     Readmission within 30 days? No     Patient currently being followed by outpatient case management? No     Do you currently have service(s) that help you manage your care at home? No     Do you take prescription medications? Yes     Do you have prescription coverage? Yes     Coverage Medicare     Do you have any problems affording any of your prescribed medications? No     Is the patient taking medications as prescribed? yes     Who is going to help you get home at discharge? Kaiden Goins (Spouse)     How do you get to doctors appointments? family or friend will provide;car, drives self     Are you on dialysis? No     Do you take coumadin? No     Discharge Plan A Home with family     Discharge Plan B Home Health     DME Needed Upon Discharge  oxygen     Discharge Plan discussed with: Patient     Discharge Barriers Identified None        Relationship/Environment    Name(s) of Who Lives With Patient Kaiden Goins (Spouse)               Anticipated DC dispo: home with family   Prior Level of Function: Independent with ADLs, lives with spouse   PCP: Perez Casiano MD    Comments:  CM met with patient at bedside to introduce role and discuss discharge planning. Patient lives with spouse, Kaiden, who will also be help at home  and can provide transport at time of discharge. CM discharge needs depends on hospital progress. CM will continue following to assist with other needs.

## 2022-07-25 NOTE — EICU
EICU Note      Call received from bedside RN that the patient's BP is 143/104 mmHg and the HR is sinus 126 bpm. Patient was initially in shock and was on levophed but the levophed has subsequently been stopped.    Plan:    -Restart home Toprol XL 25 mg PO OD  -Monitor BP and HR

## 2022-07-25 NOTE — ASSESSMENT & PLAN NOTE
Source UTI w/ prabably PNA  Zosyn and Vanc given in ED  Cont Cefepime, Flagyl and Vanc Day #2  Blood and Urine cultures NGTD  No sputum produced  IVF bolus in ED and cont maintenance IVFs  Not required Levophed infusion since ICU admit yesterday evening

## 2022-07-25 NOTE — NURSING
Pt arrived from ED in no acute distress on cont levo to R femoral central line, will wean off as BP tolerates.  AAOx4, following commands.  IV abx infusing.  Requiring increase in O2 to maintain sat.  Rose catheter in place.

## 2022-07-25 NOTE — HOSPITAL COURSE
Admitted for treatment of septic shock due to UTI.  Empirically received Zosyn in the ED and emilia urine and blood cultures.  IV fluid resuscitation was started with inadequate response and she briefly required Norepinephrine infusion.  Hyperglycemia and mild metabolic acidosis on admission.  Acidosis resolved and blood sugars remained high and corrected with intermittent insulin.  Further stabilized and transferred out of the ICU on 25 July.    7/26: blood cultures growing gram negative rods empirically. Cont cefepime. Currently on 15L NC.     7/27:  Blood cultures growing salmonella in 1/2 sets.  Antibiotics de-escalated to Rocephin.  Patient transferred to ICU overnight due to hypoxia and hypercapnia requiring BiPAP.  BNP slightly elevated.  Echo obtained.  Lasix given this a.m..    7/28:  Patient weaned off of BiPAP.  Currently on Vapotherm.  Wheezing noted this a.m..  Start Solu-Medrol.  Wean oxygen as tolerated.  Continue Rocephin for salmonella bacteremia.  Possible step-down tomorrow.    7/29- Awake , alert, however slow to answer questions. Oriented to self, persons and place. Currently on Vapotherm 25/70. Nightly  Bipap continues. C/O constipation. Afebrile . WBC normalized . PCT down to 3.11>56.4. Urine culture - multiple organisms isolated. Repeat blood culture - Coag neg staph. Rocephin continues for Salmonella bacteremia. CXR with persistent bilateral patchy airspace and interstitial opacities diffusely through the lungs.     7/30- More awake and alert today, however did not use BiPAP last night. Per nursing pt was restless last night required prn meds. Oxygen demand further decreased. Currently Vapotherm 15/40. Follow up CXR with improvement. Labs are stable. Downgrade to tele. Disposition - Rehab     7/31- Episode of hypotension for unknown reason last night treated with bolus  ml. BP has been high since . Will resume home Imdur and add lasix.  Oxygen demand cont to improve. Currently on 4.5  L/min via NC. Rocephin continues to complete 10 days course EOT 8/2/22. Disposition- Pt and  prefer to go home than rehab . Blood glucose elevated at >400 after drinking soda . Additional insulin utilized.     8/1- O2 demand stable at 4.5 L/min. Pt feels better and wants to go home . Home O2 eval performed. CM consulted for discharge planing - Home O2, home Health setup.     8/2- No acute events overnight . Completed Rocephin x 10 days today for salmonella bacteremia. O2 demand stable at 4L/min. Labs are reviewed and stable at baseline except blood glucose noted to be high due to steroid . Completed prednisone therapy today. At this point pt deemed stable for discharge to home with Home Health . Follow up with PCP in 3 to 7 days.

## 2022-07-25 NOTE — PLAN OF CARE
P.T. EVAL COMPLETE.  PT CURRENTLY REQUIRES MAXA X 2 FOR BED MOBILITY, MODA X 2 FOR BED<>CHAIR TF.  P.T. RECOMMENDS INPATIENT REHAB AT D/C

## 2022-07-25 NOTE — ASSESSMENT & PLAN NOTE
Urine and Blood cultures NGTD  Cont IVAB and IVFs  Reported recent hx of renal abscess per patient but no records of this at [facility] or outside [facility] records for last 2 years  CT Renal: Left ureteral fullness with mild left-sided pelviectasis.  Punctate nonobstructing right renal calculi.  Hepatomegaly.  Left colonic diverticulosis.  Bibasilar atelectasis/consolidation suggestive of pneumonia.  Atherosclerotic changes.  Cholecystectomy.  No drainable fluid collection is identified

## 2022-07-25 NOTE — ASSESSMENT & PLAN NOTE
Follows with Dr. Chakraborty in clinic  Cont ASA  Resumed Toprol and add Imdur as BP increases  Cont cardiac monitoring  Trend troponin  Denies CP

## 2022-07-25 NOTE — ASSESSMENT & PLAN NOTE
"This patient does have evidence of infective focus  My overall impression is Septic shock . Vital signs were reviewed and noted in progress note.  Antibiotics given-   Antibiotics (From admission, onward)            Start     Stop Route Frequency Ordered    07/24/22 2100  mupirocin 2 % ointment         07/29 2059 Nasl 2 times daily 07/24/22 1445    07/24/22 1830  metronidazole IVPB 500 mg         -- IV Every 8 hours (non-standard times) 07/24/22 1456    07/24/22 1800  cefepime in dextrose 5 % IVPB 2 g         -- IV Every 24 hours (non-standard times) 07/24/22 1555    07/24/22 1442  vancomycin - pharmacy to dose  (vancomycin IVPB)        "And" Linked Group Details    -- IV pharmacy to manage frequency 07/24/22 1342        Cultures were taken-   Microbiology Results (last 7 days)     Procedure Component Value Units Date/Time    Blood culture x two cultures. Draw prior to antibiotics [[id number]] Collected: 07/24/22 1200    Order Status: Completed Specimen: Blood from Line, Femoral, Right Updated: 07/25/22 0715     Blood Culture, Routine No Growth to date    Narrative:      Aerobic and anaerobic    Blood culture x two cultures. Draw prior to antibiotics [[id number]] Collected: 07/24/22 1135    Order Status: Completed Specimen: Blood from Peripheral, Antecubital, Right Updated: 07/25/22 0715     Blood Culture, Routine No Growth to date    Narrative:      Aerobic and anaerobic    Stool culture [[id number]]     Order Status: No result Specimen: Stool     Clostridium difficile EIA [[id number]]     Order Status: Canceled Specimen: Stool     Culture, Respiratory with Gram Stain [[id number]]     Order Status: No result Specimen: Respiratory     Culture, MRSA [[id number]]     Order Status: No result Specimen: MRSA source     Urine culture [[id number]] Collected: 07/24/22 1255    Order Status: No result Specimen: Urine Updated: 07/24/22 1335        Lactic acid level pending    Organ dysfunction indicated by Acute kidney injury, " Encephalopathy  and Acute respiratory failure     Source- UTI, diarrhea- work up in progress    Source control Achieved by- IV ABx    Stabilized and transferred out of ICU 25 July.

## 2022-07-25 NOTE — PLAN OF CARE
OT anna completed. Sup>sit max A of 2, sit>stand mod A of 2, step>pivot to bedside chair with mod A of 2. Recommending inpatient rehab at d/c.

## 2022-07-25 NOTE — PLAN OF CARE
Plan of care reviewed with pt.  BP stable off levo, cont IV fluids and abx administered per orders.  Requiring 7L HF NC O2 to maintain sat. No resp distress noted.  DM diet initiated, SSI for hyperglycemia, accuchecks achs.  Rose catheter in use, urine cloudy.  Pt with c/o side pain from where she states she hit when she fell at home, prn order for pain meds requested and received from MD.

## 2022-07-25 NOTE — ASSESSMENT & PLAN NOTE
Creatinine trending down with volume resuscitation  Cont IVFs   Follow up labs   Accurate I/Os  Rose in place  Avoid nephrotoxic meds and renal dose IVAB per pharmacy

## 2022-07-25 NOTE — PROGRESS NOTES
[facility] - Intensive Care ([facility])  Critical Care Medicine  Progress Note    Patient Name: Alexandra Goins  MRN: [id number]  Admission Date: 7/24/2022  Hospital Length of Stay: 1 days  Code Status: Full Code  Attending Provider: Trevor Gibbs MD  Primary Care Provider: Perez Casiano MD   Principal Problem: Severe sepsis    Subjective:     HPI:  Ms Goins is a obese 57 yo WF with a PMH of COPD, CVA, CAD, BiPolar D/O, DM2 on insulin, HLD, HTN, PVD and Hypothyroidism with multiple allergies.  She presented to [facility] ED about noon today via personal vehicle confused with family concern of lethargy, malaise and Alt MS progressive since this AM and noted intermittent fever last 6 days.  In ED confused with temp 100.5, BP 68/44, WBC 18, creatinine 3.7, Mg 1 and UA+.  Given IVFs, IVAB, Levophed infusion, Mg, Tylenol, Ibuprofen and CL placed in ED.  Admitted to ICU.      Hospital/ICU Course:  7/24 - Patient seen in ED awaiting ICU admit.  She is lethargic but easily aroused with orientation X 3 in no distress on low flow NC O2.  BP improved post 2L IVF and on low dose Levophed infusion.  Family reportedly requested DNR status but patient has declined DNR and wants to be full code.  Dr. Gibbs at bed side also for exam and patient request of Full Code status.   7/25 - Upright in bed sleeping this AM easily awakened and responsive with orientation and no distress on high flow NC O2.  Not required Levophed since ICU admit yesterday evening.  States she feels much better.       Review of Systems   Constitutional:  Positive for malaise/fatigue (improved). Negative for chills and fever.   HENT:  Negative for congestion.    Eyes:  Negative for blurred vision.   Respiratory:  Positive for shortness of breath (AYALA). Negative for cough and sputum production.    Cardiovascular:  Negative for chest pain and leg swelling.   Gastrointestinal:  Negative for abdominal pain, nausea and vomiting.   Genitourinary:  Negative for  dysuria.   Musculoskeletal:  Positive for back pain and myalgias.   Skin:  Negative for rash.   Neurological:  Negative for dizziness, weakness and headaches.   Endo/Heme/Allergies:  Does not bruise/bleed easily.   Psychiatric/Behavioral:  The patient is not nervous/anxious.        Objective:     Vital Signs (Most Recent):  Temp: (!) 101.8 °F (38.8 °C) (07/25/22 0508)  Pulse: (!) 116 (07/25/22 0600)  Resp: (!) 22 (07/25/22 0600)  BP: (!) 109/55 (07/25/22 0600)  SpO2: (!) 90 % (07/25/22 0600)   Vital Signs (24h Range):  Temp:  [97.5 °F (36.4 °C)-101.8 °F (38.8 °C)] 101.8 °F (38.8 °C)  Pulse:  [] 116  Resp:  [9-34] 22  SpO2:  [83 %-99 %] 90 %  BP: ()/(44-88) 109/55     Weight: 96.1 kg (211 lb 13.8 oz)  Body mass index is 36.37 kg/m².      Intake/Output Summary (Last 24 hours) at 7/25/2022 0759  Last data filed at 7/25/2022 0600  Gross per 24 hour   Intake 3627.86 ml   Output 1385 ml   Net 2242.86 ml       Physical Exam  Vitals and nursing note reviewed.   Constitutional:       General: She is awake. She is not in acute distress.     Appearance: She is well-developed. She is morbidly obese. She is ill-appearing. She is not toxic-appearing or diaphoretic.      Interventions: She is not intubated.Nasal cannula in place.   HENT:      Head: Normocephalic and atraumatic.      Mouth/Throat:      Mouth: Mucous membranes are dry.   Eyes:      General: Lids are normal.      Pupils: Pupils are equal, round, and reactive to light.   Neck:      Trachea: Trachea normal.      Comments: Obese  Cardiovascular:      Rate and Rhythm: Regular rhythm. Tachycardia present.      Pulses:           Radial pulses are 1+ on the right side and 1+ on the left side.        Dorsalis pedis pulses are 1+ on the right side and 1+ on the left side.      Heart sounds: Normal heart sounds.   Pulmonary:      Effort: Pulmonary effort is normal. No tachypnea, accessory muscle usage or respiratory distress. She is not intubated.      Breath  sounds: Examination of the right-lower field reveals decreased breath sounds. Examination of the left-lower field reveals decreased breath sounds. Decreased breath sounds present.   Chest:      Chest wall: No deformity or tenderness.   Abdominal:      General: Bowel sounds are decreased. There is no distension.      Palpations: Abdomen is soft.      Tenderness: There is no abdominal tenderness.      Comments: Obese   Genitourinary:     Comments: Rose in place  Musculoskeletal:         General: Normal range of motion.      Cervical back: Normal range of motion.      Right lower leg: No edema.      Left lower leg: No edema.      Right foot: No deformity.      Left foot: No deformity.   Lymphadenopathy:      Cervical: No cervical adenopathy.   Skin:     General: Skin is warm and dry.      Capillary Refill: Capillary refill takes less than 2 seconds.      Findings: No rash.          Neurological:      General: No focal deficit present.      Mental Status: She is alert, oriented to person, place, and time and easily aroused.      GCS: GCS eye subscore is 4. GCS verbal subscore is 5. GCS motor subscore is 6.   Psychiatric:         Attention and Perception: Attention normal.         Mood and Affect: Affect is flat.         Speech: Speech normal.         Behavior: Behavior normal. Behavior is cooperative.         Thought Content: Thought content normal.         Cognition and Memory: Cognition normal.         Judgment: Judgment normal.       Vents:  Oxygen Concentration (%): 40 (07/24/22 1156)    Lines/Drains/Airways       Central Venous Catheter Line  Duration             Percutaneous Central Line Insertion/Assessment - Triple Lumen  07/24/22 1200 right femoral vein <1 day              Drain  Duration                  Urethral Catheter 07/24/22 1647 <1 day              Peripheral Intravenous Line  Duration                  Peripheral IV - Single Lumen 07/24/22 1537 22 G Left Hand <1 day                    Significant  Labs:    CBC/Anemia Profile:  Recent Labs   Lab 07/24/22  1128 07/25/22  0510   WBC 18.70* 14.48*   HGB 13.2 11.4*   HCT 40.9 36.0*   * 160   MCV 95 96   RDW 14.6* 14.9*        Chemistries:  Recent Labs   Lab 07/24/22  1207 07/24/22 2002 07/25/22  0510   * 132* 133*   K 4.4 4.5 4.8   CL 94* 98 100   CO2 27 21* 20*   BUN 53* 53* 50*   CREATININE 3.7* 3.0* 2.4*   CALCIUM 7.8* 6.4* 7.3*   ALBUMIN 2.1*  --  1.9*   PROT 6.2  --  5.9*   BILITOT 0.8  --  0.6   ALKPHOS 66  --  73   ALT 34  --  30   AST 38  --  33   MG 1.0* 1.5* 1.7   PHOS 3.9  --   --        POCT Glucose:   Recent Labs   Lab 07/24/22  1702 07/24/22 2003 07/25/22  0508   POCTGLUCOSE 285* 358* 276*     All pertinent labs within the past 24 hours have been reviewed.    Significant Imaging:  I have reviewed all pertinent imaging results/findings within the past 24 hours.  CT: I have reviewed all pertinent results/findings within the past 24 hours and my personal findings are:  CT Renal: Left ureteral fullness with mild left-sided pelviectasis.  Punctate nonobstructing right renal calculi.  Hepatomegaly.  Left colonic diverticulosis.  Bibasilar atelectasis/consolidation suggestive of pneumonia.  Atherosclerotic changes.  Cholecystectomy.  No drainable fluid collection is identified      ABG  Recent Labs   Lab 07/24/22  1156   PH 7.341*   PO2 70*   PCO2 55.3*   HCO3 29.9*   BE 4     Assessment/Plan:     Neuro  Hx of arterial ischemic stroke  Cont ASA  Reported hx aneurysm and follows with NS with MRA every 2 years  Avoiding any anticoagulation    Psychiatric  Bipolar disorder with anxiety and depression  Hx Depression and hx SA on admit in 2015  Follows with Psych cont post discharge  Cont Wellbutrin     Pulmonary  Acute respiratory failure with hypoxia  Reported COPD from last admit Feb 2021 but no pulm records found of PFTs  Cont IVAB and high flow NC O2  Not on home O2  Cont nebs  Follow up CXR in AM    Cardiac/Vascular  Coronary artery disease  of native artery of native heart with stable angina pectoris  Follows with Dr. Chakraborty in clinic  Cont ASA  Resumed Toprol and add Imdur as BP increases  Cont cardiac monitoring  Trend troponin  Denies CP    Renal/  Acute urinary tract infection  Urine and Blood cultures NGTD  Cont IVAB and IVFs  Reported recent hx of renal abscess per patient but no records of this at [facility] or outside [facility] records for last 2 years  CT Renal: Left ureteral fullness with mild left-sided pelviectasis.  Punctate nonobstructing right renal calculi.  Hepatomegaly.  Left colonic diverticulosis.  Bibasilar atelectasis/consolidation suggestive of pneumonia.  Atherosclerotic changes.  Cholecystectomy.  No drainable fluid collection is identified    MAGDA (acute kidney injury) secondary to dehydration  Creatinine trending down with volume resuscitation  Cont IVFs   Follow up labs   Accurate I/Os  Rose in place  Avoid nephrotoxic meds and renal dose IVAB per pharmacy    ID  * Severe sepsis  Source UTI w/ prabably PNA  Zosyn and Vanc given in ED  Cont Cefepime, Flagyl and Vanc Day #2  Blood and Urine cultures NGTD  No sputum produced  IVF bolus in ED and cont maintenance IVFs  Not required Levophed infusion since ICU admit yesterday evening    Endocrine  Severe obesity (BMI 35.0-39.9) with comorbidity  Encouraged weight loss    Type 2 diabetes mellitus with complication, with long-term current use of insulin  Cont SSI  ADA diet    Other  Nicotine dependence  Encouraged tobacco cessation  Recommend outpt pulm follow up with complete PFTs  Cont ICS and LABA and DARIUS          Preventive Measures and Monitoring:   Stress Ulcer: Pepcid  Nutrition: ADA diet  Glucose control: SSI  Bowel prophylaxis: Colace and PRN Dulcolax  DVT prophylaxis: SCDs  Hx CAD on B-Blocker: Toprol  Head of Bed/Reposition: Elevate HOB and turn Q1-2 hours   Early Mobility: bed rest  Central Line Right Femoral Day: #2  Rose Day: #2  IVAB Day: #2  Code Status:  Full     Counseling/Consultation:I have discussed the care of this patient in detail with the bedside nursing staff and Dr. Dia and Dr. Gibbs    Will transfer to [facility] and sign off, please re-consult if needed.      Critical Care Time: 52 minutes  Critical secondary to Patient has a condition that poses threat to life and bodily function: Acute Renal Failure, Acute Hypoxic Resp Failure and Severe Sepsis     Critical care was time spent personally by me on the following activities: development of treatment plan with patient or surrogate and bedside caregivers, discussions with consultants, evaluation of patient's response to treatment, examination of patient, ordering and performing treatments and interventions, ordering and review of laboratory studies, ordering and review of radiographic studies, pulse oximetry, re-evaluation of patient's condition. This critical care time did not overlap with that of any other provider or involve time for any procedures.     Jamie Velasquez NP  Critical Care Medicine  [facility] - Intensive Care [facility])

## 2022-07-25 NOTE — ASSESSMENT & PLAN NOTE
Cont ASA  Reported hx aneurysm and follows with NS with MRA every 2 years  Avoiding any anticoagulation

## 2022-07-25 NOTE — SUBJECTIVE & OBJECTIVE
Interval History: Remained hemodynamically stable overnight off Vasopressors.  Fatigued and somnolent but improved.  Urine no longer purulent.  Blood sugars remain very high.  Increasing sliding scale and adding Levemir.  Transfer out of ICU.    Review of Systems   Constitutional:  Positive for fatigue. Negative for chills and fever.   HENT:  Negative for congestion and sore throat.    Eyes:  Negative for visual disturbance.   Respiratory:  Negative for cough, shortness of breath and wheezing.    Cardiovascular:  Negative for chest pain, palpitations and leg swelling.   Gastrointestinal:  Negative for abdominal pain, blood in stool, constipation, diarrhea, nausea and vomiting.   Genitourinary:  Positive for flank pain and pelvic pain. Negative for dysuria and hematuria.   Musculoskeletal:  Negative for arthralgias and back pain.   Skin:  Negative for rash and wound.   Neurological:  Negative for dizziness, weakness, light-headedness and numbness.   Hematological:  Negative for adenopathy.   Objective:     Vital Signs (Most Recent):  Temp: 99.5 °F (37.5 °C) (07/25/22 0800)  Pulse: 109 (07/25/22 0814)  Resp: (!) 22 (07/25/22 0814)  BP: (!) 110/53 (07/25/22 0800)  SpO2: (!) 89 % (07/25/22 0814)   Vital Signs (24h Range):  Temp:  [97.5 °F (36.4 °C)-101.8 °F (38.8 °C)] 99.5 °F (37.5 °C)  Pulse:  [] 109  Resp:  [9-34] 22  SpO2:  [83 %-99 %] 89 %  BP: ()/(44-88) 110/53     Weight: 96.1 kg (211 lb 13.8 oz)  Body mass index is 36.37 kg/m².    Intake/Output Summary (Last 24 hours) at 7/25/2022 1018  Last data filed at 7/25/2022 0700  Gross per 24 hour   Intake 3893.33 ml   Output 1385 ml   Net 2508.33 ml      Physical Exam  Vitals and nursing note reviewed.   Constitutional:       General: She is not in acute distress.     Appearance: She is well-developed.   HENT:      Head: Normocephalic and atraumatic.   Eyes:      Conjunctiva/sclera: Conjunctivae normal.      Pupils: Pupils are equal, round, and reactive to  light.   Neck:      Thyroid: No thyromegaly.      Vascular: No JVD.   Cardiovascular:      Rate and Rhythm: Normal rate and regular rhythm.      Heart sounds: No murmur heard.    No friction rub. No gallop.   Pulmonary:      Effort: Pulmonary effort is normal.      Breath sounds: Normal breath sounds. No wheezing or rales.   Abdominal:      General: Bowel sounds are normal. There is no distension.      Palpations: Abdomen is soft.      Tenderness: There is no abdominal tenderness. There is no guarding or rebound.   Genitourinary:     Comments: Rose catheter in place draining clear yellow urine  Musculoskeletal:         General: No deformity. Normal range of motion.      Cervical back: Neck supple.   Lymphadenopathy:      Cervical: No cervical adenopathy.   Skin:     General: Skin is warm and dry.      Findings: No rash.   Neurological:      Mental Status: She is alert and oriented to person, place, and time.      Deep Tendon Reflexes: Reflexes are normal and symmetric.   Psychiatric:         Behavior: Behavior normal.         Thought Content: Thought content normal.         Judgment: Judgment normal.       Significant Labs: All pertinent labs within the past 24 hours have been reviewed.    Significant Imaging: I have reviewed all pertinent imaging results/findings within the past 24 hours.

## 2022-07-25 NOTE — PLAN OF CARE
POC reviewed with pt. Levophed has remained off throughout shift. Pt became increasingly tachycardic and hypertensive throughout shift. Was afebrile at time started got home metoprolol restarted with some relief. Later on pt became febrile and still tachycardic gave prn acetaminophen with some relief. Increased oxygen to 15L. TMAX 101.8. Report to be given to day shift RN who will assume care.           Problem: Adult Inpatient Plan of Care  Goal: Plan of Care Review  Outcome: Ongoing, Progressing  Goal: Patient-Specific Goal (Individualized)  Outcome: Ongoing, Progressing  Goal: Absence of Hospital-Acquired Illness or Injury  Outcome: Ongoing, Progressing  Goal: Optimal Comfort and Wellbeing  Outcome: Ongoing, Progressing  Goal: Readiness for Transition of Care  Outcome: Ongoing, Progressing     Problem: Diabetes Comorbidity  Goal: Blood Glucose Level Within Targeted Range  Outcome: Ongoing, Progressing     Problem: Adjustment to Illness (Sepsis/Septic Shock)  Goal: Optimal Coping  Outcome: Ongoing, Progressing     Problem: Bleeding (Sepsis/Septic Shock)  Goal: Absence of Bleeding  Outcome: Ongoing, Progressing     Problem: Glycemic Control Impaired (Sepsis/Septic Shock)  Goal: Blood Glucose Level Within Desired Range  Outcome: Ongoing, Progressing     Problem: Infection Progression (Sepsis/Septic Shock)  Goal: Absence of Infection Signs and Symptoms  Outcome: Ongoing, Progressing     Problem: Nutrition Impaired (Sepsis/Septic Shock)  Goal: Optimal Nutrition Intake  Outcome: Ongoing, Progressing     Problem: Fluid and Electrolyte Imbalance (Acute Kidney Injury/Impairment)  Goal: Fluid and Electrolyte Balance  Outcome: Ongoing, Progressing     Problem: Oral Intake Inadequate (Acute Kidney Injury/Impairment)  Goal: Optimal Nutrition Intake  Outcome: Ongoing, Progressing     Problem: Renal Function Impairment (Acute Kidney Injury/Impairment)  Goal: Effective Renal Function  Outcome: Ongoing, Progressing     Problem:  Infection  Goal: Absence of Infection Signs and Symptoms  Outcome: Ongoing, Progressing     Problem: Fall Injury Risk  Goal: Absence of Fall and Fall-Related Injury  Outcome: Ongoing, Progressing     Problem: Skin Injury Risk Increased  Goal: Skin Health and Integrity  Outcome: Ongoing, Progressing

## 2022-07-25 NOTE — PT/OT/SLP EVAL
"Occupational Therapy   Evaluation    Name: Alexandra Goins  MRN: [id number]  Admitting Diagnosis:  Severe sepsis  Recent Surgery: * No surgery found *      Recommendations:     Discharge Recommendations: rehabilitation facility  Discharge Equipment Recommendations:   (TBD)  Barriers to discharge:  None    Assessment:     Alexandra Goins is a 56 y.o. female with a medical diagnosis of Severe sepsis.  She presents with the following performance deficits affecting function: weakness, impaired endurance, impaired self care skills, impaired functional mobility, impaired balance, impaired cardiopulmonary response to activity, decreased safety awareness, impaired cognition.      Rehab Prognosis: Good; patient would benefit from acute skilled OT services to address these deficits and reach maximum level of function.       Plan:     Patient to be seen 2 x/week to address the above listed problems via self-care/home management, therapeutic activities, therapeutic exercises  · Plan of Care Expires: 08/06/22  · Plan of Care Reviewed with: patient    Subjective     Chief Complaint: Reported "I live with my ."  Patient/Family Comments/goals: get stronger    Occupational Profile:  PLOF obtained from chart as patient questionable historian.  Living Environment: Patient resides with her  in a mobile home with steps to enter.  Previous level of function: patient was independent with ADLs and ambulation prior to admission.  Roles and Routines: n/a  Equipment Used at Home:  walker, rolling, cane, straight  Assistance upon Discharge:     Pain/Comfort:  · Pain Rating 1:  (no nonverbal indicators of pain throughout)    Objective:     Communicated with: NurseRossy, prior to session.  Patient found supine with SCD, telemetry, pulse ox (continuous), peripheral IV, oxygen, bed alarm, blood pressure cuff upon OT entry to room.    General Precautions: Standard, fall, respiratory   Orthopedic Precautions:N/A   Braces: " N/A  Respiratory Status: High flow, flow 15 L/min, concentration 1000%    Bed Mobility:    · Patient completed Supine to Sit with maximal assistance and 2 persons    Functional Mobility/Transfers:  · Patient completed Sit <> Stand Transfer with moderate assistance and of 2 persons  with  hand-held assist   · Patient completed Bed <> Chair Transfer using Step Transfer technique with moderate assistance and of 2 persons with hand-held assist  · Functional Mobility: not appropriate at evaluation    Activities of Daily Living:  · Upper Body Dressing: maximal assistance .  · Lower Body Dressing: total assistance .    Cognitive/Visual Perceptual:  Cognitive/Psychosocial Skills:     -       Oriented to: Person   -       Follows Commands/attention:unable to consistently follow commands  -       Safety awareness/insight to disability: impaired   -       Mood/Affect/Coping skills/emotional control: Flat affect and Lethargic    Physical Exam:  Balance:    -       sitting: fair (due to lethargy with frequent posterior LOB), static standing: poor +, dynamic standing: poor  Upper Extremity Range of Motion:     -       Right Upper Extremity: WFL  -       Left Upper Extremity: WFL  Upper Extremity Strength:    -       Right Upper Extremity: Deficits: grossly 4/5  -       Left Upper Extremity: Deficits: grossly 4/5   Strength:    -       Right Upper Extremity: Deficits: fair  -       Left Upper Extremity: Deficits: fair    AMPAC 6 Click ADL:  AMPAC Total Score: 16    Treatment & Education:  Patient educated on role of OT in acute setting and benefits of participation. Educated on techniques to use to increase independence and decrease fall risk with functional transfers. Educated on importance of OOB activity and calling for A to transfer back to bed. Encouraged completion of B UE AROM therex throughout the day to tolerance to increase functional strength and activity tolerance. Patient stated understanding and in agreement with  POC.  Education:    Patient left up in chair with all lines intact, call button in reach and chair alarm on    GOALS:   Multidisciplinary Problems     Occupational Therapy Goals        Problem: Occupational Therapy    Goal Priority Disciplines Outcome Interventions   Occupational Therapy Goal     OT, PT/OT     Description: Goals to be met by: 8/6/22     Patient will increase functional independence with ADLs by performing:    Toileting from toilet with Contact Guard Assistance for hygiene and clothing management.   Toilet transfer to toilet with Contact Guard Assistance.  Increased functional strength in B UE grossly by 1/2 MM grade.                     History:     Past Medical History:   Diagnosis Date    Acute ischemic stroke 9/17/2019    Acute respiratory failure with hypoxia 2/11/2021    Aneurysm     Anxiety     Arthritis     Asthma     Bipolar 1 disorder     Cerebral aneurysm, nonruptured 9/19/2019    Coronary artery disease of native artery of native heart with stable angina pectoris 1/19/2022    Depression     Diabetes mellitus, type 2     Diverticular disease     GERD (gastroesophageal reflux disease)     Hyperlipemia     Hypertension     Hyponatremia 7/24/2022    Hypothyroidism     Pneumonia     PVD (peripheral vascular disease) 4/28/2021    Renal manifestation of secondary diabetes mellitus     Right-sided back pain 6/29/2019    Severe obesity (BMI 35.0-39.9) with comorbidity 5/31/2022    Stroke     Trouble in sleeping        Past Surgical History:   Procedure Laterality Date    CEREBRAL ANGIOGRAM N/A 10/28/2019    Procedure: ANGIOGRAM-CEREBRAL;  Surgeon: [facility] Provider;  Location: [address];  Service: Radiology;  Laterality: N/A;   190/Aayush    CHOLECYSTECTOMY      COLON SURGERY      COLONOSCOPY N/A 1/12/2018    Procedure: COLONOSCOPY;  Surgeon: Roque Mahajan III, MD;  Location: [facility];  Service: Endoscopy;  Laterality: N/A;    DENTAL SURGERY   05/21/2018    removal of 8 top teeth    HYSTERECTOMY      TONSILLECTOMY         Time Tracking:     OT Date of Treatment: 07/25/22  OT Start Time: 1025  OT Stop Time: 1050  OT Total Time (min): 25 min    Billable Minutes:Evaluation 15  Therapeutic Activity 10    7/25/2022

## 2022-07-26 PROBLEM — E87.1 HYPONATREMIA: Status: RESOLVED | Noted: 2022-07-24 | Resolved: 2022-07-26

## 2022-07-26 PROBLEM — R78.81 GRAM-NEGATIVE BACTEREMIA: Status: ACTIVE | Noted: 2022-07-26

## 2022-07-26 PROBLEM — R19.7 DIARRHEA: Status: RESOLVED | Noted: 2022-07-24 | Resolved: 2022-07-26

## 2022-07-26 LAB
ALBUMIN SERPL BCP-MCNC: 1.8 G/DL (ref 3.5–5.2)
ALLENS TEST: ABNORMAL
ALP SERPL-CCNC: 80 U/L (ref 55–135)
ALT SERPL W/O P-5'-P-CCNC: 37 U/L (ref 10–44)
ANION GAP SERPL CALC-SCNC: 8 MMOL/L (ref 8–16)
AST SERPL-CCNC: 60 U/L (ref 10–40)
BASOPHILS NFR BLD: 0 % (ref 0–1.9)
BILIRUB SERPL-MCNC: 0.6 MG/DL (ref 0.1–1)
BUN SERPL-MCNC: 41 MG/DL (ref 6–20)
CALCIUM SERPL-MCNC: 6.7 MG/DL (ref 8.7–10.5)
CHLORIDE SERPL-SCNC: 107 MMOL/L (ref 95–110)
CO2 SERPL-SCNC: 23 MMOL/L (ref 23–29)
CREAT SERPL-MCNC: 1.2 MG/DL (ref 0.5–1.4)
DELSYS: ABNORMAL
DIFFERENTIAL METHOD: ABNORMAL
EOSINOPHIL NFR BLD: 0 % (ref 0–8)
ERYTHROCYTE [DISTWIDTH] IN BLOOD BY AUTOMATED COUNT: 15.3 % (ref 11.5–14.5)
EST. GFR  (AFRICAN AMERICAN): 58 ML/MIN/1.73 M^2
EST. GFR  (NON AFRICAN AMERICAN): 50 ML/MIN/1.73 M^2
FLOW: 15
GLUCOSE SERPL-MCNC: 260 MG/DL (ref 70–110)
HCO3 UR-SCNC: 28.3 MMOL/L (ref 24–28)
HCT VFR BLD AUTO: 37.1 % (ref 37–48.5)
HGB BLD-MCNC: 11.1 G/DL (ref 12–16)
IMM GRANULOCYTES # BLD AUTO: ABNORMAL K/UL (ref 0–0.04)
IMM GRANULOCYTES NFR BLD AUTO: ABNORMAL % (ref 0–0.5)
LYMPHOCYTES NFR BLD: 13 % (ref 18–48)
MAGNESIUM SERPL-MCNC: 1.7 MG/DL (ref 1.6–2.6)
MCH RBC QN AUTO: 29.4 PG (ref 27–31)
MCHC RBC AUTO-ENTMCNC: 29.9 G/DL (ref 32–36)
MCV RBC AUTO: 98 FL (ref 82–98)
MODE: ABNORMAL
MONOCYTES NFR BLD: 2 % (ref 4–15)
NEUTROPHILS NFR BLD: 65 % (ref 38–73)
NEUTS BAND NFR BLD MANUAL: 20 %
NRBC BLD-RTO: 0 /100 WBC
PCO2 BLDA: 58.7 MMHG (ref 35–45)
PH SMN: 7.29 [PH] (ref 7.35–7.45)
PLATELET # BLD AUTO: 182 K/UL (ref 150–450)
PMV BLD AUTO: 10.4 FL (ref 9.2–12.9)
PO2 BLDA: 50 MMHG (ref 80–100)
POC BE: 2 MMOL/L
POC SATURATED O2: 79 % (ref 95–100)
POCT GLUCOSE: 177 MG/DL (ref 70–110)
POCT GLUCOSE: 208 MG/DL (ref 70–110)
POCT GLUCOSE: 218 MG/DL (ref 70–110)
POCT GLUCOSE: 245 MG/DL (ref 70–110)
POTASSIUM SERPL-SCNC: 4 MMOL/L (ref 3.5–5.1)
PROT SERPL-MCNC: 5.1 G/DL (ref 6–8.4)
RBC # BLD AUTO: 3.77 M/UL (ref 4–5.4)
SAMPLE: ABNORMAL
SITE: ABNORMAL
SODIUM SERPL-SCNC: 138 MMOL/L (ref 136–145)
WBC # BLD AUTO: 11.16 K/UL (ref 3.9–12.7)

## 2022-07-26 PROCEDURE — 94660 CPAP INITIATION&MGMT: CPT

## 2022-07-26 PROCEDURE — 25000003 PHARM REV CODE 250: Performed by: FAMILY MEDICINE

## 2022-07-26 PROCEDURE — 94640 AIRWAY INHALATION TREATMENT: CPT

## 2022-07-26 PROCEDURE — 63600175 PHARM REV CODE 636 W HCPCS: Performed by: FAMILY MEDICINE

## 2022-07-26 PROCEDURE — 94761 N-INVAS EAR/PLS OXIMETRY MLT: CPT

## 2022-07-26 PROCEDURE — 97530 THERAPEUTIC ACTIVITIES: CPT

## 2022-07-26 PROCEDURE — 82803 BLOOD GASES ANY COMBINATION: CPT

## 2022-07-26 PROCEDURE — 25000003 PHARM REV CODE 250: Performed by: NURSE PRACTITIONER

## 2022-07-26 PROCEDURE — 25000242 PHARM REV CODE 250 ALT 637 W/ HCPCS: Performed by: NURSE PRACTITIONER

## 2022-07-26 PROCEDURE — 11000001 HC ACUTE MED/SURG PRIVATE ROOM

## 2022-07-26 PROCEDURE — 36410 VNPNXR 3YR/> PHY/QHP DX/THER: CPT

## 2022-07-26 PROCEDURE — 27000190 HC CPAP FULL FACE MASK W/VALVE

## 2022-07-26 PROCEDURE — 76937 US GUIDE VASCULAR ACCESS: CPT

## 2022-07-26 PROCEDURE — 87040 BLOOD CULTURE FOR BACTERIA: CPT | Mod: 59 | Performed by: FAMILY MEDICINE

## 2022-07-26 PROCEDURE — 80053 COMPREHEN METABOLIC PANEL: CPT | Performed by: NURSE PRACTITIONER

## 2022-07-26 PROCEDURE — 99900035 HC TECH TIME PER 15 MIN (STAT)

## 2022-07-26 PROCEDURE — 85007 BL SMEAR W/DIFF WBC COUNT: CPT | Performed by: NURSE PRACTITIONER

## 2022-07-26 PROCEDURE — S4991 NICOTINE PATCH NONLEGEND: HCPCS | Performed by: NURSE PRACTITIONER

## 2022-07-26 PROCEDURE — 27100171 HC OXYGEN HIGH FLOW UP TO 24 HOURS

## 2022-07-26 PROCEDURE — 96372 THER/PROPH/DIAG INJ SC/IM: CPT

## 2022-07-26 PROCEDURE — 85027 COMPLETE CBC AUTOMATED: CPT | Performed by: NURSE PRACTITIONER

## 2022-07-26 PROCEDURE — C1751 CATH, INF, PER/CENT/MIDLINE: HCPCS

## 2022-07-26 PROCEDURE — 83735 ASSAY OF MAGNESIUM: CPT | Performed by: NURSE PRACTITIONER

## 2022-07-26 PROCEDURE — 97530 THERAPEUTIC ACTIVITIES: CPT | Mod: CQ

## 2022-07-26 PROCEDURE — 25000003 PHARM REV CODE 250: Performed by: INTERNAL MEDICINE

## 2022-07-26 PROCEDURE — C9399 UNCLASSIFIED DRUGS OR BIOLOG: HCPCS | Performed by: FAMILY MEDICINE

## 2022-07-26 PROCEDURE — 36600 WITHDRAWAL OF ARTERIAL BLOOD: CPT

## 2022-07-26 RX ORDER — DOXEPIN HYDROCHLORIDE 10 MG/1
10 CAPSULE ORAL NIGHTLY PRN
Status: DISCONTINUED | OUTPATIENT
Start: 2022-07-26 | End: 2022-08-02 | Stop reason: HOSPADM

## 2022-07-26 RX ORDER — DIAZEPAM 5 MG/1
10 TABLET ORAL 3 TIMES DAILY PRN
Status: DISCONTINUED | OUTPATIENT
Start: 2022-07-26 | End: 2022-08-02 | Stop reason: HOSPADM

## 2022-07-26 RX ORDER — BENZTROPINE MESYLATE 0.5 MG/1
0.5 TABLET ORAL 2 TIMES DAILY PRN
Status: DISCONTINUED | OUTPATIENT
Start: 2022-07-26 | End: 2022-08-02 | Stop reason: HOSPADM

## 2022-07-26 RX ORDER — RISPERIDONE 1 MG/1
1 TABLET ORAL 2 TIMES DAILY
Status: DISCONTINUED | OUTPATIENT
Start: 2022-07-26 | End: 2022-08-02 | Stop reason: HOSPADM

## 2022-07-26 RX ADMIN — BUDESONIDE 0.5 MG: 0.5 INHALANT ORAL at 08:07

## 2022-07-26 RX ADMIN — METOPROLOL SUCCINATE 25 MG: 25 TABLET, EXTENDED RELEASE ORAL at 09:07

## 2022-07-26 RX ADMIN — RISPERIDONE 1 MG: 1 TABLET ORAL at 01:07

## 2022-07-26 RX ADMIN — DIAZEPAM 10 MG: 5 TABLET ORAL at 11:07

## 2022-07-26 RX ADMIN — ARFORMOTEROL TARTRATE 15 MCG: 15 SOLUTION RESPIRATORY (INHALATION) at 08:07

## 2022-07-26 RX ADMIN — INSULIN DETEMIR 18 UNITS: 100 INJECTION, SOLUTION SUBCUTANEOUS at 09:07

## 2022-07-26 RX ADMIN — RISPERIDONE 1 MG: 1 TABLET ORAL at 09:07

## 2022-07-26 RX ADMIN — NICOTINE 1 PATCH: 21 PATCH, EXTENDED RELEASE TRANSDERMAL at 09:07

## 2022-07-26 RX ADMIN — IPRATROPIUM BROMIDE AND ALBUTEROL SULFATE 3 ML: 2.5; .5 SOLUTION RESPIRATORY (INHALATION) at 01:07

## 2022-07-26 RX ADMIN — IPRATROPIUM BROMIDE AND ALBUTEROL SULFATE 3 ML: 2.5; .5 SOLUTION RESPIRATORY (INHALATION) at 08:07

## 2022-07-26 RX ADMIN — IPRATROPIUM BROMIDE AND ALBUTEROL SULFATE 3 ML: 2.5; .5 SOLUTION RESPIRATORY (INHALATION) at 07:07

## 2022-07-26 RX ADMIN — INSULIN DETEMIR 15 UNITS: 100 INJECTION, SOLUTION SUBCUTANEOUS at 09:07

## 2022-07-26 RX ADMIN — FAMOTIDINE 20 MG: 20 TABLET ORAL at 09:07

## 2022-07-26 RX ADMIN — MUPIROCIN: 20 OINTMENT TOPICAL at 09:07

## 2022-07-26 RX ADMIN — BUDESONIDE 0.5 MG: 0.5 INHALANT ORAL at 07:07

## 2022-07-26 RX ADMIN — ASPIRIN 81 MG: 81 TABLET, COATED ORAL at 09:07

## 2022-07-26 RX ADMIN — CEFTRIAXONE 2 G: 2 INJECTION, SOLUTION INTRAVENOUS at 06:07

## 2022-07-26 RX ADMIN — INSULIN ASPART 2 UNITS: 100 INJECTION, SOLUTION INTRAVENOUS; SUBCUTANEOUS at 09:07

## 2022-07-26 RX ADMIN — INSULIN ASPART 2 UNITS: 100 INJECTION, SOLUTION INTRAVENOUS; SUBCUTANEOUS at 06:07

## 2022-07-26 RX ADMIN — BUPROPION HYDROCHLORIDE 150 MG: 150 TABLET, FILM COATED, EXTENDED RELEASE ORAL at 09:07

## 2022-07-26 NOTE — PROGRESS NOTES
[facility]  Progress Note    Patient Name: Alexandra Goins  MRN: [id number]  Patient Class: IP- Inpatient   Admission Date: 7/24/2022  Length of Stay: 2 days  Attending Physician: Bishop Manuel MD  Primary Care Provider: Perez Casiano MD        Subjective:     Principal Problem:Severe sepsis        HPI:   Fever       Since Tuesday morning. recent skin cancer removed from face    Altered Mental Status       lethargic since this morning      Per ER- This is a 56 y.o. female patient with a PMHx of  COPD, HTN, HLP, T2DM, pneumonia, nicotine dependence, non ruptured cerebral aneurysm, stroke, PVD, anxiety, depression, diverticular disease, GERD, hypothyroidism, PVD, severe obesity, arthritis, asthma, Bipolar 1 disorder, and CAD who presents to the Emergency Department for evaluation of AMS which onset gradually this morning. Per , pt came in 5 days PTA to have biopsy of suspected skin cancer.  states pt had a slight fever beginning Tuesday and that the fever got higher over the next 3-4 days.  states pt fell on Friday but appeared to be okay.  states pt was very lethargic this morning, weak, extremely hard to wake and was speaking nonsense upon waking up.  states pt has COPD and is not on oxygen at home. Symptoms are constant and moderate in severity. No mitigating or exacerbating factors reported. Associated sxs include SOB, fever and weakness. Patient is unable to deny other sxs due to AMS at this time.  No further complaints or concerns at this time. HPI limited to AMS.      Patient evaluated by ER and found to have Septic shock. Patient to be admitted to ICU on [facility].     Patient currently awake, alert, oriented to person, place, time, and situation. Code status discussed with patient  and her current  wishes are to be a FULL CODE.       Overview/Hospital Course:  Admitted for treatment of septic shock due to UTI.  Empirically received Zosyn in the ED and  emilia urine and blood cultures.  IV fluid resuscitation was started with inadequate response and she briefly required Norepinephrine infusion.  Hyperglycemia and mild metabolic acidosis on admission.  Acidosis resolved and blood sugars remained high and corrected with intermittent insulin.  Further stabilized and transferred out of the ICU on 25 July.  7/26: blood cultures growing gram negative rods empirically. Cont cefepime. Currently on 15L NC.       Interval History: blood cultures growing gram negative rods empirically. Cont cefepime. Currently on 15L NC.     Review of Systems   Constitutional:  Negative for fatigue and fever.   HENT:  Negative for sinus pressure.    Eyes:  Negative for visual disturbance.   Respiratory:  Negative for shortness of breath.    Cardiovascular:  Negative for chest pain.   Gastrointestinal:  Negative for nausea and vomiting.   Genitourinary:  Negative for difficulty urinating.   Musculoskeletal:  Negative for back pain.   Skin:  Negative for rash.   Neurological:  Negative for headaches.   Psychiatric/Behavioral:  Negative for confusion.    Objective:     Vital Signs (Most Recent):  Temp: 98.6 °F (37 °C) (07/26/22 1125)  Pulse: (!) 116 (07/26/22 1125)  Resp: 17 (07/26/22 1125)  BP: (!) 143/78 (07/26/22 1125)  SpO2: (!) 86 % (07/26/22 1125)   Vital Signs (24h Range):  Temp:  [98.3 °F (36.8 °C)-101.4 °F (38.6 °C)] 98.6 °F (37 °C)  Pulse:  [106-129] 116  Resp:  [17-22] 17  SpO2:  [86 %-97 %] 86 %  BP: (112-147)/(53-78) 143/78     Weight: 100.5 kg (221 lb 9 oz)  Body mass index is 38.03 kg/m².    Intake/Output Summary (Last 24 hours) at 7/26/2022 1157  Last data filed at 7/26/2022 0851  Gross per 24 hour   Intake 2620.77 ml   Output 1100 ml   Net 1520.77 ml      Physical Exam  Vitals and nursing note reviewed.   Constitutional:       General: She is not in acute distress.     Appearance: She is well-developed.   HENT:      Head: Normocephalic and atraumatic.   Eyes:       Conjunctiva/sclera: Conjunctivae normal.      Pupils: Pupils are equal, round, and reactive to light.   Neck:      Thyroid: No thyromegaly.      Vascular: No JVD.   Cardiovascular:      Rate and Rhythm: Normal rate and regular rhythm.      Heart sounds: No murmur heard.    No friction rub. No gallop.   Pulmonary:      Effort: Pulmonary effort is normal.      Breath sounds: Normal breath sounds. No wheezing or rales.   Abdominal:      General: Bowel sounds are normal. There is no distension.      Palpations: Abdomen is soft.      Tenderness: There is no abdominal tenderness. There is no guarding or rebound.   Genitourinary:     Comments: Rose catheter in place draining clear yellow urine  Musculoskeletal:         General: No deformity. Normal range of motion.      Cervical back: Neck supple.   Lymphadenopathy:      Cervical: No cervical adenopathy.   Skin:     General: Skin is warm and dry.      Findings: No rash.   Neurological:      Mental Status: She is alert and oriented to person, place, and time.      Deep Tendon Reflexes: Reflexes are normal and symmetric.   Psychiatric:         Behavior: Behavior normal.         Thought Content: Thought content normal.         Judgment: Judgment normal.       Significant Labs: All pertinent labs within the past 24 hours have been reviewed.    Significant Imaging: I have reviewed all pertinent imaging results/findings within the past 24 hours.        Assessment/Plan:      * Severe sepsis  This patient does have evidence of infective focus  My overall impression is Septic shock . Vital signs were reviewed and noted in progress note.  Antibiotics given-   Antibiotics (From admission, onward)            Start     Stop Route Frequency Ordered    07/24/22 2100  mupirocin 2 % ointment         07/29 2059 Nasl 2 times daily 07/24/22 1445    07/24/22 1800  cefepime in dextrose 5 % IVPB 2 g         -- IV Every 24 hours (non-standard times) 07/24/22 1555        Cultures were taken-    Microbiology Results (last 7 days)     Procedure Component Value Units Date/Time    Blood culture [[id number]]     Order Status: No result Specimen: Blood     Blood culture [[id number]]     Order Status: No result Specimen: Blood     Blood culture x two cultures. Draw prior to antibiotics [[id number]]  (Abnormal) Collected: 07/24/22 1200    Order Status: Completed Specimen: Blood from Line, Femoral, Right Updated: 07/26/22 0841     Blood Culture, Routine Gram stain juan bottle: Gram negative rods       Results called to and read back by: Michelle RAMESH RN 07/25/2022  14:49      GRAM NEGATIVE CHANNING  Identification and susceptibility pending      Narrative:      Aerobic and anaerobic    Blood culture x two cultures. Draw prior to antibiotics [[id number]] Collected: 07/24/22 1135    Order Status: Completed Specimen: Blood from Peripheral, Antecubital, Right Updated: 07/26/22 0613     Blood Culture, Routine No Growth to date      No Growth to date    Narrative:      Aerobic and anaerobic    Urine culture [[id number]] Collected: 07/24/22 1255    Order Status: Completed Specimen: Urine Updated: 07/25/22 2128     Urine Culture, Routine Multiple organisms isolated. None in predominance.  Repeat if      clinically necessary.    Narrative:      Specimen Source->Urine    Stool culture [[id number]]     Order Status: No result Specimen: Stool     Clostridium difficile EIA [[id number]]     Order Status: Canceled Specimen: Stool     Culture, Respiratory with Gram Stain [[id number]]     Order Status: No result Specimen: Respiratory     Culture, MRSA [[id number]]     Order Status: No result Specimen: MRSA source         Lactic acid level pending    Organ dysfunction indicated by Acute kidney injury, Encephalopathy  and Acute respiratory failure     Source- UTI, diarrhea- work up in progress    Source control Achieved by- IV ABx    Stabilized and transferred out of ICU 25 July.    7/26:  Cont cefepime  Source urine, blood, pna       Gram-negative  bacteremia  Cont on cefepime  Repeat blood cultures  ID and sensitivity pending       Hypoalbuminemia  Monitor      COPD (chronic obstructive pulmonary disease)  Monitor O2 sats, supplemental O2 as needed  Add duonebs q 4 hours      MAGDA (acute kidney injury) secondary to dehydration  Hold home diuretics  Currently receiving IVF bolus  Monitor  Trend BMP  Renal dose all medications     7/26:  Cont IVF   Repeat labs      Severe obesity (BMI 35.0-39.9) with comorbidity  Body mass index is 36.66 kg/m². Morbid obesity complicates all aspects of disease management from diagnostic modalities to treatment. Weight loss encouraged and health benefits explained to patient.         Coronary artery disease of native artery of native heart with stable angina pectoris  Telemetry  Trend troponins      Acute respiratory failure with hypoxia  Telemetry  Monitor O2 sats, supplemental O2 as needed  Duonebs q 4 hours  Possible allergy to pulmicort     7/26:  Currently on 15L   Wean as tolerated      Debility  Fall and skin precautions  Turn Q 2 hours           Hx of arterial ischemic stroke  An non ruptuured aneursym  Monitor  Neurochecks q 4 hours  Check Ct scan brain      Acute urinary tract infection  Cultures growing multiple organisms  Currently improving on cefepime      Type 2 diabetes mellitus with complication, with long-term current use of insulin   Dm diet once tolerating po intake  Accuchecks with correctional SSI  Lab Results   Component Value Date    HGBA1C 11.2 (H) 07/25/2022     Consult dietician and DM educator on patient stabilized    7/26:  Increased levemir to 18 units bid  Cont sliding scale     Hx of Renal abscess, right  Check Ct abd/ pelvis      Nicotine dependence  Smoking cessation education > 3 minutes  Add nicotine patch      Bipolar disorder with anxiety and depression  Resume home medications once stabilized  Check urine drug screen        VTE Risk Mitigation (From admission, onward)         Ordered     IP  VTE HIGH RISK PATIENT  Once         07/24/22 1444     Place sequential compression device  Until discontinued         07/24/22 1444                Discharge Planning   NOÉ:      Code Status: Full Code   Is the patient medically ready for discharge?:     Reason for patient still in hospital (select all that apply): Patient trending condition  Discharge Plan A: Home with family                  Bishop Manuel MD  Department of Hospital Medicine   [facility]

## 2022-07-26 NOTE — SUBJECTIVE & OBJECTIVE
Interval History: blood cultures growing gram negative rods empirically. Cont cefepime. Currently on 15L NC.     Review of Systems   Constitutional:  Negative for fatigue and fever.   HENT:  Negative for sinus pressure.    Eyes:  Negative for visual disturbance.   Respiratory:  Negative for shortness of breath.    Cardiovascular:  Negative for chest pain.   Gastrointestinal:  Negative for nausea and vomiting.   Genitourinary:  Negative for difficulty urinating.   Musculoskeletal:  Negative for back pain.   Skin:  Negative for rash.   Neurological:  Negative for headaches.   Psychiatric/Behavioral:  Negative for confusion.    Objective:     Vital Signs (Most Recent):  Temp: 98.6 °F (37 °C) (07/26/22 1125)  Pulse: (!) 116 (07/26/22 1125)  Resp: 17 (07/26/22 1125)  BP: (!) 143/78 (07/26/22 1125)  SpO2: (!) 86 % (07/26/22 1125)   Vital Signs (24h Range):  Temp:  [98.3 °F (36.8 °C)-101.4 °F (38.6 °C)] 98.6 °F (37 °C)  Pulse:  [106-129] 116  Resp:  [17-22] 17  SpO2:  [86 %-97 %] 86 %  BP: (112-147)/(53-78) 143/78     Weight: 100.5 kg (221 lb 9 oz)  Body mass index is 38.03 kg/m².    Intake/Output Summary (Last 24 hours) at 7/26/2022 1157  Last data filed at 7/26/2022 0851  Gross per 24 hour   Intake 2620.77 ml   Output 1100 ml   Net 1520.77 ml      Physical Exam  Vitals and nursing note reviewed.   Constitutional:       General: She is not in acute distress.     Appearance: She is well-developed.   HENT:      Head: Normocephalic and atraumatic.   Eyes:      Conjunctiva/sclera: Conjunctivae normal.      Pupils: Pupils are equal, round, and reactive to light.   Neck:      Thyroid: No thyromegaly.      Vascular: No JVD.   Cardiovascular:      Rate and Rhythm: Normal rate and regular rhythm.      Heart sounds: No murmur heard.    No friction rub. No gallop.   Pulmonary:      Effort: Pulmonary effort is normal.      Breath sounds: Normal breath sounds. No wheezing or rales.   Abdominal:      General: Bowel sounds are normal.  There is no distension.      Palpations: Abdomen is soft.      Tenderness: There is no abdominal tenderness. There is no guarding or rebound.   Genitourinary:     Comments: Rose catheter in place draining clear yellow urine  Musculoskeletal:         General: No deformity. Normal range of motion.      Cervical back: Neck supple.   Lymphadenopathy:      Cervical: No cervical adenopathy.   Skin:     General: Skin is warm and dry.      Findings: No rash.   Neurological:      Mental Status: She is alert and oriented to person, place, and time.      Deep Tendon Reflexes: Reflexes are normal and symmetric.   Psychiatric:         Behavior: Behavior normal.         Thought Content: Thought content normal.         Judgment: Judgment normal.       Significant Labs: All pertinent labs within the past 24 hours have been reviewed.    Significant Imaging: I have reviewed all pertinent imaging results/findings within the past 24 hours.

## 2022-07-26 NOTE — PLAN OF CARE
Patient remains free from falls and injuries this shift. Patient in stable condition during shift. No s/s of acute distress noted. Will continue to monitor. Chart check completed.

## 2022-07-26 NOTE — PLAN OF CARE
Problem: Adult Inpatient Plan of Care  Goal: Plan of Care Review  Outcome: Ongoing, Progressing  Goal: Patient-Specific Goal (Individualized)  Outcome: Ongoing, Progressing  Goal: Absence of Hospital-Acquired Illness or Injury  Outcome: Ongoing, Progressing  Goal: Optimal Comfort and Wellbeing  Outcome: Ongoing, Progressing  Goal: Readiness for Transition of Care  Outcome: Ongoing, Progressing     Problem: Diabetes Comorbidity  Goal: Blood Glucose Level Within Targeted Range  Outcome: Ongoing, Progressing     Problem: Adjustment to Illness (Sepsis/Septic Shock)  Goal: Optimal Coping  Outcome: Ongoing, Progressing     Problem: Bleeding (Sepsis/Septic Shock)  Goal: Absence of Bleeding  Outcome: Ongoing, Progressing     Problem: Glycemic Control Impaired (Sepsis/Septic Shock)  Goal: Blood Glucose Level Within Desired Range  Outcome: Ongoing, Progressing     Problem: Infection Progression (Sepsis/Septic Shock)  Goal: Absence of Infection Signs and Symptoms  Outcome: Ongoing, Progressing     Problem: Nutrition Impaired (Sepsis/Septic Shock)  Goal: Optimal Nutrition Intake  Outcome: Ongoing, Progressing     Problem: Fluid and Electrolyte Imbalance (Acute Kidney Injury/Impairment)  Goal: Fluid and Electrolyte Balance  Outcome: Ongoing, Progressing     Problem: Oral Intake Inadequate (Acute Kidney Injury/Impairment)  Goal: Optimal Nutrition Intake  Outcome: Ongoing, Progressing     Problem: Renal Function Impairment (Acute Kidney Injury/Impairment)  Goal: Effective Renal Function  Outcome: Ongoing, Progressing     Problem: Infection  Goal: Absence of Infection Signs and Symptoms  Outcome: Ongoing, Progressing     Problem: Fall Injury Risk  Goal: Absence of Fall and Fall-Related Injury  Outcome: Ongoing, Progressing     Problem: Skin Injury Risk Increased  Goal: Skin Health and Integrity  Outcome: Ongoing, Progressing     Problem: Impaired Wound Healing  Goal: Optimal Wound Healing  Outcome: Ongoing, Progressing

## 2022-07-26 NOTE — AI DETERIORATION ALERT
Patient Deterioration Alert     Deterioration alert received. Patient seen and examined, she is very emotional. Home psych meds reordered. VS unchanged. No acute change in patient status. Continue current treatment plan.        Marti Bajwa, NP

## 2022-07-26 NOTE — ASSESSMENT & PLAN NOTE
This patient does have evidence of infective focus  My overall impression is Septic shock . Vital signs were reviewed and noted in progress note.  Antibiotics given-   Antibiotics (From admission, onward)            Start     Stop Route Frequency Ordered    07/24/22 2100  mupirocin 2 % ointment         07/29 2059 Nasl 2 times daily 07/24/22 1445    07/24/22 1800  cefepime in dextrose 5 % IVPB 2 g         -- IV Every 24 hours (non-standard times) 07/24/22 1555        Cultures were taken-   Microbiology Results (last 7 days)     Procedure Component Value Units Date/Time    Blood culture [[id number]]     Order Status: No result Specimen: Blood     Blood culture [[id number]]     Order Status: No result Specimen: Blood     Blood culture x two cultures. Draw prior to antibiotics [[id number]]  (Abnormal) Collected: 07/24/22 1200    Order Status: Completed Specimen: Blood from Line, Femoral, Right Updated: 07/26/22 0841     Blood Culture, Routine Gram stain juan bottle: Gram negative rods       Results called to and read back by: Michelle RAMESH RN 07/25/2022  14:49      GRAM NEGATIVE CHANNING  Identification and susceptibility pending      Narrative:      Aerobic and anaerobic    Blood culture x two cultures. Draw prior to antibiotics [[id number]] Collected: 07/24/22 1135    Order Status: Completed Specimen: Blood from Peripheral, Antecubital, Right Updated: 07/26/22 0613     Blood Culture, Routine No Growth to date      No Growth to date    Narrative:      Aerobic and anaerobic    Urine culture [[id number]] Collected: 07/24/22 1255    Order Status: Completed Specimen: Urine Updated: 07/25/22 2128     Urine Culture, Routine Multiple organisms isolated. None in predominance.  Repeat if      clinically necessary.    Narrative:      Specimen Source->Urine    Stool culture [[id number]]     Order Status: No result Specimen: Stool     Clostridium difficile EIA [[id number]]     Order Status: Canceled Specimen: Stool     Culture, Respiratory with  Gram Stain [[id number]]     Order Status: No result Specimen: Respiratory     Culture, MRSA [[id number]]     Order Status: No result Specimen: MRSA source         Lactic acid level pending    Organ dysfunction indicated by Acute kidney injury, Encephalopathy  and Acute respiratory failure     Source- UTI, diarrhea- work up in progress    Source control Achieved by- IV ABx    Stabilized and transferred out of ICU 25 July.    7/26:  Cont cefepime  Source urine, blood, pna

## 2022-07-26 NOTE — PT/OT/SLP PROGRESS
"Physical Therapy  Treatment    Alexandra Goins   MRN: [id number]   Admitting Diagnosis: Severe sepsis    PT Received On: 07/26/22  PT Start Time: 1150     PT Stop Time: 1200    PT Total Time (min): 10 min       Billable Minutes:  Therapeutic Activity 10    Treatment Type: Treatment  PT/PTA: PTA     PTA Visit Number: 1       General Precautions: Standard, fall, respiratory  Orthopedic Precautions: N/A   Braces: N/A  Respiratory Status: HIGH FLOW O2 VIA NC         Subjective:  Communicated with patient's nurse, Cassidy, and completed Epic chart review prior to session.  Patient adamantly refusing PT session.     Pain/Comfort  Pain Rating 1: 0/10    Objective:   Patient found with: peripheral IV, PICC line, telemetry, oxygen, bed alarm, Other (comments) (HIGH FLOW NC)    Patient found in bed sleeping. Attempted to arouse patient with gentle tapping to shoulder and she responded "Stop it!"  Attempted to encourage patient to participate in PT session and get up for lunch.. Patient response "No!"    Family member attempted to arouse and encourage patient which caused increase agitation. "Leave me alone!!"    Educated patient's family on how to perform PROM at all joints within available planes of motion. Attempted to demonstrate with patient but she continued to refuse and began to pull away, becoming increasingly agitated.     Session terminated at this time due to patient behavior.     AM-PAC 6 CLICK MOBILITY  How much help from another person does this patient currently need?   1 = Unable, Total/Dependent Assistance  2 = A lot, Maximum/Moderate Assistance  3 = A little, Minimum/Contact Guard/Supervision  4 = None, Modified [place]/Independent    Turning over in bed (including adjusting bedclothes, sheets and blankets)?: 1 (REF)  Sitting down on and standing up from a chair with arms (e.g., wheelchair, bedside commode, etc.): 1 (REF)  Moving from lying on back to sitting on the side of the bed?: 1 (REF)  Moving to and " from a bed to a chair (including a wheelchair)?: 1 (REF)  Need to walk in hospital room?: 1 (REF)  Climbing 3-5 steps with a railing?: 1  Basic Mobility Total Score: 6    AM-PAC Raw Score CMS G-Code Modifier Level of Impairment Assistance   6 % Total / Unable   7 - 9 CM 80 - 100% Maximal Assist   10 - 14 CL 60 - 80% Moderate Assist   15 - 19 CK 40 - 60% Moderate Assist   20 - 22 CJ 20 - 40% Minimal Assist   23 CI 1-20% SBA / CGA   24 CH 0% Independent/ Mod I     Patient left HOB elevated with all lines intact, call button in reach, bed alarm on, nurse notified and family members present.    Assessment:  Alexandra Goins is a 56 y.o. female with a medical diagnosis of Severe sepsis and presents with overall decline in functional mobility. Patient would continue to benefit from skilled PT to address functional limitations listed below in order to return to PLOF/decrease caregiver burden. Patient resistance &unwillingness to participate are the greatest limiting factors at this time.     Rehab identified problem list/impairments: Rehab identified problem list/impairments: weakness, impaired endurance, impaired self care skills, impaired functional mobility, gait instability, impaired balance, impaired cognition, decreased coordination, decreased upper extremity function, decreased lower extremity function, decreased safety awareness, decreased ROM, impaired coordination, impaired cardiopulmonary response to activity    Rehab potential is fair.    Activity tolerance: unable to determine    Discharge recommendations: Discharge Facility/Level of Care Needs: rehabilitation facility     Barriers to discharge:      Equipment recommendations: Equipment Needed After Discharge: other (see comments) (TBD BY NEXT LEVEL OF CARE)     GOALS:   Multidisciplinary Problems     Physical Therapy Goals        Problem: Physical Therapy    Goal Priority Disciplines Outcome Goal Variances Interventions   Physical Therapy Goal     PT,  PT/OT      Description: LTG'S TO BE MET IN 14 DAYS (8-8-22)  PT WILL REQUIRE [facility] FOR BED MOBILITY  PT WILL REQUIRE [facility] FOR BED<>CHAIR TF'S  PT WILL AMB 50 FEET WITH RW AND [facility]                     PLAN:    Patient to be seen 3 x/week  to address the above listed problems via gait training, therapeutic activities, therapeutic exercises  Plan of Care expires: 08/08/22  Plan of Care reviewed with: patient, spouse, family         07/26/2022

## 2022-07-26 NOTE — PT/OT/SLP PROGRESS
"Occupational Therapy      Patient Name:  Alexandra Goins   MRN:  [id number]    S: pt uncooperative with therapy session.  O:Patient found in bed sleeping. Attempted to arouse patient and she responded "Stop it!" Pt informed the importance of movement and obtaining upright sitting posture as well as family member attempted to arouse and encourage patient which caused increase agitation. "Leave me alone!!"  Educated patient's family on how to perform PROM at all joints within available planes of motion. Attempted to demonstrate with patient but she continued to refuse and began to pull away, becoming increasingly agitated.   A: tx session limited due to poor motivation and poor behavior  P: Continue with POC.  Mona Pinon, OT  7/26/2022   [phone]    "

## 2022-07-26 NOTE — ASSESSMENT & PLAN NOTE
Dm diet once tolerating po intake  Accuchecks with correctional SSI  Lab Results   Component Value Date    HGBA1C 11.2 (H) 07/25/2022     Consult dietician and DM educator on patient stabilized    7/26:  Increased levemir to 18 units bid  Cont sliding scale

## 2022-07-26 NOTE — ASSESSMENT & PLAN NOTE
Telemetry  Monitor O2 sats, supplemental O2 as needed  Duonebs q 4 hours  Possible allergy to pulmicort     7/26:  Currently on 15L   Wean as tolerated

## 2022-07-27 PROBLEM — A41.9 SEVERE SEPSIS: Status: RESOLVED | Noted: 2022-07-24 | Resolved: 2022-07-27

## 2022-07-27 PROBLEM — N17.9 AKI (ACUTE KIDNEY INJURY): Status: RESOLVED | Noted: 2022-07-24 | Resolved: 2022-07-27

## 2022-07-27 PROBLEM — J96.02 ACUTE RESPIRATORY FAILURE WITH HYPOXIA AND HYPERCAPNIA: Status: ACTIVE | Noted: 2021-02-11

## 2022-07-27 PROBLEM — R65.20 SEVERE SEPSIS: Status: RESOLVED | Noted: 2022-07-24 | Resolved: 2022-07-27

## 2022-07-27 PROBLEM — N39.0 ACUTE URINARY TRACT INFECTION: Status: RESOLVED | Noted: 2019-06-28 | Resolved: 2022-07-27

## 2022-07-27 PROBLEM — G93.40 ACUTE ENCEPHALOPATHY: Status: ACTIVE | Noted: 2019-09-18

## 2022-07-27 LAB
ALBUMIN SERPL BCP-MCNC: 1.8 G/DL (ref 3.5–5.2)
ALLENS TEST: ABNORMAL
ALP SERPL-CCNC: 83 U/L (ref 55–135)
ALT SERPL W/O P-5'-P-CCNC: 36 U/L (ref 10–44)
ANION GAP SERPL CALC-SCNC: 10 MMOL/L (ref 8–16)
ANION GAP SERPL CALC-SCNC: 11 MMOL/L (ref 8–16)
AORTIC ROOT ANNULUS: 3.32 CM
ASCENDING AORTA: 3.01 CM
AST SERPL-CCNC: 48 U/L (ref 10–40)
AV INDEX (PROSTH): 0.79
AV MEAN GRADIENT: 7 MMHG
AV PEAK GRADIENT: 14 MMHG
AV VALVE AREA: 2.59 CM2
AV VELOCITY RATIO: 0.77
BASOPHILS # BLD AUTO: 0.02 K/UL (ref 0–0.2)
BASOPHILS NFR BLD: 0.2 % (ref 0–1.9)
BILIRUB SERPL-MCNC: 0.5 MG/DL (ref 0.1–1)
BNP SERPL-MCNC: 175 PG/ML (ref 0–99)
BSA FOR ECHO PROCEDURE: 2.17 M2
BUN SERPL-MCNC: 23 MG/DL (ref 6–20)
BUN SERPL-MCNC: 28 MG/DL (ref 6–20)
CALCIUM SERPL-MCNC: 7.3 MG/DL (ref 8.7–10.5)
CALCIUM SERPL-MCNC: 8.1 MG/DL (ref 8.7–10.5)
CHLORIDE SERPL-SCNC: 101 MMOL/L (ref 95–110)
CHLORIDE SERPL-SCNC: 106 MMOL/L (ref 95–110)
CO2 SERPL-SCNC: 25 MMOL/L (ref 23–29)
CO2 SERPL-SCNC: 31 MMOL/L (ref 23–29)
CREAT SERPL-MCNC: 0.8 MG/DL (ref 0.5–1.4)
CREAT SERPL-MCNC: 0.8 MG/DL (ref 0.5–1.4)
CV ECHO LV RWT: 0.54 CM
DELSYS: ABNORMAL
DIFFERENTIAL METHOD: ABNORMAL
DOP CALC AO PEAK VEL: 1.84 M/S
DOP CALC AO VTI: 34.4 CM
DOP CALC LVOT AREA: 3.3 CM2
DOP CALC LVOT DIAMETER: 2.04 CM
DOP CALC LVOT PEAK VEL: 1.42 M/S
DOP CALC LVOT STROKE VOLUME: 89.19 CM3
DOP CALC RVOT PEAK VEL: 1.12 M/S
DOP CALC RVOT VTI: 17 CM
DOP CALCLVOT PEAK VEL VTI: 27.3 CM
E WAVE DECELERATION TIME: 147.95 MSEC
E/A RATIO: 1.25
E/E' RATIO: 6.59 M/S
ECHO LV POSTERIOR WALL: 1.28 CM (ref 0.6–1.1)
EJECTION FRACTION: 60 %
EOSINOPHIL # BLD AUTO: 0 K/UL (ref 0–0.5)
EOSINOPHIL NFR BLD: 0 % (ref 0–8)
EP: 6
ERYTHROCYTE [DISTWIDTH] IN BLOOD BY AUTOMATED COUNT: 15.3 % (ref 11.5–14.5)
ERYTHROCYTE [SEDIMENTATION RATE] IN BLOOD BY WESTERGREN METHOD: 14 MM/H
EST. GFR  (AFRICAN AMERICAN): >60 ML/MIN/1.73 M^2
EST. GFR  (AFRICAN AMERICAN): >60 ML/MIN/1.73 M^2
EST. GFR  (NON AFRICAN AMERICAN): >60 ML/MIN/1.73 M^2
EST. GFR  (NON AFRICAN AMERICAN): >60 ML/MIN/1.73 M^2
FINAL PATHOLOGIC DIAGNOSIS: NORMAL
FIO2: 65
FRACTIONAL SHORTENING: 28 % (ref 28–44)
GLUCOSE SERPL-MCNC: 104 MG/DL (ref 70–110)
GLUCOSE SERPL-MCNC: 161 MG/DL (ref 70–110)
GROSS: NORMAL
HCO3 UR-SCNC: 30.1 MMOL/L (ref 24–28)
HCT VFR BLD AUTO: 34.9 % (ref 37–48.5)
HGB BLD-MCNC: 10.6 G/DL (ref 12–16)
IMM GRANULOCYTES # BLD AUTO: 0.13 K/UL (ref 0–0.04)
IMM GRANULOCYTES NFR BLD AUTO: 1.1 % (ref 0–0.5)
INTERVENTRICULAR SEPTUM: 1.32 CM (ref 0.6–1.1)
IP: 16
IVC DIAMETER: 1.59 CM
IVRT: 49.48 MSEC
LA MAJOR: 5.05 CM
LA MINOR: 5 CM
LA WIDTH: 3.9 CM
LEFT ATRIUM SIZE: 3.43 CM
LEFT ATRIUM VOLUME INDEX: 27.6 ML/M2
LEFT ATRIUM VOLUME: 57.14 CM3
LEFT INTERNAL DIMENSION IN SYSTOLE: 3.37 CM (ref 2.1–4)
LEFT VENTRICLE DIASTOLIC VOLUME INDEX: 49.66 ML/M2
LEFT VENTRICLE DIASTOLIC VOLUME: 102.79 ML
LEFT VENTRICLE MASS INDEX: 115 G/M2
LEFT VENTRICLE SYSTOLIC VOLUME INDEX: 22.4 ML/M2
LEFT VENTRICLE SYSTOLIC VOLUME: 46.46 ML
LEFT VENTRICULAR INTERNAL DIMENSION IN DIASTOLE: 4.71 CM (ref 3.5–6)
LEFT VENTRICULAR MASS: 238.66 G
LV LATERAL E/E' RATIO: 5.56 M/S
LV SEPTAL E/E' RATIO: 8.09 M/S
LVOT MG: 4.44 MMHG
LVOT MV: 1.02 CM/S
LYMPHOCYTES # BLD AUTO: 1.1 K/UL (ref 1–4.8)
LYMPHOCYTES NFR BLD: 9.7 % (ref 18–48)
Lab: NORMAL
MAGNESIUM SERPL-MCNC: 1.3 MG/DL (ref 1.6–2.6)
MAGNESIUM SERPL-MCNC: 1.6 MG/DL (ref 1.6–2.6)
MCH RBC QN AUTO: 29.5 PG (ref 27–31)
MCHC RBC AUTO-ENTMCNC: 30.4 G/DL (ref 32–36)
MCV RBC AUTO: 97 FL (ref 82–98)
MICROSCOPIC EXAM: NORMAL
MODE: ABNORMAL
MONOCYTES # BLD AUTO: 0.9 K/UL (ref 0.3–1)
MONOCYTES NFR BLD: 7.6 % (ref 4–15)
MV PEAK A VEL: 0.71 M/S
MV PEAK E VEL: 0.89 M/S
MV STENOSIS PRESSURE HALF TIME: 42.91 MS
MV VALVE AREA P 1/2 METHOD: 5.13 CM2
NEUTROPHILS # BLD AUTO: 9.4 K/UL (ref 1.8–7.7)
NEUTROPHILS NFR BLD: 81.4 % (ref 38–73)
NRBC BLD-RTO: 0 /100 WBC
PCO2 BLDA: 53.5 MMHG (ref 35–45)
PH SMN: 7.36 [PH] (ref 7.35–7.45)
PLATELET # BLD AUTO: 210 K/UL (ref 150–450)
PLATELET BLD QL SMEAR: ABNORMAL
PMV BLD AUTO: 10.6 FL (ref 9.2–12.9)
PO2 BLDA: 55 MMHG (ref 80–100)
POC BE: 5 MMOL/L
POC SATURATED O2: 86 % (ref 95–100)
POCT GLUCOSE: 107 MG/DL (ref 70–110)
POCT GLUCOSE: 129 MG/DL (ref 70–110)
POCT GLUCOSE: 142 MG/DL (ref 70–110)
POCT GLUCOSE: 160 MG/DL (ref 70–110)
POCT GLUCOSE: 189 MG/DL (ref 70–110)
POTASSIUM SERPL-SCNC: 3.4 MMOL/L (ref 3.5–5.1)
POTASSIUM SERPL-SCNC: 3.7 MMOL/L (ref 3.5–5.1)
PROT SERPL-MCNC: 5 G/DL (ref 6–8.4)
PV MEAN GRADIENT: 2.94 MMHG
RA MAJOR: 3.91 CM
RA PRESSURE: 3 MMHG
RBC # BLD AUTO: 3.59 M/UL (ref 4–5.4)
SAMPLE: ABNORMAL
SITE: ABNORMAL
SODIUM SERPL-SCNC: 141 MMOL/L (ref 136–145)
SODIUM SERPL-SCNC: 143 MMOL/L (ref 136–145)
STJ: 3.05 CM
TARGETS BLD QL SMEAR: ABNORMAL
TDI LATERAL: 0.16 M/S
TDI SEPTAL: 0.11 M/S
TDI: 0.14 M/S
WBC # BLD AUTO: 11.49 K/UL (ref 3.9–12.7)

## 2022-07-27 PROCEDURE — 83735 ASSAY OF MAGNESIUM: CPT | Mod: 91 | Performed by: FAMILY MEDICINE

## 2022-07-27 PROCEDURE — 85025 COMPLETE CBC W/AUTO DIFF WBC: CPT | Performed by: NURSE PRACTITIONER

## 2022-07-27 PROCEDURE — 25000003 PHARM REV CODE 250: Performed by: INTERNAL MEDICINE

## 2022-07-27 PROCEDURE — 25000003 PHARM REV CODE 250: Performed by: NURSE PRACTITIONER

## 2022-07-27 PROCEDURE — 36600 WITHDRAWAL OF ARTERIAL BLOOD: CPT

## 2022-07-27 PROCEDURE — 36415 COLL VENOUS BLD VENIPUNCTURE: CPT | Performed by: FAMILY MEDICINE

## 2022-07-27 PROCEDURE — 20000000 HC ICU ROOM

## 2022-07-27 PROCEDURE — S4991 NICOTINE PATCH NONLEGEND: HCPCS | Performed by: NURSE PRACTITIONER

## 2022-07-27 PROCEDURE — C9399 UNCLASSIFIED DRUGS OR BIOLOG: HCPCS | Performed by: FAMILY MEDICINE

## 2022-07-27 PROCEDURE — 82803 BLOOD GASES ANY COMBINATION: CPT

## 2022-07-27 PROCEDURE — 99900035 HC TECH TIME PER 15 MIN (STAT)

## 2022-07-27 PROCEDURE — 83735 ASSAY OF MAGNESIUM: CPT | Performed by: NURSE PRACTITIONER

## 2022-07-27 PROCEDURE — 94799 UNLISTED PULMONARY SVC/PX: CPT

## 2022-07-27 PROCEDURE — 99291 PR CRITICAL CARE, E/M 30-74 MINUTES: ICD-10-PCS | Mod: ,,, | Performed by: NURSE PRACTITIONER

## 2022-07-27 PROCEDURE — 63600175 PHARM REV CODE 636 W HCPCS: Performed by: FAMILY MEDICINE

## 2022-07-27 PROCEDURE — 63600175 PHARM REV CODE 636 W HCPCS: Performed by: NURSE PRACTITIONER

## 2022-07-27 PROCEDURE — 27100171 HC OXYGEN HIGH FLOW UP TO 24 HOURS

## 2022-07-27 PROCEDURE — 83880 ASSAY OF NATRIURETIC PEPTIDE: CPT | Performed by: NURSE PRACTITIONER

## 2022-07-27 PROCEDURE — 27000249 HC VAPOTHERM CIRCUIT

## 2022-07-27 PROCEDURE — 80048 BASIC METABOLIC PNL TOTAL CA: CPT | Mod: XB | Performed by: FAMILY MEDICINE

## 2022-07-27 PROCEDURE — 80053 COMPREHEN METABOLIC PANEL: CPT | Performed by: NURSE PRACTITIONER

## 2022-07-27 PROCEDURE — 99291 CRITICAL CARE FIRST HOUR: CPT | Mod: ,,, | Performed by: NURSE PRACTITIONER

## 2022-07-27 PROCEDURE — 94640 AIRWAY INHALATION TREATMENT: CPT

## 2022-07-27 PROCEDURE — 36415 COLL VENOUS BLD VENIPUNCTURE: CPT | Performed by: NURSE PRACTITIONER

## 2022-07-27 PROCEDURE — 25000003 PHARM REV CODE 250: Performed by: FAMILY MEDICINE

## 2022-07-27 PROCEDURE — 25000242 PHARM REV CODE 250 ALT 637 W/ HCPCS: Performed by: NURSE PRACTITIONER

## 2022-07-27 RX ORDER — LANOLIN ALCOHOL/MO/W.PET/CERES
800 CREAM (GRAM) TOPICAL
Status: DISCONTINUED | OUTPATIENT
Start: 2022-07-27 | End: 2022-07-30

## 2022-07-27 RX ORDER — FAMOTIDINE 20 MG/1
20 TABLET, FILM COATED ORAL 2 TIMES DAILY
Status: DISCONTINUED | OUTPATIENT
Start: 2022-07-27 | End: 2022-08-02 | Stop reason: HOSPADM

## 2022-07-27 RX ORDER — FUROSEMIDE 10 MG/ML
60 INJECTION INTRAMUSCULAR; INTRAVENOUS ONCE
Status: COMPLETED | OUTPATIENT
Start: 2022-07-27 | End: 2022-07-27

## 2022-07-27 RX ORDER — FUROSEMIDE 10 MG/ML
40 INJECTION INTRAMUSCULAR; INTRAVENOUS ONCE
Status: DISCONTINUED | OUTPATIENT
Start: 2022-07-27 | End: 2022-07-27

## 2022-07-27 RX ADMIN — MUPIROCIN: 20 OINTMENT TOPICAL at 09:07

## 2022-07-27 RX ADMIN — NICOTINE 1 PATCH: 21 PATCH, EXTENDED RELEASE TRANSDERMAL at 08:07

## 2022-07-27 RX ADMIN — MAGNESIUM OXIDE TAB 400 MG (241.3 MG ELEMENTAL MG) 800 MG: 400 (241.3 MG) TAB at 09:07

## 2022-07-27 RX ADMIN — IPRATROPIUM BROMIDE AND ALBUTEROL SULFATE 3 ML: 2.5; .5 SOLUTION RESPIRATORY (INHALATION) at 02:07

## 2022-07-27 RX ADMIN — MAGNESIUM OXIDE TAB 400 MG (241.3 MG ELEMENTAL MG) 800 MG: 400 (241.3 MG) TAB at 02:07

## 2022-07-27 RX ADMIN — RISPERIDONE 1 MG: 1 TABLET ORAL at 09:07

## 2022-07-27 RX ADMIN — POTASSIUM BICARBONATE 35 MEQ: 391 TABLET, EFFERVESCENT ORAL at 08:07

## 2022-07-27 RX ADMIN — MAGNESIUM OXIDE TAB 400 MG (241.3 MG ELEMENTAL MG) 800 MG: 400 (241.3 MG) TAB at 05:07

## 2022-07-27 RX ADMIN — DOCUSATE SODIUM 100 MG: 100 CAPSULE, LIQUID FILLED ORAL at 09:07

## 2022-07-27 RX ADMIN — INSULIN DETEMIR 18 UNITS: 100 INJECTION, SOLUTION SUBCUTANEOUS at 09:07

## 2022-07-27 RX ADMIN — DIAZEPAM 10 MG: 5 TABLET ORAL at 10:07

## 2022-07-27 RX ADMIN — POTASSIUM BICARBONATE 35 MEQ: 391 TABLET, EFFERVESCENT ORAL at 05:07

## 2022-07-27 RX ADMIN — FUROSEMIDE 60 MG: 10 INJECTION, SOLUTION INTRAMUSCULAR; INTRAVENOUS at 08:07

## 2022-07-27 RX ADMIN — FAMOTIDINE 20 MG: 20 TABLET ORAL at 09:07

## 2022-07-27 RX ADMIN — IPRATROPIUM BROMIDE AND ALBUTEROL SULFATE 3 ML: 2.5; .5 SOLUTION RESPIRATORY (INHALATION) at 07:07

## 2022-07-27 RX ADMIN — BUDESONIDE 0.5 MG: 0.5 INHALANT ORAL at 07:07

## 2022-07-27 RX ADMIN — ARFORMOTEROL TARTRATE 15 MCG: 15 SOLUTION RESPIRATORY (INHALATION) at 07:07

## 2022-07-27 RX ADMIN — ASPIRIN 81 MG: 81 TABLET, COATED ORAL at 09:07

## 2022-07-27 RX ADMIN — POTASSIUM BICARBONATE 50 MEQ: 978 TABLET, EFFERVESCENT ORAL at 09:07

## 2022-07-27 RX ADMIN — MUPIROCIN: 20 OINTMENT TOPICAL at 08:07

## 2022-07-27 RX ADMIN — METOPROLOL SUCCINATE 25 MG: 25 TABLET, EXTENDED RELEASE ORAL at 09:07

## 2022-07-27 RX ADMIN — INSULIN ASPART 2 UNITS: 100 INJECTION, SOLUTION INTRAVENOUS; SUBCUTANEOUS at 10:07

## 2022-07-27 RX ADMIN — CEFTRIAXONE 2 G: 2 INJECTION, SOLUTION INTRAVENOUS at 05:07

## 2022-07-27 RX ADMIN — BUPROPION HYDROCHLORIDE 150 MG: 150 TABLET, FILM COATED, EXTENDED RELEASE ORAL at 09:07

## 2022-07-27 RX ADMIN — HYDROCODONE BITARTRATE AND ACETAMINOPHEN 1 TABLET: 5; 325 TABLET ORAL at 09:07

## 2022-07-27 NOTE — PT/OT/SLP PROGRESS
Physical Therapy      Patient Name:  Alexandra Goins   MRN:  [id number]    07:50 a.m.  Communicated with patient's nurse, Harrison, who requested patient be on hold today due to medical instability.   Will follow up during next scheduled PT session.

## 2022-07-27 NOTE — PROGRESS NOTES
[facility]ensive [facility] Medicine  Progress Note    Patient Name: Alexandra Goins  MRN: [id number]  Patient Class: IP- Inpatient   Admission Date: 7/24/2022  Length of Stay: 3 days  Attending Physician: Bishop Manuel MD  Primary Care Provider: Perez Casiano MD        Subjective:     Principal Problem:Acute respiratory failure with hypoxia and hypercapnia        HPI:   Fever       Since Tuesday morning. recent skin cancer removed from face    Altered Mental Status       lethargic since this morning      Per ER- This is a 56 y.o. female patient with a PMHx of  COPD, HTN, HLP, T2DM, pneumonia, nicotine dependence, non ruptured cerebral aneurysm, stroke, PVD, anxiety, depression, diverticular disease, GERD, hypothyroidism, PVD, severe obesity, arthritis, asthma, Bipolar 1 disorder, and CAD who presents to the Emergency Department for evaluation of AMS which onset gradually this morning. Per , pt came in 5 days PTA to have biopsy of suspected skin cancer.  states pt had a slight fever beginning Tuesday and that the fever got higher over the next 3-4 days.  states pt fell on Friday but appeared to be okay.  states pt was very lethargic this morning, weak, extremely hard to wake and was speaking nonsense upon waking up.  states pt has COPD and is not on oxygen at home. Symptoms are constant and moderate in severity. No mitigating or exacerbating factors reported. Associated sxs include SOB, fever and weakness. Patient is unable to deny other sxs due to AMS at this time.  No further complaints or concerns at this time. HPI limited to AMS.      Patient evaluated by ER and found to have Septic shock. Patient to be admitted to ICU on [facility].     Patient currently awake, alert, oriented to person, place, time, and situation. Code status discussed with patient  and her current  wishes are to be a FULL CODE.       Overview/Hospital Course:  Admitted for treatment of septic  shock due to UTI.  Empirically received Zosyn in the ED and emilia urine and blood cultures.  IV fluid resuscitation was started with inadequate response and she briefly required Norepinephrine infusion.  Hyperglycemia and mild metabolic acidosis on admission.  Acidosis resolved and blood sugars remained high and corrected with intermittent insulin.  Further stabilized and transferred out of the ICU on 25 July.  7/26: blood cultures growing gram negative rods empirically. Cont cefepime. Currently on 15L NC.   7/27:  Blood cultures growing salmonella in 1/2 sets.  Antibiotics de-escalated to Rocephin.  Patient transferred to ICU overnight due to hypoxia and hypercapnia requiring BiPAP.  BNP slightly elevated.  Echo obtained.  Lasix given this a.m..      Interval History: Blood cultures growing salmonella in 1/2 sets.  Antibiotics de-escalated to Rocephin.  Patient transferred to ICU overnight due to hypoxia and hypercapnia requiring BiPAP.  BNP slightly elevated.  Echo obtained.  Lasix given this a.m..    Review of Systems   Constitutional:  Negative for fatigue and fever.   HENT:  Negative for sinus pressure.    Eyes:  Negative for visual disturbance.   Respiratory:  Positive for shortness of breath.    Cardiovascular:  Negative for chest pain.   Gastrointestinal:  Negative for nausea and vomiting.   Genitourinary:  Negative for difficulty urinating.   Musculoskeletal:  Negative for back pain.   Skin:  Negative for rash.   Neurological:  Negative for headaches.   Psychiatric/Behavioral:  Negative for confusion.    Objective:     Vital Signs (Most Recent):  Temp: 100.1 °F (37.8 °C) (07/27/22 0701)  Pulse: (!) 111 (07/27/22 0903)  Resp: (!) 25 (07/27/22 0903)  BP: (!) 154/67 (07/27/22 0900)  SpO2: 95 % (07/27/22 0903)   Vital Signs (24h Range):  Temp:  [98.2 °F (36.8 °C)-100.7 °F (38.2 °C)] 100.1 °F (37.8 °C)  Pulse:  [105-116] 111  Resp:  [17-30] 25  SpO2:  [85 %-97 %] 95 %  BP: (125-155)/(60-86) 154/67     Weight:  103.9 kg (229 lb)  Body mass index is 39.31 kg/m².    Intake/Output Summary (Last 24 hours) at 7/27/2022 0923  Last data filed at 7/27/2022 0900  Gross per 24 hour   Intake 2075.06 ml   Output 1500 ml   Net 575.06 ml      Physical Exam  Vitals and nursing note reviewed.   Constitutional:       General: She is not in acute distress.     Appearance: She is well-developed. She is obese.   HENT:      Head: Normocephalic and atraumatic.   Eyes:      Conjunctiva/sclera: Conjunctivae normal.      Pupils: Pupils are equal, round, and reactive to light.   Neck:      Thyroid: No thyromegaly.      Vascular: No JVD.   Cardiovascular:      Rate and Rhythm: Normal rate and regular rhythm.      Heart sounds: No murmur heard.    No friction rub. No gallop.   Pulmonary:      Effort: Pulmonary effort is normal.      Breath sounds: Normal breath sounds. No wheezing or rales.      Comments: On bipap  Abdominal:      General: Bowel sounds are normal. There is no distension.      Palpations: Abdomen is soft.      Tenderness: There is no abdominal tenderness. There is no guarding or rebound.   Musculoskeletal:         General: No deformity. Normal range of motion.      Cervical back: Neck supple.   Lymphadenopathy:      Cervical: No cervical adenopathy.   Skin:     General: Skin is warm and dry.      Findings: No rash.   Neurological:      Mental Status: She is alert and oriented to person, place, and time.      Deep Tendon Reflexes: Reflexes are normal and symmetric.   Psychiatric:         Behavior: Behavior normal.         Thought Content: Thought content normal.         Judgment: Judgment normal.       Significant Labs: All pertinent labs within the past 24 hours have been reviewed.    Significant Imaging: I have reviewed all pertinent imaging results/findings within the past 24 hours.      Assessment/Plan:      * Acute respiratory failure with hypoxia and hypercapnia  Telemetry  Monitor O2 sats, supplemental O2 as needed  Duonebs q 4  hours  Possible allergy to pulmicort     7/27:  ABG overnight reviewed  Currently on BiPAP  Possible diastolic heart failure exacerbation  Will give Lasix 20 mg  Echo pending  Wean O2 as tolerated  Patient currently in ICU for monitoring        Salmonella bacteremia  Salmonella bacteremia noted  Sensitivities pending  Will continue on Rocephin    Hypoalbuminemia  Monitor      COPD (chronic obstructive pulmonary disease)  Monitor O2 sats, supplemental O2 as needed  Add duonebs q 4 hours      Severe obesity (BMI 35.0-39.9) with comorbidity  Body mass index is 36.66 kg/m². Morbid obesity complicates all aspects of disease management from diagnostic modalities to treatment. Weight loss encouraged and health benefits explained to patient.         Coronary artery disease of native artery of native heart with stable angina pectoris  Telemetry  Trend troponins      Acute encephalopathy  Likely related to hypercapnia  Mentation appears to be improved on BiPAP      Debility  Fall and skin precautions  Turn Q 2 hours           Hx of arterial ischemic stroke  An non ruptuured aneursym  Monitor  Neurochecks q 4 hours  Check Ct scan brain      Type 2 diabetes mellitus with complication, with long-term current use of insulin   Dm diet once tolerating po intake  Accuchecks with correctional SSI  Lab Results   Component Value Date    HGBA1C 11.2 (H) 07/25/2022     Consult dietician and DM educator on patient stabilized    7/27:  Controlled  Continue on Levemir 18 units b.i.d.  Continue sliding scale    Hx of Renal abscess, right  Check Ct abd/ pelvis      Nicotine dependence  Smoking cessation education > 3 minutes  Add nicotine patch      Bipolar disorder with anxiety and depression  Resume home medications once stabilized  Check urine drug screen        VTE Risk Mitigation (From admission, onward)         Ordered     IP VTE HIGH RISK PATIENT  Once         07/24/22 1444     Place sequential compression device  Until discontinued          07/24/22 1444                Discharge Planning   NOÉ:      Code Status: Full Code   Is the patient medically ready for discharge?:     Reason for patient still in hospital (select all that apply): Patient trending condition  Discharge Plan A: Home with family            Critical care time spent on the evaluation and treatment of severe organ dysfunction, review of pertinent labs and imaging studies, discussions with consulting providers and discussions with patient/family: 38 minutes.      Bishop Manuel MD  Department of Hospital Medicine   [facility] - [facility]ensive [facility] ([facility])

## 2022-07-27 NOTE — EICU
56 yr old female   PMH :    COPD, HTN, HLP, T2DM, pneumonia, nicotine dependence, non ruptured cerebral aneurysm, stroke, PVD, anxiety, depression, diverticular disease, GERD, hypothyroidism, PVD, severe obesity, arthritis, asthma, Bipolar 1 disorder, and CAD     Acute hypoxic respiratory failure   Salmonella in blood   Shock - off of levophed     On NIV 14/10   pullinh I tidal volumes close to 500   ABG to be repeated   Patient will need CT chest as well, may not be possible on NIV   On ceftriaxone for the salmonella     Crticially ill

## 2022-07-27 NOTE — ASSESSMENT & PLAN NOTE
Urine culture noted mult organisms  Cont IVAB   Reported recent hx of renal abscess per patient but no records of this at [facility] or outside [facility] records for last 2 years  CT Renal: Left ureteral fullness with mild left-sided pelviectasis.  Punctate nonobstructing right renal calculi.  Hepatomegaly.  Left colonic diverticulosis.  Bibasilar atelectasis/consolidation suggestive of pneumonia.  Atherosclerotic changes.  Cholecystectomy.  No drainable fluid collection is identified

## 2022-07-27 NOTE — PROGRESS NOTES
Patient arrived to ICU bed 6 transferred from [facility] accompanied by Todd RN, Mireya RN, and RT. Pt on 65% BiPAP, sat 90%. Heart monitor in place NSR, vital signs taken. Temp 99.2F. Mildly confused x time, situation. IVF @50mL/hr. External catheter changed and placed to suction.    Patient orientated to room, call light, fall precautions, and board.

## 2022-07-27 NOTE — ASSESSMENT & PLAN NOTE
Dm diet once tolerating po intake  Accuchecks with correctional SSI  Lab Results   Component Value Date    HGBA1C 11.2 (H) 07/25/2022     Consult dietician and DM educator on patient stabilized    7/27:  Controlled  Continue on Levemir 18 units b.i.d.  Continue sliding scale

## 2022-07-27 NOTE — PROGRESS NOTES
Primary nurse at bedside noted pt's O2 at 85% on 15L HF oxygen. Increased confusion and agitation. Notified provider and respiratory. Both at bedside and new order for ABG and head CT placed. CT performed and shows no acute findings. Pt placed on Q bipap for increased CO2. Resting comfortably at this time. Daughters at bedside and jarrett placed in room. Currently 93% O2 sat.

## 2022-07-27 NOTE — ASSESSMENT & PLAN NOTE
Hx Depression and hx SA on admit in 2015  Follows with Psych cont post discharge  Cont Wellbutrin and Risperidone per HM

## 2022-07-27 NOTE — SUBJECTIVE & OBJECTIVE
Review of Systems   Unable to perform ROS: Acuity of condition   On NPPV and confused    Objective:     Vital Signs (Most Recent):  Temp: 100.1 °F (37.8 °C) (07/27/22 0701)  Pulse: 110 (07/27/22 0730)  Resp: (!) 26 (07/27/22 0730)  BP: (!) 155/70 (07/27/22 0730)  SpO2: 97 % (07/27/22 0730)   Vital Signs (24h Range):  Temp:  [98.2 °F (36.8 °C)-100.7 °F (38.2 °C)] 100.1 °F (37.8 °C)  Pulse:  [105-117] 110  Resp:  [17-30] 26  SpO2:  [85 %-97 %] 97 %  BP: (125-155)/(60-86) 155/70     Weight: 104 kg (229 lb 4.5 oz)  Body mass index is 39.36 kg/m².      Intake/Output Summary (Last 24 hours) at 7/27/2022 0758  Last data filed at 7/27/2022 0324  Gross per 24 hour   Intake 2255.06 ml   Output 1400 ml   Net 855.06 ml       Physical Exam  Vitals and nursing note reviewed.   Constitutional:       General: She is awake. She is not in acute distress.     Appearance: She is well-developed. She is morbidly obese. She is ill-appearing. She is not toxic-appearing or diaphoretic.      Interventions: She is not intubated.Face mask in place.   HENT:      Head: Normocephalic and atraumatic.      Mouth/Throat:      Mouth: Mucous membranes are dry.      Comments: Mouth dry on NPPV  Eyes:      General: Lids are normal.      Pupils: Pupils are equal, round, and reactive to light.   Neck:      Trachea: Trachea normal.      Comments: Obese  Cardiovascular:      Rate and Rhythm: Regular rhythm. Tachycardia present.      Pulses:           Radial pulses are 2+ on the right side and 2+ on the left side.        Dorsalis pedis pulses are 1+ on the right side and 1+ on the left side.      Heart sounds: Normal heart sounds.   Pulmonary:      Effort: Pulmonary effort is normal. No tachypnea, accessory muscle usage or respiratory distress. She is not intubated.      Breath sounds: Decreased breath sounds and rales present.   Chest:      Chest wall: No deformity or tenderness.   Abdominal:      General: Bowel sounds are decreased. There is no distension.       Palpations: Abdomen is soft.      Tenderness: There is no abdominal tenderness.      Comments: Obese   Musculoskeletal:         General: Normal range of motion.      Cervical back: Normal range of motion.      Right lower leg: No edema.      Left lower leg: No edema.      Right foot: No deformity.      Left foot: No deformity.   Lymphadenopathy:      Cervical: No cervical adenopathy.   Skin:     General: Skin is warm and dry.      Capillary Refill: Capillary refill takes 2 to 3 seconds.      Findings: No rash.   Neurological:      General: No focal deficit present.      Mental Status: She is alert. She is confused.      GCS: GCS eye subscore is 4. GCS verbal subscore is 4. GCS motor subscore is 6.   Psychiatric:         Mood and Affect: Mood is anxious.         Cognition and Memory: Cognition normal. Memory is impaired.         Judgment: Judgment is impulsive.       Vents:  Oxygen Concentration (%): 70 (07/27/22 0715)    Lines/Drains/Airways       Peripheral Intravenous Line  Duration                  Peripheral IV - Single Lumen 07/24/22 1537 22 G Left Hand 2 days         Peripheral IV - Double Lumen 07/25/22 1051 20 G Anterior;Distal;Right Forearm 1 day         Midline Catheter Insertion/Assessment  - Single Lumen 07/26/22 1113 Right basilic vein (medial side of arm) 20g x 8cm <1 day                    Significant Labs:    CBC/Anemia Profile:  Recent Labs   Lab 07/26/22  1109 07/27/22  0446   WBC 11.16 11.49   HGB 11.1* 10.6*   HCT 37.1 34.9*    210   MCV 98 97   RDW 15.3* 15.3*        Chemistries:  Recent Labs   Lab 07/26/22  1109 07/27/22  0446    141   K 4.0 3.7    106   CO2 23 25   BUN 41* 28*   CREATININE 1.2 0.8   CALCIUM 6.7* 7.3*   ALBUMIN 1.8* 1.8*   PROT 5.1* 5.0*   BILITOT 0.6 0.5   ALKPHOS 80 83   ALT 37 36   AST 60* 48*   MG 1.7 1.6       ABGs:   Recent Labs   Lab 07/27/22  0705   PH 7.358   PCO2 53.5*   HCO3 30.1*   POCSATURATED 86*   BE 5     POCT Glucose:   Recent Labs   Lab  07/26/22  2141 07/27/22  0445 07/27/22  0532   POCTGLUCOSE 208* 142* 160*     All pertinent labs within the past 24 hours have been reviewed.      Significant Imaging:  I have reviewed all pertinent imaging results/findings within the past 24 hours.  CT: I have reviewed all pertinent results/findings within the past 24 hours and my personal findings are:  Head: no acute neuro process noted  CXR: I have reviewed all pertinent results/findings within the past 24 hours and my personal findings are:  There are persistent bilateral patchy airspace and interstitial opacities diffusely through the lungs, appearing somewhat increased at the lung apices

## 2022-07-27 NOTE — ASSESSMENT & PLAN NOTE
Reported COPD from last admit Feb 2021 but no pulm records found of PFTs  Back on cont NPPV   Not on home O2  Cont nebs  Worsening CXR with interstitial infiltrates and I/O balance + 5.4 L.  Concern for acute diastolic heart failure will give IVP Lasix and check TTE  ICU pulm monitoring

## 2022-07-27 NOTE — ASSESSMENT & PLAN NOTE
Encouraged tobacco cessation  Recommend outpt pulm follow up with complete PFTs  Cont ICS and LABA and DARIUS  Nicotine patch

## 2022-07-27 NOTE — SUBJECTIVE & OBJECTIVE
Interval History: Blood cultures growing salmonella in 1/2 sets.  Antibiotics de-escalated to Rocephin.  Patient transferred to ICU overnight due to hypoxia and hypercapnia requiring BiPAP.  BNP slightly elevated.  Echo obtained.  Lasix given this a.m..    Review of Systems   Constitutional:  Negative for fatigue and fever.   HENT:  Negative for sinus pressure.    Eyes:  Negative for visual disturbance.   Respiratory:  Positive for shortness of breath.    Cardiovascular:  Negative for chest pain.   Gastrointestinal:  Negative for nausea and vomiting.   Genitourinary:  Negative for difficulty urinating.   Musculoskeletal:  Negative for back pain.   Skin:  Negative for rash.   Neurological:  Negative for headaches.   Psychiatric/Behavioral:  Negative for confusion.    Objective:     Vital Signs (Most Recent):  Temp: 100.1 °F (37.8 °C) (07/27/22 0701)  Pulse: (!) 111 (07/27/22 0903)  Resp: (!) 25 (07/27/22 0903)  BP: (!) 154/67 (07/27/22 0900)  SpO2: 95 % (07/27/22 0903)   Vital Signs (24h Range):  Temp:  [98.2 °F (36.8 °C)-100.7 °F (38.2 °C)] 100.1 °F (37.8 °C)  Pulse:  [105-116] 111  Resp:  [17-30] 25  SpO2:  [85 %-97 %] 95 %  BP: (125-155)/(60-86) 154/67     Weight: 103.9 kg (229 lb)  Body mass index is 39.31 kg/m².    Intake/Output Summary (Last 24 hours) at 7/27/2022 0923  Last data filed at 7/27/2022 0900  Gross per 24 hour   Intake 2075.06 ml   Output 1500 ml   Net 575.06 ml      Physical Exam  Vitals and nursing note reviewed.   Constitutional:       General: She is not in acute distress.     Appearance: She is well-developed. She is obese.   HENT:      Head: Normocephalic and atraumatic.   Eyes:      Conjunctiva/sclera: Conjunctivae normal.      Pupils: Pupils are equal, round, and reactive to light.   Neck:      Thyroid: No thyromegaly.      Vascular: No JVD.   Cardiovascular:      Rate and Rhythm: Normal rate and regular rhythm.      Heart sounds: No murmur heard.    No friction rub. No gallop.   Pulmonary:       Effort: Pulmonary effort is normal.      Breath sounds: Normal breath sounds. No wheezing or rales.      Comments: On bipap  Abdominal:      General: Bowel sounds are normal. There is no distension.      Palpations: Abdomen is soft.      Tenderness: There is no abdominal tenderness. There is no guarding or rebound.   Musculoskeletal:         General: No deformity. Normal range of motion.      Cervical back: Neck supple.   Lymphadenopathy:      Cervical: No cervical adenopathy.   Skin:     General: Skin is warm and dry.      Findings: No rash.   Neurological:      Mental Status: She is alert and oriented to person, place, and time.      Deep Tendon Reflexes: Reflexes are normal and symmetric.   Psychiatric:         Behavior: Behavior normal.         Thought Content: Thought content normal.         Judgment: Judgment normal.       Significant Labs: All pertinent labs within the past 24 hours have been reviewed.    Significant Imaging: I have reviewed all pertinent imaging results/findings within the past 24 hours.

## 2022-07-27 NOTE — PLAN OF CARE
VS stable throughout shift. Pt on bipap at documented settings upon initial assessment; SpO2>=90%. Initially disoriented to everything but person. Mental status improved as oxygenation improved throughout shift; still needs intermittent reorientation. Pt currently on vapotherm 30L/80%; well tolerated. Pure wick external catheter in place; great UOP with one-time lasix dose. Pt and family updated on status and plan of care.

## 2022-07-27 NOTE — PROGRESS NOTES
"Informed patient via portal of biopsy showing benign SK. No further intervention needed for this site.       Final Pathologic Diagnosis Skin, right cheek, shave biopsy:   -SEBORRHEIC KERATOSIS, THIN AND PIGMENTED   This lesion is benign.   Comment: Interp By Roque Briseno M.D., Signed on 07/27/2022 at 12:44  Gross Patient ID/Pathology ID:  [id number]   Received in formalin labeled "right cheek" are gray-tan skin shave biopsy   measuring 9 x 12 mm, with an ill-defined gray tan lesion on the top surface.   Inked black on the deep surface, trisected, and specimen submitted into   cassette [id number]    Grossed by Dannielle Trilpett   Microscopic Exam Sections show minimal acanthosis at the same level as the basal layer of   monomorphous keratocytes.  Some areas contain mildly enlarged, pigmented   keratocytes at the basal layer.  Sparse melanophages are noted in the   superficial dermis. Mart-1 highlights periodically spaced population of   single melanocytes at the dermoepidermal junction.  Mart 1 also highlights   some scattered Mart 1 positive cells of unknown significance.   Immunohistochemical stain was reviewed in conjunction with adequate positive   control.   Disclaimer Unless the case is a 'gross only' or additional testing only, the final   diagnosis for each specimen is based on a microscopic examination of   appropriate tissue sections.     "

## 2022-07-27 NOTE — SIGNIFICANT EVENT
Messaged by staff about change in mentation and unable to recognize her family at bedside. Per chart review patient has been sundowning in the evenings but staff reports mentation had worsened. STAT ABG and CTH ordered.   Recent Labs     07/26/22 1958   PH 7.291*   PCO2 58.7*   PO2 50*   HCO3 28.3*   POCSATURATED 79*   BE 2     Will place patient on BIPAP until the morning.

## 2022-07-27 NOTE — SIGNIFICANT EVENT
Attempted to wean BiPAP early this am and patient unable to tolerate being on Highflo or NRB and immediately desats in mid 70's. She will require continuous biPAp support in order to maintain Sats. Will place transfer to ICU orders.  Will discuss with primary team in the AM

## 2022-07-27 NOTE — PROGRESS NOTES
[facility] - Intensive Care ([facility])  Critical Care Medicine  Progress Note    Patient Name: Alexandra Goins  MRN: [id number]  Admission Date: 7/24/2022  Hospital Length of Stay: 3 days  Code Status: Full Code  Attending Provider: Bishop Manuel MD  Primary Care Provider: Perez Casiano MD   Principal Problem: Acute respiratory failure with hypoxia and hypercapnia    Subjective:     HPI:  Ms Goins is a obese 57 yo WF with a PMH of COPD, CVA, CAD, BiPolar D/O, DM2 on insulin, HLD, HTN, PVD and Hypothyroidism with multiple allergies.  She presented to [facility] ED about noon today via personal vehicle confused with family concern of lethargy, malaise and Alt MS progressive since this AM and noted intermittent fever last 6 days.  In ED confused with temp 100.5, BP 68/44, WBC 18, creatinine 3.7, Mg 1 and UA+.  Given IVFs, IVAB, Levophed infusion, Mg, Tylenol, Ibuprofen and CL placed in ED.  Admitted to ICU.      Hospital/ICU Course:  7/24 - Patient seen in ED awaiting ICU admit.  She is lethargic but easily aroused with orientation X 3 in no distress on low flow NC O2.  BP improved post 2L IVF and on low dose Levophed infusion.  Family reportedly requested DNR status but patient has declined DNR and wants to be full code.  Dr. Gibbs at bed side also for exam and patient request of Full Code status.   7/25 - Upright in bed sleeping this AM easily awakened and responsive with orientation and no distress on high flow NC O2.  Not required Levophed since ICU admit yesterday evening.  States she feels much better.   7/27 - Transferred back to ICU this AM due to Alt MS and ABG w/ worsening hypoxia and Hypecapnea.  More alert in ICU still confused in no distress on NPPV.  Daughter at bed side.  I/O balance + 5.4 L since admit and shock resolved with VSS and CXR increased diffuse interstitial infiltrates.       Review of Systems   Unable to perform ROS: Acuity of condition   On NPPV and confused    Objective:     Vital Signs (Most  Recent):  Temp: 100.1 °F (37.8 °C) (07/27/22 0701)  Pulse: 110 (07/27/22 0730)  Resp: (!) 26 (07/27/22 0730)  BP: (!) 155/70 (07/27/22 0730)  SpO2: 97 % (07/27/22 0730)   Vital Signs (24h Range):  Temp:  [98.2 °F (36.8 °C)-100.7 °F (38.2 °C)] 100.1 °F (37.8 °C)  Pulse:  [105-117] 110  Resp:  [17-30] 26  SpO2:  [85 %-97 %] 97 %  BP: (125-155)/(60-86) 155/70     Weight: 104 kg (229 lb 4.5 oz)  Body mass index is 39.36 kg/m².      Intake/Output Summary (Last 24 hours) at 7/27/2022 0758  Last data filed at 7/27/2022 0324  Gross per 24 hour   Intake 2255.06 ml   Output 1400 ml   Net 855.06 ml       Physical Exam  Vitals and nursing note reviewed.   Constitutional:       General: She is awake. She is not in acute distress.     Appearance: She is well-developed. She is morbidly obese. She is ill-appearing. She is not toxic-appearing or diaphoretic.      Interventions: She is not intubated.Face mask in place.   HENT:      Head: Normocephalic and atraumatic.      Mouth/Throat:      Mouth: Mucous membranes are dry.      Comments: Mouth dry on NPPV  Eyes:      General: Lids are normal.      Pupils: Pupils are equal, round, and reactive to light.   Neck:      Trachea: Trachea normal.      Comments: Obese  Cardiovascular:      Rate and Rhythm: Regular rhythm. Tachycardia present.      Pulses:           Radial pulses are 2+ on the right side and 2+ on the left side.        Dorsalis pedis pulses are 1+ on the right side and 1+ on the left side.      Heart sounds: Normal heart sounds.   Pulmonary:      Effort: Pulmonary effort is normal. No tachypnea, accessory muscle usage or respiratory distress. She is not intubated.      Breath sounds: Decreased breath sounds and rales present.   Chest:      Chest wall: No deformity or tenderness.   Abdominal:      General: Bowel sounds are decreased. There is no distension.      Palpations: Abdomen is soft.      Tenderness: There is no abdominal tenderness.      Comments: Obese    Musculoskeletal:         General: Normal range of motion.      Cervical back: Normal range of motion.      Right lower leg: No edema.      Left lower leg: No edema.      Right foot: No deformity.      Left foot: No deformity.   Lymphadenopathy:      Cervical: No cervical adenopathy.   Skin:     General: Skin is warm and dry.      Capillary Refill: Capillary refill takes 2 to 3 seconds.      Findings: No rash.   Neurological:      General: No focal deficit present.      Mental Status: She is alert. She is confused.      GCS: GCS eye subscore is 4. GCS verbal subscore is 4. GCS motor subscore is 6.   Psychiatric:         Mood and Affect: Mood is anxious.         Cognition and Memory: Cognition normal. Memory is impaired.         Judgment: Judgment is impulsive.       Vents:  Oxygen Concentration (%): 70 (07/27/22 0715)    Lines/Drains/Airways       Peripheral Intravenous Line  Duration                  Peripheral IV - Single Lumen 07/24/22 1537 22 G Left Hand 2 days         Peripheral IV - Double Lumen 07/25/22 1051 20 G Anterior;Distal;Right Forearm 1 day         Midline Catheter Insertion/Assessment  - Single Lumen 07/26/22 1113 Right basilic vein (medial side of arm) 20g x 8cm <1 day                    Significant Labs:    CBC/Anemia Profile:  Recent Labs   Lab 07/26/22  1109 07/27/22  0446   WBC 11.16 11.49   HGB 11.1* 10.6*   HCT 37.1 34.9*    210   MCV 98 97   RDW 15.3* 15.3*        Chemistries:  Recent Labs   Lab 07/26/22  1109 07/27/22  0446    141   K 4.0 3.7    106   CO2 23 25   BUN 41* 28*   CREATININE 1.2 0.8   CALCIUM 6.7* 7.3*   ALBUMIN 1.8* 1.8*   PROT 5.1* 5.0*   BILITOT 0.6 0.5   ALKPHOS 80 83   ALT 37 36   AST 60* 48*   MG 1.7 1.6       ABGs:   Recent Labs   Lab 07/27/22  0705   PH 7.358   PCO2 53.5*   HCO3 30.1*   POCSATURATED 86*   BE 5     POCT Glucose:   Recent Labs   Lab 07/26/22  2141 07/27/22  0445 07/27/22  0532   POCTGLUCOSE 208* 142* 160*     All pertinent labs within  the past 24 hours have been reviewed.      Significant Imaging:  I have reviewed all pertinent imaging results/findings within the past 24 hours.  CT: I have reviewed all pertinent results/findings within the past 24 hours and my personal findings are:  Head: no acute neuro process noted  CXR: I have reviewed all pertinent results/findings within the past 24 hours and my personal findings are:  There are persistent bilateral patchy airspace and interstitial opacities diffusely through the lungs, appearing somewhat increased at the lung apices      ABG  Recent Labs   Lab 07/27/22  0705   PH 7.358   PO2 55*   PCO2 53.5*   HCO3 30.1*   BE 5     Assessment/Plan:     Neuro  Hx of arterial ischemic stroke  Cont ASA  Reported hx aneurysm and follows with NS with MRA every 2 years  Avoiding any anticoagulation    Acute encephalopathy  Suspect secondary to hypercapnea and hypoxia  Improved in ICU on NPPV and ABGs improving  CT head no acute neuro process noted    Psychiatric  Bipolar disorder with anxiety and depression  Hx Depression and hx SA on admit in 2015  Follows with Psych cont post discharge  Cont Wellbutrin and Risperidone per HM    Pulmonary  * Acute respiratory failure with hypoxia and hypercapnia  Reported COPD from last admit Feb 2021 but no pulm records found of PFTs  Back on cont NPPV   Not on home O2  Cont nebs  Worsening CXR with interstitial infiltrates and I/O balance + 5.4 L.  Concern for acute diastolic heart failure will give IVP Lasix and check TTE  ICU pulm monitoring    Cardiac/Vascular  Coronary artery disease of native artery of native heart with stable angina pectoris  Follows with Dr. Chakraborty in clinic  Cont ASA  Cont Toprol  IVP Lasix X 1  Cont cardiac monitoring    Renal/  Acute urinary tract infection  Urine culture noted mult organisms  Cont IVAB   Reported recent hx of renal abscess per patient but no records of this at [facility] or outside [facility] records for last 2 years  CT Renal: Left  ureteral fullness with mild left-sided pelviectasis.  Punctate nonobstructing right renal calculi.  Hepatomegaly.  Left colonic diverticulosis.  Bibasilar atelectasis/consolidation suggestive of pneumonia.  Atherosclerotic changes.  Cholecystectomy.  No drainable fluid collection is identified    ID  Severe sepsis  Source UTI w/ possible PNA  Zosyn and Vanc given in ED  Cefepime, Flagyl and Vanc X 3 days changed to Rocephin 7/26  Blood cultures 1/2 Salmonella suspect contaminate and repeats done yesterday   Urine culture mult organisms noted  No sputum produced  BP stable actually little high post volume resuscitation and off pressors 48 hours    Endocrine  Severe obesity (BMI 35.0-39.9) with comorbidity  Encouraged weight loss    Type 2 diabetes mellitus with complication, with long-term current use of insulin  Glucose 142-260 last 24 hours  Cont SSI, Detemir and ADA diet    Other  Nicotine dependence  Encouraged tobacco cessation  Recommend outpt pulm follow up with complete PFTs  Cont ICS and LABA and DARIUS  Nicotine patch        Preventive Measures and Monitoring:   Stress Ulcer: Pepcid  Nutrition: ADA diet  Glucose control: SSI  Bowel prophylaxis: Colace and PRN Dulcolax  DVT prophylaxis: SCDs  Hx CAD on B-Blocker: Toprol  Head of Bed/Reposition: Elevate HOB and turn Q1-2 hours   Early Mobility: bed rest  IVAB Day: #4  Code Status: Full     Counseling/Consultation:I have discussed the care of this patient in detail with the bedside nursing staff and Dr. Dia and Dr. Manuel     Critical Care Time: 57 minutes  Critical secondary to Patient has a condition that poses threat to life and bodily function: Acute Renal Failure, Acute Hypoxic and Hypercapnic Resp Failure and Severe Sepsis with Acute Encephalopathy     Critical care was time spent personally by me on the following activities: development of treatment plan with patient or surrogate and bedside caregivers, discussions with consultants, evaluation of patient's  response to treatment, examination of patient, ordering and performing treatments and interventions, ordering and review of laboratory studies, ordering and review of radiographic studies, pulse oximetry, re-evaluation of patient's condition. This critical care time did not overlap with that of any other provider or involve time for any procedures.     Jamie Velasquez, NP  Critical Care Medicine  [facility] - Intensive Care [facility])

## 2022-07-27 NOTE — ASSESSMENT & PLAN NOTE
Telemetry  Monitor O2 sats, supplemental O2 as needed  Duonebs q 4 hours  Possible allergy to pulmicort     7/27:  ABG overnight reviewed  Currently on BiPAP  Possible diastolic heart failure exacerbation  Will give Lasix 20 mg  Echo pending  Wean O2 as tolerated  Patient currently in ICU for monitoring

## 2022-07-27 NOTE — PT/OT/SLP PROGRESS
Occupational Therapy      Patient Name:  Alexandra Goins   MRN:  [id number]  Communicated with patient's nurse, Harrison, who requested patient be on hold today due to medical instability.   Will follow up during next scheduled PT session.     Mona Pinon OT  7/27/2022   0594

## 2022-07-27 NOTE — ASSESSMENT & PLAN NOTE
Source UTI w/ possible PNA  Zosyn and Vanc given in ED  Cefepime, Flagyl and Vanc X 3 days changed to Rocephin 7/26  Blood cultures 1/2 Salmonella suspect contaminate and repeats done yesterday   Urine culture mult organisms noted  No sputum produced  BP stable actually little high post volume resuscitation and off pressors 48 hours

## 2022-07-27 NOTE — ASSESSMENT & PLAN NOTE
Suspect secondary to hypercapnea and hypoxia  Improved in ICU on NPPV and ABGs improving  CT head no acute neuro process noted

## 2022-07-27 NOTE — ASSESSMENT & PLAN NOTE
Follows with Dr. Chakraborty in clinic  Cont ASA  Cont Toprol  IVP Lasix X 1  Cont cardiac monitoring

## 2022-07-27 NOTE — PROGRESS NOTES
Pharmacist Renal Dose Adjustment Note    Alexandra Goins is a 56 y.o. female being treated with the medication famotidine.     Patient Data:    Vital Signs (Most Recent):  Temp: 100.1 °F (37.8 °C) (07/27/22 0701)  Pulse: 103 (07/27/22 1000)  Resp: (!) 28 (07/27/22 1000)  BP: 139/75 (07/27/22 1000)  SpO2: 95 % (07/27/22 1000)   Vital Signs (72h Range):  Temp:  [97.5 °F (36.4 °C)-101.8 °F (38.8 °C)]   Pulse:  []   Resp:  [9-34]   BP: ()/(44-88)   SpO2:  [83 %-99 %]      Recent Labs   Lab 07/25/22  0510 07/26/22  1109 07/27/22  0446   CREATININE 2.4* 1.2 0.8     Serum creatinine: 0.8 mg/dL 07/27/22 0446  Estimated creatinine clearance: 90 mL/min    Famotidine 20 mg PO daily has been changed to 20 mg BID per [facility]'s renal dose adjustment protocol.     Pharmacist's Name: Nic Streeter, PharmD 7/27/2022 10:41 AM  Pharmacist's Extension: [phone]

## 2022-07-28 PROBLEM — G93.40 ACUTE ENCEPHALOPATHY: Status: RESOLVED | Noted: 2019-09-18 | Resolved: 2022-07-28

## 2022-07-28 PROBLEM — J44.1 CHRONIC OBSTRUCTIVE PULMONARY DISEASE WITH ACUTE EXACERBATION: Status: ACTIVE | Noted: 2022-07-24

## 2022-07-28 LAB
ALBUMIN SERPL BCP-MCNC: 1.9 G/DL (ref 3.5–5.2)
ALP SERPL-CCNC: 91 U/L (ref 55–135)
ALT SERPL W/O P-5'-P-CCNC: 40 U/L (ref 10–44)
ANION GAP SERPL CALC-SCNC: 11 MMOL/L (ref 8–16)
AST SERPL-CCNC: 59 U/L (ref 10–40)
BASOPHILS # BLD AUTO: 0.03 K/UL (ref 0–0.2)
BASOPHILS NFR BLD: 0.2 % (ref 0–1.9)
BILIRUB SERPL-MCNC: 0.6 MG/DL (ref 0.1–1)
BUN SERPL-MCNC: 22 MG/DL (ref 6–20)
CALCIUM SERPL-MCNC: 8.1 MG/DL (ref 8.7–10.5)
CHLORIDE SERPL-SCNC: 101 MMOL/L (ref 95–110)
CO2 SERPL-SCNC: 35 MMOL/L (ref 23–29)
CREAT SERPL-MCNC: 0.7 MG/DL (ref 0.5–1.4)
DIFFERENTIAL METHOD: ABNORMAL
EOSINOPHIL # BLD AUTO: 0 K/UL (ref 0–0.5)
EOSINOPHIL NFR BLD: 0.2 % (ref 0–8)
ERYTHROCYTE [DISTWIDTH] IN BLOOD BY AUTOMATED COUNT: 14.6 % (ref 11.5–14.5)
EST. GFR  (AFRICAN AMERICAN): >60 ML/MIN/1.73 M^2
EST. GFR  (NON AFRICAN AMERICAN): >60 ML/MIN/1.73 M^2
GLUCOSE SERPL-MCNC: 58 MG/DL (ref 70–110)
HCT VFR BLD AUTO: 35.6 % (ref 37–48.5)
HGB BLD-MCNC: 11.1 G/DL (ref 12–16)
IMM GRANULOCYTES # BLD AUTO: 0.16 K/UL (ref 0–0.04)
IMM GRANULOCYTES NFR BLD AUTO: 1.3 % (ref 0–0.5)
LYMPHOCYTES # BLD AUTO: 2 K/UL (ref 1–4.8)
LYMPHOCYTES NFR BLD: 16.2 % (ref 18–48)
MAGNESIUM SERPL-MCNC: 1.5 MG/DL (ref 1.6–2.6)
MCH RBC QN AUTO: 29.8 PG (ref 27–31)
MCHC RBC AUTO-ENTMCNC: 31.2 G/DL (ref 32–36)
MCV RBC AUTO: 95 FL (ref 82–98)
MONOCYTES # BLD AUTO: 0.9 K/UL (ref 0.3–1)
MONOCYTES NFR BLD: 7.1 % (ref 4–15)
NEUTROPHILS # BLD AUTO: 9.1 K/UL (ref 1.8–7.7)
NEUTROPHILS NFR BLD: 75 % (ref 38–73)
NRBC BLD-RTO: 0 /100 WBC
PLATELET # BLD AUTO: 259 K/UL (ref 150–450)
PMV BLD AUTO: 10.5 FL (ref 9.2–12.9)
POCT GLUCOSE: 140 MG/DL (ref 70–110)
POCT GLUCOSE: 244 MG/DL (ref 70–110)
POCT GLUCOSE: 261 MG/DL (ref 70–110)
POCT GLUCOSE: 341 MG/DL (ref 70–110)
POCT GLUCOSE: 76 MG/DL (ref 70–110)
POTASSIUM SERPL-SCNC: 3.9 MMOL/L (ref 3.5–5.1)
PROCALCITONIN SERPL IA-MCNC: 3.11 NG/ML
PROT SERPL-MCNC: 5.6 G/DL (ref 6–8.4)
RBC # BLD AUTO: 3.73 M/UL (ref 4–5.4)
SODIUM SERPL-SCNC: 147 MMOL/L (ref 136–145)
WBC # BLD AUTO: 12.13 K/UL (ref 3.9–12.7)

## 2022-07-28 PROCEDURE — 25000003 PHARM REV CODE 250: Performed by: NURSE PRACTITIONER

## 2022-07-28 PROCEDURE — C9399 UNCLASSIFIED DRUGS OR BIOLOG: HCPCS | Performed by: NURSE PRACTITIONER

## 2022-07-28 PROCEDURE — S4991 NICOTINE PATCH NONLEGEND: HCPCS | Performed by: NURSE PRACTITIONER

## 2022-07-28 PROCEDURE — 84145 PROCALCITONIN (PCT): CPT | Performed by: FAMILY MEDICINE

## 2022-07-28 PROCEDURE — 27100171 HC OXYGEN HIGH FLOW UP TO 24 HOURS

## 2022-07-28 PROCEDURE — 94660 CPAP INITIATION&MGMT: CPT

## 2022-07-28 PROCEDURE — 99291 CRITICAL CARE FIRST HOUR: CPT | Mod: ,,, | Performed by: NURSE PRACTITIONER

## 2022-07-28 PROCEDURE — 97530 THERAPEUTIC ACTIVITIES: CPT | Mod: CQ

## 2022-07-28 PROCEDURE — 63600175 PHARM REV CODE 636 W HCPCS: Performed by: INTERNAL MEDICINE

## 2022-07-28 PROCEDURE — 36415 COLL VENOUS BLD VENIPUNCTURE: CPT | Performed by: NURSE PRACTITIONER

## 2022-07-28 PROCEDURE — 20000000 HC ICU ROOM

## 2022-07-28 PROCEDURE — 25000242 PHARM REV CODE 250 ALT 637 W/ HCPCS: Performed by: NURSE PRACTITIONER

## 2022-07-28 PROCEDURE — 80053 COMPREHEN METABOLIC PANEL: CPT | Performed by: NURSE PRACTITIONER

## 2022-07-28 PROCEDURE — 85025 COMPLETE CBC W/AUTO DIFF WBC: CPT | Performed by: NURSE PRACTITIONER

## 2022-07-28 PROCEDURE — 25000003 PHARM REV CODE 250: Performed by: INTERNAL MEDICINE

## 2022-07-28 PROCEDURE — 94799 UNLISTED PULMONARY SVC/PX: CPT

## 2022-07-28 PROCEDURE — 25000003 PHARM REV CODE 250: Performed by: FAMILY MEDICINE

## 2022-07-28 PROCEDURE — 63600175 PHARM REV CODE 636 W HCPCS: Performed by: FAMILY MEDICINE

## 2022-07-28 PROCEDURE — 99900035 HC TECH TIME PER 15 MIN (STAT)

## 2022-07-28 PROCEDURE — 94640 AIRWAY INHALATION TREATMENT: CPT

## 2022-07-28 PROCEDURE — 36415 COLL VENOUS BLD VENIPUNCTURE: CPT | Performed by: FAMILY MEDICINE

## 2022-07-28 PROCEDURE — 97530 THERAPEUTIC ACTIVITIES: CPT

## 2022-07-28 PROCEDURE — 99291 PR CRITICAL CARE, E/M 30-74 MINUTES: ICD-10-PCS | Mod: ,,, | Performed by: NURSE PRACTITIONER

## 2022-07-28 PROCEDURE — 83735 ASSAY OF MAGNESIUM: CPT | Performed by: NURSE PRACTITIONER

## 2022-07-28 RX ORDER — POLYETHYLENE GLYCOL 3350 17 G/17G
17 POWDER, FOR SOLUTION ORAL DAILY
Status: DISCONTINUED | OUTPATIENT
Start: 2022-07-28 | End: 2022-08-02 | Stop reason: HOSPADM

## 2022-07-28 RX ORDER — MAGNESIUM SULFATE HEPTAHYDRATE 40 MG/ML
2 INJECTION, SOLUTION INTRAVENOUS ONCE
Status: DISCONTINUED | OUTPATIENT
Start: 2022-07-28 | End: 2022-07-28

## 2022-07-28 RX ORDER — DOCUSATE SODIUM 100 MG/1
100 CAPSULE, LIQUID FILLED ORAL DAILY
Status: DISCONTINUED | OUTPATIENT
Start: 2022-07-28 | End: 2022-08-02 | Stop reason: HOSPADM

## 2022-07-28 RX ADMIN — IPRATROPIUM BROMIDE AND ALBUTEROL SULFATE 3 ML: 2.5; .5 SOLUTION RESPIRATORY (INHALATION) at 01:07

## 2022-07-28 RX ADMIN — INSULIN ASPART 4 UNITS: 100 INJECTION, SOLUTION INTRAVENOUS; SUBCUTANEOUS at 11:07

## 2022-07-28 RX ADMIN — ARFORMOTEROL TARTRATE 15 MCG: 15 SOLUTION RESPIRATORY (INHALATION) at 07:07

## 2022-07-28 RX ADMIN — DOCUSATE SODIUM 100 MG: 100 CAPSULE, LIQUID FILLED ORAL at 10:07

## 2022-07-28 RX ADMIN — FAMOTIDINE 20 MG: 20 TABLET ORAL at 08:07

## 2022-07-28 RX ADMIN — BUDESONIDE 0.5 MG: 0.5 INHALANT ORAL at 07:07

## 2022-07-28 RX ADMIN — METOPROLOL SUCCINATE 25 MG: 25 TABLET, EXTENDED RELEASE ORAL at 08:07

## 2022-07-28 RX ADMIN — RISPERIDONE 1 MG: 1 TABLET ORAL at 08:07

## 2022-07-28 RX ADMIN — METHYLPREDNISOLONE SODIUM SUCCINATE 40 MG: 40 INJECTION, POWDER, FOR SOLUTION INTRAMUSCULAR; INTRAVENOUS at 02:07

## 2022-07-28 RX ADMIN — METHYLPREDNISOLONE SODIUM SUCCINATE 40 MG: 40 INJECTION, POWDER, FOR SOLUTION INTRAMUSCULAR; INTRAVENOUS at 09:07

## 2022-07-28 RX ADMIN — INSULIN DETEMIR 14 UNITS: 100 INJECTION, SOLUTION SUBCUTANEOUS at 06:07

## 2022-07-28 RX ADMIN — MUPIROCIN: 20 OINTMENT TOPICAL at 08:07

## 2022-07-28 RX ADMIN — ASPIRIN 81 MG: 81 TABLET, COATED ORAL at 08:07

## 2022-07-28 RX ADMIN — IPRATROPIUM BROMIDE AND ALBUTEROL SULFATE 3 ML: 2.5; .5 SOLUTION RESPIRATORY (INHALATION) at 07:07

## 2022-07-28 RX ADMIN — NICOTINE 1 PATCH: 21 PATCH, EXTENDED RELEASE TRANSDERMAL at 08:07

## 2022-07-28 RX ADMIN — METHYLPREDNISOLONE SODIUM SUCCINATE 40 MG: 40 INJECTION, POWDER, FOR SOLUTION INTRAMUSCULAR; INTRAVENOUS at 10:07

## 2022-07-28 RX ADMIN — MAGNESIUM OXIDE TAB 400 MG (241.3 MG ELEMENTAL MG) 800 MG: 400 (241.3 MG) TAB at 05:07

## 2022-07-28 RX ADMIN — DEXTROSE 125 ML: 10 SOLUTION INTRAVENOUS at 05:07

## 2022-07-28 RX ADMIN — BUPROPION HYDROCHLORIDE 150 MG: 150 TABLET, FILM COATED, EXTENDED RELEASE ORAL at 08:07

## 2022-07-28 RX ADMIN — INSULIN ASPART 6 UNITS: 100 INJECTION, SOLUTION INTRAVENOUS; SUBCUTANEOUS at 04:07

## 2022-07-28 RX ADMIN — MAGNESIUM OXIDE TAB 400 MG (241.3 MG ELEMENTAL MG) 800 MG: 400 (241.3 MG) TAB at 10:07

## 2022-07-28 RX ADMIN — INSULIN ASPART 4 UNITS: 100 INJECTION, SOLUTION INTRAVENOUS; SUBCUTANEOUS at 08:07

## 2022-07-28 RX ADMIN — CEFTRIAXONE 2 G: 2 INJECTION, SOLUTION INTRAVENOUS at 05:07

## 2022-07-28 RX ADMIN — POLYETHYLENE GLYCOL 3350 17 G: 17 POWDER, FOR SOLUTION ORAL at 10:07

## 2022-07-28 NOTE — PROGRESS NOTES
[facility] - Intensive [facility] Medicine  Progress Note    Patient Name: Alexandra Goins  MRN: [id number]  Patient Class: IP- Inpatient   Admission Date: 7/24/2022  Length of Stay: 4 days  Attending Physician: Bishop Manuel MD  Primary Care Provider: Perez Casiano MD        Subjective:     Principal Problem:Acute respiratory failure with hypoxia and hypercapnia        HPI:   Fever       Since Tuesday morning. recent skin cancer removed from face    Altered Mental Status       lethargic since this morning      Per ER- This is a 56 y.o. female patient with a PMHx of  COPD, HTN, HLP, T2DM, pneumonia, nicotine dependence, non ruptured cerebral aneurysm, stroke, PVD, anxiety, depression, diverticular disease, GERD, hypothyroidism, PVD, severe obesity, arthritis, asthma, Bipolar 1 disorder, and CAD who presents to the Emergency Department for evaluation of AMS which onset gradually this morning. Per , pt came in 5 days PTA to have biopsy of suspected skin cancer.  states pt had a slight fever beginning Tuesday and that the fever got higher over the next 3-4 days.  states pt fell on Friday but appeared to be okay.  states pt was very lethargic this morning, weak, extremely hard to wake and was speaking nonsense upon waking up.  states pt has COPD and is not on oxygen at home. Symptoms are constant and moderate in severity. No mitigating or exacerbating factors reported. Associated sxs include SOB, fever and weakness. Patient is unable to deny other sxs due to AMS at this time.  No further complaints or concerns at this time. HPI limited to AMS.      Patient evaluated by ER and found to have Septic shock. Patient to be admitted to ICU on Iv Pressers.     Patient currently awake, alert, oriented to person, place, time, and situation. Code status discussed with patient  and her current  wishes are to be a FULL CODE.       Overview/Hospital Course:  Admitted for treatment of septic  shock due to UTI.  Empirically received Zosyn in the ED and emilia urine and blood cultures.  IV fluid resuscitation was started with inadequate response and she briefly required Norepinephrine infusion.  Hyperglycemia and mild metabolic acidosis on admission.  Acidosis resolved and blood sugars remained high and corrected with intermittent insulin.  Further stabilized and transferred out of the ICU on 25 July.  7/26: blood cultures growing gram negative rods empirically. Cont cefepime. Currently on 15L NC.   7/27:  Blood cultures growing salmonella in 1/2 sets.  Antibiotics de-escalated to Rocephin.  Patient transferred to ICU overnight due to hypoxia and hypercapnia requiring BiPAP.  BNP slightly elevated.  Echo obtained.  Lasix given this a.m..  7/28:  Patient weaned off of BiPAP.  Currently on Vapotherm.  Wheezing noted this a.m..  Start Solu-Medrol.  Wean oxygen as tolerated.  Continue Rocephin for salmonella bacteremia.  Possible step-down tomorrow.      Interval History: Patient weaned off of BiPAP.  Currently on Vapotherm.  Wheezing noted this a.m..  Start Solu-Medrol.  Wean oxygen as tolerated.  Continue Rocephin for salmonella bacteremia.  Possible step-down tomorrow.    Review of Systems   Constitutional:  Negative for fatigue and fever.   HENT:  Negative for sinus pressure.    Eyes:  Negative for visual disturbance.   Respiratory:  Positive for shortness of breath.    Cardiovascular:  Negative for chest pain.   Gastrointestinal:  Negative for nausea and vomiting.   Genitourinary:  Negative for difficulty urinating.   Musculoskeletal:  Negative for back pain.   Skin:  Negative for rash.   Neurological:  Negative for headaches.   Psychiatric/Behavioral:  Negative for confusion.    Objective:     Vital Signs (Most Recent):  Temp: 98.7 °F (37.1 °C) (07/28/22 0715)  Pulse: 96 (07/28/22 1100)  Resp: (!) 24 (07/28/22 1100)  BP: (!) 182/74 (07/28/22 1100)  SpO2: (!) 93 % (07/28/22 1100)   Vital Signs (24h  Range):  Temp:  [97.7 °F (36.5 °C)-99.3 °F (37.4 °C)] 98.7 °F (37.1 °C)  Pulse:  [] 96  Resp:  [17-49] 24  SpO2:  [92 %-99 %] 93 %  BP: (128-182)/(59-77) 182/74     Weight: 98.6 kg (217 lb 6 oz)  Body mass index is 37.31 kg/m².    Intake/Output Summary (Last 24 hours) at 7/28/2022 1122  Last data filed at 7/28/2022 1100  Gross per 24 hour   Intake 1816.51 ml   Output 3250 ml   Net -1433.49 ml      Physical Exam  Vitals and nursing note reviewed.   Constitutional:       General: She is not in acute distress.     Appearance: She is well-developed. She is obese.   HENT:      Head: Normocephalic and atraumatic.   Eyes:      Conjunctiva/sclera: Conjunctivae normal.      Pupils: Pupils are equal, round, and reactive to light.   Neck:      Thyroid: No thyromegaly.      Vascular: No JVD.   Cardiovascular:      Rate and Rhythm: Normal rate and regular rhythm.      Heart sounds: No murmur heard.    No friction rub. No gallop.   Pulmonary:      Effort: Pulmonary effort is normal.      Breath sounds: Wheezing present. No rales.      Comments: On vapotherm  Abdominal:      General: Bowel sounds are normal. There is no distension.      Palpations: Abdomen is soft.      Tenderness: There is no abdominal tenderness. There is no guarding or rebound.   Musculoskeletal:         General: No deformity. Normal range of motion.      Cervical back: Neck supple.   Lymphadenopathy:      Cervical: No cervical adenopathy.   Skin:     General: Skin is warm and dry.      Findings: No rash.   Neurological:      Mental Status: She is alert and oriented to person, place, and time.      Deep Tendon Reflexes: Reflexes are normal and symmetric.   Psychiatric:         Behavior: Behavior normal.         Thought Content: Thought content normal.         Judgment: Judgment normal.       Significant Labs: All pertinent labs within the past 24 hours have been reviewed.    Significant Imaging: I have reviewed all pertinent imaging results/findings  within the past 24 hours.      Assessment/Plan:      * Acute respiratory failure with hypoxia and hypercapnia  Telemetry  Monitor O2 sats, supplemental O2 as needed  Duonebs q 4 hours  Possible allergy to pulmicort     7/28:  Respiratory status improved  Wheezing noted this a.m.  Currently on Vapotherm  Will start Solu-Medrol 40 mg q.8h  Wean O2 as tolerated  Possible step-down tomorrow      Salmonella bacteremia  Patient allergic to Levaquin  Will continue on Rocephin for 10 days total    Hypoalbuminemia  Monitor      Chronic obstructive pulmonary disease with acute exacerbation  Continue treatment for COPD exacerbation      Severe obesity (BMI 35.0-39.9) with comorbidity  Body mass index is 36.66 kg/m². Morbid obesity complicates all aspects of disease management from diagnostic modalities to treatment. Weight loss encouraged and health benefits explained to patient.         Coronary artery disease of native artery of native heart with stable angina pectoris  Telemetry  Trend troponins      Debility  PT/OT consult on case        Hx of arterial ischemic stroke  An non ruptuured aneursym  Monitor  Neurochecks q 4 hours  Check Ct scan brain      Type 2 diabetes mellitus with complication, with long-term current use of insulin   Dm diet once tolerating po intake  Accuchecks with correctional SSI  Lab Results   Component Value Date    HGBA1C 11.2 (H) 07/25/2022     Consult dietician and DM educator on patient stabilized    7/28:  Glucose dropped to 58 this a.m.  Levemir discontinue  Will continue sliding scale      Hx of Renal abscess, right  Check Ct abd/ pelvis      Nicotine dependence  Smoking cessation education > 3 minutes  Add nicotine patch      Bipolar disorder with anxiety and depression  Resume home medications once stabilized  Check urine drug screen        VTE Risk Mitigation (From admission, onward)         Ordered     IP VTE HIGH RISK PATIENT  Once         07/24/22 1444     Place sequential compression  device  Until discontinued         07/24/22 1444                Discharge Planning   NOÉ:      Code Status: Full Code   Is the patient medically ready for discharge?:     Reason for patient still in hospital (select all that apply): Patient trending condition  Discharge Plan A: Home with family            Critical care time spent on the evaluation and treatment of severe organ dysfunction, review of pertinent labs and imaging studies, discussions with consulting providers and discussions with patient/family: 38 minutes.      Bishop Manuel MD  Department of Hospital Medicine   '[place] - Intensive Care ([facility])

## 2022-07-28 NOTE — ASSESSMENT & PLAN NOTE
2/4 initial blood cultures grew salmonella, suspected contamination  On abx coverage  Repeat blood cultures now 1 of 4 growing gram positive cocci; follow for final ID

## 2022-07-28 NOTE — PROGRESS NOTES
[facility] - Intensive Care ([facility])  Critical Care Medicine  Progress Note    Patient Name: Alexandra Goins  MRN: [id number]  Admission Date: 7/24/2022  Hospital Length of Stay: 4 days  Code Status: Full Code  Attending Provider: Bishop Manuel MD  Primary Care Provider: Perez Casiano MD   Principal Problem: Acute respiratory failure with hypoxia and hypercapnia    Subjective:     HPI:  Ms Goins is a obese 57 yo WF with a PMH of COPD, CVA, CAD, BiPolar D/O, DM2 on insulin, HLD, HTN, PVD and Hypothyroidism with multiple allergies.  She presented to [facility] ED about noon today via personal vehicle confused with family concern of lethargy, malaise and Alt MS progressive since this AM and noted intermittent fever last 6 days.  In ED confused with temp 100.5, BP 68/44, WBC 18, creatinine 3.7, Mg 1 and UA+.  Given IVFs, IVAB, Levophed infusion, Mg, Tylenol, Ibuprofen and CL placed in ED.  Admitted to ICU.      Hospital/ICU Course:  7/24 - Patient seen in ED awaiting ICU admit.  She is lethargic but easily aroused with orientation X 3 in no distress on low flow NC O2.  BP improved post 2L IVF and on low dose Levophed infusion.  Family reportedly requested DNR status but patient has declined DNR and wants to be full code.  Dr. Gibbs at bed side also for exam and patient request of Full Code status.   7/25 - Upright in bed sleeping this AM easily awakened and responsive with orientation and no distress on high flow NC O2.  Not required Levophed since ICU admit yesterday evening.  States she feels much better.   7/27 - Transferred back to ICU this AM due to Alt MS and ABG w/ worsening hypoxia and Hypecapnea.  More alert in ICU still confused in no distress on NPPV.  Daughter at bed side.  I/O balance + 5.4 L since admit and shock resolved with VSS and CXR increased diffuse interstitial infiltrates.   7/28 - awake and alert; crying to go home but remains on HFNC 30L 90%; diuresed well -3.6L last 24 hours      Review of  Systems   Constitutional:  Positive for malaise/fatigue. Negative for chills and fever.   HENT:  Negative for sore throat.    Respiratory:  Positive for shortness of breath.    Cardiovascular:  Negative for chest pain.   Gastrointestinal:  Negative for abdominal pain, nausea and vomiting.   Musculoskeletal:  Positive for myalgias.   Neurological:  Positive for weakness. Negative for focal weakness.   Psychiatric/Behavioral:  The patient is nervous/anxious and has insomnia.        Objective:     Vital Signs (Most Recent):  Temp: 98.1 °F (36.7 °C) (07/28/22 0305)  Pulse: 104 (07/28/22 0600)  Resp: (!) 24 (07/28/22 0600)  BP: (!) 161/74 (07/28/22 0600)  SpO2: 97 % (07/28/22 0600)   Vital Signs (24h Range):  Temp:  [97.7 °F (36.5 °C)-100.1 °F (37.8 °C)] 98.1 °F (36.7 °C)  Pulse:  [] 104  Resp:  [17-49] 24  SpO2:  [92 %-99 %] 97 %  BP: (128-167)/(59-77) 161/74     Weight: 98.6 kg (217 lb 6 oz)  Body mass index is 37.31 kg/m².      Intake/Output Summary (Last 24 hours) at 7/28/2022 0647  Last data filed at 7/28/2022 0600  Gross per 24 hour   Intake 1091.51 ml   Output 4000 ml   Net -2908.49 ml         Physical Exam  Vitals and nursing note reviewed.   Constitutional:       General: She is awake.      Appearance: She is obese. She is ill-appearing.      Interventions: Nasal cannula in place.   HENT:      Head: Atraumatic.   Eyes:      Conjunctiva/sclera: Conjunctivae normal.   Neck:      Vascular: No JVD.   Cardiovascular:      Rate and Rhythm: Regular rhythm. Tachycardia present.      Pulses:           Radial pulses are 2+ on the right side and 2+ on the left side.        Dorsalis pedis pulses are 2+ on the right side and 2+ on the left side.   Pulmonary:      Effort: Tachypnea present. No accessory muscle usage.      Breath sounds: Decreased breath sounds and wheezing present.   Abdominal:      General: Bowel sounds are normal.   Musculoskeletal:      Right lower leg: No edema.      Left lower leg: No edema.    Skin:     General: Skin is warm and dry.      Capillary Refill: Capillary refill takes less than 2 seconds.   Neurological:      Mental Status: She is alert.      GCS: GCS eye subscore is 4. GCS verbal subscore is 5. GCS motor subscore is 6.   Psychiatric:         Attention and Perception: She is inattentive.         Mood and Affect: Mood is anxious.         Speech: Speech normal.         Behavior: Behavior is cooperative.         Judgment: Judgment is inappropriate.       Vents:  Oxygen Concentration (%): 60 (07/28/22 0343)    Lines/Drains/Airways       Drain  Duration             Female External Urinary Catheter 07/27/22 0600 1 day              Peripheral Intravenous Line  Duration                  Peripheral IV - Single Lumen 07/24/22 1537 22 G Left Hand 3 days         Peripheral IV - Double Lumen 07/25/22 1051 20 G Anterior;Distal;Right Forearm 2 days                    Significant Labs:    CBC/Anemia Profile:  Recent Labs   Lab 07/26/22  1109 07/27/22  0446 07/28/22  0355   WBC 11.16 11.49 12.13   HGB 11.1* 10.6* 11.1*   HCT 37.1 34.9* 35.6*    210 259   MCV 98 97 95   RDW 15.3* 15.3* 14.6*        Chemistries:  Recent Labs   Lab 07/26/22  1109 07/27/22  0446 07/27/22  1610 07/28/22  0355    141 143 147*   K 4.0 3.7 3.4* 3.9    106 101 101   CO2 23 25 31* 35*   BUN 41* 28* 23* 22*   CREATININE 1.2 0.8 0.8 0.7   CALCIUM 6.7* 7.3* 8.1* 8.1*   ALBUMIN 1.8* 1.8*  --  1.9*   PROT 5.1* 5.0*  --  5.6*   BILITOT 0.6 0.5  --  0.6   ALKPHOS 80 83  --  91   ALT 37 36  --  40   AST 60* 48*  --  59*   MG 1.7 1.6 1.3* 1.5*       All pertinent labs within the past 24 hours have been reviewed.    Significant Imaging:  I have reviewed all pertinent imaging results/findings within the past 24 hours.      ABG  Recent Labs   Lab 07/27/22  0705   PH 7.358   PO2 55*   PCO2 53.5*   HCO3 30.1*   BE 5     Assessment/Plan:     Neuro  Acute encephalopathy  Suspect secondary to hypercapnea and hypoxia  CT head no acute  neuro process noted  Improving with support    Hx of arterial ischemic stroke  Cont ASA  Reported hx aneurysm and follows with NS with MRA every 2 years  Avoiding any anticoagulation    Psychiatric  Bipolar disorder with anxiety and depression  Hx Depression and hx SA on admit in 2015  Follows with Psych cont post discharge  Cont Wellbutrin and Risperidone per HM    Pulmonary  * Acute respiratory failure with hypoxia and hypercapnia  Reported COPD from last admit Feb 2021 but no pulm records found of PFTs  Not on home O2  Cont nebs  Diuresed well last 24 hours  Now with wheezing, likely exacerbation of COPD, start steroid    Chronic obstructive pulmonary disease with acute exacerbation  Recommend outpt pulm follow up with complete PFTs  Cont ICS and LABA and DARIUS  IV steroid  Empiric abx     Cardiac/Vascular  Coronary artery disease of native artery of native heart with stable angina pectoris  Follows with Dr. Chakraborty in clinic  Cont ASA  Cont Toprol  Diuresed well    ID  2/4 initial blood cultures grew salmonella, suspected contamination  On abx coverage  Repeat blood cultures now 1 of 4 growing gram positive cocci; follow for final ID    Endocrine  Severe obesity (BMI 35.0-39.9) with comorbidity  Encouraged weight loss    Type 2 diabetes mellitus with complication, with long-term current use of insulin  Hypoglycemia overnight  Overall trend improved since admission  Hold long acting insulin   Continue SSI prn with monitoring for glucose control and prevention of insulin toxicity    Other  Nicotine dependence  Encouraged tobacco cessation  Nicotine patch       Critical Care Daily Checklist:    A: Awake: RASS Goal/Actual Goal:    Actual: [place] Agitation Sedation Scale (RASS): Alert and calm   B: Spontaneous Breathing Trial Performed?     C: SAT & SBT Coordinated?  n/a                      D: Delirium: CAM-ICU Overall CAM-ICU: Positive   E: Early Mobility Performed? Yes   F: Feeding Goal:    Status:     Current Diet  Order   Procedures    Diet diabetic [facility]; 2000 Calorie     Order Specific Question:   Indicate patient location for additional diet options:     Answer:   [facility]     Order Specific Question:   Total calories:     Answer:   2000 Calorie      AS: Analgesia/Sedation Avoid sedation   T: Thromboembolic Prophylaxis SCD   H: HOB > 300 Yes   U: Stress Ulcer Prophylaxis (if needed) pepcid   G: Glucose Control monitoring   B: Bowel Function     I: Indwelling Catheter (Lines & Rose) Necessity reviewed   D: De-escalation of Antimicrobials/Pharmacotherapies reviewed    Plan for the day/ETD Start steroid; continue resp support    Code Status:  Family/Goals of Care: Full Code  Pending hospital course; home vs rehab/LTAC   I have discussed case and plan of care in detail with Dr Dia and Dr Manuel; Status and plan of care were discussed with team on multidisciplinary rounds.    Critical Care Time: 60 minutes  Critical secondary to acute hypoxemic and hypercapnic respiratory failure; Critical care was time spent personally by me on the following activities: development of treatment plan with patient or surrogate and bedside caregivers, discussions with consultants, evaluation of patient's response to treatment, examination of patient, ordering and performing treatments and interventions, ordering and review of laboratory studies, ordering and review of radiographic studies, pulse oximetry, re-evaluation of patient's condition. This critical care time did not overlap with that of any other provider or involve time for any procedures.     BELKIS Julio-BC  Critical Care Medicine  [facility] - [facility]ensive [facility] ([facility])

## 2022-07-28 NOTE — ASSESSMENT & PLAN NOTE
Suspect secondary to hypercapnea and hypoxia  CT head no acute neuro process noted  Improving with support

## 2022-07-28 NOTE — EICU
Rounding (Video Assessment):  N/A    Intervention Initiated From:  Bedside    Annie Communicated with Bedside Nurse regarding:  Labs    Yes    Comments: Faye GRANADOS called to report positive blood cultures, call transferred to Dr Randhawa

## 2022-07-28 NOTE — PT/OT/SLP PROGRESS
Physical Therapy  Treatment    Alexandra Goins   MRN: [id number]   Admitting Diagnosis: Acute respiratory failure with hypoxia and hypercapnia    PT Received On: 07/28/22  PT Start Time: 1050     PT Stop Time: 1120    PT Total Time (min): 30 min       Billable Minutes:  Therapeutic Activity 30    Treatment Type: Treatment  PT/PTA: PTA     PTA Visit Number: 2       General Precautions: Standard, fall, respiratory  Orthopedic Precautions: N/A   Braces: N/A  Respiratory Status: VAPOTHERM         Subjective:  Communicated with patient's nurse, Chris, and completed Epic chart review prior to session.  Patient agreed to PT session.     Pain/Comfort  Pain Rating 1: 0/10    Objective:   Patient found with: peripheral IV, PICC line, telemetry, oxygen, PureWick, SCD, pulse ox (continuous), Other (comments) (VAPOTHERM)    Functional Mobility:  Supine > sit EOB: Max A of 2    Seated EOB x10 min focusing on increased tolerance to upright position, core stability, trunk control and CV endurance  CGA -Min A to maintain sitting balance    Forward scoot towards EOB: Mod A of 2    STS from EOB w/ HHAx2: Mod A of 2    Stand pivot T/F w/ HHAx2: Mod A of 2    Educated patient on AROM TE to be completed throughout the day in order to maintain current level of ROM and strength. Encouraged patient to increase time OOB and up in chair with a minimum goal of 2 consecutive hours. Re enforced importance of utilizing call light to meet all needs in room and not attempt to get up without staff assistance. Patient verbalized understanding of all education given and agreed to comply.      AM-PAC 6 CLICK MOBILITY  How much help from another person does this patient currently need?   1 = Unable, Total/Dependent Assistance  2 = A lot, Maximum/Moderate Assistance  3 = A little, Minimum/Contact Guard/Supervision  4 = None, Modified [place]/Independent    Turning over in bed (including adjusting bedclothes, sheets and blankets)?: 2  Sitting down on  and standing up from a chair with arms (e.g., wheelchair, bedside commode, etc.): 2  Moving from lying on back to sitting on the side of the bed?: 2  Moving to and from a bed to a chair (including a wheelchair)?: 2  Need to walk in hospital room?: 1  Climbing 3-5 steps with a railing?: 1  Basic Mobility Total Score: 10    AM-PAC Raw Score CMS G-Code Modifier Level of Impairment Assistance   6 % Total / Unable   7 - 9 CM 80 - 100% Maximal Assist   10 - 14 CL 60 - 80% Moderate Assist   15 - 19 CK 40 - 60% Moderate Assist   20 - 22 CJ 20 - 40% Minimal Assist   23 CI 1-20% SBA / CGA   24 CH 0% Independent/ Mod I     Patient left up in chair with all lines intact, call button in reach and nurse notified.    Assessment:  Alexandra Goins is a 56 y.o. female with a medical diagnosis of Acute respiratory failure with hypoxia and hypercapnia and presents with overall decline in functional mobility. Patient would continue to benefit from skilled PT to address functional limitations listed below in order to return to PLOF/decrease caregiver burden. Improvement noted in patient's ability and willingness to participate. Patient gave good effort throughout.     Rehab identified problem list/impairments: Rehab identified problem list/impairments: weakness, impaired endurance, impaired sensation, impaired self care skills, impaired functional mobility, gait instability, impaired balance, impaired cognition, decreased coordination, decreased upper extremity function, decreased lower extremity function, decreased safety awareness, decreased ROM, impaired coordination, edema, impaired cardiopulmonary response to activity    Rehab potential is fair.    Activity tolerance: Poor    Discharge recommendations: Discharge Facility/Level of Care Needs: rehabilitation facility     Barriers to discharge:      Equipment recommendations: Equipment Needed After Discharge: other (see comments) (TBD BY NEXT LEVEL OF CARE)     GOALS:    Multidisciplinary Problems     Physical Therapy Goals        Problem: Physical Therapy    Goal Priority Disciplines Outcome Goal Variances Interventions   Physical Therapy Goal     PT, PT/OT      Description: LTG'S TO BE MET IN 14 DAYS (8-8-22)  PT WILL REQUIRE [facility] FOR BED MOBILITY  PT WILL REQUIRE [facility] FOR BED<>CHAIR TF'S  PT WILL AMB 50 FEET WITH RW AND [facility]                     PLAN:    Patient to be seen 3 x/week  to address the above listed problems via gait training, therapeutic activities, therapeutic exercises  Plan of Care expires: 08/08/22  Plan of Care reviewed with: patient         07/28/2022

## 2022-07-28 NOTE — ASSESSMENT & PLAN NOTE
Telemetry  Monitor O2 sats, supplemental O2 as needed  Duonebs q 4 hours  Possible allergy to pulmicort     7/28:  Respiratory status improved  Wheezing noted this a.m.  Currently on Vapotherm  Will start Solu-Medrol 40 mg q.8h  Wean O2 as tolerated  Possible step-down tomorrow

## 2022-07-28 NOTE — PT/OT/SLP PROGRESS
Occupational Therapy  Treatment    Alexandra Goins   MRN: [id number]   Admitting Diagnosis: Acute respiratory failure with hypoxia and hypercapnia    OT Date of Treatment: 07/28/22   OT Start Time: 1100  OT Stop Time: 1125  OT Total Time (min): 25 min    Billable Minutes:  Therapeutic Activity 25 minutes               General Precautions: Standard, fall  Orthopedic Precautions: N/A  Braces: N/A  Subjective:  Communicated with nurse and epic chart review prior to session.    Pain/Comfort  Pain Rating 1: 0/10    Objective:  Patient found with: telemetry, peripheral IV, PICC line, PureWick, SCD     Functional Mobility:  Bed Mobility:       Transfers:        Functional Ambulation: pt req min a x2  A few feet    Activities of Daily Living:   UE max a   LE max a tripp  Socks      Balance:   Static Sit: FAIR: Maintains without assist, but unable to take any challenges   Dynamic Sit: FAIR: Cannot move trunk without losing balance  Static Stand: POOR+: Needs MINIMAL assist to maintain  Dynamic stand: POOR: Needs MOD (moderate) assist during gait    Therapeutic Activities and Exercises:  Patient educated on role of OT in acute setting and benefits of participation. Educated on techniques to use to increase independence and decrease fall risk with functional transfers. Educated on importance of OOB activity and calling for A to transfer back to bed. Encouraged completion of B UE AROM therex throughout the day to tolerance to increase functional strength and activity tolerance. Patient stated understanding and in agreement with POC.    AM-PAC 6 CLICK ADL   How much help from another person does this patient currently need?   1 = Unable, Total/Dependent Assistance  2 = A lot, Maximum/Moderate Assistance  3 = A little, Minimum/Contact Guard/Supervision  4 = None, Modified [place]/Independent    Putting on and taking off regular lower body clothing? : 2  Bathing (including washing, rinsing, drying)?: 2  Toileting, which includes  "using toilet, bedpan, or urinal? : 2  Putting on and taking off regular upper body clothing?: 3  Taking care of personal grooming such as brushing teeth?: 3  Eating meals?: 4  Daily Activity Total Score: 16     AM-PAC Raw Score CMS "G-Code Modifier Level of Impairment Assistance   6 % Total / Unable   7 - 8 CM 80 - 100% Maximal Assist   9-13 CL 60 - 80% Moderate Assist   14 - 19 CK 40 - 60% Moderate Assist   20 - 22 CJ 20 - 40% Minimal Assist   23 CI 1-20% SBA / CGA   24 CH 0% Independent/ Mod I       Patient left up in chair with all lines intact, call button in reach, nurse notified and  nurse present    ASSESSMENT:  Alexandra Goins is a 56 y.o. female with a medical diagnosis of Acute respiratory failure with hypoxia and hypercapnia and presents with  Debility and generalized weakness    Rehab identified problem list/impairments: Rehab identified problem list/impairments: weakness, impaired functional mobility, decreased safety awareness, impaired endurance, gait instability, impaired balance, decreased upper extremity function, impaired self care skills, decreased lower extremity function    Rehab potential is good.    Activity tolerance: Good    Discharge recommendations: Discharge Facility/Level of Care Needs: rehabilitation facility     Barriers to discharge:      Equipment recommendations: other (see comments)     GOALS:   Multidisciplinary Problems     Occupational Therapy Goals        Problem: Occupational Therapy    Goal Priority Disciplines Outcome Interventions   Occupational Therapy Goal     OT, PT/OT Ongoing, Progressing    Description: Goals to be met by: 8/6/22     Patient will increase functional independence with ADLs by performing:    Toileting from toilet with Contact Guard Assistance for hygiene and clothing management.   Toilet transfer to toilet with Contact Guard Assistance.  Increased functional strength in B UE grossly by 1/2 MM grade.                     Plan:  Patient to be seen " 2 x/week to address the above listed problems via self-care/home management, therapeutic activities, therapeutic exercises  Plan of Care expires: 08/06/22  Plan of Care reviewed with: patient, spouse         07/28/2022

## 2022-07-28 NOTE — SUBJECTIVE & OBJECTIVE
Interval History: Patient weaned off of BiPAP.  Currently on Vapotherm.  Wheezing noted this a.m..  Start Solu-Medrol.  Wean oxygen as tolerated.  Continue Rocephin for salmonella bacteremia.  Possible step-down tomorrow.    Review of Systems   Constitutional:  Negative for fatigue and fever.   HENT:  Negative for sinus pressure.    Eyes:  Negative for visual disturbance.   Respiratory:  Positive for shortness of breath.    Cardiovascular:  Negative for chest pain.   Gastrointestinal:  Negative for nausea and vomiting.   Genitourinary:  Negative for difficulty urinating.   Musculoskeletal:  Negative for back pain.   Skin:  Negative for rash.   Neurological:  Negative for headaches.   Psychiatric/Behavioral:  Negative for confusion.    Objective:     Vital Signs (Most Recent):  Temp: 98.7 °F (37.1 °C) (07/28/22 0715)  Pulse: 96 (07/28/22 1100)  Resp: (!) 24 (07/28/22 1100)  BP: (!) 182/74 (07/28/22 1100)  SpO2: (!) 93 % (07/28/22 1100)   Vital Signs (24h Range):  Temp:  [97.7 °F (36.5 °C)-99.3 °F (37.4 °C)] 98.7 °F (37.1 °C)  Pulse:  [] 96  Resp:  [17-49] 24  SpO2:  [92 %-99 %] 93 %  BP: (128-182)/(59-77) 182/74     Weight: 98.6 kg (217 lb 6 oz)  Body mass index is 37.31 kg/m².    Intake/Output Summary (Last 24 hours) at 7/28/2022 1122  Last data filed at 7/28/2022 1100  Gross per 24 hour   Intake 1816.51 ml   Output 3250 ml   Net -1433.49 ml      Physical Exam  Vitals and nursing note reviewed.   Constitutional:       General: She is not in acute distress.     Appearance: She is well-developed. She is obese.   HENT:      Head: Normocephalic and atraumatic.   Eyes:      Conjunctiva/sclera: Conjunctivae normal.      Pupils: Pupils are equal, round, and reactive to light.   Neck:      Thyroid: No thyromegaly.      Vascular: No JVD.   Cardiovascular:      Rate and Rhythm: Normal rate and regular rhythm.      Heart sounds: No murmur heard.    No friction rub. No gallop.   Pulmonary:      Effort: Pulmonary effort is  normal.      Breath sounds: Wheezing present. No rales.      Comments: On vapotherm  Abdominal:      General: Bowel sounds are normal. There is no distension.      Palpations: Abdomen is soft.      Tenderness: There is no abdominal tenderness. There is no guarding or rebound.   Musculoskeletal:         General: No deformity. Normal range of motion.      Cervical back: Neck supple.   Lymphadenopathy:      Cervical: No cervical adenopathy.   Skin:     General: Skin is warm and dry.      Findings: No rash.   Neurological:      Mental Status: She is alert and oriented to person, place, and time.      Deep Tendon Reflexes: Reflexes are normal and symmetric.   Psychiatric:         Behavior: Behavior normal.         Thought Content: Thought content normal.         Judgment: Judgment normal.       Significant Labs: All pertinent labs within the past 24 hours have been reviewed.    Significant Imaging: I have reviewed all pertinent imaging results/findings within the past 24 hours.

## 2022-07-28 NOTE — PLAN OF CARE
Pt confused, easily agitated at times and pulls off oxygen. Requires freq reorientation. Pt desats low 80%s off O2, but high 90%s while on VT/BiPAP. Pt slept well and able to wear BiPAP overnight. SR on monitor. VSS. 900mL UOP per external catheter. Q2 turns, heels floated. Fall precautions in place, bed alarm on. POC discussed w/patient and daughter at bs, verbalized understanding.

## 2022-07-28 NOTE — ASSESSMENT & PLAN NOTE
Reported COPD from last admit Feb 2021 but no pulm records found of PFTs  Not on home O2  Cont nebs  Diuresed well last 24 hours  Now with wheezing, likely exacerbation of COPD, start steroid

## 2022-07-28 NOTE — PLAN OF CARE
Plan of care reviewed with patient and  this shift, both verbalized understanding. Pt more alert and oriented this shift. Vapotherm remains in place at 25L 90%. PIV x2 remain in place, patent and intact; saline locked. Purewick in place. Levemir discontinued due to low BG overnight. Pt with better appetite this shift, BG stable, remains on moderate SSI. VS remain stable this shift. IV steroids. NAD noted. Pt able to make needs known.

## 2022-07-28 NOTE — ASSESSMENT & PLAN NOTE
Recommend outpt pulm follow up with complete PFTs  Cont ICS and LABA and DARIUS  IV steroid  Empiric abx

## 2022-07-28 NOTE — ASSESSMENT & PLAN NOTE
Hypoglycemia overnight  Overall trend improved since admission  Hold long acting insulin   Continue SSI prn with monitoring for glucose control and prevention of insulin toxicity

## 2022-07-28 NOTE — SUBJECTIVE & OBJECTIVE
Review of Systems   Constitutional:  Positive for malaise/fatigue. Negative for chills and fever.   HENT:  Negative for sore throat.    Respiratory:  Positive for shortness of breath.    Cardiovascular:  Negative for chest pain.   Gastrointestinal:  Negative for abdominal pain, nausea and vomiting.   Musculoskeletal:  Positive for myalgias.   Neurological:  Positive for weakness. Negative for focal weakness.   Psychiatric/Behavioral:  The patient is nervous/anxious and has insomnia.        Objective:     Vital Signs (Most Recent):  Temp: 98.1 °F (36.7 °C) (07/28/22 0305)  Pulse: 104 (07/28/22 0600)  Resp: (!) 24 (07/28/22 0600)  BP: (!) 161/74 (07/28/22 0600)  SpO2: 97 % (07/28/22 0600)   Vital Signs (24h Range):  Temp:  [97.7 °F (36.5 °C)-100.1 °F (37.8 °C)] 98.1 °F (36.7 °C)  Pulse:  [] 104  Resp:  [17-49] 24  SpO2:  [92 %-99 %] 97 %  BP: (128-167)/(59-77) 161/74     Weight: 98.6 kg (217 lb 6 oz)  Body mass index is 37.31 kg/m².      Intake/Output Summary (Last 24 hours) at 7/28/2022 0647  Last data filed at 7/28/2022 0600  Gross per 24 hour   Intake 1091.51 ml   Output 4000 ml   Net -2908.49 ml         Physical Exam  Vitals and nursing note reviewed.   Constitutional:       General: She is awake.      Appearance: She is obese. She is ill-appearing.      Interventions: Nasal cannula in place.   HENT:      Head: Atraumatic.   Eyes:      Conjunctiva/sclera: Conjunctivae normal.   Neck:      Vascular: No JVD.   Cardiovascular:      Rate and Rhythm: Regular rhythm. Tachycardia present.      Pulses:           Radial pulses are 2+ on the right side and 2+ on the left side.        Dorsalis pedis pulses are 2+ on the right side and 2+ on the left side.   Pulmonary:      Effort: Tachypnea present. No accessory muscle usage.      Breath sounds: Decreased breath sounds and wheezing present.   Abdominal:      General: Bowel sounds are normal.   Musculoskeletal:      Right lower leg: No edema.      Left lower leg: No  edema.   Skin:     General: Skin is warm and dry.      Capillary Refill: Capillary refill takes less than 2 seconds.   Neurological:      Mental Status: She is alert.      GCS: GCS eye subscore is 4. GCS verbal subscore is 5. GCS motor subscore is 6.   Psychiatric:         Attention and Perception: She is inattentive.         Mood and Affect: Mood is anxious.         Speech: Speech normal.         Behavior: Behavior is cooperative.         Judgment: Judgment is inappropriate.       Vents:  Oxygen Concentration (%): 60 (07/28/22 0343)    Lines/Drains/Airways       Drain  Duration             Female External Urinary Catheter 07/27/22 0600 1 day              Peripheral Intravenous Line  Duration                  Peripheral IV - Single Lumen 07/24/22 1537 22 G Left Hand 3 days         Peripheral IV - Double Lumen 07/25/22 1051 20 G Anterior;Distal;Right Forearm 2 days                    Significant Labs:    CBC/Anemia Profile:  Recent Labs   Lab 07/26/22  1109 07/27/22  0446 07/28/22  0355   WBC 11.16 11.49 12.13   HGB 11.1* 10.6* 11.1*   HCT 37.1 34.9* 35.6*    210 259   MCV 98 97 95   RDW 15.3* 15.3* 14.6*        Chemistries:  Recent Labs   Lab 07/26/22  1109 07/27/22  0446 07/27/22  1610 07/28/22  0355    141 143 147*   K 4.0 3.7 3.4* 3.9    106 101 101   CO2 23 25 31* 35*   BUN 41* 28* 23* 22*   CREATININE 1.2 0.8 0.8 0.7   CALCIUM 6.7* 7.3* 8.1* 8.1*   ALBUMIN 1.8* 1.8*  --  1.9*   PROT 5.1* 5.0*  --  5.6*   BILITOT 0.6 0.5  --  0.6   ALKPHOS 80 83  --  91   ALT 37 36  --  40   AST 60* 48*  --  59*   MG 1.7 1.6 1.3* 1.5*       All pertinent labs within the past 24 hours have been reviewed.    Significant Imaging:  I have reviewed all pertinent imaging results/findings within the past 24 hours.

## 2022-07-28 NOTE — ASSESSMENT & PLAN NOTE
Dm diet once tolerating po intake  Accuchecks with correctional SSI  Lab Results   Component Value Date    HGBA1C 11.2 (H) 07/25/2022     Consult dietician and DM educator on patient stabilized    7/28:  Glucose dropped to 58 this a.m.  Levemir discontinue  Will continue sliding scale

## 2022-07-29 LAB
ALBUMIN SERPL BCP-MCNC: 1.9 G/DL (ref 3.5–5.2)
ALP SERPL-CCNC: 100 U/L (ref 55–135)
ALT SERPL W/O P-5'-P-CCNC: 39 U/L (ref 10–44)
ANION GAP SERPL CALC-SCNC: 13 MMOL/L (ref 8–16)
AST SERPL-CCNC: 33 U/L (ref 10–40)
BACTERIA BLD CULT: ABNORMAL
BASOPHILS # BLD AUTO: 0.04 K/UL (ref 0–0.2)
BASOPHILS NFR BLD: 0.4 % (ref 0–1.9)
BILIRUB SERPL-MCNC: 0.5 MG/DL (ref 0.1–1)
BUN SERPL-MCNC: 26 MG/DL (ref 6–20)
CALCIUM SERPL-MCNC: 8.2 MG/DL (ref 8.7–10.5)
CHLORIDE SERPL-SCNC: 94 MMOL/L (ref 95–110)
CO2 SERPL-SCNC: 33 MMOL/L (ref 23–29)
CREAT SERPL-MCNC: 0.7 MG/DL (ref 0.5–1.4)
DIFFERENTIAL METHOD: ABNORMAL
EOSINOPHIL # BLD AUTO: 0 K/UL (ref 0–0.5)
EOSINOPHIL NFR BLD: 0 % (ref 0–8)
ERYTHROCYTE [DISTWIDTH] IN BLOOD BY AUTOMATED COUNT: 14.5 % (ref 11.5–14.5)
EST. GFR  (AFRICAN AMERICAN): >60 ML/MIN/1.73 M^2
EST. GFR  (NON AFRICAN AMERICAN): >60 ML/MIN/1.73 M^2
GLUCOSE SERPL-MCNC: 334 MG/DL (ref 70–110)
HCT VFR BLD AUTO: 35.7 % (ref 37–48.5)
HGB BLD-MCNC: 11.3 G/DL (ref 12–16)
IMM GRANULOCYTES # BLD AUTO: 0.3 K/UL (ref 0–0.04)
IMM GRANULOCYTES NFR BLD AUTO: 2.7 % (ref 0–0.5)
LYMPHOCYTES # BLD AUTO: 1.3 K/UL (ref 1–4.8)
LYMPHOCYTES NFR BLD: 11.2 % (ref 18–48)
MAGNESIUM SERPL-MCNC: 1.6 MG/DL (ref 1.6–2.6)
MCH RBC QN AUTO: 30 PG (ref 27–31)
MCHC RBC AUTO-ENTMCNC: 31.7 G/DL (ref 32–36)
MCV RBC AUTO: 95 FL (ref 82–98)
MONOCYTES # BLD AUTO: 0.4 K/UL (ref 0.3–1)
MONOCYTES NFR BLD: 3.9 % (ref 4–15)
NEUTROPHILS # BLD AUTO: 9.2 K/UL (ref 1.8–7.7)
NEUTROPHILS NFR BLD: 81.8 % (ref 38–73)
NRBC BLD-RTO: 0 /100 WBC
PLATELET # BLD AUTO: 273 K/UL (ref 150–450)
PMV BLD AUTO: 10.5 FL (ref 9.2–12.9)
POCT GLUCOSE: 297 MG/DL (ref 70–110)
POCT GLUCOSE: 308 MG/DL (ref 70–110)
POCT GLUCOSE: 324 MG/DL (ref 70–110)
POCT GLUCOSE: 335 MG/DL (ref 70–110)
POTASSIUM SERPL-SCNC: 4.6 MMOL/L (ref 3.5–5.1)
PROT SERPL-MCNC: 6 G/DL (ref 6–8.4)
RBC # BLD AUTO: 3.77 M/UL (ref 4–5.4)
SODIUM SERPL-SCNC: 140 MMOL/L (ref 136–145)
WBC # BLD AUTO: 11.29 K/UL (ref 3.9–12.7)

## 2022-07-29 PROCEDURE — S4991 NICOTINE PATCH NONLEGEND: HCPCS | Performed by: NURSE PRACTITIONER

## 2022-07-29 PROCEDURE — 63600175 PHARM REV CODE 636 W HCPCS: Performed by: FAMILY MEDICINE

## 2022-07-29 PROCEDURE — 27000221 HC OXYGEN, UP TO 24 HOURS

## 2022-07-29 PROCEDURE — 25000003 PHARM REV CODE 250: Performed by: FAMILY MEDICINE

## 2022-07-29 PROCEDURE — 25000003 PHARM REV CODE 250: Performed by: NURSE PRACTITIONER

## 2022-07-29 PROCEDURE — 94660 CPAP INITIATION&MGMT: CPT

## 2022-07-29 PROCEDURE — 99291 PR CRITICAL CARE, E/M 30-74 MINUTES: ICD-10-PCS | Mod: ,,, | Performed by: NURSE PRACTITIONER

## 2022-07-29 PROCEDURE — 63600175 PHARM REV CODE 636 W HCPCS: Performed by: NURSE PRACTITIONER

## 2022-07-29 PROCEDURE — 80053 COMPREHEN METABOLIC PANEL: CPT | Performed by: NURSE PRACTITIONER

## 2022-07-29 PROCEDURE — 99900035 HC TECH TIME PER 15 MIN (STAT)

## 2022-07-29 PROCEDURE — 25000242 PHARM REV CODE 250 ALT 637 W/ HCPCS: Performed by: NURSE PRACTITIONER

## 2022-07-29 PROCEDURE — 94799 UNLISTED PULMONARY SVC/PX: CPT

## 2022-07-29 PROCEDURE — 36415 COLL VENOUS BLD VENIPUNCTURE: CPT | Performed by: NURSE PRACTITIONER

## 2022-07-29 PROCEDURE — 94640 AIRWAY INHALATION TREATMENT: CPT

## 2022-07-29 PROCEDURE — 83735 ASSAY OF MAGNESIUM: CPT | Performed by: NURSE PRACTITIONER

## 2022-07-29 PROCEDURE — 85025 COMPLETE CBC W/AUTO DIFF WBC: CPT | Performed by: NURSE PRACTITIONER

## 2022-07-29 PROCEDURE — 20000000 HC ICU ROOM

## 2022-07-29 PROCEDURE — 99291 CRITICAL CARE FIRST HOUR: CPT | Mod: ,,, | Performed by: NURSE PRACTITIONER

## 2022-07-29 PROCEDURE — 27100171 HC OXYGEN HIGH FLOW UP TO 24 HOURS

## 2022-07-29 RX ORDER — INSULIN ASPART 100 [IU]/ML
4 INJECTION, SOLUTION INTRAVENOUS; SUBCUTANEOUS
Status: DISCONTINUED | OUTPATIENT
Start: 2022-07-29 | End: 2022-07-29

## 2022-07-29 RX ORDER — INSULIN ASPART 100 [IU]/ML
6 INJECTION, SOLUTION INTRAVENOUS; SUBCUTANEOUS
Status: DISCONTINUED | OUTPATIENT
Start: 2022-07-29 | End: 2022-08-01

## 2022-07-29 RX ADMIN — MAGNESIUM OXIDE TAB 400 MG (241.3 MG ELEMENTAL MG) 800 MG: 400 (241.3 MG) TAB at 09:07

## 2022-07-29 RX ADMIN — IPRATROPIUM BROMIDE AND ALBUTEROL SULFATE 3 ML: 2.5; .5 SOLUTION RESPIRATORY (INHALATION) at 01:07

## 2022-07-29 RX ADMIN — RISPERIDONE 1 MG: 1 TABLET ORAL at 08:07

## 2022-07-29 RX ADMIN — INSULIN DETEMIR 14 UNITS: 100 INJECTION, SOLUTION SUBCUTANEOUS at 08:07

## 2022-07-29 RX ADMIN — INSULIN ASPART 3 UNITS: 100 INJECTION, SOLUTION INTRAVENOUS; SUBCUTANEOUS at 08:07

## 2022-07-29 RX ADMIN — INSULIN ASPART 8 UNITS: 100 INJECTION, SOLUTION INTRAVENOUS; SUBCUTANEOUS at 11:07

## 2022-07-29 RX ADMIN — INSULIN ASPART 8 UNITS: 100 INJECTION, SOLUTION INTRAVENOUS; SUBCUTANEOUS at 06:07

## 2022-07-29 RX ADMIN — BUPROPION HYDROCHLORIDE 150 MG: 150 TABLET, FILM COATED, EXTENDED RELEASE ORAL at 08:07

## 2022-07-29 RX ADMIN — INSULIN ASPART 4 UNITS: 100 INJECTION, SOLUTION INTRAVENOUS; SUBCUTANEOUS at 11:07

## 2022-07-29 RX ADMIN — POLYETHYLENE GLYCOL 3350 17 G: 17 POWDER, FOR SOLUTION ORAL at 08:07

## 2022-07-29 RX ADMIN — FAMOTIDINE 20 MG: 20 TABLET ORAL at 08:07

## 2022-07-29 RX ADMIN — ASPIRIN 81 MG: 81 TABLET, COATED ORAL at 08:07

## 2022-07-29 RX ADMIN — METHYLPREDNISOLONE SODIUM SUCCINATE 40 MG: 40 INJECTION, POWDER, FOR SOLUTION INTRAMUSCULAR; INTRAVENOUS at 05:07

## 2022-07-29 RX ADMIN — INSULIN ASPART 6 UNITS: 100 INJECTION, SOLUTION INTRAVENOUS; SUBCUTANEOUS at 04:07

## 2022-07-29 RX ADMIN — ARFORMOTEROL TARTRATE 15 MCG: 15 SOLUTION RESPIRATORY (INHALATION) at 09:07

## 2022-07-29 RX ADMIN — HYDROCODONE BITARTRATE AND ACETAMINOPHEN 1 TABLET: 5; 325 TABLET ORAL at 01:07

## 2022-07-29 RX ADMIN — BUDESONIDE 0.5 MG: 0.5 INHALANT ORAL at 07:07

## 2022-07-29 RX ADMIN — ARFORMOTEROL TARTRATE 15 MCG: 15 SOLUTION RESPIRATORY (INHALATION) at 07:07

## 2022-07-29 RX ADMIN — BISACODYL 10 MG: 10 SUPPOSITORY RECTAL at 08:07

## 2022-07-29 RX ADMIN — CEFTRIAXONE 2 G: 2 INJECTION, SOLUTION INTRAVENOUS at 05:07

## 2022-07-29 RX ADMIN — BUDESONIDE 0.5 MG: 0.5 INHALANT ORAL at 09:07

## 2022-07-29 RX ADMIN — DOXEPIN HYDROCHLORIDE 10 MG: 10 CAPSULE ORAL at 09:07

## 2022-07-29 RX ADMIN — METOPROLOL SUCCINATE 25 MG: 25 TABLET, EXTENDED RELEASE ORAL at 08:07

## 2022-07-29 RX ADMIN — IPRATROPIUM BROMIDE AND ALBUTEROL SULFATE 3 ML: 2.5; .5 SOLUTION RESPIRATORY (INHALATION) at 07:07

## 2022-07-29 RX ADMIN — DOCUSATE SODIUM 100 MG: 100 CAPSULE, LIQUID FILLED ORAL at 08:07

## 2022-07-29 RX ADMIN — NICOTINE 1 PATCH: 21 PATCH, EXTENDED RELEASE TRANSDERMAL at 08:07

## 2022-07-29 RX ADMIN — MAGNESIUM OXIDE TAB 400 MG (241.3 MG ELEMENTAL MG) 800 MG: 400 (241.3 MG) TAB at 05:07

## 2022-07-29 RX ADMIN — IPRATROPIUM BROMIDE AND ALBUTEROL SULFATE 3 ML: 2.5; .5 SOLUTION RESPIRATORY (INHALATION) at 09:07

## 2022-07-29 RX ADMIN — MUPIROCIN: 20 OINTMENT TOPICAL at 08:07

## 2022-07-29 RX ADMIN — INSULIN ASPART 8 UNITS: 100 INJECTION, SOLUTION INTRAVENOUS; SUBCUTANEOUS at 04:07

## 2022-07-29 NOTE — SUBJECTIVE & OBJECTIVE
Review of Systems   Constitutional:  Positive for malaise/fatigue. Negative for chills and fever.   HENT:  Negative for sore throat.    Respiratory:  Positive for shortness of breath.    Cardiovascular:  Negative for chest pain.   Gastrointestinal:  Negative for abdominal pain, nausea and vomiting.   Musculoskeletal:  Positive for myalgias.   Neurological:  Positive for weakness. Negative for focal weakness.   Psychiatric/Behavioral:  The patient is nervous/anxious and has insomnia.        Objective:     Vital Signs (Most Recent):  Temp: 97.7 °F (36.5 °C) (07/29/22 0705)  Pulse: 80 (07/29/22 0601)  Resp: (!) 27 (07/29/22 0601)  BP: (!) 176/80 (07/29/22 0601)  SpO2: (!) 92 % (07/29/22 0601)   Vital Signs (24h Range):  Temp:  [97.7 °F (36.5 °C)-99.2 °F (37.3 °C)] 97.7 °F (36.5 °C)  Pulse:  [] 80  Resp:  [17-38] 27  SpO2:  [88 %-97 %] 92 %  BP: (139-189)/() 176/80     Weight: 94.1 kg (207 lb 7.3 oz)  Body mass index is 35.61 kg/m².      Intake/Output Summary (Last 24 hours) at 7/29/2022 0727  Last data filed at 7/29/2022 0600  Gross per 24 hour   Intake 1160 ml   Output 1850 ml   Net -690 ml           Physical Exam  Vitals and nursing note reviewed.   Constitutional:       General: She is awake.      Appearance: She is obese. She is ill-appearing.      Interventions: Nasal cannula in place.   HENT:      Head: Atraumatic.   Eyes:      Conjunctiva/sclera: Conjunctivae normal.   Neck:      Vascular: No JVD.   Cardiovascular:      Rate and Rhythm: Regular rhythm. Tachycardia present.      Pulses:           Radial pulses are 2+ on the right side and 2+ on the left side.        Dorsalis pedis pulses are 2+ on the right side and 2+ on the left side.   Pulmonary:      Effort: Tachypnea present. No accessory muscle usage.      Breath sounds: Decreased breath sounds and wheezing present.      Comments: Wheezing is decreased from prior exam  Abdominal:      General: Bowel sounds are normal.   Musculoskeletal:       Right lower leg: No edema.      Left lower leg: No edema.   Skin:     General: Skin is warm and dry.      Capillary Refill: Capillary refill takes less than 2 seconds.   Neurological:      Mental Status: She is alert.      GCS: GCS eye subscore is 4. GCS verbal subscore is 5. GCS motor subscore is 6.   Psychiatric:         Attention and Perception: She is inattentive.         Mood and Affect: Mood is anxious.         Speech: Speech normal.         Behavior: Behavior is cooperative.         Judgment: Judgment is inappropriate.       Vents:  Oxygen Concentration (%): 60 (07/29/22 0501)    Lines/Drains/Airways       Drain  Duration             Female External Urinary Catheter 07/27/22 0600 2 days              Peripheral Intravenous Line  Duration                  Peripheral IV - Single Lumen 20 G Anterior;Proximal;Right Forearm -- days                    Significant Labs:    CBC/Anemia Profile:  Recent Labs   Lab 07/28/22  0355 07/29/22  0353   WBC 12.13 11.29   HGB 11.1* 11.3*   HCT 35.6* 35.7*    273   MCV 95 95   RDW 14.6* 14.5          Chemistries:  Recent Labs   Lab 07/27/22  1610 07/28/22  0355 07/29/22  0353    147* 140   K 3.4* 3.9 4.6    101 94*   CO2 31* 35* 33*   BUN 23* 22* 26*   CREATININE 0.8 0.7 0.7   CALCIUM 8.1* 8.1* 8.2*   ALBUMIN  --  1.9* 1.9*   PROT  --  5.6* 6.0   BILITOT  --  0.6 0.5   ALKPHOS  --  91 100   ALT  --  40 39   AST  --  59* 33   MG 1.3* 1.5* 1.6         All pertinent labs within the past 24 hours have been reviewed.    Significant Imaging:  I have reviewed all pertinent imaging results/findings within the past 24 hours.

## 2022-07-29 NOTE — PROGRESS NOTES
[facility] ([facility])  Critical Care Medicine  Progress Note    Patient Name: Alexandra Goins  MRN: [id number]  Admission Date: 7/24/2022  Hospital Length of Stay: 5 days  Code Status: Full Code  Attending Provider: Mely Nieto MD  Primary Care Provider: Perez Casiano MD   Principal Problem: Acute respiratory failure with hypoxia and hypercapnia    Subjective:     HPI:  Ms Goins is a obese 55 yo WF with a PMH of COPD, CVA, CAD, BiPolar D/O, DM2 on insulin, HLD, HTN, PVD and Hypothyroidism with multiple allergies.  She presented to [facility] ED about noon today via personal vehicle confused with family concern of lethargy, malaise and Alt MS progressive since this AM and noted intermittent fever last 6 days.  In ED confused with temp 100.5, BP 68/44, WBC 18, creatinine 3.7, Mg 1 and UA+.  Given IVFs, IVAB, Levophed infusion, Mg, Tylenol, Ibuprofen and CL placed in ED.  Admitted to ICU.      Hospital/ICU Course:  7/24 - Patient seen in ED awaiting ICU admit.  She is lethargic but easily aroused with orientation X 3 in no distress on low flow NC O2.  BP improved post 2L IVF and on low dose Levophed infusion.  Family reportedly requested DNR status but patient has declined DNR and wants to be full code.  Dr. Gibbs at bed side also for exam and patient request of Full Code status.   7/25 - Upright in bed sleeping this AM easily awakened and responsive with orientation and no distress on high flow NC O2.  Not required Levophed since ICU admit yesterday evening.  States she feels much better.   7/27 - Transferred back to ICU this AM due to Alt MS and ABG w/ worsening hypoxia and Hypecapnea.  More alert in ICU still confused in no distress on NPPV.  Daughter at bed side.  I/O balance + 5.4 L since admit and shock resolved with VSS and CXR increased diffuse interstitial infiltrates.   7/28 - awake and alert; crying to go home but remains on HFNC 30L 90%; diuresed well -3.6L last 24 hours  7/29 - awake  "and alert; reports feeling a bit better and less "scared". Insomnia reported. Continues to require HFNC 25L/90% daytime with nocturnal NIPPV. Less wheezing on exam today      Review of Systems   Constitutional:  Positive for malaise/fatigue. Negative for chills and fever.   HENT:  Negative for sore throat.    Respiratory:  Positive for shortness of breath.    Cardiovascular:  Negative for chest pain.   Gastrointestinal:  Negative for abdominal pain, nausea and vomiting.   Musculoskeletal:  Positive for myalgias.   Neurological:  Positive for weakness. Negative for focal weakness.   Psychiatric/Behavioral:  The patient is nervous/anxious and has insomnia.        Objective:     Vital Signs (Most Recent):  Temp: 97.7 °F (36.5 °C) (07/29/22 0705)  Pulse: 80 (07/29/22 0601)  Resp: (!) 27 (07/29/22 0601)  BP: (!) 176/80 (07/29/22 0601)  SpO2: (!) 92 % (07/29/22 0601)   Vital Signs (24h Range):  Temp:  [97.7 °F (36.5 °C)-99.2 °F (37.3 °C)] 97.7 °F (36.5 °C)  Pulse:  [] 80  Resp:  [17-38] 27  SpO2:  [88 %-97 %] 92 %  BP: (139-189)/() 176/80     Weight: 94.1 kg (207 lb 7.3 oz)  Body mass index is 35.61 kg/m².      Intake/Output Summary (Last 24 hours) at 7/29/2022 0727  Last data filed at 7/29/2022 0600  Gross per 24 hour   Intake 1160 ml   Output 1850 ml   Net -690 ml           Physical Exam  Vitals and nursing note reviewed.   Constitutional:       General: She is awake.      Appearance: She is obese. She is ill-appearing.      Interventions: Nasal cannula in place.   HENT:      Head: Atraumatic.   Eyes:      Conjunctiva/sclera: Conjunctivae normal.   Neck:      Vascular: No JVD.   Cardiovascular:      Rate and Rhythm: Regular rhythm. Tachycardia present.      Pulses:           Radial pulses are 2+ on the right side and 2+ on the left side.        Dorsalis pedis pulses are 2+ on the right side and 2+ on the left side.   Pulmonary:      Effort: Tachypnea present. No accessory muscle usage.      Breath sounds: " Decreased breath sounds and wheezing present.      Comments: Wheezing is decreased from prior exam  Abdominal:      General: Bowel sounds are normal.   Musculoskeletal:      Right lower leg: No edema.      Left lower leg: No edema.   Skin:     General: Skin is warm and dry.      Capillary Refill: Capillary refill takes less than 2 seconds.   Neurological:      Mental Status: She is alert.      GCS: GCS eye subscore is 4. GCS verbal subscore is 5. GCS motor subscore is 6.   Psychiatric:         Attention and Perception: She is inattentive.         Mood and Affect: Mood is anxious.         Speech: Speech normal.         Behavior: Behavior is cooperative.         Judgment: Judgment is inappropriate.       Vents:  Oxygen Concentration (%): 60 (07/29/22 0501)    Lines/Drains/Airways       Drain  Duration             Female External Urinary Catheter 07/27/22 0600 2 days              Peripheral Intravenous Line  Duration                  Peripheral IV - Single Lumen 20 G Anterior;Proximal;Right Forearm -- days                    Significant Labs:    CBC/Anemia Profile:  Recent Labs   Lab 07/28/22  0355 07/29/22  0353   WBC 12.13 11.29   HGB 11.1* 11.3*   HCT 35.6* 35.7*    273   MCV 95 95   RDW 14.6* 14.5          Chemistries:  Recent Labs   Lab 07/27/22  1610 07/28/22  0355 07/29/22  0353    147* 140   K 3.4* 3.9 4.6    101 94*   CO2 31* 35* 33*   BUN 23* 22* 26*   CREATININE 0.8 0.7 0.7   CALCIUM 8.1* 8.1* 8.2*   ALBUMIN  --  1.9* 1.9*   PROT  --  5.6* 6.0   BILITOT  --  0.6 0.5   ALKPHOS  --  91 100   ALT  --  40 39   AST  --  59* 33   MG 1.3* 1.5* 1.6         All pertinent labs within the past 24 hours have been reviewed.    Significant Imaging:  I have reviewed all pertinent imaging results/findings within the past 24 hours.      ABG  Recent Labs   Lab 07/27/22  0705   PH 7.358   PO2 55*   PCO2 53.5*   HCO3 30.1*   BE 5     Assessment/Plan:     Neuro  Hx of arterial ischemic stroke  Cont  ASA  Reported hx aneurysm and follows with NS with MRA every 2 years  Avoiding any anticoagulation    Psychiatric  Bipolar disorder with anxiety and depression  Hx Depression and hx SA on admit in 2015  Follows with Psych cont post discharge  Cont Wellbutrin and Risperidone per HM    Pulmonary  * Acute respiratory failure with hypoxia and hypercapnia  Not on home O2  Cont nebs  Continue nocturnal NIPPV  Wean daytime supplemental oxygen as tolerated  continue steroid    Chronic obstructive pulmonary disease with acute exacerbation  Reports asthma history  Recommend outpt pulm follow up with complete PFTs  Cont ICS and LABA and DARIUS  IV steroid, begin taper  Empiric abx     Cardiac/Vascular  Coronary artery disease of native artery of native heart with stable angina pectoris  Follows with Dr. Chakraborty in clinic  Cont ASA  Cont Toprol    ID  2/4 initial blood cultures grew salmonella, suspected contamination  Repeat blood cultures now 1 of 4 growing coag neg staph; contamination  Plan empiric 7 day abx course for pneumonia/resp failure/COPD exacerbation    Endocrine  Severe obesity (BMI 35.0-39.9) with comorbidity  Encouraged weight loss    Type 2 diabetes mellitus with complication, with long-term current use of insulin  Again hyperglycemia trending up; likely s/t steroid  continue long acting insulin add scheduled short acting  Continue SSI prn with monitoring for glucose control and prevention of insulin toxicity    Other  Nicotine dependence  Encouraged tobacco cessation  Nicotine patch     Critical Care Daily Checklist:    A: Awake: RASS Goal/Actual Goal:    Actual: [place] Agitation Sedation Scale (RASS): Alert and calm   B: Spontaneous Breathing Trial Performed?     C: SAT & SBT Coordinated?  n/a                      D: Delirium: CAM-ICU Overall CAM-ICU: Negative   E: Early Mobility Performed? Yes   F: Feeding Goal:    Status:     Current Diet Order   Procedures    Diet diabetic [facility]; 2000 Calorie      Order Specific Question:   Indicate patient location for additional diet options:     Answer:   [facility]     Order Specific Question:   Total calories:     Answer:   2000 Calorie      AS: Analgesia/Sedation Avoid sedation   T: Thromboembolic Prophylaxis SCD   H: HOB > 300 Yes   U: Stress Ulcer Prophylaxis (if needed) pepcid   G: Glucose Control monitoring   B: Bowel Function     I: Indwelling Catheter (Lines & Rose) Necessity reviewed   D: De-escalation of Antimicrobials/Pharmacotherapies reviewed    Plan for the day/ETD Start steroid; continue resp support    Code Status:  Family/Goals of Care: Full Code  Pending hospital course; home vs rehab/LTAC   I have discussed case and plan of care in detail with Dr Dia and Dr Manuel; Status and plan of care were discussed with team on multidisciplinary rounds.    Critical Care Time: 57 minutes  Critical secondary to acute hypoxemic and hypercapnic respiratory failure; Critical care was time spent personally by me on the following activities: development of treatment plan with patient or surrogate and bedside caregivers, discussions with consultants, evaluation of patient's response to treatment, examination of patient, ordering and performing treatments and interventions, ordering and review of laboratory studies, ordering and review of radiographic studies, pulse oximetry, re-evaluation of patient's condition. This critical care time did not overlap with that of any other provider or involve time for any procedures.     BELKIS Julio-BC  Critical Care Medicine  [facility]ensive [facility] ([facility])

## 2022-07-29 NOTE — ASSESSMENT & PLAN NOTE
Body mass index is 35.61 kg/m². Morbid obesity complicates all aspects of disease management from diagnostic modalities to treatment. Weight loss encouraged and health benefits explained to patient.

## 2022-07-29 NOTE — PLAN OF CARE
Pt with minimal improvement in overall condition this shift. AAOx2-3 with intermittent confusion to time and situation. Vapotherm weaned to 25L/75% FiO2, pt denies SOB today. SR with PVCs on monitor, BP stable. Blood glucose remains in 300s today, scheduled insulin with meals added to moderate SSI regimen. Voiding per bedpan. First BM today since hospitalization after prn suppository given.Turned/repositioned with pillows, heels floated on pillow. Call light and personal items within reach, bed alarm set, instructed to call for assistance. Spouse visited multiple times throughout shift, pt and spouse updated on POC

## 2022-07-29 NOTE — ASSESSMENT & PLAN NOTE
Telemetry  Monitor O2 sats, supplemental O2 as needed  Duonebs q 4 hours  Possible allergy to pulmicort     7/28:  Respiratory status improved  Wheezing noted this a.m.  Currently on Vapotherm  Will start Solu-Medrol 40 mg q.8h  Wean O2 as tolerated  Possible step-down tomorrow  7/29-  Wean FiO2 as tolerated   Inhaled bronchodilators and ICS  Systemic steroid taper   Ventilatory support via biPAP nightly and PRN

## 2022-07-29 NOTE — ASSESSMENT & PLAN NOTE
Reports asthma history  Recommend outpt pulm follow up with complete PFTs  Cont ICS and LABA and DARIUS  IV steroid, begin taper  Empiric abx

## 2022-07-29 NOTE — PLAN OF CARE
CM received phone call from daughter, Joelle, regarding rehab list and discharge planning. Daughter contacted [facility] facilities, [facility], [facility] and [facility] all told her they were not in-network with [facility] Medicare plan. Daughter verbalized concerns/frustration regarding patient discharge with limited in-network options. She is requesting to initiate one time auth contract with a [facility] facility.    CM briefly discussed current medical status with need for Vapotherm. Explained Acute Rehab facilities cannot manage current O2 needs at this time. Criteria for Acute Care Facilities vs LTAC vs Acute Rehab was discussed. CM also discussed process of one-time auth contract with insurance and the need for in-network denials prior to insurance considering this. Daughter in agreement to f/u with CM on Monday regarding d/c planning, or sooner per provider request.

## 2022-07-29 NOTE — ASSESSMENT & PLAN NOTE
Again hyperglycemia trending up; likely s/t steroid  continue long acting insulin add scheduled short acting  Continue SSI prn with monitoring for glucose control and prevention of insulin toxicity

## 2022-07-29 NOTE — PROGRESS NOTES
[place] - Intensive Care [facility] Medicine  Progress Note    Patient Name: Alexandra Goins  MRN: [id number]  Patient Class: IP- Inpatient   Admission Date: 7/24/2022  Length of Stay: 5 days  Attending Physician: Mely Nieto MD  Primary Care Provider: Perez Casiano MD        Subjective:     Principal Problem:Acute respiratory failure with hypoxia and hypercapnia        HPI:   Fever       Since Tuesday morning. recent skin cancer removed from face    Altered Mental Status       lethargic since this morning      Per ER- This is a 56 y.o. female patient with a PMHx of  COPD, HTN, HLP, T2DM, pneumonia, nicotine dependence, non ruptured cerebral aneurysm, stroke, PVD, anxiety, depression, diverticular disease, GERD, hypothyroidism, PVD, severe obesity, arthritis, asthma, Bipolar 1 disorder, and CAD who presents to the Emergency Department for evaluation of AMS which onset gradually this morning. Per , pt came in 5 days PTA to have biopsy of suspected skin cancer.  states pt had a slight fever beginning Tuesday and that the fever got higher over the next 3-4 days.  states pt fell on Friday but appeared to be okay.  states pt was very lethargic this morning, weak, extremely hard to wake and was speaking nonsense upon waking up.  states pt has COPD and is not on oxygen at home. Symptoms are constant and moderate in severity. No mitigating or exacerbating factors reported. Associated sxs include SOB, fever and weakness. Patient is unable to deny other sxs due to AMS at this time.  No further complaints or concerns at this time. HPI limited to AMS.      Patient evaluated by ER and found to have Septic shock. Patient to be admitted to ICU on [facility].     Patient currently awake, alert, oriented to person, place, time, and situation. Code status discussed with patient  and her current  wishes are to be a FULL CODE.       Overview/Hospital Course:  Admitted for treatment of  septic shock due to UTI.  Empirically received Zosyn in the ED and emilia urine and blood cultures.  IV fluid resuscitation was started with inadequate response and she briefly required Norepinephrine infusion.  Hyperglycemia and mild metabolic acidosis on admission.  Acidosis resolved and blood sugars remained high and corrected with intermittent insulin.  Further stabilized and transferred out of the ICU on 25 July.    7/26: blood cultures growing gram negative rods empirically. Cont cefepime. Currently on 15L NC.     7/27:  Blood cultures growing salmonella in 1/2 sets.  Antibiotics de-escalated to Rocephin.  Patient transferred to ICU overnight due to hypoxia and hypercapnia requiring BiPAP.  BNP slightly elevated.  Echo obtained.  Lasix given this a.m..    7/28:  Patient weaned off of BiPAP.  Currently on Vapotherm.  Wheezing noted this a.m..  Start Solu-Medrol.  Wean oxygen as tolerated.  Continue Rocephin for salmonella bacteremia.  Possible step-down tomorrow.    7/29- Awake , alert, however slow to answer questions. Oriented to self, persons and place. Currently on Vapotherm 25/70. Nightly  Bipap continues. C/O constipation. Afebrile . WBC normalized . PCT down to 3.11>56.4. Urine culture - multiple organisms isolated. Repeat blood culture - Coag neg staph. Rocephin continues for Salmonella bacteremia. CXR with persistent bilateral patchy airspace and interstitial opacities diffusely through the lungs.       Interval History:   Wean FiO2 as tolerated . Antibiotic Rocehin continues for Salmonella bacteremia.       Review of Systems   Constitutional:  Positive for activity change. Negative for appetite change, fatigue and fever.   HENT:  Negative for sinus pressure and sore throat.    Eyes:  Negative for visual disturbance.   Respiratory:  Positive for shortness of breath. Negative for cough and chest tightness.    Cardiovascular:  Negative for chest pain, palpitations and leg swelling.   Gastrointestinal:   Positive for constipation. Negative for abdominal distention, abdominal pain, diarrhea, nausea and vomiting.   Endocrine: Negative for polyuria.   Genitourinary:  Negative for decreased urine volume, difficulty urinating, dysuria, flank pain, frequency and hematuria.   Musculoskeletal:  Negative for back pain and gait problem.   Skin:  Negative for rash.   Neurological:  Negative for syncope, speech difficulty, weakness, light-headedness and headaches.   Psychiatric/Behavioral:  Positive for confusion (intermittent). Negative for hallucinations and sleep disturbance.    Objective:     Vital Signs (Most Recent):  Temp: 97.9 °F (36.6 °C) (07/29/22 1505)  Pulse: 76 (07/29/22 1505)  Resp: (!) 25 (07/29/22 1505)  BP: (!) 153/67 (07/29/22 1505)  SpO2: (!) 92 % (07/29/22 1505)   Vital Signs (24h Range):  Temp:  [97.7 °F (36.5 °C)-98.9 °F (37.2 °C)] 97.9 °F (36.6 °C)  Pulse:  [52-95] 76  Resp:  [16-38] 25  SpO2:  [88 %-99 %] 92 %  BP: (133-189)/() 153/67     Weight: 94.1 kg (207 lb 7.3 oz)  Body mass index is 35.61 kg/m².    Intake/Output Summary (Last 24 hours) at 7/29/2022 1538  Last data filed at 7/29/2022 1200  Gross per 24 hour   Intake 560 ml   Output 1650 ml   Net -1090 ml      Physical Exam  Constitutional:       General: She is not in acute distress.     Appearance: She is well-developed. She is ill-appearing. She is not diaphoretic.   HENT:      Head: Normocephalic and atraumatic.      Mouth/Throat:      Pharynx: No oropharyngeal exudate.   Eyes:      Conjunctiva/sclera: Conjunctivae normal.      Pupils: Pupils are equal, round, and reactive to light.   Neck:      Thyroid: No thyromegaly.      Vascular: No JVD.   Cardiovascular:      Rate and Rhythm: Normal rate and regular rhythm.      Heart sounds: Normal heart sounds. No murmur heard.  Pulmonary:      Effort: Pulmonary effort is normal. No respiratory distress.      Breath sounds: Wheezing (faint wheezes mckinley) present. No rales.   Chest:      Chest wall:  No tenderness.   Abdominal:      General: Bowel sounds are normal. There is no distension.      Palpations: Abdomen is soft.      Tenderness: There is no abdominal tenderness. There is no guarding or rebound.      Comments: Obese abd    Musculoskeletal:         General: Normal range of motion.      Cervical back: Normal range of motion and neck supple.   Lymphadenopathy:      Cervical: No cervical adenopathy.   Skin:     General: Skin is warm and dry.      Findings: No rash.   Neurological:      Mental Status: She is alert and oriented to person, place, and time.      Cranial Nerves: No cranial nerve deficit.      Sensory: No sensory deficit.      Deep Tendon Reflexes: Reflexes normal.       Significant Labs: All pertinent labs within the past 24 hours have been reviewed.  BMP:   Recent Labs   Lab 07/29/22  0353   *      K 4.6   CL 94*   CO2 33*   BUN 26*   CREATININE 0.7   CALCIUM 8.2*   MG 1.6     CBC:   Recent Labs   Lab 07/28/22  0355 07/29/22  0353   WBC 12.13 11.29   HGB 11.1* 11.3*   HCT 35.6* 35.7*    273     CMP:   Recent Labs   Lab 07/27/22  1610 07/28/22  0355 07/29/22  0353    147* 140   K 3.4* 3.9 4.6    101 94*   CO2 31* 35* 33*    58* 334*   BUN 23* 22* 26*   CREATININE 0.8 0.7 0.7   CALCIUM 8.1* 8.1* 8.2*   PROT  --  5.6* 6.0   ALBUMIN  --  1.9* 1.9*   BILITOT  --  0.6 0.5   ALKPHOS  --  91 100   AST  --  59* 33   ALT  --  40 39   ANIONGAP 11 11 13   EGFRNONAA >60 >60 >60       Significant Imaging:       Assessment/Plan:      * Acute respiratory failure with hypoxia and hypercapnia  Telemetry  Monitor O2 sats, supplemental O2 as needed  Duonebs q 4 hours  Possible allergy to pulmicort     7/28:  Respiratory status improved  Wheezing noted this a.m.  Currently on Vapotherm  Will start Solu-Medrol 40 mg q.8h  Wean O2 as tolerated  Possible step-down tomorrow  7/29-  Wean FiO2 as tolerated   Inhaled bronchodilators and ICS  Systemic steroid taper   Ventilatory  support via biPAP nightly and PRN    Salmonella bacteremia  Patient allergic to Levaquin  Will continue on Rocephin for 10 days total    Chronic obstructive pulmonary disease with acute exacerbation  Continue treatment for COPD exacerbation      Severe obesity (BMI 35.0-39.9) with comorbidity  Body mass index is 35.61 kg/m². Morbid obesity complicates all aspects of disease management from diagnostic modalities to treatment. Weight loss encouraged and health benefits explained to patient.         Coronary artery disease of native artery of native heart with stable angina pectoris  Telemetry  Trend troponins      Debility  PT/OT consult on case        Hx of arterial ischemic stroke  Pt with  non ruptuured aneursym  Monitor  Neurochecks q 4 hours  Check Ct scan brain      Type 2 diabetes mellitus with complication, with long-term current use of insulin   Dm diet once tolerating po intake  Accuchecks with correctional SSI  Lab Results   Component Value Date    HGBA1C 11.2 (H) 07/25/2022     Consult dietician and DM educator on patient stabilized    7/28:  Glucose dropped to 58 this a.m.  Levemir discontinue  Will continue sliding scale      Hx of Renal abscess, right  Check Ct abd/ pelvis  7/29-  CT abd /pelvis showed mild moderate perinephric fat stranding without evidence for abnormal fluid collection.  Currently on Rocephin   Urine culture -  multiple organisms isolated.    Nicotine dependence  Smoking cessation education > 3 minutes  Add nicotine patch      Bipolar disorder with anxiety and depression  Resume home medications once stabilized  Check urine drug screen        VTE Risk Mitigation (From admission, onward)         Ordered     IP VTE HIGH RISK PATIENT  Once         07/24/22 1444     Place sequential compression device  Until discontinued         07/24/22 1444                Discharge Planning   NOÉ:      Code Status: Full Code   Is the patient medically ready for discharge?:     Reason for patient still  in hospital (select all that apply): Patient trending condition  Discharge Plan A: Rehab            Critical care time spent on the evaluation and treatment of severe organ dysfunction, review of pertinent labs and imaging studies, discussions with consulting providers and discussions with patient/family: 30  minutes.      Mely Nieto MD  Department of Hospital Medicine   [facility] - Intensive Care ([facility])

## 2022-07-29 NOTE — ASSESSMENT & PLAN NOTE
Not on home O2  Cont nebs  Continue nocturnal NIPPV  Wean daytime supplemental oxygen as tolerated  continue steroid

## 2022-07-29 NOTE — PLAN OF CARE
Pt continues to be intermittently confused to place, time and situation.  Cpap placed on patient throughout the night.  Pt needing frequent reminders to keep cpap mask on and why she needs to keep on.  Pt c/o pain one time during shift, see flow sheet and MAR.  Patient occasionally removes purewick and sheets and pads were changed after incontinence episodes.  Pt has frequent PVC's, and O2 saturation maintained WDL.  Pt resting in bed with call light and belongings in reach.  Will continue to monitor.    Nurses Note -- 4 Eyes      7/29/2022   4:39 AM      Skin assessed during: shift change    [x] No Pressure Injuries Present    []Prevention Measures Documented      [] Yes- Altered Skin Integrity Present or Discovered   [] LDA Added if Not in Epic (Describe Wound)   [] New Altered Skin Integrity was Present on Admit and Documented in LDA   [] Wound Image Taken    Wound Care Consulted? NO  Attending Nurse:  Trinidad Alex RN     Second RN/Staff Member:  DARWIN Perez        Problem: Adult Inpatient Plan of Care  Goal: Plan of Care Review  Outcome: Ongoing, Progressing  Goal: Patient-Specific Goal (Individualized)  Outcome: Ongoing, Progressing  Goal: Absence of Hospital-Acquired Illness or Injury  Outcome: Ongoing, Progressing  Goal: Optimal Comfort and Wellbeing  Outcome: Ongoing, Progressing  Goal: Readiness for Transition of Care  Outcome: Ongoing, Progressing     Problem: Diabetes Comorbidity  Goal: Blood Glucose Level Within Targeted Range  Outcome: Ongoing, Progressing     Problem: Adjustment to Illness (Sepsis/Septic Shock)  Goal: Optimal Coping  Outcome: Ongoing, Progressing     Problem: Bleeding (Sepsis/Septic Shock)  Goal: Absence of Bleeding  Outcome: Ongoing, Progressing     Problem: Glycemic Control Impaired (Sepsis/Septic Shock)  Goal: Blood Glucose Level Within Desired Range  Outcome: Ongoing, Progressing     Problem: Infection Progression (Sepsis/Septic Shock)  Goal: Absence of Infection Signs and  Symptoms  Outcome: Ongoing, Progressing     Problem: Nutrition Impaired (Sepsis/Septic Shock)  Goal: Optimal Nutrition Intake  Outcome: Ongoing, Progressing     Problem: Fluid and Electrolyte Imbalance (Acute Kidney Injury/Impairment)  Goal: Fluid and Electrolyte Balance  Outcome: Ongoing, Progressing     Problem: Oral Intake Inadequate (Acute Kidney Injury/Impairment)  Goal: Optimal Nutrition Intake  Outcome: Ongoing, Progressing     Problem: Renal Function Impairment (Acute Kidney Injury/Impairment)  Goal: Effective Renal Function  Outcome: Ongoing, Progressing     Problem: Infection  Goal: Absence of Infection Signs and Symptoms  Outcome: Ongoing, Progressing     Problem: Fall Injury Risk  Goal: Absence of Fall and Fall-Related Injury  Outcome: Ongoing, Progressing     Problem: Skin Injury Risk Increased  Goal: Skin Health and Integrity  Outcome: Ongoing, Progressing     Problem: Impaired Wound Healing  Goal: Optimal Wound Healing  Outcome: Ongoing, Progressing     Problem: Restraint, Nonbehavioral (Nonviolent)  Goal: Absence of Harm or Injury  Outcome: Ongoing, Progressing

## 2022-07-29 NOTE — ASSESSMENT & PLAN NOTE
Check Ct abd/ pelvis  7/29-  CT abd /pelvis showed mild moderate perinephric fat stranding without evidence for abnormal fluid collection.  Currently on Rocephin   Urine culture -  multiple organisms isolated.

## 2022-07-29 NOTE — ASSESSMENT & PLAN NOTE
2/4 initial blood cultures grew salmonella, suspected contamination  Repeat blood cultures now 1 of 4 growing coag neg staph; contamination  Plan empiric 7 day abx course for pneumonia/resp failure/COPD exacerbation

## 2022-07-29 NOTE — SUBJECTIVE & OBJECTIVE
Interval History:   Wean FiO2 as tolerated . Antibiotic Rocehin continues for Salmonella bacteremia.       Review of Systems   Constitutional:  Positive for activity change. Negative for appetite change, fatigue and fever.   HENT:  Negative for sinus pressure and sore throat.    Eyes:  Negative for visual disturbance.   Respiratory:  Positive for shortness of breath. Negative for cough and chest tightness.    Cardiovascular:  Negative for chest pain, palpitations and leg swelling.   Gastrointestinal:  Positive for constipation. Negative for abdominal distention, abdominal pain, diarrhea, nausea and vomiting.   Endocrine: Negative for polyuria.   Genitourinary:  Negative for decreased urine volume, difficulty urinating, dysuria, flank pain, frequency and hematuria.   Musculoskeletal:  Negative for back pain and gait problem.   Skin:  Negative for rash.   Neurological:  Negative for syncope, speech difficulty, weakness, light-headedness and headaches.   Psychiatric/Behavioral:  Positive for confusion (intermittent). Negative for hallucinations and sleep disturbance.    Objective:     Vital Signs (Most Recent):  Temp: 97.9 °F (36.6 °C) (07/29/22 1505)  Pulse: 76 (07/29/22 1505)  Resp: (!) 25 (07/29/22 1505)  BP: (!) 153/67 (07/29/22 1505)  SpO2: (!) 92 % (07/29/22 1505)   Vital Signs (24h Range):  Temp:  [97.7 °F (36.5 °C)-98.9 °F (37.2 °C)] 97.9 °F (36.6 °C)  Pulse:  [52-95] 76  Resp:  [16-38] 25  SpO2:  [88 %-99 %] 92 %  BP: (133-189)/() 153/67     Weight: 94.1 kg (207 lb 7.3 oz)  Body mass index is 35.61 kg/m².    Intake/Output Summary (Last 24 hours) at 7/29/2022 1538  Last data filed at 7/29/2022 1200  Gross per 24 hour   Intake 560 ml   Output 1650 ml   Net -1090 ml      Physical Exam  Constitutional:       General: She is not in acute distress.     Appearance: She is well-developed. She is ill-appearing. She is not diaphoretic.   HENT:      Head: Normocephalic and atraumatic.      Mouth/Throat:       Pharynx: No oropharyngeal exudate.   Eyes:      Conjunctiva/sclera: Conjunctivae normal.      Pupils: Pupils are equal, round, and reactive to light.   Neck:      Thyroid: No thyromegaly.      Vascular: No JVD.   Cardiovascular:      Rate and Rhythm: Normal rate and regular rhythm.      Heart sounds: Normal heart sounds. No murmur heard.  Pulmonary:      Effort: Pulmonary effort is normal. No respiratory distress.      Breath sounds: Wheezing (faint wheezes mckinley) present. No rales.   Chest:      Chest wall: No tenderness.   Abdominal:      General: Bowel sounds are normal. There is no distension.      Palpations: Abdomen is soft.      Tenderness: There is no abdominal tenderness. There is no guarding or rebound.      Comments: Obese abd    Musculoskeletal:         General: Normal range of motion.      Cervical back: Normal range of motion and neck supple.   Lymphadenopathy:      Cervical: No cervical adenopathy.   Skin:     General: Skin is warm and dry.      Findings: No rash.   Neurological:      Mental Status: She is alert and oriented to person, place, and time.      Cranial Nerves: No cranial nerve deficit.      Sensory: No sensory deficit.      Deep Tendon Reflexes: Reflexes normal.       Significant Labs: All pertinent labs within the past 24 hours have been reviewed.  BMP:   Recent Labs   Lab 07/29/22  0353   *      K 4.6   CL 94*   CO2 33*   BUN 26*   CREATININE 0.7   CALCIUM 8.2*   MG 1.6     CBC:   Recent Labs   Lab 07/28/22  0355 07/29/22  0353   WBC 12.13 11.29   HGB 11.1* 11.3*   HCT 35.6* 35.7*    273     CMP:   Recent Labs   Lab 07/27/22  1610 07/28/22  0355 07/29/22  0353    147* 140   K 3.4* 3.9 4.6    101 94*   CO2 31* 35* 33*    58* 334*   BUN 23* 22* 26*   CREATININE 0.8 0.7 0.7   CALCIUM 8.1* 8.1* 8.2*   PROT  --  5.6* 6.0   ALBUMIN  --  1.9* 1.9*   BILITOT  --  0.6 0.5   ALKPHOS  --  91 100   AST  --  59* 33   ALT  --  40 39   ANIONGAP 11 11 13   EGFRNONAA  >60 >60 >60       Significant Imaging:

## 2022-07-29 NOTE — PLAN OF CARE
07/29/22 1117   Post-Acute Status   Post-Acute Authorization Placement   Post-Acute Placement Status Patient List Provided   Patient choice form signed by patient/caregiver List with quality metrics by geographic area provided   Discharge Plan   Discharge Plan A Rehab     [facility] patient and [facility] List left at bedside with spouse. He will review over the weekend and provide choices Monday.

## 2022-07-30 PROBLEM — J44.0 CHRONIC OBSTRUCTIVE PULMONARY DISEASE WITH ACUTE LOWER RESPIRATORY INFECTION: Status: ACTIVE | Noted: 2022-07-24

## 2022-07-30 LAB
ALBUMIN SERPL BCP-MCNC: 2.1 G/DL (ref 3.5–5.2)
ALP SERPL-CCNC: 95 U/L (ref 55–135)
ALT SERPL W/O P-5'-P-CCNC: 40 U/L (ref 10–44)
ANION GAP SERPL CALC-SCNC: 12 MMOL/L (ref 8–16)
AST SERPL-CCNC: 32 U/L (ref 10–40)
BACTERIA BLD CULT: NORMAL
BASOPHILS # BLD AUTO: 0.02 K/UL (ref 0–0.2)
BASOPHILS NFR BLD: 0.2 % (ref 0–1.9)
BILIRUB SERPL-MCNC: 0.5 MG/DL (ref 0.1–1)
BUN SERPL-MCNC: 30 MG/DL (ref 6–20)
CALCIUM SERPL-MCNC: 8.6 MG/DL (ref 8.7–10.5)
CHLORIDE SERPL-SCNC: 96 MMOL/L (ref 95–110)
CO2 SERPL-SCNC: 36 MMOL/L (ref 23–29)
CREAT SERPL-MCNC: 0.8 MG/DL (ref 0.5–1.4)
DIFFERENTIAL METHOD: ABNORMAL
EOSINOPHIL # BLD AUTO: 0 K/UL (ref 0–0.5)
EOSINOPHIL NFR BLD: 0.1 % (ref 0–8)
ERYTHROCYTE [DISTWIDTH] IN BLOOD BY AUTOMATED COUNT: 14.4 % (ref 11.5–14.5)
EST. GFR  (AFRICAN AMERICAN): >60 ML/MIN/1.73 M^2
EST. GFR  (NON AFRICAN AMERICAN): >60 ML/MIN/1.73 M^2
GLUCOSE SERPL-MCNC: 277 MG/DL (ref 70–110)
HCT VFR BLD AUTO: 37.9 % (ref 37–48.5)
HGB BLD-MCNC: 11.5 G/DL (ref 12–16)
IMM GRANULOCYTES # BLD AUTO: 0.23 K/UL (ref 0–0.04)
IMM GRANULOCYTES NFR BLD AUTO: 1.8 % (ref 0–0.5)
LYMPHOCYTES # BLD AUTO: 1.7 K/UL (ref 1–4.8)
LYMPHOCYTES NFR BLD: 13.1 % (ref 18–48)
MAGNESIUM SERPL-MCNC: 1.5 MG/DL (ref 1.6–2.6)
MCH RBC QN AUTO: 29.1 PG (ref 27–31)
MCHC RBC AUTO-ENTMCNC: 30.3 G/DL (ref 32–36)
MCV RBC AUTO: 96 FL (ref 82–98)
MONOCYTES # BLD AUTO: 0.7 K/UL (ref 0.3–1)
MONOCYTES NFR BLD: 5.1 % (ref 4–15)
NEUTROPHILS # BLD AUTO: 10.1 K/UL (ref 1.8–7.7)
NEUTROPHILS NFR BLD: 79.7 % (ref 38–73)
NRBC BLD-RTO: 0 /100 WBC
PHOSPHATE SERPL-MCNC: 2.9 MG/DL (ref 2.7–4.5)
PLATELET # BLD AUTO: 347 K/UL (ref 150–450)
PMV BLD AUTO: 10.6 FL (ref 9.2–12.9)
POCT GLUCOSE: 166 MG/DL (ref 70–110)
POCT GLUCOSE: 214 MG/DL (ref 70–110)
POCT GLUCOSE: 255 MG/DL (ref 70–110)
POCT GLUCOSE: 329 MG/DL (ref 70–110)
POTASSIUM SERPL-SCNC: 4.2 MMOL/L (ref 3.5–5.1)
PROT SERPL-MCNC: 6 G/DL (ref 6–8.4)
RBC # BLD AUTO: 3.95 M/UL (ref 4–5.4)
SODIUM SERPL-SCNC: 144 MMOL/L (ref 136–145)
WBC # BLD AUTO: 12.66 K/UL (ref 3.9–12.7)

## 2022-07-30 PROCEDURE — 63600175 PHARM REV CODE 636 W HCPCS: Performed by: INTERNAL MEDICINE

## 2022-07-30 PROCEDURE — 21400001 HC TELEMETRY ROOM

## 2022-07-30 PROCEDURE — 84100 ASSAY OF PHOSPHORUS: CPT | Performed by: INTERNAL MEDICINE

## 2022-07-30 PROCEDURE — S4991 NICOTINE PATCH NONLEGEND: HCPCS | Performed by: NURSE PRACTITIONER

## 2022-07-30 PROCEDURE — 99900035 HC TECH TIME PER 15 MIN (STAT)

## 2022-07-30 PROCEDURE — 25000003 PHARM REV CODE 250: Performed by: NURSE PRACTITIONER

## 2022-07-30 PROCEDURE — 63600175 PHARM REV CODE 636 W HCPCS: Performed by: NURSE PRACTITIONER

## 2022-07-30 PROCEDURE — 97116 GAIT TRAINING THERAPY: CPT | Mod: CQ

## 2022-07-30 PROCEDURE — 94640 AIRWAY INHALATION TREATMENT: CPT

## 2022-07-30 PROCEDURE — 25000003 PHARM REV CODE 250: Performed by: INTERNAL MEDICINE

## 2022-07-30 PROCEDURE — 94799 UNLISTED PULMONARY SVC/PX: CPT

## 2022-07-30 PROCEDURE — 99291 CRITICAL CARE FIRST HOUR: CPT | Mod: ,,, | Performed by: NURSE PRACTITIONER

## 2022-07-30 PROCEDURE — 94760 N-INVAS EAR/PLS OXIMETRY 1: CPT

## 2022-07-30 PROCEDURE — 80053 COMPREHEN METABOLIC PANEL: CPT | Performed by: NURSE PRACTITIONER

## 2022-07-30 PROCEDURE — 99291 PR CRITICAL CARE, E/M 30-74 MINUTES: ICD-10-PCS | Mod: ,,, | Performed by: NURSE PRACTITIONER

## 2022-07-30 PROCEDURE — 27100171 HC OXYGEN HIGH FLOW UP TO 24 HOURS

## 2022-07-30 PROCEDURE — 25000242 PHARM REV CODE 250 ALT 637 W/ HCPCS: Performed by: NURSE PRACTITIONER

## 2022-07-30 PROCEDURE — 83735 ASSAY OF MAGNESIUM: CPT | Performed by: NURSE PRACTITIONER

## 2022-07-30 PROCEDURE — 97530 THERAPEUTIC ACTIVITIES: CPT | Mod: CQ

## 2022-07-30 PROCEDURE — 36415 COLL VENOUS BLD VENIPUNCTURE: CPT | Performed by: NURSE PRACTITIONER

## 2022-07-30 PROCEDURE — 85025 COMPLETE CBC W/AUTO DIFF WBC: CPT | Performed by: NURSE PRACTITIONER

## 2022-07-30 PROCEDURE — 25000003 PHARM REV CODE 250: Performed by: FAMILY MEDICINE

## 2022-07-30 PROCEDURE — 94761 N-INVAS EAR/PLS OXIMETRY MLT: CPT

## 2022-07-30 RX ORDER — GABAPENTIN 100 MG/1
100 CAPSULE ORAL 3 TIMES DAILY
Refills: 0 | Status: DISCONTINUED | OUTPATIENT
Start: 2022-07-30 | End: 2022-08-02 | Stop reason: HOSPADM

## 2022-07-30 RX ORDER — FUROSEMIDE 40 MG/1
40 TABLET ORAL DAILY
Status: DISCONTINUED | OUTPATIENT
Start: 2022-07-31 | End: 2022-07-30

## 2022-07-30 RX ORDER — ISOSORBIDE MONONITRATE 30 MG/1
30 TABLET, EXTENDED RELEASE ORAL DAILY
Status: DISCONTINUED | OUTPATIENT
Start: 2022-07-30 | End: 2022-07-30

## 2022-07-30 RX ORDER — MAGNESIUM SULFATE HEPTAHYDRATE 40 MG/ML
2 INJECTION, SOLUTION INTRAVENOUS
Status: DISCONTINUED | OUTPATIENT
Start: 2022-07-30 | End: 2022-07-30

## 2022-07-30 RX ORDER — PREDNISONE 10 MG/1
10 TABLET ORAL
Status: DISCONTINUED | OUTPATIENT
Start: 2022-07-30 | End: 2022-07-31

## 2022-07-30 RX ADMIN — INSULIN ASPART 6 UNITS: 100 INJECTION, SOLUTION INTRAVENOUS; SUBCUTANEOUS at 03:07

## 2022-07-30 RX ADMIN — INSULIN ASPART 4 UNITS: 100 INJECTION, SOLUTION INTRAVENOUS; SUBCUTANEOUS at 03:07

## 2022-07-30 RX ADMIN — BUDESONIDE 0.5 MG: 0.5 INHALANT ORAL at 07:07

## 2022-07-30 RX ADMIN — ARFORMOTEROL TARTRATE 15 MCG: 15 SOLUTION RESPIRATORY (INHALATION) at 07:07

## 2022-07-30 RX ADMIN — IPRATROPIUM BROMIDE AND ALBUTEROL SULFATE 3 ML: 2.5; .5 SOLUTION RESPIRATORY (INHALATION) at 07:07

## 2022-07-30 RX ADMIN — FAMOTIDINE 20 MG: 20 TABLET ORAL at 08:07

## 2022-07-30 RX ADMIN — INSULIN ASPART 6 UNITS: 100 INJECTION, SOLUTION INTRAVENOUS; SUBCUTANEOUS at 06:07

## 2022-07-30 RX ADMIN — INSULIN ASPART 6 UNITS: 100 INJECTION, SOLUTION INTRAVENOUS; SUBCUTANEOUS at 10:07

## 2022-07-30 RX ADMIN — ISOSORBIDE MONONITRATE 30 MG: 30 TABLET, EXTENDED RELEASE ORAL at 08:07

## 2022-07-30 RX ADMIN — CEFTRIAXONE 2 G: 2 INJECTION, SOLUTION INTRAVENOUS at 04:07

## 2022-07-30 RX ADMIN — DIAZEPAM 10 MG: 5 TABLET ORAL at 12:07

## 2022-07-30 RX ADMIN — DOCUSATE SODIUM 100 MG: 100 CAPSULE, LIQUID FILLED ORAL at 08:07

## 2022-07-30 RX ADMIN — METHYLPREDNISOLONE SODIUM SUCCINATE 40 MG: 40 INJECTION, POWDER, FOR SOLUTION INTRAMUSCULAR; INTRAVENOUS at 05:07

## 2022-07-30 RX ADMIN — IPRATROPIUM BROMIDE AND ALBUTEROL SULFATE 3 ML: 2.5; .5 SOLUTION RESPIRATORY (INHALATION) at 01:07

## 2022-07-30 RX ADMIN — BUPROPION HYDROCHLORIDE 150 MG: 150 TABLET, FILM COATED, EXTENDED RELEASE ORAL at 08:07

## 2022-07-30 RX ADMIN — METOPROLOL SUCCINATE 25 MG: 25 TABLET, EXTENDED RELEASE ORAL at 08:07

## 2022-07-30 RX ADMIN — RISPERIDONE 1 MG: 1 TABLET ORAL at 08:07

## 2022-07-30 RX ADMIN — MAGNESIUM SULFATE HEPTAHYDRATE 2 G: 40 INJECTION, SOLUTION INTRAVENOUS at 08:07

## 2022-07-30 RX ADMIN — ACETAMINOPHEN 650 MG: 325 TABLET ORAL at 04:07

## 2022-07-30 RX ADMIN — GABAPENTIN 100 MG: 100 CAPSULE ORAL at 02:07

## 2022-07-30 RX ADMIN — INSULIN DETEMIR 14 UNITS: 100 INJECTION, SOLUTION SUBCUTANEOUS at 08:07

## 2022-07-30 RX ADMIN — SODIUM CHLORIDE 500 ML: 0.9 INJECTION, SOLUTION INTRAVENOUS at 08:07

## 2022-07-30 RX ADMIN — INSULIN DETEMIR 14 UNITS: 100 INJECTION, SOLUTION SUBCUTANEOUS at 09:07

## 2022-07-30 RX ADMIN — GABAPENTIN 100 MG: 100 CAPSULE ORAL at 08:07

## 2022-07-30 RX ADMIN — ASPIRIN 81 MG: 81 TABLET, COATED ORAL at 08:07

## 2022-07-30 RX ADMIN — INSULIN ASPART 8 UNITS: 100 INJECTION, SOLUTION INTRAVENOUS; SUBCUTANEOUS at 10:07

## 2022-07-30 RX ADMIN — MAGNESIUM OXIDE TAB 400 MG (241.3 MG ELEMENTAL MG) 800 MG: 400 (241.3 MG) TAB at 06:07

## 2022-07-30 RX ADMIN — NICOTINE 1 PATCH: 21 PATCH, EXTENDED RELEASE TRANSDERMAL at 08:07

## 2022-07-30 RX ADMIN — POLYETHYLENE GLYCOL 3350 17 G: 17 POWDER, FOR SOLUTION ORAL at 08:07

## 2022-07-30 RX ADMIN — PREDNISONE 10 MG: 10 TABLET ORAL at 05:07

## 2022-07-30 RX ADMIN — FAMOTIDINE 20 MG: 20 TABLET ORAL at 09:07

## 2022-07-30 NOTE — PLAN OF CARE
Pt restless and not sleeping throughout the night.  PRN meds given to help pt to relax and sleep, see MAR. Pt experience delirium and becoming agitated at times as the night progressed, pt is easily reoriented however.   Pt continues to maintain O2 sats on current vapotherm settings.  VSS, with frequent PVC's on monitor.  Belongings and call light with in reach, will continue to monitor.        Problem: Adult Inpatient Plan of Care  Goal: Plan of Care Review  Outcome: Ongoing, Progressing  Goal: Patient-Specific Goal (Individualized)  Outcome: Ongoing, Progressing  Goal: Absence of Hospital-Acquired Illness or Injury  Outcome: Ongoing, Progressing  Goal: Optimal Comfort and Wellbeing  Outcome: Ongoing, Progressing  Goal: Readiness for Transition of Care  Outcome: Ongoing, Progressing     Problem: Diabetes Comorbidity  Goal: Blood Glucose Level Within Targeted Range  Outcome: Ongoing, Progressing     Problem: Adjustment to Illness (Sepsis/Septic Shock)  Goal: Optimal Coping  Outcome: Ongoing, Progressing     Problem: Bleeding (Sepsis/Septic Shock)  Goal: Absence of Bleeding  Outcome: Ongoing, Progressing     Problem: Glycemic Control Impaired (Sepsis/Septic Shock)  Goal: Blood Glucose Level Within Desired Range  Outcome: Ongoing, Progressing     Problem: Infection Progression (Sepsis/Septic Shock)  Goal: Absence of Infection Signs and Symptoms  Outcome: Ongoing, Progressing     Problem: Nutrition Impaired (Sepsis/Septic Shock)  Goal: Optimal Nutrition Intake  Outcome: Ongoing, Progressing     Problem: Fluid and Electrolyte Imbalance (Acute Kidney Injury/Impairment)  Goal: Fluid and Electrolyte Balance  Outcome: Ongoing, Progressing     Problem: Oral Intake Inadequate (Acute Kidney Injury/Impairment)  Goal: Optimal Nutrition Intake  Outcome: Ongoing, Progressing     Problem: Renal Function Impairment (Acute Kidney Injury/Impairment)  Goal: Effective Renal Function  Outcome: Ongoing, Progressing     Problem:  Infection  Goal: Absence of Infection Signs and Symptoms  Outcome: Ongoing, Progressing     Problem: Fall Injury Risk  Goal: Absence of Fall and Fall-Related Injury  Outcome: Ongoing, Progressing     Problem: Skin Injury Risk Increased  Goal: Skin Health and Integrity  Outcome: Ongoing, Progressing     Problem: Impaired Wound Healing  Goal: Optimal Wound Healing  Outcome: Ongoing, Progressing     Problem: Restraint, Nonbehavioral (Nonviolent)  Goal: Absence of Harm or Injury  Outcome: Ongoing, Progressing     Problem: Confusion Acute  Goal: Optimal Cognitive Function  Outcome: Ongoing, Progressing

## 2022-07-30 NOTE — ASSESSMENT & PLAN NOTE
Reports asthma history  Recommend outpt pulm follow up with complete PFTs  Cont ICS and LABA and DARIUS  IV steroid, taper to oral daily dosing  Complete 10 day course of abx

## 2022-07-30 NOTE — NURSING
Pt requesting to have bipap mask removed.  Pt placed back on vapotherm at previous settings, see flow sheet. Will continue to monitor

## 2022-07-30 NOTE — PROGRESS NOTES
Oxygen demand and CXR improving  Continue current therapy  Will down grade to med / tele level of care  Pulmonary team will follow for continued improvement.    LEMUEL JulioP-BC  [facility]/P[facility] Medicine

## 2022-07-30 NOTE — SUBJECTIVE & OBJECTIVE
Review of Systems   Constitutional:  Positive for malaise/fatigue. Negative for chills and fever.   HENT:  Negative for sore throat.    Respiratory:  Positive for shortness of breath.    Cardiovascular:  Negative for chest pain.   Gastrointestinal:  Negative for abdominal pain, nausea and vomiting.   Musculoskeletal:  Positive for myalgias.   Neurological:  Positive for weakness. Negative for focal weakness.        Confusion   Psychiatric/Behavioral:  The patient is nervous/anxious and has insomnia.        Objective:     Vital Signs (Most Recent):  Temp: 98.2 °F (36.8 °C) (07/30/22 0301)  Pulse: 78 (07/30/22 0601)  Resp: (!) 27 (07/30/22 0601)  BP: (!) 159/71 (07/30/22 0601)  SpO2: 97 % (07/30/22 0601)   Vital Signs (24h Range):  Temp:  [97.5 °F (36.4 °C)-98.2 °F (36.8 °C)] 98.2 °F (36.8 °C)  Pulse:  [52-94] 78  Resp:  [16-39] 27  SpO2:  [90 %-99 %] 97 %  BP: (133-197)/() 159/71     Weight: 92 kg (202 lb 13.2 oz)  Body mass index is 34.81 kg/m².      Intake/Output Summary (Last 24 hours) at 7/30/2022 0711  Last data filed at 7/30/2022 0444  Gross per 24 hour   Intake 880 ml   Output 2975 ml   Net -2095 ml           Physical Exam  Vitals and nursing note reviewed.   Constitutional:       General: She is awake.      Appearance: She is obese. She is ill-appearing.      Interventions: Nasal cannula in place.   HENT:      Head: Atraumatic.   Eyes:      Conjunctiva/sclera: Conjunctivae normal.   Neck:      Vascular: No JVD.   Cardiovascular:      Rate and Rhythm: Regular rhythm. Tachycardia present.      Pulses:           Radial pulses are 2+ on the right side and 2+ on the left side.        Dorsalis pedis pulses are 2+ on the right side and 2+ on the left side.   Pulmonary:      Effort: Tachypnea present. No accessory muscle usage.      Breath sounds: Decreased breath sounds and wheezing present.      Comments: Wheezing is decreased from prior exam  Abdominal:      General: Bowel sounds are normal.    Musculoskeletal:      Right lower leg: No edema.      Left lower leg: No edema.   Skin:     General: Skin is warm and dry.      Capillary Refill: Capillary refill takes less than 2 seconds.   Neurological:      Mental Status: She is alert. She is confused.      GCS: GCS eye subscore is 4. GCS verbal subscore is 4. GCS motor subscore is 6.   Psychiatric:         Attention and Perception: She is inattentive.         Mood and Affect: Mood is not anxious. Affect is flat.         Speech: Speech normal.         Behavior: Behavior is cooperative.         Judgment: Judgment is inappropriate.       Vents:  Oxygen Concentration (%): 75 (07/30/22 0601)    Lines/Drains/Airways       Peripheral Intravenous Line  Duration                  Peripheral IV - Single Lumen 07/30/22 0219 20 G Right Wrist <1 day                    Significant Labs:    CBC/Anemia Profile:  Recent Labs   Lab 07/29/22  0353 07/30/22  0355   WBC 11.29 12.66   HGB 11.3* 11.5*   HCT 35.7* 37.9    347   MCV 95 96   RDW 14.5 14.4          Chemistries:  Recent Labs   Lab 07/29/22  0353 07/30/22  0355    144   K 4.6 4.2   CL 94* 96   CO2 33* 36*   BUN 26* 30*   CREATININE 0.7 0.8   CALCIUM 8.2* 8.6*   ALBUMIN 1.9* 2.1*   PROT 6.0 6.0   BILITOT 0.5 0.5   ALKPHOS 100 95   ALT 39 40   AST 33 32   MG 1.6 1.5*   PHOS  --  2.9         All pertinent labs within the past 24 hours have been reviewed.    Significant Imaging:  I have reviewed all pertinent imaging results/findings within the past 24 hours.

## 2022-07-30 NOTE — PROGRESS NOTES
[place] - Intensive Care [facility] Medicine  Progress Note    Patient Name: Alexandra Goins  MRN: [id number]  Patient Class: IP- Inpatient   Admission Date: 7/24/2022  Length of Stay: 6 days  Attending Physician: Mely Nieto MD  Primary Care Provider: Perez Casiano MD        Subjective:     Principal Problem:Acute respiratory failure with hypoxia and hypercapnia        HPI:   Fever       Since Tuesday morning. recent skin cancer removed from face    Altered Mental Status       lethargic since this morning      Per ER- This is a 56 y.o. female patient with a PMHx of  COPD, HTN, HLP, T2DM, pneumonia, nicotine dependence, non ruptured cerebral aneurysm, stroke, PVD, anxiety, depression, diverticular disease, GERD, hypothyroidism, PVD, severe obesity, arthritis, asthma, Bipolar 1 disorder, and CAD who presents to the Emergency Department for evaluation of AMS which onset gradually this morning. Per , pt came in 5 days PTA to have biopsy of suspected skin cancer.  states pt had a slight fever beginning Tuesday and that the fever got higher over the next 3-4 days.  states pt fell on Friday but appeared to be okay.  states pt was very lethargic this morning, weak, extremely hard to wake and was speaking nonsense upon waking up.  states pt has COPD and is not on oxygen at home. Symptoms are constant and moderate in severity. No mitigating or exacerbating factors reported. Associated sxs include SOB, fever and weakness. Patient is unable to deny other sxs due to AMS at this time.  No further complaints or concerns at this time. HPI limited to AMS.      Patient evaluated by ER and found to have Septic shock. Patient to be admitted to ICU on [facility].     Patient currently awake, alert, oriented to person, place, time, and situation. Code status discussed with patient  and her current  wishes are to be a FULL CODE.       Overview/Hospital Course:  Admitted for treatment of  septic shock due to UTI.  Empirically received Zosyn in the ED and emilia urine and blood cultures.  IV fluid resuscitation was started with inadequate response and she briefly required Norepinephrine infusion.  Hyperglycemia and mild metabolic acidosis on admission.  Acidosis resolved and blood sugars remained high and corrected with intermittent insulin.  Further stabilized and transferred out of the ICU on 25 July.    7/26: blood cultures growing gram negative rods empirically. Cont cefepime. Currently on 15L NC.     7/27:  Blood cultures growing salmonella in 1/2 sets.  Antibiotics de-escalated to Rocephin.  Patient transferred to ICU overnight due to hypoxia and hypercapnia requiring BiPAP.  BNP slightly elevated.  Echo obtained.  Lasix given this a.m..    7/28:  Patient weaned off of BiPAP.  Currently on Vapotherm.  Wheezing noted this a.m..  Start Solu-Medrol.  Wean oxygen as tolerated.  Continue Rocephin for salmonella bacteremia.  Possible step-down tomorrow.    7/29- Awake , alert, however slow to answer questions. Oriented to self, persons and place. Currently on Vapotherm 25/70. Nightly  Bipap continues. C/O constipation. Afebrile . WBC normalized . PCT down to 3.11>56.4. Urine culture - multiple organisms isolated. Repeat blood culture - Coag neg staph. Rocephin continues for Salmonella bacteremia. CXR with persistent bilateral patchy airspace and interstitial opacities diffusely through the lungs.     7/30- More awake and alert today, however did not use BiPAP last night. Per nursing pt was restless last night required prn meds. Oxygen demand further decreased. Currently Vapotherm 15/40. Follow up CXR with improvement. Labs are stable. Downgrade to tele. Disposition - Rehab       Interval History:     Oxygen demand decreasing   Downgrade to Tele    Review of Systems   Constitutional:  Positive for activity change. Negative for appetite change, fatigue and fever.   HENT:  Negative for sinus pressure and  sore throat.    Eyes:  Negative for visual disturbance.   Respiratory:  Positive for cough and shortness of breath. Negative for chest tightness.    Cardiovascular:  Negative for chest pain, palpitations and leg swelling.   Gastrointestinal:  Positive for constipation. Negative for abdominal distention, abdominal pain, diarrhea, nausea and vomiting.   Endocrine: Negative for polyuria.   Genitourinary:  Negative for decreased urine volume, difficulty urinating, dysuria, flank pain, frequency and hematuria.   Musculoskeletal:  Negative for back pain and gait problem.   Skin:  Negative for rash.   Neurological:  Negative for syncope, speech difficulty, weakness, light-headedness and headaches.   Psychiatric/Behavioral:  Positive for confusion (intermittent). Negative for hallucinations and sleep disturbance.    Objective:     Vital Signs (Most Recent):  Temp: 98.2 °F (36.8 °C) (07/30/22 1100)  Pulse: 86 (07/30/22 1343)  Resp: (!) 22 (07/30/22 1343)  BP: 134/63 (07/30/22 1300)  SpO2: (!) 93 % (07/30/22 1343)   Vital Signs (24h Range):  Temp:  [97.5 °F (36.4 °C)-98.2 °F (36.8 °C)] 98.2 °F (36.8 °C)  Pulse:  [] 86  Resp:  [12-39] 22  SpO2:  [89 %-100 %] 93 %  BP: (107-197)/() 134/63     Weight: 92 kg (202 lb 13.2 oz)  Body mass index is 34.81 kg/m².    Intake/Output Summary (Last 24 hours) at 7/30/2022 1458  Last data filed at 7/30/2022 0900  Gross per 24 hour   Intake 920 ml   Output 2025 ml   Net -1105 ml      Physical Exam  Constitutional:       General: She is not in acute distress.     Appearance: She is well-developed. She is ill-appearing. She is not diaphoretic.   HENT:      Head: Normocephalic and atraumatic.      Mouth/Throat:      Pharynx: No oropharyngeal exudate.   Eyes:      Conjunctiva/sclera: Conjunctivae normal.      Pupils: Pupils are equal, round, and reactive to light.   Neck:      Thyroid: No thyromegaly.      Vascular: No JVD.   Cardiovascular:      Rate and Rhythm: Normal rate and  regular rhythm.      Heart sounds: Normal heart sounds. No murmur heard.  Pulmonary:      Effort: Pulmonary effort is normal. No respiratory distress.      Breath sounds: No wheezing or rales.   Chest:      Chest wall: No tenderness.   Abdominal:      General: Bowel sounds are normal. There is no distension.      Palpations: Abdomen is soft.      Tenderness: There is no abdominal tenderness. There is no guarding or rebound.      Comments: Obese abd    Musculoskeletal:         General: Normal range of motion.      Cervical back: Normal range of motion and neck supple.   Lymphadenopathy:      Cervical: No cervical adenopathy.   Skin:     General: Skin is warm and dry.      Findings: No rash.   Neurological:      Mental Status: She is alert and oriented to person, place, and time.      Cranial Nerves: No cranial nerve deficit.      Sensory: No sensory deficit.      Deep Tendon Reflexes: Reflexes normal.       Significant Labs: All pertinent labs within the past 24 hours have been reviewed.  BMP:   Recent Labs   Lab 07/30/22 0355   *      K 4.2   CL 96   CO2 36*   BUN 30*   CREATININE 0.8   CALCIUM 8.6*   MG 1.5*     CBC:   Recent Labs   Lab 07/29/22 0353 07/30/22 0355   WBC 11.29 12.66   HGB 11.3* 11.5*   HCT 35.7* 37.9    347     CMP:   Recent Labs   Lab 07/29/22 0353 07/30/22 0355    144   K 4.6 4.2   CL 94* 96   CO2 33* 36*   * 277*   BUN 26* 30*   CREATININE 0.7 0.8   CALCIUM 8.2* 8.6*   PROT 6.0 6.0   ALBUMIN 1.9* 2.1*   BILITOT 0.5 0.5   ALKPHOS 100 95   AST 33 32   ALT 39 40   ANIONGAP 13 12   EGFRNONAA >60 >60       Significant Imaging:       Assessment/Plan:      * Acute respiratory failure with hypoxia and hypercapnia  Telemetry  Monitor O2 sats, supplemental O2 as needed  Duonebs q 4 hours  Possible allergy to pulmicort     7/28:  Respiratory status improved  Wheezing noted this a.m.  Currently on Vapotherm  Will start Solu-Medrol 40 mg q.8h  Wean O2 as  tolerated  Possible step-down tomorrow  7/29-  Wean FiO2 as tolerated   Inhaled bronchodilators and ICS  Systemic steroid taper   Ventilatory support via biPAP nightly and PRN  7/30-  Oxygen demand decreasing   Wean FiO2 as tolerated   Downgrade to Tele    Salmonella bacteremia  Patient allergic to Levaquin  Will continue on Rocephin for 10 days total    Chronic obstructive pulmonary disease with acute lower respiratory infection  Continue treatment for COPD exacerbation      Severe obesity (BMI 35.0-39.9) with comorbidity  Body mass index is 35.61 kg/m². Morbid obesity complicates all aspects of disease management from diagnostic modalities to treatment. Weight loss encouraged and health benefits explained to patient.         Coronary artery disease of native artery of native heart with stable angina pectoris  Telemetry  Trend troponins      Debility  PT/OT consult on case        Hx of arterial ischemic stroke  Pt with  non ruptuured aneursym  Monitor  Neurochecks q 4 hours  Check Ct scan brain      Type 2 diabetes mellitus with complication, with long-term current use of insulin   Dm diet once tolerating po intake  Accuchecks with correctional SSI  Lab Results   Component Value Date    HGBA1C 11.2 (H) 07/25/2022     Consult dietician and DM educator on patient stabilized    7/28:  Glucose dropped to 58 this a.m.  Levemir discontinue  Will continue sliding scale      Hx of Renal abscess, right  Check Ct abd/ pelvis  7/29-  CT abd /pelvis showed mild moderate perinephric fat stranding without evidence for abnormal fluid collection.  Currently on Rocephin   Urine culture -  multiple organisms isolated.    Nicotine dependence  Smoking cessation education > 3 minutes  Add nicotine patch      Bipolar disorder with anxiety and depression  Resume home medications once stabilized  Check urine drug screen        VTE Risk Mitigation (From admission, onward)         Ordered     IP VTE HIGH RISK PATIENT  Once         07/24/22  1444     Place sequential compression device  Until discontinued         07/24/22 1444                Discharge Planning   NOÉ:      Code Status: Full Code   Is the patient medically ready for discharge?:     Reason for patient still in hospital (select all that apply): Patient trending condition  Discharge Plan A: Rehab            Critical care time spent on the evaluation and treatment of severe organ dysfunction, review of pertinent labs and imaging studies, discussions with consulting providers and discussions with patient/family: 30  minutes.      Mely Nieto MD  Department of Hospital Medicine   [facility] - Intensive Care ([facility])

## 2022-07-30 NOTE — SUBJECTIVE & OBJECTIVE
Interval History:     Oxygen demand decreasing   Downgrade to Tele    Review of Systems   Constitutional:  Positive for activity change. Negative for appetite change, fatigue and fever.   HENT:  Negative for sinus pressure and sore throat.    Eyes:  Negative for visual disturbance.   Respiratory:  Positive for cough and shortness of breath. Negative for chest tightness.    Cardiovascular:  Negative for chest pain, palpitations and leg swelling.   Gastrointestinal:  Positive for constipation. Negative for abdominal distention, abdominal pain, diarrhea, nausea and vomiting.   Endocrine: Negative for polyuria.   Genitourinary:  Negative for decreased urine volume, difficulty urinating, dysuria, flank pain, frequency and hematuria.   Musculoskeletal:  Negative for back pain and gait problem.   Skin:  Negative for rash.   Neurological:  Negative for syncope, speech difficulty, weakness, light-headedness and headaches.   Psychiatric/Behavioral:  Positive for confusion (intermittent). Negative for hallucinations and sleep disturbance.    Objective:     Vital Signs (Most Recent):  Temp: 98.2 °F (36.8 °C) (07/30/22 1100)  Pulse: 86 (07/30/22 1343)  Resp: (!) 22 (07/30/22 1343)  BP: 134/63 (07/30/22 1300)  SpO2: (!) 93 % (07/30/22 1343)   Vital Signs (24h Range):  Temp:  [97.5 °F (36.4 °C)-98.2 °F (36.8 °C)] 98.2 °F (36.8 °C)  Pulse:  [] 86  Resp:  [12-39] 22  SpO2:  [89 %-100 %] 93 %  BP: (107-197)/() 134/63     Weight: 92 kg (202 lb 13.2 oz)  Body mass index is 34.81 kg/m².    Intake/Output Summary (Last 24 hours) at 7/30/2022 1458  Last data filed at 7/30/2022 0900  Gross per 24 hour   Intake 920 ml   Output 2025 ml   Net -1105 ml      Physical Exam  Constitutional:       General: She is not in acute distress.     Appearance: She is well-developed. She is ill-appearing. She is not diaphoretic.   HENT:      Head: Normocephalic and atraumatic.      Mouth/Throat:      Pharynx: No oropharyngeal exudate.   Eyes:       Conjunctiva/sclera: Conjunctivae normal.      Pupils: Pupils are equal, round, and reactive to light.   Neck:      Thyroid: No thyromegaly.      Vascular: No JVD.   Cardiovascular:      Rate and Rhythm: Normal rate and regular rhythm.      Heart sounds: Normal heart sounds. No murmur heard.  Pulmonary:      Effort: Pulmonary effort is normal. No respiratory distress.      Breath sounds: No wheezing or rales.   Chest:      Chest wall: No tenderness.   Abdominal:      General: Bowel sounds are normal. There is no distension.      Palpations: Abdomen is soft.      Tenderness: There is no abdominal tenderness. There is no guarding or rebound.      Comments: Obese abd    Musculoskeletal:         General: Normal range of motion.      Cervical back: Normal range of motion and neck supple.   Lymphadenopathy:      Cervical: No cervical adenopathy.   Skin:     General: Skin is warm and dry.      Findings: No rash.   Neurological:      Mental Status: She is alert and oriented to person, place, and time.      Cranial Nerves: No cranial nerve deficit.      Sensory: No sensory deficit.      Deep Tendon Reflexes: Reflexes normal.       Significant Labs: All pertinent labs within the past 24 hours have been reviewed.  BMP:   Recent Labs   Lab 07/30/22 0355   *      K 4.2   CL 96   CO2 36*   BUN 30*   CREATININE 0.8   CALCIUM 8.6*   MG 1.5*     CBC:   Recent Labs   Lab 07/29/22  0353 07/30/22 0355   WBC 11.29 12.66   HGB 11.3* 11.5*   HCT 35.7* 37.9    347     CMP:   Recent Labs   Lab 07/29/22  0353 07/30/22 0355    144   K 4.6 4.2   CL 94* 96   CO2 33* 36*   * 277*   BUN 26* 30*   CREATININE 0.7 0.8   CALCIUM 8.2* 8.6*   PROT 6.0 6.0   ALBUMIN 1.9* 2.1*   BILITOT 0.5 0.5   ALKPHOS 100 95   AST 33 32   ALT 39 40   ANIONGAP 13 12   EGFRNONAA >60 >60       Significant Imaging:

## 2022-07-30 NOTE — ASSESSMENT & PLAN NOTE
Not on home O2  Cont nebs  Continue nocturnal NIPPV as tolerated  Wean daytime supplemental oxygen as tolerated  CXR and oxygen demand improving

## 2022-07-30 NOTE — PROGRESS NOTES
[facility] - Intensive Care ([facility])  Critical Care Medicine  Progress Note    Patient Name: Alexandra Goins  MRN: [id number]  Admission Date: 7/24/2022  Hospital Length of Stay: 6 days  Code Status: Full Code  Attending Provider: Mely Nieto MD  Primary Care Provider: Perez Casiano MD   Principal Problem: Acute respiratory failure with hypoxia and hypercapnia    Subjective:     HPI:  Ms Goins is a obese 55 yo WF with a PMH of COPD, CVA, CAD, BiPolar D/O, DM2 on insulin, HLD, HTN, PVD and Hypothyroidism with multiple allergies.  She presented to [facility] ED about noon today via personal vehicle confused with family concern of lethargy, malaise and Alt MS progressive since this AM and noted intermittent fever last 6 days.  In ED confused with temp 100.5, BP 68/44, WBC 18, creatinine 3.7, Mg 1 and UA+.  Given IVFs, IVAB, Levophed infusion, Mg, Tylenol, Ibuprofen and CL placed in ED.  Admitted to ICU.      Hospital/ICU Course:  7/24 - Patient seen in ED awaiting ICU admit.  She is lethargic but easily aroused with orientation X 3 in no distress on low flow NC O2.  BP improved post 2L IVF and on low dose Levophed infusion.  Family reportedly requested DNR status but patient has declined DNR and wants to be full code.  Dr. Gibbs at bed side also for exam and patient request of Full Code status.   7/25 - Upright in bed sleeping this AM easily awakened and responsive with orientation and no distress on high flow NC O2.  Not required Levophed since ICU admit yesterday evening.  States she feels much better.   7/27 - Transferred back to ICU this AM due to Alt MS and ABG w/ worsening hypoxia and Hypecapnea.  More alert in ICU still confused in no distress on NPPV.  Daughter at bed side.  I/O balance + 5.4 L since admit and shock resolved with VSS and CXR increased diffuse interstitial infiltrates.   7/28 - awake and alert; crying to go home but remains on HFNC 30L 90%; diuresed well -3.6L last 24 hours  7/29 - awake  "and alert; reports feeling a bit better and less "scared". Insomnia reported. Continues to require HFNC 25L/90% daytime with nocturnal NIPPV. Less wheezing on exam today  7/30 - awake and alert this am, more confused; reported restless agitation and not wanting NIPPV overnight.   Oxygen demand improving, HFNC at 25L 75% and am CXR show improved aeration. Hyperglycemia trend remains uncontrolled      Review of Systems   Constitutional:  Positive for malaise/fatigue. Negative for chills and fever.   HENT:  Negative for sore throat.    Respiratory:  Positive for shortness of breath.    Cardiovascular:  Negative for chest pain.   Gastrointestinal:  Negative for abdominal pain, nausea and vomiting.   Musculoskeletal:  Positive for myalgias.   Neurological:  Positive for weakness. Negative for focal weakness.        Confusion   Psychiatric/Behavioral:  The patient is nervous/anxious and has insomnia.        Objective:     Vital Signs (Most Recent):  Temp: 98.2 °F (36.8 °C) (07/30/22 0301)  Pulse: 78 (07/30/22 0601)  Resp: (!) 27 (07/30/22 0601)  BP: (!) 159/71 (07/30/22 0601)  SpO2: 97 % (07/30/22 0601)   Vital Signs (24h Range):  Temp:  [97.5 °F (36.4 °C)-98.2 °F (36.8 °C)] 98.2 °F (36.8 °C)  Pulse:  [52-94] 78  Resp:  [16-39] 27  SpO2:  [90 %-99 %] 97 %  BP: (133-197)/() 159/71     Weight: 92 kg (202 lb 13.2 oz)  Body mass index is 34.81 kg/m².      Intake/Output Summary (Last 24 hours) at 7/30/2022 0711  Last data filed at 7/30/2022 0444  Gross per 24 hour   Intake 880 ml   Output 2975 ml   Net -2095 ml           Physical Exam  Vitals and nursing note reviewed.   Constitutional:       General: She is awake.      Appearance: She is obese. She is ill-appearing.      Interventions: Nasal cannula in place.   HENT:      Head: Atraumatic.   Eyes:      Conjunctiva/sclera: Conjunctivae normal.   Neck:      Vascular: No JVD.   Cardiovascular:      Rate and Rhythm: Regular rhythm. Tachycardia present.      Pulses:          "  Radial pulses are 2+ on the right side and 2+ on the left side.        Dorsalis pedis pulses are 2+ on the right side and 2+ on the left side.   Pulmonary:      Effort: Tachypnea present. No accessory muscle usage.      Breath sounds: Decreased breath sounds and wheezing present.      Comments: Wheezing is decreased from prior exam  Abdominal:      General: Bowel sounds are normal.   Musculoskeletal:      Right lower leg: No edema.      Left lower leg: No edema.   Skin:     General: Skin is warm and dry.      Capillary Refill: Capillary refill takes less than 2 seconds.   Neurological:      Mental Status: She is alert. She is confused.      GCS: GCS eye subscore is 4. GCS verbal subscore is 4. GCS motor subscore is 6.   Psychiatric:         Attention and Perception: She is inattentive.         Mood and Affect: Mood is not anxious. Affect is flat.         Speech: Speech normal.         Behavior: Behavior is cooperative.         Judgment: Judgment is inappropriate.       Vents:  Oxygen Concentration (%): 75 (07/30/22 0601)    Lines/Drains/Airways       Peripheral Intravenous Line  Duration                  Peripheral IV - Single Lumen 07/30/22 0219 20 G Right Wrist <1 day                    Significant Labs:    CBC/Anemia Profile:  Recent Labs   Lab 07/29/22  0353 07/30/22  0355   WBC 11.29 12.66   HGB 11.3* 11.5*   HCT 35.7* 37.9    347   MCV 95 96   RDW 14.5 14.4          Chemistries:  Recent Labs   Lab 07/29/22  0353 07/30/22  0355    144   K 4.6 4.2   CL 94* 96   CO2 33* 36*   BUN 26* 30*   CREATININE 0.7 0.8   CALCIUM 8.2* 8.6*   ALBUMIN 1.9* 2.1*   PROT 6.0 6.0   BILITOT 0.5 0.5   ALKPHOS 100 95   ALT 39 40   AST 33 32   MG 1.6 1.5*   PHOS  --  2.9         All pertinent labs within the past 24 hours have been reviewed.    Significant Imaging:  I have reviewed all pertinent imaging results/findings within the past 24 hours.      ABG  Recent Labs   Lab 07/27/22  0705   PH 7.358   PO2 55*   PCO2 53.5*    HCO3 30.1*   BE 5     Assessment/Plan:     Neuro  Hx of arterial ischemic stroke  Cont ASA  Reported hx aneurysm and follows with NS with MRA every 2 years  Avoiding any anticoagulation    Psychiatric  Bipolar disorder with anxiety and depression  Hx Depression and hx SA on admit in 2015  Follows with Psych cont post discharge  Cont Wellbutrin and Risperidone per HM    Pulmonary  * Acute respiratory failure with hypoxia and hypercapnia  Not on home O2  Cont nebs  Continue nocturnal NIPPV as tolerated  Wean daytime supplemental oxygen as tolerated  CXR and oxygen demand improving    Chronic obstructive pulmonary disease with acute lower respiratory infection  Reports asthma history  Recommend outpt pulm follow up with complete PFTs  Cont ICS and LABA and DARIUS  IV steroid, taper to oral daily dosing  Complete 10 day course of abx     Cardiac/Vascular  Coronary artery disease of native artery of native heart with stable angina pectoris  Follows with Dr. Chakraborty in clinic  Cont ASA  Cont Toprol    ID  2/4 initial blood cultures grew salmonella, suspected contamination  Repeat blood cultures now 1 of 4 growing coag neg staph; contamination  Plan total 10 day abx course for pneumonia/resp failure/COPD exacerbation    Endocrine  Severe obesity (BMI 35.0-39.9) with comorbidity  Encouraged weight loss    Type 2 diabetes mellitus with complication, with long-term current use of insulin  Again hyperglycemia trending up; likely s/t steroid  continue long acting insulin add scheduled short acting  Continue SSI prn with monitoring for glucose control and prevention of insulin toxicity    Other  Nicotine dependence  Encouraged tobacco cessation  Nicotine patch     Critical Care Daily Checklist:    A: Awake: RASS Goal/Actual Goal:    Actual: [place] Agitation Sedation Scale (RASS): Alert and calm   B: Spontaneous Breathing Trial Performed?     C: SAT & SBT Coordinated?  n/a                      D: Delirium: CAM-ICU Overall  CAM-ICU: Negative   E: Early Mobility Performed? Yes   F: Feeding Goal:    Status:     Current Diet Order   Procedures    Diet diabetic [facility]; 2000 Calorie     Order Specific Question:   Indicate patient location for additional diet options:     Answer:   [facility]     Order Specific Question:   Total calories:     Answer:   2000 Calorie      AS: Analgesia/Sedation Prn home dose benzo available   T: Thromboembolic Prophylaxis SCD   H: HOB > 300 Yes   U: Stress Ulcer Prophylaxis (if needed) pepcid   G: Glucose Control monitoring   B: Bowel Function Stool Occurrence: 1   I: Indwelling Catheter (Lines & Rose) Necessity reviewed   D: De-escalation of Antimicrobials/Pharmacotherapies reviewed    Plan for the day/ETD Wean steroid and HFNC support as tolerated    Code Status:  Family/Goals of Care: Full Code  Pending hospital course; home vs rehab/LTAC   I have discussed case and plan of care in detail with Dr Dia and Dr Nieto; Status and plan of care were discussed with team on multidisciplinary rounds.    Critical Care Time: 60 minutes  Critical secondary to acute hypoxemic and hypercapnic respiratory failure with COPD exacerbation s/t lower resp infection; Critical care was time spent personally by me on the following activities: development of treatment plan with patient or surrogate and bedside caregivers, discussions with consultants, evaluation of patient's response to treatment, examination of patient, ordering and performing treatments and interventions, ordering and review of laboratory studies, ordering and review of radiographic studies, pulse oximetry, re-evaluation of patient's condition. This critical care time did not overlap with that of any other provider or involve time for any procedures.     BELKIS Julio-BC  Critical Care Medicine  [facility] - Intensive Care ([facility])

## 2022-07-30 NOTE — PLAN OF CARE
Patient and updated on plan of care. ICU transfer, report received from Analia Kellogg. Instructed patient to use call light for assistance, call light in reach.Hourly rounding performed, bed locked, side rails up, bed alarm on, and in low position.  Education provided, questions answered as of now. Patient remained free from falls during shift. Will continue to monitor per unit practice/policy. Agnes Lyon RN.

## 2022-07-30 NOTE — ASSESSMENT & PLAN NOTE
Telemetry  Monitor O2 sats, supplemental O2 as needed  Duonebs q 4 hours  Possible allergy to pulmicort     7/28:  Respiratory status improved  Wheezing noted this a.m.  Currently on Vapotherm  Will start Solu-Medrol 40 mg q.8h  Wean O2 as tolerated  Possible step-down tomorrow  7/29-  Wean FiO2 as tolerated   Inhaled bronchodilators and ICS  Systemic steroid taper   Ventilatory support via biPAP nightly and PRN  7/30-  Oxygen demand decreasing   Wean FiO2 as tolerated   Downgrade to Tele

## 2022-07-30 NOTE — ASSESSMENT & PLAN NOTE
2/4 initial blood cultures grew salmonella, suspected contamination  Repeat blood cultures now 1 of 4 growing coag neg staph; contamination  Plan total 10 day abx course for pneumonia/resp failure/COPD exacerbation

## 2022-07-30 NOTE — NURSING
Pt transferred to telemetry room 203 via wheel chair on 2L nasal cannula. Chart, medications, and personal belongings brought with patient. Pt transferred by RN, accompanied by family members. Tolerated well.

## 2022-07-30 NOTE — PT/OT/SLP PROGRESS
Physical Therapy  Treatment    Alexandra Goins   MRN: [id number]   Admitting Diagnosis: Acute respiratory failure with hypoxia and hypercapnia    PT Received On: 07/30/22  PT Start Time: 1050     PT Stop Time: 1130    PT Total Time (min): 40 min       Billable Minutes:  Gait Training 10 and Therapeutic Activity 30    Treatment Type: Treatment  PT/PTA: PTA     PTA Visit Number: 3       General Precautions: Standard, fall  Orthopedic Precautions: N/A   Braces: N/A  Respiratory Status: VAPOTHERM         Subjective:  Communicated with patient's nurse, Analia, and completed Epic chart review prior to session.  Patient agreed to PT session with some encouragement.    Pain/Comfort  Pain Rating 1: 0/10    Objective:   Patient found with: telemetry, peripheral IV, PICC line, oxygen, pulse ox (continuous), blood pressure cuff (VAPOTHERM)    Functional Mobility:  Supine > sit EOB: Max A (Max VC for sequencing of task; increased time to complete due to patient frequently distracted by her )    STS from EOB > RW x2 trials: Mod A (VC for hand placement; required 2nd trial due to patient becoming fearful with 1st trial)    8ft x2 trials w/ RW Min-Mod A (Intermittent VC for safety w/ RW mgmt; decreased gait speed; short B step length)    Stand pivot T/F to toilet w/ RW: Min-Mod A (VC for sequencing of task and safety w/ RW mgmt)    STS from toilet > RW: Mod A     Stand pivot T/F to chair w/ RW: Min-Mod A (VC for sequencing of task and safety w/ RW mgmt)    Educated patient on performing LE TE throughout the day outside of PT sessions to maintain current level of ROM and prevent further decline. Pt performed x10 reps of TE including: LAQ, Hip Flex and AP. Patient expressed verbal understanding not to attempt to get up without staff assistance and to use call light to meet needs in room. Encouraged patient to increase time OOB and to remain OOB for a minimum of 2 hours/day and slowly increase as tolerated. Patient verbalized  understanding of all education given and agreed to comply.     AM-PAC 6 CLICK MOBILITY  How much help from another person does this patient currently need?   1 = Unable, Total/Dependent Assistance  2 = A lot, Maximum/Moderate Assistance  3 = A little, Minimum/Contact Guard/Supervision  4 = None, Modified [place]/Independent    Turning over in bed (including adjusting bedclothes, sheets and blankets)?: 2  Sitting down on and standing up from a chair with arms (e.g., wheelchair, bedside commode, etc.): 3  Moving from lying on back to sitting on the side of the bed?: 2  Moving to and from a bed to a chair (including a wheelchair)?: 2  Need to walk in hospital room?: 3  Climbing 3-5 steps with a railing?: 1  Basic Mobility Total Score: 13    AM-PAC Raw Score CMS G-Code Modifier Level of Impairment Assistance   6 % Total / Unable   7 - 9 CM 80 - 100% Maximal Assist   10 - 14 CL 60 - 80% Moderate Assist   15 - 19 CK 40 - 60% Moderate Assist   20 - 22 CJ 20 - 40% Minimal Assist   23 CI 1-20% SBA / CGA   24 CH 0% Independent/ Mod I     Patient left up in chair with all lines intact, call button in reach, nurse notified and  present.    Assessment:  Alexandra Goins is a 56 y.o. female with a medical diagnosis of Acute respiratory failure with hypoxia and hypercapnia and presents with overall decline in functional mobility. Patient would continue to benefit from skilled PT to address functional limitations listed below in order to return to PLOF/decrease caregiver burden.    Rehab identified problem list/impairments: Rehab identified problem list/impairments: weakness, impaired endurance, impaired sensation, impaired self care skills, impaired functional mobility, gait instability, impaired balance, impaired cognition, decreased coordination, decreased safety awareness, decreased ROM, impaired coordination, impaired cardiopulmonary response to activity    Rehab potential is fair.    Activity tolerance:  Fair    Discharge recommendations: Discharge Facility/Level of Care Needs: rehabilitation facility     Barriers to discharge:      Equipment recommendations: Equipment Needed After Discharge: other (see comments) (TBD)     GOALS:   Multidisciplinary Problems     Physical Therapy Goals        Problem: Physical Therapy    Goal Priority Disciplines Outcome Goal Variances Interventions   Physical Therapy Goal     PT, PT/OT      Description: LTG'S TO BE MET IN 14 DAYS (8-8-22)  PT WILL REQUIRE [facility] FOR BED MOBILITY  PT WILL REQUIRE [facility] FOR BED<>CHAIR TF'S  PT WILL AMB 50 FEET WITH RW AND [facility]                     PLAN:    Patient to be seen 3 x/week  to address the above listed problems via gait training, therapeutic activities, therapeutic exercises  Plan of Care expires: 08/08/22  Plan of Care reviewed with: patient         07/30/2022

## 2022-07-30 NOTE — NURSING
Assumed care of pt.  Pt lying supine in bed with vapotherm in place.  Skin assessment performed with day shift nurse and no changes noted.  Chart reviewed, safety maintained.  Belongings and call light in reach, will continue to monitor.

## 2022-07-31 LAB
ALBUMIN SERPL BCP-MCNC: 2.2 G/DL (ref 3.5–5.2)
ALP SERPL-CCNC: 86 U/L (ref 55–135)
ALT SERPL W/O P-5'-P-CCNC: 85 U/L (ref 10–44)
ANION GAP SERPL CALC-SCNC: 10 MMOL/L (ref 8–16)
AST SERPL-CCNC: 74 U/L (ref 10–40)
BACTERIA BLD CULT: NORMAL
BASOPHILS # BLD AUTO: 0.02 K/UL (ref 0–0.2)
BASOPHILS NFR BLD: 0.2 % (ref 0–1.9)
BILIRUB SERPL-MCNC: 0.5 MG/DL (ref 0.1–1)
BNP SERPL-MCNC: 193 PG/ML (ref 0–99)
BUN SERPL-MCNC: 28 MG/DL (ref 6–20)
CALCIUM SERPL-MCNC: 8.6 MG/DL (ref 8.7–10.5)
CHLORIDE SERPL-SCNC: 98 MMOL/L (ref 95–110)
CO2 SERPL-SCNC: 36 MMOL/L (ref 23–29)
CREAT SERPL-MCNC: 0.7 MG/DL (ref 0.5–1.4)
DIFFERENTIAL METHOD: ABNORMAL
EOSINOPHIL # BLD AUTO: 0.1 K/UL (ref 0–0.5)
EOSINOPHIL NFR BLD: 0.7 % (ref 0–8)
ERYTHROCYTE [DISTWIDTH] IN BLOOD BY AUTOMATED COUNT: 14.6 % (ref 11.5–14.5)
EST. GFR  (AFRICAN AMERICAN): >60 ML/MIN/1.73 M^2
EST. GFR  (NON AFRICAN AMERICAN): >60 ML/MIN/1.73 M^2
GLUCOSE SERPL-MCNC: 191 MG/DL (ref 70–110)
HCT VFR BLD AUTO: 38.1 % (ref 37–48.5)
HGB BLD-MCNC: 11.7 G/DL (ref 12–16)
IMM GRANULOCYTES # BLD AUTO: 0.15 K/UL (ref 0–0.04)
IMM GRANULOCYTES NFR BLD AUTO: 1.3 % (ref 0–0.5)
LACTATE SERPL-SCNC: 1.7 MMOL/L (ref 0.5–2.2)
LYMPHOCYTES # BLD AUTO: 2.1 K/UL (ref 1–4.8)
LYMPHOCYTES NFR BLD: 18.7 % (ref 18–48)
MAGNESIUM SERPL-MCNC: 1.4 MG/DL (ref 1.6–2.6)
MCH RBC QN AUTO: 29.5 PG (ref 27–31)
MCHC RBC AUTO-ENTMCNC: 30.7 G/DL (ref 32–36)
MCV RBC AUTO: 96 FL (ref 82–98)
MONOCYTES # BLD AUTO: 0.6 K/UL (ref 0.3–1)
MONOCYTES NFR BLD: 5 % (ref 4–15)
NEUTROPHILS # BLD AUTO: 8.5 K/UL (ref 1.8–7.7)
NEUTROPHILS NFR BLD: 74.1 % (ref 38–73)
NRBC BLD-RTO: 0 /100 WBC
PLATELET # BLD AUTO: 306 K/UL (ref 150–450)
PMV BLD AUTO: 10.4 FL (ref 9.2–12.9)
POCT GLUCOSE: 180 MG/DL (ref 70–110)
POCT GLUCOSE: 370 MG/DL (ref 70–110)
POCT GLUCOSE: 392 MG/DL (ref 70–110)
POCT GLUCOSE: 452 MG/DL (ref 70–110)
POCT GLUCOSE: 485 MG/DL (ref 70–110)
POTASSIUM SERPL-SCNC: 4.4 MMOL/L (ref 3.5–5.1)
PROT SERPL-MCNC: 6.1 G/DL (ref 6–8.4)
RBC # BLD AUTO: 3.97 M/UL (ref 4–5.4)
SODIUM SERPL-SCNC: 144 MMOL/L (ref 136–145)
WBC # BLD AUTO: 11.42 K/UL (ref 3.9–12.7)

## 2022-07-31 PROCEDURE — 25000242 PHARM REV CODE 250 ALT 637 W/ HCPCS: Performed by: NURSE PRACTITIONER

## 2022-07-31 PROCEDURE — 94660 CPAP INITIATION&MGMT: CPT

## 2022-07-31 PROCEDURE — 83605 ASSAY OF LACTIC ACID: CPT | Performed by: INTERNAL MEDICINE

## 2022-07-31 PROCEDURE — 25000003 PHARM REV CODE 250: Performed by: NURSE PRACTITIONER

## 2022-07-31 PROCEDURE — 25000003 PHARM REV CODE 250: Performed by: INTERNAL MEDICINE

## 2022-07-31 PROCEDURE — 99900035 HC TECH TIME PER 15 MIN (STAT)

## 2022-07-31 PROCEDURE — 94799 UNLISTED PULMONARY SVC/PX: CPT

## 2022-07-31 PROCEDURE — 99233 PR SUBSEQUENT HOSPITAL CARE,LEVL III: ICD-10-PCS | Mod: ,,, | Performed by: INTERNAL MEDICINE

## 2022-07-31 PROCEDURE — 83735 ASSAY OF MAGNESIUM: CPT | Performed by: NURSE PRACTITIONER

## 2022-07-31 PROCEDURE — 94640 AIRWAY INHALATION TREATMENT: CPT

## 2022-07-31 PROCEDURE — 63600175 PHARM REV CODE 636 W HCPCS: Performed by: INTERNAL MEDICINE

## 2022-07-31 PROCEDURE — 85025 COMPLETE CBC W/AUTO DIFF WBC: CPT | Performed by: NURSE PRACTITIONER

## 2022-07-31 PROCEDURE — 27000221 HC OXYGEN, UP TO 24 HOURS

## 2022-07-31 PROCEDURE — 63600175 PHARM REV CODE 636 W HCPCS: Performed by: NURSE PRACTITIONER

## 2022-07-31 PROCEDURE — 80053 COMPREHEN METABOLIC PANEL: CPT | Performed by: NURSE PRACTITIONER

## 2022-07-31 PROCEDURE — 21400001 HC TELEMETRY ROOM

## 2022-07-31 PROCEDURE — 99233 SBSQ HOSP IP/OBS HIGH 50: CPT | Mod: ,,, | Performed by: INTERNAL MEDICINE

## 2022-07-31 PROCEDURE — S4991 NICOTINE PATCH NONLEGEND: HCPCS | Performed by: NURSE PRACTITIONER

## 2022-07-31 PROCEDURE — 94618 PULMONARY STRESS TESTING: CPT

## 2022-07-31 PROCEDURE — 94761 N-INVAS EAR/PLS OXIMETRY MLT: CPT

## 2022-07-31 PROCEDURE — 83880 ASSAY OF NATRIURETIC PEPTIDE: CPT | Performed by: NURSE PRACTITIONER

## 2022-07-31 RX ORDER — PREDNISONE 10 MG/1
10 TABLET ORAL DAILY
Status: COMPLETED | OUTPATIENT
Start: 2022-08-01 | End: 2022-08-02

## 2022-07-31 RX ORDER — FUROSEMIDE 20 MG/1
20 TABLET ORAL DAILY
Status: DISCONTINUED | OUTPATIENT
Start: 2022-07-31 | End: 2022-08-01

## 2022-07-31 RX ORDER — ISOSORBIDE MONONITRATE 30 MG/1
30 TABLET, EXTENDED RELEASE ORAL DAILY
Status: DISCONTINUED | OUTPATIENT
Start: 2022-07-31 | End: 2022-08-02 | Stop reason: HOSPADM

## 2022-07-31 RX ADMIN — RISPERIDONE 1 MG: 1 TABLET ORAL at 08:07

## 2022-07-31 RX ADMIN — ACETAMINOPHEN 650 MG: 325 TABLET ORAL at 08:07

## 2022-07-31 RX ADMIN — ASPIRIN 81 MG: 81 TABLET, COATED ORAL at 08:07

## 2022-07-31 RX ADMIN — CEFTRIAXONE 2 G: 2 INJECTION, SOLUTION INTRAVENOUS at 04:07

## 2022-07-31 RX ADMIN — ARFORMOTEROL TARTRATE 15 MCG: 15 SOLUTION RESPIRATORY (INHALATION) at 08:07

## 2022-07-31 RX ADMIN — METOPROLOL SUCCINATE 25 MG: 25 TABLET, EXTENDED RELEASE ORAL at 08:07

## 2022-07-31 RX ADMIN — INSULIN ASPART 10 UNITS: 100 INJECTION, SOLUTION INTRAVENOUS; SUBCUTANEOUS at 04:07

## 2022-07-31 RX ADMIN — BUPROPION HYDROCHLORIDE 150 MG: 150 TABLET, FILM COATED, EXTENDED RELEASE ORAL at 08:07

## 2022-07-31 RX ADMIN — BUDESONIDE 0.5 MG: 0.5 INHALANT ORAL at 08:07

## 2022-07-31 RX ADMIN — NICOTINE 1 PATCH: 21 PATCH, EXTENDED RELEASE TRANSDERMAL at 08:07

## 2022-07-31 RX ADMIN — INSULIN HUMAN 10 UNITS: 100 INJECTION, SOLUTION PARENTERAL at 03:07

## 2022-07-31 RX ADMIN — POLYETHYLENE GLYCOL 3350 17 G: 17 POWDER, FOR SOLUTION ORAL at 08:07

## 2022-07-31 RX ADMIN — ISOSORBIDE MONONITRATE 30 MG: 30 TABLET, EXTENDED RELEASE ORAL at 08:07

## 2022-07-31 RX ADMIN — GABAPENTIN 100 MG: 100 CAPSULE ORAL at 08:07

## 2022-07-31 RX ADMIN — FAMOTIDINE 20 MG: 20 TABLET ORAL at 08:07

## 2022-07-31 RX ADMIN — INSULIN ASPART 6 UNITS: 100 INJECTION, SOLUTION INTRAVENOUS; SUBCUTANEOUS at 04:07

## 2022-07-31 RX ADMIN — IPRATROPIUM BROMIDE AND ALBUTEROL SULFATE 3 ML: 2.5; .5 SOLUTION RESPIRATORY (INHALATION) at 08:07

## 2022-07-31 RX ADMIN — GABAPENTIN 100 MG: 100 CAPSULE ORAL at 03:07

## 2022-07-31 RX ADMIN — IPRATROPIUM BROMIDE AND ALBUTEROL SULFATE 3 ML: 2.5; .5 SOLUTION RESPIRATORY (INHALATION) at 01:07

## 2022-07-31 RX ADMIN — INSULIN ASPART 6 UNITS: 100 INJECTION, SOLUTION INTRAVENOUS; SUBCUTANEOUS at 08:07

## 2022-07-31 RX ADMIN — INSULIN ASPART 10 UNITS: 100 INJECTION, SOLUTION INTRAVENOUS; SUBCUTANEOUS at 11:07

## 2022-07-31 RX ADMIN — FUROSEMIDE 20 MG: 20 TABLET ORAL at 03:07

## 2022-07-31 RX ADMIN — INSULIN ASPART 6 UNITS: 100 INJECTION, SOLUTION INTRAVENOUS; SUBCUTANEOUS at 11:07

## 2022-07-31 RX ADMIN — DIAZEPAM 10 MG: 5 TABLET ORAL at 08:07

## 2022-07-31 RX ADMIN — PREDNISONE 10 MG: 10 TABLET ORAL at 05:07

## 2022-07-31 RX ADMIN — INSULIN DETEMIR 14 UNITS: 100 INJECTION, SOLUTION SUBCUTANEOUS at 08:07

## 2022-07-31 RX ADMIN — INSULIN ASPART 5 UNITS: 100 INJECTION, SOLUTION INTRAVENOUS; SUBCUTANEOUS at 09:07

## 2022-07-31 RX ADMIN — DOCUSATE SODIUM 100 MG: 100 CAPSULE, LIQUID FILLED ORAL at 08:07

## 2022-07-31 NOTE — PROGRESS NOTES
Home Oxygen Evaluation - [facility] - Cardiopulmonary Department      Date Performed: 7/31/2022      1) Patient's Home O2 Sat on room air, while at rest: Room Air SpO2 At Rest: (!) 81 %        If O2 sats on room air at rest are 88% or below, patient qualifies.  Document O2 liter flow needed in Step 2.  If O2 sats are 89% or above, complete Step 3.        2)  If patient is not ambulated and O2 sats are <88%, what is the O2 liter flow required to meet ordered saturation?  4.5    If O2 sats on room air while exercising remain 89% or above patient does not qualify, no further testing needed Document N/A in step 3. If O2 sats on room air while exercising are 88% or below, continue to Step 4.    3) Patient's O2 Sat on room air while exercising: Room Air SpO2 During Ambulation:  (pt unable to ambulate)        4) Patient's O2 Sat while exercising on O2: SpO2 During Ambulation on O2: 91 % at Ambulation O2 LPM: 4.5 LPM         (Must show improvement from #4 for patients to qualify)

## 2022-07-31 NOTE — PLAN OF CARE
Patient updated on plan of care. Instructed patient to use call light for assistance, call light in reach.Hourly rounding performed, bed locked, side rails up, bed alarm on, and in low position. Patient up in chair x2 today, assist x1, pt tolerated well.  Education provided on managing blood surgar, questions answered as of now. Patient remained free from falls during shift. Will continue to monitor per unit practice/policy. Agnes Lyon RN.

## 2022-07-31 NOTE — SUBJECTIVE & OBJECTIVE
Interval History: Improved overnight , qualified for oxygen    Objective:     Vital Signs (Most Recent):  Temp: 98.5 °F (36.9 °C) (07/31/22 1146)  Pulse: 101 (07/31/22 1332)  Resp: 20 (07/31/22 1332)  BP: (!) 155/70 (07/31/22 1146)  SpO2: (!) 90 % (07/31/22 1332)   Vital Signs (24h Range):  Temp:  [97.9 °F (36.6 °C)-98.8 °F (37.1 °C)] 98.5 °F (36.9 °C)  Pulse:  [] 101  Resp:  [11-20] 20  SpO2:  [88 %-94 %] 90 %  BP: ()/(44-88) 155/70     Weight: 92.3 kg (203 lb 7.8 oz)  Body mass index is 34.93 kg/m².      Intake/Output Summary (Last 24 hours) at 7/31/2022 1346  Last data filed at 7/31/2022 1259  Gross per 24 hour   Intake 750 ml   Output 700 ml   Net 50 ml       Physical Exam  Vitals and nursing note reviewed.   Constitutional:       Appearance: She is well-developed.   HENT:      Head: Normocephalic and atraumatic.      Nose: Nose normal.      Mouth/Throat:      Pharynx: No oropharyngeal exudate.   Eyes:      Conjunctiva/sclera: Conjunctivae normal.      Pupils: Pupils are equal, round, and reactive to light.   Neck:      Thyroid: No thyromegaly.      Vascular: No JVD.      Trachea: No tracheal deviation.   Cardiovascular:      Rate and Rhythm: Normal rate and regular rhythm.      Heart sounds: Normal heart sounds.   Pulmonary:      Effort: Pulmonary effort is normal. No respiratory distress.      Breath sounds: Examination of the right-lower field reveals wheezing. Examination of the left-lower field reveals wheezing. Decreased breath sounds, wheezing and rales present. No rhonchi.   Chest:      Chest wall: No tenderness.   Abdominal:      General: Bowel sounds are normal.      Palpations: Abdomen is soft.   Musculoskeletal:         General: Normal range of motion.      Cervical back: Neck supple.      Right lower leg: Edema present.      Left lower leg: Edema present.   Lymphadenopathy:      Cervical: No cervical adenopathy.   Skin:     General: Skin is warm and dry.   Neurological:      Mental  Status: She is alert and oriented to person, place, and time.   Psychiatric:      Comments: Aggressive, hostile       Vents:  Oxygen Concentration (%): 38 (07/31/22 1332)    Lines/Drains/Airways       Peripheral Intravenous Line  Duration                  Peripheral IV - Single Lumen 07/30/22 0219 20 G Right Wrist 1 day                    Significant Labs:    CBC/Anemia Profile:  Recent Labs   Lab 07/30/22  0355 07/31/22  0700   WBC 12.66 11.42   HGB 11.5* 11.7*   HCT 37.9 38.1    306   MCV 96 96   RDW 14.4 14.6*        Chemistries:  Recent Labs   Lab 07/30/22  0355 07/31/22  0700    144   K 4.2 4.4   CL 96 98   CO2 36* 36*   BUN 30* 28*   CREATININE 0.8 0.7   CALCIUM 8.6* 8.6*   ALBUMIN 2.1* 2.2*   PROT 6.0 6.1   BILITOT 0.5 0.5   ALKPHOS 95 86   ALT 40 85*   AST 32 74*   MG 1.5* 1.4*   PHOS 2.9  --        ABGs: No results for input(s): PH, PCO2, HCO3, POCSATURATED, BE in the last 48 hours.    Significant Imaging:  I have reviewed all pertinent imaging results/findings within the past 24 hours.

## 2022-07-31 NOTE — NURSING
Patient blood sugar 452, PCP aware. Educated patient to not drink soft drinks and to prevent family from offering. Patient verbalizes understanding.

## 2022-07-31 NOTE — SUBJECTIVE & OBJECTIVE
Interval History:   Oxygen demand cont to improve. Currently on 4.5 L/min via NC. Maritzahin continues to complete 10 days course EOT 8/2/22. Disposition- Pt and  prefer to go home than rehab . Blood glucose elevated at >400 after drinking soda . Additional insulin utilized.       Review of Systems   Constitutional:  Positive for activity change. Negative for appetite change, fatigue and fever.   HENT:  Negative for sinus pressure and sore throat.    Eyes:  Negative for visual disturbance.   Respiratory:  Positive for cough and shortness of breath. Negative for chest tightness.    Cardiovascular:  Negative for chest pain, palpitations and leg swelling.   Gastrointestinal:  Positive for constipation. Negative for abdominal distention, abdominal pain, diarrhea, nausea and vomiting.   Endocrine: Negative for polyuria.   Genitourinary:  Negative for decreased urine volume, difficulty urinating, dysuria, flank pain, frequency and hematuria.   Musculoskeletal:  Negative for back pain and gait problem.   Skin:  Negative for rash.   Neurological:  Negative for syncope, speech difficulty, weakness, light-headedness and headaches.   Psychiatric/Behavioral:  Positive for confusion (intermittent). Negative for hallucinations and sleep disturbance.    Objective:     Vital Signs (Most Recent):  Temp: 98.5 °F (36.9 °C) (07/31/22 1146)  Pulse: 101 (07/31/22 1332)  Resp: 20 (07/31/22 1332)  BP: (!) 155/70 (07/31/22 1146)  SpO2: (!) 90 % (07/31/22 1332)   Vital Signs (24h Range):  Temp:  [97.9 °F (36.6 °C)-98.8 °F (37.1 °C)] 98.5 °F (36.9 °C)  Pulse:  [] 101  Resp:  [11-20] 20  SpO2:  [88 %-94 %] 90 %  BP: ()/(44-88) 155/70     Weight: 92.3 kg (203 lb 7.8 oz)  Body mass index is 34.93 kg/m².    Intake/Output Summary (Last 24 hours) at 7/31/2022 1526  Last data filed at 7/31/2022 1259  Gross per 24 hour   Intake 750 ml   Output 700 ml   Net 50 ml      Physical Exam  Constitutional:       General: She is not in acute  distress.     Appearance: She is well-developed. She is ill-appearing. She is not diaphoretic.   HENT:      Head: Normocephalic and atraumatic.      Mouth/Throat:      Pharynx: No oropharyngeal exudate.   Eyes:      Conjunctiva/sclera: Conjunctivae normal.      Pupils: Pupils are equal, round, and reactive to light.   Neck:      Thyroid: No thyromegaly.      Vascular: No JVD.   Cardiovascular:      Rate and Rhythm: Normal rate and regular rhythm.      Heart sounds: Normal heart sounds. No murmur heard.  Pulmonary:      Effort: Pulmonary effort is normal. No respiratory distress.      Breath sounds: No wheezing or rales.   Chest:      Chest wall: No tenderness.   Abdominal:      General: Bowel sounds are normal. There is no distension.      Palpations: Abdomen is soft.      Tenderness: There is no abdominal tenderness. There is no guarding or rebound.      Comments: Obese abd    Musculoskeletal:         General: Normal range of motion.      Cervical back: Normal range of motion and neck supple.   Lymphadenopathy:      Cervical: No cervical adenopathy.   Skin:     General: Skin is warm and dry.      Findings: No rash.   Neurological:      Mental Status: She is alert and oriented to person, place, and time.      Cranial Nerves: No cranial nerve deficit.      Sensory: No sensory deficit.      Deep Tendon Reflexes: Reflexes normal.       Significant Labs: All pertinent labs within the past 24 hours have been reviewed.  BMP:   Recent Labs   Lab 07/31/22  0700   *      K 4.4   CL 98   CO2 36*   BUN 28*   CREATININE 0.7   CALCIUM 8.6*   MG 1.4*     CBC:   Recent Labs   Lab 07/30/22  0355 07/31/22  0700   WBC 12.66 11.42   HGB 11.5* 11.7*   HCT 37.9 38.1    306     CMP:   Recent Labs   Lab 07/30/22  0355 07/31/22  0700    144   K 4.2 4.4   CL 96 98   CO2 36* 36*   * 191*   BUN 30* 28*   CREATININE 0.8 0.7   CALCIUM 8.6* 8.6*   PROT 6.0 6.1   ALBUMIN 2.1* 2.2*   BILITOT 0.5 0.5   ALKPHOS 95 86    AST 32 74*   ALT 40 85*   ANIONGAP 12 10   EGFRNONAA >60 >60       Significant Imaging:

## 2022-07-31 NOTE — ASSESSMENT & PLAN NOTE
Reports asthma history  Recommend outpt pulm follow up with complete PFTs  Cont ICS and LABA and DARIUS  IV steroid, taper to oral daily dosing  Complete 10 day course of abx   7/31 Will need oxygen upon discharge, expect long time for recovery 3- 4 weeks or longer

## 2022-07-31 NOTE — ASSESSMENT & PLAN NOTE
Telemetry  Monitor O2 sats, supplemental O2 as needed  Duonebs q 4 hours  Possible allergy to pulmicort     7/28:  Respiratory status improved  Wheezing noted this a.m.  Currently on Vapotherm  Will start Solu-Medrol 40 mg q.8h  Wean O2 as tolerated  Possible step-down tomorrow  7/29-  Wean FiO2 as tolerated   Inhaled bronchodilators and ICS  Systemic steroid taper   Ventilatory support via biPAP nightly and PRN  7/30-  Oxygen demand decreasing   Wean FiO2 as tolerated   Downgrade to Tele  7/31-  Oxygen demand cont to improve. Currently on 4.5 L/min via NC.

## 2022-07-31 NOTE — CARE UPDATE
Bolus started, still in Trendelenburg with new blood pressure 90/48, patient seems to be more responsive.

## 2022-07-31 NOTE — PROGRESS NOTES
'[facility] Telemetry ([facility])  Pulmonology  Progress Note    Patient Name: Alexandra Goins  MRN: [id number]  Admission Date: 7/24/2022  Hospital Length of Stay: 7 days  Code Status: Full Code  Attending Provider: Mely Nieto MD  Primary Care Provider: Perez Casiano MD   Principal Problem: Acute respiratory failure with hypoxia and hypercapnia    Subjective:Improved     Interval History: Improved overnight , qualified for oxygen    Objective:     Vital Signs (Most Recent):  Temp: 98.5 °F (36.9 °C) (07/31/22 1146)  Pulse: 101 (07/31/22 1332)  Resp: 20 (07/31/22 1332)  BP: (!) 155/70 (07/31/22 1146)  SpO2: (!) 90 % (07/31/22 1332)   Vital Signs (24h Range):  Temp:  [97.9 °F (36.6 °C)-98.8 °F (37.1 °C)] 98.5 °F (36.9 °C)  Pulse:  [] 101  Resp:  [11-20] 20  SpO2:  [88 %-94 %] 90 %  BP: ()/(44-88) 155/70     Weight: 92.3 kg (203 lb 7.8 oz)  Body mass index is 34.93 kg/m².      Intake/Output Summary (Last 24 hours) at 7/31/2022 1346  Last data filed at 7/31/2022 1259  Gross per 24 hour   Intake 750 ml   Output 700 ml   Net 50 ml       Physical Exam  Vitals and nursing note reviewed.   Constitutional:       Appearance: She is well-developed.   HENT:      Head: Normocephalic and atraumatic.      Nose: Nose normal.      Mouth/Throat:      Pharynx: No oropharyngeal exudate.   Eyes:      Conjunctiva/sclera: Conjunctivae normal.      Pupils: Pupils are equal, round, and reactive to light.   Neck:      Thyroid: No thyromegaly.      Vascular: No JVD.      Trachea: No tracheal deviation.   Cardiovascular:      Rate and Rhythm: Normal rate and regular rhythm.      Heart sounds: Normal heart sounds.   Pulmonary:      Effort: Pulmonary effort is normal. No respiratory distress.      Breath sounds: Examination of the right-lower field reveals wheezing. Examination of the left-lower field reveals wheezing. Decreased breath sounds, wheezing and rales present. No rhonchi.   Chest:      Chest wall: No tenderness.    Abdominal:      General: Bowel sounds are normal.      Palpations: Abdomen is soft.   Musculoskeletal:         General: Normal range of motion.      Cervical back: Neck supple.      Right lower leg: Edema present.      Left lower leg: Edema present.   Lymphadenopathy:      Cervical: No cervical adenopathy.   Skin:     General: Skin is warm and dry.   Neurological:      Mental Status: She is alert and oriented to person, place, and time.   Psychiatric:      Comments: Aggressive, hostile       Vents:  Oxygen Concentration (%): 38 (07/31/22 1332)    Lines/Drains/Airways       Peripheral Intravenous Line  Duration                  Peripheral IV - Single Lumen 07/30/22 0219 20 G Right Wrist 1 day                    Significant Labs:    CBC/Anemia Profile:  Recent Labs   Lab 07/30/22  0355 07/31/22  0700   WBC 12.66 11.42   HGB 11.5* 11.7*   HCT 37.9 38.1    306   MCV 96 96   RDW 14.4 14.6*        Chemistries:  Recent Labs   Lab 07/30/22  0355 07/31/22  0700    144   K 4.2 4.4   CL 96 98   CO2 36* 36*   BUN 30* 28*   CREATININE 0.8 0.7   CALCIUM 8.6* 8.6*   ALBUMIN 2.1* 2.2*   PROT 6.0 6.1   BILITOT 0.5 0.5   ALKPHOS 95 86   ALT 40 85*   AST 32 74*   MG 1.5* 1.4*   PHOS 2.9  --        ABGs: No results for input(s): PH, PCO2, HCO3, POCSATURATED, BE in the last 48 hours.    Significant Imaging:  I have reviewed all pertinent imaging results/findings within the past 24 hours.      ABG  Recent Labs   Lab 07/27/22  0705   PH 7.358   PO2 55*   PCO2 53.5*   HCO3 30.1*   BE 5     Assessment/Plan:     Chronic obstructive pulmonary disease with acute lower respiratory infection  Reports asthma history  Recommend outpt pulm follow up with complete PFTs  Cont ICS and LABA and DARIUS  IV steroid, taper to oral daily dosing  Complete 10 day course of abx   7/31 Will need oxygen upon discharge, expect long time for recovery 3- 4 weeks or longer    Bipolar disorder with anxiety and depression  Hx Depression and hx SA on  admit in 2015  Follows with Psych cont post discharge  Cont Wellbutrin and Risperidone per HM  7/31 Anxious, hostile           Ulises Diane MD  Pulmonology  [facility] - Tele[facility] ([facility])

## 2022-07-31 NOTE — ASSESSMENT & PLAN NOTE
Hx Depression and hx SA on admit in 2015  Follows with Psych cont post discharge  Cont Wellbutrin and Risperidone per HM  7/31 Anxious, hostile

## 2022-07-31 NOTE — PROGRESS NOTES
[facility] Medicine  Progress Note    Patient Name: Alexandra Goins  MRN: [id number]  Patient Class: IP- Inpatient   Admission Date: 7/24/2022  Length of Stay: 7 days  Attending Physician: Mely Nieto MD  Primary Care Provider: Perez Casiano MD        Subjective:     Principal Problem:Acute respiratory failure with hypoxia and hypercapnia        HPI:   Fever       Since Tuesday morning. recent skin cancer removed from face    Altered Mental Status       lethargic since this morning      Per ER- This is a 56 y.o. female patient with a PMHx of  COPD, HTN, HLP, T2DM, pneumonia, nicotine dependence, non ruptured cerebral aneurysm, stroke, PVD, anxiety, depression, diverticular disease, GERD, hypothyroidism, PVD, severe obesity, arthritis, asthma, Bipolar 1 disorder, and CAD who presents to the Emergency Department for evaluation of AMS which onset gradually this morning. Per , pt came in 5 days PTA to have biopsy of suspected skin cancer.  states pt had a slight fever beginning Tuesday and that the fever got higher over the next 3-4 days.  states pt fell on Friday but appeared to be okay.  states pt was very lethargic this morning, weak, extremely hard to wake and was speaking nonsense upon waking up.  states pt has COPD and is not on oxygen at home. Symptoms are constant and moderate in severity. No mitigating or exacerbating factors reported. Associated sxs include SOB, fever and weakness. Patient is unable to deny other sxs due to AMS at this time.  No further complaints or concerns at this time. HPI limited to AMS.      Patient evaluated by ER and found to have Septic shock. Patient to be admitted to ICU on [facility].     Patient currently awake, alert, oriented to person, place, time, and situation. Code status discussed with patient  and her current  wishes are to be a FULL CODE.       Overview/Hospital Course:  Admitted for treatment of  septic shock due to UTI.  Empirically received Zosyn in the ED and emilia urine and blood cultures.  IV fluid resuscitation was started with inadequate response and she briefly required Norepinephrine infusion.  Hyperglycemia and mild metabolic acidosis on admission.  Acidosis resolved and blood sugars remained high and corrected with intermittent insulin.  Further stabilized and transferred out of the ICU on 25 July.    7/26: blood cultures growing gram negative rods empirically. Cont cefepime. Currently on 15L NC.     7/27:  Blood cultures growing salmonella in 1/2 sets.  Antibiotics de-escalated to Rocephin.  Patient transferred to ICU overnight due to hypoxia and hypercapnia requiring BiPAP.  BNP slightly elevated.  Echo obtained.  Lasix given this a.m..    7/28:  Patient weaned off of BiPAP.  Currently on Vapotherm.  Wheezing noted this a.m..  Start Solu-Medrol.  Wean oxygen as tolerated.  Continue Rocephin for salmonella bacteremia.  Possible step-down tomorrow.    7/29- Awake , alert, however slow to answer questions. Oriented to self, persons and place. Currently on Vapotherm 25/70. Nightly  Bipap continues. C/O constipation. Afebrile . WBC normalized . PCT down to 3.11>56.4. Urine culture - multiple organisms isolated. Repeat blood culture - Coag neg staph. Rocephin continues for Salmonella bacteremia. CXR with persistent bilateral patchy airspace and interstitial opacities diffusely through the lungs.     7/30- More awake and alert today, however did not use BiPAP last night. Per nursing pt was restless last night required prn meds. Oxygen demand further decreased. Currently Vapotherm 15/40. Follow up CXR with improvement. Labs are stable. Downgrade to tele. Disposition - Rehab     7/31- Episode of hypotension for unknown reason last night treated with bolus  ml. BP has been high since . Will resume home Imdur and add lasix.  Oxygen demand cont to improve. Currently on 4.5 L/min via NC. Rocephin  continues to complete 10 days course EOT 8/2/22. Disposition- Pt and  prefer to go home than rehab . Blood glucose elevated at >400 after drinking soda . Additional insulin utilized.       Interval History:   Oxygen demand cont to improve. Currently on 4.5 L/min via NC. Karen continues to complete 10 days course EOT 8/2/22. Disposition- Pt and  prefer to go home than rehab . Blood glucose elevated at >400 after drinking soda . Additional insulin utilized.       Review of Systems   Constitutional:  Positive for activity change. Negative for appetite change, fatigue and fever.   HENT:  Negative for sinus pressure and sore throat.    Eyes:  Negative for visual disturbance.   Respiratory:  Positive for cough and shortness of breath. Negative for chest tightness.    Cardiovascular:  Negative for chest pain, palpitations and leg swelling.   Gastrointestinal:  Positive for constipation. Negative for abdominal distention, abdominal pain, diarrhea, nausea and vomiting.   Endocrine: Negative for polyuria.   Genitourinary:  Negative for decreased urine volume, difficulty urinating, dysuria, flank pain, frequency and hematuria.   Musculoskeletal:  Negative for back pain and gait problem.   Skin:  Negative for rash.   Neurological:  Negative for syncope, speech difficulty, weakness, light-headedness and headaches.   Psychiatric/Behavioral:  Positive for confusion (intermittent). Negative for hallucinations and sleep disturbance.    Objective:     Vital Signs (Most Recent):  Temp: 98.5 °F (36.9 °C) (07/31/22 1146)  Pulse: 101 (07/31/22 1332)  Resp: 20 (07/31/22 1332)  BP: (!) 155/70 (07/31/22 1146)  SpO2: (!) 90 % (07/31/22 1332)   Vital Signs (24h Range):  Temp:  [97.9 °F (36.6 °C)-98.8 °F (37.1 °C)] 98.5 °F (36.9 °C)  Pulse:  [] 101  Resp:  [11-20] 20  SpO2:  [88 %-94 %] 90 %  BP: ()/(44-88) 155/70     Weight: 92.3 kg (203 lb 7.8 oz)  Body mass index is 34.93 kg/m².    Intake/Output Summary (Last 24  hours) at 7/31/2022 1526  Last data filed at 7/31/2022 1259  Gross per 24 hour   Intake 750 ml   Output 700 ml   Net 50 ml      Physical Exam  Constitutional:       General: She is not in acute distress.     Appearance: She is well-developed. She is ill-appearing. She is not diaphoretic.   HENT:      Head: Normocephalic and atraumatic.      Mouth/Throat:      Pharynx: No oropharyngeal exudate.   Eyes:      Conjunctiva/sclera: Conjunctivae normal.      Pupils: Pupils are equal, round, and reactive to light.   Neck:      Thyroid: No thyromegaly.      Vascular: No JVD.   Cardiovascular:      Rate and Rhythm: Normal rate and regular rhythm.      Heart sounds: Normal heart sounds. No murmur heard.  Pulmonary:      Effort: Pulmonary effort is normal. No respiratory distress.      Breath sounds: No wheezing or rales.   Chest:      Chest wall: No tenderness.   Abdominal:      General: Bowel sounds are normal. There is no distension.      Palpations: Abdomen is soft.      Tenderness: There is no abdominal tenderness. There is no guarding or rebound.      Comments: Obese abd    Musculoskeletal:         General: Normal range of motion.      Cervical back: Normal range of motion and neck supple.   Lymphadenopathy:      Cervical: No cervical adenopathy.   Skin:     General: Skin is warm and dry.      Findings: No rash.   Neurological:      Mental Status: She is alert and oriented to person, place, and time.      Cranial Nerves: No cranial nerve deficit.      Sensory: No sensory deficit.      Deep Tendon Reflexes: Reflexes normal.       Significant Labs: All pertinent labs within the past 24 hours have been reviewed.  BMP:   Recent Labs   Lab 07/31/22  0700   *      K 4.4   CL 98   CO2 36*   BUN 28*   CREATININE 0.7   CALCIUM 8.6*   MG 1.4*     CBC:   Recent Labs   Lab 07/30/22  0355 07/31/22  0700   WBC 12.66 11.42   HGB 11.5* 11.7*   HCT 37.9 38.1    306     CMP:   Recent Labs   Lab 07/30/22  0355  07/31/22  0700    144   K 4.2 4.4   CL 96 98   CO2 36* 36*   * 191*   BUN 30* 28*   CREATININE 0.8 0.7   CALCIUM 8.6* 8.6*   PROT 6.0 6.1   ALBUMIN 2.1* 2.2*   BILITOT 0.5 0.5   ALKPHOS 95 86   AST 32 74*   ALT 40 85*   ANIONGAP 12 10   EGFRNONAA >60 >60       Significant Imaging:       Assessment/Plan:      * Acute respiratory failure with hypoxia and hypercapnia  Telemetry  Monitor O2 sats, supplemental O2 as needed  Duonebs q 4 hours  Possible allergy to pulmicort     7/28:  Respiratory status improved  Wheezing noted this a.m.  Currently on Vapotherm  Will start Solu-Medrol 40 mg q.8h  Wean O2 as tolerated  Possible step-down tomorrow  7/29-  Wean FiO2 as tolerated   Inhaled bronchodilators and ICS  Systemic steroid taper   Ventilatory support via biPAP nightly and PRN  7/30-  Oxygen demand decreasing   Wean FiO2 as tolerated   Downgrade to Tele  7/31-  Oxygen demand cont to improve. Currently on 4.5 L/min via NC.     Salmonella bacteremia  Patient allergic to Levaquin  Will continue on Rocephin for 10 days total, EOT 8/2/22    Chronic obstructive pulmonary disease with acute lower respiratory infection  Continue treatment for COPD exacerbation      Severe obesity (BMI 35.0-39.9) with comorbidity  Body mass index is 35.61 kg/m². Morbid obesity complicates all aspects of disease management from diagnostic modalities to treatment. Weight loss encouraged and health benefits explained to patient.         Coronary artery disease of native artery of native heart with stable angina pectoris  Telemetry  Trend troponins      Debility  PT/OT consult on case        Hx of arterial ischemic stroke  Pt with  non ruptuured aneursym  Monitor  Neurochecks q 4 hours  Check Ct scan brain      Type 2 diabetes mellitus with complication, with long-term current use of insulin   Dm diet once tolerating po intake  Accuchecks with correctional SSI  Lab Results   Component Value Date    HGBA1C 11.2 (H) 07/25/2022     Consult  dietician and DM educator on patient stabilized    7/28:  Glucose dropped to 58 this a.m.  Levemir discontinue  Will continue sliding scale      Hx of Renal abscess, right  Check Ct abd/ pelvis  7/29-  CT abd /pelvis showed mild moderate perinephric fat stranding without evidence for abnormal fluid collection.  Currently on Rocephin   Urine culture -  multiple organisms isolated.    Nicotine dependence  Smoking cessation education > 3 minutes  Add nicotine patch      Bipolar disorder with anxiety and depression  Resume home medications once stabilized  Check urine drug screen        VTE Risk Mitigation (From admission, onward)         Ordered     IP VTE HIGH RISK PATIENT  Once         07/24/22 1444     Place sequential compression device  Until discontinued         07/24/22 1444                Discharge Planning   NOÉ:      Code Status: Full Code   Is the patient medically ready for discharge?:     Reason for patient still in hospital (select all that apply): Patient trending condition  Discharge Plan A: Rehab                  Mely Nieto MD  Department of Hospital Medicine   [facility] - Telemetry ([facility])

## 2022-07-31 NOTE — CARE UPDATE
Contacted SAMSON Hale, patient difficult to arose and vitals were unable to obtain with automatic vital machine. Performed manual resulting 88/44 in right arm, oxygen 93-94%, heart rate 70-80's and blood sugar within normal limits. Trendelenburg patient, tried to keep awake and awaiting additional orders. Patient was transferred from ICU today with appropriate level of consciousness but now has changed significantly.

## 2022-07-31 NOTE — CARE UPDATE
The patient is being educated on BIPAP necessity but is refusing to  Wear it, RRT present but patient chooses to remain non-compliant. More alert and responsive, denying acute symptoms

## 2022-08-01 LAB
ALBUMIN SERPL BCP-MCNC: 2.4 G/DL (ref 3.5–5.2)
ALP SERPL-CCNC: 110 U/L (ref 55–135)
ALT SERPL W/O P-5'-P-CCNC: 113 U/L (ref 10–44)
ANION GAP SERPL CALC-SCNC: 9 MMOL/L (ref 8–16)
AST SERPL-CCNC: 70 U/L (ref 10–40)
BASOPHILS # BLD AUTO: 0.02 K/UL (ref 0–0.2)
BASOPHILS NFR BLD: 0.2 % (ref 0–1.9)
BILIRUB SERPL-MCNC: 0.6 MG/DL (ref 0.1–1)
BUN SERPL-MCNC: 26 MG/DL (ref 6–20)
CALCIUM SERPL-MCNC: 8.6 MG/DL (ref 8.7–10.5)
CHLORIDE SERPL-SCNC: 94 MMOL/L (ref 95–110)
CO2 SERPL-SCNC: 37 MMOL/L (ref 23–29)
CREAT SERPL-MCNC: 0.9 MG/DL (ref 0.5–1.4)
DIFFERENTIAL METHOD: ABNORMAL
EOSINOPHIL # BLD AUTO: 0.1 K/UL (ref 0–0.5)
EOSINOPHIL NFR BLD: 1.1 % (ref 0–8)
ERYTHROCYTE [DISTWIDTH] IN BLOOD BY AUTOMATED COUNT: 14.6 % (ref 11.5–14.5)
EST. GFR  (NO RACE VARIABLE): >60 ML/MIN/1.73 M^2
GLUCOSE SERPL-MCNC: 303 MG/DL (ref 70–110)
HCT VFR BLD AUTO: 39.9 % (ref 37–48.5)
HGB BLD-MCNC: 12.4 G/DL (ref 12–16)
IMM GRANULOCYTES # BLD AUTO: 0.12 K/UL (ref 0–0.04)
IMM GRANULOCYTES NFR BLD AUTO: 1.1 % (ref 0–0.5)
LYMPHOCYTES # BLD AUTO: 2.1 K/UL (ref 1–4.8)
LYMPHOCYTES NFR BLD: 19.2 % (ref 18–48)
MAGNESIUM SERPL-MCNC: 1.3 MG/DL (ref 1.6–2.6)
MCH RBC QN AUTO: 29.9 PG (ref 27–31)
MCHC RBC AUTO-ENTMCNC: 31.1 G/DL (ref 32–36)
MCV RBC AUTO: 96 FL (ref 82–98)
MONOCYTES # BLD AUTO: 0.5 K/UL (ref 0.3–1)
MONOCYTES NFR BLD: 4.1 % (ref 4–15)
NEUTROPHILS # BLD AUTO: 8.3 K/UL (ref 1.8–7.7)
NEUTROPHILS NFR BLD: 74.3 % (ref 38–73)
NRBC BLD-RTO: 0 /100 WBC
PHOSPHATE SERPL-MCNC: 3.2 MG/DL (ref 2.7–4.5)
PLATELET # BLD AUTO: 295 K/UL (ref 150–450)
PMV BLD AUTO: 10.5 FL (ref 9.2–12.9)
POCT GLUCOSE: 266 MG/DL (ref 70–110)
POCT GLUCOSE: 334 MG/DL (ref 70–110)
POCT GLUCOSE: 340 MG/DL (ref 70–110)
POCT GLUCOSE: 386 MG/DL (ref 70–110)
POTASSIUM SERPL-SCNC: 4.4 MMOL/L (ref 3.5–5.1)
PROT SERPL-MCNC: 6.5 G/DL (ref 6–8.4)
RBC # BLD AUTO: 4.15 M/UL (ref 4–5.4)
SODIUM SERPL-SCNC: 140 MMOL/L (ref 136–145)
WBC # BLD AUTO: 11.1 K/UL (ref 3.9–12.7)

## 2022-08-01 PROCEDURE — 25000003 PHARM REV CODE 250: Performed by: NURSE PRACTITIONER

## 2022-08-01 PROCEDURE — C9399 UNCLASSIFIED DRUGS OR BIOLOG: HCPCS | Performed by: INTERNAL MEDICINE

## 2022-08-01 PROCEDURE — 21400001 HC TELEMETRY ROOM

## 2022-08-01 PROCEDURE — 84100 ASSAY OF PHOSPHORUS: CPT | Performed by: INTERNAL MEDICINE

## 2022-08-01 PROCEDURE — 27000221 HC OXYGEN, UP TO 24 HOURS

## 2022-08-01 PROCEDURE — 63600175 PHARM REV CODE 636 W HCPCS: Performed by: NURSE PRACTITIONER

## 2022-08-01 PROCEDURE — S4991 NICOTINE PATCH NONLEGEND: HCPCS | Performed by: NURSE PRACTITIONER

## 2022-08-01 PROCEDURE — 99900035 HC TECH TIME PER 15 MIN (STAT)

## 2022-08-01 PROCEDURE — 80053 COMPREHEN METABOLIC PANEL: CPT | Performed by: NURSE PRACTITIONER

## 2022-08-01 PROCEDURE — 99232 SBSQ HOSP IP/OBS MODERATE 35: CPT | Mod: ,,, | Performed by: INTERNAL MEDICINE

## 2022-08-01 PROCEDURE — 25000242 PHARM REV CODE 250 ALT 637 W/ HCPCS: Performed by: NURSE PRACTITIONER

## 2022-08-01 PROCEDURE — 94660 CPAP INITIATION&MGMT: CPT

## 2022-08-01 PROCEDURE — 99232 PR SUBSEQUENT HOSPITAL CARE,LEVL II: ICD-10-PCS | Mod: ,,, | Performed by: INTERNAL MEDICINE

## 2022-08-01 PROCEDURE — 25000003 PHARM REV CODE 250: Performed by: INTERNAL MEDICINE

## 2022-08-01 PROCEDURE — 85025 COMPLETE CBC W/AUTO DIFF WBC: CPT | Performed by: NURSE PRACTITIONER

## 2022-08-01 PROCEDURE — 83735 ASSAY OF MAGNESIUM: CPT | Performed by: NURSE PRACTITIONER

## 2022-08-01 PROCEDURE — 63600175 PHARM REV CODE 636 W HCPCS: Performed by: INTERNAL MEDICINE

## 2022-08-01 PROCEDURE — 94640 AIRWAY INHALATION TREATMENT: CPT

## 2022-08-01 PROCEDURE — 94761 N-INVAS EAR/PLS OXIMETRY MLT: CPT

## 2022-08-01 RX ORDER — MAGNESIUM SULFATE HEPTAHYDRATE 40 MG/ML
2 INJECTION, SOLUTION INTRAVENOUS
Status: COMPLETED | OUTPATIENT
Start: 2022-08-01 | End: 2022-08-01

## 2022-08-01 RX ORDER — INSULIN ASPART 100 [IU]/ML
8 INJECTION, SOLUTION INTRAVENOUS; SUBCUTANEOUS
Status: DISCONTINUED | OUTPATIENT
Start: 2022-08-01 | End: 2022-08-02 | Stop reason: HOSPADM

## 2022-08-01 RX ORDER — FUROSEMIDE 40 MG/1
40 TABLET ORAL DAILY
Status: DISCONTINUED | OUTPATIENT
Start: 2022-08-01 | End: 2022-08-02 | Stop reason: HOSPADM

## 2022-08-01 RX ADMIN — CEFTRIAXONE 2 G: 2 INJECTION, SOLUTION INTRAVENOUS at 04:08

## 2022-08-01 RX ADMIN — DIAZEPAM 10 MG: 5 TABLET ORAL at 08:08

## 2022-08-01 RX ADMIN — ASPIRIN 81 MG: 81 TABLET, COATED ORAL at 09:08

## 2022-08-01 RX ADMIN — INSULIN DETEMIR 22 UNITS: 100 INJECTION, SOLUTION SUBCUTANEOUS at 09:08

## 2022-08-01 RX ADMIN — INSULIN ASPART 10 UNITS: 100 INJECTION, SOLUTION INTRAVENOUS; SUBCUTANEOUS at 04:08

## 2022-08-01 RX ADMIN — IPRATROPIUM BROMIDE AND ALBUTEROL SULFATE 3 ML: 2.5; .5 SOLUTION RESPIRATORY (INHALATION) at 07:08

## 2022-08-01 RX ADMIN — GABAPENTIN 100 MG: 100 CAPSULE ORAL at 08:08

## 2022-08-01 RX ADMIN — MAGNESIUM SULFATE HEPTAHYDRATE 2 G: 40 INJECTION, SOLUTION INTRAVENOUS at 06:08

## 2022-08-01 RX ADMIN — METOPROLOL SUCCINATE 25 MG: 25 TABLET, EXTENDED RELEASE ORAL at 09:08

## 2022-08-01 RX ADMIN — INSULIN ASPART 8 UNITS: 100 INJECTION, SOLUTION INTRAVENOUS; SUBCUTANEOUS at 11:08

## 2022-08-01 RX ADMIN — INSULIN ASPART 8 UNITS: 100 INJECTION, SOLUTION INTRAVENOUS; SUBCUTANEOUS at 04:08

## 2022-08-01 RX ADMIN — GABAPENTIN 100 MG: 100 CAPSULE ORAL at 04:08

## 2022-08-01 RX ADMIN — INSULIN ASPART 6 UNITS: 100 INJECTION, SOLUTION INTRAVENOUS; SUBCUTANEOUS at 06:08

## 2022-08-01 RX ADMIN — NICOTINE 1 PATCH: 21 PATCH, EXTENDED RELEASE TRANSDERMAL at 09:08

## 2022-08-01 RX ADMIN — INSULIN ASPART 4 UNITS: 100 INJECTION, SOLUTION INTRAVENOUS; SUBCUTANEOUS at 09:08

## 2022-08-01 RX ADMIN — ARFORMOTEROL TARTRATE 15 MCG: 15 SOLUTION RESPIRATORY (INHALATION) at 07:08

## 2022-08-01 RX ADMIN — RISPERIDONE 1 MG: 1 TABLET ORAL at 08:08

## 2022-08-01 RX ADMIN — IPRATROPIUM BROMIDE AND ALBUTEROL SULFATE 3 ML: 2.5; .5 SOLUTION RESPIRATORY (INHALATION) at 01:08

## 2022-08-01 RX ADMIN — INSULIN ASPART 6 UNITS: 100 INJECTION, SOLUTION INTRAVENOUS; SUBCUTANEOUS at 11:08

## 2022-08-01 RX ADMIN — DOCUSATE SODIUM 100 MG: 100 CAPSULE, LIQUID FILLED ORAL at 09:08

## 2022-08-01 RX ADMIN — FAMOTIDINE 20 MG: 20 TABLET ORAL at 09:08

## 2022-08-01 RX ADMIN — FUROSEMIDE 40 MG: 40 TABLET ORAL at 09:08

## 2022-08-01 RX ADMIN — BUPROPION HYDROCHLORIDE 150 MG: 150 TABLET, FILM COATED, EXTENDED RELEASE ORAL at 09:08

## 2022-08-01 RX ADMIN — BUDESONIDE 0.5 MG: 0.5 INHALANT ORAL at 07:08

## 2022-08-01 RX ADMIN — MAGNESIUM SULFATE HEPTAHYDRATE 2 G: 40 INJECTION, SOLUTION INTRAVENOUS at 09:08

## 2022-08-01 RX ADMIN — INSULIN ASPART 8 UNITS: 100 INJECTION, SOLUTION INTRAVENOUS; SUBCUTANEOUS at 06:08

## 2022-08-01 RX ADMIN — ISOSORBIDE MONONITRATE 30 MG: 30 TABLET, EXTENDED RELEASE ORAL at 09:08

## 2022-08-01 RX ADMIN — GABAPENTIN 100 MG: 100 CAPSULE ORAL at 09:08

## 2022-08-01 RX ADMIN — POLYETHYLENE GLYCOL 3350 17 G: 17 POWDER, FOR SOLUTION ORAL at 09:08

## 2022-08-01 RX ADMIN — RISPERIDONE 1 MG: 1 TABLET ORAL at 09:08

## 2022-08-01 RX ADMIN — FAMOTIDINE 20 MG: 20 TABLET ORAL at 08:08

## 2022-08-01 RX ADMIN — PREDNISONE 10 MG: 10 TABLET ORAL at 09:08

## 2022-08-01 NOTE — ASSESSMENT & PLAN NOTE
Reports asthma history  Recommend outpt pulm follow up with complete PFTs  Cont ICS and LABA and DARIUS  IV steroid, taper to oral daily dosing  Complete 10 day course of abx    Will need oxygen upon discharge, expect long time for recovery 3- 4 weeks or longer

## 2022-08-01 NOTE — PROGRESS NOTES
[facility] Medicine  Progress Note    Patient Name: Alexandra Goins  MRN: [id number]  Patient Class: IP- Inpatient   Admission Date: 7/24/2022  Length of Stay: 8 days  Attending Physician: Mely Nieto MD  Primary Care Provider: Perez Casiano MD        Subjective:     Principal Problem:Acute respiratory failure with hypoxia and hypercapnia        HPI:   Fever       Since Tuesday morning. recent skin cancer removed from face    Altered Mental Status       lethargic since this morning      Per ER- This is a 56 y.o. female patient with a PMHx of  COPD, HTN, HLP, T2DM, pneumonia, nicotine dependence, non ruptured cerebral aneurysm, stroke, PVD, anxiety, depression, diverticular disease, GERD, hypothyroidism, PVD, severe obesity, arthritis, asthma, Bipolar 1 disorder, and CAD who presents to the Emergency Department for evaluation of AMS which onset gradually this morning. Per , pt came in 5 days PTA to have biopsy of suspected skin cancer.  states pt had a slight fever beginning Tuesday and that the fever got higher over the next 3-4 days.  states pt fell on Friday but appeared to be okay.  states pt was very lethargic this morning, weak, extremely hard to wake and was speaking nonsense upon waking up.  states pt has COPD and is not on oxygen at home. Symptoms are constant and moderate in severity. No mitigating or exacerbating factors reported. Associated sxs include SOB, fever and weakness. Patient is unable to deny other sxs due to AMS at this time.  No further complaints or concerns at this time. HPI limited to AMS.      Patient evaluated by ER and found to have Septic shock. Patient to be admitted to ICU on [facility].     Patient currently awake, alert, oriented to person, place, time, and situation. Code status discussed with patient  and her current  wishes are to be a FULL CODE.       Overview/Hospital Course:  Admitted for treatment of  septic shock due to UTI.  Empirically received Zosyn in the ED and emilia urine and blood cultures.  IV fluid resuscitation was started with inadequate response and she briefly required Norepinephrine infusion.  Hyperglycemia and mild metabolic acidosis on admission.  Acidosis resolved and blood sugars remained high and corrected with intermittent insulin.  Further stabilized and transferred out of the ICU on 25 July.    7/26: blood cultures growing gram negative rods empirically. Cont cefepime. Currently on 15L NC.     7/27:  Blood cultures growing salmonella in 1/2 sets.  Antibiotics de-escalated to Rocephin.  Patient transferred to ICU overnight due to hypoxia and hypercapnia requiring BiPAP.  BNP slightly elevated.  Echo obtained.  Lasix given this a.m..    7/28:  Patient weaned off of BiPAP.  Currently on Vapotherm.  Wheezing noted this a.m..  Start Solu-Medrol.  Wean oxygen as tolerated.  Continue Rocephin for salmonella bacteremia.  Possible step-down tomorrow.    7/29- Awake , alert, however slow to answer questions. Oriented to self, persons and place. Currently on Vapotherm 25/70. Nightly  Bipap continues. C/O constipation. Afebrile . WBC normalized . PCT down to 3.11>56.4. Urine culture - multiple organisms isolated. Repeat blood culture - Coag neg staph. Rocephin continues for Salmonella bacteremia. CXR with persistent bilateral patchy airspace and interstitial opacities diffusely through the lungs.     7/30- More awake and alert today, however did not use BiPAP last night. Per nursing pt was restless last night required prn meds. Oxygen demand further decreased. Currently Vapotherm 15/40. Follow up CXR with improvement. Labs are stable. Downgrade to tele. Disposition - Rehab     7/31- Episode of hypotension for unknown reason last night treated with bolus  ml. BP has been high since . Will resume home Imdur and add lasix.  Oxygen demand cont to improve. Currently on 4.5 L/min via NC. Rocephin  continues to complete 10 days course EOT 8/2/22. Disposition- Pt and  prefer to go home than rehab . Blood glucose elevated at >400 after drinking soda . Additional insulin utilized.     8/1- O2 demand stable at 4.5 L/min. Pt feels better and wants to go home . Home O2 eval performed. CM consulted for discharge planing . Home O2, home Health setup.       Interval History:    Home O2 eval performed. CM consulted for discharge planing . Home O2, home Health setup.       Review of Systems   Constitutional:  Positive for activity change. Negative for appetite change, fatigue and fever.   HENT:  Negative for sinus pressure and sore throat.    Eyes:  Negative for visual disturbance.   Respiratory:  Positive for cough and shortness of breath. Negative for chest tightness.    Cardiovascular:  Negative for chest pain, palpitations and leg swelling.   Gastrointestinal:  Positive for constipation. Negative for abdominal distention, abdominal pain, diarrhea, nausea and vomiting.   Endocrine: Negative for polyuria.   Genitourinary:  Negative for decreased urine volume, difficulty urinating, dysuria, flank pain, frequency and hematuria.   Musculoskeletal:  Negative for back pain and gait problem.   Skin:  Negative for rash.   Neurological:  Negative for syncope, speech difficulty, weakness, light-headedness and headaches.   Psychiatric/Behavioral:  Negative for confusion (intermittent), hallucinations and sleep disturbance.    Objective:     Vital Signs (Most Recent):  Temp: 98.2 °F (36.8 °C) (08/01/22 1540)  Pulse: 91 (08/01/22 1540)  Resp: 20 (08/01/22 1540)  BP: 125/61 (08/01/22 1540)  SpO2: (!) 93 % (08/01/22 1540)   Vital Signs (24h Range):  Temp:  [97.1 °F (36.2 °C)-98.7 °F (37.1 °C)] 98.2 °F (36.8 °C)  Pulse:  [] 91  Resp:  [8-21] 20  SpO2:  [91 %-98 %] 93 %  BP: (125-158)/(61-74) 125/61     Weight: 89.7 kg (197 lb 12 oz)  Body mass index is 33.94 kg/m².  No intake or output data in the 24 hours ending  08/01/22 1542   Physical Exam  Constitutional:       General: She is not in acute distress.     Appearance: She is well-developed. She is ill-appearing. She is not diaphoretic.   HENT:      Head: Normocephalic and atraumatic.      Mouth/Throat:      Pharynx: No oropharyngeal exudate.   Eyes:      Conjunctiva/sclera: Conjunctivae normal.      Pupils: Pupils are equal, round, and reactive to light.   Neck:      Thyroid: No thyromegaly.      Vascular: No JVD.   Cardiovascular:      Rate and Rhythm: Normal rate and regular rhythm.      Heart sounds: Normal heart sounds. No murmur heard.  Pulmonary:      Effort: Pulmonary effort is normal. No respiratory distress.      Breath sounds: No wheezing or rales.   Chest:      Chest wall: No tenderness.   Abdominal:      General: Bowel sounds are normal. There is no distension.      Palpations: Abdomen is soft.      Tenderness: There is no abdominal tenderness. There is no guarding or rebound.      Comments: Obese abd    Musculoskeletal:         General: Normal range of motion.      Cervical back: Normal range of motion and neck supple.   Lymphadenopathy:      Cervical: No cervical adenopathy.   Skin:     General: Skin is warm and dry.      Findings: No rash.   Neurological:      Mental Status: She is alert and oriented to person, place, and time.      Cranial Nerves: No cranial nerve deficit.      Sensory: No sensory deficit.      Deep Tendon Reflexes: Reflexes normal.       Significant Labs: All pertinent labs within the past 24 hours have been reviewed.  CBC:   Recent Labs   Lab 07/31/22  0700 08/01/22  0551   WBC 11.42 11.10   HGB 11.7* 12.4   HCT 38.1 39.9    295     CMP:   Recent Labs   Lab 07/31/22  0700 08/01/22  0551    140   K 4.4 4.4   CL 98 94*   CO2 36* 37*   * 303*   BUN 28* 26*   CREATININE 0.7 0.9   CALCIUM 8.6* 8.6*   PROT 6.1 6.5   ALBUMIN 2.2* 2.4*   BILITOT 0.5 0.6   ALKPHOS 86 110   AST 74* 70*   ALT 85* 113*   ANIONGAP 10 9   EGFRNONAA  >60  --        Significant Imaging:       Assessment/Plan:      * Acute respiratory failure with hypoxia and hypercapnia in the setting of Bilateral Pneumonia, unspecified organism   Telemetry  Monitor O2 sats, supplemental O2 as needed  Duonebs q 4 hours  Possible allergy to pulmicort     7/28:  Respiratory status improved  Wheezing noted this a.m.  Currently on Vapotherm  Will start Solu-Medrol 40 mg q.8h  Wean O2 as tolerated  Possible step-down tomorrow  7/29-  Wean FiO2 as tolerated   Inhaled bronchodilators and ICS  Systemic steroid taper   Ventilatory support via biPAP nightly and PRN  7/30-  Oxygen demand decreasing   Wean FiO2 as tolerated   Downgrade to Tele  7/31-  Oxygen demand cont to improve. Currently on 4.5 L/min via NC.   8/1-  Stable oxygen demand 4.5 L/min     Salmonella bacteremia  Patient allergic to Levaquin  Will continue on Rocephin for 10 days total, EOT 8/2/22    Chronic obstructive pulmonary disease with acute lower respiratory infection  Continue treatment for COPD exacerbation      Severe obesity (BMI 35.0-39.9) with comorbidity  Body mass index is 35.61 kg/m². Morbid obesity complicates all aspects of disease management from diagnostic modalities to treatment. Weight loss encouraged and health benefits explained to patient.         Coronary artery disease of native artery of native heart with stable angina pectoris  Telemetry  Trend troponins      Debility  PT/OT consult on case        Hx of arterial ischemic stroke  Pt with  non ruptuured aneursym  Monitor  Neurochecks q 4 hours  Check Ct scan brain      Type 2 diabetes mellitus with complication, with long-term current use of insulin   Dm diet once tolerating po intake  Accuchecks with correctional SSI  Lab Results   Component Value Date    HGBA1C 11.2 (H) 07/25/2022     Consult dietician and DM educator on patient stabilized    7/28:  Glucose dropped to 58 this a.m.  Levemir discontinue  Will continue sliding scale      Hx of Renal  abscess, right  Check Ct abd/ pelvis  7/29-  CT abd /pelvis showed mild moderate perinephric fat stranding without evidence for abnormal fluid collection.  Currently on Rocephin   Urine culture -  multiple organisms isolated.    Nicotine dependence  Smoking cessation education > 3 minutes  Add nicotine patch      Bipolar disorder with anxiety and depression  Resume home medications once stabilized  Check urine drug screen        VTE Risk Mitigation (From admission, onward)         Ordered     IP VTE HIGH RISK PATIENT  Once         07/24/22 1444     Place sequential compression device  Until discontinued         07/24/22 1444                Discharge Planning   NOÉ:      Code Status: Full Code   Is the patient medically ready for discharge?:     Reason for patient still in hospital (select all that apply): Patient trending condition  Discharge Plan A: Rehab                  Mely Nieto MD  Department of Hospital Medicine   [facility] - Telemetry ([facility])

## 2022-08-01 NOTE — ASSESSMENT & PLAN NOTE
Telemetry  Monitor O2 sats, supplemental O2 as needed  Duonebs q 4 hours  Possible allergy to pulmicort     7/28:  Respiratory status improved  Wheezing noted this a.m.  Currently on Vapotherm  Will start Solu-Medrol 40 mg q.8h  Wean O2 as tolerated  Possible step-down tomorrow  7/29-  Wean FiO2 as tolerated   Inhaled bronchodilators and ICS  Systemic steroid taper   Ventilatory support via biPAP nightly and PRN  7/30-  Oxygen demand decreasing   Wean FiO2 as tolerated   Downgrade to Tele  7/31-  Oxygen demand cont to improve. Currently on 4.5 L/min via NC.   8/1-  Stable oxygen demand 4.5 L/min

## 2022-08-01 NOTE — PLAN OF CARE
Patient approved for oxygen per [facility]. Patient requested agency deliver portable tank at 4:45p when spouse at bedside.

## 2022-08-01 NOTE — PLAN OF CARE
Nutrition Plan of Care:    NutritionRecommendation/Intervention:   1  Patient to continue on diabetic diet with adaquate intake.   2. Collaboration with Medical Providers    Trinidad Brannon MS, RDN, LDN

## 2022-08-01 NOTE — PROGRESS NOTES
[facility] - Telemetry ([facility])  Pulmonology  Progress Note    Patient Name: Alexandra Goins  MRN: [id number]  Admission Date: 7/24/2022  Hospital Length of Stay: 8 days  Code Status: Full Code  Attending Provider: Mely Nieto MD  Primary Care Provider: Perez Casiano MD   Principal Problem: Acute respiratory failure with hypoxia and hypercapnia    Subjective:     Interval History:  08/01: seen and examined: 2 -3 LPM, pending Discharge. T max 98.7F,     Respiratory ROS: no cough, shortness of breath, or wheezing     Objective:     Vital Signs (Most Recent):  Temp: 98.2 °F (36.8 °C) (08/01/22 1540)  Pulse: 91 (08/01/22 1540)  Resp: 20 (08/01/22 1540)  BP: 125/61 (08/01/22 1540)  SpO2: (!) 93 % (08/01/22 1540)   Vital Signs (24h Range):  Temp:  [97.1 °F (36.2 °C)-98.7 °F (37.1 °C)] 98.2 °F (36.8 °C)  Pulse:  [67-99] 91  Resp:  [18-21] 20  SpO2:  [91 %-98 %] 93 %  BP: (125-158)/(61-74) 125/61     Weight: 89.7 kg (197 lb 12 oz)  Body mass index is 33.94 kg/m².    No intake or output data in the 24 hours ending 08/01/22 1759    Physical Exam  Vitals and nursing note reviewed.   HENT:      Head: Normocephalic.      Nose: Nose normal.   Eyes:      Pupils: Pupils are equal, round, and reactive to light.   Cardiovascular:      Rate and Rhythm: Normal rate.      Pulses: Normal pulses.      Heart sounds: Normal heart sounds.   Pulmonary:      Effort: Pulmonary effort is normal.      Breath sounds: Normal breath sounds.   Abdominal:      General: Bowel sounds are normal.      Palpations: Abdomen is soft.   Musculoskeletal:      Cervical back: Normal range of motion.   Skin:     Capillary Refill: Capillary refill takes 2 to 3 seconds.   Neurological:      General: No focal deficit present.      Mental Status: She is alert.       Vents:  Oxygen Concentration (%): 38 (08/01/22 0422)    Lines/Drains/Airways       Peripheral Intravenous Line  Duration                  Peripheral IV - Single Lumen 08/01/22 1640 22 G Left;Posterior  Forearm <1 day                    Significant Labs:    CBC/Anemia Profile:  Recent Labs   Lab 07/31/22  0700 08/01/22  0551   WBC 11.42 11.10   HGB 11.7* 12.4   HCT 38.1 39.9    295   MCV 96 96   RDW 14.6* 14.6*        Chemistries:  Recent Labs   Lab 07/31/22  0700 08/01/22  0551    140   K 4.4 4.4   CL 98 94*   CO2 36* 37*   BUN 28* 26*   CREATININE 0.7 0.9   CALCIUM 8.6* 8.6*   ALBUMIN 2.2* 2.4*   PROT 6.1 6.5   BILITOT 0.5 0.6   ALKPHOS 86 110   ALT 85* 113*   AST 74* 70*   MG 1.4* 1.3*   PHOS  --  3.2       Blood Culture: No results for input(s): LABBLOO in the last 48 hours.  Respiratory Culture: No results for input(s): GSRESP, RESPIRATORYC in the last 48 hours.  All pertinent labs within the past 24 hours have been reviewed.    Significant Imaging:  I have reviewed all pertinent imaging results/findings within the past 24 hours.      ABG  Recent Labs   Lab 07/27/22  0705   PH 7.358   PO2 55*   PCO2 53.5*   HCO3 30.1*   BE 5     Assessment/Plan:     * Acute respiratory failure with hypoxia and hypercapnia in the setting of Bilateral Pneumonia, unspecified organism   Will need  home O2  Cont nebs  Follow in Pulmonary clinic    Chronic obstructive pulmonary disease with acute lower respiratory infection  Reports asthma history  Recommend outpt pulm follow up with complete PFTs  Cont ICS and LABA and DARIUS  IV steroid, taper to oral daily dosing  Complete 10 day course of abx    Will need oxygen upon discharge, expect long time for recovery 3- 4 weeks or longer    Nicotine dependence  Encouraged tobacco cessation  Nicotine patch    Renal/  Replace lytes      Sign off     Ventura Sumner MD  Pulmonology  [facility] - Telemetry ([facility])

## 2022-08-01 NOTE — PROGRESS NOTES
"[facility] - Telemetry ([facility])  Adult Nutrition  Progress Note    SUMMARY       Recommendations    Recommendation/Intervention:   1  Patient to continue on diabetic diet with adaquate intake.   2. Collaboration with Medical Providers    Goals:   1. Patient tolerates diabetic oral intake.    Nutrition Goal Status: goal met    Communication of RD Recs: other (comment) (POC)    Assessment and Plan    No nutrition risk problem at this time.  RD will continue to monitor.       Malnutrition Assessment                                       Reason for Assessment    Reason For Assessment: length of stay    Diagnosis: diabetes diagnosis/complications, pulmonary disease  Relevant Medical History: Patient with history of respiratory failure, CAD, COPD, Type 2 DM    Interdisciplinary Rounds: did not attend    General Information Comments:   8/1/2022: Patient with a regular textured, Diabetic diet with no documented tolerance issues at this time.  Po intake noted at 100%.  Labs reviewed.  NKFA. LBM:7/29/2022.  Patient with low to no nutritional risk at this time.  Will continue to monitor.  (RD remote)    Nutrition Discharge Planning: Patient to continue on diabetic diet with no tolerance issues.    Nutrition Risk Screen    Nutrition Risk Screen: no indicators present    Nutrition/Diet History    Spiritual, Cultural Beliefs, Baptism Practices, Values that Affect Care: no  Food Allergies: NKFA  Factors Affecting Nutritional Intake: None identified at this time    Anthropometrics    Temp: 97.1 °F (36.2 °C)  Height Method: Stated  Height: 5' 4" (162.6 cm)  Height (inches): 64 in  Weight Method: Bed Scale  Weight: 89.7 kg (197 lb 12 oz)  Weight (lb): 197.75 lb  Ideal Body Weight (IBW), Female: 120 lb  % Ideal Body Weight, Female (lb): 164.79 %  BMI (Calculated): 33.9  BMI Grade: 35 - 39.9 - obesity - grade II       Lab/Procedures/Meds  BMP  Lab Results   Component Value Date     08/01/2022    K 4.4 08/01/2022    CL 94 (L) " 08/01/2022    CO2 37 (H) 08/01/2022    BUN 26 (H) 08/01/2022    CREATININE 0.9 08/01/2022    CALCIUM 8.6 (L) 08/01/2022    ANIONGAP 9 08/01/2022    ESTGFRAFRICA >60 07/31/2022    EGFRNONAA >60 07/31/2022     Lab Results   Component Value Date    LABPROT 10.1 10/28/2019    ALBUMIN 2.4 (L) 08/01/2022     Lab Results   Component Value Date     08/01/2022    K 4.4 08/01/2022    CL 94 (L) 08/01/2022    CO2 37 (H) 08/01/2022     Lab Results   Component Value Date    HGBA1C 11.2 (H) 07/25/2022       Pertinent Labs Reviewed: reviewed  Pertinent Medications Reviewed: reviewed    Physical Findings/Assessment         Estimated/Assessed Needs    Weight Used For Calorie Calculations: 89.7 kg (197 lb 12 oz)  Energy Calorie Requirements (kcal): 20-25kcal/kg     Protein Requirements: 0.8-1.0  Weight Used For Protein Calculations: 89.7 kg (197 lb 12 oz)  Fluid Requirements (mL): 2243ml  Estimated Fluid Requirement Method: RDA Method  RDA Method (mL): 20  CHO Requirement: 224-280      Nutrition Prescription Ordered    Current Diet Order: Diabetic    Evaluation of Received Nutrient/Fluid Intake    % Kcal Needs: 100  % Protein Needs: 100  Energy Calories Required: meeting needs  Protein Required: meeting needs  Fluid Required: meeting needs  Tolerance: tolerating  % Intake of Estimated Energy Needs: 75 - 100 %  % Meal Intake: 75 - 100 %    Nutrition Risk    Level of Risk/Frequency of Follow-up: low     Monitor and Evaluation    Food and Nutrient Intake: energy intake, food and beverage intake  Food and Nutrient Adminstration: diet order  Knowledge/Beliefs/Attitudes: food and nutrition knowledge/skill, beliefs and attitudes  Physical Activity and Function: nutrition-related ADLs and IADLs, factors affecting access to physical activity  Anthropometric Measurements: height/length, weight, weight change, body mass index  Biochemical Data, Medical Tests and Procedures: electrolyte and renal panel, lipid profile, gastrointestinal  profile, glucose/endocrine profile, inflammatory profile     Nutrition Follow-Up    RD Follow-up?: Yes     Trinidad Brannon, MS, RDN, LDN

## 2022-08-01 NOTE — SUBJECTIVE & OBJECTIVE
Interval History:    Home O2 eval performed. CM consulted for discharge planing . Home O2, home Health setup.       Review of Systems   Constitutional:  Positive for activity change. Negative for appetite change, fatigue and fever.   HENT:  Negative for sinus pressure and sore throat.    Eyes:  Negative for visual disturbance.   Respiratory:  Positive for cough and shortness of breath. Negative for chest tightness.    Cardiovascular:  Negative for chest pain, palpitations and leg swelling.   Gastrointestinal:  Positive for constipation. Negative for abdominal distention, abdominal pain, diarrhea, nausea and vomiting.   Endocrine: Negative for polyuria.   Genitourinary:  Negative for decreased urine volume, difficulty urinating, dysuria, flank pain, frequency and hematuria.   Musculoskeletal:  Negative for back pain and gait problem.   Skin:  Negative for rash.   Neurological:  Negative for syncope, speech difficulty, weakness, light-headedness and headaches.   Psychiatric/Behavioral:  Negative for confusion (intermittent), hallucinations and sleep disturbance.    Objective:     Vital Signs (Most Recent):  Temp: 98.2 °F (36.8 °C) (08/01/22 1540)  Pulse: 91 (08/01/22 1540)  Resp: 20 (08/01/22 1540)  BP: 125/61 (08/01/22 1540)  SpO2: (!) 93 % (08/01/22 1540)   Vital Signs (24h Range):  Temp:  [97.1 °F (36.2 °C)-98.7 °F (37.1 °C)] 98.2 °F (36.8 °C)  Pulse:  [] 91  Resp:  [8-21] 20  SpO2:  [91 %-98 %] 93 %  BP: (125-158)/(61-74) 125/61     Weight: 89.7 kg (197 lb 12 oz)  Body mass index is 33.94 kg/m².  No intake or output data in the 24 hours ending 08/01/22 1542   Physical Exam  Constitutional:       General: She is not in acute distress.     Appearance: She is well-developed. She is ill-appearing. She is not diaphoretic.   HENT:      Head: Normocephalic and atraumatic.      Mouth/Throat:      Pharynx: No oropharyngeal exudate.   Eyes:      Conjunctiva/sclera: Conjunctivae normal.      Pupils: Pupils are equal,  round, and reactive to light.   Neck:      Thyroid: No thyromegaly.      Vascular: No JVD.   Cardiovascular:      Rate and Rhythm: Normal rate and regular rhythm.      Heart sounds: Normal heart sounds. No murmur heard.  Pulmonary:      Effort: Pulmonary effort is normal. No respiratory distress.      Breath sounds: No wheezing or rales.   Chest:      Chest wall: No tenderness.   Abdominal:      General: Bowel sounds are normal. There is no distension.      Palpations: Abdomen is soft.      Tenderness: There is no abdominal tenderness. There is no guarding or rebound.      Comments: Obese abd    Musculoskeletal:         General: Normal range of motion.      Cervical back: Normal range of motion and neck supple.   Lymphadenopathy:      Cervical: No cervical adenopathy.   Skin:     General: Skin is warm and dry.      Findings: No rash.   Neurological:      Mental Status: She is alert and oriented to person, place, and time.      Cranial Nerves: No cranial nerve deficit.      Sensory: No sensory deficit.      Deep Tendon Reflexes: Reflexes normal.       Significant Labs: All pertinent labs within the past 24 hours have been reviewed.  CBC:   Recent Labs   Lab 07/31/22  0700 08/01/22  0551   WBC 11.42 11.10   HGB 11.7* 12.4   HCT 38.1 39.9    295     CMP:   Recent Labs   Lab 07/31/22  0700 08/01/22  0551    140   K 4.4 4.4   CL 98 94*   CO2 36* 37*   * 303*   BUN 28* 26*   CREATININE 0.7 0.9   CALCIUM 8.6* 8.6*   PROT 6.1 6.5   ALBUMIN 2.2* 2.4*   BILITOT 0.5 0.6   ALKPHOS 86 110   AST 74* 70*   ALT 85* 113*   ANIONGAP 10 9   EGFRNONAA >60  --        Significant Imaging:

## 2022-08-01 NOTE — PLAN OF CARE
CM met with patient and daughter at bedside. Patient/daughter decline rehab and prefer to d/c home with home health. In-network agency list provided per request and MD notified daughter is at bedside.

## 2022-08-01 NOTE — SUBJECTIVE & OBJECTIVE
Interval History:  08/01: seen and examined: 2 -3 LPM, pending Discharge. T max 98.7F,     Respiratory ROS: no cough, shortness of breath, or wheezing     Objective:     Vital Signs (Most Recent):  Temp: 98.2 °F (36.8 °C) (08/01/22 1540)  Pulse: 91 (08/01/22 1540)  Resp: 20 (08/01/22 1540)  BP: 125/61 (08/01/22 1540)  SpO2: (!) 93 % (08/01/22 1540)   Vital Signs (24h Range):  Temp:  [97.1 °F (36.2 °C)-98.7 °F (37.1 °C)] 98.2 °F (36.8 °C)  Pulse:  [67-99] 91  Resp:  [18-21] 20  SpO2:  [91 %-98 %] 93 %  BP: (125-158)/(61-74) 125/61     Weight: 89.7 kg (197 lb 12 oz)  Body mass index is 33.94 kg/m².    No intake or output data in the 24 hours ending 08/01/22 1759    Physical Exam  Vitals and nursing note reviewed.   HENT:      Head: Normocephalic.      Nose: Nose normal.   Eyes:      Pupils: Pupils are equal, round, and reactive to light.   Cardiovascular:      Rate and Rhythm: Normal rate.      Pulses: Normal pulses.      Heart sounds: Normal heart sounds.   Pulmonary:      Effort: Pulmonary effort is normal.      Breath sounds: Normal breath sounds.   Abdominal:      General: Bowel sounds are normal.      Palpations: Abdomen is soft.   Musculoskeletal:      Cervical back: Normal range of motion.   Skin:     Capillary Refill: Capillary refill takes 2 to 3 seconds.   Neurological:      General: No focal deficit present.      Mental Status: She is alert.       Vents:  Oxygen Concentration (%): 38 (08/01/22 0422)    Lines/Drains/Airways       Peripheral Intravenous Line  Duration                  Peripheral IV - Single Lumen 08/01/22 1640 22 G Left;Posterior Forearm <1 day                    Significant Labs:    CBC/Anemia Profile:  Recent Labs   Lab 07/31/22  0700 08/01/22  0551   WBC 11.42 11.10   HGB 11.7* 12.4   HCT 38.1 39.9    295   MCV 96 96   RDW 14.6* 14.6*        Chemistries:  Recent Labs   Lab 07/31/22  0700 08/01/22  0551    140   K 4.4 4.4   CL 98 94*   CO2 36* 37*   BUN 28* 26*   CREATININE 0.7  0.9   CALCIUM 8.6* 8.6*   ALBUMIN 2.2* 2.4*   PROT 6.1 6.5   BILITOT 0.5 0.6   ALKPHOS 86 110   ALT 85* 113*   AST 74* 70*   MG 1.4* 1.3*   PHOS  --  3.2       Blood Culture: No results for input(s): LABBLOO in the last 48 hours.  Respiratory Culture: No results for input(s): GSRESP, RESPIRATORYC in the last 48 hours.  All pertinent labs within the past 24 hours have been reviewed.    Significant Imaging:  I have reviewed all pertinent imaging results/findings within the past 24 hours.

## 2022-08-01 NOTE — PLAN OF CARE
NAEON. Patient slept throughout the night. BiPAP worn for majority of the time while asleep. BG monitored and noted to be hyperglycemic. SSI and flat dose of insulin given as ordered. Patient up to chair with minimal assistance. O2 sats improved, but still remained on 4.5L via NC when off BiPAP. Patient hopeful for discharge this AM, states feeling ready to go home and frustration with length of stay. Encouraged patient to focus on current goals.  Problem: Adult Inpatient Plan of Care  Goal: Plan of Care Review  Outcome: Ongoing, Progressing  Goal: Patient-Specific Goal (Individualized)  Outcome: Ongoing, Progressing  Goal: Absence of Hospital-Acquired Illness or Injury  Outcome: Ongoing, Progressing  Goal: Optimal Comfort and Wellbeing  Outcome: Ongoing, Progressing  Goal: Readiness for Transition of Care  Outcome: Ongoing, Progressing     Problem: Diabetes Comorbidity  Goal: Blood Glucose Level Within Targeted Range  Outcome: Ongoing, Progressing     Problem: Adjustment to Illness (Sepsis/Septic Shock)  Goal: Optimal Coping  Outcome: Ongoing, Progressing     Problem: Bleeding (Sepsis/Septic Shock)  Goal: Absence of Bleeding  Outcome: Ongoing, Progressing     Problem: Glycemic Control Impaired (Sepsis/Septic Shock)  Goal: Blood Glucose Level Within Desired Range  Outcome: Ongoing, Progressing     Problem: Infection Progression (Sepsis/Septic Shock)  Goal: Absence of Infection Signs and Symptoms  Outcome: Ongoing, Progressing     Problem: Nutrition Impaired (Sepsis/Septic Shock)  Goal: Optimal Nutrition Intake  Outcome: Ongoing, Progressing     Problem: Fluid and Electrolyte Imbalance (Acute Kidney Injury/Impairment)  Goal: Fluid and Electrolyte Balance  Outcome: Ongoing, Progressing     Problem: Oral Intake Inadequate (Acute Kidney Injury/Impairment)  Goal: Optimal Nutrition Intake  Outcome: Ongoing, Progressing     Problem: Renal Function Impairment (Acute Kidney Injury/Impairment)  Goal: Effective Renal  Function  Outcome: Ongoing, Progressing     Problem: Infection  Goal: Absence of Infection Signs and Symptoms  Outcome: Ongoing, Progressing     Problem: Fall Injury Risk  Goal: Absence of Fall and Fall-Related Injury  Outcome: Ongoing, Progressing     Problem: Skin Injury Risk Increased  Goal: Skin Health and Integrity  Outcome: Ongoing, Progressing     Problem: Impaired Wound Healing  Goal: Optimal Wound Healing  Outcome: Ongoing, Progressing     Problem: Restraint, Nonbehavioral (Nonviolent)  Goal: Absence of Harm or Injury  Outcome: Ongoing, Progressing     Problem: Confusion Acute  Goal: Optimal Cognitive Function  Outcome: Ongoing, Progressing

## 2022-08-02 VITALS
HEIGHT: 64 IN | WEIGHT: 197.06 LBS | TEMPERATURE: 98 F | BODY MASS INDEX: 33.64 KG/M2 | OXYGEN SATURATION: 91 % | RESPIRATION RATE: 18 BRPM | SYSTOLIC BLOOD PRESSURE: 146 MMHG | HEART RATE: 85 BPM | DIASTOLIC BLOOD PRESSURE: 72 MMHG

## 2022-08-02 LAB
ALBUMIN SERPL BCP-MCNC: 2.3 G/DL (ref 3.5–5.2)
ALP SERPL-CCNC: 91 U/L (ref 55–135)
ALT SERPL W/O P-5'-P-CCNC: 104 U/L (ref 10–44)
ANION GAP SERPL CALC-SCNC: 10 MMOL/L (ref 8–16)
AST SERPL-CCNC: 57 U/L (ref 10–40)
BASOPHILS # BLD AUTO: 0.02 K/UL (ref 0–0.2)
BASOPHILS NFR BLD: 0.2 % (ref 0–1.9)
BILIRUB SERPL-MCNC: 0.5 MG/DL (ref 0.1–1)
BUN SERPL-MCNC: 28 MG/DL (ref 6–20)
CALCIUM SERPL-MCNC: 8.4 MG/DL (ref 8.7–10.5)
CHLORIDE SERPL-SCNC: 93 MMOL/L (ref 95–110)
CO2 SERPL-SCNC: 35 MMOL/L (ref 23–29)
CREAT SERPL-MCNC: 0.9 MG/DL (ref 0.5–1.4)
DIFFERENTIAL METHOD: ABNORMAL
EOSINOPHIL # BLD AUTO: 0.1 K/UL (ref 0–0.5)
EOSINOPHIL NFR BLD: 1 % (ref 0–8)
ERYTHROCYTE [DISTWIDTH] IN BLOOD BY AUTOMATED COUNT: 14.4 % (ref 11.5–14.5)
EST. GFR  (NO RACE VARIABLE): >60 ML/MIN/1.73 M^2
GLUCOSE SERPL-MCNC: 275 MG/DL (ref 70–110)
HCT VFR BLD AUTO: 39.2 % (ref 37–48.5)
HGB BLD-MCNC: 12.1 G/DL (ref 12–16)
IMM GRANULOCYTES # BLD AUTO: 0.14 K/UL (ref 0–0.04)
IMM GRANULOCYTES NFR BLD AUTO: 1.2 % (ref 0–0.5)
LYMPHOCYTES # BLD AUTO: 2.3 K/UL (ref 1–4.8)
LYMPHOCYTES NFR BLD: 19.7 % (ref 18–48)
MAGNESIUM SERPL-MCNC: 1.8 MG/DL (ref 1.6–2.6)
MCH RBC QN AUTO: 29.8 PG (ref 27–31)
MCHC RBC AUTO-ENTMCNC: 30.9 G/DL (ref 32–36)
MCV RBC AUTO: 97 FL (ref 82–98)
MONOCYTES # BLD AUTO: 0.6 K/UL (ref 0.3–1)
MONOCYTES NFR BLD: 5.2 % (ref 4–15)
NEUTROPHILS # BLD AUTO: 8.4 K/UL (ref 1.8–7.7)
NEUTROPHILS NFR BLD: 72.7 % (ref 38–73)
NRBC BLD-RTO: 0 /100 WBC
PLATELET # BLD AUTO: 294 K/UL (ref 150–450)
PMV BLD AUTO: 10.4 FL (ref 9.2–12.9)
POCT GLUCOSE: 274 MG/DL (ref 70–110)
POTASSIUM SERPL-SCNC: 4.2 MMOL/L (ref 3.5–5.1)
PROT SERPL-MCNC: 6.4 G/DL (ref 6–8.4)
RBC # BLD AUTO: 4.06 M/UL (ref 4–5.4)
SODIUM SERPL-SCNC: 138 MMOL/L (ref 136–145)
WBC # BLD AUTO: 11.52 K/UL (ref 3.9–12.7)

## 2022-08-02 PROCEDURE — 94799 UNLISTED PULMONARY SVC/PX: CPT

## 2022-08-02 PROCEDURE — 25000242 PHARM REV CODE 250 ALT 637 W/ HCPCS: Performed by: NURSE PRACTITIONER

## 2022-08-02 PROCEDURE — 83735 ASSAY OF MAGNESIUM: CPT | Performed by: NURSE PRACTITIONER

## 2022-08-02 PROCEDURE — 97530 THERAPEUTIC ACTIVITIES: CPT | Mod: CQ

## 2022-08-02 PROCEDURE — 25000003 PHARM REV CODE 250: Performed by: NURSE PRACTITIONER

## 2022-08-02 PROCEDURE — 94640 AIRWAY INHALATION TREATMENT: CPT

## 2022-08-02 PROCEDURE — 85025 COMPLETE CBC W/AUTO DIFF WBC: CPT | Performed by: NURSE PRACTITIONER

## 2022-08-02 PROCEDURE — 63600175 PHARM REV CODE 636 W HCPCS: Performed by: INTERNAL MEDICINE

## 2022-08-02 PROCEDURE — C9399 UNCLASSIFIED DRUGS OR BIOLOG: HCPCS | Performed by: INTERNAL MEDICINE

## 2022-08-02 PROCEDURE — 36415 COLL VENOUS BLD VENIPUNCTURE: CPT | Performed by: NURSE PRACTITIONER

## 2022-08-02 PROCEDURE — 97116 GAIT TRAINING THERAPY: CPT | Mod: CQ

## 2022-08-02 PROCEDURE — S4991 NICOTINE PATCH NONLEGEND: HCPCS | Performed by: NURSE PRACTITIONER

## 2022-08-02 PROCEDURE — 97110 THERAPEUTIC EXERCISES: CPT

## 2022-08-02 PROCEDURE — 80053 COMPREHEN METABOLIC PANEL: CPT | Performed by: NURSE PRACTITIONER

## 2022-08-02 PROCEDURE — 97530 THERAPEUTIC ACTIVITIES: CPT

## 2022-08-02 PROCEDURE — 27000221 HC OXYGEN, UP TO 24 HOURS

## 2022-08-02 PROCEDURE — 94761 N-INVAS EAR/PLS OXIMETRY MLT: CPT

## 2022-08-02 PROCEDURE — 25000003 PHARM REV CODE 250: Performed by: INTERNAL MEDICINE

## 2022-08-02 RX ORDER — POLYETHYLENE GLYCOL 3350 17 G/17G
17 POWDER, FOR SOLUTION ORAL DAILY
Refills: 0 | COMMUNITY
Start: 2022-08-03 | End: 2022-09-02

## 2022-08-02 RX ORDER — FLUTICASONE PROPIONATE AND SALMETEROL 250; 50 UG/1; UG/1
1 POWDER RESPIRATORY (INHALATION) 2 TIMES DAILY
Qty: 60 EACH | Refills: 0 | Status: ON HOLD | OUTPATIENT
Start: 2022-08-02 | End: 2023-11-13 | Stop reason: HOSPADM

## 2022-08-02 RX ADMIN — BUPROPION HYDROCHLORIDE 150 MG: 150 TABLET, FILM COATED, EXTENDED RELEASE ORAL at 08:08

## 2022-08-02 RX ADMIN — ARFORMOTEROL TARTRATE 15 MCG: 15 SOLUTION RESPIRATORY (INHALATION) at 07:08

## 2022-08-02 RX ADMIN — POLYETHYLENE GLYCOL 3350 17 G: 17 POWDER, FOR SOLUTION ORAL at 08:08

## 2022-08-02 RX ADMIN — INSULIN DETEMIR 22 UNITS: 100 INJECTION, SOLUTION SUBCUTANEOUS at 08:08

## 2022-08-02 RX ADMIN — NICOTINE 1 PATCH: 21 PATCH, EXTENDED RELEASE TRANSDERMAL at 09:08

## 2022-08-02 RX ADMIN — IPRATROPIUM BROMIDE AND ALBUTEROL SULFATE 3 ML: 2.5; .5 SOLUTION RESPIRATORY (INHALATION) at 07:08

## 2022-08-02 RX ADMIN — BUDESONIDE 0.5 MG: 0.5 INHALANT ORAL at 07:08

## 2022-08-02 RX ADMIN — METOPROLOL SUCCINATE 25 MG: 25 TABLET, EXTENDED RELEASE ORAL at 08:08

## 2022-08-02 RX ADMIN — CEFTRIAXONE SODIUM 2 G: 2 INJECTION, SOLUTION INTRAVENOUS at 09:08

## 2022-08-02 RX ADMIN — FUROSEMIDE 40 MG: 40 TABLET ORAL at 08:08

## 2022-08-02 RX ADMIN — GABAPENTIN 100 MG: 100 CAPSULE ORAL at 08:08

## 2022-08-02 RX ADMIN — ISOSORBIDE MONONITRATE 30 MG: 30 TABLET, EXTENDED RELEASE ORAL at 08:08

## 2022-08-02 RX ADMIN — RISPERIDONE 1 MG: 1 TABLET ORAL at 08:08

## 2022-08-02 RX ADMIN — PREDNISONE 10 MG: 10 TABLET ORAL at 08:08

## 2022-08-02 RX ADMIN — FAMOTIDINE 20 MG: 20 TABLET ORAL at 08:08

## 2022-08-02 RX ADMIN — INSULIN ASPART 6 UNITS: 100 INJECTION, SOLUTION INTRAVENOUS; SUBCUTANEOUS at 07:08

## 2022-08-02 RX ADMIN — DOCUSATE SODIUM 100 MG: 100 CAPSULE, LIQUID FILLED ORAL at 08:08

## 2022-08-02 RX ADMIN — ASPIRIN 81 MG: 81 TABLET, COATED ORAL at 08:08

## 2022-08-02 RX ADMIN — INSULIN ASPART 8 UNITS: 100 INJECTION, SOLUTION INTRAVENOUS; SUBCUTANEOUS at 08:08

## 2022-08-02 NOTE — PLAN OF CARE
Pt has met OT acute care goals. Performed bed mobility independently, ambulating and t/f with SBA using RW. Discharged home today.

## 2022-08-02 NOTE — PT/OT/SLP PROGRESS
Occupational Therapy   Treatment    Name: Alexandra Goins  MRN: [id number]  Admitting Diagnosis:  Acute respiratory failure with hypoxia and hypercapnia       Recommendations:     Discharge Recommendations: rehabilitation facility  Discharge Equipment Recommendations:  other (see comments) (TBD)  Barriers to discharge:  None    Assessment:     Alexandra Goins is a 56 y.o. female with a medical diagnosis of Acute respiratory failure with hypoxia and hypercapnia. Pt has met acute OT goals; discharge from skilled OT services at this time.    Rehab Prognosis:  Good; patient would benefit from acute skilled OT services to address these deficits and reach maximum level of function.       Plan:     Patient to be seen 2 x/week to address the above listed problems via self-care/home management, therapeutic activities, therapeutic exercises  Plan of Care Expires: 08/02/22  Plan of Care Reviewed with: patient, family    Subjective     Pain/Comfort:  Pain Rating 1: 0/10    Objective:     Communicated with: nurse Rosado and Epic chart review prior to session.  Patient found HOB elevated with peripheral IV, telemetry upon OT entry to room.    General Precautions: Standard, fall   Orthopedic Precautions:N/A   Braces: N/A  Respiratory Status: Nasal cannula, flow 4 L/min       Bed Mobility:    Patient completed Scooting/Bridging with independence  Patient completed Supine to Sit with independence     Functional Mobility/Transfers:  Patient completed Sit <> Stand Transfer with supervision  with  rolling walker   Patient completed Bed <> Chair Transfer using Stand Pivot technique with stand by assistance with rolling walker  Functional Mobility: Pt ambulated 200ft with SBA using RW to increase dynamic standing balance and activity tolerance for ADL completion.      [facility] 6 Click ADL: 21    Treatment & Education:  Pt educated on and performed x10 reps each therapeutic exercises (shoulder flexion, elbow flexion, and scapular retraction)  while seated EOB. Pt educated on continued importance of OOB activity to increase independence. Pt required verbal cueing for hand placement to push off from/ reach back to where pt is sitting while using RW for safety. Pt demonstrated understanding.     Patient left up in chair with all lines intact, call button in reach, and family present    GOALS:   Multidisciplinary Problems       Occupational Therapy Goals       Not on file              Multidisciplinary Problems (Resolved)          Problem: Occupational Therapy    Goal Priority Disciplines Outcome Interventions   Occupational Therapy Goal   (Resolved)     OT, PT/OT Met    Description: Goals to be met by: 8/6/22     Patient will increase functional independence with ADLs by performing:    Toileting from toilet with Contact Guard Assistance for hygiene and clothing management.   Toilet transfer to toilet with Contact Guard Assistance.  Increased functional strength in B UE grossly by 1/2 MM grade.                         Time Tracking:     OT Date of Treatment: 08/02/22  OT Start Time: 0845  OT Stop Time: 0910  OT Total Time (min): 25 min    Billable Minutes:Therapeutic Activity 15  Therapeutic Exercise 10    OT/JANE: LASHON Whitney OT     8/2/2022

## 2022-08-02 NOTE — PLAN OF CARE
[facility] - Telemetry (Hospital)  Discharge Final Note    Primary Care Provider: Perez Casiano MD    Expected Discharge Date: 8/2/2022    Final Discharge Note (most recent)     Final Note - 08/02/22 1006        Final Note    Assessment Type Final Discharge Note     Anticipated Discharge Disposition Home-Health Care [facility]     Hospital Resources/Appts/Education Provided Appointments scheduled by Navigator/Coordinator;Appointments scheduled and added to AVS;Post-Acute resouces added to AVS        Post-Acute Status    Post-Acute Authorization Home Health;HME     HME Status Set-up Complete/Auth obtained     Home Health Status Set-up Complete/Auth obtained                 Important Message from Medicare              Follow-up providers     Ulises Diane MD   Specialty: Pulmonary Disease    [address]  [place] [address]   Phone: [phone]       Next Steps: Follow up in 2 week(s)    Instructions: REview progress          After-discharge care            [facility]   Service: Home Health Services    [address]  [facility] [address]   Phone: [phone]                       DC Dispo: home with [facility]  PCP f/u: scheduled within 7 days of d/c. CM left message with Dr. Diane clinic requesting assistance with scheduling f/u.   [facility] delivered portable O2 tank to bedside.

## 2022-08-02 NOTE — NURSING
Report and assignment given to Shavonne GRANADOS. Patient resting comfortably in bed at this time.

## 2022-08-02 NOTE — NURSING
DISCHARGE INSTRUCTIONS REVIEWED AND COPY GIVEN. PATIENT DAUGHTER AT BEDSIDE FOR D/C. CARDIAC MONITOR REMOVED. PIV REMOVED AFTER ABX INFUSION COMPLETED, CANNULA NOTED INTACT. PRESSURE DRESSING APPLIED, ZEINA WELL. LEAVING IN STABLE CONDITION VIA W/C WITH FAMILY AND ALL BELONGINGS.

## 2022-08-02 NOTE — PLAN OF CARE
Pt free from fall/injury/trauma  Skin intact with no evidence of breakdown  Pt up with assistance to restroom  Still on 4 L oxygen  Pt monitored on telemetry   Will continue to monitor

## 2022-08-02 NOTE — PT/OT/SLP PROGRESS
"Physical Therapy  Treatment    Alexandra Goins   MRN: [id number]   Admitting Diagnosis: Acute respiratory failure with hypoxia and hypercapnia    PT Received On: 08/02/22  PT Start Time: 0840     PT Stop Time: 0905    PT Total Time (min): 25 min       Billable Minutes:  Gait Training 10 and Therapeutic Activity 15    Treatment Type: Treatment  PT/PTA: PTA     PTA Visit Number: 4       General Precautions: Standard, fall  Orthopedic Precautions: N/A   Braces: N/A  Respiratory Status: Nasal cannula, flow 4 L/min         Subjective:  Communicated with patient's nurse, Ashlee, and completed Epic chart review prior to session.  Patient agreed to PT session.  "I'm so happy because I'm going home today. This is the best day!"    Pain/Comfort  Pain Rating 1: 0/10    Objective:   Patient found with: telemetry, peripheral IV, oxygen, bed alarm    Functional Mobility:  Supine to sit EOB: I    STS from EOB > RW: SPV (VC for hand placement)    200ft w/ RW SBA (good overall safety awareness throughout)    Stand pivot to EOB w/ RW: SBA    Stand pivot T/F from EOB > RW: SBA    Educated patient on AROM TE to be completed throughout the day in order to maintain current level of ROM and strength. Encouraged patient to increase time OOB and up in chair with a minimum goal of 2 consecutive hours. Re enforced importance of utilizing call light to meet all needs in room and not attempt to get up without staff assistance. Patient verbalized understanding of all education given and agreed to comply.     AM-PAC 6 CLICK MOBILITY  How much help from another person does this patient currently need?   1 = Unable, Total/Dependent Assistance  2 = A lot, Maximum/Moderate Assistance  3 = A little, Minimum/Contact Guard/Supervision  4 = None, Modified [place]/Independent    Turning over in bed (including adjusting bedclothes, sheets and blankets)?: 4  Sitting down on and standing up from a chair with arms (e.g., wheelchair, bedside commode, etc.): " 4  Moving from lying on back to sitting on the side of the bed?: 4  Moving to and from a bed to a chair (including a wheelchair)?: 4  Need to walk in hospital room?: 4  Climbing 3-5 steps with a railing?: 1  Basic Mobility Total Score: 21    AM-PAC Raw Score CMS G-Code Modifier Level of Impairment Assistance   6 % Total / Unable   7 - 9 CM 80 - 100% Maximal Assist   10 - 14 CL 60 - 80% Moderate Assist   15 - 19 CK 40 - 60% Moderate Assist   20 - 22 CJ 20 - 40% Minimal Assist   23 CI 1-20% SBA / CGA   24 CH 0% Independent/ Mod I     Patient left up in chair with call button in reach and family members present.    Assessment:  Alexandra Goins is a 56 y.o. female with a medical diagnosis of Acute respiratory failure with hypoxia and hypercapnia and presents with overall decline in functional mobility. Patient would continue to benefit from skilled PT to address functional limitations listed below in order to return to PLOF/decrease caregiver burden.       Rehab identified problem list/impairments: Rehab identified problem list/impairments: weakness, impaired endurance, impaired cardiopulmonary response to activity    Rehab potential is good.    Activity tolerance: Good    Discharge recommendations: Discharge Facility/Level of Care Needs: rehabilitation facility     Barriers to discharge:      Equipment recommendations: Equipment Needed After Discharge: none     GOALS:   Multidisciplinary Problems     Physical Therapy Goals        Problem: Physical Therapy    Goal Priority Disciplines Outcome Goal Variances Interventions   Physical Therapy Goal     PT, PT/OT      Description: LTG'S TO BE MET IN 14 DAYS (8-8-22)  PT WILL REQUIRE [facility] FOR BED MOBILITY  PT WILL REQUIRE [facility] FOR BED<>CHAIR TF'S  PT WILL AMB 50 FEET WITH RW AND TIMUR                     PLAN:    Patient to be seen 2 x/week  to address the above listed problems via gait training, therapeutic activities, therapeutic exercises  Plan of Care expires:  08/08/22  Plan of Care reviewed with: patient, family         08/02/2022

## 2022-08-03 ENCOUNTER — PATIENT MESSAGE (OUTPATIENT)
Dept: ADMINISTRATIVE | Facility: CLINIC | Age: 57
End: 2022-08-03
Payer: MEDICARE

## 2022-08-03 ENCOUNTER — TELEPHONE (OUTPATIENT)
Dept: FAMILY MEDICINE | Facility: CLINIC | Age: 57
End: 2022-08-03
Payer: MEDICARE

## 2022-08-03 ENCOUNTER — PATIENT OUTREACH (OUTPATIENT)
Dept: ADMINISTRATIVE | Facility: CLINIC | Age: 57
End: 2022-08-03
Payer: MEDICARE

## 2022-08-03 RX ORDER — GABAPENTIN 600 MG/1
TABLET ORAL
Qty: 120 TABLET | Refills: 0 | Status: SHIPPED | OUTPATIENT
Start: 2022-08-03 | End: 2022-08-29 | Stop reason: SDUPTHER

## 2022-08-03 NOTE — TELEPHONE ENCOUNTER
Patient notified she has a Hospital F/U with Barbi @ 8 am on 8/4/22 patient verbalized understanding

## 2022-08-03 NOTE — PROGRESS NOTES
C3 nurse spoke with Alexandra Goins for a TCC post hospital discharge follow up call. The patient has a scheduled HOSFU appointment with VILLA Cam on 08/04/2022 @ 0800.

## 2022-08-03 NOTE — TELEPHONE ENCOUNTER
No new care gaps identified.  [facility] Embedded Care Gaps. Reference number: [id number]. 8/03/2022   9:40:13 AM CDT

## 2022-08-03 NOTE — PROGRESS NOTES
C3 nurse attempted to contact Alexandra Goins for a TCC post hospital discharge follow up call. No answer. Left voicemail with callback information. The patient has a scheduled HOSFU appointment with VILLA Cam on 08/04/2022 @ 0800.

## 2022-08-03 NOTE — PHYSICIAN QUERY
PT Name: Alexandra Goins  MR #: [id number]     DOCUMENTATION CLARIFICATION     CDS: Raissa Richey RN      Contact information:[email or web address]  This form is a permanent document in the medical record.     Query Date: August 3, 2022    By submitting this query, we are merely seeking further clarification of documentation.  Please utilize your independent clinical judgment when addressing the question(s) below.    The Medical Record contains the following   Indicators Supporting Clinical Findings Location in Medical Record   x Heart Failure documented Possible diastolic heart failure exacerbation      Worsening CXR with interstitial infiltrates and I/O balance + 5.4 L.  Concern for acute diastolic heart failure will give IVP Lasix and check TTE   7/27 HM PN      7/27 Pulmonology PN   x BNP  07/27/22 07:01 07/31/22 07:00    (H) 193 (H)    Lab Values   x EF/Echo The left ventricle is normal in size with concentric hypertrophy and normal systolic function.  The estimated ejection fraction is 60%.  Indeterminate left ventricular diastolic function.  Normal right ventricular size with normal right ventricular systolic function.  Normal central venous pressure (3 mmHg).  Trivial pericardial effusion.   7/27 Transthoracic echo (TTE) complete   x Radiology findings Mild bilateral interstitial prominence, most notably in the lower right lung.  Small right pleural effusion.  Pulmonary edema versus atypical infection.    Diffuse interstitial and alveolar opacities concerning for interstitial pneumonia.    Persistent diffuse interstitial and alveolar opacities, which are overall similar to the comparison exam and remain concerning for multifocal pneumonia.    Slight interval improvement of bilateral pulmonary opacities.   7/24 X-Ray Chest 1 View      7/26 X-Ray Chest 1 View      7/27 X-Ray Chest 1 View        7/30 X-Ray Chest 1 View   x Subjective/Objective Respiratory Conditions Patient transferred to ICU  overnight due to hypoxia and hypercapnia requiring BiPAP.  7/27 HM PN    Recent/Current MI      Heart Transplant, LVAD     x Edema, JVD Right lower leg: Edema present.   Left lower leg: Edema present.    7/31 Pulmonology PN    Ascites     x Diuretics/Meds Lasix 60mg IV once (7/27)  Lasix 20mg PO (7/31)  Lasix 40mg PO daily (8/1-8/2)   MAR        Other Treatment     x Other PMHx of  COPD, HTN, HLP, T2DM, pneumonia, nicotine dependence, non ruptured cerebral aneurysm, stroke, PVD, anxiety, depression, diverticular disease, GERD, hypothyroidism, PVD, severe obesity, arthritis, asthma, Bipolar 1 disorder, and CAD    Current Outpatient Medications on File Prior to Encounter   Medication Sig    furosemide (LASIX) 40 MG tablet TAKE 1 TABLET BY MOUTH EVERY MONDAY, WEDNESDAY, AND FRIDAY AS NEEDED       Diuresed well -3.6L last 24 hours   7/24 H&P                        7/28 Pulmonology PN     Heart failure is a clinical diagnosis which includes symptomatic fluid retention, elevated intracardiac pressures, and/or the inability of the heart to deliver adequate blood flow.     Heart Failure with reduced Ejection Fraction (HFrEF) or Systolic Heart Failure (loses ability to contract normally, EF is <40%)     Heart Failure with preserved Ejection Fraction (HFpEF) or Diastolic Heart Failure (stiff ventricles, does not relax properly, EF is >50%)      Heart Failure with Combined Systolic and Diastolic Failure (stiff ventricles, does not relax properly and EF is <50%)     Mid-range or mildly reduced ejection fraction (HFmrEF) is classified as systolic heart failure.   Common clues to acute exacerbation:  Rapidly progressive symptoms (w/in 2 weeks of presentation), using IV diuretics, using supplemental O2, pulmonary edema on Xray, new or worsening pleural effusion, +JVD or other signs of volume overload, MI w/in 4 weeks, and/or BNP >500  The clinical guidelines noted are only system guidelines, and do not replace the providers  clinical judgment.      Provider, please clarify the diagnosis of Diastolic Heart Failure Exacerbation associated with the above clinical findings.    [ x  ]  Acute on Chronic Diastolic Heart Failure is Ruled In (HFpEF) - worsening of CHF signs/symptoms in preexisting CHF     [   ]  Heart Failure Ruled Out     [   ]  Other (please specify): ___________________________________     [  ]  Clinically Undetermined       Please document in your progress notes daily for the duration of treatment until resolved and include in your discharge summary.    References:  American Heart Association editorial staff. (2017, May). Ejection Fraction Heart Failure Measurement. American Heart Association. https://www.heart.org/en/health-topics/heart-failure/diagnosing-heart-failure/ejection-fraction-heart-failure-measurement#:~:text=Ejection%20fraction%20(EF)%20is%20a,pushed%20out%20with%20each%20heartbeat  SAMIA Kennedy (2020, December 15). Heart failure with preserved ejection fraction: Clinical manifestations and diagnosis. [employer]Date. https://www.[employer]com/contents/heart-failure-with-preserved-ejection-fraction-clinical-manifestations-and-diagnosis.  ICD-10-CM/PCS Coding Clinic Third Quarter ICD-10, Effective with discharges: September 8, 2020 [facility] § Heart failure with mid-range or mildly reduced ejection fraction (2020).  Form No. 80389

## 2022-08-03 NOTE — TELEPHONE ENCOUNTER
----- Message from Sarah William sent at 8/3/2022  9:21 AM CDT -----  Name Of Caller: Alexandra     Provider Name: Perez Casiano,    Does patient feel the need to be seen today? No     Relationship to the Pt?: pt    Contact Preference?: [phone]    What is the nature of the call?:Pt called and missed a call from office please call back

## 2022-08-04 ENCOUNTER — OFFICE VISIT (OUTPATIENT)
Dept: FAMILY MEDICINE | Facility: CLINIC | Age: 57
End: 2022-08-04
Payer: MEDICARE

## 2022-08-04 ENCOUNTER — LAB VISIT (OUTPATIENT)
Dept: LAB | Facility: HOSPITAL | Age: 57
End: 2022-08-04
Attending: NURSE PRACTITIONER
Payer: MEDICARE

## 2022-08-04 VITALS
HEART RATE: 94 BPM | DIASTOLIC BLOOD PRESSURE: 80 MMHG | HEIGHT: 64 IN | BODY MASS INDEX: 33.31 KG/M2 | OXYGEN SATURATION: 98 % | WEIGHT: 195.13 LBS | SYSTOLIC BLOOD PRESSURE: 130 MMHG | TEMPERATURE: 98 F

## 2022-08-04 DIAGNOSIS — Z79.4 TYPE 2 DIABETES MELLITUS WITH HYPERGLYCEMIA, WITH LONG-TERM CURRENT USE OF INSULIN: Primary | ICD-10-CM

## 2022-08-04 DIAGNOSIS — J96.01 ACUTE RESPIRATORY FAILURE WITH HYPOXIA AND HYPERCAPNIA: ICD-10-CM

## 2022-08-04 DIAGNOSIS — E11.65 TYPE 2 DIABETES MELLITUS WITH HYPERGLYCEMIA, WITH LONG-TERM CURRENT USE OF INSULIN: Primary | ICD-10-CM

## 2022-08-04 DIAGNOSIS — Z12.31 ENCOUNTER FOR SCREENING MAMMOGRAM FOR MALIGNANT NEOPLASM OF BREAST: ICD-10-CM

## 2022-08-04 DIAGNOSIS — R79.89 ELEVATED LFTS: ICD-10-CM

## 2022-08-04 DIAGNOSIS — Z72.0 TOBACCO ABUSE: ICD-10-CM

## 2022-08-04 DIAGNOSIS — J96.02 ACUTE RESPIRATORY FAILURE WITH HYPOXIA AND HYPERCAPNIA: ICD-10-CM

## 2022-08-04 LAB
ALBUMIN SERPL BCP-MCNC: 2.7 G/DL (ref 3.5–5.2)
ALP SERPL-CCNC: 113 U/L (ref 55–135)
ALT SERPL W/O P-5'-P-CCNC: 92 U/L (ref 10–44)
AST SERPL-CCNC: 50 U/L (ref 10–40)
BILIRUB DIRECT SERPL-MCNC: 0.3 MG/DL (ref 0.1–0.3)
BILIRUB SERPL-MCNC: 0.5 MG/DL (ref 0.1–1)
PROT SERPL-MCNC: 6.5 G/DL (ref 6–8.4)

## 2022-08-04 PROCEDURE — 3008F PR BODY MASS INDEX (BMI) DOCUMENTED: ICD-10-PCS | Mod: CPTII,S$GLB,, | Performed by: NURSE PRACTITIONER

## 2022-08-04 PROCEDURE — 3075F PR MOST RECENT SYSTOLIC BLOOD PRESS GE 130-139MM HG: ICD-10-PCS | Mod: CPTII,S$GLB,, | Performed by: NURSE PRACTITIONER

## 2022-08-04 PROCEDURE — 99496 TRANSJ CARE MGMT HIGH F2F 7D: CPT | Mod: S$GLB,,, | Performed by: NURSE PRACTITIONER

## 2022-08-04 PROCEDURE — 3079F PR MOST RECENT DIASTOLIC BLOOD PRESSURE 80-89 MM HG: ICD-10-PCS | Mod: CPTII,S$GLB,, | Performed by: NURSE PRACTITIONER

## 2022-08-04 PROCEDURE — 3046F PR MOST RECENT HEMOGLOBIN A1C LEVEL > 9.0%: ICD-10-PCS | Mod: CPTII,S$GLB,, | Performed by: NURSE PRACTITIONER

## 2022-08-04 PROCEDURE — 3072F LOW RISK FOR RETINOPATHY: CPT | Mod: CPTII,S$GLB,, | Performed by: NURSE PRACTITIONER

## 2022-08-04 PROCEDURE — 3008F BODY MASS INDEX DOCD: CPT | Mod: CPTII,S$GLB,, | Performed by: NURSE PRACTITIONER

## 2022-08-04 PROCEDURE — 3079F DIAST BP 80-89 MM HG: CPT | Mod: CPTII,S$GLB,, | Performed by: NURSE PRACTITIONER

## 2022-08-04 PROCEDURE — 99999 PR PBB SHADOW E&M-EST. PATIENT-LVL V: CPT | Mod: PBBFAC,,, | Performed by: NURSE PRACTITIONER

## 2022-08-04 PROCEDURE — 4010F PR ACE/ARB THEARPY RXD/TAKEN: ICD-10-PCS | Mod: CPTII,S$GLB,, | Performed by: NURSE PRACTITIONER

## 2022-08-04 PROCEDURE — 3046F HEMOGLOBIN A1C LEVEL >9.0%: CPT | Mod: CPTII,S$GLB,, | Performed by: NURSE PRACTITIONER

## 2022-08-04 PROCEDURE — 80076 HEPATIC FUNCTION PANEL: CPT | Performed by: NURSE PRACTITIONER

## 2022-08-04 PROCEDURE — 4010F ACE/ARB THERAPY RXD/TAKEN: CPT | Mod: CPTII,S$GLB,, | Performed by: NURSE PRACTITIONER

## 2022-08-04 PROCEDURE — 99496 TRANSITIONAL CARE MANAGE SERVICE 7 DAY DISCHARGE: ICD-10-PCS | Mod: S$GLB,,, | Performed by: NURSE PRACTITIONER

## 2022-08-04 PROCEDURE — 36415 COLL VENOUS BLD VENIPUNCTURE: CPT | Mod: PO | Performed by: NURSE PRACTITIONER

## 2022-08-04 PROCEDURE — 3075F SYST BP GE 130 - 139MM HG: CPT | Mod: CPTII,S$GLB,, | Performed by: NURSE PRACTITIONER

## 2022-08-04 PROCEDURE — 1160F RVW MEDS BY RX/DR IN RCRD: CPT | Mod: CPTII,S$GLB,, | Performed by: NURSE PRACTITIONER

## 2022-08-04 PROCEDURE — 99999 PR PBB SHADOW E&M-EST. PATIENT-LVL V: ICD-10-PCS | Mod: PBBFAC,,, | Performed by: NURSE PRACTITIONER

## 2022-08-04 PROCEDURE — 1159F PR MEDICATION LIST DOCUMENTED IN MEDICAL RECORD: ICD-10-PCS | Mod: CPTII,S$GLB,, | Performed by: NURSE PRACTITIONER

## 2022-08-04 PROCEDURE — 1160F PR REVIEW ALL MEDS BY PRESCRIBER/CLIN PHARMACIST DOCUMENTED: ICD-10-PCS | Mod: CPTII,S$GLB,, | Performed by: NURSE PRACTITIONER

## 2022-08-04 PROCEDURE — 1159F MED LIST DOCD IN RCRD: CPT | Mod: CPTII,S$GLB,, | Performed by: NURSE PRACTITIONER

## 2022-08-04 PROCEDURE — 99499 UNLISTED E&M SERVICE: CPT | Mod: HCNC,S$GLB,, | Performed by: NURSE PRACTITIONER

## 2022-08-04 PROCEDURE — 99499 RISK ADDL DX/OHS AUDIT: ICD-10-PCS | Mod: HCNC,S$GLB,, | Performed by: NURSE PRACTITIONER

## 2022-08-04 PROCEDURE — 3072F PR LOW RISK FOR RETINOPATHY: ICD-10-PCS | Mod: CPTII,S$GLB,, | Performed by: NURSE PRACTITIONER

## 2022-08-04 RX ORDER — IBUPROFEN 200 MG
1 TABLET ORAL DAILY
Qty: 28 PATCH | Refills: 0 | Status: SHIPPED | OUTPATIENT
Start: 2022-08-04 | End: 2022-08-23 | Stop reason: SDUPTHER

## 2022-08-04 NOTE — DISCHARGE SUMMARY
[facility] Medicine  Discharge Summary      Patient Name: Alexandra Goins  MRN: [id number]  Patient Class: IP- Inpatient  Admission Date: 7/24/2022  Hospital Length of Stay: 9 days  Discharge Date and Time: 8/2/2022 11:50 AM  Attending Physician: No att. providers found   Discharging Provider: Mely Nieto MD  Primary Care Provider: Perez Casiano MD      HPI:    Fever       Since Tuesday morning. recent skin cancer removed from face    Altered Mental Status       lethargic since this morning      Per ER- This is a 56 y.o. female patient with a PMHx of  COPD, HTN, HLP, T2DM, pneumonia, nicotine dependence, non ruptured cerebral aneurysm, stroke, PVD, anxiety, depression, diverticular disease, GERD, hypothyroidism, PVD, severe obesity, arthritis, asthma, Bipolar 1 disorder, and CAD who presents to the Emergency Department for evaluation of AMS which onset gradually this morning. Per , pt came in 5 days PTA to have biopsy of suspected skin cancer.  states pt had a slight fever beginning Tuesday and that the fever got higher over the next 3-4 days.  states pt fell on Friday but appeared to be okay.  states pt was very lethargic this morning, weak, extremely hard to wake and was speaking nonsense upon waking up.  states pt has COPD and is not on oxygen at home. Symptoms are constant and moderate in severity. No mitigating or exacerbating factors reported. Associated sxs include SOB, fever and weakness. Patient is unable to deny other sxs due to AMS at this time.  No further complaints or concerns at this time. HPI limited to AMS.      Patient evaluated by ER and found to have Septic shock. Patient to be admitted to ICU on [facility].     Patient currently awake, alert, oriented to person, place, time, and situation. Code status discussed with patient  and her current  wishes are to be a FULL CODE.       * No surgery found *      Hospital Course:    Admitted for treatment of septic shock due to UTI.  Empirically received Zosyn in the ED and emilia urine and blood cultures.  IV fluid resuscitation was started with inadequate response and she briefly required Norepinephrine infusion.  Hyperglycemia and mild metabolic acidosis on admission.  Acidosis resolved and blood sugars remained high and corrected with intermittent insulin.  Further stabilized and transferred out of the ICU on 25 July.    7/26: blood cultures growing gram negative rods empirically. Cont cefepime. Currently on 15L NC.     7/27:  Blood cultures growing salmonella in 1/2 sets.  Antibiotics de-escalated to Rocephin.  Patient transferred to ICU overnight due to hypoxia and hypercapnia requiring BiPAP.  BNP slightly elevated.  Echo obtained.  Lasix given this a.m..    7/28:  Patient weaned off of BiPAP.  Currently on Vapotherm.  Wheezing noted this a.m..  Start Solu-Medrol.  Wean oxygen as tolerated.  Continue Rocephin for salmonella bacteremia.  Possible step-down tomorrow.    7/29- Awake , alert, however slow to answer questions. Oriented to self, persons and place. Currently on Vapotherm 25/70. Nightly  Bipap continues. C/O constipation. Afebrile . WBC normalized . PCT down to 3.11>56.4. Urine culture - multiple organisms isolated. Repeat blood culture - Coag neg staph. Rocephin continues for Salmonella bacteremia. CXR with persistent bilateral patchy airspace and interstitial opacities diffusely through the lungs.     7/30- More awake and alert today, however did not use BiPAP last night. Per nursing pt was restless last night required prn meds. Oxygen demand further decreased. Currently Vapotherm 15/40. Follow up CXR with improvement. Labs are stable. Downgrade to tele. Disposition - Rehab     7/31- Episode of hypotension for unknown reason last night treated with bolus  ml. BP has been high since . Will resume home Imdur and add lasix.  Oxygen demand cont to improve. Currently on 4.5  L/min via NC. Rocephin continues to complete 10 days course EOT 8/2/22. Disposition- Pt and  prefer to go home than rehab . Blood glucose elevated at >400 after drinking soda . Additional insulin utilized.     8/1- O2 demand stable at 4.5 L/min. Pt feels better and wants to go home . Home O2 eval performed. CM consulted for discharge planing - Home O2, home Health setup.     8/2- No acute events overnight . Completed Rocephin x 10 days today for salmonella bacteremia. O2 demand stable at 4L/min. Labs are reviewed and stable at baseline except blood glucose noted to be high due to steroid . Completed prednisone therapy today. At this point pt deemed stable for discharge to home with Home Health . Follow up with PCP in 3 to 7 days.        Goals of Care Treatment Preferences:  Code Status: Full Code      Consults:   Consults (From admission, onward)        Status Ordering Provider     Inpatient consult to Social Work  Once        Provider:  (Not yet assigned)    Completed SNEHA DELVALLE     Inpatient consult to Critical Care Medicine  Once        Provider:  (Not yet assigned)    Completed ARIN LIVINGSTON new Assessment & Plan notes have been filed under this hospital service since the last note was generated.  Service: Hospital Medicine    Final Active Diagnoses:    Diagnosis Date Noted POA    PRINCIPAL PROBLEM:  Acute respiratory failure with hypoxia and hypercapnia in the setting of Bilateral Pneumonia, unspecified organism  [J96.01, J96.02] 02/11/2021 Yes    Salmonella bacteremia [R78.81] 07/26/2022 Yes    Chronic obstructive pulmonary disease with acute lower respiratory infection [J44.0] 07/24/2022 Yes    Severe obesity (BMI 35.0-39.9) with comorbidity [E66.01] 05/31/2022 Yes     Chronic    Coronary artery disease of native artery of native heart with stable angina pectoris [I25.118] 01/19/2022 Yes     Chronic    Hx of arterial ischemic stroke [Z86.73] 09/17/2019 Not Applicable      Chronic    Debility [R53.81] 09/17/2019 Yes    Type 2 diabetes mellitus with complication, with long-term current use of insulin [E11.8, Z79.4] 06/21/2018 Not Applicable     Chronic    Hx of Renal abscess, right [N15.1] 06/21/2018 Yes    Nicotine dependence [F17.200] 08/31/2015 Yes    Bipolar disorder with anxiety and depression [F31.9] 08/30/2015 Yes     Chronic      Problems Resolved During this Admission:    Diagnosis Date Noted Date Resolved POA    Severe sepsis [A41.9, R65.20] 07/24/2022 07/27/2022 Yes    MAGDA (acute kidney injury) secondary to dehydration [N17.9] 07/24/2022 07/27/2022 Yes    Diarrhea [R19.7] 07/24/2022 07/26/2022 Yes    Hyponatremia [E87.1] 07/24/2022 07/26/2022 Yes    Acute encephalopathy [G93.40] 09/18/2019 07/28/2022 No    Acute urinary tract infection [N39.0] 06/28/2019 07/27/2022 Yes       Discharged Condition: stable    Disposition: Home-Health Care [facility]    Follow Up:   Contact information for follow-up providers     Ulises Diane MD Follow up in 2 week(s).    Specialty: Pulmonary Disease  Why: REview progress  Contact information:  [address]  [place] [address] [phone]             Perez Casiano MD. Schedule an appointment as soon as possible for a visit in 3 day(s).    Specialty: Family Medicine  Why: Discharge follow up  Contact information:  [address] [place] [address] [phone]                   Contact information for after-discharge care     Home Medical Care     [facility] .    Service: Home Health Services  Contact information:  [address] [facility] [address] [phone]                           Patient Instructions:      OXYGEN FOR HOME USE     Order Specific Question Answer Comments   Liter Flow 5    Duration Continuous    Qualifying Test Performed at: Rest    Oxygen saturation: 81    Portable mode: continuous    Route nasal cannula    Device: home concentrator with  "portable tanks    Length of need (in months): 99 mos    Patient condition with qualifying saturation COPD    Height: 5' 4" (1.626 m)    Weight: 89.7 kg (197 lb 12 oz)    Alternative treatment measures have been tried or considered and deemed clinically ineffective. Yes      Ambulatory referral/consult to Home Health   Standing Status: Future   Referral Priority: Routine Referral Type: Home Health   Referral Reason: Specialty Services Required   Requested Specialty: Home Health Services   Number of Visits Requested: 1     [facility]   Standing Status: Future   Referral Priority: Routine Referral Type: Consultation   Referral Reason: Specialty Services Required   Number of Visits Requested: 1     Diet diabetic     Activity as tolerated       Significant Diagnostic Studies: Labs:   BMP:   Recent Labs   Lab 08/02/22  0538   *      K 4.2   CL 93*   CO2 35*   BUN 28*   CREATININE 0.9   CALCIUM 8.4*   MG 1.8   , CMP   Recent Labs   Lab 08/02/22  0538      K 4.2   CL 93*   CO2 35*   *   BUN 28*   CREATININE 0.9   CALCIUM 8.4*   PROT 6.4   ALBUMIN 2.3*   BILITOT 0.5   ALKPHOS 91   AST 57*   *   ANIONGAP 10    and CBC   Recent Labs   Lab 08/02/22  0538   WBC 11.52   HGB 12.1   HCT 39.2          Pending Diagnostic Studies:     Procedure Component Value Units Date/Time    Basic Metabolic Panel [[id number]] Collected: 07/27/22 1406    Order Status: Sent Lab Status: In process Updated: 07/27/22 1406    Specimen: Blood     HCV Virus Hold Specimen [[id number]] Collected: 07/24/22 1200    Order Status: Sent Lab Status: In process Updated: 07/24/22 1456    Specimen: Blood     Magnesium [[id number]] Collected: 07/27/22 1406    Order Status: Sent Lab Status: In process Updated: 07/27/22 1406    Specimen: Blood          Medications:  Reconciled Home Medications:      Medication List      START taking these medications    fluticasone-salmeterol 250-50 " mcg/dose 250-50 mcg/dose diskus inhaler  Commonly known as: ADVAIR  Inhale 1 puff into the lungs 2 (two) times daily. Controller for maintenance     polyethylene glycol 17 gram Pwpk  Commonly known as: GLYCOLAX  Take 17 g by mouth once daily.        CHANGE how you take these medications    aspirin 81 MG EC tablet  Commonly known as: ECOTRIN  Take 1 tablet (81 mg total) by mouth once daily.  What changed: when to take this        CONTINUE taking these medications    albuterol 90 mcg/actuation inhaler  Commonly known as: PROVENTIL/VENTOLIN HFA  INHALE 2 TO 4 PUFFS BY MOUTH THREE TIMES DAILY AS NEEDED FOR WHEEZING     albuterol-ipratropium 2.5 mg-0.5 mg/3 mL nebulizer solution  Commonly known as: DUO-NEB  Take 3 mLs by nebulization every 6 (six) hours as needed for Wheezing. Rescue     benztropine 0.5 MG tablet  Commonly known as: COGENTIN  Take 1 tablet (0.5 mg total) by mouth 2 (two) times daily as needed (dystonia).     blood sugar diagnostic Strp  Commonly known as: TRUE METRIX GLUCOSE TEST STRIP  USE 1 STRIP TO CHECK GLUCOSE THREE TIMES DAILY     buPROPion 150 MG TB24 tablet  Commonly known as: WELLBUTRIN XL  Take 1 tablet (150 mg total) by mouth once daily.     butalbital-acetaminophen-caffeine -40 mg -40 mg per tablet  Commonly known as: FIORICET, ESGIC  Take 1 tablet by mouth every 4 (four) hours as needed. for pain.     doxepin 10 MG capsule  Commonly known as: SINEQUAN  Take 1 to 2 capsules at bedtime     ezetimibe 10 mg tablet  Commonly known as: ZETIA  Take 1 tablet (10 mg total) by mouth once daily.     furosemide 40 MG tablet  Commonly known as: LASIX  TAKE 1 TABLET BY MOUTH EVERY MONDAY, WEDNESDAY, AND FRIDAY AS NEEDED     insulin  unit/mL injection  Inject 25 Units into the skin 2 (two) times daily before meals.     isosorbide mononitrate 30 MG 24 hr tablet  Commonly known as: IMDUR  Take 1 tablet by mouth once daily     losartan 25 MG tablet  Commonly known as: COZAAR  Take 1 tablet  (25 mg total) by mouth once daily.     metFORMIN 500 MG ER 24hr tablet  Commonly known as: GLUCOPHAGE-XR  Take 1 tablet (500 mg total) by mouth 2 (two) times daily with meals.     metoprolol succinate 25 MG 24 hr tablet  Commonly known as: TOPROL-XL  Take 1 tablet (25 mg total) by mouth once daily.     NOVOLIN R INJ     promethazine 12.5 MG Tab  Commonly known as: PHENERGAN  Take 1 tablet (12.5 mg total) by mouth every 6 (six) hours as needed.     risperiDONE 1 MG tablet  Commonly known as: RISPERDAL  Take 1 tablet (1 mg total) by mouth 2 (two) times daily.     * diazePAM 10 MG Tab  Commonly known as: VALIUM  Take 1 tablet (10 mg total) by mouth 3 (three) times daily as needed.     * VALIUM ORAL  Take by mouth 3 (three) times daily as needed for Anxiety. Do not exceed 3 timed a day         * This list has 2 medication(s) that are the same as other medications prescribed for you. Read the directions carefully, and ask your doctor or other care provider to review them with you.            STOP taking these medications    ketorolac 10 mg tablet  Commonly known as: TORADOL            Indwelling Lines/Drains at time of discharge:   Lines/Drains/Airways     None                 Time spent on the discharge of patient: 30  minutes         Mely Nieto MD  Department of Hospital Medicine  [facility] Telemetry ([facility])

## 2022-08-05 ENCOUNTER — PES CALL (OUTPATIENT)
Dept: ADMINISTRATIVE | Facility: CLINIC | Age: 57
End: 2022-08-05
Payer: MEDICARE

## 2022-08-10 ENCOUNTER — OFFICE VISIT (OUTPATIENT)
Dept: CARDIOLOGY | Facility: CLINIC | Age: 57
End: 2022-08-10
Payer: MEDICARE

## 2022-08-10 DIAGNOSIS — I63.9 CEREBROVASCULAR ACCIDENT (CVA), UNSPECIFIED MECHANISM: ICD-10-CM

## 2022-08-10 DIAGNOSIS — R53.81 DEBILITY: ICD-10-CM

## 2022-08-10 DIAGNOSIS — I25.118 CORONARY ARTERY DISEASE OF NATIVE ARTERY OF NATIVE HEART WITH STABLE ANGINA PECTORIS: ICD-10-CM

## 2022-08-10 DIAGNOSIS — J96.10 CHRONIC RESPIRATORY FAILURE, UNSPECIFIED WHETHER WITH HYPOXIA OR HYPERCAPNIA: ICD-10-CM

## 2022-08-10 DIAGNOSIS — I50.32 CHRONIC DIASTOLIC HEART FAILURE: ICD-10-CM

## 2022-08-10 DIAGNOSIS — F17.209 NICOTINE DEPENDENCE WITH NICOTINE-INDUCED DISORDER, UNSPECIFIED NICOTINE PRODUCT TYPE: ICD-10-CM

## 2022-08-10 DIAGNOSIS — F31.9 BIPOLAR AFFECTIVE DISORDER, REMISSION STATUS UNSPECIFIED: ICD-10-CM

## 2022-08-10 DIAGNOSIS — F41.9 ANXIETY: ICD-10-CM

## 2022-08-10 DIAGNOSIS — I10 ESSENTIAL HYPERTENSION: Primary | ICD-10-CM

## 2022-08-10 DIAGNOSIS — I67.1 CEREBRAL ANEURYSM: ICD-10-CM

## 2022-08-10 DIAGNOSIS — Z86.73 HX OF ARTERIAL ISCHEMIC STROKE: ICD-10-CM

## 2022-08-10 PROCEDURE — 99214 PR OFFICE/OUTPT VISIT, EST, LEVL IV, 30-39 MIN: ICD-10-PCS | Mod: 95,,, | Performed by: INTERNAL MEDICINE

## 2022-08-10 PROCEDURE — 3072F PR LOW RISK FOR RETINOPATHY: ICD-10-PCS | Mod: CPTII,95,, | Performed by: INTERNAL MEDICINE

## 2022-08-10 PROCEDURE — 3046F PR MOST RECENT HEMOGLOBIN A1C LEVEL > 9.0%: ICD-10-PCS | Mod: CPTII,95,, | Performed by: INTERNAL MEDICINE

## 2022-08-10 PROCEDURE — 3072F LOW RISK FOR RETINOPATHY: CPT | Mod: CPTII,95,, | Performed by: INTERNAL MEDICINE

## 2022-08-10 PROCEDURE — 3046F HEMOGLOBIN A1C LEVEL >9.0%: CPT | Mod: CPTII,95,, | Performed by: INTERNAL MEDICINE

## 2022-08-10 PROCEDURE — 4010F PR ACE/ARB THEARPY RXD/TAKEN: ICD-10-PCS | Mod: CPTII,95,, | Performed by: INTERNAL MEDICINE

## 2022-08-10 PROCEDURE — 4010F ACE/ARB THERAPY RXD/TAKEN: CPT | Mod: CPTII,95,, | Performed by: INTERNAL MEDICINE

## 2022-08-10 PROCEDURE — 1111F PR DISCHARGE MEDS RECONCILED W/ CURRENT OUTPATIENT MED LIST: ICD-10-PCS | Mod: CPTII,95,, | Performed by: INTERNAL MEDICINE

## 2022-08-10 PROCEDURE — 99214 OFFICE O/P EST MOD 30 MIN: CPT | Mod: 95,,, | Performed by: INTERNAL MEDICINE

## 2022-08-10 PROCEDURE — 1111F DSCHRG MED/CURRENT MED MERGE: CPT | Mod: CPTII,95,, | Performed by: INTERNAL MEDICINE

## 2022-08-10 NOTE — PROGRESS NOTES
Subjective:   Patient ID:  Alexandra Goins is a 56 y.o. female who presents for evaluation of No chief complaint on file.      HPI     Comes in for a follow-up.  He recently discharged from the hospital after septic shock with salmonella bacteremia/UTI.  BNP was elevated on at towards the discharge.  She is using 4 L of oxygen at home.  This was a virtual visit.  She states that she is recovering slowly but surely.  Denies significant leg swelling.  Denies orthopnea.  Does report however dyspnea on exertion that is improving as per patient.    She reports that she quit smoking.  And using nicotine patches for now.    55 yo female,  CAD, PVCs, h/o syncope, HTN, HLD, cerebral aneurysm, h/o CVA, DM II, asthma, GERD, bipolar disorder, family h/o premature CAD, and tobacco. As well as brain anuerysm   Syncope or 2020 with CPR, thought to be secondary possibly to seizure.  Workup was done at [facility].  Normal nuclear stress test in 2020.  14 days monitor showed only 1% PVCs.  Intolerant to Repatha.  On that he was LDL of 55.   Follows for brain aneurysm with neurosurgery.         Past Medical History:   Diagnosis Date    Acute ischemic stroke 9/17/2019    Acute respiratory failure with hypoxia 2/11/2021    Aneurysm     Anxiety     Arthritis     Asthma     Bipolar 1 disorder     Cerebral aneurysm, nonruptured 9/19/2019    Coronary artery disease of native artery of native heart with stable angina pectoris 1/19/2022    Depression     Diabetes mellitus, type 2     Diverticular disease     GERD (gastroesophageal reflux disease)     Hyperlipemia     Hypertension     Hyponatremia 7/24/2022    Hypothyroidism     Pneumonia     PVD (peripheral vascular disease) 4/28/2021    Renal manifestation of secondary diabetes mellitus     Right-sided back pain 6/29/2019    Severe obesity (BMI 35.0-39.9) with comorbidity 5/31/2022    Stroke     Trouble in sleeping        Past Surgical History:    Procedure Laterality Date    CEREBRAL ANGIOGRAM N/A 10/28/2019    Procedure: ANGIOGRAM-CEREBRAL;  Surgeon: Dahiana Diagnostic Provider;  [facility];  Service: Radiology;  Laterality: N/A;  Rm 190/Aayush    CHOLECYSTECTOMY      COLON SURGERY      COLONOSCOPY N/A 1/12/2018    Procedure: COLONOSCOPY;  Surgeon: Roque Mahajan III, MD;  Location: [facility];  Service: Endoscopy;  Laterality: N/A;    DENTAL SURGERY  05/21/2018    removal of 8 top teeth    HYSTERECTOMY      TONSILLECTOMY         Social History     Tobacco Use    Smoking status: Current Every Day Smoker     Packs/day: 0.50     Years: 35.00     Pack years: 17.50     Types: Cigarettes, Vaping w/o nicotine    Smokeless tobacco: Never Used   Substance Use Topics    Alcohol use: Not Currently     Comment: occassionally    Drug use: Yes     Types: Marijuana       Family History   Problem Relation Age of Onset    Alcohol abuse Mother     COPD Mother     Depression Mother     Drug abuse Mother     Alcohol abuse Father     Arthritis Father     Cancer Father     Heart disease Father     Hypertension Father     COPD Sister     Kidney failure Sister     Heart disease Brother     Hypertension Brother     Cancer Maternal Aunt     Cancer Maternal Uncle     Diabetes Maternal Uncle     Drug abuse Maternal Uncle     Cancer Paternal Aunt     Cancer Paternal Uncle     Arthritis Maternal Grandmother     Hypertension Maternal Grandmother     Breast cancer Maternal Grandmother     Arthritis Paternal Grandmother     Cancer Paternal Grandmother     Hypertension Paternal Grandfather        Review of Systems   Constitutional: Negative for fever and malaise/fatigue.   HENT: Negative for sore throat.    Eyes: Negative for blurred vision.   Cardiovascular: Positive for dyspnea on exertion. Negative for chest pain, claudication, cyanosis, irregular heartbeat, leg swelling, near-syncope, orthopnea, palpitations, paroxysmal nocturnal  dyspnea and syncope.   Respiratory: Negative for cough and hemoptysis.    Hematologic/Lymphatic: Negative for bleeding problem.   Skin: Negative for rash.   Musculoskeletal: Negative for falls.   Gastrointestinal: Negative for abdominal pain.   Genitourinary: Negative.    Neurological: Negative.    Psychiatric/Behavioral: Negative for altered mental status and substance abuse.       Current Outpatient Medications on File Prior to Visit   Medication Sig    albuterol (PROVENTIL/VENTOLIN HFA) 90 mcg/actuation inhaler INHALE 2 TO 4 PUFFS BY MOUTH THREE TIMES DAILY AS NEEDED FOR WHEEZING    albuterol-ipratropium (DUO-NEB) 2.5 mg-0.5 mg/3 mL nebulizer solution Take 3 mLs by nebulization every 6 (six) hours as needed for Wheezing. Rescue    aspirin (ECOTRIN) 81 MG EC tablet Take 1 tablet (81 mg total) by mouth once daily. (Patient taking differently: Take 81 mg by mouth every other day.)    benztropine (COGENTIN) 0.5 MG tablet Take 1 tablet (0.5 mg total) by mouth 2 (two) times daily as needed (dystonia).    blood sugar diagnostic (TRUE METRIX GLUCOSE TEST STRIP) Strp USE 1 STRIP TO CHECK GLUCOSE THREE TIMES DAILY    buPROPion (WELLBUTRIN XL) 150 MG TB24 tablet Take 1 tablet (150 mg total) by mouth once daily.    butalbital-acetaminophen-caffeine -40 mg (FIORICET, ESGIC) -40 mg per tablet Take 1 tablet by mouth every 4 (four) hours as needed. for pain.    diazepam (VALIUM ORAL) Take by mouth 3 (three) times daily as needed for Anxiety. Do not exceed 3 timed a day    diazePAM (VALIUM) 10 MG Tab Take 1 tablet (10 mg total) by mouth 3 (three) times daily as needed.    doxepin (SINEQUAN) 10 MG capsule Take 1 to 2 capsules at bedtime    empagliflozin (JARDIANCE) 25 mg tablet Take 1 tablet (25 mg total) by mouth once daily.    ezetimibe (ZETIA) 10 mg tablet Take 1 tablet (10 mg total) by mouth once daily.    fluticasone-salmeterol diskus inhaler 250-50 mcg Inhale 1 puff into the lungs 2 (two) times  daily. Controller for maintenance    furosemide (LASIX) 40 MG tablet TAKE 1 TABLET BY MOUTH EVERY MONDAY, WEDNESDAY, AND FRIDAY AS NEEDED    gabapentin (NEURONTIN) 600 MG tablet TAKE 1 TABLET BY MOUTH IN THE MORNING AND  TAKE  1  TABLET  BY  MOUTH  IN  THE  AFTERNOON  THEN  TAKE  2  TABLETS  BY  MOUTH  AT  BEDTIME    insulin  unit/mL injection Inject 25 Units into the skin 2 (two) times daily before meals.    insulin regular, human (NOVOLIN R INJ)     isosorbide mononitrate (IMDUR) 30 MG 24 hr tablet Take 1 tablet by mouth once daily    losartan (COZAAR) 25 MG tablet Take 1 tablet (25 mg total) by mouth once daily.    metFORMIN (GLUCOPHAGE-XR) 500 MG ER 24hr tablet Take 1 tablet (500 mg total) by mouth 2 (two) times daily with meals.    metoprolol succinate (TOPROL-XL) 25 MG 24 hr tablet Take 1 tablet by mouth once daily    nicotine (NICODERM CQ) 21 mg/24 hr Place 1 patch onto the skin once daily.    polyethylene glycol (GLYCOLAX) 17 gram PwPk Take 17 g by mouth once daily.    promethazine (PHENERGAN) 12.5 MG Tab Take 1 tablet (12.5 mg total) by mouth every 6 (six) hours as needed.    risperiDONE (RISPERDAL) 1 MG tablet Take 1 tablet (1 mg total) by mouth 2 (two) times daily.     No current facility-administered medications on file prior to visit.       Objective:   Objective:  Wt Readings from Last 3 Encounters:   08/04/22 88.5 kg (195 lb 1.7 oz)   08/02/22 89.4 kg (197 lb 1.5 oz)   06/10/22 93 kg (205 lb)     Temp Readings from Last 3 Encounters:   08/04/22 97.9 °F (36.6 °C)   08/02/22 97.8 °F (36.6 °C) (Oral)   05/31/22 98.3 °F (36.8 °C) (Temporal)     BP Readings from Last 3 Encounters:   08/04/22 130/80   08/02/22 (!) 146/72   06/10/22 (!) 148/65     Pulse Readings from Last 3 Encounters:   08/04/22 94   08/02/22 85   06/10/22 73       Physical Exam  This was a virtual visit.  Patient looks not in distress.  Wearing oxygen cannula.    Lab Results   Component Value Date    CHOL 145 10/20/2021     CHOL 213 (H) 01/28/2020    CHOL 219 (H) 09/17/2019     Lab Results   Component Value Date    HDL 61 10/20/2021    HDL 74 01/28/2020    HDL 69 09/17/2019     Lab Results   Component Value Date    LDLCALC 55.0 (L) 10/20/2021    LDLCALC 110.4 01/28/2020    LDLCALC 95.4 09/17/2019     Lab Results   Component Value Date    TRIG 145 10/20/2021    TRIG 143 01/28/2020    TRIG 273 (H) 09/17/2019     Lab Results   Component Value Date    CHOLHDL 42.1 10/20/2021    CHOLHDL 34.7 01/28/2020    CHOLHDL 31.5 09/17/2019       Chemistry        Component Value Date/Time     08/02/2022 0538    K 4.2 08/02/2022 0538    CL 93 (L) 08/02/2022 0538    CO2 35 (H) 08/02/2022 0538    BUN 28 (H) 08/02/2022 0538    CREATININE 0.9 08/02/2022 0538     (H) 08/02/2022 0538        Component Value Date/Time    CALCIUM 8.4 (L) 08/02/2022 0538    ALKPHOS 113 08/04/2022 0914    AST 50 (H) 08/04/2022 0914    ALT 92 (H) 08/04/2022 0914    BILITOT 0.5 08/04/2022 0914    ESTGFRAFRICA >60 07/31/2022 0700    EGFRNONAA >60 07/31/2022 0700          Lab Results   Component Value Date    TSH 5.013 (H) 05/31/2022     Lab Results   Component Value Date    INR 1.0 10/28/2019    INR 1.0 09/17/2019    INR 1.0 06/21/2018     Lab Results   Component Value Date    WBC 11.52 08/02/2022    HGB 12.1 08/02/2022    HCT 39.2 08/02/2022    MCV 97 08/02/2022     08/02/2022     BNP  @LABRCNTIP(BNP,BNPTRIAGEBLO)@  CrCl cannot be calculated (Patient's most recent lab result is older than the maximum 7 days allowed.).     Imaging:  ======  Results for orders placed during the hospital encounter of 07/24/22    Echo Saline Bubble? No    Interpretation Summary  · The left ventricle is normal in size with concentric hypertrophy and normal systolic function.  · The estimated ejection fraction is 60%.  · Indeterminate left ventricular diastolic function.  · Normal right ventricular size with normal right ventricular systolic function.  · Normal central venous  pressure (3 mmHg).  · Trivial pericardial effusion.    No results found for this or any previous visit.    No results found for this or any previous visit.    Results for orders placed during the hospital encounter of 07/15/16    X-Ray Chest PA And Lateral    Narrative  2 view chest    Comparison: 06/23/2016    Findings: There is a curvilinear area of increased density in the suprahilar region on the left which is favored to represent a combination of vasculature and possibly some atelectasis with infiltrate thought less likely.  Diffuse chronic increased peribronchial markings are noted.  The heart is not enlarged.  IMPRESSION:      1.  As above  ______________________________________    Electronically signed by: LEXII TUCKER D.O.  Date:     07/15/16  Time:    09:26    No results found for this or any previous visit.    No valid procedures specified.    Diagnostic Results:  ECG: Reviewed    The ASCVD Risk score (Polina XAVIER Jr., et al., 2013) failed to calculate for the following reasons:    The patient has a prior MI or stroke diagnosis    Assessment and Plan:   Essential hypertension    Hx of arterial ischemic stroke    Cerebrovascular accident (CVA), unspecified mechanism    Cerebral aneurysm    Coronary artery disease of native artery of native heart with stable angina pectoris    Bipolar affective disorder, remission status unspecified    Nicotine dependence with nicotine-induced disorder, unspecified nicotine product type    Anxiety    Debility    Chronic respiratory failure, unspecified whether with hypoxia or hypercapnia    Chronic diastolic heart failure      Reviewed all tests and above medical conditions with patient in detail and formulated treatment plan.  Risk factor modification discussed.   Cardiac low salt diet discussed.  Maintaining healthy weight and weight loss goals were discussed in clinic.  On Lasix 40 mg daily.  Recommended increasing to b.i.d. p.r.n..  Reviewed blood work and imaging from  recent hospital stay for salmonella bacteremia and UTI.  Normal troponin.  BNP around 200.   Echocardiogram is normal EF.  Follow up in 6 months

## 2022-08-11 ENCOUNTER — PATIENT OUTREACH (OUTPATIENT)
Dept: ADMINISTRATIVE | Facility: HOSPITAL | Age: 57
End: 2022-08-11
Payer: MEDICARE

## 2022-08-11 NOTE — Clinical Note
"Hi Jose Borges is flagging this patient as being non-compliant for needing a statin. Per her chart she is "intolerant of statins due to statin induced myopathy".  If you are agreeable with this diagnosis please add the diagnosis to the visit on 8/4/22.        Per payor's the diagnosis codes below are approved diagnosis that will satisfy the measure for statins. The appropriate diagnosis must be dropped in an encounter every year to meet the payor's compliance.  If you have any questions please let me know. I am more than happy to help in any way possible.   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

## 2022-08-11 NOTE — PROGRESS NOTES
[employer] Statin Report: Per chart review, patient is not on statin therapy due to intolerance, statin myopathy. Patient was seen on 8/4/22, message will be sent to provider to discuss diagnosis code.     
no

## 2022-08-17 NOTE — PROGRESS NOTES
Subjective:       Patient ID: Alexandra Goins is a 56 y.o. female.    Chief Complaint: Hospital Follow Up  Transitional Care Note    Family and/or Caretaker present at visit?  Yes.  Diagnostic tests reviewed/disposition: I have reviewed all completed as well as pending diagnostic tests at the time of discharge.  Disease/illness education: montse  Home health/community services discussion/referrals: Patient does not have home health established from hospital visit.  They do not need home health.  If needed, we will set up home health for the patient.   Establishment or re-establishment of referral orders for community resources: No other necessary community resources.   Discussion with other health care providers: No discussion with other health care providers necessary.         Admitted for treatment of septic shock due to UTI.  Empirically received Zosyn in the ED and emilia urine and blood cultures.  IV fluid resuscitation was started with inadequate response and she briefly required Norepinephrine infusion.  Hyperglycemia and mild metabolic acidosis on admission.  Acidosis resolved and blood sugars remained high and corrected with intermittent insulin.  Further stabilized and transferred out of the ICU on 25 July.     7/26: blood cultures growing gram negative rods empirically. Cont cefepime. Currently on 15L NC.      7/27:  Blood cultures growing salmonella in 1/2 sets.  Antibiotics de-escalated to Rocephin.  Patient transferred to ICU overnight due to hypoxia and hypercapnia requiring BiPAP.  BNP slightly elevated.  Echo obtained.  Lasix given this a.m..     7/28:  Patient weaned off of BiPAP.  Currently on Vapotherm.  Wheezing noted this a.m..  Start Solu-Medrol.  Wean oxygen as tolerated.  Continue Rocephin for salmonella bacteremia.  Possible step-down tomorrow.     7/29- Awake , alert, however slow to answer questions. Oriented to self, persons and place. Currently on Vapotherm 25/70. Nightly  Bipap  continues. C/O constipation. Afebrile . WBC normalized . PCT down to 3.11>56.4. Urine culture - multiple organisms isolated. Repeat blood culture - Coag neg staph. Rocephin continues for Salmonella bacteremia. CXR with persistent bilateral patchy airspace and interstitial opacities diffusely through the lungs.      7/30- More awake and alert today, however did not use BiPAP last night. Per nursing pt was restless last night required prn meds. Oxygen demand further decreased. Currently Vapotherm 15/40. Follow up CXR with improvement. Labs are stable. Downgrade to tele. Disposition - Rehab      7/31- Episode of hypotension for unknown reason last night treated with bolus  ml. BP has been high since . Will resume home Imdur and add lasix.  Oxygen demand cont to improve. Currently on 4.5 L/min via NC. Rocephin continues to complete 10 days course EOT 8/2/22. Disposition- Pt and  prefer to go home than rehab . Blood glucose elevated at >400 after drinking soda . Additional insulin utilized.      8/1- O2 demand stable at 4.5 L/min. Pt feels better and wants to go home . Home O2 eval performed. CM consulted for discharge planing - Home O2, home Health setup.      8/2- No acute events overnight . Completed Rocephin x 10 days today for salmonella bacteremia. O2 demand stable at 4L/min. Labs are reviewed and stable at baseline except blood glucose noted to be high due to steroid . Completed prednisone therapy today. At this point pt deemed stable for discharge to home with Home Health . Follow up with PCP in 3 to 7 days.     Since discharge patient has been afebrile.  Glucose readings 200's.  Denies SOB or chest pain. NC @5L, quit smoking 2 weeks ago after 1pk/day/30 years, Complains of weakness, request PT/OT    Review of Systems   Constitutional: Negative.    HENT: Negative.    Respiratory: Positive for shortness of breath.    Cardiovascular: Negative.    Gastrointestinal: Negative for diarrhea and nausea.    Genitourinary: Negative.    Musculoskeletal: Positive for myalgias.   Neurological: Positive for weakness.       Objective:      Physical Exam  Vitals reviewed.   Constitutional:       Appearance: She is obese.   HENT:      Head: Normocephalic.      Right Ear: External ear normal.      Left Ear: External ear normal.      Mouth/Throat:      Mouth: Mucous membranes are dry.   Eyes:      Extraocular Movements: Extraocular movements intact.   Cardiovascular:      Rate and Rhythm: Normal rate.      Heart sounds: Normal heart sounds.   Pulmonary:      Effort: Pulmonary effort is normal. No respiratory distress.      Comments: NC @ 5L  Skin:     General: Skin is warm and dry.   Neurological:      Mental Status: She is alert and oriented to person, place, and time.   Psychiatric:         Behavior: Behavior normal.         Assessment:       1. Type 2 diabetes mellitus with hyperglycemia, with long-term current use of insulin    2. Elevated LFTs    3. Acute respiratory failure with hypoxia and hypercapnia in the setting of Bilateral Pneumonia, unspecified organism     4. Encounter for screening mammogram for malignant neoplasm of breast    5. Tobacco abuse        Plan:   Type 2 diabetes mellitus with hyperglycemia, with long-term current use of insulin  -     empagliflozin (JARDIANCE) 25 mg tablet; Take 1 tablet (25 mg total) by mouth once daily.  Dispense: 90 tablet; Refill: 0  -uncontrolled A1C; follow up with PCP in 2 weeks   Elevated LFTs  -     HEPATIC FUNCTION PANEL; Future; Expected date: 08/04/2022    Acute respiratory failure with hypoxia and hypercapnia in the setting of Bilateral Pneumonia, unspecified organism   -     Pulse Oximetry; Future; Expected date: 08/04/2022    Encounter for screening mammogram for malignant neoplasm of breast  -     Mammo Digital Screening Bilat w/ Leroy; Future; Expected date: 08/04/2022    Tobacco abuse  -     Ambulatory referral/consult to Smoking Cessation Program; Future; Expected  date: 08/11/2022  -     nicotine (NICODERM CQ) 21 mg/24 hr; Place 1 patch onto the skin once daily.  Dispense: 28 patch; Refill: 0   cessation efforts discussed.  Verbalized understanding    No follow-ups on file.

## 2022-08-19 ENCOUNTER — OFFICE VISIT (OUTPATIENT)
Dept: FAMILY MEDICINE | Facility: CLINIC | Age: 57
End: 2022-08-19
Payer: MEDICARE

## 2022-08-19 ENCOUNTER — TELEPHONE (OUTPATIENT)
Dept: FAMILY MEDICINE | Facility: CLINIC | Age: 57
End: 2022-08-19
Payer: MEDICARE

## 2022-08-19 ENCOUNTER — DOCUMENT SCAN (OUTPATIENT)
Dept: HOME HEALTH SERVICES | Facility: HOSPITAL | Age: 57
End: 2022-08-19
Payer: MEDICARE

## 2022-08-19 DIAGNOSIS — E11.8 TYPE 2 DIABETES MELLITUS WITH COMPLICATION, WITH LONG-TERM CURRENT USE OF INSULIN: Primary | ICD-10-CM

## 2022-08-19 DIAGNOSIS — Z79.4 TYPE 2 DIABETES MELLITUS WITH COMPLICATION, WITH LONG-TERM CURRENT USE OF INSULIN: Primary | ICD-10-CM

## 2022-08-19 PROCEDURE — 4010F PR ACE/ARB THEARPY RXD/TAKEN: ICD-10-PCS | Mod: CPTII,95,, | Performed by: NURSE PRACTITIONER

## 2022-08-19 PROCEDURE — 3072F PR LOW RISK FOR RETINOPATHY: ICD-10-PCS | Mod: CPTII,95,, | Performed by: NURSE PRACTITIONER

## 2022-08-19 PROCEDURE — 3046F PR MOST RECENT HEMOGLOBIN A1C LEVEL > 9.0%: ICD-10-PCS | Mod: CPTII,95,, | Performed by: NURSE PRACTITIONER

## 2022-08-19 PROCEDURE — 3046F HEMOGLOBIN A1C LEVEL >9.0%: CPT | Mod: CPTII,95,, | Performed by: NURSE PRACTITIONER

## 2022-08-19 PROCEDURE — 3072F LOW RISK FOR RETINOPATHY: CPT | Mod: CPTII,95,, | Performed by: NURSE PRACTITIONER

## 2022-08-19 PROCEDURE — 99441 PR PHYSICIAN TELEPHONE EVALUATION 5-10 MIN: ICD-10-PCS | Mod: 95,,, | Performed by: NURSE PRACTITIONER

## 2022-08-19 PROCEDURE — 4010F ACE/ARB THERAPY RXD/TAKEN: CPT | Mod: CPTII,95,, | Performed by: NURSE PRACTITIONER

## 2022-08-19 PROCEDURE — 99441 PR PHYSICIAN TELEPHONE EVALUATION 5-10 MIN: CPT | Mod: 95,,, | Performed by: NURSE PRACTITIONER

## 2022-08-19 NOTE — TELEPHONE ENCOUNTER
Spoke to pt and told pt that I would ask Barbi to call her for her appt since she cant log on for the virtual.

## 2022-08-19 NOTE — PROGRESS NOTES
Subjective:       Patient ID: Alexandra Goins is a 56 y.o. female.    Chief Complaint: No chief complaint on file.  The patient location is: [place]   The chief complaint leading to consultation is: DM2 follow up    Visit type: audiovisual    Face to Face time with patient: 10  10 minutes of total time spent on the encounter, which includes face to face time and non-face to face time preparing to see the patient (eg, review of tests), Obtaining and/or reviewing separately obtained history, Documenting clinical information in the electronic or other health record, Independently interpreting results (not separately reported) and communicating results to the patient/family/caregiver, or Care coordination (not separately reported).         Each patient to whom he or she provides medical services by telemedicine is:  (1) informed of the relationship between the physician and patient and the respective role of any other health care provider with respect to management of the patient; and (2) notified that he or she may decline to receive medical services by telemedicine and may withdraw from such care at any time.    Notes: Pt reports via audio medicine for DM2 follow up.  A1C=11.2; initiated on jardiance.  GQE=988, 156, 208; ,130,163; denies hypoglycemia; not monitoring diet, no physical activity.      Past Medical History:   Diagnosis Date    Acute ischemic stroke 9/17/2019    Acute respiratory failure with hypoxia 2/11/2021    Aneurysm     Anxiety     Arthritis     Asthma     Bipolar 1 disorder     Cerebral aneurysm, nonruptured 9/19/2019    Coronary artery disease of native artery of native heart with stable angina pectoris 1/19/2022    Depression     Diabetes mellitus, type 2     Diverticular disease     GERD (gastroesophageal reflux disease)     Hyperlipemia     Hypertension     Hyponatremia 7/24/2022    Hypothyroidism     Pneumonia     PVD (peripheral vascular disease) 4/28/2021    Renal  manifestation of secondary diabetes mellitus     Right-sided back pain 6/29/2019    Severe obesity (BMI 35.0-39.9) with comorbidity 5/31/2022    Stroke     Trouble in sleeping      HPI  Review of Systems   Constitutional: Negative for appetite change and fatigue.   HENT: Negative.    Respiratory: Negative.    Cardiovascular: Negative.    Gastrointestinal: Negative.    Psychiatric/Behavioral: Negative.        Objective:      Physical Exam  Pulmonary:      Effort: No respiratory distress (speaks without panting ).   Neurological:      Mental Status: She is oriented to person, place, and time.         Assessment:       1. Type 2 diabetes mellitus with complication, with long-term current use of insulin        Plan:   Type 2 diabetes mellitus with complication, with long-term current use of insulin  -improving.  Continue treatment plan.  Follow up with PCP scheduled     No follow-ups on file.

## 2022-08-19 NOTE — TELEPHONE ENCOUNTER
----- Message from Dulce Lechuga sent at 8/19/2022  8:59 AM CDT -----  Contact: PT      Who Called: Alexandra    Does the patient know what this is regarding?:  appt 08/19/2022   Would the patient rather a call back or a response via [employer]?  Callback   Best Call Back Number:  [phone] (home)       Additional Information: requesting phone visit

## 2022-08-22 ENCOUNTER — EXTERNAL HOME HEALTH (OUTPATIENT)
Dept: HOME HEALTH SERVICES | Facility: HOSPITAL | Age: 57
End: 2022-08-22
Payer: MEDICARE

## 2022-08-22 ENCOUNTER — PATIENT OUTREACH (OUTPATIENT)
Dept: ADMINISTRATIVE | Facility: HOSPITAL | Age: 57
End: 2022-08-22
Payer: MEDICARE

## 2022-08-22 NOTE — PROGRESS NOTES
Working HTN report: Jose    Called to discuss scheduling appointment and to get updated BP reading. Pt had Primary Care visit on 08/04/2022. /80

## 2022-08-22 NOTE — PROGRESS NOTES
[facility] A1C REPORT:  Patient last had A1c done on 7/25/22. A1c was 11.2. Patient is due for repeat 10/25/22. She is scheduled with PCP on 10/26/22. Scheduled A1c for same day. Order was already in.

## 2022-08-23 ENCOUNTER — CLINICAL SUPPORT (OUTPATIENT)
Dept: SMOKING CESSATION | Facility: CLINIC | Age: 57
End: 2022-08-23
Payer: COMMERCIAL

## 2022-08-23 DIAGNOSIS — F17.200 NICOTINE DEPENDENCE: Primary | ICD-10-CM

## 2022-08-23 PROCEDURE — 99404 PREV MED CNSL INDIV APPRX 60: CPT | Mod: 95,,,

## 2022-08-23 PROCEDURE — 99404 PR PREVENT COUNSEL,INDIV,60 MIN: ICD-10-PCS | Mod: 95,,,

## 2022-08-23 RX ORDER — IBUPROFEN 200 MG
1 TABLET ORAL DAILY
Qty: 28 PATCH | Refills: 0 | Status: SHIPPED | OUTPATIENT
Start: 2022-08-26 | End: 2022-09-21 | Stop reason: SDUPTHER

## 2022-08-23 NOTE — PROGRESS NOTES
FTND score of 5 indicates a high dependence to Nicotine.  Patient quit smoking recently with her stay in the hospital.  Patient started back smoking when she returned home, but reduced from 20 cpd to 5-6 cpd.  TUCKER-D score of 23 is percieved as significant mental distress and possible depression.  Patient is being treated and medicated for depression/anxiety by ALEJANDRO POMPA  Patient is currently using NRT patch daily and they were reorder.  Behavioral changes discussed including changing where she is smoking and being in the moment when smoking.  Patient encouraged for her rate reduction.  Today's appointment conducted via phone, patient called help line, but was unable to connect with the eleazar.

## 2022-08-29 ENCOUNTER — TELEPHONE (OUTPATIENT)
Dept: PSYCHIATRY | Facility: CLINIC | Age: 57
End: 2022-08-29
Payer: MEDICARE

## 2022-08-30 ENCOUNTER — CLINICAL SUPPORT (OUTPATIENT)
Dept: SMOKING CESSATION | Facility: CLINIC | Age: 57
End: 2022-08-30
Payer: COMMERCIAL

## 2022-08-30 DIAGNOSIS — F17.200 NICOTINE DEPENDENCE: Primary | ICD-10-CM

## 2022-08-30 PROCEDURE — 99403 PREV MED CNSL INDIV APPRX 45: CPT | Mod: 95,,,

## 2022-08-30 PROCEDURE — 99403 PR PREVENT COUNSEL,INDIV,45 MIN: ICD-10-PCS | Mod: 95,,,

## 2022-08-30 NOTE — PROGRESS NOTES
Individual Follow-Up Form    8/30/2022    Quit Date: N/A    Clinical Status of Patient: Outpatient    Length of Service: 45 minutes    Continuing Medication: yes  Patches    Other Medications:      Target Symptoms: Withdrawal and medication side effects. The following were  rated moderate (3) to severe (4) on TCRS:  Moderate (3): none  Severe (4): none    Comments: completion of TCRS (Tobacco Cessation Rating Scale) reviewed strategies, cues, and triggers. Introduced the negative impact of tobacco on health, the health advantages of discontinuing the use of tobacco, time line improved health changes after a quit, withdrawal issues to expect from nicotine and habit, and ways to achieve the goal of a quit.  Patient states that she is smoking 5 cpd.  I congratulated the patient for her rate reduction maintenance.  Patient states she is not smoking until 10am and not after 5:30 pm.  We discussed pushing her morning cigarette to 11am by next session.  We discussed waiting 0.5 hrs after eating meals to smoke.  We discussed attempting to reduce to 3 cpd by next session.  We discussed the 15 minute rule from thought to action of smoking.  Patient stated that she was becoming irritated during our session because she thought I was pushing her to hard.  We discussed our end goal and I stressed the importance of setting weekly goals for reduction and behavioral change.  We discussed that cigarette companies add sugar to cigarettes that make them more addictive and detrimental to her diabetes.  Patient remains on prescribed tobacco cessation medication regimen of 21 mg patch without any negative side effects at this time.  The patient will continue with virtual therapy sessions and medication monitoring by CTTS. Prescribed medication management will be by physician.     Diagnosis: F17.200    Next Visit: 1 week

## 2022-08-31 ENCOUNTER — PATIENT OUTREACH (OUTPATIENT)
Dept: ADMINISTRATIVE | Facility: HOSPITAL | Age: 57
End: 2022-08-31
Payer: MEDICARE

## 2022-08-31 NOTE — PROGRESS NOTES
HOSPITAL F/U: I spoke with pt that confirmed her hospital f/u appt with Dr Casiano for 9/1/22 @ 1pm.

## 2022-09-01 ENCOUNTER — OFFICE VISIT (OUTPATIENT)
Dept: FAMILY MEDICINE | Facility: CLINIC | Age: 57
End: 2022-09-01
Payer: MEDICARE

## 2022-09-01 ENCOUNTER — HOSPITAL ENCOUNTER (OUTPATIENT)
Dept: RADIOLOGY | Facility: HOSPITAL | Age: 57
Discharge: HOME | End: 2022-09-01
Attending: NURSE PRACTITIONER
Payer: MEDICARE

## 2022-09-01 VITALS
HEIGHT: 64 IN | SYSTOLIC BLOOD PRESSURE: 86 MMHG | WEIGHT: 195.75 LBS | HEART RATE: 89 BPM | DIASTOLIC BLOOD PRESSURE: 56 MMHG | TEMPERATURE: 98 F | OXYGEN SATURATION: 98 % | BODY MASS INDEX: 33.42 KG/M2

## 2022-09-01 DIAGNOSIS — Z79.4 TYPE 2 DIABETES MELLITUS WITH COMPLICATION, WITH LONG-TERM CURRENT USE OF INSULIN: Primary | ICD-10-CM

## 2022-09-01 DIAGNOSIS — R78.81 SALMONELLA BACTEREMIA: ICD-10-CM

## 2022-09-01 DIAGNOSIS — J96.01 ACUTE RESPIRATORY FAILURE WITH HYPOXIA AND HYPERCAPNIA: ICD-10-CM

## 2022-09-01 DIAGNOSIS — E11.8 TYPE 2 DIABETES MELLITUS WITH COMPLICATION, WITH LONG-TERM CURRENT USE OF INSULIN: Primary | ICD-10-CM

## 2022-09-01 DIAGNOSIS — E11.42 DIABETIC POLYNEUROPATHY ASSOCIATED WITH TYPE 2 DIABETES MELLITUS: ICD-10-CM

## 2022-09-01 DIAGNOSIS — I63.9 CEREBROVASCULAR ACCIDENT (CVA), UNSPECIFIED MECHANISM: ICD-10-CM

## 2022-09-01 DIAGNOSIS — F31.9 BIPOLAR AFFECTIVE DISORDER, REMISSION STATUS UNSPECIFIED: ICD-10-CM

## 2022-09-01 DIAGNOSIS — Z12.31 ENCOUNTER FOR SCREENING MAMMOGRAM FOR MALIGNANT NEOPLASM OF BREAST: ICD-10-CM

## 2022-09-01 DIAGNOSIS — I10 ESSENTIAL HYPERTENSION: ICD-10-CM

## 2022-09-01 DIAGNOSIS — J96.02 ACUTE RESPIRATORY FAILURE WITH HYPOXIA AND HYPERCAPNIA: ICD-10-CM

## 2022-09-01 DIAGNOSIS — E03.4 HYPOTHYROIDISM DUE TO ACQUIRED ATROPHY OF THYROID: ICD-10-CM

## 2022-09-01 LAB
ALBUMIN/CREAT UR: 39.1 UG/MG (ref 0–30)
BACTERIA #/AREA URNS AUTO: ABNORMAL /HPF
BILIRUB UR QL STRIP: NEGATIVE
CLARITY UR REFRACT.AUTO: ABNORMAL
COLOR UR AUTO: YELLOW
CREAT UR-MCNC: 46 MG/DL (ref 15–325)
GLUCOSE UR QL STRIP: ABNORMAL
HGB UR QL STRIP: NEGATIVE
HYALINE CASTS UR QL AUTO: 3 /LPF
KETONES UR QL STRIP: NEGATIVE
LEUKOCYTE ESTERASE UR QL STRIP: ABNORMAL
MICROALBUMIN UR DL<=1MG/L-MCNC: 18 UG/ML
MICROSCOPIC COMMENT: ABNORMAL
NITRITE UR QL STRIP: NEGATIVE
PH UR STRIP: 5 [PH] (ref 5–8)
PROT UR QL STRIP: NEGATIVE
RBC #/AREA URNS AUTO: 7 /HPF (ref 0–4)
SP GR UR STRIP: 1.01 (ref 1–1.03)
SQUAMOUS #/AREA URNS AUTO: 4 /HPF
URN SPEC COLLECT METH UR: ABNORMAL
WBC #/AREA URNS AUTO: 54 /HPF (ref 0–5)
WBC CLUMPS UR QL AUTO: ABNORMAL
YEAST UR QL AUTO: ABNORMAL

## 2022-09-01 PROCEDURE — 87088 URINE BACTERIA CULTURE: CPT | Performed by: FAMILY MEDICINE

## 2022-09-01 PROCEDURE — 82043 UR ALBUMIN QUANTITATIVE: CPT | Performed by: FAMILY MEDICINE

## 2022-09-01 PROCEDURE — 99214 PR OFFICE/OUTPT VISIT, EST, LEVL IV, 30-39 MIN: ICD-10-PCS | Mod: S$GLB,,, | Performed by: FAMILY MEDICINE

## 2022-09-01 PROCEDURE — 3046F PR MOST RECENT HEMOGLOBIN A1C LEVEL > 9.0%: ICD-10-PCS | Mod: CPTII,S$GLB,, | Performed by: FAMILY MEDICINE

## 2022-09-01 PROCEDURE — 87086 URINE CULTURE/COLONY COUNT: CPT | Performed by: FAMILY MEDICINE

## 2022-09-01 PROCEDURE — 4010F PR ACE/ARB THEARPY RXD/TAKEN: ICD-10-PCS | Mod: CPTII,S$GLB,, | Performed by: FAMILY MEDICINE

## 2022-09-01 PROCEDURE — 77063 BREAST TOMOSYNTHESIS BI: CPT | Mod: TC

## 2022-09-01 PROCEDURE — 3008F PR BODY MASS INDEX (BMI) DOCUMENTED: ICD-10-PCS | Mod: CPTII,S$GLB,, | Performed by: FAMILY MEDICINE

## 2022-09-01 PROCEDURE — 87186 SC STD MICRODIL/AGAR DIL: CPT | Performed by: FAMILY MEDICINE

## 2022-09-01 PROCEDURE — 99999 PR PBB SHADOW E&M-EST. PATIENT-LVL II: CPT | Mod: PBBFAC,,, | Performed by: FAMILY MEDICINE

## 2022-09-01 PROCEDURE — 77063 MAMMO DIGITAL SCREENING BILAT WITH TOMO: ICD-10-PCS | Mod: 26,,, | Performed by: RADIOLOGY

## 2022-09-01 PROCEDURE — 77063 BREAST TOMOSYNTHESIS BI: CPT | Mod: 26,,, | Performed by: RADIOLOGY

## 2022-09-01 PROCEDURE — 82570 ASSAY OF URINE CREATININE: CPT | Performed by: FAMILY MEDICINE

## 2022-09-01 PROCEDURE — 87077 CULTURE AEROBIC IDENTIFY: CPT | Performed by: FAMILY MEDICINE

## 2022-09-01 PROCEDURE — 99214 OFFICE O/P EST MOD 30 MIN: CPT | Mod: S$GLB,,, | Performed by: FAMILY MEDICINE

## 2022-09-01 PROCEDURE — 3072F LOW RISK FOR RETINOPATHY: CPT | Mod: CPTII,S$GLB,, | Performed by: FAMILY MEDICINE

## 2022-09-01 PROCEDURE — 77067 SCR MAMMO BI INCL CAD: CPT | Mod: 26,,, | Performed by: RADIOLOGY

## 2022-09-01 PROCEDURE — 4010F ACE/ARB THERAPY RXD/TAKEN: CPT | Mod: CPTII,S$GLB,, | Performed by: FAMILY MEDICINE

## 2022-09-01 PROCEDURE — 1111F PR DISCHARGE MEDS RECONCILED W/ CURRENT OUTPATIENT MED LIST: ICD-10-PCS | Mod: CPTII,S$GLB,, | Performed by: FAMILY MEDICINE

## 2022-09-01 PROCEDURE — 3008F BODY MASS INDEX DOCD: CPT | Mod: CPTII,S$GLB,, | Performed by: FAMILY MEDICINE

## 2022-09-01 PROCEDURE — 1111F DSCHRG MED/CURRENT MED MERGE: CPT | Mod: CPTII,S$GLB,, | Performed by: FAMILY MEDICINE

## 2022-09-01 PROCEDURE — 77067 SCR MAMMO BI INCL CAD: CPT | Mod: TC

## 2022-09-01 PROCEDURE — 81001 URINALYSIS AUTO W/SCOPE: CPT | Performed by: FAMILY MEDICINE

## 2022-09-01 PROCEDURE — 3046F HEMOGLOBIN A1C LEVEL >9.0%: CPT | Mod: CPTII,S$GLB,, | Performed by: FAMILY MEDICINE

## 2022-09-01 PROCEDURE — 77067 MAMMO DIGITAL SCREENING BILAT WITH TOMO: ICD-10-PCS | Mod: 26,,, | Performed by: RADIOLOGY

## 2022-09-01 PROCEDURE — 3072F PR LOW RISK FOR RETINOPATHY: ICD-10-PCS | Mod: CPTII,S$GLB,, | Performed by: FAMILY MEDICINE

## 2022-09-01 PROCEDURE — 99999 PR PBB SHADOW E&M-EST. PATIENT-LVL II: ICD-10-PCS | Mod: PBBFAC,,, | Performed by: FAMILY MEDICINE

## 2022-09-01 NOTE — PROGRESS NOTES
Subjective:       Patient ID: Alexandra Goins is a 56 y.o. female.    Chief Complaint: No chief complaint on file.      HPI Comments:       Current Outpatient Medications:     albuterol (PROVENTIL/VENTOLIN HFA) 90 mcg/actuation inhaler, INHALE 2 TO 4 PUFFS BY MOUTH THREE TIMES DAILY AS NEEDED FOR WHEEZING, Disp: 18 g, Rfl: 3    albuterol-ipratropium (DUO-NEB) 2.5 mg-0.5 mg/3 mL nebulizer solution, Take 3 mLs by nebulization every 6 (six) hours as needed for Wheezing. Rescue, Disp: 1 Box, Rfl: 0    aspirin (ECOTRIN) 81 MG EC tablet, Take 1 tablet (81 mg total) by mouth once daily. (Patient taking differently: Take 81 mg by mouth every other day.), Disp: , Rfl:     benztropine (COGENTIN) 0.5 MG tablet, Take 1 tablet (0.5 mg total) by mouth 2 (two) times daily as needed (dystonia)., Disp: 60 tablet, Rfl: 2    blood sugar diagnostic (TRUE METRIX GLUCOSE TEST STRIP) Strp, USE 1 STRIP TO CHECK GLUCOSE THREE TIMES DAILY, Disp: 100 strip, Rfl: 3    buPROPion (WELLBUTRIN XL) 150 MG TB24 tablet, Take 1 tablet (150 mg total) by mouth once daily., Disp: 30 tablet, Rfl: 2    butalbital-acetaminophen-caffeine -40 mg (FIORICET, ESGIC) -40 mg per tablet, Take 1 tablet by mouth every 6 (six) hours as needed for Headaches. for pain., Disp: 30 tablet, Rfl: 0    diazepam (VALIUM ORAL), Take by mouth 3 (three) times daily as needed for Anxiety. Do not exceed 3 timed a day, Disp: , Rfl:     diazePAM (VALIUM) 10 MG Tab, Take 1 tablet (10 mg total) by mouth 3 (three) times daily as needed., Disp: 90 tablet, Rfl: 2    doxepin (SINEQUAN) 10 MG capsule, Take 1 to 2 capsules at bedtime, Disp: 60 capsule, Rfl: 2    empagliflozin (JARDIANCE) 25 mg tablet, Take 1 tablet (25 mg total) by mouth once daily., Disp: 90 tablet, Rfl: 1    ezetimibe (ZETIA) 10 mg tablet, Take 1 tablet (10 mg total) by mouth once daily., Disp: 30 tablet, Rfl: 11    fluticasone-salmeterol diskus inhaler 250-50 mcg, Inhale 1 puff into the lungs 2 (two) times  daily. Controller for maintenance, Disp: 60 each, Rfl: 0    furosemide (LASIX) 40 MG tablet, TAKE 1 TABLET BY MOUTH EVERY MONDAY, WEDNESDAY, AND FRIDAY AS NEEDED, Disp: 45 tablet, Rfl: 2    gabapentin (NEURONTIN) 600 MG tablet, TAKE 1 TABLET BY MOUTH IN THE MORNING AND  TAKE  1  TABLET  BY  MOUTH  IN  THE  AFTERNOON  THEN  TAKE  2  TABLETS  BY  MOUTH  AT  BEDTIME, Disp: 120 tablet, Rfl: 0    insulin  unit/mL injection, Inject 25 Units into the skin 2 (two) times daily before meals., Disp: 20 mL, Rfl: 0    insulin regular, human (NOVOLIN R INJ), , Disp: , Rfl:     isosorbide mononitrate (IMDUR) 30 MG 24 hr tablet, Take 1 tablet by mouth once daily, Disp: 90 tablet, Rfl: 2    losartan (COZAAR) 25 MG tablet, Take 1 tablet (25 mg total) by mouth once daily., Disp: 90 tablet, Rfl: 3    metFORMIN (GLUCOPHAGE-XR) 500 MG ER 24hr tablet, Take 1 tablet (500 mg total) by mouth 2 (two) times daily with meals., Disp: 180 tablet, Rfl: 1    metoprolol succinate (TOPROL-XL) 25 MG 24 hr tablet, Take 1 tablet by mouth once daily, Disp: 90 tablet, Rfl: 3    nicotine (NICODERM CQ) 21 mg/24 hr, Place 1 patch onto the skin once daily., Disp: 28 patch, Rfl: 0    polyethylene glycol (GLYCOLAX) 17 gram PwPk, Take 17 g by mouth once daily., Disp: , Rfl: 0    promethazine (PHENERGAN) 12.5 MG Tab, Take 1 tablet (12.5 mg total) by mouth every 6 (six) hours as needed., Disp: 20 tablet, Rfl: 0    risperiDONE (RISPERDAL) 1 MG tablet, Take 1 tablet (1 mg total) by mouth 2 (two) times daily., Disp: 60 tablet, Rfl: 2      Hospital follow-up.  Discharge about a month ago on oxygen which she is using continuously.     Admitted with acute respiratory failure with hypoxia and hypercapnia in the setting of bilateral pneumonia, some [place] bacteremia from UTI,    Discharged on Jardiance.  Last A1c 11.1.  Blood sugars generally less than 200 when she checks at home.    Using Lasix once a day.      Got her mammogram today    Other recent labs:  Slightly elevated transaminases, hemoglobin of 12.    Review of Systems   Constitutional:  Positive for fatigue. Negative for activity change, appetite change and fever.   HENT:  Negative for sore throat.    Respiratory:  Negative for cough and shortness of breath.    Cardiovascular:  Negative for chest pain.   Gastrointestinal:  Negative for abdominal pain, diarrhea and nausea.   Genitourinary:  Negative for difficulty urinating.   Musculoskeletal:  Negative for arthralgias and myalgias.   Neurological:  Negative for dizziness and headaches.     Objective:      Vitals:    09/01/22 1256   Temp: 97.6 °F (36.4 °C)   Weight: 88.8 kg (195 lb 12.3 oz)     Physical Exam  Vitals and nursing note reviewed.   Constitutional:       General: She is not in acute distress.     Appearance: She is well-developed. She is not diaphoretic.   HENT:      Head: Normocephalic.      Mouth/Throat:      Pharynx: No oropharyngeal exudate.   Neck:      Thyroid: No thyromegaly.   Cardiovascular:      Rate and Rhythm: Normal rate and regular rhythm.      Heart sounds: Normal heart sounds. No murmur heard.  Pulmonary:      Effort: Pulmonary effort is normal.      Breath sounds: Normal breath sounds. No wheezing or rales.   Abdominal:      General: There is no distension.      Palpations: Abdomen is soft.   Musculoskeletal:      Cervical back: Neck supple.      Right lower leg: No edema.      Left lower leg: No edema.   Lymphadenopathy:      Cervical: No cervical adenopathy.   Skin:     General: Skin is warm and dry.   Neurological:      Mental Status: She is alert and oriented to person, place, and time.   Psychiatric:         Mood and Affect: Mood normal.         Behavior: Behavior normal.         Thought Content: Thought content normal.         Judgment: Judgment normal.       Assessment:       1. Type 2 diabetes mellitus with complication, with long-term current use of insulin    2. Acute respiratory failure with hypoxia and hypercapnia in  the setting of Bilateral Pneumonia, unspecified organism     3. Diabetic polyneuropathy associated with type 2 diabetes mellitus    4. Bipolar affective disorder, remission status unspecified    5. Hypothyroidism due to acquired atrophy of thyroid    6. Essential hypertension    7. Cerebrovascular accident (CVA), unspecified mechanism    8. Salmonella bacteremia          Plan:   Type 2 diabetes mellitus with complication, with long-term current use of insulin  Comments:  Uncontrolled.  Will follow-up with diabetic  soon.  Refill Jardiance    Acute respiratory failure with hypoxia and hypercapnia in the setting of Bilateral Pneumonia, unspecified organism   Comments:  On oxygen at all times.  Follow-up with pulmonology in 4 weeks    Diabetic polyneuropathy associated with type 2 diabetes mellitus  Comments:  Gabapentin refilled  Orders:  -     Microalbumin/Creatinine Ratio, Urine; Future; Expected date: 09/01/2022    Bipolar affective disorder, remission status unspecified  Comments:  Stable.  Followed by Psychiatry    Hypothyroidism due to acquired atrophy of thyroid  Comments:  Not on replacement.  TSH today  Orders:  -     TSH; Future; Expected date: 09/01/2022    Essential hypertension  Comments:  Controlled    Cerebrovascular accident (CVA), unspecified mechanism  Comments:  Stable neurologically    Salmonella bacteremia  Comments:  Urinalysis  Orders:  -     Urinalysis, Reflex to Urine Culture Urine, Clean Catch

## 2022-09-02 ENCOUNTER — TELEPHONE (OUTPATIENT)
Dept: DIABETES | Facility: CLINIC | Age: 57
End: 2022-09-02
Payer: MEDICARE

## 2022-09-02 ENCOUNTER — PATIENT MESSAGE (OUTPATIENT)
Dept: FAMILY MEDICINE | Facility: CLINIC | Age: 57
End: 2022-09-02
Payer: MEDICARE

## 2022-09-02 NOTE — TELEPHONE ENCOUNTER
----- Message from Amelia Santana, PhD, NP-C sent at 9/2/2022  3:24 PM CDT -----  Regarding: Appointment Rescheduled  She did not show up for her last scheduled appointment, but we will get her rescheduled.  Thanks !    Staff, could you please re-schedule Ms. Morris?    Amelia   ----- Message -----  From: Perez Casiano MD  Sent: 9/1/2022  12:49 PM CDT  To: Amelia Santana, PhD, NP-C    Hi, any chance you can get Alexandra in soon?

## 2022-09-04 ENCOUNTER — PATIENT MESSAGE (OUTPATIENT)
Dept: FAMILY MEDICINE | Facility: CLINIC | Age: 57
End: 2022-09-04
Payer: MEDICARE

## 2022-09-04 LAB — BACTERIA UR CULT: ABNORMAL

## 2022-09-06 RX ORDER — SULFAMETHOXAZOLE AND TRIMETHOPRIM 800; 160 MG/1; MG/1
1 TABLET ORAL 2 TIMES DAILY
Qty: 14 TABLET | Refills: 0 | Status: ON HOLD | OUTPATIENT
Start: 2022-09-06 | End: 2023-06-15 | Stop reason: HOSPADM

## 2022-09-07 ENCOUNTER — CLINICAL SUPPORT (OUTPATIENT)
Dept: SMOKING CESSATION | Facility: CLINIC | Age: 57
End: 2022-09-07
Payer: MEDICARE

## 2022-09-07 DIAGNOSIS — F17.200 NICOTINE DEPENDENCE: Primary | ICD-10-CM

## 2022-09-07 PROCEDURE — 99403 PR PREVENT COUNSEL,INDIV,45 MIN: ICD-10-PCS | Mod: 95,,,

## 2022-09-07 PROCEDURE — 99403 PREV MED CNSL INDIV APPRX 45: CPT | Mod: 95,,,

## 2022-09-07 NOTE — PROGRESS NOTES
"Individual Follow-Up Form    9/7/2022    Quit Date: N/A    Clinical Status of Patient: Outpatient    Length of Service: 45 minutes    Continuing Medication: yes  Patches    Other Medications:      Target Symptoms: Withdrawal and medication side effects. The following were  rated moderate (3) to severe (4) on TCRS:  Moderate (3): none  Severe (4): none    Comments: completion of TCRS (Tobacco Cessation Rating Scale) reviewed strategies, controlling environment, cues, triggers, new goals set. Introduced high risk situations with preparation interventions, caffeine similarities with withdrawal issues of habit and nicotine, Alcohol, Understanding urges, cravings, stress and relaxation. Open discussion with intervention discussion.  Patient states she is smoking 5 cpd.  Patient mentioned that her  is leaving out 5 cigarettes for her each day.  Patient did not attempt any of the behavioral changes we discussed last week.  Patient became frustrated when I asked her to wait until she and her  finish talking on the porch each evening.  Patient states "I want to smoke, and I smoke".   We discussed the importance of delaying smoking with activities for distraction, and we discussed the 15 minute rule from thought to action of smoking.  We discussed delaying her first cigarette of the day until 10:30 am. We discussed that after eating and frustration/anger is a trigger to smoke.  We discussed a rate reduction to 3 cpd by next session.  Patient remains on prescribed tobacco cessation medication regimen of 21 mg patch without any negative side effects at this time.The patient will continue with group therapy sessions and medication monitoring by CTTS. Prescribed medication management will be by physician.       Diagnosis: F17.200    Next Visit: 1 week    "

## 2022-09-14 ENCOUNTER — CLINICAL SUPPORT (OUTPATIENT)
Dept: SMOKING CESSATION | Facility: CLINIC | Age: 57
End: 2022-09-14
Payer: COMMERCIAL

## 2022-09-14 DIAGNOSIS — F17.200 NICOTINE DEPENDENCE: Primary | ICD-10-CM

## 2022-09-14 PROCEDURE — 99403 PR PREVENT COUNSEL,INDIV,45 MIN: ICD-10-PCS | Mod: 95,,,

## 2022-09-14 PROCEDURE — 99403 PREV MED CNSL INDIV APPRX 45: CPT | Mod: 95,,,

## 2022-09-14 NOTE — PROGRESS NOTES
"Individual Follow-Up Form    9/14/2022    Quit Date: N/A    Clinical Status of Patient: Outpatient    Length of Service: 45 minutes    Continuing Medication: yes  Patches    Other Medications:      Target Symptoms: Withdrawal and medication side effects. The following were  rated moderate (3) to severe (4) on TCRS:  Moderate (3): none  Severe (4): none    Comments: completion of TCRS (Tobacco Cessation Rating Scale) reviewed strategies, cues, and triggers. Introduced the negative impact of tobacco on health, the health advantages of discontinuing the use of tobacco, time line improved health changes after a quit, withdrawal issues to expect from nicotine and habit, and ways to achieve the goal of a quit.  Patient states that she continues to smoke 5 cpd and smoked more yesterday when her daughter lost a finger and injured her hand with a  blade.  We discussed her desire to quit and her triggers to smoke with anger and frustration.  Patient states that she is happy smoking five cpd, but wants to quit.  Patient "knows she needs to quit for her health" and suggested a rate reduction to 4 cpd by next session.  I reiterated the importance of smoking alone and changing her evening smoking routine by not smoking while talking to her .  We discussed rearranging a closet for distraction and working on her adult coloring books.  Patient remains on prescribed tobacco cessation medication regimen of 21 mg patch without any negative side effects at this time.The patient will continue with group therapy sessions and medication monitoring by CTTS. Prescribed medication management will be by physician.     Diagnosis: F17.200    Next Visit: 1 week    "

## 2022-09-21 ENCOUNTER — CLINICAL SUPPORT (OUTPATIENT)
Dept: SMOKING CESSATION | Facility: CLINIC | Age: 57
End: 2022-09-21
Payer: COMMERCIAL

## 2022-09-21 DIAGNOSIS — F17.200 NICOTINE DEPENDENCE: Primary | ICD-10-CM

## 2022-09-21 PROCEDURE — 99402 PREV MED CNSL INDIV APPRX 30: CPT | Mod: 95,,,

## 2022-09-21 PROCEDURE — 99402 PR PREVENT COUNSEL,INDIV,30 MIN: ICD-10-PCS | Mod: 95,,,

## 2022-09-21 RX ORDER — IBUPROFEN 200 MG
1 TABLET ORAL DAILY
Qty: 28 PATCH | Refills: 0 | Status: SHIPPED | OUTPATIENT
Start: 2022-09-21 | End: 2022-10-19 | Stop reason: DRUGHIGH

## 2022-09-21 NOTE — PROGRESS NOTES
Individual Follow-Up Form    9/21/2022    Quit Date: N/A    Clinical Status of Patient: Outpatient    Length of Service: 30 minutes    Continuing Medication: yes  Patches    Other Medications:      Target Symptoms: Withdrawal and medication side effects. The following were  rated moderate (3) to severe (4) on TCRS:  Moderate (3): none  Severe (4): yasir    Comments: completion of TCRS (Tobacco Cessation Rating Scale) reviewed strategies, controlling environment, cues, triggers, new goals set. Introduced high risk situations with preparation interventions, caffeine similarities with withdrawal issues of habit and nicotine, Alcohol, Understanding urges, cravings, stress and relaxation. Open discussion with intervention discussion.  Patient states she is smoking 4 cpd.  I congratulated the patient for her rate reduction.  Patient states that she has stopped smoking her evening cigarette with her , and smokes alone after they talk.  Patient states that sometimes when she lights a cigarette she will put it out after a couple drags, because she does not want it.  We discussed pushing her first cigarette to 10:30 am.  We discussed waiting 30 minutes after eating to smoke.  We discussed a rate reduction to 3 cpd by next session.  Patient remains on prescribed tobacco cessation medication regimen of 21 mg patch without any negative side effects at this time.  Patient remains on prescribed tobacco cessation medication regimen of 21 mg patch without any negative side effects at this time.  The patient will continue with virtual therapy sessions and medication monitoring by CTTS. Prescribed medication management will be by physician.     Diagnosis: F17.200    Next Visit: 1 week

## 2022-09-26 ENCOUNTER — HOSPITAL ENCOUNTER (OUTPATIENT)
Dept: RADIOLOGY | Facility: HOSPITAL | Age: 57
Discharge: HOME | End: 2022-09-26
Attending: NURSE PRACTITIONER
Payer: MEDICARE

## 2022-09-26 ENCOUNTER — OFFICE VISIT (OUTPATIENT)
Dept: PULMONOLOGY | Facility: CLINIC | Age: 57
End: 2022-09-26
Payer: MEDICARE

## 2022-09-26 VITALS
DIASTOLIC BLOOD PRESSURE: 62 MMHG | WEIGHT: 195.75 LBS | HEIGHT: 64 IN | HEART RATE: 88 BPM | BODY MASS INDEX: 33.42 KG/M2 | RESPIRATION RATE: 20 BRPM | SYSTOLIC BLOOD PRESSURE: 106 MMHG | OXYGEN SATURATION: 96 %

## 2022-09-26 DIAGNOSIS — J18.9 PNEUMONIA OF BOTH LOWER LOBES DUE TO INFECTIOUS ORGANISM: ICD-10-CM

## 2022-09-26 DIAGNOSIS — F17.213 CIGARETTE NICOTINE DEPENDENCE WITH WITHDRAWAL: ICD-10-CM

## 2022-09-26 DIAGNOSIS — J44.9 CHRONIC OBSTRUCTIVE PULMONARY DISEASE, UNSPECIFIED COPD TYPE: ICD-10-CM

## 2022-09-26 DIAGNOSIS — F31.9 BIPOLAR AFFECTIVE DISORDER, REMISSION STATUS UNSPECIFIED: Chronic | ICD-10-CM

## 2022-09-26 DIAGNOSIS — J44.89 ASTHMA WITH COPD: ICD-10-CM

## 2022-09-26 DIAGNOSIS — J96.02 ACUTE RESPIRATORY FAILURE WITH HYPOXIA AND HYPERCAPNIA: ICD-10-CM

## 2022-09-26 DIAGNOSIS — J44.89 ASTHMA WITH COPD: Primary | ICD-10-CM

## 2022-09-26 DIAGNOSIS — J44.0 CHRONIC OBSTRUCTIVE PULMONARY DISEASE WITH ACUTE LOWER RESPIRATORY INFECTION: ICD-10-CM

## 2022-09-26 DIAGNOSIS — J96.01 ACUTE RESPIRATORY FAILURE WITH HYPOXIA AND HYPERCAPNIA: ICD-10-CM

## 2022-09-26 PROBLEM — E66.811 CLASS 1 OBESITY DUE TO EXCESS CALORIES WITH SERIOUS COMORBIDITY AND BODY MASS INDEX (BMI) OF 33.0 TO 33.9 IN ADULT: Status: ACTIVE | Noted: 2022-05-31

## 2022-09-26 PROBLEM — E66.09 CLASS 1 OBESITY DUE TO EXCESS CALORIES WITH SERIOUS COMORBIDITY AND BODY MASS INDEX (BMI) OF 33.0 TO 33.9 IN ADULT: Status: ACTIVE | Noted: 2022-05-31

## 2022-09-26 PROCEDURE — 71046 XR CHEST PA AND LATERAL: ICD-10-PCS | Mod: 26,,, | Performed by: RADIOLOGY

## 2022-09-26 PROCEDURE — 3072F PR LOW RISK FOR RETINOPATHY: ICD-10-PCS | Mod: CPTII,S$GLB,, | Performed by: NURSE PRACTITIONER

## 2022-09-26 PROCEDURE — 99999 PR PBB SHADOW E&M-EST. PATIENT-LVL V: ICD-10-PCS | Mod: PBBFAC,,, | Performed by: NURSE PRACTITIONER

## 2022-09-26 PROCEDURE — 1159F PR MEDICATION LIST DOCUMENTED IN MEDICAL RECORD: ICD-10-PCS | Mod: CPTII,S$GLB,, | Performed by: NURSE PRACTITIONER

## 2022-09-26 PROCEDURE — 3046F HEMOGLOBIN A1C LEVEL >9.0%: CPT | Mod: CPTII,S$GLB,, | Performed by: NURSE PRACTITIONER

## 2022-09-26 PROCEDURE — 3066F NEPHROPATHY DOC TX: CPT | Mod: CPTII,S$GLB,, | Performed by: NURSE PRACTITIONER

## 2022-09-26 PROCEDURE — 3078F DIAST BP <80 MM HG: CPT | Mod: CPTII,S$GLB,, | Performed by: NURSE PRACTITIONER

## 2022-09-26 PROCEDURE — 71046 X-RAY EXAM CHEST 2 VIEWS: CPT | Mod: TC

## 2022-09-26 PROCEDURE — 3046F PR MOST RECENT HEMOGLOBIN A1C LEVEL > 9.0%: ICD-10-PCS | Mod: CPTII,S$GLB,, | Performed by: NURSE PRACTITIONER

## 2022-09-26 PROCEDURE — 4010F PR ACE/ARB THEARPY RXD/TAKEN: ICD-10-PCS | Mod: CPTII,S$GLB,, | Performed by: NURSE PRACTITIONER

## 2022-09-26 PROCEDURE — 1160F PR REVIEW ALL MEDS BY PRESCRIBER/CLIN PHARMACIST DOCUMENTED: ICD-10-PCS | Mod: CPTII,S$GLB,, | Performed by: NURSE PRACTITIONER

## 2022-09-26 PROCEDURE — 3060F PR POS MICROALBUMINURIA RESULT DOCUMENTED/REVIEW: ICD-10-PCS | Mod: CPTII,S$GLB,, | Performed by: NURSE PRACTITIONER

## 2022-09-26 PROCEDURE — 99214 PR OFFICE/OUTPT VISIT, EST, LEVL IV, 30-39 MIN: ICD-10-PCS | Mod: S$GLB,,, | Performed by: NURSE PRACTITIONER

## 2022-09-26 PROCEDURE — 3074F PR MOST RECENT SYSTOLIC BLOOD PRESSURE < 130 MM HG: ICD-10-PCS | Mod: CPTII,S$GLB,, | Performed by: NURSE PRACTITIONER

## 2022-09-26 PROCEDURE — 3078F PR MOST RECENT DIASTOLIC BLOOD PRESSURE < 80 MM HG: ICD-10-PCS | Mod: CPTII,S$GLB,, | Performed by: NURSE PRACTITIONER

## 2022-09-26 PROCEDURE — 1160F RVW MEDS BY RX/DR IN RCRD: CPT | Mod: CPTII,S$GLB,, | Performed by: NURSE PRACTITIONER

## 2022-09-26 PROCEDURE — 1159F MED LIST DOCD IN RCRD: CPT | Mod: CPTII,S$GLB,, | Performed by: NURSE PRACTITIONER

## 2022-09-26 PROCEDURE — 3072F LOW RISK FOR RETINOPATHY: CPT | Mod: CPTII,S$GLB,, | Performed by: NURSE PRACTITIONER

## 2022-09-26 PROCEDURE — 3008F PR BODY MASS INDEX (BMI) DOCUMENTED: ICD-10-PCS | Mod: CPTII,S$GLB,, | Performed by: NURSE PRACTITIONER

## 2022-09-26 PROCEDURE — 3066F PR DOCUMENTATION OF TREATMENT FOR NEPHROPATHY: ICD-10-PCS | Mod: CPTII,S$GLB,, | Performed by: NURSE PRACTITIONER

## 2022-09-26 PROCEDURE — 99214 OFFICE O/P EST MOD 30 MIN: CPT | Mod: S$GLB,,, | Performed by: NURSE PRACTITIONER

## 2022-09-26 PROCEDURE — 99999 PR PBB SHADOW E&M-EST. PATIENT-LVL V: CPT | Mod: PBBFAC,,, | Performed by: NURSE PRACTITIONER

## 2022-09-26 PROCEDURE — 71046 X-RAY EXAM CHEST 2 VIEWS: CPT | Mod: 26,,, | Performed by: RADIOLOGY

## 2022-09-26 PROCEDURE — 3008F BODY MASS INDEX DOCD: CPT | Mod: CPTII,S$GLB,, | Performed by: NURSE PRACTITIONER

## 2022-09-26 PROCEDURE — 4010F ACE/ARB THERAPY RXD/TAKEN: CPT | Mod: CPTII,S$GLB,, | Performed by: NURSE PRACTITIONER

## 2022-09-26 PROCEDURE — 3060F POS MICROALBUMINURIA REV: CPT | Mod: CPTII,S$GLB,, | Performed by: NURSE PRACTITIONER

## 2022-09-26 PROCEDURE — 3074F SYST BP LT 130 MM HG: CPT | Mod: CPTII,S$GLB,, | Performed by: NURSE PRACTITIONER

## 2022-09-26 NOTE — ASSESSMENT & PLAN NOTE
44 pack year smoker, weaned from 1 pack to 5 cigarettes  Some motivation to quit  Proceed with LDCT screening for lung cancer    Assistance with smoking cessation was offered, including:  []  Medications  [x]  Counseling  []  Printed Information on Smoking Cessation  [x]  Referral to a Smoking Cessation Program, in program    Patient was counseled regarding smoking for 3-10 minutes.

## 2022-09-26 NOTE — ASSESSMENT & PLAN NOTE
Advair 250.  Albuterol inhaler.  Duo nebs.  NC 4-5 L activity and sleep  The current medical regimen is effective;  continue present plan and medications.  Follow up next available appointment  cpft, ABG, 6mwd

## 2022-09-26 NOTE — ASSESSMENT & PLAN NOTE
9/26/2022 chest xray no acute opacity   Exam lungs clear  Symptomatically improved  On NC 4-5 L activity and sleep   Return ABG, 6mwd, cpft  Continue Advair 250, Albuterol inhaler, duo nebs. NC 4-5 L activity and sleep.

## 2022-09-26 NOTE — ASSESSMENT & PLAN NOTE
7/24 - 8/2/2022 hospital stay  Discharged on NC 4 - 5L activity and sleep.   Follow up cpft/abg/6mwd.

## 2022-09-28 ENCOUNTER — CLINICAL SUPPORT (OUTPATIENT)
Dept: SMOKING CESSATION | Facility: CLINIC | Age: 57
End: 2022-09-28
Payer: COMMERCIAL

## 2022-09-28 DIAGNOSIS — F17.200 NICOTINE DEPENDENCE: Primary | ICD-10-CM

## 2022-09-28 PROCEDURE — 99403 PR PREVENT COUNSEL,INDIV,45 MIN: ICD-10-PCS | Mod: 95,,,

## 2022-09-28 PROCEDURE — 99403 PREV MED CNSL INDIV APPRX 45: CPT | Mod: 95,,,

## 2022-09-28 NOTE — PROGRESS NOTES
Individual Follow-Up Form    9/28/2022    Quit Date: N/A    Clinical Status of Patient: Outpatient    Length of Service: 45 minutes    Continuing Medication: yes  Patches    Other Medications:      Target Symptoms: Withdrawal and medication side effects. The following were  rated moderate (3) to severe (4) on TCRS:  Moderate (3): none  Severe (4): none    Comments: completion of TCRS (Tobacco Cessation Rating Scale) reviewed strategies, habitual behavior, stress, and high risk situations. Introduced stress with addition interventions, SOLVE, relaxation with interventions, nutrition, exercise, weight gain, and the importance of rewarding oneself for accomplishments toward becoming tobacco free. Open discussion of all items with interventions.  Today's appointment was conducted via phone due to technical difficulties involving her [facility].    Patient states she did not smoke at all yesterday, and was down to 3 cpd on some days.  I congratulated the patient for her reduction.  We discussed not smoking anymore today and maintaining a rate of no more than 2 cpd by next session.  Patient states that she was able to stay busy yesterday and that helped with not smoking.  We discussed writing down a plan for tomorrow with activities to stay busy tomorrow and to start thinking about a quit date.  We discussed patient's improved lung function and her decreased use of O2.  We discussed quitting smoking before she is able to go to her hunt club next month. We discussed watching her grandchildren grow up as motivation to quit smoking.  Patient remains on prescribed tobacco cessation medication regimen of 21 mg patch without any negative side effects at this time.  The patient will continue with virtual therapy sessions and medication monitoring by CTTS. Prescribed medication management will be by physician.       Diagnosis: F17.200    Next Visit: 1 week

## 2022-10-04 RX ORDER — DOXEPIN HYDROCHLORIDE 10 MG/1
CAPSULE ORAL
Qty: 60 CAPSULE | Refills: 0 | Status: SHIPPED | OUTPATIENT
Start: 2022-10-04 | End: 2022-10-19 | Stop reason: SDUPTHER

## 2022-10-04 RX ORDER — BUPROPION HYDROCHLORIDE 150 MG/1
150 TABLET ORAL DAILY
Qty: 30 TABLET | Refills: 0 | Status: SHIPPED | OUTPATIENT
Start: 2022-10-04 | End: 2022-10-19 | Stop reason: SDUPTHER

## 2022-10-04 RX ORDER — RISPERIDONE 1 MG/1
1 TABLET ORAL 2 TIMES DAILY
Qty: 60 TABLET | Refills: 0 | Status: SHIPPED | OUTPATIENT
Start: 2022-10-04 | End: 2022-10-19 | Stop reason: SDUPTHER

## 2022-10-04 RX ORDER — BENZTROPINE MESYLATE 0.5 MG/1
0.5 TABLET ORAL 2 TIMES DAILY PRN
Qty: 60 TABLET | Refills: 0 | Status: SHIPPED | OUTPATIENT
Start: 2022-10-04 | End: 2022-10-19 | Stop reason: SDUPTHER

## 2022-10-05 ENCOUNTER — CLINICAL SUPPORT (OUTPATIENT)
Dept: SMOKING CESSATION | Facility: CLINIC | Age: 57
End: 2022-10-05
Payer: MEDICARE

## 2022-10-05 DIAGNOSIS — F17.200 NICOTINE DEPENDENCE: Primary | ICD-10-CM

## 2022-10-05 PROCEDURE — 99403 PR PREVENT COUNSEL,INDIV,45 MIN: ICD-10-PCS | Mod: 95,,,

## 2022-10-05 PROCEDURE — 99403 PREV MED CNSL INDIV APPRX 45: CPT | Mod: 95,,,

## 2022-10-05 NOTE — PROGRESS NOTES
Individual Follow-Up Form    10/5/2022    Quit Date: N/A    Clinical Status of Patient: Outpatient    Length of Service: 45 minutes    Continuing Medication: yes  Patches    Other Medications:      Target Symptoms: Withdrawal and medication side effects. The following were  rated moderate (3) to severe (4) on TCRS:  Moderate (3): none  Severe (4): none    Comments: completion of TCRS (Tobacco Cessation Rating Scale) reviewed strategies, habitual behavior, stress, and high risk situations. Introduced stress with addition interventions, SOLVE, relaxation with interventions, nutrition, exercise, weight gain, and the importance of rewarding oneself for accomplishments toward becoming tobacco free. Open discussion of all items with interventions. Patient states she has had a bad week and was smoking 4 or 5 cpd.  We discussed a rate reduction to 2 cpd by next session.  Patient states feeling down about her health and a recent event with bradycardia.  We discussed that quitting smoking is a key component to improved health.  We discussed that she will continue to have good and bad days moving forward, and the importance of finding healthy distraction when things get her angry or frustrated.  We discussed new outside activities for distraction.  Patient remains on prescribed tobacco cessation medication regimen of 21 mg patch without any negative side effects at this time. The patient will continue with virtual therapy sessions and medication monitoring by CTTS. Prescribed medication management will be by physician.       Diagnosis: F17.200    Next Visit: 1 week

## 2022-10-10 ENCOUNTER — PATIENT OUTREACH (OUTPATIENT)
Dept: ADMINISTRATIVE | Facility: HOSPITAL | Age: 57
End: 2022-10-10
Payer: MEDICARE

## 2022-10-10 RX ORDER — DIAZEPAM 10 MG/1
TABLET ORAL
Qty: 90 TABLET | Refills: 1 | Status: SHIPPED | OUTPATIENT
Start: 2022-10-10 | End: 2022-10-19 | Stop reason: SDUPTHER

## 2022-10-10 NOTE — PROGRESS NOTES
[employer] Statin Report-CVD.    Pt has allergy/intolerance to statin. 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 Needs to have approved code dropped.  Pt has appt scheduled, 10/26/22.    Remind me set to send to pcp to consider resolving statin intolerance as it is not an approved diagnosis. Also to review if statin intolerance is still a problem, drop an approved code.    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

## 2022-10-10 NOTE — TELEPHONE ENCOUNTER
----- Message from Nikki Blackwell sent at 10/10/2022  8:27 AM CDT -----  Type:  RX Refill Request    Who Called: pt  Refill or New Rx:refill  RX Name and Strength:gabapentin (NEURONTIN) 600 MG tablet  How is the patient currently taking it? (ex. 1XDay):3 xday 4 pills per day  Is this a 30 day or 90 day RX:90  Preferred Pharmacy with phone number:  [facility] [address] [place], [place] - [address] [address]  [address]  [place] [address]  Phone: [phone] Fax: [phone]      Local or Mail Order:local  Ordering Provider:  Would the patient rather a call back or a response via [facility]? Call back  Best Call Back Number:[phone]  Additional Information:

## 2022-10-10 NOTE — TELEPHONE ENCOUNTER
Patient notified that refill request was sent to Dr. Casiano and he is out today and he will be back on Tuesday and fill it then. Patient has been out for 3 days and the Pain is horrible

## 2022-10-10 NOTE — TELEPHONE ENCOUNTER
No new care gaps identified.  [facility] Embedded Care Gaps. Reference number: [id number]. 10/10/2022   8:49:21 AM CLAIRT

## 2022-10-10 NOTE — TELEPHONE ENCOUNTER
----- Message from Gabbie Anderson sent at 10/10/2022  2:56 PM CDT -----  Contact: self/[phone]  Patient is calling for a refill on gabapentin (NEURONTIN) 600 MG tablet, she would like it call in   [id number] - [place], [place] - [address]  [address] [place] [facility] [address]  Phone: [phone] Fax: [phone]  Please call her back at [phone]. Thanks/ar

## 2022-10-11 ENCOUNTER — TELEPHONE (OUTPATIENT)
Dept: SMOKING CESSATION | Facility: CLINIC | Age: 57
End: 2022-10-11
Payer: MEDICARE

## 2022-10-11 ENCOUNTER — EXTERNAL HOME HEALTH (OUTPATIENT)
Dept: HOME HEALTH SERVICES | Facility: HOSPITAL | Age: 57
End: 2022-10-11
Payer: MEDICARE

## 2022-10-11 RX ORDER — GABAPENTIN 600 MG/1
TABLET ORAL
Qty: 120 TABLET | Refills: 1 | Status: SHIPPED | OUTPATIENT
Start: 2022-10-11 | End: 2022-12-12

## 2022-10-11 NOTE — TELEPHONE ENCOUNTER
2nd attempt, patient called after not checking in for today's virtual visit.  Voicemail with contact information given.

## 2022-10-11 NOTE — TELEPHONE ENCOUNTER
1st attempt, patient called after not checking in for today's scheduled virtual visit.  Voicemail with contact information given.

## 2022-10-19 ENCOUNTER — CLINICAL SUPPORT (OUTPATIENT)
Dept: SMOKING CESSATION | Facility: CLINIC | Age: 57
End: 2022-10-19
Payer: COMMERCIAL

## 2022-10-19 ENCOUNTER — OFFICE VISIT (OUTPATIENT)
Dept: PSYCHIATRY | Facility: CLINIC | Age: 57
End: 2022-10-19
Payer: MEDICARE

## 2022-10-19 DIAGNOSIS — F31.9 BIPOLAR 1 DISORDER: Primary | ICD-10-CM

## 2022-10-19 DIAGNOSIS — F41.9 ANXIETY: ICD-10-CM

## 2022-10-19 DIAGNOSIS — F17.200 NICOTINE DEPENDENCE: Primary | ICD-10-CM

## 2022-10-19 DIAGNOSIS — G47.00 INSOMNIA, UNSPECIFIED TYPE: ICD-10-CM

## 2022-10-19 PROCEDURE — 3072F PR LOW RISK FOR RETINOPATHY: ICD-10-PCS | Mod: CPTII,95,, | Performed by: PSYCHIATRY & NEUROLOGY

## 2022-10-19 PROCEDURE — 99499 RISK ADDL DX/OHS AUDIT: ICD-10-PCS | Mod: HCNC,95,, | Performed by: PSYCHIATRY & NEUROLOGY

## 2022-10-19 PROCEDURE — 99499 UNLISTED E&M SERVICE: CPT | Mod: HCNC,95,, | Performed by: PSYCHIATRY & NEUROLOGY

## 2022-10-19 PROCEDURE — 99999 PR PBB SHADOW E&M-EST. PATIENT-LVL I: CPT | Mod: PBBFAC,,, | Performed by: PSYCHIATRY & NEUROLOGY

## 2022-10-19 PROCEDURE — 3066F PR DOCUMENTATION OF TREATMENT FOR NEPHROPATHY: ICD-10-PCS | Mod: CPTII,95,, | Performed by: PSYCHIATRY & NEUROLOGY

## 2022-10-19 PROCEDURE — 4010F ACE/ARB THERAPY RXD/TAKEN: CPT | Mod: CPTII,95,, | Performed by: PSYCHIATRY & NEUROLOGY

## 2022-10-19 PROCEDURE — 99999 PR PBB SHADOW E&M-EST. PATIENT-LVL I: ICD-10-PCS | Mod: PBBFAC,,, | Performed by: PSYCHIATRY & NEUROLOGY

## 2022-10-19 PROCEDURE — 3046F HEMOGLOBIN A1C LEVEL >9.0%: CPT | Mod: CPTII,95,, | Performed by: PSYCHIATRY & NEUROLOGY

## 2022-10-19 PROCEDURE — 3060F POS MICROALBUMINURIA REV: CPT | Mod: CPTII,95,, | Performed by: PSYCHIATRY & NEUROLOGY

## 2022-10-19 PROCEDURE — 99214 PR OFFICE/OUTPT VISIT, EST, LEVL IV, 30-39 MIN: ICD-10-PCS | Mod: 95,,, | Performed by: PSYCHIATRY & NEUROLOGY

## 2022-10-19 PROCEDURE — 4010F PR ACE/ARB THEARPY RXD/TAKEN: ICD-10-PCS | Mod: CPTII,95,, | Performed by: PSYCHIATRY & NEUROLOGY

## 2022-10-19 PROCEDURE — 99403 PREV MED CNSL INDIV APPRX 45: CPT | Mod: 95,,,

## 2022-10-19 PROCEDURE — 3046F PR MOST RECENT HEMOGLOBIN A1C LEVEL > 9.0%: ICD-10-PCS | Mod: CPTII,95,, | Performed by: PSYCHIATRY & NEUROLOGY

## 2022-10-19 PROCEDURE — 3066F NEPHROPATHY DOC TX: CPT | Mod: CPTII,95,, | Performed by: PSYCHIATRY & NEUROLOGY

## 2022-10-19 PROCEDURE — 99214 OFFICE O/P EST MOD 30 MIN: CPT | Mod: 95,,, | Performed by: PSYCHIATRY & NEUROLOGY

## 2022-10-19 PROCEDURE — 3072F LOW RISK FOR RETINOPATHY: CPT | Mod: CPTII,95,, | Performed by: PSYCHIATRY & NEUROLOGY

## 2022-10-19 PROCEDURE — 3060F PR POS MICROALBUMINURIA RESULT DOCUMENTED/REVIEW: ICD-10-PCS | Mod: CPTII,95,, | Performed by: PSYCHIATRY & NEUROLOGY

## 2022-10-19 PROCEDURE — 99403 PR PREVENT COUNSEL,INDIV,45 MIN: ICD-10-PCS | Mod: 95,,,

## 2022-10-19 RX ORDER — DIAZEPAM 10 MG/1
TABLET ORAL
Qty: 90 TABLET | Refills: 2 | Status: SHIPPED | OUTPATIENT
Start: 2022-10-19 | End: 2023-02-09 | Stop reason: SDUPTHER

## 2022-10-19 RX ORDER — DOXEPIN HYDROCHLORIDE 10 MG/1
CAPSULE ORAL
Qty: 60 CAPSULE | Refills: 2 | Status: SHIPPED | OUTPATIENT
Start: 2022-10-19 | End: 2023-02-09

## 2022-10-19 RX ORDER — IBUPROFEN 200 MG
1 TABLET ORAL DAILY
Qty: 14 PATCH | Refills: 0 | Status: SHIPPED | OUTPATIENT
Start: 2022-10-19 | End: 2022-12-20 | Stop reason: SDUPTHER

## 2022-10-19 RX ORDER — BUPROPION HYDROCHLORIDE 150 MG/1
150 TABLET ORAL DAILY
Qty: 30 TABLET | Refills: 2 | Status: SHIPPED | OUTPATIENT
Start: 2022-10-19 | End: 2023-02-09 | Stop reason: SDUPTHER

## 2022-10-19 RX ORDER — BENZTROPINE MESYLATE 0.5 MG/1
0.5 TABLET ORAL 2 TIMES DAILY PRN
Qty: 60 TABLET | Refills: 2 | Status: SHIPPED | OUTPATIENT
Start: 2022-10-19 | End: 2023-05-11 | Stop reason: SDUPTHER

## 2022-10-19 RX ORDER — RISPERIDONE 1 MG/1
1 TABLET ORAL 2 TIMES DAILY
Qty: 60 TABLET | Refills: 2 | Status: SHIPPED | OUTPATIENT
Start: 2022-10-19 | End: 2023-02-09 | Stop reason: SDUPTHER
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
## 2022-10-19 NOTE — PROGRESS NOTES
"Individual Follow-Up Form    10/19/2022    Quit Date: N/A    Clinical Status of Patient: Outpatient    Length of Service: 45 minutes    Continuing Medication: yes  Patches    Other Medications:      Target Symptoms: Withdrawal and medication side effects. The following were  rated moderate (3) to severe (4) on TCRS:  Moderate (3): none  Severe (4): none    Comments: completion of TCRS (Tobacco Cessation Rating Scale) reviewed strategies, habitual behavior, stress, and high risk situations. Introduced stress with addition interventions, SOLVE, relaxation with interventions, nutrition, exercise, weight gain, and the importance of rewarding oneself for accomplishments toward becoming tobacco free. Open discussion of all items with interventions.   Patient states she is smoking 4 cpd.  We discussed a rate reduction to 3 cpd by next week.  Patient states she will be able quit when she reduces to 3 cpd.  We discussed waiting longer in the morning to smoke her first cigarette.  Patient states she is happy to be off of Oxygen and walks her dogs.  We discussed increasing those walks to twice a day.  Patient states she enjoys smoking and we discussed the health effects and benefits post quit.  Patient states that she feels her sense of taste is improving.  We discussed talking to her  and having him only leave her 3 cpd for the day.  Patient states, "I just need to put them down, but I am not ready".  Patient remains on prescribed tobacco cessation medication regimen of 21 mg patch without any negative side effects at this time.  The patient will continue with virtual therapy sessions and medication monitoring by CTTS. Prescribed medication management will be by physician.     Diagnosis: F17.200    Next Visit: 1 week    "

## 2022-10-21 DIAGNOSIS — E11.9 TYPE 2 DIABETES MELLITUS WITHOUT COMPLICATION: ICD-10-CM

## 2022-10-25 ENCOUNTER — TELEPHONE (OUTPATIENT)
Dept: SMOKING CESSATION | Facility: CLINIC | Age: 57
End: 2022-10-25
Payer: MEDICARE

## 2022-10-25 NOTE — TELEPHONE ENCOUNTER
1st attempt, patient called after not checking in for today's virtual visit.  Voicemail with contact information given.

## 2022-10-26 ENCOUNTER — TELEPHONE (OUTPATIENT)
Dept: ADMINISTRATIVE | Facility: CLINIC | Age: 57
End: 2022-10-26
Payer: MEDICARE

## 2022-11-01 ENCOUNTER — TELEPHONE (OUTPATIENT)
Dept: SMOKING CESSATION | Facility: CLINIC | Age: 57
End: 2022-11-01
Payer: MEDICARE

## 2022-11-01 NOTE — TELEPHONE ENCOUNTER
1st attempt, patient called after chart review revealed patient had not logged into her patient portal today.  Voicemail with contact information given.

## 2022-11-01 NOTE — TELEPHONE ENCOUNTER
Patient contacted after not checking in for today's scheduled virtual visit.  Patient states she was not feeling well and rescheduled for next week.

## 2022-11-02 ENCOUNTER — LAB VISIT (OUTPATIENT)
Dept: LAB | Facility: HOSPITAL | Age: 57
End: 2022-11-02
Attending: FAMILY MEDICINE
Payer: MEDICARE

## 2022-11-02 ENCOUNTER — OFFICE VISIT (OUTPATIENT)
Dept: FAMILY MEDICINE | Facility: CLINIC | Age: 57
End: 2022-11-02
Payer: MEDICARE

## 2022-11-02 VITALS
SYSTOLIC BLOOD PRESSURE: 98 MMHG | DIASTOLIC BLOOD PRESSURE: 66 MMHG | WEIGHT: 187.63 LBS | OXYGEN SATURATION: 95 % | BODY MASS INDEX: 32.03 KG/M2 | HEIGHT: 64 IN | HEART RATE: 101 BPM | TEMPERATURE: 98 F

## 2022-11-02 DIAGNOSIS — I63.9 CEREBROVASCULAR ACCIDENT (CVA), UNSPECIFIED MECHANISM: ICD-10-CM

## 2022-11-02 DIAGNOSIS — G72.0 STATIN MYOPATHY: ICD-10-CM

## 2022-11-02 DIAGNOSIS — E11.8 TYPE 2 DIABETES MELLITUS WITH COMPLICATION, WITH LONG-TERM CURRENT USE OF INSULIN: Primary | ICD-10-CM

## 2022-11-02 DIAGNOSIS — F31.9 BIPOLAR AFFECTIVE DISORDER, REMISSION STATUS UNSPECIFIED: ICD-10-CM

## 2022-11-02 DIAGNOSIS — E11.42 DIABETIC POLYNEUROPATHY ASSOCIATED WITH TYPE 2 DIABETES MELLITUS: ICD-10-CM

## 2022-11-02 DIAGNOSIS — E03.4 HYPOTHYROIDISM DUE TO ACQUIRED ATROPHY OF THYROID: ICD-10-CM

## 2022-11-02 DIAGNOSIS — T46.6X5A STATIN MYOPATHY: ICD-10-CM

## 2022-11-02 DIAGNOSIS — E11.8 TYPE 2 DIABETES MELLITUS WITH COMPLICATION, WITH LONG-TERM CURRENT USE OF INSULIN: ICD-10-CM

## 2022-11-02 DIAGNOSIS — Z79.4 TYPE 2 DIABETES MELLITUS WITH COMPLICATION, WITH LONG-TERM CURRENT USE OF INSULIN: Primary | ICD-10-CM

## 2022-11-02 DIAGNOSIS — I10 ESSENTIAL HYPERTENSION: ICD-10-CM

## 2022-11-02 DIAGNOSIS — Z79.4 TYPE 2 DIABETES MELLITUS WITH COMPLICATION, WITH LONG-TERM CURRENT USE OF INSULIN: ICD-10-CM

## 2022-11-02 LAB
ALBUMIN SERPL BCP-MCNC: 4.1 G/DL (ref 3.5–5.2)
ALP SERPL-CCNC: 63 U/L (ref 55–135)
ALT SERPL W/O P-5'-P-CCNC: 63 U/L (ref 10–44)
AST SERPL-CCNC: 51 U/L (ref 10–40)
BILIRUB DIRECT SERPL-MCNC: 0.2 MG/DL (ref 0.1–0.3)
BILIRUB SERPL-MCNC: 0.5 MG/DL (ref 0.1–1)
CHOLEST SERPL-MCNC: 197 MG/DL (ref 120–199)
CHOLEST/HDLC SERPL: 4.5 {RATIO} (ref 2–5)
ESTIMATED AVG GLUCOSE: 214 MG/DL (ref 68–131)
HBA1C MFR BLD: 9.1 % (ref 4–5.6)
HDLC SERPL-MCNC: 44 MG/DL (ref 40–75)
HDLC SERPL: 22.3 % (ref 20–50)
LDLC SERPL CALC-MCNC: 90.6 MG/DL (ref 63–159)
NONHDLC SERPL-MCNC: 153 MG/DL
PROT SERPL-MCNC: 7.9 G/DL (ref 6–8.4)
T4 FREE SERPL-MCNC: 1.01 NG/DL (ref 0.71–1.51)
TRIGL SERPL-MCNC: 312 MG/DL (ref 30–150)
TSH SERPL DL<=0.005 MIU/L-ACNC: 4.55 UIU/ML (ref 0.4–4)

## 2022-11-02 PROCEDURE — 3072F PR LOW RISK FOR RETINOPATHY: ICD-10-PCS | Mod: CPTII,S$GLB,, | Performed by: FAMILY MEDICINE

## 2022-11-02 PROCEDURE — 84443 ASSAY THYROID STIM HORMONE: CPT | Performed by: FAMILY MEDICINE

## 2022-11-02 PROCEDURE — G0008 ADMIN INFLUENZA VIRUS VAC: HCPCS | Mod: S$GLB,,, | Performed by: FAMILY MEDICINE

## 2022-11-02 PROCEDURE — 36415 COLL VENOUS BLD VENIPUNCTURE: CPT | Mod: PO | Performed by: FAMILY MEDICINE

## 2022-11-02 PROCEDURE — 90686 FLU VACCINE (QUAD) GREATER THAN OR EQUAL TO 3YO PRESERVATIVE FREE IM: ICD-10-PCS | Mod: S$GLB,,, | Performed by: FAMILY MEDICINE

## 2022-11-02 PROCEDURE — 4010F PR ACE/ARB THEARPY RXD/TAKEN: ICD-10-PCS | Mod: CPTII,S$GLB,, | Performed by: FAMILY MEDICINE

## 2022-11-02 PROCEDURE — 99214 PR OFFICE/OUTPT VISIT, EST, LEVL IV, 30-39 MIN: ICD-10-PCS | Mod: 25,S$GLB,, | Performed by: FAMILY MEDICINE

## 2022-11-02 PROCEDURE — 3066F PR DOCUMENTATION OF TREATMENT FOR NEPHROPATHY: ICD-10-PCS | Mod: CPTII,S$GLB,, | Performed by: FAMILY MEDICINE

## 2022-11-02 PROCEDURE — 1159F PR MEDICATION LIST DOCUMENTED IN MEDICAL RECORD: ICD-10-PCS | Mod: CPTII,S$GLB,, | Performed by: FAMILY MEDICINE

## 2022-11-02 PROCEDURE — 90686 IIV4 VACC NO PRSV 0.5 ML IM: CPT | Mod: S$GLB,,, | Performed by: FAMILY MEDICINE

## 2022-11-02 PROCEDURE — 1159F MED LIST DOCD IN RCRD: CPT | Mod: CPTII,S$GLB,, | Performed by: FAMILY MEDICINE

## 2022-11-02 PROCEDURE — 99214 OFFICE O/P EST MOD 30 MIN: CPT | Mod: 25,S$GLB,, | Performed by: FAMILY MEDICINE

## 2022-11-02 PROCEDURE — 99999 PR PBB SHADOW E&M-EST. PATIENT-LVL IV: ICD-10-PCS | Mod: PBBFAC,,, | Performed by: FAMILY MEDICINE

## 2022-11-02 PROCEDURE — 80061 LIPID PANEL: CPT | Performed by: FAMILY MEDICINE

## 2022-11-02 PROCEDURE — 3066F NEPHROPATHY DOC TX: CPT | Mod: CPTII,S$GLB,, | Performed by: FAMILY MEDICINE

## 2022-11-02 PROCEDURE — 3072F LOW RISK FOR RETINOPATHY: CPT | Mod: CPTII,S$GLB,, | Performed by: FAMILY MEDICINE

## 2022-11-02 PROCEDURE — 3078F DIAST BP <80 MM HG: CPT | Mod: CPTII,S$GLB,, | Performed by: FAMILY MEDICINE

## 2022-11-02 PROCEDURE — 3008F PR BODY MASS INDEX (BMI) DOCUMENTED: ICD-10-PCS | Mod: CPTII,S$GLB,, | Performed by: FAMILY MEDICINE

## 2022-11-02 PROCEDURE — 3060F POS MICROALBUMINURIA REV: CPT | Mod: CPTII,S$GLB,, | Performed by: FAMILY MEDICINE

## 2022-11-02 PROCEDURE — 99999 PR PBB SHADOW E&M-EST. PATIENT-LVL IV: CPT | Mod: PBBFAC,,, | Performed by: FAMILY MEDICINE

## 2022-11-02 PROCEDURE — 3046F PR MOST RECENT HEMOGLOBIN A1C LEVEL > 9.0%: ICD-10-PCS | Mod: CPTII,S$GLB,, | Performed by: FAMILY MEDICINE

## 2022-11-02 PROCEDURE — 3008F BODY MASS INDEX DOCD: CPT | Mod: CPTII,S$GLB,, | Performed by: FAMILY MEDICINE

## 2022-11-02 PROCEDURE — 3078F PR MOST RECENT DIASTOLIC BLOOD PRESSURE < 80 MM HG: ICD-10-PCS | Mod: CPTII,S$GLB,, | Performed by: FAMILY MEDICINE

## 2022-11-02 PROCEDURE — 3060F PR POS MICROALBUMINURIA RESULT DOCUMENTED/REVIEW: ICD-10-PCS | Mod: CPTII,S$GLB,, | Performed by: FAMILY MEDICINE

## 2022-11-02 PROCEDURE — 3074F SYST BP LT 130 MM HG: CPT | Mod: CPTII,S$GLB,, | Performed by: FAMILY MEDICINE

## 2022-11-02 PROCEDURE — 84439 ASSAY OF FREE THYROXINE: CPT | Performed by: FAMILY MEDICINE

## 2022-11-02 PROCEDURE — 3074F PR MOST RECENT SYSTOLIC BLOOD PRESSURE < 130 MM HG: ICD-10-PCS | Mod: CPTII,S$GLB,, | Performed by: FAMILY MEDICINE

## 2022-11-02 PROCEDURE — 3046F HEMOGLOBIN A1C LEVEL >9.0%: CPT | Mod: CPTII,S$GLB,, | Performed by: FAMILY MEDICINE

## 2022-11-02 PROCEDURE — 4010F ACE/ARB THERAPY RXD/TAKEN: CPT | Mod: CPTII,S$GLB,, | Performed by: FAMILY MEDICINE

## 2022-11-02 PROCEDURE — 83036 HEMOGLOBIN GLYCOSYLATED A1C: CPT | Performed by: FAMILY MEDICINE

## 2022-11-02 PROCEDURE — G0008 FLU VACCINE (QUAD) GREATER THAN OR EQUAL TO 3YO PRESERVATIVE FREE IM: ICD-10-PCS | Mod: S$GLB,,, | Performed by: FAMILY MEDICINE

## 2022-11-02 PROCEDURE — 80076 HEPATIC FUNCTION PANEL: CPT | Performed by: FAMILY MEDICINE

## 2022-11-02 RX ORDER — EZETIMIBE 10 MG/1
10 TABLET ORAL DAILY
Qty: 90 TABLET | Refills: 1 | Status: SHIPPED | OUTPATIENT
Start: 2022-11-02 | End: 2023-07-28 | Stop reason: SDUPTHER

## 2022-11-02 NOTE — PROGRESS NOTES
Subjective:       Patient ID: Alexandra Goins is a 57 y.o. female.    Chief Complaint: Diabetes Mellitus      HPI Comments:       Current Outpatient Medications:     albuterol (PROVENTIL/VENTOLIN HFA) 90 mcg/actuation inhaler, INHALE 2 TO 4 PUFFS BY MOUTH THREE TIMES DAILY AS NEEDED FOR WHEEZING, Disp: 18 g, Rfl: 3    aspirin (ECOTRIN) 81 MG EC tablet, Take 1 tablet (81 mg total) by mouth once daily. (Patient taking differently: Take 81 mg by mouth every other day.), Disp: , Rfl:     benztropine (COGENTIN) 0.5 MG tablet, Take 1 tablet (0.5 mg total) by mouth 2 (two) times daily as needed (dystonia)., Disp: 60 tablet, Rfl: 2    blood sugar diagnostic (TRUE METRIX GLUCOSE TEST STRIP) Strp, USE 1 STRIP TO CHECK GLUCOSE THREE TIMES DAILY, Disp: 100 strip, Rfl: 3    buPROPion (WELLBUTRIN XL) 150 MG TB24 tablet, Take 1 tablet (150 mg total) by mouth once daily., Disp: 30 tablet, Rfl: 2    butalbital-acetaminophen-caffeine -40 mg (FIORICET, ESGIC) -40 mg per tablet, Take 1 tablet by mouth every 6 (six) hours as needed for Headaches. for pain., Disp: 30 tablet, Rfl: 0    diazePAM (VALIUM) 10 MG Tab, Take 1/2 to 1 tablet three times daily as needed for anxiety, Disp: 90 tablet, Rfl: 2    doxepin (SINEQUAN) 10 MG capsule, Take 1 to 2 capsules at bedtime, Disp: 60 capsule, Rfl: 2    empagliflozin (JARDIANCE) 25 mg tablet, Take 1 tablet (25 mg total) by mouth once daily., Disp: 90 tablet, Rfl: 1    furosemide (LASIX) 40 MG tablet, TAKE 1 TABLET BY MOUTH EVERY MONDAY, WEDNESDAY, AND FRIDAY AS NEEDED, Disp: 45 tablet, Rfl: 2    gabapentin (NEURONTIN) 600 MG tablet, TAKE 1 TABLET BY MOUTH IN THE MORNING AND  TAKE  1  TABLET  BY  MOUTH  IN  THE  AFTERNOON  THEN  TAKE  2  TABLETS  BY  MOUTH  AT  BEDTIME, Disp: 120 tablet, Rfl: 1    insulin  unit/mL injection, Inject 25 Units into the skin 2 (two) times daily before meals., Disp: 20 mL, Rfl: 0    insulin regular, human (NOVOLIN R INJ), , Disp: , Rfl:      isosorbide mononitrate (IMDUR) 30 MG 24 hr tablet, Take 1 tablet by mouth once daily, Disp: 90 tablet, Rfl: 2    losartan (COZAAR) 25 MG tablet, Take 1 tablet (25 mg total) by mouth once daily., Disp: 90 tablet, Rfl: 3    metFORMIN (GLUCOPHAGE-XR) 500 MG ER 24hr tablet, Take 1 tablet (500 mg total) by mouth 2 (two) times daily with meals., Disp: 180 tablet, Rfl: 1    metoprolol succinate (TOPROL-XL) 25 MG 24 hr tablet, Take 1 tablet by mouth once daily, Disp: 90 tablet, Rfl: 3    nicotine (NICODERM CQ) 14 mg/24 hr, Place 1 patch onto the skin once daily., Disp: 14 patch, Rfl: 0    promethazine (PHENERGAN) 12.5 MG Tab, Take 1 tablet (12.5 mg total) by mouth every 6 (six) hours as needed., Disp: 20 tablet, Rfl: 0    risperiDONE (RISPERDAL) 1 MG tablet, Take 1 tablet (1 mg total) by mouth 2 (two) times daily., Disp: 60 tablet, Rfl: 2    sulfamethoxazole-trimethoprim 800-160mg (BACTRIM DS) 800-160 mg Tab, Take 1 tablet by mouth 2 (two) times daily., Disp: 14 tablet, Rfl: 0    albuterol-ipratropium (DUO-NEB) 2.5 mg-0.5 mg/3 mL nebulizer solution, Take 3 mLs by nebulization every 6 (six) hours as needed for Wheezing. Rescue, Disp: 1 Box, Rfl: 0    ezetimibe (ZETIA) 10 mg tablet, Take 1 tablet (10 mg total) by mouth once daily., Disp: 90 tablet, Rfl: 1    fluticasone-salmeterol diskus inhaler 250-50 mcg, Inhale 1 puff into the lungs 2 (two) times daily. Controller for maintenance, Disp: 60 each, Rfl: 0  Routine follow-up.  Seen about 2 months ago.      Out of her Zetia.  Here for fasting blood work today    Blood sugars have been improved since starting Jardiance.  Less than 200 generally peer    Feels well.  Occasional headaches.    Wants to get off her oxygen.  Does not use it.  Will discuss with pulmonology    Last A1c 11.2 in July.  Followed by diabetes care.  Sees them in mid December again.  Will get A1c today.      Recently elevated liver function test.  Trending downward.  Recheck today    Review of Systems  "  Constitutional:  Negative for activity change, appetite change and fever.   HENT:  Negative for sore throat.    Respiratory:  Negative for cough and shortness of breath.    Cardiovascular:  Negative for chest pain.   Gastrointestinal:  Negative for abdominal pain, diarrhea and nausea.   Genitourinary:  Negative for difficulty urinating.   Musculoskeletal:  Negative for arthralgias and myalgias.   Neurological:  Negative for dizziness and headaches.     Objective:      Vitals:    11/02/22 0849   BP: 98/66   Pulse: 101   Temp: 97.7 °F (36.5 °C)   SpO2: 95%   Weight: 85.1 kg (187 lb 9.8 oz)   Height: 5' 4" (1.626 m)   PainSc: 0-No pain     Physical Exam  Vitals and nursing note reviewed.   Constitutional:       General: She is not in acute distress.     Appearance: She is well-developed. She is not diaphoretic.   HENT:      Head: Normocephalic.   Neck:      Thyroid: No thyromegaly.   Cardiovascular:      Rate and Rhythm: Normal rate and regular rhythm.      Heart sounds: Normal heart sounds. No murmur heard.  Pulmonary:      Effort: Pulmonary effort is normal.      Breath sounds: Normal breath sounds. No wheezing or rales.   Abdominal:      General: There is no distension.      Palpations: Abdomen is soft.   Musculoskeletal:      Cervical back: Neck supple.      Right lower leg: No edema.      Left lower leg: No edema.   Lymphadenopathy:      Cervical: No cervical adenopathy.   Skin:     General: Skin is warm and dry.   Neurological:      Mental Status: She is alert and oriented to person, place, and time.   Psychiatric:         Mood and Affect: Mood normal.         Behavior: Behavior normal.         Thought Content: Thought content normal.         Judgment: Judgment normal.       Assessment:       1. Type 2 diabetes mellitus with complication, with long-term current use of insulin    2. Diabetic polyneuropathy associated with type 2 diabetes mellitus    3. Bipolar affective disorder, remission status unspecified  "   4. Hypothyroidism due to acquired atrophy of thyroid    5. Essential hypertension    6. Cerebrovascular accident (CVA), unspecified mechanism    7. Statin myopathy          Plan:   Type 2 diabetes mellitus with complication, with long-term current use of insulin  Comments:  Previously uncontrolled.  Follow-up with diabetes care in 4 weeks.  A1c today  Orders:  -     Hemoglobin A1C; Future; Expected date: 11/02/2022  -     Lipid Panel; Future; Expected date: 11/02/2022  -     Hepatic Function Panel; Future; Expected date: 11/02/2022    Diabetic polyneuropathy associated with type 2 diabetes mellitus  Comments:  Stable    Bipolar affective disorder, remission status unspecified  Comments:  Stable.  Appears less sedated than usual    Hypothyroidism due to acquired atrophy of thyroid  Comments:  TSH today  Orders:  -     TSH; Future; Expected date: 11/02/2022    Essential hypertension  Comments:  Controlled.  Follow-up March    Cerebrovascular accident (CVA), unspecified mechanism  Comments:  Stable    Statin myopathy  Comments:  on zetia. refilled    Other orders  -     ezetimibe (ZETIA) 10 mg tablet; Take 1 tablet (10 mg total) by mouth once daily.  Dispense: 90 tablet; Refill: 1  -     Influenza - Quadrivalent (PF)

## 2022-11-03 ENCOUNTER — DOCUMENT SCAN (OUTPATIENT)
Dept: HOME HEALTH SERVICES | Facility: HOSPITAL | Age: 57
End: 2022-11-03
Payer: MEDICARE

## 2022-11-07 ENCOUNTER — DOCUMENT SCAN (OUTPATIENT)
Dept: HOME HEALTH SERVICES | Facility: HOSPITAL | Age: 57
End: 2022-11-07
Payer: MEDICARE

## 2022-11-08 ENCOUNTER — TELEPHONE (OUTPATIENT)
Dept: SMOKING CESSATION | Facility: CLINIC | Age: 57
End: 2022-11-08
Payer: MEDICARE

## 2022-11-08 NOTE — TELEPHONE ENCOUNTER
1st attempt, patient called after reviewing her chart and noticing she had not logged into her patient portal today.  Voicemail reminding patient of today's visit, and contact information given.

## 2022-11-08 NOTE — TELEPHONE ENCOUNTER
2nd attempt, patient called after not checking in for today's virtual visit.  Voicemail with contact information given.  Patient notified that no upcoming visits are scheduled.

## 2022-11-16 ENCOUNTER — LAB VISIT (OUTPATIENT)
Dept: LAB | Facility: HOSPITAL | Age: 57
End: 2022-11-16
Attending: INTERNAL MEDICINE
Payer: MEDICARE

## 2022-11-16 ENCOUNTER — OFFICE VISIT (OUTPATIENT)
Dept: CARDIOLOGY | Facility: CLINIC | Age: 57
End: 2022-11-16
Payer: MEDICARE

## 2022-11-16 VITALS
SYSTOLIC BLOOD PRESSURE: 94 MMHG | WEIGHT: 187.5 LBS | BODY MASS INDEX: 32.18 KG/M2 | HEART RATE: 107 BPM | DIASTOLIC BLOOD PRESSURE: 62 MMHG | OXYGEN SATURATION: 94 %

## 2022-11-16 DIAGNOSIS — T46.6X5A STATIN MYOPATHY: ICD-10-CM

## 2022-11-16 DIAGNOSIS — E11.8 TYPE 2 DIABETES MELLITUS WITH COMPLICATION, WITH LONG-TERM CURRENT USE OF INSULIN: Chronic | ICD-10-CM

## 2022-11-16 DIAGNOSIS — E11.69 DYSLIPIDEMIA ASSOCIATED WITH TYPE 2 DIABETES MELLITUS: ICD-10-CM

## 2022-11-16 DIAGNOSIS — Z78.9 STATIN INTOLERANCE: ICD-10-CM

## 2022-11-16 DIAGNOSIS — F17.200 SMOKING: ICD-10-CM

## 2022-11-16 DIAGNOSIS — I10 ESSENTIAL HYPERTENSION: Primary | Chronic | ICD-10-CM

## 2022-11-16 DIAGNOSIS — I25.118 CORONARY ARTERY DISEASE OF NATIVE ARTERY OF NATIVE HEART WITH STABLE ANGINA PECTORIS: Chronic | ICD-10-CM

## 2022-11-16 DIAGNOSIS — Z79.4 TYPE 2 DIABETES MELLITUS WITH COMPLICATION, WITH LONG-TERM CURRENT USE OF INSULIN: Chronic | ICD-10-CM

## 2022-11-16 DIAGNOSIS — E66.09 CLASS 1 OBESITY DUE TO EXCESS CALORIES WITH SERIOUS COMORBIDITY AND BODY MASS INDEX (BMI) OF 33.0 TO 33.9 IN ADULT: ICD-10-CM

## 2022-11-16 DIAGNOSIS — G72.0 STATIN MYOPATHY: ICD-10-CM

## 2022-11-16 DIAGNOSIS — I73.9 PVD (PERIPHERAL VASCULAR DISEASE): ICD-10-CM

## 2022-11-16 DIAGNOSIS — E78.5 DYSLIPIDEMIA ASSOCIATED WITH TYPE 2 DIABETES MELLITUS: ICD-10-CM

## 2022-11-16 DIAGNOSIS — N39.0 UTI (URINARY TRACT INFECTION), UNCOMPLICATED: ICD-10-CM

## 2022-11-16 LAB
BACTERIA #/AREA URNS AUTO: ABNORMAL /HPF
BILIRUB UR QL STRIP: NEGATIVE
CLARITY UR REFRACT.AUTO: ABNORMAL
COLOR UR AUTO: YELLOW
GLUCOSE UR QL STRIP: ABNORMAL
HGB UR QL STRIP: NEGATIVE
HYALINE CASTS UR QL AUTO: 1 /LPF
KETONES UR QL STRIP: NEGATIVE
LEUKOCYTE ESTERASE UR QL STRIP: ABNORMAL
MICROSCOPIC COMMENT: ABNORMAL
NITRITE UR QL STRIP: NEGATIVE
PH UR STRIP: 5 [PH] (ref 5–8)
PROT UR QL STRIP: NEGATIVE
RBC #/AREA URNS AUTO: 4 /HPF (ref 0–4)
SP GR UR STRIP: 1.01 (ref 1–1.03)
SQUAMOUS #/AREA URNS AUTO: 2 /HPF
URN SPEC COLLECT METH UR: ABNORMAL
WBC #/AREA URNS AUTO: 31 /HPF (ref 0–5)
WBC CLUMPS UR QL AUTO: ABNORMAL
YEAST UR QL AUTO: ABNORMAL

## 2022-11-16 PROCEDURE — 3060F POS MICROALBUMINURIA REV: CPT | Mod: CPTII,S$GLB,, | Performed by: INTERNAL MEDICINE

## 2022-11-16 PROCEDURE — 3072F PR LOW RISK FOR RETINOPATHY: ICD-10-PCS | Mod: CPTII,S$GLB,, | Performed by: INTERNAL MEDICINE

## 2022-11-16 PROCEDURE — 1159F PR MEDICATION LIST DOCUMENTED IN MEDICAL RECORD: ICD-10-PCS | Mod: CPTII,S$GLB,, | Performed by: INTERNAL MEDICINE

## 2022-11-16 PROCEDURE — 3008F BODY MASS INDEX DOCD: CPT | Mod: CPTII,S$GLB,, | Performed by: INTERNAL MEDICINE

## 2022-11-16 PROCEDURE — 3074F PR MOST RECENT SYSTOLIC BLOOD PRESSURE < 130 MM HG: ICD-10-PCS | Mod: CPTII,S$GLB,, | Performed by: INTERNAL MEDICINE

## 2022-11-16 PROCEDURE — 1160F PR REVIEW ALL MEDS BY PRESCRIBER/CLIN PHARMACIST DOCUMENTED: ICD-10-PCS | Mod: CPTII,S$GLB,, | Performed by: INTERNAL MEDICINE

## 2022-11-16 PROCEDURE — 3066F NEPHROPATHY DOC TX: CPT | Mod: CPTII,S$GLB,, | Performed by: INTERNAL MEDICINE

## 2022-11-16 PROCEDURE — 3060F PR POS MICROALBUMINURIA RESULT DOCUMENTED/REVIEW: ICD-10-PCS | Mod: CPTII,S$GLB,, | Performed by: INTERNAL MEDICINE

## 2022-11-16 PROCEDURE — 3078F PR MOST RECENT DIASTOLIC BLOOD PRESSURE < 80 MM HG: ICD-10-PCS | Mod: CPTII,S$GLB,, | Performed by: INTERNAL MEDICINE

## 2022-11-16 PROCEDURE — 99214 OFFICE O/P EST MOD 30 MIN: CPT | Mod: S$GLB,,, | Performed by: INTERNAL MEDICINE

## 2022-11-16 PROCEDURE — 3074F SYST BP LT 130 MM HG: CPT | Mod: CPTII,S$GLB,, | Performed by: INTERNAL MEDICINE

## 2022-11-16 PROCEDURE — 3046F HEMOGLOBIN A1C LEVEL >9.0%: CPT | Mod: CPTII,S$GLB,, | Performed by: INTERNAL MEDICINE

## 2022-11-16 PROCEDURE — 99999 PR PBB SHADOW E&M-EST. PATIENT-LVL IV: ICD-10-PCS | Mod: PBBFAC,,, | Performed by: INTERNAL MEDICINE

## 2022-11-16 PROCEDURE — 1160F RVW MEDS BY RX/DR IN RCRD: CPT | Mod: CPTII,S$GLB,, | Performed by: INTERNAL MEDICINE

## 2022-11-16 PROCEDURE — 3008F PR BODY MASS INDEX (BMI) DOCUMENTED: ICD-10-PCS | Mod: CPTII,S$GLB,, | Performed by: INTERNAL MEDICINE

## 2022-11-16 PROCEDURE — 3046F PR MOST RECENT HEMOGLOBIN A1C LEVEL > 9.0%: ICD-10-PCS | Mod: CPTII,S$GLB,, | Performed by: INTERNAL MEDICINE

## 2022-11-16 PROCEDURE — 99999 PR PBB SHADOW E&M-EST. PATIENT-LVL IV: CPT | Mod: PBBFAC,,, | Performed by: INTERNAL MEDICINE

## 2022-11-16 PROCEDURE — 3066F PR DOCUMENTATION OF TREATMENT FOR NEPHROPATHY: ICD-10-PCS | Mod: CPTII,S$GLB,, | Performed by: INTERNAL MEDICINE

## 2022-11-16 PROCEDURE — 3072F LOW RISK FOR RETINOPATHY: CPT | Mod: CPTII,S$GLB,, | Performed by: INTERNAL MEDICINE

## 2022-11-16 PROCEDURE — 1159F MED LIST DOCD IN RCRD: CPT | Mod: CPTII,S$GLB,, | Performed by: INTERNAL MEDICINE

## 2022-11-16 PROCEDURE — 81001 URINALYSIS AUTO W/SCOPE: CPT | Performed by: INTERNAL MEDICINE

## 2022-11-16 PROCEDURE — 4010F PR ACE/ARB THEARPY RXD/TAKEN: ICD-10-PCS | Mod: CPTII,S$GLB,, | Performed by: INTERNAL MEDICINE

## 2022-11-16 PROCEDURE — 4010F ACE/ARB THERAPY RXD/TAKEN: CPT | Mod: CPTII,S$GLB,, | Performed by: INTERNAL MEDICINE

## 2022-11-16 PROCEDURE — 3078F DIAST BP <80 MM HG: CPT | Mod: CPTII,S$GLB,, | Performed by: INTERNAL MEDICINE

## 2022-11-16 PROCEDURE — 99214 PR OFFICE/OUTPT VISIT, EST, LEVL IV, 30-39 MIN: ICD-10-PCS | Mod: S$GLB,,, | Performed by: INTERNAL MEDICINE

## 2022-11-16 NOTE — PROGRESS NOTES
Subjective:   Patient ID:  Alexandra Goins is a 57 y.o. female who presents for follow up of No chief complaint on file.      57 yo female, came in for 3 months f/u  PMH CAD, PVCs, h/o syncope, HTN, HLD, cerebral aneurysm, h/o CVA, DM II, asthma, GERD, bipolar disorder, family h/o premature CAD, and tobacco. As well as brain anuerysm   In , episode of syncope. Jerking, myoclonic, HR dropped to 40 bpm. Family member did CPR, called EMR and added ambu bag for breathing.   Sent to [facility] and Neurology consult did not feel this was consistent with a CVA. MRI MRA were unremarkable. MRA of the brain revealed small aneurysm that is not new per family. Additional work-up including chemistries, blood glucose, thyroid studies, CBC, syphilis work-up, respiratory viral panel including COVID-19, EEG, and echo were unrevealing.  Seizure could be a possible diagnosis, neurology recommends close follow-up in the outpatient setting to monitor for any further episodes.  Troponin negative   ECHO normal EF  Had similar episode 1 yr ago.   Few faint/syncope episodes and had hand injury     MPI no ischemia and ZIOPATCH showed 1% PVCs  Last syncope episode of staring for 10 minutes with hands shaking in . Standing up and no collapsed. Could sit and BP/HR low. Recovered spontaneously and only confused for 30 seconds\  No chest pain. SOB due to smoking. Today EKG NSR     visit, Had repatha rx for 2 months and c/o swelling of feet and hands for 5 days. With pain on feet. Last dose was 4 weeks ago.  AYALA someday. Has COPD and smoking. Breathing Rx as needed  Sleeps as elevated.  No chest pain  No recurrent syncope    06/2021 visit  Feels sick after resumed repatha  No recurrent syncope. Chest heaviness with SOB when waking up. No those symptoms at daytime.  Today EKG NSR PVC and  bpm    12/2021 visit  3 weeks ago, episode of syncope with low BP 70 mmHG. ? UTI  Chronic left chest pain, related with  anxeity. Chronic SOB, smoking and on breathing Rx.  ekg NSR no acute stt change. BP controlled. A1C > 14 in .     visit  The chest pain improved a lot after added Imdur 30 mg daily at last visit. The episode of syncope occurred in  with vision loss at sitting. The family helped her laying down and woke up in few munites. BP was low. Continues to smoke.  Will do brain MRI soon for brain aneurysm     visit  The chest pain improved, no recurrent syncope. Occasional dizzziness   Phar.MPI no ischemia,. Normal EF  F/u neurosurgery at [facility] and brain aneurysm small and 2yrs f/u    Interval history   admitted for septic shock with salmonella bacteremia/UTI. Off home O2 now   echo showed normal biv function  BP soft no dizziness and faint. Some burning        Past Medical History:   Diagnosis Date    Acute ischemic stroke 9/17/2019    Acute respiratory failure with hypoxia 2/11/2021    Aneurysm     Anxiety     Arthritis     Asthma     Bipolar 1 disorder     Cerebral aneurysm, nonruptured 9/19/2019    Coronary artery disease of native artery of native heart with stable angina pectoris 1/19/2022    Depression     Diabetes mellitus, type 2     Diverticular disease     GERD (gastroesophageal reflux disease)     Hyperlipemia     Hypertension     Hyponatremia 7/24/2022    Hypothyroidism     Pneumonia     PVD (peripheral vascular disease) 4/28/2021    Renal manifestation of secondary diabetes mellitus     Right-sided back pain 6/29/2019    Severe obesity (BMI 35.0-39.9) with comorbidity 5/31/2022    Stroke     Trouble in sleeping        Past Surgical History:   Procedure Laterality Date    CEREBRAL ANGIOGRAM N/A 10/28/2019    Procedure: ANGIOGRAM-CEREBRAL;  Surgeon: Dahiana Diagnostic Provider;  Location: [facility] OR [address] [place] [address];  Service: Radiology;  Laterality: N/A;  Rm 190/Aayush    CHOLECYSTECTOMY      COLON SURGERY      COLONOSCOPY N/A 1/12/2018    Procedure: COLONOSCOPY;  Surgeon: Roque RAMESH  Keyshawn BRIDGES MD;  Location: [facility];  Service: Endoscopy;  Laterality: N/A;    DENTAL SURGERY  05/21/2018    removal of 8 top teeth    HYSTERECTOMY      TONSILLECTOMY         Social History     Tobacco Use    Smoking status: Every Day     Packs/day: 1.00     Years: 44.00     Pack years: 44.00     Types: Cigarettes, Vaping w/o nicotine     Start date: 1978    Smokeless tobacco: Never   Substance Use Topics    Alcohol use: Not Currently     Comment: occassionally    Drug use: Yes     Types: Marijuana       Family History   Problem Relation Age of Onset    Alcohol abuse Mother     COPD Mother     Depression Mother     Drug abuse Mother     Alcohol abuse Father     Arthritis Father     Cancer Father     Heart disease Father     Hypertension Father     COPD Sister     Kidney failure Sister     Heart disease Brother     Hypertension Brother     Cancer Maternal Aunt     Cancer Maternal Uncle     Diabetes Maternal Uncle     Drug abuse Maternal Uncle     Cancer Paternal Aunt     Cancer Paternal Uncle     Arthritis Maternal Grandmother     Hypertension Maternal Grandmother     Breast cancer Maternal Grandmother     Arthritis Paternal Grandmother     Cancer Paternal Grandmother     Hypertension Paternal Grandfather          Review of Systems   Constitutional: Negative for decreased appetite, diaphoresis, fever, malaise/fatigue and night sweats.   HENT:  Negative for nosebleeds.    Eyes:  Negative for blurred vision and double vision.   Cardiovascular:  Positive for leg swelling and syncope. Negative for claudication, dyspnea on exertion, irregular heartbeat, near-syncope, palpitations and paroxysmal nocturnal dyspnea.   Respiratory:  Negative for cough, shortness of breath, sleep disturbances due to breathing, snoring, sputum production and wheezing.    Endocrine: Negative for cold intolerance and polyuria.   Hematologic/Lymphatic: Does not bruise/bleed easily.   Skin:  Negative for rash.   Musculoskeletal:  Negative for back  pain, falls, joint pain, joint swelling and neck pain.   Gastrointestinal:  Negative for abdominal pain, heartburn, nausea and vomiting.   Genitourinary:  Positive for dysuria. Negative for frequency and hematuria.   Neurological:  Positive for dizziness. Negative for difficulty with concentration, focal weakness, headaches, light-headedness, numbness, seizures and weakness.   Psychiatric/Behavioral:  Negative for depression, memory loss and substance abuse. The patient does not have insomnia.    Allergic/Immunologic: Negative for HIV exposure and hives.     Objective:   Physical Exam  HENT:      Head: Normocephalic.   Eyes:      Pupils: Pupils are equal, round, and reactive to light.   Neck:      Thyroid: No thyromegaly.      Vascular: Normal carotid pulses. No carotid bruit or JVD.   Cardiovascular:      Rate and Rhythm: Normal rate and regular rhythm. No extrasystoles are present.     Chest Wall: PMI is not displaced.      Pulses: Normal pulses.           Carotid pulses are 2+ on the right side and 2+ on the left side.     Heart sounds: Normal heart sounds. No murmur heard.    No gallop. No S3 sounds.   Pulmonary:      Effort: No respiratory distress.      Breath sounds: Normal breath sounds. No stridor.   Abdominal:      General: Bowel sounds are normal.      Palpations: Abdomen is soft.      Tenderness: There is no abdominal tenderness. There is no rebound.   Musculoskeletal:         General: Normal range of motion.   Skin:     Findings: No rash.   Neurological:      Mental Status: She is alert and oriented to person, place, and time.   Psychiatric:         Behavior: Behavior normal.       Lab Results   Component Value Date    CHOL 197 11/02/2022    CHOL 145 10/20/2021    CHOL 213 (H) 01/28/2020     Lab Results   Component Value Date    HDL 44 11/02/2022    HDL 61 10/20/2021    HDL 74 01/28/2020     Lab Results   Component Value Date    LDLCALC 90.6 11/02/2022    LDLCALC 55.0 (L) 10/20/2021    LDLCALC 110.4  01/28/2020     Lab Results   Component Value Date    TRIG 312 (H) 11/02/2022    TRIG 145 10/20/2021    TRIG 143 01/28/2020     Lab Results   Component Value Date    CHOLHDL 22.3 11/02/2022    CHOLHDL 42.1 10/20/2021    CHOLHDL 34.7 01/28/2020       Chemistry        Component Value Date/Time     08/02/2022 0538    K 4.2 08/02/2022 0538    CL 93 (L) 08/02/2022 0538    CO2 35 (H) 08/02/2022 0538    BUN 28 (H) 08/02/2022 0538    CREATININE 0.9 08/02/2022 0538     (H) 08/02/2022 0538        Component Value Date/Time    CALCIUM 8.4 (L) 08/02/2022 0538    ALKPHOS 63 11/02/2022 0924    AST 51 (H) 11/02/2022 0924    ALT 63 (H) 11/02/2022 0924    BILITOT 0.5 11/02/2022 0924    ESTGFRAFRICA >60 07/31/2022 0700    EGFRNONAA >60 07/31/2022 0700          Lab Results   Component Value Date    HGBA1C 9.1 (H) 11/02/2022     Lab Results   Component Value Date    TSH 4.548 (H) 11/02/2022     Lab Results   Component Value Date    INR 1.0 10/28/2019    INR 1.0 09/17/2019    INR 1.0 06/21/2018     Lab Results   Component Value Date    WBC 11.52 08/02/2022    HGB 12.1 08/02/2022    HCT 39.2 08/02/2022    MCV 97 08/02/2022     08/02/2022     BMP  Sodium   Date Value Ref Range Status   08/02/2022 138 136 - 145 mmol/L Final     Potassium   Date Value Ref Range Status   08/02/2022 4.2 3.5 - 5.1 mmol/L Final     Chloride   Date Value Ref Range Status   08/02/2022 93 (L) 95 - 110 mmol/L Final     CO2   Date Value Ref Range Status   08/02/2022 35 (H) 23 - 29 mmol/L Final     BUN   Date Value Ref Range Status   08/02/2022 28 (H) 6 - 20 mg/dL Final     Creatinine   Date Value Ref Range Status   08/02/2022 0.9 0.5 - 1.4 mg/dL Final     Calcium   Date Value Ref Range Status   08/02/2022 8.4 (L) 8.7 - 10.5 mg/dL Final     Anion Gap   Date Value Ref Range Status   08/02/2022 10 8 - 16 mmol/L Final     eGFR if    Date Value Ref Range Status   07/31/2022 >60 >60 mL/min/1.73 m^2 Final     eGFR if non African American    Date Value Ref Range Status   07/31/2022 >60 >60 mL/min/1.73 m^2 Final     Comment:     Calculation used to obtain the estimated glomerular filtration  rate (eGFR) is the CKD-EPI equation.        BNP  @LABRCNTIP(BNP,BNPTRIAGEBLO)@  @LABRCNTIP(troponini)@  CrCl cannot be calculated (Patient's most recent lab result is older than the maximum 7 days allowed.).  No results found in the last 24 hours.  No results found in the last 24 hours.  No results found in the last 24 hours.    Assessment:      1. Essential hypertension    2. Coronary artery disease of native artery of native heart with stable angina pectoris    3. Dyslipidemia associated with type 2 diabetes mellitus    4. PVD (peripheral vascular disease)    5. Statin intolerance    6. Statin myopathy    7. UTI (urinary tract infection), uncomplicated    8. Type 2 diabetes mellitus with complication, with long-term current use of insulin    9. Class 1 obesity due to excess calories with serious comorbidity and body mass index (BMI) of 33.0 to 33.9 in adult    10. Smoking      Burning urination  Low BP    Plan:   UA to r/o UTI  D/c losartan   Continue Imdur metoprolol, lasix as needed zetia and ASA  Smoking cessation  DM Rx per PCP  Counseled DASH  Check Lipid profile in 6 months  Recommend heart-healthy diet, weight control and regular exercise.  Norman. Risk modification.   I have reviewed all pertinent labs and cardiac studies independently. Plans and recommendations have been formulated under my direct supervision. All questions answered and patient voiced understanding.   If symptoms persist go to the ED  RTC in 6 months

## 2022-11-17 ENCOUNTER — PATIENT MESSAGE (OUTPATIENT)
Dept: CARDIOLOGY | Facility: CLINIC | Age: 57
End: 2022-11-17
Payer: MEDICARE

## 2022-11-29 ENCOUNTER — PATIENT MESSAGE (OUTPATIENT)
Dept: OPHTHALMOLOGY | Facility: CLINIC | Age: 57
End: 2022-11-29

## 2022-11-29 ENCOUNTER — OFFICE VISIT (OUTPATIENT)
Dept: OPHTHALMOLOGY | Facility: CLINIC | Age: 57
End: 2022-11-29
Payer: MEDICARE

## 2022-11-29 DIAGNOSIS — E11.9 DIABETES MELLITUS TYPE 2 WITHOUT RETINOPATHY: Primary | ICD-10-CM

## 2022-11-29 DIAGNOSIS — E11.36 DIABETIC CATARACT: ICD-10-CM

## 2022-11-29 DIAGNOSIS — H52.7 REFRACTIVE ERRORS: ICD-10-CM

## 2022-11-29 PROCEDURE — 1159F PR MEDICATION LIST DOCUMENTED IN MEDICAL RECORD: ICD-10-PCS | Mod: CPTII,S$GLB,, | Performed by: OPTOMETRIST

## 2022-11-29 PROCEDURE — 2023F DILAT RTA XM W/O RTNOPTHY: CPT | Mod: CPTII,S$GLB,, | Performed by: OPTOMETRIST

## 2022-11-29 PROCEDURE — 3066F PR DOCUMENTATION OF TREATMENT FOR NEPHROPATHY: ICD-10-PCS | Mod: CPTII,S$GLB,, | Performed by: OPTOMETRIST

## 2022-11-29 PROCEDURE — 3046F HEMOGLOBIN A1C LEVEL >9.0%: CPT | Mod: CPTII,S$GLB,, | Performed by: OPTOMETRIST

## 2022-11-29 PROCEDURE — 4010F ACE/ARB THERAPY RXD/TAKEN: CPT | Mod: CPTII,S$GLB,, | Performed by: OPTOMETRIST

## 2022-11-29 PROCEDURE — 99999 PR PBB SHADOW E&M-EST. PATIENT-LVL III: CPT | Mod: PBBFAC,,, | Performed by: OPTOMETRIST

## 2022-11-29 PROCEDURE — 92015 DETERMINE REFRACTIVE STATE: CPT | Mod: S$GLB,,, | Performed by: OPTOMETRIST

## 2022-11-29 PROCEDURE — 1159F MED LIST DOCD IN RCRD: CPT | Mod: CPTII,S$GLB,, | Performed by: OPTOMETRIST

## 2022-11-29 PROCEDURE — 3046F PR MOST RECENT HEMOGLOBIN A1C LEVEL > 9.0%: ICD-10-PCS | Mod: CPTII,S$GLB,, | Performed by: OPTOMETRIST

## 2022-11-29 PROCEDURE — 92014 COMPRE OPH EXAM EST PT 1/>: CPT | Mod: S$GLB,,, | Performed by: OPTOMETRIST

## 2022-11-29 PROCEDURE — 92015 PR REFRACTION: ICD-10-PCS | Mod: S$GLB,,, | Performed by: OPTOMETRIST

## 2022-11-29 PROCEDURE — 99999 PR PBB SHADOW E&M-EST. PATIENT-LVL III: ICD-10-PCS | Mod: PBBFAC,,, | Performed by: OPTOMETRIST

## 2022-11-29 PROCEDURE — 3060F PR POS MICROALBUMINURIA RESULT DOCUMENTED/REVIEW: ICD-10-PCS | Mod: CPTII,S$GLB,, | Performed by: OPTOMETRIST

## 2022-11-29 PROCEDURE — 3066F NEPHROPATHY DOC TX: CPT | Mod: CPTII,S$GLB,, | Performed by: OPTOMETRIST

## 2022-11-29 PROCEDURE — 2023F PR DILATED RETINAL EXAM W/O EVID OF RETINOPATHY: ICD-10-PCS | Mod: CPTII,S$GLB,, | Performed by: OPTOMETRIST

## 2022-11-29 PROCEDURE — 3060F POS MICROALBUMINURIA REV: CPT | Mod: CPTII,S$GLB,, | Performed by: OPTOMETRIST

## 2022-11-29 PROCEDURE — 92014 PR EYE EXAM, EST PATIENT,COMPREHESV: ICD-10-PCS | Mod: S$GLB,,, | Performed by: OPTOMETRIST

## 2022-11-29 PROCEDURE — 4010F PR ACE/ARB THEARPY RXD/TAKEN: ICD-10-PCS | Mod: CPTII,S$GLB,, | Performed by: OPTOMETRIST

## 2022-11-29 NOTE — PROGRESS NOTES
"HPI     Diabetic Eye Exam     Additional comments: Lab Results       Component                Value               Date                       HGBA1C                   9.1 (H)             11/02/2022                       Comments    Pt states.  Eyes have a "light blur" to vision.  Noticed this about 6   months ago.          Last edited by Sunni Gregorio MA on 11/29/2022  1:27 PM.            Assessment /Plan     For exam results, see Encounter Report.    Diabetes mellitus type 2 without retinopathy    Diabetic cataract    Refractive errors      No Background Diabetic Retinopathy    Moderate cataracts OU, not surgical    Dispense Final Rx for glasses.  RTC 1 year  Discussed above and answered questions.                     "

## 2022-12-07 ENCOUNTER — CLINICAL SUPPORT (OUTPATIENT)
Dept: SMOKING CESSATION | Facility: CLINIC | Age: 57
End: 2022-12-07
Payer: COMMERCIAL

## 2022-12-07 DIAGNOSIS — F17.200 NICOTINE DEPENDENCE: Primary | ICD-10-CM

## 2022-12-07 PROCEDURE — 99407 PR TOBACCO USE CESSATION INTENSIVE >10 MINUTES: ICD-10-PCS | Mod: S$GLB,,,

## 2022-12-07 PROCEDURE — 99407 BEHAV CHNG SMOKING > 10 MIN: CPT | Mod: S$GLB,,,

## 2022-12-07 NOTE — PROGRESS NOTES
Spoke with patient today in regard to smoking cessation progress for three month telephone follow up, she states that she is only smoking one cigarette per day at this time. The patient has scheduled an appointment to return to the program for attempt #2 on Tuesday December 13th at 9:00. Informed patient of benefit period future follow ups and contact information if any further help or support is needed. Will complete smart form for Quit attempt #1

## 2022-12-14 ENCOUNTER — OFFICE VISIT (OUTPATIENT)
Dept: DIABETES | Facility: CLINIC | Age: 57
End: 2022-12-14
Payer: MEDICARE

## 2022-12-14 DIAGNOSIS — E11.42 DIABETIC POLYNEUROPATHY ASSOCIATED WITH TYPE 2 DIABETES MELLITUS: Primary | ICD-10-CM

## 2022-12-14 DIAGNOSIS — E78.2 MIXED HYPERLIPIDEMIA: ICD-10-CM

## 2022-12-14 PROCEDURE — 3066F NEPHROPATHY DOC TX: CPT | Mod: HCNC,CPTII,95, | Performed by: NURSE PRACTITIONER

## 2022-12-14 PROCEDURE — 1159F PR MEDICATION LIST DOCUMENTED IN MEDICAL RECORD: ICD-10-PCS | Mod: HCNC,CPTII,95, | Performed by: NURSE PRACTITIONER

## 2022-12-14 PROCEDURE — 3072F LOW RISK FOR RETINOPATHY: CPT | Mod: HCNC,CPTII,95, | Performed by: NURSE PRACTITIONER

## 2022-12-14 PROCEDURE — 3066F PR DOCUMENTATION OF TREATMENT FOR NEPHROPATHY: ICD-10-PCS | Mod: HCNC,CPTII,95, | Performed by: NURSE PRACTITIONER

## 2022-12-14 PROCEDURE — 4010F PR ACE/ARB THEARPY RXD/TAKEN: ICD-10-PCS | Mod: HCNC,CPTII,95, | Performed by: NURSE PRACTITIONER

## 2022-12-14 PROCEDURE — 99213 OFFICE O/P EST LOW 20 MIN: CPT | Mod: HCNC,95,, | Performed by: NURSE PRACTITIONER

## 2022-12-14 PROCEDURE — 1160F PR REVIEW ALL MEDS BY PRESCRIBER/CLIN PHARMACIST DOCUMENTED: ICD-10-PCS | Mod: HCNC,CPTII,95, | Performed by: NURSE PRACTITIONER

## 2022-12-14 PROCEDURE — 4010F ACE/ARB THERAPY RXD/TAKEN: CPT | Mod: HCNC,CPTII,95, | Performed by: NURSE PRACTITIONER

## 2022-12-14 PROCEDURE — 3060F POS MICROALBUMINURIA REV: CPT | Mod: HCNC,CPTII,95, | Performed by: NURSE PRACTITIONER

## 2022-12-14 PROCEDURE — 1159F MED LIST DOCD IN RCRD: CPT | Mod: HCNC,CPTII,95, | Performed by: NURSE PRACTITIONER

## 2022-12-14 PROCEDURE — 3060F PR POS MICROALBUMINURIA RESULT DOCUMENTED/REVIEW: ICD-10-PCS | Mod: HCNC,CPTII,95, | Performed by: NURSE PRACTITIONER

## 2022-12-14 PROCEDURE — 3046F HEMOGLOBIN A1C LEVEL >9.0%: CPT | Mod: HCNC,CPTII,95, | Performed by: NURSE PRACTITIONER

## 2022-12-14 PROCEDURE — 99213 PR OFFICE/OUTPT VISIT, EST, LEVL III, 20-29 MIN: ICD-10-PCS | Mod: HCNC,95,, | Performed by: NURSE PRACTITIONER

## 2022-12-14 PROCEDURE — 3046F PR MOST RECENT HEMOGLOBIN A1C LEVEL > 9.0%: ICD-10-PCS | Mod: HCNC,CPTII,95, | Performed by: NURSE PRACTITIONER

## 2022-12-14 PROCEDURE — 3072F PR LOW RISK FOR RETINOPATHY: ICD-10-PCS | Mod: HCNC,CPTII,95, | Performed by: NURSE PRACTITIONER

## 2022-12-14 PROCEDURE — 1160F RVW MEDS BY RX/DR IN RCRD: CPT | Mod: HCNC,CPTII,95, | Performed by: NURSE PRACTITIONER

## 2022-12-14 RX ORDER — INSULIN GLARGINE 300 U/ML
76 INJECTION, SOLUTION SUBCUTANEOUS DAILY
Qty: 4 PEN | Refills: 3 | Status: SHIPPED | OUTPATIENT
Start: 2022-12-14 | End: 2023-07-06 | Stop reason: SDUPTHER

## 2022-12-14 RX ORDER — PEN NEEDLE, DIABETIC 30 GX3/16"
1 NEEDLE, DISPOSABLE MISCELLANEOUS DAILY
Qty: 100 EACH | Refills: 3 | Status: ON HOLD | OUTPATIENT
Start: 2022-12-14 | End: 2024-03-14

## 2022-12-14 NOTE — PROGRESS NOTES
PCP: Perez Casiano MD    Subjective:     Chief Complaint: Diabetes Follow Up    HISTORY OF PRESENT ILLNESS: 57 y.o.  female presenting for diabetes follow up.  She has been lost to follow up > 2 years.      Patient has had Type II diabetes since 1985 and has the following complications: peripheral neuropathy, PVD, and CAD.  Other pertinent conditions include: bipolar disorder and depression ( follows psych ) and hypoglycemia-induced seizures. She has attended diabetes education in the past.     In July, patient was hospitalized for several days due to UTI, sepsis, and pneumonia. Upon discharge she was on 02, and was receiving home health. Since then, no other hospitalizations, emergency room visits, syncope, or diaphoresis.  Blood glucose monitoring is performed. Per patient, fasting BG s are 180 s - 350 s; pp 200 - 400 s.      Labs Reviewed.       Lab Results   Component Value Date    CPEPTIDE 3.4 01/25/2017     Lab Results   Component Value Date    GLUTAMICACID 0.01 01/25/2017     Lab Results   Component Value Date    HUMANINSULIN 0.00 01/25/2017     DM MEDICATIONS:   Jardiance 25 mg - no longer taking due to cost  Novolin 70/30 - 70 units BID  Novolin R, takes 5-10 units, as needed  Metformin 500 mg BID ac    Review of Systems   Constitutional:  Negative for appetite change, fatigue and unexpected weight change.   Eyes:  Negative for visual disturbance.   Gastrointestinal:  Negative for abdominal pain, constipation, diarrhea and nausea.   Endocrine: Negative for polydipsia, polyphagia and polyuria.   Neurological:  Positive for numbness (bilateral feet). Negative for dizziness and headaches.   Psychiatric/Behavioral:  Negative for confusion and decreased concentration. The patient is not nervous/anxious.        Diabetes Management Status  Statin: Not taking  ACE/ARB: Not taking    Screening or Prevention Patient's value Goal Complete/Controlled?   HgA1C Testing and Control   Lab Results   Component  Value Date    HGBA1C 9.1 (H) 11/02/2022    HGBA1C 11.2 (H) 07/25/2022    HGBA1C 11.3 (H) 05/31/2022      Annually/Less than 8% No     Lipid profile : 11/02/2022 Annually Yes     LDL control Lab Results   Component Value Date    LDLCALC 90.6 11/02/2022    Annually/Less than 100 mg/dl  Yes     Nephropathy screening Lab Results   Component Value Date    MICALBCREAT 39.1 (H) 09/01/2022     Lab Results   Component Value Date    PROTEINUA Negative 11/16/2022    Annually Yes     Blood pressure BP Readings from Last 1 Encounters:   11/16/22 94/62    Less than 140/90 Yes     Dilated retinal exam : 11/29/2022 Annually Yes    Foot exam   : 05/31/2022 Annually Yes       ACTIVITY LEVEL: Sedentary. Discussed activities, benefits, methods, and precautions.  MEAL PLANNING: Patient reports number of meals per day to be 2 and number of snacks per day to be 0.   Patient is encouraged to carb count and consume no more than 45 - 60 grams of carbohydrates in each meal, and 1800 k / gloria per day.      BLOOD GLUCOSE TESTING:  True Metrix  SOCIAL HISTORY:  .  Smokes cigarettes    Objective:      Physical Exam  Constitutional:       Appearance: She is well-developed.   HENT:      Head: Normocephalic and atraumatic.   Eyes:      Pupils: Pupils are equal, round, and reactive to light.   Pulmonary:      Effort: Pulmonary effort is normal.   Psychiatric:         Behavior: Behavior normal.         Thought Content: Thought content normal.         Judgment: Judgment normal.         Assessment / Plan:     1.) Diabetic polyneuropathy associated with type 2 diabetes mellitus  Comments:  -  Uncontrolled, A1C 9.1 %.  Discussed trying newer analog insulin. Stop Novolin 70/30.  Start Toujeo 76 units ONCE daily.  Continue Novolin R 15 units TID ac.  Continue metformin 500 mg, 1 tablet BID ac.  Will remain OFF Jardiance due to recent history of UTI that required hospitalization.   Orders:  -     insulin glargine U-300 conc (TOUJEO MAX U-300 SOLOSTAR)  "300 unit/mL (3 mL) insulin pen; Inject 76 Units into the skin once daily.  Dispense: 4 pen; Refill: 3  -     pen needle, diabetic 32 gauge x 5/32" Ndle; Inject 1 each into the skin once daily.  Dispense: 100 each; Refill: 3  -     lancets Misc; 1 each by Misc.(Non-Drug; Combo Route) route 3 (three) times daily. Dispense preferred on insurance  Dispense: 100 each; Refill: 11  -     blood-glucose meter kit; Use as instructed - preferred on insurance  Dispense: 1 each; Refill: 0  -     blood sugar diagnostic Strp; 1 each by Misc.(Non-Drug; Combo Route) route 3 (three) times daily. Dispense preferred on insurance  Dispense: 100 strip; Refill: 11    2.) Mixed hyperlipidemia - continue medications     Additional Plan Details:    1.) Continue monitoring blood sugar 3x daily, fasting and ac dinner or at bedtime. Discussed ADA goal for fasting blood sugar, 80 - 130 mg/dL; pp blood sugars below 180 mg/dl. Also, discussed prevention of hypoglycemia and the need to adjust goals to higher levels if persistent hypoglycemia.  Reminded to bring BG records or meter to each visit for review.  2.) Return to clinic in 3 months for follow up. The patient was explained the above plan and given opportunity to ask questions.  She understands, chooses and consents to this plan and accepts all the risks, which include but are not limited to the risks mentioned above. She understands the alternative of having no testing, interventions or treatments at this time. She left content and without further questions.     Amelia Santana, SAMSON-C, CDE    The patient location is: [facility]  The chief complaint leading to consultation is: Type 2 Diabetes    Visit type: audiovisual    Face to Face time with patient: 12  15  minutes of total time spent on the encounter, which includes face to face time and non-face to face time preparing to see the patient (eg, review of tests), Obtaining and/or reviewing separately obtained history, " Documenting clinical information in the electronic or other health record, Independently interpreting results (not separately reported) and communicating results to the patient/family/caregiver, or Care coordination (not separately reported).     Each patient to whom he or she provides medical services by telemedicine is:  (1) informed of the relationship between the physician and patient and the respective role of any other health care provider with respect to management of the patient; and (2) notified that he or she may decline to receive medical services by telemedicine and may withdraw from such care at any time.

## 2022-12-16 RX ORDER — LANCETS
1 EACH MISCELLANEOUS 3 TIMES DAILY
Qty: 100 EACH | Refills: 11 | Status: SHIPPED | OUTPATIENT
Start: 2022-12-16 | End: 2024-02-21

## 2022-12-16 RX ORDER — INSULIN PUMP SYRINGE, 3 ML
EACH MISCELLANEOUS
Qty: 1 EACH | Refills: 0 | Status: SHIPPED | OUTPATIENT
Start: 2022-12-16 | End: 2024-02-21

## 2022-12-20 ENCOUNTER — CLINICAL SUPPORT (OUTPATIENT)
Dept: SMOKING CESSATION | Facility: CLINIC | Age: 57
End: 2022-12-20
Payer: COMMERCIAL

## 2022-12-20 DIAGNOSIS — F17.200 NICOTINE DEPENDENCE: Primary | ICD-10-CM

## 2022-12-20 PROCEDURE — 99404 PR PREVENT COUNSEL,INDIV,60 MIN: ICD-10-PCS | Mod: 95,,,

## 2022-12-20 PROCEDURE — 99404 PREV MED CNSL INDIV APPRX 60: CPT | Mod: 95,,,

## 2022-12-20 RX ORDER — IBUPROFEN 200 MG
1 TABLET ORAL DAILY
Qty: 21 PATCH | Refills: 0 | Status: SHIPPED | OUTPATIENT
Start: 2022-12-20 | End: 2023-05-24

## 2022-12-20 NOTE — PROGRESS NOTES
FTND score of 4 indicates a moderate dependence to Nicotine.  Patient is smoking one cigarette a day.  JONN-D score of 35 is percieved as significant mental distress and probable depression.   NRT ordered and behavioral changes discussed.  Quit attempt discussed.

## 2023-01-03 ENCOUNTER — TELEPHONE (OUTPATIENT)
Dept: SMOKING CESSATION | Facility: CLINIC | Age: 58
End: 2023-01-03
Payer: MEDICARE

## 2023-01-10 ENCOUNTER — TELEPHONE (OUTPATIENT)
Dept: SMOKING CESSATION | Facility: CLINIC | Age: 58
End: 2023-01-10
Payer: MEDICARE

## 2023-01-10 NOTE — TELEPHONE ENCOUNTER
Patient contacted for reminder to check in for today's virtual visit.  Patient stated she was sick and rescheduled for 01/17/23 at 2 pm.

## 2023-01-17 ENCOUNTER — TELEPHONE (OUTPATIENT)
Dept: SMOKING CESSATION | Facility: CLINIC | Age: 58
End: 2023-01-17
Payer: MEDICARE

## 2023-01-17 NOTE — TELEPHONE ENCOUNTER
1st attempt, patient called after noticing she had not logged in to her [facility] account in over two weeks.  Voicemail with contact information given.

## 2023-02-07 ENCOUNTER — TELEPHONE (OUTPATIENT)
Dept: PSYCHIATRY | Facility: CLINIC | Age: 58
End: 2023-02-07

## 2023-02-07 DIAGNOSIS — Z00.00 ENCOUNTER FOR MEDICARE ANNUAL WELLNESS EXAM: ICD-10-CM

## 2023-02-09 ENCOUNTER — OFFICE VISIT (OUTPATIENT)
Dept: PSYCHIATRY | Facility: CLINIC | Age: 58
End: 2023-02-09
Payer: MEDICARE

## 2023-02-09 DIAGNOSIS — Z00.00 ENCOUNTER FOR MEDICARE ANNUAL WELLNESS EXAM: ICD-10-CM

## 2023-02-09 DIAGNOSIS — F31.9 BIPOLAR 1 DISORDER: Primary | ICD-10-CM

## 2023-02-09 DIAGNOSIS — G47.00 INSOMNIA, UNSPECIFIED TYPE: ICD-10-CM

## 2023-02-09 DIAGNOSIS — F41.9 ANXIETY: ICD-10-CM

## 2023-02-09 PROCEDURE — 99214 PR OFFICE/OUTPT VISIT, EST, LEVL IV, 30-39 MIN: ICD-10-PCS | Mod: HCNC,95,, | Performed by: PSYCHIATRY & NEUROLOGY

## 2023-02-09 PROCEDURE — 3072F PR LOW RISK FOR RETINOPATHY: ICD-10-PCS | Mod: HCNC,CPTII,95, | Performed by: PSYCHIATRY & NEUROLOGY

## 2023-02-09 PROCEDURE — 99499 UNLISTED E&M SERVICE: CPT | Mod: 95,,, | Performed by: PSYCHIATRY & NEUROLOGY

## 2023-02-09 PROCEDURE — 3072F LOW RISK FOR RETINOPATHY: CPT | Mod: HCNC,CPTII,95, | Performed by: PSYCHIATRY & NEUROLOGY

## 2023-02-09 PROCEDURE — 99214 OFFICE O/P EST MOD 30 MIN: CPT | Mod: HCNC,95,, | Performed by: PSYCHIATRY & NEUROLOGY

## 2023-02-09 RX ORDER — BUPROPION HYDROCHLORIDE 150 MG/1
150 TABLET ORAL DAILY
Qty: 30 TABLET | Refills: 2 | Status: SHIPPED | OUTPATIENT
Start: 2023-02-09 | End: 2023-05-11 | Stop reason: SDUPTHER

## 2023-02-09 RX ORDER — DIAZEPAM 10 MG/1
TABLET ORAL
Qty: 90 TABLET | Refills: 2 | Status: SHIPPED | OUTPATIENT
Start: 2023-02-09 | End: 2023-05-11 | Stop reason: SDUPTHER

## 2023-02-09 RX ORDER — MIRTAZAPINE 15 MG/1
15 TABLET, FILM COATED ORAL NIGHTLY
Qty: 30 TABLET | Refills: 2 | Status: SHIPPED | OUTPATIENT
Start: 2023-02-09 | End: 2023-05-11 | Stop reason: SDUPTHER

## 2023-02-09 RX ORDER — RISPERIDONE 1 MG/1
1 TABLET ORAL 2 TIMES DAILY
Qty: 60 TABLET | Refills: 2 | Status: SHIPPED | OUTPATIENT
Start: 2023-02-09 | End: 2023-05-11 | Stop reason: SDUPTHER
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
## 2023-02-21 ENCOUNTER — TELEPHONE (OUTPATIENT)
Dept: FAMILY MEDICINE | Facility: CLINIC | Age: 58
End: 2023-02-21
Payer: MEDICARE

## 2023-02-21 NOTE — TELEPHONE ENCOUNTER
Spoke with Trena letting her know Dr. Casiano is out of the office this afternoon and maybe she can get Pulmonary to sign the order for the O2 reduction of liters. Lester will talk to her PCM and see if Pulmonary can sign off

## 2023-02-21 NOTE — TELEPHONE ENCOUNTER
----- Message from Andrea Garibay sent at 2/21/2023  1:38 PM CST -----  Contact: [facility]/ [facility]  [facility]/ [facility] nurse is calling to follow up on order that needed DR. Navarro, reports needing today. Please call [phone]  Fax: [phone]

## 2023-03-13 ENCOUNTER — DOCUMENT SCAN (OUTPATIENT)
Dept: HOME HEALTH SERVICES | Facility: HOSPITAL | Age: 58
End: 2023-03-13
Payer: MEDICARE

## 2023-03-15 ENCOUNTER — TELEPHONE (OUTPATIENT)
Dept: SMOKING CESSATION | Facility: CLINIC | Age: 58
End: 2023-03-15
Payer: MEDICARE

## 2023-04-05 DIAGNOSIS — E11.9 TYPE 2 DIABETES MELLITUS WITHOUT COMPLICATION: ICD-10-CM

## 2023-04-10 NOTE — TELEPHONE ENCOUNTER
Returned call to Emilia with home health. Patient missed lab appt this morning. Order given to redraw fasting lipid panel at earliest availability.  
PAST MEDICAL HISTORY:  ADHD     Closed fracture of wrist left wrist at age 4    DM (diabetes mellitus)     Fracture of clavicle at age 2.    Hashimoto's disease     History of ventral hernia     Hypothyroidism     Umbilical hernia

## 2023-04-19 ENCOUNTER — PATIENT MESSAGE (OUTPATIENT)
Dept: ADMINISTRATIVE | Facility: HOSPITAL | Age: 58
End: 2023-04-19
Payer: MEDICARE

## 2023-05-11 ENCOUNTER — OFFICE VISIT (OUTPATIENT)
Dept: PSYCHIATRY | Facility: CLINIC | Age: 58
End: 2023-05-11
Payer: MEDICARE

## 2023-05-11 DIAGNOSIS — G47.00 INSOMNIA, UNSPECIFIED TYPE: ICD-10-CM

## 2023-05-11 DIAGNOSIS — F41.9 ANXIETY: ICD-10-CM

## 2023-05-11 DIAGNOSIS — F31.9 BIPOLAR 1 DISORDER: Primary | ICD-10-CM

## 2023-05-11 PROCEDURE — 3072F LOW RISK FOR RETINOPATHY: CPT | Mod: CPTII,95,, | Performed by: PSYCHIATRY & NEUROLOGY

## 2023-05-11 PROCEDURE — 99214 PR OFFICE/OUTPT VISIT, EST, LEVL IV, 30-39 MIN: ICD-10-PCS | Mod: 95,,, | Performed by: PSYCHIATRY & NEUROLOGY

## 2023-05-11 PROCEDURE — 99499 UNLISTED E&M SERVICE: CPT | Mod: 95,,, | Performed by: PSYCHIATRY & NEUROLOGY

## 2023-05-11 PROCEDURE — 99214 OFFICE O/P EST MOD 30 MIN: CPT | Mod: 95,,, | Performed by: PSYCHIATRY & NEUROLOGY

## 2023-05-11 PROCEDURE — 3072F PR LOW RISK FOR RETINOPATHY: ICD-10-PCS | Mod: CPTII,95,, | Performed by: PSYCHIATRY & NEUROLOGY

## 2023-05-11 RX ORDER — BUPROPION HYDROCHLORIDE 300 MG/1
300 TABLET ORAL DAILY
Qty: 60 TABLET | Refills: 3 | Status: SHIPPED | OUTPATIENT
Start: 2023-05-11 | End: 2023-08-23

## 2023-05-11 RX ORDER — DIAZEPAM 10 MG/1
TABLET ORAL
Qty: 90 TABLET | Refills: 2 | Status: SHIPPED | OUTPATIENT
Start: 2023-05-11 | End: 2023-07-31

## 2023-05-11 RX ORDER — MIRTAZAPINE 15 MG/1
15 TABLET, FILM COATED ORAL NIGHTLY
Qty: 30 TABLET | Refills: 2 | Status: SHIPPED | OUTPATIENT
Start: 2023-05-11 | End: 2023-07-31

## 2023-05-11 RX ORDER — RISPERIDONE 1 MG/1
1 TABLET ORAL 2 TIMES DAILY
Qty: 60 TABLET | Refills: 2 | Status: SHIPPED | OUTPATIENT
Start: 2023-05-11 | End: 2023-08-09 | Stop reason: SDUPTHER

## 2023-05-11 RX ORDER — BUPROPION HYDROCHLORIDE 150 MG/1
150 TABLET ORAL DAILY
Qty: 30 TABLET | Refills: 2 | Status: SHIPPED | OUTPATIENT
Start: 2023-05-11 | End: 2023-05-24

## 2023-05-11 RX ORDER — BENZTROPINE MESYLATE 0.5 MG/1
0.5 TABLET ORAL 2 TIMES DAILY PRN
Qty: 60 TABLET | Refills: 2 | Status: SHIPPED | OUTPATIENT
Start: 2023-05-11 | End: 2023-07-31
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
## 2023-05-17 DIAGNOSIS — I10 ESSENTIAL HYPERTENSION: Primary | ICD-10-CM

## 2023-05-17 DIAGNOSIS — I25.118 CORONARY ARTERY DISEASE OF NATIVE ARTERY OF NATIVE HEART WITH STABLE ANGINA PECTORIS: ICD-10-CM

## 2023-05-23 PROBLEM — I50.32 CHRONIC DIASTOLIC HEART FAILURE: Status: ACTIVE | Noted: 2023-05-23

## 2023-05-24 ENCOUNTER — OFFICE VISIT (OUTPATIENT)
Dept: FAMILY MEDICINE | Facility: CLINIC | Age: 58
End: 2023-05-24
Payer: MEDICARE

## 2023-05-24 ENCOUNTER — LAB VISIT (OUTPATIENT)
Dept: LAB | Facility: HOSPITAL | Age: 58
End: 2023-05-24
Attending: FAMILY MEDICINE
Payer: MEDICARE

## 2023-05-24 VITALS
TEMPERATURE: 98 F | BODY MASS INDEX: 30.39 KG/M2 | OXYGEN SATURATION: 93 % | WEIGHT: 177 LBS | DIASTOLIC BLOOD PRESSURE: 76 MMHG | SYSTOLIC BLOOD PRESSURE: 128 MMHG | HEART RATE: 105 BPM

## 2023-05-24 DIAGNOSIS — R51.9 GENERALIZED HEADACHES: ICD-10-CM

## 2023-05-24 DIAGNOSIS — E03.4 HYPOTHYROIDISM DUE TO ACQUIRED ATROPHY OF THYROID: ICD-10-CM

## 2023-05-24 DIAGNOSIS — E11.8 TYPE 2 DIABETES MELLITUS WITH COMPLICATION, WITH LONG-TERM CURRENT USE OF INSULIN: Primary | ICD-10-CM

## 2023-05-24 DIAGNOSIS — E11.8 TYPE 2 DIABETES MELLITUS WITH COMPLICATION, WITH LONG-TERM CURRENT USE OF INSULIN: ICD-10-CM

## 2023-05-24 DIAGNOSIS — F31.9 BIPOLAR AFFECTIVE DISORDER, REMISSION STATUS UNSPECIFIED: ICD-10-CM

## 2023-05-24 DIAGNOSIS — I25.118 CORONARY ARTERY DISEASE OF NATIVE ARTERY OF NATIVE HEART WITH STABLE ANGINA PECTORIS: ICD-10-CM

## 2023-05-24 DIAGNOSIS — T46.6X5A STATIN MYOPATHY: ICD-10-CM

## 2023-05-24 DIAGNOSIS — K63.5 POLYP OF COLON, UNSPECIFIED PART OF COLON, UNSPECIFIED TYPE: ICD-10-CM

## 2023-05-24 DIAGNOSIS — E11.42 DIABETIC POLYNEUROPATHY ASSOCIATED WITH TYPE 2 DIABETES MELLITUS: ICD-10-CM

## 2023-05-24 DIAGNOSIS — J44.0 CHRONIC OBSTRUCTIVE PULMONARY DISEASE WITH ACUTE LOWER RESPIRATORY INFECTION: ICD-10-CM

## 2023-05-24 DIAGNOSIS — Z79.4 TYPE 2 DIABETES MELLITUS WITH COMPLICATION, WITH LONG-TERM CURRENT USE OF INSULIN: Primary | ICD-10-CM

## 2023-05-24 DIAGNOSIS — I10 ESSENTIAL HYPERTENSION: ICD-10-CM

## 2023-05-24 DIAGNOSIS — I50.32 CHRONIC DIASTOLIC HEART FAILURE: ICD-10-CM

## 2023-05-24 DIAGNOSIS — I67.1 CEREBRAL ANEURYSM: ICD-10-CM

## 2023-05-24 DIAGNOSIS — I69.952 HEMIPLEGIA AND HEMIPARESIS FOLLOWING UNSPECIFIED CEREBROVASCULAR DISEASE AFFECTING LEFT DOMINANT SIDE: ICD-10-CM

## 2023-05-24 DIAGNOSIS — Z79.4 TYPE 2 DIABETES MELLITUS WITH COMPLICATION, WITH LONG-TERM CURRENT USE OF INSULIN: ICD-10-CM

## 2023-05-24 DIAGNOSIS — I63.9 CEREBROVASCULAR ACCIDENT (CVA), UNSPECIFIED MECHANISM: ICD-10-CM

## 2023-05-24 DIAGNOSIS — G72.0 STATIN MYOPATHY: ICD-10-CM

## 2023-05-24 DIAGNOSIS — R56.9 SEIZURE-LIKE ACTIVITY: ICD-10-CM

## 2023-05-24 DIAGNOSIS — I73.9 PVD (PERIPHERAL VASCULAR DISEASE): ICD-10-CM

## 2023-05-24 DIAGNOSIS — E66.01 MORBID (SEVERE) OBESITY DUE TO EXCESS CALORIES: ICD-10-CM

## 2023-05-24 LAB
ALBUMIN SERPL BCP-MCNC: 3.9 G/DL (ref 3.5–5.2)
ALP SERPL-CCNC: 86 U/L (ref 55–135)
ALT SERPL W/O P-5'-P-CCNC: 32 U/L (ref 10–44)
ANION GAP SERPL CALC-SCNC: 14 MMOL/L (ref 8–16)
AST SERPL-CCNC: 30 U/L (ref 10–40)
BILIRUB SERPL-MCNC: 0.4 MG/DL (ref 0.1–1)
BUN SERPL-MCNC: 13 MG/DL (ref 6–20)
CALCIUM SERPL-MCNC: 9.5 MG/DL (ref 8.7–10.5)
CHLORIDE SERPL-SCNC: 98 MMOL/L (ref 95–110)
CO2 SERPL-SCNC: 26 MMOL/L (ref 23–29)
CREAT SERPL-MCNC: 0.8 MG/DL (ref 0.5–1.4)
EST. GFR  (NO RACE VARIABLE): >60 ML/MIN/1.73 M^2
ESTIMATED AVG GLUCOSE: 318 MG/DL (ref 68–131)
GLUCOSE SERPL-MCNC: 200 MG/DL (ref 70–110)
HBA1C MFR BLD: 12.7 % (ref 4–5.6)
POTASSIUM SERPL-SCNC: 4.6 MMOL/L (ref 3.5–5.1)
PROT SERPL-MCNC: 7.7 G/DL (ref 6–8.4)
SODIUM SERPL-SCNC: 138 MMOL/L (ref 136–145)
TSH SERPL DL<=0.005 MIU/L-ACNC: 3.52 UIU/ML (ref 0.4–4)

## 2023-05-24 PROCEDURE — 3008F BODY MASS INDEX DOCD: CPT | Mod: HCNC,CPTII,S$GLB, | Performed by: FAMILY MEDICINE

## 2023-05-24 PROCEDURE — 3072F LOW RISK FOR RETINOPATHY: CPT | Mod: HCNC,CPTII,S$GLB, | Performed by: FAMILY MEDICINE

## 2023-05-24 PROCEDURE — 1159F PR MEDICATION LIST DOCUMENTED IN MEDICAL RECORD: ICD-10-PCS | Mod: HCNC,CPTII,S$GLB, | Performed by: FAMILY MEDICINE

## 2023-05-24 PROCEDURE — 3078F PR MOST RECENT DIASTOLIC BLOOD PRESSURE < 80 MM HG: ICD-10-PCS | Mod: HCNC,CPTII,S$GLB, | Performed by: FAMILY MEDICINE

## 2023-05-24 PROCEDURE — 1159F MED LIST DOCD IN RCRD: CPT | Mod: HCNC,CPTII,S$GLB, | Performed by: FAMILY MEDICINE

## 2023-05-24 PROCEDURE — 3072F PR LOW RISK FOR RETINOPATHY: ICD-10-PCS | Mod: HCNC,CPTII,S$GLB, | Performed by: FAMILY MEDICINE

## 2023-05-24 PROCEDURE — 99999 PR PBB SHADOW E&M-EST. PATIENT-LVL V: ICD-10-PCS | Mod: PBBFAC,HCNC,, | Performed by: FAMILY MEDICINE

## 2023-05-24 PROCEDURE — 3074F SYST BP LT 130 MM HG: CPT | Mod: HCNC,CPTII,S$GLB, | Performed by: FAMILY MEDICINE

## 2023-05-24 PROCEDURE — 3074F PR MOST RECENT SYSTOLIC BLOOD PRESSURE < 130 MM HG: ICD-10-PCS | Mod: HCNC,CPTII,S$GLB, | Performed by: FAMILY MEDICINE

## 2023-05-24 PROCEDURE — 80053 COMPREHEN METABOLIC PANEL: CPT | Mod: HCNC | Performed by: FAMILY MEDICINE

## 2023-05-24 PROCEDURE — 99215 PR OFFICE/OUTPT VISIT, EST, LEVL V, 40-54 MIN: ICD-10-PCS | Mod: HCNC,S$GLB,, | Performed by: FAMILY MEDICINE

## 2023-05-24 PROCEDURE — 3008F PR BODY MASS INDEX (BMI) DOCUMENTED: ICD-10-PCS | Mod: HCNC,CPTII,S$GLB, | Performed by: FAMILY MEDICINE

## 2023-05-24 PROCEDURE — 99999 PR PBB SHADOW E&M-EST. PATIENT-LVL V: CPT | Mod: PBBFAC,HCNC,, | Performed by: FAMILY MEDICINE

## 2023-05-24 PROCEDURE — 84443 ASSAY THYROID STIM HORMONE: CPT | Mod: HCNC | Performed by: FAMILY MEDICINE

## 2023-05-24 PROCEDURE — 36415 COLL VENOUS BLD VENIPUNCTURE: CPT | Mod: HCNC,PO | Performed by: FAMILY MEDICINE

## 2023-05-24 PROCEDURE — 3078F DIAST BP <80 MM HG: CPT | Mod: HCNC,CPTII,S$GLB, | Performed by: FAMILY MEDICINE

## 2023-05-24 PROCEDURE — 99215 OFFICE O/P EST HI 40 MIN: CPT | Mod: HCNC,S$GLB,, | Performed by: FAMILY MEDICINE

## 2023-05-24 PROCEDURE — 83036 HEMOGLOBIN GLYCOSYLATED A1C: CPT | Mod: HCNC | Performed by: FAMILY MEDICINE

## 2023-05-24 RX ORDER — GABAPENTIN 600 MG/1
TABLET ORAL
Qty: 360 TABLET | Refills: 1 | Status: SHIPPED | OUTPATIENT
Start: 2023-05-24 | End: 2023-11-20 | Stop reason: SDUPTHER

## 2023-05-24 RX ORDER — BUTALBITAL, ACETAMINOPHEN AND CAFFEINE 50; 325; 40 MG/1; MG/1; MG/1
1 TABLET ORAL EVERY 6 HOURS PRN
Qty: 30 TABLET | Refills: 0 | Status: SHIPPED | OUTPATIENT
Start: 2023-05-24 | End: 2023-12-19 | Stop reason: SDUPTHER

## 2023-05-24 NOTE — PROGRESS NOTES
Subjective:       Patient ID: Alexandra Goins is a 57 y.o. female.    Chief Complaint: Follow-up and Fatigue      HPI Comments:       Current Outpatient Medications:     albuterol (PROVENTIL/VENTOLIN HFA) 90 mcg/actuation inhaler, INHALE 2 TO 4 PUFFS BY MOUTH THREE TIMES DAILY AS NEEDED FOR WHEEZING, Disp: 18 g, Rfl: 3    benztropine (COGENTIN) 0.5 MG tablet, Take 1 tablet (0.5 mg total) by mouth 2 (two) times daily as needed (dystonia)., Disp: 60 tablet, Rfl: 2    blood sugar diagnostic (TRUE METRIX GLUCOSE TEST STRIP) Strp, USE 1 STRIP TO CHECK GLUCOSE THREE TIMES DAILY, Disp: 100 strip, Rfl: 3    blood sugar diagnostic Strp, 1 each by Misc.(Non-Drug; Combo Route) route 3 (three) times daily. Dispense preferred on insurance, Disp: 100 strip, Rfl: 11    blood-glucose meter kit, Use as instructed - preferred on insurance, Disp: 1 each, Rfl: 0    buPROPion (WELLBUTRIN XL) 300 MG 24 hr tablet, Take 1 tablet (300 mg total) by mouth once daily., Disp: 60 tablet, Rfl: 3    diazePAM (VALIUM) 10 MG Tab, Take 1/2 to 1 tablet three times daily as needed for anxiety, Disp: 90 tablet, Rfl: 2    furosemide (LASIX) 40 MG tablet, TAKE 1 TABLET BY MOUTH EVERY MONDAY, WEDNESDAY, AND FRIDAY AS NEEDED, Disp: 45 tablet, Rfl: 2    insulin glargine U-300 conc (TOUJEO MAX U-300 SOLOSTAR) 300 unit/mL (3 mL) insulin pen, Inject 76 Units into the skin once daily., Disp: 4 pen, Rfl: 3    insulin regular, human (NOVOLIN R INJ), , Disp: , Rfl:     isosorbide mononitrate (IMDUR) 30 MG 24 hr tablet, Take 1 tablet by mouth once daily, Disp: 90 tablet, Rfl: 2    lancets Misc, 1 each by Misc.(Non-Drug; Combo Route) route 3 (three) times daily. Dispense preferred on insurance, Disp: 100 each, Rfl: 11    metFORMIN (GLUCOPHAGE-XR) 500 MG ER 24hr tablet, TAKE 1 TABLET BY MOUTH TWICE DAILY WITH MEALS, Disp: 180 tablet, Rfl: 1    metoprolol succinate (TOPROL-XL) 25 MG 24 hr tablet, Take 1 tablet by mouth once daily, Disp: 90 tablet, Rfl: 3     "mirtazapine (REMERON) 15 MG tablet, Take 1 tablet (15 mg total) by mouth every evening., Disp: 30 tablet, Rfl: 2    pen needle, diabetic 32 gauge x 5/32" Ndle, Inject 1 each into the skin once daily., Disp: 100 each, Rfl: 3    promethazine (PHENERGAN) 12.5 MG Tab, Take 1 tablet (12.5 mg total) by mouth every 6 (six) hours as needed., Disp: 20 tablet, Rfl: 0    risperiDONE (RISPERDAL) 1 MG tablet, Take 1 tablet (1 mg total) by mouth 2 (two) times daily., Disp: 60 tablet, Rfl: 2    albuterol-ipratropium (DUO-NEB) 2.5 mg-0.5 mg/3 mL nebulizer solution, Take 3 mLs by nebulization every 6 (six) hours as needed for Wheezing. Rescue, Disp: 1 Box, Rfl: 0    butalbital-acetaminophen-caffeine -40 mg (FIORICET, ESGIC) -40 mg per tablet, Take 1 tablet by mouth every 6 (six) hours as needed for Headaches. for pain., Disp: 30 tablet, Rfl: 0    empagliflozin (JARDIANCE) 25 mg tablet, Take 1 tablet (25 mg total) by mouth once daily., Disp: 90 tablet, Rfl: 1    ezetimibe (ZETIA) 10 mg tablet, Take 1 tablet (10 mg total) by mouth once daily., Disp: 90 tablet, Rfl: 1    fluticasone-salmeterol diskus inhaler 250-50 mcg, Inhale 1 puff into the lungs 2 (two) times daily. Controller for maintenance, Disp: 60 each, Rfl: 0    gabapentin (NEURONTIN) 600 MG tablet, TAKE 1 CAPSULE BY MOUTH IN THE MORNING, 1 CAPSULE IN THE AFTERNOON, AND THEN 2 CAPSULES AT BEDTIME, Disp: 360 tablet, Rfl: 1    sulfamethoxazole-trimethoprim 800-160mg (BACTRIM DS) 800-160 mg Tab, Take 1 tablet by mouth 2 (two) times daily., Disp: 14 tablet, Rfl: 0      Six-month follow-up.  Generally doing okay.  Occasionally gets constipated.  Recommended MiraLax.      Cardiology took her off losartan, apparently for concerns about low blood pressure/lightheadedness.  She says this has been better.  Blood pressure good today.      Gabapentin helps her with both numbness and pain in her feet.  Wants a longer-term refill if possible.      Takes Fioricet about once or " twice a week.  Generally effective.      Still smoking 1-5 cigarettes per day.  Working with smoking cessation.      No chest pains except when she is anxious.    No recent seizures.  Been about 2 years.  Recently had her Wellbutrin dose increase with no seizure activity.      Does not like the new insulin that was started late last year.  Will follow-up with diabetes education.  Thinks she is gained weight, but in fact her weight is down 10 lb since her last visit with me.    Breathing is okay.  Uses medications    Follow-up  Associated symptoms include fatigue. Pertinent negatives include no abdominal pain, arthralgias, chest pain, coughing, fever, headaches, myalgias, nausea or sore throat.   Fatigue  Associated symptoms include fatigue. Pertinent negatives include no abdominal pain, arthralgias, chest pain, coughing, fever, headaches, myalgias, nausea or sore throat.   Review of Systems   Constitutional:  Positive for fatigue. Negative for activity change, appetite change and fever.   HENT:  Negative for sore throat.    Respiratory:  Negative for cough and shortness of breath.    Cardiovascular:  Negative for chest pain.   Gastrointestinal:  Positive for constipation. Negative for abdominal pain, diarrhea and nausea.   Genitourinary:  Negative for difficulty urinating.   Musculoskeletal:  Negative for arthralgias and myalgias.   Neurological:  Negative for dizziness and headaches.     Objective:      Vitals:    05/24/23 0821   BP: 128/76   Pulse: 105   Temp: 97.8 °F (36.6 °C)   TempSrc: Tympanic   SpO2: (!) 93%   Weight: 80.3 kg (177 lb 0.5 oz)   PainSc: 0-No pain     Physical Exam  Vitals and nursing note reviewed.   Constitutional:       General: She is not in acute distress.     Appearance: She is well-developed. She is not diaphoretic.   HENT:      Head: Normocephalic.   Neck:      Thyroid: No thyromegaly.   Cardiovascular:      Rate and Rhythm: Normal rate and regular rhythm.      Pulses:           Dorsalis  pedis pulses are 1+ on the right side and 1+ on the left side.        Posterior tibial pulses are 1+ on the right side and 1+ on the left side.      Heart sounds: Normal heart sounds. No murmur heard.  Pulmonary:      Effort: Pulmonary effort is normal.      Breath sounds: Normal breath sounds. No wheezing or rales.   Abdominal:      General: There is no distension.      Palpations: Abdomen is soft.   Musculoskeletal:      Cervical back: Neck supple.      Right lower leg: No edema.      Left lower leg: No edema.   Feet:      Right foot:      Protective Sensation: 7 sites tested.  7 sites sensed.      Skin integrity: Skin integrity normal.      Left foot:      Protective Sensation: 7 sites tested.  7 sites sensed.      Skin integrity: Skin integrity normal.   Lymphadenopathy:      Cervical: No cervical adenopathy.   Skin:     General: Skin is warm and dry.   Neurological:      Mental Status: She is alert and oriented to person, place, and time.   Psychiatric:         Mood and Affect: Mood normal.         Behavior: Behavior normal.         Thought Content: Thought content normal.         Judgment: Judgment normal.       Assessment:       1. Type 2 diabetes mellitus with complication, with long-term current use of insulin    2. Diabetic polyneuropathy associated with type 2 diabetes mellitus    3. Bipolar affective disorder, remission status unspecified    4. Hypothyroidism due to acquired atrophy of thyroid    5. Statin myopathy    6. Essential hypertension    7. Cerebrovascular accident (CVA), unspecified mechanism    8. Chronic diastolic heart failure    9. Seizure-like activity    10. Coronary artery disease of native artery of native heart with stable angina pectoris    11. Hemiplegia and hemiparesis following unspecified cerebrovascular disease affecting left dominant side    12. PVD (peripheral vascular disease)    13. Chronic obstructive pulmonary disease with acute lower respiratory infection    14. Morbid  (severe) obesity due to excess calories    15. Polyp of colon, unspecified part of colon, unspecified type    16. Cerebral aneurysm    17. Generalized headaches        Plan:   Type 2 diabetes mellitus with complication, with long-term current use of insulin  Comments:  Poorly controlled most recently.  A1c today.  Follow-up with diabetes care  Orders:  -     Hemoglobin A1C; Future; Expected date: 05/24/2023  -     Comprehensive Metabolic Panel; Future; Expected date: 05/24/2023    Diabetic polyneuropathy associated with type 2 diabetes mellitus  Comments:  Gabapentin is very helpful    Bipolar affective disorder, remission status unspecified  Comments:  Followed by Psychiatry.  Recent increase in Wellbutrin    Hypothyroidism due to acquired atrophy of thyroid  Comments:  TSH today  Orders:  -     TSH; Future; Expected date: 05/24/2023    Statin myopathy    Essential hypertension  Comments:  Blood pressure controlled.  Follow-up 6 months    Cerebrovascular accident (CVA), unspecified mechanism  Comments:  Stable neurologically    Chronic diastolic heart failure  Comments:  Stable    Seizure-like activity  Comments:  No seizures in quite some time.  No problems being on high-dose Wellbutrin    Coronary artery disease of native artery of native heart with stable angina pectoris  Comments:  No aspirin or statin currently    Hemiplegia and hemiparesis following unspecified cerebrovascular disease affecting left dominant side  Comments:  Neurologically stable    PVD (peripheral vascular disease)  Comments:  No edema    Chronic obstructive pulmonary disease with acute lower respiratory infection  Comments:  Compliant with medication    Morbid (severe) obesity due to excess calories  Comments:  Weight down 10 lb since last visit    Polyp of colon, unspecified part of colon, unspecified type  Comments:  5 year Colonoscopy ordered  Orders:  -     Ambulatory referral/consult to Endo Procedure ; Future; Expected date:  05/25/2023    Cerebral aneurysm  Comments:  Stable  Orders:  -     butalbital-acetaminophen-caffeine -40 mg (FIORICET, ESGIC) -40 mg per tablet; Take 1 tablet by mouth every 6 (six) hours as needed for Headaches. for pain.  Dispense: 30 tablet; Refill: 0    Generalized headaches  Comments:  P.rmaryellen Fioricet  Orders:  -     butalbital-acetaminophen-caffeine -40 mg (FIORICET, ESGIC) -40 mg per tablet; Take 1 tablet by mouth every 6 (six) hours as needed for Headaches. for pain.  Dispense: 30 tablet; Refill: 0    Other orders  -     gabapentin (NEURONTIN) 600 MG tablet; TAKE 1 CAPSULE BY MOUTH IN THE MORNING, 1 CAPSULE IN THE AFTERNOON, AND THEN 2 CAPSULES AT BEDTIME  Dispense: 360 tablet; Refill: 1

## 2023-05-25 ENCOUNTER — HOSPITAL ENCOUNTER (OUTPATIENT)
Dept: PREADMISSION TESTING | Facility: HOSPITAL | Age: 58
Discharge: HOME | End: 2023-05-25
Attending: FAMILY MEDICINE
Payer: MEDICARE

## 2023-05-25 ENCOUNTER — TELEPHONE (OUTPATIENT)
Dept: FAMILY MEDICINE | Facility: CLINIC | Age: 58
End: 2023-05-25
Payer: MEDICARE

## 2023-05-25 DIAGNOSIS — K63.5 POLYP OF COLON, UNSPECIFIED PART OF COLON, UNSPECIFIED TYPE: Primary | ICD-10-CM

## 2023-05-25 RX ORDER — POLYETHYLENE GLYCOL 3350, SODIUM SULFATE ANHYDROUS, SODIUM BICARBONATE, SODIUM CHLORIDE, POTASSIUM CHLORIDE 236; 22.74; 6.74; 5.86; 2.97 G/4L; G/4L; G/4L; G/4L; G/4L
4 POWDER, FOR SOLUTION ORAL ONCE
Qty: 4000 ML | Refills: 0 | Status: SHIPPED | OUTPATIENT
Start: 2023-05-25 | End: 2023-05-25

## 2023-05-25 NOTE — TELEPHONE ENCOUNTER
----- Message from Perez Casiano MD sent at 5/24/2023 10:27 PM CDT -----  Diabetes control much worse. A1c 12.7. Be sure to reach out to your diabetes provider ASAP!

## 2023-05-25 NOTE — TELEPHONE ENCOUNTER
Spoke to pt and gave lab results and instructions from Dr. Casiano and pt verbalized understanding.

## 2023-06-08 ENCOUNTER — PES CALL (OUTPATIENT)
Dept: ADMINISTRATIVE | Facility: CLINIC | Age: 58
End: 2023-06-08
Payer: MEDICARE

## 2023-06-14 ENCOUNTER — HOSPITAL ENCOUNTER (OUTPATIENT)
Facility: HOSPITAL | Age: 58
Discharge: HOME | End: 2023-06-15
Attending: EMERGENCY MEDICINE | Admitting: INTERNAL MEDICINE
Payer: MEDICARE

## 2023-06-14 DIAGNOSIS — R53.1 WEAKNESS: ICD-10-CM

## 2023-06-14 DIAGNOSIS — E16.2 HYPOGLYCEMIA: ICD-10-CM

## 2023-06-14 DIAGNOSIS — N30.01 ACUTE CYSTITIS WITH HEMATURIA: Primary | ICD-10-CM

## 2023-06-14 DIAGNOSIS — R41.82 ALTERED MENTAL STATUS, UNSPECIFIED ALTERED MENTAL STATUS TYPE: ICD-10-CM

## 2023-06-14 DIAGNOSIS — R07.9 CHEST PAIN: ICD-10-CM

## 2023-06-14 PROBLEM — G93.41 ACUTE METABOLIC ENCEPHALOPATHY: Status: ACTIVE | Noted: 2023-06-14

## 2023-06-14 LAB
ALBUMIN SERPL BCP-MCNC: 3.6 G/DL (ref 3.5–5.2)
ALP SERPL-CCNC: 71 U/L (ref 55–135)
ALT SERPL W/O P-5'-P-CCNC: 32 U/L (ref 10–44)
ANION GAP SERPL CALC-SCNC: 12 MMOL/L (ref 8–16)
AST SERPL-CCNC: 30 U/L (ref 10–40)
BACTERIA #/AREA URNS HPF: ABNORMAL /HPF
BASOPHILS # BLD AUTO: 0.06 K/UL (ref 0–0.2)
BASOPHILS NFR BLD: 0.7 % (ref 0–1.9)
BILIRUB SERPL-MCNC: 0.3 MG/DL (ref 0.1–1)
BILIRUB UR QL STRIP: NEGATIVE
BNP SERPL-MCNC: 15 PG/ML (ref 0–99)
BUN SERPL-MCNC: 17 MG/DL (ref 6–20)
CALCIUM SERPL-MCNC: 9.6 MG/DL (ref 8.7–10.5)
CHLORIDE SERPL-SCNC: 100 MMOL/L (ref 95–110)
CLARITY UR: ABNORMAL
CO2 SERPL-SCNC: 28 MMOL/L (ref 23–29)
COLOR UR: YELLOW
CREAT SERPL-MCNC: 0.9 MG/DL (ref 0.5–1.4)
DIFFERENTIAL METHOD: ABNORMAL
EOSINOPHIL # BLD AUTO: 0 K/UL (ref 0–0.5)
EOSINOPHIL NFR BLD: 0.4 % (ref 0–8)
ERYTHROCYTE [DISTWIDTH] IN BLOOD BY AUTOMATED COUNT: 15.8 % (ref 11.5–14.5)
EST. GFR  (NO RACE VARIABLE): >60 ML/MIN/1.73 M^2
GLUCOSE SERPL-MCNC: 107 MG/DL (ref 70–110)
GLUCOSE UR QL STRIP: ABNORMAL
HCT VFR BLD AUTO: 52 % (ref 37–48.5)
HCV AB SERPL QL IA: NEGATIVE
HEP C VIRUS HOLD SPECIMEN: NORMAL
HGB BLD-MCNC: 16.1 G/DL (ref 12–16)
HGB UR QL STRIP: NEGATIVE
HIV 1+2 AB+HIV1 P24 AG SERPL QL IA: NEGATIVE
IMM GRANULOCYTES # BLD AUTO: 0.02 K/UL (ref 0–0.04)
IMM GRANULOCYTES NFR BLD AUTO: 0.2 % (ref 0–0.5)
KETONES UR QL STRIP: NEGATIVE
LACTATE SERPL-SCNC: 2.1 MMOL/L (ref 0.5–2.2)
LACTATE SERPL-SCNC: 2.5 MMOL/L (ref 0.5–2.2)
LEUKOCYTE ESTERASE UR QL STRIP: ABNORMAL
LIPASE SERPL-CCNC: 12 U/L (ref 4–60)
LYMPHOCYTES # BLD AUTO: 1.9 K/UL (ref 1–4.8)
LYMPHOCYTES NFR BLD: 21 % (ref 18–48)
MCH RBC QN AUTO: 27.7 PG (ref 27–31)
MCHC RBC AUTO-ENTMCNC: 31 G/DL (ref 32–36)
MCV RBC AUTO: 89 FL (ref 82–98)
MICROSCOPIC COMMENT: ABNORMAL
MONOCYTES # BLD AUTO: 0.7 K/UL (ref 0.3–1)
MONOCYTES NFR BLD: 7.4 % (ref 4–15)
NEUTROPHILS # BLD AUTO: 6.2 K/UL (ref 1.8–7.7)
NEUTROPHILS NFR BLD: 70.3 % (ref 38–73)
NITRITE UR QL STRIP: NEGATIVE
NRBC BLD-RTO: 0 /100 WBC
PH UR STRIP: 6 [PH] (ref 5–8)
PLATELET # BLD AUTO: 191 K/UL (ref 150–450)
PMV BLD AUTO: 10.3 FL (ref 9.2–12.9)
POCT GLUCOSE: 128 MG/DL (ref 70–110)
POCT GLUCOSE: 186 MG/DL (ref 70–110)
POCT GLUCOSE: 204 MG/DL (ref 70–110)
POCT GLUCOSE: 206 MG/DL (ref 70–110)
POCT GLUCOSE: 64 MG/DL (ref 70–110)
POTASSIUM SERPL-SCNC: 4.6 MMOL/L (ref 3.5–5.1)
PROCALCITONIN SERPL IA-MCNC: 0.04 NG/ML
PROT SERPL-MCNC: 7 G/DL (ref 6–8.4)
PROT UR QL STRIP: ABNORMAL
RBC # BLD AUTO: 5.82 M/UL (ref 4–5.4)
RBC #/AREA URNS HPF: 2 /HPF (ref 0–4)
SODIUM SERPL-SCNC: 140 MMOL/L (ref 136–145)
SP GR UR STRIP: 1.01 (ref 1–1.03)
TROPONIN I SERPL DL<=0.01 NG/ML-MCNC: 0.01 NG/ML (ref 0–0.03)
UNIDENT CRYS URNS QL MICRO: ABNORMAL
URN SPEC COLLECT METH UR: ABNORMAL
UROBILINOGEN UR STRIP-ACNC: NEGATIVE EU/DL
WBC # BLD AUTO: 8.89 K/UL (ref 3.9–12.7)
WBC #/AREA URNS HPF: >100 /HPF (ref 0–5)
WBC CLUMPS URNS QL MICRO: ABNORMAL
YEAST URNS QL MICRO: ABNORMAL

## 2023-06-14 PROCEDURE — G0378 HOSPITAL OBSERVATION PER HR: HCPCS | Mod: HCNC

## 2023-06-14 PROCEDURE — 87077 CULTURE AEROBIC IDENTIFY: CPT | Mod: HCNC | Performed by: EMERGENCY MEDICINE

## 2023-06-14 PROCEDURE — 84145 PROCALCITONIN (PCT): CPT | Mod: HCNC | Performed by: EMERGENCY MEDICINE

## 2023-06-14 PROCEDURE — 25000003 PHARM REV CODE 250: Mod: HCNC | Performed by: EMERGENCY MEDICINE

## 2023-06-14 PROCEDURE — 93010 EKG 12-LEAD: ICD-10-PCS | Mod: HCNC,,, | Performed by: INTERNAL MEDICINE

## 2023-06-14 PROCEDURE — 93005 ELECTROCARDIOGRAM TRACING: CPT | Mod: HCNC

## 2023-06-14 PROCEDURE — 83605 ASSAY OF LACTIC ACID: CPT | Mod: HCNC | Performed by: EMERGENCY MEDICINE

## 2023-06-14 PROCEDURE — 63600175 PHARM REV CODE 636 W HCPCS: Mod: HCNC | Performed by: EMERGENCY MEDICINE

## 2023-06-14 PROCEDURE — 96372 THER/PROPH/DIAG INJ SC/IM: CPT | Mod: 59 | Performed by: NURSE PRACTITIONER

## 2023-06-14 PROCEDURE — 87040 BLOOD CULTURE FOR BACTERIA: CPT | Mod: HCNC | Performed by: EMERGENCY MEDICINE

## 2023-06-14 PROCEDURE — 84484 ASSAY OF TROPONIN QUANT: CPT | Mod: HCNC | Performed by: EMERGENCY MEDICINE

## 2023-06-14 PROCEDURE — 87088 URINE BACTERIA CULTURE: CPT | Mod: HCNC | Performed by: EMERGENCY MEDICINE

## 2023-06-14 PROCEDURE — 82962 GLUCOSE BLOOD TEST: CPT | Mod: HCNC

## 2023-06-14 PROCEDURE — 25000003 PHARM REV CODE 250: Mod: HCNC | Performed by: NURSE PRACTITIONER

## 2023-06-14 PROCEDURE — 96365 THER/PROPH/DIAG IV INF INIT: CPT | Mod: HCNC

## 2023-06-14 PROCEDURE — 93010 ELECTROCARDIOGRAM REPORT: CPT | Mod: HCNC,,, | Performed by: INTERNAL MEDICINE

## 2023-06-14 PROCEDURE — P9612 CATHETERIZE FOR URINE SPEC: HCPCS | Mod: HCNC

## 2023-06-14 PROCEDURE — 99285 EMERGENCY DEPT VISIT HI MDM: CPT | Mod: 25,HCNC

## 2023-06-14 PROCEDURE — 85025 COMPLETE CBC W/AUTO DIFF WBC: CPT | Mod: HCNC | Performed by: EMERGENCY MEDICINE

## 2023-06-14 PROCEDURE — 63600175 PHARM REV CODE 636 W HCPCS: Mod: HCNC | Performed by: NURSE PRACTITIONER

## 2023-06-14 PROCEDURE — 87186 SC STD MICRODIL/AGAR DIL: CPT | Mod: HCNC | Performed by: EMERGENCY MEDICINE

## 2023-06-14 PROCEDURE — 87389 HIV-1 AG W/HIV-1&-2 AB AG IA: CPT | Mod: HCNC | Performed by: EMERGENCY MEDICINE

## 2023-06-14 PROCEDURE — 96361 HYDRATE IV INFUSION ADD-ON: CPT | Mod: HCNC

## 2023-06-14 PROCEDURE — 81000 URINALYSIS NONAUTO W/SCOPE: CPT | Mod: HCNC | Performed by: EMERGENCY MEDICINE

## 2023-06-14 PROCEDURE — 83880 ASSAY OF NATRIURETIC PEPTIDE: CPT | Mod: HCNC | Performed by: EMERGENCY MEDICINE

## 2023-06-14 PROCEDURE — 83690 ASSAY OF LIPASE: CPT | Mod: HCNC | Performed by: EMERGENCY MEDICINE

## 2023-06-14 PROCEDURE — 80053 COMPREHEN METABOLIC PANEL: CPT | Mod: HCNC | Performed by: EMERGENCY MEDICINE

## 2023-06-14 PROCEDURE — 87086 URINE CULTURE/COLONY COUNT: CPT | Mod: HCNC | Performed by: EMERGENCY MEDICINE

## 2023-06-14 PROCEDURE — 86803 HEPATITIS C AB TEST: CPT | Mod: HCNC | Performed by: EMERGENCY MEDICINE

## 2023-06-14 RX ORDER — DEXTROSE 40 %
30 GEL (GRAM) ORAL
Status: DISCONTINUED | OUTPATIENT
Start: 2023-06-14 | End: 2023-06-15 | Stop reason: HOSPADM

## 2023-06-14 RX ORDER — PROCHLORPERAZINE EDISYLATE 5 MG/ML
5 INJECTION INTRAMUSCULAR; INTRAVENOUS EVERY 6 HOURS PRN
Status: DISCONTINUED | OUTPATIENT
Start: 2023-06-14 | End: 2023-06-15 | Stop reason: HOSPADM

## 2023-06-14 RX ORDER — LANOLIN ALCOHOL/MO/W.PET/CERES
800 CREAM (GRAM) TOPICAL
Status: DISCONTINUED | OUTPATIENT
Start: 2023-06-14 | End: 2023-06-15 | Stop reason: HOSPADM

## 2023-06-14 RX ORDER — SIMETHICONE 80 MG
1 TABLET,CHEWABLE ORAL 4 TIMES DAILY PRN
Status: DISCONTINUED | OUTPATIENT
Start: 2023-06-14 | End: 2023-06-15 | Stop reason: HOSPADM

## 2023-06-14 RX ORDER — DEXTROSE MONOHYDRATE AND SODIUM CHLORIDE 5; .9 G/100ML; G/100ML
INJECTION, SOLUTION INTRAVENOUS CONTINUOUS
Status: DISCONTINUED | OUTPATIENT
Start: 2023-06-14 | End: 2023-06-15 | Stop reason: HOSPADM

## 2023-06-14 RX ORDER — SODIUM CHLORIDE 0.9 % (FLUSH) 0.9 %
10 SYRINGE (ML) INJECTION EVERY 8 HOURS PRN
Status: DISCONTINUED | OUTPATIENT
Start: 2023-06-14 | End: 2023-06-15 | Stop reason: HOSPADM

## 2023-06-14 RX ORDER — POLYETHYLENE GLYCOL 3350 17 G/17G
17 POWDER, FOR SOLUTION ORAL 2 TIMES DAILY PRN
Status: DISCONTINUED | OUTPATIENT
Start: 2023-06-14 | End: 2023-06-15 | Stop reason: HOSPADM

## 2023-06-14 RX ORDER — ISOSORBIDE MONONITRATE 30 MG/1
30 TABLET, EXTENDED RELEASE ORAL DAILY
Status: DISCONTINUED | OUTPATIENT
Start: 2023-06-15 | End: 2023-06-15 | Stop reason: HOSPADM

## 2023-06-14 RX ORDER — GLUCAGON 1 MG
1 KIT INJECTION
Status: DISCONTINUED | OUTPATIENT
Start: 2023-06-14 | End: 2023-06-15 | Stop reason: HOSPADM

## 2023-06-14 RX ORDER — IBUPROFEN 200 MG
16 TABLET ORAL
Status: DISCONTINUED | OUTPATIENT
Start: 2023-06-14 | End: 2023-06-14 | Stop reason: RX

## 2023-06-14 RX ORDER — INSULIN ASPART 100 [IU]/ML
0-5 INJECTION, SOLUTION INTRAVENOUS; SUBCUTANEOUS
Status: DISCONTINUED | OUTPATIENT
Start: 2023-06-14 | End: 2023-06-15 | Stop reason: HOSPADM

## 2023-06-14 RX ORDER — MAG HYDROX/ALUMINUM HYD/SIMETH 200-200-20
30 SUSPENSION, ORAL (FINAL DOSE FORM) ORAL 4 TIMES DAILY PRN
Status: DISCONTINUED | OUTPATIENT
Start: 2023-06-14 | End: 2023-06-15 | Stop reason: HOSPADM

## 2023-06-14 RX ORDER — TALC
6 POWDER (GRAM) TOPICAL NIGHTLY PRN
Status: DISCONTINUED | OUTPATIENT
Start: 2023-06-14 | End: 2023-06-15 | Stop reason: HOSPADM

## 2023-06-14 RX ORDER — ONDANSETRON 2 MG/ML
4 INJECTION INTRAMUSCULAR; INTRAVENOUS EVERY 6 HOURS PRN
Status: DISCONTINUED | OUTPATIENT
Start: 2023-06-14 | End: 2023-06-15 | Stop reason: HOSPADM

## 2023-06-14 RX ORDER — SODIUM,POTASSIUM PHOSPHATES 280-250MG
2 POWDER IN PACKET (EA) ORAL
Status: DISCONTINUED | OUTPATIENT
Start: 2023-06-14 | End: 2023-06-15 | Stop reason: HOSPADM

## 2023-06-14 RX ORDER — AMOXICILLIN 250 MG
1 CAPSULE ORAL 2 TIMES DAILY
Status: DISCONTINUED | OUTPATIENT
Start: 2023-06-14 | End: 2023-06-15 | Stop reason: HOSPADM

## 2023-06-14 RX ORDER — IPRATROPIUM BROMIDE AND ALBUTEROL SULFATE 2.5; .5 MG/3ML; MG/3ML
3 SOLUTION RESPIRATORY (INHALATION) EVERY 6 HOURS PRN
Status: DISCONTINUED | OUTPATIENT
Start: 2023-06-14 | End: 2023-06-15 | Stop reason: HOSPADM

## 2023-06-14 RX ORDER — ACETAMINOPHEN 325 MG/1
650 TABLET ORAL EVERY 4 HOURS PRN
Status: DISCONTINUED | OUTPATIENT
Start: 2023-06-14 | End: 2023-06-15 | Stop reason: HOSPADM

## 2023-06-14 RX ORDER — DEXTROSE 40 %
15 GEL (GRAM) ORAL
Status: DISCONTINUED | OUTPATIENT
Start: 2023-06-14 | End: 2023-06-15 | Stop reason: HOSPADM

## 2023-06-14 RX ORDER — IBUPROFEN 200 MG
24 TABLET ORAL
Status: DISCONTINUED | OUTPATIENT
Start: 2023-06-14 | End: 2023-06-14 | Stop reason: RX

## 2023-06-14 RX ORDER — ENOXAPARIN SODIUM 100 MG/ML
40 INJECTION SUBCUTANEOUS EVERY 24 HOURS
Status: DISCONTINUED | OUTPATIENT
Start: 2023-06-14 | End: 2023-06-15 | Stop reason: HOSPADM

## 2023-06-14 RX ORDER — BUPROPION HYDROCHLORIDE 150 MG/1
300 TABLET ORAL DAILY
Status: DISCONTINUED | OUTPATIENT
Start: 2023-06-15 | End: 2023-06-14

## 2023-06-14 RX ORDER — METOPROLOL SUCCINATE 25 MG/1
25 TABLET, EXTENDED RELEASE ORAL DAILY
Status: DISCONTINUED | OUTPATIENT
Start: 2023-06-15 | End: 2023-06-15 | Stop reason: HOSPADM

## 2023-06-14 RX ORDER — IPRATROPIUM BROMIDE AND ALBUTEROL SULFATE 2.5; .5 MG/3ML; MG/3ML
3 SOLUTION RESPIRATORY (INHALATION) EVERY 6 HOURS PRN
Status: DISCONTINUED | OUTPATIENT
Start: 2023-06-14 | End: 2023-06-14 | Stop reason: SDUPTHER

## 2023-06-14 RX ORDER — NALOXONE HCL 0.4 MG/ML
0.02 VIAL (ML) INJECTION
Status: DISCONTINUED | OUTPATIENT
Start: 2023-06-14 | End: 2023-06-15 | Stop reason: HOSPADM

## 2023-06-14 RX ADMIN — ACETAMINOPHEN 650 MG: 325 TABLET ORAL at 07:06

## 2023-06-14 RX ADMIN — SENNOSIDES AND DOCUSATE SODIUM 1 TABLET: 50; 8.6 TABLET ORAL at 08:06

## 2023-06-14 RX ADMIN — CEFTRIAXONE 2 G: 2 INJECTION, POWDER, FOR SOLUTION INTRAMUSCULAR; INTRAVENOUS at 03:06

## 2023-06-14 RX ADMIN — DEXTROSE AND SODIUM CHLORIDE: 5; 900 INJECTION, SOLUTION INTRAVENOUS at 05:06

## 2023-06-14 RX ADMIN — SODIUM CHLORIDE 1947 ML: 9 INJECTION, SOLUTION INTRAVENOUS at 03:06

## 2023-06-14 RX ADMIN — ENOXAPARIN SODIUM 40 MG: 40 INJECTION SUBCUTANEOUS at 07:06

## 2023-06-14 NOTE — ASSESSMENT & PLAN NOTE
Patient is identified as having Diastolic (HFpEF) heart failure that is Chronic. CHF is currently controlled. Latest ECHO performed and demonstrates- Results for orders placed during the hospital encounter of 07/24/22    Echo Saline Bubble? No    Interpretation Summary  · The left ventricle is normal in size with concentric hypertrophy and normal systolic function.  · The estimated ejection fraction is 60%.  · Indeterminate left ventricular diastolic function.  · Normal right ventricular size with normal right ventricular systolic function.  · Normal central venous pressure (3 mmHg).  · Trivial pericardial effusion.  . Continue Beta Blocker and Nitrate/Vasodilator and monitor clinical status closely. Monitor on telemetry. Patient is off CHF pathway.  Monitor strict Is&Os and daily weights.  Place on fluid restriction of 1.5 L. Continue to stress to patient importance of self efficacy and  on diet for CHF. Last BNP reviewed- and noted below   Recent Labs   Lab 06/14/23  1443   BNP 15   .

## 2023-06-14 NOTE — H&P
OCounts include 234 beds at the [facility] - Emergency Dept.  [facility] Medicine  History & Physical    Patient Name: Alexandra Goins  MRN: [id number]  Patient Class: OP- Observation  Admission Date: 6/14/2023  Attending Physician: Dr. Triplett   Primary Care Provider: Perez Casiano MD         Patient information was obtained from patient, spouse/SO and ER records.     Subjective:     Principal Problem:Hypoglycemia    Chief Complaint:   Chief Complaint   Patient presents with    Altered Mental Status     Pt. Presents to ED via [facility] due to being slightly altered for the past few days but today her sugar was low (42). EMS treated the sugar with oral glucose but pt is still slightly altered. Per EMS pt has a hx confusion with UTI's. Pt. Cbg is 156 per EMS. Pt also has had some sort of brain injury per EMS and can not make decisions for herself.         HPI: The patient is a 58 yo female with CAD, PVCs, h/o syncope, HTN, HLD, Cerebral aneurysm (small), IDDM, Asthma, GERD, Bipolar disorder, and tobacco abuse who presented to ED for AMS. Pt's spouse reports he noticed occasional confusion the last few days. Today, the neighbor found pt unresponsive on the floor of the pt's home. The pt was outside on her porch 15 minutes prior. The neighbor then noticed the pt's dog was outside unattended. She went to the pt's house to check on her and found the pt unresponsive. On EMS arrival, pt was awake but drowsy. Glucose was 42. The pt was given glucose tablets. The patient is still mildly confused per spouse. The patient states she took Toujeo 78 units this am and did not eat breakfast.     In the ED, VSS, WBC normal, LA 2.5. +UTI. CT head showed mild paranasal sinus disease, nothing acute. CXR clear.   Glucose 64 on arrival to ED. Pt was given food and placed on D5NS and glucose improved to 128.   She was also given IVFs and IV Rocephin.     The patient will be placed in observation under hospital medicine for Acute metabolic encephalopathy, UTI, and hypoglycemia.        Past Medical History:   Diagnosis Date    Acute ischemic stroke 9/17/2019    Acute respiratory failure with hypoxia 2/11/2021    Aneurysm     Anxiety     Arthritis     Asthma     Bipolar 1 disorder     Cerebral aneurysm, nonruptured 9/19/2019    Chronic diastolic heart failure 5/23/2023    Coronary artery disease of native artery of native heart with stable angina pectoris 1/19/2022    Depression     Diabetes mellitus, type 2     Diverticular disease     GERD (gastroesophageal reflux disease)     Hyperlipemia     Hypertension     Hyponatremia 7/24/2022    Hypothyroidism     Pneumonia     PVD (peripheral vascular disease) 4/28/2021    Renal manifestation of secondary diabetes mellitus     Right-sided back pain 6/29/2019    Severe obesity (BMI 35.0-39.9) with comorbidity 5/31/2022    Stroke     Trouble in sleeping        Past Surgical History:   Procedure Laterality Date    CEREBRAL ANGIOGRAM N/A 10/28/2019    Procedure: ANGIOGRAM-CEREBRAL;  Surgeon: Dahiana Diagnostic Provider;  Location: [facility] OR [address] [place] [address];  Service: Radiology;  Laterality: N/A;  Rm 190/Aayush    CHOLECYSTECTOMY      COLON SURGERY      COLONOSCOPY N/A 1/12/2018    Procedure: COLONOSCOPY;  Surgeon: Roque Mahajan III, MD;  Location: [facility];  Service: Endoscopy;  Laterality: N/A;    DENTAL SURGERY  05/21/2018    removal of 8 top teeth    HYSTERECTOMY      TONSILLECTOMY         Review of patient's allergies indicates:   Allergen Reactions    Seroquel [quetiapine] Other (See Comments)     TC SEIZURES    Adhesive Blisters     Pt states can't tolerate paper tape    Levaquin [levofloxacin]     Levomenol analogues     Lipitor [atorvastatin]      Leg Cramps    Repatha pushtronex [evolocumab]      Feels sick    Zofran [ondansetron hcl] Hives and Other (See Comments)     headaches    Celestone [betamethasone] Hives, Itching and Rash    Piroxicam Diarrhea and Nausea Only       No current facility-administered  medications on file prior to encounter.     Current Outpatient Medications on File Prior to Encounter   Medication Sig    albuterol (PROVENTIL/VENTOLIN HFA) 90 mcg/actuation inhaler INHALE 2 TO 4 PUFFS BY MOUTH THREE TIMES DAILY AS NEEDED FOR WHEEZING    albuterol-ipratropium (DUO-NEB) 2.5 mg-0.5 mg/3 mL nebulizer solution Take 3 mLs by nebulization every 6 (six) hours as needed for Wheezing. Rescue    benztropine (COGENTIN) 0.5 MG tablet Take 1 tablet (0.5 mg total) by mouth 2 (two) times daily as needed (dystonia).    blood sugar diagnostic (TRUE METRIX GLUCOSE TEST STRIP) Strp USE 1 STRIP TO CHECK GLUCOSE THREE TIMES DAILY    blood sugar diagnostic Strp 1 each by Misc.(Non-Drug; Combo Route) route 3 (three) times daily. Dispense preferred on insurance    blood-glucose meter kit Use as instructed - preferred on insurance    buPROPion (WELLBUTRIN XL) 300 MG 24 hr tablet Take 1 tablet (300 mg total) by mouth once daily.    butalbital-acetaminophen-caffeine -40 mg (FIORICET, ESGIC) -40 mg per tablet Take 1 tablet by mouth every 6 (six) hours as needed for Headaches. for pain.    diazePAM (VALIUM) 10 MG Tab Take 1/2 to 1 tablet three times daily as needed for anxiety    empagliflozin (JARDIANCE) 25 mg tablet Take 1 tablet (25 mg total) by mouth once daily.    ezetimibe (ZETIA) 10 mg tablet Take 1 tablet (10 mg total) by mouth once daily.    fluticasone-salmeterol diskus inhaler 250-50 mcg Inhale 1 puff into the lungs 2 (two) times daily. Controller for maintenance    furosemide (LASIX) 40 MG tablet TAKE 1 TABLET BY MOUTH EVERY MONDAY, WEDNESDAY, AND FRIDAY AS NEEDED    gabapentin (NEURONTIN) 600 MG tablet TAKE 1 CAPSULE BY MOUTH IN THE MORNING, 1 CAPSULE IN THE AFTERNOON, AND THEN 2 CAPSULES AT BEDTIME    insulin glargine U-300 conc (TOUJEO MAX U-300 SOLOSTAR) 300 unit/mL (3 mL) insulin pen Inject 76 Units into the skin once daily.    insulin regular, human (NOVOLIN R INJ)     isosorbide  "mononitrate (IMDUR) 30 MG 24 hr tablet Take 1 tablet by mouth once daily    lancets Misc 1 each by Misc.(Non-Drug; Combo Route) route 3 (three) times daily. Dispense preferred on insurance    metFORMIN (GLUCOPHAGE-XR) 500 MG ER 24hr tablet TAKE 1 TABLET BY MOUTH TWICE DAILY WITH MEALS    metoprolol succinate (TOPROL-XL) 25 MG 24 hr tablet Take 1 tablet by mouth once daily    mirtazapine (REMERON) 15 MG tablet Take 1 tablet (15 mg total) by mouth every evening.    pen needle, diabetic 32 gauge x 5/32" Ndle Inject 1 each into the skin once daily.    promethazine (PHENERGAN) 12.5 MG Tab Take 1 tablet (12.5 mg total) by mouth every 6 (six) hours as needed.    risperiDONE (RISPERDAL) 1 MG tablet Take 1 tablet (1 mg total) by mouth 2 (two) times daily.    sulfamethoxazole-trimethoprim 800-160mg (BACTRIM DS) 800-160 mg Tab Take 1 tablet by mouth 2 (two) times daily.     Family History       Problem Relation (Age of Onset)    Alcohol abuse Mother, Father    Arthritis Father, Maternal Grandmother, Paternal Grandmother    Breast cancer Maternal Grandmother    COPD Mother, Sister    Cancer Father, Maternal Aunt, Maternal Uncle, Paternal Aunt, Paternal Uncle, Paternal Grandmother    Depression Mother    Diabetes Maternal Uncle    Drug abuse Mother, Maternal Uncle    Heart disease Father, Brother    Hypertension Father, Brother, Maternal Grandmother, Paternal Grandfather    Kidney failure Sister          Tobacco Use    Smoking status: Every Day     Packs/day: 1.00     Years: 44.00     Pack years: 44.00     Types: Cigarettes, Vaping w/o nicotine     Start date: 1978    Smokeless tobacco: Never   Substance and Sexual Activity    Alcohol use: Not Currently     Comment: occassionally    Drug use: Yes     Types: Marijuana    Sexual activity: Yes     Review of Systems   Constitutional:  Positive for activity change and fatigue. Negative for appetite change, chills, diaphoresis, fever and unexpected weight change.   HENT:  " Negative for congestion, nosebleeds, sinus pressure and sore throat.    Eyes:  Negative for pain, discharge and visual disturbance.   Respiratory:  Negative for cough, chest tightness, shortness of breath, wheezing and stridor.    Cardiovascular:  Negative for chest pain, palpitations and leg swelling.   Gastrointestinal:  Negative for abdominal distention, abdominal pain, blood in stool, constipation, diarrhea, nausea and vomiting.   Endocrine: Negative for cold intolerance and heat intolerance.   Genitourinary:  Negative for difficulty urinating, dysuria, flank pain, frequency and urgency.   Musculoskeletal:  Negative for arthralgias, back pain, joint swelling, myalgias, neck pain and neck stiffness.   Skin:  Negative for rash and wound.   Allergic/Immunologic: Negative for food allergies and immunocompromised state.   Neurological:  Positive for syncope and weakness. Negative for dizziness, seizures, facial asymmetry, speech difficulty, light-headedness, numbness and headaches.   Hematological:  Negative for adenopathy.   Psychiatric/Behavioral:  Positive for confusion. Negative for agitation and hallucinations.    Objective:     Vital Signs (Most Recent):  Temp: 98.8 °F (37.1 °C) (06/14/23 1402)  Pulse: 90 (06/14/23 1450)  Resp: 20 (06/14/23 1450)  BP: 135/70 (06/14/23 1445)  SpO2: 96 % (06/14/23 1450) Vital Signs (24h Range):  Temp:  [98.8 °F (37.1 °C)] 98.8 °F (37.1 °C)  Pulse:  [83-90] 90  Resp:  [18-20] 20  SpO2:  [89 %-96 %] 96 %  BP: (128-152)/(63-79) 135/70     Weight: 80.3 kg (177 lb)  Body mass index is 30.38 kg/m².     Physical Exam  Vitals and nursing note reviewed.   Constitutional:       General: She is not in acute distress.     Appearance: She is well-developed. She is not diaphoretic.   HENT:      Head: Normocephalic and atraumatic.      Nose: Nose normal.   Eyes:      General: No scleral icterus.     Conjunctiva/sclera: Conjunctivae normal.   Neck:      Trachea: No tracheal deviation.    Cardiovascular:      Rate and Rhythm: Normal rate and regular rhythm.      Heart sounds: Normal heart sounds. No murmur heard.    No friction rub. No gallop.   Pulmonary:      Effort: Pulmonary effort is normal. No respiratory distress.      Breath sounds: Normal breath sounds. No stridor. No wheezing or rales.   Chest:      Chest wall: No tenderness.   Abdominal:      General: Bowel sounds are normal. There is no distension.      Palpations: Abdomen is soft. There is no mass.      Tenderness: There is no abdominal tenderness. There is no guarding or rebound.   Musculoskeletal:         General: No tenderness or deformity. Normal range of motion.      Cervical back: Normal range of motion and neck supple.   Skin:     General: Skin is warm and dry.      Coloration: Skin is not pale.      Findings: No erythema or rash.   Neurological:      Mental Status: She is alert and oriented to person, place, and time.      Cranial Nerves: No cranial nerve deficit.      Motor: No abnormal muscle tone.      Coordination: Coordination normal.   Psychiatric:         Behavior: Behavior normal.         Thought Content: Thought content normal.              Significant Labs: All pertinent labs within the past 24 hours have been reviewed.    Significant Imaging: I have reviewed all pertinent imaging results/findings within the past 24 hours.    Assessment/Plan:     * Hypoglycemia  Pt took home insulin this am and did not eat breakfast  Blood sugar was 42 PTA  Hgb A1c is 12.7 on 5/24/23  Cont D5NS  Change IVfs when Glucose persistently  >200  NISS  Diabetic diet       UTI (urinary tract infection)  IV Rocephin   Blood and urine cultures       Lactic acidosis  Likely 2/2 dehydration   Cont IVFs   Monitor       Acute metabolic encephalopathy  Likely 2/2 hypoglycemia  CT head showed nothing acute   Hold sedating home medications for now - Wellbutrin, Risperdal, Remeron, Gabapentin, Valium prn, Fioricet prn.       Chronic diastolic heart  failure  Patient is identified as having Diastolic (HFpEF) heart failure that is Chronic. CHF is currently controlled. Latest ECHO performed and demonstrates- Results for orders placed during the hospital encounter of 07/24/22    Echo Saline Bubble? No    Interpretation Summary  · The left ventricle is normal in size with concentric hypertrophy and normal systolic function.  · The estimated ejection fraction is 60%.  · Indeterminate left ventricular diastolic function.  · Normal right ventricular size with normal right ventricular systolic function.  · Normal central venous pressure (3 mmHg).  · Trivial pericardial effusion.  . Continue Beta Blocker and Nitrate/Vasodilator and monitor clinical status closely. Monitor on telemetry. Patient is off CHF pathway.  Monitor strict Is&Os and daily weights.  Place on fluid restriction of 1.5 L. Continue to stress to patient importance of self efficacy and  on diet for CHF. Last BNP reviewed- and noted below   Recent Labs   Lab 06/14/23  1443   BNP 15   .          Asthma with COPD  No wheezing   Neb txs prn       Essential hypertension  BP stable   Cont Imdur and Toprol       Bipolar disorder with anxiety and depression  Hold home medications for now   Restart in am if AMS resolved       VTE Risk Mitigation (From admission, onward)         Ordered     enoxaparin injection 40 mg  Daily         06/14/23 1813     IP VTE HIGH RISK PATIENT  Once         06/14/23 1813     Place sequential compression device  Until discontinued         06/14/23 1813                     On 06/14/2023, patient should be placed in hospital observation services under my care in collaboration with Dr. Triplett .      Luma Graves NP  Department of Hospital Medicine  '[facility] - Emergency Dept.

## 2023-06-14 NOTE — ASSESSMENT & PLAN NOTE
Likely 2/2 hypoglycemia  CT head showed nothing acute   Hold sedating home medications for now - Wellbutrin, Risperdal, Remeron, Gabapentin, Valium prn, Fioricet prn.

## 2023-06-14 NOTE — ASSESSMENT & PLAN NOTE
Pt took home insulin this am and did not eat breakfast  Blood sugar was 42 PTA  Hgb A1c is 12.7 on 5/24/23  Cont D5NS  Change IVfs when Glucose persistently  >200  NISS  Diabetic diet

## 2023-06-14 NOTE — ED PROVIDER NOTES
SCRIBE #1 NOTE: I, Rogelio Timmons, am scribing for, and in the presence of, Luis Carmichael MD. I have scribed the entire note.      History      Chief Complaint   Patient presents with    Altered Mental Status     Pt. Presents to ED via [facility] due to being slightly altered for the past few days but today her sugar was low (42). EMS treated the sugar with oral glucose but pt is still slightly altered. Per EMS pt has a hx confusion with UTI's. Pt. Cbg is 156 per EMS. Pt also has had some sort of brain injury per EMS and can not make decisions for herself.        Review of patient's allergies indicates:   Allergen Reactions    Seroquel [quetiapine] Other (See Comments)     TC SEIZURES    Adhesive Blisters     Pt states can't tolerate paper tape    Levaquin [levofloxacin]     Levomenol analogues     Lipitor [atorvastatin]      Leg Cramps    Repatha pushtronex [evolocumab]      Feels sick    Zofran [ondansetron hcl] Hives and Other (See Comments)     headaches    Celestone [betamethasone] Hives, Itching and Rash    Piroxicam Diarrhea and Nausea Only        HPI   HPI    6/14/2023, 2:03 PM   History obtained from [facility]  HPI and ROS limited secondary to pt's AMS      History of Present Illness: Alexandra Goins is a 57 y.o. female patient who presents to the Emergency Department for AMS, onset just PTA. Pt was found on the ground altered by her neighbors; CBG at the scene was 42. Symptoms are constant and moderate in severity. No mitigating or exacerbating factors reported. No associated sxs reported. Pt was given oral glucose, which improved the pt's CBG to 156 just PTA. No further complaints or concerns at this time.     Arrival mode: EMS    PCP: Perez Casiano MD       Past Medical History:  Past Medical History:   Diagnosis Date    Acute ischemic stroke 9/17/2019    Acute respiratory failure with hypoxia 2/11/2021    Aneurysm     Anxiety     Arthritis     Asthma     Bipolar 1 disorder     Cerebral aneurysm, nonruptured  9/19/2019    Chronic diastolic heart failure 5/23/2023    Coronary artery disease of native artery of native heart with stable angina pectoris 1/19/2022    Depression     Diabetes mellitus, type 2     Diverticular disease     GERD (gastroesophageal reflux disease)     Hyperlipemia     Hypertension     Hyponatremia 7/24/2022    Hypothyroidism     Pneumonia     PVD (peripheral vascular disease) 4/28/2021    Renal manifestation of secondary diabetes mellitus     Right-sided back pain 6/29/2019    Severe obesity (BMI 35.0-39.9) with comorbidity 5/31/2022    Stroke     Trouble in sleeping        Past Surgical History:  Past Surgical History:   Procedure Laterality Date    CEREBRAL ANGIOGRAM N/A 10/28/2019    Procedure: ANGIOGRAM-CEREBRAL;  Surgeon: [facility] Diagnostic Provider;  Location: [facility] OR [address] [place] [address];  Service: Radiology;  Laterality: N/A;  Rm 190/Aayush    CHOLECYSTECTOMY      COLON SURGERY      COLONOSCOPY N/A 1/12/2018    Procedure: COLONOSCOPY;  Surgeon: Roque Mahajan III, MD;  Location: [facility];  Service: Endoscopy;  Laterality: N/A;    DENTAL SURGERY  05/21/2018    removal of 8 top teeth    HYSTERECTOMY      TONSILLECTOMY           Family History:  Family History   Problem Relation Age of Onset    Alcohol abuse Mother     COPD Mother     Depression Mother     Drug abuse Mother     Alcohol abuse Father     Arthritis Father     Cancer Father     Heart disease Father     Hypertension Father     COPD Sister     Kidney failure Sister     Heart disease Brother     Hypertension Brother     Cancer Maternal Aunt     Cancer Maternal Uncle     Diabetes Maternal Uncle     Drug abuse Maternal Uncle     Cancer Paternal Aunt     Cancer Paternal Uncle     Arthritis Maternal Grandmother     Hypertension Maternal Grandmother     Breast cancer Maternal Grandmother     Arthritis Paternal Grandmother     Cancer Paternal Grandmother     Hypertension Paternal Grandfather        Social History:  Social History     Tobacco Use     Smoking status: Every Day     Packs/day: 1.00     Years: 44.00     Pack years: 44.00     Types: Cigarettes, Vaping w/o nicotine     Start date: 1978    Smokeless tobacco: Never   Substance and Sexual Activity    Alcohol use: Not Currently     Comment: occassionally    Drug use: Yes     Types: Marijuana    Sexual activity: Yes       ROS   Review of Systems   Unable to perform ROS: Mental status change   Psychiatric/Behavioral:  Positive for confusion (AMS).      Physical Exam      Initial Vitals [06/14/23 1402]   BP Pulse Resp Temp SpO2   (!) 152/63 90 18 98.8 °F (37.1 °C) 96 %      MAP       --          Physical Exam  Nursing Notes and Vital Signs Reviewed.  Constitutional: Patient is in no acute distress. Well-developed and well-nourished.  Head: Atraumatic. Normocephalic.  Eyes: PERRL. EOM intact. Conjunctivae are not pale. No scleral icterus.  ENT: Mucous membranes are moist. Oropharynx is clear and symmetric.    Neck: Supple. Full ROM.   Cardiovascular: Regular rate. Regular rhythm. No murmurs, rubs, or gallops. Distal pulses are 2+ and symmetric.  Pulmonary/Chest: No respiratory distress. Clear to auscultation bilaterally. No wheezing or rales.  Abdominal: Soft and non-distended.  There is no tenderness.  No rebound, guarding, or rigidity.  Musculoskeletal: Moves all extremities. No obvious deformities. No edema.  Skin: Warm and dry.  Neurological: Slow to respond to questions, but otherwise appropriate.  Normal speech.     ED Course    Procedures  ED Vital Signs:  Vitals:    06/14/23 1402 06/14/23 1415 06/14/23 1430 06/14/23 1434   BP: (!) 152/63 (!) 143/79 128/71    Pulse: 90 83 86 84   Resp: 18 20 20 20   Temp: 98.8 °F (37.1 °C)      TempSrc: Oral      SpO2: 96% 95% (!) 94% 95%   Weight:        06/14/23 1445 06/14/23 1450 06/14/23 1528 06/14/23 1820   BP: 135/70   (!) 157/69   Pulse: 84 90  76   Resp: 20 20  18   Temp:    98.1 °F (36.7 °C)   TempSrc:       SpO2: (S) (!) 89% 96%  (!) 93%   Weight:   80.3 kg  (177 lb)     06/14/23 1924 06/15/23 0107 06/15/23 0412   BP: (!) 158/70 (!) 159/72 (!) 161/78   Pulse: 81 75 83   Resp: 18 18 18   Temp:  98 °F (36.7 °C) 98.1 °F (36.7 °C)   TempSrc:  Oral Oral   SpO2: 95% (!) 92% (!) 93%   Weight:          Abnormal Lab Results:  Labs Reviewed   CBC W/ AUTO DIFFERENTIAL - Abnormal; Notable for the following components:       Result Value    RBC 5.82 (*)     Hemoglobin 16.1 (*)     Hematocrit 52.0 (*)     MCHC 31.0 (*)     RDW 15.8 (*)     All other components within normal limits    Narrative:     Release to patient->Immediate   LACTIC ACID, PLASMA - Abnormal; Notable for the following components:    Lactate (Lactic Acid) 2.5 (*)     All other components within normal limits    Narrative:     Release to patient->Immediate   URINALYSIS, REFLEX TO URINE CULTURE - Abnormal; Notable for the following components:    Appearance, UA Hazy (*)     Protein, UA Trace (*)     Glucose, UA 4+ (*)     Leukocytes, UA 3+ (*)     All other components within normal limits    Narrative:     Specimen Source->Urine   URINALYSIS MICROSCOPIC - Abnormal; Notable for the following components:    WBC, UA >100 (*)     WBC Clumps, UA Many (*)     Bacteria Many (*)     All other components within normal limits    Narrative:     Specimen Source->Urine   POCT GLUCOSE - Abnormal; Notable for the following components:    POCT Glucose 64 (*)     All other components within normal limits   POCT GLUCOSE - Abnormal; Notable for the following components:    POCT Glucose 128 (*)     All other components within normal limits   POCT GLUCOSE - Abnormal; Notable for the following components:    POCT Glucose 186 (*)     All other components within normal limits   POCT GLUCOSE - Abnormal; Notable for the following components:    POCT Glucose 206 (*)     All other components within normal limits   CULTURE, URINE   COMPREHENSIVE METABOLIC PANEL    Narrative:     Release to patient->Immediate   B-TYPE NATRIURETIC PEPTIDE     Narrative:     Release to patient->Immediate   LIPASE    Narrative:     Release to patient->Immediate   TROPONIN I    Narrative:     Release to patient->Immediate   PROCALCITONIN    Narrative:     Release to patient->Immediate   HIV 1 / 2 ANTIBODY    Narrative:     Release to patient->Immediate   HEPATITIS C ANTIBODY    Narrative:     Release to patient->Immediate   HEP C VIRUS HOLD SPECIMEN    Narrative:     Release to patient->Immediate   LACTIC ACID, PLASMA        All Lab Results:  Results for orders placed or performed during the hospital encounter of 06/14/23   Blood culture x two cultures. Draw prior to antibiotics.    Specimen: Peripheral, Forearm, Right; Blood   Result Value Ref Range    Blood Culture, Routine No Growth to date    CBC auto differential   Result Value Ref Range    WBC 8.89 3.90 - 12.70 K/uL    RBC 5.82 (H) 4.00 - 5.40 M/uL    Hemoglobin 16.1 (H) 12.0 - 16.0 g/dL    Hematocrit 52.0 (H) 37.0 - 48.5 %    MCV 89 82 - 98 fL    MCH 27.7 27.0 - 31.0 pg    MCHC 31.0 (L) 32.0 - 36.0 g/dL    RDW 15.8 (H) 11.5 - 14.5 %    Platelets 191 150 - 450 K/uL    MPV 10.3 9.2 - 12.9 fL    Immature Granulocytes 0.2 0.0 - 0.5 %    Gran # (ANC) 6.2 1.8 - 7.7 K/uL    Immature Grans (Abs) 0.02 0.00 - 0.04 K/uL    Lymph # 1.9 1.0 - 4.8 K/uL    Mono # 0.7 0.3 - 1.0 K/uL    Eos # 0.0 0.0 - 0.5 K/uL    Baso # 0.06 0.00 - 0.20 K/uL    nRBC 0 0 /100 WBC    Gran % 70.3 38.0 - 73.0 %    Lymph % 21.0 18.0 - 48.0 %    Mono % 7.4 4.0 - 15.0 %    Eosinophil % 0.4 0.0 - 8.0 %    Basophil % 0.7 0.0 - 1.9 %    Differential Method Automated    Comprehensive metabolic panel   Result Value Ref Range    Sodium 140 136 - 145 mmol/L    Potassium 4.6 3.5 - 5.1 mmol/L    Chloride 100 95 - 110 mmol/L    CO2 28 23 - 29 mmol/L    Glucose 107 70 - 110 mg/dL    BUN 17 6 - 20 mg/dL    Creatinine 0.9 0.5 - 1.4 mg/dL    Calcium 9.6 8.7 - 10.5 mg/dL    Total Protein 7.0 6.0 - 8.4 g/dL    Albumin 3.6 3.5 - 5.2 g/dL    Total Bilirubin 0.3 0.1 - 1.0  mg/dL    Alkaline Phosphatase 71 55 - 135 U/L    AST 30 10 - 40 U/L    ALT 32 10 - 44 U/L    Anion Gap 12 8 - 16 mmol/L    eGFR >60 >60 mL/min/1.73 m^2   Lactic acid, plasma #1   Result Value Ref Range    Lactate (Lactic Acid) 2.5 (H) 0.5 - 2.2 mmol/L   Urinalysis, Reflex to Urine Culture Urine, Catheterized    Specimen: Urine   Result Value Ref Range    Specimen UA Urine, Catheterized     Color, UA Yellow Yellow, Straw, Joann    Appearance, UA Hazy (A) Clear    pH, UA 6.0 5.0 - 8.0    Specific Gravity, UA 1.010 1.005 - 1.030    Protein, UA Trace (A) Negative    Glucose, UA 4+ (A) Negative    Ketones, UA Negative Negative    Bilirubin (UA) Negative Negative    Occult Blood UA Negative Negative    Nitrite, UA Negative Negative    Urobilinogen, UA Negative <2.0 EU/dL    Leukocytes, UA 3+ (A) Negative   Brain natriuretic peptide   Result Value Ref Range    BNP 15 0 - 99 pg/mL   Lipase   Result Value Ref Range    Lipase 12 4 - 60 U/L   Troponin I   Result Value Ref Range    Troponin I 0.011 0.000 - 0.026 ng/mL   Procalcitonin   Result Value Ref Range    Procalcitonin 0.04 <0.25 ng/mL   HIV 1/2 Ag/Ab (4th Gen)   Result Value Ref Range    HIV 1/2 Ag/Ab Negative Negative   Hepatitis C Antibody   Result Value Ref Range    Hepatitis C Ab Negative Negative   HCV Virus Hold Specimen   Result Value Ref Range    HEP C Virus Hold Specimen Hold for HCV sendout    Urinalysis Microscopic   Result Value Ref Range    RBC, UA 2 0 - 4 /hpf    WBC, UA >100 (H) 0 - 5 /hpf    WBC Clumps, UA Many (A) None-Rare    Bacteria Many (A) None-Occ /hpf    Yeast, UA None None    Unclass Virgen UA Rare None-Moderate    Microscopic Comment SEE COMMENT    Lactic acid, plasma #2   Result Value Ref Range    Lactate (Lactic Acid) 2.1 0.5 - 2.2 mmol/L   POCT glucose   Result Value Ref Range    POCT Glucose 64 (L) 70 - 110 mg/dL   POCT glucose   Result Value Ref Range    POCT Glucose 128 (H) 70 - 110 mg/dL   POCT glucose   Result Value Ref Range    POCT  Glucose 186 (H) 70 - 110 mg/dL   POCT glucose   Result Value Ref Range    POCT Glucose 206 (H) 70 - 110 mg/dL   POCT glucose   Result Value Ref Range    POCT Glucose 204 (H) 70 - 110 mg/dL   POCT glucose   Result Value Ref Range    POCT Glucose 204 (H) 70 - 110 mg/dL     Imaging Results:  Imaging Results              CT Head Without Contrast (Final result)  Result time 06/14/23 15:30:29      Final result by Nikos Cody MD (06/14/23 15:30:29)                   Impression:      1.  Motion artifact is present on a number of images.  Subtle abnormalities could be overlooked.  Negative for acute obvious intracranial process. Negative for hemorrhage, or skull fracture.    2.  Intracranial atherosclerotic disease again seen.    3.  Mild paranasal sinus disease in distribution described above.    4.  Other stable findings as noted above.    All CT scans at this facility are performed  using dose modulation techniques as appropriate to performed exam including the following:  automated exposure control; adjustment of mA and/or kV according to the patients size (this includes techniques or standardized protocols for targeted exams where dose is matched to indication/reason for exam: i.e. extremities or head);  iterative reconstruction technique.      Electronically signed by: Nikos Cody MD  Date:    06/14/2023  Time:    15:30               Narrative:    EXAMINATION:  CT HEAD WITHOUT CONTRAST    CLINICAL HISTORY:  Mental status change, unknown cause;    TECHNIQUE:  Axial images through the brain and posterior fossa were obtained without the use of IV contrast.  Sagittal and coronal reconstructions are provided for review.    COMPARISON:  July 26, 2022    FINDINGS:  Motion artifact is present on a number of images.  Subtle abnormalities could be overlooked.  The ventricles are midline and the CSF spaces are normal for age.  The gray-white matter junction is well preserved. Negative for intracranial vascular  abnormalities. Negative for mass, mass effect, cerebral edema, hemorrhage or abnormal fluid collections.  Intracranial atherosclerotic disease again seen.  Stable falx calcifications.    The skull and scalp are  intact.  Hypoplastic frontal sinuses.  Patchy ethmoid air cell disease.  The rest of the paranasal sinuses, mastoid air cells, middle ears and ear canals are clear. The globes are intact.                                       X-Ray Chest AP Portable (Final result)  Result time 06/14/23 15:09:58      Final result by Nikos Cody MD (06/14/23 15:09:58)                   Impression:      1.  Negative for acute process involving the chest.    2.  Stable findings as noted above.      Electronically signed by: Nikos Cody MD  Date:    06/14/2023  Time:    15:09               Narrative:    EXAMINATION:  XR CHEST AP PORTABLE    CLINICAL HISTORY:  Sepsis;    COMPARISON:  September 26, 2022    FINDINGS:  Low lung volumes noted.  Stable medial and lateral left lower lobe scarring.  Lungs are otherwise free of new pulmonary opacities.  The cardiac silhouette size is normal. The trachea is midline and the mediastinal width is normal. Negative for focal infiltrate, effusion or pneumothorax. Pulmonary vasculature is normal. Negative for osseous abnormalities. Tortuous aorta with calcifications of the aortic knob.  Convex right curvature of the thoracic spine with marginal spondylosis again seen.                                     The EKG was ordered, reviewed, and independently interpreted by the ED provider.  Interpretation time: 14:42  Rate: 85 BPM  Rhythm: normal sinus rhythm  Interpretation: Right superior axis deviation. Possible anterior infarct, age undetermined. No STEMI.           The Emergency Provider reviewed the vital signs and test results, which are outlined above.    ED Discussion     3:56 PM: Discussed case with Dr. Triplett ([facility]). Dr. Triplett agrees with current care and management of  pt and accepts admission.   Admitting Service: Hospital Medicine  Admitting Physician: Dr. Triplett  Admit to: Obs    3:57 PM: Re-evaluated pt. I have discussed test results, shared treatment plan, and the need for admission with patient and family at bedside. Pt and family express understanding at this time and agree with all information. All questions answered. Pt and family have no further questions or concerns at this time. Pt is ready for admit.         ED Medication(s):  Medications   dextrose 5 % and 0.9 % NaCl infusion ( Intravenous New Bag 6/14/23 1707)   albuterol-ipratropium 2.5 mg-0.5 mg/3 mL nebulizer solution 3 mL (has no administration in time range)   isosorbide mononitrate 24 hr tablet 30 mg (has no administration in time range)   metoprolol succinate (TOPROL-XL) 24 hr tablet 25 mg (has no administration in time range)   sodium chloride 0.9% flush 10 mL (has no administration in time range)   melatonin tablet 6 mg (has no administration in time range)   ondansetron injection 4 mg (has no administration in time range)   prochlorperazine injection Soln 5 mg (has no administration in time range)   polyethylene glycol packet 17 g (has no administration in time range)   senna-docusate 8.6-50 mg per tablet 1 tablet (1 tablet Oral Given 6/14/23 2042)   simethicone chewable tablet 80 mg (has no administration in time range)   aluminum-magnesium hydroxide-simethicone 200-200-20 mg/5 mL suspension 30 mL (has no administration in time range)   acetaminophen tablet 650 mg (650 mg Oral Given 6/14/23 1919)   naloxone 0.4 mg/mL injection 0.02 mg (has no administration in time range)   potassium bicarbonate disintegrating tablet 50 mEq (has no administration in time range)   potassium bicarbonate disintegrating tablet 35 mEq (has no administration in time range)   potassium bicarbonate disintegrating tablet 60 mEq (has no administration in time range)   magnesium oxide tablet 800 mg (has no administration in time  range)   magnesium oxide tablet 800 mg (has no administration in time range)   potassium, sodium phosphates 280-160-250 mg packet 2 packet (has no administration in time range)   potassium, sodium phosphates 280-160-250 mg packet 2 packet (has no administration in time range)   potassium, sodium phosphates 280-160-250 mg packet 2 packet (has no administration in time range)   glucagon (human recombinant) injection 1 mg (has no administration in time range)   enoxaparin injection 40 mg (40 mg Subcutaneous Given 6/14/23 1919)   insulin aspart U-100 pen 0-5 Units (has no administration in time range)   dextrose 10% bolus 125 mL 125 mL (has no administration in time range)   dextrose 10% bolus 250 mL 250 mL (has no administration in time range)   dextrose 40 % gel 15,000 mg (has no administration in time range)   dextrose 40 % gel 30,000 mg (has no administration in time range)   cefTRIAXone (ROCEPHIN) 1 g in dextrose 5 % in water (D5W) 5 % 100 mL IVPB (MB+) (has no administration in time range)   sodium chloride 0.9% bolus 1,947 mL 1,947 mL (0 mLs Intravenous Stopped 6/14/23 1644)   cefTRIAXone (ROCEPHIN) 2 g in dextrose 5 % in water (D5W) 5 % 100 mL IVPB (MB+) (0 g Intravenous Stopped 6/14/23 1610)         Current Discharge Medication List            Medical Decision Making    Medical Decision Making:   Initial Assessment:   Found altered at home face down.  Blood glucose was low.  Given oral glucose, then D50 en route.  Mentation is back to baseline  Differential Diagnosis:   Hypoglycemia, sepsis, electrolyte abnormality  Clinical Tests:   Lab Tests: Ordered and Reviewed  Radiological Study: Ordered and Reviewed  Medical Tests: Ordered and Reviewed  ED Management:  Labs and imaging reviewed by me.  No acute findings except for a UTI, lactic acidosis, and a blood glucose that is slowly going lower.  Considered admission, patient and family are agreeable.    Other:   I have discussed this case with another health care  provider.       <> Summary of the Discussion: Case discussed with Dr. Triplett () will place in observation         Scribe Attestation:   Scribe #1: I performed the above scribed service and the documentation accurately describes the services I performed. I attest to the accuracy of the note.    Attending:   Physician Attestation Statement for Scribe #1: I, Luis Carmichael MD, personally performed the services described in this documentation, as scribed by Rogelio Timmons, in my presence, and it is both accurate and complete.          Clinical Impression       ICD-10-CM ICD-9-CM   1. Acute cystitis with hematuria  N30.01 595.0   2. Weakness  R53.1 780.79   3. Hypoglycemia  E16.2 251.2   4. Altered mental status, unspecified altered mental status type  R41.82 780.97   5. Chest pain  R07.9 786.50       Disposition:   Disposition: Placed in Observation  Condition: Fair       Luis Carmichael MD  06/15/23 0603

## 2023-06-14 NOTE — SUBJECTIVE & OBJECTIVE
Past Medical History:   Diagnosis Date    Acute ischemic stroke 9/17/2019    Acute respiratory failure with hypoxia 2/11/2021    Aneurysm     Anxiety     Arthritis     Asthma     Bipolar 1 disorder     Cerebral aneurysm, nonruptured 9/19/2019    Chronic diastolic heart failure 5/23/2023    Coronary artery disease of native artery of native heart with stable angina pectoris 1/19/2022    Depression     Diabetes mellitus, type 2     Diverticular disease     GERD (gastroesophageal reflux disease)     Hyperlipemia     Hypertension     Hyponatremia 7/24/2022    Hypothyroidism     Pneumonia     PVD (peripheral vascular disease) 4/28/2021    Renal manifestation of secondary diabetes mellitus     Right-sided back pain 6/29/2019    Severe obesity (BMI 35.0-39.9) with comorbidity 5/31/2022    Stroke     Trouble in sleeping        Past Surgical History:   Procedure Laterality Date    CEREBRAL ANGIOGRAM N/A 10/28/2019    Procedure: ANGIOGRAM-CEREBRAL;  Surgeon: Dahiana Diagnostic Provider;  Location: [facility] OR [address] [place] [address];  Service: Radiology;  Laterality: N/A;  Rm 190/Aayush    CHOLECYSTECTOMY      COLON SURGERY      COLONOSCOPY N/A 1/12/2018    Procedure: COLONOSCOPY;  Surgeon: Roque Mahajan III, MD;  Location: [facility];  Service: Endoscopy;  Laterality: N/A;    DENTAL SURGERY  05/21/2018    removal of 8 top teeth    HYSTERECTOMY      TONSILLECTOMY         Review of patient's allergies indicates:   Allergen Reactions    Seroquel [quetiapine] Other (See Comments)     TC SEIZURES    Adhesive Blisters     Pt states can't tolerate paper tape    Levaquin [levofloxacin]     Levomenol analogues     Lipitor [atorvastatin]      Leg Cramps    Repatha pushtronex [evolocumab]      Feels sick    Zofran [ondansetron hcl] Hives and Other (See Comments)     headaches    Celestone [betamethasone] Hives, Itching and Rash    Piroxicam Diarrhea and Nausea Only       No current facility-administered medications on file prior to encounter.     Current  Outpatient Medications on File Prior to Encounter   Medication Sig    albuterol (PROVENTIL/VENTOLIN HFA) 90 mcg/actuation inhaler INHALE 2 TO 4 PUFFS BY MOUTH THREE TIMES DAILY AS NEEDED FOR WHEEZING    albuterol-ipratropium (DUO-NEB) 2.5 mg-0.5 mg/3 mL nebulizer solution Take 3 mLs by nebulization every 6 (six) hours as needed for Wheezing. Rescue    benztropine (COGENTIN) 0.5 MG tablet Take 1 tablet (0.5 mg total) by mouth 2 (two) times daily as needed (dystonia).    blood sugar diagnostic (TRUE METRIX GLUCOSE TEST STRIP) Strp USE 1 STRIP TO CHECK GLUCOSE THREE TIMES DAILY    blood sugar diagnostic Strp 1 each by Misc.(Non-Drug; Combo Route) route 3 (three) times daily. Dispense preferred on insurance    blood-glucose meter kit Use as instructed - preferred on insurance    buPROPion (WELLBUTRIN XL) 300 MG 24 hr tablet Take 1 tablet (300 mg total) by mouth once daily.    butalbital-acetaminophen-caffeine -40 mg (FIORICET, ESGIC) -40 mg per tablet Take 1 tablet by mouth every 6 (six) hours as needed for Headaches. for pain.    diazePAM (VALIUM) 10 MG Tab Take 1/2 to 1 tablet three times daily as needed for anxiety    empagliflozin (JARDIANCE) 25 mg tablet Take 1 tablet (25 mg total) by mouth once daily.    ezetimibe (ZETIA) 10 mg tablet Take 1 tablet (10 mg total) by mouth once daily.    fluticasone-salmeterol diskus inhaler 250-50 mcg Inhale 1 puff into the lungs 2 (two) times daily. Controller for maintenance    furosemide (LASIX) 40 MG tablet TAKE 1 TABLET BY MOUTH EVERY MONDAY, WEDNESDAY, AND FRIDAY AS NEEDED    gabapentin (NEURONTIN) 600 MG tablet TAKE 1 CAPSULE BY MOUTH IN THE MORNING, 1 CAPSULE IN THE AFTERNOON, AND THEN 2 CAPSULES AT BEDTIME    insulin glargine U-300 conc (TOUJEO MAX U-300 SOLOSTAR) 300 unit/mL (3 mL) insulin pen Inject 76 Units into the skin once daily.    insulin regular, human (NOVOLIN R INJ)     isosorbide mononitrate (IMDUR) 30 MG 24 hr tablet Take 1 tablet by mouth once  "daily    lancets Misc 1 each by Misc.(Non-Drug; Combo Route) route 3 (three) times daily. Dispense preferred on insurance    metFORMIN (GLUCOPHAGE-XR) 500 MG ER 24hr tablet TAKE 1 TABLET BY MOUTH TWICE DAILY WITH MEALS    metoprolol succinate (TOPROL-XL) 25 MG 24 hr tablet Take 1 tablet by mouth once daily    mirtazapine (REMERON) 15 MG tablet Take 1 tablet (15 mg total) by mouth every evening.    pen needle, diabetic 32 gauge x 5/32" Ndle Inject 1 each into the skin once daily.    promethazine (PHENERGAN) 12.5 MG Tab Take 1 tablet (12.5 mg total) by mouth every 6 (six) hours as needed.    risperiDONE (RISPERDAL) 1 MG tablet Take 1 tablet (1 mg total) by mouth 2 (two) times daily.    sulfamethoxazole-trimethoprim 800-160mg (BACTRIM DS) 800-160 mg Tab Take 1 tablet by mouth 2 (two) times daily.     Family History       Problem Relation (Age of Onset)    Alcohol abuse Mother, Father    Arthritis Father, Maternal Grandmother, Paternal Grandmother    Breast cancer Maternal Grandmother    COPD Mother, Sister    Cancer Father, Maternal Aunt, Maternal Uncle, Paternal Aunt, Paternal Uncle, Paternal Grandmother    Depression Mother    Diabetes Maternal Uncle    Drug abuse Mother, Maternal Uncle    Heart disease Father, Brother    Hypertension Father, Brother, Maternal Grandmother, Paternal Grandfather    Kidney failure Sister          Tobacco Use    Smoking status: Every Day     Packs/day: 1.00     Years: 44.00     Pack years: 44.00     Types: Cigarettes, Vaping w/o nicotine     Start date: 1978    Smokeless tobacco: Never   Substance and Sexual Activity    Alcohol use: Not Currently     Comment: occassionally    Drug use: Yes     Types: Marijuana    Sexual activity: Yes     Review of Systems   Constitutional:  Positive for activity change and fatigue. Negative for appetite change, chills, diaphoresis, fever and unexpected weight change.   HENT:  Negative for congestion, nosebleeds, sinus pressure and sore throat.    Eyes:  " Negative for pain, discharge and visual disturbance.   Respiratory:  Negative for cough, chest tightness, shortness of breath, wheezing and stridor.    Cardiovascular:  Negative for chest pain, palpitations and leg swelling.   Gastrointestinal:  Negative for abdominal distention, abdominal pain, blood in stool, constipation, diarrhea, nausea and vomiting.   Endocrine: Negative for cold intolerance and heat intolerance.   Genitourinary:  Negative for difficulty urinating, dysuria, flank pain, frequency and urgency.   Musculoskeletal:  Negative for arthralgias, back pain, joint swelling, myalgias, neck pain and neck stiffness.   Skin:  Negative for rash and wound.   Allergic/Immunologic: Negative for food allergies and immunocompromised state.   Neurological:  Positive for syncope and weakness. Negative for dizziness, seizures, facial asymmetry, speech difficulty, light-headedness, numbness and headaches.   Hematological:  Negative for adenopathy.   Psychiatric/Behavioral:  Positive for confusion. Negative for agitation and hallucinations.    Objective:     Vital Signs (Most Recent):  Temp: 98.8 °F (37.1 °C) (06/14/23 1402)  Pulse: 90 (06/14/23 1450)  Resp: 20 (06/14/23 1450)  BP: 135/70 (06/14/23 1445)  SpO2: 96 % (06/14/23 1450) Vital Signs (24h Range):  Temp:  [98.8 °F (37.1 °C)] 98.8 °F (37.1 °C)  Pulse:  [83-90] 90  Resp:  [18-20] 20  SpO2:  [89 %-96 %] 96 %  BP: (128-152)/(63-79) 135/70     Weight: 80.3 kg (177 lb)  Body mass index is 30.38 kg/m².     Physical Exam  Vitals and nursing note reviewed.   Constitutional:       General: She is not in acute distress.     Appearance: She is well-developed. She is not diaphoretic.   HENT:      Head: Normocephalic and atraumatic.      Nose: Nose normal.   Eyes:      General: No scleral icterus.     Conjunctiva/sclera: Conjunctivae normal.   Neck:      Trachea: No tracheal deviation.   Cardiovascular:      Rate and Rhythm: Normal rate and regular rhythm.      Heart  sounds: Normal heart sounds. No murmur heard.    No friction rub. No gallop.   Pulmonary:      Effort: Pulmonary effort is normal. No respiratory distress.      Breath sounds: Normal breath sounds. No stridor. No wheezing or rales.   Chest:      Chest wall: No tenderness.   Abdominal:      General: Bowel sounds are normal. There is no distension.      Palpations: Abdomen is soft. There is no mass.      Tenderness: There is no abdominal tenderness. There is no guarding or rebound.   Musculoskeletal:         General: No tenderness or deformity. Normal range of motion.      Cervical back: Normal range of motion and neck supple.   Skin:     General: Skin is warm and dry.      Coloration: Skin is not pale.      Findings: No erythema or rash.   Neurological:      Mental Status: She is alert and oriented to person, place, and time.      Cranial Nerves: No cranial nerve deficit.      Motor: No abnormal muscle tone.      Coordination: Coordination normal.   Psychiatric:         Behavior: Behavior normal.         Thought Content: Thought content normal.              Significant Labs: All pertinent labs within the past 24 hours have been reviewed.    Significant Imaging: I have reviewed all pertinent imaging results/findings within the past 24 hours.

## 2023-06-14 NOTE — HPI
The patient is a 56 yo female with CAD, PVCs, h/o syncope, HTN, HLD, Cerebral aneurysm (small), IDDM, Asthma, GERD, Bipolar disorder, and tobacco abuse who presented to ED for AMS. Pt's spouse reports he noticed occasional confusion the last few days. Today, the neighbor found pt unresponsive on the floor of the pt's home. The pt was outside on her porch 15 minutes prior. The neighbor then noticed the pt's dog was outside unattended. She went to the pt's house to check on her and found the pt unresponsive. On EMS arrival, pt was awake but drowsy. Glucose was 42. The pt was given glucose tablets. The patient is still mildly confused per spouse. The patient states she took Toujeo 78 units this am and did not eat breakfast.     In the ED, VSS, WBC normal, LA 2.5. +UTI. CT head showed mild paranasal sinus disease, nothing acute. CXR clear.   Glucose 64 on arrival to ED. Pt was given food and placed on D5NS and glucose improved to 128.   She was also given IVFs and IV Rocephin.     The patient will be placed in observation under hospital medicine for Acute metabolic encephalopathy, UTI, and hypoglycemia.

## 2023-06-15 VITALS
BODY MASS INDEX: 30.38 KG/M2 | RESPIRATION RATE: 18 BRPM | DIASTOLIC BLOOD PRESSURE: 80 MMHG | HEART RATE: 78 BPM | OXYGEN SATURATION: 95 % | WEIGHT: 177 LBS | TEMPERATURE: 98 F | SYSTOLIC BLOOD PRESSURE: 166 MMHG

## 2023-06-15 LAB
ALBUMIN SERPL BCP-MCNC: 3.4 G/DL (ref 3.5–5.2)
ALP SERPL-CCNC: 72 U/L (ref 55–135)
ALT SERPL W/O P-5'-P-CCNC: 31 U/L (ref 10–44)
ANION GAP SERPL CALC-SCNC: 12 MMOL/L (ref 8–16)
AST SERPL-CCNC: 31 U/L (ref 10–40)
BASOPHILS # BLD AUTO: 0.06 K/UL (ref 0–0.2)
BASOPHILS NFR BLD: 1 % (ref 0–1.9)
BILIRUB SERPL-MCNC: 0.3 MG/DL (ref 0.1–1)
BUN SERPL-MCNC: 9 MG/DL (ref 6–20)
CALCIUM SERPL-MCNC: 8.9 MG/DL (ref 8.7–10.5)
CHLORIDE SERPL-SCNC: 105 MMOL/L (ref 95–110)
CO2 SERPL-SCNC: 23 MMOL/L (ref 23–29)
CREAT SERPL-MCNC: 0.8 MG/DL (ref 0.5–1.4)
DIFFERENTIAL METHOD: ABNORMAL
EOSINOPHIL # BLD AUTO: 0.1 K/UL (ref 0–0.5)
EOSINOPHIL NFR BLD: 1.2 % (ref 0–8)
ERYTHROCYTE [DISTWIDTH] IN BLOOD BY AUTOMATED COUNT: 15.8 % (ref 11.5–14.5)
EST. GFR  (NO RACE VARIABLE): >60 ML/MIN/1.73 M^2
GLUCOSE SERPL-MCNC: 224 MG/DL (ref 70–110)
HCT VFR BLD AUTO: 53.8 % (ref 37–48.5)
HGB BLD-MCNC: 16.3 G/DL (ref 12–16)
IMM GRANULOCYTES # BLD AUTO: 0.02 K/UL (ref 0–0.04)
IMM GRANULOCYTES NFR BLD AUTO: 0.3 % (ref 0–0.5)
LYMPHOCYTES # BLD AUTO: 1.8 K/UL (ref 1–4.8)
LYMPHOCYTES NFR BLD: 31.1 % (ref 18–48)
MAGNESIUM SERPL-MCNC: 1.4 MG/DL (ref 1.6–2.6)
MCH RBC QN AUTO: 27.5 PG (ref 27–31)
MCHC RBC AUTO-ENTMCNC: 30.3 G/DL (ref 32–36)
MCV RBC AUTO: 91 FL (ref 82–98)
MONOCYTES # BLD AUTO: 0.4 K/UL (ref 0.3–1)
MONOCYTES NFR BLD: 7.6 % (ref 4–15)
NEUTROPHILS # BLD AUTO: 3.4 K/UL (ref 1.8–7.7)
NEUTROPHILS NFR BLD: 58.8 % (ref 38–73)
NRBC BLD-RTO: 0 /100 WBC
PHOSPHATE SERPL-MCNC: 3.4 MG/DL (ref 2.7–4.5)
PLATELET # BLD AUTO: 169 K/UL (ref 150–450)
PMV BLD AUTO: 10.5 FL (ref 9.2–12.9)
POCT GLUCOSE: 204 MG/DL (ref 70–110)
POCT GLUCOSE: 248 MG/DL (ref 70–110)
POTASSIUM SERPL-SCNC: 4.7 MMOL/L (ref 3.5–5.1)
PROT SERPL-MCNC: 6.8 G/DL (ref 6–8.4)
RBC # BLD AUTO: 5.92 M/UL (ref 4–5.4)
SODIUM SERPL-SCNC: 140 MMOL/L (ref 136–145)
WBC # BLD AUTO: 5.78 K/UL (ref 3.9–12.7)

## 2023-06-15 PROCEDURE — 97161 PT EVAL LOW COMPLEX 20 MIN: CPT | Mod: HCNC

## 2023-06-15 PROCEDURE — 96372 THER/PROPH/DIAG INJ SC/IM: CPT | Performed by: NURSE PRACTITIONER

## 2023-06-15 PROCEDURE — 80053 COMPREHEN METABOLIC PANEL: CPT | Mod: HCNC | Performed by: NURSE PRACTITIONER

## 2023-06-15 PROCEDURE — 97530 THERAPEUTIC ACTIVITIES: CPT | Mod: HCNC

## 2023-06-15 PROCEDURE — 85025 COMPLETE CBC W/AUTO DIFF WBC: CPT | Mod: HCNC | Performed by: NURSE PRACTITIONER

## 2023-06-15 PROCEDURE — 97116 GAIT TRAINING THERAPY: CPT | Mod: HCNC

## 2023-06-15 PROCEDURE — 99900035 HC TECH TIME PER 15 MIN (STAT): Mod: HCNC

## 2023-06-15 PROCEDURE — 83735 ASSAY OF MAGNESIUM: CPT | Mod: HCNC | Performed by: NURSE PRACTITIONER

## 2023-06-15 PROCEDURE — 63600175 PHARM REV CODE 636 W HCPCS: Mod: HCNC | Performed by: NURSE PRACTITIONER

## 2023-06-15 PROCEDURE — 27000221 HC OXYGEN, UP TO 24 HOURS: Mod: HCNC

## 2023-06-15 PROCEDURE — 97166 OT EVAL MOD COMPLEX 45 MIN: CPT | Mod: HCNC

## 2023-06-15 PROCEDURE — 94761 N-INVAS EAR/PLS OXIMETRY MLT: CPT | Mod: HCNC

## 2023-06-15 PROCEDURE — 84100 ASSAY OF PHOSPHORUS: CPT | Mod: HCNC | Performed by: NURSE PRACTITIONER

## 2023-06-15 PROCEDURE — 25000003 PHARM REV CODE 250: Mod: HCNC | Performed by: STUDENT IN AN ORGANIZED HEALTH CARE EDUCATION/TRAINING PROGRAM

## 2023-06-15 PROCEDURE — 36415 COLL VENOUS BLD VENIPUNCTURE: CPT | Mod: HCNC | Performed by: NURSE PRACTITIONER

## 2023-06-15 PROCEDURE — 25000003 PHARM REV CODE 250: Mod: HCNC | Performed by: NURSE PRACTITIONER

## 2023-06-15 PROCEDURE — G0378 HOSPITAL OBSERVATION PER HR: HCPCS | Mod: HCNC

## 2023-06-15 RX ORDER — LEVOFLOXACIN 750 MG/1
750 TABLET ORAL DAILY
Qty: 7 TABLET | Refills: 0 | Status: SHIPPED | OUTPATIENT
Start: 2023-06-15 | End: 2023-06-22

## 2023-06-15 RX ORDER — PROMETHAZINE HYDROCHLORIDE 12.5 MG/1
12.5 TABLET ORAL EVERY 6 HOURS PRN
Qty: 32 TABLET | Refills: 0 | Status: SHIPPED | OUTPATIENT
Start: 2023-06-15 | End: 2023-12-19 | Stop reason: SDUPTHER

## 2023-06-15 RX ORDER — GABAPENTIN 300 MG/1
600 CAPSULE ORAL 2 TIMES DAILY
Status: DISCONTINUED | OUTPATIENT
Start: 2023-06-15 | End: 2023-06-15 | Stop reason: HOSPADM

## 2023-06-15 RX ORDER — HYDROXYZINE HYDROCHLORIDE 25 MG/1
25 TABLET, FILM COATED ORAL DAILY PRN
Status: DISCONTINUED | OUTPATIENT
Start: 2023-06-15 | End: 2023-06-15 | Stop reason: HOSPADM

## 2023-06-15 RX ADMIN — ISOSORBIDE MONONITRATE 30 MG: 30 TABLET, EXTENDED RELEASE ORAL at 09:06

## 2023-06-15 RX ADMIN — METOPROLOL SUCCINATE 25 MG: 25 TABLET, EXTENDED RELEASE ORAL at 09:06

## 2023-06-15 RX ADMIN — HYDROXYZINE HYDROCHLORIDE 25 MG: 25 TABLET ORAL at 12:06

## 2023-06-15 RX ADMIN — INSULIN ASPART 2 UNITS: 100 INJECTION, SOLUTION INTRAVENOUS; SUBCUTANEOUS at 12:06

## 2023-06-15 RX ADMIN — GABAPENTIN 600 MG: 300 CAPSULE ORAL at 09:06

## 2023-06-15 NOTE — PT/OT/SLP EVAL
Physical Therapy Evaluation    Patient Name:  Alexandra Goins   MRN:  [id number]    Recommendations:     Discharge Recommendations: outpatient PT   Discharge Equipment Recommendations: none   Barriers to discharge: None    Assessment:     Alexandra Goins is a 57 y.o. female admitted with a medical diagnosis of Hypoglycemia.  She presents with the following impairments/functional limitations: impaired balance, gait instability, decreased safety awareness, impaired functional mobility .    Rehab Prognosis: Good; patient would benefit from acute skilled PT services to address these deficits and reach maximum level of function.    Recent Surgery: * No surgery found *      Plan:     During this hospitalization, patient to be seen 3 x/week to address the identified rehab impairments via gait training, therapeutic activities, therapeutic exercises and progress toward the following goals:    Plan of Care Expires:  06/29/23    Subjective     Chief Complaint: HIP PAIN RESULTING FROM FALL  Patient/Family Comments/goals: NONE STATED  Pain/Comfort:  Pain Rating 1: 8/10  Location - Side 1: Right  Location 1: hip  Pain Addressed 1: Reposition, Distraction    Patients cultural, spiritual, Spiritism conflicts given the current situation:      Living Environment:  PT LIVES WITH  IN SINGLE STORY HOME WITH 7-8 STEPS TO ENTER WITH [employer] RAILINGS.   Prior to admission, patients level of function was IND WITH ALL ADLS. PT EXPLAINS THAT SHE USES RW EVERY SO OFTEN FOR STABILITY. PT REPORTS THAT SHE DOES DRIVE.  Equipment used at home: walker, rolling, grab bar, shower chair, raised toilet.  DME owned (not currently used): single point cane and ROLLATOR .  Upon discharge, patient will have assistance from .    Objective:     Communicated with NURSE MAGDALENE AND [facility] CHART REVIEW prior to session.  Patient found sitting edge of bed with peripheral IV, telemetry  upon PT entry to room.    General Precautions: Standard,  "fall  Orthopedic Precautions:N/A   Braces: N/A  Respiratory Status: Room air    Exams:  Cognitive Exam:  Patient is oriented to Person, Place, Time, and Situation  RLE ROM: WFL  RLE Strength: WFL  LLE ROM: WFL  LLE Strength: WFL    Functional Mobility:  Bed Mobility:     Scooting: supervision  Transfers:     Sit to Stand:  supervision with no AD  Gait: PT ' WITH SPV.      AM-PAC 6 CLICK MOBILITY  Total Score:21       Treatment & Education:  PT FOUND SITTING EOB, WANTING TO CHANGE GOWNS. PT SCOOTED FORWARD ON EOB REQUIRING SPV. PT PERFORMED SIT TO STAND WITH SPV. PT ' WITH SPV. PT REPORTS HAVING FALLEN FREQUENTLY RECENTLY DUE TO "TRIPPING OVER OWN FEET" AND FEELING A LITTLE UNSTABLE. PT RETURN TO ROOM AND T/F TO CHAIR WITH SPV. PT EDUCATED ON HEP INCLUDING AP, LAQ, AND MIP X10 EACH. PT INSTRUCTED TO USE CALL BUTTON TO CALL FOR NURSE FOR ASSISTANCE RETURNING TO BED AND GETTING TO/FROM BATHROOM DUE TO RECENT BALANCE CONCERNS.     Patient left up in chair with all lines intact and call button in reach.    GOALS:   Multidisciplinary Problems       Physical Therapy Goals          Problem: Physical Therapy    Goal Priority Disciplines Outcome Goal Variances Interventions   Physical Therapy Goal     PT, PT/OT      Description: LT23  1. PT WILL BE IND WITH ALL BED MOB TO INCREASE FUNC MOB  2. PT WILL BE IND WITH SIT<> STAND TO RETURN TO PLOF  3. PT WILL ' WITH SPV TO ALLOW FOR COMMUNITY AMBULATION  4. PT WILL BE ABLE TO ASCEND/DESCEND 1 FLIGHT OF STAIRS WITH SPV FOR SAFE STAIR NAVIGATION AT HOME                       History:     Past Medical History:   Diagnosis Date    Acute ischemic stroke 2019    Acute respiratory failure with hypoxia 2021    Aneurysm     Anxiety     Arthritis     Asthma     Bipolar 1 disorder     Cerebral aneurysm, nonruptured 2019    Chronic diastolic heart failure 2023    Coronary artery disease of native artery of native heart with stable angina pectoris " 1/19/2022    Depression     Diabetes mellitus, type 2     Diverticular disease     GERD (gastroesophageal reflux disease)     Hyperlipemia     Hypertension     Hyponatremia 7/24/2022    Hypothyroidism     Pneumonia     PVD (peripheral vascular disease) 4/28/2021    Renal manifestation of secondary diabetes mellitus     Right-sided back pain 6/29/2019    Severe obesity (BMI 35.0-39.9) with comorbidity 5/31/2022    Stroke     Trouble in sleeping        Past Surgical History:   Procedure Laterality Date    CEREBRAL ANGIOGRAM N/A 10/28/2019    Procedure: ANGIOGRAM-CEREBRAL;  [facility];  Location: [address];  Service: Radiology;  Laterality: N/A;  Rm 190/Aayush    CHOLECYSTECTOMY      COLON SURGERY      COLONOSCOPY N/A 1/12/2018    Procedure: COLONOSCOPY;  Surgeon: Roque Mahajan III, MD;  Location: [facility];  Service: Endoscopy;  Laterality: N/A;    DENTAL SURGERY  05/21/2018    removal of 8 top teeth    HYSTERECTOMY      TONSILLECTOMY         Time Tracking:     PT Received On: 06/15/23  PT Start Time: 1038     PT Stop Time: 1106  PT Total Time (min): 28 min     Billable Minutes: Evaluation 14 and Gait Training 14      06/15/2023

## 2023-06-15 NOTE — PLAN OF CARE
Patient remains injury free. Patient AAO X4.  No signs or symptoms of distress noted.  Patient denies any pain or discomfort at this time. Patient will be discharged to home to self care. All active orders reviewed and adhered to. 12 hour Chart check complete.  Continue with current poc.

## 2023-06-15 NOTE — PLAN OF CARE
OT eval completed.  Pt SPV with sit>stand with no AD. SBA with ambulation 450ft with no AD.  Good BUE strength. (I) with ADLs.  Pt does not require skilled acute OT services at this time.  Discharge from OT. Defer to PT for gait and balance. Recommends home.

## 2023-06-15 NOTE — PLAN OF CARE
[facility] - Med Surg  Initial Discharge Assessment       Primary Care Provider: Perez Casiano MD    Admission Diagnosis: Weakness [R53.1]  Hypoglycemia [E16.2]  Acute cystitis with hematuria [N30.01]  Chest pain [R07.9]  Altered mental status, unspecified altered mental status type [R41.82]    Admission Date: 6/14/2023  Expected Discharge Date: per attending         Payor: HU[employer] MANAGED MEDICARE / Plan: HUMANA TOTAL CARE ADVANTAGE / Product Type: Medicare Advantage /     Extended Emergency Contact Information  Primary Emergency Contact: Kaiden Goins  Address: [address] [place] [address] [facility]  Home Phone: [phone]  Mobile Phone: [phone]  Relation: Spouse   needed? No  Secondary Emergency Contact: Joelle Goins   [place]  Mobile Phone: [phone]  Relation: Daughter    Discharge Plan A: Home with family         [facility] [address] [place] [address]  Phone: [phone] Fax: [phone]      Initial Assessment (most recent)       Adult Discharge Assessment - 06/15/23 0908          Discharge Assessment    Assessment Type Discharge Planning Assessment     Confirmed/corrected address, phone number and insurance Yes     Confirmed Demographics Correct on Facesheet     Source of Information patient     When was your last doctors appointment? --   2 weeks    Communicated NOÉ with patient/caregiver Date not available/Unable to determine     Reason For Admission weakness     People in Home spouse     Do you expect to return to your current living situation? Yes     Do you have help at home or someone to help you manage your care at home? Yes     Who are your caregiver(s) and their phone number(s)? spouse     Prior to hospitilization cognitive status: Alert/Oriented     Current cognitive status: Alert/Oriented     Equipment Currently Used at Home none      Readmission within 30 days? No     Patient currently being followed by outpatient case management? No     Do you currently have service(s) that help you manage your care at home? No     Do you take prescription medications? Yes     Do you have prescription coverage? Yes     Coverage humana     Do you have any problems affording any of your prescribed medications? No     Is the patient taking medications as prescribed? yes     Who is going to help you get home at discharge? spouse     How do you get to doctors appointments? family or friend will provide     Are you on dialysis? No     Do you take coumadin? No     Discharge Plan A Home with family        Housing Stability    In the last 12 months, was there a time when you were not able to pay the mortgage or rent on time? No     In the last 12 months, was there a time when you did not have a steady place to sleep or slept in a shelter (including now)? No        Transportation Needs    In the past 12 months, has lack of transportation kept you from meetings, work, or from getting things needed for daily living? No        Food Insecurity    Within the past 12 months, you worried that your food would run out before you got the money to buy more. Never true     Within the past 12 months, the food you bought just didn't last and you didn't have money to get more. Never true

## 2023-06-15 NOTE — HOSPITAL COURSE
Alexandra Goins is a 57 year old female who was placed in observation for hypoglycemia after taking insulin without eating and UTI. She was placed on dextrose infusion briefly without further hypoglycemic events and mental status changes. She was treated with IV Rocephin for UTI. Urine cultures are pending, but will discharge on levaquin based on previous cultures 9/2022. Patient states her allergy to Levaquin is nausea relieved by phenergan. Patient has a history of recurrent UTIs. Will refer to urology for further work up. She will follow up with PCP for final culture. She was seen by PT/TO who recommended outpatient therapy, ambulatory referral placed. She is stable for discharge.

## 2023-06-15 NOTE — PLAN OF CARE
[facility] - Med Surg  Discharge Final Note    Primary Care Provider: Perez Casiano MD    Expected Discharge Date: 6/15/2023    Final Discharge Note (most recent)       Final Note - 06/15/23 1010          Final Note    Assessment Type Final Discharge Note     Anticipated Discharge Disposition Home or Self Care     Hospital Resources/Appts/Education Provided Appointments scheduled and added to AVS

## 2023-06-15 NOTE — PT/OT/SLP EVAL
Occupational Therapy   Evaluation and Discharge Note    Name: Alexandra Goins  MRN: [id number]  Admitting Diagnosis: Hypoglycemia  Recent Surgery: * No surgery found *      Recommendations:     Discharge Recommendations: home  Discharge Equipment Recommendations: none  Barriers to discharge:  None    Assessment:     Alexandra Goins is a 57 y.o. female with a medical diagnosis of Hypoglycemia. At this time, patient is functioning at their prior level of function and does not require further acute OT services. Defer to PT for gait and balance.     Plan:     During this hospitalization, patient does not require further acute OT services.  Please re-consult if situation changes.    Plan of Care Reviewed with: patient  D/C OT services.  Subjective     Chief Complaint: R hip pain  Patient/Family Comments/goals: return home    Occupational Profile:  Living Environment: lives with  and 2 dogs in a 1 story house with 7 steps and B railings to enter. Pt's  works during the day. Pt reports neighbor is available and can assist pt when needed. Pt has walk-in shower.  Previous level of function: Pt Mod (I) with functional mobility using RW community distances. (I) with ADLs.  Roles and Routines: drives and does not work  Equipment Used at home: walker, rolling, raised toilet, shower chair, grab bar, cane, straight, rollator, other (see comments) (hand held shower head)  Assistance upon Discharge: limited assist from     Pain/Comfort:  Pain Rating 1: 8/10  Location - Side 1: Right  Location - Orientation 1: generalized  Location 1: hip  Pain Addressed 1: Reposition, Distraction  Pain Rating Post-Intervention 1: 8/10    Objective:     Communicated with: Nurse and epic chart review prior to session.  Patient found sitting edge of bed with telemetry, peripheral IV upon OT entry to room.    General Precautions: Standard, fall  Orthopedic Precautions: N/A  Braces: N/A  Respiratory Status: Room air     Occupational  Performance:    Bed Mobility:    Patient completed Scooting/Bridging with supervision    Functional Mobility/Transfers:  Patient completed Sit <> Stand Transfer with supervision  with  no assistive device   Patient completed Bed <> Chair Transfer using Stand Pivot technique with stand by assistance with no assistive device  Functional Mobility: Patient completed x450ft functional mobility with SBA and no AD to increase dynamic standing balance and activity tolerance needed for ADL completion.     Activities of Daily Living:  Upper Body Dressing: independence doff/tripp gown EOB  Lower Body Dressing: independence doff/tripp socks EOB    Cognitive/Visual Perceptual:  Cognitive/Psychosocial Skills:     -       Oriented to: Person, Place, Time, and Situation   -       Follows Commands/attention:Follows multistep  commands  -       Communication: clear/fluent  -       Safety awareness/insight to disability: impaired   Visual/Perceptual:      -pt wears glasses .    Physical Exam:  Sensation:    -       Impaired  neuropathy in B feet.  Dominant hand:    -       right  Upper Extremity Range of Motion:     -       Right Upper Extremity: WNL  -       Left Upper Extremity: WNL  Upper Extremity Strength:    -       Right Upper Extremity: WFL  -       Left Upper Extremity: WFL   Strength:    -       Right Upper Extremity: WFL  -       Left Upper Extremity: WFL    AMPAC 6 Click ADL:  AMPAC Total Score: 24    Treatment & Education:  Patient educated on role of OT in acute setting and benefits of participation. Educated on techniques to use to increase independence and decrease fall risk with functional transfers. Educated on importance of OOB activity and calling for A to transfer back to bed. Encouraged completion of B UE AROM therex throughout the day to tolerance to increase functional strength and activity tolerance. Educated patient on importance of increased tolerance to upright position and direct impact on CV endurance  and strength. Patient encouraged to sit up in chair for a minimum of 2 consecutive hours per day. Patient stated understanding and in agreement with POC.     Patient left up in chair with all lines intact, call button in reach, and nurse notified    GOALS:   Multidisciplinary Problems       Occupational Therapy Goals       Not on file                    History:     Past Medical History:   Diagnosis Date    Acute ischemic stroke 9/17/2019    Acute respiratory failure with hypoxia 2/11/2021    Aneurysm     Anxiety     Arthritis     Asthma     Bipolar 1 disorder     Cerebral aneurysm, nonruptured 9/19/2019    Chronic diastolic heart failure 5/23/2023    Coronary artery disease of native artery of native heart with stable angina pectoris 1/19/2022    Depression     Diabetes mellitus, type 2     Diverticular disease     GERD (gastroesophageal reflux disease)     Hyperlipemia     Hypertension     Hyponatremia 7/24/2022    Hypothyroidism     Pneumonia     PVD (peripheral vascular disease) 4/28/2021    Renal manifestation of secondary diabetes mellitus     Right-sided back pain 6/29/2019    Severe obesity (BMI 35.0-39.9) with comorbidity 5/31/2022    Stroke     Trouble in sleeping          Past Surgical History:   Procedure Laterality Date    CEREBRAL ANGIOGRAM N/A 10/28/2019    Procedure: ANGIOGRAM-CEREBRAL;  Surgeon: Dahiana Diagnostic Provider;  Location: [address];  Service: Radiology;  Laterality: N/A;  Rm 190/Aayush    CHOLECYSTECTOMY      COLON SURGERY      COLONOSCOPY N/A 1/12/2018    Procedure: COLONOSCOPY;  Surgeon: Roque Mahajan III, MD;  Location: [facility];  Service: Endoscopy;  Laterality: N/A;    DENTAL SURGERY  05/21/2018    removal of 8 top teeth    HYSTERECTOMY      TONSILLECTOMY         Time Tracking:     OT Date of Treatment: 06/15/23  OT Start Time: 1035  OT Stop Time: 1105  OT Total Time (min): 30 min    Billable Minutes:Evaluation 15  Therapeutic Activity 15    6/15/2023  Darlene Whitney OT

## 2023-06-15 NOTE — DISCHARGE SUMMARY
[facility] Medicine  Discharge Summary      Patient Name: Alexandra Goins  MRN: [id number]  [facility]: [id number]  Patient Class: OP- Observation  Admission Date: 6/14/2023  Hospital Length of Stay: 0 days  Discharge Date and Time:  06/15/2023 5:21 PM  Attending Physician: No att. providers found   Discharging Provider: Skinny Bennett NP  Primary Care Provider: Perez Casiano MD    Primary Care Team: Networked reference to record PCT     HPI:   The patient is a 58 yo female with CAD, PVCs, h/o syncope, HTN, HLD, Cerebral aneurysm (small), IDDM, Asthma, GERD, Bipolar disorder, and tobacco abuse who presented to ED for AMS. Pt's spouse reports he noticed occasional confusion the last few days. Today, the neighbor found pt unresponsive on the floor of the pt's home. The pt was outside on her porch 15 minutes prior. The neighbor then noticed the pt's dog was outside unattended. She went to the pt's house to check on her and found the pt unresponsive. On EMS arrival, pt was awake but drowsy. Glucose was 42. The pt was given glucose tablets. The patient is still mildly confused per spouse. The patient states she took Toujeo 78 units this am and did not eat breakfast.     In the ED, VSS, WBC normal, LA 2.5. +UTI. CT head showed mild paranasal sinus disease, nothing acute. CXR clear.   Glucose 64 on arrival to ED. Pt was given food and placed on D5NS and glucose improved to 128.   She was also given IVFs and IV Rocephin.     The patient will be placed in observation under hospital medicine for Acute metabolic encephalopathy, UTI, and hypoglycemia.       * No surgery found *      Hospital Course:   Alexandra Goins is a 57 year old female who was placed in observation for hypoglycemia after taking insulin without eating and UTI. She was placed on dextrose infusion briefly without further hypoglycemic events and mental status changes. She was treated with IV Rocephin for UTI. Urine cultures are pending, but  will discharge on levaquin based on previous cultures 9/2022. Patient states her allergy to Levaquin is nausea relieved by phenergan. Patient has a history of recurrent UTIs. Will refer to urology for further work up. She will follow up with PCP for final culture. She was seen by PT/TO who recommended outpatient therapy, ambulatory referral placed. She is stable for discharge.        Goals of Care Treatment Preferences:  Code Status: Full Code      Consults:     No new Assessment & Plan notes have been filed under this hospital service since the last note was generated.  Service: Hospital Medicine    Final Active Diagnoses:    Diagnosis Date Noted POA    PRINCIPAL PROBLEM:  Hypoglycemia [E16.2] 06/14/2023 Yes    Acute metabolic encephalopathy [G93.41] 06/14/2023 Yes    Chronic diastolic heart failure [I50.32] 05/23/2023 Yes    Asthma with COPD [J44.9] 09/26/2022 Yes    Lactic acidosis [E87.20] 02/11/2021 Yes    UTI (urinary tract infection) [N39.0] 06/28/2019 Yes    Essential hypertension [I10] 08/31/2015 Yes     Chronic    Bipolar disorder with anxiety and depression [F31.9] 08/30/2015 Yes     Chronic      Problems Resolved During this Admission:       Discharged Condition: stable    Disposition: Home or Self Care    Follow Up:    Patient Instructions:      Ambulatory referral/consult to Physical/Occupational Therapy   Standing Status: Future   Referral Priority: Routine Referral Type: Physical Medicine   Referral Reason: Specialty Services Required   Number of Visits Requested: 1     Ambulatory referral/consult to Urology   Standing Status: Future   Referral Priority: Routine Referral Type: Consultation   Referral Reason: Specialty Services Required   Requested Specialty: Urology   Number of Visits Requested: 1       Significant Diagnostic Studies: Labs:   CMP   Recent Labs   Lab 06/14/23  1443 06/15/23  0713    140   K 4.6 4.7    105   CO2 28 23    224*   BUN 17 9   CREATININE 0.9 0.8    CALCIUM 9.6 8.9   PROT 7.0 6.8   ALBUMIN 3.6 3.4*   BILITOT 0.3 0.3   ALKPHOS 71 72   AST 30 31   ALT 32 31   ANIONGAP 12 12    and CBC   Recent Labs   Lab 06/14/23  1443 06/15/23  0713   WBC 8.89 5.78   HGB 16.1* 16.3*   HCT 52.0* 53.8*    169       Pending Diagnostic Studies:     None         Medications:  Reconciled Home Medications:      Medication List      START taking these medications    levoFLOXacin 750 MG tablet  Commonly known as: LEVAQUIN  Take 1 tablet (750 mg total) by mouth once daily. for 7 days        CONTINUE taking these medications    albuterol 90 mcg/actuation inhaler  Commonly known as: PROVENTIL/VENTOLIN HFA  INHALE 2 TO 4 PUFFS BY MOUTH THREE TIMES DAILY AS NEEDED FOR WHEEZING     albuterol-ipratropium 2.5 mg-0.5 mg/3 mL nebulizer solution  Commonly known as: DUO-NEB  Take 3 mLs by nebulization every 6 (six) hours as needed for Wheezing. Rescue     benztropine 0.5 MG tablet  Commonly known as: COGENTIN  Take 1 tablet (0.5 mg total) by mouth 2 (two) times daily as needed (dystonia).     * blood sugar diagnostic Strp  Commonly known as: TRUE METRIX GLUCOSE TEST STRIP  USE 1 STRIP TO CHECK GLUCOSE THREE TIMES DAILY     * blood sugar diagnostic Strp  1 each by Misc.(Non-Drug; Combo Route) route 3 (three) times daily. Dispense preferred on insurance     blood-glucose meter kit  Use as instructed - preferred on insurance     buPROPion 300 MG 24 hr tablet  Commonly known as: WELLBUTRIN XL  Take 1 tablet (300 mg total) by mouth once daily.     butalbital-acetaminophen-caffeine -40 mg -40 mg per tablet  Commonly known as: FIORICET, ESGIC  Take 1 tablet by mouth every 6 (six) hours as needed for Headaches. for pain.     diazePAM 10 MG Tab  Commonly known as: VALIUM  Take 1/2 to 1 tablet three times daily as needed for anxiety     empagliflozin 25 mg tablet  Commonly known as: JARDIANCE  Take 1 tablet (25 mg total) by mouth once daily.     ezetimibe 10 mg tablet  Commonly known as:  "ZETIA  Take 1 tablet (10 mg total) by mouth once daily.     fluticasone-salmeterol 250-50 mcg/dose 250-50 mcg/dose diskus inhaler  Commonly known as: ADVAIR  Inhale 1 puff into the lungs 2 (two) times daily. Controller for maintenance     furosemide 40 MG tablet  Commonly known as: LASIX  TAKE 1 TABLET BY MOUTH EVERY MONDAY, WEDNESDAY, AND FRIDAY AS NEEDED     gabapentin 600 MG tablet  Commonly known as: NEURONTIN  TAKE 1 CAPSULE BY MOUTH IN THE MORNING, 1 CAPSULE IN THE AFTERNOON, AND THEN 2 CAPSULES AT BEDTIME     isosorbide mononitrate 30 MG 24 hr tablet  Commonly known as: IMDUR  Take 1 tablet by mouth once daily     lancets Misc  1 each by Misc.(Non-Drug; Combo Route) route 3 (three) times daily. Dispense preferred on insurance     metFORMIN 500 MG ER 24hr tablet  Commonly known as: GLUCOPHAGE-XR  TAKE 1 TABLET BY MOUTH TWICE DAILY WITH MEALS     metoprolol succinate 25 MG 24 hr tablet  Commonly known as: TOPROL-XL  Take 1 tablet by mouth once daily     mirtazapine 15 MG tablet  Commonly known as: REMERON  Take 1 tablet (15 mg total) by mouth every evening.     NOVOLIN R INJ     pen needle, diabetic 32 gauge x 5/32" Ndle  Inject 1 each into the skin once daily.     promethazine 12.5 MG Tab  Commonly known as: PHENERGAN  Take 1 tablet (12.5 mg total) by mouth every 6 (six) hours as needed.     risperiDONE 1 MG tablet  Commonly known as: RISPERDAL  Take 1 tablet (1 mg total) by mouth 2 (two) times daily.     TOUJEO MAX U-300 SOLOSTAR 300 unit/mL (3 mL) insulin pen  Generic drug: insulin glargine U-300 conc  Inject 76 Units into the skin once daily.         * This list has 2 medication(s) that are the same as other medications prescribed for you. Read the directions carefully, and ask your doctor or other care provider to review them with you.            STOP taking these medications    sulfamethoxazole-trimethoprim 800-160mg 800-160 mg Tab  Commonly known as: BACTRIM DS            Indwelling Lines/Drains at time " of discharge:   Lines/Drains/Airways     None                 Time spent on the discharge of patient: >35 minutes         Skinny Bennett NP  Department of Hospital Medicine  '[facility]

## 2023-06-16 LAB — BACTERIA UR CULT: ABNORMAL

## 2023-06-19 LAB — BACTERIA BLD CULT: NORMAL

## 2023-06-20 LAB — BACTERIA BLD CULT: NORMAL

## 2023-06-24 ENCOUNTER — OFFICE VISIT (OUTPATIENT)
Dept: DIABETES | Facility: CLINIC | Age: 58
End: 2023-06-24
Payer: MEDICARE

## 2023-06-24 DIAGNOSIS — E11.65 TYPE 2 DIABETES MELLITUS WITH HYPERGLYCEMIA, WITH LONG-TERM CURRENT USE OF INSULIN: Primary | ICD-10-CM

## 2023-06-24 DIAGNOSIS — Z79.4 TYPE 2 DIABETES MELLITUS WITH HYPERGLYCEMIA, WITH LONG-TERM CURRENT USE OF INSULIN: Primary | ICD-10-CM

## 2023-06-24 PROCEDURE — 3072F LOW RISK FOR RETINOPATHY: CPT | Mod: HCNC,CPTII,95, | Performed by: NURSE PRACTITIONER

## 2023-06-24 PROCEDURE — 1159F PR MEDICATION LIST DOCUMENTED IN MEDICAL RECORD: ICD-10-PCS | Mod: HCNC,CPTII,95, | Performed by: NURSE PRACTITIONER

## 2023-06-24 PROCEDURE — 3072F PR LOW RISK FOR RETINOPATHY: ICD-10-PCS | Mod: HCNC,CPTII,95, | Performed by: NURSE PRACTITIONER

## 2023-06-24 PROCEDURE — 1159F MED LIST DOCD IN RCRD: CPT | Mod: HCNC,CPTII,95, | Performed by: NURSE PRACTITIONER

## 2023-06-24 PROCEDURE — 99213 OFFICE O/P EST LOW 20 MIN: CPT | Mod: HCNC,95,, | Performed by: NURSE PRACTITIONER

## 2023-06-24 PROCEDURE — 99213 PR OFFICE/OUTPT VISIT, EST, LEVL III, 20-29 MIN: ICD-10-PCS | Mod: HCNC,95,, | Performed by: NURSE PRACTITIONER

## 2023-06-24 PROCEDURE — 3046F PR MOST RECENT HEMOGLOBIN A1C LEVEL > 9.0%: ICD-10-PCS | Mod: HCNC,CPTII,95, | Performed by: NURSE PRACTITIONER

## 2023-06-24 PROCEDURE — 1160F RVW MEDS BY RX/DR IN RCRD: CPT | Mod: HCNC,CPTII,95, | Performed by: NURSE PRACTITIONER

## 2023-06-24 PROCEDURE — 1160F PR REVIEW ALL MEDS BY PRESCRIBER/CLIN PHARMACIST DOCUMENTED: ICD-10-PCS | Mod: HCNC,CPTII,95, | Performed by: NURSE PRACTITIONER

## 2023-06-24 PROCEDURE — 3046F HEMOGLOBIN A1C LEVEL >9.0%: CPT | Mod: HCNC,CPTII,95, | Performed by: NURSE PRACTITIONER

## 2023-06-24 NOTE — PROGRESS NOTES
PCP: Perez Casiano MD    Subjective:     Chief Complaint: Diabetes Follow Up    HISTORY OF PRESENT ILLNESS: 57 y.o.  female presenting virtually for diabetes follow up.      Patient has had Type II diabetes since 1985 and has the following complications: peripheral neuropathy, PVD, and CAD. Other pertinent conditions include: bipolar disorder and depression ( follows psych ) and hypoglycemia-induced seizures. She has attended diabetes education in the past.     Past tried and failed medications:  No longer taking Jardiance due to history of recurrent UTI w/ sepsis. Per patient, she had fall last week while at home and EMS was called by neighbor.  BG at the time was 47. When she arrived at hospitalized, she was diagnosed with acute cystitis w/hematuria and hypoglycemia.    Blood glucose monitoring is performed. Per patient, fasting BG s are < 180 s, occasional 200 - 220 s. Has occasional hypoglycemia, BG < 60, which she treats with OJ. On today, patient states that she has been watching her carbohydrate intake, and intentionally lost 10 pounds.      Labs Reviewed.       Lab Results   Component Value Date    CPEPTIDE 3.4 01/25/2017     Lab Results   Component Value Date    GLUTAMICACID 0.01 01/25/2017     Lab Results   Component Value Date    HUMANINSULIN 0.00 01/25/2017     DM MEDICATIONS:   Toujeo 76 units daily ( at bedtime )  Novolin R, takes 10 -30  units, as needed  Metformin 500 mg BID ac    Review of Systems   Constitutional:  Negative for appetite change and fatigue.        Intentional weight loss   Eyes:  Negative for visual disturbance.   Gastrointestinal:  Negative for diarrhea, nausea and vomiting.   Endocrine: Negative for polydipsia, polyphagia and polyuria.   Neurological:  Positive for seizures (history) and numbness (bilateral feet).   Psychiatric/Behavioral:  Negative for decreased concentration. The patient is not nervous/anxious.         History of Bipolar Disorder       Diabetes  Management Status  Statin: Not taking  ACE/ARB: Not taking    Screening or Prevention Patient's value Goal Complete/Controlled?   HgA1C Testing and Control   Lab Results   Component Value Date    HGBA1C 12.7 (H) 05/24/2023    HGBA1C 9.1 (H) 11/02/2022    HGBA1C 11.2 (H) 07/25/2022      Annually/Less than 8% No     Lipid profile : 11/02/2022 Annually Yes     LDL control Lab Results   Component Value Date    LDLCALC 90.6 11/02/2022    Annually/Less than 100 mg/dl  Yes     Nephropathy screening Lab Results   Component Value Date    MICALBCREAT 39.1 (H) 09/01/2022     Lab Results   Component Value Date    PROTEINUA Trace (A) 06/14/2023    Annually Yes     Blood pressure BP Readings from Last 1 Encounters:   06/15/23 (!) 166/80    Less than 140/90 Yes     Dilated retinal exam : 11/29/2022 Annually Yes    Foot exam   : 05/24/2023 Annually Yes       ACTIVITY LEVEL: Sedentary. Discussed activities, benefits, methods, and precautions.  MEAL PLANNING: Patient reports number of meals per day to be 2 and number of snacks per day to be 0.   Patient is encouraged to carb count and consume no more than 45 - 60 grams of carbohydrates in each meal, and 1800 k / gloria per day.      BLOOD GLUCOSE TESTING:  True Metrix meter  SOCIAL HISTORY:  .  Smokes cigarettes.     Objective:      Physical Exam  Constitutional:       Appearance: She is well-developed.   HENT:      Head: Normocephalic and atraumatic.   Eyes:      Pupils: Pupils are equal, round, and reactive to light.   Pulmonary:      Effort: Pulmonary effort is normal.   Psychiatric:         Behavior: Behavior normal.         Thought Content: Thought content normal.         Judgment: Judgment normal.       Assessment / Plan:     1.) Type 2 diabetes mellitus with hyperglycemia, with long-term current use of insulin    Comments:  -   Uncontrolled, A1C 12.7 %.  For now, continue Toujeo 76 units ONCE daily.  Continue Novolin R 10 - 30  units TID ac.  Continue metformin 500 mg, 1  tablet BID ac.  Will remain OFF Jardiance due to recent history of UTI that required hospitalization.  Discussed pros/cons of Dexcom G7 CGM.   Patient is interested in device.  She voices that  currently has one.  Will send order via [facility].     Additional Plan Details:    1.) Continue monitoring blood sugar 3x daily, fasting and ac dinner or at bedtime. Discussed ADA goal for fasting blood sugar, 80 - 130 mg/dL; pp blood sugars below 180 mg/dl. Also, discussed prevention of hypoglycemia and the need to adjust goals to higher levels if persistent hypoglycemia.  Reminded to bring BG records or meter to each visit for review.  2.) Return to clinic in 3 months for follow up. The patient was explained the above plan and given opportunity to ask questions.  She understands, chooses and consents to this plan and accepts all the risks, which include but are not limited to the risks mentioned above. She understands the alternative of having no testing, interventions or treatments at this time. She left content and without further questions.     Amelia Santana, NP-C, [facility]    The patient [facility] is: [facility]  The chief complaint leading to consultation is: Type 2 Diabetes    Visit type: audiovisual    Face to Face time with patient: 15 minutes  18  minutes of total time spent on the encounter, which includes face to face time and non-face to face time preparing to see the patient (eg, review of tests), Obtaining and/or reviewing separately obtained history, Documenting clinical information in the electronic or other health record, Independently interpreting results (not separately reported) and communicating results to the patient/family/caregiver, or Care coordination (not separately reported).     Each patient to whom he or she provides medical services by telemedicine is:  (1) informed of the relationship between the physician and patient and the respective role of any other health care provider  with respect to management of the patient; and (2) notified that he or she may decline to receive medical services by telemedicine and may withdraw from such care at any time.

## 2023-06-25 RX ORDER — METFORMIN HYDROCHLORIDE 500 MG/1
500 TABLET, EXTENDED RELEASE ORAL 2 TIMES DAILY WITH MEALS
Qty: 180 TABLET | Refills: 1 | Status: SHIPPED | OUTPATIENT
Start: 2023-06-25 | End: 2023-10-26 | Stop reason: SDUPTHER

## 2023-06-28 ENCOUNTER — OFFICE VISIT (OUTPATIENT)
Dept: UROLOGY | Facility: CLINIC | Age: 58
End: 2023-06-28
Payer: MEDICARE

## 2023-06-28 ENCOUNTER — LAB VISIT (OUTPATIENT)
Dept: LAB | Facility: HOSPITAL | Age: 58
End: 2023-06-28
Attending: FAMILY MEDICINE
Payer: MEDICARE

## 2023-06-28 ENCOUNTER — TELEPHONE (OUTPATIENT)
Dept: DIABETES | Facility: CLINIC | Age: 58
End: 2023-06-28
Payer: MEDICARE

## 2023-06-28 ENCOUNTER — OFFICE VISIT (OUTPATIENT)
Dept: FAMILY MEDICINE | Facility: CLINIC | Age: 58
End: 2023-06-28
Payer: MEDICARE

## 2023-06-28 VITALS
DIASTOLIC BLOOD PRESSURE: 60 MMHG | WEIGHT: 189.94 LBS | BODY MASS INDEX: 32.43 KG/M2 | SYSTOLIC BLOOD PRESSURE: 120 MMHG | TEMPERATURE: 100 F | HEART RATE: 100 BPM | HEIGHT: 64 IN | OXYGEN SATURATION: 91 %

## 2023-06-28 VITALS
DIASTOLIC BLOOD PRESSURE: 74 MMHG | RESPIRATION RATE: 18 BRPM | HEART RATE: 92 BPM | SYSTOLIC BLOOD PRESSURE: 123 MMHG | WEIGHT: 184 LBS | BODY MASS INDEX: 31.58 KG/M2

## 2023-06-28 DIAGNOSIS — Z79.4 TYPE 2 DIABETES MELLITUS WITH COMPLICATION, WITH LONG-TERM CURRENT USE OF INSULIN: Primary | ICD-10-CM

## 2023-06-28 DIAGNOSIS — N39.0 URINARY TRACT INFECTION WITHOUT HEMATURIA, SITE UNSPECIFIED: ICD-10-CM

## 2023-06-28 DIAGNOSIS — N30.01 ACUTE CYSTITIS WITH HEMATURIA: ICD-10-CM

## 2023-06-28 DIAGNOSIS — F31.9 BIPOLAR AFFECTIVE DISORDER, REMISSION STATUS UNSPECIFIED: ICD-10-CM

## 2023-06-28 DIAGNOSIS — F50.89 PICA: ICD-10-CM

## 2023-06-28 DIAGNOSIS — E11.8 TYPE 2 DIABETES MELLITUS WITH COMPLICATION, WITH LONG-TERM CURRENT USE OF INSULIN: Primary | ICD-10-CM

## 2023-06-28 DIAGNOSIS — E03.4 HYPOTHYROIDISM DUE TO ACQUIRED ATROPHY OF THYROID: ICD-10-CM

## 2023-06-28 PROBLEM — I20.9 ISCHEMIC CHEST PAIN: Status: RESOLVED | Noted: 2020-01-27 | Resolved: 2023-06-28

## 2023-06-28 PROBLEM — E16.2 HYPOGLYCEMIA: Status: RESOLVED | Noted: 2023-06-14 | Resolved: 2023-06-28

## 2023-06-28 PROBLEM — M54.9 RIGHT-SIDED BACK PAIN: Status: RESOLVED | Noted: 2019-06-29 | Resolved: 2023-06-28

## 2023-06-28 PROBLEM — I95.9 HYPOTENSION: Status: RESOLVED | Noted: 2021-02-11 | Resolved: 2023-06-28

## 2023-06-28 PROBLEM — R06.02 SOB (SHORTNESS OF BREATH): Status: RESOLVED | Noted: 2017-01-24 | Resolved: 2023-06-28

## 2023-06-28 PROBLEM — R10.13 EPIGASTRIC ABDOMINAL PAIN: Status: RESOLVED | Noted: 2018-01-12 | Resolved: 2023-06-28

## 2023-06-28 LAB
IRON SERPL-MCNC: 118 UG/DL (ref 30–160)
SATURATED IRON: 32 % (ref 20–50)
TOTAL IRON BINDING CAPACITY: 370 UG/DL (ref 250–450)
TRANSFERRIN SERPL-MCNC: 250 MG/DL (ref 200–375)

## 2023-06-28 PROCEDURE — 3072F PR LOW RISK FOR RETINOPATHY: ICD-10-PCS | Mod: HCNC,CPTII,S$GLB, | Performed by: NURSE PRACTITIONER

## 2023-06-28 PROCEDURE — 99204 OFFICE O/P NEW MOD 45 MIN: CPT | Mod: HCNC,S$GLB,, | Performed by: NURSE PRACTITIONER

## 2023-06-28 PROCEDURE — 87077 CULTURE AEROBIC IDENTIFY: CPT | Mod: HCNC | Performed by: NURSE PRACTITIONER

## 2023-06-28 PROCEDURE — 87086 URINE CULTURE/COLONY COUNT: CPT | Mod: HCNC | Performed by: NURSE PRACTITIONER

## 2023-06-28 PROCEDURE — 3072F LOW RISK FOR RETINOPATHY: CPT | Mod: HCNC,CPTII,S$GLB, | Performed by: NURSE PRACTITIONER

## 2023-06-28 PROCEDURE — 1159F MED LIST DOCD IN RCRD: CPT | Mod: HCNC,CPTII,S$GLB, | Performed by: FAMILY MEDICINE

## 2023-06-28 PROCEDURE — 84466 ASSAY OF TRANSFERRIN: CPT | Mod: HCNC | Performed by: FAMILY MEDICINE

## 2023-06-28 PROCEDURE — 87186 SC STD MICRODIL/AGAR DIL: CPT | Mod: HCNC | Performed by: NURSE PRACTITIONER

## 2023-06-28 PROCEDURE — 3074F SYST BP LT 130 MM HG: CPT | Mod: HCNC,CPTII,S$GLB, | Performed by: NURSE PRACTITIONER

## 2023-06-28 PROCEDURE — 99999 PR PBB SHADOW E&M-EST. PATIENT-LVL V: CPT | Mod: PBBFAC,HCNC,, | Performed by: FAMILY MEDICINE

## 2023-06-28 PROCEDURE — 99999 PR PBB SHADOW E&M-EST. PATIENT-LVL IV: CPT | Mod: PBBFAC,HCNC,, | Performed by: NURSE PRACTITIONER

## 2023-06-28 PROCEDURE — 3046F PR MOST RECENT HEMOGLOBIN A1C LEVEL > 9.0%: ICD-10-PCS | Mod: HCNC,CPTII,S$GLB, | Performed by: NURSE PRACTITIONER

## 2023-06-28 PROCEDURE — 3072F LOW RISK FOR RETINOPATHY: CPT | Mod: HCNC,CPTII,S$GLB, | Performed by: FAMILY MEDICINE

## 2023-06-28 PROCEDURE — 36415 COLL VENOUS BLD VENIPUNCTURE: CPT | Mod: HCNC,PO | Performed by: FAMILY MEDICINE

## 2023-06-28 PROCEDURE — 99204 PR OFFICE/OUTPT VISIT, NEW, LEVL IV, 45-59 MIN: ICD-10-PCS | Mod: HCNC,S$GLB,, | Performed by: NURSE PRACTITIONER

## 2023-06-28 PROCEDURE — 1159F PR MEDICATION LIST DOCUMENTED IN MEDICAL RECORD: ICD-10-PCS | Mod: HCNC,CPTII,S$GLB, | Performed by: NURSE PRACTITIONER

## 2023-06-28 PROCEDURE — 99214 OFFICE O/P EST MOD 30 MIN: CPT | Mod: HCNC,S$GLB,, | Performed by: FAMILY MEDICINE

## 2023-06-28 PROCEDURE — 3074F SYST BP LT 130 MM HG: CPT | Mod: HCNC,CPTII,S$GLB, | Performed by: FAMILY MEDICINE

## 2023-06-28 PROCEDURE — 99999 PR PBB SHADOW E&M-EST. PATIENT-LVL V: ICD-10-PCS | Mod: PBBFAC,HCNC,, | Performed by: FAMILY MEDICINE

## 2023-06-28 PROCEDURE — 3072F PR LOW RISK FOR RETINOPATHY: ICD-10-PCS | Mod: HCNC,CPTII,S$GLB, | Performed by: FAMILY MEDICINE

## 2023-06-28 PROCEDURE — 3008F BODY MASS INDEX DOCD: CPT | Mod: HCNC,CPTII,S$GLB, | Performed by: FAMILY MEDICINE

## 2023-06-28 PROCEDURE — 1159F MED LIST DOCD IN RCRD: CPT | Mod: HCNC,CPTII,S$GLB, | Performed by: NURSE PRACTITIONER

## 2023-06-28 PROCEDURE — 3078F DIAST BP <80 MM HG: CPT | Mod: HCNC,CPTII,S$GLB, | Performed by: FAMILY MEDICINE

## 2023-06-28 PROCEDURE — 3008F PR BODY MASS INDEX (BMI) DOCUMENTED: ICD-10-PCS | Mod: HCNC,CPTII,S$GLB, | Performed by: FAMILY MEDICINE

## 2023-06-28 PROCEDURE — 1159F PR MEDICATION LIST DOCUMENTED IN MEDICAL RECORD: ICD-10-PCS | Mod: HCNC,CPTII,S$GLB, | Performed by: FAMILY MEDICINE

## 2023-06-28 PROCEDURE — 87088 URINE BACTERIA CULTURE: CPT | Mod: HCNC | Performed by: NURSE PRACTITIONER

## 2023-06-28 PROCEDURE — 3046F HEMOGLOBIN A1C LEVEL >9.0%: CPT | Mod: HCNC,CPTII,S$GLB, | Performed by: NURSE PRACTITIONER

## 2023-06-28 PROCEDURE — 3008F BODY MASS INDEX DOCD: CPT | Mod: HCNC,CPTII,S$GLB, | Performed by: NURSE PRACTITIONER

## 2023-06-28 PROCEDURE — 3074F PR MOST RECENT SYSTOLIC BLOOD PRESSURE < 130 MM HG: ICD-10-PCS | Mod: HCNC,CPTII,S$GLB, | Performed by: FAMILY MEDICINE

## 2023-06-28 PROCEDURE — 3046F PR MOST RECENT HEMOGLOBIN A1C LEVEL > 9.0%: ICD-10-PCS | Mod: HCNC,CPTII,S$GLB, | Performed by: FAMILY MEDICINE

## 2023-06-28 PROCEDURE — 99214 PR OFFICE/OUTPT VISIT, EST, LEVL IV, 30-39 MIN: ICD-10-PCS | Mod: HCNC,S$GLB,, | Performed by: FAMILY MEDICINE

## 2023-06-28 PROCEDURE — 3078F DIAST BP <80 MM HG: CPT | Mod: HCNC,CPTII,S$GLB, | Performed by: NURSE PRACTITIONER

## 2023-06-28 PROCEDURE — 99999 PR PBB SHADOW E&M-EST. PATIENT-LVL IV: ICD-10-PCS | Mod: PBBFAC,HCNC,, | Performed by: NURSE PRACTITIONER

## 2023-06-28 PROCEDURE — 3078F PR MOST RECENT DIASTOLIC BLOOD PRESSURE < 80 MM HG: ICD-10-PCS | Mod: HCNC,CPTII,S$GLB, | Performed by: FAMILY MEDICINE

## 2023-06-28 PROCEDURE — 3046F HEMOGLOBIN A1C LEVEL >9.0%: CPT | Mod: HCNC,CPTII,S$GLB, | Performed by: FAMILY MEDICINE

## 2023-06-28 PROCEDURE — 3008F PR BODY MASS INDEX (BMI) DOCUMENTED: ICD-10-PCS | Mod: HCNC,CPTII,S$GLB, | Performed by: NURSE PRACTITIONER

## 2023-06-28 PROCEDURE — 3078F PR MOST RECENT DIASTOLIC BLOOD PRESSURE < 80 MM HG: ICD-10-PCS | Mod: HCNC,CPTII,S$GLB, | Performed by: NURSE PRACTITIONER

## 2023-06-28 PROCEDURE — 3074F PR MOST RECENT SYSTOLIC BLOOD PRESSURE < 130 MM HG: ICD-10-PCS | Mod: HCNC,CPTII,S$GLB, | Performed by: NURSE PRACTITIONER

## 2023-06-28 RX ORDER — MIRTAZAPINE 30 MG/1
30 TABLET, FILM COATED ORAL NIGHTLY
COMMUNITY
End: 2023-11-08

## 2023-06-28 RX ORDER — POLYETHYLENE GLYCOL-3350 AND ELECTROLYTES 236; 6.74; 5.86; 2.97; 22.74 G/274.31G; G/274.31G; G/274.31G; G/274.31G; G/274.31G
POWDER, FOR SOLUTION ORAL
COMMUNITY
Start: 2023-05-25 | End: 2023-08-15

## 2023-06-28 NOTE — PROGRESS NOTES
Subjective:       Patient ID: Alexandra Goins is a 57 y.o. female.    Chief Complaint: No chief complaint on file.      HPI Comments:       Current Outpatient Medications:     albuterol (PROVENTIL/VENTOLIN HFA) 90 mcg/actuation inhaler, INHALE 2 TO 4 PUFFS BY MOUTH THREE TIMES DAILY AS NEEDED FOR WHEEZING, Disp: 18 g, Rfl: 3    benztropine (COGENTIN) 0.5 MG tablet, Take 1 tablet (0.5 mg total) by mouth 2 (two) times daily as needed (dystonia)., Disp: 60 tablet, Rfl: 2    blood sugar diagnostic (TRUE METRIX GLUCOSE TEST STRIP) Strp, USE 1 STRIP TO CHECK GLUCOSE THREE TIMES DAILY, Disp: 100 strip, Rfl: 3    blood sugar diagnostic Strp, 1 each by Misc.(Non-Drug; Combo Route) route 3 (three) times daily. Dispense preferred on insurance, Disp: 100 strip, Rfl: 11    blood-glucose meter kit, Use as instructed - preferred on insurance, Disp: 1 each, Rfl: 0    buPROPion (WELLBUTRIN XL) 300 MG 24 hr tablet, Take 1 tablet (300 mg total) by mouth once daily., Disp: 60 tablet, Rfl: 3    butalbital-acetaminophen-caffeine -40 mg (FIORICET, ESGIC) -40 mg per tablet, Take 1 tablet by mouth every 6 (six) hours as needed for Headaches. for pain., Disp: 30 tablet, Rfl: 0    diazePAM (VALIUM) 10 MG Tab, Take 1/2 to 1 tablet three times daily as needed for anxiety, Disp: 90 tablet, Rfl: 2    ezetimibe (ZETIA) 10 mg tablet, Take 1 tablet (10 mg total) by mouth once daily., Disp: 90 tablet, Rfl: 1    furosemide (LASIX) 40 MG tablet, TAKE 1 TABLET BY MOUTH EVERY MONDAY, WEDNESDAY, AND FRIDAY AS NEEDED, Disp: 45 tablet, Rfl: 2    gabapentin (NEURONTIN) 600 MG tablet, TAKE 1 CAPSULE BY MOUTH IN THE MORNING, 1 CAPSULE IN THE AFTERNOON, AND THEN 2 CAPSULES AT BEDTIME, Disp: 360 tablet, Rfl: 1    GAVILYTE-G 236-22.74-6.74 -5.86 gram suspension, Take by mouth., Disp: , Rfl:     insulin glargine U-300 conc (TOUJEO MAX U-300 SOLOSTAR) 300 unit/mL (3 mL) insulin pen, Inject 76 Units into the skin once daily., Disp: 4 pen, Rfl: 3     "insulin regular, human (NOVOLIN R INJ), , Disp: , Rfl:     isosorbide mononitrate (IMDUR) 30 MG 24 hr tablet, Take 1 tablet by mouth once daily, Disp: 90 tablet, Rfl: 2    lancets Misc, 1 each by Misc.(Non-Drug; Combo Route) route 3 (three) times daily. Dispense preferred on insurance, Disp: 100 each, Rfl: 11    metFORMIN (GLUCOPHAGE-XR) 500 MG ER 24hr tablet, Take 1 tablet (500 mg total) by mouth 2 (two) times daily with meals., Disp: 180 tablet, Rfl: 1    metoprolol succinate (TOPROL-XL) 25 MG 24 hr tablet, Take 1 tablet by mouth once daily, Disp: 90 tablet, Rfl: 3    mirtazapine (REMERON) 15 MG tablet, Take 1 tablet (15 mg total) by mouth every evening., Disp: 30 tablet, Rfl: 2    mirtazapine (REMERON) 30 MG tablet, Take 30 mg by mouth every evening., Disp: , Rfl:     pen needle, diabetic 32 gauge x 5/32" Ndle, Inject 1 each into the skin once daily., Disp: 100 each, Rfl: 3    promethazine (PHENERGAN) 12.5 MG Tab, Take 1 tablet (12.5 mg total) by mouth every 6 (six) hours as needed., Disp: 32 tablet, Rfl: 0    risperiDONE (RISPERDAL) 1 MG tablet, Take 1 tablet (1 mg total) by mouth 2 (two) times daily., Disp: 60 tablet, Rfl: 2    albuterol-ipratropium (DUO-NEB) 2.5 mg-0.5 mg/3 mL nebulizer solution, Take 3 mLs by nebulization every 6 (six) hours as needed for Wheezing. Rescue, Disp: 1 Box, Rfl: 0    fluticasone-salmeterol diskus inhaler 250-50 mcg, Inhale 1 puff into the lungs 2 (two) times daily. Controller for maintenance, Disp: 60 each, Rfl: 0  Hospitalization follow-up.  Admitted observationally.      Found to be hypoglycemia.  Also had a UTI.  Was treated with Rocephin and switch to Levaquin.  Had follow-up with urology today.    Continues to have low blood sugars at home.  On the same amount of insulin as previously.  I sent a note to Amelia Santana to have her reach out to the patient to discuss next options.    Says she is been eating a lot of ice.  Hemoglobin was 16 during the hospitalization.  For recheck " "iron levels today    Review of Systems   Constitutional:  Negative for activity change and unexpected weight change.   HENT:  Negative for hearing loss, rhinorrhea and trouble swallowing.    Eyes:  Negative for discharge and visual disturbance.   Respiratory:  Negative for chest tightness and wheezing.    Cardiovascular:  Negative for chest pain and palpitations.   Gastrointestinal:  Positive for constipation. Negative for blood in stool, diarrhea and vomiting.   Endocrine: Negative for polydipsia and polyuria.   Genitourinary:  Negative for difficulty urinating, dysuria, hematuria and menstrual problem.   Musculoskeletal:  Negative for arthralgias, joint swelling and neck pain.   Neurological:  Positive for weakness and headaches.   Psychiatric/Behavioral:  Positive for dysphoric mood. Negative for confusion.      Objective:      Vitals:    06/28/23 1314   BP: 120/60   Pulse: 100   Temp: 99.5 °F (37.5 °C)   TempSrc: Tympanic   SpO2: (!) 91%   Weight: 86.1 kg (189 lb 14.8 oz)   Height: 5' 4" (1.626 m)   PainSc: 0-No pain     Physical Exam  Vitals and nursing note reviewed.   Constitutional:       General: She is not in acute distress.     Appearance: She is well-developed. She is not ill-appearing or diaphoretic.   HENT:      Head: Normocephalic.      Mouth/Throat:      Pharynx: No oropharyngeal exudate.   Neck:      Thyroid: No thyromegaly.   Cardiovascular:      Rate and Rhythm: Normal rate and regular rhythm.      Heart sounds: Normal heart sounds. No murmur heard.  Pulmonary:      Effort: Pulmonary effort is normal.      Breath sounds: Normal breath sounds. No wheezing or rales.   Abdominal:      General: There is no distension.      Palpations: Abdomen is soft.   Musculoskeletal:      Cervical back: Neck supple.   Lymphadenopathy:      Cervical: No cervical adenopathy.   Skin:     General: Skin is warm and dry.   Neurological:      Mental Status: She is alert and oriented to person, place, and time. "   Psychiatric:         Mood and Affect: Mood normal.         Behavior: Behavior normal.         Thought Content: Thought content normal.         Judgment: Judgment normal.       Assessment:       1. Type 2 diabetes mellitus with complication, with long-term current use of insulin    2. Bipolar affective disorder, remission status unspecified    3. Hypothyroidism due to acquired atrophy of thyroid    4. Pica    5. Urinary tract infection without hematuria, site unspecified        Plan:   Type 2 diabetes mellitus with complication, with long-term current use of insulin  Comments:  Now has hypoglycemia.  May need some adjustments on insulin    Bipolar affective disorder, remission status unspecified  Comments:  Stable    Hypothyroidism due to acquired atrophy of thyroid  Comments:  Euthyroid on current dose    Pica  Comments:  Check iron levels  Orders:  -     Iron and TIBC; Future; Expected date: 06/28/2023    Urinary tract infection without hematuria, site unspecified  Comments:  Had follow-up with urology today

## 2023-06-28 NOTE — PROGRESS NOTES
Chief Complaint:   Urinary tract infection    HPI:   Patient is a 57-year-old female that was recently hospitalized secondary to uncontrolled diabetes and UTI.  In reviewing EMR, recent urine culture indicated E coli.  Patient states that she is completed antibiotics and is completely asymptomatic.  Unable to give us a urine sample.  Recent hemoglobin A1c was 12.7%.  Patient states that she has nocturia 2-3 times nightly and is wearing a pad during the day secondary to mixed incontinence.  Denies pelvic or flank pain.  Remote history of renal stones, no gross hematuria  Jone KING  7/3/18: Drain was removed.  Pt complaining of pain.  Says she had a 101 fever last night and this morning.  Says the only reason she isn't crying is because she is holding it together to talk to me.  Putting an ice pack on her back caused shooting pains.    6/25/18: Pt was seen in ER last week for right perirenal abscess.  Drain placed and purulent today.  CT planned for later this week.  Still feeling poorly.    Allergies:  Seroquel [quetiapine], Adhesive, Levomenol analogues, Lipitor [atorvastatin], Repatha pushtronex [evolocumab], Zofran [ondansetron hcl], Celestone [betamethasone], Levaquin [levofloxacin], and Piroxicam    Medications:  has a current medication list which includes the following prescription(s): albuterol, albuterol-ipratropium, benztropine, blood sugar diagnostic, blood sugar diagnostic, blood-glucose meter, bupropion, butalbital-acetaminophen-caffeine -40 mg, diazepam, ezetimibe, fluticasone-salmeterol 250-50 mcg/dose, furosemide, gabapentin, toujeo max u-300 solostar, insulin regular, human, isosorbide mononitrate, lancets, metformin, metoprolol succinate, mirtazapine, pen needle, diabetic, promethazine, and risperidone.    Review of Systems:  General: No fever, chills, fatigability, or weight loss.  Skin: No rashes, itching, or changes in color or texture of skin.  Chest: Denies AYALA, cyanosis, wheezing,  cough, and sputum production.  Abdomen: Appetite fine. No weight loss. Denies diarrhea, abdominal pain, hematemesis, or blood in stool.  Musculoskeletal: No joint stiffness or swelling. Denies back pain.  : As above.  All other review of systems negative.    PMH:   has a past medical history of Acute ischemic stroke (9/17/2019), Acute respiratory failure with hypoxia (2/11/2021), Aneurysm, Anxiety, Arthritis, Asthma, Bipolar 1 disorder, Cerebral aneurysm, nonruptured (9/19/2019), Chronic diastolic heart failure (5/23/2023), Coronary artery disease of native artery of native heart with stable angina pectoris (1/19/2022), Depression, Diabetes mellitus, type 2, Diverticular disease, GERD (gastroesophageal reflux disease), Hyperlipemia, Hypertension, Hyponatremia (7/24/2022), Hypothyroidism, Pneumonia, PVD (peripheral vascular disease) (4/28/2021), Renal manifestation of secondary diabetes mellitus, Right-sided back pain (6/29/2019), Severe obesity (BMI 35.0-39.9) with comorbidity (5/31/2022), Stroke, and Trouble in sleeping.    PSH:   has a past surgical history that includes Hysterectomy; Tonsillectomy; Cholecystectomy; Colon surgery; Colonoscopy (N/A, 1/12/2018); Dental surgery (05/21/2018); and Cerebral angiogram (N/A, 10/28/2019).    FamHx: family history includes Alcohol abuse in her father and mother; Arthritis in her father, maternal grandmother, and paternal grandmother; Breast cancer in her maternal grandmother; COPD in her mother and sister; Cancer in her father, maternal aunt, maternal uncle, paternal aunt, paternal grandmother, and paternal uncle; Depression in her mother; Diabetes in her maternal uncle; Drug abuse in her maternal uncle and mother; Heart disease in her brother and father; Hypertension in her brother, father, maternal grandmother, and paternal grandfather; Kidney failure in her sister.    SocHx:  reports that she has been smoking cigarettes and vaping w/o nicotine. She started smoking  about 45 years ago. She has a 44.00 pack-year smoking history. She has never used smokeless tobacco. She reports that she does not currently use alcohol. She reports current drug use. Drug: Marijuana.      Physical Exam:  Vitals:    06/28/23 1018   BP: 123/74   Pulse: 92   Resp: 18     General:  Morbidly obese female, A&Ox3, no apparent distress, no deformities  Neck: No masses, normal thyroid  Lungs: normal inspiration, no use of accessory muscles  Heart: normal pulse, no arrhythmias  Abdomen: Soft, NT, ND, no masses, no hernias, no hepatosplenomegaly  Lymphatic: Neck and groin nodes negative  Skin: The skin is warm and dry. No jaundice.  Labs/Studies:   See HPI  Impression/Plan:   Urinary tract infection  Patient was educated on behavior modifications needed to decrease risk of recurrent urinary tract infections, in females.  Cath urine sample was sent for culture and patient to return to clinic in 2 weeks for re-evaluation.

## 2023-06-28 NOTE — TELEPHONE ENCOUNTER
Attempted to call patient to discuss blood sugar readings.  Was informed by PCP that patient reports frequent hypoglycemia. No response to phone call.  Left VM to call back.

## 2023-06-30 ENCOUNTER — TELEPHONE (OUTPATIENT)
Dept: UROLOGY | Facility: CLINIC | Age: 58
End: 2023-06-30
Payer: MEDICARE

## 2023-06-30 RX ORDER — CEFDINIR 300 MG/1
300 CAPSULE ORAL 2 TIMES DAILY
Qty: 20 CAPSULE | Refills: 0 | Status: SHIPPED | OUTPATIENT
Start: 2023-06-30 | End: 2023-07-01

## 2023-06-30 NOTE — TELEPHONE ENCOUNTER
Spoke with pt concerning labs      ----- Message from Yanna Hsieh NP sent at 6/30/2023 12:19 PM CDT -----  Please contact patient inform her that preliminary urine culture indicates a urinary tract infection and Omnicef has been sent to her local pharmacy.  We will contact her with final urine culture results.

## 2023-07-01 LAB — BACTERIA UR CULT: ABNORMAL

## 2023-07-01 RX ORDER — NITROFURANTOIN 25; 75 MG/1; MG/1
100 CAPSULE ORAL 2 TIMES DAILY
Qty: 20 CAPSULE | Refills: 0 | Status: SHIPPED | OUTPATIENT
Start: 2023-07-01 | End: 2023-07-28

## 2023-07-06 DIAGNOSIS — E11.42 DIABETIC POLYNEUROPATHY ASSOCIATED WITH TYPE 2 DIABETES MELLITUS: ICD-10-CM

## 2023-07-07 RX ORDER — INSULIN GLARGINE 300 U/ML
76 INJECTION, SOLUTION SUBCUTANEOUS DAILY
Qty: 4 PEN | Refills: 3 | Status: SHIPPED | OUTPATIENT
Start: 2023-07-07 | End: 2024-07-06

## 2023-07-10 ENCOUNTER — TELEPHONE (OUTPATIENT)
Dept: UROLOGY | Facility: CLINIC | Age: 58
End: 2023-07-10
Payer: MEDICARE

## 2023-07-10 NOTE — TELEPHONE ENCOUNTER
----- Message from Jatin Gan sent at 7/10/2023 10:04 AM CDT -----  Contact: Alexandra  Pt is calling in regards to getting more information about appt 07/12. Pt wants to know if insurance will cover the appt. Also, pt stated receiving a message about having a CT scan and wants to know what it is for. Please call back at [phone]                              Thanks  KT    
Spoke with pt and all questions regarding appt answered. Rescheduled due to original appt time did not work for pt and driving in the afternoon.    Paula MORGAN RN    
(3) no apparent problem

## 2023-07-12 ENCOUNTER — TELEPHONE (OUTPATIENT)
Dept: DIABETES | Facility: CLINIC | Age: 58
End: 2023-07-12
Payer: MEDICARE

## 2023-07-12 ENCOUNTER — OFFICE VISIT (OUTPATIENT)
Dept: DIABETES | Facility: CLINIC | Age: 58
End: 2023-07-12
Payer: MEDICARE

## 2023-07-12 DIAGNOSIS — E11.8 TYPE 2 DIABETES MELLITUS WITH COMPLICATION, WITH LONG-TERM CURRENT USE OF INSULIN: Primary | Chronic | ICD-10-CM

## 2023-07-12 DIAGNOSIS — Z79.4 TYPE 2 DIABETES MELLITUS WITH COMPLICATION, WITH LONG-TERM CURRENT USE OF INSULIN: Primary | Chronic | ICD-10-CM

## 2023-07-12 PROCEDURE — 3072F PR LOW RISK FOR RETINOPATHY: ICD-10-PCS | Mod: HCNC,CPTII,95, | Performed by: NURSE PRACTITIONER

## 2023-07-12 PROCEDURE — 1160F PR REVIEW ALL MEDS BY PRESCRIBER/CLIN PHARMACIST DOCUMENTED: ICD-10-PCS | Mod: HCNC,CPTII,95, | Performed by: NURSE PRACTITIONER

## 2023-07-12 PROCEDURE — 1159F PR MEDICATION LIST DOCUMENTED IN MEDICAL RECORD: ICD-10-PCS | Mod: HCNC,CPTII,95, | Performed by: NURSE PRACTITIONER

## 2023-07-12 PROCEDURE — 3072F LOW RISK FOR RETINOPATHY: CPT | Mod: HCNC,CPTII,95, | Performed by: NURSE PRACTITIONER

## 2023-07-12 PROCEDURE — 99442 PR PHYSICIAN TELEPHONE EVALUATION 11-20 MIN: CPT | Mod: HCNC,95,, | Performed by: NURSE PRACTITIONER

## 2023-07-12 PROCEDURE — 1159F MED LIST DOCD IN RCRD: CPT | Mod: HCNC,CPTII,95, | Performed by: NURSE PRACTITIONER

## 2023-07-12 PROCEDURE — 1160F RVW MEDS BY RX/DR IN RCRD: CPT | Mod: HCNC,CPTII,95, | Performed by: NURSE PRACTITIONER

## 2023-07-12 PROCEDURE — 99442 PR PHYSICIAN TELEPHONE EVALUATION 11-20 MIN: ICD-10-PCS | Mod: HCNC,95,, | Performed by: NURSE PRACTITIONER

## 2023-07-12 PROCEDURE — 3046F PR MOST RECENT HEMOGLOBIN A1C LEVEL > 9.0%: ICD-10-PCS | Mod: HCNC,CPTII,95, | Performed by: NURSE PRACTITIONER

## 2023-07-12 PROCEDURE — 3046F HEMOGLOBIN A1C LEVEL >9.0%: CPT | Mod: HCNC,CPTII,95, | Performed by: NURSE PRACTITIONER

## 2023-07-12 NOTE — PROGRESS NOTES
Established Patient - Audio Only Telehealth Visit     The patient location is: [place]  The chief complaint leading to consultation is: Type 2 Diabetes  Visit type: Virtual visit with audio only ( telephone)    Total time spent with patient: 18 minutes      The reason for the audio only service rather than synchronous audio and video virtual visit was related to technical difficulties or patient preference/necessity.     Each patient to whom I provide medical services by telemedicine is:  (1) informed of the relationship between the physician and patient and the respective role of any other health care provider with respect to management of the patient; and (2) notified that they may decline to receive medical services by telemedicine and may withdraw from such care at any time. Patient verbally consented to receive this service via voice-only telephone call.    HISTORY OF PRESENT ILLNESS: 57 year old  female presenting virtually for diabetes follow up.  Patient has had Type II diabetes since 1985 and has the following complications: peripheral neuropathy, PVD, and CAD. Other pertinent conditions include: bipolar disorder and depression ( follows psych ) and hypoglycemia-induced seizures. She has attended diabetes education in the past.     Past tried and failed medications:  No longer taking Jardiance due to history of recurrent UTI w/ sepsis. Since her last virtual visit, patient denies hospital admissions or emergency room visits.  However, her  was recently in a MVA.     Blood glucose monitoring is performed.  Per patient, fasting BG s are 80 s - 120 s. On today, patient reports that hypoglycemia hs resolved. She was unable to afford Dexcom CGM.     DM MEDICATIONS: Toujeo 76 units daily ( at bedtime )  //  Novolin R, takes 10 units TID ac   //  Metformin 500 mg BID ac    Diabetes Management Status  Statin: Not taking  ACE/ARB: Not taking    Screening or Prevention Patient's value Goal  Complete/Controlled?   HgA1C Testing and Control   Lab Results   Component Value Date    HGBA1C 12.7 (H) 05/24/2023      Annually/Less than 8% No   Lipid profile : 11/02/2022 Annually Yes   LDL control Lab Results   Component Value Date    LDLCALC 90.6 11/02/2022    Annually/Less than 100 mg/dl  Yes   Nephropathy screening Lab Results   Component Value Date    LABMICR 18.0 09/01/2022     Lab Results   Component Value Date    PROTEINUA Negative 07/14/2023     No results found for: UTPCR   Annually Yes   Blood pressure BP Readings from Last 1 Encounters:   07/14/23 132/72    Less than 140/90 Yes   Dilated retinal exam : 11/29/2022 Annually Yes   Foot exam   : 05/24/2023 Annually Yes     Assessment and plan:    Type 2 diabetes mellitus with complication, with long-term current use of insulin    Unable to afford Dexcom CGM.  Continue Toujeo 76 units daily.  Due to decreasing carbohydrate intake, patient has decreased Novolog to 10 units before meals. Per patient, hypoglycemia has improved. Repeat labs scheduled in August: A1C, CMP. RTC in 4 months.     ABNER MorenoC, [facility]     This service was not originating from a related E/M service provided within the previous 7 days nor will  to an E/M service or procedure within the next 24 hours or my soonest available appointment.  Prevailing standard of care was able to be met in this audio-only visit.

## 2023-07-14 ENCOUNTER — OFFICE VISIT (OUTPATIENT)
Dept: FAMILY MEDICINE | Facility: CLINIC | Age: 58
End: 2023-07-14
Payer: MEDICARE

## 2023-07-14 VITALS
BODY MASS INDEX: 32.26 KG/M2 | WEIGHT: 187.94 LBS | TEMPERATURE: 100 F | DIASTOLIC BLOOD PRESSURE: 72 MMHG | HEART RATE: 90 BPM | OXYGEN SATURATION: 97 % | SYSTOLIC BLOOD PRESSURE: 132 MMHG

## 2023-07-14 DIAGNOSIS — H91.90 HEARING LOSS, UNSPECIFIED HEARING LOSS TYPE, UNSPECIFIED LATERALITY: ICD-10-CM

## 2023-07-14 DIAGNOSIS — Z79.4 TYPE 2 DIABETES MELLITUS WITH COMPLICATION, WITH LONG-TERM CURRENT USE OF INSULIN: Primary | ICD-10-CM

## 2023-07-14 DIAGNOSIS — F50.89 PICA: ICD-10-CM

## 2023-07-14 DIAGNOSIS — J06.9 UPPER RESPIRATORY TRACT INFECTION, UNSPECIFIED TYPE: ICD-10-CM

## 2023-07-14 DIAGNOSIS — N39.0 URINARY TRACT INFECTION WITHOUT HEMATURIA, SITE UNSPECIFIED: ICD-10-CM

## 2023-07-14 DIAGNOSIS — E11.8 TYPE 2 DIABETES MELLITUS WITH COMPLICATION, WITH LONG-TERM CURRENT USE OF INSULIN: Primary | ICD-10-CM

## 2023-07-14 DIAGNOSIS — F31.9 BIPOLAR AFFECTIVE DISORDER, REMISSION STATUS UNSPECIFIED: ICD-10-CM

## 2023-07-14 LAB
BILIRUB UR QL STRIP: NEGATIVE
CLARITY UR REFRACT.AUTO: CLEAR
COLOR UR AUTO: COLORLESS
GLUCOSE UR QL STRIP: NEGATIVE
HGB UR QL STRIP: NEGATIVE
KETONES UR QL STRIP: NEGATIVE
LEUKOCYTE ESTERASE UR QL STRIP: NEGATIVE
NITRITE UR QL STRIP: NEGATIVE
PH UR STRIP: 6 [PH] (ref 5–8)
PROT UR QL STRIP: NEGATIVE
SP GR UR STRIP: 1 (ref 1–1.03)
URN SPEC COLLECT METH UR: ABNORMAL

## 2023-07-14 PROCEDURE — 1159F MED LIST DOCD IN RCRD: CPT | Mod: HCNC,CPTII,S$GLB, | Performed by: FAMILY MEDICINE

## 2023-07-14 PROCEDURE — 3075F PR MOST RECENT SYSTOLIC BLOOD PRESS GE 130-139MM HG: ICD-10-PCS | Mod: HCNC,CPTII,S$GLB, | Performed by: FAMILY MEDICINE

## 2023-07-14 PROCEDURE — 1159F PR MEDICATION LIST DOCUMENTED IN MEDICAL RECORD: ICD-10-PCS | Mod: HCNC,CPTII,S$GLB, | Performed by: FAMILY MEDICINE

## 2023-07-14 PROCEDURE — 3072F LOW RISK FOR RETINOPATHY: CPT | Mod: HCNC,CPTII,S$GLB, | Performed by: FAMILY MEDICINE

## 2023-07-14 PROCEDURE — 3046F HEMOGLOBIN A1C LEVEL >9.0%: CPT | Mod: HCNC,CPTII,S$GLB, | Performed by: FAMILY MEDICINE

## 2023-07-14 PROCEDURE — 81003 URINALYSIS AUTO W/O SCOPE: CPT | Mod: HCNC | Performed by: FAMILY MEDICINE

## 2023-07-14 PROCEDURE — 99214 OFFICE O/P EST MOD 30 MIN: CPT | Mod: HCNC,S$GLB,, | Performed by: FAMILY MEDICINE

## 2023-07-14 PROCEDURE — 99999 PR PBB SHADOW E&M-EST. PATIENT-LVL V: CPT | Mod: PBBFAC,HCNC,, | Performed by: FAMILY MEDICINE

## 2023-07-14 PROCEDURE — 3008F BODY MASS INDEX DOCD: CPT | Mod: HCNC,CPTII,S$GLB, | Performed by: FAMILY MEDICINE

## 2023-07-14 PROCEDURE — 99999 PR PBB SHADOW E&M-EST. PATIENT-LVL V: ICD-10-PCS | Mod: PBBFAC,HCNC,, | Performed by: FAMILY MEDICINE

## 2023-07-14 PROCEDURE — 3078F PR MOST RECENT DIASTOLIC BLOOD PRESSURE < 80 MM HG: ICD-10-PCS | Mod: HCNC,CPTII,S$GLB, | Performed by: FAMILY MEDICINE

## 2023-07-14 PROCEDURE — 3078F DIAST BP <80 MM HG: CPT | Mod: HCNC,CPTII,S$GLB, | Performed by: FAMILY MEDICINE

## 2023-07-14 PROCEDURE — 3072F PR LOW RISK FOR RETINOPATHY: ICD-10-PCS | Mod: HCNC,CPTII,S$GLB, | Performed by: FAMILY MEDICINE

## 2023-07-14 PROCEDURE — 87086 URINE CULTURE/COLONY COUNT: CPT | Mod: HCNC | Performed by: FAMILY MEDICINE

## 2023-07-14 PROCEDURE — 3008F PR BODY MASS INDEX (BMI) DOCUMENTED: ICD-10-PCS | Mod: HCNC,CPTII,S$GLB, | Performed by: FAMILY MEDICINE

## 2023-07-14 PROCEDURE — 3075F SYST BP GE 130 - 139MM HG: CPT | Mod: HCNC,CPTII,S$GLB, | Performed by: FAMILY MEDICINE

## 2023-07-14 PROCEDURE — 3046F PR MOST RECENT HEMOGLOBIN A1C LEVEL > 9.0%: ICD-10-PCS | Mod: HCNC,CPTII,S$GLB, | Performed by: FAMILY MEDICINE

## 2023-07-14 PROCEDURE — 99214 PR OFFICE/OUTPT VISIT, EST, LEVL IV, 30-39 MIN: ICD-10-PCS | Mod: HCNC,S$GLB,, | Performed by: FAMILY MEDICINE

## 2023-07-14 NOTE — PROGRESS NOTES
Subjective:       Patient ID: Alexandra Goins is a 57 y.o. female.    Chief Complaint: Follow-up      HPI Comments:       Current Outpatient Medications:     albuterol (PROVENTIL/VENTOLIN HFA) 90 mcg/actuation inhaler, INHALE 2 TO 4 PUFFS BY MOUTH THREE TIMES DAILY AS NEEDED FOR WHEEZING, Disp: 18 g, Rfl: 3    benztropine (COGENTIN) 0.5 MG tablet, Take 1 tablet (0.5 mg total) by mouth 2 (two) times daily as needed (dystonia)., Disp: 60 tablet, Rfl: 2    blood sugar diagnostic (TRUE METRIX GLUCOSE TEST STRIP) Strp, USE 1 STRIP TO CHECK GLUCOSE THREE TIMES DAILY, Disp: 100 strip, Rfl: 3    blood sugar diagnostic Strp, 1 each by Misc.(Non-Drug; Combo Route) route 3 (three) times daily. Dispense preferred on insurance, Disp: 100 strip, Rfl: 11    blood-glucose meter kit, Use as instructed - preferred on insurance, Disp: 1 each, Rfl: 0    buPROPion (WELLBUTRIN XL) 300 MG 24 hr tablet, Take 1 tablet (300 mg total) by mouth once daily., Disp: 60 tablet, Rfl: 3    butalbital-acetaminophen-caffeine -40 mg (FIORICET, ESGIC) -40 mg per tablet, Take 1 tablet by mouth every 6 (six) hours as needed for Headaches. for pain., Disp: 30 tablet, Rfl: 0    diazePAM (VALIUM) 10 MG Tab, Take 1/2 to 1 tablet three times daily as needed for anxiety, Disp: 90 tablet, Rfl: 2    ezetimibe (ZETIA) 10 mg tablet, Take 1 tablet (10 mg total) by mouth once daily., Disp: 90 tablet, Rfl: 1    furosemide (LASIX) 40 MG tablet, TAKE 1 TABLET BY MOUTH EVERY MONDAY, WEDNESDAY, AND FRIDAY AS NEEDED, Disp: 45 tablet, Rfl: 2    gabapentin (NEURONTIN) 600 MG tablet, TAKE 1 CAPSULE BY MOUTH IN THE MORNING, 1 CAPSULE IN THE AFTERNOON, AND THEN 2 CAPSULES AT BEDTIME, Disp: 360 tablet, Rfl: 1    GAVILYTE-G 236-22.74-6.74 -5.86 gram suspension, Take by mouth., Disp: , Rfl:     insulin glargine U-300 conc (TOUJEO MAX U-300 SOLOSTAR) 300 unit/mL (3 mL) insulin pen, Inject 76 Units into the skin once daily., Disp: 4 pen , Rfl: 3    insulin regular,  "human (NOVOLIN R INJ), , Disp: , Rfl:     isosorbide mononitrate (IMDUR) 30 MG 24 hr tablet, Take 1 tablet by mouth once daily, Disp: 90 tablet, Rfl: 2    lancets Misc, 1 each by Misc.(Non-Drug; Combo Route) route 3 (three) times daily. Dispense preferred on insurance, Disp: 100 each, Rfl: 11    metFORMIN (GLUCOPHAGE-XR) 500 MG ER 24hr tablet, Take 1 tablet (500 mg total) by mouth 2 (two) times daily with meals., Disp: 180 tablet, Rfl: 1    metoprolol succinate (TOPROL-XL) 25 MG 24 hr tablet, Take 1 tablet by mouth once daily, Disp: 90 tablet, Rfl: 3    mirtazapine (REMERON) 15 MG tablet, Take 1 tablet (15 mg total) by mouth every evening., Disp: 30 tablet, Rfl: 2    mirtazapine (REMERON) 30 MG tablet, Take 30 mg by mouth every evening., Disp: , Rfl:     pen needle, diabetic 32 gauge x 5/32" Ndle, Inject 1 each into the skin once daily., Disp: 100 each, Rfl: 3    promethazine (PHENERGAN) 12.5 MG Tab, Take 1 tablet (12.5 mg total) by mouth every 6 (six) hours as needed., Disp: 32 tablet, Rfl: 0    risperiDONE (RISPERDAL) 1 MG tablet, Take 1 tablet (1 mg total) by mouth 2 (two) times daily., Disp: 60 tablet, Rfl: 2    albuterol-ipratropium (DUO-NEB) 2.5 mg-0.5 mg/3 mL nebulizer solution, Take 3 mLs by nebulization every 6 (six) hours as needed for Wheezing. Rescue, Disp: 1 Box, Rfl: 0    fluticasone-salmeterol diskus inhaler 250-50 mcg, Inhale 1 puff into the lungs 2 (two) times daily. Controller for maintenance, Disp: 60 each, Rfl: 0    nitrofurantoin, macrocrystal-monohydrate, (MACROBID) 100 MG capsule, Take 1 capsule (100 mg total) by mouth 2 (two) times daily., Disp: 20 capsule, Rfl: 0      Short-term follow-up.  I was concerned because she was having both uncontrolled diabetes and also a lot of hypoglycemia.  She finally got through 2 diabetes care and is working on reducing her short-term insulin and has a new sliding scale.  Says the blood sugars have not been as low as previously.  Encouraged her to stay on " top of her diabetes as that seems to determine how she does overall.      Still eating ice.  There was no iron-deficiency anemia follow-up blood work.    Very hard of hearing.  Has not had an audiology exam as an adult    Complains of fever and chills last 24 hours.  Some sore throat, ear pain, swollen glands, rhinorrhea.  Cough when she lies back only.  Not taking anything for.      Recently (today) completed Omnicef prescription for UTI.  Still has a little stinging when she goes    Review of Systems   Constitutional:  Positive for chills and fever. Negative for activity change and appetite change.   HENT:  Positive for congestion, postnasal drip, rhinorrhea and sore throat.    Respiratory:  Negative for cough and shortness of breath.    Cardiovascular:  Negative for chest pain.   Gastrointestinal:  Negative for abdominal pain, diarrhea and nausea.   Genitourinary:  Negative for difficulty urinating.   Musculoskeletal:  Negative for arthralgias and myalgias.   Neurological:  Negative for dizziness and headaches.     Objective:      Vitals:    07/14/23 1113   BP: 132/72   Pulse: 90   Temp: 100 °F (37.8 °C)   TempSrc: Tympanic   SpO2: 97%   Weight: 85.2 kg (187 lb 15.1 oz)   PainSc: 0-No pain     Physical Exam  Vitals and nursing note reviewed.   Constitutional:       General: She is not in acute distress.     Appearance: She is well-developed. She is not ill-appearing or diaphoretic.   HENT:      Head: Normocephalic.      Right Ear: Tympanic membrane, ear canal and external ear normal.      Left Ear: Tympanic membrane, ear canal and external ear normal.      Nose: Mucosal edema and rhinorrhea present.      Mouth/Throat:      Pharynx: Pharyngeal swelling present. No oropharyngeal exudate or posterior oropharyngeal erythema.      Tonsils: No tonsillar exudate.   Neck:      Thyroid: No thyromegaly.   Cardiovascular:      Rate and Rhythm: Normal rate and regular rhythm.      Heart sounds: Normal heart sounds. No murmur  heard.  Pulmonary:      Effort: Pulmonary effort is normal.      Breath sounds: Normal breath sounds. No wheezing or rales.   Abdominal:      General: There is no distension.      Palpations: Abdomen is soft.   Musculoskeletal:      Cervical back: Neck supple.   Lymphadenopathy:      Cervical: No cervical adenopathy.   Skin:     General: Skin is warm and dry.   Neurological:      Mental Status: She is alert and oriented to person, place, and time.   Psychiatric:         Behavior: Behavior normal.         Thought Content: Thought content normal.         Judgment: Judgment normal.       Assessment:       1. Type 2 diabetes mellitus with complication, with long-term current use of insulin    2. Bipolar affective disorder, remission status unspecified    3. Pica    4. Urinary tract infection without hematuria, site unspecified    5. Upper respiratory tract infection, unspecified type    6. Hearing loss, unspecified hearing loss type, unspecified laterality        Plan:   Type 2 diabetes mellitus with complication, with long-term current use of insulin  Comments:  Now back in touch with diabetes care.  Doing better.  Follow-up 3 months    Bipolar affective disorder, remission status unspecified  Comments:  Stable.  Followed by Psychiatry    Pica  Comments:  No anemia or iron-deficiency    Urinary tract infection without hematuria, site unspecified  Comments:  Completed antibiotics today.  Patient wants to check another urinalysis  Orders:  -     Urinalysis; Future; Expected date: 07/14/2023  -     Urine culture; Future; Expected date: 07/14/2023    Upper respiratory tract infection, unspecified type  Comments:  Supportive care    Hearing loss, unspecified hearing loss type, unspecified laterality  Comments:  Audiology consult  Orders:  -     Ambulatory referral/consult to Audiology; Future; Expected date: 07/21/2023

## 2023-07-16 LAB
BACTERIA UR CULT: NORMAL
BACTERIA UR CULT: NORMAL

## 2023-07-28 ENCOUNTER — OFFICE VISIT (OUTPATIENT)
Dept: HOME HEALTH SERVICES | Facility: CLINIC | Age: 58
End: 2023-07-28
Payer: MEDICARE

## 2023-07-28 ENCOUNTER — TELEPHONE (OUTPATIENT)
Dept: ADMINISTRATIVE | Facility: CLINIC | Age: 58
End: 2023-07-28
Payer: MEDICARE

## 2023-07-28 ENCOUNTER — TELEPHONE (OUTPATIENT)
Dept: HOME HEALTH SERVICES | Facility: CLINIC | Age: 58
End: 2023-07-28

## 2023-07-28 VITALS
OXYGEN SATURATION: 95 % | TEMPERATURE: 98 F | BODY MASS INDEX: 31.92 KG/M2 | HEIGHT: 64 IN | SYSTOLIC BLOOD PRESSURE: 115 MMHG | HEART RATE: 87 BPM | WEIGHT: 187 LBS | DIASTOLIC BLOOD PRESSURE: 80 MMHG

## 2023-07-28 DIAGNOSIS — E66.9 OBESITY (BMI 30.0-34.9): ICD-10-CM

## 2023-07-28 DIAGNOSIS — Z00.00 ENCOUNTER FOR MEDICARE ANNUAL WELLNESS EXAM: ICD-10-CM

## 2023-07-28 DIAGNOSIS — T46.6X5A STATIN MYOPATHY: ICD-10-CM

## 2023-07-28 DIAGNOSIS — E11.8 TYPE 2 DIABETES MELLITUS WITH COMPLICATION, WITH LONG-TERM CURRENT USE OF INSULIN: Chronic | ICD-10-CM

## 2023-07-28 DIAGNOSIS — E11.42 DIABETIC POLYNEUROPATHY ASSOCIATED WITH TYPE 2 DIABETES MELLITUS: ICD-10-CM

## 2023-07-28 DIAGNOSIS — I67.1 CEREBRAL ANEURYSM, NONRUPTURED: ICD-10-CM

## 2023-07-28 DIAGNOSIS — Z78.9 STATIN INTOLERANCE: ICD-10-CM

## 2023-07-28 DIAGNOSIS — J44.89 ASTHMA WITH COPD: ICD-10-CM

## 2023-07-28 DIAGNOSIS — Z13.31 POSITIVE DEPRESSION SCREENING: ICD-10-CM

## 2023-07-28 DIAGNOSIS — E11.69 DYSLIPIDEMIA ASSOCIATED WITH TYPE 2 DIABETES MELLITUS: ICD-10-CM

## 2023-07-28 DIAGNOSIS — F17.213 CIGARETTE NICOTINE DEPENDENCE WITH WITHDRAWAL: ICD-10-CM

## 2023-07-28 DIAGNOSIS — I10 ESSENTIAL HYPERTENSION: Chronic | ICD-10-CM

## 2023-07-28 DIAGNOSIS — E78.2 MIXED HYPERLIPIDEMIA: ICD-10-CM

## 2023-07-28 DIAGNOSIS — Z99.89 DEPENDENCE ON OTHER ENABLING MACHINES AND DEVICES: ICD-10-CM

## 2023-07-28 DIAGNOSIS — R42 DIZZINESS: ICD-10-CM

## 2023-07-28 DIAGNOSIS — E03.4 HYPOTHYROIDISM DUE TO ACQUIRED ATROPHY OF THYROID: Chronic | ICD-10-CM

## 2023-07-28 DIAGNOSIS — F31.9 BIPOLAR AFFECTIVE DISORDER, REMISSION STATUS UNSPECIFIED: Chronic | ICD-10-CM

## 2023-07-28 DIAGNOSIS — Z86.73 HISTORY OF STROKE: ICD-10-CM

## 2023-07-28 DIAGNOSIS — F32.A DEPRESSION, UNSPECIFIED DEPRESSION TYPE: Chronic | ICD-10-CM

## 2023-07-28 DIAGNOSIS — I25.118 CORONARY ARTERY DISEASE OF NATIVE ARTERY OF NATIVE HEART WITH STABLE ANGINA PECTORIS: Chronic | ICD-10-CM

## 2023-07-28 DIAGNOSIS — I50.32 CHRONIC DIASTOLIC HEART FAILURE: ICD-10-CM

## 2023-07-28 DIAGNOSIS — G72.0 STATIN MYOPATHY: ICD-10-CM

## 2023-07-28 DIAGNOSIS — E78.5 DYSLIPIDEMIA ASSOCIATED WITH TYPE 2 DIABETES MELLITUS: ICD-10-CM

## 2023-07-28 DIAGNOSIS — I69.952 HEMIPLEGIA AND HEMIPARESIS FOLLOWING UNSPECIFIED CEREBROVASCULAR DISEASE AFFECTING LEFT DOMINANT SIDE: ICD-10-CM

## 2023-07-28 DIAGNOSIS — I73.9 PVD (PERIPHERAL VASCULAR DISEASE): ICD-10-CM

## 2023-07-28 DIAGNOSIS — Z79.4 TYPE 2 DIABETES MELLITUS WITH COMPLICATION, WITH LONG-TERM CURRENT USE OF INSULIN: Chronic | ICD-10-CM

## 2023-07-28 DIAGNOSIS — Z00.00 ENCOUNTER FOR PREVENTIVE HEALTH EXAMINATION: Primary | ICD-10-CM

## 2023-07-28 PROBLEM — J96.01 ACUTE RESPIRATORY FAILURE WITH HYPOXIA AND HYPERCAPNIA: Status: RESOLVED | Noted: 2021-02-11 | Resolved: 2023-07-28

## 2023-07-28 PROBLEM — E83.42 HYPOMAGNESEMIA: Status: RESOLVED | Noted: 2021-02-11 | Resolved: 2023-07-28

## 2023-07-28 PROBLEM — G93.41 ACUTE METABOLIC ENCEPHALOPATHY: Status: RESOLVED | Noted: 2023-06-14 | Resolved: 2023-07-28

## 2023-07-28 PROBLEM — R56.9 SEIZURE-LIKE ACTIVITY: Status: RESOLVED | Noted: 2021-03-17 | Resolved: 2023-07-28

## 2023-07-28 PROBLEM — N15.1 RENAL ABSCESS, RIGHT: Status: RESOLVED | Noted: 2018-06-21 | Resolved: 2023-07-28

## 2023-07-28 PROBLEM — E87.20 LACTIC ACIDOSIS: Status: RESOLVED | Noted: 2021-02-11 | Resolved: 2023-07-28

## 2023-07-28 PROBLEM — J44.0 CHRONIC OBSTRUCTIVE PULMONARY DISEASE WITH ACUTE LOWER RESPIRATORY INFECTION: Status: RESOLVED | Noted: 2022-07-24 | Resolved: 2023-07-28

## 2023-07-28 PROBLEM — J44.1 COPD EXACERBATION: Status: RESOLVED | Noted: 2021-02-11 | Resolved: 2023-07-28

## 2023-07-28 PROBLEM — N39.0 UTI (URINARY TRACT INFECTION): Status: RESOLVED | Noted: 2019-06-28 | Resolved: 2023-07-28

## 2023-07-28 PROBLEM — R78.81 SALMONELLA BACTEREMIA: Status: RESOLVED | Noted: 2022-07-26 | Resolved: 2023-07-28

## 2023-07-28 PROBLEM — J96.02 ACUTE RESPIRATORY FAILURE WITH HYPOXIA AND HYPERCAPNIA: Status: RESOLVED | Noted: 2021-02-11 | Resolved: 2023-07-28

## 2023-07-28 PROBLEM — I72.9 ANEURYSM: Status: RESOLVED | Noted: 2019-09-17 | Resolved: 2023-07-28

## 2023-07-28 PROBLEM — E86.1 HYPOVOLEMIA: Status: RESOLVED | Noted: 2019-09-19 | Resolved: 2023-07-28

## 2023-07-28 PROCEDURE — 3074F SYST BP LT 130 MM HG: CPT | Mod: CPTII,S$GLB,, | Performed by: NURSE PRACTITIONER

## 2023-07-28 PROCEDURE — G0439 PPPS, SUBSEQ VISIT: HCPCS | Mod: S$GLB,,, | Performed by: NURSE PRACTITIONER

## 2023-07-28 PROCEDURE — 3008F PR BODY MASS INDEX (BMI) DOCUMENTED: ICD-10-PCS | Mod: CPTII,S$GLB,, | Performed by: NURSE PRACTITIONER

## 2023-07-28 PROCEDURE — 3079F PR MOST RECENT DIASTOLIC BLOOD PRESSURE 80-89 MM HG: ICD-10-PCS | Mod: CPTII,S$GLB,, | Performed by: NURSE PRACTITIONER

## 2023-07-28 PROCEDURE — 1159F PR MEDICATION LIST DOCUMENTED IN MEDICAL RECORD: ICD-10-PCS | Mod: CPTII,S$GLB,, | Performed by: NURSE PRACTITIONER

## 2023-07-28 PROCEDURE — G0439 PR MEDICARE ANNUAL WELLNESS SUBSEQUENT VISIT: ICD-10-PCS | Mod: S$GLB,,, | Performed by: NURSE PRACTITIONER

## 2023-07-28 PROCEDURE — 1160F RVW MEDS BY RX/DR IN RCRD: CPT | Mod: CPTII,S$GLB,, | Performed by: NURSE PRACTITIONER

## 2023-07-28 PROCEDURE — 1160F PR REVIEW ALL MEDS BY PRESCRIBER/CLIN PHARMACIST DOCUMENTED: ICD-10-PCS | Mod: CPTII,S$GLB,, | Performed by: NURSE PRACTITIONER

## 2023-07-28 PROCEDURE — 3046F PR MOST RECENT HEMOGLOBIN A1C LEVEL > 9.0%: ICD-10-PCS | Mod: CPTII,S$GLB,, | Performed by: NURSE PRACTITIONER

## 2023-07-28 PROCEDURE — 3046F HEMOGLOBIN A1C LEVEL >9.0%: CPT | Mod: CPTII,S$GLB,, | Performed by: NURSE PRACTITIONER

## 2023-07-28 PROCEDURE — 3072F PR LOW RISK FOR RETINOPATHY: ICD-10-PCS | Mod: CPTII,S$GLB,, | Performed by: NURSE PRACTITIONER

## 2023-07-28 PROCEDURE — 3074F PR MOST RECENT SYSTOLIC BLOOD PRESSURE < 130 MM HG: ICD-10-PCS | Mod: CPTII,S$GLB,, | Performed by: NURSE PRACTITIONER

## 2023-07-28 PROCEDURE — 3072F LOW RISK FOR RETINOPATHY: CPT | Mod: CPTII,S$GLB,, | Performed by: NURSE PRACTITIONER

## 2023-07-28 PROCEDURE — 3079F DIAST BP 80-89 MM HG: CPT | Mod: CPTII,S$GLB,, | Performed by: NURSE PRACTITIONER

## 2023-07-28 PROCEDURE — 1159F MED LIST DOCD IN RCRD: CPT | Mod: CPTII,S$GLB,, | Performed by: NURSE PRACTITIONER

## 2023-07-28 PROCEDURE — 3008F BODY MASS INDEX DOCD: CPT | Mod: CPTII,S$GLB,, | Performed by: NURSE PRACTITIONER

## 2023-07-28 RX ORDER — EZETIMIBE 10 MG/1
10 TABLET ORAL DAILY
Qty: 30 TABLET | Refills: 0 | Status: ON HOLD | OUTPATIENT
Start: 2023-07-28 | End: 2024-03-14

## 2023-07-28 NOTE — TELEPHONE ENCOUNTER
Called pt; informed pt I was calling to schedule her an appt for her LDCT lung screen per provider's message; pt stated she does not understand why she needs this test and does not want to schedule it; pt stated she would like to speak with the provider on why she needs this text and why was it ordered; informed pt I would send message to provider to see what the provider says; pt verbalized understanding.

## 2023-07-28 NOTE — TELEPHONE ENCOUNTER
Spoke with patient regarding the reason for LDCT lung screen. She states she cannot afford it. Recommended she call her insurance to find out if it costs anything. She states to call her back next Monday or Tuesday after she talks to her  about it.

## 2023-07-28 NOTE — PATIENT INSTRUCTIONS
Counseling and Referral of Other Preventative  (Italic type indicates deductible and co-insurance are waived)    Patient Name: Alexandra Goins  Today's Date: 7/28/2023    Health Maintenance       Date Due Completion Date    Shingles Vaccine (1 of 2) Never done ---    LDCT Lung Screen 06/04/2016 6/4/2015    Colorectal Cancer Screening 01/12/2023 1/12/2018    Hemoglobin A1c 08/24/2023 5/24/2023    Override on 2/5/2016: Done (Per Care Everywhere)    Mammogram 09/01/2023 9/1/2022    COVID-19 Vaccine (1) 07/28/2024 (Originally 3/22/1966) ---    Influenza Vaccine (1) 09/01/2023 11/2/2022    Diabetes Urine Screening 09/01/2023 9/1/2022    Lipid Panel 11/02/2023 11/2/2022    Override on 2/5/2016: Done (per Care Everywhere)    Eye Exam 11/29/2023 11/29/2022    Override on 3/14/2018: Done (Per Dr [facility])    Override on 1/30/2017: Done (Negative Retinopathy- see scanned report)    Foot Exam 05/24/2024 5/24/2023    Override on 10/20/2021: Done    Override on 1/25/2017: Done    TETANUS VACCINE 06/20/2026 6/20/2016    Pneumococcal Vaccines (Age 0-64) (3 - PPSV23 if available, else PCV20) 09/22/2028 11/20/2017        No orders of the defined types were placed in this encounter.    The following information is provided to all patients.  This information is to help you find resources for any of the problems found today that may be affecting your health:                Living healthy guide: www.[facility].[place].gov      Understanding Diabetes: www.diabetes.org      Eating healthy: www.cdc.gov/healthyweight      CDC home safety checklist: www.cdc.gov/steadi/patient.html      Agency on Aging: www.goea.[place].gov      Alcoholics anonymous (AA): www.aa.org      Physical Activity: www.shanna.nih.gov/wk7savu      Tobacco use: www.quitwithusla.org

## 2023-07-28 NOTE — TELEPHONE ENCOUNTER
----- Message from JONO Mackay sent at 7/28/2023 10:16 AM CDT -----  Please call pt to schedule her LDCT lung screen. Was ordered sept 2022.   Thank you,   Vida

## 2023-07-28 NOTE — PROGRESS NOTES
"Alexandra Goins presented for Medicare AWV today. The following components were reviewed and updated:    Medical history  Family History  Social history  Allergies and Current Medications  Health Risk Assessment  Health Maintenance  Care Team    **See Completed Assessments for Annual Wellness visit with in the encounter summary    The following assessments were completed:  Depression Screening  Cognitive function Screening      Timed Get Up Test  Whisper Test    Vitals:    07/28/23 0927   BP: 115/80   Pulse: 87   Temp: 97.5 °F (36.4 °C)   TempSrc: Temporal   SpO2: 95%   Weight: 84.8 kg (187 lb)   Height: 5' 4" (1.626 m)     Body mass index is 32.1 kg/m².   ]    Physical Exam  Vitals reviewed.   Constitutional:       Appearance: Normal appearance. She is obese.   HENT:      Head: Normocephalic and atraumatic.      Ears:      Comments: Decreased hearing  Cardiovascular:      Rate and Rhythm: Normal rate and regular rhythm.      Pulses: Normal pulses.           Dorsalis pedis pulses are 2+ on the right side and 2+ on the left side.        Posterior tibial pulses are 2+ on the right side and 2+ on the left side.      Heart sounds: Normal heart sounds.   Pulmonary:      Effort: Pulmonary effort is normal.      Breath sounds: Normal breath sounds.   Musculoskeletal:         General: Normal range of motion.      Cervical back: Normal range of motion and neck supple.   Feet:      Right foot:      Toenail Condition: Right toenails are abnormally thick. Fungal disease present.     Left foot:      Toenail Condition: Left toenails are abnormally thick. Fungal disease present.  Skin:     General: Skin is warm and dry.   Neurological:      Mental Status: She is alert and oriented to person, place, and time.   Psychiatric:         Mood and Affect: Mood normal.         Behavior: Behavior normal.        Outpatient Medications Marked as Taking for the 7/28/23 encounter (Office Visit) with JONO Mackay   Medication Sig " Dispense Refill    albuterol (PROVENTIL/VENTOLIN HFA) 90 mcg/actuation inhaler INHALE 2 TO 4 PUFFS BY MOUTH THREE TIMES DAILY AS NEEDED FOR WHEEZING 18 g 3    albuterol-ipratropium (DUO-NEB) 2.5 mg-0.5 mg/3 mL nebulizer solution Take 3 mLs by nebulization every 6 (six) hours as needed for Wheezing. Rescue 1 Box 0    benztropine (COGENTIN) 0.5 MG tablet Take 1 tablet (0.5 mg total) by mouth 2 (two) times daily as needed (dystonia). 60 tablet 2    blood sugar diagnostic (TRUE METRIX GLUCOSE TEST STRIP) Strp USE 1 STRIP TO CHECK GLUCOSE THREE TIMES DAILY 100 strip 3    blood sugar diagnostic Strp 1 each by Misc.(Non-Drug; Combo Route) route 3 (three) times daily. Dispense preferred on insurance 100 strip 11    blood-glucose meter kit Use as instructed - preferred on insurance 1 each 0    buPROPion (WELLBUTRIN XL) 300 MG 24 hr tablet Take 1 tablet (300 mg total) by mouth once daily. 60 tablet 3    butalbital-acetaminophen-caffeine -40 mg (FIORICET, ESGIC) -40 mg per tablet Take 1 tablet by mouth every 6 (six) hours as needed for Headaches. for pain. 30 tablet 0    diazePAM (VALIUM) 10 MG Tab Take 1/2 to 1 tablet three times daily as needed for anxiety 90 tablet 2    fluticasone-salmeterol diskus inhaler 250-50 mcg Inhale 1 puff into the lungs 2 (two) times daily. Controller for maintenance 60 each 0    furosemide (LASIX) 40 MG tablet TAKE 1 TABLET BY MOUTH EVERY MONDAY, WEDNESDAY, AND FRIDAY AS NEEDED 45 tablet 2    gabapentin (NEURONTIN) 600 MG tablet TAKE 1 CAPSULE BY MOUTH IN THE MORNING, 1 CAPSULE IN THE AFTERNOON, AND THEN 2 CAPSULES AT BEDTIME 360 tablet 1    insulin glargine U-300 conc (TOUJEO MAX U-300 SOLOSTAR) 300 unit/mL (3 mL) insulin pen Inject 76 Units into the skin once daily. 4 pen 3    insulin regular, human (NOVOLIN R INJ)       lancets Misc 1 each by Misc.(Non-Drug; Combo Route) route 3 (three) times daily. Dispense preferred on insurance 100 each 11    metFORMIN (GLUCOPHAGE-XR) 500 MG ER  "24hr tablet Take 1 tablet (500 mg total) by mouth 2 (two) times daily with meals. 180 tablet 1    mirtazapine (REMERON) 30 MG tablet Take 30 mg by mouth every evening.      pen needle, diabetic 32 gauge x 5/32" Ndle Inject 1 each into the skin once daily. 100 each 3    promethazine (PHENERGAN) 12.5 MG Tab Take 1 tablet (12.5 mg total) by mouth every 6 (six) hours as needed. 32 tablet 0    risperiDONE (RISPERDAL) 1 MG tablet Take 1 tablet (1 mg total) by mouth 2 (two) times daily. 60 tablet 2        Diagnoses and health risks identified today and associated recommendations/orders:  1. Encounter for preventive health examination  2. Encounter for Medicare annual wellness exam  Medicare awv complete. Health maintenance:  Shingles vaccine due-encouraged patient to obtain a pharmacy.  Low-dose CT lung screen ordered September 2022 -message sent to MA to call patient to schedule.  Colonoscopy scheduled on 09/15/2023.  Patient declined COVID-19 vaccine.   Patient states she is not currently taking imdur, toprol XL, and zetia.  She states she is not taking Imdur or Toprol since it makes her blood pressure dropped and feel lightheaded.  Recommend she follow up with Dr. Chakraborty regarding not taking these medications.  Patient states she needs refill of Zetia to pharmacy on [address].  One-month supply sent. F/u with pcp.  - Ambulatory Referral/Consult to Enhanced Annual Wellness Visit (eAWV)    3. Chronic diastolic heart failure  Chronic and stable. Continue current management. See med list above. Follow up with cardiology.      4. Bipolar affective disorder, remission status unspecified  Chronic and stable. Continue current management.  On wellbutrin, Risperdal, Remeron, valium.  See med list above.  PHQ 9 score of 15 indicating moderately severe depression.  Patient denies any SI.  she has Follow up with Dr. Mullen.     5. Hemiplegia and hemiparesis following unspecified cerebrovascular disease affecting left " dominant side  Chronic and stable. Continue current management. Zetia. See med list above.  Fall precautions recommended.  Follow up with PCP.     6. Coronary artery disease of native artery of native heart with stable angina pectoris  Chronic and stable. Continue current management. See med list above. She states she is not taking Imdur or Toprol since it makes her blood pressure dropped and feel lightheaded.  Recommend she follow up with Dr. Chakraborty regarding not taking these medications. Follow up with cardiology.      7. Asthma with COPD  Chronic and stable. No acute issues. Continue current management.  Albuterol inhaler, DuoNeb nebulizer p.r.n., fluticasone salmeterol Diskus inhaler.  See med list above. Follow up with pulmonology.      8. Type 2 diabetes mellitus with complication, with long-term current use of insulin   Latest Reference Range & Units 07/24/15 05:20 06/21/16 08:27 01/19/17 12:54 08/09/17 12:35 11/08/17 13:01 06/21/18 05:58 06/22/18 04:35 06/23/18 05:36 04/10/19 07:57 06/28/19 13:27 09/17/19 19:13 01/21/20 09:31 01/28/20 05:48 08/24/20 10:10 02/11/21 20:18 10/20/21 09:16 05/31/22 10:18 07/25/22 05:10 11/02/22 09:24 05/24/23 08:58   Hemoglobin A1C External 4.0 - 5.6 % 12.7 (H) 14.7 (H) 16.8 (H) >14.0 (H) 9.9 (H) >14.0 (H) >14.0 (H) >14.0 (H) >14.0 (H) 7.3 (H) 6.1 (H) 8.4 (H) 7.9 (H) 9.4 (H) 10.1 (H) >14.0 (H) 11.3 (H) 11.2 (H) 9.1 (H) 12.7 (H)   Estimated Avg Glucose 68 - 131 mg/dL 318 (H) 375 (H) 435 (H) Unable to calculate 237 (H) Unable to calculate Unable to calculate Unable to calculate Unable to calculate 163 (H) 128 194 (H) 180 (H) 223 (H) 243 (H) Unable to calculate 278 (H) 275 (H) 214 (H) 318 (H)   (H): Data is abnormally high  Controlled. Continue current management. Toujeo 76 units daily;  Novolin R, takes 10 units TID ac, Metformin 500 mg BID ac. See med list. Had appmt with Amelia Santana NP 7/12/23. RTC in 4 months. Patient states she checks her BS qid and ranges 100s-200s. Reviewed  diabetic diet, diabetic foot care, preferred BS, and HgbA1C levels. Follow up with your PCP as instructed, podiatry and ophthalmology yearly.      9. Dyslipidemia associated with type 2 diabetes mellitus  Lab Results   Component Value Date    CHOL 197 11/02/2022    CHOL 145 10/20/2021    CHOL 165 08/17/2020     Lab Results   Component Value Date    HDL 44 11/02/2022    HDL 61 10/20/2021    HDL 43 (L) 08/17/2020     Lab Results   Component Value Date    LDLCALC 90.6 11/02/2022    LDLCALC 55.0 (L) 10/20/2021    LDLCALC 98 08/17/2020     No results found for: DLDL  Lab Results   Component Value Date    TRIG 312 (H) 11/02/2022    TRIG 145 10/20/2021    TRIG 119 08/17/2020       f1   Lab Results   Component Value Date    CHOLHDL 22.3 11/02/2022    CHOLHDL 42.1 10/20/2021    CHOLHDL 34.7 01/28/2020    Chronic and stable. Zetia refill sent. Continue current. F/u with pcp.    - ezetimibe (ZETIA) 10 mg tablet; Take 1 tablet (10 mg total) by mouth once daily.  Dispense: 30 tablet; Refill: 0    10. Diabetic polyneuropathy associated with type 2 diabetes mellitus  Chronic and stable. Continue current management. See med list above. Follow up with PCP.     11. PVD (peripheral vascular disease)  Chronic and stable. Continue current management. See med list above. Follow up with PCP.     12. Depression, unspecified depression type  Positive depression screening  Chronic. PHQ 9 score of 15 indicating moderately severe depression.  Patient denies any SI.  she has Follow up with Dr. Mullen.     13. Cerebral aneurysm, nonruptured  3/14/22 Stable small wide neck basilar tip aneurysm. Chronic and stable. Continue current management. See med list above. Follow up with PCP.     14. Dizziness  New problem. ringing in the ears. ear pain for 2 weeks. hearing loss.  - Ambulatory referral/consult to ENT; Future    15. History of stroke  Stable.     16. Essential hypertension  Chronic and stable on BP medication.  Continue current  management.  See med list above. Recommend low sodium diet. Follow up with PCP.       18. Hypothyroidism due to acquired atrophy of thyroid  Lab Results   Component Value Date    TSH 3.515 05/24/2023    Chronic and stable. Continue current management. See med list above. Follow up with PCP.     19. Obesity (BMI 30.0-34.9)  Recommend diet and exercise to lose weight. Follow up with your PCP as planned to discuss adjustments to your treatment plan.      20. Cigarette nicotine dependence with withdrawal  Chronic and improving.  Patient is using nicotine patches and has been smoking less. Now smokes 0.5 ppd.  Recommend follow-up with psychiatrist on coping mechanisms.  Recommend follow-up with fluid being cessation program.    21. Statin myopathy  22. Statin intolerance  On zetia.     23. Dependence on other enabling machines and devices  Frequent falls. Pt states due to blood sugar and dizziness/ear issues.  and lightheadedness with standing. ENT referral placed. Recommend to see Dr. Casiano for PT afterwards if warranted.           Provided Alexandra with a 5-10 year written screening schedule and personal prevention plan. Recommendations were developed using the USPSTF age appropriate recommendations. Education, counseling, and referrals were provided as needed.  After Visit Summary printed and given to patient which includes a list of additional screenings\tests needed.    Follow up in about 1 year (around 7/28/2024) for annual wellness visit.      JONO Mackay    I offered to discuss advanced care planning, including how to pick a person who would make decisions for you if you were unable to make them for yourself, called a health care power of , and what kind of decisions you might make such as use of life sustaining treatments such as ventilators and tube feeding when faced with a life limiting illness recorded on a living will that they will need to know. (How you want to be cared for as you near  the end of your natural life)     X  Patient has advanced directives written and agrees to provide copies to the institution.  I have reviewed the positive depression score which warrants active treatment with psychotherapy and/or medications. Currently seeing psychiatry.

## 2023-07-28 NOTE — Clinical Note
Medicare awv complete. Health maintenance:  Shingles vaccine due-encouraged patient to obtain a pharmacy.  Low-dose CT lung screen ordered September 2022 -message sent to MA to call patient to schedule.  Colonoscopy scheduled on 09/15/2023.  Patient declined COVID-19 vaccine.  Patient states she is not currently taking imdur, toprol XL, and zetia.  She states she is not taking Imdur or Toprol since it makes her blood pressure dropped and feel lightheaded.  Recommend she follow up with Dr. Chakraborty regarding not taking these medications.  Patient states she needs refill of Zetia to pharmacy on [facility].  One-month supply sent. F/u with pcp.  PHQ 9 score of 15 indicating moderately severe depression.  Patient denies any SI.  she has Follow up with Dr. Mullen.   Dizziness ringing in the ears. ear pain for 2 weeks. hearing loss. Referral to ent.   Consider PT if warranted for frequent falls.

## 2023-07-31 ENCOUNTER — PATIENT MESSAGE (OUTPATIENT)
Dept: INTERNAL MEDICINE | Facility: CLINIC | Age: 58
End: 2023-07-31
Payer: MEDICARE

## 2023-07-31 RX ORDER — DIAZEPAM 10 MG/1
TABLET ORAL
Qty: 90 TABLET | Refills: 0 | Status: SHIPPED | OUTPATIENT
Start: 2023-07-31 | End: 2023-08-09 | Stop reason: SDUPTHER

## 2023-07-31 RX ORDER — MIRTAZAPINE 15 MG/1
TABLET, FILM COATED ORAL
Qty: 30 TABLET | Refills: 0 | Status: SHIPPED | OUTPATIENT
Start: 2023-07-31 | End: 2023-08-09 | Stop reason: SDUPTHER

## 2023-07-31 RX ORDER — BENZTROPINE MESYLATE 0.5 MG/1
TABLET ORAL
Qty: 60 TABLET | Refills: 0 | Status: SHIPPED | OUTPATIENT
Start: 2023-07-31 | End: 2023-08-09 | Stop reason: SDUPTHER

## 2023-08-01 NOTE — TELEPHONE ENCOUNTER
Called pt; no answer; left message informing pt I was calling to schedule her an appt for her LDCT lung screen per provider's message and to return my call; left my name and number

## 2023-08-09 RX ORDER — BENZTROPINE MESYLATE 0.5 MG/1
0.5 TABLET ORAL 2 TIMES DAILY
Qty: 60 TABLET | Refills: 0 | Status: SHIPPED | OUTPATIENT
Start: 2023-08-09 | End: 2023-08-23 | Stop reason: SDUPTHER

## 2023-08-09 RX ORDER — RISPERIDONE 1 MG/1
1 TABLET ORAL 2 TIMES DAILY
Qty: 60 TABLET | Refills: 0 | Status: SHIPPED | OUTPATIENT
Start: 2023-08-09 | End: 2023-08-23 | Stop reason: SDUPTHER

## 2023-08-09 RX ORDER — DIAZEPAM 10 MG/1
TABLET ORAL
Qty: 90 TABLET | Refills: 0 | Status: SHIPPED | OUTPATIENT
Start: 2023-08-09 | End: 2023-08-23 | Stop reason: SDUPTHER

## 2023-08-09 RX ORDER — MIRTAZAPINE 15 MG/1
15 TABLET, FILM COATED ORAL NIGHTLY
Qty: 30 TABLET | Refills: 0 | Status: SHIPPED | OUTPATIENT
Start: 2023-08-09 | End: 2023-08-23 | Stop reason: SDUPTHER

## 2023-08-10 ENCOUNTER — CLINICAL SUPPORT (OUTPATIENT)
Dept: AUDIOLOGY | Facility: CLINIC | Age: 58
End: 2023-08-10
Payer: MEDICARE

## 2023-08-10 ENCOUNTER — OFFICE VISIT (OUTPATIENT)
Dept: OTOLARYNGOLOGY | Facility: CLINIC | Age: 58
End: 2023-08-10
Payer: MEDICARE

## 2023-08-10 VITALS — HEIGHT: 64 IN | WEIGHT: 192 LBS | BODY MASS INDEX: 32.78 KG/M2 | TEMPERATURE: 98 F

## 2023-08-10 DIAGNOSIS — H93.13 TINNITUS OF BOTH EARS: ICD-10-CM

## 2023-08-10 DIAGNOSIS — H90.3 SENSORINEURAL HEARING LOSS (SNHL) OF BOTH EARS: ICD-10-CM

## 2023-08-10 DIAGNOSIS — H93.13 TINNITUS, BILATERAL: ICD-10-CM

## 2023-08-10 DIAGNOSIS — R13.10 DYSPHAGIA, UNSPECIFIED TYPE: ICD-10-CM

## 2023-08-10 DIAGNOSIS — H90.3 SENSORINEURAL HEARING LOSS, BILATERAL: Primary | ICD-10-CM

## 2023-08-10 DIAGNOSIS — R42 DIZZINESS: ICD-10-CM

## 2023-08-10 DIAGNOSIS — R42 DIZZINESS: Primary | ICD-10-CM

## 2023-08-10 PROCEDURE — 99204 PR OFFICE/OUTPT VISIT, NEW, LEVL IV, 45-59 MIN: ICD-10-PCS | Mod: S$GLB,,, | Performed by: PHYSICIAN ASSISTANT

## 2023-08-10 PROCEDURE — 99204 OFFICE O/P NEW MOD 45 MIN: CPT | Mod: S$GLB,,, | Performed by: PHYSICIAN ASSISTANT

## 2023-08-10 PROCEDURE — 99999 PR PBB SHADOW E&M-EST. PATIENT-LVL I: ICD-10-PCS | Mod: PBBFAC,,, | Performed by: AUDIOLOGIST-HEARING AID FITTER

## 2023-08-10 PROCEDURE — 3046F PR MOST RECENT HEMOGLOBIN A1C LEVEL > 9.0%: ICD-10-PCS | Mod: CPTII,S$GLB,, | Performed by: PHYSICIAN ASSISTANT

## 2023-08-10 PROCEDURE — 1159F PR MEDICATION LIST DOCUMENTED IN MEDICAL RECORD: ICD-10-PCS | Mod: CPTII,S$GLB,, | Performed by: PHYSICIAN ASSISTANT

## 2023-08-10 PROCEDURE — 3008F PR BODY MASS INDEX (BMI) DOCUMENTED: ICD-10-PCS | Mod: CPTII,S$GLB,, | Performed by: PHYSICIAN ASSISTANT

## 2023-08-10 PROCEDURE — 3072F PR LOW RISK FOR RETINOPATHY: ICD-10-PCS | Mod: CPTII,S$GLB,, | Performed by: PHYSICIAN ASSISTANT

## 2023-08-10 PROCEDURE — 3008F BODY MASS INDEX DOCD: CPT | Mod: CPTII,S$GLB,, | Performed by: PHYSICIAN ASSISTANT

## 2023-08-10 PROCEDURE — 92557 COMPREHENSIVE HEARING TEST: CPT | Mod: S$GLB,,, | Performed by: AUDIOLOGIST-HEARING AID FITTER

## 2023-08-10 PROCEDURE — 1159F MED LIST DOCD IN RCRD: CPT | Mod: CPTII,S$GLB,, | Performed by: PHYSICIAN ASSISTANT

## 2023-08-10 PROCEDURE — 99999 PR PBB SHADOW E&M-EST. PATIENT-LVL V: CPT | Mod: PBBFAC,,, | Performed by: PHYSICIAN ASSISTANT

## 2023-08-10 PROCEDURE — 92540 BASIC VESTIBULAR EVALUATION: CPT | Mod: S$GLB,,, | Performed by: AUDIOLOGIST-HEARING AID FITTER

## 2023-08-10 PROCEDURE — 92567 PR TYMPA2METRY: ICD-10-PCS | Mod: S$GLB,,, | Performed by: AUDIOLOGIST-HEARING AID FITTER

## 2023-08-10 PROCEDURE — 92557 PR COMPREHENSIVE HEARING TEST: ICD-10-PCS | Mod: S$GLB,,, | Performed by: AUDIOLOGIST-HEARING AID FITTER

## 2023-08-10 PROCEDURE — 92567 TYMPANOMETRY: CPT | Mod: S$GLB,,, | Performed by: AUDIOLOGIST-HEARING AID FITTER

## 2023-08-10 PROCEDURE — 92540 PR VESTIBULAR EVAL NYSTAG FOVL&PERPH STIM OSCIL TRACKING: ICD-10-PCS | Mod: S$GLB,,, | Performed by: AUDIOLOGIST-HEARING AID FITTER

## 2023-08-10 PROCEDURE — 99999 PR PBB SHADOW E&M-EST. PATIENT-LVL V: ICD-10-PCS | Mod: PBBFAC,,, | Performed by: PHYSICIAN ASSISTANT

## 2023-08-10 PROCEDURE — 99999 PR PBB SHADOW E&M-EST. PATIENT-LVL I: CPT | Mod: PBBFAC,,, | Performed by: AUDIOLOGIST-HEARING AID FITTER

## 2023-08-10 PROCEDURE — 3046F HEMOGLOBIN A1C LEVEL >9.0%: CPT | Mod: CPTII,S$GLB,, | Performed by: PHYSICIAN ASSISTANT

## 2023-08-10 PROCEDURE — 3072F LOW RISK FOR RETINOPATHY: CPT | Mod: CPTII,S$GLB,, | Performed by: PHYSICIAN ASSISTANT

## 2023-08-10 NOTE — PROGRESS NOTES
Referring provider: RAND Lira YAA Goins was seen 08/10/2023 for an audiological evaluation.  Patient complains of hearing loss in the bilateral ear for over thirty years. She reports equally sided hearing with a gradual decline in hearing. She has tinnitus in the bilateral ear that has been present for years and is unchanged. No known family history of hearing loss. No significant otologic history. She has noted dizziness that occurs when her blood sugar is low. This is her first hearing test.     Audiogram:   Results reveal a moderate-to-severe sensorineural hearing loss 250-8000 Hz for the right ear, and a moderate-to-severe sensorineural hearing loss 250-8000 Hz for the left ear.   Speech Reception Thresholds were 65 dBHL for the right ear and 65 dBHL for the left ear.   Word recognition scores were fair for the right ear and fair for the left ear.   Tympanograms were Type C for the right ear and Type A for the left ear.    VNG Screen:   Spontaneous test: Absent for nystagmus  Gaze test: Absent for nystagmus  Static Positional test: Absent for nystagmus   [place]-Hallpike Right: Negative for BPPV  [place]-Hallpike Left: Negative for BPPV    Summary: Normal vestibular screen.     Patient was counseled on the above findings.    Recommendations:  ENT  Hearing aids, binaural. Information packet and copy of audiogram was provided to patient.         Rec: hearing aid packet provided.

## 2023-08-10 NOTE — PROGRESS NOTES
Subjective     Patient ID: Alexandra Goins is a 57 y.o. female.    Chief Complaint: Dizziness (Dizzininess, otalgia, and tinnitus. Audio results.)    Patient is a 57 year old female here to see me today for the first time for evaluation of multiple issues.  She complains of hearing loss in both ears for over thirty years. She reports that she felt like her right ear has been her better hearing ear for years.  She notes a gradual decline in hearing AU for years. She has tinnitus AU (describes it as ringing); present for years; no recent change.  Reports father with hearing loss; denies previous otologic surgery.  Has hx of loud noise exposure.   She has noted dizziness that occurs when her blood sugar is low.  No spinning.   She had left ear pain in past month.  Not otalgia now and no ear drainage.    She also complains of dysphagia.  Worse over past one year.  Occurs with solids and liquids.  Feels like she must forcefully swallow to get things down at times.  Seems to occur almost daily now.  She has had previous esophageal dilation (patient said 20 years ago; daughter thinks it was more recent 8-10 yrs ago).  She says she takes reflux medication but I don't see it listed on her med list and she can't recall name of it.  She is a smoker since age 13.  Trying to quit with patch.  Smokes up to one pack per day.      Review of Systems   HENT:  Positive for hearing loss, nosebleeds, trouble swallowing and voice change.    Eyes:  Positive for itching.   Gastrointestinal:  Positive for abdominal pain, constipation, diarrhea and vomiting.   Endocrine: Negative.    Genitourinary: Negative.    Musculoskeletal:  Positive for back pain and neck pain.   Integumentary:  Negative.   Neurological:  Positive for dizziness, light-headedness and headaches.   Hematological: Negative.    Psychiatric/Behavioral:  Positive for decreased concentration and sleep disturbance. The patient is nervous/anxious.           Objective      Physical Exam  Constitutional:       General: She is not in acute distress.     Appearance: She is well-developed.   HENT:      Head: Normocephalic and atraumatic.      Right Ear: Tympanic membrane, ear canal and external ear normal. Decreased hearing noted. No middle ear effusion. Tympanic membrane is not injected or erythematous.      Left Ear: Tympanic membrane, ear canal and external ear normal. Decreased hearing noted.  No middle ear effusion. Tympanic membrane is not injected or erythematous.      Nose: Nose normal. No mucosal edema, congestion or rhinorrhea.      Right Sinus: No maxillary sinus tenderness or frontal sinus tenderness.      Left Sinus: No maxillary sinus tenderness or frontal sinus tenderness.      Mouth/Throat:      Mouth: Mucous membranes are moist. Mucous membranes are not pale and not dry.      Dentition: Abnormal dentition (no upper teeth).      Pharynx: Uvula midline. No oropharyngeal exudate or posterior oropharyngeal erythema.   Eyes:      General: Lids are normal. No scleral icterus.     Extraocular Movements:      Right eye: Normal extraocular motion and no nystagmus.      Left eye: Normal extraocular motion and no nystagmus.      Conjunctiva/sclera: Conjunctivae normal.      Right eye: Right conjunctiva is not injected. No chemosis.     Left eye: Left conjunctiva is not injected. No chemosis.     Pupils: Pupils are equal, round, and reactive to light.   Neck:      Trachea: Trachea and phonation normal.   Pulmonary:      Effort: Pulmonary effort is normal.   Lymphadenopathy:      Head:      Right side of head: No submental, submandibular, preauricular or posterior auricular adenopathy.      Left side of head: No submental, submandibular, preauricular or posterior auricular adenopathy.      Cervical: No cervical adenopathy.   Skin:     General: Skin is warm and dry.      Findings: No erythema or rash.   Neurological:      Mental Status: She is alert and oriented to person, place,  and time.      Cranial Nerves: No cranial nerve deficit.   Psychiatric:         Behavior: Behavior normal. Behavior is cooperative.       AUDIOGRAM:      Audiogram:   Results reveal a moderate-to-severe sensorineural hearing loss 250-8000 Hz for the right ear, and a moderate-to-severe sensorineural hearing loss 250-8000 Hz for the left ear.   Speech Reception Thresholds were 65 dBHL for the right ear and 65 dBHL for the left ear.   Word recognition scores were fair for the right ear and fair for the left ear.   Tympanograms were Type C for the right ear and Type A for the left ear.     VNG Screen:   Spontaneous test: Absent for nystagmus  Gaze test: Absent for nystagmus  Static Positional test: Absent for nystagmus   [place]-Hallpike Right: Negative for BPPV  [place]-Hallpike Left: Negative for BPPV     Summary: Normal vestibular screen.           Assessment and Plan     1. Dizziness  -     Ambulatory referral/consult to ENT    2. Sensorineural hearing loss (SNHL) of both ears    3. Tinnitus of both ears    4. Dysphagia, unspecified type  -     Fl Modified Barium Swallow Speech; Future; Expected date: 08/10/2023  -     FL Esophagram Complete; Future; Expected date: 08/10/2023         Dizziness:  I had a long discussion with the patient regarding their symptoms.  Dizziness, vertigo and disequilibrium are common symptoms reported by adults during visits to their doctors. They are all symptoms that can result from a peripheral vestibular disorder (a dysfunction of the balance organs of the inner ear) or central vestibular disorder (a dysfunction of one or more parts of the central nervous system that help process balance and spatial information).  There are also non-vestibular causes of dizziness, and dizziness can be linked to a wide array of problems such as blood-flow irregularities from cardiovascular problems and blood pressure fluctuations.  Her vestibular screen today is normal.  Her dizziness is likely related to her  blood sugar fluctuations (has been getting low readings).    Sensorineural hearing loss:  We reviewed the patient's recent audiogram and hearing loss in detail.  We also discussed that she is a fair candidate for hearing aids, if and when she the patient is motivated.  She was given handouts with information and pricing of hearing aids, and will contact audiology when ready to proceed.  We also discussed the use hearing protection when exposed to loud noise, including lawn equipment.     Tinnitus:  We also discussed that tinnitus is most often caused by a hearing loss, and that as the hair cells are damaged, either genetic or as a result of loud noise exposure, they then cause tinnitus.  Some patients find that restricting the salt or caffeine in their diet helps, and there is also an OTC supplement, lipoflavinoids, that some people find to be effective though their benefit is not fully proven.  Tinnitus tends to be louder in times of stress and fatigue, and may decrease with time.  Sound machines may also be an effective masking technique if needed at night.    Dysphagia:  Previous CVA and has had esophagus dilated many years ago.  Recommend swallow study and esophagram for further evaluation.  Will schedule RTC with MD for scope exam but likely needs to get back in with GI.  This was discussed with patient and her daughter today.         No follow-ups on file.

## 2023-08-15 ENCOUNTER — TELEPHONE (OUTPATIENT)
Dept: PREADMISSION TESTING | Facility: HOSPITAL | Age: 58
End: 2023-08-15
Payer: MEDICARE

## 2023-08-15 NOTE — TELEPHONE ENCOUNTER
----- Message from Joann Bell sent at 8/14/2023  4:42 PM CDT -----  Name of Who is Calling:Patient           What is the request in detail:  Patient is requesting to reschedule colonoscopy         Can the clinic reply by [facility]:no           What Number to Call Back if not in [facility]: [phone]

## 2023-08-23 ENCOUNTER — OFFICE VISIT (OUTPATIENT)
Dept: PSYCHIATRY | Facility: CLINIC | Age: 58
End: 2023-08-23
Payer: MEDICARE

## 2023-08-23 DIAGNOSIS — F31.9 BIPOLAR 1 DISORDER: Primary | ICD-10-CM

## 2023-08-23 DIAGNOSIS — F41.9 ANXIETY: ICD-10-CM

## 2023-08-23 DIAGNOSIS — G47.00 INSOMNIA, UNSPECIFIED TYPE: ICD-10-CM

## 2023-08-23 PROCEDURE — 3046F HEMOGLOBIN A1C LEVEL >9.0%: CPT | Mod: CPTII,95,, | Performed by: PSYCHIATRY & NEUROLOGY

## 2023-08-23 PROCEDURE — 99214 PR OFFICE/OUTPT VISIT, EST, LEVL IV, 30-39 MIN: ICD-10-PCS | Mod: 95,,, | Performed by: PSYCHIATRY & NEUROLOGY

## 2023-08-23 PROCEDURE — 99214 OFFICE O/P EST MOD 30 MIN: CPT | Mod: 95,,, | Performed by: PSYCHIATRY & NEUROLOGY

## 2023-08-23 PROCEDURE — 3072F LOW RISK FOR RETINOPATHY: CPT | Mod: CPTII,95,, | Performed by: PSYCHIATRY & NEUROLOGY

## 2023-08-23 PROCEDURE — 3072F PR LOW RISK FOR RETINOPATHY: ICD-10-PCS | Mod: CPTII,95,, | Performed by: PSYCHIATRY & NEUROLOGY

## 2023-08-23 PROCEDURE — 99499 UNLISTED E&M SERVICE: CPT | Mod: 95,,, | Performed by: PSYCHIATRY & NEUROLOGY

## 2023-08-23 PROCEDURE — 3046F PR MOST RECENT HEMOGLOBIN A1C LEVEL > 9.0%: ICD-10-PCS | Mod: CPTII,95,, | Performed by: PSYCHIATRY & NEUROLOGY

## 2023-08-23 RX ORDER — BENZTROPINE MESYLATE 0.5 MG/1
0.5 TABLET ORAL 2 TIMES DAILY
Qty: 60 TABLET | Refills: 2 | Status: SHIPPED | OUTPATIENT
Start: 2023-08-23 | End: 2023-11-08 | Stop reason: SDUPTHER

## 2023-08-23 RX ORDER — RISPERIDONE 1 MG/1
1 TABLET ORAL 2 TIMES DAILY
Qty: 60 TABLET | Refills: 2 | Status: SHIPPED | OUTPATIENT
Start: 2023-08-23 | End: 2023-11-08 | Stop reason: SDUPTHER

## 2023-08-23 RX ORDER — DIAZEPAM 10 MG/1
TABLET ORAL
Qty: 90 TABLET | Refills: 2 | Status: SHIPPED | OUTPATIENT
Start: 2023-08-23 | End: 2023-11-08 | Stop reason: SDUPTHER

## 2023-08-23 RX ORDER — VILAZODONE HYDROCHLORIDE 20 MG/1
TABLET ORAL
Qty: 30 TABLET | Refills: 2 | Status: SHIPPED | OUTPATIENT
Start: 2023-08-23 | End: 2023-10-11

## 2023-08-23 RX ORDER — MIRTAZAPINE 15 MG/1
15 TABLET, FILM COATED ORAL NIGHTLY
Qty: 30 TABLET | Refills: 2 | Status: SHIPPED | OUTPATIENT
Start: 2023-08-23 | End: 2023-11-08 | Stop reason: SDUPTHER
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
## 2023-09-05 ENCOUNTER — TELEPHONE (OUTPATIENT)
Dept: PSYCHIATRY | Facility: CLINIC | Age: 58
End: 2023-09-05
Payer: MEDICARE

## 2023-09-05 NOTE — TELEPHONE ENCOUNTER
Called pt back in regards to scheduling sooner appt. Pt stated she is having trouble with her medication and needs to see Dr. Pryor. Advised she has a virtual appt already and will be added to wait list. Pt asked to add an in person appt also. Pt accepted appt for 11/8/23.    ----- Message from Soraya Clifford sent at 9/5/2023  7:43 AM CDT -----  Contact: Alexandra Ruffin is calling in regards to her getting an am no later than 1:00 pm appt.please call back at [phone]              Thanks  KYLE

## 2023-09-08 ENCOUNTER — PATIENT OUTREACH (OUTPATIENT)
Dept: ADMINISTRATIVE | Facility: HOSPITAL | Age: 58
End: 2023-09-08
Payer: MEDICARE

## 2023-10-09 ENCOUNTER — PATIENT OUTREACH (OUTPATIENT)
Dept: ADMINISTRATIVE | Facility: HOSPITAL | Age: 58
End: 2023-10-09
Payer: MEDICARE

## 2023-10-09 ENCOUNTER — LAB VISIT (OUTPATIENT)
Dept: LAB | Facility: HOSPITAL | Age: 58
End: 2023-10-09
Attending: NURSE PRACTITIONER
Payer: MEDICARE

## 2023-10-09 DIAGNOSIS — Z79.4 TYPE 2 DIABETES MELLITUS WITH COMPLICATION, WITH LONG-TERM CURRENT USE OF INSULIN: Chronic | ICD-10-CM

## 2023-10-09 DIAGNOSIS — E11.9 TYPE 2 DIABETES MELLITUS WITHOUT COMPLICATION: ICD-10-CM

## 2023-10-09 DIAGNOSIS — Z79.4 TYPE 2 DIABETES MELLITUS WITH COMPLICATION, WITH LONG-TERM CURRENT USE OF INSULIN: Primary | Chronic | ICD-10-CM

## 2023-10-09 DIAGNOSIS — E11.8 TYPE 2 DIABETES MELLITUS WITH COMPLICATION, WITH LONG-TERM CURRENT USE OF INSULIN: Primary | Chronic | ICD-10-CM

## 2023-10-09 DIAGNOSIS — E11.8 TYPE 2 DIABETES MELLITUS WITH COMPLICATION, WITH LONG-TERM CURRENT USE OF INSULIN: Chronic | ICD-10-CM

## 2023-10-09 LAB
ALBUMIN SERPL BCP-MCNC: 4 G/DL (ref 3.5–5.2)
ALP SERPL-CCNC: 91 U/L (ref 55–135)
ALT SERPL W/O P-5'-P-CCNC: 34 U/L (ref 10–44)
ANION GAP SERPL CALC-SCNC: 14 MMOL/L (ref 8–16)
AST SERPL-CCNC: 42 U/L (ref 10–40)
BILIRUB SERPL-MCNC: 0.3 MG/DL (ref 0.1–1)
BUN SERPL-MCNC: 14 MG/DL (ref 6–20)
CALCIUM SERPL-MCNC: 10.3 MG/DL (ref 8.7–10.5)
CHLORIDE SERPL-SCNC: 96 MMOL/L (ref 95–110)
CO2 SERPL-SCNC: 26 MMOL/L (ref 23–29)
CREAT SERPL-MCNC: 1.1 MG/DL (ref 0.5–1.4)
EST. GFR  (NO RACE VARIABLE): 57.5 ML/MIN/1.73 M^2
ESTIMATED AVG GLUCOSE: 298 MG/DL (ref 68–131)
GLUCOSE SERPL-MCNC: 333 MG/DL (ref 70–110)
HBA1C MFR BLD: 12 % (ref 4–5.6)
POTASSIUM SERPL-SCNC: 5.2 MMOL/L (ref 3.5–5.1)
PROT SERPL-MCNC: 8.1 G/DL (ref 6–8.4)
SODIUM SERPL-SCNC: 136 MMOL/L (ref 136–145)

## 2023-10-09 PROCEDURE — 36415 COLL VENOUS BLD VENIPUNCTURE: CPT | Mod: HCNC,PO | Performed by: NURSE PRACTITIONER

## 2023-10-09 PROCEDURE — 80053 COMPREHEN METABOLIC PANEL: CPT | Mod: HCNC | Performed by: NURSE PRACTITIONER

## 2023-10-09 PROCEDURE — 36415 COLL VENOUS BLD VENIPUNCTURE: CPT | Mod: HCNC,PO | Performed by: FAMILY MEDICINE

## 2023-10-09 PROCEDURE — 80061 LIPID PANEL: CPT | Mod: HCNC | Performed by: FAMILY MEDICINE

## 2023-10-09 PROCEDURE — 83036 HEMOGLOBIN GLYCOSYLATED A1C: CPT | Mod: HCNC | Performed by: NURSE PRACTITIONER

## 2023-10-09 NOTE — PROGRESS NOTES
DM LABS: LIPID/hA1C/MICRO NEEDED linked to lab scheduled 10/09/23.   [facility]  [address]  [id number]  [phone]  Dept: [phone]    February 26, 2021     Patient: Gaye Brower   YOB: 1986   Date of Visit: 2/26/2021       To Whom it May Concern:    Alfredo Mendoza is under my professional care  She was seen in the hospital from 2/26/2021   to 02/26/21  She may return to work today with light duty restriction until next scheduled appointment at [facility] on 3/15/21 for further assessment  If you have any questions or concerns, please don't hesitate to call           Sincerely,          Dawn Da Silva MD

## 2023-10-10 LAB
CHOLEST SERPL-MCNC: 191 MG/DL (ref 120–199)
CHOLEST/HDLC SERPL: 4.3 {RATIO} (ref 2–5)
HDLC SERPL-MCNC: 44 MG/DL (ref 40–75)
HDLC SERPL: 23 % (ref 20–50)
LDLC SERPL CALC-MCNC: 98.2 MG/DL (ref 63–159)
NONHDLC SERPL-MCNC: 147 MG/DL
TRIGL SERPL-MCNC: 244 MG/DL (ref 30–150)

## 2023-10-11 ENCOUNTER — OFFICE VISIT (OUTPATIENT)
Dept: DIABETES | Facility: CLINIC | Age: 58
End: 2023-10-11
Payer: MEDICARE

## 2023-10-11 DIAGNOSIS — E11.42 DIABETIC POLYNEUROPATHY ASSOCIATED WITH TYPE 2 DIABETES MELLITUS: Primary | ICD-10-CM

## 2023-10-11 PROCEDURE — 3072F PR LOW RISK FOR RETINOPATHY: ICD-10-PCS | Mod: HCNC,CPTII,95, | Performed by: NURSE PRACTITIONER

## 2023-10-11 PROCEDURE — 3046F HEMOGLOBIN A1C LEVEL >9.0%: CPT | Mod: HCNC,CPTII,95, | Performed by: NURSE PRACTITIONER

## 2023-10-11 PROCEDURE — 1160F PR REVIEW ALL MEDS BY PRESCRIBER/CLIN PHARMACIST DOCUMENTED: ICD-10-PCS | Mod: HCNC,CPTII,95, | Performed by: NURSE PRACTITIONER

## 2023-10-11 PROCEDURE — 99213 OFFICE O/P EST LOW 20 MIN: CPT | Mod: HCNC,95,, | Performed by: NURSE PRACTITIONER

## 2023-10-11 PROCEDURE — 1159F PR MEDICATION LIST DOCUMENTED IN MEDICAL RECORD: ICD-10-PCS | Mod: HCNC,CPTII,95, | Performed by: NURSE PRACTITIONER

## 2023-10-11 PROCEDURE — 3046F PR MOST RECENT HEMOGLOBIN A1C LEVEL > 9.0%: ICD-10-PCS | Mod: HCNC,CPTII,95, | Performed by: NURSE PRACTITIONER

## 2023-10-11 PROCEDURE — 1160F RVW MEDS BY RX/DR IN RCRD: CPT | Mod: HCNC,CPTII,95, | Performed by: NURSE PRACTITIONER

## 2023-10-11 PROCEDURE — 99213 PR OFFICE/OUTPT VISIT, EST, LEVL III, 20-29 MIN: ICD-10-PCS | Mod: HCNC,95,, | Performed by: NURSE PRACTITIONER

## 2023-10-11 PROCEDURE — 3072F LOW RISK FOR RETINOPATHY: CPT | Mod: HCNC,CPTII,95, | Performed by: NURSE PRACTITIONER

## 2023-10-11 PROCEDURE — 1159F MED LIST DOCD IN RCRD: CPT | Mod: HCNC,CPTII,95, | Performed by: NURSE PRACTITIONER

## 2023-10-11 NOTE — PROGRESS NOTES
PCP: Perez Casiano MD    Subjective:     Chief Complaint: Diabetes Follow Up    HISTORY OF PRESENT ILLNESS: 58 y.o.  female presenting virtually for diabetes follow up. Patient has had Type II diabetes since 1985 and has the following complications: peripheral neuropathy, PVD, and CAD. Other pertinent conditions include: bipolar disorder and depression ( follows psych ) and hypoglycemia-induced seizures. She has attended diabetes education in the past.     Past tried and failed medications:  No longer taking Jardiance due to cost and history of recurrent UTI w/ sepsis. On today, patient voices that Toujeo is causing her to gain weight and she wishes to D/C when she runs out of supply. Since her last virtual visit, patient denies hospital admissions or emergency room visits.    Blood glucose monitoring is performed.  Per patient, random BG s range between 150 s - 300 s. Denies hypoglycemia.  She was unable to afford Dexcom CGM.     Labs Reviewed.       Lab Results   Component Value Date    CPEPTIDE 3.4 01/25/2017     Lab Results   Component Value Date    GLUTAMICACID 0.01 01/25/2017     Lab Results   Component Value Date    HUMANINSULIN 0.00 01/25/2017     DM MEDICATIONS:   Toujeo 76 units daily  Novolin R, takes 5-10 units, as needed  Metformin 500 mg BID ac    Review of Systems   Constitutional:  Negative for appetite change, fatigue and unexpected weight change.   Eyes:  Negative for visual disturbance.   Gastrointestinal:  Negative for abdominal pain, constipation, diarrhea and nausea.   Endocrine: Negative for polydipsia, polyphagia and polyuria.   Neurological:  Positive for numbness (bilateral feet).       Diabetes Management Status  Statin: Not taking  ACE/ARB: Not taking    Screening or Prevention Patient's value Goal Complete/Controlled?   HgA1C Testing and Control   Lab Results   Component Value Date    HGBA1C 12.0 (H) 10/09/2023    HGBA1C 12.7 (H) 05/24/2023    HGBA1C 9.1 (H) 11/02/2022       Annually/Less than 8% No     Lipid profile : 10/09/2023 Annually Yes     LDL control Lab Results   Component Value Date    LDLCALC 98.2 10/09/2023    Annually/Less than 100 mg/dl  Yes     Nephropathy screening Lab Results   Component Value Date    MICALBCREAT 39.1 (H) 09/01/2022     Lab Results   Component Value Date    PROTEINUA Negative 07/14/2023    Annually Yes     Blood pressure BP Readings from Last 1 Encounters:   10/13/23 (!) 167/72    Less than 140/90 Yes     Dilated retinal exam : 11/29/2022 Annually Yes    Foot exam   : 10/11/2023 Annually Yes       ACTIVITY LEVEL: Sedentary. Discussed activities, benefits, methods, and precautions.  MEAL PLANNING: Patient reports number of meals per day to be 2 and number of snacks per day to be 0.   Patient is encouraged to carb count and consume no more than 45 - 60 grams of carbohydrates in each meal, and 1800 k / gloria per day.      BLOOD GLUCOSE TESTING:  True Metrix  SOCIAL HISTORY:  .  Smokes cigarettes    Objective:      Physical Exam  Constitutional:       Appearance: She is well-developed.   HENT:      Head: Normocephalic and atraumatic.   Eyes:      Pupils: Pupils are equal, round, and reactive to light.   Pulmonary:      Effort: Pulmonary effort is normal.   Psychiatric:         Behavior: Behavior normal.         Thought Content: Thought content normal.         Judgment: Judgment normal.         Assessment / Plan:     1.) Diabetic polyneuropathy associated with type 2 diabetes mellitus  Comments:  -   Uncontrolled, A1C 12 %.  Patient does not feel that Toujeo is helping to control blood sugars, and she also reports gaining weight.  She wishes to restart mixed insulin. D/C Toujeo. Start Novolin 70/30, 35 untis BID.  D/C Novolin R.  Continue metformin 500 mg, 1 tablet BID ac.       -   Discussed the possibility of trying GLP-1 therapy, but will check with pharmacy regarding cost of medication.     Additional Plan Details:    1.) Continue monitoring blood  sugar 3x daily, fasting and ac dinner or at bedtime. Discussed ADA goal for fasting blood sugar, 80 - 130 mg/dL; pp blood sugars below 180 mg/dl. Also, discussed prevention of hypoglycemia and the need to adjust goals to higher levels if persistent hypoglycemia.  Reminded to bring BG records or meter to each visit for review.  2.) Return to clinic in 3 months for follow up. The patient was explained the above plan and given opportunity to ask questions.  She understands, chooses and consents to this plan and accepts all the risks, which include but are not limited to the risks mentioned above. She understands the alternative of having no testing, interventions or treatments at this time. She left content and without further questions.     Amelia Santana, NP-C, [facility]    The patient location is: [place]  The chief complaint leading to consultation is: Type 2 Diabetes    Visit type: audiovisual    Face to Face time with patient: 10 minutes  15  minutes of total time spent on the encounter, which includes face to face time and non-face to face time preparing to see the patient (eg, review of tests), Obtaining and/or reviewing separately obtained history, Documenting clinical information in the electronic or other health record, Independently interpreting results (not separately reported) and communicating results to the patient/family/caregiver, or Care coordination (not separately reported).     Each patient to whom he or she provides medical services by telemedicine is:  (1) informed of the relationship between the physician and patient and the respective role of any other health care provider with respect to management of the patient; and (2) notified that he or she may decline to receive medical services by telemedicine and may withdraw from such care at any time.

## 2023-10-13 ENCOUNTER — ANESTHESIA (OUTPATIENT)
Dept: ENDOSCOPY | Facility: HOSPITAL | Age: 58
End: 2023-10-13
Payer: MEDICARE

## 2023-10-13 ENCOUNTER — ANESTHESIA EVENT (OUTPATIENT)
Dept: ENDOSCOPY | Facility: HOSPITAL | Age: 58
End: 2023-10-13
Payer: MEDICARE

## 2023-10-13 ENCOUNTER — HOSPITAL ENCOUNTER (OUTPATIENT)
Facility: HOSPITAL | Age: 58
Discharge: HOME | End: 2023-10-13
Attending: INTERNAL MEDICINE | Admitting: INTERNAL MEDICINE
Payer: MEDICARE

## 2023-10-13 DIAGNOSIS — Z86.010 PERSONAL HISTORY OF COLONIC POLYPS: Primary | ICD-10-CM

## 2023-10-13 PROBLEM — Z86.0100 PERSONAL HISTORY OF COLONIC POLYPS: Status: ACTIVE | Noted: 2023-10-13

## 2023-10-13 LAB — POCT GLUCOSE: 252 MG/DL (ref 70–110)

## 2023-10-13 PROCEDURE — 37000009 HC ANESTHESIA EA ADD 15 MINS: Mod: HCNC | Performed by: INTERNAL MEDICINE

## 2023-10-13 PROCEDURE — G0105 COLORECTAL SCRN; HI RISK IND: HCPCS | Mod: 53,HCNC,, | Performed by: INTERNAL MEDICINE

## 2023-10-13 PROCEDURE — 25000003 PHARM REV CODE 250: Mod: HCNC | Performed by: NURSE ANESTHETIST, CERTIFIED REGISTERED

## 2023-10-13 PROCEDURE — G0105 COLORECTAL SCRN; HI RISK IND: HCPCS | Mod: 74,HCNC | Performed by: INTERNAL MEDICINE

## 2023-10-13 PROCEDURE — G0105 COLORECTAL SCRN; HI RISK IND: ICD-10-PCS | Mod: 53,HCNC,, | Performed by: INTERNAL MEDICINE

## 2023-10-13 PROCEDURE — 63600175 PHARM REV CODE 636 W HCPCS: Mod: HCNC | Performed by: NURSE ANESTHETIST, CERTIFIED REGISTERED

## 2023-10-13 PROCEDURE — 37000008 HC ANESTHESIA 1ST 15 MINUTES: Mod: HCNC | Performed by: INTERNAL MEDICINE

## 2023-10-13 RX ORDER — LIDOCAINE HYDROCHLORIDE 10 MG/ML
INJECTION, SOLUTION EPIDURAL; INFILTRATION; INTRACAUDAL; PERINEURAL
Status: DISCONTINUED | OUTPATIENT
Start: 2023-10-13 | End: 2023-10-13

## 2023-10-13 RX ORDER — SODIUM, POTASSIUM,MAG SULFATES 17.5-3.13G
1 SOLUTION, RECONSTITUTED, ORAL ORAL DAILY
Qty: 1 KIT | Refills: 0 | Status: SHIPPED | OUTPATIENT
Start: 2023-10-13 | End: 2023-10-15

## 2023-10-13 RX ORDER — PROPOFOL 10 MG/ML
VIAL (ML) INTRAVENOUS
Status: DISCONTINUED | OUTPATIENT
Start: 2023-10-13 | End: 2023-10-13

## 2023-10-13 RX ADMIN — SODIUM CHLORIDE, SODIUM LACTATE, POTASSIUM CHLORIDE, AND CALCIUM CHLORIDE: .6; .31; .03; .02 INJECTION, SOLUTION INTRAVENOUS at 06:10

## 2023-10-13 RX ADMIN — PROPOFOL 90 MG: 10 INJECTION, EMULSION INTRAVENOUS at 07:10

## 2023-10-13 RX ADMIN — LIDOCAINE HYDROCHLORIDE 50 MG: 10 SOLUTION INTRAVENOUS at 07:10

## 2023-10-13 NOTE — ANESTHESIA PREPROCEDURE EVALUATION
10/13/2023  Aleaxndra Goins is a 58 y.o., female.      Pre-op Assessment    I have reviewed the Patient Summary Reports.     I have reviewed the Nursing Notes. I have reviewed the NPO Status.   I have reviewed the Medications.     Review of Systems  Anesthesia Hx:  No problems with previous Anesthesia    Social:  Smoker    Hematology/Oncology:  Hematology Normal   Oncology Normal     EENT/Dental:EENT/Dental Normal   Cardiovascular:   Hypertension, well controlled CAD asymptomatic CABG/stent  Angina, with exertion hyperlipidemia AYALA    Pulmonary:   Pneumonia COPD, moderate Asthma moderate    Renal/:   Chronic Renal Disease, CKD    Hepatic/GI:   Bowel Prep. GERD, poorly controlled    Musculoskeletal:  Musculoskeletal Normal    Neurological:   CVA, no residual symptoms Neuromuscular Disease,    Endocrine:   Diabetes, well controlled, type 2 Hypothyroidism  Morbid Obesity / BMI > 40  Dermatological:  Skin Normal    Psych:   Psychiatric History anxiety depression          Physical Exam  General: Well nourished    Airway:  Mallampati: II   Mouth Opening: Normal  TM Distance: Normal  Tongue: Normal  Neck ROM: Normal ROM    Dental:  Intact    Chest/Lungs:  Clear to auscultation    Heart:  Rate: Normal        Anesthesia Plan  Type of Anesthesia, risks & benefits discussed:    Anesthesia Type: MAC  Intra-op Monitoring Plan: Standard ASA Monitors  Induction:  IV  Informed Consent: Informed consent signed with the Patient and all parties understand the risks and agree with anesthesia plan.  All questions answered. Patient consented to blood products? Yes  ASA Score: 3    Ready For Surgery From Anesthesia Perspective.     .

## 2023-10-13 NOTE — [employer] PATIENT INSTRUCTIONS
Discharge Summary/Instructions after an Endoscopic Procedure  Patient Name: Alexandra Goins  Patient MRN: [id number]  Patient YOB: 1965 Friday, October 13, 2023 Thelma Chairez MD  Dear patient,  As a result of recent federal legislation (The Federal Cures Act), you may   receive lab or pathology results from your procedure in your [facility]   account before your physician is able to contact you. Your physician or   their representative will relay the results to you with their   recommendations at their soonest availability.  Thank you,  RESTRICTIONS:  During your procedure today, you received medications for sedation.  These   medications may affect your judgment, balance and coordination.  Therefore,   for 24 hours, you have the following restrictions:   - DO NOT drive a car, operate machinery, make legal/financial decisions,   sign important papers or drink alcohol.    ACTIVITY:  Today: no heavy lifting, straining or running due to procedural   sedation/anesthesia.  The following day: return to full activity including work.  DIET:  Eat and drink normally unless instructed otherwise.     TREATMENT FOR COMMON SIDE EFFECTS:  - Mild abdominal pain, nausea, belching, bloating or excessive gas:  rest,   eat lightly and use a heating pad.  - Sore Throat: treat with throat lozenges and/or gargle with warm salt   water.  - Because air was used during the procedure, expelling large amounts of air   from your rectum or belching is normal.  - If a bowel prep was taken, you may not have a bowel movement for 1-3 days.    This is normal.  SYMPTOMS TO WATCH FOR AND REPORT TO YOUR PHYSICIAN:  1. Abdominal pain or bloating, other than gas cramps.  2. Chest pain.  3. Back pain.  4. Signs of infection such as: chills or fever occurring within 24 hours   after the procedure.  5. Rectal bleeding, which would show as bright red, maroon, or black stools.   (A tablespoon of blood from the rectum is not serious, especially  if   hemorrhoids are present.)  6. Vomiting.  7. Weakness or dizziness.  GO DIRECTLY TO THE NEAREST EMERGENCY ROOM IF YOU HAVE ANY OF THE FOLLOWING:      Difficulty breathing              Chills and/or fever over 101 F   Persistent vomiting and/or vomiting blood   Severe abdominal pain   Severe chest pain   Black, tarry stools   Bleeding- more than one tablespoon   Any other symptom or condition that you feel may need urgent attention  Your doctor recommends these additional instructions:  If any biopsies were taken, your doctors clinic will contact you in 1 to 2   weeks with any results.  - Discharge patient to home (via wheelchair).   - Resume previous diet.   - Continue present medications.   - Repeat colonoscopy with an extended prep at next available appointment   (within 3 months) because the bowel preparation was suboptimal.   - Patient has a contact number available for emergencies.  The signs and   symptoms of potential delayed complications were discussed with the   patient.  Return to normal activities tomorrow.  Written discharge   instructions were provided to the patient.  For questions, problems or results please call your physician Thelma Chairez MD at Work:  [phone]  If you have any questions about the above instructions, call the GI   department at [phone] or call the endoscopy unit at [phone]   from 7am until 3 pm.  [facility] [place], EMERGENCY ROOM PHONE NUMBER:   [phone]  IF A COMPLICATION OR EMERGENCY SITUATION ARISES AND YOU ARE UNABLE TO REACH   YOUR PHYSICIAN - GO DIRECTLY TO THE EMERGENCY ROOM.  I have read or have had read to me these discharge instructions for my   procedure and have received a written copy.  I understand these   instructions and will follow-up with my physician if I have any questions.     __________________________________       _____________________________________  Nurse Signature                                           Patient/Designated   Responsible Party Signature  MD Thelma Eisenberg MD  10/13/2023 7:44:16 AM  [employer]

## 2023-10-13 NOTE — TRANSFER OF CARE
"Anesthesia Transfer of Care Note    Patient: Alexandra Goins    Procedure(s) Performed: Procedure(s) (LRB):  COLONOSCOPY (N/A)    Patient location: PACU    Anesthesia Type: MAC    Transport from OR: Transported from OR on room air with adequate spontaneous ventilation    Post pain: adequate analgesia    Post assessment: no apparent anesthetic complications    Post vital signs: stable    Level of consciousness: awake    Nausea/Vomiting: no nausea/vomiting    Complications: none    Transfer of care protocol was followed      Last vitals:   Visit Vitals  BP (!) 146/92 (BP Location: Left arm, Patient Position: Lying)   Pulse 96   Temp 36.6 °C (97.9 °F) (Temporal)   Resp 18   Ht 5' 4" (1.626 m)   Wt 87.1 kg (192 lb)   SpO2 (!) 90%   Breastfeeding No   BMI 32.96 kg/m²     "

## 2023-10-13 NOTE — H&P
Short Stay Endoscopy History and Physical    PCP - Perez Casiano MD    Procedure - Colonoscopy  ASA - 2  Mallampati - per anesthesia  History of Anesthesia problems - no  Family history Anesthesia problems -  no     HPI:  This is a 58 y.o. female here for evaluation of :   Active Hospital Problems    Diagnosis  POA    *Personal history of colonic polyps [Z86.010]  Not Applicable      Resolved Hospital Problems   No resolved problems to display.         Health Maintenance         Date Due Completion Date    Shingles Vaccine (1 of 2) Never done ---    LDCT Lung Screen 06/04/2016 6/4/2015    Colorectal Cancer Screening 01/12/2023 1/12/2018    Influenza Vaccine (1) 09/01/2023 11/2/2022    Mammogram 09/01/2023 9/1/2022    Diabetes Urine Screening 09/01/2023 9/1/2022    Eye Exam 11/29/2023 11/29/2022    Override on 3/14/2018: Done (Per Dr Chay [facility])    Override on 1/30/2017: Done (Negative Retinopathy- see scanned report)    COVID-19 Vaccine (1) 07/28/2024 (Originally 3/22/1966) ---    Hemoglobin A1c 01/09/2024 10/9/2023    Override on 2/5/2016: Done (Per Care Everywhere)    Lipid Panel 10/09/2024 10/9/2023    Override on 2/5/2016: Done (per Care Everywhere)    Foot Exam 10/11/2024 10/11/2023    Override on 10/20/2021: Done    Override on 1/25/2017: Done    TETANUS VACCINE 06/20/2026 6/20/2016    Pneumococcal Vaccines (Age 0-64) (3 - PPSV23 or PCV20) 09/22/2028 11/20/2017              ROS:  CONSTITUTIONAL: Denies weight change,  fatigue, fevers, chills, night sweats.  CARDIOVASCULAR: Denies chest pain, shortness of breath, orthopnea and edema.  RESPIRATORY: Denies cough, hemoptysis, dyspnea, and wheezing.  GI: See HPI.    Medical History:   Past Medical History:   Diagnosis Date    Acute ischemic stroke 9/17/2019    Acute respiratory failure with hypoxia 2/11/2021    Aneurysm     Anxiety     Arthritis     Asthma     Bipolar 1 disorder     Cerebral aneurysm, nonruptured 9/19/2019    Chronic  diastolic heart failure 5/23/2023    Coronary artery disease of native artery of native heart with stable angina pectoris 1/19/2022    Depression     Diabetes mellitus, type 2     Diverticular disease     GERD (gastroesophageal reflux disease)     Hyperlipemia     Hypertension     Hyponatremia 7/24/2022    Hypothyroidism     Pneumonia     PVD (peripheral vascular disease) 4/28/2021    Renal manifestation of secondary diabetes mellitus     Right-sided back pain 6/29/2019    Severe obesity (BMI 35.0-39.9) with comorbidity 5/31/2022    Stroke     Trouble in sleeping        Surgical History:   Past Surgical History:   Procedure Laterality Date    CEREBRAL ANGIOGRAM N/A 10/28/2019    Procedure: ANGIOGRAM-CEREBRAL;  Surgeon: Dahiana Diagnostic Provider;  Location: [facility] OR [address] [place] [address];  Service: Radiology;  Laterality: N/A;  Rm 190/Aayush    CHOLECYSTECTOMY      COLON SURGERY      COLONOSCOPY N/A 1/12/2018    Procedure: COLONOSCOPY;  Surgeon: Roque Mahajan III, MD;  Location: [facility];  Service: Endoscopy;  Laterality: N/A;    DENTAL SURGERY  05/21/2018    removal of 8 top teeth    HYSTERECTOMY      TONSILLECTOMY         Family History:   Family History   Problem Relation Age of Onset    Alcohol abuse Mother     COPD Mother     Depression Mother     Drug abuse Mother     Alcohol abuse Father     Arthritis Father     Cancer Father     Heart disease Father     Hypertension Father     COPD Sister     Kidney failure Sister     Heart disease Brother     Hypertension Brother     Cancer Maternal Aunt     Cancer Maternal Uncle     Diabetes Maternal Uncle     Drug abuse Maternal Uncle     Cancer Paternal Aunt     Cancer Paternal Uncle     Arthritis Maternal Grandmother     Hypertension Maternal Grandmother     Breast cancer Maternal Grandmother     Arthritis Paternal Grandmother     Cancer Paternal Grandmother     Hypertension Paternal Grandfather        Social History:   Social History     Tobacco Use    Smoking status: Every Day      Current packs/day: 1.00     Average packs/day: 1 pack/day for 45.8 years (45.8 ttl pk-yrs)     Types: Cigarettes, Vaping w/o nicotine     Start date: 1978    Smokeless tobacco: Never    Tobacco comments:     On average, smokes 10 cigarettes per day.    Substance Use Topics    Alcohol use: Not Currently    Drug use: Yes     Types: Marijuana       Allergies:   Review of patient's allergies indicates:   Allergen Reactions    Seroquel [quetiapine] Other (See Comments)     TC SEIZURES    Adhesive Blisters     Pt states can't tolerate paper tape    Levomenol analogues     Lipitor [atorvastatin]      Leg Cramps    Repatha pushtronex [evolocumab]      Feels sick    Zofran [ondansetron hcl] Hives and Other (See Comments)     headaches    Celestone [betamethasone] Hives, Itching and Rash    Levaquin [levofloxacin] Nausea Only    Piroxicam Diarrhea and Nausea Only       Medications:   No current facility-administered medications on file prior to encounter.     Current Outpatient Medications on File Prior to Encounter   Medication Sig Dispense Refill    albuterol (PROVENTIL/VENTOLIN HFA) 90 mcg/actuation inhaler INHALE 2 TO 4 PUFFS BY MOUTH THREE TIMES DAILY AS NEEDED FOR WHEEZING 18 g 3    butalbital-acetaminophen-caffeine -40 mg (FIORICET, ESGIC) -40 mg per tablet Take 1 tablet by mouth every 6 (six) hours as needed for Headaches. for pain. 30 tablet 0    furosemide (LASIX) 40 MG tablet TAKE 1 TABLET BY MOUTH EVERY MONDAY, WEDNESDAY, AND FRIDAY AS NEEDED 45 tablet 2    gabapentin (NEURONTIN) 600 MG tablet TAKE 1 CAPSULE BY MOUTH IN THE MORNING, 1 CAPSULE IN THE AFTERNOON, AND THEN 2 CAPSULES AT BEDTIME 360 tablet 1    insulin regular, human (NOVOLIN R INJ)       isosorbide mononitrate (IMDUR) 30 MG 24 hr tablet Take 1 tablet by mouth once daily 90 tablet 2    albuterol-ipratropium (DUO-NEB) 2.5 mg-0.5 mg/3 mL nebulizer solution Take 3 mLs by nebulization every 6 (six) hours as needed for Wheezing. Rescue 1 Box 0  "   blood sugar diagnostic (TRUE METRIX GLUCOSE TEST STRIP) Strp USE 1 STRIP TO CHECK GLUCOSE THREE TIMES DAILY 100 strip 3    blood-glucose meter kit Use as instructed - preferred on insurance 1 each 0    fluticasone-salmeterol diskus inhaler 250-50 mcg Inhale 1 puff into the lungs 2 (two) times daily. Controller for maintenance 60 each 0    lancets Misc 1 each by Misc.(Non-Drug; Combo Route) route 3 (three) times daily. Dispense preferred on insurance 100 each 11    metoprolol succinate (TOPROL-XL) 25 MG 24 hr tablet Take 1 tablet by mouth once daily 90 tablet 3    pen needle, diabetic 32 gauge x 5/32" Ndle Inject 1 each into the skin once daily. 100 each 3       Physical Exam:  Vital Signs:   Vitals:    10/13/23 0639   BP: (!) 146/92   Pulse: 96   Resp: 18   Temp: 97.9 °F (36.6 °C)     General Appearance: Well appearing in no acute distress  ENT: OP clear  Chest: CTA B  CV: RRR, no m/r/g  Abd: s/nt/nd/nabs  Ext: no edema    Labs:Reviewed    IMP:  Active Hospital Problems    Diagnosis  POA    *Personal history of colonic polyps [Z86.010]  Not Applicable      Resolved Hospital Problems   No resolved problems to display.         Plan:   I have explained the risks and benefits of colonoscopy to the patient including but not limited to bleeding, perforation, infection, and death. The patient wishes to proceed.    "

## 2023-10-13 NOTE — ANESTHESIA POSTPROCEDURE EVALUATION
Anesthesia Post Evaluation    Patient: Alexandra Goins    Procedure(s) Performed: Procedure(s) (LRB):  COLONOSCOPY (N/A)    Final Anesthesia Type: MAC      Patient location during evaluation: PACU  Patient participation: Yes- Able to Participate  Level of consciousness: awake and alert  Post-procedure vital signs: reviewed and stable  Pain management: adequate  Airway patency: patent    PONV status at discharge: No PONV  Anesthetic complications: no      Cardiovascular status: blood pressure returned to baseline  Respiratory status: unassisted  Hydration status: euvolemic  Follow-up not needed.          Vitals Value Taken Time   /66 10/13/23 0746   Temp 98 10/13/23 0746   Pulse 66 10/13/23 0746   Resp 12 10/13/23 0746   SpO2 98 10/13/23 0746         No case tracking events are documented in the log.      Pain/Chan Score: No data recorded

## 2023-10-13 NOTE — DISCHARGE SUMMARY
[facility] - Endoscopy (Hospital)  Discharge Note  Short Stay    Procedure(s) (LRB):  COLONOSCOPY (N/A)      OUTCOME: Patient tolerated treatment/procedure well without complication and is now ready for discharge.    DISPOSITION: Home or Self Care    FINAL DIAGNOSIS:  Personal history of colonic polyps    FOLLOWUP: With primary care provider    DISCHARGE INSTRUCTIONS:  No discharge procedures on file.

## 2023-10-13 NOTE — ANESTHESIA RELEASE NOTE
"Anesthesia Release from PACU Note    Patient: Alexandra Goins    Procedure(s) Performed: Procedure(s) (LRB):  COLONOSCOPY (N/A)    Anesthesia type: MAC    Post pain: Adequate analgesia    Post assessment: no apparent anesthetic complications    Last Vitals:   Visit Vitals  BP (!) 146/92 (BP Location: Left arm, Patient Position: Lying)   Pulse 96   Temp 36.6 °C (97.9 °F) (Temporal)   Resp 18   Ht 5' 4" (1.626 m)   Wt 87.1 kg (192 lb)   SpO2 (!) 90%   Breastfeeding No   BMI 32.96 kg/m²       Post vital signs: stable    Level of consciousness: awake    Nausea/Vomiting: no nausea/no vomiting    Complications: none    Airway Patency: patent    Respiratory: unassisted    Cardiovascular: stable and blood pressure at baseline    Hydration: euvolemic  "

## 2023-10-16 VITALS
RESPIRATION RATE: 18 BRPM | HEIGHT: 64 IN | SYSTOLIC BLOOD PRESSURE: 167 MMHG | DIASTOLIC BLOOD PRESSURE: 72 MMHG | HEART RATE: 75 BPM | WEIGHT: 192 LBS | OXYGEN SATURATION: 93 % | BODY MASS INDEX: 32.78 KG/M2 | TEMPERATURE: 98 F

## 2023-10-26 RX ORDER — METFORMIN HYDROCHLORIDE 500 MG/1
500 TABLET, EXTENDED RELEASE ORAL 2 TIMES DAILY WITH MEALS
Qty: 180 TABLET | Refills: 1 | Status: SHIPPED | OUTPATIENT
Start: 2023-10-26 | End: 2024-01-29 | Stop reason: SDUPTHER

## 2023-11-08 ENCOUNTER — PATIENT MESSAGE (OUTPATIENT)
Dept: PSYCHIATRY | Facility: CLINIC | Age: 58
End: 2023-11-08

## 2023-11-08 ENCOUNTER — OFFICE VISIT (OUTPATIENT)
Dept: PSYCHIATRY | Facility: CLINIC | Age: 58
End: 2023-11-08
Payer: MEDICARE

## 2023-11-08 DIAGNOSIS — F31.9 BIPOLAR 1 DISORDER: Primary | ICD-10-CM

## 2023-11-08 DIAGNOSIS — F41.9 ANXIETY: ICD-10-CM

## 2023-11-08 DIAGNOSIS — G47.00 INSOMNIA, UNSPECIFIED TYPE: ICD-10-CM

## 2023-11-08 PROCEDURE — 3072F LOW RISK FOR RETINOPATHY: CPT | Mod: HCNC,CPTII,95, | Performed by: PSYCHIATRY & NEUROLOGY

## 2023-11-08 PROCEDURE — 3072F PR LOW RISK FOR RETINOPATHY: ICD-10-PCS | Mod: HCNC,CPTII,95, | Performed by: PSYCHIATRY & NEUROLOGY

## 2023-11-08 PROCEDURE — 3046F PR MOST RECENT HEMOGLOBIN A1C LEVEL > 9.0%: ICD-10-PCS | Mod: HCNC,CPTII,95, | Performed by: PSYCHIATRY & NEUROLOGY

## 2023-11-08 PROCEDURE — 99214 PR OFFICE/OUTPT VISIT, EST, LEVL IV, 30-39 MIN: ICD-10-PCS | Mod: HCNC,95,, | Performed by: PSYCHIATRY & NEUROLOGY

## 2023-11-08 PROCEDURE — 3046F HEMOGLOBIN A1C LEVEL >9.0%: CPT | Mod: HCNC,CPTII,95, | Performed by: PSYCHIATRY & NEUROLOGY

## 2023-11-08 PROCEDURE — 99214 OFFICE O/P EST MOD 30 MIN: CPT | Mod: HCNC,95,, | Performed by: PSYCHIATRY & NEUROLOGY

## 2023-11-08 RX ORDER — RISPERIDONE 1 MG/1
1 TABLET ORAL 2 TIMES DAILY
Qty: 60 TABLET | Refills: 2 | Status: SHIPPED | OUTPATIENT
Start: 2023-11-08 | End: 2024-02-26 | Stop reason: SDUPTHER

## 2023-11-08 RX ORDER — MIRTAZAPINE 15 MG/1
15 TABLET, FILM COATED ORAL NIGHTLY
Qty: 30 TABLET | Refills: 2 | Status: SHIPPED | OUTPATIENT
Start: 2023-11-08 | End: 2024-02-26 | Stop reason: SDUPTHER

## 2023-11-08 RX ORDER — BENZTROPINE MESYLATE 0.5 MG/1
0.5 TABLET ORAL 2 TIMES DAILY
Qty: 60 TABLET | Refills: 2 | Status: SHIPPED | OUTPATIENT
Start: 2023-11-08 | End: 2024-02-26 | Stop reason: SDUPTHER

## 2023-11-08 RX ORDER — DIAZEPAM 10 MG/1
TABLET ORAL
Qty: 90 TABLET | Refills: 2 | Status: SHIPPED | OUTPATIENT
Start: 2023-11-08

## 2023-11-08 RX ORDER — DESVENLAFAXINE SUCCINATE 50 MG/1
50 TABLET, EXTENDED RELEASE ORAL DAILY
Qty: 30 TABLET | Refills: 2 | Status: SHIPPED | OUTPATIENT
Start: 2023-11-08 | End: 2024-02-27
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
## 2023-11-10 ENCOUNTER — HOSPITAL ENCOUNTER (INPATIENT)
Facility: HOSPITAL | Age: 58
LOS: 2 days | Discharge: HOME | DRG: 638 | End: 2023-11-13
Attending: EMERGENCY MEDICINE | Admitting: INTERNAL MEDICINE
Payer: MEDICARE

## 2023-11-10 DIAGNOSIS — N17.9 AKI (ACUTE KIDNEY INJURY): ICD-10-CM

## 2023-11-10 DIAGNOSIS — R71.8 ELEVATED HEMATOCRIT: ICD-10-CM

## 2023-11-10 DIAGNOSIS — D58.2 ELEVATED HEMOGLOBIN: ICD-10-CM

## 2023-11-10 DIAGNOSIS — R11.2 NAUSEA VOMITING AND DIARRHEA: ICD-10-CM

## 2023-11-10 DIAGNOSIS — E87.5 HYPERKALEMIA: ICD-10-CM

## 2023-11-10 DIAGNOSIS — E11.10 DKA (DIABETIC KETOACIDOSIS): ICD-10-CM

## 2023-11-10 DIAGNOSIS — E87.29 HIGH ANION GAP METABOLIC ACIDOSIS: ICD-10-CM

## 2023-11-10 DIAGNOSIS — E13.10 DIABETIC KETOACIDOSIS WITHOUT COMA ASSOCIATED WITH OTHER SPECIFIED DIABETES MELLITUS: Primary | ICD-10-CM

## 2023-11-10 DIAGNOSIS — R19.7 NAUSEA VOMITING AND DIARRHEA: ICD-10-CM

## 2023-11-10 LAB
ALBUMIN SERPL BCP-MCNC: 3.3 G/DL (ref 3.5–5.2)
ALBUMIN SERPL BCP-MCNC: 3.8 G/DL (ref 3.5–5.2)
ALLENS TEST: ABNORMAL
ALP SERPL-CCNC: 85 U/L (ref 55–135)
ALP SERPL-CCNC: 99 U/L (ref 55–135)
ALT SERPL W/O P-5'-P-CCNC: 17 U/L (ref 10–44)
ALT SERPL W/O P-5'-P-CCNC: 23 U/L (ref 10–44)
ANION GAP SERPL CALC-SCNC: 26 MMOL/L (ref 8–16)
ANION GAP SERPL CALC-SCNC: 27 MMOL/L (ref 8–16)
AST SERPL-CCNC: 30 U/L (ref 10–40)
AST SERPL-CCNC: 33 U/L (ref 10–40)
BACTERIA #/AREA URNS HPF: ABNORMAL /HPF
BASOPHILS # BLD AUTO: 0.05 K/UL (ref 0–0.2)
BASOPHILS # BLD AUTO: 0.05 K/UL (ref 0–0.2)
BASOPHILS NFR BLD: 0.4 % (ref 0–1.9)
BASOPHILS NFR BLD: 0.4 % (ref 0–1.9)
BILIRUB SERPL-MCNC: 0.5 MG/DL (ref 0.1–1)
BILIRUB SERPL-MCNC: 0.6 MG/DL (ref 0.1–1)
BILIRUB UR QL STRIP: NEGATIVE
BUN SERPL-MCNC: 43 MG/DL (ref 6–20)
BUN SERPL-MCNC: 46 MG/DL (ref 6–20)
CALCIUM SERPL-MCNC: 8.5 MG/DL (ref 8.7–10.5)
CALCIUM SERPL-MCNC: 9.3 MG/DL (ref 8.7–10.5)
CHLORIDE SERPL-SCNC: 92 MMOL/L (ref 95–110)
CHLORIDE SERPL-SCNC: 93 MMOL/L (ref 95–110)
CLARITY UR: CLEAR
CO2 SERPL-SCNC: 11 MMOL/L (ref 23–29)
CO2 SERPL-SCNC: 12 MMOL/L (ref 23–29)
COLOR UR: COLORLESS
CREAT SERPL-MCNC: 1.5 MG/DL (ref 0.5–1.4)
CREAT SERPL-MCNC: 1.7 MG/DL (ref 0.5–1.4)
DELSYS: ABNORMAL
DIFFERENTIAL METHOD: ABNORMAL
DIFFERENTIAL METHOD: ABNORMAL
EOSINOPHIL # BLD AUTO: 0 K/UL (ref 0–0.5)
EOSINOPHIL # BLD AUTO: 0 K/UL (ref 0–0.5)
EOSINOPHIL NFR BLD: 0 % (ref 0–8)
EOSINOPHIL NFR BLD: 0.2 % (ref 0–8)
ERYTHROCYTE [DISTWIDTH] IN BLOOD BY AUTOMATED COUNT: 16.6 % (ref 11.5–14.5)
ERYTHROCYTE [DISTWIDTH] IN BLOOD BY AUTOMATED COUNT: 17 % (ref 11.5–14.5)
EST. GFR  (NO RACE VARIABLE): 34 ML/MIN/1.73 M^2
EST. GFR  (NO RACE VARIABLE): 40 ML/MIN/1.73 M^2
FIO2: 21
GLUCOSE SERPL-MCNC: 441 MG/DL (ref 70–110)
GLUCOSE SERPL-MCNC: 450 MG/DL (ref 70–110)
GLUCOSE SERPL-MCNC: 459 MG/DL (ref 70–110)
GLUCOSE UR QL STRIP: ABNORMAL
HCO3 UR-SCNC: 17.8 MMOL/L (ref 24–28)
HCT VFR BLD AUTO: 62.7 % (ref 37–48.5)
HCT VFR BLD AUTO: 66.7 % (ref 37–48.5)
HCT VFR BLD CALC: 61 %PCV (ref 36–54)
HGB BLD-MCNC: 20.2 G/DL (ref 12–16)
HGB BLD-MCNC: 20.8 G/DL (ref 12–16)
HGB UR QL STRIP: ABNORMAL
HYALINE CASTS #/AREA URNS LPF: 1 /LPF
IMM GRANULOCYTES # BLD AUTO: 0.06 K/UL (ref 0–0.04)
IMM GRANULOCYTES # BLD AUTO: 0.08 K/UL (ref 0–0.04)
IMM GRANULOCYTES NFR BLD AUTO: 0.5 % (ref 0–0.5)
IMM GRANULOCYTES NFR BLD AUTO: 0.6 % (ref 0–0.5)
KETONES UR QL STRIP: ABNORMAL
LEUKOCYTE ESTERASE UR QL STRIP: ABNORMAL
LIPASE SERPL-CCNC: 18 U/L (ref 4–60)
LYMPHOCYTES # BLD AUTO: 1.6 K/UL (ref 1–4.8)
LYMPHOCYTES # BLD AUTO: 2.3 K/UL (ref 1–4.8)
LYMPHOCYTES NFR BLD: 12.5 % (ref 18–48)
LYMPHOCYTES NFR BLD: 17 % (ref 18–48)
MCH RBC QN AUTO: 28.4 PG (ref 27–31)
MCH RBC QN AUTO: 29.6 PG (ref 27–31)
MCHC RBC AUTO-ENTMCNC: 31.2 G/DL (ref 32–36)
MCHC RBC AUTO-ENTMCNC: 32.2 G/DL (ref 32–36)
MCV RBC AUTO: 91 FL (ref 82–98)
MCV RBC AUTO: 92 FL (ref 82–98)
MICROSCOPIC COMMENT: ABNORMAL
MODE: ABNORMAL
MONOCYTES # BLD AUTO: 0.9 K/UL (ref 0.3–1)
MONOCYTES # BLD AUTO: 1.1 K/UL (ref 0.3–1)
MONOCYTES NFR BLD: 7.3 % (ref 4–15)
MONOCYTES NFR BLD: 8.2 % (ref 4–15)
NEUTROPHILS # BLD AUTO: 10.2 K/UL (ref 1.8–7.7)
NEUTROPHILS # BLD AUTO: 9.8 K/UL (ref 1.8–7.7)
NEUTROPHILS NFR BLD: 73.8 % (ref 38–73)
NEUTROPHILS NFR BLD: 79.1 % (ref 38–73)
NITRITE UR QL STRIP: NEGATIVE
NRBC BLD-RTO: 0 /100 WBC
NRBC BLD-RTO: 0 /100 WBC
PCO2 BLDA: 40.3 MMHG (ref 35–45)
PH SMN: 7.25 [PH] (ref 7.35–7.45)
PH UR STRIP: 6 [PH] (ref 5–8)
PLATELET # BLD AUTO: 217 K/UL (ref 150–450)
PLATELET # BLD AUTO: 220 K/UL (ref 150–450)
PMV BLD AUTO: 10.4 FL (ref 9.2–12.9)
PMV BLD AUTO: 9.8 FL (ref 9.2–12.9)
PO2 BLDA: 80 MMHG (ref 80–100)
POC BE: -9 MMOL/L
POC IONIZED CALCIUM: 1.18 MMOL/L (ref 1.06–1.42)
POC SATURATED O2: 94 % (ref 95–100)
POTASSIUM BLD-SCNC: 3.5 MMOL/L (ref 3.5–5.1)
POTASSIUM SERPL-SCNC: 5.4 MMOL/L (ref 3.5–5.1)
POTASSIUM SERPL-SCNC: 6.2 MMOL/L (ref 3.5–5.1)
PROT SERPL-MCNC: 7.7 G/DL (ref 6–8.4)
PROT SERPL-MCNC: 8.5 G/DL (ref 6–8.4)
PROT UR QL STRIP: ABNORMAL
RBC # BLD AUTO: 6.82 M/UL (ref 4–5.4)
RBC # BLD AUTO: 7.33 M/UL (ref 4–5.4)
RBC #/AREA URNS HPF: 4 /HPF (ref 0–4)
SAMPLE: ABNORMAL
SITE: ABNORMAL
SODIUM BLD-SCNC: 134 MMOL/L (ref 136–145)
SODIUM SERPL-SCNC: 130 MMOL/L (ref 136–145)
SODIUM SERPL-SCNC: 131 MMOL/L (ref 136–145)
SP GR UR STRIP: 1.02 (ref 1–1.03)
SQUAMOUS #/AREA URNS HPF: 2 /HPF
URN SPEC COLLECT METH UR: ABNORMAL
UROBILINOGEN UR STRIP-ACNC: NEGATIVE EU/DL
WBC # BLD AUTO: 12.93 K/UL (ref 3.9–12.7)
WBC # BLD AUTO: 13.3 K/UL (ref 3.9–12.7)
WBC #/AREA URNS HPF: >100 /HPF (ref 0–5)
WBC CLUMPS URNS QL MICRO: ABNORMAL
YEAST URNS QL MICRO: ABNORMAL

## 2023-11-10 PROCEDURE — 84295 ASSAY OF SERUM SODIUM: CPT | Mod: HCNC

## 2023-11-10 PROCEDURE — 80053 COMPREHEN METABOLIC PANEL: CPT | Mod: 91,HCNC | Performed by: EMERGENCY MEDICINE

## 2023-11-10 PROCEDURE — 36600 WITHDRAWAL OF ARTERIAL BLOOD: CPT | Mod: HCNC

## 2023-11-10 PROCEDURE — 87086 URINE CULTURE/COLONY COUNT: CPT | Mod: HCNC | Performed by: NURSE PRACTITIONER

## 2023-11-10 PROCEDURE — 93010 ELECTROCARDIOGRAM REPORT: CPT | Mod: HCNC,,, | Performed by: INTERNAL MEDICINE

## 2023-11-10 PROCEDURE — 82800 BLOOD PH: CPT | Mod: HCNC

## 2023-11-10 PROCEDURE — 25000003 PHARM REV CODE 250: Mod: HCNC | Performed by: NURSE PRACTITIONER

## 2023-11-10 PROCEDURE — 93005 ELECTROCARDIOGRAM TRACING: CPT | Mod: HCNC

## 2023-11-10 PROCEDURE — 82803 BLOOD GASES ANY COMBINATION: CPT | Mod: HCNC

## 2023-11-10 PROCEDURE — 80053 COMPREHEN METABOLIC PANEL: CPT | Mod: HCNC | Performed by: NURSE PRACTITIONER

## 2023-11-10 PROCEDURE — 96365 THER/PROPH/DIAG IV INF INIT: CPT | Mod: HCNC

## 2023-11-10 PROCEDURE — 82330 ASSAY OF CALCIUM: CPT | Mod: HCNC

## 2023-11-10 PROCEDURE — 84132 ASSAY OF SERUM POTASSIUM: CPT | Mod: HCNC

## 2023-11-10 PROCEDURE — 80307 DRUG TEST PRSMV CHEM ANLYZR: CPT | Mod: HCNC | Performed by: NURSE PRACTITIONER

## 2023-11-10 PROCEDURE — 83690 ASSAY OF LIPASE: CPT | Mod: HCNC | Performed by: NURSE PRACTITIONER

## 2023-11-10 PROCEDURE — 81000 URINALYSIS NONAUTO W/SCOPE: CPT | Mod: HCNC | Performed by: NURSE PRACTITIONER

## 2023-11-10 PROCEDURE — 93010 EKG 12-LEAD: ICD-10-PCS | Mod: HCNC,,, | Performed by: INTERNAL MEDICINE

## 2023-11-10 PROCEDURE — 85025 COMPLETE CBC W/AUTO DIFF WBC: CPT | Mod: 91,HCNC | Performed by: EMERGENCY MEDICINE

## 2023-11-10 PROCEDURE — 85014 HEMATOCRIT: CPT | Mod: HCNC

## 2023-11-10 PROCEDURE — 85025 COMPLETE CBC W/AUTO DIFF WBC: CPT | Mod: HCNC | Performed by: NURSE PRACTITIONER

## 2023-11-10 PROCEDURE — 63600175 PHARM REV CODE 636 W HCPCS: Mod: HCNC | Performed by: NURSE PRACTITIONER

## 2023-11-10 PROCEDURE — 96361 HYDRATE IV INFUSION ADD-ON: CPT | Mod: HCNC

## 2023-11-10 PROCEDURE — 99291 CRITICAL CARE FIRST HOUR: CPT | Mod: HCNC

## 2023-11-10 PROCEDURE — 99900035 HC TECH TIME PER 15 MIN (STAT): Mod: HCNC

## 2023-11-10 RX ADMIN — PROMETHAZINE HYDROCHLORIDE 25 MG: 25 INJECTION INTRAMUSCULAR; INTRAVENOUS at 09:11

## 2023-11-10 RX ADMIN — SODIUM CHLORIDE 1000 ML: 9 INJECTION, SOLUTION INTRAVENOUS at 09:11

## 2023-11-11 PROBLEM — E11.10 DIABETIC KETOACIDOSIS WITHOUT COMA ASSOCIATED WITH TYPE 2 DIABETES MELLITUS: Status: ACTIVE | Noted: 2023-11-11

## 2023-11-11 PROBLEM — N30.00 ACUTE CYSTITIS WITHOUT HEMATURIA: Status: ACTIVE | Noted: 2023-11-11

## 2023-11-11 LAB
AMPHET+METHAMPHET UR QL: NEGATIVE
ANION GAP SERPL CALC-SCNC: 16 MMOL/L (ref 8–16)
ANION GAP SERPL CALC-SCNC: 19 MMOL/L (ref 8–16)
ANION GAP SERPL CALC-SCNC: 19 MMOL/L (ref 8–16)
ANION GAP SERPL CALC-SCNC: 26 MMOL/L (ref 8–16)
ANION GAP SERPL CALC-SCNC: 29 MMOL/L (ref 8–16)
B-OH-BUTYR BLD STRIP-SCNC: 4.8 MMOL/L (ref 0–0.5)
BARBITURATES UR QL SCN>200 NG/ML: NEGATIVE
BENZODIAZ UR QL SCN>200 NG/ML: ABNORMAL
BUN SERPL-MCNC: 21 MG/DL (ref 6–20)
BUN SERPL-MCNC: 26 MG/DL (ref 6–20)
BUN SERPL-MCNC: 29 MG/DL (ref 6–20)
BUN SERPL-MCNC: 35 MG/DL (ref 6–20)
BUN SERPL-MCNC: 38 MG/DL (ref 6–20)
BZE UR QL SCN: NEGATIVE
CALCIUM SERPL-MCNC: 8.3 MG/DL (ref 8.7–10.5)
CALCIUM SERPL-MCNC: 8.4 MG/DL (ref 8.7–10.5)
CALCIUM SERPL-MCNC: 8.4 MG/DL (ref 8.7–10.5)
CALCIUM SERPL-MCNC: 8.5 MG/DL (ref 8.7–10.5)
CALCIUM SERPL-MCNC: 8.7 MG/DL (ref 8.7–10.5)
CANNABINOIDS UR QL SCN: ABNORMAL
CHLORIDE SERPL-SCNC: 100 MMOL/L (ref 95–110)
CHLORIDE SERPL-SCNC: 96 MMOL/L (ref 95–110)
CHLORIDE SERPL-SCNC: 98 MMOL/L (ref 95–110)
CHLORIDE SERPL-SCNC: 99 MMOL/L (ref 95–110)
CHLORIDE SERPL-SCNC: 99 MMOL/L (ref 95–110)
CO2 SERPL-SCNC: 25 MMOL/L (ref 23–29)
CO2 SERPL-SCNC: 25 MMOL/L (ref 23–29)
CO2 SERPL-SCNC: 28 MMOL/L (ref 23–29)
CO2 SERPL-SCNC: 6 MMOL/L (ref 23–29)
CO2 SERPL-SCNC: 9 MMOL/L (ref 23–29)
CREAT SERPL-MCNC: 1 MG/DL (ref 0.5–1.4)
CREAT SERPL-MCNC: 1 MG/DL (ref 0.5–1.4)
CREAT SERPL-MCNC: 1.2 MG/DL (ref 0.5–1.4)
CREAT SERPL-MCNC: 1.3 MG/DL (ref 0.5–1.4)
CREAT SERPL-MCNC: 1.4 MG/DL (ref 0.5–1.4)
CREAT UR-MCNC: 31.2 MG/DL (ref 15–325)
EST. GFR  (NO RACE VARIABLE): 43 ML/MIN/1.73 M^2
EST. GFR  (NO RACE VARIABLE): 47 ML/MIN/1.73 M^2
EST. GFR  (NO RACE VARIABLE): 52 ML/MIN/1.73 M^2
EST. GFR  (NO RACE VARIABLE): >60 ML/MIN/1.73 M^2
EST. GFR  (NO RACE VARIABLE): >60 ML/MIN/1.73 M^2
GLUCOSE SERPL-MCNC: 178 MG/DL (ref 70–110)
GLUCOSE SERPL-MCNC: 213 MG/DL (ref 70–110)
GLUCOSE SERPL-MCNC: 225 MG/DL (ref 70–110)
GLUCOSE SERPL-MCNC: 344 MG/DL (ref 70–110)
GLUCOSE SERPL-MCNC: 360 MG/DL (ref 70–110)
LACTATE SERPL-SCNC: 1.3 MMOL/L (ref 0.5–2.2)
MAGNESIUM SERPL-MCNC: 1.5 MG/DL (ref 1.6–2.6)
MAGNESIUM SERPL-MCNC: 2.3 MG/DL (ref 1.6–2.6)
METHADONE UR QL SCN>300 NG/ML: NEGATIVE
OPIATES UR QL SCN: NEGATIVE
PCP UR QL SCN>25 NG/ML: NEGATIVE
PHOSPHATE SERPL-MCNC: 1.2 MG/DL (ref 2.7–4.5)
PHOSPHATE SERPL-MCNC: 2.4 MG/DL (ref 2.7–4.5)
POCT GLUCOSE: 138 MG/DL (ref 70–110)
POCT GLUCOSE: 153 MG/DL (ref 70–110)
POCT GLUCOSE: 170 MG/DL (ref 70–110)
POCT GLUCOSE: 170 MG/DL (ref 70–110)
POCT GLUCOSE: 177 MG/DL (ref 70–110)
POCT GLUCOSE: 199 MG/DL (ref 70–110)
POCT GLUCOSE: 257 MG/DL (ref 70–110)
POCT GLUCOSE: 264 MG/DL (ref 70–110)
POCT GLUCOSE: 308 MG/DL (ref 70–110)
POCT GLUCOSE: 360 MG/DL (ref 70–110)
POTASSIUM SERPL-SCNC: 3 MMOL/L (ref 3.5–5.1)
POTASSIUM SERPL-SCNC: 3.3 MMOL/L (ref 3.5–5.1)
POTASSIUM SERPL-SCNC: 3.6 MMOL/L (ref 3.5–5.1)
POTASSIUM SERPL-SCNC: 4.6 MMOL/L (ref 3.5–5.1)
POTASSIUM SERPL-SCNC: 4.6 MMOL/L (ref 3.5–5.1)
SODIUM SERPL-SCNC: 134 MMOL/L (ref 136–145)
SODIUM SERPL-SCNC: 135 MMOL/L (ref 136–145)
SODIUM SERPL-SCNC: 140 MMOL/L (ref 136–145)
SODIUM SERPL-SCNC: 142 MMOL/L (ref 136–145)
SODIUM SERPL-SCNC: 143 MMOL/L (ref 136–145)
TOXICOLOGY INFORMATION: ABNORMAL
TROPONIN I SERPL DL<=0.01 NG/ML-MCNC: <0.006 NG/ML (ref 0–0.03)
TSH SERPL DL<=0.005 MIU/L-ACNC: 1.5 UIU/ML (ref 0.4–4)

## 2023-11-11 PROCEDURE — 84484 ASSAY OF TROPONIN QUANT: CPT | Mod: HCNC | Performed by: EMERGENCY MEDICINE

## 2023-11-11 PROCEDURE — 84443 ASSAY THYROID STIM HORMONE: CPT | Mod: HCNC | Performed by: NURSE PRACTITIONER

## 2023-11-11 PROCEDURE — 96375 TX/PRO/DX INJ NEW DRUG ADDON: CPT | Mod: HCNC

## 2023-11-11 PROCEDURE — 63600175 PHARM REV CODE 636 W HCPCS: Mod: HCNC | Performed by: INTERNAL MEDICINE

## 2023-11-11 PROCEDURE — 80048 BASIC METABOLIC PNL TOTAL CA: CPT | Mod: 91,HCNC | Performed by: EMERGENCY MEDICINE

## 2023-11-11 PROCEDURE — 63600175 PHARM REV CODE 636 W HCPCS: Mod: HCNC | Performed by: EMERGENCY MEDICINE

## 2023-11-11 PROCEDURE — 25000003 PHARM REV CODE 250: Mod: HCNC | Performed by: EMERGENCY MEDICINE

## 2023-11-11 PROCEDURE — 82010 KETONE BODYS QUAN: CPT | Mod: HCNC | Performed by: EMERGENCY MEDICINE

## 2023-11-11 PROCEDURE — 83735 ASSAY OF MAGNESIUM: CPT | Mod: HCNC | Performed by: NURSE PRACTITIONER

## 2023-11-11 PROCEDURE — 94761 N-INVAS EAR/PLS OXIMETRY MLT: CPT | Mod: HCNC

## 2023-11-11 PROCEDURE — 36415 COLL VENOUS BLD VENIPUNCTURE: CPT | Mod: HCNC | Performed by: NURSE PRACTITIONER

## 2023-11-11 PROCEDURE — 25000003 PHARM REV CODE 250: Mod: HCNC | Performed by: STUDENT IN AN ORGANIZED HEALTH CARE EDUCATION/TRAINING PROGRAM

## 2023-11-11 PROCEDURE — 96365 THER/PROPH/DIAG IV INF INIT: CPT | Mod: 59,HCNC

## 2023-11-11 PROCEDURE — 84100 ASSAY OF PHOSPHORUS: CPT | Mod: HCNC | Performed by: NURSE PRACTITIONER

## 2023-11-11 PROCEDURE — 27000221 HC OXYGEN, UP TO 24 HOURS: Mod: HCNC

## 2023-11-11 PROCEDURE — 99900035 HC TECH TIME PER 15 MIN (STAT): Mod: HCNC

## 2023-11-11 PROCEDURE — 21400001 HC TELEMETRY ROOM: Mod: HCNC

## 2023-11-11 PROCEDURE — 96361 HYDRATE IV INFUSION ADD-ON: CPT | Mod: HCNC

## 2023-11-11 PROCEDURE — 80048 BASIC METABOLIC PNL TOTAL CA: CPT | Mod: HCNC | Performed by: NURSE PRACTITIONER

## 2023-11-11 PROCEDURE — 25000003 PHARM REV CODE 250: Mod: HCNC | Performed by: NURSE PRACTITIONER

## 2023-11-11 PROCEDURE — 63600175 PHARM REV CODE 636 W HCPCS: Mod: HCNC | Performed by: NURSE PRACTITIONER

## 2023-11-11 PROCEDURE — 83735 ASSAY OF MAGNESIUM: CPT | Mod: 91,HCNC | Performed by: INTERNAL MEDICINE

## 2023-11-11 PROCEDURE — 80048 BASIC METABOLIC PNL TOTAL CA: CPT | Mod: 91,HCNC | Performed by: INTERNAL MEDICINE

## 2023-11-11 PROCEDURE — 36415 COLL VENOUS BLD VENIPUNCTURE: CPT | Mod: HCNC | Performed by: INTERNAL MEDICINE

## 2023-11-11 PROCEDURE — 83605 ASSAY OF LACTIC ACID: CPT | Mod: HCNC | Performed by: NURSE PRACTITIONER

## 2023-11-11 PROCEDURE — 84100 ASSAY OF PHOSPHORUS: CPT | Mod: 91,HCNC | Performed by: INTERNAL MEDICINE

## 2023-11-11 PROCEDURE — 82962 GLUCOSE BLOOD TEST: CPT | Mod: HCNC

## 2023-11-11 PROCEDURE — 25000003 PHARM REV CODE 250: Mod: HCNC | Performed by: INTERNAL MEDICINE

## 2023-11-11 RX ORDER — SODIUM CHLORIDE, SODIUM LACTATE, POTASSIUM CHLORIDE, CALCIUM CHLORIDE 600; 310; 30; 20 MG/100ML; MG/100ML; MG/100ML; MG/100ML
INJECTION, SOLUTION INTRAVENOUS CONTINUOUS
Status: DISCONTINUED | OUTPATIENT
Start: 2023-11-11 | End: 2023-11-13

## 2023-11-11 RX ORDER — HYDRALAZINE HYDROCHLORIDE 20 MG/ML
10 INJECTION INTRAMUSCULAR; INTRAVENOUS EVERY 6 HOURS PRN
Status: DISCONTINUED | OUTPATIENT
Start: 2023-11-11 | End: 2023-11-13 | Stop reason: HOSPADM

## 2023-11-11 RX ORDER — MORPHINE SULFATE 4 MG/ML
4 INJECTION, SOLUTION INTRAMUSCULAR; INTRAVENOUS
Status: DISCONTINUED | OUTPATIENT
Start: 2023-11-11 | End: 2023-11-11

## 2023-11-11 RX ORDER — GABAPENTIN 400 MG/1
1200 CAPSULE ORAL NIGHTLY
Status: DISCONTINUED | OUTPATIENT
Start: 2023-11-11 | End: 2023-11-13 | Stop reason: HOSPADM

## 2023-11-11 RX ORDER — VENLAFAXINE HYDROCHLORIDE 37.5 MG/1
37.5 CAPSULE, EXTENDED RELEASE ORAL DAILY
Status: DISCONTINUED | OUTPATIENT
Start: 2023-11-12 | End: 2023-11-11

## 2023-11-11 RX ORDER — MIRTAZAPINE 15 MG/1
15 TABLET, FILM COATED ORAL NIGHTLY
Status: DISCONTINUED | OUTPATIENT
Start: 2023-11-11 | End: 2023-11-13 | Stop reason: HOSPADM

## 2023-11-11 RX ORDER — POTASSIUM CHLORIDE 20 MEQ/1
20 TABLET, EXTENDED RELEASE ORAL ONCE
Status: COMPLETED | OUTPATIENT
Start: 2023-11-11 | End: 2023-11-11

## 2023-11-11 RX ORDER — BENZTROPINE MESYLATE 0.5 MG/1
0.5 TABLET ORAL 2 TIMES DAILY
Status: DISCONTINUED | OUTPATIENT
Start: 2023-11-11 | End: 2023-11-11

## 2023-11-11 RX ORDER — SODIUM CHLORIDE 0.9 % (FLUSH) 0.9 %
10 SYRINGE (ML) INJECTION
Status: DISCONTINUED | OUTPATIENT
Start: 2023-11-11 | End: 2023-11-13 | Stop reason: HOSPADM

## 2023-11-11 RX ORDER — INDOMETHACIN 25 MG/1
100 CAPSULE ORAL ONCE
Status: COMPLETED | OUTPATIENT
Start: 2023-11-11 | End: 2023-11-11

## 2023-11-11 RX ORDER — DIAZEPAM 2 MG/1
2 TABLET ORAL EVERY 8 HOURS PRN
Status: DISCONTINUED | OUTPATIENT
Start: 2023-11-11 | End: 2023-11-11

## 2023-11-11 RX ORDER — MAGNESIUM SULFATE HEPTAHYDRATE 40 MG/ML
2 INJECTION, SOLUTION INTRAVENOUS ONCE
Status: COMPLETED | OUTPATIENT
Start: 2023-11-11 | End: 2023-11-11

## 2023-11-11 RX ORDER — INSULIN ASPART 100 [IU]/ML
0-10 INJECTION, SOLUTION INTRAVENOUS; SUBCUTANEOUS
Status: DISCONTINUED | OUTPATIENT
Start: 2023-11-11 | End: 2023-11-13 | Stop reason: HOSPADM

## 2023-11-11 RX ORDER — IBUPROFEN 200 MG
16 TABLET ORAL
Status: DISCONTINUED | OUTPATIENT
Start: 2023-11-11 | End: 2023-11-13 | Stop reason: HOSPADM

## 2023-11-11 RX ORDER — IBUPROFEN 200 MG
24 TABLET ORAL
Status: DISCONTINUED | OUTPATIENT
Start: 2023-11-11 | End: 2023-11-13 | Stop reason: HOSPADM

## 2023-11-11 RX ORDER — FAMOTIDINE 10 MG/ML
20 INJECTION INTRAVENOUS DAILY
Status: DISCONTINUED | OUTPATIENT
Start: 2023-11-11 | End: 2023-11-12

## 2023-11-11 RX ORDER — GABAPENTIN 300 MG/1
300 CAPSULE ORAL 3 TIMES DAILY
Status: DISCONTINUED | OUTPATIENT
Start: 2023-11-11 | End: 2023-11-11

## 2023-11-11 RX ORDER — GLUCAGON 1 MG
1 KIT INJECTION
Status: DISCONTINUED | OUTPATIENT
Start: 2023-11-11 | End: 2023-11-13 | Stop reason: HOSPADM

## 2023-11-11 RX ORDER — RISPERIDONE 1 MG/1
1 TABLET ORAL 2 TIMES DAILY
Status: DISCONTINUED | OUTPATIENT
Start: 2023-11-11 | End: 2023-11-13 | Stop reason: HOSPADM

## 2023-11-11 RX ORDER — DESVENLAFAXINE SUCCINATE 50 MG/1
50 TABLET, EXTENDED RELEASE ORAL DAILY
Status: DISCONTINUED | OUTPATIENT
Start: 2023-11-11 | End: 2023-11-13 | Stop reason: HOSPADM

## 2023-11-11 RX ORDER — DIAZEPAM 5 MG/1
5 TABLET ORAL EVERY 6 HOURS PRN
Status: DISCONTINUED | OUTPATIENT
Start: 2023-11-11 | End: 2023-11-13 | Stop reason: HOSPADM

## 2023-11-11 RX ORDER — LABETALOL HYDROCHLORIDE 5 MG/ML
10 INJECTION, SOLUTION INTRAVENOUS EVERY 6 HOURS PRN
Status: DISCONTINUED | OUTPATIENT
Start: 2023-11-11 | End: 2023-11-13 | Stop reason: HOSPADM

## 2023-11-11 RX ORDER — MUPIROCIN 20 MG/G
OINTMENT TOPICAL 2 TIMES DAILY
Status: DISCONTINUED | OUTPATIENT
Start: 2023-11-11 | End: 2023-11-13 | Stop reason: HOSPADM

## 2023-11-11 RX ORDER — ENOXAPARIN SODIUM 100 MG/ML
40 INJECTION SUBCUTANEOUS EVERY 24 HOURS
Status: DISCONTINUED | OUTPATIENT
Start: 2023-11-11 | End: 2023-11-13 | Stop reason: HOSPADM

## 2023-11-11 RX ORDER — GABAPENTIN 300 MG/1
600 CAPSULE ORAL
Status: DISCONTINUED | OUTPATIENT
Start: 2023-11-12 | End: 2023-11-13 | Stop reason: HOSPADM

## 2023-11-11 RX ORDER — IPRATROPIUM BROMIDE AND ALBUTEROL SULFATE 2.5; .5 MG/3ML; MG/3ML
3 SOLUTION RESPIRATORY (INHALATION) EVERY 6 HOURS PRN
Status: DISCONTINUED | OUTPATIENT
Start: 2023-11-11 | End: 2023-11-13 | Stop reason: HOSPADM

## 2023-11-11 RX ORDER — BENZTROPINE MESYLATE 0.5 MG/1
0.5 TABLET ORAL 2 TIMES DAILY PRN
Status: DISCONTINUED | OUTPATIENT
Start: 2023-11-11 | End: 2023-11-13 | Stop reason: HOSPADM

## 2023-11-11 RX ADMIN — FAMOTIDINE 20 MG: 10 INJECTION, SOLUTION INTRAVENOUS at 09:11

## 2023-11-11 RX ADMIN — CEFTRIAXONE SODIUM 1 G: 1 INJECTION, POWDER, FOR SOLUTION INTRAMUSCULAR; INTRAVENOUS at 01:11

## 2023-11-11 RX ADMIN — MUPIROCIN: 20 OINTMENT TOPICAL at 08:11

## 2023-11-11 RX ADMIN — SODIUM CHLORIDE, POTASSIUM CHLORIDE, SODIUM LACTATE AND CALCIUM CHLORIDE: 600; 310; 30; 20 INJECTION, SOLUTION INTRAVENOUS at 09:11

## 2023-11-11 RX ADMIN — GABAPENTIN 1200 MG: 400 CAPSULE ORAL at 08:11

## 2023-11-11 RX ADMIN — ERTAPENEM 1 G: 1 INJECTION INTRAMUSCULAR; INTRAVENOUS at 01:11

## 2023-11-11 RX ADMIN — RISPERIDONE 1 MG: 1 TABLET ORAL at 08:11

## 2023-11-11 RX ADMIN — ENOXAPARIN SODIUM 40 MG: 40 INJECTION SUBCUTANEOUS at 04:11

## 2023-11-11 RX ADMIN — DIAZEPAM 5 MG: 5 TABLET ORAL at 10:11

## 2023-11-11 RX ADMIN — POTASSIUM CHLORIDE 20 MEQ: 1500 TABLET, EXTENDED RELEASE ORAL at 01:11

## 2023-11-11 RX ADMIN — INSULIN DETEMIR 10 UNITS: 100 INJECTION, SOLUTION SUBCUTANEOUS at 01:11

## 2023-11-11 RX ADMIN — SODIUM CHLORIDE, POTASSIUM CHLORIDE, SODIUM LACTATE AND CALCIUM CHLORIDE: 600; 310; 30; 20 INJECTION, SOLUTION INTRAVENOUS at 01:11

## 2023-11-11 RX ADMIN — MIRTAZAPINE 15 MG: 15 TABLET, FILM COATED ORAL at 08:11

## 2023-11-11 RX ADMIN — POTASSIUM PHOSPHATE, MONOBASIC POTASSIUM PHOSPHATE, DIBASIC 30 MMOL: 224; 236 INJECTION, SOLUTION, CONCENTRATE INTRAVENOUS at 03:11

## 2023-11-11 RX ADMIN — SODIUM CHLORIDE 0.1 UNITS/KG/HR: 9 INJECTION, SOLUTION INTRAVENOUS at 05:11

## 2023-11-11 RX ADMIN — INSULIN DETEMIR 5 UNITS: 100 INJECTION, SOLUTION SUBCUTANEOUS at 03:11

## 2023-11-11 RX ADMIN — DESVENLAFAXINE SUCCINATE 50 MG: 50 TABLET, FILM COATED, EXTENDED RELEASE ORAL at 03:11

## 2023-11-11 RX ADMIN — MUPIROCIN: 20 OINTMENT TOPICAL at 01:11

## 2023-11-11 RX ADMIN — SODIUM CHLORIDE 0.02 UNITS/KG/HR: 9 INJECTION, SOLUTION INTRAVENOUS at 01:11

## 2023-11-11 RX ADMIN — INSULIN HUMAN 10 UNITS: 100 INJECTION, SOLUTION PARENTERAL at 12:11

## 2023-11-11 RX ADMIN — SODIUM CHLORIDE 0.05 UNITS/KG/HR: 9 INJECTION, SOLUTION INTRAVENOUS at 07:11

## 2023-11-11 RX ADMIN — SODIUM BICARBONATE: 84 INJECTION, SOLUTION INTRAVENOUS at 04:11

## 2023-11-11 RX ADMIN — SODIUM BICARBONATE 100 MEQ: 84 INJECTION, SOLUTION INTRAVENOUS at 06:11

## 2023-11-11 RX ADMIN — MAGNESIUM SULFATE HEPTAHYDRATE 2 G: 40 INJECTION, SOLUTION INTRAVENOUS at 03:11

## 2023-11-11 RX ADMIN — SODIUM CHLORIDE 1000 ML: 9 INJECTION, SOLUTION INTRAVENOUS at 12:11

## 2023-11-11 RX ADMIN — HYDRALAZINE HYDROCHLORIDE 10 MG: 20 INJECTION, SOLUTION INTRAMUSCULAR; INTRAVENOUS at 01:11

## 2023-11-11 RX ADMIN — INSULIN ASPART 2 UNITS: 100 INJECTION, SOLUTION INTRAVENOUS; SUBCUTANEOUS at 04:11

## 2023-11-11 NOTE — FIRST PROVIDER EVALUATION
Medical screening examination initiated.  I have conducted a focused provider triage encounter, findings are as follows:    Brief history of present illness:  Patient presents with abdominal pain, nausea, vomiting, diarrhea.  Onset was 4 days ago.  History of diverticulitis.    There were no vitals filed for this visit.    Pertinent physical exam:  Patient appears nauseous in triage, sits in wheelchair with emesis bag.    Brief workup plan:  Labs, IV fluids, antiemetics    Preliminary workup initiated; this workup will be continued and followed by the physician or advanced practice provider that is assigned to the patient when roomed.

## 2023-11-11 NOTE — HPI
Alexandra Goins is a 58 year old female with past medical history of hypertension, hyperlipidemia, CAD, stroke, bipolar disorder, anxiety, depression, cerebral aneurysm, chronic diastolic heart failure, asthma/COPD, hypothyroidism, PVD, GERD, diverticular disease, neuropathy, seizure (due to hypoglycemia), and arthritis who presented to ED for evaluation of nausea/vomiting. She also reports chest pain, anxiety, fever up to 103, diarrhea, dysuria. She states that she got a flu shot last Saturday and then she started feeling bad on Monday, her symptoms have progressively worsened. She has not been taking her insulin over past couple of days due to feeling so bad and vomiting/not eating. Labs show UTI, DKA. She was given Rocephin. She was given 2 L IV fluids, insulin bolus and started on insulin infusion. She was also started on bicarb drip, as CO2 actually went down from 11 to 6 after fluid bolus. Initial glucose 459. She has not had any vomiting or diarrhea since arriving to ED.  Critical care team was consulted for admission due to DKA.

## 2023-11-11 NOTE — ASSESSMENT & PLAN NOTE
--urinalysis results suggestive of infection  --prior culture data reviewed, suspicion for MDR organism  --continue Ertapenem   --follow cultures  --monitor fever curve- Tylenol PRN

## 2023-11-11 NOTE — PLAN OF CARE
Admitted to room 241. Contact precautions for ESBL in urine on 7/1/23. Updated patient on plan of care. Instructed patient to use call light for assistance, call light in reach. Hourly rounding performed. Vitals q4 hours. Education provided, questions answered/encouraged. Chart check complete. Sinus rhythm.    Problem: Adult Inpatient Plan of Care  Goal: Plan of Care Review  Outcome: Ongoing, Progressing

## 2023-11-11 NOTE — CONSULTS
[facility] - Intensive [facility] Medicine  Consult Note    Patient Name: Alexandra Goins  MRN: [id number]  Admission Date: 11/10/2023  Hospital Length of Stay: 0 days  Attending Physician: Pino Khoury MD   Primary Care Provider: Perez Casiano MD           Patient information was obtained from patient, past medical records and ER records.     Consults  Subjective:     Principal Problem: Diabetic ketoacidosis without coma associated with type 2 diabetes mellitus    Chief Complaint:   Chief Complaint   Patient presents with    Emesis     Vomiting and diarrhea since Monday, flu shot saturday        HPI: Alexandra Goins, a 58-year-old female with a complex medical history including hypertension, hyperlipidemia, CAD, stroke, bipolar disorder, anxiety, depression, cerebral aneurysm, chronic diastolic heart failure, asthma/COPD, hypothyroidism, PVD, GERD, diverticular disease, neuropathy, seizure (due to hypoglycemia), and arthritis, presented to the ED for evaluation of nausea/vomiting, chest pain, anxiety, fever up to 103, diarrhea, and dysuria. Her symptoms commenced following a flu shot received last Saturday, progressively worsening by Monday. Ms. Goins reported non-compliance with insulin in the preceding days, attributing it to her malaise and inability to eat. Initial evaluation suggested DKA and a urinary tract infection. Critical care was consulted for her deteriorating condition.    Upon arrival in the ICU, Alexandra exhibited abnormal vital signs including a temperature of 99.2, pulse of 101, respiratory rate of 20, and elevated blood pressure at 151/70. Laboratory findings were significant for a UTI and diabetic ketoacidosis (DKA), with an initial glucose level of 459. Management included administration of Rocephin for the UTI, aggressive hydration with 2 L IV fluids, an insulin bolus followed by continuous insulin infusion, and a bicarb drip due to a decreasing CO2 level from 11 to 6  post-fluid bolus. Her condition stabilized in the ICU, with cessation of vomiting and diarrhea. Order for stepdown off the unit was placed and Hospital Medicine was called for assumption of care.                  Past Medical History:   Diagnosis Date    Acute ischemic stroke 9/17/2019    Acute respiratory failure with hypoxia 2/11/2021    Aneurysm     Anxiety     Arthritis     Asthma     Bipolar 1 disorder     Cerebral aneurysm, nonruptured 9/19/2019    Chronic diastolic heart failure 5/23/2023    Coronary artery disease of native artery of native heart with stable angina pectoris 1/19/2022    Depression     Diabetes mellitus, type 2     Diverticular disease     GERD (gastroesophageal reflux disease)     Hyperlipemia     Hypertension     Hyponatremia 7/24/2022    Hypothyroidism     Pneumonia     PVD (peripheral vascular disease) 4/28/2021    Renal manifestation of secondary diabetes mellitus     Right-sided back pain 6/29/2019    Severe obesity (BMI 35.0-39.9) with comorbidity 5/31/2022    Stroke     Trouble in sleeping        Past Surgical History:   Procedure Laterality Date    CEREBRAL ANGIOGRAM N/A 10/28/2019    Procedure: ANGIOGRAM-CEREBRAL;  Surgeon: [facility] Diagnostic Provider;  Location: [facility] OR [address] [place] [address];  Service: Radiology;  Laterality: N/A;  Rm 190/Aayush    CHOLECYSTECTOMY      COLON SURGERY      COLONOSCOPY N/A 1/12/2018    Procedure: COLONOSCOPY;  Surgeon: Roque Mahajan III, MD;  Location: [facility] ENDO;  Service: Endoscopy;  Laterality: N/A;    COLONOSCOPY N/A 10/13/2023    Procedure: COLONOSCOPY;  Surgeon: Thelma Chairez MD;  Location: [facility] ENDO;  Service: Endoscopy;  Laterality: N/A;    DENTAL SURGERY  05/21/2018    removal of 8 top teeth    HYSTERECTOMY      TONSILLECTOMY         Review of patient's allergies indicates:   Allergen Reactions    Seroquel [quetiapine] Other (See Comments)     TC SEIZURES    Adhesive Blisters     Pt states can't tolerate paper tape     Levomenol analogues     Lipitor [atorvastatin]      Leg Cramps    Repatha pushtronex [evolocumab]      Feels sick    Zofran [ondansetron hcl] Hives and Other (See Comments)     headaches    Celestone [betamethasone] Hives, Itching and Rash    Levaquin [levofloxacin] Nausea Only    Piroxicam Diarrhea and Nausea Only       No current facility-administered medications on file prior to encounter.     Current Outpatient Medications on File Prior to Encounter   Medication Sig    albuterol (PROVENTIL/VENTOLIN HFA) 90 mcg/actuation inhaler INHALE 2 TO 4 PUFFS BY MOUTH THREE TIMES DAILY AS NEEDED FOR WHEEZING    albuterol-ipratropium (DUO-NEB) 2.5 mg-0.5 mg/3 mL nebulizer solution Take 3 mLs by nebulization every 6 (six) hours as needed for Wheezing. Rescue    benztropine (COGENTIN) 0.5 MG tablet Take 1 tablet (0.5 mg total) by mouth 2 (two) times daily. TAKE 1 TABLET BY MOUTH TWICE DAILY AS NEEDED FOR  DYSTONIA    blood sugar diagnostic (TRUE METRIX GLUCOSE TEST STRIP) Strp USE 1 STRIP TO CHECK GLUCOSE THREE TIMES DAILY    blood sugar diagnostic Strp 1 each by Misc.(Non-Drug; Combo Route) route 3 (three) times daily. Dispense preferred on insurance    blood-glucose meter kit Use as instructed - preferred on insurance    butalbital-acetaminophen-caffeine -40 mg (FIORICET, ESGIC) -40 mg per tablet Take 1 tablet by mouth every 6 (six) hours as needed for Headaches. for pain.    desvenlafaxine succinate (PRISTIQ) 50 MG Tb24 Take 1 tablet (50 mg total) by mouth once daily.    diazePAM (VALIUM) 10 MG Tab TAKE 1/2 TO 1 (ONE-HALF TO ONE) TABLET BY MOUTH THREE TIMES DAILY AS NEEDED FOR ANXIETY    ezetimibe (ZETIA) 10 mg tablet Take 1 tablet (10 mg total) by mouth once daily.    fluticasone-salmeterol diskus inhaler 250-50 mcg Inhale 1 puff into the lungs 2 (two) times daily. Controller for maintenance    furosemide (LASIX) 40 MG tablet TAKE 1 TABLET BY MOUTH EVERY MONDAY, WEDNESDAY, AND FRIDAY AS  "NEEDED    gabapentin (NEURONTIN) 600 MG tablet TAKE 1 CAPSULE BY MOUTH IN THE MORNING, 1 CAPSULE IN THE AFTERNOON, AND THEN 2 CAPSULES AT BEDTIME    insulin glargine U-300 conc (TOUJEO MAX U-300 SOLOSTAR) 300 unit/mL (3 mL) insulin pen Inject 76 Units into the skin once daily.    insulin regular, human (NOVOLIN R INJ)     isosorbide mononitrate (IMDUR) 30 MG 24 hr tablet Take 1 tablet by mouth once daily    lancets Misc 1 each by Misc.(Non-Drug; Combo Route) route 3 (three) times daily. Dispense preferred on insurance    metFORMIN (GLUCOPHAGE-XR) 500 MG ER 24hr tablet Take 1 tablet (500 mg total) by mouth 2 (two) times daily with meals.    metoprolol succinate (TOPROL-XL) 25 MG 24 hr tablet Take 1 tablet by mouth once daily    mirtazapine (REMERON) 15 MG tablet Take 1 tablet (15 mg total) by mouth every evening.    pen needle, diabetic 32 gauge x 5/32" Ndle Inject 1 each into the skin once daily.    promethazine (PHENERGAN) 12.5 MG Tab Take 1 tablet (12.5 mg total) by mouth every 6 (six) hours as needed.    risperiDONE (RISPERDAL) 1 MG tablet Take 1 tablet (1 mg total) by mouth 2 (two) times daily.     Family History       Problem Relation (Age of Onset)    Alcohol abuse Mother, Father    Arthritis Father, Maternal Grandmother, Paternal Grandmother    Breast cancer Maternal Grandmother    COPD Mother, Sister    Cancer Father, Maternal Aunt, Maternal Uncle, Paternal Aunt, Paternal Uncle, Paternal Grandmother    Depression Mother    Diabetes Maternal Uncle    Drug abuse Mother, Maternal Uncle    Heart disease Father, Brother    Hypertension Father, Brother, Maternal Grandmother, Paternal Grandfather    Kidney failure Sister          Tobacco Use    Smoking status: Every Day     Current packs/day: 1.00     Average packs/day: 1 pack/day for 45.9 years (45.9 ttl pk-yrs)     Types: Cigarettes, Vaping w/o nicotine     Start date: 1978    Smokeless tobacco: Never    Tobacco comments:     On average, smokes 10 " cigarettes per day.    Substance and Sexual Activity    Alcohol use: Not Currently    Drug use: Yes     Types: Marijuana    Sexual activity: Yes     Review of Systems   Constitutional:  Negative for chills, fatigue and fever.   HENT:  Negative for congestion, postnasal drip, rhinorrhea and sore throat.    Eyes:  Negative for pain and visual disturbance.   Respiratory:  Negative for cough, chest tightness, shortness of breath and wheezing.    Cardiovascular:  Negative for chest pain, palpitations and leg swelling.   Gastrointestinal:  Positive for abdominal pain, diarrhea, nausea and vomiting. Negative for abdominal distention.   Genitourinary:  Negative for difficulty urinating, flank pain and hematuria.   Musculoskeletal:  Negative for arthralgias, back pain, gait problem and joint swelling.   Skin:  Negative for pallor and rash.   Neurological:  Negative for dizziness, syncope and weakness.   Psychiatric/Behavioral:  Negative for confusion, decreased concentration and suicidal ideas.      Objective:     Vital Signs (Most Recent):  Temp: 98.2 °F (36.8 °C) (11/11/23 1200)  Pulse: 94 (11/11/23 1300)  Resp: 20 (11/11/23 1300)  BP: (!) 180/79 (11/11/23 1300)  SpO2: (!) 92 % (11/11/23 1300) Vital Signs (24h Range):  Temp:  [98.2 °F (36.8 °C)-99.2 °F (37.3 °C)] 98.2 °F (36.8 °C)  Pulse:  [] 94  Resp:  [14-20] 20  SpO2:  [88 %-95 %] 92 %  BP: (117-186)/(58-87) 180/79     Weight: 75 kg (165 lb 5.5 oz)  Body mass index is 29.29 kg/m².     Physical Exam  Vitals reviewed.   Constitutional:       General: She is not in acute distress.     Appearance: Normal appearance. She is normal weight. She is not ill-appearing.   HENT:      Head: Normocephalic and atraumatic.      Nose: Nose normal. No congestion or rhinorrhea.      Mouth/Throat:      Mouth: Mucous membranes are moist.      Pharynx: Oropharynx is clear.   Eyes:      General: No scleral icterus.     Extraocular Movements: Extraocular movements intact.      Pupils:  Pupils are equal, round, and reactive to light.   Cardiovascular:      Pulses: Normal pulses.      Heart sounds: Normal heart sounds. No murmur heard.     No gallop.   Pulmonary:      Effort: Pulmonary effort is normal. No respiratory distress.      Breath sounds: Normal breath sounds. No wheezing.   Abdominal:      General: Abdomen is flat. Bowel sounds are normal. There is no distension.      Palpations: Abdomen is soft.      Tenderness: There is generalized abdominal tenderness. There is no guarding or rebound.   Skin:     Capillary Refill: Capillary refill takes less than 2 seconds.      Coloration: Skin is not jaundiced.   Neurological:      General: No focal deficit present.      Mental Status: She is alert and oriented to person, place, and time. Mental status is at baseline.      Cranial Nerves: No cranial nerve deficit.   Psychiatric:         Mood and Affect: Affect is blunt and flat.         Behavior: Behavior normal.         Thought Content: Thought content normal.          Significant Labs: BMP:   Recent Labs   Lab 11/11/23  0814 11/11/23  1131   * 178*    142   K 3.3* 3.0*   CL 99 98   CO2 25 28   BUN 29* 26*   CREATININE 1.2 1.0   CALCIUM 8.4* 8.4*   MG 1.5*  --      CBC:   Recent Labs   Lab 11/10/23  2053 11/10/23  2134 11/10/23  2157   WBC 13.30* 12.93*  --    HGB 20.8* 20.2*  --    HCT 66.7* 62.7* 61*    220  --      CMP:   Recent Labs   Lab 11/10/23  2053 11/10/23  2134 11/11/23  0048 11/11/23  0420 11/11/23  0814 11/11/23  1131   * 131*   < > 135* 143 142   K 5.4* 6.2*   < > 4.6 3.3* 3.0*   CL 92* 93*   < > 100 99 98   CO2 11* 12*   < > 9* 25 28   * 450*   < > 344* 225* 178*   BUN 46* 43*   < > 35* 29* 26*   CREATININE 1.7* 1.5*   < > 1.3 1.2 1.0   CALCIUM 9.3 8.5*   < > 8.3* 8.4* 8.4*   PROT 8.5* 7.7  --   --   --   --    ALBUMIN 3.8 3.3*  --   --   --   --    BILITOT 0.6 0.5  --   --   --   --    ALKPHOS 99 85  --   --   --   --    AST 30 33  --   --   --   --   "  ALT 23 17  --   --   --   --    ANIONGAP 27* 26*   < > 26* 19* 16    < > = values in this interval not displayed.     Cardiac Markers: No results for input(s): "CKMB", "MYOGLOBIN", "BNP", "TROPISTAT" in the last 48 hours.  Coagulation: No results for input(s): "PT", "INR", "APTT" in the last 48 hours.  Lactic Acid:   Recent Labs   Lab 11/11/23  0420   LACTATE 1.3     Magnesium:   Recent Labs   Lab 11/11/23  0814   MG 1.5*     Troponin:   Recent Labs   Lab 11/11/23  0122   TROPONINI <0.006     TSH:   Recent Labs   Lab 11/11/23  1131   TSH 1.497     Urine Studies:   Recent Labs   Lab 11/10/23  2317   COLORU Colorless*   APPEARANCEUA Clear   PHUR 6.0   SPECGRAV 1.025   PROTEINUA 2+*   GLUCUA 4+*   KETONESU 3+*   BILIRUBINUA Negative   OCCULTUA Trace*   NITRITE Negative   UROBILINOGEN Negative   LEUKOCYTESUR 3+*   RBCUA 4   WBCUA >100*   BACTERIA None   SQUAMEPITHEL 2   HYALINECASTS 1         Significant Imaging:   Imaging Results              X-Ray Chest AP Portable (Final result)  Result time 11/10/23 22:56:01      Final result by Bear Arshad MD (11/10/23 22:56:01)                   Impression:      Mild basilar subsegmental atelectasis or scarring    Mild perihilar interstitial opacities may reflect element of pulmonary edema or bronchitis.      Electronically signed by: Bear Arshad  Date:    11/10/2023  Time:    22:56               Narrative:    EXAMINATION:  XR CHEST AP PORTABLE    CLINICAL HISTORY:  dka;    TECHNIQUE:  Single frontal view of the chest was performed.    COMPARISON:  None    FINDINGS:  No pleural effusion or pneumothorax.  Mild perihilar interstitial opacities.  Basilar subsegmental atelectasis or scarring.    The cardiac silhouette is prominent.  The hilar and mediastinal contours are unremarkable.    Bones are intact.                                       CT Abdomen Pelvis  Without Contrast (Final result)  Result time 11/10/23 22:51:11      Final result by Bear Arshad MD (11/10/23 " 22:51:11)                   Impression:      Colonic diverticulosis involving the left and sigmoid colon with mucosal thickening.  Consider follow-up colonoscopy to exclude a mucosal lesion.    Hepatomegaly    Cholecystectomy    Atherosclerotic changes    Mild lingular and right middle lobes scarring.    Small focus of gas within the bladder.  Correlate to history of instrumentation.  Infectious process not excluded.    Adrenal hyperplasia    All CT scans   are performed using dose optimization techniques including the following: automated exposure control; adjustment of the mA and/or kV; use of iterative reconstruction technique.  Dose modulation was employed for ALARA by means of: Automated exposure control; adjustment of the mA and/or kV according to patient size (this includes techniques or standardized protocols for targeted exams where dose is matched to indication/reason for exam; i.e. extremities or head); and/or use of iterative reconstructive technique.      Electronically signed by: Bear Arshad  Date:    11/10/2023  Time:    22:51               Narrative:    EXAMINATION:  CT ABDOMEN PELVIS WITHOUT CONTRAST    CLINICAL HISTORY:  Abdominal abscess/infection suspected;Abdominal pain, acute, nonlocalized;    TECHNIQUE:  Low dose axial images, sagittal and coronal reformations were obtained from the lung bases to the pubic symphysis,    COMPARISON:  None    FINDINGS:  Heart: Normal in size as far as seen.  No pericardial effusion as far seen.    Lung Bases: Well aerated, without consolidation or pleural fluid.    Liver: Hepatomegaly with no focal hepatic lesions.    Gallbladder: Status post cholecystectomy.    Bile Ducts: No evidence of dilated ducts.    Pancreas: No mass or peripancreatic fat stranding.    Spleen: Unremarkable.    Adrenals: Adrenal hyperplasia    Kidneys/ Ureters: Punctate right-sided nonobstructing renal calculi    Bladder: Small amount of gas the bladder.    Reproductive organs: Status  post hysterectomy    GI Tract/Mesentery: No evidence of bowel obstruction or inflammation. No secondary signs of appendicitis.  Colonic diverticulosis with mucosal thickening of sigmoid and left colon.  Postsurgical changes of the sigmoid colon.    Peritoneal Space: No ascites. No free air.    Retroperitoneum: No significant adenopathy.    Abdominal wall: Unremarkable.    Vasculature: No aneurysm.  Atherosclerotic changes    Bones: No acute fracture.                                        Assessment/Plan:     * Diabetic ketoacidosis without coma associated with type 2 diabetes mellitus  --last A1c on file: 12% one month prior to admission  --Home medications include metformin, toujeo, basal insulin  --holding home PO medications  -- prandial, basal, and moderate dose SSI ordered  --Accuchecks ACQHS, slowly advance diet  --Fasting AM glucose goal <140        Acute cystitis without hematuria  --urinalysis results suggestive of infection  --prior culture data reviewed, suspicion for MDR organism  --continue Ertapenem   --follow cultures  --monitor fever curve- Tylenol PRN    Marijuana abuse  --cessation counseling ordered, to be done prior to discharge      Bipolar disorder with anxiety and depression  --home med list confirmed by pharmacy  --will restart pertinent medications as tolerated  --monitor clinically        VTE Risk Mitigation (From admission, onward)         Ordered     enoxaparin injection 40 mg  Daily         11/11/23 0327     IP VTE HIGH RISK PATIENT  Once         11/11/23 0327     Place sequential compression device  Until discontinued         11/11/23 0327                Thank you for your consult. I will assume care over patient. Please contact us if you have any additional questions.    Pino Khoury MD  Department of Hospital Medicine   [facility] - Intensive Care ([facility])

## 2023-11-11 NOTE — ED PROVIDER NOTES
SCRIBE #1 NOTE: I, Romeo Thompson, am scribing for, and in the presence of, Diane Rabago DO. I have scribed the entire note.      History     Chief Complaint   Patient presents with    Emesis     Vomiting and diarrhea since Monday, flu shot saturday     Review of patient's allergies indicates:   Allergen Reactions    Seroquel [quetiapine] Other (See Comments)     TC SEIZURES    Adhesive Blisters     Pt states can't tolerate paper tape    Levomenol analogues     Lipitor [atorvastatin]      Leg Cramps    Repatha pushtronex [evolocumab]      Feels sick    Zofran [ondansetron hcl] Hives and Other (See Comments)     headaches    Celestone [betamethasone] Hives, Itching and Rash    Levaquin [levofloxacin] Nausea Only    Piroxicam Diarrhea and Nausea Only         History of Present Illness     HPI    11/11/2023, 12:47 AM  History obtained from the patient      History of Present Illness: Alexandra Goins is a 58 y.o. female patient with a PMHx of HTN, HLD, depression, bipolar 1 disorder, DM, anxiety, diverticular disease, hypothyroidism, pneumonia, cerebral aneurysm (non ruptured), PVD, acute respiratory failure with hypoxia, hyponatremia, chronic diastolic HF, stroke, and GERD who presents to the Emergency Department for evaluation of left upper quadrant abdominal pain with nausea and vomiting which onset since Monday after receiving the flu shot on Saturday. Symptoms are constant. No mitigating or exacerbating factors reported. Associated sxs include N/D, HA, right-sided CP, dysuria, and fever which has now resolved. Fever reached 103 at peak. Pt's pain rating is a 7/10. Patient denies any cough, SOB, rashes, and all other sxs at this time. Prior Tx includes Tylenol and Ibuprofen but the pt was unable to tolerate. Pt takes metformin. Pt has been on compliant with insulin for past two days. No further complaints or concerns at this time.       Arrival mode: Personal vehicle     PCP: Perez Casiano MD        Past  Medical History:  Past Medical History:   Diagnosis Date    Acute ischemic stroke 9/17/2019    Acute respiratory failure with hypoxia 2/11/2021    Aneurysm     Anxiety     Arthritis     Asthma     Bipolar 1 disorder     Cerebral aneurysm, nonruptured 9/19/2019    Chronic diastolic heart failure 5/23/2023    Coronary artery disease of native artery of native heart with stable angina pectoris 1/19/2022    Depression     Diabetes mellitus, type 2     Diverticular disease     GERD (gastroesophageal reflux disease)     Hyperlipemia     Hypertension     Hyponatremia 7/24/2022    Hypothyroidism     Pneumonia     PVD (peripheral vascular disease) 4/28/2021    Renal manifestation of secondary diabetes mellitus     Right-sided back pain 6/29/2019    Severe obesity (BMI 35.0-39.9) with comorbidity 5/31/2022    Stroke     Trouble in sleeping        Past Surgical History:  Past Surgical History:   Procedure Laterality Date    CEREBRAL ANGIOGRAM N/A 10/28/2019    Procedure: ANGIOGRAM-CEREBRAL;  Surgeon: Dahiana Diagnostic Provider;  Location: [address];  Service: Radiology;  Laterality: N/A;  Rm 190/Aayush    CHOLECYSTECTOMY      COLON SURGERY      COLONOSCOPY N/A 1/12/2018    Procedure: COLONOSCOPY;  Surgeon: Roque Mahajan III, MD;  Location: [facility] ENDO;  Service: Endoscopy;  Laterality: N/A;    COLONOSCOPY N/A 10/13/2023    Procedure: COLONOSCOPY;  Surgeon: Thelma Chairez MD;  Location: [facility] ENDO;  Service: Endoscopy;  Laterality: N/A;    DENTAL SURGERY  05/21/2018    removal of 8 top teeth    HYSTERECTOMY      TONSILLECTOMY           Family History:  Family History   Problem Relation Age of Onset    Alcohol abuse Mother     COPD Mother     Depression Mother     Drug abuse Mother     Alcohol abuse Father     Arthritis Father     Cancer Father     Heart disease Father     Hypertension Father     COPD Sister     Kidney failure Sister     Heart disease Brother     Hypertension Brother     Cancer Maternal Aunt     Cancer  Maternal Uncle     Diabetes Maternal Uncle     Drug abuse Maternal Uncle     Cancer Paternal Aunt     Cancer Paternal Uncle     Arthritis Maternal Grandmother     Hypertension Maternal Grandmother     Breast cancer Maternal Grandmother     Arthritis Paternal Grandmother     Cancer Paternal Grandmother     Hypertension Paternal Grandfather        Social History:  Social History     Tobacco Use    Smoking status: Every Day     Current packs/day: 1.00     Average packs/day: 1 pack/day for 45.9 years (45.9 ttl pk-yrs)     Types: Cigarettes, Vaping w/o nicotine     Start date: 1978    Smokeless tobacco: Never    Tobacco comments:     On average, smokes 10 cigarettes per day.    Substance and Sexual Activity    Alcohol use: Not Currently    Drug use: Yes     Types: Marijuana    Sexual activity: Yes        Review of Systems     Review of Systems   Constitutional:  Negative for fever (resolved, fever reached 103).   Respiratory:  Negative for cough and shortness of breath.    Cardiovascular:  Positive for chest pain (right sided).   Gastrointestinal:  Positive for abdominal pain, diarrhea, nausea and vomiting.   Genitourinary:  Positive for dysuria.   Skin:  Negative for rash.   Neurological:  Positive for headaches.        Physical Exam     Initial Vitals [11/10/23 1825]   BP Pulse Resp Temp SpO2   (!) 146/75 66 16 98.7 °F (37.1 °C) (!) 94 %      MAP       --          Physical Exam  Nursing Notes and Vital Signs Reviewed.  Constitutional: Patient is in no acute distress. Well-developed and well-nourished.  Head: Atraumatic. Normocephalic.  Eyes: PERRL. EOM intact. Conjunctivae are not pale. No scleral icterus.  ENT: Dry mucous membranes. Oropharynx is clear and symmetric.    Neck: Supple. Full ROM. No lymphadenopathy.  Cardiovascular: Regular rate. Regular rhythm. No murmurs, rubs, or gallops. Distal pulses are 2+ and symmetric.  Pulmonary/Chest: No respiratory distress. Clear to auscultation bilaterally. No wheezing or  "rales.  Abdominal: Soft and non-distended.  LUQ abd tenderness.  No rebound, guarding, or rigidity. Good bowel sounds.  Genitourinary: No CVA tenderness  Musculoskeletal: Moves all extremities. No obvious deformities. No edema. No calf tenderness.  Skin: Warm and dry.  Neurological:  Alert, awake, and appropriate.  Normal speech.  No acute focal neurological deficits are appreciated.  Psychiatric: Normal affect. Good eye contact. Appropriate in content.     ED Course   Critical Care    Date/Time: 11/11/2023 1:00 AM    Performed by: Diane Rabago DO  Authorized by: Diane Rabago DO  Direct patient critical care time: 12 minutes  Additional history critical care time: 9 minutes  Ordering / reviewing critical care time: 11 minutes  Documentation critical care time: 8 minutes  Consulting other physicians critical care time: 7 minutes  Total critical care time (exclusive of procedural time) : 47 minutes  Critical care time was exclusive of teaching time and separately billable procedures and treating other patients.  Critical care was necessary to treat or prevent imminent or life-threatening deterioration of the following conditions: metabolic crisis and endocrine crisis.  Critical care was time spent personally by me on the following activities: development of treatment plan with patient or surrogate, discussions with consultants, evaluation of patient's response to treatment, examination of patient, obtaining history from patient or surrogate, ordering and performing treatments and interventions, ordering and review of laboratory studies, ordering and review of radiographic studies, pulse oximetry and re-evaluation of patient's condition.        ED Vital Signs:  Vitals:    11/10/23 1825   BP: (!) 146/75   Pulse: 66   Resp: 16   Temp: 98.7 °F (37.1 °C)   TempSrc: Oral   SpO2: (!) 94%   Height: 5' 4" (1.626 m)       Abnormal Lab Results:  Labs Reviewed   CBC W/ AUTO DIFFERENTIAL - Abnormal; Notable for the " following components:       Result Value    WBC 13.30 (*)     RBC 7.33 (*)     Hemoglobin 20.8 (*)     Hematocrit 66.7 (*)     MCHC 31.2 (*)     RDW 17.0 (*)     Immature Granulocytes 0.6 (*)     Gran # (ANC) 9.8 (*)     Immature Grans (Abs) 0.08 (*)     Mono # 1.1 (*)     Gran % 73.8 (*)     Lymph % 17.0 (*)     All other components within normal limits    Narrative:     hgb   critical result(s) called and verbal readback obtained from clara helms rn by KAT5 11/10/2023 21:21   COMPREHENSIVE METABOLIC PANEL - Abnormal; Notable for the following components:    Sodium 130 (*)     Potassium 5.4 (*)     Chloride 92 (*)     CO2 11 (*)     Glucose 459 (*)     BUN 46 (*)     Creatinine 1.7 (*)     Total Protein 8.5 (*)     eGFR 34 (*)     Anion Gap 27 (*)     All other components within normal limits    Narrative:     GLU critical result called and verbal readback obtained from Clara Helms RN by FYM1 11/10/2023 21:20   URINALYSIS, REFLEX TO URINE CULTURE - Abnormal; Notable for the following components:    Color, UA Colorless (*)     Protein, UA 2+ (*)     Glucose, UA 4+ (*)     Ketones, UA 3+ (*)     Occult Blood UA Trace (*)     Leukocytes, UA 3+ (*)     All other components within normal limits    Narrative:     Specimen Source->Urine   CBC W/ AUTO DIFFERENTIAL - Abnormal; Notable for the following components:    WBC 12.93 (*)     RBC 6.82 (*)     Hemoglobin 20.2 (*)     Hematocrit 62.7 (*)     RDW 16.6 (*)     Gran # (ANC) 10.2 (*)     Immature Grans (Abs) 0.06 (*)     Gran % 79.1 (*)     Lymph % 12.5 (*)     All other components within normal limits    Narrative:      hgb  critical result(s) called and verbal readback obtained from   sammie salazar rn by KAT5 11/10/2023 21:46   COMPREHENSIVE METABOLIC PANEL - Abnormal; Notable for the following components:    Sodium 131 (*)     Potassium 6.2 (*)     Chloride 93 (*)     CO2 12 (*)     Glucose 450 (*)     BUN 43 (*)     Creatinine 1.5 (*)     Calcium 8.5 (*)     Albumin  3.3 (*)     eGFR 40 (*)     Anion Gap 26 (*)     All other components within normal limits   BETA - HYDROXYBUTYRATE, SERUM - Abnormal; Notable for the following components:    Beta-Hydroxybutyrate 4.8 (*)     All other components within normal limits   URINALYSIS MICROSCOPIC - Abnormal; Notable for the following components:    WBC, UA >100 (*)     WBC Clumps, UA Moderate (*)     Yeast, UA Few (*)     All other components within normal limits    Narrative:     Specimen Source->Urine   ISTAT PROCEDURE - Abnormal; Notable for the following components:    POC PH 7.252 (*)     POC HCO3 17.8 (*)     POC BE -9 (*)     POC Glucose 441 (*)     POC Sodium 134 (*)     POC Hematocrit 61 (*)     All other components within normal limits   POCT GLUCOSE - Abnormal; Notable for the following components:    POCT Glucose 360 (*)     All other components within normal limits   CULTURE, URINE   LIPASE   TROPONIN I   BETA - HYDROXYBUTYRATE, SERUM   TROPONIN I   BASIC METABOLIC PANEL        All Lab Results:  Results for orders placed or performed during the hospital encounter of 11/10/23   CBC auto differential   Result Value Ref Range    WBC 13.30 (H) 3.90 - 12.70 K/uL    RBC 7.33 (H) 4.00 - 5.40 M/uL    Hemoglobin 20.8 (HH) 12.0 - 16.0 g/dL    Hematocrit 66.7 (H) 37.0 - 48.5 %    MCV 91 82 - 98 fL    MCH 28.4 27.0 - 31.0 pg    MCHC 31.2 (L) 32.0 - 36.0 g/dL    RDW 17.0 (H) 11.5 - 14.5 %    Platelets 217 150 - 450 K/uL    MPV 10.4 9.2 - 12.9 fL    Immature Granulocytes 0.6 (H) 0.0 - 0.5 %    Gran # (ANC) 9.8 (H) 1.8 - 7.7 K/uL    Immature Grans (Abs) 0.08 (H) 0.00 - 0.04 K/uL    Lymph # 2.3 1.0 - 4.8 K/uL    Mono # 1.1 (H) 0.3 - 1.0 K/uL    Eos # 0.0 0.0 - 0.5 K/uL    Baso # 0.05 0.00 - 0.20 K/uL    nRBC 0 0 /100 WBC    Gran % 73.8 (H) 38.0 - 73.0 %    Lymph % 17.0 (L) 18.0 - 48.0 %    Mono % 8.2 4.0 - 15.0 %    Eosinophil % 0.0 0.0 - 8.0 %    Basophil % 0.4 0.0 - 1.9 %    Differential Method Automated    Comprehensive metabolic panel    Result Value Ref Range    Sodium 130 (L) 136 - 145 mmol/L    Potassium 5.4 (H) 3.5 - 5.1 mmol/L    Chloride 92 (L) 95 - 110 mmol/L    CO2 11 (L) 23 - 29 mmol/L    Glucose 459 (HH) 70 - 110 mg/dL    BUN 46 (H) 6 - 20 mg/dL    Creatinine 1.7 (H) 0.5 - 1.4 mg/dL    Calcium 9.3 8.7 - 10.5 mg/dL    Total Protein 8.5 (H) 6.0 - 8.4 g/dL    Albumin 3.8 3.5 - 5.2 g/dL    Total Bilirubin 0.6 0.1 - 1.0 mg/dL    Alkaline Phosphatase 99 55 - 135 U/L    AST 30 10 - 40 U/L    ALT 23 10 - 44 U/L    eGFR 34 (A) >60 mL/min/1.73 m^2    Anion Gap 27 (H) 8 - 16 mmol/L   Lipase   Result Value Ref Range    Lipase 18 4 - 60 U/L   Urinalysis, Reflex to Urine Culture Urine, Clean Catch    Specimen: Urine   Result Value Ref Range    Specimen UA Urine, Clean Catch     Color, UA Colorless (A) Yellow, Straw, Joann    Appearance, UA Clear Clear    pH, UA 6.0 5.0 - 8.0    Specific Gravity, UA 1.025 1.005 - 1.030    Protein, UA 2+ (A) Negative    Glucose, UA 4+ (A) Negative    Ketones, UA 3+ (A) Negative    Bilirubin (UA) Negative Negative    Occult Blood UA Trace (A) Negative    Nitrite, UA Negative Negative    Urobilinogen, UA Negative <2.0 EU/dL    Leukocytes, UA 3+ (A) Negative   CBC auto differential   Result Value Ref Range    WBC 12.93 (H) 3.90 - 12.70 K/uL    RBC 6.82 (H) 4.00 - 5.40 M/uL    Hemoglobin 20.2 (HH) 12.0 - 16.0 g/dL    Hematocrit 62.7 (H) 37.0 - 48.5 %    MCV 92 82 - 98 fL    MCH 29.6 27.0 - 31.0 pg    MCHC 32.2 32.0 - 36.0 g/dL    RDW 16.6 (H) 11.5 - 14.5 %    Platelets 220 150 - 450 K/uL    MPV 9.8 9.2 - 12.9 fL    Immature Granulocytes 0.5 0.0 - 0.5 %    Gran # (ANC) 10.2 (H) 1.8 - 7.7 K/uL    Immature Grans (Abs) 0.06 (H) 0.00 - 0.04 K/uL    Lymph # 1.6 1.0 - 4.8 K/uL    Mono # 0.9 0.3 - 1.0 K/uL    Eos # 0.0 0.0 - 0.5 K/uL    Baso # 0.05 0.00 - 0.20 K/uL    nRBC 0 0 /100 WBC    Gran % 79.1 (H) 38.0 - 73.0 %    Lymph % 12.5 (L) 18.0 - 48.0 %    Mono % 7.3 4.0 - 15.0 %    Eosinophil % 0.2 0.0 - 8.0 %    Basophil % 0.4 0.0  - 1.9 %    Differential Method Automated    Comprehensive metabolic panel   Result Value Ref Range    Sodium 131 (L) 136 - 145 mmol/L    Potassium 6.2 (H) 3.5 - 5.1 mmol/L    Chloride 93 (L) 95 - 110 mmol/L    CO2 12 (L) 23 - 29 mmol/L    Glucose 450 (H) 70 - 110 mg/dL    BUN 43 (H) 6 - 20 mg/dL    Creatinine 1.5 (H) 0.5 - 1.4 mg/dL    Calcium 8.5 (L) 8.7 - 10.5 mg/dL    Total Protein 7.7 6.0 - 8.4 g/dL    Albumin 3.3 (L) 3.5 - 5.2 g/dL    Total Bilirubin 0.5 0.1 - 1.0 mg/dL    Alkaline Phosphatase 85 55 - 135 U/L    AST 33 10 - 40 U/L    ALT 17 10 - 44 U/L    eGFR 40 (A) >60 mL/min/1.73 m^2    Anion Gap 26 (H) 8 - 16 mmol/L   Beta-Hydroxybutyrate, Serum   Result Value Ref Range    Beta-Hydroxybutyrate 4.8 (H) 0.0 - 0.5 mmol/L   Troponin I   Result Value Ref Range    Troponin I <0.006 0.000 - 0.026 ng/mL   Urinalysis Microscopic   Result Value Ref Range    RBC, UA 4 0 - 4 /hpf    WBC, UA >100 (H) 0 - 5 /hpf    WBC Clumps, UA Moderate (A) None-Rare    Bacteria None None-Occ /hpf    Yeast, UA Few (A) None    Squam Epithel, UA 2 /hpf    Hyaline Casts, UA 1 0-1/lpf /lpf    Microscopic Comment SEE COMMENT    ISTAT PROCEDURE   Result Value Ref Range    POC PH 7.252 (LL) 7.35 - 7.45    POC PCO2 40.3 35 - 45 mmHg    POC PO2 80 80 - 100 mmHg    POC HCO3 17.8 (L) 24 - 28 mmol/L    POC BE -9 (L) -2 to 2 mmol/L    POC SATURATED O2 94 95 - 100 %    POC Glucose 441 (H) 70 - 110 mg/dL    POC Sodium 134 (L) 136 - 145 mmol/L    POC Potassium 3.5 3.5 - 5.1 mmol/L    POC Ionized Calcium 1.18 1.06 - 1.42 mmol/L    POC Hematocrit 61 (H) 36 - 54 %PCV    Sample ARTERIAL     Site RR     Allens Test Pass     DelSys Room Air     Mode SPONT     FiO2 21    POCT glucose   Result Value Ref Range    POCT Glucose 360 (H) 70 - 110 mg/dL         Imaging Results:  Imaging Results              X-Ray Chest AP Portable (Final result)  Result time 11/10/23 22:56:01      Final result by Bear Arshad MD (11/10/23 22:56:01)                   Impression:       Mild basilar subsegmental atelectasis or scarring    Mild perihilar interstitial opacities may reflect element of pulmonary edema or bronchitis.      Electronically signed by: Bear Arshad  Date:    11/10/2023  Time:    22:56               Narrative:    EXAMINATION:  XR CHEST AP PORTABLE    CLINICAL HISTORY:  dka;    TECHNIQUE:  Single frontal view of the chest was performed.    COMPARISON:  None    FINDINGS:  No pleural effusion or pneumothorax.  Mild perihilar interstitial opacities.  Basilar subsegmental atelectasis or scarring.    The cardiac silhouette is prominent.  The hilar and mediastinal contours are unremarkable.    Bones are intact.                                       CT Abdomen Pelvis  Without Contrast (Final result)  Result time 11/10/23 22:51:11      Final result by Bear Arshad MD (11/10/23 22:51:11)                   Impression:      Colonic diverticulosis involving the left and sigmoid colon with mucosal thickening.  Consider follow-up colonoscopy to exclude a mucosal lesion.    Hepatomegaly    Cholecystectomy    Atherosclerotic changes    Mild lingular and right middle lobes scarring.    Small focus of gas within the bladder.  Correlate to history of instrumentation.  Infectious process not excluded.    Adrenal hyperplasia    All CT scans   are performed using dose optimization techniques including the following: automated exposure control; adjustment of the mA and/or kV; use of iterative reconstruction technique.  Dose modulation was employed for ALARA by means of: Automated exposure control; adjustment of the mA and/or kV according to patient size (this includes techniques or standardized protocols for targeted exams where dose is matched to indication/reason for exam; i.e. extremities or head); and/or use of iterative reconstructive technique.      Electronically signed by: Bear Arshad  Date:    11/10/2023  Time:    22:51               Narrative:    EXAMINATION:  CT ABDOMEN PELVIS  WITHOUT CONTRAST    CLINICAL HISTORY:  Abdominal abscess/infection suspected;Abdominal pain, acute, nonlocalized;    TECHNIQUE:  Low dose axial images, sagittal and coronal reformations were obtained from the lung bases to the pubic symphysis,    COMPARISON:  None    FINDINGS:  Heart: Normal in size as far as seen.  No pericardial effusion as far seen.    Lung Bases: Well aerated, without consolidation or pleural fluid.    Liver: Hepatomegaly with no focal hepatic lesions.    Gallbladder: Status post cholecystectomy.    Bile Ducts: No evidence of dilated ducts.    Pancreas: No mass or peripancreatic fat stranding.    Spleen: Unremarkable.    Adrenals: Adrenal hyperplasia    Kidneys/ Ureters: Punctate right-sided nonobstructing renal calculi    Bladder: Small amount of gas the bladder.    Reproductive organs: Status post hysterectomy    GI Tract/Mesentery: No evidence of bowel obstruction or inflammation. No secondary signs of appendicitis.  Colonic diverticulosis with mucosal thickening of sigmoid and left colon.  Postsurgical changes of the sigmoid colon.    Peritoneal Space: No ascites. No free air.    Retroperitoneum: No significant adenopathy.    Abdominal wall: Unremarkable.    Vasculature: No aneurysm.  Atherosclerotic changes    Bones: No acute fracture.                                       The EKG was ordered, reviewed, and independently interpreted by the ED provider.  Interpretation time: 23:25  Rate: 107 BPM  Rhythm: sinus tachycardia with frequent premature ventricular complexes  Interpretation: Left axis deviation. Inferior infarct, age undetermined. T wave abnormality, consider lateral ischemia. No STEMI.           The Emergency Provider reviewed the vital signs and test results, which are outlined above.     ED Discussion     2:34 AM: Discussed case with Rena See NP (ICU). Dr. Mehta agrees with current care and management of pt and accepts admission.   Admitting Service: ICU  Admitting  Physician: Dr. Kilgore  Admit to: ICU     2:45 AM: Re-evaluated pt. I have discussed test results, shared treatment plan, and the need for admission with patient and family at bedside. Pt and family express understanding at this time and agree with all information. All questions answered. Pt and family have no further questions or concerns at this time. Pt is ready for admit.     ED Course as of 11/12/23 0334   Fri Nov 10, 2023   2150 Hemoglobin(!!): 20.2  Chronically worsening [NF]   2209 Potassium(!): 6.2 [NF]   2209 Glucose(!): 450 [NF]   2209 Creatinine(!): 1.5 [NF]   2209 Anion Gap(!): 26 [NF]   2209 POC PH(!!): 7.252 [NF]   2209 POC HCO3(!): 17.8 [NF]   Sat Nov 11, 2023   0141 WBC, UA(!): >100 [NF]   0141 Leukocytes, UA(!): 3+ [NF]   0141 Beta-Hydroxybutyrate(!): 4.8 [NF]   0141 POCT Glucose(!): 360 [NF]   0222 58-year-old female presents in DKA not improving with IV insulin push and 2 L of fluids.  She also has a UTI with an MAGDA and hemoglobin of 20.2.  Her hyperkalemia will likely be correct his IV fluids and insulin.  Chest x-ray negative for pneumonia.  EKG and troponin show no signs of ACS.  Her beta hydroxybutyrate is elevated and she has an elevated anion gap metabolic acidosis.  CT Abdomen and pelvis confirms cystitis.  She was status post colon cystectomy therefore cholelithiasis, cholecystitis, and cholangitis are unlikely.  Lipase is negative for acute pancreatitis.  She has nothing to indicate small-bowel obstruction or acute appendicitis.  [NF]   0225 Glucose(!): 360 [NF]   0225 Anion Gap(!): 29 [NF]   0225 CO2(!!): 6 [NF]      ED Course User Index  [NF] Diane Rabago, DO     Medical Decision Making  Amount and/or Complexity of Data Reviewed  Labs: ordered. Decision-making details documented in ED Course.  Radiology: ordered and independent interpretation performed. Decision-making details documented in ED Course.  ECG/medicine tests: ordered and independent interpretation performed.  Decision-making details documented in ED Course.    Risk  OTC drugs.  Prescription drug management.  Parenteral controlled substances.  Drug therapy requiring intensive monitoring for toxicity.  Decision regarding hospitalization.                ED Medication(s):  Medications   morphine injection 4 mg (has no administration in time range)   cefTRIAXone (ROCEPHIN) 1 g in dextrose 5 % in water (D5W) 100 mL IVPB (MB+) (has no administration in time range)   sodium chloride 0.9% bolus 1,000 mL 1,000 mL (0 mLs Intravenous Stopped 11/10/23 2221)   promethazine (PHENERGAN) 25 mg in dextrose 5 % (D5W) 50 mL IVPB (0 mg Intravenous Stopped 11/10/23 2141)   sodium chloride 0.9% bolus 1,000 mL 1,000 mL (1,000 mLs Intravenous New Bag 11/11/23 0029)   insulin regular injection 10 Units 0.1 mL (10 Units Intravenous Given 11/11/23 0024)       New Prescriptions    No medications on file               Scribe Attestation:   Scribe #1: I performed the above scribed service and the documentation accurately describes the services I performed. I attest to the accuracy of the note.     Attending:   Physician Attestation Statement for Scribe #1: I, Diane Rabago DO, personally performed the services described in this documentation, as scribed by Romeo Thompson, in my presence, and it is both accurate and complete.           Clinical Impression       ICD-10-CM ICD-9-CM   1. Diabetic ketoacidosis without coma associated with other specified diabetes mellitus  E13.10 250.12   2. DKA (diabetic ketoacidosis)  E11.10 250.12   3. MAGDA (acute kidney injury)  N17.9 584.9   4. Hyperkalemia  E87.5 276.7   5. Elevated hemoglobin  D58.2 282.7   6. Elevated hematocrit  R71.8 282.7   7. High anion gap metabolic acidosis  E87.29 276.2   8. Nausea vomiting and diarrhea  R11.2 787.91    R19.7 787.01       Disposition:   Disposition: Admitted  Condition: Critical        Diane Rabago DO  11/12/23 6122

## 2023-11-11 NOTE — ASSESSMENT & PLAN NOTE
Aggressive hydration with IV fluids  IV insulin followed by insulin infusion  Serial BMP every 4 hours  Hourly glucose checks  Replete electrolytes as indicated  NPO

## 2023-11-11 NOTE — ASSESSMENT & PLAN NOTE
--last A1c on file: 12% one month prior to admission  --Home medications include metformin, toujeo, basal insulin  --holding home PO medications  -- prandial, basal, and moderate dose SSI ordered  [facility]  --Fasting AM glucose goal <140

## 2023-11-11 NOTE — ED NOTES
Bed: 16  Expected date:   Expected time:   Means of arrival:   Comments:  [facility] ([facility])

## 2023-11-11 NOTE — PLAN OF CARE
Problem: Adult Inpatient Plan of Care  Goal: Plan of Care Review  Outcome: Ongoing, Progressing  Goal: Patient-Specific Goal (Individualized)  Outcome: Ongoing, Progressing  Goal: Absence of Hospital-Acquired Illness or Injury  Outcome: Ongoing, Progressing  Goal: Optimal Comfort and Wellbeing  Outcome: Ongoing, Progressing  Goal: Readiness for Transition of Care  Outcome: Ongoing, Progressing     Problem: Diabetes Comorbidity  Goal: Blood Glucose Level Within Targeted Range  Outcome: Ongoing, Progressing     Problem: Skin Injury Risk Increased  Goal: Skin Health and Integrity  Outcome: Ongoing, Progressing  ICU DAILY GOALS       A: Awake    RASS: Goal - 0   Actual -  0   Restraint necessity:  NA  B: Breath   SBT: NA 2 L NC  C: Coordinate A & B, analgesics/sedatives   Pain: managed    SAT: Not intubated  D: Delirium   CAM-ICU: Overall CAM-ICU: Negative  E: Early(intubated/ Progressive (non-intubated) Mobility   MOVE Screen: Pass   Activity: Activity Management: Rolling - L1  FAS: Feeding/Nutrition   Diet order: Diet/Nutrition Received: NPO, ice chips,     T: Thrombus   DVT prophylaxis: VTE Required Core Measure: (SCDs) Sequential compression device initiated/maintained  H: HOB Elevation   Head of Bed (HOB) Positioning: HOB at 20-30 degrees  U: Ulcer Prophylaxis   GI: yes  G: Glucose control   managed Glycemic Management: blood glucose monitored  S: Skin   Bundle compliance: yes   Bathing/Skin Care: bath, complete Date: 11/11/23  B: Bowel Function   LBM 11/9    I: Indwelling Catheters   Rose necessity:     CVC necessity: No   IPAD offered: Not appropriate  D: De-escalation Antibx   Yes  Plan for the day   Continue insulin gtt  Family/Goals of care/Code Status   Code Status: Full Code     No acute events throughout day, VS and assessment per flow sheet, patient progressing towards goals as tolerated, plan of care reviewed with Alexandra Goins and family, all concerns addressed, will continue to monitor.

## 2023-11-11 NOTE — ASSESSMENT & PLAN NOTE
Troponin normal  Earlier report of right sided chest pain  Cardiac monitoring  Resume medications when nausea/vomiting resolved and no longer NPO

## 2023-11-11 NOTE — SUBJECTIVE & OBJECTIVE
Past Medical History:   Diagnosis Date    Acute ischemic stroke 9/17/2019    Acute respiratory failure with hypoxia 2/11/2021    Aneurysm     Anxiety     Arthritis     Asthma     Bipolar 1 disorder     Cerebral aneurysm, nonruptured 9/19/2019    Chronic diastolic heart failure 5/23/2023    Coronary artery disease of native artery of native heart with stable angina pectoris 1/19/2022    Depression     Diabetes mellitus, type 2     Diverticular disease     GERD (gastroesophageal reflux disease)     Hyperlipemia     Hypertension     Hyponatremia 7/24/2022    Hypothyroidism     Pneumonia     PVD (peripheral vascular disease) 4/28/2021    Renal manifestation of secondary diabetes mellitus     Right-sided back pain 6/29/2019    Severe obesity (BMI 35.0-39.9) with comorbidity 5/31/2022    Stroke     Trouble in sleeping        Past Surgical History:   Procedure Laterality Date    CEREBRAL ANGIOGRAM N/A 10/28/2019    Procedure: ANGIOGRAM-CEREBRAL;  Surgeon: Dahiana Diagnostic Provider;  Location: [facility] OR [address] [place] [address];  Service: Radiology;  Laterality: N/A;  Rm 190/Aayush    CHOLECYSTECTOMY      COLON SURGERY      COLONOSCOPY N/A 1/12/2018    Procedure: COLONOSCOPY;  Surgeon: Roque Mahajan III, MD;  Location: [facility] ENDO;  Service: Endoscopy;  Laterality: N/A;    COLONOSCOPY N/A 10/13/2023    Procedure: COLONOSCOPY;  Surgeon: Thelma Chairez MD;  Location: [facility];  Service: Endoscopy;  Laterality: N/A;    DENTAL SURGERY  05/21/2018    removal of 8 top teeth    HYSTERECTOMY      TONSILLECTOMY         Review of patient's allergies indicates:   Allergen Reactions    Seroquel [quetiapine] Other (See Comments)     TC SEIZURES    Adhesive Blisters     Pt states can't tolerate paper tape    Levomenol analogues     Lipitor [atorvastatin]      Leg Cramps    Repatha pushtronex [evolocumab]      Feels sick    Zofran [ondansetron hcl] Hives and Other (See Comments)     headaches    Celestone [betamethasone] Hives, Itching and Rash     Levaquin [levofloxacin] Nausea Only    Piroxicam Diarrhea and Nausea Only       No current facility-administered medications on file prior to encounter.     Current Outpatient Medications on File Prior to Encounter   Medication Sig    albuterol (PROVENTIL/VENTOLIN HFA) 90 mcg/actuation inhaler INHALE 2 TO 4 PUFFS BY MOUTH THREE TIMES DAILY AS NEEDED FOR WHEEZING    albuterol-ipratropium (DUO-NEB) 2.5 mg-0.5 mg/3 mL nebulizer solution Take 3 mLs by nebulization every 6 (six) hours as needed for Wheezing. Rescue    benztropine (COGENTIN) 0.5 MG tablet Take 1 tablet (0.5 mg total) by mouth 2 (two) times daily. TAKE 1 TABLET BY MOUTH TWICE DAILY AS NEEDED FOR  DYSTONIA    blood sugar diagnostic (TRUE METRIX GLUCOSE TEST STRIP) Strp USE 1 STRIP TO CHECK GLUCOSE THREE TIMES DAILY    blood sugar diagnostic Strp 1 each by Misc.(Non-Drug; Combo Route) route 3 (three) times daily. Dispense preferred on insurance    blood-glucose meter kit Use as instructed - preferred on insurance    butalbital-acetaminophen-caffeine -40 mg (FIORICET, ESGIC) -40 mg per tablet Take 1 tablet by mouth every 6 (six) hours as needed for Headaches. for pain.    desvenlafaxine succinate (PRISTIQ) 50 MG Tb24 Take 1 tablet (50 mg total) by mouth once daily.    diazePAM (VALIUM) 10 MG Tab TAKE 1/2 TO 1 (ONE-HALF TO ONE) TABLET BY MOUTH THREE TIMES DAILY AS NEEDED FOR ANXIETY    ezetimibe (ZETIA) 10 mg tablet Take 1 tablet (10 mg total) by mouth once daily.    fluticasone-salmeterol diskus inhaler 250-50 mcg Inhale 1 puff into the lungs 2 (two) times daily. Controller for maintenance    furosemide (LASIX) 40 MG tablet TAKE 1 TABLET BY MOUTH EVERY MONDAY, WEDNESDAY, AND FRIDAY AS NEEDED    gabapentin (NEURONTIN) 600 MG tablet TAKE 1 CAPSULE BY MOUTH IN THE MORNING, 1 CAPSULE IN THE AFTERNOON, AND THEN 2 CAPSULES AT BEDTIME    insulin glargine U-300 conc (TOUJEO MAX U-300 SOLOSTAR) 300 unit/mL (3 mL) insulin pen Inject 76 Units into the skin  "once daily.    insulin regular, human (NOVOLIN R INJ)     isosorbide mononitrate (IMDUR) 30 MG 24 hr tablet Take 1 tablet by mouth once daily    lancets Misc 1 each by Misc.(Non-Drug; Combo Route) route 3 (three) times daily. Dispense preferred on insurance    metFORMIN (GLUCOPHAGE-XR) 500 MG ER 24hr tablet Take 1 tablet (500 mg total) by mouth 2 (two) times daily with meals.    metoprolol succinate (TOPROL-XL) 25 MG 24 hr tablet Take 1 tablet by mouth once daily    mirtazapine (REMERON) 15 MG tablet Take 1 tablet (15 mg total) by mouth every evening.    pen needle, diabetic 32 gauge x 5/32" Ndle Inject 1 each into the skin once daily.    promethazine (PHENERGAN) 12.5 MG Tab Take 1 tablet (12.5 mg total) by mouth every 6 (six) hours as needed.    risperiDONE (RISPERDAL) 1 MG tablet Take 1 tablet (1 mg total) by mouth 2 (two) times daily.     Family History       Problem Relation (Age of Onset)    Alcohol abuse Mother, Father    Arthritis Father, Maternal Grandmother, Paternal Grandmother    Breast cancer Maternal Grandmother    COPD Mother, Sister    Cancer Father, Maternal Aunt, Maternal Uncle, Paternal Aunt, Paternal Uncle, Paternal Grandmother    Depression Mother    Diabetes Maternal Uncle    Drug abuse Mother, Maternal Uncle    Heart disease Father, Brother    Hypertension Father, Brother, Maternal Grandmother, Paternal Grandfather    Kidney failure Sister          Tobacco Use    Smoking status: Every Day     Current packs/day: 1.00     Average packs/day: 1 pack/day for 45.9 years (45.9 ttl pk-yrs)     Types: Cigarettes, Vaping w/o nicotine     Start date: 1978    Smokeless tobacco: Never    Tobacco comments:     On average, smokes 10 cigarettes per day.    Substance and Sexual Activity    Alcohol use: Not Currently    Drug use: Yes     Types: Marijuana    Sexual activity: Yes     Review of Systems   Constitutional:  Negative for chills, fatigue and fever.   HENT:  Negative for congestion, postnasal drip, " rhinorrhea and sore throat.    Eyes:  Negative for pain and visual disturbance.   Respiratory:  Negative for cough, chest tightness, shortness of breath and wheezing.    Cardiovascular:  Negative for chest pain, palpitations and leg swelling.   Gastrointestinal:  Positive for abdominal pain, diarrhea, nausea and vomiting. Negative for abdominal distention.   Genitourinary:  Negative for difficulty urinating, flank pain and hematuria.   Musculoskeletal:  Negative for arthralgias, back pain, gait problem and joint swelling.   Skin:  Negative for pallor and rash.   Neurological:  Negative for dizziness, syncope and weakness.   Psychiatric/Behavioral:  Negative for confusion, decreased concentration and suicidal ideas.      Objective:     Vital Signs (Most Recent):  Temp: 98.2 °F (36.8 °C) (11/11/23 1200)  Pulse: 94 (11/11/23 1300)  Resp: 20 (11/11/23 1300)  BP: (!) 180/79 (11/11/23 1300)  SpO2: (!) 92 % (11/11/23 1300) Vital Signs (24h Range):  Temp:  [98.2 °F (36.8 °C)-99.2 °F (37.3 °C)] 98.2 °F (36.8 °C)  Pulse:  [] 94  Resp:  [14-20] 20  SpO2:  [88 %-95 %] 92 %  BP: (117-186)/(58-87) 180/79     Weight: 75 kg (165 lb 5.5 oz)  Body mass index is 29.29 kg/m².     Physical Exam  Vitals reviewed.   Constitutional:       General: She is not in acute distress.     Appearance: Normal appearance. She is normal weight. She is not ill-appearing.   HENT:      Head: Normocephalic and atraumatic.      Nose: Nose normal. No congestion or rhinorrhea.      Mouth/Throat:      Mouth: Mucous membranes are moist.      Pharynx: Oropharynx is clear.   Eyes:      General: No scleral icterus.     Extraocular Movements: Extraocular movements intact.      Pupils: Pupils are equal, round, and reactive to light.   Cardiovascular:      Pulses: Normal pulses.      Heart sounds: Normal heart sounds. No murmur heard.     No gallop.   Pulmonary:      Effort: Pulmonary effort is normal. No respiratory distress.      Breath sounds: Normal breath  "sounds. No wheezing.   Abdominal:      General: Abdomen is flat. Bowel sounds are normal. There is no distension.      Palpations: Abdomen is soft.      Tenderness: There is generalized abdominal tenderness. There is no guarding or rebound.   Skin:     Capillary Refill: Capillary refill takes less than 2 seconds.      Coloration: Skin is not jaundiced.   Neurological:      General: No focal deficit present.      Mental Status: She is alert and oriented to person, place, and time. Mental status is at baseline.      Cranial Nerves: No cranial nerve deficit.   Psychiatric:         Mood and Affect: Affect is blunt and flat.         Behavior: Behavior normal.         Thought Content: Thought content normal.          Significant Labs: BMP:   Recent Labs   Lab 11/11/23  0814 11/11/23  1131   * 178*    142   K 3.3* 3.0*   CL 99 98   CO2 25 28   BUN 29* 26*   CREATININE 1.2 1.0   CALCIUM 8.4* 8.4*   MG 1.5*  --      CBC:   Recent Labs   Lab 11/10/23  2053 11/10/23  2134 11/10/23  2157   WBC 13.30* 12.93*  --    HGB 20.8* 20.2*  --    HCT 66.7* 62.7* 61*    220  --      CMP:   Recent Labs   Lab 11/10/23  2053 11/10/23  2134 11/11/23  0048 11/11/23  0420 11/11/23  0814 11/11/23  1131   * 131*   < > 135* 143 142   K 5.4* 6.2*   < > 4.6 3.3* 3.0*   CL 92* 93*   < > 100 99 98   CO2 11* 12*   < > 9* 25 28   * 450*   < > 344* 225* 178*   BUN 46* 43*   < > 35* 29* 26*   CREATININE 1.7* 1.5*   < > 1.3 1.2 1.0   CALCIUM 9.3 8.5*   < > 8.3* 8.4* 8.4*   PROT 8.5* 7.7  --   --   --   --    ALBUMIN 3.8 3.3*  --   --   --   --    BILITOT 0.6 0.5  --   --   --   --    ALKPHOS 99 85  --   --   --   --    AST 30 33  --   --   --   --    ALT 23 17  --   --   --   --    ANIONGAP 27* 26*   < > 26* 19* 16    < > = values in this interval not displayed.     Cardiac Markers: No results for input(s): "CKMB", "MYOGLOBIN", "BNP", "TROPISTAT" in the last 48 hours.  Coagulation: No results for input(s): "PT", "INR", " ""APTT" in the last 48 hours.  Lactic Acid:   Recent Labs   Lab 11/11/23  0420   LACTATE 1.3     Magnesium:   Recent Labs   Lab 11/11/23  0814   MG 1.5*     Troponin:   Recent Labs   Lab 11/11/23  0122   TROPONINI <0.006     TSH:   Recent Labs   Lab 11/11/23  1131   TSH 1.497     Urine Studies:   Recent Labs   Lab 11/10/23  2317   COLORU Colorless*   APPEARANCEUA Clear   PHUR 6.0   SPECGRAV 1.025   PROTEINUA 2+*   GLUCUA 4+*   KETONESU 3+*   BILIRUBINUA Negative   OCCULTUA Trace*   NITRITE Negative   UROBILINOGEN Negative   LEUKOCYTESUR 3+*   RBCUA 4   WBCUA >100*   BACTERIA None   SQUAMEPITHEL 2   HYALINECASTS 1         Significant Imaging:   Imaging Results              X-Ray Chest AP Portable (Final result)  Result time 11/10/23 22:56:01      Final result by Bear Arshad MD (11/10/23 22:56:01)                   Impression:      Mild basilar subsegmental atelectasis or scarring    Mild perihilar interstitial opacities may reflect element of pulmonary edema or bronchitis.      Electronically signed by: Bear Arshad  Date:    11/10/2023  Time:    22:56               Narrative:    EXAMINATION:  XR CHEST AP PORTABLE    CLINICAL HISTORY:  dka;    TECHNIQUE:  Single frontal view of the chest was performed.    COMPARISON:  None    FINDINGS:  No pleural effusion or pneumothorax.  Mild perihilar interstitial opacities.  Basilar subsegmental atelectasis or scarring.    The cardiac silhouette is prominent.  The hilar and mediastinal contours are unremarkable.    Bones are intact.                                       CT Abdomen Pelvis  Without Contrast (Final result)  Result time 11/10/23 22:51:11      Final result by Bear Arshad MD (11/10/23 22:51:11)                   Impression:      Colonic diverticulosis involving the left and sigmoid colon with mucosal thickening.  Consider follow-up colonoscopy to exclude a mucosal lesion.    Hepatomegaly    Cholecystectomy    Atherosclerotic changes    Mild lingular and right " middle lobes scarring.    Small focus of gas within the bladder.  Correlate to history of instrumentation.  Infectious process not excluded.    Adrenal hyperplasia    All CT scans   are performed using dose optimization techniques including the following: automated exposure control; adjustment of the mA and/or kV; use of iterative reconstruction technique.  Dose modulation was employed for ALARA by means of: Automated exposure control; adjustment of the mA and/or kV according to patient size (this includes techniques or standardized protocols for targeted exams where dose is matched to indication/reason for exam; i.e. extremities or head); and/or use of iterative reconstructive technique.      Electronically signed by: Bear Arshad  Date:    11/10/2023  Time:    22:51               Narrative:    EXAMINATION:  CT ABDOMEN PELVIS WITHOUT CONTRAST    CLINICAL HISTORY:  Abdominal abscess/infection suspected;Abdominal pain, acute, nonlocalized;    TECHNIQUE:  Low dose axial images, sagittal and coronal reformations were obtained from the lung bases to the pubic symphysis,    COMPARISON:  None    FINDINGS:  Heart: Normal in size as far as seen.  No pericardial effusion as far seen.    Lung Bases: Well aerated, without consolidation or pleural fluid.    Liver: Hepatomegaly with no focal hepatic lesions.    Gallbladder: Status post cholecystectomy.    Bile Ducts: No evidence of dilated ducts.    Pancreas: No mass or peripancreatic fat stranding.    Spleen: Unremarkable.    Adrenals: Adrenal hyperplasia    Kidneys/ Ureters: Punctate right-sided nonobstructing renal calculi    Bladder: Small amount of gas the bladder.    Reproductive organs: Status post hysterectomy    GI Tract/Mesentery: No evidence of bowel obstruction or inflammation. No secondary signs of appendicitis.  Colonic diverticulosis with mucosal thickening of sigmoid and left colon.  Postsurgical changes of the sigmoid colon.    Peritoneal Space: No ascites. No  free air.    Retroperitoneum: No significant adenopathy.    Abdominal wall: Unremarkable.    Vasculature: No aneurysm.  Atherosclerotic changes    Bones: No acute fracture.

## 2023-11-11 NOTE — HPI
Alexandra Goins, a 58-year-old female with a complex medical history including hypertension, hyperlipidemia, CAD, stroke, bipolar disorder, anxiety, depression, cerebral aneurysm, chronic diastolic heart failure, asthma/COPD, hypothyroidism, PVD, GERD, diverticular disease, neuropathy, seizure (due to hypoglycemia), and arthritis, presented to the ED for evaluation of nausea/vomiting, chest pain, anxiety, fever up to 103, diarrhea, and dysuria. Her symptoms commenced following a flu shot received last Saturday, progressively worsening by Monday. Ms. Goins reported non-compliance with insulin in the preceding days, attributing it to her malaise and inability to eat. Initial evaluation suggested DKA and a urinary tract infection. Critical care was consulted for her deteriorating condition.    Upon arrival in the ICU, Alexandra exhibited abnormal vital signs including a temperature of 99.2, pulse of 101, respiratory rate of 20, and elevated blood pressure at 151/70. Laboratory findings were significant for a UTI and diabetic ketoacidosis (DKA), with an initial glucose level of 459. Management included administration of Rocephin for the UTI, aggressive hydration with 2 L IV fluids, an insulin bolus followed by continuous insulin infusion, and a bicarb drip due to a decreasing CO2 level from 11 to 6 post-fluid bolus. Her condition stabilized in the ICU, with cessation of vomiting and diarrhea. Order for stepdown off the unit was placed and Hospital Medicine was called for assumption of care.

## 2023-11-11 NOTE — ASSESSMENT & PLAN NOTE
--home med list confirmed by pharmacy  --will restart pertinent medications as tolerated  --monitor clinically

## 2023-11-11 NOTE — H&P
[facility] - Intensive Care ([facility])  Critical Care Medicine  History & Physical    Patient Name: Alexandra Goins  MRN: [id number]  Admission Date: 11/10/2023  Hospital Length of Stay: 0 days  Code Status: Full Code  Attending Physician: Kristel Kilgore MD   Primary Care Provider: Perez Casiano MD   Principal Problem: Diabetic ketoacidosis without coma associated with type 2 diabetes mellitus    Subjective:     HPI:  Alexandra Goins is a 58 year old female with past medical history of hypertension, hyperlipidemia, CAD, stroke, bipolar disorder, anxiety, depression, cerebral aneurysm, chronic diastolic heart failure, asthma/COPD, hypothyroidism, PVD, GERD, diverticular disease, neuropathy, seizure (due to hypoglycemia), and arthritis who presented to ED for evaluation of nausea/vomiting. She also reports chest pain, anxiety, fever up to 103, diarrhea, dysuria. She states that she got a flu shot last Saturday and then she started feeling bad on Monday, her symptoms have progressively worsened. She has not been taking her insulin over past couple of days due to feeling so bad and vomiting/not eating. Labs show UTI, DKA. She was given Rocephin. She was given 2 L IV fluids, insulin bolus and started on insulin infusion. She was also started on bicarb drip, as CO2 actually went down from 11 to 6 after fluid bolus. Initial glucose 459. She has not had any vomiting or diarrhea since arriving to ED.  Critical care team was consulted for admission due to DKA.      Hospital/ICU Course:  No notes on file     Past Medical History:   Diagnosis Date    Acute ischemic stroke 9/17/2019    Acute respiratory failure with hypoxia 2/11/2021    Aneurysm     Anxiety     Arthritis     Asthma     Bipolar 1 disorder     Cerebral aneurysm, nonruptured 9/19/2019    Chronic diastolic heart failure 5/23/2023    Coronary artery disease of native artery of native heart with stable angina pectoris 1/19/2022    Depression      Diabetes mellitus, type 2     Diverticular disease     GERD (gastroesophageal reflux disease)     Hyperlipemia     Hypertension     Hyponatremia 7/24/2022    Hypothyroidism     Pneumonia     PVD (peripheral vascular disease) 4/28/2021    Renal manifestation of secondary diabetes mellitus     Right-sided back pain 6/29/2019    Severe obesity (BMI 35.0-39.9) with comorbidity 5/31/2022    Stroke     Trouble in sleeping        Past Surgical History:   Procedure Laterality Date    CEREBRAL ANGIOGRAM N/A 10/28/2019    Procedure: ANGIOGRAM-CEREBRAL;  Surgeon: [facility] Provider;  Location: [facility] OR [address] [place] [address];  Service: Radiology;  Laterality: N/A;  Rm 190/Aayush    CHOLECYSTECTOMY      COLON SURGERY      COLONOSCOPY N/A 1/12/2018    Procedure: COLONOSCOPY;  Surgeon: Roque Mahajan III, MD;  Location: [facility] ENDO;  Service: Endoscopy;  Laterality: N/A;    COLONOSCOPY N/A 10/13/2023    Procedure: COLONOSCOPY;  Surgeon: Thelma Chairez MD;  Location: [facility] ENDO;  Service: Endoscopy;  Laterality: N/A;    DENTAL SURGERY  05/21/2018    removal of 8 top teeth    HYSTERECTOMY      TONSILLECTOMY         Review of patient's allergies indicates:   Allergen Reactions    Seroquel [quetiapine] Other (See Comments)     TC SEIZURES    Adhesive Blisters     Pt states can't tolerate paper tape    Levomenol analogues     Lipitor [atorvastatin]      Leg Cramps    Repatha pushtronex [evolocumab]      Feels sick    Zofran [ondansetron hcl] Hives and Other (See Comments)     headaches    Celestone [betamethasone] Hives, Itching and Rash    Levaquin [levofloxacin] Nausea Only    Piroxicam Diarrhea and Nausea Only       Family History       Problem Relation (Age of Onset)    Alcohol abuse Mother, Father    Arthritis Father, Maternal Grandmother, Paternal Grandmother    Breast cancer Maternal Grandmother    COPD Mother, Sister    Cancer Father, Maternal Aunt, Maternal Uncle, Paternal Aunt, Paternal Uncle,  Paternal Grandmother    Depression Mother    Diabetes Maternal Uncle    Drug abuse Mother, Maternal Uncle    Heart disease Father, Brother    Hypertension Father, Brother, Maternal Grandmother, Paternal Grandfather    Kidney failure Sister          Tobacco Use    Smoking status: Every Day     Current packs/day: 1.00     Average packs/day: 1 pack/day for 45.9 years (45.9 ttl pk-yrs)     Types: Cigarettes, Vaping w/o nicotine     Start date: 1978    Smokeless tobacco: Never    Tobacco comments:     On average, smokes 10 cigarettes per day.    Substance and Sexual Activity    Alcohol use: Not Currently    Drug use: Yes     Types: Marijuana    Sexual activity: Yes         Review of Systems   Constitutional:  Positive for fatigue and fever.   HENT: Negative.     Eyes: Negative.    Respiratory:  Positive for shortness of breath. Negative for cough and wheezing.    Cardiovascular:  Positive for chest pain. Negative for palpitations and leg swelling.   Gastrointestinal:  Positive for abdominal pain, diarrhea, nausea and vomiting. Negative for abdominal distention and blood in stool.   Endocrine: Negative.    Genitourinary:  Positive for dysuria.   Musculoskeletal:  Positive for arthralgias.   Skin: Negative.    Allergic/Immunologic: Negative.    Neurological:  Positive for weakness and headaches.   Hematological: Negative.    Psychiatric/Behavioral: Negative.       Objective:     Vital Signs (Most Recent):  Temp: 99.2 °F (37.3 °C) (11/11/23 0500)  Pulse: 101 (11/11/23 0500)  Resp: 20 (11/11/23 0500)  BP: (!) 151/70 (11/11/23 0500)  SpO2: 95 % (11/11/23 0500) Vital Signs (24h Range):  Temp:  [98.7 °F (37.1 °C)-99.2 °F (37.3 °C)] 99.2 °F (37.3 °C)  Pulse:  [] 101  Resp:  [16-20] 20  SpO2:  [88 %-95 %] 95 %  BP: (141-151)/(70-82) 151/70     Weight: 75 kg (165 lb 5.5 oz)  Body mass index is 29.29 kg/m².      Intake/Output Summary (Last 24 hours) at 11/11/2023 0579  Last data filed at 11/11/2023 023  Gross per 24  hour   Intake 2049 ml   Output --   Net 2049 ml        Physical Exam  Vitals and nursing note reviewed.   Constitutional:       General: She is not in acute distress.     Appearance: She is ill-appearing. She is not toxic-appearing or diaphoretic.   HENT:      Head: Normocephalic and atraumatic.      Nose: Nose normal.      Mouth/Throat:      Mouth: Mucous membranes are dry.   Eyes:      Extraocular Movements: Extraocular movements intact.      Pupils: Pupils are equal, round, and reactive to light.   Cardiovascular:      Rate and Rhythm: Regular rhythm. Tachycardia present.   Pulmonary:      Effort: Pulmonary effort is normal. No respiratory distress.      Breath sounds: Normal breath sounds. No wheezing or rales.   Abdominal:      General: Bowel sounds are normal. There is no distension.      Palpations: Abdomen is soft.      Tenderness: There is abdominal tenderness.      Comments: Mild, generalized tenderness   Musculoskeletal:         General: Normal range of motion.      Cervical back: Neck supple.      Right lower leg: No edema.      Left lower leg: No edema.   Skin:     General: Skin is warm and dry.      Capillary Refill: Capillary refill takes less than 2 seconds.   Neurological:      General: No focal deficit present.      Mental Status: She is alert and oriented to person, place, and time.   Psychiatric:         Mood and Affect: Mood normal.         Behavior: Behavior normal.          Vents: N/A       Lines/Drains/Airways       Peripheral Intravenous Line  Duration                  Peripheral IV - Single Lumen 11/10/23 2119 20 G Anterior;Right Forearm <1 day         Peripheral IV - Single Lumen 11/11/23 0336 20 G Anterior;Left;Proximal Upper Arm <1 day                    Significant Labs:    CBC/Anemia Profile:  Recent Labs   Lab 11/10/23  2053 11/10/23  2134 11/10/23  2157   WBC 13.30* 12.93*  --    HGB 20.8* 20.2*  --    HCT 66.7* 62.7* 61*    220  --    MCV 91 92  --    RDW 17.0* 16.6*  --          Chemistries:  Recent Labs   Lab 11/10/23  2053 11/10/23  2134 11/11/23  0048   * 131* 134*   K 5.4* 6.2* 4.6   CL 92* 93* 99   CO2 11* 12* 6*   BUN 46* 43* 38*   CREATININE 1.7* 1.5* 1.4   CALCIUM 9.3 8.5* 8.7   ALBUMIN 3.8 3.3*  --    PROT 8.5* 7.7  --    BILITOT 0.6 0.5  --    ALKPHOS 99 85  --    ALT 23 17  --    AST 30 33  --        ABGs:   Recent Labs   Lab 11/10/23  2157   PH 7.252*   PCO2 40.3   HCO3 17.8*   POCSATURATED 94   BE -9*     POCT Glucose:   Recent Labs   Lab 11/11/23  0023 11/11/23  0439   POCTGLUCOSE 360* 257*     Beta-Hydroxybutyrate 4.8    Troponin:   Recent Labs   Lab 11/11/23  0122   TROPONINI <0.006     Urine Studies:   Recent Labs   Lab 11/10/23  2317   COLORU Colorless*   APPEARANCEUA Clear   PHUR 6.0   SPECGRAV 1.025   PROTEINUA 2+*   GLUCUA 4+*   KETONESU 3+*   BILIRUBINUA Negative   OCCULTUA Trace*   NITRITE Negative   UROBILINOGEN Negative   LEUKOCYTESUR 3+*   RBCUA 4   WBCUA >100*   BACTERIA None   SQUAMEPITHEL 2   HYALINECASTS 1     UDS + marijuana and benzos    All pertinent labs within the past 24 hours have been reviewed.    Significant Imaging:   I have reviewed all pertinent imaging results/findings within the past 24 hours.  X-Ray Chest AP Portable [[id number]] Resulted: 11/10/23 2256   Order Status: Completed Updated: 11/10/23 2258   Narrative:     EXAMINATION:   XR CHEST AP PORTABLE     CLINICAL HISTORY:   dka;     TECHNIQUE:   Single frontal view of the chest was performed.     COMPARISON:   None     FINDINGS:   No pleural effusion or pneumothorax.  Mild perihilar interstitial opacities.  Basilar subsegmental atelectasis or scarring.     The cardiac silhouette is prominent.  The hilar and mediastinal contours are unremarkable.     Bones are intact.    Impression:       Mild basilar subsegmental atelectasis or scarring     Mild perihilar interstitial opacities may reflect element of pulmonary edema or bronchitis.       Electronically signed by: Bear Arshad    Date: 11/10/2023   Time: 22:56   CT Abdomen Pelvis Without Contrast [[id number]] Resulted: 11/10/23 2251   Order Status: Completed Updated: 11/10/23 2253   Narrative:     EXAMINATION:   CT ABDOMEN PELVIS WITHOUT CONTRAST     CLINICAL HISTORY:   Abdominal abscess/infection suspected;Abdominal pain, acute, nonlocalized;     TECHNIQUE:   Low dose axial images, sagittal and coronal reformations were obtained from the lung bases to the pubic symphysis,     COMPARISON:   None     FINDINGS:   Heart: Normal in size as far as seen.  No pericardial effusion as far seen.     Lung Bases: Well aerated, without consolidation or pleural fluid.     Liver: Hepatomegaly with no focal hepatic lesions.     Gallbladder: Status post cholecystectomy.     Bile Ducts: No evidence of dilated ducts.     Pancreas: No mass or peripancreatic fat stranding.     Spleen: Unremarkable.     Adrenals: Adrenal hyperplasia     Kidneys/ Ureters: Punctate right-sided nonobstructing renal calculi     Bladder: Small amount of gas the bladder.     Reproductive organs: Status post hysterectomy     GI Tract/Mesentery: No evidence of bowel obstruction or inflammation. No secondary signs of appendicitis.  Colonic diverticulosis with mucosal thickening of sigmoid and left colon.  Postsurgical changes of the sigmoid colon.     Peritoneal Space: No ascites. No free air.     Retroperitoneum: No significant adenopathy.     Abdominal wall: Unremarkable.     Vasculature: No aneurysm.  Atherosclerotic changes     Bones: No acute fracture.    Impression:       Colonic diverticulosis involving the left and sigmoid colon with mucosal thickening.  Consider follow-up colonoscopy to exclude a mucosal lesion.     Hepatomegaly     Cholecystectomy     Atherosclerotic changes     Mild lingular and right middle lobes scarring.     Small focus of gas within the bladder.  Correlate to history of instrumentation.  Infectious process not excluded.     Adrenal hyperplasia     All CT  scans   are performed using dose optimization techniques including the following: automated exposure control; adjustment of the mA and/or kV; use of iterative reconstruction technique.  Dose modulation was employed for ALARA by means of: Automated exposure control; adjustment of the mA and/or kV according to patient size (this includes techniques or standardized protocols for targeted exams where dose is matched to indication/reason for exam; i.e. extremities or head); and/or use of iterative reconstructive technique.       Electronically signed by: Bear Arshad   Date: 11/10/2023   Time: 22:51     Assessment/Plan:     Psychiatric  Marijuana abuse   on cessation, could be contributing to some of her symptoms    Bipolar disorder with anxiety and depression  Holding medication for now due to NPO and vomiting    Pulmonary  Asthma with COPD  No wheezing on exam, no current exacerbation  Monitor  PRN nebs    Cardiac/Vascular  Chronic diastolic heart failure  Monitor fluid volume status closely    Coronary artery disease of native artery of native heart with stable angina pectoris  Troponin normal  Earlier report of right sided chest pain  Cardiac monitoring  Resume medications when nausea/vomiting resolved and no longer NPO    Dyslipidemia associated with type 2 diabetes mellitus  Hold medication for now, NPO    Essential hypertension  Hold po medications  BP stable    Renal/  Acute cystitis without hematuria  Rocephin  Monitor urine culture reports    Endocrine  * Diabetic ketoacidosis without coma associated with type 2 diabetes mellitus  Aggressive hydration with IV fluids  IV insulin followed by insulin infusion  Serial BMP every 4 hours  Hourly glucose checks  Replete electrolytes as indicated  NPO    Hypothyroidism due to acquired atrophy of thyroid  Resume home medication when no longer NPO      Full Resuscitation  DVT prophylaxis: SCD, Lovenox    Critical Care Time: 59 minutes  Critical secondary to  DKA    Family history is reviewed and has not changed      Critical care was time spent personally by me on the following activities: development of treatment plan with patient or surrogate and bedside caregivers, discussions with consultants, evaluation of patient's response to treatment, examination of patient, ordering and performing treatments and interventions, ordering and review of laboratory studies, ordering and review of radiographic studies, pulse oximetry, re-evaluation of patient's condition. This critical care time did not overlap with that of any other provider or involve time for any procedures.     Rena See NP  Critical Care Medicine  [facility] - Intensive Care ([facility])

## 2023-11-11 NOTE — SUBJECTIVE & OBJECTIVE
Past Medical History:   Diagnosis Date    Acute ischemic stroke 9/17/2019    Acute respiratory failure with hypoxia 2/11/2021    Aneurysm     Anxiety     Arthritis     Asthma     Bipolar 1 disorder     Cerebral aneurysm, nonruptured 9/19/2019    Chronic diastolic heart failure 5/23/2023    Coronary artery disease of native artery of native heart with stable angina pectoris 1/19/2022    Depression     Diabetes mellitus, type 2     Diverticular disease     GERD (gastroesophageal reflux disease)     Hyperlipemia     Hypertension     Hyponatremia 7/24/2022    Hypothyroidism     Pneumonia     PVD (peripheral vascular disease) 4/28/2021    Renal manifestation of secondary diabetes mellitus     Right-sided back pain 6/29/2019    Severe obesity (BMI 35.0-39.9) with comorbidity 5/31/2022    Stroke     Trouble in sleeping        Past Surgical History:   Procedure Laterality Date    CEREBRAL ANGIOGRAM N/A 10/28/2019    Procedure: ANGIOGRAM-CEREBRAL;  Surgeon: Dahiana Diagnostic Provider;  Location: [facility] OR [address] [place] [address];  Service: Radiology;  Laterality: N/A;  Rm 190/Aayush    CHOLECYSTECTOMY      COLON SURGERY      COLONOSCOPY N/A 1/12/2018    Procedure: COLONOSCOPY;  Surgeon: Roque Mahajan III, MD;  Location: [facility] ENDO;  Service: Endoscopy;  Laterality: N/A;    COLONOSCOPY N/A 10/13/2023    Procedure: COLONOSCOPY;  Surgeon: Thelma Chairez MD;  Location: [facility];  Service: Endoscopy;  Laterality: N/A;    DENTAL SURGERY  05/21/2018    removal of 8 top teeth    HYSTERECTOMY      TONSILLECTOMY         Review of patient's allergies indicates:   Allergen Reactions    Seroquel [quetiapine] Other (See Comments)     TC SEIZURES    Adhesive Blisters     Pt states can't tolerate paper tape    Levomenol analogues     Lipitor [atorvastatin]      Leg Cramps    Repatha pushtronex [evolocumab]      Feels sick    Zofran [ondansetron hcl] Hives and Other (See Comments)     headaches    Celestone [betamethasone] Hives, Itching and Rash     Levaquin [levofloxacin] Nausea Only    Piroxicam Diarrhea and Nausea Only       Family History       Problem Relation (Age of Onset)    Alcohol abuse Mother, Father    Arthritis Father, Maternal Grandmother, Paternal Grandmother    Breast cancer Maternal Grandmother    COPD Mother, Sister    Cancer Father, Maternal Aunt, Maternal Uncle, Paternal Aunt, Paternal Uncle, Paternal Grandmother    Depression Mother    Diabetes Maternal Uncle    Drug abuse Mother, Maternal Uncle    Heart disease Father, Brother    Hypertension Father, Brother, Maternal Grandmother, Paternal Grandfather    Kidney failure Sister          Tobacco Use    Smoking status: Every Day     Current packs/day: 1.00     Average packs/day: 1 pack/day for 45.9 years (45.9 ttl pk-yrs)     Types: Cigarettes, Vaping w/o nicotine     Start date: 1978    Smokeless tobacco: Never    Tobacco comments:     On average, smokes 10 cigarettes per day.    Substance and Sexual Activity    Alcohol use: Not Currently    Drug use: Yes     Types: Marijuana    Sexual activity: Yes         Review of Systems   Constitutional:  Positive for fatigue and fever.   HENT: Negative.     Eyes: Negative.    Respiratory:  Positive for shortness of breath. Negative for cough and wheezing.    Cardiovascular:  Positive for chest pain. Negative for palpitations and leg swelling.   Gastrointestinal:  Positive for abdominal pain, diarrhea, nausea and vomiting. Negative for abdominal distention and blood in stool.   Endocrine: Negative.    Genitourinary:  Positive for dysuria.   Musculoskeletal:  Positive for arthralgias.   Skin: Negative.    Allergic/Immunologic: Negative.    Neurological:  Positive for weakness and headaches.   Hematological: Negative.    Psychiatric/Behavioral: Negative.       Objective:     Vital Signs (Most Recent):  Temp: 99.2 °F (37.3 °C) (11/11/23 0500)  Pulse: 101 (11/11/23 0500)  Resp: 20 (11/11/23 0500)  BP: (!) 151/70 (11/11/23 0500)  SpO2: 95 % (11/11/23 0500)  Vital Signs (24h Range):  Temp:  [98.7 °F (37.1 °C)-99.2 °F (37.3 °C)] 99.2 °F (37.3 °C)  Pulse:  [] 101  Resp:  [16-20] 20  SpO2:  [88 %-95 %] 95 %  BP: (141-151)/(70-82) 151/70     Weight: 75 kg (165 lb 5.5 oz)  Body mass index is 29.29 kg/m².      Intake/Output Summary (Last 24 hours) at 11/11/2023 0521  Last data filed at 11/11/2023 0239  Gross per 24 hour   Intake 2049 ml   Output --   Net 2049 ml        Physical Exam  Vitals and nursing note reviewed.   Constitutional:       General: She is not in acute distress.     Appearance: She is ill-appearing. She is not toxic-appearing or diaphoretic.   HENT:      Head: Normocephalic and atraumatic.      Nose: Nose normal.      Mouth/Throat:      Mouth: Mucous membranes are dry.   Eyes:      Extraocular Movements: Extraocular movements intact.      Pupils: Pupils are equal, round, and reactive to light.   Cardiovascular:      Rate and Rhythm: Regular rhythm. Tachycardia present.   Pulmonary:      Effort: Pulmonary effort is normal. No respiratory distress.      Breath sounds: Normal breath sounds. No wheezing or rales.   Abdominal:      General: Bowel sounds are normal. There is no distension.      Palpations: Abdomen is soft.      Tenderness: There is abdominal tenderness.      Comments: Mild, generalized tenderness   Musculoskeletal:         General: Normal range of motion.      Cervical back: Neck supple.      Right lower leg: No edema.      Left lower leg: No edema.   Skin:     General: Skin is warm and dry.      Capillary Refill: Capillary refill takes less than 2 seconds.   Neurological:      General: No focal deficit present.      Mental Status: She is alert and oriented to person, place, and time.   Psychiatric:         Mood and Affect: Mood normal.         Behavior: Behavior normal.          Vents: N/A       Lines/Drains/Airways       Peripheral Intravenous Line  Duration                  Peripheral IV - Single Lumen 11/10/23 2119 20 G Anterior;Right  Forearm <1 day         Peripheral IV - Single Lumen 11/11/23 0336 20 G Anterior;Left;Proximal Upper Arm <1 day                    Significant Labs:    CBC/Anemia Profile:  Recent Labs   Lab 11/10/23  2053 11/10/23  2134 11/10/23  2157   WBC 13.30* 12.93*  --    HGB 20.8* 20.2*  --    HCT 66.7* 62.7* 61*    220  --    MCV 91 92  --    RDW 17.0* 16.6*  --         Chemistries:  Recent Labs   Lab 11/10/23  2053 11/10/23  2134 11/11/23  0048   * 131* 134*   K 5.4* 6.2* 4.6   CL 92* 93* 99   CO2 11* 12* 6*   BUN 46* 43* 38*   CREATININE 1.7* 1.5* 1.4   CALCIUM 9.3 8.5* 8.7   ALBUMIN 3.8 3.3*  --    PROT 8.5* 7.7  --    BILITOT 0.6 0.5  --    ALKPHOS 99 85  --    ALT 23 17  --    AST 30 33  --        ABGs:   Recent Labs   Lab 11/10/23  2157   PH 7.252*   PCO2 40.3   HCO3 17.8*   POCSATURATED 94   BE -9*     POCT Glucose:   Recent Labs   Lab 11/11/23  0023 11/11/23  0439   POCTGLUCOSE 360* 257*     Beta-Hydroxybutyrate 4.8    Troponin:   Recent Labs   Lab 11/11/23  0122   TROPONINI <0.006     Urine Studies:   Recent Labs   Lab 11/10/23  2317   COLORU Colorless*   APPEARANCEUA Clear   PHUR 6.0   SPECGRAV 1.025   PROTEINUA 2+*   GLUCUA 4+*   KETONESU 3+*   BILIRUBINUA Negative   OCCULTUA Trace*   NITRITE Negative   UROBILINOGEN Negative   LEUKOCYTESUR 3+*   RBCUA 4   WBCUA >100*   BACTERIA None   SQUAMEPITHEL 2   HYALINECASTS 1     UDS + marijuana and benzos    All pertinent labs within the past 24 hours have been reviewed.    Significant Imaging:   I have reviewed all pertinent imaging results/findings within the past 24 hours.  X-Ray Chest AP Portable [[id number]] Resulted: 11/10/23 2256   Order Status: Completed Updated: 11/10/23 2258   Narrative:     EXAMINATION:   XR CHEST AP PORTABLE     CLINICAL HISTORY:   dka;     TECHNIQUE:   Single frontal view of the chest was performed.     COMPARISON:   None     FINDINGS:   No pleural effusion or pneumothorax.  Mild perihilar interstitial opacities.  Basilar  subsegmental atelectasis or scarring.     The cardiac silhouette is prominent.  The hilar and mediastinal contours are unremarkable.     Bones are intact.    Impression:       Mild basilar subsegmental atelectasis or scarring     Mild perihilar interstitial opacities may reflect element of pulmonary edema or bronchitis.       Electronically signed by: Bear Jeancarlos   Date: 11/10/2023   Time: 22:56   CT Abdomen Pelvis Without Contrast [[id number]] Resulted: 11/10/23 2251   Order Status: Completed Updated: 11/10/23 2253   Narrative:     EXAMINATION:   CT ABDOMEN PELVIS WITHOUT CONTRAST     CLINICAL HISTORY:   Abdominal abscess/infection suspected;Abdominal pain, acute, nonlocalized;     TECHNIQUE:   Low dose axial images, sagittal and coronal reformations were obtained from the lung bases to the pubic symphysis,     COMPARISON:   None     FINDINGS:   Heart: Normal in size as far as seen.  No pericardial effusion as far seen.     Lung Bases: Well aerated, without consolidation or pleural fluid.     Liver: Hepatomegaly with no focal hepatic lesions.     Gallbladder: Status post cholecystectomy.     Bile Ducts: No evidence of dilated ducts.     Pancreas: No mass or peripancreatic fat stranding.     Spleen: Unremarkable.     Adrenals: Adrenal hyperplasia     Kidneys/ Ureters: Punctate right-sided nonobstructing renal calculi     Bladder: Small amount of gas the bladder.     Reproductive organs: Status post hysterectomy     GI Tract/Mesentery: No evidence of bowel obstruction or inflammation. No secondary signs of appendicitis.  Colonic diverticulosis with mucosal thickening of sigmoid and left colon.  Postsurgical changes of the sigmoid colon.     Peritoneal Space: No ascites. No free air.     Retroperitoneum: No significant adenopathy.     Abdominal wall: Unremarkable.     Vasculature: No aneurysm.  Atherosclerotic changes     Bones: No acute fracture.    Impression:       Colonic diverticulosis involving the left and  sigmoid colon with mucosal thickening.  Consider follow-up colonoscopy to exclude a mucosal lesion.     Hepatomegaly     Cholecystectomy     Atherosclerotic changes     Mild lingular and right middle lobes scarring.     Small focus of gas within the bladder.  Correlate to history of instrumentation.  Infectious process not excluded.     Adrenal hyperplasia     All CT scans   are performed using dose optimization techniques including the following: automated exposure control; adjustment of the mA and/or kV; use of iterative reconstruction technique.  Dose modulation was employed for ALARA by means of: Automated exposure control; adjustment of the mA and/or kV according to patient size (this includes techniques or standardized protocols for targeted exams where dose is matched to indication/reason for exam; i.e. extremities or head); and/or use of iterative reconstructive technique.       Electronically signed by: Bear Arshad   Date: 11/10/2023   Time: 22:51

## 2023-11-11 NOTE — PROGRESS NOTES
Pharmacist Renal Dose Adjustment Note    Alexandra Goins is a 58 y.o. female being treated with the medication famotidine    Patient Data:    Vital Signs (Most Recent):  Temp: 98.7 °F (37.1 °C) (11/10/23 1825)  Pulse: 103 (11/11/23 0155)  Resp: 16 (11/10/23 1825)  BP: (!) 141/82 (11/11/23 0055)  SpO2: (!) 92 % (11/11/23 0155) Vital Signs (72h Range):  Temp:  [98.7 °F (37.1 °C)]   Pulse:  []   Resp:  [16]   BP: (141-146)/(75-82)   SpO2:  [92 %-94 %]      Recent Labs   Lab 11/10/23  2053 11/10/23  2134 11/11/23  0048   CREATININE 1.7* 1.5* 1.4     Serum creatinine: 1.4 mg/dL 11/11/23 0048  Estimated creatinine clearance: 44.7 mL/min    Famotidine 20 mg BID will be changed to famotidine 20 mg QD for CrCl <50 mL/min.     Pharmacist's Name: Yaquelin Cabral  Pharmacist's Extension: [phone]

## 2023-11-12 LAB
ALBUMIN SERPL BCP-MCNC: 2.8 G/DL (ref 3.5–5.2)
ALP SERPL-CCNC: 65 U/L (ref 55–135)
ALT SERPL W/O P-5'-P-CCNC: 20 U/L (ref 10–44)
ANION GAP SERPL CALC-SCNC: 16 MMOL/L (ref 8–16)
AST SERPL-CCNC: 29 U/L (ref 10–40)
BACTERIA UR CULT: NORMAL
BACTERIA UR CULT: NORMAL
BASOPHILS # BLD AUTO: 0.03 K/UL (ref 0–0.2)
BASOPHILS NFR BLD: 0.4 % (ref 0–1.9)
BILIRUB SERPL-MCNC: 0.6 MG/DL (ref 0.1–1)
BUN SERPL-MCNC: 17 MG/DL (ref 6–20)
CALCIUM SERPL-MCNC: 8.1 MG/DL (ref 8.7–10.5)
CHLORIDE SERPL-SCNC: 96 MMOL/L (ref 95–110)
CO2 SERPL-SCNC: 27 MMOL/L (ref 23–29)
CREAT SERPL-MCNC: 0.9 MG/DL (ref 0.5–1.4)
DIFFERENTIAL METHOD: ABNORMAL
EOSINOPHIL # BLD AUTO: 0.1 K/UL (ref 0–0.5)
EOSINOPHIL NFR BLD: 0.8 % (ref 0–8)
ERYTHROCYTE [DISTWIDTH] IN BLOOD BY AUTOMATED COUNT: 14.7 % (ref 11.5–14.5)
EST. GFR  (NO RACE VARIABLE): >60 ML/MIN/1.73 M^2
GLUCOSE SERPL-MCNC: 250 MG/DL (ref 70–110)
HCT VFR BLD AUTO: 56.8 % (ref 37–48.5)
HGB BLD-MCNC: 18 G/DL (ref 12–16)
IMM GRANULOCYTES # BLD AUTO: 0.04 K/UL (ref 0–0.04)
IMM GRANULOCYTES NFR BLD AUTO: 0.5 % (ref 0–0.5)
LYMPHOCYTES # BLD AUTO: 2.8 K/UL (ref 1–4.8)
LYMPHOCYTES NFR BLD: 35.7 % (ref 18–48)
MAGNESIUM SERPL-MCNC: 1.9 MG/DL (ref 1.6–2.6)
MAGNESIUM SERPL-MCNC: 1.9 MG/DL (ref 1.6–2.6)
MCH RBC QN AUTO: 28.3 PG (ref 27–31)
MCHC RBC AUTO-ENTMCNC: 31.7 G/DL (ref 32–36)
MCV RBC AUTO: 89 FL (ref 82–98)
MONOCYTES # BLD AUTO: 0.8 K/UL (ref 0.3–1)
MONOCYTES NFR BLD: 10.8 % (ref 4–15)
NEUTROPHILS # BLD AUTO: 4 K/UL (ref 1.8–7.7)
NEUTROPHILS NFR BLD: 51.8 % (ref 38–73)
NRBC BLD-RTO: 0 /100 WBC
PHOSPHATE SERPL-MCNC: 2.2 MG/DL (ref 2.7–4.5)
PHOSPHATE SERPL-MCNC: 2.2 MG/DL (ref 2.7–4.5)
PLATELET # BLD AUTO: 174 K/UL (ref 150–450)
PMV BLD AUTO: 10.1 FL (ref 9.2–12.9)
POCT GLUCOSE: 126 MG/DL (ref 70–110)
POCT GLUCOSE: 144 MG/DL (ref 70–110)
POCT GLUCOSE: 212 MG/DL (ref 70–110)
POCT GLUCOSE: 224 MG/DL (ref 70–110)
POCT GLUCOSE: 275 MG/DL (ref 70–110)
POTASSIUM SERPL-SCNC: 3.5 MMOL/L (ref 3.5–5.1)
PROT SERPL-MCNC: 5.8 G/DL (ref 6–8.4)
RBC # BLD AUTO: 6.35 M/UL (ref 4–5.4)
SODIUM SERPL-SCNC: 139 MMOL/L (ref 136–145)
WBC # BLD AUTO: 7.79 K/UL (ref 3.9–12.7)

## 2023-11-12 PROCEDURE — 36415 COLL VENOUS BLD VENIPUNCTURE: CPT | Mod: HCNC | Performed by: INTERNAL MEDICINE

## 2023-11-12 PROCEDURE — 80053 COMPREHEN METABOLIC PANEL: CPT | Mod: HCNC | Performed by: INTERNAL MEDICINE

## 2023-11-12 PROCEDURE — 63600175 PHARM REV CODE 636 W HCPCS: Mod: HCNC | Performed by: INTERNAL MEDICINE

## 2023-11-12 PROCEDURE — 83735 ASSAY OF MAGNESIUM: CPT | Mod: HCNC | Performed by: INTERNAL MEDICINE

## 2023-11-12 PROCEDURE — 94761 N-INVAS EAR/PLS OXIMETRY MLT: CPT | Mod: HCNC

## 2023-11-12 PROCEDURE — 25000003 PHARM REV CODE 250: Mod: HCNC | Performed by: NURSE PRACTITIONER

## 2023-11-12 PROCEDURE — 84100 ASSAY OF PHOSPHORUS: CPT | Mod: HCNC | Performed by: INTERNAL MEDICINE

## 2023-11-12 PROCEDURE — 99900035 HC TECH TIME PER 15 MIN (STAT): Mod: HCNC

## 2023-11-12 PROCEDURE — 63600175 PHARM REV CODE 636 W HCPCS: Mod: HCNC | Performed by: NURSE PRACTITIONER

## 2023-11-12 PROCEDURE — 85025 COMPLETE CBC W/AUTO DIFF WBC: CPT | Mod: HCNC | Performed by: INTERNAL MEDICINE

## 2023-11-12 PROCEDURE — 27000221 HC OXYGEN, UP TO 24 HOURS: Mod: HCNC

## 2023-11-12 PROCEDURE — 21400001 HC TELEMETRY ROOM: Mod: HCNC

## 2023-11-12 PROCEDURE — 94799 UNLISTED PULMONARY SVC/PX: CPT | Mod: HCNC,XB

## 2023-11-12 PROCEDURE — 25000003 PHARM REV CODE 250: Mod: HCNC | Performed by: INTERNAL MEDICINE

## 2023-11-12 PROCEDURE — 25000003 PHARM REV CODE 250: Mod: HCNC | Performed by: STUDENT IN AN ORGANIZED HEALTH CARE EDUCATION/TRAINING PROGRAM

## 2023-11-12 RX ORDER — FAMOTIDINE 10 MG/ML
20 INJECTION INTRAVENOUS 2 TIMES DAILY
Status: DISCONTINUED | OUTPATIENT
Start: 2023-11-12 | End: 2023-11-13

## 2023-11-12 RX ORDER — HYDROCODONE BITARTRATE AND ACETAMINOPHEN 5; 325 MG/1; MG/1
1 TABLET ORAL EVERY 6 HOURS PRN
Status: DISCONTINUED | OUTPATIENT
Start: 2023-11-12 | End: 2023-11-13 | Stop reason: HOSPADM

## 2023-11-12 RX ADMIN — INSULIN DETEMIR 10 UNITS: 100 INJECTION, SOLUTION SUBCUTANEOUS at 09:11

## 2023-11-12 RX ADMIN — ENOXAPARIN SODIUM 40 MG: 40 INJECTION SUBCUTANEOUS at 05:11

## 2023-11-12 RX ADMIN — SODIUM CHLORIDE, POTASSIUM CHLORIDE, SODIUM LACTATE AND CALCIUM CHLORIDE: 600; 310; 30; 20 INJECTION, SOLUTION INTRAVENOUS at 06:11

## 2023-11-12 RX ADMIN — GABAPENTIN 600 MG: 300 CAPSULE ORAL at 06:11

## 2023-11-12 RX ADMIN — MIRTAZAPINE 15 MG: 15 TABLET, FILM COATED ORAL at 08:11

## 2023-11-12 RX ADMIN — GABAPENTIN 1200 MG: 400 CAPSULE ORAL at 08:11

## 2023-11-12 RX ADMIN — ERTAPENEM 1 G: 1 INJECTION INTRAMUSCULAR; INTRAVENOUS at 10:11

## 2023-11-12 RX ADMIN — MUPIROCIN: 20 OINTMENT TOPICAL at 09:11

## 2023-11-12 RX ADMIN — DESVENLAFAXINE SUCCINATE 50 MG: 50 TABLET, FILM COATED, EXTENDED RELEASE ORAL at 10:11

## 2023-11-12 RX ADMIN — SODIUM CHLORIDE, POTASSIUM CHLORIDE, SODIUM LACTATE AND CALCIUM CHLORIDE: 600; 310; 30; 20 INJECTION, SOLUTION INTRAVENOUS at 11:11

## 2023-11-12 RX ADMIN — RISPERIDONE 1 MG: 1 TABLET ORAL at 08:11

## 2023-11-12 RX ADMIN — MUPIROCIN: 20 OINTMENT TOPICAL at 08:11

## 2023-11-12 RX ADMIN — INSULIN DETEMIR 15 UNITS: 100 INJECTION, SOLUTION SUBCUTANEOUS at 09:11

## 2023-11-12 RX ADMIN — HYDROCODONE BITARTRATE AND ACETAMINOPHEN 1 TABLET: 5; 325 TABLET ORAL at 05:11

## 2023-11-12 RX ADMIN — DIAZEPAM 5 MG: 5 TABLET ORAL at 08:11

## 2023-11-12 RX ADMIN — RISPERIDONE 1 MG: 1 TABLET ORAL at 09:11

## 2023-11-12 RX ADMIN — GABAPENTIN 600 MG: 300 CAPSULE ORAL at 12:11

## 2023-11-12 RX ADMIN — HYDROCODONE BITARTRATE AND ACETAMINOPHEN 1 TABLET: 5; 325 TABLET ORAL at 10:11

## 2023-11-12 RX ADMIN — INSULIN ASPART 6 UNITS: 100 INJECTION, SOLUTION INTRAVENOUS; SUBCUTANEOUS at 12:11

## 2023-11-12 RX ADMIN — FAMOTIDINE 20 MG: 10 INJECTION, SOLUTION INTRAVENOUS at 10:11

## 2023-11-12 RX ADMIN — SODIUM CHLORIDE, POTASSIUM CHLORIDE, SODIUM LACTATE AND CALCIUM CHLORIDE: 600; 310; 30; 20 INJECTION, SOLUTION INTRAVENOUS at 09:11

## 2023-11-12 RX ADMIN — FAMOTIDINE 20 MG: 10 INJECTION, SOLUTION INTRAVENOUS at 08:11

## 2023-11-12 NOTE — SUBJECTIVE & OBJECTIVE
"Interval History: No acute events overnight. Patient reports feeling "much better" this AM and has no complaints at our encounter. Denies CP, SOB, Abdominal pain, fevers/chills.    Review of Systems  Objective:     Vital Signs (Most Recent):  Temp: (!) 64.4 °F (18 °C) (11/12/23 0800)  Pulse: 89 (11/12/23 0800)  Resp: 17 (11/12/23 1034)  BP: 120/60 (11/12/23 0700)  SpO2: (!) 94 % (11/12/23 0800) Vital Signs (24h Range):  Temp:  [64.4 °F (18 °C)-98.6 °F (37 °C)] 64.4 °F (18 °C)  Pulse:  [] 89  Resp:  [13-20] 17  SpO2:  [90 %-96 %] 94 %  BP: (116-186)/(57-84) 120/60     Weight: 75 kg (165 lb 5.5 oz)  Body mass index is 29.29 kg/m².    Intake/Output Summary (Last 24 hours) at 11/12/2023 1134  Last data filed at 11/11/2023 1600  Gross per 24 hour   Intake 1350.78 ml   Output 450 ml   Net 900.78 ml         Physical Exam      Vitals Reviewed  GEN: No acute distress, pleasant, body habitus normal  HEENT: atraumatic and normocephalic  CARDS: regular rate and rhythm, no m/g, pulses palpable in LE  PULM: breathing comfortably on room air, chest symmetric, nonlabored, no abnormal breath sounds on auscultation  ABD: nontender, nondistended, soft, no organomegaly, BS+  MUSK: global weakness, needs help to maneuver in bed  Psych: affect much improved, less flat than yesterday  Neuro: Alert and oriented x3, CN's I-IX grossly intact, sensation and motor intact; follows directions and answers questions appropriately      Significant Labs: BMP:   Recent Labs   Lab 11/12/23  0514   *      K 3.5   CL 96   CO2 27   BUN 17   CREATININE 0.9   CALCIUM 8.1*   MG 1.9  1.9     CBC:   Recent Labs   Lab 11/10/23  2053 11/10/23  2134 11/10/23  2157 11/12/23  0514   WBC 13.30* 12.93*  --  7.79   HGB 20.8* 20.2*  --  18.0*   HCT 66.7* 62.7* 61* 56.8*    220  --  174     CMP:   Recent Labs   Lab 11/10/23  2053 11/10/23  2134 11/11/23  0048 11/11/23  1131 11/11/23  1714 11/12/23  0514   * 131*   < > 142 140 139   K " "5.4* 6.2*   < > 3.0* 3.6 3.5   CL 92* 93*   < > 98 96 96   CO2 11* 12*   < > 28 25 27   * 450*   < > 178* 213* 250*   BUN 46* 43*   < > 26* 21* 17   CREATININE 1.7* 1.5*   < > 1.0 1.0 0.9   CALCIUM 9.3 8.5*   < > 8.4* 8.5* 8.1*   PROT 8.5* 7.7  --   --   --  5.8*   ALBUMIN 3.8 3.3*  --   --   --  2.8*   BILITOT 0.6 0.5  --   --   --  0.6   ALKPHOS 99 85  --   --   --  65   AST 30 33  --   --   --  29   ALT 23 17  --   --   --  20   ANIONGAP 27* 26*   < > 16 19* 16    < > = values in this interval not displayed.     Cardiac Markers: No results for input(s): "CKMB", "MYOGLOBIN", "BNP", "TROPISTAT" in the last 48 hours.  Coagulation: No results for input(s): "PT", "INR", "APTT" in the last 48 hours.  Lactic Acid:   Recent Labs   Lab 11/11/23  0420   LACTATE 1.3     Magnesium:   Recent Labs   Lab 11/11/23  0814 11/11/23  1714 11/12/23  0514   MG 1.5* 2.3 1.9  1.9     Troponin:   Recent Labs   Lab 11/11/23  0122   TROPONINI <0.006     TSH:   Recent Labs   Lab 11/11/23  1131   TSH 1.497     Urine Studies:   Recent Labs   Lab 11/10/23  2317   COLORU Colorless*   APPEARANCEUA Clear   PHUR 6.0   SPECGRAV 1.025   PROTEINUA 2+*   GLUCUA 4+*   KETONESU 3+*   BILIRUBINUA Negative   OCCULTUA Trace*   NITRITE Negative   UROBILINOGEN Negative   LEUKOCYTESUR 3+*   RBCUA 4   WBCUA >100*   BACTERIA None   SQUAMEPITHEL 2   HYALINECASTS 1         Significant Imaging:   Imaging Results              X-Ray Chest AP Portable (Final result)  Result time 11/10/23 22:56:01      Final result by Bear Arshad MD (11/10/23 22:56:01)                   Impression:      Mild basilar subsegmental atelectasis or scarring    Mild perihilar interstitial opacities may reflect element of pulmonary edema or bronchitis.      Electronically signed by: Bear Arshad  Date:    11/10/2023  Time:    22:56               Narrative:    EXAMINATION:  XR CHEST AP PORTABLE    CLINICAL HISTORY:  dka;    TECHNIQUE:  Single frontal view of the chest was " performed.    COMPARISON:  None    FINDINGS:  No pleural effusion or pneumothorax.  Mild perihilar interstitial opacities.  Basilar subsegmental atelectasis or scarring.    The cardiac silhouette is prominent.  The hilar and mediastinal contours are unremarkable.    Bones are intact.                                       CT Abdomen Pelvis  Without Contrast (Final result)  Result time 11/10/23 22:51:11      Final result by Bear Arshad MD (11/10/23 22:51:11)                   Impression:      Colonic diverticulosis involving the left and sigmoid colon with mucosal thickening.  Consider follow-up colonoscopy to exclude a mucosal lesion.    Hepatomegaly    Cholecystectomy    Atherosclerotic changes    Mild lingular and right middle lobes scarring.    Small focus of gas within the bladder.  Correlate to history of instrumentation.  Infectious process not excluded.    Adrenal hyperplasia    All CT scans   are performed using dose optimization techniques including the following: automated exposure control; adjustment of the mA and/or kV; use of iterative reconstruction technique.  Dose modulation was employed for ALARA by means of: Automated exposure control; adjustment of the mA and/or kV according to patient size (this includes techniques or standardized protocols for targeted exams where dose is matched to indication/reason for exam; i.e. extremities or head); and/or use of iterative reconstructive technique.      Electronically signed by: Bear Arshad  Date:    11/10/2023  Time:    22:51               Narrative:    EXAMINATION:  CT ABDOMEN PELVIS WITHOUT CONTRAST    CLINICAL HISTORY:  Abdominal abscess/infection suspected;Abdominal pain, acute, nonlocalized;    TECHNIQUE:  Low dose axial images, sagittal and coronal reformations were obtained from the lung bases to the pubic symphysis,    COMPARISON:  None    FINDINGS:  Heart: Normal in size as far as seen.  No pericardial effusion as far seen.    Lung Bases: Well  aerated, without consolidation or pleural fluid.    Liver: Hepatomegaly with no focal hepatic lesions.    Gallbladder: Status post cholecystectomy.    Bile Ducts: No evidence of dilated ducts.    Pancreas: No mass or peripancreatic fat stranding.    Spleen: Unremarkable.    Adrenals: Adrenal hyperplasia    Kidneys/ Ureters: Punctate right-sided nonobstructing renal calculi    Bladder: Small amount of gas the bladder.    Reproductive organs: Status post hysterectomy    GI Tract/Mesentery: No evidence of bowel obstruction or inflammation. No secondary signs of appendicitis.  Colonic diverticulosis with mucosal thickening of sigmoid and left colon.  Postsurgical changes of the sigmoid colon.    Peritoneal Space: No ascites. No free air.    Retroperitoneum: No significant adenopathy.    Abdominal wall: Unremarkable.    Vasculature: No aneurysm.  Atherosclerotic changes    Bones: No acute fracture.

## 2023-11-12 NOTE — PROGRESS NOTES
Pharmacist Renal Dose Adjustment Note    Alexandra Goins is a 58 y.o. female being treated with the medication famotidine.    Patient Data:    Vital Signs (Most Recent):  Temp: (!) 64.4 °F (18 °C) (11/12/23 0800)  Pulse: 89 (11/12/23 0800)  Resp: 17 (11/12/23 1034)  BP: 120/60 (11/12/23 0700)  SpO2: (!) 94 % (11/12/23 0800) Vital Signs (72h Range):  Temp:  [64.4 °F (18 °C)-99.2 °F (37.3 °C)]   Pulse:  []   Resp:  [13-20]   BP: (116-186)/(57-87)   SpO2:  [88 %-96 %]      Recent Labs   Lab 11/11/23  1131 11/11/23  1714 11/12/23  0514   CREATININE 1.0 1.0 0.9     Serum creatinine: 0.9 mg/dL 11/12/23 0514  Estimated creatinine clearance: 64.4 mL/min    Famotidine 20 mg every 24 hours was adjusted to every 12 hours according to [facility]'s renal dose adjustment policy        Pharmacist's Name: Radha Jurado PharmD 11/12/2023 12:21 PM    Pharmacist's Extension: [phone]

## 2023-11-12 NOTE — PROGRESS NOTES
[facility] Medicine  Progress Note    Patient Name: Alexandra Goins  MRN: [id number]  Patient Class: IP- Inpatient   Admission Date: 11/10/2023  Length of Stay: 1 days  Attending Physician: Pino Khoury MD  Primary Care Provider: Perez Casiano MD        Subjective:     Principal Problem:Diabetic ketoacidosis without coma associated with type 2 diabetes mellitus        HPI:  Alexandra Goins, a 58-year-old female with a complex medical history including hypertension, hyperlipidemia, CAD, stroke, bipolar disorder, anxiety, depression, cerebral aneurysm, chronic diastolic heart failure, asthma/COPD, hypothyroidism, PVD, GERD, diverticular disease, neuropathy, seizure (due to hypoglycemia), and arthritis, presented to the ED for evaluation of nausea/vomiting, chest pain, anxiety, fever up to 103, diarrhea, and dysuria. Her symptoms commenced following a flu shot received last Saturday, progressively worsening by Monday. Ms. Goins reported non-compliance with insulin in the preceding days, attributing it to her malaise and inability to eat. Initial evaluation suggested DKA and a urinary tract infection. Critical care was consulted for her deteriorating condition.    Upon arrival in the ICU, Alexandra exhibited abnormal vital signs including a temperature of 99.2, pulse of 101, respiratory rate of 20, and elevated blood pressure at 151/70. Laboratory findings were significant for a UTI and diabetic ketoacidosis (DKA), with an initial glucose level of 459. Management included administration of Rocephin for the UTI, aggressive hydration with 2 L IV fluids, an insulin bolus followed by continuous insulin infusion, and a bicarb drip due to a decreasing CO2 level from 11 to 6 post-fluid bolus. Her condition stabilized in the ICU, with cessation of vomiting and diarrhea. Order for stepdown off the unit was placed and Hospital Medicine was called for assumption of  "care.                  Overview/Hospital Course:  No notes on file    Interval History: No acute events overnight. Patient reports feeling "much better" this AM and has no complaints at our encounter. Denies CP, SOB, Abdominal pain, fevers/chills.    Review of Systems  Objective:     Vital Signs (Most Recent):  Temp: (!) 64.4 °F (18 °C) (11/12/23 0800)  Pulse: 89 (11/12/23 0800)  Resp: 17 (11/12/23 1034)  BP: 120/60 (11/12/23 0700)  SpO2: (!) 94 % (11/12/23 0800) Vital Signs (24h Range):  Temp:  [64.4 °F (18 °C)-98.6 °F (37 °C)] 64.4 °F (18 °C)  Pulse:  [] 89  Resp:  [13-20] 17  SpO2:  [90 %-96 %] 94 %  BP: (116-186)/(57-84) 120/60     Weight: 75 kg (165 lb 5.5 oz)  Body mass index is 29.29 kg/m².    Intake/Output Summary (Last 24 hours) at 11/12/2023 1134  Last data filed at 11/11/2023 1600  Gross per 24 hour   Intake 1350.78 ml   Output 450 ml   Net 900.78 ml         Physical Exam      Vitals Reviewed  GEN: No acute distress, pleasant, body habitus normal  HEENT: atraumatic and normocephalic  CARDS: regular rate and rhythm, no m/g, pulses palpable in LE  PULM: breathing comfortably on room air, chest symmetric, nonlabored, no abnormal breath sounds on auscultation  ABD: nontender, nondistended, soft, no organomegaly, BS+  MUSK: global weakness, needs help to maneuver in bed  Psych: affect much improved, less flat than yesterday  Neuro: Alert and oriented x3, CN's I-IX grossly intact, sensation and motor intact; follows directions and answers questions appropriately      Significant Labs: BMP:   Recent Labs   Lab 11/12/23  0514   *      K 3.5   CL 96   CO2 27   BUN 17   CREATININE 0.9   CALCIUM 8.1*   MG 1.9  1.9     CBC:   Recent Labs   Lab 11/10/23  2053 11/10/23  2134 11/10/23  2157 11/12/23  0514   WBC 13.30* 12.93*  --  7.79   HGB 20.8* 20.2*  --  18.0*   HCT 66.7* 62.7* 61* 56.8*    220  --  174     CMP:   Recent Labs   Lab 11/10/23  2053 11/10/23  2134 11/11/23  0048 11/11/23  1131 " "11/11/23  1714 11/12/23  0514   * 131*   < > 142 140 139   K 5.4* 6.2*   < > 3.0* 3.6 3.5   CL 92* 93*   < > 98 96 96   CO2 11* 12*   < > 28 25 27   * 450*   < > 178* 213* 250*   BUN 46* 43*   < > 26* 21* 17   CREATININE 1.7* 1.5*   < > 1.0 1.0 0.9   CALCIUM 9.3 8.5*   < > 8.4* 8.5* 8.1*   PROT 8.5* 7.7  --   --   --  5.8*   ALBUMIN 3.8 3.3*  --   --   --  2.8*   BILITOT 0.6 0.5  --   --   --  0.6   ALKPHOS 99 85  --   --   --  65   AST 30 33  --   --   --  29   ALT 23 17  --   --   --  20   ANIONGAP 27* 26*   < > 16 19* 16    < > = values in this interval not displayed.     Cardiac Markers: No results for input(s): "CKMB", "MYOGLOBIN", "BNP", "TROPISTAT" in the last 48 hours.  Coagulation: No results for input(s): "PT", "INR", "APTT" in the last 48 hours.  Lactic Acid:   Recent Labs   Lab 11/11/23  0420   LACTATE 1.3     Magnesium:   Recent Labs   Lab 11/11/23  0814 11/11/23  1714 11/12/23  0514   MG 1.5* 2.3 1.9  1.9     Troponin:   Recent Labs   Lab 11/11/23  0122   TROPONINI <0.006     TSH:   Recent Labs   Lab 11/11/23  1131   TSH 1.497     Urine Studies:   Recent Labs   Lab 11/10/23  2317   COLORU Colorless*   APPEARANCEUA Clear   PHUR 6.0   SPECGRAV 1.025   PROTEINUA 2+*   GLUCUA 4+*   KETONESU 3+*   BILIRUBINUA Negative   OCCULTUA Trace*   NITRITE Negative   UROBILINOGEN Negative   LEUKOCYTESUR 3+*   RBCUA 4   WBCUA >100*   BACTERIA None   SQUAMEPITHEL 2   HYALINECASTS 1         Significant Imaging:   Imaging Results              X-Ray Chest AP Portable (Final result)  Result time 11/10/23 22:56:01      Final result by Bear Arshad MD (11/10/23 22:56:01)                   Impression:      Mild basilar subsegmental atelectasis or scarring    Mild perihilar interstitial opacities may reflect element of pulmonary edema or bronchitis.      Electronically signed by: Bear Arshad  Date:    11/10/2023  Time:    22:56               Narrative:    EXAMINATION:  XR CHEST AP PORTABLE    CLINICAL " HISTORY:  dka;    TECHNIQUE:  Single frontal view of the chest was performed.    COMPARISON:  None    FINDINGS:  No pleural effusion or pneumothorax.  Mild perihilar interstitial opacities.  Basilar subsegmental atelectasis or scarring.    The cardiac silhouette is prominent.  The hilar and mediastinal contours are unremarkable.    Bones are intact.                                       CT Abdomen Pelvis  Without Contrast (Final result)  Result time 11/10/23 22:51:11      Final result by Bear Arshad MD (11/10/23 22:51:11)                   Impression:      Colonic diverticulosis involving the left and sigmoid colon with mucosal thickening.  Consider follow-up colonoscopy to exclude a mucosal lesion.    Hepatomegaly    Cholecystectomy    Atherosclerotic changes    Mild lingular and right middle lobes scarring.    Small focus of gas within the bladder.  Correlate to history of instrumentation.  Infectious process not excluded.    Adrenal hyperplasia    All CT scans   are performed using dose optimization techniques including the following: automated exposure control; adjustment of the mA and/or kV; use of iterative reconstruction technique.  Dose modulation was employed for ALARA by means of: Automated exposure control; adjustment of the mA and/or kV according to patient size (this includes techniques or standardized protocols for targeted exams where dose is matched to indication/reason for exam; i.e. extremities or head); and/or use of iterative reconstructive technique.      Electronically signed by: Bear Arshad  Date:    11/10/2023  Time:    22:51               Narrative:    EXAMINATION:  CT ABDOMEN PELVIS WITHOUT CONTRAST    CLINICAL HISTORY:  Abdominal abscess/infection suspected;Abdominal pain, acute, nonlocalized;    TECHNIQUE:  Low dose axial images, sagittal and coronal reformations were obtained from the lung bases to the pubic symphysis,    COMPARISON:  None    FINDINGS:  Heart: Normal in size as far  as seen.  No pericardial effusion as far seen.    Lung Bases: Well aerated, without consolidation or pleural fluid.    Liver: Hepatomegaly with no focal hepatic lesions.    Gallbladder: Status post cholecystectomy.    Bile Ducts: No evidence of dilated ducts.    Pancreas: No mass or peripancreatic fat stranding.    Spleen: Unremarkable.    Adrenals: Adrenal hyperplasia    Kidneys/ Ureters: Punctate right-sided nonobstructing renal calculi    Bladder: Small amount of gas the bladder.    Reproductive organs: Status post hysterectomy    GI Tract/Mesentery: No evidence of bowel obstruction or inflammation. No secondary signs of appendicitis.  Colonic diverticulosis with mucosal thickening of sigmoid and left colon.  Postsurgical changes of the sigmoid colon.    Peritoneal Space: No ascites. No free air.    Retroperitoneum: No significant adenopathy.    Abdominal wall: Unremarkable.    Vasculature: No aneurysm.  Atherosclerotic changes    Bones: No acute fracture.                                          Assessment/Plan:      * Diabetic ketoacidosis without coma associated with type 2 diabetes mellitus  --last A1c on file: 12% one month prior to admission  --Home medications include metformin, toujeo, basal insulin  --holding home PO medications  -- prandial, basal, and moderate dose SSI ordered  --Accuchecks ACQHS, slowly advance diet  --Fasting AM glucose goal <140    11/12/2023  --Closer to goal  --basal insulin increased to 15u  --monitor efficacy throughout day    Acute cystitis without hematuria  --urinalysis results suggestive of infection  --prior culture data reviewed, suspicion for MDR organism  --continue Ertapenem   --follow cultures  --monitor fever curve- Tylenol PRN    11/12/2023  --pending culture speciation/sensitivities  --anticipate this will return in 24-48 hours  --given hx of MDR organisms, discharge pending finalized cultures    Marijuana abuse  --cessation counseling ordered, to be done prior to  discharge    Bipolar disorder with anxiety and depression  --home med list confirmed by pharmacy  --will restart pertinent medications as tolerated  --monitor clinically        VTE Risk Mitigation (From admission, onward)         Ordered     enoxaparin injection 40 mg  Daily         11/11/23 0327     IP VTE HIGH RISK PATIENT  Once         11/11/23 0327     Place sequential compression device  Until discontinued         11/11/23 0327                Discharge Planning   NOÉ:      Code Status: Full Code   Is the patient medically ready for discharge?:     Reason for patient still in hospital (select all that apply): Laboratory test  Discharge Plan A: Home        Disposition: pending final culture           Pino Khoury MD  Department of Hospital Medicine   [facility] [facility] - Telemetry ([facility])   no

## 2023-11-12 NOTE — HOSPITAL COURSE
Alexandra Goins, a 58-year-old female with a complex medical history including hypertension, hyperlipidemia, CAD, stroke, bipolar disorder, anxiety, depression, cerebral aneurysm, chronic diastolic heart failure, asthma/COPD, hypothyroidism, PVD, GERD, diverticular disease, neuropathy, seizure (due to hypoglycemia), and arthritis, presented to the ED for evaluation of nausea/vomiting, chest pain, anxiety, fever up to 103, diarrhea, and dysuria. Her symptoms commenced following a flu shot received last Saturday, progressively worsening by Monday. Ms. Goins reported non-compliance with insulin in the preceding days, attributing it to her malaise and inability to eat. Initial evaluation suggested DKA and a urinary tract infection. Critical care was consulted for her deteriorating condition.     Upon arrival in the ICU, Alexandra exhibited abnormal vital signs including a temperature of 99.2, pulse of 101, respiratory rate of 20, and elevated blood pressure at 151/70. Laboratory findings were significant for a UTI and diabetic ketoacidosis (DKA), with an initial glucose level of 459. Management included administration of Rocephin for the UTI, aggressive hydration with 2 L IV fluids, an insulin bolus followed by continuous insulin infusion, and a bicarb drip due to a decreasing CO2 level from 11 to 6 post-fluid bolus. Her condition stabilized in the ICU, with cessation of vomiting and diarrhea. Order for stepdown off the unit was placed and Hospital Medicine was called for assumption of care.    11/12/2023  Glucose more controlled this AM, but not yet at goal. Basal insulin increased to 15u QD this morning, will monitor efficacy throughout. No culture data to review this morning.     11/13  Examination done at bedside, patient appeared alert and oriented x3, denied acute issues overnight.  Denied fever, chills, nausea, vomiting, chest pain, shortness OO breath, bowel or bladder issues.    Appeared hemodynamically  stable   Blood glucose improved, bicarb within normal limit, anion gap remained closed.  Urine cultures- Multiple organisms isolated. None in predominance.     Remained hemodynamically stable   Patient denied further UTI signs/symptoms.  Considering clinical and hemodynamic stability, planning to discharge patient today,  increase premeal insulin dose, recommended compliance with medications, monitor blood glucose closely, follow up outpatient with PCP/Endocrinology within 1-2 weeks upon discharge.  Patient expressed understanding, agreed to the plan.  Ordered antibiotics to complete course for UTI   Medications to be delivered to bedside.

## 2023-11-13 ENCOUNTER — CLINICAL SUPPORT (OUTPATIENT)
Dept: SMOKING CESSATION | Facility: CLINIC | Age: 58
End: 2023-11-13

## 2023-11-13 VITALS
HEIGHT: 63 IN | WEIGHT: 165.38 LBS | SYSTOLIC BLOOD PRESSURE: 108 MMHG | DIASTOLIC BLOOD PRESSURE: 59 MMHG | TEMPERATURE: 99 F | RESPIRATION RATE: 18 BRPM | BODY MASS INDEX: 29.3 KG/M2 | HEART RATE: 101 BPM | OXYGEN SATURATION: 92 %

## 2023-11-13 DIAGNOSIS — F17.200 NICOTINE DEPENDENCE: Primary | ICD-10-CM

## 2023-11-13 LAB
ALBUMIN SERPL BCP-MCNC: 2.7 G/DL (ref 3.5–5.2)
ALP SERPL-CCNC: 70 U/L (ref 55–135)
ALT SERPL W/O P-5'-P-CCNC: 40 U/L (ref 10–44)
ANION GAP SERPL CALC-SCNC: 13 MMOL/L (ref 8–16)
AST SERPL-CCNC: 65 U/L (ref 10–40)
BASOPHILS # BLD AUTO: 0.02 K/UL (ref 0–0.2)
BASOPHILS NFR BLD: 0.3 % (ref 0–1.9)
BILIRUB SERPL-MCNC: 0.5 MG/DL (ref 0.1–1)
BUN SERPL-MCNC: 13 MG/DL (ref 6–20)
CALCIUM SERPL-MCNC: 8.2 MG/DL (ref 8.7–10.5)
CHLORIDE SERPL-SCNC: 98 MMOL/L (ref 95–110)
CO2 SERPL-SCNC: 29 MMOL/L (ref 23–29)
CREAT SERPL-MCNC: 0.7 MG/DL (ref 0.5–1.4)
DIFFERENTIAL METHOD: ABNORMAL
EOSINOPHIL # BLD AUTO: 0.1 K/UL (ref 0–0.5)
EOSINOPHIL NFR BLD: 1.3 % (ref 0–8)
ERYTHROCYTE [DISTWIDTH] IN BLOOD BY AUTOMATED COUNT: 14.9 % (ref 11.5–14.5)
EST. GFR  (NO RACE VARIABLE): >60 ML/MIN/1.73 M^2
GLUCOSE SERPL-MCNC: 166 MG/DL (ref 70–110)
HCT VFR BLD AUTO: 56.4 % (ref 37–48.5)
HGB BLD-MCNC: 17.7 G/DL (ref 12–16)
IMM GRANULOCYTES # BLD AUTO: 0.03 K/UL (ref 0–0.04)
IMM GRANULOCYTES NFR BLD AUTO: 0.4 % (ref 0–0.5)
LYMPHOCYTES # BLD AUTO: 2.7 K/UL (ref 1–4.8)
LYMPHOCYTES NFR BLD: 39 % (ref 18–48)
MCH RBC QN AUTO: 28.9 PG (ref 27–31)
MCHC RBC AUTO-ENTMCNC: 31.4 G/DL (ref 32–36)
MCV RBC AUTO: 92 FL (ref 82–98)
MONOCYTES # BLD AUTO: 0.6 K/UL (ref 0.3–1)
MONOCYTES NFR BLD: 8.8 % (ref 4–15)
NEUTROPHILS # BLD AUTO: 3.5 K/UL (ref 1.8–7.7)
NEUTROPHILS NFR BLD: 50.2 % (ref 38–73)
NRBC BLD-RTO: 0 /100 WBC
PLATELET # BLD AUTO: 149 K/UL (ref 150–450)
PMV BLD AUTO: 10.1 FL (ref 9.2–12.9)
POCT GLUCOSE: 228 MG/DL (ref 70–110)
POTASSIUM SERPL-SCNC: 3.5 MMOL/L (ref 3.5–5.1)
PROT SERPL-MCNC: 5.5 G/DL (ref 6–8.4)
RBC # BLD AUTO: 6.13 M/UL (ref 4–5.4)
SODIUM SERPL-SCNC: 140 MMOL/L (ref 136–145)
WBC # BLD AUTO: 6.95 K/UL (ref 3.9–12.7)

## 2023-11-13 PROCEDURE — 27000221 HC OXYGEN, UP TO 24 HOURS: Mod: HCNC

## 2023-11-13 PROCEDURE — 25000003 PHARM REV CODE 250: Mod: HCNC | Performed by: STUDENT IN AN ORGANIZED HEALTH CARE EDUCATION/TRAINING PROGRAM

## 2023-11-13 PROCEDURE — 85025 COMPLETE CBC W/AUTO DIFF WBC: CPT | Mod: HCNC | Performed by: STUDENT IN AN ORGANIZED HEALTH CARE EDUCATION/TRAINING PROGRAM

## 2023-11-13 PROCEDURE — 80053 COMPREHEN METABOLIC PANEL: CPT | Mod: HCNC | Performed by: STUDENT IN AN ORGANIZED HEALTH CARE EDUCATION/TRAINING PROGRAM

## 2023-11-13 PROCEDURE — 99406 BEHAV CHNG SMOKING 3-10 MIN: CPT | Mod: S$GLB,,,

## 2023-11-13 PROCEDURE — 99900035 HC TECH TIME PER 15 MIN (STAT): Mod: HCNC

## 2023-11-13 PROCEDURE — 36415 COLL VENOUS BLD VENIPUNCTURE: CPT | Mod: HCNC | Performed by: STUDENT IN AN ORGANIZED HEALTH CARE EDUCATION/TRAINING PROGRAM

## 2023-11-13 PROCEDURE — 99406 PT REFUSED TOBACCO CESSATION: ICD-10-PCS | Mod: S$GLB,,,

## 2023-11-13 RX ORDER — FAMOTIDINE 20 MG/1
20 TABLET, FILM COATED ORAL 2 TIMES DAILY
Status: DISCONTINUED | OUTPATIENT
Start: 2023-11-13 | End: 2023-11-13 | Stop reason: HOSPADM

## 2023-11-13 RX ORDER — POTASSIUM CHLORIDE 20 MEQ/1
40 TABLET, EXTENDED RELEASE ORAL ONCE
Status: COMPLETED | OUTPATIENT
Start: 2023-11-13 | End: 2023-11-13

## 2023-11-13 RX ORDER — NITROFURANTOIN 25; 75 MG/1; MG/1
100 CAPSULE ORAL 2 TIMES DAILY
Qty: 6 CAPSULE | Refills: 0 | Status: SHIPPED | OUTPATIENT
Start: 2023-11-14 | End: 2023-11-17

## 2023-11-13 RX ADMIN — GABAPENTIN 600 MG: 300 CAPSULE ORAL at 06:11

## 2023-11-13 RX ADMIN — INSULIN ASPART 4 UNITS: 100 INJECTION, SOLUTION INTRAVENOUS; SUBCUTANEOUS at 11:11

## 2023-11-13 RX ADMIN — RISPERIDONE 1 MG: 1 TABLET ORAL at 09:11

## 2023-11-13 RX ADMIN — GABAPENTIN 600 MG: 300 CAPSULE ORAL at 11:11

## 2023-11-13 RX ADMIN — FAMOTIDINE 20 MG: 20 TABLET, FILM COATED ORAL at 09:11

## 2023-11-13 RX ADMIN — MUPIROCIN: 20 OINTMENT TOPICAL at 09:11

## 2023-11-13 RX ADMIN — HYDROCODONE BITARTRATE AND ACETAMINOPHEN 1 TABLET: 5; 325 TABLET ORAL at 03:11

## 2023-11-13 RX ADMIN — INSULIN DETEMIR 15 UNITS: 100 INJECTION, SOLUTION SUBCUTANEOUS at 09:11

## 2023-11-13 RX ADMIN — POTASSIUM CHLORIDE 40 MEQ: 1500 TABLET, EXTENDED RELEASE ORAL at 09:11

## 2023-11-13 RX ADMIN — DESVENLAFAXINE SUCCINATE 50 MG: 50 TABLET, FILM COATED, EXTENDED RELEASE ORAL at 09:11

## 2023-11-13 RX ADMIN — HYDROCODONE BITARTRATE AND ACETAMINOPHEN 1 TABLET: 5; 325 TABLET ORAL at 11:11

## 2023-11-13 NOTE — PROGRESS NOTES
Patient is not ready to quit and refuses smoking cessation appointment.  Patient refuses nicotine patch at this time.

## 2023-11-13 NOTE — PROGRESS NOTES
Pharmacist Intervention IV to PO Note    Alexandra Goins is a 58 y.o. female being treated with IV medication famotidine    Patient Data:    Vital Signs (Most Recent):  Temp: 97.5 °F (36.4 °C) (11/12/23 1943)  Pulse: 80 (11/12/23 2124)  Resp: 18 (11/12/23 2124)  BP: 123/62 (11/12/23 1942)  SpO2: (!) 92 % (11/12/23 2124) Vital Signs (72h Range):  Temp:  [64.4 °F (18 °C)-99.2 °F (37.3 °C)]   Pulse:  []   Resp:  [13-20]   BP: (116-186)/(57-87)   SpO2:  [88 %-96 %]      CBC:  Recent Labs   Lab 11/10/23  2053 11/10/23  2134 11/10/23  2157 11/12/23  0514   WBC 13.30* 12.93*  --  7.79   RBC 7.33* 6.82*  --  6.35*   HGB 20.8* 20.2*  --  18.0*   HCT 66.7* 62.7* 61* 56.8*    220  --  174   MCV 91 92  --  89   MCH 28.4 29.6  --  28.3   MCHC 31.2* 32.2  --  31.7*     CMP:     Recent Labs   Lab 11/10/23  2053 11/10/23  2134 11/11/23  0048 11/11/23  1131 11/11/23  1714 11/12/23  0514   * 450*   < > 178* 213* 250*   CALCIUM 9.3 8.5*   < > 8.4* 8.5* 8.1*   ALBUMIN 3.8 3.3*  --   --   --  2.8*   PROT 8.5* 7.7  --   --   --  5.8*   * 131*   < > 142 140 139   K 5.4* 6.2*   < > 3.0* 3.6 3.5   CO2 11* 12*   < > 28 25 27   CL 92* 93*   < > 98 96 96   BUN 46* 43*   < > 26* 21* 17   CREATININE 1.7* 1.5*   < > 1.0 1.0 0.9   ALKPHOS 99 85  --   --   --  65   ALT 23 17  --   --   --  20   AST 30 33  --   --   --  29   BILITOT 0.6 0.5  --   --   --  0.6    < > = values in this interval not displayed.       Dietary Orders:  Diet Orders            Diet diabetic 2000 Calorie: Diabetic starting at 11/11 1322            Based on the following criteria, this patient qualifies for intravenous to oral conversion:  [x] The patients gastrointestinal tract is functioning (tolerating medications via oral or enteral route for 24 hours and tolerating food or enteral feeds for 24 hours).  [x] The patient is hemodynamically stable for 24 hours (heart rate <100 beats per minute, systolic blood pressure >99 mm Hg, and respiratory  rate <20 breaths per minute).  [x] The patient shows clinical improvement (afebrile for at least 24 hours and white blood cell count downtrending or normalized). Additionally, the patient must be non-neutropenic (absolute neutrophil count >500 cells/mm3).  [x] For antimicrobials, the patient has received IV therapy for at least 24 hours.    IV medication (famotidine 20 mg BID) will be changed to oral medication (famotidine 20 mg BID).     Pharmacist's Name: Yaquelin Cabral  Pharmacist's Extension:  [phone]

## 2023-11-13 NOTE — DISCHARGE SUMMARY
[facility]  Discharge Summary      Patient Name: Alexandra Goins  MRN: [id number]  [facility]: [id number]  Patient Class: IP- Inpatient  Admission Date: 11/10/2023  Hospital Length of Stay: 2 days  Discharge Date and Time: 11/13/2023  Attending Physician: Zay Brunson,*   Discharging Provider: Zay Brunson MD  Primary Care Provider: Perez Casiano MD    Primary Care Team: Networked reference to record PCT     HPI:   Alexandra Goins, a 58-year-old female with a complex medical history including hypertension, hyperlipidemia, CAD, stroke, bipolar disorder, anxiety, depression, cerebral aneurysm, chronic diastolic heart failure, asthma/COPD, hypothyroidism, PVD, GERD, diverticular disease, neuropathy, seizure (due to hypoglycemia), and arthritis, presented to the ED for evaluation of nausea/vomiting, chest pain, anxiety, fever up to 103, diarrhea, and dysuria. Her symptoms commenced following a flu shot received last Saturday, progressively worsening by Monday. Ms. Goins reported non-compliance with insulin in the preceding days, attributing it to her malaise and inability to eat. Initial evaluation suggested DKA and a urinary tract infection. Critical care was consulted for her deteriorating condition.    Upon arrival in the ICU, Alexandra exhibited abnormal vital signs including a temperature of 99.2, pulse of 101, respiratory rate of 20, and elevated blood pressure at 151/70. Laboratory findings were significant for a UTI and diabetic ketoacidosis (DKA), with an initial glucose level of 459. Management included administration of Rocephin for the UTI, aggressive hydration with 2 L IV fluids, an insulin bolus followed by continuous insulin infusion, and a bicarb drip due to a decreasing CO2 level from 11 to 6 post-fluid bolus. Her condition stabilized in the ICU, with cessation of vomiting and diarrhea. Order for stepdown off the unit was placed and Hospital  Medicine was called for assumption of care.                * No surgery found *      Hospital Course:       Alexandra Goins, a 58-year-old female with a complex medical history including hypertension, hyperlipidemia, CAD, stroke, bipolar disorder, anxiety, depression, cerebral aneurysm, chronic diastolic heart failure, asthma/COPD, hypothyroidism, PVD, GERD, diverticular disease, neuropathy, seizure (due to hypoglycemia), and arthritis, presented to the ED for evaluation of nausea/vomiting, chest pain, anxiety, fever up to 103, diarrhea, and dysuria. Her symptoms commenced following a flu shot received last Saturday, progressively worsening by Monday. Ms. Goins reported non-compliance with insulin in the preceding days, attributing it to her malaise and inability to eat. Initial evaluation suggested DKA and a urinary tract infection. Critical care was consulted for her deteriorating condition.     Upon arrival in the ICU, Alexandra exhibited abnormal vital signs including a temperature of 99.2, pulse of 101, respiratory rate of 20, and elevated blood pressure at 151/70. Laboratory findings were significant for a UTI and diabetic ketoacidosis (DKA), with an initial glucose level of 459. Management included administration of Rocephin for the UTI, aggressive hydration with 2 L IV fluids, an insulin bolus followed by continuous insulin infusion, and a bicarb drip due to a decreasing CO2 level from 11 to 6 post-fluid bolus. Her condition stabilized in the ICU, with cessation of vomiting and diarrhea. Order for stepdown off the unit was placed and Hospital Medicine was called for assumption of care.    11/12/2023  Glucose more controlled this AM, but not yet at goal. Basal insulin increased to 15u QD this morning, will monitor efficacy throughout. No culture data to review this morning.     11/13  Examination done at bedside, patient appeared alert and oriented x3, denied acute issues overnight.  Denied fever, chills,  nausea, vomiting, chest pain, shortness OO breath, bowel or bladder issues.    Appeared hemodynamically stable   Blood glucose improved, bicarb within normal limit, anion gap remained closed.  Urine cultures- Multiple organisms isolated. None in predominance.     Remained hemodynamically stable   Patient denied further UTI signs/symptoms.  Considering clinical and hemodynamic stability, planning to discharge patient today,  increase premeal insulin dose, recommended compliance with medications, monitor blood glucose closely, follow up outpatient with PCP/Endocrinology within 1-2 weeks upon discharge.  Patient expressed understanding, agreed to the plan.  Ordered antibiotics to complete course for UTI   Medications to be delivered to bedside.         Physical Exam      Vitals Reviewed  GEN: No acute distress, pleasant, body habitus normal  HEENT: atraumatic and normocephalic  CARDS: regular rate and rhythm, no m/g, pulses palpable in LE  PULM: breathing comfortably on room air, chest symmetric, nonlabored, no abnormal breath sounds on auscultation  ABD: nontender, nondistended, soft, no organomegaly, BS+  MUSK: global weakness, needs help to maneuver in bed  Psych: affect much improved, less flat than yesterday  Neuro: Alert and oriented x3, CN's I-IX grossly intact, sensation and motor intact; follows directions and answers questions appropriately      Goals of Care Treatment Preferences:  Code Status: Full Code    Living Will: Yes              Consults:   Consults (From admission, onward)          Status Ordering Provider     Inpatient consult to Hospitalist  Once        Provider:  (Not yet assigned)    Acknowledged MICHELLE ROMAN            No new Assessment & Plan notes have been filed under this hospital service since the last note was generated.  Service: Hospital Medicine    Final Active Diagnoses:    Diagnosis Date Noted POA    PRINCIPAL PROBLEM:  Diabetic ketoacidosis without coma associated with type 2  diabetes mellitus [E11.10] 11/11/2023 Yes    Acute cystitis without hematuria [N30.00] 11/11/2023 Yes    Chronic diastolic heart failure [I50.32] 05/23/2023 Yes    Asthma with COPD [J44.89] 09/26/2022 Yes    Coronary artery disease of native artery of native heart with stable angina pectoris [I25.118] 01/19/2022 Yes     Chronic    Dyslipidemia associated with type 2 diabetes mellitus [E11.69, E78.5] 08/31/2015 Yes    Marijuana abuse [F12.10] 08/31/2015 Yes     Chronic    Bipolar disorder with anxiety and depression [F31.9] 08/30/2015 Yes     Chronic      Problems Resolved During this Admission:       Discharged Condition: good    Disposition: Home or Self Care    Follow Up:   Follow-up Information       Perez Casiano MD Follow up in 1 week(s).    Specialty: Family Medicine  Contact information:  [address] [place] [id number]  [phone]                           Patient Instructions:   No discharge procedures on file.    Significant Diagnostic Studies:   Results for orders placed or performed during the hospital encounter of 11/10/23   Urine culture    Specimen: Urine   Result Value Ref Range    Urine Culture, Routine       Multiple organisms isolated. None in predominance.  Repeat if    Urine Culture, Routine clinically necessary.    CBC auto differential   Result Value Ref Range    WBC 13.30 (H) 3.90 - 12.70 K/uL    RBC 7.33 (H) 4.00 - 5.40 M/uL    Hemoglobin 20.8 (HH) 12.0 - 16.0 g/dL    Hematocrit 66.7 (H) 37.0 - 48.5 %    MCV 91 82 - 98 fL    MCH 28.4 27.0 - 31.0 pg    MCHC 31.2 (L) 32.0 - 36.0 g/dL    RDW 17.0 (H) 11.5 - 14.5 %    Platelets 217 150 - 450 K/uL    MPV 10.4 9.2 - 12.9 fL    Immature Granulocytes 0.6 (H) 0.0 - 0.5 %    Gran # (ANC) 9.8 (H) 1.8 - 7.7 K/uL    Immature Grans (Abs) 0.08 (H) 0.00 - 0.04 K/uL    Lymph # 2.3 1.0 - 4.8 K/uL    Mono # 1.1 (H) 0.3 - 1.0 K/uL    Eos # 0.0 0.0 - 0.5 K/uL    Baso # 0.05 0.00 - 0.20 K/uL    nRBC 0 0 /100 WBC    Gran % 73.8 (H) 38.0 - 73.0 %     Lymph % 17.0 (L) 18.0 - 48.0 %    Mono % 8.2 4.0 - 15.0 %    Eosinophil % 0.0 0.0 - 8.0 %    Basophil % 0.4 0.0 - 1.9 %    Differential Method Automated    Comprehensive metabolic panel   Result Value Ref Range    Sodium 130 (L) 136 - 145 mmol/L    Potassium 5.4 (H) 3.5 - 5.1 mmol/L    Chloride 92 (L) 95 - 110 mmol/L    CO2 11 (L) 23 - 29 mmol/L    Glucose 459 (HH) 70 - 110 mg/dL    BUN 46 (H) 6 - 20 mg/dL    Creatinine 1.7 (H) 0.5 - 1.4 mg/dL    Calcium 9.3 8.7 - 10.5 mg/dL    Total Protein 8.5 (H) 6.0 - 8.4 g/dL    Albumin 3.8 3.5 - 5.2 g/dL    Total Bilirubin 0.6 0.1 - 1.0 mg/dL    Alkaline Phosphatase 99 55 - 135 U/L    AST 30 10 - 40 U/L    ALT 23 10 - 44 U/L    eGFR 34 (A) >60 mL/min/1.73 m^2    Anion Gap 27 (H) 8 - 16 mmol/L   Lipase   Result Value Ref Range    Lipase 18 4 - 60 U/L   Urinalysis, Reflex to Urine Culture Urine, Clean Catch    Specimen: Urine   Result Value Ref Range    Specimen UA Urine, Clean Catch     Color, UA Colorless (A) Yellow, Straw, Joann    Appearance, UA Clear Clear    pH, UA 6.0 5.0 - 8.0    Specific Gravity, UA 1.025 1.005 - 1.030    Protein, UA 2+ (A) Negative    Glucose, UA 4+ (A) Negative    Ketones, UA 3+ (A) Negative    Bilirubin (UA) Negative Negative    Occult Blood UA Trace (A) Negative    Nitrite, UA Negative Negative    Urobilinogen, UA Negative <2.0 EU/dL    Leukocytes, UA 3+ (A) Negative   CBC auto differential   Result Value Ref Range    WBC 12.93 (H) 3.90 - 12.70 K/uL    RBC 6.82 (H) 4.00 - 5.40 M/uL    Hemoglobin 20.2 (HH) 12.0 - 16.0 g/dL    Hematocrit 62.7 (H) 37.0 - 48.5 %    MCV 92 82 - 98 fL    MCH 29.6 27.0 - 31.0 pg    MCHC 32.2 32.0 - 36.0 g/dL    RDW 16.6 (H) 11.5 - 14.5 %    Platelets 220 150 - 450 K/uL    MPV 9.8 9.2 - 12.9 fL    Immature Granulocytes 0.5 0.0 - 0.5 %    Gran # (ANC) 10.2 (H) 1.8 - 7.7 K/uL    Immature Grans (Abs) 0.06 (H) 0.00 - 0.04 K/uL    Lymph # 1.6 1.0 - 4.8 K/uL    Mono # 0.9 0.3 - 1.0 K/uL    Eos # 0.0 0.0 - 0.5 K/uL    Baso #  0.05 0.00 - 0.20 K/uL    nRBC 0 0 /100 WBC    Gran % 79.1 (H) 38.0 - 73.0 %    Lymph % 12.5 (L) 18.0 - 48.0 %    Mono % 7.3 4.0 - 15.0 %    Eosinophil % 0.2 0.0 - 8.0 %    Basophil % 0.4 0.0 - 1.9 %    Differential Method Automated    Comprehensive metabolic panel   Result Value Ref Range    Sodium 131 (L) 136 - 145 mmol/L    Potassium 6.2 (H) 3.5 - 5.1 mmol/L    Chloride 93 (L) 95 - 110 mmol/L    CO2 12 (L) 23 - 29 mmol/L    Glucose 450 (H) 70 - 110 mg/dL    BUN 43 (H) 6 - 20 mg/dL    Creatinine 1.5 (H) 0.5 - 1.4 mg/dL    Calcium 8.5 (L) 8.7 - 10.5 mg/dL    Total Protein 7.7 6.0 - 8.4 g/dL    Albumin 3.3 (L) 3.5 - 5.2 g/dL    Total Bilirubin 0.5 0.1 - 1.0 mg/dL    Alkaline Phosphatase 85 55 - 135 U/L    AST 33 10 - 40 U/L    ALT 17 10 - 44 U/L    eGFR 40 (A) >60 mL/min/1.73 m^2    Anion Gap 26 (H) 8 - 16 mmol/L   Beta-Hydroxybutyrate, Serum   Result Value Ref Range    Beta-Hydroxybutyrate 4.8 (H) 0.0 - 0.5 mmol/L   Troponin I   Result Value Ref Range    Troponin I <0.006 0.000 - 0.026 ng/mL   Urinalysis Microscopic   Result Value Ref Range    RBC, UA 4 0 - 4 /hpf    WBC, UA >100 (H) 0 - 5 /hpf    WBC Clumps, UA Moderate (A) None-Rare    Bacteria None None-Occ /hpf    Yeast, UA Few (A) None    Squam Epithel, UA 2 /hpf    Hyaline Casts, UA 1 0-1/lpf /lpf    Microscopic Comment SEE COMMENT    Basic metabolic panel   Result Value Ref Range    Sodium 134 (L) 136 - 145 mmol/L    Potassium 4.6 3.5 - 5.1 mmol/L    Chloride 99 95 - 110 mmol/L    CO2 6 (LL) 23 - 29 mmol/L    Glucose 360 (H) 70 - 110 mg/dL    BUN 38 (H) 6 - 20 mg/dL    Creatinine 1.4 0.5 - 1.4 mg/dL    Calcium 8.7 8.7 - 10.5 mg/dL    Anion Gap 29 (H) 8 - 16 mmol/L    eGFR 43 (A) >60 mL/min/1.73 m^2   Lactic Acid, Plasma   Result Value Ref Range    Lactate (Lactic Acid) 1.3 0.5 - 2.2 mmol/L   Basic metabolic panel   Result Value Ref Range    Sodium 135 (L) 136 - 145 mmol/L    Potassium 4.6 3.5 - 5.1 mmol/L    Chloride 100 95 - 110 mmol/L    CO2 9 (LL) 23 - 29  mmol/L    Glucose 344 (H) 70 - 110 mg/dL    BUN 35 (H) 6 - 20 mg/dL    Creatinine 1.3 0.5 - 1.4 mg/dL    Calcium 8.3 (L) 8.7 - 10.5 mg/dL    Anion Gap 26 (H) 8 - 16 mmol/L    eGFR 47 (A) >60 mL/min/1.73 m^2   Drug screen panel, in-house   Result Value Ref Range    Benzodiazepines Presumptive Positive (A) Negative    Methadone metabolites Negative Negative    Cocaine (Metab.) Negative Negative    Opiate Scrn, Ur Negative Negative    Barbiturate Screen, Ur Negative Negative    Amphetamine Screen, Ur Negative Negative    THC Presumptive Positive (A) Negative    Phencyclidine Negative Negative    Creatinine, Urine 31.2 15.0 - 325.0 mg/dL    Toxicology Information SEE COMMENT    Basic metabolic panel   Result Value Ref Range    Sodium 143 136 - 145 mmol/L    Potassium 3.3 (L) 3.5 - 5.1 mmol/L    Chloride 99 95 - 110 mmol/L    CO2 25 23 - 29 mmol/L    Glucose 225 (H) 70 - 110 mg/dL    BUN 29 (H) 6 - 20 mg/dL    Creatinine 1.2 0.5 - 1.4 mg/dL    Calcium 8.4 (L) 8.7 - 10.5 mg/dL    Anion Gap 19 (H) 8 - 16 mmol/L    eGFR 52 (A) >60 mL/min/1.73 m^2   Magnesium   Result Value Ref Range    Magnesium 1.5 (L) 1.6 - 2.6 mg/dL   Phosphorus   Result Value Ref Range    Phosphorus 1.2 (L) 2.7 - 4.5 mg/dL   Basic metabolic panel   Result Value Ref Range    Sodium 142 136 - 145 mmol/L    Potassium 3.0 (L) 3.5 - 5.1 mmol/L    Chloride 98 95 - 110 mmol/L    CO2 28 23 - 29 mmol/L    Glucose 178 (H) 70 - 110 mg/dL    BUN 26 (H) 6 - 20 mg/dL    Creatinine 1.0 0.5 - 1.4 mg/dL    Calcium 8.4 (L) 8.7 - 10.5 mg/dL    Anion Gap 16 8 - 16 mmol/L    eGFR >60 >60 mL/min/1.73 m^2   TSH   Result Value Ref Range    TSH 1.497 0.400 - 4.000 uIU/mL   Basic Metabolic Panel   Result Value Ref Range    Sodium 140 136 - 145 mmol/L    Potassium 3.6 3.5 - 5.1 mmol/L    Chloride 96 95 - 110 mmol/L    CO2 25 23 - 29 mmol/L    Glucose 213 (H) 70 - 110 mg/dL    BUN 21 (H) 6 - 20 mg/dL    Creatinine 1.0 0.5 - 1.4 mg/dL    Calcium 8.5 (L) 8.7 - 10.5 mg/dL    Anion  Gap 19 (H) 8 - 16 mmol/L    eGFR >60 >60 mL/min/1.73 m^2   Magnesium   Result Value Ref Range    Magnesium 2.3 1.6 - 2.6 mg/dL   Phosphorus   Result Value Ref Range    Phosphorus 2.4 (L) 2.7 - 4.5 mg/dL   CBC Auto Differential   Result Value Ref Range    WBC 7.79 3.90 - 12.70 K/uL    RBC 6.35 (H) 4.00 - 5.40 M/uL    Hemoglobin 18.0 (H) 12.0 - 16.0 g/dL    Hematocrit 56.8 (H) 37.0 - 48.5 %    MCV 89 82 - 98 fL    MCH 28.3 27.0 - 31.0 pg    MCHC 31.7 (L) 32.0 - 36.0 g/dL    RDW 14.7 (H) 11.5 - 14.5 %    Platelets 174 150 - 450 K/uL    MPV 10.1 9.2 - 12.9 fL    Immature Granulocytes 0.5 0.0 - 0.5 %    Gran # (ANC) 4.0 1.8 - 7.7 K/uL    Immature Grans (Abs) 0.04 0.00 - 0.04 K/uL    Lymph # 2.8 1.0 - 4.8 K/uL    Mono # 0.8 0.3 - 1.0 K/uL    Eos # 0.1 0.0 - 0.5 K/uL    Baso # 0.03 0.00 - 0.20 K/uL    nRBC 0 0 /100 WBC    Gran % 51.8 38.0 - 73.0 %    Lymph % 35.7 18.0 - 48.0 %    Mono % 10.8 4.0 - 15.0 %    Eosinophil % 0.8 0.0 - 8.0 %    Basophil % 0.4 0.0 - 1.9 %    Differential Method Automated    Comprehensive Metabolic Panel   Result Value Ref Range    Sodium 139 136 - 145 mmol/L    Potassium 3.5 3.5 - 5.1 mmol/L    Chloride 96 95 - 110 mmol/L    CO2 27 23 - 29 mmol/L    Glucose 250 (H) 70 - 110 mg/dL    BUN 17 6 - 20 mg/dL    Creatinine 0.9 0.5 - 1.4 mg/dL    Calcium 8.1 (L) 8.7 - 10.5 mg/dL    Total Protein 5.8 (L) 6.0 - 8.4 g/dL    Albumin 2.8 (L) 3.5 - 5.2 g/dL    Total Bilirubin 0.6 0.1 - 1.0 mg/dL    Alkaline Phosphatase 65 55 - 135 U/L    AST 29 10 - 40 U/L    ALT 20 10 - 44 U/L    eGFR >60 >60 mL/min/1.73 m^2    Anion Gap 16 8 - 16 mmol/L   Magnesium   Result Value Ref Range    Magnesium 1.9 1.6 - 2.6 mg/dL   Phosphorus   Result Value Ref Range    Phosphorus 2.2 (L) 2.7 - 4.5 mg/dL   Magnesium   Result Value Ref Range    Magnesium 1.9 1.6 - 2.6 mg/dL   Phosphorus   Result Value Ref Range    Phosphorus 2.2 (L) 2.7 - 4.5 mg/dL   CBC Auto Differential   Result Value Ref Range    WBC 6.95 3.90 - 12.70 K/uL    RBC  6.13 (H) 4.00 - 5.40 M/uL    Hemoglobin 17.7 (H) 12.0 - 16.0 g/dL    Hematocrit 56.4 (H) 37.0 - 48.5 %    MCV 92 82 - 98 fL    MCH 28.9 27.0 - 31.0 pg    MCHC 31.4 (L) 32.0 - 36.0 g/dL    RDW 14.9 (H) 11.5 - 14.5 %    Platelets 149 (L) 150 - 450 K/uL    MPV 10.1 9.2 - 12.9 fL    Immature Granulocytes 0.4 0.0 - 0.5 %    Gran # (ANC) 3.5 1.8 - 7.7 K/uL    Immature Grans (Abs) 0.03 0.00 - 0.04 K/uL    Lymph # 2.7 1.0 - 4.8 K/uL    Mono # 0.6 0.3 - 1.0 K/uL    Eos # 0.1 0.0 - 0.5 K/uL    Baso # 0.02 0.00 - 0.20 K/uL    nRBC 0 0 /100 WBC    Gran % 50.2 38.0 - 73.0 %    Lymph % 39.0 18.0 - 48.0 %    Mono % 8.8 4.0 - 15.0 %    Eosinophil % 1.3 0.0 - 8.0 %    Basophil % 0.3 0.0 - 1.9 %    Differential Method Automated    Comprehensive Metabolic Panel   Result Value Ref Range    Sodium 140 136 - 145 mmol/L    Potassium 3.5 3.5 - 5.1 mmol/L    Chloride 98 95 - 110 mmol/L    CO2 29 23 - 29 mmol/L    Glucose 166 (H) 70 - 110 mg/dL    BUN 13 6 - 20 mg/dL    Creatinine 0.7 0.5 - 1.4 mg/dL    Calcium 8.2 (L) 8.7 - 10.5 mg/dL    Total Protein 5.5 (L) 6.0 - 8.4 g/dL    Albumin 2.7 (L) 3.5 - 5.2 g/dL    Total Bilirubin 0.5 0.1 - 1.0 mg/dL    Alkaline Phosphatase 70 55 - 135 U/L    AST 65 (H) 10 - 40 U/L    ALT 40 10 - 44 U/L    eGFR >60 >60 mL/min/1.73 m^2    Anion Gap 13 8 - 16 mmol/L   ISTAT PROCEDURE   Result Value Ref Range    POC PH 7.252 (LL) 7.35 - 7.45    POC PCO2 40.3 35 - 45 mmHg    POC PO2 80 80 - 100 mmHg    POC HCO3 17.8 (L) 24 - 28 mmol/L    POC BE -9 (L) -2 to 2 mmol/L    POC SATURATED O2 94 95 - 100 %    POC Glucose 441 (H) 70 - 110 mg/dL    POC Sodium 134 (L) 136 - 145 mmol/L    POC Potassium 3.5 3.5 - 5.1 mmol/L    POC Ionized Calcium 1.18 1.06 - 1.42 mmol/L    POC Hematocrit 61 (H) 36 - 54 %PCV    Sample ARTERIAL     Site RR     Allens Test Pass     DelSys Room Air     Mode SPONT     FiO2 21    POCT glucose   Result Value Ref Range    POCT Glucose 360 (H) 70 - 110 mg/dL   POCT glucose   Result Value Ref Range     POCT Glucose 257 (H) 70 - 110 mg/dL   POCT glucose   Result Value Ref Range    POCT Glucose 308 (H) 70 - 110 mg/dL   POCT glucose   Result Value Ref Range    POCT Glucose 264 (H) 70 - 110 mg/dL   POCT glucose   Result Value Ref Range    POCT Glucose 199 (H) 70 - 110 mg/dL   POCT glucose   Result Value Ref Range    POCT Glucose 170 (H) 70 - 110 mg/dL   POCT glucose   Result Value Ref Range    POCT Glucose 177 (H) 70 - 110 mg/dL   POCT glucose   Result Value Ref Range    POCT Glucose 153 (H) 70 - 110 mg/dL   POCT glucose   Result Value Ref Range    POCT Glucose 138 (H) 70 - 110 mg/dL   POCT glucose   Result Value Ref Range    POCT Glucose 170 (H) 70 - 110 mg/dL   POCT glucose   Result Value Ref Range    POCT Glucose 212 (H) 70 - 110 mg/dL   POCT glucose   Result Value Ref Range    POCT Glucose 224 (H) 70 - 110 mg/dL   POCT glucose   Result Value Ref Range    POCT Glucose 275 (H) 70 - 110 mg/dL   POCT glucose   Result Value Ref Range    POCT Glucose 126 (H) 70 - 110 mg/dL   POCT glucose   Result Value Ref Range    POCT Glucose 144 (H) 70 - 110 mg/dL   POCT glucose   Result Value Ref Range    POCT Glucose 228 (H) 70 - 110 mg/dL        Imaging Results              X-Ray Chest AP Portable (Final result)  Result time 11/10/23 22:56:01      Final result by Bear Arshad MD (11/10/23 22:56:01)                   Impression:      Mild basilar subsegmental atelectasis or scarring    Mild perihilar interstitial opacities may reflect element of pulmonary edema or bronchitis.      Electronically signed by: Bear Arshad  Date:    11/10/2023  Time:    22:56               Narrative:    EXAMINATION:  XR CHEST AP PORTABLE    CLINICAL HISTORY:  dka;    TECHNIQUE:  Single frontal view of the chest was performed.    COMPARISON:  None    FINDINGS:  No pleural effusion or pneumothorax.  Mild perihilar interstitial opacities.  Basilar subsegmental atelectasis or scarring.    The cardiac silhouette is prominent.  The hilar and mediastinal  contours are unremarkable.    Bones are intact.                                       CT Abdomen Pelvis  Without Contrast (Final result)  Result time 11/10/23 22:51:11      Final result by Bear Arshad MD (11/10/23 22:51:11)                   Impression:      Colonic diverticulosis involving the left and sigmoid colon with mucosal thickening.  Consider follow-up colonoscopy to exclude a mucosal lesion.    Hepatomegaly    Cholecystectomy    Atherosclerotic changes    Mild lingular and right middle lobes scarring.    Small focus of gas within the bladder.  Correlate to history of instrumentation.  Infectious process not excluded.    Adrenal hyperplasia    All CT scans   are performed using dose optimization techniques including the following: automated exposure control; adjustment of the mA and/or kV; use of iterative reconstruction technique.  Dose modulation was employed for ALARA by means of: Automated exposure control; adjustment of the mA and/or kV according to patient size (this includes techniques or standardized protocols for targeted exams where dose is matched to indication/reason for exam; i.e. extremities or head); and/or use of iterative reconstructive technique.      Electronically signed by: Bear Arshad  Date:    11/10/2023  Time:    22:51               Narrative:    EXAMINATION:  CT ABDOMEN PELVIS WITHOUT CONTRAST    CLINICAL HISTORY:  Abdominal abscess/infection suspected;Abdominal pain, acute, nonlocalized;    TECHNIQUE:  Low dose axial images, sagittal and coronal reformations were obtained from the lung bases to the pubic symphysis,    COMPARISON:  None    FINDINGS:  Heart: Normal in size as far as seen.  No pericardial effusion as far seen.    Lung Bases: Well aerated, without consolidation or pleural fluid.    Liver: Hepatomegaly with no focal hepatic lesions.    Gallbladder: Status post cholecystectomy.    Bile Ducts: No evidence of dilated ducts.    Pancreas: No mass or peripancreatic fat  stranding.    Spleen: Unremarkable.    Adrenals: Adrenal hyperplasia    Kidneys/ Ureters: Punctate right-sided nonobstructing renal calculi    Bladder: Small amount of gas the bladder.    Reproductive organs: Status post hysterectomy    GI Tract/Mesentery: No evidence of bowel obstruction or inflammation. No secondary signs of appendicitis.  Colonic diverticulosis with mucosal thickening of sigmoid and left colon.  Postsurgical changes of the sigmoid colon.    Peritoneal Space: No ascites. No free air.    Retroperitoneum: No significant adenopathy.    Abdominal wall: Unremarkable.    Vasculature: No aneurysm.  Atherosclerotic changes    Bones: No acute fracture.                                       Pending Diagnostic Studies:       Procedure Component Value Units Date/Time    Beta-Hydroxybutyrate, Serum [[id number]] Collected: 11/11/23 0122    Order Status: Sent Lab Status: In process Updated: 11/11/23 0146    Specimen: Blood     Troponin I [[id number]] Collected: 11/11/23 0420    Order Status: Sent Lab Status: In process Updated: 11/11/23 0421    Specimen: Blood            Medications:       Medication List        START taking these medications      insulin regular 100 unit/mL injection  Commonly known as: NovoLIN R Regular U100 Insulin  Inject 12 Units into the skin 3 (three) times daily before meals.  Replaces: NOVOLIN R INJ     nitrofurantoin (macrocrystal-monohydrate) 100 MG capsule  Commonly known as: MACROBID  Take 1 capsule (100 mg total) by mouth 2 (two) times daily. for 3 days  Start taking on: November 14, 2023            CONTINUE taking these medications      albuterol 90 mcg/actuation inhaler  Commonly known as: PROVENTIL/VENTOLIN HFA  INHALE 2 TO 4 PUFFS BY MOUTH THREE TIMES DAILY AS NEEDED FOR WHEEZING     albuterol-ipratropium 2.5 mg-0.5 mg/3 mL nebulizer solution  Commonly known as: DUO-NEB  Take 3 mLs by nebulization every 6 (six) hours as needed for Wheezing. Rescue     benztropine 0.5 MG  tablet  Commonly known as: COGENTIN  Take 1 tablet (0.5 mg total) by mouth 2 (two) times daily. TAKE 1 TABLET BY MOUTH TWICE DAILY AS NEEDED FOR  DYSTONIA     * blood sugar diagnostic Strp  Commonly known as: TRUE METRIX GLUCOSE TEST STRIP  USE 1 STRIP TO CHECK GLUCOSE THREE TIMES DAILY     * blood sugar diagnostic Strp  1 each by Misc.(Non-Drug; Combo Route) route 3 (three) times daily. Dispense preferred on insurance     blood-glucose meter kit  Use as instructed - preferred on insurance     butalbital-acetaminophen-caffeine -40 mg -40 mg per tablet  Commonly known as: FIORICET, ESGIC  Take 1 tablet by mouth every 6 (six) hours as needed for Headaches. for pain.     desvenlafaxine succinate 50 MG Tb24  Commonly known as: PRISTIQ  Take 1 tablet (50 mg total) by mouth once daily.     diazePAM 10 MG Tab  Commonly known as: VALIUM  TAKE 1/2 TO 1 (ONE-HALF TO ONE) TABLET BY MOUTH THREE TIMES DAILY AS NEEDED FOR ANXIETY     ezetimibe 10 mg tablet  Commonly known as: ZETIA  Take 1 tablet (10 mg total) by mouth once daily.     furosemide 40 MG tablet  Commonly known as: LASIX  TAKE 1 TABLET BY MOUTH EVERY MONDAY, WEDNESDAY, AND FRIDAY AS NEEDED     gabapentin 600 MG tablet  Commonly known as: NEURONTIN  TAKE 1 CAPSULE BY MOUTH IN THE MORNING, 1 CAPSULE IN THE AFTERNOON, AND THEN 2 CAPSULES AT BEDTIME     isosorbide mononitrate 30 MG 24 hr tablet  Commonly known as: IMDUR  Take 1 tablet by mouth once daily     lancets Misc  1 each by Misc.(Non-Drug; Combo Route) route 3 (three) times daily. Dispense preferred on insurance     metFORMIN 500 MG ER 24hr tablet  Commonly known as: GLUCOPHAGE-XR  Take 1 tablet (500 mg total) by mouth 2 (two) times daily with meals.     metoprolol succinate 25 MG 24 hr tablet  Commonly known as: TOPROL-XL  Take 1 tablet by mouth once daily     mirtazapine 15 MG tablet  Commonly known as: REMERON  Take 1 tablet (15 mg total) by mouth every evening.     pen needle, diabetic 32 gauge x  "5/32" Ndle  Inject 1 each into the skin once daily.     promethazine 12.5 MG Tab  Commonly known as: PHENERGAN  Take 1 tablet (12.5 mg total) by mouth every 6 (six) hours as needed.     risperiDONE 1 MG tablet  Commonly known as: RISPERDAL  Take 1 tablet (1 mg total) by mouth 2 (two) times daily.     TOUJEO MAX U-300 SOLOSTAR 300 unit/mL (3 mL) insulin pen  Generic drug: insulin glargine U-300 conc  Inject 76 Units into the skin once daily.           * This list has 2 medication(s) that are the same as other medications prescribed for you. Read the directions carefully, and ask your doctor or other care provider to review them with you.                STOP taking these medications      fluticasone-salmeterol 250-50 mcg/dose 250-50 mcg/dose diskus inhaler  Commonly known as: ADVAIR     NOVOLIN R INJ  Replaced by: insulin regular 100 unit/mL injection               Where to Get Your Medications        These medications were sent to [facility] [address] [place], [facility] [address]      Hours: Mon-Fri, 8a-5:30p Phone: [phone]   insulin regular 100 unit/mL injection  nitrofurantoin (macrocrystal-monohydrate) 100 MG capsule          Indwelling Lines/Drains at time of discharge:   Lines/Drains/Airways       None                   Time spent on the discharge of patient: 85 minutes         Zay Brunson MD  Department of Hospital Medicine  Count includes the [facility] - Telemetry ([facility])  "

## 2023-11-13 NOTE — PLAN OF CARE
[facility] - Telemetry (Hospital)  Discharge Final Note    Primary Care Provider: Perez Casiano MD    Expected Discharge Date: 11/13/2023    Final Discharge Note (most recent)       Final Note - 11/13/23 1059          Final Note    Assessment Type Final Discharge Note     Anticipated Discharge Disposition Home or Self Care     Hospital Resources/Appts/Education Provided Appointments scheduled and added to AVS        Post-Acute Status    Discharge Delays None known at this time                   Pt to discharge home today. No CM needs. PCP follow up scheduled on AVS:   Hospital Follow Up with Perez Casiano MD  Monday Nov 20, 2023 8:00 AM    Important Message from Medicare  Important Message from Medicare regarding Discharge Appeal Rights: Given to patient/caregiver, Explained to patient/caregiver, Signed/date by patient/caregiver     Date IMM was signed: 11/13/23  Time IMM was signed: 102

## 2023-11-13 NOTE — DISCHARGE INSTRUCTIONS
Recommend compliance with medications, follow-up visits, diet, hydration  Recommend outpatient follow up with PCP, monitor blood glucose regularly

## 2023-11-14 ENCOUNTER — PATIENT OUTREACH (OUTPATIENT)
Dept: ADMINISTRATIVE | Facility: CLINIC | Age: 58
End: 2023-11-14
Payer: MEDICARE

## 2023-11-14 RX ORDER — POLYETHYLENE GLYCOL 3350, SODIUM SULFATE ANHYDROUS, SODIUM BICARBONATE, SODIUM CHLORIDE, POTASSIUM CHLORIDE 236; 22.74; 6.74; 5.86; 2.97 G/4L; G/4L; G/4L; G/4L; G/4L
4 POWDER, FOR SOLUTION ORAL ONCE
Qty: 4000 ML | Refills: 0 | Status: SHIPPED | OUTPATIENT
Start: 2023-11-14 | End: 2023-11-14

## 2023-11-14 NOTE — PROGRESS NOTES
2nd Attempt made to reach patient for TCC call. Left voicemail please call [phone] leave first name, last name, and  Kati will return your call.

## 2023-11-14 NOTE — PROGRESS NOTES
Subjective:      Patient ID: Alexandra Goins is a 57 y.o. female.    Patient Active Problem List   Diagnosis    Syncope and collapse    Suicide attempt    Hyperglycemia due to type 2 diabetes mellitus    Bipolar disorder with anxiety and depression    Depression    Essential hypertension    Dyslipidemia associated with type 2 diabetes mellitus    Hypothyroidism due to acquired atrophy of thyroid    Marijuana abuse    Cigarette nicotine dependence with withdrawal    Chest pain, moderate coronary artery risk    Family history of arteriosclerotic cardiovascular disease    SOB (shortness of breath)    Epigastric abdominal pain    Hx of Renal abscess, right    Type 2 diabetes mellitus with complication, with long-term current use of insulin    Anxiety    Right-sided back pain    Hx of arterial ischemic stroke    S/P admn tPA in diff fac w/n last 24 hr bef adm to crnt fac    Acute right-sided weakness    Stroke    Aneurysm    Debility    Hyperlipidemia    Hypovolemia    Cerebral aneurysm, nonruptured    Cerebral aneurysm    Hemiplegia and hemiparesis following unspecified cerebrovascular disease affecting left dominant side    Ischemic chest pain    Statin intolerance    Acute respiratory failure with hypoxia and hypercapnia in the setting of Bilateral Pneumonia, unspecified organism     COPD exacerbation    History of stroke    Hypomagnesemia    Lactic acidosis    Hypotension    Statin myopathy    Diabetic polyneuropathy associated with type 2 diabetes mellitus    Seizure-like activity    PVD (peripheral vascular disease)    Coronary artery disease of native artery of native heart with stable angina pectoris    Class 1 obesity due to excess calories with serious comorbidity and body mass index (BMI) of 33.0 to 33.9 in adult    Chronic obstructive pulmonary disease with acute lower respiratory infection    Salmonella bacteremia    Asthma with COPD       she has been referred by No ref. provider found for evaluation  and management for   Chief Complaint   Patient presents with    COPD    Hospital Follow Up       Chief Complaint: COPD and Hospital Follow Up      HPI:  Alexandra Goins is a 57 y.o. female presents to pulmonary clinic initial evaluation related to hospital follow up 7/24/2022 - 8/2/2022.     Patient reports improved. She states she is not using home O2 on regular basis. Occasional use with activity if O2 sats 88%.   Discharged on NC 5 L. States using NC 3-4 L activity and sleep.     Patient reports she has longstanding diagnosis of COPD with asthma.    Current treatment Advair 250.  Albuterol inhaler.  Duo nebs.  Not always adherent to Advair daily or even twice daily.  Advise improve endurance 2 Advair 1 puff twice daily.    Occasional cough no mucus production.  No wheezing.  Occasional shortness of breath if she is off oxygen for exertional activities otherwise no shortness of breath at rest.    44 pack year smoker.  Weaned from 1 pack of cigarettes daily to 5 cigarettes daily over the past 2 months.  Motivated and hopeful for complete cessation. Proceed with LDCT baseline screening for lung cancer.       Previous Report Reviewed: ER records, lab reports, office notes, and radiology reports     Past Medical History: The following portions of the patient's history were reviewed and updated as appropriate:   She  has a past surgical history that includes Hysterectomy; Tonsillectomy; Cholecystectomy; Colon surgery; Colonoscopy (N/A, 1/12/2018); Dental surgery (05/21/2018); and Cerebral angiogram (N/A, 10/28/2019).  Her family history includes Alcohol abuse in her father and mother; Arthritis in her father, maternal grandmother, and paternal grandmother; Breast cancer in her maternal grandmother; COPD in her mother and sister; Cancer in her father, maternal aunt, maternal uncle, paternal aunt, paternal grandmother, and paternal uncle; Depression in her mother; Diabetes in her maternal uncle; Drug abuse in her  maternal uncle and mother; Heart disease in her brother and father; Hypertension in her brother, father, maternal grandmother, and paternal grandfather; Kidney failure in her sister.  She  reports that she has been smoking cigarettes and vaping w/o nicotine. She started smoking about 44 years ago. She has a 44.00 pack-year smoking history. She has never used smokeless tobacco. She reports that she does not currently use alcohol. She reports current drug use. Drug: Marijuana.  She has a current medication list which includes the following prescription(s): albuterol, aspirin, benztropine, blood sugar diagnostic, bupropion, butalbital-acetaminophen-caffeine -40 mg, doxepin, empagliflozin, furosemide, gabapentin, insulin nph, insulin regular, human, isosorbide mononitrate, losartan, metformin, metoprolol succinate, nicotine, promethazine, risperidone, sulfamethoxazole-trimethoprim 800-160mg, albuterol-ipratropium, ezetimibe, and fluticasone-salmeterol 250-50 mcg/dose.  She is allergic to seroquel [quetiapine], adhesive, levaquin [levofloxacin], levomenol analogues, lipitor [atorvastatin], repatha pushtronex [evolocumab], zofran [ondansetron hcl], celestone [betamethasone], and piroxicam..    Review of Systems   Constitutional:  Negative for fever, chills, weight loss, weight gain, activity change, appetite change, fatigue and night sweats.   HENT:  Negative for postnasal drip, rhinorrhea, sinus pressure, voice change and congestion.    Eyes:  Negative for redness and itching.   Respiratory:  Negative for snoring, cough, sputum production, chest tightness, shortness of breath, wheezing, orthopnea, asthma nighttime symptoms, dyspnea on extertion, use of rescue inhaler and somnolence.    Cardiovascular: Negative.  Negative for chest pain, palpitations and leg swelling.   Genitourinary:  Negative for difficulty urinating and hematuria.   Endocrine:  Negative for cold intolerance and heat intolerance.   "  Musculoskeletal:  Negative for arthralgias, gait problem, joint swelling and myalgias.   Skin: Negative.    Gastrointestinal:  Negative for nausea, vomiting, abdominal pain and acid reflux.   Neurological:  Negative for dizziness, weakness, light-headedness and headaches.   Hematological:  Negative for adenopathy. No excessive bruising.   All other systems reviewed and are negative.     Objective:   /62   Pulse 88   Resp 20   Ht 5' 4" (1.626 m)   Wt 88.8 kg (195 lb 12.3 oz)   SpO2 96%   BMI 33.60 kg/m²   Physical Exam  Vitals and nursing note reviewed.   Constitutional:       General: She is not in acute distress.     Appearance: She is well-developed. She is not ill-appearing or toxic-appearing.   HENT:      Head: Normocephalic.      Right Ear: External ear normal.      Left Ear: External ear normal.      Nose: Nose normal.      Mouth/Throat:      Pharynx: No oropharyngeal exudate.   Eyes:      Conjunctiva/sclera: Conjunctivae normal.   Cardiovascular:      Rate and Rhythm: Normal rate and regular rhythm.      Heart sounds: Normal heart sounds.   Pulmonary:      Effort: Pulmonary effort is normal.      Breath sounds: Normal breath sounds. No stridor.   Abdominal:      Palpations: Abdomen is soft.   Musculoskeletal:         General: Normal range of motion.      Cervical back: Normal range of motion and neck supple.   Lymphadenopathy:      Cervical: No cervical adenopathy.   Skin:     General: Skin is warm and dry.   Neurological:      Mental Status: She is alert and oriented to person, place, and time.   Psychiatric:         Behavior: Behavior normal. Behavior is cooperative.         Thought Content: Thought content normal.         Judgment: Judgment normal.     Personal Diagnostic Review    X-Ray Chest PA And Lateral  Narrative: EXAMINATION:  XR CHEST PA AND LATERAL    CLINICAL HISTORY:  Chronic obstructive pulmonary disease, unspecified    COMPARISON:  07/30/2022    FINDINGS:  PA and lateral views of " the chest show linear atelectasis or scarring in both lung bases without lobar type consolidation, pneumothorax or pleural effusion.  Aorta is partially calcified.  Heart is upper normal in size, unchanged.  No acute osseous findings.  Impression: Linear atelectasis or scarring in the lower lungs without focal consolidation    Electronically signed by: Trevor Novak MD  Date:    09/26/2022  Time:    14:56      Assessment:     1. Asthma with COPD    2. Chronic obstructive pulmonary disease, unspecified COPD type    3. Chronic obstructive pulmonary disease with acute lower respiratory infection    4. Bipolar affective disorder, remission status unspecified    5. Pneumonia of both lower lobes due to infectious organism    6. Cigarette nicotine dependence with withdrawal    7. Acute respiratory failure with hypoxia and hypercapnia in the setting of Bilateral Pneumonia, unspecified organism       Orders Placed This Encounter   Procedures    X-Ray Chest PA And Lateral     Standing Status:   Future     Number of Occurrences:   1     Standing Expiration Date:   9/26/2023     Order Specific Question:   May the Radiologist modify the order per protocol to meet the clinical needs of the patient?     Answer:   Yes     Order Specific Question:   Release to patient     Answer:   Immediate    CT Chest Lung Screening Low Dose     Standing Status:   Future     Standing Expiration Date:   12/17/2024     Order Specific Question:   Is the patient a current or former smoker who quit within the past 15 years?     Answer:   Yes     Order Specific Question:   Is the patient between the age 50-80 years old?     Answer:   Yes     Order Specific Question:   Has the patient ever had lung cancer or an abnormal Chest CT?     Answer:   No     Order Specific Question:   Has the patient smoked 20 or more packs of cigarettes/year?     Answer:   Yes     Order Specific Question:   May the Radiologist modify the order per protocol to meet the clinical  needs of the patient?     Answer:   Yes     Order Specific Question:   Does the patient show any signs or symptoms of lung cancer?     Answer:   No     Order Specific Question:   Is this the first (baseline) CT or an annual exam?     Answer:   Baseline [1]     Order Specific Question:   Is there documentation of shared decision making for this lung screening exam?     Answer:   No     Order Specific Question:   Is this a low dose screening chest CT?     Answer:   Yes    Complete PFT with bronchodilator     Standing Status:   Future     Standing Expiration Date:   2023     Order Specific Question:   Release to patient     Answer:   Immediate    Stress test, pulmonary     Standing Status:   Future     Standing Expiration Date:   2023     Order Specific Question:   Reason for study     Answer:   Functional status     Order Specific Question:   Release to patient     Answer:   Immediate    PULM - Arterial Blood Gases--in addition to PFT only     Standing Status:   Future     Standing Expiration Date:   2023     Order Specific Question:   Release to patient     Answer:   Immediate    Fraction of  Nitric Oxide     Standing Status:   Future     Standing Expiration Date:   2023     Order Specific Question:   Release to patient     Answer:   Immediate     Medication List with Changes/Refills   Current Medications    ALBUTEROL (PROVENTIL/VENTOLIN HFA) 90 MCG/ACTUATION INHALER    INHALE 2 TO 4 PUFFS BY MOUTH THREE TIMES DAILY AS NEEDED FOR WHEEZING    ALBUTEROL-IPRATROPIUM (DUO-NEB) 2.5 MG-0.5 MG/3 ML NEBULIZER SOLUTION    Take 3 mLs by nebulization every 6 (six) hours as needed for Wheezing. Rescue    ASPIRIN (ECOTRIN) 81 MG EC TABLET    Take 1 tablet (81 mg total) by mouth once daily.    BENZTROPINE (COGENTIN) 0.5 MG TABLET    Take 1 tablet (0.5 mg total) by mouth 2 (two) times daily as needed (dystonia).    BLOOD SUGAR DIAGNOSTIC (TRUE METRIX GLUCOSE TEST STRIP) STRP    USE 1 STRIP TO CHECK  GLUCOSE THREE TIMES DAILY    BUPROPION (WELLBUTRIN XL) 150 MG TB24 TABLET    Take 1 tablet (150 mg total) by mouth once daily.    BUTALBITAL-ACETAMINOPHEN-CAFFEINE -40 MG (FIORICET, ESGIC) -40 MG PER TABLET    Take 1 tablet by mouth every 6 (six) hours as needed for Headaches. for pain.    DOXEPIN (SINEQUAN) 10 MG CAPSULE    Take 1 to 2 capsules at bedtime    EMPAGLIFLOZIN (JARDIANCE) 25 MG TABLET    Take 1 tablet (25 mg total) by mouth once daily.    EZETIMIBE (ZETIA) 10 MG TABLET    Take 1 tablet (10 mg total) by mouth once daily.    FLUTICASONE-SALMETEROL DISKUS INHALER 250-50 MCG    Inhale 1 puff into the lungs 2 (two) times daily. Controller for maintenance    FUROSEMIDE (LASIX) 40 MG TABLET    TAKE 1 TABLET BY MOUTH EVERY MONDAY, WEDNESDAY, AND FRIDAY AS NEEDED    GABAPENTIN (NEURONTIN) 600 MG TABLET    TAKE 1 TABLET BY MOUTH IN THE MORNING AND  TAKE  1  TABLET  BY  MOUTH  IN  THE  AFTERNOON  THEN  TAKE  2  TABLETS  BY  MOUTH  AT  BEDTIME    INSULIN  UNIT/ML INJECTION    Inject 25 Units into the skin 2 (two) times daily before meals.    INSULIN REGULAR, HUMAN (NOVOLIN R INJ)        ISOSORBIDE MONONITRATE (IMDUR) 30 MG 24 HR TABLET    Take 1 tablet by mouth once daily    LOSARTAN (COZAAR) 25 MG TABLET    Take 1 tablet (25 mg total) by mouth once daily.    METFORMIN (GLUCOPHAGE-XR) 500 MG ER 24HR TABLET    Take 1 tablet (500 mg total) by mouth 2 (two) times daily with meals.    METOPROLOL SUCCINATE (TOPROL-XL) 25 MG 24 HR TABLET    Take 1 tablet by mouth once daily    NICOTINE (NICODERM CQ) 21 MG/24 HR    Place 1 patch onto the skin once daily.    PROMETHAZINE (PHENERGAN) 12.5 MG TAB    Take 1 tablet (12.5 mg total) by mouth every 6 (six) hours as needed.    RISPERIDONE (RISPERDAL) 1 MG TABLET    Take 1 tablet (1 mg total) by mouth 2 (two) times daily.    SULFAMETHOXAZOLE-TRIMETHOPRIM 800-160MG (BACTRIM DS) 800-160 MG TAB    Take 1 tablet by mouth 2 (two) times daily.   Discontinued  Medications    DIAZEPAM (VALIUM ORAL)    Take by mouth 3 (three) times daily as needed for Anxiety. Do not exceed 3 timed a day    DIAZEPAM (VALIUM) 10 MG TAB    Take 1 tablet (10 mg total) by mouth 3 (three) times daily as needed.     Plan:   Discussed diagnosis, its evaluation, treatment and usual course. All questions answered.  Problem List Items Addressed This Visit       Bipolar disorder with anxiety and depression (Chronic)    Current Assessment & Plan     Managed by psychiatry             Cigarette nicotine dependence with withdrawal    Current Assessment & Plan     44 pack year smoker, weaned from 1 pack to 5 cigarettes  Some motivation to quit  Proceed with LDCT screening for lung cancer    Assistance with smoking cessation was offered, including:  []  Medications  [x]  Counseling  []  Printed Information on Smoking Cessation  [x]  Referral to a Smoking Cessation Program, in program    Patient was counseled regarding smoking for 3-10 minutes.             Relevant Orders    CT Chest Lung Screening Low Dose    Chronic obstructive pulmonary disease with acute lower respiratory infection    Current Assessment & Plan      - 2022 hospital stay  Discharged on NC 4 - 5L activity and sleep.   Follow up cpft/abg/6mwd.          Asthma with COPD - Primary    Overview     Advair 250.  Albuterol inhaler.  Duo nebs.  NC 4-5 L activity and sleep         Current Assessment & Plan     Advair 250.  Albuterol inhaler.  Duo nebs.  NC 4-5 L activity and sleep  The current medical regimen is effective;  continue present plan and medications.  Follow up next available appointment  cpft, ABG, 6mwd           Relevant Orders    Complete PFT with bronchodilator    Stress test, pulmonary    PULM - Arterial Blood Gases--in addition to PFT only    X-Ray Chest PA And Lateral (Completed)    Fraction of  Nitric Oxide    Acute respiratory failure with hypoxia and hypercapnia in the setting of Bilateral Pneumonia, unspecified  organism     Current Assessment & Plan     9/26/2022 chest xray no acute opacity   Exam lungs clear  Symptomatically improved  On NC 4-5 L activity and sleep   Return ABG, 6mwd, cpft  Continue Advair 250, Albuterol inhaler, duo nebs. NC 4-5 L activity and sleep.           Other Visit Diagnoses       Chronic obstructive pulmonary disease, unspecified COPD type        Pneumonia of both lower lobes due to infectious organism        Relevant Orders    X-Ray Chest PA And Lateral (Completed)          I spent a total of 50 minutes on the day of the visit.  This includes face to face time and non-face to face time preparing to see the patient (eg, review of tests), obtaining and/or reviewing separately obtained history, documenting clinical information in the electronic or other health record, independently interpreting results and communicating results to the patient/family/caregiver, or care coordinator.    Follow up for next available Asthma, COPD cpft/abg/6mwd./ LDCT.        intact

## 2023-11-14 NOTE — PROGRESS NOTES
C3 nurse attempted to contact Alexandra AYA Buster for a TCC post hospital discharge follow up call. No answer. Left voicemail with callback information. The patient has a scheduled HOSFU appointment with Perez Casiano MD on 11/20/23 @ 0800.

## 2023-11-15 ENCOUNTER — OFFICE VISIT (OUTPATIENT)
Dept: INTERNAL MEDICINE | Facility: CLINIC | Age: 58
End: 2023-11-15
Payer: MEDICARE

## 2023-11-15 ENCOUNTER — NURSE TRIAGE (OUTPATIENT)
Dept: ADMINISTRATIVE | Facility: CLINIC | Age: 58
End: 2023-11-15
Payer: MEDICARE

## 2023-11-15 ENCOUNTER — TELEPHONE (OUTPATIENT)
Dept: FAMILY MEDICINE | Facility: CLINIC | Age: 58
End: 2023-11-15
Payer: MEDICARE

## 2023-11-15 DIAGNOSIS — R53.1 GENERALIZED WEAKNESS: Primary | ICD-10-CM

## 2023-11-15 DIAGNOSIS — E11.65 TYPE 2 DIABETES MELLITUS WITH HYPERGLYCEMIA, WITH LONG-TERM CURRENT USE OF INSULIN: ICD-10-CM

## 2023-11-15 DIAGNOSIS — Z91.81 HISTORY OF RECENT FALL: ICD-10-CM

## 2023-11-15 DIAGNOSIS — Z79.4 TYPE 2 DIABETES MELLITUS WITH HYPERGLYCEMIA, WITH LONG-TERM CURRENT USE OF INSULIN: ICD-10-CM

## 2023-11-15 PROCEDURE — 1159F PR MEDICATION LIST DOCUMENTED IN MEDICAL RECORD: ICD-10-PCS | Mod: HCNC,CPTII,95, | Performed by: FAMILY MEDICINE

## 2023-11-15 PROCEDURE — 1160F PR REVIEW ALL MEDS BY PRESCRIBER/CLIN PHARMACIST DOCUMENTED: ICD-10-PCS | Mod: HCNC,CPTII,95, | Performed by: FAMILY MEDICINE

## 2023-11-15 PROCEDURE — 3072F LOW RISK FOR RETINOPATHY: CPT | Mod: HCNC,CPTII,95, | Performed by: FAMILY MEDICINE

## 2023-11-15 PROCEDURE — 3072F PR LOW RISK FOR RETINOPATHY: ICD-10-PCS | Mod: HCNC,CPTII,95, | Performed by: FAMILY MEDICINE

## 2023-11-15 PROCEDURE — 1160F RVW MEDS BY RX/DR IN RCRD: CPT | Mod: HCNC,CPTII,95, | Performed by: FAMILY MEDICINE

## 2023-11-15 PROCEDURE — 1159F MED LIST DOCD IN RCRD: CPT | Mod: HCNC,CPTII,95, | Performed by: FAMILY MEDICINE

## 2023-11-15 PROCEDURE — 99214 PR OFFICE/OUTPT VISIT, EST, LEVL IV, 30-39 MIN: ICD-10-PCS | Mod: HCNC,95,, | Performed by: FAMILY MEDICINE

## 2023-11-15 PROCEDURE — 3046F HEMOGLOBIN A1C LEVEL >9.0%: CPT | Mod: HCNC,CPTII,95, | Performed by: FAMILY MEDICINE

## 2023-11-15 PROCEDURE — 1111F DSCHRG MED/CURRENT MED MERGE: CPT | Mod: HCNC,CPTII,95, | Performed by: FAMILY MEDICINE

## 2023-11-15 PROCEDURE — 1111F PR DISCHARGE MEDS RECONCILED W/ CURRENT OUTPATIENT MED LIST: ICD-10-PCS | Mod: HCNC,CPTII,95, | Performed by: FAMILY MEDICINE

## 2023-11-15 PROCEDURE — 3046F PR MOST RECENT HEMOGLOBIN A1C LEVEL > 9.0%: ICD-10-PCS | Mod: HCNC,CPTII,95, | Performed by: FAMILY MEDICINE

## 2023-11-15 PROCEDURE — 99214 OFFICE O/P EST MOD 30 MIN: CPT | Mod: HCNC,95,, | Performed by: FAMILY MEDICINE

## 2023-11-15 NOTE — PROGRESS NOTES
"The patient location is: [place]  The chief complaint leading to consultation is: Generalized weakness     Visit type: audiovisual    Face to Face time with patient: 10 minutes   20 minutes minutes of total time spent on the encounter, which includes face to face time and non-face to face time preparing to see the patient (eg, review of tests), Obtaining and/or reviewing separately obtained history, Documenting clinical information in the electronic or other health record, Independently interpreting results (not separately reported) and communicating results to the patient/family/caregiver, or Care coordination (not separately reported).     Each patient to whom he or she provides medical services by telemedicine is:  (1) informed of the relationship between the physician and patient and the respective role of any other health care provider with respect to management of the patient; and (2) notified that he or she may decline to receive medical services by telemedicine and may withdraw from such care at any time.    Notes:    Subjective:       Patient ID: Alexandra Goins is a 58 y.o. female.    Chief Complaint: No chief complaint on file.    Alexandra Goins is a 58 y.o. female who presents with complaints of generalized weakness. Was recently discharged from hospital where she was diagnosed with DKA and UTI and spent 2 days in ICU. She was discharged on Monday. Since discharge, she has felt weakness especially with prolonged standing. She fell las night due to her legs giving out. Denies any "big" injuries but states her toes was stubbed, pain is 4/10 in severity. Denies worsening of numbness/tingling beyond her baseline issues. Request referral to [facility] home health today.     Review of Systems   Constitutional:  Positive for activity change. Negative for unexpected weight change.   HENT:  Positive for hearing loss. Negative for trouble swallowing.    Eyes:  Negative for discharge and visual disturbance. "   Respiratory:  Negative for chest tightness and wheezing.    Cardiovascular:  Negative for chest pain and palpitations.   Gastrointestinal:  Negative for blood in stool, constipation, diarrhea and vomiting.   Endocrine: Negative for polydipsia and polyuria.   Genitourinary:  Positive for difficulty urinating. Negative for dysuria, hematuria and menstrual problem.   Musculoskeletal:  Positive for arthralgias. Negative for joint swelling.   Neurological:  Positive for weakness. Negative for headaches.   Psychiatric/Behavioral:  Negative for confusion and dysphoric mood.          Current Outpatient Medications on File Prior to Visit   Medication Sig Dispense Refill    albuterol (PROVENTIL/VENTOLIN HFA) 90 mcg/actuation inhaler INHALE 2 TO 4 PUFFS BY MOUTH THREE TIMES DAILY AS NEEDED FOR WHEEZING 18 g 3    albuterol-ipratropium (DUO-NEB) 2.5 mg-0.5 mg/3 mL nebulizer solution Take 3 mLs by nebulization every 6 (six) hours as needed for Wheezing. Rescue 1 Box 0    benztropine (COGENTIN) 0.5 MG tablet Take 1 tablet (0.5 mg total) by mouth 2 (two) times daily. TAKE 1 TABLET BY MOUTH TWICE DAILY AS NEEDED FOR  DYSTONIA 60 tablet 2    blood sugar diagnostic (TRUE METRIX GLUCOSE TEST STRIP) Strp USE 1 STRIP TO CHECK GLUCOSE THREE TIMES DAILY 100 strip 3    blood sugar diagnostic Strp 1 each by Misc.(Non-Drug; Combo Route) route 3 (three) times daily. Dispense preferred on insurance 100 strip 11    blood-glucose meter kit Use as instructed - preferred on insurance 1 each 0    butalbital-acetaminophen-caffeine -40 mg (FIORICET, ESGIC) -40 mg per tablet Take 1 tablet by mouth every 6 (six) hours as needed for Headaches. for pain. 30 tablet 0    desvenlafaxine succinate (PRISTIQ) 50 MG Tb24 Take 1 tablet (50 mg total) by mouth once daily. 30 tablet 2    diazePAM (VALIUM) 10 MG Tab TAKE 1/2 TO 1 (ONE-HALF TO ONE) TABLET BY MOUTH THREE TIMES DAILY AS NEEDED FOR ANXIETY 90 tablet 2    ezetimibe (ZETIA) 10 mg tablet Take  "1 tablet (10 mg total) by mouth once daily. 30 tablet 0    furosemide (LASIX) 40 MG tablet TAKE 1 TABLET BY MOUTH EVERY MONDAY, WEDNESDAY, AND FRIDAY AS NEEDED 45 tablet 2    insulin glargine U-300 conc (TOUJEO MAX U-300 SOLOSTAR) 300 unit/mL (3 mL) insulin pen Inject 76 Units into the skin once daily. 4 pen 3    insulin regular (NOVOLIN R REGULAR U100 INSULIN) 100 unit/mL Inj injection Inject 12 Units into the skin 3 (three) times daily before meals. 10 mL 0    isosorbide mononitrate (IMDUR) 30 MG 24 hr tablet Take 1 tablet by mouth once daily 90 tablet 2    lancets Misc 1 each by Misc.(Non-Drug; Combo Route) route 3 (three) times daily. Dispense preferred on insurance 100 each 11    metFORMIN (GLUCOPHAGE-XR) 500 MG ER 24hr tablet Take 1 tablet (500 mg total) by mouth 2 (two) times daily with meals. 180 tablet 1    metoprolol succinate (TOPROL-XL) 25 MG 24 hr tablet Take 1 tablet by mouth once daily 90 tablet 3    mirtazapine (REMERON) 15 MG tablet Take 1 tablet (15 mg total) by mouth every evening. 30 tablet 2    pen needle, diabetic 32 gauge x 5/32" Ndle Inject 1 each into the skin once daily. 100 each 3    promethazine (PHENERGAN) 12.5 MG Tab Take 1 tablet (12.5 mg total) by mouth every 6 (six) hours as needed. 32 tablet 0    risperiDONE (RISPERDAL) 1 MG tablet Take 1 tablet (1 mg total) by mouth 2 (two) times daily. 60 tablet 2    [DISCONTINUED] gabapentin (NEURONTIN) 600 MG tablet TAKE 1 CAPSULE BY MOUTH IN THE MORNING, 1 CAPSULE IN THE AFTERNOON, AND THEN 2 CAPSULES AT BEDTIME 360 tablet 1     No current facility-administered medications on file prior to visit.        Past Medical History:   Diagnosis Date    Acute ischemic stroke 9/17/2019    Acute respiratory failure with hypoxia 2/11/2021    Aneurysm     Anxiety     Arthritis     Asthma     Bipolar 1 disorder     Cerebral aneurysm, nonruptured 9/19/2019    Chronic diastolic heart failure 5/23/2023    Coronary artery disease of native artery of native heart " with stable angina pectoris 1/19/2022    Depression     Diabetes mellitus, type 2     Diverticular disease     GERD (gastroesophageal reflux disease)     Hyperlipemia     Hypertension     Hyponatremia 7/24/2022    Hypothyroidism     Pneumonia     PVD (peripheral vascular disease) 4/28/2021    Renal manifestation of secondary diabetes mellitus     Right-sided back pain 6/29/2019    Severe obesity (BMI 35.0-39.9) with comorbidity 5/31/2022    Stroke     Trouble in sleeping         Past Surgical History:   Procedure Laterality Date    CEREBRAL ANGIOGRAM N/A 10/28/2019    Procedure: ANGIOGRAM-CEREBRAL;  Surgeon: Dahiana Diagnostic Provider;  Location: [facility] OR [address] [place] [address];  Service: Radiology;  Laterality: N/A;  Rm 190/Aayush    CHOLECYSTECTOMY      COLON SURGERY      COLONOSCOPY N/A 1/12/2018    Procedure: COLONOSCOPY;  Surgeon: Roque Mahajan III, MD;  Location: [facility] ENDO;  Service: Endoscopy;  Laterality: N/A;    COLONOSCOPY N/A 10/13/2023    Procedure: COLONOSCOPY;  Surgeon: Tehlma Chairez MD;  Location: [facility];  Service: Endoscopy;  Laterality: N/A;    DENTAL SURGERY  05/21/2018    removal of 8 top teeth    HYSTERECTOMY      TONSILLECTOMY        Objective:    Vitals no obtained due to virtual visit     Physical Exam  HENT:      Head: Normocephalic.   Eyes:      General: No scleral icterus.  Neurological:      Mental Status: She is alert and oriented to person, place, and time.   Psychiatric:         Mood and Affect: Mood normal.         Behavior: Behavior normal.         Assessment:       1. Generalized weakness    2. History of recent fall    3. Type 2 diabetes mellitus with hyperglycemia, with long-term current use of insulin        Plan:   1. Generalized weakness  -     Ambulatory referral/consult to Home Health; Future; Expected date: 11/16/2023    2. History of recent fall  -     X-ray Knee Ortho Bilateral; Future; Expected date: 11/15/2023  -     X-Ray Hip 2 or 3 views Left (with Pelvis when performed);  Future; Expected date: 11/15/2023  -     X-Ray Hip 2 or 3 views Right (with Pelvis when performed); Future; Expected date: 11/15/2023    3. Type 2 diabetes mellitus with hyperglycemia, with long-term current use of insulin  -     SUBSEQUENT HOME HEALTH ORDERS         No follow-ups on file.    Trinidad Thompson MD  Family Medicine

## 2023-11-15 NOTE — TELEPHONE ENCOUNTER
Attempted to call pt n/a lvm to call back regarding appt. Pt needs to reschedule to in person visit provider is not able to evaluate via virtual.

## 2023-11-15 NOTE — TELEPHONE ENCOUNTER
----- Message from Kelsey Oneal sent at 11/15/2023 10:09 AM CST -----  Regarding: pt call back  Name of Who is Calling: pt daughter        What is the request in detail: is returning call and needs a call back, please advise.  Ophelia Bernardo MA      Can the clinic reply by [facility]: no          What Number to Call Back if not in [facility]: [phone]

## 2023-11-15 NOTE — TELEPHONE ENCOUNTER
[address] ED  EMERGENCY DEPARTMENT ENCOUNTER        Pt Name: Eunice Patel  MRN: [id number]  [place] 1979  Date of evaluation: 1/25/2020  Provider: Diandra Collins PA-C  PCP: No primary care provider on file. This patient was not seen and evaluated by the attending physician No att. providers found. CHIEF COMPLAINT       Chief Complaint   Patient presents with    Abdominal Pain     patient states that he is having upper abdominal pain. patient points at his upper abdomen and states the pain is behind the left rib. patient states that it hurts more when he eats       HISTORY OF PRESENT ILLNESS   (Location/Symptom, Timing/Onset, Context/Setting, Quality, Duration, Modifying Factors, Severity)  Note limiting factors. Eunice Patel is a 41 y.o. male patient brought in today by private vehicle for complaints of left upper abdominal pain that has been going on for over 1 month. He states the pain feels like it is behind his left rib. Onset of symptoms x1 month. Duration of symptoms have been intermittent in nature and are worse after he eats. Denies nausea, vomiting or change in bowel habits. Denies any urinary complaints. Denies any chest pain or shortness of breath. Denies any fevers or chills. Denies any recent abdominal surgeries however he has had several surgeries in the past.  Seems to make his symptoms. Patient states he has not tried anything for pain control. He reports this pain is different than his previous \"adhesion pain\" which he has had in the past.    Nursing Notes were all reviewed and agreed with or any disagreements were addressed in the HPI. REVIEW OF SYSTEMS    (2-9 systems for level 4, 10 or more for level 5)     Review of Systems   Constitutional: Negative. HENT: Negative. Respiratory: Negative. Cardiovascular: Negative. Gastrointestinal: Positive for abdominal pain. Genitourinary: Negative. Musculoskeletal: Negative.     Skin: Spoke with pt . He states she is npt able to get around very well right now and not sure if she is able to come in person. States he will figure it out pt husbands works usually until 5 pm    Negative. Neurological: Negative. Positives and Pertinent negatives as per HPI. Except as noted above in the ROS, all other systems were reviewed and negative. PAST MEDICAL HISTORY     Past Medical History:   Diagnosis Date    Abdominal adhesions          SURGICAL HISTORY     Past Surgical History:   Procedure Laterality Date    ABDOMEN SURGERY      2010         [address]       Discharge Medication List as of 1/25/2020  7:28 PM            ALLERGIES     Patient has no known allergies. FAMILYHISTORY     History reviewed. No pertinent family history. SOCIAL HISTORY       Social History     Tobacco Use    Smoking status: Former Smoker     Packs/day: 1.00     Types: Cigarettes    Smokeless tobacco: Current User     Types: Chew    Tobacco comment: 1 pack per week   Substance Use Topics    Alcohol use: Yes     Alcohol/week: 24.0 standard drinks     Types: 24 Cans of beer per week    Drug use: Yes     Types: Marijuana       SCREENINGS             PHYSICAL EXAM    (up to 7 for level 4, 8 or more for level 5)     ED Triage Vitals [01/25/20 1601]   BP Temp Temp Source Pulse Resp SpO2 Height Weight   (!) 156/94 98 °F (36.7 °C) Temporal 88 16 98 % 5' 11\" (1.803 m) 190 lb (86.2 kg)       Physical Exam  Vitals signs and nursing note reviewed. Constitutional:       General: He is awake. Appearance: Normal appearance. He is well-developed and normal weight. He is not ill-appearing, toxic-appearing or diaphoretic. HENT:      Head: Normocephalic and atraumatic. Nose: Nose normal.   Eyes:      General:         Right eye: No discharge. Left eye: No discharge. Neck:      Musculoskeletal: Normal range of motion and neck supple. Cardiovascular:      Rate and Rhythm: Normal rate and regular rhythm. Pulses:           Radial pulses are 2+ on the right side and 2+ on the left side. Heart sounds: Normal heart sounds. No murmur. No gallop.     Pulmonary:      Effort: Pulmonary effort is normal. No respiratory distress. Breath sounds: Normal breath sounds. No decreased breath sounds, wheezing, rhonchi or rales. Chest:      Chest wall: No tenderness. Abdominal:      General: Abdomen is flat. A surgical scar is present. Bowel sounds are normal.      Palpations: Abdomen is soft. Tenderness: There is abdominal tenderness in the left upper quadrant. There is no right CVA tenderness, left CVA tenderness, guarding or rebound. Musculoskeletal: Normal range of motion. General: No deformity. Skin:     General: Skin is warm and dry. Neurological:      General: No focal deficit present. Mental Status: He is alert and oriented to person, place, and time. GCS: GCS eye subscore is 4. GCS verbal subscore is 5. GCS motor subscore is 6. Psychiatric:         Behavior: Behavior normal. Behavior is cooperative.          DIAGNOSTIC RESULTS   LABS:    Labs Reviewed   URINE RT REFLEX TO CULTURE - Abnormal; Notable for the following components:       Result Value    Ketones, Urine TRACE (*)     All other components within normal limits    Narrative:     Performed at:  [place] [address],  [employer] [facility]   Phone [phone]   CBC WITH AUTO DIFFERENTIAL    Narrative:     Performed at:  [place] [address],  [place], [facility]   Phone [phone]   COMPREHENSIVE METABOLIC PANEL W/ REFLEX TO MG FOR LOW K    Narrative:     Performed at:  [place] [address],  [facility]   Phone [phone]   LIPASE    Narrative:     Performed at:  [place] [address],  [facility]   Phone [phone]   TROPONIN    Narrative:     Performed at:  [place] [address],  [facility]   Phone [phone]   D-DIMER, QUANTITATIVE

## 2023-11-15 NOTE — PROGRESS NOTES
C3 nurse spoke with Alexandra Goins for a TCC post hospital discharge follow up call. The patient has a scheduled HOSFU appointment with Perez Casiano MD on 11/16/23 @ 1120.

## 2023-11-15 NOTE — PROGRESS NOTES
"See charting and "my note". Patient agreed to transfer to triage nurse d/t patient stated concerns.    "

## 2023-11-15 NOTE — TELEPHONE ENCOUNTER
@ 0802 request for triage assist. @ 0892 call successfully transferred to triage nurse.  @ 0807 secure high priority message to pcp staff regarding pt stated questions, requests, concerns.

## 2023-11-15 NOTE — TELEPHONE ENCOUNTER
Reason for Disposition   MODERATE weakness (i.e., interferes with work, school, normal activities) and new-onset or worsening    Additional Information   Negative: Major injury from dangerous force (e.g., fall > 10 feet or 3 meters)   Negative: Major bleeding (e.g., actively dripping or spurting) and can't be stopped   Negative: Shock suspected (e.g., cold/pale/clammy skin, too weak to stand)   Negative: Difficult to awaken or acting confused (e.g., disoriented, slurred speech)   Negative: SEVERE weakness (i.e., unable to walk or barely able to walk, requires support) and new-onset or worsening   Negative: Can't stand (bear weight) or walk and new-onset after fall   Negative: Sounds like a life-threatening emergency to the triager   Negative: Injury (or injuries) that need emergency care   Negative: Sounds like a serious injury to the triager   Negative: Muscle pain and dark (cola colored) or red-colored urine   Negative: Unable to get up until help (e.g., caregiver, family, friend) arrived and on the ground 1 hour or more   Negative: Patient sounds very sick or weak to the triager    Protocols used: Falls and [id number]  Spoke with daughter of pt who reports that pt. Was recently discharged from hospital. States mother has been having weakness. Had fall last night. Denies hitting head.States that 4th toe on right foot swollen reports pain to right knee, and shoulder pain. Advised to be seen in office now. Virtual visit scheduled for pt. Also asking for referral to Home health. Advised will send message to PCP . Verbalized understanding

## 2023-11-15 NOTE — TELEPHONE ENCOUNTER
----- Message from Kati Tomlinson RN sent at 11/15/2023  8:49 AM CST -----  Regarding: tcc post-d/c call  Spoke with patient for tcc post-d/c call. Please contact patient and advised regarding patient stated questions and / or concerns. Thank you.    Patient transferred to triage r/t c/o fall 11/14/23 with associated c/o pain to RLE, R-knee, R foot 4th toe swelling and pain, and R shoulder rated 4/10. Additionally, L shoulder strain, verbalized Hx of Sx to L shoulder. Denies visible injuries or head impact.    Pt verbalized continued weakness without change since d/c. Unsteady gait. Requesting HH assist.    Please do not reply to this message, as this inbox is not routinely monitored.

## 2023-11-15 NOTE — TELEPHONE ENCOUNTER
Spoke with daughter marlene she informed me pt is not able to get down the flight of stairs to get out the house she is very weak. She was just recently discharged from hospital. Informed provider he states he will only see in office for now to evaluate. Called pt daughter back and informed her if she needs assistance with getting her out the bed to call an ambulance to assist daughter states she will have her at appt tomorrow

## 2023-11-16 ENCOUNTER — PATIENT MESSAGE (OUTPATIENT)
Dept: INTERNAL MEDICINE | Facility: CLINIC | Age: 58
End: 2023-11-16
Payer: MEDICARE

## 2023-11-19 NOTE — TELEPHONE ENCOUNTER
No care due was identified.  [facility] Embedded Care Due Messages. Reference number: [id number].   11/19/2023 2:56:54 PM CST

## 2023-11-20 ENCOUNTER — HOSPITAL ENCOUNTER (OUTPATIENT)
Dept: RADIOLOGY | Facility: HOSPITAL | Age: 58
Discharge: HOME | End: 2023-11-20
Attending: FAMILY MEDICINE
Payer: MEDICARE

## 2023-11-20 ENCOUNTER — OFFICE VISIT (OUTPATIENT)
Dept: FAMILY MEDICINE | Facility: CLINIC | Age: 58
End: 2023-11-20
Payer: MEDICARE

## 2023-11-20 VITALS
WEIGHT: 176.13 LBS | SYSTOLIC BLOOD PRESSURE: 130 MMHG | TEMPERATURE: 99 F | HEART RATE: 100 BPM | BODY MASS INDEX: 31.21 KG/M2 | HEIGHT: 63 IN | DIASTOLIC BLOOD PRESSURE: 70 MMHG | OXYGEN SATURATION: 92 %

## 2023-11-20 DIAGNOSIS — E11.8 TYPE 2 DIABETES MELLITUS WITH COMPLICATION, WITH LONG-TERM CURRENT USE OF INSULIN: ICD-10-CM

## 2023-11-20 DIAGNOSIS — I10 ESSENTIAL HYPERTENSION: ICD-10-CM

## 2023-11-20 DIAGNOSIS — E11.10 DIABETIC KETOACIDOSIS WITHOUT COMA ASSOCIATED WITH TYPE 2 DIABETES MELLITUS: Primary | ICD-10-CM

## 2023-11-20 DIAGNOSIS — N30.00 ACUTE CYSTITIS WITHOUT HEMATURIA: ICD-10-CM

## 2023-11-20 DIAGNOSIS — M79.674 TOE PAIN, RIGHT: ICD-10-CM

## 2023-11-20 DIAGNOSIS — F31.9 BIPOLAR AFFECTIVE DISORDER, REMISSION STATUS UNSPECIFIED: ICD-10-CM

## 2023-11-20 DIAGNOSIS — I50.32 CHRONIC DIASTOLIC HEART FAILURE: ICD-10-CM

## 2023-11-20 DIAGNOSIS — E11.42 DIABETIC POLYNEUROPATHY ASSOCIATED WITH TYPE 2 DIABETES MELLITUS: ICD-10-CM

## 2023-11-20 DIAGNOSIS — Z79.4 TYPE 2 DIABETES MELLITUS WITH COMPLICATION, WITH LONG-TERM CURRENT USE OF INSULIN: ICD-10-CM

## 2023-11-20 PROCEDURE — 3078F DIAST BP <80 MM HG: CPT | Mod: HCNC,CPTII,S$GLB, | Performed by: FAMILY MEDICINE

## 2023-11-20 PROCEDURE — 3072F LOW RISK FOR RETINOPATHY: CPT | Mod: HCNC,CPTII,S$GLB, | Performed by: FAMILY MEDICINE

## 2023-11-20 PROCEDURE — 1111F DSCHRG MED/CURRENT MED MERGE: CPT | Mod: HCNC,CPTII,S$GLB, | Performed by: FAMILY MEDICINE

## 2023-11-20 PROCEDURE — 3078F PR MOST RECENT DIASTOLIC BLOOD PRESSURE < 80 MM HG: ICD-10-PCS | Mod: HCNC,CPTII,S$GLB, | Performed by: FAMILY MEDICINE

## 2023-11-20 PROCEDURE — 1159F MED LIST DOCD IN RCRD: CPT | Mod: HCNC,CPTII,S$GLB, | Performed by: FAMILY MEDICINE

## 2023-11-20 PROCEDURE — 99999 PR PBB SHADOW E&M-EST. PATIENT-LVL V: ICD-10-PCS | Mod: PBBFAC,HCNC,, | Performed by: FAMILY MEDICINE

## 2023-11-20 PROCEDURE — 99495 TCM SERVICES (MODERATE COMPLEXITY): ICD-10-PCS | Mod: HCNC,S$GLB,, | Performed by: FAMILY MEDICINE

## 2023-11-20 PROCEDURE — 73630 XR FOOT COMPLETE 3 VIEW RIGHT: ICD-10-PCS | Mod: 26,HCNC,RT, | Performed by: RADIOLOGY

## 2023-11-20 PROCEDURE — 3008F BODY MASS INDEX DOCD: CPT | Mod: HCNC,CPTII,S$GLB, | Performed by: FAMILY MEDICINE

## 2023-11-20 PROCEDURE — 1111F PR DISCHARGE MEDS RECONCILED W/ CURRENT OUTPATIENT MED LIST: ICD-10-PCS | Mod: HCNC,CPTII,S$GLB, | Performed by: FAMILY MEDICINE

## 2023-11-20 PROCEDURE — 1159F PR MEDICATION LIST DOCUMENTED IN MEDICAL RECORD: ICD-10-PCS | Mod: HCNC,CPTII,S$GLB, | Performed by: FAMILY MEDICINE

## 2023-11-20 PROCEDURE — 99495 TRANSJ CARE MGMT MOD F2F 14D: CPT | Mod: HCNC,S$GLB,, | Performed by: FAMILY MEDICINE

## 2023-11-20 PROCEDURE — 3046F PR MOST RECENT HEMOGLOBIN A1C LEVEL > 9.0%: ICD-10-PCS | Mod: HCNC,CPTII,S$GLB, | Performed by: FAMILY MEDICINE

## 2023-11-20 PROCEDURE — 3072F PR LOW RISK FOR RETINOPATHY: ICD-10-PCS | Mod: HCNC,CPTII,S$GLB, | Performed by: FAMILY MEDICINE

## 2023-11-20 PROCEDURE — 3046F HEMOGLOBIN A1C LEVEL >9.0%: CPT | Mod: HCNC,CPTII,S$GLB, | Performed by: FAMILY MEDICINE

## 2023-11-20 PROCEDURE — 73630 X-RAY EXAM OF FOOT: CPT | Mod: TC,HCNC,PO,RT

## 2023-11-20 PROCEDURE — 3008F PR BODY MASS INDEX (BMI) DOCUMENTED: ICD-10-PCS | Mod: HCNC,CPTII,S$GLB, | Performed by: FAMILY MEDICINE

## 2023-11-20 PROCEDURE — 73630 X-RAY EXAM OF FOOT: CPT | Mod: 26,HCNC,RT, | Performed by: RADIOLOGY

## 2023-11-20 PROCEDURE — 3075F SYST BP GE 130 - 139MM HG: CPT | Mod: HCNC,CPTII,S$GLB, | Performed by: FAMILY MEDICINE

## 2023-11-20 PROCEDURE — 99999 PR PBB SHADOW E&M-EST. PATIENT-LVL V: CPT | Mod: PBBFAC,HCNC,, | Performed by: FAMILY MEDICINE

## 2023-11-20 PROCEDURE — 3075F PR MOST RECENT SYSTOLIC BLOOD PRESS GE 130-139MM HG: ICD-10-PCS | Mod: HCNC,CPTII,S$GLB, | Performed by: FAMILY MEDICINE

## 2023-11-20 RX ORDER — GABAPENTIN 600 MG/1
TABLET ORAL
Qty: 360 TABLET | Refills: 0 | OUTPATIENT
Start: 2023-11-20

## 2023-11-20 RX ORDER — GABAPENTIN 600 MG/1
TABLET ORAL
Qty: 360 TABLET | Refills: 1 | Status: SHIPPED | OUTPATIENT
Start: 2023-11-20

## 2023-11-20 NOTE — PROGRESS NOTES
Subjective:       Patient ID: Alexandra Goins is a 58 y.o. female.    Chief Complaint: Hospital Follow Up      HPI Comments:       Current Outpatient Medications:     albuterol (PROVENTIL/VENTOLIN HFA) 90 mcg/actuation inhaler, INHALE 2 TO 4 PUFFS BY MOUTH THREE TIMES DAILY AS NEEDED FOR WHEEZING, Disp: 18 g, Rfl: 3    benztropine (COGENTIN) 0.5 MG tablet, Take 1 tablet (0.5 mg total) by mouth 2 (two) times daily. TAKE 1 TABLET BY MOUTH TWICE DAILY AS NEEDED FOR  DYSTONIA, Disp: 60 tablet, Rfl: 2    blood sugar diagnostic (TRUE METRIX GLUCOSE TEST STRIP) Strp, USE 1 STRIP TO CHECK GLUCOSE THREE TIMES DAILY, Disp: 100 strip, Rfl: 3    blood sugar diagnostic Strp, 1 each by Misc.(Non-Drug; Combo Route) route 3 (three) times daily. Dispense preferred on insurance, Disp: 100 strip, Rfl: 11    blood-glucose meter kit, Use as instructed - preferred on insurance, Disp: 1 each, Rfl: 0    butalbital-acetaminophen-caffeine -40 mg (FIORICET, ESGIC) -40 mg per tablet, Take 1 tablet by mouth every 6 (six) hours as needed for Headaches. for pain., Disp: 30 tablet, Rfl: 0    desvenlafaxine succinate (PRISTIQ) 50 MG Tb24, Take 1 tablet (50 mg total) by mouth once daily., Disp: 30 tablet, Rfl: 2    diazePAM (VALIUM) 10 MG Tab, TAKE 1/2 TO 1 (ONE-HALF TO ONE) TABLET BY MOUTH THREE TIMES DAILY AS NEEDED FOR ANXIETY, Disp: 90 tablet, Rfl: 2    furosemide (LASIX) 40 MG tablet, TAKE 1 TABLET BY MOUTH EVERY MONDAY, WEDNESDAY, AND FRIDAY AS NEEDED, Disp: 45 tablet, Rfl: 2    insulin glargine U-300 conc (TOUJEO MAX U-300 SOLOSTAR) 300 unit/mL (3 mL) insulin pen, Inject 76 Units into the skin once daily., Disp: 4 pen , Rfl: 3    insulin regular (NOVOLIN R REGULAR U100 INSULIN) 100 unit/mL Inj injection, Inject 12 Units into the skin 3 (three) times daily before meals., Disp: 10 mL, Rfl: 0    isosorbide mononitrate (IMDUR) 30 MG 24 hr tablet, Take 1 tablet by mouth once daily, Disp: 90 tablet, Rfl: 2    lancets Misc, 1 each by  "Misc.(Non-Drug; Combo Route) route 3 (three) times daily. Dispense preferred on insurance, Disp: 100 each, Rfl: 11    metFORMIN (GLUCOPHAGE-XR) 500 MG ER 24hr tablet, Take 1 tablet (500 mg total) by mouth 2 (two) times daily with meals., Disp: 180 tablet, Rfl: 1    metoprolol succinate (TOPROL-XL) 25 MG 24 hr tablet, Take 1 tablet by mouth once daily, Disp: 90 tablet, Rfl: 3    mirtazapine (REMERON) 15 MG tablet, Take 1 tablet (15 mg total) by mouth every evening., Disp: 30 tablet, Rfl: 2    pen needle, diabetic 32 gauge x 5/32" Ndle, Inject 1 each into the skin once daily., Disp: 100 each, Rfl: 3    promethazine (PHENERGAN) 12.5 MG Tab, Take 1 tablet (12.5 mg total) by mouth every 6 (six) hours as needed., Disp: 32 tablet, Rfl: 0    risperiDONE (RISPERDAL) 1 MG tablet, Take 1 tablet (1 mg total) by mouth 2 (two) times daily., Disp: 60 tablet, Rfl: 2    albuterol-ipratropium (DUO-NEB) 2.5 mg-0.5 mg/3 mL nebulizer solution, Take 3 mLs by nebulization every 6 (six) hours as needed for Wheezing. Rescue, Disp: 1 Box, Rfl: 0    ezetimibe (ZETIA) 10 mg tablet, Take 1 tablet (10 mg total) by mouth once daily., Disp: 30 tablet, Rfl: 0    gabapentin (NEURONTIN) 600 MG tablet, TAKE 1 CAPSULE BY MOUTH IN THE MORNING, 1 CAPSULE IN THE AFTERNOON, AND THEN 2 CAPSULES AT BEDTIME, Disp: 360 tablet, Rfl: 1      Transitional Care Note    Family and/or Caretaker present at visit?  No.  Diagnostic tests reviewed/disposition: No diagnosic tests pending after this hospitalization.  Disease/illness education: DKA  Home health/community services discussion/referrals: Patient does not have home health established from hospital visit.  They do not need home health.  If needed, we will set up home health for the patient.   Establishment or re-establishment of referral orders for community resources: No other necessary community resources.   Discussion with other health care providers: No discussion with other health care providers necessary. " "               "Alexandra Goins, a 58-year-old female with a complex medical history including hypertension, hyperlipidemia, CAD, stroke, bipolar disorder, anxiety, depression, cerebral aneurysm, chronic diastolic heart failure, asthma/COPD, hypothyroidism, PVD, GERD, diverticular disease, neuropathy, seizure (due to hypoglycemia), and arthritis, presented to the ED for evaluation of nausea/vomiting, chest pain, anxiety, fever up to 103, diarrhea, and dysuria. Her symptoms commenced following a flu shot received last Saturday, progressively worsening by Monday. Ms. Goins reported non-compliance with insulin in the preceding days, attributing it to her malaise and inability to eat. Initial evaluation suggested DKA and a urinary tract infection. Critical care was consulted for her deteriorating condition.     Upon arrival in the ICU, Alexandra exhibited abnormal vital signs including a temperature of 99.2, pulse of 101, respiratory rate of 20, and elevated blood pressure at 151/70. Laboratory findings were significant for a UTI and diabetic ketoacidosis (DKA), with an initial glucose level of 459. Management included administration of Rocephin for the UTI, aggressive hydration with 2 L IV fluids, an insulin bolus followed by continuous insulin infusion, and a bicarb drip due to a decreasing CO2 level from 11 to 6 post-fluid bolus. Her condition stabilized in the ICU, with cessation of vomiting and diarrhea. Order for stepdown off the unit was placed and Hospital Medicine was called for assumption of care.     Hospital Course:      Alexandra Goins, a 58-year-old female with a complex medical history including hypertension, hyperlipidemia, CAD, stroke, bipolar disorder, anxiety, depression, cerebral aneurysm, chronic diastolic heart failure, asthma/COPD, hypothyroidism, PVD, GERD, diverticular disease, neuropathy, seizure (due to hypoglycemia), and arthritis, presented to the ED for evaluation of nausea/vomiting, chest " pain, anxiety, fever up to 103, diarrhea, and dysuria. Her symptoms commenced following a flu shot received last Saturday, progressively worsening by Monday. Ms. Goins reported non-compliance with insulin in the preceding days, attributing it to her malaise and inability to eat. Initial evaluation suggested DKA and a urinary tract infection. Critical care was consulted for her deteriorating condition.     Upon arrival in the ICU, Alexandra exhibited abnormal vital signs including a temperature of 99.2, pulse of 101, respiratory rate of 20, and elevated blood pressure at 151/70. Laboratory findings were significant for a UTI and diabetic ketoacidosis (DKA), with an initial glucose level of 459. Management included administration of Rocephin for the UTI, aggressive hydration with 2 L IV fluids, an insulin bolus followed by continuous insulin infusion, and a bicarb drip due to a decreasing CO2 level from 11 to 6 post-fluid bolus. Her condition stabilized in the ICU, with cessation of vomiting and diarrhea. Order for stepdown off the unit was placed and Hospital Medicine was called for assumption of care.     11/12/2023  Glucose more controlled this AM, but not yet at goal. Basal insulin increased to 15u QD this morning, will monitor efficacy throughout. No culture data to review this morning.      11/13  Examination done at bedside, patient appeared alert and oriented x3, denied acute issues overnight.  Denied fever, chills, nausea, vomiting, chest pain, shortness OO breath, bowel or bladder issues.    Appeared hemodynamically stable   Blood glucose improved, bicarb within normal limit, anion gap remained closed.  Urine cultures- Multiple organisms isolated. None in predominance.     Remained hemodynamically stable   Patient denied further UTI signs/symptoms.  Considering clinical and hemodynamic stability, planning to discharge patient today,  increase premeal insulin dose, recommended compliance with medications,  "monitor blood glucose closely, follow up outpatient with PCP/Endocrinology within 1-2 weeks upon discharge.  Patient expressed understanding, agreed to the plan.  Ordered antibiotics to complete course for UTI   Medications to be delivered to bedside."    Home now for one-week.      Says she feels "tired".    Sugars doing much better.    Hit her right foot against something hard now has a painful right 4th toe.  Using brenda tape.  Hurts when she walks.        Review of Systems   Constitutional:  Positive for fatigue. Negative for activity change, appetite change and fever.   HENT:  Negative for sore throat.    Respiratory:  Negative for cough and shortness of breath.    Cardiovascular:  Negative for chest pain.   Gastrointestinal:  Negative for abdominal pain, diarrhea and nausea.   Genitourinary:  Negative for difficulty urinating.   Musculoskeletal:  Positive for gait problem. Negative for arthralgias and myalgias.   Neurological:  Negative for dizziness and headaches.       Objective:      Vitals:    11/20/23 1124 11/20/23 1146   BP: 130/70    Pulse: 100    Temp: 99.4 °F (37.4 °C)    SpO2: (!) 86% (!) 92%   Weight: 79.9 kg (176 lb 2.4 oz)    Height: 5' 3" (1.6 m)    PainSc:   5    PainLoc: Toe      Physical Exam  Vitals and nursing note reviewed.   Constitutional:       General: She is not in acute distress.     Appearance: She is well-developed. She is not diaphoretic.   HENT:      Head: Normocephalic.   Neck:      Thyroid: No thyromegaly.   Cardiovascular:      Rate and Rhythm: Normal rate and regular rhythm.      Heart sounds: Normal heart sounds. No murmur heard.  Pulmonary:      Effort: Pulmonary effort is normal.      Breath sounds: Normal breath sounds. No wheezing or rales.   Abdominal:      General: There is no distension.      Palpations: Abdomen is soft.   Musculoskeletal:      Cervical back: Neck supple.        Feet:    Feet:      Comments: Erythematous, tender, warm area.  Does not appear cellulitic. "  No open wounds  Lymphadenopathy:      Cervical: No cervical adenopathy.   Skin:     General: Skin is warm and dry.   Neurological:      Mental Status: She is alert and oriented to person, place, and time.   Psychiatric:         Mood and Affect: Mood normal.         Behavior: Behavior normal.         Thought Content: Thought content normal.         Judgment: Judgment normal.         Assessment:       1. Diabetic ketoacidosis without coma associated with type 2 diabetes mellitus    2. Acute cystitis without hematuria    3. Type 2 diabetes mellitus with complication, with long-term current use of insulin    4. Bipolar affective disorder, remission status unspecified    5. Diabetic polyneuropathy associated with type 2 diabetes mellitus    6. Chronic diastolic heart failure    7. Essential hypertension    8. Toe pain, right        Plan:   Diabetic ketoacidosis without coma associated with type 2 diabetes mellitus  Comments:  Now stabilized on insulin    Acute cystitis without hematuria  Comments:  Completed antibiotics.  Asymptomatic    Type 2 diabetes mellitus with complication, with long-term current use of insulin  Comments:  Previous A1c 12.0.  Follow-up with diabetes care in February.  Follow-up here in one-month    Bipolar affective disorder, remission status unspecified  Comments:  Stable    Diabetic polyneuropathy associated with type 2 diabetes mellitus  Comments:  On gabapentin  Orders:  -     gabapentin (NEURONTIN) 600 MG tablet; TAKE 1 CAPSULE BY MOUTH IN THE MORNING, 1 CAPSULE IN THE AFTERNOON, AND THEN 2 CAPSULES AT BEDTIME  Dispense: 360 tablet; Refill: 1    Chronic diastolic heart failure  Comments:  Stable fluid balance    Essential hypertension  Comments:  Controlled    Toe pain, right  Comments:  X-ray  Orders:  -     Cancel: X-Ray Foot 2 View Right; Future; Expected date: 11/20/2023  -     X-Ray Foot Complete 3 view Right; Future; Expected date: 11/20/2023

## 2023-11-22 DIAGNOSIS — E11.9 TYPE 2 DIABETES MELLITUS WITHOUT COMPLICATION: ICD-10-CM

## 2023-12-14 ENCOUNTER — CLINICAL SUPPORT (OUTPATIENT)
Dept: SMOKING CESSATION | Facility: CLINIC | Age: 58
End: 2023-12-14
Payer: COMMERCIAL

## 2023-12-14 DIAGNOSIS — F17.200 NICOTINE DEPENDENCE: Primary | ICD-10-CM

## 2023-12-14 PROCEDURE — 99999 PR PBB SHADOW E&M-EST. PATIENT-LVL I: ICD-10-PCS | Mod: PBBFAC,,,

## 2023-12-14 PROCEDURE — 99407 BEHAV CHNG SMOKING > 10 MIN: CPT | Mod: S$GLB,,, | Performed by: GENERAL PRACTICE

## 2023-12-14 PROCEDURE — 99999 PR PBB SHADOW E&M-EST. PATIENT-LVL I: CPT | Mod: PBBFAC,,,

## 2023-12-14 PROCEDURE — 99407 PR TOBACCO USE CESSATION INTENSIVE >10 MINUTES: ICD-10-PCS | Mod: S$GLB,,, | Performed by: GENERAL PRACTICE

## 2023-12-19 ENCOUNTER — TELEPHONE (OUTPATIENT)
Dept: PREADMISSION TESTING | Facility: HOSPITAL | Age: 58
End: 2023-12-19
Payer: MEDICARE

## 2023-12-19 ENCOUNTER — OFFICE VISIT (OUTPATIENT)
Dept: FAMILY MEDICINE | Facility: CLINIC | Age: 58
End: 2023-12-19
Payer: MEDICARE

## 2023-12-19 VITALS
SYSTOLIC BLOOD PRESSURE: 128 MMHG | HEART RATE: 93 BPM | BODY MASS INDEX: 34.27 KG/M2 | OXYGEN SATURATION: 90 % | TEMPERATURE: 95 F | DIASTOLIC BLOOD PRESSURE: 60 MMHG | WEIGHT: 193.44 LBS | HEIGHT: 63 IN

## 2023-12-19 DIAGNOSIS — Z79.4 TYPE 2 DIABETES MELLITUS WITH COMPLICATION, WITH LONG-TERM CURRENT USE OF INSULIN: Primary | ICD-10-CM

## 2023-12-19 DIAGNOSIS — I10 ESSENTIAL HYPERTENSION: ICD-10-CM

## 2023-12-19 DIAGNOSIS — I67.1 CEREBRAL ANEURYSM: ICD-10-CM

## 2023-12-19 DIAGNOSIS — Z12.39 ENCOUNTER FOR SCREENING FOR MALIGNANT NEOPLASM OF BREAST, UNSPECIFIED SCREENING MODALITY: ICD-10-CM

## 2023-12-19 DIAGNOSIS — R51.9 GENERALIZED HEADACHES: ICD-10-CM

## 2023-12-19 DIAGNOSIS — F31.9 BIPOLAR AFFECTIVE DISORDER, REMISSION STATUS UNSPECIFIED: ICD-10-CM

## 2023-12-19 DIAGNOSIS — E11.42 DIABETIC POLYNEUROPATHY ASSOCIATED WITH TYPE 2 DIABETES MELLITUS: ICD-10-CM

## 2023-12-19 DIAGNOSIS — R30.0 DYSURIA: ICD-10-CM

## 2023-12-19 DIAGNOSIS — Z86.73 HISTORY OF STROKE: ICD-10-CM

## 2023-12-19 DIAGNOSIS — I50.32 CHRONIC DIASTOLIC HEART FAILURE: ICD-10-CM

## 2023-12-19 DIAGNOSIS — E11.8 TYPE 2 DIABETES MELLITUS WITH COMPLICATION, WITH LONG-TERM CURRENT USE OF INSULIN: Primary | ICD-10-CM

## 2023-12-19 LAB
BACTERIA #/AREA URNS AUTO: ABNORMAL /HPF
BILIRUB UR QL STRIP: NEGATIVE
CLARITY UR REFRACT.AUTO: ABNORMAL
COLOR UR AUTO: YELLOW
GLUCOSE UR QL STRIP: ABNORMAL
HGB UR QL STRIP: NEGATIVE
KETONES UR QL STRIP: NEGATIVE
LEUKOCYTE ESTERASE UR QL STRIP: ABNORMAL
MICROSCOPIC COMMENT: ABNORMAL
NITRITE UR QL STRIP: NEGATIVE
PH UR STRIP: 7 [PH] (ref 5–8)
PROT UR QL STRIP: NEGATIVE
RBC #/AREA URNS AUTO: 1 /HPF (ref 0–4)
SP GR UR STRIP: 1.01 (ref 1–1.03)
SQUAMOUS #/AREA URNS AUTO: 1 /HPF
URN SPEC COLLECT METH UR: ABNORMAL
WBC #/AREA URNS AUTO: 20 /HPF (ref 0–5)

## 2023-12-19 PROCEDURE — 99214 PR OFFICE/OUTPT VISIT, EST, LEVL IV, 30-39 MIN: ICD-10-PCS | Mod: HCNC,S$GLB,, | Performed by: FAMILY MEDICINE

## 2023-12-19 PROCEDURE — 3008F BODY MASS INDEX DOCD: CPT | Mod: HCNC,CPTII,S$GLB, | Performed by: FAMILY MEDICINE

## 2023-12-19 PROCEDURE — 3074F SYST BP LT 130 MM HG: CPT | Mod: HCNC,CPTII,S$GLB, | Performed by: FAMILY MEDICINE

## 2023-12-19 PROCEDURE — 3078F DIAST BP <80 MM HG: CPT | Mod: HCNC,CPTII,S$GLB, | Performed by: FAMILY MEDICINE

## 2023-12-19 PROCEDURE — 3078F PR MOST RECENT DIASTOLIC BLOOD PRESSURE < 80 MM HG: ICD-10-PCS | Mod: HCNC,CPTII,S$GLB, | Performed by: FAMILY MEDICINE

## 2023-12-19 PROCEDURE — 3008F PR BODY MASS INDEX (BMI) DOCUMENTED: ICD-10-PCS | Mod: HCNC,CPTII,S$GLB, | Performed by: FAMILY MEDICINE

## 2023-12-19 PROCEDURE — 3046F HEMOGLOBIN A1C LEVEL >9.0%: CPT | Mod: HCNC,CPTII,S$GLB, | Performed by: FAMILY MEDICINE

## 2023-12-19 PROCEDURE — 81001 URINALYSIS AUTO W/SCOPE: CPT | Mod: HCNC | Performed by: FAMILY MEDICINE

## 2023-12-19 PROCEDURE — 3046F PR MOST RECENT HEMOGLOBIN A1C LEVEL > 9.0%: ICD-10-PCS | Mod: HCNC,CPTII,S$GLB, | Performed by: FAMILY MEDICINE

## 2023-12-19 PROCEDURE — 99999 PR PBB SHADOW E&M-EST. PATIENT-LVL V: ICD-10-PCS | Mod: PBBFAC,HCNC,, | Performed by: FAMILY MEDICINE

## 2023-12-19 PROCEDURE — 87086 URINE CULTURE/COLONY COUNT: CPT | Mod: HCNC | Performed by: FAMILY MEDICINE

## 2023-12-19 PROCEDURE — 3072F LOW RISK FOR RETINOPATHY: CPT | Mod: HCNC,CPTII,S$GLB, | Performed by: FAMILY MEDICINE

## 2023-12-19 PROCEDURE — 87077 CULTURE AEROBIC IDENTIFY: CPT | Mod: HCNC | Performed by: FAMILY MEDICINE

## 2023-12-19 PROCEDURE — 1159F MED LIST DOCD IN RCRD: CPT | Mod: HCNC,CPTII,S$GLB, | Performed by: FAMILY MEDICINE

## 2023-12-19 PROCEDURE — 1159F PR MEDICATION LIST DOCUMENTED IN MEDICAL RECORD: ICD-10-PCS | Mod: HCNC,CPTII,S$GLB, | Performed by: FAMILY MEDICINE

## 2023-12-19 PROCEDURE — 99999 PR PBB SHADOW E&M-EST. PATIENT-LVL V: CPT | Mod: PBBFAC,HCNC,, | Performed by: FAMILY MEDICINE

## 2023-12-19 PROCEDURE — 3074F PR MOST RECENT SYSTOLIC BLOOD PRESSURE < 130 MM HG: ICD-10-PCS | Mod: HCNC,CPTII,S$GLB, | Performed by: FAMILY MEDICINE

## 2023-12-19 PROCEDURE — 99214 OFFICE O/P EST MOD 30 MIN: CPT | Mod: HCNC,S$GLB,, | Performed by: FAMILY MEDICINE

## 2023-12-19 PROCEDURE — 87088 URINE BACTERIA CULTURE: CPT | Mod: HCNC | Performed by: FAMILY MEDICINE

## 2023-12-19 PROCEDURE — 3072F PR LOW RISK FOR RETINOPATHY: ICD-10-PCS | Mod: HCNC,CPTII,S$GLB, | Performed by: FAMILY MEDICINE

## 2023-12-19 PROCEDURE — 87186 SC STD MICRODIL/AGAR DIL: CPT | Mod: HCNC | Performed by: FAMILY MEDICINE

## 2023-12-19 RX ORDER — PROMETHAZINE HYDROCHLORIDE 12.5 MG/1
12.5 TABLET ORAL EVERY 6 HOURS PRN
Qty: 32 TABLET | Refills: 0 | Status: SHIPPED | OUTPATIENT
Start: 2023-12-19

## 2023-12-19 RX ORDER — BUTALBITAL, ACETAMINOPHEN AND CAFFEINE 50; 325; 40 MG/1; MG/1; MG/1
1 TABLET ORAL EVERY 6 HOURS PRN
Qty: 30 TABLET | Refills: 0 | Status: SHIPPED | OUTPATIENT
Start: 2023-12-19

## 2023-12-19 NOTE — PROGRESS NOTES
Subjective:       Patient ID: Alexandra Goins is a 58 y.o. female.    Chief Complaint: Follow-up      HPI Comments:       Current Outpatient Medications:     albuterol (PROVENTIL/VENTOLIN HFA) 90 mcg/actuation inhaler, INHALE 2 TO 4 PUFFS BY MOUTH THREE TIMES DAILY AS NEEDED FOR WHEEZING, Disp: 18 g, Rfl: 3    benztropine (COGENTIN) 0.5 MG tablet, Take 1 tablet (0.5 mg total) by mouth 2 (two) times daily. TAKE 1 TABLET BY MOUTH TWICE DAILY AS NEEDED FOR  DYSTONIA, Disp: 60 tablet, Rfl: 2    blood sugar diagnostic (TRUE METRIX GLUCOSE TEST STRIP) Strp, USE 1 STRIP TO CHECK GLUCOSE THREE TIMES DAILY, Disp: 100 strip, Rfl: 3    blood sugar diagnostic Strp, 1 each by Misc.(Non-Drug; Combo Route) route 3 (three) times daily. Dispense preferred on insurance, Disp: 100 strip, Rfl: 11    blood-glucose meter kit, Use as instructed - preferred on insurance, Disp: 1 each, Rfl: 0    desvenlafaxine succinate (PRISTIQ) 50 MG Tb24, Take 1 tablet (50 mg total) by mouth once daily., Disp: 30 tablet, Rfl: 2    diazePAM (VALIUM) 10 MG Tab, TAKE 1/2 TO 1 (ONE-HALF TO ONE) TABLET BY MOUTH THREE TIMES DAILY AS NEEDED FOR ANXIETY, Disp: 90 tablet, Rfl: 2    furosemide (LASIX) 40 MG tablet, TAKE 1 TABLET BY MOUTH EVERY MONDAY, WEDNESDAY, AND FRIDAY AS NEEDED, Disp: 45 tablet, Rfl: 2    gabapentin (NEURONTIN) 600 MG tablet, TAKE 1 CAPSULE BY MOUTH IN THE MORNING, 1 CAPSULE IN THE AFTERNOON, AND THEN 2 CAPSULES AT BEDTIME, Disp: 360 tablet, Rfl: 1    insulin glargine U-300 conc (TOUJEO MAX U-300 SOLOSTAR) 300 unit/mL (3 mL) insulin pen, Inject 76 Units into the skin once daily., Disp: 4 pen , Rfl: 3    isosorbide mononitrate (IMDUR) 30 MG 24 hr tablet, Take 1 tablet by mouth once daily, Disp: 90 tablet, Rfl: 2    lancets Misc, 1 each by Misc.(Non-Drug; Combo Route) route 3 (three) times daily. Dispense preferred on insurance, Disp: 100 each, Rfl: 11    metFORMIN (GLUCOPHAGE-XR) 500 MG ER 24hr tablet, Take 1 tablet (500 mg total) by mouth 2  "(two) times daily with meals., Disp: 180 tablet, Rfl: 1    metoprolol succinate (TOPROL-XL) 25 MG 24 hr tablet, Take 1 tablet by mouth once daily, Disp: 90 tablet, Rfl: 3    mirtazapine (REMERON) 15 MG tablet, Take 1 tablet (15 mg total) by mouth every evening., Disp: 30 tablet, Rfl: 2    pen needle, diabetic 32 gauge x 5/32" Ndle, Inject 1 each into the skin once daily., Disp: 100 each, Rfl: 3    risperiDONE (RISPERDAL) 1 MG tablet, Take 1 tablet (1 mg total) by mouth 2 (two) times daily., Disp: 60 tablet, Rfl: 2    albuterol-ipratropium (DUO-NEB) 2.5 mg-0.5 mg/3 mL nebulizer solution, Take 3 mLs by nebulization every 6 (six) hours as needed for Wheezing. Rescue, Disp: 1 Box, Rfl: 0    butalbital-acetaminophen-caffeine -40 mg (FIORICET, ESGIC) -40 mg per tablet, Take 1 tablet by mouth every 6 (six) hours as needed for Headaches. for pain., Disp: 30 tablet, Rfl: 0    ezetimibe (ZETIA) 10 mg tablet, Take 1 tablet (10 mg total) by mouth once daily., Disp: 30 tablet, Rfl: 0    insulin regular (NOVOLIN R REGULAR U100 INSULIN) 100 unit/mL Inj injection, Inject 12 Units into the skin 3 (three) times daily before meals., Disp: 10 mL, Rfl: 0    promethazine (PHENERGAN) 12.5 MG Tab, Take 1 tablet (12.5 mg total) by mouth every 6 (six) hours as needed., Disp: 32 tablet, Rfl: 0      She is accompanied by her  today.  He says he has been getting her into shape because he is now retired has time to help her manage her health much more closely    He is monitoring her blood sugars very closely, and they have been consistently less than 200 now for quite a while.  They are aware of the February appointment with diabetes care.  Her last A1c in October was 12.3.  She has been poorly controlled for years    Some hypoglycemia.  Now she only takes the fast acting insulin when she has had something to eat.    She has chronic urinary incontinence.  Now she is having some difficulty voiding and also pain when she " "goes.    Requests refills on Fioricet and Phenergan.  Still has fairly frequent headaches.  Has seen Neurology before.    [facility] appointment.  Has a rescheduled appointment coming up in January    There going for walks every day.      Review of Systems    Objective:      Vitals:    12/19/23 1000   BP: 128/60   Pulse: 93   Temp: (!) 94.8 °F (34.9 °C)   TempSrc: Tympanic   SpO2: (!) 90%   Weight: 87.8 kg (193 lb 7.3 oz)   Height: 5' 3" (1.6 m)   PainSc: 0-No pain     Physical Exam  Vitals and nursing note reviewed.   Constitutional:       General: She is not in acute distress.     Appearance: She is well-developed. She is not diaphoretic.   HENT:      Head: Normocephalic.   Neck:      Thyroid: No thyromegaly.   Cardiovascular:      Rate and Rhythm: Normal rate and regular rhythm.      Heart sounds: Normal heart sounds. No murmur heard.  Pulmonary:      Effort: Pulmonary effort is normal.      Breath sounds: Normal breath sounds. No wheezing or rales.   Abdominal:      General: There is no distension.      Palpations: Abdomen is soft.   Musculoskeletal:      Cervical back: Neck supple.   Lymphadenopathy:      Cervical: No cervical adenopathy.   Skin:     General: Skin is warm and dry.   Neurological:      Mental Status: She is alert and oriented to person, place, and time.   Psychiatric:         Mood and Affect: Mood normal.         Behavior: Behavior normal.         Thought Content: Thought content normal.         Judgment: Judgment normal.         Assessment:       1. Type 2 diabetes mellitus with complication, with long-term current use of insulin    2. Bipolar affective disorder, remission status unspecified    3. Diabetic polyneuropathy associated with type 2 diabetes mellitus    4. Essential hypertension    5. Chronic diastolic heart failure    6. History of stroke    7. Encounter for screening for malignant neoplasm of breast, unspecified screening modality    8. Cerebral aneurysm    9. Generalized " headaches    10. Dysuria        Plan:   Type 2 diabetes mellitus with complication, with long-term current use of insulin  Comments:  Historically very poorly controlled.  Now she is getting much more attention to her daily routine.  Follow-up with diabetes care in February    Bipolar affective disorder, remission status unspecified  Comments:  Followed by Psychiatry    Diabetic polyneuropathy associated with type 2 diabetes mellitus  Comments:  Gabapentin as needed    Essential hypertension  Comments:  Controlled.    Chronic diastolic heart failure  Comments:  Asymptomatic    History of stroke  Comments:  No recent neurologic symptoms    Encounter for screening for malignant neoplasm of breast, unspecified screening modality  Comments:  Mammogram ordered  Orders:  -     Mammo Digital Screening Bilat w/ [facility]; Future; Expected date: 12/19/2023    Cerebral aneurysm  Comments:  Stable.  Periodic monitoring  Orders:  -     butalbital-acetaminophen-caffeine -40 mg (FIORICET, ESGIC) -40 mg per tablet; Take 1 tablet by mouth every 6 (six) hours as needed for Headaches. for pain.  Dispense: 30 tablet; Refill: 0    Generalized headaches  Comments:  P.r.[facility], followed by Neurology  Orders:  -     butalbital-acetaminophen-caffeine -40 mg (FIORICET, ESGIC) -40 mg per tablet; Take 1 tablet by mouth every 6 (six) hours as needed for Headaches. for pain.  Dispense: 30 tablet; Refill: 0    Dysuria  Comments:  Urinalysis, C&S  Orders:  -     Urine culture; Future; Expected date: 12/19/2023  -     Urinalysis; Future; Expected date: 12/19/2023    Other orders  -     promethazine (PHENERGAN) 12.5 MG Tab; Take 1 tablet (12.5 mg total) by mouth every 6 (six) hours as needed.  Dispense: 32 tablet; Refill: 0

## 2023-12-20 NOTE — PROGRESS NOTES
Spoke with patient today in regard to smoking cessation progress 3/6/12 month telephone follow up, she states that she is not tobacco free.  Patient states that she has trimmed down significantly.  Commended patient on the accomplishments thus far.  Informed patient of benefit period, future follow up, and contact information if any further help or support is needed.  Will complete smart form for 3/6/12 month follow up on Quit attempt #2 and resolve episode.

## 2023-12-21 LAB — BACTERIA UR CULT: ABNORMAL

## 2023-12-22 RX ORDER — SULFAMETHOXAZOLE AND TRIMETHOPRIM 800; 160 MG/1; MG/1
1 TABLET ORAL 2 TIMES DAILY
Qty: 14 TABLET | Refills: 0 | Status: ON HOLD | OUTPATIENT
Start: 2023-12-22 | End: 2024-03-14

## 2023-12-22 NOTE — ASSESSMENT & PLAN NOTE
Encouraged tobacco cessation  Nicotine patch   Transitional Care Management Telephone Call Attempt     Discharge Date: 9/7/21  Discharge Location: [place] External Hospital: [facility]     Call Attempt Date: 12/22/2023  Call Attempt: Second  Line busy

## 2023-12-26 ENCOUNTER — TELEPHONE (OUTPATIENT)
Dept: FAMILY MEDICINE | Facility: CLINIC | Age: 58
End: 2023-12-26
Payer: MEDICARE

## 2023-12-26 NOTE — TELEPHONE ENCOUNTER
Spoke with pt she confirmed she has received the results and wants to know why she was prescribed bactrim informed her shew has an UTI per provider

## 2024-01-16 ENCOUNTER — HOSPITAL ENCOUNTER (OUTPATIENT)
Dept: RADIOLOGY | Facility: HOSPITAL | Age: 59
Discharge: HOME | End: 2024-01-16
Attending: FAMILY MEDICINE
Payer: MEDICARE

## 2024-01-16 DIAGNOSIS — Z91.81 HISTORY OF RECENT FALL: ICD-10-CM

## 2024-01-16 PROCEDURE — 73521 X-RAY EXAM HIPS BI 2 VIEWS: CPT | Mod: TC,HCNC,PO

## 2024-01-16 PROCEDURE — 73521 X-RAY EXAM HIPS BI 2 VIEWS: CPT | Mod: 26,HCNC,, | Performed by: RADIOLOGY

## 2024-01-23 ENCOUNTER — OFFICE VISIT (OUTPATIENT)
Dept: OPHTHALMOLOGY | Facility: CLINIC | Age: 59
End: 2024-01-23
Payer: MEDICARE

## 2024-01-23 ENCOUNTER — PATIENT MESSAGE (OUTPATIENT)
Dept: OPHTHALMOLOGY | Facility: CLINIC | Age: 59
End: 2024-01-23

## 2024-01-23 DIAGNOSIS — E11.9 DIABETES MELLITUS TYPE 2 WITHOUT RETINOPATHY: Primary | ICD-10-CM

## 2024-01-23 DIAGNOSIS — E11.36 DIABETIC CATARACT: ICD-10-CM

## 2024-01-23 DIAGNOSIS — H52.7 REFRACTIVE ERRORS: ICD-10-CM

## 2024-01-23 PROCEDURE — 1159F MED LIST DOCD IN RCRD: CPT | Mod: HCNC,CPTII,S$GLB, | Performed by: OPTOMETRIST

## 2024-01-23 PROCEDURE — 2023F DILAT RTA XM W/O RTNOPTHY: CPT | Mod: HCNC,CPTII,S$GLB, | Performed by: OPTOMETRIST

## 2024-01-23 PROCEDURE — 99999 PR PBB SHADOW E&M-EST. PATIENT-LVL III: CPT | Mod: PBBFAC,HCNC,, | Performed by: OPTOMETRIST

## 2024-01-23 PROCEDURE — 92014 COMPRE OPH EXAM EST PT 1/>: CPT | Mod: HCNC,S$GLB,, | Performed by: OPTOMETRIST

## 2024-01-23 PROCEDURE — 92015 DETERMINE REFRACTIVE STATE: CPT | Mod: HCNC,S$GLB,, | Performed by: OPTOMETRIST

## 2024-01-23 NOTE — PROGRESS NOTES
SUBJECTIVE  Alexandra Goins is 58 y.o. female  Corrected distance visual acuity was 20/25 in the right eye and 20/30 in the left eye. Corrected near visual acuity was J2 in the right eye and J3 in the left eye.   Chief Complaint   Patient presents with    Diabetic Eye Exam    Hypertensive Eye Exam    Eye Exam          HPI    NIDDM exam  No visual complaints  Last eye exam 11/29/2022 [facility].  Update glasses RX.  Lab Results       Component                Value               Date                       HGBA1C                   12.0 (H)            10/09/2023              Last edited by Misty Bernardo MA on 1/23/2024  9:40 AM.         Assessment /Plan :  1. Diabetes mellitus type 2 without retinopathy   No Background Diabetic Retinopathy  Strict BG control, f/u w/ PCP, and annual DFE  Stressed importance of DM control to preserve vision     2. Diabetic cataract   Moderate cataracts OU. Cataracts are not significantly affecting activities of daily living and therefore surgery is not indicated at this time.   Will continue to monitor annually. Pt to call or RTC with any significant change in vision prior to next visit.      3. Refractive errors   Dispense Final Rx for glasses.  RTC 1 year  Discussed above and answered questions.

## 2024-01-29 RX ORDER — METFORMIN HYDROCHLORIDE 500 MG/1
500 TABLET, EXTENDED RELEASE ORAL 2 TIMES DAILY WITH MEALS
Qty: 180 TABLET | Refills: 0 | Status: SHIPPED | OUTPATIENT
Start: 2024-01-29

## 2024-02-12 PROBLEM — E11.10 DIABETIC KETOACIDOSIS WITHOUT COMA ASSOCIATED WITH TYPE 2 DIABETES MELLITUS: Status: RESOLVED | Noted: 2023-11-11 | Resolved: 2024-02-12

## 2024-02-21 ENCOUNTER — OFFICE VISIT (OUTPATIENT)
Dept: DIABETES | Facility: CLINIC | Age: 59
End: 2024-02-21
Payer: MEDICARE

## 2024-02-21 ENCOUNTER — LAB VISIT (OUTPATIENT)
Dept: LAB | Facility: HOSPITAL | Age: 59
End: 2024-02-21
Attending: NURSE PRACTITIONER
Payer: MEDICARE

## 2024-02-21 VITALS
DIASTOLIC BLOOD PRESSURE: 69 MMHG | SYSTOLIC BLOOD PRESSURE: 106 MMHG | WEIGHT: 200.63 LBS | HEART RATE: 77 BPM | BODY MASS INDEX: 35.54 KG/M2

## 2024-02-21 DIAGNOSIS — E78.5 DYSLIPIDEMIA ASSOCIATED WITH TYPE 2 DIABETES MELLITUS: ICD-10-CM

## 2024-02-21 DIAGNOSIS — E11.42 DIABETIC POLYNEUROPATHY ASSOCIATED WITH TYPE 2 DIABETES MELLITUS: Primary | ICD-10-CM

## 2024-02-21 DIAGNOSIS — I10 ESSENTIAL HYPERTENSION: Chronic | ICD-10-CM

## 2024-02-21 DIAGNOSIS — E66.9 OBESITY (BMI 30.0-34.9): ICD-10-CM

## 2024-02-21 DIAGNOSIS — E11.69 DYSLIPIDEMIA ASSOCIATED WITH TYPE 2 DIABETES MELLITUS: ICD-10-CM

## 2024-02-21 DIAGNOSIS — E11.42 DIABETIC POLYNEUROPATHY ASSOCIATED WITH TYPE 2 DIABETES MELLITUS: ICD-10-CM

## 2024-02-21 LAB
ALBUMIN SERPL BCP-MCNC: 3.7 G/DL (ref 3.5–5.2)
ALP SERPL-CCNC: 70 U/L (ref 55–135)
ALT SERPL W/O P-5'-P-CCNC: 37 U/L (ref 10–44)
ANION GAP SERPL CALC-SCNC: 9 MMOL/L (ref 8–16)
AST SERPL-CCNC: 37 U/L (ref 10–40)
BILIRUB SERPL-MCNC: 0.3 MG/DL (ref 0.1–1)
BUN SERPL-MCNC: 18 MG/DL (ref 6–20)
CALCIUM SERPL-MCNC: 8.8 MG/DL (ref 8.7–10.5)
CHLORIDE SERPL-SCNC: 98 MMOL/L (ref 95–110)
CO2 SERPL-SCNC: 28 MMOL/L (ref 23–29)
CREAT SERPL-MCNC: 1 MG/DL (ref 0.5–1.4)
EST. GFR  (NO RACE VARIABLE): >60 ML/MIN/1.73 M^2
ESTIMATED AVG GLUCOSE: 143 MG/DL (ref 68–131)
GLUCOSE SERPL-MCNC: 56 MG/DL (ref 70–110)
GLUCOSE SERPL-MCNC: 89 MG/DL (ref 70–110)
GLUCOSE SERPL-MCNC: 92 MG/DL (ref 70–110)
HBA1C MFR BLD: 6.6 % (ref 4–5.6)
POTASSIUM SERPL-SCNC: 3.7 MMOL/L (ref 3.5–5.1)
PROT SERPL-MCNC: 6.9 G/DL (ref 6–8.4)
SODIUM SERPL-SCNC: 135 MMOL/L (ref 136–145)

## 2024-02-21 PROCEDURE — 1159F MED LIST DOCD IN RCRD: CPT | Mod: HCNC,CPTII,S$GLB, | Performed by: NURSE PRACTITIONER

## 2024-02-21 PROCEDURE — 99999 PR PBB SHADOW E&M-EST. PATIENT-LVL IV: CPT | Mod: PBBFAC,HCNC,, | Performed by: NURSE PRACTITIONER

## 2024-02-21 PROCEDURE — 82962 GLUCOSE BLOOD TEST: CPT | Mod: HCNC,S$GLB,ICN, | Performed by: NURSE PRACTITIONER

## 2024-02-21 PROCEDURE — 3074F SYST BP LT 130 MM HG: CPT | Mod: HCNC,CPTII,S$GLB, | Performed by: NURSE PRACTITIONER

## 2024-02-21 PROCEDURE — 3044F HG A1C LEVEL LT 7.0%: CPT | Mod: HCNC,CPTII,S$GLB, | Performed by: NURSE PRACTITIONER

## 2024-02-21 PROCEDURE — 80053 COMPREHEN METABOLIC PANEL: CPT | Mod: HCNC | Performed by: NURSE PRACTITIONER

## 2024-02-21 PROCEDURE — 36415 COLL VENOUS BLD VENIPUNCTURE: CPT | Mod: HCNC,PO | Performed by: NURSE PRACTITIONER

## 2024-02-21 PROCEDURE — 3078F DIAST BP <80 MM HG: CPT | Mod: HCNC,CPTII,S$GLB, | Performed by: NURSE PRACTITIONER

## 2024-02-21 PROCEDURE — 83036 HEMOGLOBIN GLYCOSYLATED A1C: CPT | Mod: HCNC | Performed by: NURSE PRACTITIONER

## 2024-02-21 PROCEDURE — 3008F BODY MASS INDEX DOCD: CPT | Mod: HCNC,CPTII,S$GLB, | Performed by: NURSE PRACTITIONER

## 2024-02-21 PROCEDURE — 99214 OFFICE O/P EST MOD 30 MIN: CPT | Mod: HCNC,S$GLB,, | Performed by: NURSE PRACTITIONER

## 2024-02-21 PROCEDURE — 1160F RVW MEDS BY RX/DR IN RCRD: CPT | Mod: HCNC,CPTII,S$GLB, | Performed by: NURSE PRACTITIONER

## 2024-02-21 RX ORDER — INSULIN PUMP SYRINGE, 3 ML
EACH MISCELLANEOUS
Qty: 1 EACH | Refills: 0 | Status: SHIPPED | OUTPATIENT
Start: 2024-02-21 | End: 2024-03-18 | Stop reason: SDUPTHER

## 2024-02-21 RX ORDER — LANCETS
1 EACH MISCELLANEOUS 4 TIMES DAILY
Qty: 150 EACH | Refills: 6 | Status: SHIPPED | OUTPATIENT
Start: 2024-02-21

## 2024-02-21 NOTE — PROGRESS NOTES
"PCP: Perez Casiano MD    Subjective:     Chief Complaint: Diabetes Follow Up    HISTORY OF PRESENT ILLNESS: 58 y.o.  female presenting, with , for diabetes follow up.     Patient has had Type II diabetes since 1985 and has the following complications: peripheral neuropathy, PVD, and CAD. Other pertinent conditions: bipolar disorder and depression ( follows psych ) and hypoglycemia-induced seizures. She has attended diabetes education in the past.     Past tried and failed medications:  No longer taking Jardiance due to cost and history of recurrent UTI w/ sepsis. In November 2023, patient was hospitalized with hyperglycemia and DKA.  Since then, no other hospital admissions.  Patient is back on Toujeo with improved blood sugar control. GLP-1 therapy too expensive.    Blood glucose monitoring is performed.  Per records (media), fasting BGs are < 58, 67 > 117 - 209;  pp bfast 61, 67, 82 - 148;  ac lunch 64, 65 - 125; ac dinner 68, 76 - 139; pp dinner 103 - 149.  Recent hypoglycemia may be due to increased physical activity ( walking 1-2 miles every day) and healthier diet with more protein and vegetables.     Additionally, she has been "estimating" her doses of Novolin R before meals, so she may be taking too much fasting acting insulin, causing hypoglycemia.      Blood glucose in clinic today: 56 mg/dL;  2 juices + crackers; re-check 92 mg/dl    Vitals:    02/21/24 1504   BP: 106/69   Pulse: 77   Weight: 91 kg (200 lb 9.9 oz)     BMI 35.54    DM MEDICATIONS:  Toujeo 76 units daily  //   Novolin R, takes 5-10 units, as needed ( estimates dose )  //  Metformin 500 mg BID ac    Labs Reviewed.       Lab Results   Component Value Date    CPEPTIDE 3.4 01/25/2017     Lab Results   Component Value Date    GLUTAMICACID 0.01 01/25/2017     Lab Results   Component Value Date    HUMANINSULIN 0.00 01/25/2017     Review of Systems   Constitutional:  Negative for appetite change, fatigue and unexpected weight " change.   Eyes:  Negative for visual disturbance.   Gastrointestinal:  Negative for abdominal pain, constipation, diarrhea and nausea.   Endocrine: Negative for polydipsia, polyphagia and polyuria.   Neurological:  Positive for numbness (bilateral feet).       Diabetes Management Status  Statin: Not taking - allergic  ACE/ARB: Not taking    Screening or Prevention Patient's value Goal Complete/Controlled?   HgA1C Testing and Control   Lab Results   Component Value Date    HGBA1C 6.6 (H) 02/21/2024    HGBA1C 12.0 (H) 10/09/2023    HGBA1C 12.7 (H) 05/24/2023      Annually/Less than 8% No     Lipid profile : 10/09/2023 Annually Yes     LDL control Lab Results   Component Value Date    LDLCALC 98.2 10/09/2023    Annually/Less than 100 mg/dl  Yes     Nephropathy screening Lab Results   Component Value Date    MICALBCREAT 39.1 (H) 09/01/2022     Lab Results   Component Value Date    PROTEINUA Negative 12/19/2023    Annually Yes     Blood pressure BP Readings from Last 1 Encounters:   02/21/24 106/69    Less than 140/90 Yes     Dilated retinal exam : 01/23/2024 Annually Yes    Foot exam   : 10/11/2023 Annually Yes       ACTIVITY LEVEL: Sedentary. Discussed activities, benefits, methods, and precautions.  MEAL PLANNING: Patient reports number of meals per day to be 2 and number of snacks per day to be 0.   Patient is encouraged to carb count and consume no more than 45 - 60 grams of carbohydrates in each meal, and 1800 k / gloria per day.      BLOOD GLUCOSE TESTING:  True Metrix  SOCIAL HISTORY:  .  Smokes cigarettes    Objective:      Physical Exam  Constitutional:       Appearance: She is well-developed.   HENT:      Head: Normocephalic and atraumatic.   Eyes:      Pupils: Pupils are equal, round, and reactive to light.   Cardiovascular:      Rate and Rhythm: Normal rate and regular rhythm.      Pulses:           Dorsalis pedis pulses are 2+ on the right side and 2+ on the left side.   Pulmonary:      Effort:  Pulmonary effort is normal.      Breath sounds: Normal breath sounds.   Feet:      Right foot:      Protective Sensation: 6 sites tested.  6 sites sensed.      Skin integrity: No ulcer, callus or dry skin.      Left foot:      Protective Sensation: 6 sites tested.  6 sites sensed.      Skin integrity: No ulcer, callus or dry skin.   Skin:     General: Skin is warm and dry.      Findings: No rash.   Psychiatric:         Behavior: Behavior normal.         Thought Content: Thought content normal.         Judgment: Judgment normal.         Assessment / Plan:     1.) Diabetic polyneuropathy associated with type 2 diabetes mellitus    - Condition improving, A1C has decreased to 6.6 %.  However, patient is having some occasional hypoglycemia. Encouraged her to use Novolin R with a carb ratio of 1 unit of insulin per 15 gms of carbs as a more consistent way to dose before meals. Her  counts carbohydrates, so he plans to assist her.      -  Provided a correction scale to use 2-3 hours after meals if BG are elevated.   - 199: 1 unit  //   - 249: 2 units   //   - 299: 3 units   //  - 349: 4 units   //  BG Over 350: 5 units.  Continue Toujeo 76 units daily.  Repeat labs today:  A1C, CMP     Orders:   -     POCT Glucose, Hand-Held Device  -     blood-glucose meter kit; Use as instructed - preferred on insurance  Dispense: 1 each; Refill: 0  -     lancets Misc; Inject 1 each into the skin 4 (four) times daily. Dispense preferred on insurance  Dispense: 150 each; Refill: 6  -     blood sugar diagnostic Strp; Inject 1 each into the skin 4 (four) times daily. Dispense preferred on insurance  Dispense: 150 strip; Refill: 6    2.) Essential hypertension - continue medication as prescribed.     3.) Dyslipidemia associated with type 2 diabetes mellitus - allergic to statin therapy; taking zetia.     4.) Obesity (BMI 30.0-34.9) - encouraged patient to continue with lifestyle changes.     Additional Plan  Details:    1.) Prescription for meter and testing supplies sent to pharmacy.  Continue monitoring blood sugar 3x daily, fasting and ac dinner or at bedtime. Discussed ADA goal for fasting blood sugar, 80 - 130 mg/dL; pp blood sugars below 180 mg/dl. Also, discussed prevention of hypoglycemia and the need to adjust goals to higher levels if persistent hypoglycemia.  Reminded to bring BG records or meter to each visit for review.  2.) Return to clinic in 4 months for follow up. The patient was explained the above plan and given opportunity to ask questions.  She understands, chooses and consents to this plan and accepts all the risks, which include but are not limited to the risks mentioned above. She understands the alternative of having no testing, interventions or treatments at this time. She left content and without further questions.     Amelia Santana, NP-C, [facility]

## 2024-02-21 NOTE — PATIENT INSTRUCTIONS
Two to three hours after eating, if blood sugars is elevated, you can give a correction dose based on the following scale:     - 199: 1 unit    - 249: 2 units    - 299: 3 units    - 349: 4 units   BG Over 350: 5 units

## 2024-02-22 ENCOUNTER — TELEPHONE (OUTPATIENT)
Dept: PSYCHIATRY | Facility: CLINIC | Age: 59
End: 2024-02-22
Payer: MEDICARE

## 2024-02-26 ENCOUNTER — TELEPHONE (OUTPATIENT)
Dept: PSYCHIATRY | Facility: CLINIC | Age: 59
End: 2024-02-26
Payer: MEDICARE

## 2024-02-26 ENCOUNTER — OFFICE VISIT (OUTPATIENT)
Dept: PSYCHIATRY | Facility: CLINIC | Age: 59
End: 2024-02-26
Payer: MEDICARE

## 2024-02-26 VITALS
SYSTOLIC BLOOD PRESSURE: 127 MMHG | DIASTOLIC BLOOD PRESSURE: 80 MMHG | HEART RATE: 76 BPM | BODY MASS INDEX: 35.58 KG/M2 | WEIGHT: 200.81 LBS

## 2024-02-26 DIAGNOSIS — F31.9 BIPOLAR 1 DISORDER: Primary | ICD-10-CM

## 2024-02-26 DIAGNOSIS — G47.00 INSOMNIA, UNSPECIFIED TYPE: ICD-10-CM

## 2024-02-26 DIAGNOSIS — F41.9 ANXIETY: ICD-10-CM

## 2024-02-26 PROCEDURE — 3074F SYST BP LT 130 MM HG: CPT | Mod: HCNC,CPTII,S$GLB, | Performed by: PSYCHIATRY & NEUROLOGY

## 2024-02-26 PROCEDURE — 3008F BODY MASS INDEX DOCD: CPT | Mod: HCNC,CPTII,S$GLB, | Performed by: PSYCHIATRY & NEUROLOGY

## 2024-02-26 PROCEDURE — 3044F HG A1C LEVEL LT 7.0%: CPT | Mod: HCNC,CPTII,S$GLB, | Performed by: PSYCHIATRY & NEUROLOGY

## 2024-02-26 PROCEDURE — 3079F DIAST BP 80-89 MM HG: CPT | Mod: HCNC,CPTII,S$GLB, | Performed by: PSYCHIATRY & NEUROLOGY

## 2024-02-26 PROCEDURE — 99214 OFFICE O/P EST MOD 30 MIN: CPT | Mod: HCNC,S$GLB,, | Performed by: PSYCHIATRY & NEUROLOGY

## 2024-02-26 PROCEDURE — 99999 PR PBB SHADOW E&M-EST. PATIENT-LVL I: CPT | Mod: PBBFAC,HCNC,, | Performed by: PSYCHIATRY & NEUROLOGY

## 2024-02-26 RX ORDER — BENZTROPINE MESYLATE 0.5 MG/1
0.5 TABLET ORAL 2 TIMES DAILY
Qty: 60 TABLET | Refills: 2 | Status: SHIPPED | OUTPATIENT
Start: 2024-02-26 | End: 2024-05-28 | Stop reason: SDUPTHER

## 2024-02-26 RX ORDER — RISPERIDONE 1 MG/1
1 TABLET ORAL 2 TIMES DAILY
Qty: 60 TABLET | Refills: 2 | Status: SHIPPED | OUTPATIENT
Start: 2024-02-26 | End: 2024-05-28 | Stop reason: SDUPTHER

## 2024-02-26 RX ORDER — DIAZEPAM 10 MG/1
TABLET ORAL
Qty: 90 TABLET | Refills: 2 | Status: CANCELLED | OUTPATIENT
Start: 2024-02-26

## 2024-02-26 RX ORDER — MIRTAZAPINE 15 MG/1
15 TABLET, FILM COATED ORAL NIGHTLY
Qty: 30 TABLET | Refills: 2 | Status: SHIPPED | OUTPATIENT
Start: 2024-02-26 | End: 2024-05-28 | Stop reason: SDUPTHER
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
## 2024-02-26 NOTE — TELEPHONE ENCOUNTER
----- Message from Soraya Clifford sent at 2/26/2024  3:33 PM CST -----  Contact: Alexandra Ruffin is calling in regards to the status of her refills.please call back at .[phone]             .  [facility] [address] [place] [id number]  Phone: [phone] Fax: [phone]    Thanks  KYLE

## 2024-02-27 RX ORDER — DESVENLAFAXINE SUCCINATE 50 MG/1
50 TABLET, EXTENDED RELEASE ORAL
Qty: 30 TABLET | Refills: 3 | Status: SHIPPED | OUTPATIENT
Start: 2024-02-27 | End: 2024-05-28 | Stop reason: SDUPTHER

## 2024-03-14 ENCOUNTER — HOSPITAL ENCOUNTER (OUTPATIENT)
Facility: HOSPITAL | Age: 59
Discharge: HOME | End: 2024-03-15
Attending: EMERGENCY MEDICINE | Admitting: FAMILY MEDICINE
Payer: MEDICARE

## 2024-03-14 ENCOUNTER — PATIENT MESSAGE (OUTPATIENT)
Dept: ADMINISTRATIVE | Facility: HOSPITAL | Age: 59
End: 2024-03-14
Payer: MEDICARE

## 2024-03-14 DIAGNOSIS — R07.9 CHEST PAIN: ICD-10-CM

## 2024-03-14 DIAGNOSIS — R00.2 PALPITATION: ICD-10-CM

## 2024-03-14 DIAGNOSIS — J96.01 ACUTE HYPOXEMIC RESPIRATORY FAILURE: Primary | ICD-10-CM

## 2024-03-14 DIAGNOSIS — R00.2 PALPITATIONS: ICD-10-CM

## 2024-03-14 DIAGNOSIS — R53.81 DEBILITY: ICD-10-CM

## 2024-03-14 LAB
ALBUMIN SERPL BCP-MCNC: 3.9 G/DL (ref 3.5–5.2)
ALLENS TEST: ABNORMAL
ALP SERPL-CCNC: 71 U/L (ref 55–135)
ALT SERPL W/O P-5'-P-CCNC: 36 U/L (ref 10–44)
ANION GAP SERPL CALC-SCNC: 13 MMOL/L (ref 8–16)
AST SERPL-CCNC: 33 U/L (ref 10–40)
BASOPHILS # BLD AUTO: 0.06 K/UL (ref 0–0.2)
BASOPHILS NFR BLD: 0.7 % (ref 0–1.9)
BILIRUB SERPL-MCNC: 0.3 MG/DL (ref 0.1–1)
BNP SERPL-MCNC: 27 PG/ML (ref 0–99)
BUN SERPL-MCNC: 15 MG/DL (ref 6–20)
CALCIUM SERPL-MCNC: 10 MG/DL (ref 8.7–10.5)
CHLORIDE SERPL-SCNC: 102 MMOL/L (ref 95–110)
CO2 SERPL-SCNC: 29 MMOL/L (ref 23–29)
CREAT SERPL-MCNC: 0.8 MG/DL (ref 0.5–1.4)
DELSYS: ABNORMAL
DIFFERENTIAL METHOD BLD: ABNORMAL
EOSINOPHIL # BLD AUTO: 0.1 K/UL (ref 0–0.5)
EOSINOPHIL NFR BLD: 1.4 % (ref 0–8)
ERYTHROCYTE [DISTWIDTH] IN BLOOD BY AUTOMATED COUNT: 15.2 % (ref 11.5–14.5)
EST. GFR  (NO RACE VARIABLE): >60 ML/MIN/1.73 M^2
GLUCOSE SERPL-MCNC: 88 MG/DL (ref 70–110)
HCO3 UR-SCNC: 33.8 MMOL/L (ref 24–28)
HCT VFR BLD AUTO: 51.1 % (ref 37–48.5)
HGB BLD-MCNC: 16.6 G/DL (ref 12–16)
IMM GRANULOCYTES # BLD AUTO: 0.03 K/UL (ref 0–0.04)
IMM GRANULOCYTES NFR BLD AUTO: 0.4 % (ref 0–0.5)
INFLUENZA A, MOLECULAR: NEGATIVE
INFLUENZA B, MOLECULAR: NEGATIVE
LYMPHOCYTES # BLD AUTO: 2.9 K/UL (ref 1–4.8)
LYMPHOCYTES NFR BLD: 34.1 % (ref 18–48)
MCH RBC QN AUTO: 32.2 PG (ref 27–31)
MCHC RBC AUTO-ENTMCNC: 32.5 G/DL (ref 32–36)
MCV RBC AUTO: 99 FL (ref 82–98)
MODE: ABNORMAL
MONOCYTES # BLD AUTO: 0.7 K/UL (ref 0.3–1)
MONOCYTES NFR BLD: 7.9 % (ref 4–15)
NEUTROPHILS # BLD AUTO: 4.7 K/UL (ref 1.8–7.7)
NEUTROPHILS NFR BLD: 55.5 % (ref 38–73)
NRBC BLD-RTO: 0 /100 WBC
OHS QRS DURATION: 80 MS
OHS QTC CALCULATION: 405 MS
PCO2 BLDA: 58.7 MMHG (ref 35–45)
PH SMN: 7.37 [PH] (ref 7.35–7.45)
PLATELET # BLD AUTO: 166 K/UL (ref 150–450)
PMV BLD AUTO: 9.5 FL (ref 9.2–12.9)
PO2 BLDA: 51 MMHG (ref 80–100)
POC BE: 9 MMOL/L
POC SATURATED O2: 84 % (ref 95–100)
POCT GLUCOSE: 125 MG/DL (ref 70–110)
POTASSIUM SERPL-SCNC: 4.2 MMOL/L (ref 3.5–5.1)
PROT SERPL-MCNC: 7.5 G/DL (ref 6–8.4)
RBC # BLD AUTO: 5.16 M/UL (ref 4–5.4)
SAMPLE: ABNORMAL
SARS-COV-2 RDRP RESP QL NAA+PROBE: NEGATIVE
SITE: ABNORMAL
SODIUM SERPL-SCNC: 144 MMOL/L (ref 136–145)
SPECIMEN SOURCE: NORMAL
TROPONIN I SERPL DL<=0.01 NG/ML-MCNC: <0.006 NG/ML (ref 0–0.03)
TROPONIN I SERPL DL<=0.01 NG/ML-MCNC: <0.006 NG/ML (ref 0–0.03)
TSH SERPL DL<=0.005 MIU/L-ACNC: 3.84 UIU/ML (ref 0.4–4)
WBC # BLD AUTO: 8.39 K/UL (ref 3.9–12.7)

## 2024-03-14 PROCEDURE — 96375 TX/PRO/DX INJ NEW DRUG ADDON: CPT

## 2024-03-14 PROCEDURE — 96374 THER/PROPH/DIAG INJ IV PUSH: CPT | Mod: 59,HCNC

## 2024-03-14 PROCEDURE — 80053 COMPREHEN METABOLIC PANEL: CPT | Mod: HCNC | Performed by: EMERGENCY MEDICINE

## 2024-03-14 PROCEDURE — 63600175 PHARM REV CODE 636 W HCPCS: Mod: HCNC | Performed by: EMERGENCY MEDICINE

## 2024-03-14 PROCEDURE — 94640 AIRWAY INHALATION TREATMENT: CPT | Mod: HCNC,XB

## 2024-03-14 PROCEDURE — 87502 INFLUENZA DNA AMP PROBE: CPT | Mod: HCNC | Performed by: EMERGENCY MEDICINE

## 2024-03-14 PROCEDURE — 96361 HYDRATE IV INFUSION ADD-ON: CPT | Mod: HCNC

## 2024-03-14 PROCEDURE — 99900035 HC TECH TIME PER 15 MIN (STAT): Mod: HCNC

## 2024-03-14 PROCEDURE — 84443 ASSAY THYROID STIM HORMONE: CPT | Mod: HCNC | Performed by: EMERGENCY MEDICINE

## 2024-03-14 PROCEDURE — 25000003 PHARM REV CODE 250: Mod: HCNC | Performed by: EMERGENCY MEDICINE

## 2024-03-14 PROCEDURE — G0378 HOSPITAL OBSERVATION PER HR: HCPCS | Mod: HCNC

## 2024-03-14 PROCEDURE — 99214 OFFICE O/P EST MOD 30 MIN: CPT | Mod: 25,HCNC,, | Performed by: INTERNAL MEDICINE

## 2024-03-14 PROCEDURE — 25000242 PHARM REV CODE 250 ALT 637 W/ HCPCS: Mod: HCNC | Performed by: EMERGENCY MEDICINE

## 2024-03-14 PROCEDURE — 84484 ASSAY OF TROPONIN QUANT: CPT | Mod: HCNC | Performed by: EMERGENCY MEDICINE

## 2024-03-14 PROCEDURE — 94761 N-INVAS EAR/PLS OXIMETRY MLT: CPT | Mod: HCNC,XB

## 2024-03-14 PROCEDURE — 82803 BLOOD GASES ANY COMBINATION: CPT | Mod: HCNC

## 2024-03-14 PROCEDURE — U0002 COVID-19 LAB TEST NON-CDC: HCPCS | Mod: HCNC | Performed by: EMERGENCY MEDICINE

## 2024-03-14 PROCEDURE — 99285 EMERGENCY DEPT VISIT HI MDM: CPT | Mod: 25,HCNC

## 2024-03-14 PROCEDURE — 36415 COLL VENOUS BLD VENIPUNCTURE: CPT | Mod: HCNC,XB | Performed by: FAMILY MEDICINE

## 2024-03-14 PROCEDURE — 25000003 PHARM REV CODE 250: Mod: HCNC | Performed by: FAMILY MEDICINE

## 2024-03-14 PROCEDURE — 63600175 PHARM REV CODE 636 W HCPCS: Mod: HCNC | Performed by: FAMILY MEDICINE

## 2024-03-14 PROCEDURE — 85025 COMPLETE CBC W/AUTO DIFF WBC: CPT | Mod: HCNC | Performed by: EMERGENCY MEDICINE

## 2024-03-14 PROCEDURE — 84484 ASSAY OF TROPONIN QUANT: CPT | Mod: 91,HCNC | Performed by: FAMILY MEDICINE

## 2024-03-14 PROCEDURE — 83880 ASSAY OF NATRIURETIC PEPTIDE: CPT | Mod: HCNC | Performed by: EMERGENCY MEDICINE

## 2024-03-14 PROCEDURE — 36415 COLL VENOUS BLD VENIPUNCTURE: CPT | Mod: HCNC | Performed by: EMERGENCY MEDICINE

## 2024-03-14 PROCEDURE — 36600 WITHDRAWAL OF ARTERIAL BLOOD: CPT | Mod: HCNC

## 2024-03-14 PROCEDURE — 96372 THER/PROPH/DIAG INJ SC/IM: CPT | Mod: 59 | Performed by: FAMILY MEDICINE

## 2024-03-14 PROCEDURE — 27000221 HC OXYGEN, UP TO 24 HOURS: Mod: HCNC

## 2024-03-14 RX ORDER — IPRATROPIUM BROMIDE AND ALBUTEROL SULFATE 2.5; .5 MG/3ML; MG/3ML
3 SOLUTION RESPIRATORY (INHALATION)
Status: COMPLETED | OUTPATIENT
Start: 2024-03-14 | End: 2024-03-14

## 2024-03-14 RX ORDER — HYDRALAZINE HYDROCHLORIDE 20 MG/ML
10 INJECTION INTRAMUSCULAR; INTRAVENOUS EVERY 6 HOURS PRN
Status: DISCONTINUED | OUTPATIENT
Start: 2024-03-14 | End: 2024-03-15 | Stop reason: HOSPADM

## 2024-03-14 RX ORDER — LIDOCAINE HYDROCHLORIDE 20 MG/ML
15 SOLUTION OROPHARYNGEAL ONCE
Status: DISCONTINUED | OUTPATIENT
Start: 2024-03-14 | End: 2024-03-14

## 2024-03-14 RX ORDER — METHYLPREDNISOLONE SOD SUCC 125 MG
125 VIAL (EA) INJECTION
Status: COMPLETED | OUTPATIENT
Start: 2024-03-14 | End: 2024-03-14

## 2024-03-14 RX ORDER — BUTALBITAL, ACETAMINOPHEN AND CAFFEINE 50; 325; 40 MG/1; MG/1; MG/1
1 TABLET ORAL EVERY 6 HOURS PRN
Status: DISCONTINUED | OUTPATIENT
Start: 2024-03-14 | End: 2024-03-15 | Stop reason: HOSPADM

## 2024-03-14 RX ORDER — GLUCAGON 1 MG
1 KIT INJECTION
Status: DISCONTINUED | OUTPATIENT
Start: 2024-03-14 | End: 2024-03-15 | Stop reason: HOSPADM

## 2024-03-14 RX ORDER — ENOXAPARIN SODIUM 100 MG/ML
40 INJECTION SUBCUTANEOUS EVERY 24 HOURS
Status: DISCONTINUED | OUTPATIENT
Start: 2024-03-14 | End: 2024-03-15 | Stop reason: HOSPADM

## 2024-03-14 RX ORDER — IBUPROFEN 200 MG
16 TABLET ORAL
Status: DISCONTINUED | OUTPATIENT
Start: 2024-03-14 | End: 2024-03-15 | Stop reason: HOSPADM

## 2024-03-14 RX ORDER — IBUPROFEN 200 MG
24 TABLET ORAL
Status: DISCONTINUED | OUTPATIENT
Start: 2024-03-14 | End: 2024-03-15 | Stop reason: HOSPADM

## 2024-03-14 RX ORDER — BENZTROPINE MESYLATE 0.5 MG/1
0.5 TABLET ORAL 2 TIMES DAILY
Status: DISCONTINUED | OUTPATIENT
Start: 2024-03-14 | End: 2024-03-15 | Stop reason: HOSPADM

## 2024-03-14 RX ORDER — DESVENLAFAXINE SUCCINATE 50 MG/1
50 TABLET, EXTENDED RELEASE ORAL DAILY
Status: DISCONTINUED | OUTPATIENT
Start: 2024-03-15 | End: 2024-03-15 | Stop reason: HOSPADM

## 2024-03-14 RX ORDER — INSULIN ASPART 100 [IU]/ML
0-10 INJECTION, SOLUTION INTRAVENOUS; SUBCUTANEOUS
Status: DISCONTINUED | OUTPATIENT
Start: 2024-03-14 | End: 2024-03-15 | Stop reason: HOSPADM

## 2024-03-14 RX ORDER — FUROSEMIDE 40 MG/1
40 TABLET ORAL
Status: DISCONTINUED | OUTPATIENT
Start: 2024-03-15 | End: 2024-03-15 | Stop reason: HOSPADM

## 2024-03-14 RX ORDER — HYDROCODONE BITARTRATE AND ACETAMINOPHEN 5; 325 MG/1; MG/1
1 TABLET ORAL EVERY 6 HOURS PRN
Status: DISCONTINUED | OUTPATIENT
Start: 2024-03-14 | End: 2024-03-15 | Stop reason: HOSPADM

## 2024-03-14 RX ORDER — SODIUM CHLORIDE 0.9 % (FLUSH) 0.9 %
10 SYRINGE (ML) INJECTION EVERY 12 HOURS PRN
Status: DISCONTINUED | OUTPATIENT
Start: 2024-03-14 | End: 2024-03-15 | Stop reason: HOSPADM

## 2024-03-14 RX ORDER — MIRTAZAPINE 15 MG/1
15 TABLET, FILM COATED ORAL NIGHTLY
Status: DISCONTINUED | OUTPATIENT
Start: 2024-03-14 | End: 2024-03-15 | Stop reason: HOSPADM

## 2024-03-14 RX ORDER — GABAPENTIN 300 MG/1
600 CAPSULE ORAL 3 TIMES DAILY
Status: DISCONTINUED | OUTPATIENT
Start: 2024-03-14 | End: 2024-03-15 | Stop reason: HOSPADM

## 2024-03-14 RX ORDER — NALOXONE HCL 0.4 MG/ML
0.02 VIAL (ML) INJECTION
Status: DISCONTINUED | OUTPATIENT
Start: 2024-03-14 | End: 2024-03-15 | Stop reason: HOSPADM

## 2024-03-14 RX ORDER — IPRATROPIUM BROMIDE AND ALBUTEROL SULFATE 2.5; .5 MG/3ML; MG/3ML
3 SOLUTION RESPIRATORY (INHALATION)
Status: DISCONTINUED | OUTPATIENT
Start: 2024-03-14 | End: 2024-03-14

## 2024-03-14 RX ORDER — ALUMINUM HYDROXIDE, MAGNESIUM HYDROXIDE, AND SIMETHICONE 1200; 120; 1200 MG/30ML; MG/30ML; MG/30ML
30 SUSPENSION ORAL ONCE
Status: DISCONTINUED | OUTPATIENT
Start: 2024-03-14 | End: 2024-03-14

## 2024-03-14 RX ORDER — ISOSORBIDE MONONITRATE 30 MG/1
30 TABLET, EXTENDED RELEASE ORAL DAILY
Status: DISCONTINUED | OUTPATIENT
Start: 2024-03-15 | End: 2024-03-15 | Stop reason: HOSPADM

## 2024-03-14 RX ORDER — ALBUTEROL SULFATE 0.83 MG/ML
2.5 SOLUTION RESPIRATORY (INHALATION)
Status: DISCONTINUED | OUTPATIENT
Start: 2024-03-14 | End: 2024-03-14

## 2024-03-14 RX ORDER — ACETAMINOPHEN 325 MG/1
650 TABLET ORAL EVERY 4 HOURS PRN
Status: DISCONTINUED | OUTPATIENT
Start: 2024-03-14 | End: 2024-03-15 | Stop reason: HOSPADM

## 2024-03-14 RX ORDER — RISPERIDONE 1 MG/1
1 TABLET ORAL 2 TIMES DAILY
Status: DISCONTINUED | OUTPATIENT
Start: 2024-03-14 | End: 2024-03-15 | Stop reason: HOSPADM

## 2024-03-14 RX ADMIN — BENZTROPINE MESYLATE 0.5 MG: 0.5 TABLET ORAL at 09:03

## 2024-03-14 RX ADMIN — BUTALBITAL, ACETAMINOPHEN, AND CAFFEINE 1 TABLET: 325; 50; 40 TABLET ORAL at 07:03

## 2024-03-14 RX ADMIN — METHYLPREDNISOLONE SODIUM SUCCINATE 125 MG: 125 INJECTION, POWDER, FOR SOLUTION INTRAMUSCULAR; INTRAVENOUS at 12:03

## 2024-03-14 RX ADMIN — GABAPENTIN 600 MG: 300 CAPSULE ORAL at 09:03

## 2024-03-14 RX ADMIN — IPRATROPIUM BROMIDE AND ALBUTEROL SULFATE 3 ML: 2.5; .5 SOLUTION RESPIRATORY (INHALATION) at 01:03

## 2024-03-14 RX ADMIN — SODIUM CHLORIDE 500 ML: 9 INJECTION, SOLUTION INTRAVENOUS at 10:03

## 2024-03-14 RX ADMIN — HYDRALAZINE HYDROCHLORIDE 10 MG: 20 INJECTION, SOLUTION INTRAMUSCULAR; INTRAVENOUS at 03:03

## 2024-03-14 RX ADMIN — ENOXAPARIN SODIUM 40 MG: 40 INJECTION SUBCUTANEOUS at 06:03

## 2024-03-14 RX ADMIN — MIRTAZAPINE 15 MG: 15 TABLET, FILM COATED ORAL at 09:03

## 2024-03-14 RX ADMIN — RISPERIDONE 1 MG: 1 TABLET, FILM COATED ORAL at 09:03

## 2024-03-14 NOTE — ASSESSMENT & PLAN NOTE
Chronic, controlled. Latest blood pressure and vitals reviewed-     Temp:  [97.9 °F (36.6 °C)-98.4 °F (36.9 °C)]   Pulse:  []   Resp:  [18-30]   BP: (143-193)/(65-91)   SpO2:  [90 %-97 %] .   Home meds for hypertension were reviewed and noted below.   Hypertension Medications               furosemide (LASIX) 40 MG tablet TAKE 1 TABLET BY MOUTH EVERY MONDAY, WEDNESDAY, AND FRIDAY AS NEEDED    isosorbide mononitrate (IMDUR) 30 MG 24 hr tablet Take 1 tablet by mouth once daily            While in the hospital, will manage blood pressure as follows; Continue home antihypertensive regimen    Will utilize p.r.n. blood pressure medication only if patient's blood pressure greater than 160/100 and she develops symptoms such as worsening chest pain or shortness of breath.

## 2024-03-14 NOTE — HPI
Ms. Goins is a 58-year-old female with past medical history of bipolar disorder, congestive heart failure, diabetes, hypercholesterol, hypertension, hypothyroid and COPD presented to the ER due to intermittent heart flutters.  She states that when she goes for walks she will suddenly get her heart pending we will fast and then gets readily fatigued requiring her to sit down she also feels like there is a vice over her heart that comes on suddenly and resolved suddenly.  Upon further questioning patient noted she is supposed to be on 4 L of oxygen due to her history of COPD but has not been on any oxygen at home.  While in the ER patient was noted to have oxygen saturation of 89% and was placed on nasal cannula oxygen at  3 L, resolving her hypoxia.  Patient previously followed with Dr. Hsu but states that she has not seen him in months.  She has not discussed this flutter feeling and chest pain with him as it did not occur the last time she followed up with him.  Cardiology has been consulted for evaluation and may require Holter monitor upon discharge if deemed necessary by Cardiology.  Hospital medicine will admit for observation.  She will be seen by Cardiology and will require a home O2 eval prior to discharge as she has not had home O2 for several months and does not have any takes at home.

## 2024-03-14 NOTE — Clinical Note
Diagnosis: Acute hypoxemic respiratory failure [[id number]]   Future Attending Provider: DANNIE CHRISTIAN [[id number]]   Special Needs:: No Special Needs [1]

## 2024-03-14 NOTE — PHARMACY MED REC
"Admission Medication History     The home medication history was taken by Wilfredo Bernardo.    You may go to "Admission" then "Reconcile Home Medications" tabs to review and/or act upon these items.     The home medication list has been updated by the Pharmacy department.   Please read ALL comments highlighted in yellow.   Please address this information as you see fit.    Feel free to contact us if you have any questions or require assistance.      Medications listed below were obtained from: Patient/family and Analytic software- [employer] Medications   Medication Sig    benztropine (COGENTIN) 0.5 MG tablet Take 1 tablet (0.5 mg total) by mouth 2 (two) times daily. TAKE 1 TABLET BY MOUTH TWICE DAILY AS NEEDED FOR  DYSTONIA    butalbital-acetaminophen-caffeine -40 mg (FIORICET, ESGIC) -40 mg per tablet Take 1 tablet by mouth every 6 (six) hours as needed for Headaches. for pain.    desvenlafaxine succinate (PRISTIQ) 50 MG Tb24 Take 1 tablet by mouth once daily    diazePAM (VALIUM) 10 MG Tab TAKE 1/2 TO 1 (ONE-HALF TO ONE) TABLET BY MOUTH THREE TIMES DAILY AS NEEDED FOR ANXIETY    furosemide (LASIX) 40 MG tablet TAKE 1 TABLET BY MOUTH EVERY MONDAY, WEDNESDAY, AND FRIDAY AS NEEDED    gabapentin (NEURONTIN) 600 MG tablet TAKE 1 CAPSULE BY MOUTH IN THE MORNING, 1 CAPSULE IN THE AFTERNOON, AND THEN 2 CAPSULES AT BEDTIME    insulin glargine U-300 conc (TOUJEO MAX U-300 SOLOSTAR) 300 unit/mL (3 mL) insulin pen Inject 76 Units into the skin once daily.    insulin regular (NOVOLIN R REGULAR U100 INSULIN) 100 unit/mL Inj injection Inject 12 Units into the skin 3 (three) times daily before meals.    isosorbide mononitrate (IMDUR) 30 MG 24 hr tablet Take 1 tablet by mouth once daily    metFORMIN (GLUCOPHAGE-XR) 500 MG ER 24hr tablet TAKE 1 TABLET BY MOUTH TWICE DAILY WITH MEALS    mirtazapine (REMERON) 15 MG tablet Take 1 tablet (15 mg total) by mouth every evening.    risperiDONE (RISPERDAL) 1 MG tablet Take 1 " tablet (1 mg total) by mouth 2 (two) times daily.    albuterol (PROVENTIL/VENTOLIN HFA) 90 mcg/actuation inhaler INHALE 2 TO 4 PUFFS BY MOUTH THREE TIMES DAILY AS NEEDED FOR WHEEZING    albuterol-ipratropium (DUO-NEB) 2.5 mg-0.5 mg/3 mL nebulizer solution Take 3 mLs by nebulization every 6 (six) hours as needed for Wheezing. Rescue    blood sugar diagnostic Strp Inject 1 each into the skin 4 (four) times daily. Dispense preferred on insurance    blood-glucose meter kit Use as instructed - preferred on insurance    lancets Misc Inject 1 each into the skin 4 (four) times daily. Dispense preferred on insurance    promethazine (PHENERGAN) 12.5 MG Tab Take 1 tablet (12.5 mg total) by mouth every 6 (six) hours as needed.       Wilfredo Bernardo  [id number]                .

## 2024-03-14 NOTE — ED PROVIDER NOTES
SCRIBE #1 NOTE: I, Rogelio Iain, am scribing for, and in the presence of, Leopoldo Leonard MD. I have scribed the entire note.      History      Chief Complaint   Patient presents with    Irregular Heart Beat     States it feels like a vibration and fluttering in her chest with occasional pain x3 wks.        Review of patient's allergies indicates:   Allergen Reactions    Seroquel [quetiapine] Other (See Comments)     TC SEIZURES    Adhesive Blisters     Pt states can't tolerate paper tape    Levomenol analogues     Lipitor [atorvastatin]      Leg Cramps    Repatha pushtronex [evolocumab]      Feels sick    Zofran [ondansetron hcl] Hives and Other (See Comments)     headaches    Celestone [betamethasone] Hives, Itching and Rash    Levaquin [levofloxacin] Nausea Only    Piroxicam Diarrhea and Nausea Only        HPI   HPI    3/14/2024, 9:55 AM   History obtained from the patient      History of Present Illness: Alexandra Goins is a 58 y.o. female patient who presents to the Emergency Department for palpitations, onset 6 months PTA. Pt states that she has had intermittent palpitations over the past 6 months, but that they worsened over the past 3 weeks. Symptoms are intermittent and moderate in severity. No mitigating or exacerbating factors reported. Associated sxs include intermittent chest pain. Patient denies any fever, chills, n/v/d, SOB, weakness, numbness, dizziness, headache, and all other sxs at this time. No prior Tx reported. No further complaints or concerns at this time.     Arrival mode: Personal vehicle    PCP: Perez Casiano MD       Past Medical History:  Past Medical History:   Diagnosis Date    Acute ischemic stroke 09/17/2019    Acute respiratory failure with hypoxia 02/11/2021    Aneurysm     Anxiety     Arthritis     Asthma     Bipolar 1 disorder     Cerebral aneurysm, nonruptured 09/19/2019    Chronic diastolic heart failure 05/23/2023    Coronary artery disease of native  artery of native heart with stable angina pectoris 01/19/2022    Depression     Diabetes mellitus, type 2     BS 72 01/23/2024    Diverticular disease     GERD (gastroesophageal reflux disease)     Hyperlipemia     Hypertension     Hyponatremia 07/24/2022    Hypothyroidism     Pneumonia     PVD (peripheral vascular disease) 04/28/2021    Renal manifestation of secondary diabetes mellitus     Right-sided back pain 06/29/2019    Severe obesity (BMI 35.0-39.9) with comorbidity 05/31/2022    Stroke     Trouble in sleeping        Past Surgical History:  Past Surgical History:   Procedure Laterality Date    CEREBRAL ANGIOGRAM N/A 10/28/2019    Procedure: ANGIOGRAM-CEREBRAL;  Surgeon: Dahiana Diagnostic Provider;  Location: [facility] OR [address] [place] [address];  Service: Radiology;  Laterality: N/A;  Rm 190/Aayush    CHOLECYSTECTOMY      COLON SURGERY      COLONOSCOPY N/A 1/12/2018    Procedure: COLONOSCOPY;  Surgeon: Roque Mahajan III, MD;  Location: [facility] ENDO;  Service: Endoscopy;  Laterality: N/A;    COLONOSCOPY N/A 10/13/2023    Procedure: COLONOSCOPY;  Surgeon: Thelma Chiarez MD;  Location: [facility] ENDO;  Service: Endoscopy;  Laterality: N/A;    DENTAL SURGERY  05/21/2018    removal of 8 top teeth    HYSTERECTOMY      TONSILLECTOMY           Family History:  Family History   Problem Relation Age of Onset    Alcohol abuse Mother     COPD Mother     Depression Mother     Drug abuse Mother     Alcohol abuse Father     Arthritis Father     Cancer Father     Heart disease Father     Hypertension Father     COPD Sister     Kidney failure Sister     Heart disease Brother     Hypertension Brother     Cancer Maternal Aunt     Cancer Maternal Uncle     Diabetes Maternal Uncle     Drug abuse Maternal Uncle     Cancer Paternal Aunt     Cancer Paternal Uncle     Arthritis Maternal Grandmother     Hypertension Maternal Grandmother     Breast cancer Maternal Grandmother     Arthritis Paternal Grandmother      Cancer Paternal Grandmother     Hypertension Paternal Grandfather        Social History:  Social History     Tobacco Use    Smoking status: Every Day     Current packs/day: 1.00     Average packs/day: 1 pack/day for 46.2 years (46.2 ttl pk-yrs)     Types: Cigarettes, Vaping w/o nicotine     Start date: 1978    Smokeless tobacco: Never    Tobacco comments:     On average, smokes 10 cigarettes per day.    Substance and Sexual Activity    Alcohol use: Not Currently    Drug use: Yes     Types: Marijuana    Sexual activity: Yes       ROS   Review of Systems   Constitutional:  Negative for chills and fever.   HENT:  Negative for sore throat.    Respiratory:  Negative for shortness of breath.    Cardiovascular:  Positive for chest pain (intermittent) and palpitations (intermittent).   Gastrointestinal:  Negative for diarrhea, nausea and vomiting.   Genitourinary:  Negative for dysuria.   Musculoskeletal:  Negative for back pain.   Skin:  Negative for rash.   Neurological:  Negative for dizziness, weakness, numbness and headaches.   Hematological:  Does not bruise/bleed easily.   All other systems reviewed and are negative.    Physical Exam      Initial Vitals [03/14/24 0903]   BP Pulse Resp Temp SpO2   (!) 143/65 79 18 98.4 °F (36.9 °C) (!) 92 %      MAP       --          Physical Exam  Nursing Notes and Vital Signs Reviewed.  Constitutional: Patient is in no acute distress. Well-developed and well-nourished.  Head: Atraumatic. Normocephalic.  Eyes: PERRL. EOM intact. Conjunctivae are not pale. No scleral icterus.  ENT: Mucous membranes are dry. Oropharynx is clear and symmetric.    Neck: Supple. Full ROM.   Cardiovascular: Regular rate. Regular rhythm. No murmurs, rubs, or gallops. Distal pulses are 2+ and symmetric.  Pulmonary/Chest: No respiratory distress. Coarse breath sounds bilaterally. No wheezing or rales.  Abdominal: Soft and non-distended.  There is no tenderness.  No rebound, guarding, or rigidity.    Musculoskeletal: Moves all extremities. No obvious deformities. No edema.  Skin: Warm and dry.  Neurological:  Alert, awake, and appropriate.  Normal speech.  No acute focal neurological deficits are appreciated.  Psychiatric: Normal affect. Good eye contact. Appropriate in content.    ED Course    Procedures  ED Vital Signs:  Vitals:    03/14/24 0903 03/14/24 0958 03/14/24 1032 03/14/24 1139   BP: (!) 143/65  (!) 171/83 (!) 193/87   Pulse: 79 76 74 94   Resp: 18  20 (!) 21   Temp: 98.4 °F (36.9 °C)      TempSrc: Oral      SpO2: (!) 92%  (!) 92% (!) 91%   Weight: 89.4 kg (197 lb 1.5 oz)       03/14/24 1203 03/14/24 1230 03/14/24 1300 03/14/24 1305   BP: (!) 164/71 (!) 150/76     Pulse: 87 81 76 72   Resp: (!) 21 (!) 30 18 18   Temp:       TempSrc:       SpO2: (!) 90% (!) 91% (!) 90%    Weight:        03/14/24 1310 03/14/24 1317   BP:     Pulse: 72 81   Resp: 18 18   Temp:     TempSrc:     SpO2:  96%   Weight:         Abnormal Lab Results:  Labs Reviewed   CBC W/ AUTO DIFFERENTIAL - Abnormal; Notable for the following components:       Result Value    Hemoglobin 16.6 (*)     Hematocrit 51.1 (*)     MCV 99 (*)     MCH 32.2 (*)     RDW 15.2 (*)     All other components within normal limits   ISTAT PROCEDURE - Abnormal; Notable for the following components:    POC PCO2 58.7 (*)     POC PO2 51 (*)     POC HCO3 33.8 (*)     POC BE 9 (*)     All other components within normal limits   INFLUENZA A & B BY MOLECULAR   COMPREHENSIVE METABOLIC PANEL   TROPONIN I   B-TYPE NATRIURETIC PEPTIDE   TSH   TSH   SARS-COV-2 RNA AMPLIFICATION, QUAL        All Lab Results:  Results for orders placed or performed during the hospital encounter of 03/14/24   Influenza A & B by Molecular    Specimen: Nasopharyngeal Swab   Result Value Ref Range    Influenza A, Molecular Negative Negative    Influenza B, Molecular Negative Negative    Flu A & B Source Nasal swab    CBC auto differential   Result Value Ref Range    WBC 8.39 3.90 - 12.70 K/uL     RBC 5.16 4.00 - 5.40 M/uL    Hemoglobin 16.6 (H) 12.0 - 16.0 g/dL    Hematocrit 51.1 (H) 37.0 - 48.5 %    MCV 99 (H) 82 - 98 fL    MCH 32.2 (H) 27.0 - 31.0 pg    MCHC 32.5 32.0 - 36.0 g/dL    RDW 15.2 (H) 11.5 - 14.5 %    Platelets 166 150 - 450 K/uL    MPV 9.5 9.2 - 12.9 fL    Immature Granulocytes 0.4 0.0 - 0.5 %    Gran # (ANC) 4.7 1.8 - 7.7 K/uL    Immature Grans (Abs) 0.03 0.00 - 0.04 K/uL    Lymph # 2.9 1.0 - 4.8 K/uL    Mono # 0.7 0.3 - 1.0 K/uL    Eos # 0.1 0.0 - 0.5 K/uL    Baso # 0.06 0.00 - 0.20 K/uL    nRBC 0 0 /100 WBC    Gran % 55.5 38.0 - 73.0 %    Lymph % 34.1 18.0 - 48.0 %    Mono % 7.9 4.0 - 15.0 %    Eosinophil % 1.4 0.0 - 8.0 %    Basophil % 0.7 0.0 - 1.9 %    Differential Method Automated    Comprehensive metabolic panel   Result Value Ref Range    Sodium 144 136 - 145 mmol/L    Potassium 4.2 3.5 - 5.1 mmol/L    Chloride 102 95 - 110 mmol/L    CO2 29 23 - 29 mmol/L    Glucose 88 70 - 110 mg/dL    BUN 15 6 - 20 mg/dL    Creatinine 0.8 0.5 - 1.4 mg/dL    Calcium 10.0 8.7 - 10.5 mg/dL    Total Protein 7.5 6.0 - 8.4 g/dL    Albumin 3.9 3.5 - 5.2 g/dL    Total Bilirubin 0.3 0.1 - 1.0 mg/dL    Alkaline Phosphatase 71 55 - 135 U/L    AST 33 10 - 40 U/L    ALT 36 10 - 44 U/L    eGFR >60 >60 mL/min/1.73 m^2    Anion Gap 13 8 - 16 mmol/L   Troponin I #1   Result Value Ref Range    Troponin I <0.006 0.000 - 0.026 ng/mL   BNP   Result Value Ref Range    BNP 27 0 - 99 pg/mL   TSH   Result Value Ref Range    TSH 3.836 0.400 - 4.000 uIU/mL   COVID-19 Rapid Screening   Result Value Ref Range    SARS-CoV-2 RNA, Amplification, Qual Negative Negative   ISTAT PROCEDURE   Result Value Ref Range    POC PH 7.369 7.35 - 7.45    POC PCO2 58.7 (HH) 35 - 45 mmHg    POC PO2 51 (LL) 80 - 100 mmHg    POC HCO3 33.8 (H) 24 - 28 mmol/L    POC BE 9 (H) -2 to 2 mmol/L    POC SATURATED O2 84 95 - 100 %    Sample ARTERIAL     Site LR     Allens Test Pass     DelSys Room Air     Mode SPONT      Imaging Results:  Imaging Results               X-Ray Chest AP Portable (Final result)  Result time 03/14/24 10:51:55      Final result by Keri Holm MD (Timothy) (03/14/24 10:51:55)                   Impression:      No acute infiltrates.      Electronically signed by: Keri Holm MD  Date:    03/14/2024  Time:    10:51               Narrative:    EXAMINATION:  XR CHEST AP PORTABLE    CLINICAL HISTORY:  , Chest Pain;    COMPARISON:  Chest, 11/11/2023.    FINDINGS:  One view.  Mild cardiomegaly.  No acute infiltrates..                                     The EKG was ordered, reviewed, and independently interpreted by the ED provider.  Interpretation time: 09:01  Rate: 80 BPM  Rhythm: normal sinus rhythm  Interpretation: Left axis deviation. Nonspecific ST and T wave abnormalities. No STEMI.           The Emergency Provider reviewed the vital signs and test results, which are outlined above.    ED Discussion     11:00 PM: Re-evaluated pt. Pt is not reporting SOB and cough, and expressed concern that she may be having a bronchitis flare. D/w pt all pertinent results. D/w pt any concerns expressed at this time. Answered all questions. Pt expresses understanding at this time.    1:36 PM: Re-evaluated pt. Pt's SpO2 is 91% on 2L NC. I have discussed test results, shared treatment plan, and the need for admission with patient and family at bedside. Pt and family express understanding at this time and agree with all information. All questions answered. Pt and family have no further questions or concerns at this time. Pt is ready for admit.    1:45 PM: Discussed case with Dr. Hernandez ([facility]). Dr. Hernandez agrees with current care and management of pt and accepts admission.   Admitting Service: Hospital Medicine  Admitting Physician: Dr. Hernandez  Admit to: Obs Tele    1:46 PM: Re-evaluated pt. I have discussed test results, shared treatment plan, and the need for admission with patient and family at bedside. Pt and family express understanding at  this time and agree with all information. All questions answered. Pt and family have no further questions or concerns at this time. Pt is ready for admit.    ED Course as of 03/14/24 1344   Thu Mar 14, 2024   1120 BNP: 27 [JAMAL]   1120 Troponin I: <0.006 [JAMAL]   1120 WBC: 8.39 [JAMAL]   1120 Hemoglobin(!): 16.6 [JAMAL]   1121 Hematocrit(!): 51.1 [JAMAL]   1121 BUN: 15 [JAMAL]   1121 Creatinine: 0.8 [JAMAL]   1121 X-Ray Chest AP Portable  Chest x-ray with no acute infiltrates noted [JAMAL]   1333 POC PCO2(!!): 58.7 [JAMAL]   1333 POC PO2(!!): 51 [JAMAL]   1333 POC SATURATED O2: 84 [JAMAL]      ED Course User Index  [JAMAL] Leopoldo Leonard MD       ED Medication(s):  Medications   sodium chloride 0.9% bolus 500 mL 500 mL (0 mLs Intravenous Stopped 3/14/24 1108)   methylPREDNISolone sodium succinate injection 125 mg (125 mg Intravenous Given 3/14/24 1256)   albuterol-ipratropium 2.5 mg-0.5 mg/3 mL nebulizer solution 3 mL (3 mLs Nebulization Given 3/14/24 1310)     New Prescriptions    No medications on file     Medical Decision Making    Medical Decision Making  Problems Addressed:  Acute hypoxemic respiratory failure: acute illness or injury that poses a threat to life or bodily functions  Chest pain: acute illness or injury that poses a threat to life or bodily functions  Palpitations: acute illness or injury that poses a threat to life or bodily functions    Amount and/or Complexity of Data Reviewed  Independent Historian: caregiver     Details: States patient has shortness of breath and dyspnea on exertion appears to be getting worse to him  Labs: ordered. Decision-making details documented in ED Course.  Radiology: ordered and independent interpretation performed. Decision-making details documented in ED Course.     Details: No obvious infiltrates or consolidation noted on chest x-ray  ECG/medicine tests: ordered and independent interpretation performed. Decision-making details documented in ED Course.     Details: No STEMI    Risk  Prescription  drug management.  Decision regarding hospitalization.  Diagnosis or treatment significantly limited by social determinants of health.  Risk Details: Patient is a 58-year-old female with a complex medical history including hypertension, hyperlipidemia, diabetes (history of DKA), CAD, stroke, bipolar disorder, anxiety, depression, cerebral aneurysm, chronic diastolic heart failure, asthma/COPD, hypothyroidism, PVD, GERD, diverticular disease, neuropathy, seizure (due to hypoglycemia), and arthritis comes to the emergency department with complaints of generalized malaise, cough,Shortness of breath (she feels like she has a bad bronchitis).  Labs and chest x-ray are relatively unremarkable however her ABG has a pCO2 of 58 and a PaO2 of 51 correlating with an 84% sat on room air.  Initially she had some mild expiratory wheezes and after 3 duo nebs and Solu-Medrol she had increase wheezes bilaterally.  Oxygen saturations are around 91% on 2 L per nasal cannula at this time.  I feel the patient needs to be brought in for observation and serial nebs.    Differential diagnosis includes COPD, CHF, pneumothorax, PE, pneumonia, COVID, ACS, bronchitis, etc.    Critical Care  Total time providing critical care: 50 minutes              Scribe Attestation:   Scribe #1: I performed the above scribed service and the documentation accurately describes the services I performed. I attest to the accuracy of the note.    Attending:   Physician Attestation Statement for Scribe #1: I, Leopoldo Leonard MD, personally performed the services described in this documentation, as scribed by Rogelio Timmons, in my presence, and it is both accurate and complete.         Attending Attestation:         Attending Critical Care:   Critical Care Times:   Direct Patient Care (initial evaluation, reassessments, and time considering the case)................................................................15 minutes.   Additional History from reviewing old medical  records or taking additional history from the family, EMS, PCP, etc.......................10 minutes.   Ordering, Reviewing, and Interpreting Diagnostic Studies...............................................................................................................10 minutes.   Documentation..................................................................................................................................................................................10 minutes.   Consultation with other Physicians. .................................................................................................................................................5 minutes.   ==============================================================  Total Critical Care Time - exclusive of procedural time: 50 minutes.  ==============================================================  Critical care was necessary to treat or prevent imminent or life-threatening deterioration of the following conditions: respiratory failure.   Critical care was time spent personally by me on the following activities: obtaining history from patient or relative, examination of patient, review of old charts, ordering lab, x-rays, and/or EKG, development of treatment plan with patient or relative, ordering and performing treatments and interventions, evaluation of patient's response to treatment, discussions with primary provider and re-evaluation of patient's conition.   Critical Care Condition: potentially life-threatening         Clinical Impression       Final diagnoses:  [R07.9] Chest pain  [J96.01] Acute hypoxemic respiratory failure (Primary)  [R00.2] Palpitations       Disposition:   Disposition: Placed in Observation  Condition: Fair         Leopoldo Leonard MD  03/18/24 0004       Leopoldo Leonard MD  04/04/24 2989

## 2024-03-14 NOTE — H&P
'[place] - Telemetry [facility] Medicine  History & Physical    Patient Name: Alexandra Goins  MRN: [id number]  Patient Class: OP- Observation  Admission Date: 3/14/2024  Attending Physician: Mazin Hernandez MD   Primary Care Provider: Perez Casiano MD         Patient information was obtained from patient and ER records.     Subjective:     Principal Problem:Chest pain    Chief Complaint:   Chief Complaint   Patient presents with    Irregular Heart Beat     States it feels like a vibration and fluttering in her chest with occasional pain x3 wks.         HPI: Ms. Goins is a 58-year-old female with past medical history of bipolar disorder, congestive heart failure, diabetes, hypercholesterol, hypertension, hypothyroid and COPD presented to the ER due to intermittent heart flutters.  She states that when she goes for walks she will suddenly get her heart pending we will fast and then gets readily fatigued requiring her to sit down she also feels like there is a vice over her heart that comes on suddenly and resolved suddenly.  Upon further questioning patient noted she is supposed to be on 4 L of oxygen due to her history of COPD but has not been on any oxygen at home.  While in the ER patient was noted to have oxygen saturation of 89% and was placed on nasal cannula oxygen at  3 L, resolving her hypoxia.  Patient previously followed with Dr. Hsu but states that she has not seen him in months.  She has not discussed this flutter feeling and chest pain with him as it did not occur the last time she followed up with him.  Cardiology has been consulted for evaluation and may require Holter monitor upon discharge if deemed necessary by Cardiology.  Hospital medicine will admit for observation.  She will be seen by Cardiology and will require a home O2 eval prior to discharge as she has not had home O2 for several months and does not have any takes at home.    Past Medical History:   Diagnosis Date    Acute  ischemic stroke 09/17/2019    Acute respiratory failure with hypoxia 02/11/2021    Aneurysm     Anxiety     Arthritis     Asthma     Bipolar 1 disorder     Cerebral aneurysm, nonruptured 09/19/2019    Chronic diastolic heart failure 05/23/2023    Coronary artery disease of native artery of native heart with stable angina pectoris 01/19/2022    Depression     Diabetes mellitus, type 2     BS 72 01/23/2024    Diverticular disease     GERD (gastroesophageal reflux disease)     Hyperlipemia     Hypertension     Hyponatremia 07/24/2022    Hypothyroidism     Pneumonia     PVD (peripheral vascular disease) 04/28/2021    Renal manifestation of secondary diabetes mellitus     Right-sided back pain 06/29/2019    Severe obesity (BMI 35.0-39.9) with comorbidity 05/31/2022    Stroke     Trouble in sleeping        Past Surgical History:   Procedure Laterality Date    CEREBRAL ANGIOGRAM N/A 10/28/2019    Procedure: ANGIOGRAM-CEREBRAL;  Surgeon: Dahiana Diagnostic Provider;  Location: [facility] OR [address] [place] [address];  Service: Radiology;  Laterality: N/A;  Rm 190/Aayush    CHOLECYSTECTOMY      COLON SURGERY      COLONOSCOPY N/A 1/12/2018    Procedure: COLONOSCOPY;  Surgeon: Roque Mahajan III, MD;  Location: [facility] ENDO;  Service: Endoscopy;  Laterality: N/A;    COLONOSCOPY N/A 10/13/2023    Procedure: COLONOSCOPY;  Surgeon: Thelma Chairez MD;  Location: [facility];  Service: Endoscopy;  Laterality: N/A;    DENTAL SURGERY  05/21/2018    removal of 8 top teeth    HYSTERECTOMY      TONSILLECTOMY         Review of patient's allergies indicates:   Allergen Reactions    Seroquel [quetiapine] Other (See Comments)     TC SEIZURES    Adhesive Blisters     Pt states can't tolerate paper tape    Levomenol analogues     Lipitor [atorvastatin]      Leg Cramps    Repatha pushtronex [evolocumab]      Feels sick    Zofran [ondansetron hcl] Hives and Other (See Comments)     headaches    Celestone [betamethasone] Hives, Itching and Rash    Levaquin  [levofloxacin] Nausea Only    Piroxicam Diarrhea and Nausea Only       No current facility-administered medications on file prior to encounter.     Current Outpatient Medications on File Prior to Encounter   Medication Sig    benztropine (COGENTIN) 0.5 MG tablet Take 1 tablet (0.5 mg total) by mouth 2 (two) times daily. TAKE 1 TABLET BY MOUTH TWICE DAILY AS NEEDED FOR  DYSTONIA    butalbital-acetaminophen-caffeine -40 mg (FIORICET, ESGIC) -40 mg per tablet Take 1 tablet by mouth every 6 (six) hours as needed for Headaches. for pain.    desvenlafaxine succinate (PRISTIQ) 50 MG Tb24 Take 1 tablet by mouth once daily    diazePAM (VALIUM) 10 MG Tab TAKE 1/2 TO 1 (ONE-HALF TO ONE) TABLET BY MOUTH THREE TIMES DAILY AS NEEDED FOR ANXIETY    furosemide (LASIX) 40 MG tablet TAKE 1 TABLET BY MOUTH EVERY MONDAY, WEDNESDAY, AND FRIDAY AS NEEDED    gabapentin (NEURONTIN) 600 MG tablet TAKE 1 CAPSULE BY MOUTH IN THE MORNING, 1 CAPSULE IN THE AFTERNOON, AND THEN 2 CAPSULES AT BEDTIME    insulin glargine U-300 conc (TOUJEO MAX U-300 SOLOSTAR) 300 unit/mL (3 mL) insulin pen Inject 76 Units into the skin once daily.    insulin regular (NOVOLIN R REGULAR U100 INSULIN) 100 unit/mL Inj injection Inject 12 Units into the skin 3 (three) times daily before meals.    isosorbide mononitrate (IMDUR) 30 MG 24 hr tablet Take 1 tablet by mouth once daily    metFORMIN (GLUCOPHAGE-XR) 500 MG ER 24hr tablet TAKE 1 TABLET BY MOUTH TWICE DAILY WITH MEALS    mirtazapine (REMERON) 15 MG tablet Take 1 tablet (15 mg total) by mouth every evening.    risperiDONE (RISPERDAL) 1 MG tablet Take 1 tablet (1 mg total) by mouth 2 (two) times daily.    albuterol (PROVENTIL/VENTOLIN HFA) 90 mcg/actuation inhaler INHALE 2 TO 4 PUFFS BY MOUTH THREE TIMES DAILY AS NEEDED FOR WHEEZING    albuterol-ipratropium (DUO-NEB) 2.5 mg-0.5 mg/3 mL nebulizer solution Take 3 mLs by nebulization every 6 (six) hours as needed for Wheezing. Rescue    blood sugar  "diagnostic Strp Inject 1 each into the skin 4 (four) times daily. Dispense preferred on insurance    blood-glucose meter kit Use as instructed - preferred on insurance    lancets Misc Inject 1 each into the skin 4 (four) times daily. Dispense preferred on insurance    promethazine (PHENERGAN) 12.5 MG Tab Take 1 tablet (12.5 mg total) by mouth every 6 (six) hours as needed.    [DISCONTINUED] ezetimibe (ZETIA) 10 mg tablet Take 1 tablet (10 mg total) by mouth once daily.    [DISCONTINUED] metoprolol succinate (TOPROL-XL) 25 MG 24 hr tablet Take 1 tablet by mouth once daily    [DISCONTINUED] pen needle, diabetic 32 gauge x 5/32" Ndle Inject 1 each into the skin once daily.    [DISCONTINUED] sulfamethoxazole-trimethoprim 800-160mg (BACTRIM DS) 800-160 mg Tab Take 1 tablet by mouth 2 (two) times daily.     Family History       Problem Relation (Age of Onset)    Alcohol abuse Mother, Father    Arthritis Father, Maternal Grandmother, Paternal Grandmother    Breast cancer Maternal Grandmother    COPD Mother, Sister    Cancer Father, Maternal Aunt, Maternal Uncle, Paternal Aunt, Paternal Uncle, Paternal Grandmother    Depression Mother    Diabetes Maternal Uncle    Drug abuse Mother, Maternal Uncle    Heart disease Father, Brother    Hypertension Father, Brother, Maternal Grandmother, Paternal Grandfather    Kidney failure Sister          Tobacco Use    Smoking status: Every Day     Current packs/day: 1.00     Average packs/day: 1 pack/day for 46.2 years (46.2 ttl pk-yrs)     Types: Cigarettes, Vaping w/o nicotine     Start date: 1978    Smokeless tobacco: Never    Tobacco comments:     On average, smokes 10 cigarettes per day.    Substance and Sexual Activity    Alcohol use: Not Currently    Drug use: Yes     Types: Marijuana    Sexual activity: Yes     Review of Systems   Constitutional:  Negative for activity change, appetite change and fatigue.   Respiratory:  Positive for shortness of breath. Negative for cough and " chest tightness.    Cardiovascular:  Positive for chest pain and palpitations. Negative for leg swelling.   Gastrointestinal:  Negative for abdominal pain, nausea and vomiting.   Musculoskeletal:  Negative for gait problem.   Neurological:  Negative for dizziness, weakness, light-headedness and headaches.   Psychiatric/Behavioral:  Negative for agitation and confusion. The patient is not nervous/anxious.    All other systems reviewed and are negative.    Objective:     Vital Signs (Most Recent):  Temp: 97.9 °F (36.6 °C) (03/14/24 1435)  Pulse: 83 (03/14/24 1500)  Resp: 18 (03/14/24 1435)  BP: (!) 185/91 (03/14/24 1500)  SpO2: 97 % (03/14/24 1435) Vital Signs (24h Range):  Temp:  [97.9 °F (36.6 °C)-98.4 °F (36.9 °C)] 97.9 °F (36.6 °C)  Pulse:  [] 83  Resp:  [18-30] 18  SpO2:  [90 %-97 %] 97 %  BP: (143-193)/(65-91) 185/91     Weight: 89.4 kg (197 lb)  Body mass index is 34.9 kg/m².     Physical Exam  Vitals and nursing note reviewed.   Constitutional:       Appearance: Normal appearance.   HENT:      Head: Normocephalic and atraumatic.      Nose: Nose normal.      Mouth/Throat:      Mouth: Mucous membranes are moist.   Eyes:      Extraocular Movements: Extraocular movements intact.      Conjunctiva/sclera: Conjunctivae normal.   Cardiovascular:      Rate and Rhythm: Normal rate and regular rhythm.      Pulses: Normal pulses.      Heart sounds: Normal heart sounds.   Pulmonary:      Effort: Pulmonary effort is normal.      Breath sounds: Normal breath sounds.   Abdominal:      General: Abdomen is flat. Bowel sounds are normal.      Palpations: Abdomen is soft.   Musculoskeletal:         General: Normal range of motion.      Cervical back: Normal range of motion and neck supple.   Skin:     General: Skin is warm.      Capillary Refill: Capillary refill takes less than 2 seconds.   Neurological:      Mental Status: She is alert and oriented to person, place, and time. Mental status is at baseline.   Psychiatric:          Mood and Affect: Mood normal.         Behavior: Behavior normal.                Significant Labs: All pertinent labs within the past 24 hours have been reviewed.  Recent Lab Results  (Last 5 results in the past 24 hours)        03/14/24  1722   03/14/24  1302   03/14/24  1243   03/14/24  1006   03/14/24  0901        Influenza A, Molecular     Negative           Influenza B, Molecular     Negative           Albumin       3.9         ALP       71         Allens Test   Pass             ALT       36         Anion Gap       13         AST       33         Baso #       0.06         Basophil %       0.7         BILIRUBIN TOTAL       0.3  Comment: For infants and newborns, interpretation of results should be based  on gestational age, weight and in agreement with clinical  observations.    Premature Infant recommended reference ranges:  Up to 24 hours.............<8.0 mg/dL  Up to 48 hours............<12.0 mg/dL  3-5 days..................<15.0 mg/dL  6-29 days.................<15.0 mg/dL           BNP       27  Comment: Values of less than 100 pg/ml are consistent with non-CHF populations.         Site   LR             BUN       15         Calcium       10.0         Chloride       102         CO2       29         Creatinine       0.8         DelSys   Room Air             Differential Method       Automated         eGFR       >60         Eos #       0.1         Eos %       1.4         Flu A & B Source     Nasal swab           Glucose       88         Gran # (ANC)       4.7         Gran %       55.5         Hematocrit       51.1         Hemoglobin       16.6         Immature Grans (Abs)       0.03  Comment: Mild elevation in immature granulocytes is non specific and   can be seen in a variety of conditions including stress response,   acute inflammation, trauma and pregnancy. Correlation with other   laboratory and clinical findings is essential.           Immature Granulocytes       0.4         Lymph #       2.9          Lymph %       34.1         MCH       32.2         MCHC       32.5         MCV       99         Mode   SPONT             Mono #       0.7         Mono %       7.9         MPV       9.5         nRBC       0         QRS Duration         80       OHS QTC Calculation         405       Platelet Count       166         POC BE   9             POC HCO3   33.8             POC PCO2   58.7             POC PH   7.369             POC PO2   51             POC SATURATED O2   84             POCT Glucose 125               Potassium       4.2         PROTEIN TOTAL       7.5         RBC       5.16         RDW       15.2         Sample   ARTERIAL             SARS-CoV-2 RNA, Amplification, Qual     Negative  Comment: This test utilizes isothermal nucleic acid amplification technology   to   detect the SARS-CoV-2 RdRp nucleic acid segment. The analytical   sensitivity   (limit of detection) is 500 copies/swab.    A POSITIVE result is indicative of the presence of SARS-CoV-2 RNA;   clinical   correlation with patient history and other diagnostic information is   necessary to determine patient infection status.    A NEGATIVE result means that SARS-CoV-2 nucleic acids are not present   above   the limit of detection. A NEGATIVE result should be treated as   presumptive.   It does not rule out the possibility of COVID-19 and should not be   the sole   basis for treatment decisions.    If COVID-19 is strongly suspected based on clinical and exposure   history,   re-testing using an alternate molecular assay should be considered.    This test is Food and Drug Administration (FDA) approved. Performance   characteristics of this has been independently verified by [facility] Department of Pathology and Laboratory Medicine.             Sodium       144         Troponin I       <0.006  Comment: The reference interval for Troponin I represents the 99th percentile   cutoff   for our facility and is consistent with 3rd generation  assay   performance.           TSH       3.836         WBC       8.39                                Significant Imaging:   X-Ray Chest AP Portable   Final Result      No acute infiltrates.         Electronically signed by: Keri Holm MD   Date:    03/14/2024   Time:    10:51         Assessment/Plan:     * Chest pain  -cardiology consulted  -troponins ordered  -echo ordered  -primary cardiologist Dr. Chakraborty      Acute hypoxemic respiratory failure  -history of COPD on 4 L of nasal cannula oxygen   -patient has not been using her oxygen for several months and was found to be hypoxic and hypercarbic in the ER.    -Patient will require home O2 eval prior to discharge    Smoking  -patient counseled on smoking cessation      Asthma with COPD  -patient is supposed to be on 4 L of nasal cannula oxygen but has not been using her O2   -patient currently on 3 L nasal cannula saturating at 97%   -will need home O2 eval to obtain oxygen tank prior to discharge      Type 2 diabetes mellitus with complication, with long-term current use of insulin  -SSI and Accu-Cheks   Resume home meds upon discharge      Hypothyroidism due to acquired atrophy of thyroid  -continue home meds      Essential hypertension  Chronic, controlled. Latest blood pressure and vitals reviewed-     Temp:  [97.9 °F (36.6 °C)-98.4 °F (36.9 °C)]   Pulse:  []   Resp:  [18-30]   BP: (143-193)/(65-91)   SpO2:  [90 %-97 %] .   Home meds for hypertension were reviewed and noted below.   Hypertension Medications               furosemide (LASIX) 40 MG tablet TAKE 1 TABLET BY MOUTH EVERY MONDAY, WEDNESDAY, AND FRIDAY AS NEEDED    isosorbide mononitrate (IMDUR) 30 MG 24 hr tablet Take 1 tablet by mouth once daily            While in the hospital, will manage blood pressure as follows; Continue home antihypertensive regimen    Will utilize p.r.n. blood pressure medication only if patient's blood pressure greater than 160/100 and she develops symptoms such as  worsening chest pain or shortness of breath.    Bipolar disorder with anxiety and depression  -continue home meds        VTE Risk Mitigation (From admission, onward)           Ordered     enoxaparin injection 40 mg  Daily         03/14/24 1543     IP VTE HIGH RISK PATIENT  Once         03/14/24 1543     Place sequential compression device  Until discontinued         03/14/24 1543                       On 03/14/2024, patient should be placed in hospital observation services under my care.             Mazin Hernandez MD  Department of Hospital Medicine  [place] - Telemetry ([facility])

## 2024-03-14 NOTE — SUBJECTIVE & OBJECTIVE
Past Medical History:   Diagnosis Date    Acute ischemic stroke 09/17/2019    Acute respiratory failure with hypoxia 02/11/2021    Aneurysm     Anxiety     Arthritis     Asthma     Bipolar 1 disorder     Cerebral aneurysm, nonruptured 09/19/2019    Chronic diastolic heart failure 05/23/2023    Coronary artery disease of native artery of native heart with stable angina pectoris 01/19/2022    Depression     Diabetes mellitus, type 2     BS 72 01/23/2024    Diverticular disease     GERD (gastroesophageal reflux disease)     Hyperlipemia     Hypertension     Hyponatremia 07/24/2022    Hypothyroidism     Pneumonia     PVD (peripheral vascular disease) 04/28/2021    Renal manifestation of secondary diabetes mellitus     Right-sided back pain 06/29/2019    Severe obesity (BMI 35.0-39.9) with comorbidity 05/31/2022    Stroke     Trouble in sleeping        Past Surgical History:   Procedure Laterality Date    CEREBRAL ANGIOGRAM N/A 10/28/2019    Procedure: ANGIOGRAM-CEREBRAL;  Surgeon: Dahiana Diagnostic Provider;  Location: [facility] OR [address] [place] [address];  Service: Radiology;  Laterality: N/A;  Rm 190/Aayush    CHOLECYSTECTOMY      COLON SURGERY      COLONOSCOPY N/A 1/12/2018    Procedure: COLONOSCOPY;  Surgeon: Roque Mahajan III, MD;  Location: [facility] ENDO;  Service: Endoscopy;  Laterality: N/A;    COLONOSCOPY N/A 10/13/2023    Procedure: COLONOSCOPY;  Surgeon: Thelma Chairez MD;  Location: [facility] ENDO;  Service: Endoscopy;  Laterality: N/A;    DENTAL SURGERY  05/21/2018    removal of 8 top teeth    HYSTERECTOMY      TONSILLECTOMY         Review of patient's allergies indicates:   Allergen Reactions    Seroquel [quetiapine] Other (See Comments)     TC SEIZURES    Adhesive Blisters     Pt states can't tolerate paper tape    Levomenol analogues     Lipitor [atorvastatin]      Leg Cramps    Repatha pushtronex [evolocumab]      Feels sick    Zofran [ondansetron hcl] Hives and Other (See Comments)     headaches    Celestone [betamethasone]  Hives, Itching and Rash    Levaquin [levofloxacin] Nausea Only    Piroxicam Diarrhea and Nausea Only       No current facility-administered medications on file prior to encounter.     Current Outpatient Medications on File Prior to Encounter   Medication Sig    benztropine (COGENTIN) 0.5 MG tablet Take 1 tablet (0.5 mg total) by mouth 2 (two) times daily. TAKE 1 TABLET BY MOUTH TWICE DAILY AS NEEDED FOR  DYSTONIA    butalbital-acetaminophen-caffeine -40 mg (FIORICET, ESGIC) -40 mg per tablet Take 1 tablet by mouth every 6 (six) hours as needed for Headaches. for pain.    desvenlafaxine succinate (PRISTIQ) 50 MG Tb24 Take 1 tablet by mouth once daily    diazePAM (VALIUM) 10 MG Tab TAKE 1/2 TO 1 (ONE-HALF TO ONE) TABLET BY MOUTH THREE TIMES DAILY AS NEEDED FOR ANXIETY    furosemide (LASIX) 40 MG tablet TAKE 1 TABLET BY MOUTH EVERY MONDAY, WEDNESDAY, AND FRIDAY AS NEEDED    gabapentin (NEURONTIN) 600 MG tablet TAKE 1 CAPSULE BY MOUTH IN THE MORNING, 1 CAPSULE IN THE AFTERNOON, AND THEN 2 CAPSULES AT BEDTIME    insulin glargine U-300 conc (TOUJEO MAX U-300 SOLOSTAR) 300 unit/mL (3 mL) insulin pen Inject 76 Units into the skin once daily.    insulin regular (NOVOLIN R REGULAR U100 INSULIN) 100 unit/mL Inj injection Inject 12 Units into the skin 3 (three) times daily before meals.    isosorbide mononitrate (IMDUR) 30 MG 24 hr tablet Take 1 tablet by mouth once daily    metFORMIN (GLUCOPHAGE-XR) 500 MG ER 24hr tablet TAKE 1 TABLET BY MOUTH TWICE DAILY WITH MEALS    mirtazapine (REMERON) 15 MG tablet Take 1 tablet (15 mg total) by mouth every evening.    risperiDONE (RISPERDAL) 1 MG tablet Take 1 tablet (1 mg total) by mouth 2 (two) times daily.    albuterol (PROVENTIL/VENTOLIN HFA) 90 mcg/actuation inhaler INHALE 2 TO 4 PUFFS BY MOUTH THREE TIMES DAILY AS NEEDED FOR WHEEZING    albuterol-ipratropium (DUO-NEB) 2.5 mg-0.5 mg/3 mL nebulizer solution Take 3 mLs by nebulization every 6 (six) hours as needed for  "Wheezing. Rescue    blood sugar diagnostic Strp Inject 1 each into the skin 4 (four) times daily. Dispense preferred on insurance    blood-glucose meter kit Use as instructed - preferred on insurance    lancets Misc Inject 1 each into the skin 4 (four) times daily. Dispense preferred on insurance    promethazine (PHENERGAN) 12.5 MG Tab Take 1 tablet (12.5 mg total) by mouth every 6 (six) hours as needed.    [DISCONTINUED] ezetimibe (ZETIA) 10 mg tablet Take 1 tablet (10 mg total) by mouth once daily.    [DISCONTINUED] metoprolol succinate (TOPROL-XL) 25 MG 24 hr tablet Take 1 tablet by mouth once daily    [DISCONTINUED] pen needle, diabetic 32 gauge x 5/32" Ndle Inject 1 each into the skin once daily.    [DISCONTINUED] sulfamethoxazole-trimethoprim 800-160mg (BACTRIM DS) 800-160 mg Tab Take 1 tablet by mouth 2 (two) times daily.     Family History       Problem Relation (Age of Onset)    Alcohol abuse Mother, Father    Arthritis Father, Maternal Grandmother, Paternal Grandmother    Breast cancer Maternal Grandmother    COPD Mother, Sister    Cancer Father, Maternal Aunt, Maternal Uncle, Paternal Aunt, Paternal Uncle, Paternal Grandmother    Depression Mother    Diabetes Maternal Uncle    Drug abuse Mother, Maternal Uncle    Heart disease Father, Brother    Hypertension Father, Brother, Maternal Grandmother, Paternal Grandfather    Kidney failure Sister          Tobacco Use    Smoking status: Every Day     Current packs/day: 1.00     Average packs/day: 1 pack/day for 46.2 years (46.2 ttl pk-yrs)     Types: Cigarettes, Vaping w/o nicotine     Start date: 1978    Smokeless tobacco: Never    Tobacco comments:     On average, smokes 10 cigarettes per day.    Substance and Sexual Activity    Alcohol use: Not Currently    Drug use: Yes     Types: Marijuana    Sexual activity: Yes     Review of Systems   Constitutional:  Negative for activity change, appetite change and fatigue.   Respiratory:  Positive for shortness of " breath. Negative for cough and chest tightness.    Cardiovascular:  Positive for chest pain and palpitations. Negative for leg swelling.   Gastrointestinal:  Negative for abdominal pain, nausea and vomiting.   Musculoskeletal:  Negative for gait problem.   Neurological:  Negative for dizziness, weakness, light-headedness and headaches.   Psychiatric/Behavioral:  Negative for agitation and confusion. The patient is not nervous/anxious.    All other systems reviewed and are negative.    Objective:     Vital Signs (Most Recent):  Temp: 97.9 °F (36.6 °C) (03/14/24 1435)  Pulse: 83 (03/14/24 1500)  Resp: 18 (03/14/24 1435)  BP: (!) 185/91 (03/14/24 1500)  SpO2: 97 % (03/14/24 1435) Vital Signs (24h Range):  Temp:  [97.9 °F (36.6 °C)-98.4 °F (36.9 °C)] 97.9 °F (36.6 °C)  Pulse:  [] 83  Resp:  [18-30] 18  SpO2:  [90 %-97 %] 97 %  BP: (143-193)/(65-91) 185/91     Weight: 89.4 kg (197 lb)  Body mass index is 34.9 kg/m².     Physical Exam  Vitals and nursing note reviewed.   Constitutional:       Appearance: Normal appearance.   HENT:      Head: Normocephalic and atraumatic.      Nose: Nose normal.      Mouth/Throat:      Mouth: Mucous membranes are moist.   Eyes:      Extraocular Movements: Extraocular movements intact.      Conjunctiva/sclera: Conjunctivae normal.   Cardiovascular:      Rate and Rhythm: Normal rate and regular rhythm.      Pulses: Normal pulses.      Heart sounds: Normal heart sounds.   Pulmonary:      Effort: Pulmonary effort is normal.      Breath sounds: Normal breath sounds.   Abdominal:      General: Abdomen is flat. Bowel sounds are normal.      Palpations: Abdomen is soft.   Musculoskeletal:         General: Normal range of motion.      Cervical back: Normal range of motion and neck supple.   Skin:     General: Skin is warm.      Capillary Refill: Capillary refill takes less than 2 seconds.   Neurological:      Mental Status: She is alert and oriented to person, place, and time. Mental status  is at baseline.   Psychiatric:         Mood and Affect: Mood normal.         Behavior: Behavior normal.                Significant Labs: All pertinent labs within the past 24 hours have been reviewed.  Recent Lab Results  (Last 5 results in the past 24 hours)        03/14/24  1722   03/14/24  1302   03/14/24  1243   03/14/24  1006   03/14/24  0901        Influenza A, Molecular     Negative           Influenza B, Molecular     Negative           Albumin       3.9         ALP       71         Allens Test   Pass             ALT       36         Anion Gap       13         AST       33         Baso #       0.06         Basophil %       0.7         BILIRUBIN TOTAL       0.3  Comment: For infants and newborns, interpretation of results should be based  on gestational age, weight and in agreement with clinical  observations.    Premature Infant recommended reference ranges:  Up to 24 hours.............<8.0 mg/dL  Up to 48 hours............<12.0 mg/dL  3-5 days..................<15.0 mg/dL  6-29 days.................<15.0 mg/dL           BNP       27  Comment: Values of less than 100 pg/ml are consistent with non-CHF populations.         Site   LR             BUN       15         Calcium       10.0         Chloride       102         CO2       29         Creatinine       0.8         Dels   Room Air             Differential Method       Automated         eGFR       >60         Eos #       0.1         Eos %       1.4         Flu A & B Source     Nasal swab           Glucose       88         Gran # (ANC)       4.7         Gran %       55.5         Hematocrit       51.1         Hemoglobin       16.6         Immature Grans (Abs)       0.03  Comment: Mild elevation in immature granulocytes is non specific and   can be seen in a variety of conditions including stress response,   acute inflammation, trauma and pregnancy. Correlation with other   laboratory and clinical findings is essential.           Immature Granulocytes        0.4         Lymph #       2.9         Lymph %       34.1         MCH       32.2         MCHC       32.5         MCV       99         Mode   SPONT             Mono #       0.7         Mono %       7.9         MPV       9.5         nRBC       0         QRS Duration         80       OHS QTC Calculation         405       Platelet Count       166         POC BE   9             POC HCO3   33.8             POC PCO2   58.7             POC PH   7.369             POC PO2   51             POC SATURATED O2   84             POCT Glucose 125               Potassium       4.2         PROTEIN TOTAL       7.5         RBC       5.16         RDW       15.2         Sample   ARTERIAL             SARS-CoV-2 RNA, Amplification, Qual     Negative  Comment: This test utilizes isothermal nucleic acid amplification technology   to   detect the SARS-CoV-2 RdRp nucleic acid segment. The analytical   sensitivity   (limit of detection) is 500 copies/swab.    A POSITIVE result is indicative of the presence of SARS-CoV-2 RNA;   clinical   correlation with patient history and other diagnostic information is   necessary to determine patient infection status.    A NEGATIVE result means that SARS-CoV-2 nucleic acids are not present   above   the limit of detection. A NEGATIVE result should be treated as   presumptive.   It does not rule out the possibility of COVID-19 and should not be   the sole   basis for treatment decisions.    If COVID-19 is strongly suspected based on clinical and exposure   history,   re-testing using an alternate molecular assay should be considered.    This test is Food and Drug Administration (FDA) approved. Performance   characteristics of this has been independently verified by [facility] Department of Pathology and Laboratory Medicine.             Sodium       144         Troponin I       <0.006  Comment: The reference interval for Troponin I represents the 99th percentile   cutoff   for our facility and is  consistent with 3rd generation assay   performance.           TSH       3.836         WBC       8.39                                Significant Imaging:   X-Ray Chest AP Portable   Final Result      No acute infiltrates.         Electronically signed by: Keri Holm MD   Date:    03/14/2024   Time:    10:51

## 2024-03-14 NOTE — ASSESSMENT & PLAN NOTE
-history of COPD on 4 L of nasal cannula oxygen   -patient has not been using her oxygen for several months and was found to be hypoxic and hypercarbic in the ER.    -Patient will require home O2 eval prior to discharge

## 2024-03-14 NOTE — PLAN OF CARE
A206/A206 VILLA Curryedy Goins is a 58 y.o.female admitted on 3/14/2024 for Chest pain   Code Status: Full Code MRN: [id number]   Review of patient's allergies indicates:   Allergen Reactions    Seroquel [quetiapine] Other (See Comments)     TC SEIZURES    Adhesive Blisters     Pt states can't tolerate paper tape    Levomenol analogues     Lipitor [atorvastatin]      Leg Cramps    Repatha pushtronex [evolocumab]      Feels sick    Zofran [ondansetron hcl] Hives and Other (See Comments)     headaches    Celestone [betamethasone] Hives, Itching and Rash    Levaquin [levofloxacin] Nausea Only    Piroxicam Diarrhea and Nausea Only     Past Medical History:   Diagnosis Date    Acute ischemic stroke 09/17/2019    Acute respiratory failure with hypoxia 02/11/2021    Aneurysm     Anxiety     Arthritis     Asthma     Bipolar 1 disorder     Cerebral aneurysm, nonruptured 09/19/2019    Chronic diastolic heart failure 05/23/2023    Coronary artery disease of native artery of native heart with stable angina pectoris 01/19/2022    Depression     Diabetes mellitus, type 2     BS 72 01/23/2024    Diverticular disease     GERD (gastroesophageal reflux disease)     Hyperlipemia     Hypertension     Hyponatremia 07/24/2022    Hypothyroidism     Pneumonia     PVD (peripheral vascular disease) 04/28/2021    Renal manifestation of secondary diabetes mellitus     Right-sided back pain 06/29/2019    Severe obesity (BMI 35.0-39.9) with comorbidity 05/31/2022    Stroke     Trouble in sleeping       PRN meds    acetaminophen, 650 mg, Q4H PRN  butalbital-acetaminophen-caffeine -40 mg, 1 tablet, Q6H PRN  dextrose 10%, 12.5 g, PRN  dextrose 10%, 25 g, PRN  glucagon (human recombinant), 1 mg, PRN  glucose, 16 g, PRN  glucose, 24 g, PRN  hydrALAZINE, 10 mg, Q6H PRN  HYDROcodone-acetaminophen, 1 tablet, Q6H PRN  insulin aspart U-100, 0-10 Units, QID (AC + HS) PRN  naloxone, 0.02 mg, PRN  sodium chloride 0.9%, 10 mL, Q12H PRN      Chart check  completed. Will continue plan of care.               Lead Monitored: Lead II Rhythm: normal sinus rhythm    Cardiac/Telemetry Box Number: [id number]    Last Bowel Movement: 03/13/24  Diet diabetic 2000 Calorie     Brandt Score: 23  Fall Risk Score: 8  Accucheck []   Freq?      Lines/Drains/Airways       Peripheral Intravenous Line  Duration                  Peripheral IV - Single Lumen 03/14/24 1008 20 G Right Forearm <1 day

## 2024-03-14 NOTE — ASSESSMENT & PLAN NOTE
-patient is supposed to be on 4 L of nasal cannula oxygen but has not been using her O2   -patient currently on 3 L nasal cannula saturating at 97%   -will need home O2 eval to obtain oxygen tank prior to discharge

## 2024-03-15 VITALS
BODY MASS INDEX: 36.64 KG/M2 | TEMPERATURE: 98 F | WEIGHT: 206.81 LBS | SYSTOLIC BLOOD PRESSURE: 129 MMHG | DIASTOLIC BLOOD PRESSURE: 61 MMHG | HEIGHT: 63 IN | OXYGEN SATURATION: 93 % | HEART RATE: 93 BPM | RESPIRATION RATE: 18 BRPM

## 2024-03-15 LAB
ANION GAP SERPL CALC-SCNC: 11 MMOL/L (ref 8–16)
AORTIC ROOT ANNULUS: 3.19 CM
ASCENDING AORTA: 2.87 CM
AV INDEX (PROSTH): 0.65
AV MEAN GRADIENT: 6 MMHG
AV PEAK GRADIENT: 9 MMHG
AV VALVE AREA BY VELOCITY RATIO: 1.71 CM²
AV VALVE AREA: 1.82 CM²
AV VELOCITY RATIO: 0.62
BASOPHILS # BLD AUTO: 0.02 K/UL (ref 0–0.2)
BASOPHILS NFR BLD: 0.2 % (ref 0–1.9)
BSA FOR ECHO PROCEDURE: 1.99 M2
BUN SERPL-MCNC: 20 MG/DL (ref 6–20)
CALCIUM SERPL-MCNC: 9.2 MG/DL (ref 8.7–10.5)
CHLORIDE SERPL-SCNC: 105 MMOL/L (ref 95–110)
CO2 SERPL-SCNC: 26 MMOL/L (ref 23–29)
CREAT SERPL-MCNC: 0.8 MG/DL (ref 0.5–1.4)
CV ECHO LV RWT: 0.6 CM
DIFFERENTIAL METHOD BLD: ABNORMAL
DOP CALC AO PEAK VEL: 1.49 M/S
DOP CALC AO VTI: 37.9 CM
DOP CALC LVOT AREA: 2.8 CM2
DOP CALC LVOT DIAMETER: 1.88 CM
DOP CALC LVOT PEAK VEL: 0.92 M/S
DOP CALC LVOT STROKE VOLUME: 68.81 CM3
DOP CALC RVOT PEAK VEL: 0.83 M/S
DOP CALC RVOT VTI: 20 CM
DOP CALCLVOT PEAK VEL VTI: 24.8 CM
E WAVE DECELERATION TIME: 194.32 MSEC
E/A RATIO: 1.06
E/E' RATIO: 8.33 M/S
ECHO LV POSTERIOR WALL: 1.37 CM (ref 0.6–1.1)
EOSINOPHIL # BLD AUTO: 0 K/UL (ref 0–0.5)
EOSINOPHIL NFR BLD: 0.1 % (ref 0–8)
ERYTHROCYTE [DISTWIDTH] IN BLOOD BY AUTOMATED COUNT: 15.2 % (ref 11.5–14.5)
EST. GFR  (NO RACE VARIABLE): >60 ML/MIN/1.73 M^2
FRACTIONAL SHORTENING: 33 % (ref 28–44)
GLUCOSE SERPL-MCNC: 155 MG/DL (ref 70–110)
HCT VFR BLD AUTO: 48.3 % (ref 37–48.5)
HGB BLD-MCNC: 15.7 G/DL (ref 12–16)
IMM GRANULOCYTES # BLD AUTO: 0.05 K/UL (ref 0–0.04)
IMM GRANULOCYTES NFR BLD AUTO: 0.4 % (ref 0–0.5)
INTERVENTRICULAR SEPTUM: 1.52 CM (ref 0.6–1.1)
IVC DIAMETER: 1.56 CM
IVRT: 60.89 MSEC
LA MAJOR: 5 CM
LA MINOR: 5.24 CM
LA WIDTH: 3.6 CM
LEFT ATRIUM SIZE: 3.71 CM
LEFT ATRIUM VOLUME INDEX: 30.3 ML/M2
LEFT ATRIUM VOLUME: 58.09 CM3
LEFT INTERNAL DIMENSION IN SYSTOLE: 3.07 CM (ref 2.1–4)
LEFT VENTRICLE DIASTOLIC VOLUME INDEX: 50.76 ML/M2
LEFT VENTRICLE DIASTOLIC VOLUME: 97.45 ML
LEFT VENTRICLE MASS INDEX: 140 G/M2
LEFT VENTRICLE SYSTOLIC VOLUME INDEX: 19.2 ML/M2
LEFT VENTRICLE SYSTOLIC VOLUME: 36.9 ML
LEFT VENTRICULAR INTERNAL DIMENSION IN DIASTOLE: 4.6 CM (ref 3.5–6)
LEFT VENTRICULAR MASS: 269.21 G
LV LATERAL E/E' RATIO: 7.69 M/S
LV SEPTAL E/E' RATIO: 9.09 M/S
LVOT MG: 2.19 MMHG
LVOT MV: 0.73 CM/S
LYMPHOCYTES # BLD AUTO: 1.9 K/UL (ref 1–4.8)
LYMPHOCYTES NFR BLD: 16 % (ref 18–48)
MCH RBC QN AUTO: 32.4 PG (ref 27–31)
MCHC RBC AUTO-ENTMCNC: 32.5 G/DL (ref 32–36)
MCV RBC AUTO: 100 FL (ref 82–98)
MONOCYTES # BLD AUTO: 1 K/UL (ref 0.3–1)
MONOCYTES NFR BLD: 8.5 % (ref 4–15)
MV PEAK A VEL: 0.94 M/S
MV PEAK E VEL: 1 M/S
MV STENOSIS PRESSURE HALF TIME: 56.35 MS
MV VALVE AREA P 1/2 METHOD: 3.9 CM2
NEUTROPHILS # BLD AUTO: 9 K/UL (ref 1.8–7.7)
NEUTROPHILS NFR BLD: 74.8 % (ref 38–73)
NRBC BLD-RTO: 0 /100 WBC
PISA MRMAX VEL: 2.89 M/S
PISA TR MAX VEL: 2.2 M/S
PLATELET # BLD AUTO: 185 K/UL (ref 150–450)
PMV BLD AUTO: 10.2 FL (ref 9.2–12.9)
POTASSIUM SERPL-SCNC: 4.1 MMOL/L (ref 3.5–5.1)
PV MEAN GRADIENT: 2 MMHG
RA MAJOR: 4 CM
RA PRESSURE ESTIMATED: 3 MMHG
RA WIDTH: 2.6 CM
RBC # BLD AUTO: 4.85 M/UL (ref 4–5.4)
RV TB RVSP: 5 MMHG
SODIUM SERPL-SCNC: 142 MMOL/L (ref 136–145)
STJ: 3.05 CM
TDI LATERAL: 0.13 M/S
TDI SEPTAL: 0.11 M/S
TDI: 0.12 M/S
TR MAX PG: 19 MMHG
TRICUSPID ANNULAR PLANE SYSTOLIC EXCURSION: 2.02 CM
TROPONIN I SERPL DL<=0.01 NG/ML-MCNC: <0.006 NG/ML (ref 0–0.03)
TV REST PULMONARY ARTERY PRESSURE: 22 MMHG
WBC # BLD AUTO: 11.96 K/UL (ref 3.9–12.7)
Z-SCORE OF LEFT VENTRICULAR DIMENSION IN END DIASTOLE: -1.64
Z-SCORE OF LEFT VENTRICULAR DIMENSION IN END SYSTOLE: -0.66

## 2024-03-15 PROCEDURE — 27000221 HC OXYGEN, UP TO 24 HOURS: Mod: HCNC

## 2024-03-15 PROCEDURE — 25000003 PHARM REV CODE 250: Mod: HCNC | Performed by: FAMILY MEDICINE

## 2024-03-15 PROCEDURE — 36415 COLL VENOUS BLD VENIPUNCTURE: CPT | Mod: HCNC | Performed by: FAMILY MEDICINE

## 2024-03-15 PROCEDURE — 25000003 PHARM REV CODE 250: Mod: HCNC | Performed by: NURSE PRACTITIONER

## 2024-03-15 PROCEDURE — 94618 PULMONARY STRESS TESTING: CPT | Mod: HCNC

## 2024-03-15 PROCEDURE — 94761 N-INVAS EAR/PLS OXIMETRY MLT: CPT | Mod: HCNC

## 2024-03-15 PROCEDURE — 99900035 HC TECH TIME PER 15 MIN (STAT): Mod: HCNC

## 2024-03-15 PROCEDURE — G0378 HOSPITAL OBSERVATION PER HR: HCPCS | Mod: HCNC

## 2024-03-15 PROCEDURE — 85025 COMPLETE CBC W/AUTO DIFF WBC: CPT | Mod: HCNC | Performed by: FAMILY MEDICINE

## 2024-03-15 PROCEDURE — 80048 BASIC METABOLIC PNL TOTAL CA: CPT | Mod: HCNC | Performed by: FAMILY MEDICINE

## 2024-03-15 RX ORDER — DIPHENHYDRAMINE HCL 25 MG
25 CAPSULE ORAL EVERY 6 HOURS PRN
Status: DISCONTINUED | OUTPATIENT
Start: 2024-03-15 | End: 2024-03-15 | Stop reason: HOSPADM

## 2024-03-15 RX ORDER — HYDROCODONE BITARTRATE AND ACETAMINOPHEN 500; 5 MG/1; MG/1
TABLET ORAL
Status: DISCONTINUED | OUTPATIENT
Start: 2024-03-15 | End: 2024-03-15

## 2024-03-15 RX ADMIN — FUROSEMIDE 40 MG: 40 TABLET ORAL at 08:03

## 2024-03-15 RX ADMIN — DESVENLAFAXINE SUCCINATE 50 MG: 50 TABLET, FILM COATED, EXTENDED RELEASE ORAL at 08:03

## 2024-03-15 RX ADMIN — DIPHENHYDRAMINE HYDROCHLORIDE 25 MG: 25 CAPSULE ORAL at 11:03

## 2024-03-15 RX ADMIN — RISPERIDONE 1 MG: 1 TABLET, FILM COATED ORAL at 08:03

## 2024-03-15 RX ADMIN — ISOSORBIDE MONONITRATE 30 MG: 30 TABLET, EXTENDED RELEASE ORAL at 08:03

## 2024-03-15 RX ADMIN — BENZTROPINE MESYLATE 0.5 MG: 0.5 TABLET ORAL at 08:03

## 2024-03-15 RX ADMIN — GABAPENTIN 600 MG: 300 CAPSULE ORAL at 08:03

## 2024-03-15 NOTE — PLAN OF CARE
Patient left before CM discharge assessment and set up of home health.     11:390am Left message for patient to call CM,     3:00pm no return phone call.  Called patient again, no answer.  Called spouse, left message.    4:30pm Still no return phone call.  Advised SAMSON Kumar of no home health set up.

## 2024-03-15 NOTE — CARE UPDATE
Home Oxygen Evaluation - [facility] - Cardiopulmonary Department      Date Performed: 3/15/2024      1) Patient's Home O2 Sat on room air, while at rest: Room Air SpO2 At Rest: 96 %        If O2 sats on room air at rest are 88% or below, patient qualifies.  Document O2 liter flow needed in Step 2.  If O2 sats are 89% or above, complete Step 3.        2)  If patient is not ambulated and O2 sats are <88%, what is the O2 liter flow required to meet ordered saturation?      If O2 sats on room air while exercising remain 89% or above patient does not qualify, no further testing needed Document N/A in step 3. If O2 sats on room air while exercising are 88% or below, continue to Step 4.    3) Patient's O2 Sat on room air while exercising: Room Air SpO2 During Ambulation: 90 %        4) Patient's O2 Sat while exercising on O2:   at Ambulation O2 LPM: 0 LPM         (Must show improvement from #4 for patients to qualify)

## 2024-03-15 NOTE — HOSPITAL COURSE
Alexandra Goins is a 58 year old female with a PMH of bipolar disorder, CHF, Diabetes, hypercholesterol, HTN, Hypothyroid, COPD (pt has had home O2 in the past but was returned it due to price) and tobacco use who was admitted for SOB and chest pain/palpitations.  Significant Labs: BNP WNL, Troponin WNL, and Flu/Covid Neg. CXR showed no acute infiltrates with mild cardiomegaly. EKG showed NSR with no acute ST changes. TTE showed EF of 60-65% with normal systolic/diastyolic function. No wall motion abnormalities. Pulmonary artery systolic pressure is 22mmHg. Trop remained negative.  Cardiology evaluated - atypical chest pain - recommended Holter monitor at home and stress test outpatient.  Patient seen and examined on day of discharge and deemed suitable to d/c to home.   She remains chest pain free, no complaints of palpitations and dyspnea resolved.   Of note patient states she drinks coffee and at least two energy drinks daily, discussed limiting caffeine intake.  Also discussed smoking cessation but she is not ready to stop at this time.   She will follow up outpatient cardiology with Dr. Chakraborty. Home O2 eval was performed and the pt does not need O2 on discharge. Pt was referred for home health.

## 2024-03-15 NOTE — PLAN OF CARE
Pt oriented x4 VSS  Plan of care reviewed. pt verbalizes understanding.  Patient moving/ turning independently.  Patient norm,al sinus on monitor  Bed low, side rails up x2, wheels locked, call light in reach.  Pt instructed to call for assistance

## 2024-03-15 NOTE — ASSESSMENT & PLAN NOTE
57 yo female with PMH CAD, PVCs, HTN, HLD, cerebral aneurysm, h/o CVA, DM II, asthma, GERD admitted for palpitations with associated CP ( atypical).  Pt states sx started 6 months ago worsening this past week. EKG is wnl limits troponins are normal.    Check echo  and monitor rhythm. If normal outpt stress and cardiac monitor can be scheduled

## 2024-03-15 NOTE — CONSULTS
[facility] - Telemetry ([facility])  Cardiology  Consult Note    Patient Name: Alexandra Goins  MRN: [id number]  Admission Date: 3/14/2024  Hospital Length of Stay: 0 days  Code Status: Full Code   Attending Provider: Mzain Hernandez MD   Consulting Provider: Nabor Kolb MD  Primary Care Physician: Perez Casiano MD  Principal Problem:Chest pain    Patient information was obtained from patient and ER records.     Inpatient consult to Cardiology  Consult performed by: Nabor Kolb MD  Consult ordered by: Mazin Hernandez MD        Subjective:     Chief Complaint:  palpitations     HPI:   57 yo female with PMH CAD, PVCs, HTN, HLD, cerebral aneurysm, h/o CVA, DM II, asthma, GERD admitted for palpitations with associated CP ( atypical).  Pt states sx started 6 months ago worsening this past week. EKG is wnl limits troponins are normal.    Past Medical History:   Diagnosis Date    Acute ischemic stroke 09/17/2019    Acute respiratory failure with hypoxia 02/11/2021    Aneurysm     Anxiety     Arthritis     Asthma     Bipolar 1 disorder     Cerebral aneurysm, nonruptured 09/19/2019    Chronic diastolic heart failure 05/23/2023    Coronary artery disease of native artery of native heart with stable angina pectoris 01/19/2022    Depression     Diabetes mellitus, type 2     BS 72 01/23/2024    Diverticular disease     GERD (gastroesophageal reflux disease)     Hyperlipemia     Hypertension     Hyponatremia 07/24/2022    Hypothyroidism     Pneumonia     PVD (peripheral vascular disease) 04/28/2021    Renal manifestation of secondary diabetes mellitus     Right-sided back pain 06/29/2019    Severe obesity (BMI 35.0-39.9) with comorbidity 05/31/2022    Stroke     Trouble in sleeping        Past Surgical History:   Procedure Laterality Date    CEREBRAL ANGIOGRAM N/A 10/28/2019    Procedure: ANGIOGRAM-CEREBRAL;  Surgeon: [employer] Diagnostic Provider;  Location: [facility] OR [address] [place] [address];  Service: Radiology;  Laterality: N/A;    190/Aayush    CHOLECYSTECTOMY      COLON SURGERY      COLONOSCOPY N/A 1/12/2018    Procedure: COLONOSCOPY;  Surgeon: Roque Mahajan III, MD;  Location: [facility] ENDO;  Service: Endoscopy;  Laterality: N/A;    COLONOSCOPY N/A 10/13/2023    Procedure: COLONOSCOPY;  Surgeon: Thelma Chairez MD;  Location: [facility] ENDO;  Service: Endoscopy;  Laterality: N/A;    DENTAL SURGERY  05/21/2018    removal of 8 top teeth    HYSTERECTOMY      TONSILLECTOMY         Review of patient's allergies indicates:   Allergen Reactions    Seroquel [quetiapine] Other (See Comments)     TC SEIZURES    Adhesive Blisters     Pt states can't tolerate paper tape    Levomenol analogues     Lipitor [atorvastatin]      Leg Cramps    Repatha pushtronex [evolocumab]      Feels sick    Zofran [ondansetron hcl] Hives and Other (See Comments)     headaches    Celestone [betamethasone] Hives, Itching and Rash    Levaquin [levofloxacin] Nausea Only    Piroxicam Diarrhea and Nausea Only       No current facility-administered medications on file prior to encounter.     Current Outpatient Medications on File Prior to Encounter   Medication Sig    benztropine (COGENTIN) 0.5 MG tablet Take 1 tablet (0.5 mg total) by mouth 2 (two) times daily. TAKE 1 TABLET BY MOUTH TWICE DAILY AS NEEDED FOR  DYSTONIA    butalbital-acetaminophen-caffeine -40 mg (FIORICET, ESGIC) -40 mg per tablet Take 1 tablet by mouth every 6 (six) hours as needed for Headaches. for pain.    desvenlafaxine succinate (PRISTIQ) 50 MG Tb24 Take 1 tablet by mouth once daily    diazePAM (VALIUM) 10 MG Tab TAKE 1/2 TO 1 (ONE-HALF TO ONE) TABLET BY MOUTH THREE TIMES DAILY AS NEEDED FOR ANXIETY    furosemide (LASIX) 40 MG tablet TAKE 1 TABLET BY MOUTH EVERY MONDAY, WEDNESDAY, AND FRIDAY AS NEEDED    gabapentin (NEURONTIN) 600 MG tablet TAKE 1 CAPSULE BY MOUTH IN THE MORNING, 1 CAPSULE IN THE AFTERNOON, AND THEN 2 CAPSULES AT BEDTIME    insulin glargine U-300 conc (TOUJEO MAX U-300 SOLOSTAR)  "300 unit/mL (3 mL) insulin pen Inject 76 Units into the skin once daily.    insulin regular (NOVOLIN R REGULAR U100 INSULIN) 100 unit/mL Inj injection Inject 12 Units into the skin 3 (three) times daily before meals.    isosorbide mononitrate (IMDUR) 30 MG 24 hr tablet Take 1 tablet by mouth once daily    metFORMIN (GLUCOPHAGE-XR) 500 MG ER 24hr tablet TAKE 1 TABLET BY MOUTH TWICE DAILY WITH MEALS    mirtazapine (REMERON) 15 MG tablet Take 1 tablet (15 mg total) by mouth every evening.    risperiDONE (RISPERDAL) 1 MG tablet Take 1 tablet (1 mg total) by mouth 2 (two) times daily.    albuterol (PROVENTIL/VENTOLIN HFA) 90 mcg/actuation inhaler INHALE 2 TO 4 PUFFS BY MOUTH THREE TIMES DAILY AS NEEDED FOR WHEEZING    albuterol-ipratropium (DUO-NEB) 2.5 mg-0.5 mg/3 mL nebulizer solution Take 3 mLs by nebulization every 6 (six) hours as needed for Wheezing. Rescue    blood sugar diagnostic Strp Inject 1 each into the skin 4 (four) times daily. Dispense preferred on insurance    blood-glucose meter kit Use as instructed - preferred on insurance    lancets Misc Inject 1 each into the skin 4 (four) times daily. Dispense preferred on insurance    promethazine (PHENERGAN) 12.5 MG Tab Take 1 tablet (12.5 mg total) by mouth every 6 (six) hours as needed.    [DISCONTINUED] ezetimibe (ZETIA) 10 mg tablet Take 1 tablet (10 mg total) by mouth once daily.    [DISCONTINUED] metoprolol succinate (TOPROL-XL) 25 MG 24 hr tablet Take 1 tablet by mouth once daily    [DISCONTINUED] pen needle, diabetic 32 gauge x 5/32" Ndle Inject 1 each into the skin once daily.    [DISCONTINUED] sulfamethoxazole-trimethoprim 800-160mg (BACTRIM DS) 800-160 mg Tab Take 1 tablet by mouth 2 (two) times daily.     Family History       Problem Relation (Age of Onset)    Alcohol abuse Mother, Father    Arthritis Father, Maternal Grandmother, Paternal Grandmother    Breast cancer Maternal Grandmother    COPD Mother, Sister    Cancer Father, Maternal Aunt, " Maternal Uncle, Paternal Aunt, Paternal Uncle, Paternal Grandmother    Depression Mother    Diabetes Maternal Uncle    Drug abuse Mother, Maternal Uncle    Heart disease Father, Brother    Hypertension Father, Brother, Maternal Grandmother, Paternal Grandfather    Kidney failure Sister          Tobacco Use    Smoking status: Every Day     Current packs/day: 1.00     Average packs/day: 1 pack/day for 46.2 years (46.2 total pack years)     Types: Cigarettes, Vaping w/o nicotine     Start date: 1978    Smokeless tobacco: Never    Tobacco comments:     On average, smokes 10 cigarettes per day.    Substance and Sexual Activity    Alcohol use: Not Currently    Drug use: Yes     Types: Marijuana    Sexual activity: Yes     Review of Systems   Constitutional: Negative. Negative for weight gain.   HENT: Negative.     Eyes: Negative.    Cardiovascular: Negative.  Negative for chest pain, leg swelling and palpitations.   Respiratory: Negative.  Negative for shortness of breath.    Endocrine: Negative.    Hematologic/Lymphatic: Negative.    Skin: Negative.    Musculoskeletal:  Negative for muscle weakness.   Gastrointestinal: Negative.    Genitourinary: Negative.    Neurological: Negative.  Negative for dizziness.   Psychiatric/Behavioral: Negative.     Allergic/Immunologic: Negative.    All other systems reviewed and are negative.    Objective:     Vital Signs (Most Recent):  Temp: 98.5 °F (36.9 °C) (03/14/24 1938)  Pulse: 95 (03/14/24 1938)  Resp: 17 (03/14/24 1938)  BP: (Abnormal) 187/88 (03/14/24 1938)  SpO2: (Abnormal) 93 % (03/14/24 1938) Vital Signs (24h Range):  Temp:  [97.9 °F (36.6 °C)-98.5 °F (36.9 °C)] 98.5 °F (36.9 °C)  Pulse:  [] 95  Resp:  [17-30] 17  SpO2:  [90 %-97 %] 93 %  BP: (143-193)/(65-91) 187/88     Weight: 89.4 kg (197 lb)  Body mass index is 34.9 kg/m².    SpO2: (Abnormal) 93 %       No intake or output data in the 24 hours ending 03/14/24 2011    Lines/Drains/Airways       Peripheral Intravenous  Line       Name Duration         Peripheral IV - Single Lumen 03/14/24 1008 20 G Right Forearm <1 day                     Physical Exam  Vitals and nursing note reviewed.   Constitutional:       Appearance: She is well-developed.   HENT:      Head: Normocephalic and atraumatic.   Eyes:      Conjunctiva/sclera: Conjunctivae normal.      Pupils: Pupils are equal, round, and reactive to light.   Cardiovascular:      Rate and Rhythm: Normal rate and regular rhythm.      Pulses: Intact distal pulses.      Heart sounds: Normal heart sounds.   Pulmonary:      Effort: Pulmonary effort is normal.      Breath sounds: Normal breath sounds.   Abdominal:      General: Bowel sounds are normal.      Palpations: Abdomen is soft.   Musculoskeletal:         General: Normal range of motion.      Cervical back: Normal range of motion and neck supple.   Skin:     General: Skin is warm and dry.   Neurological:      Mental Status: She is alert and oriented to person, place, and time.          Significant Labs: All pertinent lab results from the last 24 hours have been reviewed.    Significant Imaging: X-Ray: CXR: X-Ray Chest 1 View (CXR): No results found for this visit on 03/14/24.  Assessment and Plan:     * Chest pain  59 yo female with PMH CAD, PVCs, HTN, HLD, cerebral aneurysm, h/o CVA, DM II, asthma, GERD admitted for palpitations with associated CP ( atypical).  Pt states sx started 6 months ago worsening this past week. EKG is wnl limits troponins are normal.    Check echo  and monitor rhythm. If normal outpt stress and cardiac monitor can be scheduled        VTE Risk Mitigation (From admission, onward)           Ordered     enoxaparin injection 40 mg  Daily         03/14/24 1543     IP VTE HIGH RISK PATIENT  Once         03/14/24 1543     Place sequential compression device  Until discontinued         03/14/24 1543                    Thank you for your consult. I will follow-up with patient. Please contact us if you have any  additional questions.    Nabor Kolb MD  Cardiology   [facility] - Telemetry ([facility])

## 2024-03-15 NOTE — DISCHARGE SUMMARY
[facility] - Telemetry ([facility])  Hospital Medicine  Discharge Summary      Patient Name: Alexandra Goins  MRN: [id number]  [facility]: [id number]  Patient Class: OP- Observation  Admission Date: 3/14/2024  Hospital Length of Stay: 0 days  Discharge Date and Time:  03/15/2024 10:36 AM  Attending Physician: Bishop Manuel MD   Discharging Provider: Stacy Golden NP  Primary Care Provider: Perez Casiano MD    Primary Care Team: Networked reference to record PCT     HPI:   Ms. Goins is a 58-year-old female with past medical history of bipolar disorder, congestive heart failure, diabetes, hypercholesterol, hypertension, hypothyroid and COPD presented to the ER due to intermittent heart flutters.  She states that when she goes for walks she will suddenly get her heart pending we will fast and then gets readily fatigued requiring her to sit down she also feels like there is a vice over her heart that comes on suddenly and resolved suddenly.  Upon further questioning patient noted she is supposed to be on 4 L of oxygen due to her history of COPD but has not been on any oxygen at home.  While in the ER patient was noted to have oxygen saturation of 89% and was placed on nasal cannula oxygen at  3 L, resolving her hypoxia.  Patient previously followed with Dr. Hsu but states that she has not seen him in months.  She has not discussed this flutter feeling and chest pain with him as it did not occur the last time she followed up with him.  Cardiology has been consulted for evaluation and may require Holter monitor upon discharge if deemed necessary by Cardiology.  Hospital medicine will admit for observation.  She will be seen by Cardiology and will require a home O2 eval prior to discharge as she has not had home O2 for several months and does not have any takes at home.    * No surgery found *      Hospital Course:   Alexandra Goins is a 58 year old female with a PMH of bipolar disorder, CHF, Diabetes, hypercholesterol, HTN,  Hypothyroid, COPD (pt has had home O2 in the past but was returned it due to price) and tobacco use who was admitted for SOB and chest pain/palpitations.  Significant Labs: BNP WNL, Troponin WNL, and Flu/Covid Neg. CXR showed no acute infiltrates with mild cardiomegaly. EKG showed NSR with no acute ST changes. TTE showed EF of 60-65% with normal systolic/diastyolic function. No wall motion abnormalities. Pulmonary artery systolic pressure is 22mmHg. Trop remained negative.  Cardiology evaluated - atypical chest pain - recommended Holter monitor at home (will be set up by cardiology clinic) and stress test outpatient.  Patient seen and examined on day of discharge and deemed suitable to d/c to home.   She remains chest pain free, no complaints of palpitations and dyspnea resolved.   Of note patient states she drinks coffee and at least two energy drinks daily, discussed limiting caffeine intake.  Also discussed smoking cessation but she is not ready to stop at this time.   She will follow up outpatient cardiology with Dr. Chakraborty. Home O2 eval was performed and the pt does not need O2 on discharge. Pt was referred for home health.      Goals of Care Treatment Preferences:  Code Status: Full Code    Living Will: Yes              Consults:   Consults (From admission, onward)          Status Ordering Provider     Inpatient consult to Social Work  Once        Provider:  (Not yet assigned)    Ordered KELI MORELAND     Inpatient consult to Cardiology  Once        Provider:  Nabor Kolb MD    Completed DANNIE CHRISTIAN            No new Assessment & Plan notes have been filed under this hospital service since the last note was generated.  Service: Hospital Medicine    Final Active Diagnoses:    Diagnosis Date Noted POA    PRINCIPAL PROBLEM:  Chest pain [R07.9] 01/24/2017 Yes    Acute hypoxemic respiratory failure [J96.01] 03/14/2024 Yes    Smoking [F17.200] 11/16/2022 Yes    Asthma with COPD [J44.89] 09/26/2022  Yes    Type 2 diabetes mellitus with complication, with long-term current use of insulin [E11.8, Z79.4] 06/21/2018 Not Applicable     Chronic    Essential hypertension [I10] 08/31/2015 Yes     Chronic    Hypothyroidism due to acquired atrophy of thyroid [E03.4] 08/31/2015 Yes     Chronic    Bipolar disorder with anxiety and depression [F31.9] 08/30/2015 Yes     Chronic      Problems Resolved During this Admission:       Discharged Condition: good    Disposition: Home or Self Care    Follow Up:   Follow-up Information       Brennon Chakraborty MD. Schedule an appointment as soon as possible for a visit in 1 week(s).    Specialties: Cardiology, Cardiovascular Disease  Contact information:  [address] [facility] [address] [phone]               Perez Casiano MD. Schedule an appointment as soon as possible for a visit in 3 day(s).    Specialty: Family Medicine  Contact information:  [address] [facility] [address] [phone]                           Patient Instructions:      Ambulatory referral/consult to Home Health   Standing Status: Future   Referral Priority: Routine Referral Type: Home Health   Referral Reason: Specialty Services Required   Requested Specialty: Home Health Services   Number of Visits Requested: 1     Diet Cardiac     Activity as tolerated       Significant Diagnostic Studies: Labs: CMP   Recent Labs   Lab 03/14/24  1006 03/15/24  0438    142   K 4.2 4.1    105   CO2 29 26   GLU 88 155*   BUN 15 20   CREATININE 0.8 0.8   CALCIUM 10.0 9.2   PROT 7.5  --    ALBUMIN 3.9  --    BILITOT 0.3  --    ALKPHOS 71  --    AST 33  --    ALT 36  --    ANIONGAP 13 11    and CBC   Recent Labs   Lab 03/14/24  1006 03/15/24  0438   WBC 8.39 11.96   HGB 16.6* 15.7   HCT 51.1* 48.3    185       Pending Diagnostic Studies:       None           Medications:  Reconciled Home Medications:      Medication List        CONTINUE taking these medications      albuterol 90  mcg/actuation inhaler  Commonly known as: PROVENTIL/VENTOLIN HFA  INHALE 2 TO 4 PUFFS BY MOUTH THREE TIMES DAILY AS NEEDED FOR WHEEZING     albuterol-ipratropium 2.5 mg-0.5 mg/3 mL nebulizer solution  Commonly known as: DUO-NEB  Take 3 mLs by nebulization every 6 (six) hours as needed for Wheezing. Rescue     benztropine 0.5 MG tablet  Commonly known as: COGENTIN  Take 1 tablet (0.5 mg total) by mouth 2 (two) times daily. TAKE 1 TABLET BY MOUTH TWICE DAILY AS NEEDED FOR  DYSTONIA     blood sugar diagnostic Strp  Inject 1 each into the skin 4 (four) times daily. Dispense preferred on insurance     blood-glucose meter kit  Use as instructed - preferred on insurance     butalbital-acetaminophen-caffeine -40 mg -40 mg per tablet  Commonly known as: FIORICET, ESGIC  Take 1 tablet by mouth every 6 (six) hours as needed for Headaches. for pain.     desvenlafaxine succinate 50 MG Tb24  Commonly known as: PRISTIQ  Take 1 tablet by mouth once daily     diazePAM 10 MG Tab  Commonly known as: VALIUM  TAKE 1/2 TO 1 (ONE-HALF TO ONE) TABLET BY MOUTH THREE TIMES DAILY AS NEEDED FOR ANXIETY     furosemide 40 MG tablet  Commonly known as: LASIX  TAKE 1 TABLET BY MOUTH EVERY MONDAY, WEDNESDAY, AND FRIDAY AS NEEDED     gabapentin 600 MG tablet  Commonly known as: NEURONTIN  TAKE 1 CAPSULE BY MOUTH IN THE MORNING, 1 CAPSULE IN THE AFTERNOON, AND THEN 2 CAPSULES AT BEDTIME     insulin regular 100 unit/mL injection  Commonly known as: NovoLIN R Regular U100 Insulin  Inject 12 Units into the skin 3 (three) times daily before meals.     isosorbide mononitrate 30 MG 24 hr tablet  Commonly known as: IMDUR  Take 1 tablet by mouth once daily     lancets Misc  Inject 1 each into the skin 4 (four) times daily. Dispense preferred on insurance     metFORMIN 500 MG ER 24hr tablet  Commonly known as: GLUCOPHAGE-XR  TAKE 1 TABLET BY MOUTH TWICE DAILY WITH MEALS     mirtazapine 15 MG tablet  Commonly known as: REMERON  Take 1 tablet (15 mg  total) by mouth every evening.     promethazine 12.5 MG Tab  Commonly known as: PHENERGAN  Take 1 tablet (12.5 mg total) by mouth every 6 (six) hours as needed.     risperiDONE 1 MG tablet  Commonly known as: RISPERDAL  Take 1 tablet (1 mg total) by mouth 2 (two) times daily.     TOUJEO MAX U-300 SOLOSTAR 300 unit/mL (3 mL) insulin pen  Generic drug: insulin glargine U-300 conc  Inject 76 Units into the skin once daily.              Indwelling Lines/Drains at time of discharge:   Lines/Drains/Airways       None                   Time spent on the discharge of patient: <30 minutes         Stacy Golden NP  Department of Hospital Medicine  [facility] - Telemetry ([facility])

## 2024-03-15 NOTE — HPI
59 yo female with PMH CAD, PVCs, HTN, HLD, cerebral aneurysm, h/o CVA, DM II, asthma, GERD admitted for palpitations with associated CP ( atypical).  Pt states sx started 6 months ago worsening this past week. EKG is wnl limits troponins are normal.

## 2024-03-15 NOTE — SUBJECTIVE & OBJECTIVE
Past Medical History:   Diagnosis Date    Acute ischemic stroke 09/17/2019    Acute respiratory failure with hypoxia 02/11/2021    Aneurysm     Anxiety     Arthritis     Asthma     Bipolar 1 disorder     Cerebral aneurysm, nonruptured 09/19/2019    Chronic diastolic heart failure 05/23/2023    Coronary artery disease of native artery of native heart with stable angina pectoris 01/19/2022    Depression     Diabetes mellitus, type 2     BS 72 01/23/2024    Diverticular disease     GERD (gastroesophageal reflux disease)     Hyperlipemia     Hypertension     Hyponatremia 07/24/2022    Hypothyroidism     Pneumonia     PVD (peripheral vascular disease) 04/28/2021    Renal manifestation of secondary diabetes mellitus     Right-sided back pain 06/29/2019    Severe obesity (BMI 35.0-39.9) with comorbidity 05/31/2022    Stroke     Trouble in sleeping        Past Surgical History:   Procedure Laterality Date    CEREBRAL ANGIOGRAM N/A 10/28/2019    Procedure: ANGIOGRAM-CEREBRAL;  Surgeon: Dahiana Diagnostic Provider;  Location: [facility] OR [address] [place] [address];  Service: Radiology;  Laterality: N/A;  Rm 190/Aayush    CHOLECYSTECTOMY      COLON SURGERY      COLONOSCOPY N/A 1/12/2018    Procedure: COLONOSCOPY;  Surgeon: Roque Mahajan III, MD;  Location: [facility] ENDO;  Service: Endoscopy;  Laterality: N/A;    COLONOSCOPY N/A 10/13/2023    Procedure: COLONOSCOPY;  Surgeon: Thelma Chairez MD;  Location: [facility] ENDO;  Service: Endoscopy;  Laterality: N/A;    DENTAL SURGERY  05/21/2018    removal of 8 top teeth    HYSTERECTOMY      TONSILLECTOMY         Review of patient's allergies indicates:   Allergen Reactions    Seroquel [quetiapine] Other (See Comments)     TC SEIZURES    Adhesive Blisters     Pt states can't tolerate paper tape    Levomenol analogues     Lipitor [atorvastatin]      Leg Cramps    Repatha pushtronex [evolocumab]      Feels sick    Zofran [ondansetron hcl] Hives and Other (See Comments)     headaches    Celestone [betamethasone]  Hives, Itching and Rash    Levaquin [levofloxacin] Nausea Only    Piroxicam Diarrhea and Nausea Only       No current facility-administered medications on file prior to encounter.     Current Outpatient Medications on File Prior to Encounter   Medication Sig    benztropine (COGENTIN) 0.5 MG tablet Take 1 tablet (0.5 mg total) by mouth 2 (two) times daily. TAKE 1 TABLET BY MOUTH TWICE DAILY AS NEEDED FOR  DYSTONIA    butalbital-acetaminophen-caffeine -40 mg (FIORICET, ESGIC) -40 mg per tablet Take 1 tablet by mouth every 6 (six) hours as needed for Headaches. for pain.    desvenlafaxine succinate (PRISTIQ) 50 MG Tb24 Take 1 tablet by mouth once daily    diazePAM (VALIUM) 10 MG Tab TAKE 1/2 TO 1 (ONE-HALF TO ONE) TABLET BY MOUTH THREE TIMES DAILY AS NEEDED FOR ANXIETY    furosemide (LASIX) 40 MG tablet TAKE 1 TABLET BY MOUTH EVERY MONDAY, WEDNESDAY, AND FRIDAY AS NEEDED    gabapentin (NEURONTIN) 600 MG tablet TAKE 1 CAPSULE BY MOUTH IN THE MORNING, 1 CAPSULE IN THE AFTERNOON, AND THEN 2 CAPSULES AT BEDTIME    insulin glargine U-300 conc (TOUJEO MAX U-300 SOLOSTAR) 300 unit/mL (3 mL) insulin pen Inject 76 Units into the skin once daily.    insulin regular (NOVOLIN R REGULAR U100 INSULIN) 100 unit/mL Inj injection Inject 12 Units into the skin 3 (three) times daily before meals.    isosorbide mononitrate (IMDUR) 30 MG 24 hr tablet Take 1 tablet by mouth once daily    metFORMIN (GLUCOPHAGE-XR) 500 MG ER 24hr tablet TAKE 1 TABLET BY MOUTH TWICE DAILY WITH MEALS    mirtazapine (REMERON) 15 MG tablet Take 1 tablet (15 mg total) by mouth every evening.    risperiDONE (RISPERDAL) 1 MG tablet Take 1 tablet (1 mg total) by mouth 2 (two) times daily.    albuterol (PROVENTIL/VENTOLIN HFA) 90 mcg/actuation inhaler INHALE 2 TO 4 PUFFS BY MOUTH THREE TIMES DAILY AS NEEDED FOR WHEEZING    albuterol-ipratropium (DUO-NEB) 2.5 mg-0.5 mg/3 mL nebulizer solution Take 3 mLs by nebulization every 6 (six) hours as needed for  "Wheezing. Rescue    blood sugar diagnostic Strp Inject 1 each into the skin 4 (four) times daily. Dispense preferred on insurance    blood-glucose meter kit Use as instructed - preferred on insurance    lancets Misc Inject 1 each into the skin 4 (four) times daily. Dispense preferred on insurance    promethazine (PHENERGAN) 12.5 MG Tab Take 1 tablet (12.5 mg total) by mouth every 6 (six) hours as needed.    [DISCONTINUED] ezetimibe (ZETIA) 10 mg tablet Take 1 tablet (10 mg total) by mouth once daily.    [DISCONTINUED] metoprolol succinate (TOPROL-XL) 25 MG 24 hr tablet Take 1 tablet by mouth once daily    [DISCONTINUED] pen needle, diabetic 32 gauge x 5/32" Ndle Inject 1 each into the skin once daily.    [DISCONTINUED] sulfamethoxazole-trimethoprim 800-160mg (BACTRIM DS) 800-160 mg Tab Take 1 tablet by mouth 2 (two) times daily.     Family History       Problem Relation (Age of Onset)    Alcohol abuse Mother, Father    Arthritis Father, Maternal Grandmother, Paternal Grandmother    Breast cancer Maternal Grandmother    COPD Mother, Sister    Cancer Father, Maternal Aunt, Maternal Uncle, Paternal Aunt, Paternal Uncle, Paternal Grandmother    Depression Mother    Diabetes Maternal Uncle    Drug abuse Mother, Maternal Uncle    Heart disease Father, Brother    Hypertension Father, Brother, Maternal Grandmother, Paternal Grandfather    Kidney failure Sister          Tobacco Use    Smoking status: Every Day     Current packs/day: 1.00     Average packs/day: 1 pack/day for 46.2 years (46.2 total pack years)     Types: Cigarettes, Vaping w/o nicotine     Start date: 1978    Smokeless tobacco: Never    Tobacco comments:     On average, smokes 10 cigarettes per day.    Substance and Sexual Activity    Alcohol use: Not Currently    Drug use: Yes     Types: Marijuana    Sexual activity: Yes     Review of Systems   Constitutional: Negative. Negative for weight gain.   HENT: Negative.     Eyes: Negative.    Cardiovascular: " Negative.  Negative for chest pain, leg swelling and palpitations.   Respiratory: Negative.  Negative for shortness of breath.    Endocrine: Negative.    Hematologic/Lymphatic: Negative.    Skin: Negative.    Musculoskeletal:  Negative for muscle weakness.   Gastrointestinal: Negative.    Genitourinary: Negative.    Neurological: Negative.  Negative for dizziness.   Psychiatric/Behavioral: Negative.     Allergic/Immunologic: Negative.    All other systems reviewed and are negative.    Objective:     Vital Signs (Most Recent):  Temp: 98.5 °F (36.9 °C) (03/14/24 1938)  Pulse: 95 (03/14/24 1938)  Resp: 17 (03/14/24 1938)  BP: (Abnormal) 187/88 (03/14/24 1938)  SpO2: (Abnormal) 93 % (03/14/24 1938) Vital Signs (24h Range):  Temp:  [97.9 °F (36.6 °C)-98.5 °F (36.9 °C)] 98.5 °F (36.9 °C)  Pulse:  [] 95  Resp:  [17-30] 17  SpO2:  [90 %-97 %] 93 %  BP: (143-193)/(65-91) 187/88     Weight: 89.4 kg (197 lb)  Body mass index is 34.9 kg/m².    SpO2: (Abnormal) 93 %       No intake or output data in the 24 hours ending 03/14/24 2011    Lines/Drains/Airways       Peripheral Intravenous Line       Name Duration         Peripheral IV - Single Lumen 03/14/24 1008 20 G Right Forearm <1 day                     Physical Exam  Vitals and nursing note reviewed.   Constitutional:       Appearance: She is well-developed.   HENT:      Head: Normocephalic and atraumatic.   Eyes:      Conjunctiva/sclera: Conjunctivae normal.      Pupils: Pupils are equal, round, and reactive to light.   Cardiovascular:      Rate and Rhythm: Normal rate and regular rhythm.      Pulses: Intact distal pulses.      Heart sounds: Normal heart sounds.   Pulmonary:      Effort: Pulmonary effort is normal.      Breath sounds: Normal breath sounds.   Abdominal:      General: Bowel sounds are normal.      Palpations: Abdomen is soft.   Musculoskeletal:         General: Normal range of motion.      Cervical back: Normal range of motion and neck supple.   Skin:      General: Skin is warm and dry.   Neurological:      Mental Status: She is alert and oriented to person, place, and time.          Significant Labs: All pertinent lab results from the last 24 hours have been reviewed.    Significant Imaging: X-Ray: CXR: X-Ray Chest 1 View (CXR): No results found for this visit on 03/14/24.

## 2024-03-16 DIAGNOSIS — E11.42 DIABETIC POLYNEUROPATHY ASSOCIATED WITH TYPE 2 DIABETES MELLITUS: ICD-10-CM

## 2024-03-18 ENCOUNTER — TELEPHONE (OUTPATIENT)
Dept: CARDIOLOGY | Facility: HOSPITAL | Age: 59
End: 2024-03-18
Payer: MEDICARE

## 2024-03-18 ENCOUNTER — PATIENT OUTREACH (OUTPATIENT)
Dept: ADMINISTRATIVE | Facility: CLINIC | Age: 59
End: 2024-03-18
Payer: MEDICARE

## 2024-03-18 DIAGNOSIS — Z86.73 HX OF ARTERIAL ISCHEMIC STROKE: Primary | Chronic | ICD-10-CM

## 2024-03-18 RX ORDER — BLOOD-GLUCOSE METER
EACH MISCELLANEOUS
Qty: 1 EACH | Refills: 0 | Status: SHIPPED | OUTPATIENT
Start: 2024-03-18 | End: 2024-06-17

## 2024-03-18 NOTE — PROGRESS NOTES
C3 nurse spoke with Alexandra Goins for a TCC post hospital discharge follow up call. The patient has a scheduled HOSFU appointment with Perez Casiano MD on 3/21/24 @ 10:00am.

## 2024-03-20 ENCOUNTER — PATIENT MESSAGE (OUTPATIENT)
Dept: CARDIOLOGY | Facility: HOSPITAL | Age: 59
End: 2024-03-20
Payer: MEDICARE

## 2024-03-20 ENCOUNTER — TELEPHONE (OUTPATIENT)
Dept: CARDIOLOGY | Facility: HOSPITAL | Age: 59
End: 2024-03-20
Payer: MEDICARE

## 2024-03-20 PROBLEM — D58.2 ELEVATED HEMOGLOBIN: Status: ACTIVE | Noted: 2024-03-20

## 2024-03-20 PROBLEM — E66.01 MORBID (SEVERE) OBESITY DUE TO EXCESS CALORIES: Status: ACTIVE | Noted: 2024-03-20

## 2024-03-20 PROBLEM — J96.10 CHRONIC RESPIRATORY FAILURE, UNSPECIFIED WHETHER WITH HYPOXIA OR HYPERCAPNIA: Status: ACTIVE | Noted: 2024-03-20

## 2024-03-21 ENCOUNTER — OFFICE VISIT (OUTPATIENT)
Dept: FAMILY MEDICINE | Facility: CLINIC | Age: 59
End: 2024-03-21
Payer: MEDICARE

## 2024-03-21 VITALS
WEIGHT: 198.06 LBS | BODY MASS INDEX: 35.09 KG/M2 | OXYGEN SATURATION: 95 % | SYSTOLIC BLOOD PRESSURE: 129 MMHG | DIASTOLIC BLOOD PRESSURE: 61 MMHG | HEART RATE: 88 BPM | HEIGHT: 63 IN | TEMPERATURE: 98 F

## 2024-03-21 DIAGNOSIS — I69.952 HEMIPLEGIA AND HEMIPARESIS FOLLOWING UNSPECIFIED CEREBROVASCULAR DISEASE AFFECTING LEFT DOMINANT SIDE: ICD-10-CM

## 2024-03-21 DIAGNOSIS — F17.213 CIGARETTE NICOTINE DEPENDENCE WITH WITHDRAWAL: ICD-10-CM

## 2024-03-21 DIAGNOSIS — J44.89 ASTHMA WITH COPD: ICD-10-CM

## 2024-03-21 DIAGNOSIS — Z79.4 TYPE 2 DIABETES MELLITUS WITH COMPLICATION, WITH LONG-TERM CURRENT USE OF INSULIN: ICD-10-CM

## 2024-03-21 DIAGNOSIS — I50.32 CHRONIC DIASTOLIC HEART FAILURE: ICD-10-CM

## 2024-03-21 DIAGNOSIS — I10 ESSENTIAL HYPERTENSION: Chronic | ICD-10-CM

## 2024-03-21 DIAGNOSIS — D58.2 ELEVATED HEMOGLOBIN: ICD-10-CM

## 2024-03-21 DIAGNOSIS — I73.9 PVD (PERIPHERAL VASCULAR DISEASE): ICD-10-CM

## 2024-03-21 DIAGNOSIS — E66.01 MORBID (SEVERE) OBESITY DUE TO EXCESS CALORIES: ICD-10-CM

## 2024-03-21 DIAGNOSIS — E03.4 HYPOTHYROIDISM DUE TO ACQUIRED ATROPHY OF THYROID: Chronic | ICD-10-CM

## 2024-03-21 DIAGNOSIS — E11.8 TYPE 2 DIABETES MELLITUS WITH COMPLICATION, WITH LONG-TERM CURRENT USE OF INSULIN: ICD-10-CM

## 2024-03-21 DIAGNOSIS — J96.10 CHRONIC RESPIRATORY FAILURE, UNSPECIFIED WHETHER WITH HYPOXIA OR HYPERCAPNIA: ICD-10-CM

## 2024-03-21 DIAGNOSIS — I25.118 CORONARY ARTERY DISEASE OF NATIVE ARTERY OF NATIVE HEART WITH STABLE ANGINA PECTORIS: ICD-10-CM

## 2024-03-21 DIAGNOSIS — J96.01 ACUTE HYPOXEMIC RESPIRATORY FAILURE: Primary | ICD-10-CM

## 2024-03-21 PROCEDURE — 1159F MED LIST DOCD IN RCRD: CPT | Mod: CPTII,S$GLB,, | Performed by: FAMILY MEDICINE

## 2024-03-21 PROCEDURE — 3044F HG A1C LEVEL LT 7.0%: CPT | Mod: CPTII,S$GLB,, | Performed by: FAMILY MEDICINE

## 2024-03-21 PROCEDURE — 99999 PR PBB SHADOW E&M-EST. PATIENT-LVL IV: CPT | Mod: PBBFAC,,, | Performed by: FAMILY MEDICINE

## 2024-03-21 PROCEDURE — 3074F SYST BP LT 130 MM HG: CPT | Mod: CPTII,S$GLB,, | Performed by: FAMILY MEDICINE

## 2024-03-21 PROCEDURE — 99214 OFFICE O/P EST MOD 30 MIN: CPT | Mod: S$GLB,,, | Performed by: FAMILY MEDICINE

## 2024-03-21 PROCEDURE — 3078F DIAST BP <80 MM HG: CPT | Mod: CPTII,S$GLB,, | Performed by: FAMILY MEDICINE

## 2024-03-21 PROCEDURE — 3008F BODY MASS INDEX DOCD: CPT | Mod: CPTII,S$GLB,, | Performed by: FAMILY MEDICINE

## 2024-03-21 RX ORDER — FUROSEMIDE 40 MG/1
40 TABLET ORAL DAILY
Qty: 45 TABLET | Refills: 2 | Status: SHIPPED | OUTPATIENT
Start: 2024-03-21

## 2024-03-21 RX ORDER — ISOSORBIDE MONONITRATE 30 MG/1
30 TABLET, EXTENDED RELEASE ORAL DAILY
Qty: 90 TABLET | Refills: 2 | Status: SHIPPED | OUTPATIENT
Start: 2024-03-21

## 2024-03-21 NOTE — PROGRESS NOTES
Subjective:       Patient ID: Alexandra Goins is a 58 y.o. female.    Chief Complaint: Hospital Follow Up      HPI Comments:       Current Outpatient Medications:     ACCU-CHEK GUIDE GLUCOSE METER Misc, USE AS DIRECTED, Disp: 1 each, Rfl: 0    albuterol (PROVENTIL/VENTOLIN HFA) 90 mcg/actuation inhaler, INHALE 2 TO 4 PUFFS BY MOUTH THREE TIMES DAILY AS NEEDED FOR WHEEZING, Disp: 18 g, Rfl: 3    benztropine (COGENTIN) 0.5 MG tablet, Take 1 tablet (0.5 mg total) by mouth 2 (two) times daily. TAKE 1 TABLET BY MOUTH TWICE DAILY AS NEEDED FOR  DYSTONIA, Disp: 60 tablet, Rfl: 2    blood sugar diagnostic Strp, Inject 1 each into the skin 4 (four) times daily. Dispense preferred on insurance, Disp: 150 strip, Rfl: 6    butalbital-acetaminophen-caffeine -40 mg (FIORICET, ESGIC) -40 mg per tablet, Take 1 tablet by mouth every 6 (six) hours as needed for Headaches. for pain., Disp: 30 tablet, Rfl: 0    desvenlafaxine succinate (PRISTIQ) 50 MG Tb24, Take 1 tablet by mouth once daily, Disp: 30 tablet, Rfl: 3    diazePAM (VALIUM) 10 MG Tab, TAKE 1/2 TO 1 (ONE-HALF TO ONE) TABLET BY MOUTH THREE TIMES DAILY AS NEEDED FOR ANXIETY, Disp: 90 tablet, Rfl: 2    gabapentin (NEURONTIN) 600 MG tablet, TAKE 1 CAPSULE BY MOUTH IN THE MORNING, 1 CAPSULE IN THE AFTERNOON, AND THEN 2 CAPSULES AT BEDTIME, Disp: 360 tablet, Rfl: 1    insulin glargine U-300 conc (TOUJEO MAX U-300 SOLOSTAR) 300 unit/mL (3 mL) insulin pen, Inject 76 Units into the skin once daily., Disp: 4 pen , Rfl: 3    insulin regular (NOVOLIN R REGULAR U100 INSULIN) 100 unit/mL Inj injection, Inject 12 Units into the skin 3 (three) times daily before meals., Disp: 10 mL, Rfl: 0    lancets Misc, Inject 1 each into the skin 4 (four) times daily. Dispense preferred on insurance, Disp: 150 each, Rfl: 6    metFORMIN (GLUCOPHAGE-XR) 500 MG ER 24hr tablet, TAKE 1 TABLET BY MOUTH TWICE DAILY WITH MEALS, Disp: 180 tablet, Rfl: 0    mirtazapine (REMERON) 15 MG tablet, Take 1  "tablet (15 mg total) by mouth every evening., Disp: 30 tablet, Rfl: 2    promethazine (PHENERGAN) 12.5 MG Tab, Take 1 tablet (12.5 mg total) by mouth every 6 (six) hours as needed., Disp: 32 tablet, Rfl: 0    risperiDONE (RISPERDAL) 1 MG tablet, Take 1 tablet (1 mg total) by mouth 2 (two) times daily., Disp: 60 tablet, Rfl: 2    albuterol-ipratropium (DUO-NEB) 2.5 mg-0.5 mg/3 mL nebulizer solution, Take 3 mLs by nebulization every 6 (six) hours as needed for Wheezing. Rescue, Disp: 1 Box, Rfl: 0    furosemide (LASIX) 40 MG tablet, Take 1 tablet (40 mg total) by mouth once daily., Disp: 45 tablet, Rfl: 2    isosorbide mononitrate (IMDUR) 30 MG 24 hr tablet, Take 1 tablet (30 mg total) by mouth once daily., Disp: 90 tablet, Rfl: 2      Transitional Care Note    Family and/or Caretaker present at visit?  Yes.  Diagnostic tests reviewed/disposition: No diagnosic tests pending after this hospitalization.  Disease/illness education: COPD/respiratory failure  Home health/community services discussion/referrals: Patient does not have home health established from hospital visit.  They do not need home health.  If needed, we will set up home health for the patient.   Establishment or re-establishment of referral orders for community resources: No other necessary community resources.   Discussion with other health care providers: No discussion with other health care providers necessary.                "Ms. Goins is a 58-year-old female with past medical history of bipolar disorder, congestive heart failure, diabetes, hypercholesterol, hypertension, hypothyroid and COPD presented to the ER due to intermittent heart flutters.  She states that when she goes for walks she will suddenly get her heart pending we will fast and then gets readily fatigued requiring her to sit down she also feels like there is a vice over her heart that comes on suddenly and resolved suddenly.  Upon further questioning patient noted she is supposed to " be on 4 L of oxygen due to her history of COPD but has not been on any oxygen at home.  While in the ER patient was noted to have oxygen saturation of 89% and was placed on nasal cannula oxygen at  3 L, resolving her hypoxia.  Patient previously followed with Dr. Hsu but states that she has not seen him in months.  She has not discussed this flutter feeling and chest pain with him as it did not occur the last time she followed up with him.  Cardiology has been consulted for evaluation and may require Holter monitor upon discharge if deemed necessary by Cardiology.  Hospital medicine will admit for observation.  She will be seen by Cardiology and will require a home O2 eval prior to discharge as she has not had home O2 for several months and does not have any takes at home.     Hospital Course:   Alexandra Goins is a 58 year old female with a PMH of bipolar disorder, CHF, Diabetes, hypercholesterol, HTN, Hypothyroid, COPD (pt has had home O2 in the past but was returned it due to price) and tobacco use who was admitted for SOB and chest pain/palpitations.  Significant Labs: BNP WNL, Troponin WNL, and Flu/Covid Neg. CXR showed no acute infiltrates with mild cardiomegaly. EKG showed NSR with no acute ST changes. TTE showed EF of 60-65% with normal systolic/diastyolic function. No wall motion abnormalities. Pulmonary artery systolic pressure is 22mmHg. Trop remained negative.  Cardiology evaluated - atypical chest pain - recommended Holter monitor at home (will be set up by cardiology clinic) and stress test outpatient.  Patient seen and examined on day of discharge and deemed suitable to d/c to home.   She remains chest pain free, no complaints of palpitations and dyspnea resolved.   Of note patient states she drinks coffee and at least two energy drinks daily, discussed limiting caffeine intake.  Also discussed smoking cessation but she is not ready to stop at this time.   She will follow up outpatient cardiology  "with Dr. Chakraborty. Home O2 eval was performed and the pt does not need O2 on discharge. Pt was referred for home health."      Her  has returned to the seen in his helping her micro manage her healthcare.  He is managed to get her A1c down to 6.6 after double digit numbers not too long ago.  She is out of Imdur and Lasix.  These were sent in today.      He has follow-up appointments with cardiology and Holter monitoring next week.    Says she does not need her oxygen always at home.  They check O2 sats and if it is in the 90s they let her go without.    She complains of nasal stuffiness and headache.  Recommend Coricidin HBP for symptoms.      Review of Systems   Constitutional:  Negative for activity change, appetite change and fever.   HENT:  Positive for congestion, postnasal drip, rhinorrhea and sinus pressure. Negative for sore throat.    Respiratory:  Positive for cough. Negative for shortness of breath and wheezing.    Cardiovascular:  Negative for chest pain.   Gastrointestinal:  Negative for abdominal pain, diarrhea and nausea.   Genitourinary:  Negative for difficulty urinating.   Musculoskeletal:  Negative for arthralgias and myalgias.   Neurological:  Negative for dizziness and headaches.       Objective:      Vitals:    03/21/24 0946 03/21/24 0953   BP: (!) 148/82 129/61   Pulse: 88    Temp: 97.5 °F (36.4 °C)    SpO2: 95%    Weight: 89.8 kg (198 lb 1.3 oz)    Height: 5' 3" (1.6 m)    PainSc: 0-No pain      Physical Exam  Vitals and nursing note reviewed.   Constitutional:       General: She is not in acute distress.     Appearance: She is well-developed. She is not diaphoretic.   HENT:      Head: Normocephalic.      Mouth/Throat:      Pharynx: No oropharyngeal exudate.   Neck:      Thyroid: No thyromegaly.   Cardiovascular:      Rate and Rhythm: Normal rate and regular rhythm.      Heart sounds: Normal heart sounds. No murmur heard.  Pulmonary:      Effort: Pulmonary effort is normal.      Breath " sounds: Normal breath sounds. No wheezing or rales.   Abdominal:      General: There is no distension.      Palpations: Abdomen is soft.   Musculoskeletal:      Cervical back: Neck supple.   Lymphadenopathy:      Cervical: No cervical adenopathy.   Skin:     General: Skin is warm and dry.   Neurological:      Mental Status: She is alert and oriented to person, place, and time.   Psychiatric:         Behavior: Behavior normal.         Thought Content: Thought content normal.         Judgment: Judgment normal.         Assessment:       1. Acute hypoxemic respiratory failure    2. Asthma with COPD    3. Morbid (severe) obesity due to excess calories    4. Chronic respiratory failure, unspecified whether with hypoxia or hypercapnia    5. Elevated hemoglobin    6. Chronic diastolic heart failure    7. Hemiplegia and hemiparesis following unspecified cerebrovascular disease affecting left dominant side    8. Coronary artery disease of native artery of native heart with stable angina pectoris    9. PVD (peripheral vascular disease)    10. Type 2 diabetes mellitus with complication, with long-term current use of insulin    11. Hypothyroidism due to acquired atrophy of thyroid    12. Essential hypertension    13. Cigarette nicotine dependence with withdrawal        Plan:   Acute hypoxemic respiratory failure  Comments:  Resolved.  Was not using home O2    Asthma with COPD  Comments:  albuterol prn    Morbid (severe) obesity due to excess calories  Comments:  continue physical activity    Chronic respiratory failure, unspecified whether with hypoxia or hypercapnia  Comments:  Now with home O2    Elevated hemoglobin  Comments:  Improved to 15.7    Chronic diastolic heart failure    Hemiplegia and hemiparesis following unspecified cerebrovascular disease affecting left dominant side  Comments:  no change in baseline symptoms    Coronary artery disease of native artery of native heart with stable angina  pectoris  Comments:  Holter monitor pending    PVD (peripheral vascular disease)  Comments:  no edema    Type 2 diabetes mellitus with complication, with long-term current use of insulin  Comments:  A1c much mproved to 6.6!    Hypothyroidism due to acquired atrophy of thyroid  Comments:  Euthyroid on current dose    Essential hypertension  Comments:  stable    Cigarette nicotine dependence with withdrawal  Comments:  2/3 ppd    Other orders  -     isosorbide mononitrate (IMDUR) 30 MG 24 hr tablet; Take 1 tablet (30 mg total) by mouth once daily.  Dispense: 90 tablet; Refill: 2  -     furosemide (LASIX) 40 MG tablet; Take 1 tablet (40 mg total) by mouth once daily.  Dispense: 45 tablet; Refill: 2

## 2024-03-27 ENCOUNTER — OFFICE VISIT (OUTPATIENT)
Dept: HOME HEALTH SERVICES | Facility: CLINIC | Age: 59
End: 2024-03-27
Payer: MEDICARE

## 2024-03-27 VITALS
HEART RATE: 77 BPM | RESPIRATION RATE: 18 BRPM | WEIGHT: 198 LBS | HEIGHT: 63 IN | SYSTOLIC BLOOD PRESSURE: 120 MMHG | DIASTOLIC BLOOD PRESSURE: 70 MMHG | TEMPERATURE: 98 F | BODY MASS INDEX: 35.08 KG/M2 | OXYGEN SATURATION: 95 %

## 2024-03-27 DIAGNOSIS — F12.10 MARIJUANA ABUSE: Chronic | ICD-10-CM

## 2024-03-27 DIAGNOSIS — E78.5 HYPERLIPIDEMIA, UNSPECIFIED HYPERLIPIDEMIA TYPE: ICD-10-CM

## 2024-03-27 DIAGNOSIS — I50.32 CHRONIC DIASTOLIC HEART FAILURE: ICD-10-CM

## 2024-03-27 DIAGNOSIS — E66.01 MORBID (SEVERE) OBESITY DUE TO EXCESS CALORIES: ICD-10-CM

## 2024-03-27 DIAGNOSIS — E11.65 TYPE 2 DIABETES MELLITUS WITH HYPERGLYCEMIA, WITH LONG-TERM CURRENT USE OF INSULIN: ICD-10-CM

## 2024-03-27 DIAGNOSIS — E03.4 HYPOTHYROIDISM DUE TO ACQUIRED ATROPHY OF THYROID: Chronic | ICD-10-CM

## 2024-03-27 DIAGNOSIS — J44.89 ASTHMA WITH COPD: ICD-10-CM

## 2024-03-27 DIAGNOSIS — Z79.4 TYPE 2 DIABETES MELLITUS WITH HYPERGLYCEMIA, WITH LONG-TERM CURRENT USE OF INSULIN: ICD-10-CM

## 2024-03-27 DIAGNOSIS — E78.5 DYSLIPIDEMIA ASSOCIATED WITH TYPE 2 DIABETES MELLITUS: ICD-10-CM

## 2024-03-27 DIAGNOSIS — E11.69 DYSLIPIDEMIA ASSOCIATED WITH TYPE 2 DIABETES MELLITUS: ICD-10-CM

## 2024-03-27 DIAGNOSIS — I25.118 CORONARY ARTERY DISEASE OF NATIVE ARTERY OF NATIVE HEART WITH STABLE ANGINA PECTORIS: Chronic | ICD-10-CM

## 2024-03-27 DIAGNOSIS — I67.1 CEREBRAL ANEURYSM, NONRUPTURED: ICD-10-CM

## 2024-03-27 DIAGNOSIS — Z78.9 STATIN INTOLERANCE: ICD-10-CM

## 2024-03-27 DIAGNOSIS — Z86.73 HX OF ARTERIAL ISCHEMIC STROKE: Primary | Chronic | ICD-10-CM

## 2024-03-27 DIAGNOSIS — Z13.31 POSITIVE DEPRESSION SCREENING: ICD-10-CM

## 2024-03-27 DIAGNOSIS — I10 ESSENTIAL HYPERTENSION: Chronic | ICD-10-CM

## 2024-03-27 DIAGNOSIS — Z82.49 FAMILY HISTORY OF ARTERIOSCLEROTIC CARDIOVASCULAR DISEASE: ICD-10-CM

## 2024-03-27 DIAGNOSIS — Z79.4 TYPE 2 DIABETES MELLITUS WITH COMPLICATION, WITH LONG-TERM CURRENT USE OF INSULIN: Chronic | ICD-10-CM

## 2024-03-27 DIAGNOSIS — I20.89 STABLE ANGINA PECTORIS: ICD-10-CM

## 2024-03-27 DIAGNOSIS — F41.9 ANXIETY: ICD-10-CM

## 2024-03-27 DIAGNOSIS — Z86.010 PERSONAL HISTORY OF COLONIC POLYPS: ICD-10-CM

## 2024-03-27 DIAGNOSIS — Z92.82 S/P ADMN TPA IN DIFF FAC W/N LAST 24 HR BEF ADM TO CRNT FAC: ICD-10-CM

## 2024-03-27 DIAGNOSIS — R53.81 DEBILITY: ICD-10-CM

## 2024-03-27 DIAGNOSIS — F31.10 BIPOLAR AFFECTIVE DISORDER, CURRENT EPISODE MANIC, CURRENT EPISODE SEVERITY UNSPECIFIED: Chronic | ICD-10-CM

## 2024-03-27 DIAGNOSIS — J96.10 CHRONIC RESPIRATORY FAILURE, UNSPECIFIED WHETHER WITH HYPOXIA OR HYPERCAPNIA: ICD-10-CM

## 2024-03-27 DIAGNOSIS — I73.9 PVD (PERIPHERAL VASCULAR DISEASE): ICD-10-CM

## 2024-03-27 DIAGNOSIS — F17.200 SMOKING: ICD-10-CM

## 2024-03-27 DIAGNOSIS — E11.8 TYPE 2 DIABETES MELLITUS WITH COMPLICATION, WITH LONG-TERM CURRENT USE OF INSULIN: Chronic | ICD-10-CM

## 2024-03-27 DIAGNOSIS — T46.6X5A STATIN MYOPATHY: ICD-10-CM

## 2024-03-27 DIAGNOSIS — F33.1 MODERATE EPISODE OF RECURRENT MAJOR DEPRESSIVE DISORDER: Chronic | ICD-10-CM

## 2024-03-27 DIAGNOSIS — F17.213 CIGARETTE NICOTINE DEPENDENCE WITH WITHDRAWAL: ICD-10-CM

## 2024-03-27 DIAGNOSIS — E11.42 DIABETIC POLYNEUROPATHY ASSOCIATED WITH TYPE 2 DIABETES MELLITUS: ICD-10-CM

## 2024-03-27 DIAGNOSIS — G72.0 STATIN MYOPATHY: ICD-10-CM

## 2024-03-27 PROBLEM — J96.01 ACUTE HYPOXEMIC RESPIRATORY FAILURE: Status: RESOLVED | Noted: 2024-03-14 | Resolved: 2024-03-27

## 2024-03-27 PROBLEM — R53.1 ACUTE RIGHT-SIDED WEAKNESS: Status: RESOLVED | Noted: 2019-09-17 | Resolved: 2024-03-27

## 2024-03-27 PROBLEM — N30.00 ACUTE CYSTITIS WITHOUT HEMATURIA: Status: RESOLVED | Noted: 2023-11-11 | Resolved: 2024-03-27

## 2024-03-27 PROBLEM — I69.952: Status: RESOLVED | Noted: 2020-01-21 | Resolved: 2024-03-27

## 2024-03-27 PROCEDURE — 3078F DIAST BP <80 MM HG: CPT | Mod: CPTII,S$GLB,, | Performed by: NURSE PRACTITIONER

## 2024-03-27 PROCEDURE — 1159F MED LIST DOCD IN RCRD: CPT | Mod: CPTII,S$GLB,, | Performed by: NURSE PRACTITIONER

## 2024-03-27 PROCEDURE — 3044F HG A1C LEVEL LT 7.0%: CPT | Mod: CPTII,S$GLB,, | Performed by: NURSE PRACTITIONER

## 2024-03-27 PROCEDURE — G0438 PPPS, INITIAL VISIT: HCPCS | Mod: S$GLB,,, | Performed by: NURSE PRACTITIONER

## 2024-03-27 PROCEDURE — 3074F SYST BP LT 130 MM HG: CPT | Mod: CPTII,S$GLB,, | Performed by: NURSE PRACTITIONER

## 2024-03-27 PROCEDURE — 1160F RVW MEDS BY RX/DR IN RCRD: CPT | Mod: CPTII,S$GLB,, | Performed by: NURSE PRACTITIONER

## 2024-03-27 NOTE — PROGRESS NOTES
"  Alexandra Goins presented for an initial Medicare AWV today. The following components were reviewed and updated:    Medical history  Family History  Social history  Allergies and Current Medications  Health Risk Assessment  Health Maintenance  Care Team    **See Completed Assessments for Annual Wellness visit with in the encounter summary    The following assessments were completed:  Depression Screening  Cognitive function Screening  Timed Get Up Test  Whisper Test      Opioid documentation:      Patient does not have a current opioid prescription.          Vitals:    03/27/24 1053   BP: 120/70   Pulse: 77   Resp: 18   Temp: 97.9 °F (36.6 °C)   SpO2: 95%   Weight: 89.8 kg (198 lb)   Height: 5' 3" (1.6 m)     Body mass index is 35.07 kg/m².       Physical Exam  Constitutional:       General: She is not in acute distress.     Appearance: She is obese.   HENT:      Head: Normocephalic and atraumatic.      Ears:      Comments: Hard of hearing     Nose: Nose normal.      Mouth/Throat:      Mouth: Mucous membranes are moist.      Pharynx: Oropharynx is clear.   Cardiovascular:      Rate and Rhythm: Normal rate and regular rhythm.      Pulses: Normal pulses.      Heart sounds: Normal heart sounds.   Pulmonary:      Effort: Pulmonary effort is normal.      Comments: Crackles to the left lower lobe  Abdominal:      General: Bowel sounds are normal.      Palpations: Abdomen is soft.   Musculoskeletal:      Cervical back: Neck supple.      Right lower leg: No edema.      Left lower leg: No edema.   Skin:     General: Skin is warm and dry.      Capillary Refill: Capillary refill takes less than 2 seconds.   Neurological:      Mental Status: She is alert and oriented to person, place, and time.   Psychiatric:         Attention and Perception: Attention normal.         Mood and Affect: Affect is flat.         Speech: Speech normal.         Behavior: Behavior is slowed. Behavior is cooperative.         Thought Content: Thought " content normal.         Judgment: Judgment normal.           Diagnoses and health risks identified today and associated recommendations/orders:  1. Hx of arterial ischemic stroke  Chronic and stable. Continue current management.  Follow up with neurology as insturcted     2. Diabetic polyneuropathy associated with type 2 diabetes mellitus  Chronic and stable. Continue current management with gabapentin 600 mg PO BID and 1200 mg PO QHS.    Follow up with PCP.     3. Cerebral aneurysm, nonruptured  Chronic and stable. Follow up with neurology as instructed for monitoring.     4. Marijuana abuse  Stop using marijuana.     5. Bipolar affective disorder, current episode manic, current episode severity unspecified  Chronic and stable. Continue current management with desvenlafaxine 50 mg PO QD, mirtazpine 15 mg PO QHS, risperidone 1 mg PO BID and benztropine 0.5 mg PO BID.  Follow up with Psychiatrist as instructed.     6. Anxiety  Chronic and stable. Continue current management with prescribed medications (see above) and diazepam 10 mg 1/2 to 1 tablet PO TID.  Follow up with Psychiatrist as instructed.    7. Chronic respiratory failure, unspecified whether with hypoxia or hypercapnia  Chronic and stable. Continue current management with duoneb nebulizer PRN as prescribed and albuterol rescue inhaler PRN as prescribed, Follow up with PCP as instructed.     8. Asthma with COPD  Chronic and stable. Continue current management with duoneb nebulizer PRN as prescribed and albuterol rescue inhaler PRN as prescribed, Follow up with PCP as instructed.    9. PVD (peripheral vascular disease)  Chronic and stable. Continue current management. See med list above. Follow up with PCP.     10. Hyperlipidemia, unspecified hyperlipidemia type  Chronic and stable. Continue low cholesterol diet. Follow up with PCP as instructed.    11. Family history of arteriosclerotic cardiovascular disease  Continue to follow up with PCP as instructed.      12. Essential hypertension  Chronic and stable. Continue current management.  Follow up with PCP as instructed.     13. Coronary artery disease of native artery of native heart with stable angina pectoris  Chronic and stable. Continue current management with isosorbide mononitrate 30 mg PO QD. Follow up with PCP/ Cardiology as instructed.    14. Chronic diastolic heart failure  Chronic and stable. Continue current management furosemide 40 mg PO QD. Follow up with PCP/Cardiology as instructed.     15. Type 2 diabetes mellitus with complication, with long-term current use of insulin  Chronic and stable. Continue current management with Toujeo Max 76 units QD and Novolin R 12 units TID.   Follow up with PCP as instructed.    16. Obesity (BMI 30.0-34.9)  Decrease carbohydrate intake and get some excercise    17. Hypothyroidism due to acquired atrophy of thyroid  Chronic and stable. Follow up with PCP for routine monitoring as instructed.    18. Personal history of colonic polyps  Continue with follow up for monitoring as instructed by PCP/Gastroenterology    19. Statin myopathy  Chronic and stable. No statin use Continue to monitor with follow up with PCP and annual lipid panel.    20. Smoking  Patient has been using nicotine patches x 3 days and has upcoming appointment with Smoking Cessation program.    I offered to discuss advanced care planning, including how to pick a person who would make decisions for you if you were unable to make them for yourself, called a health care power of , and what kind of decisions you might make such as use of life sustaining treatments such as ventilators and tube feeding when faced with a life limiting illness recorded on a living will that they will need to know. (How you want to be cared for as you near the end of your natural life)  Patient has ACP docs in Epic      Provided Alexandra with a 5-10 year written screening schedule and personal prevention plan.  Recommendations were developed using the USPSTF age appropriate recommendations. Education, counseling, and referrals were provided as needed.  After Visit Summary printed and given to patient which includes a list of additional screenings\tests needed.       Patient Instructions Given:  - Continue all medications, treatments and therapies as ordered.   - Follow all instructions, recommendations as discussed.  - Maintain Safety Precautions at all times.  - Attend all medical appointments as scheduled.  - For worsening symptoms: call Primary Care Physician or Nurse Practitioner.  - For emergencies, call 911 or immediately report to the nearest emergency room          No follow-ups on file.      Rosa Agosto NP

## 2024-03-27 NOTE — PROGRESS NOTES
I have reviewed the patient's positive depression score. After discussing with the patient, I have determined that no other intervention is needed at this time. Patient is already in the care of psychiatrist and started a new medication in the past 2 weeks. Patient denies SI/HI.   I have used clinical judgement based on duration and functional status to consider definite necessity for treatment.

## 2024-03-28 PROBLEM — Z92.82 S/P ADMN TPA IN DIFF FAC W/N LAST 24 HR BEF ADM TO CRNT FAC: Status: RESOLVED | Noted: 2019-09-17 | Resolved: 2024-03-28

## 2024-03-28 PROBLEM — R07.9 CHEST PAIN: Status: RESOLVED | Noted: 2017-01-24 | Resolved: 2024-03-28

## 2024-03-28 PROBLEM — Z13.31 POSITIVE DEPRESSION SCREENING: Status: ACTIVE | Noted: 2024-03-28

## 2024-04-03 ENCOUNTER — PATIENT MESSAGE (OUTPATIENT)
Dept: PULMONOLOGY | Facility: CLINIC | Age: 59
End: 2024-04-03
Payer: MEDICARE

## 2024-04-05 ENCOUNTER — HOSPITAL ENCOUNTER (OUTPATIENT)
Dept: CARDIOLOGY | Facility: HOSPITAL | Age: 59
Discharge: HOME | End: 2024-04-05
Attending: PHYSICIAN ASSISTANT
Payer: MEDICARE

## 2024-04-05 DIAGNOSIS — Z86.73 HX OF ARTERIAL ISCHEMIC STROKE: Chronic | ICD-10-CM

## 2024-04-05 PROCEDURE — 93248 EXT ECG>7D<15D REV&INTERPJ: CPT | Mod: ,,, | Performed by: STUDENT IN AN ORGANIZED HEALTH CARE EDUCATION/TRAINING PROGRAM

## 2024-04-08 ENCOUNTER — CLINICAL SUPPORT (OUTPATIENT)
Dept: SMOKING CESSATION | Facility: CLINIC | Age: 59
End: 2024-04-08
Payer: COMMERCIAL

## 2024-04-08 VITALS
BODY MASS INDEX: 35.08 KG/M2 | HEIGHT: 63 IN | SYSTOLIC BLOOD PRESSURE: 113 MMHG | DIASTOLIC BLOOD PRESSURE: 69 MMHG | OXYGEN SATURATION: 96 % | HEART RATE: 91 BPM | WEIGHT: 198 LBS

## 2024-04-08 DIAGNOSIS — F17.200 NICOTINE DEPENDENCE: Primary | ICD-10-CM

## 2024-04-08 PROCEDURE — 99404 PREV MED CNSL INDIV APPRX 60: CPT | Mod: S$GLB,,,

## 2024-04-08 PROCEDURE — 99999 PR PBB SHADOW E&M-EST. PATIENT-LVL III: CPT | Mod: PBBFAC,,,

## 2024-04-08 RX ORDER — IBUPROFEN 200 MG
1 TABLET ORAL DAILY
Qty: 28 PATCH | Refills: 0 | Status: SHIPPED | OUTPATIENT
Start: 2024-04-08 | End: 2024-05-06 | Stop reason: SDUPTHER

## 2024-04-08 NOTE — Clinical Note
The patient returns to the program for the 3rd attempt. I commended her for this return. Per Tejinder CO 23 ppm, last smoked 1 hour prior to meeting.  She states she's quit smoking cigarettes 3/25/24 (3 weeks ago) and is vaping now with the idea of stopping vaping. She was a 10-20 cigarettes per day smoker. She will start to count how many times a day she smokes her vape. She is smoking it outside. She will begin using 21 mg patches, she has been using them since quitting smoking. She is highly motivated to quit smoking.  FTND of 5 indicates a high level of tobacco/nicotine dependency; CESD of 12 is preceived as a slight level of mental distress or depression at this time. She will follow up with individual appointments and occasionally a phone call visit.

## 2024-04-11 DIAGNOSIS — I10 ESSENTIAL HYPERTENSION: Primary | Chronic | ICD-10-CM

## 2024-04-12 ENCOUNTER — TELEPHONE (OUTPATIENT)
Dept: SMOKING CESSATION | Facility: CLINIC | Age: 59
End: 2024-04-12
Payer: MEDICARE

## 2024-04-12 NOTE — TELEPHONE ENCOUNTER
Received a message regarding the patients Rx for 21 mg patches had a co-pay. I called [facility] and resolved the issue.

## 2024-04-22 ENCOUNTER — CLINICAL SUPPORT (OUTPATIENT)
Dept: SMOKING CESSATION | Facility: CLINIC | Age: 59
End: 2024-04-22
Payer: COMMERCIAL

## 2024-04-22 DIAGNOSIS — F17.200 NICOTINE DEPENDENCE: Primary | ICD-10-CM

## 2024-04-22 DIAGNOSIS — E11.42 DIABETIC POLYNEUROPATHY ASSOCIATED WITH TYPE 2 DIABETES MELLITUS: ICD-10-CM

## 2024-04-22 PROCEDURE — 99999 PR PBB SHADOW E&M-EST. PATIENT-LVL II: CPT | Mod: PBBFAC,,,

## 2024-04-22 PROCEDURE — 99404 PREV MED CNSL INDIV APPRX 60: CPT | Mod: S$GLB,,,

## 2024-04-22 RX ORDER — INSULIN GLARGINE 300 U/ML
INJECTION, SOLUTION SUBCUTANEOUS
Qty: 12 ML | Refills: 0 | Status: SHIPPED | OUTPATIENT
Start: 2024-04-22 | End: 2024-06-05

## 2024-04-22 NOTE — PROGRESS NOTES
Individual Follow-Up Form    4/22/2024    Quit Date: tbd    Clinical Status of Patient: Outpatient    Length of Service: 60 minutes    Continuing Medication: yes  Patches    Other Medications: none     Target Symptoms: Withdrawal and medication side effects. The following were  rated moderate (3) to severe (4) on TCRS:  Moderate (3): none  Severe (4): none    Comments: The patient was seen in the clinic for smoking cessation follow up.  She states she's still not smoked any cigarettes but is vaping and she's not sure how much. Advised to start counting how many times a day she smokes her vape. She smokes it outside. Per Smoker[employer] CO 2 ppm, last smoked 1 hour prior to meeting, low due to only vaping. The patient remains on the prescribed tobacco cessation medication regimen of 21 mg patches without any negative side effects at this time. Session Focus: Orientation, client introductions, completion of TCRS (Tobacco Cessation Rating Scale) learned addiction model, cues/triggers, personal reasons for quitting, medications, goals, quit date discussed but not set yet.  GOALS: 1. Count how many time she's smoking. 2. Wait 10 minutes to smoke. The patient denies any abnormal behavioral or mental changes at this time. The patient will continue with  therapy sessions and medication monitoring by CTTS. Prescribed medication management will be by physician.    Diagnosis: F17.210    Next Visit: 2 weeks

## 2024-04-22 NOTE — Clinical Note
The patient was seen in the clinic for smoking cessation follow up.  She states she's still not smoked any cigarettes but is vaping and she's not sure how much. Advised to start counting how many times a day she smokes her vape. She smokes it outside. Per Smoker[employer]zer CO 2 ppm, last smoked 1 hour prior to meeting, low due to only vaping. The patient remains on the prescribed tobacco cessation medication regimen of 21 mg patches without any negative side effects at this time. Session Focus: Orientation, client introductions, completion of TCRS (Tobacco Cessation Rating Scale) learned addiction model, cues/triggers, personal reasons for quitting, medications, goals, quit date discussed but not set yet.  GOALS: 1. Count how many time she's smoking. 2. Wait 10 minutes to smoke. The patient denies any abnormal behavioral or mental changes at this time. The patient will continue with  therapy sessions and medication monitoring by CTTS. Prescribed medication management will be by physician.

## 2024-04-25 LAB
OHS CV HOLTER SINUS AVERAGE HR: 83
OHS CV HOLTER SINUS MAX HR: 135
OHS CV HOLTER SINUS MIN HR: 52

## 2024-04-26 ENCOUNTER — TELEPHONE (OUTPATIENT)
Dept: CARDIOLOGY | Facility: CLINIC | Age: 59
End: 2024-04-26
Payer: MEDICARE

## 2024-04-26 NOTE — TELEPHONE ENCOUNTER
The patient has been notified of the results and instructions were given to keep her f/u appointment with dr white                  Monitor reviewed. No afib/aflutter. No significant rhythm issues.     Two brief runs of NSVT, occasional PVC's/PSVC's.     Keep scheduled f/u appt.     Thanks

## 2024-04-26 NOTE — PROGRESS NOTES
Monitor reviewed. No afib/aflutter. No significant rhythm issues.    Two brief runs of NSVT, occasional PVC's/PSVC's.    Keep scheduled f/u appt.    Thanks

## 2024-04-30 ENCOUNTER — OFFICE VISIT (OUTPATIENT)
Dept: CARDIOLOGY | Facility: CLINIC | Age: 59
End: 2024-04-30
Payer: MEDICARE

## 2024-04-30 ENCOUNTER — HOSPITAL ENCOUNTER (OUTPATIENT)
Dept: CARDIOLOGY | Facility: HOSPITAL | Age: 59
Discharge: HOME | End: 2024-04-30
Attending: INTERNAL MEDICINE
Payer: MEDICARE

## 2024-04-30 VITALS
HEART RATE: 96 BPM | BODY MASS INDEX: 35.82 KG/M2 | DIASTOLIC BLOOD PRESSURE: 68 MMHG | SYSTOLIC BLOOD PRESSURE: 99 MMHG | WEIGHT: 202.19 LBS | HEIGHT: 63 IN | OXYGEN SATURATION: 96 %

## 2024-04-30 DIAGNOSIS — I10 ESSENTIAL HYPERTENSION: Chronic | ICD-10-CM

## 2024-04-30 DIAGNOSIS — E11.42 DIABETIC POLYNEUROPATHY ASSOCIATED WITH TYPE 2 DIABETES MELLITUS: ICD-10-CM

## 2024-04-30 DIAGNOSIS — E78.5 HYPERLIPIDEMIA, UNSPECIFIED HYPERLIPIDEMIA TYPE: ICD-10-CM

## 2024-04-30 DIAGNOSIS — R53.81 DEBILITY: ICD-10-CM

## 2024-04-30 DIAGNOSIS — Z72.0 VAPES NICOTINE CONTAINING SUBSTANCE: ICD-10-CM

## 2024-04-30 DIAGNOSIS — R07.9 CHEST PAIN, UNSPECIFIED TYPE: ICD-10-CM

## 2024-04-30 DIAGNOSIS — F31.9 BIPOLAR AFFECTIVE DISORDER, REMISSION STATUS UNSPECIFIED: Chronic | ICD-10-CM

## 2024-04-30 DIAGNOSIS — R07.89 OTHER CHEST PAIN: Primary | ICD-10-CM

## 2024-04-30 DIAGNOSIS — Z82.49 FAMILY HISTORY OF ARTERIOSCLEROTIC CARDIOVASCULAR DISEASE: ICD-10-CM

## 2024-04-30 DIAGNOSIS — R00.2 PALPITATION: ICD-10-CM

## 2024-04-30 DIAGNOSIS — I10 ESSENTIAL HYPERTENSION: ICD-10-CM

## 2024-04-30 DIAGNOSIS — R00.2 PALPITATIONS: ICD-10-CM

## 2024-04-30 DIAGNOSIS — F12.10 MARIJUANA ABUSE: Chronic | ICD-10-CM

## 2024-04-30 LAB
OHS QRS DURATION: 78 MS
OHS QTC CALCULATION: 423 MS

## 2024-04-30 PROCEDURE — 3074F SYST BP LT 130 MM HG: CPT | Mod: HCNC,CPTII,S$GLB, | Performed by: INTERNAL MEDICINE

## 2024-04-30 PROCEDURE — 99999 PR PBB SHADOW E&M-EST. PATIENT-LVL IV: CPT | Mod: PBBFAC,,, | Performed by: INTERNAL MEDICINE

## 2024-04-30 PROCEDURE — 3078F DIAST BP <80 MM HG: CPT | Mod: HCNC,CPTII,S$GLB, | Performed by: INTERNAL MEDICINE

## 2024-04-30 PROCEDURE — 3008F BODY MASS INDEX DOCD: CPT | Mod: HCNC,CPTII,S$GLB, | Performed by: INTERNAL MEDICINE

## 2024-04-30 PROCEDURE — 1159F MED LIST DOCD IN RCRD: CPT | Mod: HCNC,CPTII,S$GLB, | Performed by: INTERNAL MEDICINE

## 2024-04-30 PROCEDURE — 93010 ELECTROCARDIOGRAM REPORT: CPT | Mod: ,,, | Performed by: INTERNAL MEDICINE

## 2024-04-30 PROCEDURE — 93005 ELECTROCARDIOGRAM TRACING: CPT

## 2024-04-30 PROCEDURE — 3044F HG A1C LEVEL LT 7.0%: CPT | Mod: HCNC,CPTII,S$GLB, | Performed by: INTERNAL MEDICINE

## 2024-04-30 PROCEDURE — 99214 OFFICE O/P EST MOD 30 MIN: CPT | Mod: HCNC,S$GLB,, | Performed by: INTERNAL MEDICINE

## 2024-04-30 RX ORDER — NITROGLYCERIN 0.4 MG/1
0.4 TABLET SUBLINGUAL EVERY 5 MIN PRN
Qty: 100 TABLET | Refills: 0 | Status: SHIPPED | OUTPATIENT
Start: 2024-04-30 | End: 2025-04-30

## 2024-04-30 RX ORDER — PRAVASTATIN SODIUM 10 MG/1
10 TABLET ORAL NIGHTLY
Qty: 30 TABLET | Refills: 11 | Status: SHIPPED | OUTPATIENT
Start: 2024-04-30 | End: 2025-04-30

## 2024-04-30 NOTE — PROGRESS NOTES
Subjective:   Patient ID:  Alexandra Goins is a 58 y.o. female who presents for evaluation of No chief complaint on file.      HPI     4.30.2024  Continues to have intermittent mixed types of chest pain.  She states that sometimes she wakes up in the middle of the night with sharp pain lasting for couple of minutes radiating up to her left shoulder sometimes all few seconds.    She states sometimes it gets worse with walking but not all the time.  Describes as midsternal shooting to her left shoulder most of the time only lasting few seconds.    She was recently discharged from the hospital for atypical chest pain.  PVCs.  Her Holter monitor was without major arrhythmias.    She drinks multiple energy drinks a day and coffee and she has starting to cut down on that.   in the room today reports that history.    Echocardiogram during her recent visit was normal.  Troponins were negative.  EKG with occasional PVCs.    She has dyspnea on exertion but she also has history of COPD prior recently on home O2.  Did not require home O2 this time.        8.2022  Comes in for a follow-up.  He recently discharged from the hospital after septic shock with salmonella bacteremia/UTI.  BNP was elevated on at towards the discharge.  She is using 4 L of oxygen at home.  This was a virtual visit.  She states that she is recovering slowly but surely.  Denies significant leg swelling.  Denies orthopnea.  Does report however dyspnea on exertion that is improving as per patient.    She reports that she quit smoking.  And using nicotine patches for now.    57 yo female,  CAD, PVCs, h/o syncope, HTN, HLD, cerebral aneurysm, h/o CVA, DM II, asthma, GERD, bipolar disorder, family h/o premature CAD, and tobacco. As well as brain anuerysm   Syncope or 2020 with CPR, thought to be secondary possibly to seizure.  Workup was done at [facility].  Normal nuclear stress test in 2020.  14 days monitor showed only 1%  PVCs.  Intolerant to Repatha.  On that he was LDL of 55.   Follows for brain aneurysm with neurosurgery.         Past Medical History:   Diagnosis Date    Acute cystitis without hematuria 11/11/2023    Acute ischemic stroke 09/17/2019    Acute respiratory failure with hypoxia 02/11/2021    Acute right-sided weakness 09/17/2019    Aneurysm     Anxiety     Arthritis     Asthma     Bipolar 1 disorder     Cerebral aneurysm, nonruptured 09/19/2019    Chest pain 01/24/2017    Chronic diastolic heart failure 05/23/2023    Coronary artery disease of native artery of native heart with stable angina pectoris 01/19/2022    Depression     Diabetes mellitus, type 2     BS 72 01/23/2024    Diverticular disease     GERD (gastroesophageal reflux disease)     Hemiplegia and hemiparesis following unspecified cerebrovascular disease affecting left dominant side 01/21/2020    Hyperlipemia     Hypertension     Hyponatremia 07/24/2022    Hypothyroidism     Pneumonia     PVD (peripheral vascular disease) 04/28/2021    Renal manifestation of secondary diabetes mellitus     Right-sided back pain 06/29/2019    S/P admn tPA in diff fac w/n last 24 hr bef adm to crnt fac 09/17/2019    Severe obesity (BMI 35.0-39.9) with comorbidity 05/31/2022    Stroke     Trouble in sleeping        Past Surgical History:   Procedure Laterality Date    CEREBRAL ANGIOGRAM N/A 10/28/2019    Procedure: ANGIOGRAM-CEREBRAL;  Surgeon: [facility] Diagnostic Provider;  Location: [facility] OR [address] [place] [address];  Service: Radiology;  Laterality: N/A;  Rm 190/Aayush    CHOLECYSTECTOMY      COLON SURGERY      COLONOSCOPY N/A 1/12/2018    Procedure: COLONOSCOPY;  Surgeon: Roque Mahajan III, MD;  Location: [facility] ENDO;  Service: Endoscopy;  Laterality: N/A;    COLONOSCOPY N/A 10/13/2023    Procedure: COLONOSCOPY;  Surgeon: Thelma Chairez MD;  Location: [facility] ENDO;  Service: Endoscopy;  Laterality: N/A;    DENTAL SURGERY  05/21/2018    removal of 8 top teeth    HYSTERECTOMY       TONSILLECTOMY         Social History     Tobacco Use    Smoking status: Every Day     Current packs/day: 1.00     Average packs/day: 1 pack/day for 46.3 years (46.3 ttl pk-yrs)     Types: Cigarettes, Vaping w/o nicotine     Start date: 1978     Passive exposure: Never    Smokeless tobacco: Never    Tobacco comments:     On average, smokes 10 cigarettes per day.    Substance Use Topics    Alcohol use: Not Currently    Drug use: Yes     Types: Marijuana       Family History   Problem Relation Name Age of Onset    Alcohol abuse Mother      COPD Mother      Depression Mother      Drug abuse Mother      Alcohol abuse Father      Arthritis Father      Cancer Father      Heart disease Father      Hypertension Father      COPD Sister      Kidney failure Sister      Heart disease Brother      Hypertension Brother      Cancer Maternal Aunt      Cancer Maternal Uncle      Diabetes Maternal Uncle      Drug abuse Maternal Uncle      Cancer Paternal Aunt      Cancer Paternal Uncle      Arthritis Maternal Grandmother      Hypertension Maternal Grandmother      Breast cancer Maternal Grandmother      Arthritis Paternal Grandmother      Cancer Paternal Grandmother      Hypertension Paternal Grandfather         Review of Systems   Cardiovascular:  Positive for chest pain and dyspnea on exertion. Negative for palpitations and syncope.   Genitourinary: Negative.    Neurological: Negative.        Current Outpatient Medications   Medication Sig Dispense Refill    ACCU-CHEK GUIDE GLUCOSE METER Misc USE AS DIRECTED 1 each 0    albuterol (PROVENTIL/VENTOLIN HFA) 90 mcg/actuation inhaler INHALE 2 TO 4 PUFFS BY MOUTH THREE TIMES DAILY AS NEEDED FOR WHEEZING 18 g 3    albuterol-ipratropium (DUO-NEB) 2.5 mg-0.5 mg/3 mL nebulizer solution Take 3 mLs by nebulization every 6 (six) hours as needed for Wheezing. Rescue 1 Box 0    benztropine (COGENTIN) 0.5 MG tablet Take 1 tablet (0.5 mg total) by mouth 2 (two) times daily. TAKE 1 TABLET BY MOUTH  TWICE DAILY AS NEEDED FOR  DYSTONIA 60 tablet 2    blood sugar diagnostic Strp Inject 1 each into the skin 4 (four) times daily. Dispense preferred on insurance 150 strip 6    butalbital-acetaminophen-caffeine -40 mg (FIORICET, ESGIC) -40 mg per tablet Take 1 tablet by mouth every 6 (six) hours as needed for Headaches. for pain. 30 tablet 0    desvenlafaxine succinate (PRISTIQ) 50 MG Tb24 Take 1 tablet by mouth once daily 30 tablet 3    diazePAM (VALIUM) 10 MG Tab TAKE 1/2 TO 1 (ONE-HALF TO ONE) TABLET BY MOUTH THREE TIMES DAILY AS NEEDED FOR ANXIETY 90 tablet 2    furosemide (LASIX) 40 MG tablet Take 1 tablet (40 mg total) by mouth once daily. 45 tablet 2    gabapentin (NEURONTIN) 600 MG tablet TAKE 1 CAPSULE BY MOUTH IN THE MORNING, 1 CAPSULE IN THE AFTERNOON, AND THEN 2 CAPSULES AT BEDTIME 360 tablet 1    insulin regular (NOVOLIN R REGULAR U100 INSULIN) 100 unit/mL Inj injection Inject 12 Units into the skin 3 (three) times daily before meals. 10 mL 0    isosorbide mononitrate (IMDUR) 30 MG 24 hr tablet Take 1 tablet (30 mg total) by mouth once daily. 90 tablet 2    lancets Misc Inject 1 each into the skin 4 (four) times daily. Dispense preferred on insurance 150 each 6    metFORMIN (GLUCOPHAGE-XR) 500 MG ER 24hr tablet TAKE 1 TABLET BY MOUTH TWICE DAILY WITH MEALS 180 tablet 0    mirtazapine (REMERON) 15 MG tablet Take 1 tablet (15 mg total) by mouth every evening. 30 tablet 2    nicotine (NICODERM CQ) 21 mg/24 hr Place 1 patch onto the skin once daily. Please fill with [facility] [id number] SCT ID [id number]  BIN [id number] GROUP PRXSCT  - 0.00 CO-PAY. Best contact #[phone] 28 patch 0    nitroGLYCERIN (NITROSTAT) 0.4 MG SL tablet Place 1 tablet (0.4 mg total) under the tongue every 5 (five) minutes as needed for Chest pain. 100 tablet 0    pravastatin (PRAVACHOL) 10 MG tablet Take 1 tablet (10 mg total) by mouth every evening. 30 tablet 11    promethazine (PHENERGAN) 12.5 MG Tab Take 1 tablet (12.5 mg total)  by mouth every 6 (six) hours as needed. 32 tablet 0    risperiDONE (RISPERDAL) 1 MG tablet Take 1 tablet (1 mg total) by mouth 2 (two) times daily. 60 tablet 2    TOUJEO MAX U-300 SOLOSTAR 300 unit/mL (3 mL) insulin pen INJECT 76 UNITS SUBCUTANEOUSLY ONCE DAILY 12 mL 0     No current facility-administered medications for this visit.       Objective:   Objective:  Wt Readings from Last 3 Encounters:   04/30/24 91.7 kg (202 lb 2.6 oz)   04/08/24 89.8 kg (197 lb 15.6 oz)   03/27/24 89.8 kg (198 lb)     Temp Readings from Last 3 Encounters:   03/27/24 97.9 °F (36.6 °C)   03/21/24 97.5 °F (36.4 °C)   03/15/24 97.7 °F (36.5 °C) (Oral)     BP Readings from Last 3 Encounters:   04/30/24 99/68   04/08/24 113/69   03/27/24 120/70     Pulse Readings from Last 3 Encounters:   04/30/24 96   04/08/24 91   03/27/24 77       Physical Exam  Vitals reviewed.   Constitutional:       Appearance: She is well-developed.   Neck:      Vascular: No carotid bruit.   Cardiovascular:      Rate and Rhythm: Normal rate and regular rhythm.      Pulses: Intact distal pulses.      Heart sounds: Normal heart sounds. No murmur heard.  Pulmonary:      Breath sounds: Normal breath sounds.   Neurological:      Mental Status: She is oriented to person, place, and time.       This was a virtual visit.  Patient looks not in distress.  Wearing oxygen cannula.    Lab Results   Component Value Date    CHOL 191 10/09/2023    CHOL 197 11/02/2022    CHOL 145 10/20/2021     Lab Results   Component Value Date    HDL 44 10/09/2023    HDL 44 11/02/2022    HDL 61 10/20/2021     Lab Results   Component Value Date    LDLCALC 98.2 10/09/2023    LDLCALC 90.6 11/02/2022    LDLCALC 55.0 (L) 10/20/2021     Lab Results   Component Value Date    TRIG 244 (H) 10/09/2023    TRIG 312 (H) 11/02/2022    TRIG 145 10/20/2021     Lab Results   Component Value Date    CHOLHDL 23.0 10/09/2023    CHOLHDL 22.3 11/02/2022    CHOLHDL 42.1 10/20/2021       Chemistry        Component Value  Date/Time     03/15/2024 0438    K 4.1 03/15/2024 0438     03/15/2024 0438    CO2 26 03/15/2024 0438    BUN 20 03/15/2024 0438    CREATININE 0.8 03/15/2024 0438     (H) 03/15/2024 0438        Component Value Date/Time    CALCIUM 9.2 03/15/2024 0438    ALKPHOS 71 03/14/2024 1006    AST 33 03/14/2024 1006    ALT 36 03/14/2024 1006    BILITOT 0.3 03/14/2024 1006    ESTGFRAFRICA >60 07/31/2022 0700    EGFRNONAA >60 07/31/2022 0700          Lab Results   Component Value Date    TSH 3.836 03/14/2024     Lab Results   Component Value Date    INR 1.1 08/16/2020    INR 1.0 10/28/2019    INR 0.9 09/17/2019     Lab Results   Component Value Date    WBC 11.96 03/15/2024    HGB 15.7 03/15/2024    HCT 48.3 03/15/2024     (H) 03/15/2024     03/15/2024     BNP  @LABRCNTIP(BNP,BNPTRIAGEBLO)@  CrCl cannot be calculated (Patient's most recent lab result is older than the maximum 7 days allowed.).     Imaging:  ======  Results for orders placed during the hospital encounter of 07/24/22    Echo Saline Bubble? No    Interpretation Summary  · The left ventricle is normal in size with concentric hypertrophy and normal systolic function.  · The estimated ejection fraction is 60%.  · Indeterminate left ventricular diastolic function.  · Normal right ventricular size with normal right ventricular systolic function.  · Normal central venous pressure (3 mmHg).  · Trivial pericardial effusion.    No results found for this or any previous visit.    No results found for this or any previous visit.    Results for orders placed during the hospital encounter of 07/15/16    X-Ray Chest PA And Lateral    Narrative  2 view chest    Comparison: 06/23/2016    Findings: There is a curvilinear area of increased density in the suprahilar region on the left which is favored to represent a combination of vasculature and possibly some atelectasis with infiltrate thought less likely.  Diffuse chronic increased peribronchial  markings are noted.  The heart is not enlarged.  IMPRESSION:      1.  As above  ______________________________________    Electronically signed by: LEXII TUCKER D.O.  Date:     07/15/16  Time:    09:26    No results found for this or any previous visit.    No valid procedures specified.    Diagnostic Results:  ECG: Reviewed  Results for orders placed during the hospital encounter of 03/14/24    Echo    Interpretation Summary    Left Ventricle: The left ventricle is normal in size. Normal wall thickness. There is concentric hypertrophy. There is normal systolic function with a visually estimated ejection fraction of 60 - 65%. There is normal diastolic function.    Right Ventricle: Normal right ventricular cavity size. Wall thickness is normal. Right ventricle wall motion  is normal. Systolic function is normal.    Pulmonary Artery: The estimated pulmonary artery systolic pressure is 22 mmHg.    IVC/SVC: Normal venous pressure at 3 mmHg.    Results for orders placed during the hospital encounter of 02/14/22    Nuclear Stress - Cardiology Interpreted    Interpretation Summary    Normal myocardial perfusion scan. There is no evidence of myocardial ischemia or infarction.    There is mild apical thinning which is a normal variant.    The gated perfusion images showed an ejection fraction of 71% at rest. The gated perfusion images showed an ejection fraction of 71% post stress. Normal ejection fraction is greater than 59%.    The EKG portion of this study is negative for ischemia.    Interpretation Summary 4.2024  Show Result ComparisonThe patient was monitored for a total of 13d 21h, underlying rhythm is sinus.  The minimum heart rate was 52 bpm; the maximum 135 bpm; the average 83 bpm.  0 % of Atrial fibrillation/Atrial flutter with longest episode of 0 ms.  The total burden of AV Block present was 0 % [Complete Heart Block: 0 %; Advanced (High Grade):  0 %; 2nd Degree, Mobitz II: 0 %; 2nd Degree, Mobitz I: 0  %].  There were 0 pauses, the longest pause was 0 ms at --.  Total count of Ventricular Tachycardia (VT): 2 episode(s). Longest VT: 5 beats on Day 14 / 03:04:51  pm. Fastest VT: 127 bpm on  Day 9 / 08:44:49 am.  0 supraventricular episodes were found. Longest SVT Episode 0 s, Fastest SVT -- bpm  There were a total of 385 PVCs with 1 morphologies and 5 couplets. Overall PVC [place] at 0.03 %  There were a total of 0 Other Beats. There were 0 total number of paced beats.  There were a total of 93 PSVCs with 1 morphologies and 2 couplets. Overall PSVC [place] at  < 0.01 %  There is a total of 1 patient events.       The ASCVD Risk score (Keila MARTIN, et al., 2019) failed to calculate for the following reasons:    The patient has a prior MI or stroke diagnosis    Assessment and Plan:   Other chest pain  -     Nuclear Stress - Cardiology Interpreted; Future  -     nitroGLYCERIN (NITROSTAT) 0.4 MG SL tablet; Place 1 tablet (0.4 mg total) under the tongue every 5 (five) minutes as needed for Chest pain.  Dispense: 100 tablet; Refill: 0    Hyperlipidemia, unspecified hyperlipidemia type  -     pravastatin (PRAVACHOL) 10 MG tablet; Take 1 tablet (10 mg total) by mouth every evening.  Dispense: 30 tablet; Refill: 11    Essential hypertension    Debility    Palpitations    Chest pain, unspecified type    Palpitation    Family history of arteriosclerotic cardiovascular disease    Bipolar affective disorder, remission status unspecified    Marijuana abuse    Vapes nicotine containing substance    Diabetic polyneuropathy associated with type 2 diabetes mellitus      Reviewed all tests and above medical conditions with patient in detail and formulated treatment plan.  Risk factor modification discussed.   Cardiac low salt diet discussed.  Maintaining healthy weight and weight loss goals were discussed in clinic.  Continue with Imdur.  Add nitro p.r.n..    Reviewed echo troponin and EKGs.  Reviewed 14 days monitor  Counseled against  vaping, smoking.  Eliminate coffee.    Start low-dose pravastatin history of intolerance to Lipitor.  Asa 81 mg daily  BP borderline low not allowing for additional medications.    Follow up in 6 months

## 2024-05-06 ENCOUNTER — CLINICAL SUPPORT (OUTPATIENT)
Dept: SMOKING CESSATION | Facility: CLINIC | Age: 59
End: 2024-05-06
Payer: COMMERCIAL

## 2024-05-06 DIAGNOSIS — F17.200 NICOTINE DEPENDENCE: ICD-10-CM

## 2024-05-06 PROCEDURE — 99999 PR PBB SHADOW E&M-EST. PATIENT-LVL II: CPT | Mod: PBBFAC,,,

## 2024-05-06 PROCEDURE — 99404 PREV MED CNSL INDIV APPRX 60: CPT | Mod: S$GLB,,,

## 2024-05-06 RX ORDER — IBUPROFEN 200 MG
1 TABLET ORAL DAILY
Qty: 28 PATCH | Refills: 0 | Status: SHIPPED | OUTPATIENT
Start: 2024-05-06 | End: 2024-06-10 | Stop reason: SDUPTHER

## 2024-05-06 NOTE — Clinical Note
Spoke with patient at length for smoking cessation follow up.  She states she smokes her vape several times per day.  The patient remains on the prescribed tobacco cessation medication regimen of 21 mg patches without any negative side effects at this time. She states she did not have coffee and didn't vape this morning. Session Focus: Completion of TCRS (Tobacco Cessation Rating Scale) reviewed strategies, cues, and triggers. Introduced the negative impact of tobacco on health, the health advantages of discontinuing the use of tobacco, time line improved health changes after a quit, withdrawal issues to expect from nicotine and habit, and ways to achieve the goal of a quit. GOALS: 1.  Plan to make this last vape her last one. 2.buy no more vapes. 3.breathing exercises. The patient denies any abnormal behavioral or mental changes at this time. The patient will continue with  therapy sessions and medication monitoring by CTTS. Prescribed medication management will be by physician.

## 2024-05-06 NOTE — PROGRESS NOTES
Individual Follow-Up Form    5/6/2024    Quit Date: tbd    Clinical Status of Patient: Outpatient    Length of Service: 60 minutes    Continuing Medication: yes  Patches    Other Medications: none     Target Symptoms: Withdrawal and medication side effects. The following were  rated moderate (3) to severe (4) on TCRS:  Moderate (3): none  Severe (4): none    Comments: Spoke with patient at length for smoking cessation follow up.  She states she smokes her vape several times per day.  The patient remains on the prescribed tobacco cessation medication regimen of 21 mg patches without any negative side effects at this time. She states she did not have coffee and didn't vape this morning. Session Focus: Completion of TCRS (Tobacco Cessation Rating Scale) reviewed strategies, cues, and triggers. Introduced the negative impact of tobacco on health, the health advantages of discontinuing the use of tobacco, time line improved health changes after a quit, withdrawal issues to expect from nicotine and habit, and ways to achieve the goal of a quit. GOALS: 1.  Plan to make this last vape her last one. 2.buy no more vapes. 3.breathing exercises. The patient denies any abnormal behavioral or mental changes at this time. The patient will continue with  therapy sessions and medication monitoring by CTTS. Prescribed medication management will be by physician.    Diagnosis: F17.210    Next Visit: 2 weeks

## 2024-05-09 ENCOUNTER — HOSPITAL ENCOUNTER (OUTPATIENT)
Dept: CARDIOLOGY | Facility: HOSPITAL | Age: 59
Discharge: HOME | End: 2024-05-09
Attending: INTERNAL MEDICINE
Payer: MEDICARE

## 2024-05-09 ENCOUNTER — HOSPITAL ENCOUNTER (OUTPATIENT)
Dept: RADIOLOGY | Facility: HOSPITAL | Age: 59
Discharge: HOME | End: 2024-05-09
Attending: INTERNAL MEDICINE
Payer: MEDICARE

## 2024-05-09 DIAGNOSIS — R07.89 OTHER CHEST PAIN: ICD-10-CM

## 2024-05-09 LAB
CV STRESS BASE HR: 68 BPM
DIASTOLIC BLOOD PRESSURE: 57 MMHG
NUC REST EJECTION FRACTION: 64
NUC STRESS EJECTION FRACTION: 67 %
OHS CV CPX 1 MINUTE RECOVERY HEART RATE: 102 BPM
OHS CV CPX 85 PERCENT MAX PREDICTED HEART RATE MALE: 136
OHS CV CPX ESTIMATED METS: 1
OHS CV CPX MAX PREDICTED HEART RATE: 160
OHS CV CPX PATIENT IS FEMALE: 1
OHS CV CPX PATIENT IS MALE: 0
OHS CV CPX PEAK DIASTOLIC BLOOD PRESSURE: 53 MMHG
OHS CV CPX PEAK HEAR RATE: 109 BPM
OHS CV CPX PEAK RATE PRESSURE PRODUCT: NORMAL
OHS CV CPX PEAK SYSTOLIC BLOOD PRESSURE: 178 MMHG
OHS CV CPX PERCENT MAX PREDICTED HEART RATE ACHIEVED: 71
OHS CV CPX RATE PRESSURE PRODUCT PRESENTING: 9656
SYSTOLIC BLOOD PRESSURE: 142 MMHG

## 2024-05-09 PROCEDURE — 63600175 PHARM REV CODE 636 W HCPCS: Mod: HCNC | Performed by: INTERNAL MEDICINE

## 2024-05-09 PROCEDURE — A9502 TC99M TETROFOSMIN: HCPCS | Mod: HCNC | Performed by: INTERNAL MEDICINE

## 2024-05-09 PROCEDURE — 93017 CV STRESS TEST TRACING ONLY: CPT | Mod: HCNC

## 2024-05-09 PROCEDURE — 93016 CV STRESS TEST SUPVJ ONLY: CPT | Mod: HCNC,,, | Performed by: INTERNAL MEDICINE

## 2024-05-09 PROCEDURE — 78452 HT MUSCLE IMAGE SPECT MULT: CPT | Mod: 26,HCNC,, | Performed by: INTERNAL MEDICINE

## 2024-05-09 PROCEDURE — 93018 CV STRESS TEST I&R ONLY: CPT | Mod: HCNC,,, | Performed by: INTERNAL MEDICINE

## 2024-05-09 PROCEDURE — 78452 HT MUSCLE IMAGE SPECT MULT: CPT | Mod: HCNC

## 2024-05-09 RX ORDER — REGADENOSON 0.08 MG/ML
0.4 INJECTION, SOLUTION INTRAVENOUS
Status: COMPLETED | OUTPATIENT
Start: 2024-05-09 | End: 2024-05-09

## 2024-05-09 RX ADMIN — TETROFOSMIN 10 MILLICURIE: 1.38 INJECTION, POWDER, LYOPHILIZED, FOR SOLUTION INTRAVENOUS at 07:05

## 2024-05-09 RX ADMIN — REGADENOSON 0.4 MG: 0.08 INJECTION, SOLUTION INTRAVENOUS at 09:05

## 2024-05-09 RX ADMIN — TETROFOSMIN 32.4 MILLICURIE: 1.38 INJECTION, POWDER, LYOPHILIZED, FOR SOLUTION INTRAVENOUS at 09:05

## 2024-05-10 ENCOUNTER — TELEPHONE (OUTPATIENT)
Dept: CARDIOLOGY | Facility: CLINIC | Age: 59
End: 2024-05-10
Payer: MEDICARE

## 2024-05-10 NOTE — TELEPHONE ENCOUNTER
Attempted to contact patient; M for patient to call back for results.       ----- Message from Meagan Espinoza MD sent at 5/10/2024  3:44 PM CDT -----  PLEASE MAKE HER AN APPOINTMENT WITHIN A WEEK OKAY TO OVERBOOK.  TO DISCUSS ABNORMAL STRESS TEST RESULTS AND NEXT STEPS.    THANK YOU  ----- Message -----  From: Josue Vazquez MD  Sent: 5/9/2024   6:47 PM CDT  To: Meagan Espinoza MD

## 2024-05-15 ENCOUNTER — LAB VISIT (OUTPATIENT)
Dept: LAB | Facility: HOSPITAL | Age: 59
End: 2024-05-15
Attending: INTERNAL MEDICINE
Payer: MEDICARE

## 2024-05-15 ENCOUNTER — OFFICE VISIT (OUTPATIENT)
Dept: CARDIOLOGY | Facility: CLINIC | Age: 59
End: 2024-05-15
Payer: MEDICARE

## 2024-05-15 VITALS
HEIGHT: 63 IN | WEIGHT: 202.19 LBS | SYSTOLIC BLOOD PRESSURE: 127 MMHG | BODY MASS INDEX: 35.82 KG/M2 | HEART RATE: 98 BPM | OXYGEN SATURATION: 93 % | DIASTOLIC BLOOD PRESSURE: 68 MMHG

## 2024-05-15 DIAGNOSIS — I10 ESSENTIAL HYPERTENSION: ICD-10-CM

## 2024-05-15 DIAGNOSIS — F31.9 BIPOLAR AFFECTIVE DISORDER, REMISSION STATUS UNSPECIFIED: ICD-10-CM

## 2024-05-15 DIAGNOSIS — R07.89 OTHER CHEST PAIN: Primary | ICD-10-CM

## 2024-05-15 DIAGNOSIS — E11.42 DIABETIC POLYNEUROPATHY ASSOCIATED WITH TYPE 2 DIABETES MELLITUS: ICD-10-CM

## 2024-05-15 DIAGNOSIS — E78.5 HYPERLIPIDEMIA, UNSPECIFIED HYPERLIPIDEMIA TYPE: ICD-10-CM

## 2024-05-15 DIAGNOSIS — R94.39 ABNORMAL STRESS TEST: ICD-10-CM

## 2024-05-15 DIAGNOSIS — F12.10 MARIJUANA ABUSE: ICD-10-CM

## 2024-05-15 DIAGNOSIS — R07.9 CHEST PAIN, UNSPECIFIED TYPE: ICD-10-CM

## 2024-05-15 DIAGNOSIS — Z72.0 VAPES NICOTINE CONTAINING SUBSTANCE: ICD-10-CM

## 2024-05-15 DIAGNOSIS — Z82.49 FAMILY HISTORY OF ARTERIOSCLEROTIC CARDIOVASCULAR DISEASE: ICD-10-CM

## 2024-05-15 DIAGNOSIS — R07.89 OTHER CHEST PAIN: ICD-10-CM

## 2024-05-15 LAB
ANION GAP SERPL CALC-SCNC: 15 MMOL/L (ref 8–16)
BASOPHILS # BLD AUTO: 0.05 K/UL (ref 0–0.2)
BASOPHILS NFR BLD: 0.5 % (ref 0–1.9)
BUN SERPL-MCNC: 12 MG/DL (ref 6–20)
CALCIUM SERPL-MCNC: 10 MG/DL (ref 8.7–10.5)
CHLORIDE SERPL-SCNC: 100 MMOL/L (ref 95–110)
CO2 SERPL-SCNC: 27 MMOL/L (ref 23–29)
CREAT SERPL-MCNC: 0.8 MG/DL (ref 0.5–1.4)
DIFFERENTIAL METHOD BLD: ABNORMAL
EOSINOPHIL # BLD AUTO: 0.3 K/UL (ref 0–0.5)
EOSINOPHIL NFR BLD: 2.6 % (ref 0–8)
ERYTHROCYTE [DISTWIDTH] IN BLOOD BY AUTOMATED COUNT: 13.2 % (ref 11.5–14.5)
EST. GFR  (NO RACE VARIABLE): >60 ML/MIN/1.73 M^2
GLUCOSE SERPL-MCNC: 109 MG/DL (ref 70–110)
HCT VFR BLD AUTO: 52.5 % (ref 37–48.5)
HGB BLD-MCNC: 17.3 G/DL (ref 12–16)
IMM GRANULOCYTES # BLD AUTO: 0.04 K/UL (ref 0–0.04)
IMM GRANULOCYTES NFR BLD AUTO: 0.4 % (ref 0–0.5)
INR PPP: 1.1 (ref 0.8–1.2)
LYMPHOCYTES # BLD AUTO: 2.8 K/UL (ref 1–4.8)
LYMPHOCYTES NFR BLD: 28.8 % (ref 18–48)
MCH RBC QN AUTO: 32.2 PG (ref 27–31)
MCHC RBC AUTO-ENTMCNC: 33 G/DL (ref 32–36)
MCV RBC AUTO: 98 FL (ref 82–98)
MONOCYTES # BLD AUTO: 0.7 K/UL (ref 0.3–1)
MONOCYTES NFR BLD: 6.8 % (ref 4–15)
NEUTROPHILS # BLD AUTO: 5.9 K/UL (ref 1.8–7.7)
NEUTROPHILS NFR BLD: 60.9 % (ref 38–73)
NRBC BLD-RTO: 0 /100 WBC
PLATELET # BLD AUTO: 194 K/UL (ref 150–450)
PMV BLD AUTO: 10.9 FL (ref 9.2–12.9)
POTASSIUM SERPL-SCNC: 4.1 MMOL/L (ref 3.5–5.1)
PROTHROMBIN TIME: 11.9 SEC (ref 9–12.5)
RBC # BLD AUTO: 5.37 M/UL (ref 4–5.4)
SODIUM SERPL-SCNC: 142 MMOL/L (ref 136–145)
WBC # BLD AUTO: 9.69 K/UL (ref 3.9–12.7)

## 2024-05-15 PROCEDURE — 99214 OFFICE O/P EST MOD 30 MIN: CPT | Mod: S$GLB,,, | Performed by: INTERNAL MEDICINE

## 2024-05-15 PROCEDURE — 80048 BASIC METABOLIC PNL TOTAL CA: CPT | Performed by: INTERNAL MEDICINE

## 2024-05-15 PROCEDURE — 3078F DIAST BP <80 MM HG: CPT | Mod: CPTII,S$GLB,, | Performed by: INTERNAL MEDICINE

## 2024-05-15 PROCEDURE — 3008F BODY MASS INDEX DOCD: CPT | Mod: CPTII,S$GLB,, | Performed by: INTERNAL MEDICINE

## 2024-05-15 PROCEDURE — 85025 COMPLETE CBC W/AUTO DIFF WBC: CPT | Performed by: INTERNAL MEDICINE

## 2024-05-15 PROCEDURE — 85610 PROTHROMBIN TIME: CPT | Performed by: INTERNAL MEDICINE

## 2024-05-15 PROCEDURE — 3044F HG A1C LEVEL LT 7.0%: CPT | Mod: CPTII,S$GLB,, | Performed by: INTERNAL MEDICINE

## 2024-05-15 PROCEDURE — 99999 PR PBB SHADOW E&M-EST. PATIENT-LVL II: CPT | Mod: PBBFAC,HCNC,, | Performed by: INTERNAL MEDICINE

## 2024-05-15 PROCEDURE — 36415 COLL VENOUS BLD VENIPUNCTURE: CPT | Performed by: INTERNAL MEDICINE

## 2024-05-15 PROCEDURE — 3074F SYST BP LT 130 MM HG: CPT | Mod: CPTII,S$GLB,, | Performed by: INTERNAL MEDICINE

## 2024-05-15 RX ORDER — METOPROLOL SUCCINATE 25 MG/1
25 TABLET, EXTENDED RELEASE ORAL DAILY
Qty: 30 TABLET | Refills: 11 | Status: SHIPPED | OUTPATIENT
Start: 2024-05-15 | End: 2024-06-17

## 2024-05-15 NOTE — TELEPHONE ENCOUNTER
No care due was identified.  [facility] Embedded Care Due Messages. Reference number: [id number].   5/15/2024 9:30:40 AM CDT

## 2024-05-15 NOTE — H&P (VIEW-ONLY)
Subjective:   Patient ID:  Alexandra Goins is a 58 y.o. female who presents for evaluation of No chief complaint on file.      HPI   5.15.2024    Comes in for follow-up.    She had an abnormal nuclear stress test after last visit with large ischemia to the anterior wall.    Continues to have midsternal chest pain.    She decreased energy drinks and she feels somewhat better but she still has pretty much same symptoms Of chest pain and dyspnea on exertion.  Continues to smoke.      4.30.2024  Continues to have intermittent mixed types of chest pain.  She states that sometimes she wakes up in the middle of the night with sharp pain lasting for couple of minutes radiating up to her left shoulder sometimes all few seconds.    She states sometimes it gets worse with walking but not all the time.  Describes as midsternal shooting to her left shoulder most of the time only lasting few seconds.    She was recently discharged from the hospital for atypical chest pain.  PVCs.  Her Holter monitor was without major arrhythmias.    She drinks multiple energy drinks a day and coffee and she has starting to cut down on that.   in the room today reports that history.    Echocardiogram during her recent visit was normal.  Troponins were negative.  EKG with occasional PVCs.    She has dyspnea on exertion but she also has history of COPD prior recently on home O2.  Did not require home O2 this time.        8.2022  Comes in for a follow-up.  He recently discharged from the hospital after septic shock with salmonella bacteremia/UTI.  BNP was elevated on at towards the discharge.  She is using 4 L of oxygen at home.  This was a virtual visit.  She states that she is recovering slowly but surely.  Denies significant leg swelling.  Denies orthopnea.  Does report however dyspnea on exertion that is improving as per patient.    She reports that she quit smoking.  And using nicotine patches for now.    55 yo female,  CAD, PVCs, h/o  syncope, HTN, HLD, cerebral aneurysm, h/o CVA, DM II, asthma, GERD, bipolar disorder, family h/o premature CAD, and tobacco. As well as brain anuerysm   Syncope or 2020 with CPR, thought to be secondary possibly to seizure.  Workup was done at [facility].  Normal nuclear stress test in 2020.  14 days monitor showed only 1% PVCs.  Intolerant to Repatha.  On that he was LDL of 55.   Follows for brain aneurysm with neurosurgery.         Past Medical History:   Diagnosis Date    Acute cystitis without hematuria 11/11/2023    Acute ischemic stroke 09/17/2019    Acute respiratory failure with hypoxia 02/11/2021    Acute right-sided weakness 09/17/2019    Aneurysm     Anxiety     Arthritis     Asthma     Bipolar 1 disorder     Cerebral aneurysm, nonruptured 09/19/2019    Chest pain 01/24/2017    Chronic diastolic heart failure 05/23/2023    Coronary artery disease of native artery of native heart with stable angina pectoris 01/19/2022    Depression     Diabetes mellitus, type 2     BS 72 01/23/2024    Diverticular disease     GERD (gastroesophageal reflux disease)     Hemiplegia and hemiparesis following unspecified cerebrovascular disease affecting left dominant side 01/21/2020    Hyperlipemia     Hypertension     Hyponatremia 07/24/2022    Hypothyroidism     Pneumonia     PVD (peripheral vascular disease) 04/28/2021    Renal manifestation of secondary diabetes mellitus     Right-sided back pain 06/29/2019    S/P admn tPA in diff fac w/n last 24 hr bef adm to crnt fac 09/17/2019    Severe obesity (BMI 35.0-39.9) with comorbidity 05/31/2022    Stroke     Trouble in sleeping        Past Surgical History:   Procedure Laterality Date    CEREBRAL ANGIOGRAM N/A 10/28/2019    Procedure: ANGIOGRAM-CEREBRAL;  Surgeon: [employer] Diagnostic Provider;  Location: [facility] OR [address] [place] [address];  Service: Radiology;  Laterality: N/A;  Rm 190/Aayush    CHOLECYSTECTOMY      COLON SURGERY      COLONOSCOPY N/A 1/12/2018    Procedure: COLONOSCOPY;   Surgeon: Roque Mahajan III, MD;  Location: [facility] ENDO;  Service: Endoscopy;  Laterality: N/A;    COLONOSCOPY N/A 10/13/2023    Procedure: COLONOSCOPY;  Surgeon: Thelma Chairez MD;  Location: [facility] ENDO;  Service: Endoscopy;  Laterality: N/A;    DENTAL SURGERY  05/21/2018    removal of 8 top teeth    HYSTERECTOMY      TONSILLECTOMY         Social History     Tobacco Use    Smoking status: Every Day     Current packs/day: 1.00     Average packs/day: 1 pack/day for 46.4 years (46.4 ttl pk-yrs)     Types: Cigarettes, Vaping w/o nicotine     Start date: 1978     Passive exposure: Never    Smokeless tobacco: Never    Tobacco comments:     On average, smokes 10 cigarettes per day.    Substance Use Topics    Alcohol use: Not Currently    Drug use: Yes     Types: Marijuana       Family History   Problem Relation Name Age of Onset    Alcohol abuse Mother      COPD Mother      Depression Mother      Drug abuse Mother      Alcohol abuse Father      Arthritis Father      Cancer Father      Heart disease Father      Hypertension Father      COPD Sister      Kidney failure Sister      Heart disease Brother      Hypertension Brother      Cancer Maternal Aunt      Cancer Maternal Uncle      Diabetes Maternal Uncle      Drug abuse Maternal Uncle      Cancer Paternal Aunt      Cancer Paternal Uncle      Arthritis Maternal Grandmother      Hypertension Maternal Grandmother      Breast cancer Maternal Grandmother      Arthritis Paternal Grandmother      Cancer Paternal Grandmother      Hypertension Paternal Grandfather         Review of Systems   Cardiovascular:  Positive for chest pain and dyspnea on exertion. Negative for palpitations and syncope.   Genitourinary: Negative.    Neurological: Negative.        Current Outpatient Medications on File Prior to Visit   Medication Sig    ACCU-CHEK GUIDE GLUCOSE METER Misc USE AS DIRECTED    albuterol (PROVENTIL/VENTOLIN HFA) 90 mcg/actuation inhaler INHALE 2 TO 4 PUFFS BY MOUTH THREE  TIMES DAILY AS NEEDED FOR WHEEZING    albuterol-ipratropium (DUO-NEB) 2.5 mg-0.5 mg/3 mL nebulizer solution Take 3 mLs by nebulization every 6 (six) hours as needed for Wheezing. Rescue    benztropine (COGENTIN) 0.5 MG tablet Take 1 tablet (0.5 mg total) by mouth 2 (two) times daily. TAKE 1 TABLET BY MOUTH TWICE DAILY AS NEEDED FOR  DYSTONIA    blood sugar diagnostic Strp Inject 1 each into the skin 4 (four) times daily. Dispense preferred on insurance    butalbital-acetaminophen-caffeine -40 mg (FIORICET, ESGIC) -40 mg per tablet Take 1 tablet by mouth every 6 (six) hours as needed for Headaches. for pain.    desvenlafaxine succinate (PRISTIQ) 50 MG Tb24 Take 1 tablet by mouth once daily    diazePAM (VALIUM) 10 MG Tab TAKE 1/2 TO 1 (ONE-HALF TO ONE) TABLET BY MOUTH THREE TIMES DAILY AS NEEDED FOR ANXIETY    furosemide (LASIX) 40 MG tablet Take 1 tablet (40 mg total) by mouth once daily.    gabapentin (NEURONTIN) 600 MG tablet TAKE 1 CAPSULE BY MOUTH IN THE MORNING, 1 CAPSULE IN THE AFTERNOON, AND THEN 2 CAPSULES AT BEDTIME    isosorbide mononitrate (IMDUR) 30 MG 24 hr tablet Take 1 tablet (30 mg total) by mouth once daily.    lancets Misc Inject 1 each into the skin 4 (four) times daily. Dispense preferred on insurance    metFORMIN (GLUCOPHAGE-XR) 500 MG ER 24hr tablet TAKE 1 TABLET BY MOUTH TWICE DAILY WITH MEALS    mirtazapine (REMERON) 15 MG tablet Take 1 tablet (15 mg total) by mouth every evening.    nicotine (NICODERM CQ) 21 mg/24 hr Place 1 patch onto the skin once daily. Please fill with [facility] [id number] SCT ID [id number]  BIN [id number] GROUP PRXSCT  - 0.00 CO-PAY. Best contact #[phone]    nitroGLYCERIN (NITROSTAT) 0.4 MG SL tablet Place 1 tablet (0.4 mg total) under the tongue every 5 (five) minutes as needed for Chest pain.    pravastatin (PRAVACHOL) 10 MG tablet Take 1 tablet (10 mg total) by mouth every evening.    promethazine (PHENERGAN) 12.5 MG Tab Take 1 tablet (12.5 mg total) by mouth every  6 (six) hours as needed.    risperiDONE (RISPERDAL) 1 MG tablet Take 1 tablet (1 mg total) by mouth 2 (two) times daily.    TOUJEO MAX U-300 SOLOSTAR 300 unit/mL (3 mL) insulin pen INJECT 76 UNITS SUBCUTANEOUSLY ONCE DAILY    insulin regular (NOVOLIN R REGULAR U100 INSULIN) 100 unit/mL Inj injection Inject 12 Units into the skin 3 (three) times daily before meals.     No current facility-administered medications on file prior to visit.       Objective:   Objective:  Wt Readings from Last 3 Encounters:   05/15/24 91.7 kg (202 lb 2.6 oz)   04/30/24 91.7 kg (202 lb 2.6 oz)   04/08/24 89.8 kg (197 lb 15.6 oz)     Temp Readings from Last 3 Encounters:   03/27/24 97.9 °F (36.6 °C)   03/21/24 97.5 °F (36.4 °C)   03/15/24 97.7 °F (36.5 °C) (Oral)     BP Readings from Last 3 Encounters:   05/15/24 127/68   04/30/24 99/68   04/08/24 113/69     Pulse Readings from Last 3 Encounters:   05/15/24 98   04/30/24 96   04/08/24 91       Physical Exam  Vitals reviewed.   Constitutional:       Appearance: She is well-developed.   Neck:      Vascular: No carotid bruit.   Cardiovascular:      Rate and Rhythm: Normal rate and regular rhythm.      Pulses: Intact distal pulses.      Heart sounds: Normal heart sounds. No murmur heard.  Pulmonary:      Breath sounds: Normal breath sounds.   Neurological:      Mental Status: She is oriented to person, place, and time.       This was a virtual visit.  Patient looks not in distress.  Wearing oxygen cannula.    Lab Results   Component Value Date    CHOL 191 10/09/2023    CHOL 197 11/02/2022    CHOL 145 10/20/2021     Lab Results   Component Value Date    HDL 44 10/09/2023    HDL 44 11/02/2022    HDL 61 10/20/2021     Lab Results   Component Value Date    LDLCALC 98.2 10/09/2023    LDLCALC 90.6 11/02/2022    LDLCALC 55.0 (L) 10/20/2021     Lab Results   Component Value Date    TRIG 244 (H) 10/09/2023    TRIG 312 (H) 11/02/2022    TRIG 145 10/20/2021     Lab Results   Component Value Date     CHOLHDL 23.0 10/09/2023    CHOLHDL 22.3 11/02/2022    CHOLHDL 42.1 10/20/2021       Chemistry        Component Value Date/Time     03/15/2024 0438    K 4.1 03/15/2024 0438     03/15/2024 0438    CO2 26 03/15/2024 0438    BUN 20 03/15/2024 0438    CREATININE 0.8 03/15/2024 0438     (H) 03/15/2024 0438        Component Value Date/Time    CALCIUM 9.2 03/15/2024 0438    ALKPHOS 71 03/14/2024 1006    AST 33 03/14/2024 1006    ALT 36 03/14/2024 1006    BILITOT 0.3 03/14/2024 1006    ESTGFRAFRICA >60 07/31/2022 0700    EGFRNONAA >60 07/31/2022 0700          Lab Results   Component Value Date    TSH 3.836 03/14/2024     Lab Results   Component Value Date    INR 1.1 08/16/2020    INR 1.0 10/28/2019    INR 0.9 09/17/2019     Lab Results   Component Value Date    WBC 11.96 03/15/2024    HGB 15.7 03/15/2024    HCT 48.3 03/15/2024     (H) 03/15/2024     03/15/2024     BNP  @LABRCNTIP(BNP,BNPTRIAGEBLO)@  CrCl cannot be calculated (Patient's most recent lab result is older than the maximum 7 days allowed.).     Imaging:  ======  Results for orders placed during the hospital encounter of 07/24/22    Echo Saline Bubble? No    Interpretation Summary  · The left ventricle is normal in size with concentric hypertrophy and normal systolic function.  · The estimated ejection fraction is 60%.  · Indeterminate left ventricular diastolic function.  · Normal right ventricular size with normal right ventricular systolic function.  · Normal central venous pressure (3 mmHg).  · Trivial pericardial effusion.    No results found for this or any previous visit.    No results found for this or any previous visit.    Results for orders placed during the hospital encounter of 07/15/16    X-Ray Chest PA And Lateral    Narrative  2 view chest    Comparison: 06/23/2016    Findings: There is a curvilinear area of increased density in the suprahilar region on the left which is favored to represent a combination of  vasculature and possibly some atelectasis with infiltrate thought less likely.  Diffuse chronic increased peribronchial markings are noted.  The heart is not enlarged.  IMPRESSION:      1.  As above  ______________________________________    Electronically signed by: LEXII TUCKER D.O.  Date:     07/15/16  Time:    09:26    No results found for this or any previous visit.    No valid procedures specified.    Diagnostic Results:  ECG: Reviewed  Results for orders placed during the hospital encounter of 03/14/24    Echo    Interpretation Summary    Left Ventricle: The left ventricle is normal in size. Normal wall thickness. There is concentric hypertrophy. There is normal systolic function with a visually estimated ejection fraction of 60 - 65%. There is normal diastolic function.    Right Ventricle: Normal right ventricular cavity size. Wall thickness is normal. Right ventricle wall motion  is normal. Systolic function is normal.    Pulmonary Artery: The estimated pulmonary artery systolic pressure is 22 mmHg.    IVC/SVC: Normal venous pressure at 3 mmHg.    Results for orders placed during the hospital encounter of 02/14/22    Nuclear Stress - Cardiology Interpreted    Interpretation Summary    Normal myocardial perfusion scan. There is no evidence of myocardial ischemia or infarction.    There is mild apical thinning which is a normal variant.    The gated perfusion images showed an ejection fraction of 71% at rest. The gated perfusion images showed an ejection fraction of 71% post stress. Normal ejection fraction is greater than 59%.    The EKG portion of this study is negative for ischemia.    Interpretation Summary 4.2024  Show Result ComparisonThe patient was monitored for a total of 13d 21h, underlying rhythm is sinus.  The minimum heart rate was 52 bpm; the maximum 135 bpm; the average 83 bpm.  0 % of Atrial fibrillation/Atrial flutter with longest episode of 0 ms.  The total burden of AV Block present was  0 % [Complete Heart Block: 0 %; Advanced (High Grade):  0 %; 2nd Degree, Mobitz II: 0 %; 2nd Degree, Mobitz I: 0 %].  There were 0 pauses, the longest pause was 0 ms at --.  Total count of Ventricular Tachycardia (VT): 2 episode(s). Longest VT: 5 beats on Day 14 / 03:04:51  pm. Fastest VT: 127 bpm on  Day 9 / 08:44:49 am.  0 supraventricular episodes were found. Longest SVT Episode 0 s, Fastest SVT -- bpm  There were a total of 385 PVCs with 1 morphologies and 5 couplets. Overall PVC Walker at 0.03 %  There were a total of 0 Other Beats. There were 0 total number of paced beats.  There were a total of 93 PSVCs with 1 morphologies and 2 couplets. Overall PSVC Walker at  < 0.01 %  There is a total of 1 patient events.     Results for orders placed during the hospital encounter of 03/14/24    Echo    Interpretation Summary    Left Ventricle: The left ventricle is normal in size. Normal wall thickness. There is concentric hypertrophy. There is normal systolic function with a visually estimated ejection fraction of 60 - 65%. There is normal diastolic function.    Right Ventricle: Normal right ventricular cavity size. Wall thickness is normal. Right ventricle wall motion  is normal. Systolic function is normal.    Pulmonary Artery: The estimated pulmonary artery systolic pressure is 22 mmHg.    IVC/SVC: Normal venous pressure at 3 mmHg.    Results for orders placed during the hospital encounter of 05/09/24    Nuclear Stress - Cardiology Interpreted    Interpretation Summary    Abnormal myocardial perfusion scan.    There is a moderate intensity, large sized, mostly reversible perfusion abnormality with some fixed areas in the anterior, apical, anterolateral and anteroapical wall(s).    Basal to mid anterolateral wall ischemia is present.    There are no other significant perfusion abnormalities.    The gated perfusion images showed an ejection fraction of 64% at rest. The gated perfusion images showed an ejection fraction  of 67% post stress.    There is normal wall motion at rest and post stress.    The ECG portion of the study is negative for ischemia.    The patient reported no chest pain during the stress test.    There were no arrhythmias during stress.    The ASCVD Risk score (Keila MARTIN, et al., 2019) failed to calculate for the following reasons:    The patient has a prior MI or stroke diagnosis    Assessment and Plan:   Other chest pain  -     metoprolol succinate (TOPROL-XL) 25 MG 24 hr tablet; Take 1 tablet (25 mg total) by mouth once daily.  Dispense: 30 tablet; Refill: 11  -     Protime-INR; Future; Expected date: 05/15/2024    Abnormal stress test  -     CBC Auto Differential; Future; Expected date: 05/15/2024  -     Basic metabolic panel; Future; Expected date: 05/15/2024    Essential hypertension    Marijuana abuse    Bipolar affective disorder, remission status unspecified    Family history of arteriosclerotic cardiovascular disease    Vapes nicotine containing substance    Chest pain, unspecified type    Hyperlipidemia, unspecified hyperlipidemia type      Abnormal stress test.    With continued symptoms of angina equivalent. Plan for left heart catheterization.  Discussed patient agreeable.  On Imdur.  We will add Toprol is visit and to check blood pressure.  Reports history of hypotension with prior blood pressure medications but not sure about the name.  Reviewed all tests and above medical conditions with patient in detail and formulated treatment plan.  Risk factor modification discussed.   Cardiac low salt diet discussed.  Maintaining healthy weight and weight loss goals were discussed in clinic.  Continue with Imdur. nitro p.r.n..    Reviewed echo troponin and EKGs.  Reviewed 14 days monitor  Counseled against vaping, smoking.   Continue to decrease caffeine  Asa 81 mg daily      Follow up in 6 months

## 2024-05-15 NOTE — TELEPHONE ENCOUNTER
Refill Routing Note   Medication(s) are not appropriate for processing by [facility] for the following reason(s):        Outside of protocol    [facility] action(s):  Route             Appointments  past 12m or future 3m with PCP    Date Provider   Last Visit   3/21/2024 Perez Casiano MD   Next Visit   Visit date not found Perez Casiano MD   ED visits in past 90 days: 0        Note composed:10:08 AM 05/15/2024

## 2024-05-15 NOTE — PROGRESS NOTES
Subjective:   Patient ID:  Alexandra Goins is a 58 y.o. female who presents for evaluation of No chief complaint on file.      HPI   5.15.2024    Comes in for follow-up.    She had an abnormal nuclear stress test after last visit with large ischemia to the anterior wall.    Continues to have midsternal chest pain.    She decreased energy drinks and she feels somewhat better but she still has pretty much same symptoms Of chest pain and dyspnea on exertion.  Continues to smoke.      4.30.2024  Continues to have intermittent mixed types of chest pain.  She states that sometimes she wakes up in the middle of the night with sharp pain lasting for couple of minutes radiating up to her left shoulder sometimes all few seconds.    She states sometimes it gets worse with walking but not all the time.  Describes as midsternal shooting to her left shoulder most of the time only lasting few seconds.    She was recently discharged from the hospital for atypical chest pain.  PVCs.  Her Holter monitor was without major arrhythmias.    She drinks multiple energy drinks a day and coffee and she has starting to cut down on that.   in the room today reports that history.    Echocardiogram during her recent visit was normal.  Troponins were negative.  EKG with occasional PVCs.    She has dyspnea on exertion but she also has history of COPD prior recently on home O2.  Did not require home O2 this time.        8.2022  Comes in for a follow-up.  He recently discharged from the hospital after septic shock with salmonella bacteremia/UTI.  BNP was elevated on at towards the discharge.  She is using 4 L of oxygen at home.  This was a virtual visit.  She states that she is recovering slowly but surely.  Denies significant leg swelling.  Denies orthopnea.  Does report however dyspnea on exertion that is improving as per patient.    She reports that she quit smoking.  And using nicotine patches for now.    57 yo female,  CAD, PVCs, h/o  syncope, HTN, HLD, cerebral aneurysm, h/o CVA, DM II, asthma, GERD, bipolar disorder, family h/o premature CAD, and tobacco. As well as brain anuerysm   Syncope or 2020 with CPR, thought to be secondary possibly to seizure.  Workup was done at [facility].  Normal nuclear stress test in 2020.  14 days monitor showed only 1% PVCs.  Intolerant to Repatha.  On that he was LDL of 55.   Follows for brain aneurysm with neurosurgery.         Past Medical History:   Diagnosis Date    Acute cystitis without hematuria 11/11/2023    Acute ischemic stroke 09/17/2019    Acute respiratory failure with hypoxia 02/11/2021    Acute right-sided weakness 09/17/2019    Aneurysm     Anxiety     Arthritis     Asthma     Bipolar 1 disorder     Cerebral aneurysm, nonruptured 09/19/2019    Chest pain 01/24/2017    Chronic diastolic heart failure 05/23/2023    Coronary artery disease of native artery of native heart with stable angina pectoris 01/19/2022    Depression     Diabetes mellitus, type 2     BS 72 01/23/2024    Diverticular disease     GERD (gastroesophageal reflux disease)     Hemiplegia and hemiparesis following unspecified cerebrovascular disease affecting left dominant side 01/21/2020    Hyperlipemia     Hypertension     Hyponatremia 07/24/2022    Hypothyroidism     Pneumonia     PVD (peripheral vascular disease) 04/28/2021    Renal manifestation of secondary diabetes mellitus     Right-sided back pain 06/29/2019    S/P admn tPA in diff fac w/n last 24 hr bef adm to crnt fac 09/17/2019    Severe obesity (BMI 35.0-39.9) with comorbidity 05/31/2022    Stroke     Trouble in sleeping        Past Surgical History:   Procedure Laterality Date    CEREBRAL ANGIOGRAM N/A 10/28/2019    Procedure: ANGIOGRAM-CEREBRAL;  Surgeon: [employer] Diagnostic Provider;  Location: [facility] OR [address] [place] [address];  Service: Radiology;  Laterality: N/A;  Rm 190/Aayush    CHOLECYSTECTOMY      COLON SURGERY      COLONOSCOPY N/A 1/12/2018    Procedure: COLONOSCOPY;   Surgeon: Roque Mahajan III, MD;  Location: [facility] ENDO;  Service: Endoscopy;  Laterality: N/A;    COLONOSCOPY N/A 10/13/2023    Procedure: COLONOSCOPY;  Surgeon: Thelma Chairez MD;  Location: [facility] ENDO;  Service: Endoscopy;  Laterality: N/A;    DENTAL SURGERY  05/21/2018    removal of 8 top teeth    HYSTERECTOMY      TONSILLECTOMY         Social History     Tobacco Use    Smoking status: Every Day     Current packs/day: 1.00     Average packs/day: 1 pack/day for 46.4 years (46.4 ttl pk-yrs)     Types: Cigarettes, Vaping w/o nicotine     Start date: 1978     Passive exposure: Never    Smokeless tobacco: Never    Tobacco comments:     On average, smokes 10 cigarettes per day.    Substance Use Topics    Alcohol use: Not Currently    Drug use: Yes     Types: Marijuana       Family History   Problem Relation Name Age of Onset    Alcohol abuse Mother      COPD Mother      Depression Mother      Drug abuse Mother      Alcohol abuse Father      Arthritis Father      Cancer Father      Heart disease Father      Hypertension Father      COPD Sister      Kidney failure Sister      Heart disease Brother      Hypertension Brother      Cancer Maternal Aunt      Cancer Maternal Uncle      Diabetes Maternal Uncle      Drug abuse Maternal Uncle      Cancer Paternal Aunt      Cancer Paternal Uncle      Arthritis Maternal Grandmother      Hypertension Maternal Grandmother      Breast cancer Maternal Grandmother      Arthritis Paternal Grandmother      Cancer Paternal Grandmother      Hypertension Paternal Grandfather         Review of Systems   Cardiovascular:  Positive for chest pain and dyspnea on exertion. Negative for palpitations and syncope.   Genitourinary: Negative.    Neurological: Negative.        Current Outpatient Medications on File Prior to Visit   Medication Sig    ACCU-CHEK GUIDE GLUCOSE METER Misc USE AS DIRECTED    albuterol (PROVENTIL/VENTOLIN HFA) 90 mcg/actuation inhaler INHALE 2 TO 4 PUFFS BY MOUTH THREE  TIMES DAILY AS NEEDED FOR WHEEZING    albuterol-ipratropium (DUO-NEB) 2.5 mg-0.5 mg/3 mL nebulizer solution Take 3 mLs by nebulization every 6 (six) hours as needed for Wheezing. Rescue    benztropine (COGENTIN) 0.5 MG tablet Take 1 tablet (0.5 mg total) by mouth 2 (two) times daily. TAKE 1 TABLET BY MOUTH TWICE DAILY AS NEEDED FOR  DYSTONIA    blood sugar diagnostic Strp Inject 1 each into the skin 4 (four) times daily. Dispense preferred on insurance    butalbital-acetaminophen-caffeine -40 mg (FIORICET, ESGIC) -40 mg per tablet Take 1 tablet by mouth every 6 (six) hours as needed for Headaches. for pain.    desvenlafaxine succinate (PRISTIQ) 50 MG Tb24 Take 1 tablet by mouth once daily    diazePAM (VALIUM) 10 MG Tab TAKE 1/2 TO 1 (ONE-HALF TO ONE) TABLET BY MOUTH THREE TIMES DAILY AS NEEDED FOR ANXIETY    furosemide (LASIX) 40 MG tablet Take 1 tablet (40 mg total) by mouth once daily.    gabapentin (NEURONTIN) 600 MG tablet TAKE 1 CAPSULE BY MOUTH IN THE MORNING, 1 CAPSULE IN THE AFTERNOON, AND THEN 2 CAPSULES AT BEDTIME    isosorbide mononitrate (IMDUR) 30 MG 24 hr tablet Take 1 tablet (30 mg total) by mouth once daily.    lancets Misc Inject 1 each into the skin 4 (four) times daily. Dispense preferred on insurance    metFORMIN (GLUCOPHAGE-XR) 500 MG ER 24hr tablet TAKE 1 TABLET BY MOUTH TWICE DAILY WITH MEALS    mirtazapine (REMERON) 15 MG tablet Take 1 tablet (15 mg total) by mouth every evening.    nicotine (NICODERM CQ) 21 mg/24 hr Place 1 patch onto the skin once daily. Please fill with [facility] [id number] SCT ID [id number]  BIN [id number] GROUP PRXSCT  - 0.00 CO-PAY. Best contact #[phone]    nitroGLYCERIN (NITROSTAT) 0.4 MG SL tablet Place 1 tablet (0.4 mg total) under the tongue every 5 (five) minutes as needed for Chest pain.    pravastatin (PRAVACHOL) 10 MG tablet Take 1 tablet (10 mg total) by mouth every evening.    promethazine (PHENERGAN) 12.5 MG Tab Take 1 tablet (12.5 mg total) by mouth every  6 (six) hours as needed.    risperiDONE (RISPERDAL) 1 MG tablet Take 1 tablet (1 mg total) by mouth 2 (two) times daily.    TOUJEO MAX U-300 SOLOSTAR 300 unit/mL (3 mL) insulin pen INJECT 76 UNITS SUBCUTANEOUSLY ONCE DAILY    insulin regular (NOVOLIN R REGULAR U100 INSULIN) 100 unit/mL Inj injection Inject 12 Units into the skin 3 (three) times daily before meals.     No current facility-administered medications on file prior to visit.       Objective:   Objective:  Wt Readings from Last 3 Encounters:   05/15/24 91.7 kg (202 lb 2.6 oz)   04/30/24 91.7 kg (202 lb 2.6 oz)   04/08/24 89.8 kg (197 lb 15.6 oz)     Temp Readings from Last 3 Encounters:   03/27/24 97.9 °F (36.6 °C)   03/21/24 97.5 °F (36.4 °C)   03/15/24 97.7 °F (36.5 °C) (Oral)     BP Readings from Last 3 Encounters:   05/15/24 127/68   04/30/24 99/68   04/08/24 113/69     Pulse Readings from Last 3 Encounters:   05/15/24 98   04/30/24 96   04/08/24 91       Physical Exam  Vitals reviewed.   Constitutional:       Appearance: She is well-developed.   Neck:      Vascular: No carotid bruit.   Cardiovascular:      Rate and Rhythm: Normal rate and regular rhythm.      Pulses: Intact distal pulses.      Heart sounds: Normal heart sounds. No murmur heard.  Pulmonary:      Breath sounds: Normal breath sounds.   Neurological:      Mental Status: She is oriented to person, place, and time.       This was a virtual visit.  Patient looks not in distress.  Wearing oxygen cannula.    Lab Results   Component Value Date    CHOL 191 10/09/2023    CHOL 197 11/02/2022    CHOL 145 10/20/2021     Lab Results   Component Value Date    HDL 44 10/09/2023    HDL 44 11/02/2022    HDL 61 10/20/2021     Lab Results   Component Value Date    LDLCALC 98.2 10/09/2023    LDLCALC 90.6 11/02/2022    LDLCALC 55.0 (L) 10/20/2021     Lab Results   Component Value Date    TRIG 244 (H) 10/09/2023    TRIG 312 (H) 11/02/2022    TRIG 145 10/20/2021     Lab Results   Component Value Date     CHOLHDL 23.0 10/09/2023    CHOLHDL 22.3 11/02/2022    CHOLHDL 42.1 10/20/2021       Chemistry        Component Value Date/Time     03/15/2024 0438    K 4.1 03/15/2024 0438     03/15/2024 0438    CO2 26 03/15/2024 0438    BUN 20 03/15/2024 0438    CREATININE 0.8 03/15/2024 0438     (H) 03/15/2024 0438        Component Value Date/Time    CALCIUM 9.2 03/15/2024 0438    ALKPHOS 71 03/14/2024 1006    AST 33 03/14/2024 1006    ALT 36 03/14/2024 1006    BILITOT 0.3 03/14/2024 1006    ESTGFRAFRICA >60 07/31/2022 0700    EGFRNONAA >60 07/31/2022 0700          Lab Results   Component Value Date    TSH 3.836 03/14/2024     Lab Results   Component Value Date    INR 1.1 08/16/2020    INR 1.0 10/28/2019    INR 0.9 09/17/2019     Lab Results   Component Value Date    WBC 11.96 03/15/2024    HGB 15.7 03/15/2024    HCT 48.3 03/15/2024     (H) 03/15/2024     03/15/2024     BNP  @LABRCNTIP(BNP,BNPTRIAGEBLO)@  CrCl cannot be calculated (Patient's most recent lab result is older than the maximum 7 days allowed.).     Imaging:  ======  Results for orders placed during the hospital encounter of 07/24/22    Echo Saline Bubble? No    Interpretation Summary  · The left ventricle is normal in size with concentric hypertrophy and normal systolic function.  · The estimated ejection fraction is 60%.  · Indeterminate left ventricular diastolic function.  · Normal right ventricular size with normal right ventricular systolic function.  · Normal central venous pressure (3 mmHg).  · Trivial pericardial effusion.    No results found for this or any previous visit.    No results found for this or any previous visit.    Results for orders placed during the hospital encounter of 07/15/16    X-Ray Chest PA And Lateral    Narrative  2 view chest    Comparison: 06/23/2016    Findings: There is a curvilinear area of increased density in the suprahilar region on the left which is favored to represent a combination of  vasculature and possibly some atelectasis with infiltrate thought less likely.  Diffuse chronic increased peribronchial markings are noted.  The heart is not enlarged.  IMPRESSION:      1.  As above  ______________________________________    Electronically signed by: LEXII TUCKER D.O.  Date:     07/15/16  Time:    09:26    No results found for this or any previous visit.    No valid procedures specified.    Diagnostic Results:  ECG: Reviewed  Results for orders placed during the hospital encounter of 03/14/24    Echo    Interpretation Summary    Left Ventricle: The left ventricle is normal in size. Normal wall thickness. There is concentric hypertrophy. There is normal systolic function with a visually estimated ejection fraction of 60 - 65%. There is normal diastolic function.    Right Ventricle: Normal right ventricular cavity size. Wall thickness is normal. Right ventricle wall motion  is normal. Systolic function is normal.    Pulmonary Artery: The estimated pulmonary artery systolic pressure is 22 mmHg.    IVC/SVC: Normal venous pressure at 3 mmHg.    Results for orders placed during the hospital encounter of 02/14/22    Nuclear Stress - Cardiology Interpreted    Interpretation Summary    Normal myocardial perfusion scan. There is no evidence of myocardial ischemia or infarction.    There is mild apical thinning which is a normal variant.    The gated perfusion images showed an ejection fraction of 71% at rest. The gated perfusion images showed an ejection fraction of 71% post stress. Normal ejection fraction is greater than 59%.    The EKG portion of this study is negative for ischemia.    Interpretation Summary 4.2024  Show Result ComparisonThe patient was monitored for a total of 13d 21h, underlying rhythm is sinus.  The minimum heart rate was 52 bpm; the maximum 135 bpm; the average 83 bpm.  0 % of Atrial fibrillation/Atrial flutter with longest episode of 0 ms.  The total burden of AV Block present was  0 % [Complete Heart Block: 0 %; Advanced (High Grade):  0 %; 2nd Degree, Mobitz II: 0 %; 2nd Degree, Mobitz I: 0 %].  There were 0 pauses, the longest pause was 0 ms at --.  Total count of Ventricular Tachycardia (VT): 2 episode(s). Longest VT: 5 beats on Day 14 / 03:04:51  pm. Fastest VT: 127 bpm on  Day 9 / 08:44:49 am.  0 supraventricular episodes were found. Longest SVT Episode 0 s, Fastest SVT -- bpm  There were a total of 385 PVCs with 1 morphologies and 5 couplets. Overall PVC Demotte at 0.03 %  There were a total of 0 Other Beats. There were 0 total number of paced beats.  There were a total of 93 PSVCs with 1 morphologies and 2 couplets. Overall PSVC Demotte at  < 0.01 %  There is a total of 1 patient events.     Results for orders placed during the hospital encounter of 03/14/24    Echo    Interpretation Summary    Left Ventricle: The left ventricle is normal in size. Normal wall thickness. There is concentric hypertrophy. There is normal systolic function with a visually estimated ejection fraction of 60 - 65%. There is normal diastolic function.    Right Ventricle: Normal right ventricular cavity size. Wall thickness is normal. Right ventricle wall motion  is normal. Systolic function is normal.    Pulmonary Artery: The estimated pulmonary artery systolic pressure is 22 mmHg.    IVC/SVC: Normal venous pressure at 3 mmHg.    Results for orders placed during the hospital encounter of 05/09/24    Nuclear Stress - Cardiology Interpreted    Interpretation Summary    Abnormal myocardial perfusion scan.    There is a moderate intensity, large sized, mostly reversible perfusion abnormality with some fixed areas in the anterior, apical, anterolateral and anteroapical wall(s).    Basal to mid anterolateral wall ischemia is present.    There are no other significant perfusion abnormalities.    The gated perfusion images showed an ejection fraction of 64% at rest. The gated perfusion images showed an ejection fraction  of 67% post stress.    There is normal wall motion at rest and post stress.    The ECG portion of the study is negative for ischemia.    The patient reported no chest pain during the stress test.    There were no arrhythmias during stress.    The ASCVD Risk score (Keila MARTIN, et al., 2019) failed to calculate for the following reasons:    The patient has a prior MI or stroke diagnosis    Assessment and Plan:   Other chest pain  -     metoprolol succinate (TOPROL-XL) 25 MG 24 hr tablet; Take 1 tablet (25 mg total) by mouth once daily.  Dispense: 30 tablet; Refill: 11  -     Protime-INR; Future; Expected date: 05/15/2024    Abnormal stress test  -     CBC Auto Differential; Future; Expected date: 05/15/2024  -     Basic metabolic panel; Future; Expected date: 05/15/2024    Essential hypertension    Marijuana abuse    Bipolar affective disorder, remission status unspecified    Family history of arteriosclerotic cardiovascular disease    Vapes nicotine containing substance    Chest pain, unspecified type    Hyperlipidemia, unspecified hyperlipidemia type      Abnormal stress test.    With continued symptoms of angina equivalent. Plan for left heart catheterization.  Discussed patient agreeable.  On Imdur.  We will add Toprol is visit and to check blood pressure.  Reports history of hypotension with prior blood pressure medications but not sure about the name.  Reviewed all tests and above medical conditions with patient in detail and formulated treatment plan.  Risk factor modification discussed.   Cardiac low salt diet discussed.  Maintaining healthy weight and weight loss goals were discussed in clinic.  Continue with Imdur. nitro p.r.n..    Reviewed echo troponin and EKGs.  Reviewed 14 days monitor  Counseled against vaping, smoking.   Continue to decrease caffeine  Asa 81 mg daily      Follow up in 6 months

## 2024-05-17 RX ORDER — GABAPENTIN 600 MG/1
TABLET ORAL
Qty: 360 TABLET | Refills: 0 | Status: SHIPPED | OUTPATIENT
Start: 2024-05-17

## 2024-05-22 NOTE — TELEPHONE ENCOUNTER
S/w pharmacy. Pharmacy said that since she just picked up the rx with old directions insurance is not going to allow her dispense the remainder #30 tablets. Pharmacy advised for pt to contact insurance to let them know that the directions changed and see if they will pay for the remainder #30 tablets. Also informed that her other option is to start taking the new directions and once she runs out she can contact pharmacy to fill new rx and insurance should pay for it because it's new directions. Pt verbalized understanding.    Awake/Alert

## 2024-05-23 ENCOUNTER — CLINICAL SUPPORT (OUTPATIENT)
Dept: SMOKING CESSATION | Facility: CLINIC | Age: 59
End: 2024-05-23
Payer: COMMERCIAL

## 2024-05-23 DIAGNOSIS — F17.200 NICOTINE DEPENDENCE: Primary | ICD-10-CM

## 2024-05-23 PROCEDURE — 99999 PR PBB SHADOW E&M-EST. PATIENT-LVL II: CPT | Mod: PBBFAC,,,

## 2024-05-23 PROCEDURE — 99404 PREV MED CNSL INDIV APPRX 60: CPT | Mod: S$GLB,,,

## 2024-05-23 RX ORDER — DM/P-EPHED/ACETAMINOPH/DOXYLAM 30-7.5/3
2 LIQUID (ML) ORAL
Qty: 108 LOZENGE | Refills: 0 | Status: SHIPPED | OUTPATIENT
Start: 2024-05-23 | End: 2024-05-23

## 2024-05-23 RX ORDER — DM/P-EPHED/ACETAMINOPH/DOXYLAM 30-7.5/3
2 LIQUID (ML) ORAL
Qty: 108 LOZENGE | Refills: 0 | Status: SHIPPED | OUTPATIENT
Start: 2024-05-23

## 2024-05-23 NOTE — PROGRESS NOTES
Individual Follow-Up Form    5/23/2024    Quit Date: tbd    Clinical Status of Patient: Outpatient    Length of Service: 60 minutes    Continuing Medication: yes  Patches    Other Medications: none     Target Symptoms: Withdrawal and medication side effects. The following were  rated moderate (3) to severe (4) on TCRS:  Moderate (3): none  Severe (4): none    Comments: The patient was seen in the clinic for smoking cessation follow up. She states she smokes on her vape 10+ per day. The patient remains on the prescribed tobacco cessation medication regimen of 21 mg patches without any negative side effects at this time. Added in 2 mg lozenges. Session Focus: completion of TCRS (Tobacco Cessation Rating Scale) reviewed strategies, controlling environment, cues, triggers, new goals set. Introduced high risk situations with preparation interventions, caffeine similarities with withdrawal issues of habit and nicotine, Alcohol, Understanding urges, cravings, stress and relaxation. Open discussion with intervention discussion. GOALS: 1. Breathign exercises. 2. Move that cigarette pack. The patient denies any abnormal behavioral or mental changes at this time. The patient will continue with  therapy sessions and medication monitoring by CTTS. Prescribed medication management will be by physician.    Diagnosis: F17.210    Next Visit: 2 weeks

## 2024-05-23 NOTE — Clinical Note
The patient was seen in the clinic for smoking cessation follow up. She states she smokes on her vape 10+ per day. The patient remains on the prescribed tobacco cessation medication regimen of 21 mg patches without any negative side effects at this time. Added in 2 mg lozenges. Session Focus: completion of TCRS (Tobacco Cessation Rating Scale) reviewed strategies, controlling environment, cues, triggers, new goals set. Introduced high risk situations with preparation interventions, caffeine similarities with withdrawal issues of habit and nicotine, Alcohol, Understanding urges, cravings, stress and relaxation. Open discussion with intervention discussion. GOALS: 1. Breathign exercises. 2. Move that cigarette pack. The patient denies any abnormal behavioral or mental changes at this time. The patient will continue with  therapy sessions and medication monitoring by CTTS. Prescribed medication management will be by physician.

## 2024-05-28 ENCOUNTER — OFFICE VISIT (OUTPATIENT)
Dept: PSYCHIATRY | Facility: CLINIC | Age: 59
End: 2024-05-28
Payer: MEDICARE

## 2024-05-28 DIAGNOSIS — F31.9 BIPOLAR 1 DISORDER: Primary | ICD-10-CM

## 2024-05-28 DIAGNOSIS — F41.9 ANXIETY: ICD-10-CM

## 2024-05-28 DIAGNOSIS — G47.00 INSOMNIA, UNSPECIFIED TYPE: ICD-10-CM

## 2024-05-28 PROCEDURE — 3044F HG A1C LEVEL LT 7.0%: CPT | Mod: HCNC,CPTII,95, | Performed by: PSYCHIATRY & NEUROLOGY

## 2024-05-28 PROCEDURE — 99214 OFFICE O/P EST MOD 30 MIN: CPT | Mod: HCNC,95,, | Performed by: PSYCHIATRY & NEUROLOGY

## 2024-05-28 RX ORDER — MIRTAZAPINE 15 MG/1
15 TABLET, FILM COATED ORAL NIGHTLY
Qty: 30 TABLET | Refills: 2 | Status: SHIPPED | OUTPATIENT
Start: 2024-05-28

## 2024-05-28 RX ORDER — RISPERIDONE 1 MG/1
1.5 TABLET ORAL 2 TIMES DAILY
Qty: 90 TABLET | Refills: 2 | Status: SHIPPED | OUTPATIENT
Start: 2024-05-28 | End: 2025-05-28

## 2024-05-28 RX ORDER — BENZTROPINE MESYLATE 0.5 MG/1
0.5 TABLET ORAL 2 TIMES DAILY
Qty: 60 TABLET | Refills: 2 | Status: SHIPPED | OUTPATIENT
Start: 2024-05-28

## 2024-05-28 RX ORDER — DIAZEPAM 10 MG/1
TABLET ORAL
Qty: 90 TABLET | Refills: 2 | Status: SHIPPED | OUTPATIENT
Start: 2024-05-28

## 2024-05-28 RX ORDER — DESVENLAFAXINE SUCCINATE 50 MG/1
50 TABLET, EXTENDED RELEASE ORAL DAILY
Qty: 30 TABLET | Refills: 2 | Status: SHIPPED | OUTPATIENT
Start: 2024-05-28
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
## 2024-05-30 ENCOUNTER — HOSPITAL ENCOUNTER (OUTPATIENT)
Facility: HOSPITAL | Age: 59
Discharge: HOME | End: 2024-05-30
Attending: INTERNAL MEDICINE | Admitting: INTERNAL MEDICINE
Payer: MEDICARE

## 2024-05-30 DIAGNOSIS — R94.39 ABNORMAL STRESS TEST: ICD-10-CM

## 2024-05-30 LAB
POC ACTIVATED CLOTTING TIME K: 244 SEC (ref 74–137)
POC ACTIVATED CLOTTING TIME K: 262 SEC (ref 74–137)
POCT GLUCOSE: 141 MG/DL (ref 70–110)
SAMPLE: ABNORMAL
SAMPLE: ABNORMAL

## 2024-05-30 PROCEDURE — C1874 STENT, COATED/COV W/DEL SYS: HCPCS | Mod: HCNC | Performed by: INTERNAL MEDICINE

## 2024-05-30 PROCEDURE — 63600175 PHARM REV CODE 636 W HCPCS: Mod: HCNC | Performed by: INTERNAL MEDICINE

## 2024-05-30 PROCEDURE — C1769 GUIDE WIRE: HCPCS | Mod: HCNC | Performed by: INTERNAL MEDICINE

## 2024-05-30 PROCEDURE — 93010 ELECTROCARDIOGRAM REPORT: CPT | Mod: HCNC,,, | Performed by: STUDENT IN AN ORGANIZED HEALTH CARE EDUCATION/TRAINING PROGRAM

## 2024-05-30 PROCEDURE — 93571 IV DOP VEL&/PRESS C FLO 1ST: CPT | Mod: 26,52,LD,HCNC | Performed by: INTERNAL MEDICINE

## 2024-05-30 PROCEDURE — 93458 L HRT ARTERY/VENTRICLE ANGIO: CPT | Mod: 26,59,51,HCNC | Performed by: INTERNAL MEDICINE

## 2024-05-30 PROCEDURE — C1887 CATHETER, GUIDING: HCPCS | Mod: HCNC | Performed by: INTERNAL MEDICINE

## 2024-05-30 PROCEDURE — 99152 MOD SED SAME PHYS/QHP 5/>YRS: CPT | Mod: HCNC,,, | Performed by: INTERNAL MEDICINE

## 2024-05-30 PROCEDURE — 25000003 PHARM REV CODE 250: Mod: HCNC | Performed by: INTERNAL MEDICINE

## 2024-05-30 PROCEDURE — 92928 PRQ TCAT PLMT NTRAC ST 1 LES: CPT | Mod: LD,HCNC,, | Performed by: INTERNAL MEDICINE

## 2024-05-30 PROCEDURE — 93005 ELECTROCARDIOGRAM TRACING: CPT | Mod: 59,HCNC

## 2024-05-30 PROCEDURE — 99153 MOD SED SAME PHYS/QHP EA: CPT | Mod: HCNC | Performed by: INTERNAL MEDICINE

## 2024-05-30 PROCEDURE — C9600 PERC DRUG-EL COR STENT SING: HCPCS | Mod: LD,HCNC | Performed by: INTERNAL MEDICINE

## 2024-05-30 PROCEDURE — 99152 MOD SED SAME PHYS/QHP 5/>YRS: CPT | Mod: HCNC | Performed by: INTERNAL MEDICINE

## 2024-05-30 PROCEDURE — C1725 CATH, TRANSLUMIN NON-LASER: HCPCS | Mod: HCNC | Performed by: INTERNAL MEDICINE

## 2024-05-30 PROCEDURE — 25500020 PHARM REV CODE 255: Mod: HCNC | Performed by: INTERNAL MEDICINE

## 2024-05-30 PROCEDURE — 93459 L HRT ART/GRFT ANGIO: CPT | Mod: 59,HCNC | Performed by: INTERNAL MEDICINE

## 2024-05-30 PROCEDURE — 85347 COAGULATION TIME ACTIVATED: CPT | Mod: HCNC | Performed by: INTERNAL MEDICINE

## 2024-05-30 PROCEDURE — C1894 INTRO/SHEATH, NON-LASER: HCPCS | Mod: HCNC | Performed by: INTERNAL MEDICINE

## 2024-05-30 PROCEDURE — 93799 UNLISTED CV SVC/PROCEDURE: CPT | Mod: LD,HCNC | Performed by: INTERNAL MEDICINE

## 2024-05-30 DEVICE — EVEROLIMUS-ELUTING PLATINUM CHROMIUM CORONARY STENT SYSTEM
Type: IMPLANTABLE DEVICE | Site: HEART | Status: FUNCTIONAL
Brand: SYNERGY™ XD

## 2024-05-30 RX ORDER — SODIUM CHLORIDE 0.9 % (FLUSH) 0.9 %
10 SYRINGE (ML) INJECTION
Status: DISCONTINUED | OUTPATIENT
Start: 2024-05-30 | End: 2024-05-30 | Stop reason: HOSPADM

## 2024-05-30 RX ORDER — NAPROXEN SODIUM 220 MG/1
81 TABLET, FILM COATED ORAL ONCE
Status: COMPLETED | OUTPATIENT
Start: 2024-05-30 | End: 2024-05-30

## 2024-05-30 RX ORDER — ONDANSETRON 8 MG/1
8 TABLET, ORALLY DISINTEGRATING ORAL EVERY 8 HOURS PRN
Status: DISCONTINUED | OUTPATIENT
Start: 2024-05-30 | End: 2024-05-30

## 2024-05-30 RX ORDER — ASPIRIN 81 MG/1
81 TABLET ORAL DAILY
Qty: 360 TABLET | Refills: 0 | Status: SHIPPED | OUTPATIENT
Start: 2024-05-30 | End: 2025-05-30

## 2024-05-30 RX ORDER — FENTANYL CITRATE 50 UG/ML
INJECTION, SOLUTION INTRAMUSCULAR; INTRAVENOUS
Status: DISCONTINUED | OUTPATIENT
Start: 2024-05-30 | End: 2024-05-30 | Stop reason: HOSPADM

## 2024-05-30 RX ORDER — ACETAMINOPHEN 325 MG/1
650 TABLET ORAL EVERY 4 HOURS PRN
Status: DISCONTINUED | OUTPATIENT
Start: 2024-05-30 | End: 2024-05-30 | Stop reason: HOSPADM

## 2024-05-30 RX ORDER — VERAPAMIL HYDROCHLORIDE 2.5 MG/ML
INJECTION, SOLUTION INTRAVENOUS
Status: DISCONTINUED | OUTPATIENT
Start: 2024-05-30 | End: 2024-05-30 | Stop reason: HOSPADM

## 2024-05-30 RX ORDER — DIPHENHYDRAMINE HCL 50 MG
50 CAPSULE ORAL ONCE
Status: COMPLETED | OUTPATIENT
Start: 2024-05-30 | End: 2024-05-30

## 2024-05-30 RX ORDER — HEPARIN SODIUM 1000 [USP'U]/ML
INJECTION INTRAVENOUS; SUBCUTANEOUS
Status: DISCONTINUED | OUTPATIENT
Start: 2024-05-30 | End: 2024-05-30 | Stop reason: HOSPADM

## 2024-05-30 RX ORDER — SODIUM CHLORIDE 9 MG/ML
INJECTION, SOLUTION INTRAVENOUS CONTINUOUS
Status: DISCONTINUED | OUTPATIENT
Start: 2024-05-30 | End: 2024-05-30 | Stop reason: HOSPADM

## 2024-05-30 RX ORDER — SODIUM CHLORIDE 9 MG/ML
INJECTION, SOLUTION INTRAVENOUS CONTINUOUS
Status: ACTIVE | OUTPATIENT
Start: 2024-05-30 | End: 2024-05-30

## 2024-05-30 RX ORDER — PROMETHAZINE HYDROCHLORIDE 12.5 MG/1
12.5 TABLET ORAL EVERY 6 HOURS PRN
Status: DISCONTINUED | OUTPATIENT
Start: 2024-05-30 | End: 2024-05-30 | Stop reason: HOSPADM

## 2024-05-30 RX ORDER — LIDOCAINE HYDROCHLORIDE 20 MG/ML
INJECTION, SOLUTION EPIDURAL; INFILTRATION; INTRACAUDAL; PERINEURAL
Status: DISCONTINUED | OUTPATIENT
Start: 2024-05-30 | End: 2024-05-30 | Stop reason: HOSPADM

## 2024-05-30 RX ORDER — NITROGLYCERIN 5 MG/ML
INJECTION, SOLUTION INTRAVENOUS
Status: DISCONTINUED | OUTPATIENT
Start: 2024-05-30 | End: 2024-05-30 | Stop reason: HOSPADM

## 2024-05-30 RX ORDER — MIDAZOLAM HYDROCHLORIDE 1 MG/ML
INJECTION INTRAMUSCULAR; INTRAVENOUS
Status: DISCONTINUED | OUTPATIENT
Start: 2024-05-30 | End: 2024-05-30 | Stop reason: HOSPADM

## 2024-05-30 RX ADMIN — DIPHENHYDRAMINE HYDROCHLORIDE 50 MG: 50 CAPSULE ORAL at 09:05

## 2024-05-30 RX ADMIN — ASPIRIN 81 MG CHEWABLE TABLET 81 MG: 81 TABLET CHEWABLE at 09:05

## 2024-05-30 RX ADMIN — SODIUM CHLORIDE: 9 INJECTION, SOLUTION INTRAVENOUS at 09:05

## 2024-05-30 NOTE — PLAN OF CARE
Alert and oriented.  Able to walk to bathroom without assistance.  L radial dressing dry and intact.  No hematoma noted.  Dis charge instructions reviewed with patient and  and copy given.  Stevo delivered to bedside.  IV removed.  1510 discharged home. To exit via W/C.

## 2024-05-30 NOTE — Clinical Note
The catheter was repositioned into the ostium   right coronary artery. Hemodynamics were performed.  and Pullback was recorded.  An angiography was performed of the right coronary arteries. Multiple views were taken.

## 2024-05-30 NOTE — Clinical Note
The groin and left radial was prepped. The site was prepped with ChloraPrep. The site was clipped. The patient was draped.

## 2024-05-30 NOTE — DISCHARGE SUMMARY
[facility] - Cath Lab (Hospital)  Cardiology  Discharge Summary      Patient Name: Alexandra Goins  MRN: [id number]  Admission Date: 5/30/2024  Hospital Length of Stay: 0 days  Discharge Date and Time:  05/30/2024 12:23 PM  Attending Physician: Meagan Espinoza MD    Discharging Provider: Meagan Espinoza MD  Primary Care Physician: Perez Casiano MD    HPI:   No notes on file    Procedure(s) (LRB):  Angiogram, Coronary, with Left Heart Cath (N/A)  Angioplasty-coronary (Left)  INSERTION, STENT, CORONARY ARTERY (Left)     Indwelling Lines/Drains at time of discharge:  Lines/Drains/Airways       None                   Hospital Course:  No notes on file    Goals of Care Treatment Preferences:  Code Status: Full Code    Living Will: Yes              Consults:     Significant Diagnostic Studies: Angiography: Results for orders placed during the hospital encounter of 05/30/24    Cardiac catheterization    Conclusion    The Mid LAD lesion was 85% stenosed with 0% stenosis post-intervention. iFR 0.77    The pre-procedure left ventricular end diastolic pressure was 14.    Mid LAD lesion: A STENT SYNERGY XD [id number] stent was not successfully placed. Post dilated with 2.5 mm NC balloon    The estimated blood loss was none.    There was single vessel coronary artery disease.    The PCI was successful.    The filling pressures on the left were normal.    Coronary fistula across septal arteries    The procedure log was documented by Documenter: Nitza Vera RN and verified by Meagan Espinoza MD.    Date: 5/30/2024  Time: 12:14 PM      Pending Diagnostic Studies:       None            There are no hospital problems to display for this patient.    Assessment & plan notes cannot be loaded without a specified hospital service.      Discharged Condition: good    Disposition: Home or Self Care    Follow Up:    Patient Instructions:   No discharge procedures on file.  Medications:  Reconciled Home Medications:      Medication  List        START taking these medications      aspirin 81 MG EC tablet  Commonly known as: ECOTRIN  Take 1 tablet (81 mg total) by mouth once daily.     ticagrelor 90 mg tablet  Commonly known as: BRILINTA  Take 1 tablet (90 mg total) by mouth 2 (two) times daily.            ASK your doctor about these medications      ACCU-CHEK GUIDE GLUCOSE METER Misc  Generic drug: blood-glucose meter  USE AS DIRECTED     albuterol 90 mcg/actuation inhaler  Commonly known as: PROVENTIL/VENTOLIN HFA  INHALE 2 TO 4 PUFFS BY MOUTH THREE TIMES DAILY AS NEEDED FOR WHEEZING     albuterol-ipratropium 2.5 mg-0.5 mg/3 mL nebulizer solution  Commonly known as: DUO-NEB  Take 3 mLs by nebulization every 6 (six) hours as needed for Wheezing. Rescue     benztropine 0.5 MG tablet  Commonly known as: COGENTIN  Take 1 tablet (0.5 mg total) by mouth 2 (two) times daily. TAKE 1 TABLET BY MOUTH TWICE DAILY AS NEEDED FOR  DYSTONIA     blood sugar diagnostic Strp  Inject 1 each into the skin 4 (four) times daily. Dispense preferred on insurance     butalbital-acetaminophen-caffeine -40 mg -40 mg per tablet  Commonly known as: FIORICET, ESGIC  Take 1 tablet by mouth every 6 (six) hours as needed for Headaches. for pain.     desvenlafaxine succinate 50 MG Tb24  Commonly known as: PRISTIQ  Take 1 tablet (50 mg total) by mouth once daily.     diazePAM 10 MG Tab  Commonly known as: VALIUM  TAKE 1/2 TO 1 (ONE-HALF TO ONE) TABLET BY MOUTH THREE TIMES DAILY AS NEEDED FOR ANXIETY     furosemide 40 MG tablet  Commonly known as: LASIX  Take 1 tablet (40 mg total) by mouth once daily.     gabapentin 600 MG tablet  Commonly known as: NEURONTIN  TAKE 1 TABLET BY MOUTH IN THE MORNING AND 1 AT AFTERNOON AND 2 AT BEDTIME     insulin regular 100 unit/mL injection  Commonly known as: NovoLIN R Regular U100 Insulin  Inject 12 Units into the skin 3 (three) times daily before meals.     isosorbide mononitrate 30 MG 24 hr tablet  Commonly known as: IMDUR  Take  1 tablet (30 mg total) by mouth once daily.     lancets Misc  Inject 1 each into the skin 4 (four) times daily. Dispense preferred on insurance     metFORMIN 500 MG ER 24hr tablet  Commonly known as: GLUCOPHAGE-XR  TAKE 1 TABLET BY MOUTH TWICE DAILY WITH MEALS     metoprolol succinate 25 MG 24 hr tablet  Commonly known as: TOPROL-XL  Take 1 tablet (25 mg total) by mouth once daily.     mirtazapine 15 MG tablet  Commonly known as: REMERON  Take 1 tablet (15 mg total) by mouth every evening.     nicotine 21 mg/24 hr  Commonly known as: NICODERM CQ  Place 1 patch onto the skin once daily. Please fill with [facility] [id number] SCT ID [id number]  BIN [id number] GROUP PRXSCT  - 0.00 CO-PAY. Best contact #[phone]     nicotine polacrilex 2 MG Lozg  Take 1 lozenge (2 mg total) by mouth as needed (10 per day). Dispense FRUIT please. DO NOT CHEW UP. Can take 1-2 per hour in place of a cigarette for breakthrough cravings in place of a cigarette. SMOKING CESSATION CARD Please fill with [facility] [id number] SCT ID [id number]  BIN [id number] GROUP PRXSCT  - $0.00 CO-PAY. Best contact #[phone]     nitroGLYCERIN 0.4 MG SL tablet  Commonly known as: NITROSTAT  Place 1 tablet (0.4 mg total) under the tongue every 5 (five) minutes as needed for Chest pain.     pravastatin 10 MG tablet  Commonly known as: PRAVACHOL  Take 1 tablet (10 mg total) by mouth every evening.     promethazine 12.5 MG Tab  Commonly known as: PHENERGAN  Take 1 tablet (12.5 mg total) by mouth every 6 (six) hours as needed.     risperiDONE 1 MG tablet  Commonly known as: RISPERDAL  Take 1.5 tablets (1.5 mg total) by mouth 2 (two) times daily.     TOUJEO MAX U-300 SOLOSTAR 300 unit/mL (3 mL) insulin pen  Generic drug: insulin glargine U-300 conc  INJECT 76 UNITS SUBCUTANEOUSLY ONCE DAILY              Time spent on the discharge of patient: 30 minutes    Meagan Espinoza MD  Cardiology  [facility] ([facility])

## 2024-05-30 NOTE — Clinical Note
275 ml of contrast were injected throughout the case. 0 mL of contrast was the total wasted during the case. 275 mL was the total amount used during the case.

## 2024-05-31 LAB
OHS QRS DURATION: 92 MS
OHS QTC CALCULATION: 424 MS

## 2024-06-03 DIAGNOSIS — E11.42 DIABETIC POLYNEUROPATHY ASSOCIATED WITH TYPE 2 DIABETES MELLITUS: ICD-10-CM

## 2024-06-05 VITALS
BODY MASS INDEX: 34.55 KG/M2 | HEART RATE: 61 BPM | TEMPERATURE: 98 F | RESPIRATION RATE: 23 BRPM | SYSTOLIC BLOOD PRESSURE: 134 MMHG | OXYGEN SATURATION: 94 % | WEIGHT: 195 LBS | DIASTOLIC BLOOD PRESSURE: 76 MMHG | HEIGHT: 63 IN

## 2024-06-05 RX ORDER — INSULIN GLARGINE 300 U/ML
INJECTION, SOLUTION SUBCUTANEOUS
Qty: 12 ML | Refills: 0 | Status: SHIPPED | OUTPATIENT
Start: 2024-06-05

## 2024-06-06 ENCOUNTER — OFFICE VISIT (OUTPATIENT)
Dept: CARDIOLOGY | Facility: CLINIC | Age: 59
End: 2024-06-06
Payer: MEDICARE

## 2024-06-06 ENCOUNTER — HOSPITAL ENCOUNTER (OUTPATIENT)
Dept: RADIOLOGY | Facility: HOSPITAL | Age: 59
Discharge: HOME | End: 2024-06-06
Attending: PHYSICIAN ASSISTANT
Payer: MEDICARE

## 2024-06-06 ENCOUNTER — HOSPITAL ENCOUNTER (OUTPATIENT)
Dept: CARDIOLOGY | Facility: HOSPITAL | Age: 59
Discharge: HOME | End: 2024-06-06
Attending: PHYSICIAN ASSISTANT
Payer: MEDICARE

## 2024-06-06 VITALS
HEART RATE: 76 BPM | HEIGHT: 63 IN | SYSTOLIC BLOOD PRESSURE: 114 MMHG | BODY MASS INDEX: 35.5 KG/M2 | DIASTOLIC BLOOD PRESSURE: 66 MMHG | OXYGEN SATURATION: 94 % | WEIGHT: 200.38 LBS

## 2024-06-06 DIAGNOSIS — Z78.9 STATIN INTOLERANCE: ICD-10-CM

## 2024-06-06 DIAGNOSIS — R06.09 DOE (DYSPNEA ON EXERTION): ICD-10-CM

## 2024-06-06 DIAGNOSIS — I10 ESSENTIAL HYPERTENSION: Chronic | ICD-10-CM

## 2024-06-06 DIAGNOSIS — F17.200 SMOKING: ICD-10-CM

## 2024-06-06 DIAGNOSIS — E11.65 TYPE 2 DIABETES MELLITUS WITH HYPERGLYCEMIA, WITH LONG-TERM CURRENT USE OF INSULIN: ICD-10-CM

## 2024-06-06 DIAGNOSIS — R06.09 DOE (DYSPNEA ON EXERTION): Primary | ICD-10-CM

## 2024-06-06 DIAGNOSIS — I73.9 PVD (PERIPHERAL VASCULAR DISEASE): ICD-10-CM

## 2024-06-06 DIAGNOSIS — E78.5 DYSLIPIDEMIA ASSOCIATED WITH TYPE 2 DIABETES MELLITUS: ICD-10-CM

## 2024-06-06 DIAGNOSIS — Z86.73 HX OF ARTERIAL ISCHEMIC STROKE: Chronic | ICD-10-CM

## 2024-06-06 DIAGNOSIS — E78.2 MIXED HYPERLIPIDEMIA: ICD-10-CM

## 2024-06-06 DIAGNOSIS — M79.602 LEFT ARM PAIN: ICD-10-CM

## 2024-06-06 DIAGNOSIS — G72.0 STATIN MYOPATHY: ICD-10-CM

## 2024-06-06 DIAGNOSIS — I50.32 CHRONIC DIASTOLIC HEART FAILURE: ICD-10-CM

## 2024-06-06 DIAGNOSIS — I25.118 CORONARY ARTERY DISEASE OF NATIVE ARTERY OF NATIVE HEART WITH STABLE ANGINA PECTORIS: Chronic | ICD-10-CM

## 2024-06-06 DIAGNOSIS — Z79.4 TYPE 2 DIABETES MELLITUS WITH HYPERGLYCEMIA, WITH LONG-TERM CURRENT USE OF INSULIN: ICD-10-CM

## 2024-06-06 DIAGNOSIS — T46.6X5A STATIN MYOPATHY: ICD-10-CM

## 2024-06-06 DIAGNOSIS — F17.213 CIGARETTE NICOTINE DEPENDENCE WITH WITHDRAWAL: ICD-10-CM

## 2024-06-06 DIAGNOSIS — E11.69 DYSLIPIDEMIA ASSOCIATED WITH TYPE 2 DIABETES MELLITUS: ICD-10-CM

## 2024-06-06 LAB
OHS QRS DURATION: 84 MS
OHS QTC CALCULATION: 426 MS

## 2024-06-06 PROCEDURE — 93005 ELECTROCARDIOGRAM TRACING: CPT | Mod: HCNC

## 2024-06-06 PROCEDURE — 93010 ELECTROCARDIOGRAM REPORT: CPT | Mod: ,,, | Performed by: INTERNAL MEDICINE

## 2024-06-06 PROCEDURE — 3074F SYST BP LT 130 MM HG: CPT | Mod: CPTII,S$GLB,, | Performed by: PHYSICIAN ASSISTANT

## 2024-06-06 PROCEDURE — 1159F MED LIST DOCD IN RCRD: CPT | Mod: CPTII,S$GLB,, | Performed by: PHYSICIAN ASSISTANT

## 2024-06-06 PROCEDURE — 3008F BODY MASS INDEX DOCD: CPT | Mod: CPTII,S$GLB,, | Performed by: PHYSICIAN ASSISTANT

## 2024-06-06 PROCEDURE — 71046 X-RAY EXAM CHEST 2 VIEWS: CPT | Mod: TC,HCNC

## 2024-06-06 PROCEDURE — 3044F HG A1C LEVEL LT 7.0%: CPT | Mod: CPTII,S$GLB,, | Performed by: PHYSICIAN ASSISTANT

## 2024-06-06 PROCEDURE — 99999 PR PBB SHADOW E&M-EST. PATIENT-LVL V: CPT | Mod: PBBFAC,HCNC,, | Performed by: PHYSICIAN ASSISTANT

## 2024-06-06 PROCEDURE — 99214 OFFICE O/P EST MOD 30 MIN: CPT | Mod: S$GLB,,, | Performed by: PHYSICIAN ASSISTANT

## 2024-06-06 PROCEDURE — 3078F DIAST BP <80 MM HG: CPT | Mod: CPTII,S$GLB,, | Performed by: PHYSICIAN ASSISTANT

## 2024-06-06 PROCEDURE — 71046 X-RAY EXAM CHEST 2 VIEWS: CPT | Mod: 26,HCNC,, | Performed by: RADIOLOGY

## 2024-06-06 NOTE — PROGRESS NOTES
Subjective   Patient ID:  Alexandra Goins is a 58 y.o. female who presents for follow-up of hospital follow-up    HPI  Ms. Goins is a 58 year old female patient whose current medical conditions include tobacco abuse, statin intolerance, brain aneurysm (followed by neurosurgery), HTN, DM type II, COPD, prior CVA, bipolar disorder, and familial history of CAD who presents today for hospital follow-up. Patient recently undewent [facility] by Dr. Espinoza on 5/30/24 due to chest pain symptoms with subsequent successful PCI of LAD. She tolerated procedure well and was discharged home on Brilinta. She returns today and states she is doing ok overall. Complains of nasal congestion and post-nasal drip since d/c along with ear fullness, body aches, and SOB/AYALA. Feels like she can't take a deep breath, unsure if cold like symptoms are contributing. No fever or chills. Chest pains have greatly improved, did have one mild vague episode that spontaneously resolved. Occasional palpitations, non-bothersome. No near syncope, syncope or falls. BP stable and controlled on her medications. She is compliant with ASA/Brilitna, concerned about nosebleeds, explained needs DAPT on board for now. She recently quit smoking/vaping. Does complain of pain of radial access site that shoots up her arm. No drainage or erythema.     EKG today shows NSR, low voltage QRS, no acute ischemic changes.         Review of Systems   Constitutional: Positive for malaise/fatigue. Negative for chills, decreased appetite and fever.   HENT:  Positive for congestion. Negative for hoarse voice and sore throat.    Eyes:  Negative for blurred vision and discharge.   Cardiovascular:  Positive for dyspnea on exertion. Negative for chest pain, claudication, cyanosis, irregular heartbeat, leg swelling, near-syncope, orthopnea, palpitations and paroxysmal nocturnal dyspnea.   Respiratory:  Positive for shortness of breath. Negative for cough, hemoptysis, snoring, sputum  "production and wheezing.    Endocrine: Negative for cold intolerance and heat intolerance.   Hematologic/Lymphatic: Negative for bleeding problem. Does not bruise/bleed easily.   Skin:  Negative for rash.   Musculoskeletal:  Positive for arthritis and joint pain. Negative for back pain, joint swelling, muscle cramps, muscle weakness and myalgias.   Gastrointestinal:  Negative for abdominal pain, constipation, diarrhea, heartburn, melena and nausea.   Genitourinary:  Negative for hematuria.   Neurological:  Positive for weakness. Negative for dizziness, focal weakness, headaches, light-headedness, loss of balance, numbness, paresthesias and seizures.   Psychiatric/Behavioral:  Negative for memory loss. The patient does not have insomnia.    Allergic/Immunologic: Negative for hives.     /66 (BP Location: Left arm, Patient Position: Sitting, BP Method: Large (Manual))   Pulse 76   Ht 5' 3" (1.6 m)   Wt 90.9 kg (200 lb 6.4 oz)   SpO2 (!) 94%   BMI 35.50 kg/m²        Objective     Physical Exam  Vitals and nursing note reviewed.   Constitutional:       General: She is not in acute distress.     Appearance: Normal appearance. She is well-developed. She is not diaphoretic.   HENT:      Head: Normocephalic and atraumatic.   Eyes:      General:         Right eye: No discharge.         Left eye: No discharge.      Pupils: Pupils are equal, round, and reactive to light.   Cardiovascular:      Rate and Rhythm: Normal rate and regular rhythm.      Heart sounds: Normal heart sounds, S1 normal and S2 normal. No murmur heard.  Pulmonary:      Effort: Pulmonary effort is normal. No respiratory distress.      Breath sounds: Normal breath sounds.   Abdominal:      General: There is no distension.   Musculoskeletal:      Right lower leg: No edema.      Left lower leg: No edema.   Skin:     General: Skin is warm and dry.      Findings: No erythema.      Comments: Left radial access site with mild bruising; tenderness to " touch, intact pulse   Neurological:      General: No focal deficit present.      Mental Status: She is alert and oriented to person, place, and time.   Psychiatric:         Mood and Affect: Mood normal.         Behavior: Behavior normal.         Thought Content: Thought content normal.       [facility] Results  Summary         The Mid LAD lesion was 85% stenosed with 0% stenosis post-intervention. iFR 0.77    The pre-procedure left ventricular end diastolic pressure was 14.    Mid LAD lesion: A STENT SYNERGY XD [id number] stent was not successfully placed. Post dilated with 2.5 mm NC balloon    The estimated blood loss was none.    There was single vessel coronary artery disease.    The PCI was successful.    The filling pressures on the left were normal.    Coronary fistula across septal arteries       Assessment and Plan     1. AYALA (dyspnea on exertion)    2. Hx of arterial ischemic stroke    3. Chronic diastolic heart failure    4. Coronary artery disease of native artery of native heart with stable angina pectoris    5. Mixed hyperlipidemia    6. PVD (peripheral vascular disease)    7. Essential hypertension    8. Dyslipidemia associated with type 2 diabetes mellitus    9. Type 2 diabetes mellitus with hyperglycemia, with long-term current use of insulin    10. Statin myopathy    11. Cigarette nicotine dependence with withdrawal    12. Statin intolerance    13. Smoking    14. Left arm pain      Patient presents for f/u. Overall, clinically improved with near resolution of CP. Complains today of AYALA/weakness/fatigue/myalgias, suspect due to URI. Explained Brilinta may also be contributing. Check CBC, CMP, CK, and CXR today. Also reports persistent pain at left radial access site, may have nerve irritation, exam with palpable pulse. Check arterial U/S to rule out pseudoaneurysm.  Plan:  -CBC, CMP, CK, CXR toady with phone review  -Continue same CV meds/mgmt-counseled on importance of DAPT  -Cardiac low salt  diet  -Congratulated on smoking cessation  -LUE arterial U/S to rule out pseudoaneurysm  -RTC 6 weeks with Dr. Espinoza.

## 2024-06-07 ENCOUNTER — TELEPHONE (OUTPATIENT)
Dept: CARDIOLOGY | Facility: CLINIC | Age: 59
End: 2024-06-07
Payer: MEDICARE

## 2024-06-07 NOTE — TELEPHONE ENCOUNTER
Contacted pt and informed her CXR is clear. Labs reviewed. Creatinine WNL. H/H stable, no anemia. CPK WNL, no evidence of muscle breakdown. Continue same meds/mgmt for now. We will call to check on SOB in a week. Pt vu w/o q/c    ----- Message from Sonia Richey PA-C sent at 6/7/2024  8:49 AM CDT -----  CXR is clear.     ----- Message from Sonia Richey PA-C sent at 6/7/2024  8:50 AM CDT -----  Labs reviewed. Creatinine WNL. H/H stable, no anemia. CPK WNL, no evidence of muscle breakdown.    Would continue same meds/mgmt for now, scheduled phone review next week to check on SOB.

## 2024-06-07 NOTE — TELEPHONE ENCOUNTER
----- Message from Sonia Richey PA-C sent at 6/7/2024  8:50 AM CDT -----  Labs reviewed. Creatinine WNL. H/H stable, no anemia. CPK WNL, no evidence of muscle breakdown.    Would continue same meds/mgmt for now, scheduled phone review next week to check on SOB.

## 2024-06-10 ENCOUNTER — CLINICAL SUPPORT (OUTPATIENT)
Dept: SMOKING CESSATION | Facility: CLINIC | Age: 59
End: 2024-06-10
Payer: COMMERCIAL

## 2024-06-10 DIAGNOSIS — F17.200 NICOTINE DEPENDENCE: ICD-10-CM

## 2024-06-10 PROCEDURE — 99403 PREV MED CNSL INDIV APPRX 45: CPT | Mod: S$GLB,,,

## 2024-06-10 PROCEDURE — 99999 PR PBB SHADOW E&M-EST. PATIENT-LVL II: CPT | Mod: PBBFAC,,,

## 2024-06-10 RX ORDER — IBUPROFEN 200 MG
1 TABLET ORAL DAILY
Qty: 28 PATCH | Refills: 0 | Status: SHIPPED | OUTPATIENT
Start: 2024-06-10

## 2024-06-10 NOTE — Clinical Note
Spoke with patient at length for smoking cessation follow up. Patient remains quit as of 5/30/24. Commended patient for this continued quit. The patient remains on the prescribed tobacco cessation medication regimen of 21 mg patches without any negative side effects at this time. Session Focus: Completion of TCRS (Tobacco Cessation Rating Scale) reviewed strategies, habitual behavior, high risks situations, understanding urges and cravings, stress and relaxation with open discussion and additional interventions, Introduced lapses, relapses, understanding them and analyzing the situation of a lapse, conflict issues that may be linked to a lapse.  GOALS: 1. Breathing exercises.  The patient denies any abnormal behavioral or mental changes at this time. The patient will continue with  therapy sessions and medication monitoring by CTTS. Prescribed medication management will be by physician.

## 2024-06-10 NOTE — PROGRESS NOTES
Individual Follow-Up Form    6/10/2024    Quit Date: 5/30/24    Clinical Status of Patient: Outpatient    Length of Service: 45 minutes    Continuing Medication: yes  Patches    Other Medications: none     Target Symptoms: Withdrawal and medication side effects. The following were  rated moderate (3) to severe (4) on TCRS:  Moderate (3): none  Severe (4): none    Comments: Spoke with patient at length for smoking cessation follow up. Patient remains quit as of 5/30/24. Commended patient for this continued quit. The patient remains on the prescribed tobacco cessation medication regimen of 21 mg patches without any negative side effects at this time. Session Focus: Completion of TCRS (Tobacco Cessation Rating Scale) reviewed strategies, habitual behavior, high risks situations, understanding urges and cravings, stress and relaxation with open discussion and additional interventions, Introduced lapses, relapses, understanding them and analyzing the situation of a lapse, conflict issues that may be linked to a lapse.  GOALS: 1. Breathing exercises.  The patient denies any abnormal behavioral or mental changes at this time. The patient will continue with  therapy sessions and medication monitoring by CTTS. Prescribed medication management will be by physician.      Diagnosis: F17.210    Next Visit: 2 weeks

## 2024-06-11 ENCOUNTER — HOSPITAL ENCOUNTER (OUTPATIENT)
Dept: CARDIOLOGY | Facility: HOSPITAL | Age: 59
Discharge: HOME | End: 2024-06-11
Attending: PHYSICIAN ASSISTANT
Payer: MEDICARE

## 2024-06-11 VITALS — WEIGHT: 200 LBS | BODY MASS INDEX: 35.44 KG/M2 | HEIGHT: 63 IN

## 2024-06-11 DIAGNOSIS — Z78.9 STATIN INTOLERANCE: ICD-10-CM

## 2024-06-11 DIAGNOSIS — R06.09 DOE (DYSPNEA ON EXERTION): ICD-10-CM

## 2024-06-11 DIAGNOSIS — T46.6X5A STATIN MYOPATHY: ICD-10-CM

## 2024-06-11 DIAGNOSIS — F17.200 SMOKING: ICD-10-CM

## 2024-06-11 DIAGNOSIS — G72.0 STATIN MYOPATHY: ICD-10-CM

## 2024-06-11 DIAGNOSIS — M79.602 LEFT ARM PAIN: ICD-10-CM

## 2024-06-11 LAB
UPPER ARTERIAL LEFT ARM BRACHIAL SYS MAX: 115 CM/S
UPPER ARTERIAL LEFT ARM RADIAL SYS MAX: 112 CM/S
UPPER ARTERIAL LEFT ARM ULNAR SYS MAX: 105 CM/S

## 2024-06-11 PROCEDURE — 93931 UPPER EXTREMITY STUDY: CPT | Mod: LT

## 2024-06-11 PROCEDURE — 93931 UPPER EXTREMITY STUDY: CPT | Mod: 26,LT,, | Performed by: INTERNAL MEDICINE

## 2024-06-12 ENCOUNTER — TELEPHONE (OUTPATIENT)
Dept: CARDIOLOGY | Facility: CLINIC | Age: 59
End: 2024-06-12
Payer: MEDICARE

## 2024-06-12 NOTE — TELEPHONE ENCOUNTER
Lvm for pt to return call to clinic in regards to results:     Ultrasound showed normal flow, no issues.

## 2024-06-12 NOTE — TELEPHONE ENCOUNTER
----- Message from Sonia Richey PA-C sent at 6/12/2024  2:44 PM CDT -----  Ultrasound showed normal flow, no issues.

## 2024-06-14 ENCOUNTER — TELEPHONE (OUTPATIENT)
Dept: CARDIOLOGY | Facility: CLINIC | Age: 59
End: 2024-06-14
Payer: MEDICARE

## 2024-06-14 DIAGNOSIS — Z95.5 S/P CORONARY ARTERY STENT PLACEMENT: Primary | ICD-10-CM

## 2024-06-14 NOTE — TELEPHONE ENCOUNTER
Contacted pt and informed her Dr. Espinoza wants to switch her off the brilinta to effient 10 mg daily and off the toprol to cardizem 120 mg daily. Informed her the effient will be once a day - not BID; as well as, she is to still take her medications till she gets the new prescriptions. Pt was made aware and vu w/o q/c    *Preferred pharmacy - [facility] [place]      ---- Meagan Espinoza MD 06/14/24 ----  I would like to switch her off the brilinta to effient 10 mg daily and off the toprol to cardizem 120 mg daily   Please inquire about exact pharmacy she would like me to send these prescriptions to   She still takes her medications till she gets the new prescription. The effient will be once a day is not the same as brilinta , patient to be made well aware please   Thanks     --- Outgoing 06/14/24 ----  Contacted pt and informed her we have sent her message to Dr. Espinoza. SULY Barrow  thinks this is from the Brilinta and should get better over time. She recommends continuing same mgmt for now unless he feels differently. She vu w/o q/c      ---- SULY Lopez 06/14/24 01:05 PM ----  Will cc Dr. Espinoza. I think this is from Brilinta and should get better over time.     Would continue same mgmt for now unless he feels differently.     Thanks     ---- Outgoing 06/14/24 ----  Contacted pt to check on her SOB since recent appt. Pt states it is better than it was, but she is not able to sweep her house w/o having to stop and rest due to SOB. She states when it happens it is like a rush she can not explain, but she sounds like a kid when it happens. She denies chest pressure or heaviness, and denies any new symptoms. Pt states her arm still hurts, but has started doing stretch exercises. Pt vu w/o q/c    ----- Message from Tamiko Howe MA sent at 6/7/2024  1:14 PM CDT -----  Regarding: Phone Review  Phone review in one week for SOB

## 2024-06-15 DIAGNOSIS — E11.42 DIABETIC POLYNEUROPATHY ASSOCIATED WITH TYPE 2 DIABETES MELLITUS: ICD-10-CM

## 2024-06-17 RX ORDER — BLOOD-GLUCOSE METER
EACH MISCELLANEOUS
Qty: 1 EACH | Refills: 0 | Status: SHIPPED | OUTPATIENT
Start: 2024-06-17

## 2024-06-17 RX ORDER — PRASUGREL 10 MG/1
10 TABLET, FILM COATED ORAL DAILY
Qty: 30 TABLET | Refills: 11 | Status: SHIPPED | OUTPATIENT
Start: 2024-06-17 | End: 2025-06-17

## 2024-06-17 RX ORDER — DILTIAZEM HYDROCHLORIDE 120 MG/1
120 CAPSULE, COATED, EXTENDED RELEASE ORAL DAILY
Qty: 30 CAPSULE | Refills: 11 | Status: SHIPPED | OUTPATIENT
Start: 2024-06-17 | End: 2025-06-17

## 2024-06-21 ENCOUNTER — HOSPITAL ENCOUNTER (OUTPATIENT)
Dept: RADIOLOGY | Facility: HOSPITAL | Age: 59
Discharge: HOME | End: 2024-06-21
Attending: FAMILY MEDICINE
Payer: MEDICARE

## 2024-06-21 DIAGNOSIS — Z12.39 ENCOUNTER FOR SCREENING FOR MALIGNANT NEOPLASM OF BREAST, UNSPECIFIED SCREENING MODALITY: ICD-10-CM

## 2024-06-21 PROCEDURE — 77067 SCR MAMMO BI INCL CAD: CPT | Mod: 26,,, | Performed by: RADIOLOGY

## 2024-06-21 PROCEDURE — 77063 BREAST TOMOSYNTHESIS BI: CPT | Mod: 26,,, | Performed by: RADIOLOGY

## 2024-06-21 PROCEDURE — 77067 SCR MAMMO BI INCL CAD: CPT | Mod: TC

## 2024-06-24 ENCOUNTER — CLINICAL SUPPORT (OUTPATIENT)
Dept: SMOKING CESSATION | Facility: CLINIC | Age: 59
End: 2024-06-24
Payer: COMMERCIAL

## 2024-06-24 DIAGNOSIS — F17.200 NICOTINE DEPENDENCE: Primary | ICD-10-CM

## 2024-06-24 PROBLEM — J96.10 CHRONIC RESPIRATORY FAILURE, UNSPECIFIED WHETHER WITH HYPOXIA OR HYPERCAPNIA: Status: RESOLVED | Noted: 2024-03-20 | Resolved: 2024-06-24

## 2024-06-24 PROCEDURE — 99999 PR PBB SHADOW E&M-EST. PATIENT-LVL II: CPT | Mod: PBBFAC,,,

## 2024-06-24 PROCEDURE — 99403 PREV MED CNSL INDIV APPRX 45: CPT | Mod: S$GLB,,,

## 2024-06-24 NOTE — Clinical Note
The patient was seen in the clinic for smoking cessation follow up.  Patient remains quit as of 5/30/24. Commended patient for this continued quit. Per Smokerlyzer CO 8 ppm, (<6 is a non-smoker).  The patient remains on the prescribed tobacco cessation medication regimen of 21 mg patches without any negative side effects at this time. Discussed reducing to 14 mg patches. Session Focus: Completion of TCRS (Tobacco Cessation Rating Scale) reviewed strategies, habitual behavior, high risks situations, understanding urges and cravings, stress and relaxation with open discussion and additional interventions, Introduced lapses, relapses, understanding them and analyzing the situation of a lapse, conflict issues that may be linked to a lapse. GOALS: 1. Breathing exercises. 2. Stay busy. The patient denies any abnormal behavioral or mental changes at this time. The patient will continue with  therapy sessions and medication monitoring by CTTS. Prescribed medication management will be by physician.

## 2024-06-24 NOTE — PROGRESS NOTES
Individual Follow-Up Form    6/24/2024    Quit Date: 5/30/24    Clinical Status of Patient: Outpatient    Length of Service: 45 minutes    Continuing Medication: yes  Patches    Other Medications: none     Target Symptoms: Withdrawal and medication side effects. The following were  rated moderate (3) to severe (4) on TCRS:  Moderate (3): none  Severe (4): none    Comments: The patient was seen in the clinic for smoking cessation follow up.  Patient remains quit as of 5/30/24. Commended patient for this continued quit. Per Smokerlyzer CO 8 ppm, (<6 is a non-smoker).  The patient remains on the prescribed tobacco cessation medication regimen of 21 mg patches without any negative side effects at this time. Discussed reducing to 14 mg patches. Session Focus: Completion of TCRS (Tobacco Cessation Rating Scale) reviewed strategies, habitual behavior, high risks situations, understanding urges and cravings, stress and relaxation with open discussion and additional interventions, Introduced lapses, relapses, understanding them and analyzing the situation of a lapse, conflict issues that may be linked to a lapse. GOALS: 1. Breathing exercises. 2. Stay busy. The patient denies any abnormal behavioral or mental changes at this time. The patient will continue with  therapy sessions and medication monitoring by CTTS. Prescribed medication management will be by physician.    Diagnosis: F17.210    Next Visit: 2 weeks

## 2024-06-26 ENCOUNTER — OFFICE VISIT (OUTPATIENT)
Dept: PSYCHIATRY | Facility: CLINIC | Age: 59
End: 2024-06-26
Payer: MEDICARE

## 2024-06-26 DIAGNOSIS — F31.9 BIPOLAR 1 DISORDER: Primary | ICD-10-CM

## 2024-06-26 DIAGNOSIS — G47.00 INSOMNIA, UNSPECIFIED TYPE: ICD-10-CM

## 2024-06-26 DIAGNOSIS — F41.9 ANXIETY: ICD-10-CM

## 2024-06-26 RX ORDER — DIAZEPAM 10 MG/1
TABLET ORAL
Qty: 90 TABLET | Refills: 2 | Status: SHIPPED | OUTPATIENT
Start: 2024-06-26

## 2024-06-26 RX ORDER — BENZTROPINE MESYLATE 0.5 MG/1
0.5 TABLET ORAL 2 TIMES DAILY
Qty: 60 TABLET | Refills: 2 | Status: SHIPPED | OUTPATIENT
Start: 2024-06-26

## 2024-06-26 RX ORDER — DESVENLAFAXINE SUCCINATE 50 MG/1
50 TABLET, EXTENDED RELEASE ORAL DAILY
Qty: 30 TABLET | Refills: 2 | Status: SHIPPED | OUTPATIENT
Start: 2024-06-26

## 2024-06-26 RX ORDER — ARIPIPRAZOLE 10 MG/1
TABLET ORAL
Qty: 30 TABLET | Refills: 3 | Status: SHIPPED | OUTPATIENT
Start: 2024-06-26

## 2024-06-26 RX ORDER — MIRTAZAPINE 15 MG/1
15 TABLET, FILM COATED ORAL NIGHTLY
Qty: 30 TABLET | Refills: 2 | Status: SHIPPED | OUTPATIENT
Start: 2024-06-26
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
## 2024-07-02 ENCOUNTER — PATIENT MESSAGE (OUTPATIENT)
Dept: FAMILY MEDICINE | Facility: CLINIC | Age: 59
End: 2024-07-02
Payer: MEDICARE

## 2024-07-02 RX ORDER — FUROSEMIDE 40 MG/1
40 TABLET ORAL DAILY
Qty: 90 TABLET | Refills: 2 | Status: SHIPPED | OUTPATIENT
Start: 2024-07-02

## 2024-07-02 NOTE — TELEPHONE ENCOUNTER
Refill Decision Note   Alexandra Patriciaen  is requesting a refill authorization.  Brief Assessment and Rationale for Refill:  Approve     Medication Therapy Plan:         Comments:     Note composed:9:21 AM 07/02/2024

## 2024-07-02 NOTE — TELEPHONE ENCOUNTER
No care due was identified.  [facility] Embedded Care Due Messages. Reference number: [id number].   7/02/2024 9:15:04 AM CDT

## 2024-07-08 ENCOUNTER — CLINICAL SUPPORT (OUTPATIENT)
Dept: SMOKING CESSATION | Facility: CLINIC | Age: 59
End: 2024-07-08
Payer: COMMERCIAL

## 2024-07-08 DIAGNOSIS — F17.200 NICOTINE DEPENDENCE: Primary | ICD-10-CM

## 2024-07-08 PROCEDURE — 99403 PREV MED CNSL INDIV APPRX 45: CPT | Mod: S$GLB,,,

## 2024-07-08 PROCEDURE — 99999 PR PBB SHADOW E&M-EST. PATIENT-LVL II: CPT | Mod: PBBFAC,,,

## 2024-07-08 RX ORDER — IBUPROFEN 200 MG
1 TABLET ORAL DAILY
Qty: 28 PATCH | Refills: 0 | Status: SHIPPED | OUTPATIENT
Start: 2024-07-08

## 2024-07-08 NOTE — PROGRESS NOTES
Individual Follow-Up Form    7/8/2024    Quit Date: 5/30/24    Clinical Status of Patient: Outpatient    Length of Service: 45 minutes    Continuing Medication: yes  Patches    Other Medications: none     Target Symptoms: Withdrawal and medication side effects. The following were  rated moderate (3) to severe (4) on TCRS:  Moderate (3): none  Severe (4): none    Comments:  Spoke with patient at length for smoking cessation follow up. Patient remains quit as of 5/30/24. Commended patient for this continued quit. The patient remains on the prescribed tobacco cessation medication regimen of 21 mg patches without any negative side effects at this time. Decided to step down and ordered 14 mg patches.  She states the hardest time for her is riding in the car. Session Focus: completion of TCRS (Tobacco Cessation Rating Scale) reviewed strategies, cues, triggers, high risk situations, lapses, relapses, diet, exercise, stress, relaxation, sleep, habitual behavior, and life style changes. GOALS: 1. Control snacking. 2. Get busy.  The patient denies any abnormal behavioral or mental changes at this time. The patient will continue with  therapy sessions and medication monitoring by CTTS. Prescribed medication management will be by physician.    Diagnosis: F17.210    Next Visit: 2 weeks

## 2024-07-08 NOTE — Clinical Note
Spoke with patient at length for smoking cessation follow up. Patient remains quit as of 5/30/24. Commended patient for this continued quit. The patient remains on the prescribed tobacco cessation medication regimen of 21 mg patches without any negative side effects at this time. Decided to step down and ordered 14 mg patches.  She states the hardest time for her is riding in the car. Session Focus: completion of TCRS (Tobacco Cessation Rating Scale) reviewed strategies, cues, triggers, high risk situations, lapses, relapses, diet, exercise, stress, relaxation, sleep, habitual behavior, and life style changes. GOALS: 1. Control snacking. 2. Get busy.  The patient denies any abnormal behavioral or mental changes at this time. The patient will continue with  therapy sessions and medication monitoring by CTTS. Prescribed medication management will be by physician.

## 2024-07-12 ENCOUNTER — OFFICE VISIT (OUTPATIENT)
Dept: DIABETES | Facility: CLINIC | Age: 59
End: 2024-07-12
Payer: MEDICARE

## 2024-07-12 ENCOUNTER — LAB VISIT (OUTPATIENT)
Dept: LAB | Facility: HOSPITAL | Age: 59
End: 2024-07-12
Attending: NURSE PRACTITIONER
Payer: MEDICARE

## 2024-07-12 ENCOUNTER — TELEPHONE (OUTPATIENT)
Dept: CARDIOLOGY | Facility: CLINIC | Age: 59
End: 2024-07-12
Payer: MEDICARE

## 2024-07-12 VITALS
HEIGHT: 63 IN | DIASTOLIC BLOOD PRESSURE: 68 MMHG | WEIGHT: 202.06 LBS | BODY MASS INDEX: 35.8 KG/M2 | SYSTOLIC BLOOD PRESSURE: 111 MMHG | HEART RATE: 77 BPM

## 2024-07-12 DIAGNOSIS — E11.42 DIABETIC POLYNEUROPATHY ASSOCIATED WITH TYPE 2 DIABETES MELLITUS: ICD-10-CM

## 2024-07-12 DIAGNOSIS — E11.42 DIABETIC POLYNEUROPATHY ASSOCIATED WITH TYPE 2 DIABETES MELLITUS: Primary | ICD-10-CM

## 2024-07-12 DIAGNOSIS — E78.5 DYSLIPIDEMIA ASSOCIATED WITH TYPE 2 DIABETES MELLITUS: ICD-10-CM

## 2024-07-12 DIAGNOSIS — I10 ESSENTIAL HYPERTENSION: Chronic | ICD-10-CM

## 2024-07-12 DIAGNOSIS — E11.69 DYSLIPIDEMIA ASSOCIATED WITH TYPE 2 DIABETES MELLITUS: ICD-10-CM

## 2024-07-12 LAB — GLUCOSE SERPL-MCNC: 225 MG/DL (ref 70–110)

## 2024-07-12 PROCEDURE — 80053 COMPREHEN METABOLIC PANEL: CPT | Mod: HCNC | Performed by: NURSE PRACTITIONER

## 2024-07-12 PROCEDURE — 36415 COLL VENOUS BLD VENIPUNCTURE: CPT | Mod: HCNC,PO | Performed by: NURSE PRACTITIONER

## 2024-07-12 PROCEDURE — 82043 UR ALBUMIN QUANTITATIVE: CPT | Mod: HCNC | Performed by: NURSE PRACTITIONER

## 2024-07-12 PROCEDURE — 99999 PR PBB SHADOW E&M-EST. PATIENT-LVL IV: CPT | Mod: PBBFAC,HCNC,, | Performed by: NURSE PRACTITIONER

## 2024-07-12 PROCEDURE — 83036 HEMOGLOBIN GLYCOSYLATED A1C: CPT | Mod: HCNC | Performed by: NURSE PRACTITIONER

## 2024-07-12 RX ORDER — INSULIN ASPART INJECTION 100 [IU]/ML
10 INJECTION, SOLUTION SUBCUTANEOUS
Qty: 5 PEN | Refills: 3 | Status: SHIPPED | OUTPATIENT
Start: 2024-07-12 | End: 2025-07-12

## 2024-07-12 RX ORDER — METFORMIN HYDROCHLORIDE 750 MG/1
750 TABLET, EXTENDED RELEASE ORAL 2 TIMES DAILY WITH MEALS
Qty: 60 TABLET | Refills: 3 | Status: SHIPPED | OUTPATIENT
Start: 2024-07-12

## 2024-07-12 NOTE — PROGRESS NOTES
"PCP: Perez Casiano MD    Subjective:     Chief Complaint: Diabetes Follow Up    HISTORY OF PRESENT ILLNESS: 58 y.o.  female presenting, with , for diabetes follow up.     Patient has had Type II diabetes since 1985 and has the following complications: peripheral neuropathy, PVD, and CAD with recent heart cath on May 30, s/p stent placement to LAD. Other pertinent conditions: bipolar disorder and depression ( follows psych ) and hypoglycemia-induced seizures. She has attended diabetes education in the past.  No longer smoking or Vaping.    Past tried and failed medications:  No longer taking Jardiance due to cost and history of recurrent UTI w/ sepsis. In November 2023, patient was hospitalized with hyperglycemia and DKA.  Patient is back on Toujeo with improved blood sugar control.  GLP-1 therapy too expensive.    BG monitoring is performed. Per patient, fasting BGs are consistently > 200.  Denies any recent hypoglycemia. Continues to eat healthier diet with more protein and vegetables, but is not routinely exercising.      Blood glucose in clinic today: 225 mg/dl at 7:54 am    Vitals:    07/12/24 0750   BP: 111/68   BP Location: Right arm   Patient Position: Sitting   BP Method: Medium (Automatic)   Pulse: 77   Weight: 91.7 kg (202 lb 0.8 oz)   Height: 5' 3" (1.6 m)     BMI 35.79    DM MEDICATIONS:    Toujeo 76 units daily    Metformin 500 mg BID ac  Novolin R, 1:15 carb ratio + correction scale as needed:   - 199: 1 unit  //   - 249: 2 units   //   - 299: 3 units   //  - 349: 4 units   //  BG Over 350: 5 units.    Labs Reviewed.       Lab Results   Component Value Date    CPEPTIDE 3.4 01/25/2017     Lab Results   Component Value Date    GLUTAMICACID 0.01 01/25/2017     Lab Results   Component Value Date    HUMANINSULIN 0.00 01/25/2017     Review of Systems   Constitutional:  Positive for unexpected weight change (weight gain). Negative for appetite change and fatigue. "   Eyes:  Negative for visual disturbance.   Gastrointestinal:  Negative for constipation, diarrhea, nausea and vomiting.   Endocrine: Negative for polydipsia, polyphagia and polyuria.   Neurological:  Positive for numbness (bilateral feet).   Psychiatric/Behavioral:          History of Bipolar Disorder        Diabetes Management Status  Statin: Not taking - allergic  ACE/ARB: Not taking    Screening or Prevention Patient's value Goal Complete/Controlled?   HgA1C Testing and Control   Lab Results   Component Value Date    HGBA1C 6.6 (H) 02/21/2024    HGBA1C 12.0 (H) 10/09/2023    HGBA1C 12.7 (H) 05/24/2023      Annually/Less than 8% No     Lipid profile : 10/09/2023 Annually Yes     LDL control Lab Results   Component Value Date    LDLCALC 98.2 10/09/2023    Annually/Less than 100 mg/dl  Yes     Nephropathy screening Lab Results   Component Value Date    MICALBCREAT 39.1 (H) 09/01/2022     Lab Results   Component Value Date    PROTEINUA Negative 12/19/2023    Annually Yes     Blood pressure BP Readings from Last 1 Encounters:   07/12/24 111/68    Less than 140/90 Yes     Dilated retinal exam : 01/23/2024 Annually Yes    Foot exam   : 07/12/2024 Annually Yes       ACTIVITY LEVEL: Sedentary. Discussed activities, benefits, methods, and precautions.  MEAL PLANNING: Patient reports number of meals per day to be 2 and number of snacks per day to be 0.   Patient is encouraged to carb count and consume no more than 45 - 60 grams of carbohydrates in each meal, and 1800 k / gloria per day.      BLOOD GLUCOSE TESTING:  True Metrix  SOCIAL HISTORY:  .  No longer smoking.     Objective:      Physical Exam  Constitutional:       Appearance: She is well-developed. She is obese.   HENT:      Head: Normocephalic and atraumatic.   Eyes:      Pupils: Pupils are equal, round, and reactive to light.   Cardiovascular:      Rate and Rhythm: Normal rate and regular rhythm.      Pulses:           Dorsalis pedis pulses are 2+ on the  right side and 2+ on the left side.   Pulmonary:      Effort: Pulmonary effort is normal.      Breath sounds: Normal breath sounds.   Feet:      Right foot:      Protective Sensation: 6 sites tested.  6 sites sensed.      Skin integrity: No ulcer, callus or dry skin.      Toenail Condition: Right toenails are abnormally thick. Fungal disease present.     Left foot:      Protective Sensation: 6 sites tested.  6 sites sensed.      Skin integrity: No ulcer, callus or dry skin.      Toenail Condition: Left toenails are abnormally thick. Fungal disease present.  Skin:     General: Skin is warm and dry.      Findings: No rash.   Psychiatric:         Behavior: Behavior normal.         Thought Content: Thought content normal.         Judgment: Judgment normal.         Assessment / Plan:     1.) Diabetic polyneuropathy associated with type 2 diabetes mellitus    -  Increase Toujeo 80 units daily.  Will change to Fiasp insulin, and decrease to 1:10 carb ratio.  Her  counts carbohydrates, so he plans to assist her.      -  Continue correction scale (as needed) 2-3 hours after meals if BG are elevated.   - 199: 1 unit  //   - 249: 2 units   //   - 299: 3 units   //  - 349: 4 units   //  BG Over 350: 5 units.  Repeat labs today:  A1C, CMP, and micral.  Will send order for [employer] CGM.    Orders:   -     POCT Glucose, Hand-Held Device   -     insulin aspart, niacinamide, (FIASP FLEXTOUCH U-100 INSULIN) 100 unit/mL (3 mL) InPn; Inject 10 Units into the skin 3 (three) times daily with meals.  Dispense: 5 Pen; Refill: 3  -     Hemoglobin A1C; Standing  -     Comprehensive Metabolic Panel; Standing  -     Microalbumin/Creatinine Ratio, Urine; Standing  -     metFORMIN (GLUCOPHAGE-XR) 750 MG ER 24hr tablet; Take 1 tablet (750 mg total) by mouth 2 (two) times daily with meals.  Dispense: 60 tablet; Refill: 3    2.) Essential hypertension - continue medication as prescribed.    3.) Dyslipidemia  associated with type 2 diabetes mellitus - continue statin therapy.    Additional Plan Details:    1.) Continue monitoring blood sugar 3x daily, fasting and ac dinner or at bedtime. Discussed ADA goal for fasting blood sugar, 80 - 130 mg/dL; pp blood sugars below 180 mg/dl. Also, discussed prevention of hypoglycemia and the need to adjust goals to higher levels if persistent hypoglycemia.  Reminded to bring BG records or meter to each visit for review.  2.) Return to clinic in 3 months for follow up. The patient was explained the above plan and given opportunity to ask questions.  She understands, chooses and consents to this plan and accepts all the risks, which include but are not limited to the risks mentioned above. She understands the alternative of having no testing, interventions or treatments at this time. She left content and without further questions.     Amelia Santana, SAMSON-C, [facility] [place]    A total of 30 minutes was spent in face to face time, of which over 50 % was spent in counseling patient on disease process, complications, treatment, and side effects of medications.

## 2024-07-12 NOTE — TELEPHONE ENCOUNTER
Contacted patient; Patient wanted to know if she's supposed to be on the Metoprolol; Advised that she was supposed to stop the metoprolol and start the Cardizem in it's place. Patient understood with no further questions or concerns.       ----- Message from Kenroy Palafox MA sent at 7/12/2024 10:36 AM CDT -----  Contact: [email or web address] [phone]  Pt called                Pt is requesting a call back to speak with provider or staff to discuss a question she has for medication Metoprolol.

## 2024-07-13 LAB
ALBUMIN SERPL BCP-MCNC: 3.9 G/DL (ref 3.5–5.2)
ALBUMIN/CREAT UR: 135 UG/MG (ref 0–30)
ALP SERPL-CCNC: 59 U/L (ref 55–135)
ALT SERPL W/O P-5'-P-CCNC: 43 U/L (ref 10–44)
ANION GAP SERPL CALC-SCNC: 8 MMOL/L (ref 8–16)
AST SERPL-CCNC: 33 U/L (ref 10–40)
BILIRUB SERPL-MCNC: 0.3 MG/DL (ref 0.1–1)
BUN SERPL-MCNC: 13 MG/DL (ref 6–20)
CALCIUM SERPL-MCNC: 9.1 MG/DL (ref 8.7–10.5)
CHLORIDE SERPL-SCNC: 97 MMOL/L (ref 95–110)
CO2 SERPL-SCNC: 33 MMOL/L (ref 23–29)
CREAT SERPL-MCNC: 0.8 MG/DL (ref 0.5–1.4)
CREAT UR-MCNC: 40 MG/DL (ref 15–325)
EST. GFR  (NO RACE VARIABLE): >60 ML/MIN/1.73 M^2
ESTIMATED AVG GLUCOSE: 166 MG/DL (ref 68–131)
GLUCOSE SERPL-MCNC: 180 MG/DL (ref 70–110)
HBA1C MFR BLD: 7.4 % (ref 4–5.6)
MICROALBUMIN UR DL<=1MG/L-MCNC: 54 UG/ML
POTASSIUM SERPL-SCNC: 4.3 MMOL/L (ref 3.5–5.1)
PROT SERPL-MCNC: 7.2 G/DL (ref 6–8.4)
SODIUM SERPL-SCNC: 138 MMOL/L (ref 136–145)

## 2024-07-17 ENCOUNTER — CLINICAL SUPPORT (OUTPATIENT)
Dept: SMOKING CESSATION | Facility: CLINIC | Age: 59
End: 2024-07-17
Payer: COMMERCIAL

## 2024-07-17 DIAGNOSIS — F17.200 NICOTINE DEPENDENCE: Primary | ICD-10-CM

## 2024-07-17 PROCEDURE — 99407 BEHAV CHNG SMOKING > 10 MIN: CPT | Mod: S$GLB,,,

## 2024-07-18 NOTE — PROGRESS NOTES
Spoke with patient today in regard to smoking cessation progress for 3 month telephone follow up, she states tobacco free. Commended patient on the accomplishment thus far. Patient states the current use of the nicotine patch and has a scheduled follow up visit. Informed patient of benefit period, future follow ups, and contact information if any further help or support is needed. Will complete smart form for 3 month follow up on Quit attempt #3.

## 2024-07-22 ENCOUNTER — CLINICAL SUPPORT (OUTPATIENT)
Dept: SMOKING CESSATION | Facility: CLINIC | Age: 59
End: 2024-07-22
Payer: COMMERCIAL

## 2024-07-22 DIAGNOSIS — F17.200 NICOTINE DEPENDENCE: Primary | ICD-10-CM

## 2024-07-22 PROCEDURE — 99999 PR PBB SHADOW E&M-EST. PATIENT-LVL II: CPT | Mod: PBBFAC,,,

## 2024-07-22 PROCEDURE — 99403 PREV MED CNSL INDIV APPRX 45: CPT | Mod: S$GLB,,,

## 2024-07-22 NOTE — PROGRESS NOTES
Individual Follow-Up Form    7/22/2024    Quit Date: 5/30/24    Clinical Status of Patient: Outpatient    Length of Service: 45 minutes    Continuing Medication: yes  Patches    Other Medications: none     Target Symptoms: Withdrawal and medication side effects. The following were  rated moderate (3) to severe (4) on TCRS:  Moderate (3): none  Severe (4): none    Comments: The patient was seen in the clinic for smoking cessation follow up.  Patient remains quit as of 5/30/24. Commended patient for this continued quit. Per [employer] CO 11 ppm, (<6 is a non-smoker).  The patient remains on the prescribed tobacco cessation medication regimen of 14 mg patches without any negative side effects at this time. Session Focus:  completion of TCRS (Tobacco Cessation Rating Scale) reviewed strategies, cues, triggers, high risk situations, lapses, relapses, diet, exercise, stress, relaxation, sleep, habitual behavior, and life style changes. GOALS: 1. Control snacking. 2. Breathing exercises. The patient denies any abnormal behavioral or mental changes at this time. The patient will continue with  therapy sessions and medication monitoring by CTTS. Prescribed medication management will be by physician.    Diagnosis: F17.210    Next Visit: 2 weeks

## 2024-07-22 NOTE — Clinical Note
The patient was seen in the clinic for smoking cessation follow up.  Patient remains quit as of 5/30/24. Commended patient for this continued quit. Per Smoker[employer] CO 11 ppm, (<6 is a non-smoker).  The patient remains on the prescribed tobacco cessation medication regimen of 14 mg patches without any negative side effects at this time. Session Focus:  completion of TCRS (Tobacco Cessation Rating Scale) reviewed strategies, cues, triggers, high risk situations, lapses, relapses, diet, exercise, stress, relaxation, sleep, habitual behavior, and life style changes. GOALS: 1. Control snacking. 2. Breathing exercises. The patient denies any abnormal behavioral or mental changes at this time. The patient will continue with  therapy sessions and medication monitoring by CTTS. Prescribed medication management will be by physician.

## 2024-08-01 ENCOUNTER — CLINICAL SUPPORT (OUTPATIENT)
Dept: SMOKING CESSATION | Facility: CLINIC | Age: 59
End: 2024-08-01
Payer: COMMERCIAL

## 2024-08-01 DIAGNOSIS — F17.200 NICOTINE DEPENDENCE: ICD-10-CM

## 2024-08-01 PROCEDURE — 99404 PREV MED CNSL INDIV APPRX 60: CPT | Mod: S$GLB,,,

## 2024-08-01 PROCEDURE — 99999 PR PBB SHADOW E&M-EST. PATIENT-LVL II: CPT | Mod: PBBFAC,,,

## 2024-08-01 RX ORDER — IBUPROFEN 200 MG
1 TABLET ORAL DAILY
Qty: 28 PATCH | Refills: 1 | Status: SHIPPED | OUTPATIENT
Start: 2024-08-01

## 2024-08-01 NOTE — Clinical Note
Spoke with patient at length for smoking cessation follow up. Patient remains quit as of 5/30/24. Commended patient for this continued quit. The patient remains on the prescribed tobacco cessation medication regimen of 14 mg patches without any negative side effects at this time. She states that she stays busy by baking and playing with her puzzle books. Session Focus: completion of TCRS (Tobacco Cessation Rating Scale) reviewed strategies, cues, triggers, high risk situations, lapses, relapses, diet, exercise, stress, relaxation, sleep, habitual behavior, and life style changes. GOALS: 1.  Breathing exercises. 2. Stay busy. The patient denies any abnormal behavioral or mental changes at this time. The patient will continue with  therapy sessions and medication monitoring by CTTS. Prescribed medication management will be by physician. This session is her last as her benefit period is over. She has our number and knows she will be contacted every 3 months.

## 2024-08-07 ENCOUNTER — OFFICE VISIT (OUTPATIENT)
Dept: CARDIOLOGY | Facility: CLINIC | Age: 59
End: 2024-08-07
Payer: MEDICARE

## 2024-08-07 ENCOUNTER — LAB VISIT (OUTPATIENT)
Dept: LAB | Facility: HOSPITAL | Age: 59
End: 2024-08-07
Attending: INTERNAL MEDICINE
Payer: MEDICARE

## 2024-08-07 VITALS
HEART RATE: 84 BPM | BODY MASS INDEX: 35.79 KG/M2 | DIASTOLIC BLOOD PRESSURE: 62 MMHG | WEIGHT: 202.06 LBS | OXYGEN SATURATION: 96 % | SYSTOLIC BLOOD PRESSURE: 110 MMHG

## 2024-08-07 DIAGNOSIS — Z95.5 S/P CORONARY ARTERY STENT PLACEMENT: ICD-10-CM

## 2024-08-07 DIAGNOSIS — I25.118 CORONARY ARTERY DISEASE OF NATIVE ARTERY OF NATIVE HEART WITH STABLE ANGINA PECTORIS: ICD-10-CM

## 2024-08-07 DIAGNOSIS — I20.9 ANGINA PECTORIS, UNSPECIFIED: ICD-10-CM

## 2024-08-07 DIAGNOSIS — I73.9 PVD (PERIPHERAL VASCULAR DISEASE): ICD-10-CM

## 2024-08-07 DIAGNOSIS — E78.5 HYPERLIPIDEMIA, UNSPECIFIED HYPERLIPIDEMIA TYPE: ICD-10-CM

## 2024-08-07 DIAGNOSIS — Z78.9 STATIN INTOLERANCE: ICD-10-CM

## 2024-08-07 DIAGNOSIS — Z87.891 EX-SMOKER: ICD-10-CM

## 2024-08-07 DIAGNOSIS — I25.110 ATHEROSCLEROSIS OF NATIVE CORONARY ARTERY OF NATIVE HEART WITH UNSTABLE ANGINA PECTORIS: Primary | ICD-10-CM

## 2024-08-07 DIAGNOSIS — E78.2 MIXED HYPERLIPIDEMIA: ICD-10-CM

## 2024-08-07 DIAGNOSIS — Z86.73 HX OF ARTERIAL ISCHEMIC STROKE: ICD-10-CM

## 2024-08-07 DIAGNOSIS — I50.32 CHRONIC DIASTOLIC HEART FAILURE: ICD-10-CM

## 2024-08-07 LAB
ANION GAP SERPL CALC-SCNC: 13 MMOL/L (ref 8–16)
BASOPHILS # BLD AUTO: 0.11 K/UL (ref 0–0.2)
BASOPHILS NFR BLD: 1 % (ref 0–1.9)
BUN SERPL-MCNC: 16 MG/DL (ref 6–20)
CALCIUM SERPL-MCNC: 9.7 MG/DL (ref 8.7–10.5)
CHLORIDE SERPL-SCNC: 99 MMOL/L (ref 95–110)
CO2 SERPL-SCNC: 29 MMOL/L (ref 23–29)
CREAT SERPL-MCNC: 1 MG/DL (ref 0.5–1.4)
DIFFERENTIAL METHOD BLD: ABNORMAL
EOSINOPHIL # BLD AUTO: 1.3 K/UL (ref 0–0.5)
EOSINOPHIL NFR BLD: 11.8 % (ref 0–8)
ERYTHROCYTE [DISTWIDTH] IN BLOOD BY AUTOMATED COUNT: 15.9 % (ref 11.5–14.5)
EST. GFR  (NO RACE VARIABLE): >60 ML/MIN/1.73 M^2
GLUCOSE SERPL-MCNC: 67 MG/DL (ref 70–110)
HCT VFR BLD AUTO: 48.7 % (ref 37–48.5)
HGB BLD-MCNC: 15.6 G/DL (ref 12–16)
IMM GRANULOCYTES # BLD AUTO: 0.03 K/UL (ref 0–0.04)
IMM GRANULOCYTES NFR BLD AUTO: 0.3 % (ref 0–0.5)
INR PPP: 1.1 (ref 0.8–1.2)
LYMPHOCYTES # BLD AUTO: 4 K/UL (ref 1–4.8)
LYMPHOCYTES NFR BLD: 35.4 % (ref 18–48)
MCH RBC QN AUTO: 33.1 PG (ref 27–31)
MCHC RBC AUTO-ENTMCNC: 32 G/DL (ref 32–36)
MCV RBC AUTO: 103 FL (ref 82–98)
MONOCYTES # BLD AUTO: 0.9 K/UL (ref 0.3–1)
MONOCYTES NFR BLD: 7.5 % (ref 4–15)
NEUTROPHILS # BLD AUTO: 5 K/UL (ref 1.8–7.7)
NEUTROPHILS NFR BLD: 44 % (ref 38–73)
NRBC BLD-RTO: 0 /100 WBC
PLATELET # BLD AUTO: 222 K/UL (ref 150–450)
PMV BLD AUTO: 10.4 FL (ref 9.2–12.9)
POTASSIUM SERPL-SCNC: 4 MMOL/L (ref 3.5–5.1)
PROTHROMBIN TIME: 11.5 SEC (ref 9–12.5)
RBC # BLD AUTO: 4.72 M/UL (ref 4–5.4)
SODIUM SERPL-SCNC: 141 MMOL/L (ref 136–145)
WBC # BLD AUTO: 11.37 K/UL (ref 3.9–12.7)

## 2024-08-07 PROCEDURE — 80048 BASIC METABOLIC PNL TOTAL CA: CPT | Mod: HCNC | Performed by: INTERNAL MEDICINE

## 2024-08-07 PROCEDURE — 85025 COMPLETE CBC W/AUTO DIFF WBC: CPT | Mod: HCNC | Performed by: INTERNAL MEDICINE

## 2024-08-07 PROCEDURE — 85610 PROTHROMBIN TIME: CPT | Mod: HCNC | Performed by: INTERNAL MEDICINE

## 2024-08-07 PROCEDURE — 99999 PR PBB SHADOW E&M-EST. PATIENT-LVL IV: CPT | Mod: PBBFAC,HCNC,, | Performed by: INTERNAL MEDICINE

## 2024-08-07 PROCEDURE — 36415 COLL VENOUS BLD VENIPUNCTURE: CPT | Mod: HCNC | Performed by: INTERNAL MEDICINE

## 2024-08-07 RX ORDER — PRAVASTATIN SODIUM 40 MG/1
40 TABLET ORAL NIGHTLY
Qty: 30 TABLET | Refills: 11 | Status: SHIPPED | OUTPATIENT
Start: 2024-08-07 | End: 2025-08-07

## 2024-08-07 RX ORDER — PRASUGREL 5 MG/1
5 TABLET, FILM COATED ORAL DAILY
Qty: 30 TABLET | Refills: 11 | Status: SHIPPED | OUTPATIENT
Start: 2024-08-07 | End: 2025-08-07

## 2024-08-07 NOTE — H&P (VIEW-ONLY)
Subjective:   Patient ID:  Alexandra Goins is a 58 y.o. female who presents for evaluation of No chief complaint on file.      HPI   August 7, 2024.    Comes in for follow-up.    She continues to have chest pain.  She describes it as sharp to pressure-like left sided.  She states this is same to prior to the stent.    He is not always exertional but it can worsen with activity.    She states that nitroglycerin resolves immediately.    Compliant with her medications.    She initially had some intolerance to the Brilinta and beta-blocker and she was switched to Effient and Cardizem.    She quit smoking vaping.    5.15.2024    Comes in for follow-up.    She had an abnormal nuclear stress test after last visit with large ischemia to the anterior wall.    Continues to have midsternal chest pain.    She decreased energy drinks and she feels somewhat better but she still has pretty much same symptoms Of chest pain and dyspnea on exertion.  Continues to smoke.      4.30.2024  Continues to have intermittent mixed types of chest pain.  She states that sometimes she wakes up in the middle of the night with sharp pain lasting for couple of minutes radiating up to her left shoulder sometimes all few seconds.    She states sometimes it gets worse with walking but not all the time.  Describes as midsternal shooting to her left shoulder most of the time only lasting few seconds.    She was recently discharged from the hospital for atypical chest pain.  PVCs.  Her Holter monitor was without major arrhythmias.    She drinks multiple energy drinks a day and coffee and she has starting to cut down on that.   in the room today reports that history.    Echocardiogram during her recent visit was normal.  Troponins were negative.  EKG with occasional PVCs.    She has dyspnea on exertion but she also has history of COPD prior recently on home O2.  Did not require home O2 this time.        8.2022  Comes in for a follow-up.  He  recently discharged from the hospital after septic shock with salmonella bacteremia/UTI.  BNP was elevated on at towards the discharge.  She is using 4 L of oxygen at home.  This was a virtual visit.  She states that she is recovering slowly but surely.  Denies significant leg swelling.  Denies orthopnea.  Does report however dyspnea on exertion that is improving as per patient.    She reports that she quit smoking.  And using nicotine patches for now.    57 yo female,  CAD, PVCs, h/o syncope, HTN, HLD, cerebral aneurysm, h/o CVA, DM II, asthma, GERD, bipolar disorder, family h/o premature CAD, and tobacco. As well as brain anuerysm   Syncope or 2020 with CPR, thought to be secondary possibly to seizure.  Workup was done at [facility].  Normal nuclear stress test in 2020.  14 days monitor showed only 1% PVCs.  Intolerant to Repatha.  On that he was LDL of 55.   Follows for brain aneurysm with neurosurgery.         Past Medical History:   Diagnosis Date    Acute cystitis without hematuria 11/11/2023    Acute ischemic stroke 09/17/2019    Acute respiratory failure with hypoxia 02/11/2021    Acute right-sided weakness 09/17/2019    Aneurysm     Anxiety     Arthritis     Asthma     Bipolar 1 disorder     Cerebral aneurysm, nonruptured 09/19/2019    Chest pain 01/24/2017    Chronic diastolic heart failure 05/23/2023    Coronary artery disease of native artery of native heart with stable angina pectoris 01/19/2022    Depression     Diabetes mellitus, type 2     BS 72 01/23/2024    Diverticular disease     GERD (gastroesophageal reflux disease)     Hemiplegia and hemiparesis following unspecified cerebrovascular disease affecting left dominant side 01/21/2020    Hyperlipemia     Hypertension     Hyponatremia 07/24/2022    Hypothyroidism     Pneumonia     PVD (peripheral vascular disease) 04/28/2021    Renal manifestation of secondary diabetes mellitus     Right-sided back pain 06/29/2019    S/P admn tPA in diff  fac w/n last 24 hr bef adm to crnt fac 2019    Severe obesity (BMI 35.0-39.9) with comorbidity 2022    Stroke     Trouble in sleeping        Past Surgical History:   Procedure Laterality Date    ANGIOGRAM, CORONARY, WITH LEFT HEART CATHETERIZATION N/A 2024    Procedure: Angiogram, Coronary, with Left Heart Cath;  Surgeon: Meagan Espinoza MD;  Location: [facility] CATH LAB;  Service: Cardiology;  Laterality: N/A;    CEREBRAL ANGIOGRAM N/A 10/28/2019    Procedure: ANGIOGRAM-CEREBRAL;  [facility];  Location: [facility] OR [address] [place] [address];  Service: Radiology;  Laterality: N/A;  Rm 190/Aayush    CHOLECYSTECTOMY      COLON SURGERY      COLONOSCOPY N/A 2018    Procedure: COLONOSCOPY;  Surgeon: Roque Mahajan III, MD;  Location: [facility] ENDO;  Service: Endoscopy;  Laterality: N/A;    COLONOSCOPY N/A 10/13/2023    Procedure: COLONOSCOPY;  Surgeon: Thelma Chairez MD;  Location: [facility] ENDO;  Service: Endoscopy;  Laterality: N/A;    CORONARY STENT PLACEMENT Left 2024    Procedure: INSERTION, STENT, CORONARY ARTERY;  Surgeon: Meagan Espinoza MD;  Location: [facility] CATH LAB;  Service: Cardiology;  Laterality: Left;    DENTAL SURGERY  2018    removal of 8 top teeth    HYSTERECTOMY      PERCUTANEOUS TRANSLUMINAL BALLOON ANGIOPLASTY OF CORONARY ARTERY Left 2024    Procedure: Angioplasty-coronary;  Surgeon: Meagan Espinoza MD;  Location: [facility] CATH LAB;  Service: Cardiology;  Laterality: Left;    TONSILLECTOMY         Social History     Tobacco Use    Smoking status: Former     Current packs/day: 0.00     Average packs/day: 1 pack/day for 46.4 years (46.4 ttl pk-yrs)     Types: Cigarettes, Vaping w/o nicotine     Start date:      Quit date: 2024     Years since quittin.1     Passive exposure: Past    Smokeless tobacco: Never    Tobacco comments:     On average, smokes 10 cigarettes per day.      Quit 24   Substance Use Topics    Alcohol use: Not Currently    Drug  use: Yes     Types: Marijuana       Family History   Problem Relation Name Age of Onset    Alcohol abuse Mother      COPD Mother      Depression Mother      Drug abuse Mother      Alcohol abuse Father      Arthritis Father      Cancer Father      Heart disease Father      Hypertension Father      COPD Sister      Kidney failure Sister      Heart disease Brother      Hypertension Brother      Cancer Maternal Aunt      Cancer Maternal Uncle      Diabetes Maternal Uncle      Drug abuse Maternal Uncle      Cancer Paternal Aunt      Cancer Paternal Uncle      Arthritis Maternal Grandmother      Hypertension Maternal Grandmother      Breast cancer Maternal Grandmother      Arthritis Paternal Grandmother      Cancer Paternal Grandmother      Hypertension Paternal Grandfather         Review of Systems   Cardiovascular:  Positive for chest pain and dyspnea on exertion. Negative for palpitations and syncope.   Genitourinary: Negative.    Neurological: Negative.        Current Outpatient Medications on File Prior to Visit   Medication Sig    ACCU-CHEK GUIDE GLUCOSE METER Misc USE AS DIRECTED    albuterol (PROVENTIL/VENTOLIN HFA) 90 mcg/actuation inhaler INHALE 2 TO 4 PUFFS BY MOUTH THREE TIMES DAILY AS NEEDED FOR WHEEZING    ARIPiprazole (ABILIFY) 10 MG Tab Take 1/2 tablet daily for 7 days then 1 daily thereafter.    aspirin (ECOTRIN) 81 MG EC tablet Take 1 tablet (81 mg total) by mouth once daily.    benztropine (COGENTIN) 0.5 MG tablet Take 1 tablet (0.5 mg total) by mouth 2 (two) times daily. TAKE 1 TABLET BY MOUTH TWICE DAILY AS NEEDED FOR  DYSTONIA    blood sugar diagnostic Strp Inject 1 each into the skin 4 (four) times daily. Dispense preferred on insurance    butalbital-acetaminophen-caffeine -40 mg (FIORICET, ESGIC) -40 mg per tablet Take 1 tablet by mouth every 6 (six) hours as needed for Headaches. for pain.    desvenlafaxine succinate (PRISTIQ) 50 MG Tb24 Take 1 tablet (50 mg total) by mouth once daily.     diazePAM (VALIUM) 10 MG Tab TAKE 1/2 TO 1 (ONE-HALF TO ONE) TABLET BY MOUTH THREE TIMES DAILY AS NEEDED FOR ANXIETY    diltiaZEM (CARDIZEM CD) 120 MG Cp24 Take 1 capsule (120 mg total) by mouth once daily.    furosemide (LASIX) 40 MG tablet Take 1 tablet (40 mg total) by mouth once daily.    gabapentin (NEURONTIN) 600 MG tablet TAKE 1 TABLET BY MOUTH IN THE MORNING AND 1 AT AFTERNOON AND 2 AT BEDTIME    insulin aspart, niacinamide, (FIASP FLEXTOUCH U-100 INSULIN) 100 unit/mL (3 mL) InPn Inject 10 Units into the skin 3 (three) times daily with meals.    isosorbide mononitrate (IMDUR) 30 MG 24 hr tablet Take 1 tablet (30 mg total) by mouth once daily.    lancets Misc Inject 1 each into the skin 4 (four) times daily. Dispense preferred on insurance    metFORMIN (GLUCOPHAGE-XR) 750 MG ER 24hr tablet Take 1 tablet (750 mg total) by mouth 2 (two) times daily with meals.    mirtazapine (REMERON) 15 MG tablet Take 1 tablet (15 mg total) by mouth every evening.    nicotine (NICODERM CQ) 14 mg/24 hr Place 1 patch onto the skin once daily.    nicotine (NICODERM CQ) 21 mg/24 hr Place 1 patch onto the skin once daily. Please fill with HERMINIO [id number] SCT ID [id number]  [facility] [id number] GROUP PRXSCT  - 0.00 CO-PAY. Best contact #[phone]    nitroGLYCERIN (NITROSTAT) 0.4 MG SL tablet Place 1 tablet (0.4 mg total) under the tongue every 5 (five) minutes as needed for Chest pain.    promethazine (PHENERGAN) 12.5 MG Tab Take 1 tablet (12.5 mg total) by mouth every 6 (six) hours as needed.    TOUJEO MAX U-300 SOLOSTAR 300 unit/mL (3 mL) insulin pen INJECT 76 UNITS SUBCUTANEOUSLY ONCE DAILY    [DISCONTINUED] prasugreL (EFFIENT) 10 mg Tab Take 1 tablet (10 mg total) by mouth once daily.    [DISCONTINUED] pravastatin (PRAVACHOL) 10 MG tablet Take 1 tablet (10 mg total) by mouth every evening.    albuterol-ipratropium (DUO-NEB) 2.5 mg-0.5 mg/3 mL nebulizer solution Take 3 mLs by nebulization every 6 (six) hours as needed for Wheezing. Rescue     nicotine polacrilex 2 MG Lozg Take 1 lozenge (2 mg total) by mouth as needed (10 per day). Dispense FRUIT please. DO NOT CHEW UP. Can take 1-2 per hour in place of a cigarette for breakthrough cravings in place of a cigarette. SMOKING CESSATION CARD Please fill with PCN [id number] SCT ID [id number]  [facility] [id number] GROUP PRXSCT  - $0.00 CO-PAY. Best contact #[phone] (Patient not taking: Reported on 6/10/2024)     No current facility-administered medications on file prior to visit.       Objective:   Objective:  Wt Readings from Last 3 Encounters:   08/07/24 91.7 kg (202 lb 0.8 oz)   07/12/24 91.7 kg (202 lb 0.8 oz)   06/11/24 90.7 kg (200 lb)     Temp Readings from Last 3 Encounters:   05/30/24 97.7 °F (36.5 °C) (Temporal)   03/27/24 97.9 °F (36.6 °C)   03/21/24 97.5 °F (36.4 °C)     BP Readings from Last 3 Encounters:   08/07/24 110/62   07/12/24 111/68   06/06/24 114/66     Pulse Readings from Last 3 Encounters:   08/07/24 84   07/12/24 77   06/06/24 76       Physical Exam  Vitals reviewed.   Constitutional:       Appearance: She is well-developed.   Neck:      Vascular: No carotid bruit.   Cardiovascular:      Rate and Rhythm: Normal rate and regular rhythm.      Pulses: Intact distal pulses.      Heart sounds: Normal heart sounds. No murmur heard.  Pulmonary:      Breath sounds: Normal breath sounds.   Neurological:      Mental Status: She is oriented to person, place, and time.       This was a virtual visit.  Patient looks not in distress.  Wearing oxygen cannula.    Lab Results   Component Value Date    CHOL 191 10/09/2023    CHOL 197 11/02/2022    CHOL 145 10/20/2021     Lab Results   Component Value Date    HDL 44 10/09/2023    HDL 44 11/02/2022    HDL 61 10/20/2021     Lab Results   Component Value Date    LDLCALC 98.2 10/09/2023    LDLCALC 90.6 11/02/2022    LDLCALC 55.0 (L) 10/20/2021     Lab Results   Component Value Date    TRIG 244 (H) 10/09/2023    TRIG 312 (H) 11/02/2022    TRIG 145 10/20/2021      Lab Results   Component Value Date    CHOLHDL 23.0 10/09/2023    CHOLHDL 22.3 11/02/2022    CHOLHDL 42.1 10/20/2021       Chemistry        Component Value Date/Time     07/12/2024 0837    K 4.3 07/12/2024 0837    CL 97 07/12/2024 0837    CO2 33 (H) 07/12/2024 0837    BUN 13 07/12/2024 0837    CREATININE 0.8 07/12/2024 0837     (H) 07/12/2024 0837        Component Value Date/Time    CALCIUM 9.1 07/12/2024 0837    ALKPHOS 59 07/12/2024 0837    AST 33 07/12/2024 0837    ALT 43 07/12/2024 0837    BILITOT 0.3 07/12/2024 0837    ESTGFRAFRICA >60 07/31/2022 0700    EGFRNONAA >60 07/31/2022 0700          Lab Results   Component Value Date    TSH 3.836 03/14/2024     Lab Results   Component Value Date    INR 1.1 05/15/2024    INR 1.1 08/16/2020    INR 1.0 10/28/2019     Lab Results   Component Value Date    WBC 8.47 06/06/2024    HGB 16.0 06/06/2024    HCT 48.7 (H) 06/06/2024    MCV 96 06/06/2024     06/06/2024     BNP  @LABRCNTIP(BNP,BNPTRIAGEBLO)@  CrCl cannot be calculated (Patient's most recent lab result is older than the maximum 7 days allowed.).     Imaging:  ======  Results for orders placed during the hospital encounter of 07/24/22    Echo Saline Bubble? No    Interpretation Summary  · The left ventricle is normal in size with concentric hypertrophy and normal systolic function.  · The estimated ejection fraction is 60%.  · Indeterminate left ventricular diastolic function.  · Normal right ventricular size with normal right ventricular systolic function.  · Normal central venous pressure (3 mmHg).  · Trivial pericardial effusion.    No results found for this or any previous visit.    No results found for this or any previous visit.    Results for orders placed during the hospital encounter of 07/15/16    X-Ray Chest PA And Lateral    Narrative  2 view chest    Comparison: 06/23/2016    Findings: There is a curvilinear area of increased density in the suprahilar region on the left which is  favored to represent a combination of vasculature and possibly some atelectasis with infiltrate thought less likely.  Diffuse chronic increased peribronchial markings are noted.  The heart is not enlarged.  IMPRESSION:      1.  As above  ______________________________________    Electronically signed by: LEXII TUCKER D.O.  Date:     07/15/16  Time:    09:26    No results found for this or any previous visit.    No valid procedures specified.    Diagnostic Results:  ECG: Reviewed  Results for orders placed during the hospital encounter of 03/14/24    Echo    Interpretation Summary    Left Ventricle: The left ventricle is normal in size. Normal wall thickness. There is concentric hypertrophy. There is normal systolic function with a visually estimated ejection fraction of 60 - 65%. There is normal diastolic function.    Right Ventricle: Normal right ventricular cavity size. Wall thickness is normal. Right ventricle wall motion  is normal. Systolic function is normal.    Pulmonary Artery: The estimated pulmonary artery systolic pressure is 22 mmHg.    IVC/SVC: Normal venous pressure at 3 mmHg.    Results for orders placed during the hospital encounter of 02/14/22    Nuclear Stress - Cardiology Interpreted    Interpretation Summary    Normal myocardial perfusion scan. There is no evidence of myocardial ischemia or infarction.    There is mild apical thinning which is a normal variant.    The gated perfusion images showed an ejection fraction of 71% at rest. The gated perfusion images showed an ejection fraction of 71% post stress. Normal ejection fraction is greater than 59%.    The EKG portion of this study is negative for ischemia.    Interpretation Summary 4.2024  Show Result ComparisonThe patient was monitored for a total of 13d 21h, underlying rhythm is sinus.  The minimum heart rate was 52 bpm; the maximum 135 bpm; the average 83 bpm.  0 % of Atrial fibrillation/Atrial flutter with longest episode of 0  ms.  The total burden of AV Block present was 0 % [Complete Heart Block: 0 %; Advanced (High Grade):  0 %; 2nd Degree, Mobitz II: 0 %; 2nd Degree, Mobitz I: 0 %].  There were 0 pauses, the longest pause was 0 ms at --.  Total count of Ventricular Tachycardia (VT): 2 episode(s). Longest VT: 5 beats on Day 14 / 03:04:51  pm. Fastest VT: 127 bpm on  Day 9 / 08:44:49 am.  0 supraventricular episodes were found. Longest SVT Episode 0 s, Fastest SVT -- bpm  There were a total of 385 PVCs with 1 morphologies and 5 couplets. Overall PVC [place] at 0.03 %  There were a total of 0 Other Beats. There were 0 total number of paced beats.  There were a total of 93 PSVCs with 1 morphologies and 2 couplets. Overall PSVC [place] at  < 0.01 %  There is a total of 1 patient events.     Results for orders placed during the hospital encounter of 03/14/24    Echo    Interpretation Summary    Left Ventricle: The left ventricle is normal in size. Normal wall thickness. There is concentric hypertrophy. There is normal systolic function with a visually estimated ejection fraction of 60 - 65%. There is normal diastolic function.    Right Ventricle: Normal right ventricular cavity size. Wall thickness is normal. Right ventricle wall motion  is normal. Systolic function is normal.    Pulmonary Artery: The estimated pulmonary artery systolic pressure is 22 mmHg.    IVC/SVC: Normal venous pressure at 3 mmHg.    Results for orders placed during the hospital encounter of 05/09/24    Nuclear Stress - Cardiology Interpreted    Interpretation Summary    Abnormal myocardial perfusion scan.    There is a moderate intensity, large sized, mostly reversible perfusion abnormality with some fixed areas in the anterior, apical, anterolateral and anteroapical wall(s).    Basal to mid anterolateral wall ischemia is present.    There are no other significant perfusion abnormalities.    The gated perfusion images showed an ejection fraction of 64% at rest. The  gated perfusion images showed an ejection fraction of 67% post stress.    There is normal wall motion at rest and post stress.    The ECG portion of the study is negative for ischemia.    The patient reported no chest pain during the stress test.    There were no arrhythmias during stress.      Results for orders placed during the hospital encounter of 05/30/24    Cardiac catheterization    Conclusion    The Mid LAD lesion was 85% stenosed with 0% stenosis post-intervention. iFR 0.77    The pre-procedure left ventricular end diastolic pressure was 14.    Mid LAD lesion: A STENT SYNERGY XD [id number] stent was not successfully placed. Post dilated with 2.5 mm NC balloon    The estimated blood loss was none.    There was single vessel coronary artery disease.    The PCI was successful.    The filling pressures on the left were normal.    Coronary fistula across septal arteries    The procedure log was documented by Documenter: Nitza Vera RN and verified by Meagan Espinoza MD.    Date: 5/30/2024  Time: 12:14 PM    The ASCVD Risk score (Keila DK, et al., 2019) failed to calculate for the following reasons:    The patient has a prior MI or stroke diagnosis    Assessment and Plan:   Atherosclerosis of native coronary artery of native heart with unstable angina pectoris    Hyperlipidemia, unspecified hyperlipidemia type  -     pravastatin (PRAVACHOL) 40 MG tablet; Take 1 tablet (40 mg total) by mouth every evening.  Dispense: 30 tablet; Refill: 11  -     Ankle Brachial Indices (SIMIN); Future    S/P coronary artery stent placement  -     Basic metabolic panel; Future; Expected date: 08/07/2024  -     CBC Auto Differential; Future; Expected date: 08/07/2024  -     Protime-INR; Future; Expected date: 08/07/2024  -     prasugreL (EFFIENT) 5 mg Tab; Take 1 tablet (5 mg total) by mouth once daily.  Dispense: 30 tablet; Refill: 11    Angina pectoris, unspecified  -     CBC Auto Differential; Future; Expected date:  08/07/2024  -     Protime-INR; Future; Expected date: 08/07/2024    Ex-smoker    Statin intolerance    Chronic diastolic heart failure    Hx of arterial ischemic stroke    Coronary artery disease of native artery of native heart with stable angina pectoris    PVD (peripheral vascular disease)    Mixed hyperlipidemia          Continues to have chest pain.  Despite good medical treatment and recent stent placement.    Status post LAD stent tortuous vessel severely calcified.  We will do a relook angiogram given her continued chest pain and recent stent placement.  Risks and alternatives explained.  And she is agreeable.   in the room.  Encouraged to continue abstaining from smoking vaping  Reviewed all tests and above medical conditions with patient in detail and formulated treatment plan.  Risk factor modification discussed.   Cardiac low salt diet discussed.  Maintaining healthy weight and weight loss goals were discussed in clinic.  Continue with Imdur  We will lower Effient given easy bruising.  Increase pravastatin LDL not at goal.    Follow up after catheterization

## 2024-08-13 DIAGNOSIS — R51.9 GENERALIZED HEADACHES: ICD-10-CM

## 2024-08-13 DIAGNOSIS — I67.1 CEREBRAL ANEURYSM: ICD-10-CM

## 2024-08-13 RX ORDER — BUTALBITAL, ACETAMINOPHEN AND CAFFEINE 50; 325; 40 MG/1; MG/1; MG/1
1 TABLET ORAL EVERY 6 HOURS PRN
Qty: 30 TABLET | Refills: 0 | Status: SHIPPED | OUTPATIENT
Start: 2024-08-13

## 2024-08-13 NOTE — TELEPHONE ENCOUNTER
Refill Routing Note   Medication(s) are not appropriate for processing by [facility] for the following reason(s):        Outside of protocol    [facility] action(s):  Route               Appointments  past 12m or future 3m with PCP    Date Provider   Last Visit   3/21/2024 Perez Casiano MD   Next Visit   Visit date not found Perez Casiano MD   ED visits in past 90 days: 0        Note composed:9:04 AM 08/13/2024

## 2024-08-13 NOTE — TELEPHONE ENCOUNTER
No care due was identified.  [facility] Embedded Care Due Messages. Reference number: [id number].   8/13/2024 8:48:12 AM CDT

## 2024-08-15 ENCOUNTER — OFFICE VISIT (OUTPATIENT)
Dept: PSYCHIATRY | Facility: CLINIC | Age: 59
End: 2024-08-15
Payer: MEDICARE

## 2024-08-15 DIAGNOSIS — F31.9 BIPOLAR 1 DISORDER: Primary | ICD-10-CM

## 2024-08-15 DIAGNOSIS — G47.00 INSOMNIA, UNSPECIFIED TYPE: ICD-10-CM

## 2024-08-15 DIAGNOSIS — F41.9 ANXIETY: ICD-10-CM

## 2024-08-15 PROCEDURE — 3066F NEPHROPATHY DOC TX: CPT | Mod: HCNC,CPTII,95, | Performed by: PSYCHIATRY & NEUROLOGY

## 2024-08-15 PROCEDURE — 99214 OFFICE O/P EST MOD 30 MIN: CPT | Mod: HCNC,95,, | Performed by: PSYCHIATRY & NEUROLOGY

## 2024-08-15 PROCEDURE — 3051F HG A1C>EQUAL 7.0%<8.0%: CPT | Mod: HCNC,CPTII,95, | Performed by: PSYCHIATRY & NEUROLOGY

## 2024-08-15 PROCEDURE — 3060F POS MICROALBUMINURIA REV: CPT | Mod: HCNC,CPTII,95, | Performed by: PSYCHIATRY & NEUROLOGY

## 2024-08-15 RX ORDER — DESVENLAFAXINE 50 MG/1
50 TABLET, FILM COATED, EXTENDED RELEASE ORAL DAILY
Qty: 90 TABLET | Refills: 1 | Status: SHIPPED | OUTPATIENT
Start: 2024-08-15

## 2024-08-15 RX ORDER — DIAZEPAM 10 MG/1
TABLET ORAL
Qty: 90 TABLET | Refills: 2 | Status: SHIPPED | OUTPATIENT
Start: 2024-08-15

## 2024-08-15 RX ORDER — MIRTAZAPINE 15 MG/1
15 TABLET, FILM COATED ORAL NIGHTLY
Qty: 90 TABLET | Refills: 1 | Status: SHIPPED | OUTPATIENT
Start: 2024-08-15

## 2024-08-15 RX ORDER — ARIPIPRAZOLE 10 MG/1
TABLET ORAL
Qty: 90 TABLET | Refills: 1 | Status: SHIPPED | OUTPATIENT
Start: 2024-08-15

## 2024-08-15 RX ORDER — BENZTROPINE MESYLATE 0.5 MG/1
0.5 TABLET ORAL 2 TIMES DAILY
Qty: 180 TABLET | Refills: 1 | Status: SHIPPED | OUTPATIENT
Start: 2024-08-15
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
## 2024-08-16 ENCOUNTER — HOSPITAL ENCOUNTER (OUTPATIENT)
Facility: HOSPITAL | Age: 59
Discharge: HOME | End: 2024-08-16
Attending: INTERNAL MEDICINE | Admitting: INTERNAL MEDICINE
Payer: MEDICARE

## 2024-08-16 VITALS
TEMPERATURE: 98 F | HEART RATE: 65 BPM | HEIGHT: 63 IN | OXYGEN SATURATION: 95 % | DIASTOLIC BLOOD PRESSURE: 69 MMHG | SYSTOLIC BLOOD PRESSURE: 144 MMHG | BODY MASS INDEX: 35.79 KG/M2 | RESPIRATION RATE: 20 BRPM | WEIGHT: 202 LBS

## 2024-08-16 DIAGNOSIS — R07.9 CHEST PAIN, UNSPECIFIED TYPE: Primary | ICD-10-CM

## 2024-08-16 DIAGNOSIS — I25.10 CORONARY ARTERY DISEASE INVOLVING NATIVE CORONARY ARTERY OF NATIVE HEART, UNSPECIFIED WHETHER ANGINA PRESENT: ICD-10-CM

## 2024-08-16 LAB
CATH EF QUANTITATIVE: 65 %
POCT GLUCOSE: 172 MG/DL (ref 70–110)

## 2024-08-16 PROCEDURE — 93458 L HRT ARTERY/VENTRICLE ANGIO: CPT | Mod: HCNC | Performed by: INTERNAL MEDICINE

## 2024-08-16 PROCEDURE — 25000003 PHARM REV CODE 250: Mod: HCNC | Performed by: INTERNAL MEDICINE

## 2024-08-16 PROCEDURE — 63600175 PHARM REV CODE 636 W HCPCS: Mod: JZ,JG,HCNC | Performed by: INTERNAL MEDICINE

## 2024-08-16 PROCEDURE — 25500020 PHARM REV CODE 255: Mod: HCNC | Performed by: INTERNAL MEDICINE

## 2024-08-16 RX ORDER — FENTANYL CITRATE 50 UG/ML
INJECTION, SOLUTION INTRAMUSCULAR; INTRAVENOUS
Status: DISCONTINUED | OUTPATIENT
Start: 2024-08-16 | End: 2024-08-16 | Stop reason: HOSPADM

## 2024-08-16 RX ORDER — NITROGLYCERIN 5 MG/ML
INJECTION, SOLUTION INTRAVENOUS
Status: DISCONTINUED | OUTPATIENT
Start: 2024-08-16 | End: 2024-08-16 | Stop reason: HOSPADM

## 2024-08-16 RX ORDER — ISOSORBIDE MONONITRATE 60 MG/1
60 TABLET, EXTENDED RELEASE ORAL DAILY
Qty: 30 TABLET | Refills: 11 | Status: SHIPPED | OUTPATIENT
Start: 2024-08-16

## 2024-08-16 RX ORDER — ONDANSETRON 8 MG/1
8 TABLET, ORALLY DISINTEGRATING ORAL EVERY 8 HOURS PRN
Status: DISCONTINUED | OUTPATIENT
Start: 2024-08-16 | End: 2024-08-16 | Stop reason: HOSPADM

## 2024-08-16 RX ORDER — SODIUM CHLORIDE 9 MG/ML
INJECTION, SOLUTION INTRAVENOUS CONTINUOUS
Status: ACTIVE | OUTPATIENT
Start: 2024-08-16 | End: 2024-08-16

## 2024-08-16 RX ORDER — DIPHENHYDRAMINE HCL 50 MG
50 CAPSULE ORAL ONCE
Status: COMPLETED | OUTPATIENT
Start: 2024-08-16 | End: 2024-08-16

## 2024-08-16 RX ORDER — HEPARIN SODIUM 1000 [USP'U]/ML
INJECTION, SOLUTION INTRAVENOUS; SUBCUTANEOUS
Status: DISCONTINUED | OUTPATIENT
Start: 2024-08-16 | End: 2024-08-16 | Stop reason: HOSPADM

## 2024-08-16 RX ORDER — ACETAMINOPHEN 325 MG/1
650 TABLET ORAL EVERY 4 HOURS PRN
Status: DISCONTINUED | OUTPATIENT
Start: 2024-08-16 | End: 2024-08-16 | Stop reason: HOSPADM

## 2024-08-16 RX ORDER — VERAPAMIL HYDROCHLORIDE 2.5 MG/ML
INJECTION, SOLUTION INTRAVENOUS
Status: DISCONTINUED | OUTPATIENT
Start: 2024-08-16 | End: 2024-08-16 | Stop reason: HOSPADM

## 2024-08-16 RX ORDER — SODIUM CHLORIDE 0.9 % (FLUSH) 0.9 %
10 SYRINGE (ML) INJECTION
Status: DISCONTINUED | OUTPATIENT
Start: 2024-08-16 | End: 2024-08-16 | Stop reason: HOSPADM

## 2024-08-16 RX ORDER — PROMETHAZINE HYDROCHLORIDE 12.5 MG/1
12.5 TABLET ORAL ONCE
Status: COMPLETED | OUTPATIENT
Start: 2024-08-16 | End: 2024-08-16

## 2024-08-16 RX ORDER — LIDOCAINE HYDROCHLORIDE 20 MG/ML
INJECTION, SOLUTION INFILTRATION; PERINEURAL
Status: DISCONTINUED | OUTPATIENT
Start: 2024-08-16 | End: 2024-08-16 | Stop reason: HOSPADM

## 2024-08-16 RX ORDER — NAPROXEN SODIUM 220 MG/1
81 TABLET, FILM COATED ORAL ONCE
Status: COMPLETED | OUTPATIENT
Start: 2024-08-16 | End: 2024-08-16

## 2024-08-16 RX ORDER — MIDAZOLAM HYDROCHLORIDE 1 MG/ML
INJECTION, SOLUTION INTRAMUSCULAR; INTRAVENOUS
Status: DISCONTINUED | OUTPATIENT
Start: 2024-08-16 | End: 2024-08-16 | Stop reason: HOSPADM

## 2024-08-16 RX ADMIN — PROMETHAZINE HYDROCHLORIDE 12.5 MG: 12.5 TABLET ORAL at 09:08

## 2024-08-16 RX ADMIN — SODIUM CHLORIDE: 9 INJECTION, SOLUTION INTRAVENOUS at 06:08

## 2024-08-16 RX ADMIN — ASPIRIN 81 MG CHEWABLE TABLET 81 MG: 81 TABLET CHEWABLE at 06:08

## 2024-08-16 RX ADMIN — SODIUM CHLORIDE: 9 INJECTION, SOLUTION INTRAVENOUS at 08:08

## 2024-08-16 RX ADMIN — DIPHENHYDRAMINE HYDROCHLORIDE 50 MG: 50 CAPSULE ORAL at 06:08

## 2024-08-16 NOTE — Clinical Note
The DP pulses were 1+ bilaterally. The left PT pulse was 1+. The right PT pulse was 2+. The left radial pulse was allens test negative.

## 2024-08-16 NOTE — DISCHARGE SUMMARY
[facility] - Cath Lab (Hospital)  Cardiology  Discharge Summary      Patient Name: Alexandra Goins  MRN: [id number]  Admission Date: 8/16/2024  Hospital Length of Stay: 0 days  Discharge Date and Time:  08/16/2024 8:31 AM  Attending Physician: Meagan Espinoza MD    Discharging Provider: Meagan Espinoza MD  Primary Care Physician: Perez Casiano MD    HPI:   No notes on file    Procedure(s) (LRB):  Angiogram, Coronary, with Left Heart Cath (N/A)  Ventriculogram, Left     Indwelling Lines/Drains at time of discharge:  Lines/Drains/Airways       None                   Hospital Course:  No notes on file    Goals of Care Treatment Preferences:  Code Status: Full Code    Living Will: Yes              Consults:     Significant Diagnostic Studies: Angiography: Results for orders placed during the hospital encounter of 08/16/24    Cardiac catheterization    Conclusion    The ejection fraction was calculated to be 65%.    The LAD stent is patent, well apposed and expanded    The pre-procedure left ventricular end diastolic pressure was 23.    The post-procedure left ventricular end diastolic pressure was 26.    The estimated blood loss was none.    There was non-obstructive coronary artery disease..    There was diastolic dysfunction.    The filling pressures on the left were moderately elevated.    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    The procedure log was documented by Documenter: Jami Muir RN and verified by Meagan Espinoza MD.    Date: 8/16/2024  Time: 8:20 AM    Will increase imdur , unclear if chest pain origin is due to the fistula, no apparent origin and no enlargement of a specific coronary, there is possible multiple origins including small d1 , and very numerous distal septals and apical diagonals     Pending Diagnostic Studies:       None            There are no hospital problems to display for this patient.    Assessment & plan notes cannot be loaded without a  Patient is still inpatient. Will defer and call next week.   specified hospital service.      Discharged Condition: good    Disposition: Home or Self Care    Follow Up:    Patient Instructions:   No discharge procedures on file.  Medications:  Reconciled Home Medications:      Medication List        CHANGE how you take these medications      isosorbide mononitrate 60 MG 24 hr tablet  Commonly known as: IMDUR  Take 1 tablet (60 mg total) by mouth once daily.  What changed:   medication strength  how much to take            ASK your doctor about these medications      ACCU-CHEK GUIDE GLUCOSE METER Misc  Generic drug: blood-glucose meter  USE AS DIRECTED     albuterol 90 mcg/actuation inhaler  Commonly known as: PROVENTIL/VENTOLIN HFA  INHALE 2 TO 4 PUFFS BY MOUTH THREE TIMES DAILY AS NEEDED FOR WHEEZING     albuterol-ipratropium 2.5 mg-0.5 mg/3 mL nebulizer solution  Commonly known as: DUO-NEB  Take 3 mLs by nebulization every 6 (six) hours as needed for Wheezing. Rescue     ARIPiprazole 10 MG Tab  Commonly known as: ABILIFY  Take 1 tablet daily.     aspirin 81 MG EC tablet  Commonly known as: ECOTRIN  Take 1 tablet (81 mg total) by mouth once daily.     benztropine 0.5 MG tablet  Commonly known as: COGENTIN  Take 1 tablet (0.5 mg total) by mouth 2 (two) times daily. TAKE 1 TABLET BY MOUTH TWICE DAILY AS NEEDED FOR  DYSTONIA     blood sugar diagnostic Strp  Inject 1 each into the skin 4 (four) times daily. Dispense preferred on insurance     butalbital-acetaminophen-caffeine -40 mg -40 mg per tablet  Commonly known as: FIORICET, ESGIC  Take 1 tablet by mouth every 6 (six) hours as needed for Headaches. for pain.     desvenlafaxine succinate 50 MG Tb24  Commonly known as: PRISTIQ  Take 1 tablet (50 mg total) by mouth once daily.     diazePAM 10 MG Tab  Commonly known as: VALIUM  TAKE 1/2 TO 1 (ONE-HALF TO ONE) TABLET BY MOUTH THREE TIMES DAILY AS NEEDED FOR ANXIETY     diltiaZEM 120 MG Cp24  Commonly known as: CARDIZEM CD  Take 1 capsule (120 mg total) by mouth once  daily.     FIASP FLEXTOUCH U-100 INSULIN 100 unit/mL (3 mL) Inpn  Generic drug: insulin aspart (niacinamide)  Inject 10 Units into the skin 3 (three) times daily with meals.     furosemide 40 MG tablet  Commonly known as: LASIX  Take 1 tablet (40 mg total) by mouth once daily.     gabapentin 600 MG tablet  Commonly known as: NEURONTIN  TAKE 1 TABLET BY MOUTH IN THE MORNING AND 1 AT AFTERNOON AND 2 AT BEDTIME     lancets Misc  Inject 1 each into the skin 4 (four) times daily. Dispense preferred on insurance     metFORMIN 750 MG ER 24hr tablet  Commonly known as: GLUCOPHAGE-XR  Take 1 tablet (750 mg total) by mouth 2 (two) times daily with meals.     mirtazapine 15 MG tablet  Commonly known as: REMERON  Take 1 tablet (15 mg total) by mouth every evening.     * nicotine 21 mg/24 hr  Commonly known as: NICODERM CQ  Place 1 patch onto the skin once daily. Please fill with [facility] [id number] SCT ID [id number]  [facility] [id number] GROUP PRXSCT  - 0.00 CO-PAY. Best contact #[phone]     * nicotine 14 mg/24 hr  Commonly known as: NICODERM CQ  Place 1 patch onto the skin once daily.     nicotine polacrilex 2 MG Lozg  Take 1 lozenge (2 mg total) by mouth as needed (10 per day). Dispense FRUIT please. DO NOT CHEW UP. Can take 1-2 per hour in place of a cigarette for breakthrough cravings in place of a cigarette. SMOKING CESSATION CARD Please fill with N [id number] SCT ID [id number]  [facility] [id number] GROUP PRXSCT  - $0.00 CO-PAY. Best contact #[phone]     nitroGLYCERIN 0.4 MG SL tablet  Commonly known as: NITROSTAT  Place 1 tablet (0.4 mg total) under the tongue every 5 (five) minutes as needed for Chest pain.     prasugreL 5 mg Tab  Commonly known as: EFFIENT  Take 1 tablet (5 mg total) by mouth once daily.     pravastatin 40 MG tablet  Commonly known as: PRAVACHOL  Take 1 tablet (40 mg total) by mouth every evening.     promethazine 12.5 MG Tab  Commonly known as: PHENERGAN  Take 1 tablet (12.5 mg total) by mouth every 6 (six) hours as  needed.     TOUJEO MAX U-300 SOLOSTAR 300 unit/mL (3 mL) insulin pen  Generic drug: insulin glargine U-300 conc  INJECT 76 UNITS SUBCUTANEOUSLY ONCE DAILY           * This list has 2 medication(s) that are the same as other medications prescribed for you. Read the directions carefully, and ask your doctor or other care provider to review them with you.                  Time spent on the discharge of patient: 30 minutes    Meagan Espinoza MD  Cardiology  [facility] - Cath Lab ([facility])

## 2024-08-16 NOTE — DISCHARGE INSTRUCTIONS
"Post-op Heart Catheterization    1. DIET: It is advisable for you to follow a diet that limits the intake of salt, sugar, saturated fats and cholesterol.     2. DRIVING: Due to sedation you received during your procedure, DO NOT drive or operate machinery for 24 hours. Avoid making critical decisions or signing legal documents until tomorrow.    3. ACTIVITY: AVOID activities that require bending of the affected arm/wrist for 3 days and submerging the site in water for 3 days.   REMOVE the dressing the day after the procedure, and shower.    Wash with soap and water in hand; do not use wash cloth.  Apply a bandaid to site after shower if desired.  No ointments, lotions, powders, perfumes, ... for 5 days.  WEAR wrist immobilizer until Saturday night at bedtime.  No pushing, pulling, or lifting more than 10 pounds for 3 days  No riding motorcycles, riding lawn mowers, using push mowers or weed eaters... for 3 days.  You may RESUME your normal activities or prescribed exercise program as instructed by your physician after 3 days.                                                                                                       4. WOUND CARE: It is not unusual to have a small amount of bruising to appear at or near the puncture site. It is also common to have a tender "knot" develop beneath the skin at the puncture site of the wrist/arm. This is usually scar tissue and is not a cause for concern or alarm. This tender knot may take several weeks to fully resolve. The bruise will usually spread over several days. However, if the lump gets bigger, call your doctor immediately.    5. DISCOMFORT: For general discomfort at the puncture site, you may take 1 or 2 Acetaminophen (Tylenol) tablets every 4 - 6 hours as needed. (Do not take more than 4000 mg a day)    6. SIGNS AND SYMPTOMS TO REPORT:  Call your physician immediately if any of the following occur:                                            1. Loss of feeling, " "warmth or color to the affected arm/wrist                                                                                                            2. Mild bleeding from the site                 3. Pain that is sudden, sharp or persistent in the affected arm/wrist                 4. Swelling or a change in "lump" size, increased redness or drainage at the puncture site                                                                               5. High fever (101 degrees or higher)    7. GO TO  THE EMERGENCY ROOM OR CALL 911 IF YOU HAVE: Chest pains or discomforts not relieved with 3 nitroglycerin doses (sublingual tablets or spray), numbness or severe pain of limb, if your limb becomes cold or discolored or if you develop uncontrolled bleeding from the puncture site (quickly apply firm, direct pressure above the site).  "

## 2024-08-16 NOTE — PLAN OF CARE
Left FA saline lock discontinued with canula intact; tolerated well.  Pt able to eat and drink fluids while in CVRU without issue; remains AAOx3 at time of discharge.   Pt ambulated to and from bathroom without issue.   Discharge instructions, home medication list, and post discharge follow up appointments discussed with pt,  and daughter.  Pt to  medications from pharmacy at later time.  Discharged to family, via wheelchair, in no apparent distress. All personal belongings taken with pt.   Encouraged continued compliance with MD directives.

## 2024-08-20 ENCOUNTER — EXTERNAL HOME HEALTH (OUTPATIENT)
Dept: HOME HEALTH SERVICES | Facility: HOSPITAL | Age: 59
End: 2024-08-20
Payer: MEDICARE

## 2024-08-24 DIAGNOSIS — E11.42 DIABETIC POLYNEUROPATHY ASSOCIATED WITH TYPE 2 DIABETES MELLITUS: ICD-10-CM

## 2024-08-24 RX ORDER — INSULIN GLARGINE 300 U/ML
INJECTION, SOLUTION SUBCUTANEOUS
Qty: 12 ML | Refills: 1 | Status: SHIPPED | OUTPATIENT
Start: 2024-08-24

## 2024-09-03 ENCOUNTER — HOSPITAL ENCOUNTER (OUTPATIENT)
Dept: CARDIOLOGY | Facility: HOSPITAL | Age: 59
Discharge: HOME | End: 2024-09-03
Attending: INTERNAL MEDICINE
Payer: MEDICARE

## 2024-09-03 ENCOUNTER — OFFICE VISIT (OUTPATIENT)
Dept: CARDIOLOGY | Facility: CLINIC | Age: 59
End: 2024-09-03
Payer: MEDICARE

## 2024-09-03 VITALS
BODY MASS INDEX: 34.68 KG/M2 | SYSTOLIC BLOOD PRESSURE: 124 MMHG | DIASTOLIC BLOOD PRESSURE: 80 MMHG | WEIGHT: 195.75 LBS | OXYGEN SATURATION: 97 % | HEART RATE: 96 BPM

## 2024-09-03 VITALS
DIASTOLIC BLOOD PRESSURE: 72 MMHG | SYSTOLIC BLOOD PRESSURE: 109 MMHG | WEIGHT: 195 LBS | BODY MASS INDEX: 34.55 KG/M2 | HEIGHT: 63 IN

## 2024-09-03 DIAGNOSIS — E78.5 HYPERLIPIDEMIA, UNSPECIFIED HYPERLIPIDEMIA TYPE: ICD-10-CM

## 2024-09-03 DIAGNOSIS — I25.118 CORONARY ARTERY DISEASE OF NATIVE ARTERY OF NATIVE HEART WITH STABLE ANGINA PECTORIS: ICD-10-CM

## 2024-09-03 DIAGNOSIS — I20.9 ANGINA PECTORIS, UNSPECIFIED: ICD-10-CM

## 2024-09-03 DIAGNOSIS — I25.10 CORONARY ARTERY DISEASE INVOLVING NATIVE CORONARY ARTERY OF NATIVE HEART, UNSPECIFIED WHETHER ANGINA PRESENT: Primary | ICD-10-CM

## 2024-09-03 DIAGNOSIS — Z86.73 HX OF ARTERIAL ISCHEMIC STROKE: ICD-10-CM

## 2024-09-03 DIAGNOSIS — Z78.9 STATIN INTOLERANCE: ICD-10-CM

## 2024-09-03 DIAGNOSIS — E11.65 TYPE 2 DIABETES MELLITUS WITH HYPERGLYCEMIA, WITH LONG-TERM CURRENT USE OF INSULIN: ICD-10-CM

## 2024-09-03 DIAGNOSIS — Z79.4 TYPE 2 DIABETES MELLITUS WITH HYPERGLYCEMIA, WITH LONG-TERM CURRENT USE OF INSULIN: ICD-10-CM

## 2024-09-03 DIAGNOSIS — I50.32 CHRONIC DIASTOLIC HEART FAILURE: ICD-10-CM

## 2024-09-03 DIAGNOSIS — I25.110 ATHEROSCLEROSIS OF NATIVE CORONARY ARTERY OF NATIVE HEART WITH UNSTABLE ANGINA PECTORIS: ICD-10-CM

## 2024-09-03 DIAGNOSIS — Z87.891 EX-SMOKER: ICD-10-CM

## 2024-09-03 LAB
LEFT ABI: 1.15
LEFT ARM BP: 103 MMHG
LEFT DORSALIS PEDIS: 100 MMHG
LEFT POSTERIOR TIBIAL: 125 MMHG
LEFT TBI: 1.06
LEFT TOE PRESSURE: 116 MMHG
RIGHT ABI: 1.06
RIGHT ARM BP: 109 MMHG
RIGHT DORSALIS PEDIS: 105 MMHG
RIGHT POSTERIOR TIBIAL: 116 MMHG
RIGHT TBI: 0.99
RIGHT TOE PRESSURE: 108 MMHG

## 2024-09-03 PROCEDURE — 93922 UPR/L XTREMITY ART 2 LEVELS: CPT | Mod: 26,HCNC,, | Performed by: INTERNAL MEDICINE

## 2024-09-03 PROCEDURE — 3051F HG A1C>EQUAL 7.0%<8.0%: CPT | Mod: HCNC,CPTII,S$GLB, | Performed by: INTERNAL MEDICINE

## 2024-09-03 PROCEDURE — 3066F NEPHROPATHY DOC TX: CPT | Mod: HCNC,CPTII,S$GLB, | Performed by: INTERNAL MEDICINE

## 2024-09-03 PROCEDURE — 3008F BODY MASS INDEX DOCD: CPT | Mod: HCNC,CPTII,S$GLB, | Performed by: INTERNAL MEDICINE

## 2024-09-03 PROCEDURE — 3079F DIAST BP 80-89 MM HG: CPT | Mod: HCNC,CPTII,S$GLB, | Performed by: INTERNAL MEDICINE

## 2024-09-03 PROCEDURE — 3074F SYST BP LT 130 MM HG: CPT | Mod: HCNC,CPTII,S$GLB, | Performed by: INTERNAL MEDICINE

## 2024-09-03 PROCEDURE — 1159F MED LIST DOCD IN RCRD: CPT | Mod: HCNC,CPTII,S$GLB, | Performed by: INTERNAL MEDICINE

## 2024-09-03 PROCEDURE — 3060F POS MICROALBUMINURIA REV: CPT | Mod: HCNC,CPTII,S$GLB, | Performed by: INTERNAL MEDICINE

## 2024-09-03 PROCEDURE — 99999 PR PBB SHADOW E&M-EST. PATIENT-LVL IV: CPT | Mod: PBBFAC,HCNC,, | Performed by: INTERNAL MEDICINE

## 2024-09-03 PROCEDURE — 99214 OFFICE O/P EST MOD 30 MIN: CPT | Mod: HCNC,S$GLB,, | Performed by: INTERNAL MEDICINE

## 2024-09-03 PROCEDURE — 93922 UPR/L XTREMITY ART 2 LEVELS: CPT | Mod: HCNC

## 2024-09-03 RX ORDER — ISOSORBIDE MONONITRATE 120 MG/1
120 TABLET, EXTENDED RELEASE ORAL DAILY
Qty: 30 TABLET | Refills: 11 | Status: SHIPPED | OUTPATIENT
Start: 2024-09-03

## 2024-09-03 NOTE — PROGRESS NOTES
Subjective:   Patient ID:  Alexandra Goins is a 58 y.o. female who presents for evaluation of No chief complaint on file.      HPI   9.3.2024  Wakes up from right leg pain , some claudication in both legs but more in the right calf   S/p repeat relook cath, patent stent, coronary fistula again noted, multiple levels  States feels much better since we increased her imdur last visit, she only used nitro once about a week ago,  states she has panic attacks too        August 7, 2024.    Comes in for follow-up.    She continues to have chest pain.  She describes it as sharp to pressure-like left sided.  She states this is same to prior to the stent.    He is not always exertional but it can worsen with activity.    She states that nitroglycerin resolves immediately.    Compliant with her medications.    She initially had some intolerance to the Brilinta and beta-blocker and she was switched to Effient and Cardizem.    She quit smoking vaping.    5.15.2024    Comes in for follow-up.    She had an abnormal nuclear stress test after last visit with large ischemia to the anterior wall.    Continues to have midsternal chest pain.    She decreased energy drinks and she feels somewhat better but she still has pretty much same symptoms Of chest pain and dyspnea on exertion.  Continues to smoke.      4.30.2024  Continues to have intermittent mixed types of chest pain.  She states that sometimes she wakes up in the middle of the night with sharp pain lasting for couple of minutes radiating up to her left shoulder sometimes all few seconds.    She states sometimes it gets worse with walking but not all the time.  Describes as midsternal shooting to her left shoulder most of the time only lasting few seconds.    She was recently discharged from the hospital for atypical chest pain.  PVCs.  Her Holter monitor was without major arrhythmias.    She drinks multiple energy drinks a day and coffee and she has starting to cut  down on that.   in the room today reports that history.    Echocardiogram during her recent visit was normal.  Troponins were negative.  EKG with occasional PVCs.    She has dyspnea on exertion but she also has history of COPD prior recently on home O2.  Did not require home O2 this time.        8.2022  Comes in for a follow-up.  He recently discharged from the hospital after septic shock with salmonella bacteremia/UTI.  BNP was elevated on at towards the discharge.  She is using 4 L of oxygen at home.  This was a virtual visit.  She states that she is recovering slowly but surely.  Denies significant leg swelling.  Denies orthopnea.  Does report however dyspnea on exertion that is improving as per patient.    She reports that she quit smoking.  And using nicotine patches for now.    57 yo female,  CAD, PVCs, h/o syncope, HTN, HLD, cerebral aneurysm, h/o CVA, DM II, asthma, GERD, bipolar disorder, family h/o premature CAD, and tobacco. As well as brain anuerysm   Syncope or 2020 with CPR, thought to be secondary possibly to seizure.  Workup was done at [facility].  Normal nuclear stress test in 2020.  14 days monitor showed only 1% PVCs.  Intolerant to Repatha.  On that he was LDL of 55.   Follows for brain aneurysm with neurosurgery.         Past Medical History:   Diagnosis Date    Acute cystitis without hematuria 11/11/2023    Acute ischemic stroke 09/17/2019    Acute respiratory failure with hypoxia 02/11/2021    Acute right-sided weakness 09/17/2019    Aneurysm     Anxiety     Arthritis     Asthma     Bipolar 1 disorder     Cerebral aneurysm, nonruptured 09/19/2019    Chest pain 01/24/2017    Chronic diastolic heart failure 05/23/2023    Coronary artery disease of native artery of native heart with stable angina pectoris 01/19/2022    Depression     Diabetes mellitus, type 2     BS 72 01/23/2024    Diverticular disease     GERD (gastroesophageal reflux disease)     Hemiplegia and hemiparesis  following unspecified cerebrovascular disease affecting left dominant side 01/21/2020    Hyperlipemia     Hypertension     Hyponatremia 07/24/2022    Hypothyroidism     Pneumonia     PVD (peripheral vascular disease) 04/28/2021    Renal manifestation of secondary diabetes mellitus     Right-sided back pain 06/29/2019    S/P admn tPA in diff fac w/n last 24 hr bef adm to crnt fac 09/17/2019    Severe obesity (BMI 35.0-39.9) with comorbidity 05/31/2022    Stroke     Trouble in sleeping        Past Surgical History:   Procedure Laterality Date    ANGIOGRAM, CORONARY, WITH LEFT HEART CATHETERIZATION N/A 5/30/2024    Procedure: Angiogram, Coronary, with Left Heart Cath;  Surgeon: Meagan Espinoza MD;  Location: [facility] CATH LAB;  Service: Cardiology;  Laterality: N/A;    ANGIOGRAM, CORONARY, WITH LEFT HEART CATHETERIZATION N/A 8/16/2024    Procedure: Angiogram, Coronary, with Left Heart Cath;  Surgeon: Meagan Espinoza MD;  Location: [facility] CATH LAB;  Service: Cardiology;  Laterality: N/A;    CEREBRAL ANGIOGRAM N/A 10/28/2019    Procedure: ANGIOGRAM-CEREBRAL;  Surgeon: [facility] Diagnostic Provider;  Location: [facility] OR [address] [place] [address];  Service: Radiology;  Laterality: N/A;  Rm 190/Aayush    CHOLECYSTECTOMY      COLON SURGERY      COLONOSCOPY N/A 1/12/2018    Procedure: COLONOSCOPY;  Surgeon: Roque Mahajan III, MD;  Location: [facility] ENDO;  Service: Endoscopy;  Laterality: N/A;    COLONOSCOPY N/A 10/13/2023    Procedure: COLONOSCOPY;  Surgeon: Thelma Chairez MD;  Location: [facility] ENDO;  Service: Endoscopy;  Laterality: N/A;    CORONARY STENT PLACEMENT Left 5/30/2024    Procedure: INSERTION, STENT, CORONARY ARTERY;  Surgeon: Meagan Espinoza MD;  Location: [facility] CATH LAB;  Service: Cardiology;  Laterality: Left;    DENTAL SURGERY  05/21/2018    removal of 8 top teeth    HYSTERECTOMY      PERCUTANEOUS TRANSLUMINAL BALLOON ANGIOPLASTY OF CORONARY ARTERY Left 5/30/2024    Procedure: Angioplasty-coronary;  Surgeon: Meagan Espinoza,  MD;  Location: [facility] CATH LAB;  Service: Cardiology;  Laterality: Left;    TONSILLECTOMY      VENTRICULOGRAM, LEFT  2024    Procedure: Ventriculogram, Left;  Surgeon: Meagan Espinoza MD;  Location: [facility] CATH LAB;  Service: Cardiology;;       Social History     Tobacco Use    Smoking status: Former     Current packs/day: 0.00     Average packs/day: 1 pack/day for 46.4 years (46.4 ttl pk-yrs)     Types: Cigarettes, Vaping w/o nicotine     Start date:      Quit date: 2024     Years since quittin.2     Passive exposure: Past    Smokeless tobacco: Never    Tobacco comments:     On average, smokes 10 cigarettes per day.      Quit 24   Substance Use Topics    Alcohol use: Not Currently    Drug use: Yes     Types: Marijuana       Family History   Problem Relation Name Age of Onset    Alcohol abuse Mother      COPD Mother      Depression Mother      Drug abuse Mother      Alcohol abuse Father      Arthritis Father      Cancer Father      Heart disease Father      Hypertension Father      COPD Sister      Kidney failure Sister      Heart disease Brother      Hypertension Brother      Cancer Maternal Aunt      Cancer Maternal Uncle      Diabetes Maternal Uncle      Drug abuse Maternal Uncle      Cancer Paternal Aunt      Cancer Paternal Uncle      Arthritis Maternal Grandmother      Hypertension Maternal Grandmother      Breast cancer Maternal Grandmother      Arthritis Paternal Grandmother      Cancer Paternal Grandmother      Hypertension Paternal Grandfather         Review of Systems   Cardiovascular:  Positive for chest pain and dyspnea on exertion. Negative for palpitations and syncope.   Genitourinary: Negative.    Neurological: Negative.        Current Outpatient Medications on File Prior to Visit   Medication Sig    ACCU-CHEK GUIDE GLUCOSE METER Misc USE AS DIRECTED    albuterol (PROVENTIL/VENTOLIN HFA) 90 mcg/actuation inhaler INHALE 2 TO 4 PUFFS BY MOUTH THREE TIMES DAILY AS NEEDED FOR  WHEEZING    ARIPiprazole (ABILIFY) 10 MG Tab Take 1 tablet daily.    aspirin (ECOTRIN) 81 MG EC tablet Take 1 tablet (81 mg total) by mouth once daily.    benztropine (COGENTIN) 0.5 MG tablet Take 1 tablet (0.5 mg total) by mouth 2 (two) times daily. TAKE 1 TABLET BY MOUTH TWICE DAILY AS NEEDED FOR  DYSTONIA    blood sugar diagnostic Strp Inject 1 each into the skin 4 (four) times daily. Dispense preferred on insurance    butalbital-acetaminophen-caffeine -40 mg (FIORICET, ESGIC) -40 mg per tablet Take 1 tablet by mouth every 6 (six) hours as needed for Headaches. for pain.    desvenlafaxine succinate (PRISTIQ) 50 MG Tb24 Take 1 tablet (50 mg total) by mouth once daily.    diazePAM (VALIUM) 10 MG Tab TAKE 1/2 TO 1 (ONE-HALF TO ONE) TABLET BY MOUTH THREE TIMES DAILY AS NEEDED FOR ANXIETY    diltiaZEM (CARDIZEM CD) 120 MG Cp24 Take 1 capsule (120 mg total) by mouth once daily.    furosemide (LASIX) 40 MG tablet Take 1 tablet (40 mg total) by mouth once daily.    gabapentin (NEURONTIN) 600 MG tablet TAKE 1 TABLET BY MOUTH IN THE MORNING AND 1 AT AFTERNOON AND 2 AT BEDTIME    insulin aspart, niacinamide, (FIASP FLEXTOUCH U-100 INSULIN) 100 unit/mL (3 mL) InPn Inject 10 Units into the skin 3 (three) times daily with meals.    insulin glargine U-300 conc (TOUJEO MAX U-300 SOLOSTAR) 300 unit/mL (3 mL) insulin pen INJECT 76 UNITS SUBCUTANEOUSLY ONCE DAILY    lancets Misc Inject 1 each into the skin 4 (four) times daily. Dispense preferred on insurance    metFORMIN (GLUCOPHAGE-XR) 750 MG ER 24hr tablet Take 1 tablet (750 mg total) by mouth 2 (two) times daily with meals.    mirtazapine (REMERON) 15 MG tablet Take 1 tablet (15 mg total) by mouth every evening.    nicotine (NICODERM CQ) 14 mg/24 hr Place 1 patch onto the skin once daily.    nitroGLYCERIN (NITROSTAT) 0.4 MG SL tablet Place 1 tablet (0.4 mg total) under the tongue every 5 (five) minutes as needed for Chest pain.    prasugreL (EFFIENT) 5 mg Tab Take 1  tablet (5 mg total) by mouth once daily.    pravastatin (PRAVACHOL) 40 MG tablet Take 1 tablet (40 mg total) by mouth every evening.    promethazine (PHENERGAN) 12.5 MG Tab Take 1 tablet (12.5 mg total) by mouth every 6 (six) hours as needed.    [DISCONTINUED] isosorbide mononitrate (IMDUR) 60 MG 24 hr tablet Take 1 tablet (60 mg total) by mouth once daily.    albuterol-ipratropium (DUO-NEB) 2.5 mg-0.5 mg/3 mL nebulizer solution Take 3 mLs by nebulization every 6 (six) hours as needed for Wheezing. Rescue    nicotine (NICODERM CQ) 21 mg/24 hr Place 1 patch onto the skin once daily. Please fill with N [id number] SCT ID [id number]  BIN [id number] GROUP PRXSCT  - 0.00 CO-PAY. Best contact #[phone]    nicotine polacrilex 2 MG Lozg Take 1 lozenge (2 mg total) by mouth as needed (10 per day). Dispense FRUIT please. DO NOT CHEW UP. Can take 1-2 per hour in place of a cigarette for breakthrough cravings in place of a cigarette. SMOKING CESSATION CARD Please fill with N [id number] SCT ID [id number]  BIN [id number] GROUP PRXSCT  - $0.00 CO-PAY. Best contact #[phone] (Patient not taking: Reported on 6/10/2024)     No current facility-administered medications on file prior to visit.       Objective:   Objective:  Wt Readings from Last 3 Encounters:   09/03/24 88.8 kg (195 lb 12.3 oz)   08/16/24 91.6 kg (202 lb)   08/07/24 91.7 kg (202 lb 0.8 oz)     Temp Readings from Last 3 Encounters:   08/16/24 98 °F (36.7 °C) (Temporal)   05/30/24 97.7 °F (36.5 °C) (Temporal)   03/27/24 97.9 °F (36.6 °C)     BP Readings from Last 3 Encounters:   09/03/24 124/80   08/16/24 (!) 144/69   08/07/24 110/62     Pulse Readings from Last 3 Encounters:   09/03/24 96   08/16/24 65   08/07/24 84       Physical Exam  Vitals reviewed.   Constitutional:       Appearance: She is well-developed.   Neck:      Vascular: No carotid bruit.   Cardiovascular:      Rate and Rhythm: Normal rate and regular rhythm.      Pulses: Intact distal pulses.      Heart sounds:  Normal heart sounds. No murmur heard.  Pulmonary:      Breath sounds: Normal breath sounds.   Neurological:      Mental Status: She is oriented to person, place, and time.       This was a virtual visit.  Patient looks not in distress.  Wearing oxygen cannula.    Lab Results   Component Value Date    CHOL 191 10/09/2023    CHOL 197 11/02/2022    CHOL 145 10/20/2021     Lab Results   Component Value Date    HDL 44 10/09/2023    HDL 44 11/02/2022    HDL 61 10/20/2021     Lab Results   Component Value Date    LDLCALC 98.2 10/09/2023    LDLCALC 90.6 11/02/2022    LDLCALC 55.0 (L) 10/20/2021     Lab Results   Component Value Date    TRIG 244 (H) 10/09/2023    TRIG 312 (H) 11/02/2022    TRIG 145 10/20/2021     Lab Results   Component Value Date    CHOLHDL 23.0 10/09/2023    CHOLHDL 22.3 11/02/2022    CHOLHDL 42.1 10/20/2021       Chemistry        Component Value Date/Time     08/07/2024 1430    K 4.0 08/07/2024 1430    CL 99 08/07/2024 1430    CO2 29 08/07/2024 1430    BUN 16 08/07/2024 1430    CREATININE 1.0 08/07/2024 1430    GLU 67 (L) 08/07/2024 1430        Component Value Date/Time    CALCIUM 9.7 08/07/2024 1430    ALKPHOS 59 07/12/2024 0837    AST 33 07/12/2024 0837    ALT 43 07/12/2024 0837    BILITOT 0.3 07/12/2024 0837    ESTGFRAFRICA >60 07/31/2022 0700    EGFRNONAA >60 07/31/2022 0700          Lab Results   Component Value Date    TSH 3.836 03/14/2024     Lab Results   Component Value Date    INR 1.1 08/07/2024    INR 1.1 05/15/2024    INR 1.1 08/16/2020     Lab Results   Component Value Date    WBC 11.37 08/07/2024    HGB 15.6 08/07/2024    HCT 48.7 (H) 08/07/2024     (H) 08/07/2024     08/07/2024     BNP  @LABRCNTIP(BNP,BNPTRIAGEBLO)@  CrCl cannot be calculated (Patient's most recent lab result is older than the maximum 7 days allowed.).     Imaging:  ======  Results for orders placed during the hospital encounter of 07/24/22    Echo Saline Bubble? No    Interpretation Summary  · The left  ventricle is normal in size with concentric hypertrophy and normal systolic function.  · The estimated ejection fraction is 60%.  · Indeterminate left ventricular diastolic function.  · Normal right ventricular size with normal right ventricular systolic function.  · Normal central venous pressure (3 mmHg).  · Trivial pericardial effusion.    No results found for this or any previous visit.    No results found for this or any previous visit.    Results for orders placed during the hospital encounter of 07/15/16    X-Ray Chest PA And Lateral    Narrative  2 view chest    Comparison: 06/23/2016    Findings: There is a curvilinear area of increased density in the suprahilar region on the left which is favored to represent a combination of vasculature and possibly some atelectasis with infiltrate thought less likely.  Diffuse chronic increased peribronchial markings are noted.  The heart is not enlarged.  IMPRESSION:      1.  As above  ______________________________________    Electronically signed by: LEXII TUCKER D.O.  Date:     07/15/16  Time:    09:26    No results found for this or any previous visit.    No valid procedures specified.    Diagnostic Results:  ECG: Reviewed  Results for orders placed during the hospital encounter of 03/14/24    Echo    Interpretation Summary    Left Ventricle: The left ventricle is normal in size. Normal wall thickness. There is concentric hypertrophy. There is normal systolic function with a visually estimated ejection fraction of 60 - 65%. There is normal diastolic function.    Right Ventricle: Normal right ventricular cavity size. Wall thickness is normal. Right ventricle wall motion  is normal. Systolic function is normal.    Pulmonary Artery: The estimated pulmonary artery systolic pressure is 22 mmHg.    IVC/SVC: Normal venous pressure at 3 mmHg.    Results for orders placed during the hospital encounter of 02/14/22    Nuclear Stress - Cardiology  Interpreted    Interpretation Summary    Normal myocardial perfusion scan. There is no evidence of myocardial ischemia or infarction.    There is mild apical thinning which is a normal variant.    The gated perfusion images showed an ejection fraction of 71% at rest. The gated perfusion images showed an ejection fraction of 71% post stress. Normal ejection fraction is greater than 59%.    The EKG portion of this study is negative for ischemia.    Interpretation Summary 4.2024  Show Result ComparisonThe patient was monitored for a total of 13d 21h, underlying rhythm is sinus.  The minimum heart rate was 52 bpm; the maximum 135 bpm; the average 83 bpm.  0 % of Atrial fibrillation/Atrial flutter with longest episode of 0 ms.  The total burden of AV Block present was 0 % [Complete Heart Block: 0 %; Advanced (High Grade):  0 %; 2nd Degree, Mobitz II: 0 %; 2nd Degree, Mobitz I: 0 %].  There were 0 pauses, the longest pause was 0 ms at --.  Total count of Ventricular Tachycardia (VT): 2 episode(s). Longest VT: 5 beats on Day 14 / 03:04:51  pm. Fastest VT: 127 bpm on  Day 9 / 08:44:49 am.  0 supraventricular episodes were found. Longest SVT Episode 0 s, Fastest SVT -- bpm  There were a total of 385 PVCs with 1 morphologies and 5 couplets. Overall PVC Victor at 0.03 %  There were a total of 0 Other Beats. There were 0 total number of paced beats.  There were a total of 93 PSVCs with 1 morphologies and 2 couplets. Overall PSVC Victor at  < 0.01 %  There is a total of 1 patient events.     Results for orders placed during the hospital encounter of 03/14/24    Echo    Interpretation Summary    Left Ventricle: The left ventricle is normal in size. Normal wall thickness. There is concentric hypertrophy. There is normal systolic function with a visually estimated ejection fraction of 60 - 65%. There is normal diastolic function.    Right Ventricle: Normal right ventricular cavity size. Wall thickness is normal. Right ventricle  wall motion  is normal. Systolic function is normal.    Pulmonary Artery: The estimated pulmonary artery systolic pressure is 22 mmHg.    IVC/SVC: Normal venous pressure at 3 mmHg.    Results for orders placed during the hospital encounter of 05/09/24    Nuclear Stress - Cardiology Interpreted    Interpretation Summary    Abnormal myocardial perfusion scan.    There is a moderate intensity, large sized, mostly reversible perfusion abnormality with some fixed areas in the anterior, apical, anterolateral and anteroapical wall(s).    Basal to mid anterolateral wall ischemia is present.    There are no other significant perfusion abnormalities.    The gated perfusion images showed an ejection fraction of 64% at rest. The gated perfusion images showed an ejection fraction of 67% post stress.    There is normal wall motion at rest and post stress.    The ECG portion of the study is negative for ischemia.    The patient reported no chest pain during the stress test.    There were no arrhythmias during stress.      Results for orders placed during the hospital encounter of 05/30/24    Cardiac catheterization    Conclusion    The Mid LAD lesion was 85% stenosed with 0% stenosis post-intervention. iFR 0.77    The pre-procedure left ventricular end diastolic pressure was 14.    Mid LAD lesion: A STENT SYNERGY XD [id number] stent was not successfully placed. Post dilated with 2.5 mm NC balloon    The estimated blood loss was none.    There was single vessel coronary artery disease.    The PCI was successful.    The filling pressures on the left were normal.    Coronary fistula across septal arteries    The procedure log was documented by Documenter: Nitza Vera RN and verified by Meagan Espinoza MD.    Date: 5/30/2024  Time: 12:14 PM    The ASCVD Risk score (Keila DK, et al., 2019) failed to calculate for the following reasons:    The patient has a prior MI or stroke diagnosis    Results for orders placed during the  hospital encounter of 08/16/24    Cardiac catheterization    Conclusion    The ejection fraction was calculated to be 65%.    The LAD stent is patent, well apposed and expanded    The pre-procedure left ventricular end diastolic pressure was 23.    The post-procedure left ventricular end diastolic pressure was 26.    The estimated blood loss was none.    There was non-obstructive coronary artery disease..    There was diastolic dysfunction.    The filling pressures on the left were moderately elevated.    Coronary myocardial fistula however origin could not be determined, No apparent origin and no enlargement of a specific coronary, there is possible multiple origins including small d1 , and very numerous distal septals and small apical diagonals    The procedure log was documented by Documenter: Jami Miur RN and verified by Meagan Espinoza MD.    Date: 8/16/2024  Time: 8:20 AM    Assessment and Plan:   Coronary artery disease involving native coronary artery of native heart, unspecified whether angina present  -     isosorbide mononitrate (IMDUR) 120 MG 24 hr tablet; Take 1 tablet (120 mg total) by mouth once daily.  Dispense: 30 tablet; Refill: 11  -     Lipid Panel; Future; Expected date: 09/03/2024    Hyperlipidemia, unspecified hyperlipidemia type    Atherosclerosis of native coronary artery of native heart with unstable angina pectoris    Ex-smoker    Angina pectoris, unspecified    Statin intolerance    Hx of arterial ischemic stroke    Coronary artery disease of native artery of native heart with stable angina pectoris    Chronic diastolic heart failure    Type 2 diabetes mellitus with hyperglycemia, with long-term current use of insulin          Relook cath patent stent, pending SIMIN today  Encouraged to continue abstaining from smoking vaping  Reviewed all tests and above medical conditions with patient in detail and formulated treatment plan.  Risk factor modification discussed.   Cardiac low salt  diet discussed.  Maintaining healthy weight and weight loss goals were discussed in clinic.  increased Imdur  CW EFFIENT a    Follow up in 6 months

## 2024-09-05 ENCOUNTER — HOSPITAL ENCOUNTER (OUTPATIENT)
Dept: RADIOLOGY | Facility: HOSPITAL | Age: 59
Discharge: HOME | End: 2024-09-05
Attending: FAMILY MEDICINE
Payer: MEDICARE

## 2024-09-05 ENCOUNTER — OFFICE VISIT (OUTPATIENT)
Dept: FAMILY MEDICINE | Facility: CLINIC | Age: 59
End: 2024-09-05
Payer: MEDICARE

## 2024-09-05 VITALS
HEART RATE: 100 BPM | SYSTOLIC BLOOD PRESSURE: 132 MMHG | BODY MASS INDEX: 34.09 KG/M2 | WEIGHT: 192.38 LBS | OXYGEN SATURATION: 95 % | HEIGHT: 63 IN | DIASTOLIC BLOOD PRESSURE: 74 MMHG | TEMPERATURE: 97 F

## 2024-09-05 DIAGNOSIS — I10 ESSENTIAL HYPERTENSION: ICD-10-CM

## 2024-09-05 DIAGNOSIS — J96.10 CHRONIC RESPIRATORY FAILURE, UNSPECIFIED WHETHER WITH HYPOXIA OR HYPERCAPNIA: ICD-10-CM

## 2024-09-05 DIAGNOSIS — E66.01 MORBID (SEVERE) OBESITY DUE TO EXCESS CALORIES: ICD-10-CM

## 2024-09-05 DIAGNOSIS — R11.10 VOMITING, UNSPECIFIED VOMITING TYPE, UNSPECIFIED WHETHER NAUSEA PRESENT: Primary | ICD-10-CM

## 2024-09-05 DIAGNOSIS — F31.9 BIPOLAR AFFECTIVE DISORDER, REMISSION STATUS UNSPECIFIED: Chronic | ICD-10-CM

## 2024-09-05 DIAGNOSIS — E11.42 DIABETIC POLYNEUROPATHY ASSOCIATED WITH TYPE 2 DIABETES MELLITUS: ICD-10-CM

## 2024-09-05 DIAGNOSIS — I25.118 CORONARY ARTERY DISEASE OF NATIVE ARTERY OF NATIVE HEART WITH STABLE ANGINA PECTORIS: ICD-10-CM

## 2024-09-05 DIAGNOSIS — R11.10 VOMITING, UNSPECIFIED VOMITING TYPE, UNSPECIFIED WHETHER NAUSEA PRESENT: ICD-10-CM

## 2024-09-05 DIAGNOSIS — E03.4 HYPOTHYROIDISM DUE TO ACQUIRED ATROPHY OF THYROID: ICD-10-CM

## 2024-09-05 PROCEDURE — 99999 PR PBB SHADOW E&M-EST. PATIENT-LVL IV: CPT | Mod: PBBFAC,HCNC,, | Performed by: FAMILY MEDICINE

## 2024-09-05 PROCEDURE — 74019 RADEX ABDOMEN 2 VIEWS: CPT | Mod: TC,HCNC,PO

## 2024-09-05 PROCEDURE — 74019 RADEX ABDOMEN 2 VIEWS: CPT | Mod: 26,HCNC,, | Performed by: RADIOLOGY

## 2024-09-05 RX ORDER — HEPARIN SODIUM 1000 [USP'U]/ML
1000 INJECTION INTRAVENOUS; SUBCUTANEOUS
COMMUNITY
Start: 2024-05-30

## 2024-09-05 RX ORDER — GABAPENTIN 600 MG/1
TABLET ORAL
Qty: 360 TABLET | Refills: 0 | Status: SHIPPED | OUTPATIENT
Start: 2024-09-05

## 2024-09-05 RX ORDER — RISPERIDONE 1 MG/1
1.5 TABLET ORAL 2 TIMES DAILY
COMMUNITY
Start: 2024-07-22

## 2024-09-05 RX ORDER — PROMETHAZINE HYDROCHLORIDE 12.5 MG/1
12.5 TABLET ORAL EVERY 6 HOURS PRN
Qty: 32 TABLET | Refills: 0 | Status: SHIPPED | OUTPATIENT
Start: 2024-09-05

## 2024-09-05 NOTE — PROGRESS NOTES
Subjective:       Patient ID: Alexandra Goins is a 58 y.o. female.    Chief Complaint: No chief complaint on file.      HPI Comments:       Current Outpatient Medications:     ACCU-CHEK GUIDE GLUCOSE METER Misc, USE AS DIRECTED, Disp: 1 each, Rfl: 0    albuterol (PROVENTIL/VENTOLIN HFA) 90 mcg/actuation inhaler, INHALE 2 TO 4 PUFFS BY MOUTH THREE TIMES DAILY AS NEEDED FOR WHEEZING, Disp: 18 g, Rfl: 3    ARIPiprazole (ABILIFY) 10 MG Tab, Take 1 tablet daily., Disp: 90 tablet, Rfl: 1    aspirin (ECOTRIN) 81 MG EC tablet, Take 1 tablet (81 mg total) by mouth once daily., Disp: 360 tablet, Rfl: 0    benztropine (COGENTIN) 0.5 MG tablet, Take 1 tablet (0.5 mg total) by mouth 2 (two) times daily. TAKE 1 TABLET BY MOUTH TWICE DAILY AS NEEDED FOR  DYSTONIA, Disp: 180 tablet, Rfl: 1    blood sugar diagnostic Strp, Inject 1 each into the skin 4 (four) times daily. Dispense preferred on insurance, Disp: 150 strip, Rfl: 6    butalbital-acetaminophen-caffeine -40 mg (FIORICET, ESGIC) -40 mg per tablet, Take 1 tablet by mouth every 6 (six) hours as needed for Headaches. for pain., Disp: 30 tablet, Rfl: 0    desvenlafaxine succinate (PRISTIQ) 50 MG Tb24, Take 1 tablet (50 mg total) by mouth once daily., Disp: 90 tablet, Rfl: 1    diazePAM (VALIUM) 10 MG Tab, TAKE 1/2 TO 1 (ONE-HALF TO ONE) TABLET BY MOUTH THREE TIMES DAILY AS NEEDED FOR ANXIETY, Disp: 90 tablet, Rfl: 2    diltiaZEM (CARDIZEM CD) 120 MG Cp24, Take 1 capsule (120 mg total) by mouth once daily., Disp: 30 capsule, Rfl: 11    furosemide (LASIX) 40 MG tablet, Take 1 tablet (40 mg total) by mouth once daily., Disp: 90 tablet, Rfl: 2    heparin sodium,porcine/PF (HEPARIN, PORCINE, PF,) 1,000 unit/mL Soln, 1,000 Units., Disp: , Rfl:     insulin aspart, niacinamide, (FIASP FLEXTOUCH U-100 INSULIN) 100 unit/mL (3 mL) InPn, Inject 10 Units into the skin 3 (three) times daily with meals., Disp: 5 Pen, Rfl: 3    insulin glargine U-300 conc (TOUJEO MAX U-300  SOLOSTAR) 300 unit/mL (3 mL) insulin pen, INJECT 76 UNITS SUBCUTANEOUSLY ONCE DAILY, Disp: 12 mL, Rfl: 1    isosorbide mononitrate (IMDUR) 120 MG 24 hr tablet, Take 1 tablet (120 mg total) by mouth once daily., Disp: 30 tablet, Rfl: 11    lancets Misc, Inject 1 each into the skin 4 (four) times daily. Dispense preferred on insurance, Disp: 150 each, Rfl: 6    metFORMIN (GLUCOPHAGE-XR) 750 MG ER 24hr tablet, Take 1 tablet (750 mg total) by mouth 2 (two) times daily with meals., Disp: 60 tablet, Rfl: 3    mirtazapine (REMERON) 15 MG tablet, Take 1 tablet (15 mg total) by mouth every evening., Disp: 90 tablet, Rfl: 1    nicotine (NICODERM CQ) 14 mg/24 hr, Place 1 patch onto the skin once daily., Disp: 28 patch, Rfl: 1    nitroGLYCERIN (NITROSTAT) 0.4 MG SL tablet, Place 1 tablet (0.4 mg total) under the tongue every 5 (five) minutes as needed for Chest pain., Disp: 100 tablet, Rfl: 0    prasugreL (EFFIENT) 5 mg Tab, Take 1 tablet (5 mg total) by mouth once daily., Disp: 30 tablet, Rfl: 11    pravastatin (PRAVACHOL) 40 MG tablet, Take 1 tablet (40 mg total) by mouth every evening., Disp: 30 tablet, Rfl: 11    risperiDONE (RISPERDAL) 1 MG tablet, Take 1.5 mg by mouth 2 (two) times daily., Disp: , Rfl:     albuterol-ipratropium (DUO-NEB) 2.5 mg-0.5 mg/3 mL nebulizer solution, Take 3 mLs by nebulization every 6 (six) hours as needed for Wheezing. Rescue, Disp: 1 Box, Rfl: 0    gabapentin (NEURONTIN) 600 MG tablet, TAKE 1 TABLET BY MOUTH IN THE MORNING AND 1 AT AFTERNOON AND 2 AT BEDTIME, Disp: 360 tablet, Rfl: 0    nicotine (NICODERM CQ) 21 mg/24 hr, Place 1 patch onto the skin once daily. Please fill with [facility] [id number] SCT ID [id number]  BIN [id number] GROUP PRXSCT  - 0.00 CO-PAY. Best contact #[phone], Disp: 28 patch, Rfl: 0    nicotine polacrilex 2 MG Lozg, Take 1 lozenge (2 mg total) by mouth as needed (10 per day). Dispense FRUIT please. DO NOT CHEW UP. Can take 1-2 per hour in place of a cigarette for breakthrough  "cravings in place of a cigarette. SMOKING CESSATION CARD Please fill with PCN [id number] SCT ID [id number]  [facility] [id number] GROUP PRXSCT  - $0.00 CO-PAY. Best contact #[phone] (Patient not taking: Reported on 6/10/2024), Disp: 108 lozenge, Rfl: 0    promethazine (PHENERGAN) 12.5 MG Tab, Take 1 tablet (12.5 mg total) by mouth every 6 (six) hours as needed., Disp: 32 tablet, Rfl: 0      Last visit 5 months ago.    Since that time she has had her metoprolol change to Cardizem   she has had her Toujeo increased.    She has had her Imdur increased.  She had Risperdal change to Abilify    Today she complains of nausea and vomiting diarrhea.  Was constipated before that has been taking Ex-Lax.  Needs Phenergan for nausea.    Also ran out of gabapentin recently in his feeling bad with pain.  Will get this refilled today as well.    Has a cardiac stent put in, and had to have that looked at a 2nd time since I have seen her      Review of Systems   Constitutional:  Negative for activity change, appetite change and fever.   HENT:  Negative for sore throat.    Respiratory:  Negative for cough and shortness of breath.    Cardiovascular:  Negative for chest pain.   Gastrointestinal:  Positive for constipation, diarrhea, nausea and vomiting. Negative for abdominal pain.   Genitourinary:  Negative for difficulty urinating.   Musculoskeletal:  Negative for arthralgias and myalgias.   Neurological:  Negative for dizziness and headaches.       Objective:      Vitals:    09/05/24 1311   BP: 132/74   Pulse: 100   Temp: 96.9 °F (36.1 °C)   TempSrc: Tympanic   SpO2: 95%   Weight: 87.2 kg (192 lb 5.6 oz)   Height: 5' 3" (1.6 m)   PainSc:   6   PainLoc: Generalized     Physical Exam  Vitals and nursing note reviewed.   Constitutional:       General: She is not in acute distress.     Appearance: She is well-developed. She is not ill-appearing or diaphoretic.   HENT:      Head: Normocephalic.   Neck:      Thyroid: No thyromegaly. "   Cardiovascular:      Rate and Rhythm: Normal rate and regular rhythm.      Heart sounds: Normal heart sounds. No murmur heard.  Pulmonary:      Effort: Pulmonary effort is normal.      Breath sounds: Normal breath sounds. No wheezing or rales.   Abdominal:      General: There is no distension.      Palpations: Abdomen is soft.      Tenderness: There is generalized abdominal tenderness.   Musculoskeletal:      Cervical back: Neck supple.   Lymphadenopathy:      Cervical: No cervical adenopathy.   Skin:     General: Skin is warm and dry.   Neurological:      Mental Status: She is alert and oriented to person, place, and time.   Psychiatric:         Mood and Affect: Mood normal.         Behavior: Behavior normal.         Thought Content: Thought content normal.         Judgment: Judgment normal.         Assessment:       1. Vomiting, unspecified vomiting type, unspecified whether nausea present    2. Morbid (severe) obesity due to excess calories    3. Chronic respiratory failure, unspecified whether with hypoxia or hypercapnia    4. Coronary artery disease of native artery of native heart with stable angina pectoris    5. Hypothyroidism due to acquired atrophy of thyroid    6. Essential hypertension    7. Bipolar affective disorder, remission status unspecified    8. Diabetic polyneuropathy associated with type 2 diabetes mellitus        Plan:   Vomiting, unspecified vomiting type, unspecified whether nausea present  Comments:  Refill Phenergan.  Abdominal film today  Orders:  -     X-Ray Abdomen Flat And Erect; Future; Expected date: 09/05/2024    Morbid (severe) obesity due to excess calories  Comments:  Has lost a few lb    Chronic respiratory failure, unspecified whether with hypoxia or hypercapnia  Comments:  Stable on inhalers    Coronary artery disease of native artery of native heart with stable angina pectoris  Comments:  Stent now in place.  On aspirin and Effient and statin    Hypothyroidism due to  acquired atrophy of thyroid  Comments:  Euthyroid on current dose    Essential hypertension  Comments:  Controlled.  Follow-up 3 months    Bipolar affective disorder, remission status unspecified  Comments:  Recently changed from Risperdal to Abilify.  Followed by Psychiatry    Diabetic polyneuropathy associated with type 2 diabetes mellitus  Comments:  On gabapentin.  refill gabapentin  Orders:  -     gabapentin (NEURONTIN) 600 MG tablet; TAKE 1 TABLET BY MOUTH IN THE MORNING AND 1 AT AFTERNOON AND 2 AT BEDTIME  Dispense: 360 tablet; Refill: 0    Other orders  -     promethazine (PHENERGAN) 12.5 MG Tab; Take 1 tablet (12.5 mg total) by mouth every 6 (six) hours as needed.  Dispense: 32 tablet; Refill: 0

## 2024-09-16 ENCOUNTER — OFFICE VISIT (OUTPATIENT)
Dept: PSYCHIATRY | Facility: CLINIC | Age: 59
End: 2024-09-16
Payer: MEDICARE

## 2024-09-16 DIAGNOSIS — F41.9 ANXIETY: ICD-10-CM

## 2024-09-16 DIAGNOSIS — G47.00 INSOMNIA, UNSPECIFIED TYPE: ICD-10-CM

## 2024-09-16 DIAGNOSIS — F31.9 BIPOLAR 1 DISORDER: Primary | ICD-10-CM

## 2024-09-16 PROCEDURE — 3060F POS MICROALBUMINURIA REV: CPT | Mod: HCNC,CPTII,95, | Performed by: PSYCHIATRY & NEUROLOGY

## 2024-09-16 PROCEDURE — 3066F NEPHROPATHY DOC TX: CPT | Mod: HCNC,CPTII,95, | Performed by: PSYCHIATRY & NEUROLOGY

## 2024-09-16 PROCEDURE — 3051F HG A1C>EQUAL 7.0%<8.0%: CPT | Mod: HCNC,CPTII,95, | Performed by: PSYCHIATRY & NEUROLOGY

## 2024-09-16 PROCEDURE — 99214 OFFICE O/P EST MOD 30 MIN: CPT | Mod: HCNC,95,, | Performed by: PSYCHIATRY & NEUROLOGY

## 2024-09-16 RX ORDER — DESVENLAFAXINE 50 MG/1
50 TABLET, FILM COATED, EXTENDED RELEASE ORAL DAILY
Qty: 90 TABLET | Refills: 0 | Status: SHIPPED | OUTPATIENT
Start: 2024-09-16

## 2024-09-16 RX ORDER — DIAZEPAM 10 MG/1
TABLET ORAL
Qty: 90 TABLET | Refills: 2 | Status: SHIPPED | OUTPATIENT
Start: 2024-09-16

## 2024-09-16 RX ORDER — MIRTAZAPINE 15 MG/1
15 TABLET, FILM COATED ORAL NIGHTLY
Qty: 90 TABLET | Refills: 0 | Status: SHIPPED | OUTPATIENT
Start: 2024-09-16

## 2024-09-16 RX ORDER — BENZTROPINE MESYLATE 0.5 MG/1
0.5 TABLET ORAL 2 TIMES DAILY
Qty: 180 TABLET | Refills: 0 | Status: SHIPPED | OUTPATIENT
Start: 2024-09-16

## 2024-09-16 RX ORDER — ARIPIPRAZOLE 10 MG/1
TABLET ORAL
Qty: 90 TABLET | Refills: 0 | Status: SHIPPED | OUTPATIENT
Start: 2024-09-16
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
## 2024-10-23 DIAGNOSIS — E11.42 DIABETIC POLYNEUROPATHY ASSOCIATED WITH TYPE 2 DIABETES MELLITUS: ICD-10-CM

## 2024-11-04 RX ORDER — ARIPIPRAZOLE 10 MG/1
TABLET ORAL
Qty: 90 TABLET | Refills: 0 | Status: SHIPPED | OUTPATIENT
Start: 2024-11-04

## 2024-11-05 ENCOUNTER — OFFICE VISIT (OUTPATIENT)
Dept: CARDIOLOGY | Facility: CLINIC | Age: 59
End: 2024-11-05
Payer: MEDICARE

## 2024-11-05 VITALS
HEIGHT: 63 IN | SYSTOLIC BLOOD PRESSURE: 102 MMHG | WEIGHT: 199.06 LBS | BODY MASS INDEX: 35.27 KG/M2 | OXYGEN SATURATION: 93 % | HEART RATE: 75 BPM | DIASTOLIC BLOOD PRESSURE: 65 MMHG | RESPIRATION RATE: 16 BRPM

## 2024-11-05 DIAGNOSIS — E78.5 HYPERLIPIDEMIA, UNSPECIFIED HYPERLIPIDEMIA TYPE: ICD-10-CM

## 2024-11-05 DIAGNOSIS — I25.10 CORONARY ARTERY DISEASE INVOLVING NATIVE CORONARY ARTERY OF NATIVE HEART, UNSPECIFIED WHETHER ANGINA PRESENT: ICD-10-CM

## 2024-11-05 DIAGNOSIS — I25.41 CORONARY ARTERY FISTULA TO LEFT VENTRICLE: ICD-10-CM

## 2024-11-05 DIAGNOSIS — Z78.9 STATIN INTOLERANCE: ICD-10-CM

## 2024-11-05 DIAGNOSIS — Z79.4 TYPE 2 DIABETES MELLITUS WITH HYPERGLYCEMIA, WITH LONG-TERM CURRENT USE OF INSULIN: ICD-10-CM

## 2024-11-05 DIAGNOSIS — Z87.891 EX-SMOKER: Primary | ICD-10-CM

## 2024-11-05 DIAGNOSIS — E11.65 TYPE 2 DIABETES MELLITUS WITH HYPERGLYCEMIA, WITH LONG-TERM CURRENT USE OF INSULIN: ICD-10-CM

## 2024-11-05 DIAGNOSIS — Z95.5 S/P CORONARY ARTERY STENT PLACEMENT: ICD-10-CM

## 2024-11-05 DIAGNOSIS — I50.32 CHRONIC DIASTOLIC HEART FAILURE: ICD-10-CM

## 2024-11-05 PROCEDURE — 99999 PR PBB SHADOW E&M-EST. PATIENT-LVL V: CPT | Mod: PBBFAC,HCNC,, | Performed by: INTERNAL MEDICINE

## 2024-11-05 PROCEDURE — 99214 OFFICE O/P EST MOD 30 MIN: CPT | Mod: HCNC,S$GLB,, | Performed by: INTERNAL MEDICINE

## 2024-11-05 PROCEDURE — 3008F BODY MASS INDEX DOCD: CPT | Mod: HCNC,CPTII,S$GLB, | Performed by: INTERNAL MEDICINE

## 2024-11-05 PROCEDURE — 3078F DIAST BP <80 MM HG: CPT | Mod: HCNC,CPTII,S$GLB, | Performed by: INTERNAL MEDICINE

## 2024-11-05 PROCEDURE — 3074F SYST BP LT 130 MM HG: CPT | Mod: HCNC,CPTII,S$GLB, | Performed by: INTERNAL MEDICINE

## 2024-11-05 PROCEDURE — 1159F MED LIST DOCD IN RCRD: CPT | Mod: HCNC,CPTII,S$GLB, | Performed by: INTERNAL MEDICINE

## 2024-11-05 PROCEDURE — 3051F HG A1C>EQUAL 7.0%<8.0%: CPT | Mod: HCNC,CPTII,S$GLB, | Performed by: INTERNAL MEDICINE

## 2024-11-05 PROCEDURE — 3066F NEPHROPATHY DOC TX: CPT | Mod: HCNC,CPTII,S$GLB, | Performed by: INTERNAL MEDICINE

## 2024-11-05 PROCEDURE — 3060F POS MICROALBUMINURIA REV: CPT | Mod: HCNC,CPTII,S$GLB, | Performed by: INTERNAL MEDICINE

## 2024-11-05 RX ORDER — PRAVASTATIN SODIUM 40 MG/1
40 TABLET ORAL NIGHTLY
Qty: 90 TABLET | Refills: 3 | Status: SHIPPED | OUTPATIENT
Start: 2024-11-05 | End: 2025-11-05

## 2024-11-05 RX ORDER — PRASUGREL 5 MG/1
5 TABLET, FILM COATED ORAL DAILY
Qty: 90 TABLET | Refills: 3 | Status: SHIPPED | OUTPATIENT
Start: 2024-11-05 | End: 2025-11-05

## 2024-11-05 RX ORDER — DILTIAZEM HYDROCHLORIDE 120 MG/1
120 CAPSULE, COATED, EXTENDED RELEASE ORAL DAILY
Qty: 90 CAPSULE | Refills: 3 | Status: SHIPPED | OUTPATIENT
Start: 2024-11-05 | End: 2025-11-05

## 2024-11-05 RX ORDER — ISOSORBIDE MONONITRATE 120 MG/1
120 TABLET, EXTENDED RELEASE ORAL DAILY
Qty: 90 TABLET | Refills: 3 | Status: SHIPPED | OUTPATIENT
Start: 2024-11-05 | End: 2025-10-31

## 2024-11-05 NOTE — PROGRESS NOTES
Subjective:   Patient ID:  Alexandra Goins is a 59 y.o. female who presents for evaluation of No chief complaint on file.      HPI   11.5.2024  Supposed to come in six-month.  She states that they come in to follow-up on the results of her SIMIN.    Her SIMIN results were normal.    Also she has not had anymore chest pain since last visit.  She states that she has not used any nitroglycerin for the past 2 weeks.    Still not smoking which we encouraged today.    Requesting 90 day supply on her medications    9.3.2024  Wakes up from right leg pain , some claudication in both legs but more in the right calf   S/p repeat relook cath, patent stent, coronary fistula again noted, multiple levels  States feels much better since we increased her imdur last visit, she only used nitro once about a week ago,  states she has panic attacks too        August 7, 2024.    Comes in for follow-up.    She continues to have chest pain.  She describes it as sharp to pressure-like left sided.  She states this is same to prior to the stent.    He is not always exertional but it can worsen with activity.    She states that nitroglycerin resolves immediately.    Compliant with her medications.    She initially had some intolerance to the Brilinta and beta-blocker and she was switched to Effient and Cardizem.    She quit smoking vaping.    5.15.2024    Comes in for follow-up.    She had an abnormal nuclear stress test after last visit with large ischemia to the anterior wall.    Continues to have midsternal chest pain.    She decreased energy drinks and she feels somewhat better but she still has pretty much same symptoms Of chest pain and dyspnea on exertion.  Continues to smoke.      4.30.2024  Continues to have intermittent mixed types of chest pain.  She states that sometimes she wakes up in the middle of the night with sharp pain lasting for couple of minutes radiating up to her left shoulder sometimes all few seconds.    She  states sometimes it gets worse with walking but not all the time.  Describes as midsternal shooting to her left shoulder most of the time only lasting few seconds.    She was recently discharged from the hospital for atypical chest pain.  PVCs.  Her Holter monitor was without major arrhythmias.    She drinks multiple energy drinks a day and coffee and she has starting to cut down on that.   in the room today reports that history.    Echocardiogram during her recent visit was normal.  Troponins were negative.  EKG with occasional PVCs.    She has dyspnea on exertion but she also has history of COPD prior recently on home O2.  Did not require home O2 this time.        8.2022  Comes in for a follow-up.  He recently discharged from the hospital after septic shock with salmonella bacteremia/UTI.  BNP was elevated on at towards the discharge.  She is using 4 L of oxygen at home.  This was a virtual visit.  She states that she is recovering slowly but surely.  Denies significant leg swelling.  Denies orthopnea.  Does report however dyspnea on exertion that is improving as per patient.    She reports that she quit smoking.  And using nicotine patches for now.    55 yo female,  CAD, PVCs, h/o syncope, HTN, HLD, cerebral aneurysm, h/o CVA, DM II, asthma, GERD, bipolar disorder, family h/o premature CAD, and tobacco. As well as brain anuerysm   Syncope or 2020 with CPR, thought to be secondary possibly to seizure.  Workup was done at [facility].  Normal nuclear stress test in 2020.  14 days monitor showed only 1% PVCs.  Intolerant to Repatha.  On that he was LDL of 55.   Follows for brain aneurysm with neurosurgery.         Past Medical History:   Diagnosis Date    Acute cystitis without hematuria 11/11/2023    Acute ischemic stroke 09/17/2019    Acute respiratory failure with hypoxia 02/11/2021    Acute right-sided weakness 09/17/2019    Aneurysm     Anxiety     Arthritis     Asthma     Bipolar 1 disorder      Cerebral aneurysm, nonruptured 09/19/2019    Chest pain 01/24/2017    Chronic diastolic heart failure 05/23/2023    Coronary artery disease of native artery of native heart with stable angina pectoris 01/19/2022    Depression     Diabetes mellitus, type 2     BS 72 01/23/2024    Diverticular disease     GERD (gastroesophageal reflux disease)     Hemiplegia and hemiparesis following unspecified cerebrovascular disease affecting left dominant side 01/21/2020    Hyperlipemia     Hypertension     Hyponatremia 07/24/2022    Hypothyroidism     Pneumonia     PVD (peripheral vascular disease) 04/28/2021    Renal manifestation of secondary diabetes mellitus     Right-sided back pain 06/29/2019    S/P admn tPA in diff fac w/n last 24 hr bef adm to crnt fac 09/17/2019    Severe obesity (BMI 35.0-39.9) with comorbidity 05/31/2022    Stroke     Trouble in sleeping        Past Surgical History:   Procedure Laterality Date    ANGIOGRAM, CORONARY, WITH LEFT HEART CATHETERIZATION N/A 5/30/2024    Procedure: Angiogram, Coronary, with Left Heart Cath;  Surgeon: Meagan Espinoza MD;  Location: [facility] CATH LAB;  Service: Cardiology;  Laterality: N/A;    ANGIOGRAM, CORONARY, WITH LEFT HEART CATHETERIZATION N/A 8/16/2024    Procedure: Angiogram, Coronary, with Left Heart Cath;  Surgeon: Meagan Espinoza MD;  Location: [facility] CATH LAB;  Service: Cardiology;  Laterality: N/A;    CEREBRAL ANGIOGRAM N/A 10/28/2019    Procedure: ANGIOGRAM-CEREBRAL;  Surgeon: [facility] Diagnostic Provider;  Location: [facility] OR [address] [place] [address];  Service: Radiology;  Laterality: N/A;  Rm 190/Aayush    CHOLECYSTECTOMY      COLON SURGERY      COLONOSCOPY N/A 1/12/2018    Procedure: COLONOSCOPY;  Surgeon: Roque Mahajan III, MD;  Location: [facility] ENDO;  Service: Endoscopy;  Laterality: N/A;    COLONOSCOPY N/A 10/13/2023    Procedure: COLONOSCOPY;  Surgeon: Thelma Chairez MD;  Location: [facility] ENDO;  Service: Endoscopy;  Laterality: N/A;    CORONARY STENT PLACEMENT Left  2024    Procedure: INSERTION, STENT, CORONARY ARTERY;  Surgeon: Meagan Espinoza MD;  Location: [facility] CATH LAB;  Service: Cardiology;  Laterality: Left;    DENTAL SURGERY  2018    removal of 8 top teeth    HYSTERECTOMY      PERCUTANEOUS TRANSLUMINAL BALLOON ANGIOPLASTY OF CORONARY ARTERY Left 2024    Procedure: Angioplasty-coronary;  Surgeon: Meagan Espinoza MD;  Location: [facility] CATH LAB;  Service: Cardiology;  Laterality: Left;    TONSILLECTOMY      VENTRICULOGRAM, LEFT  2024    Procedure: Ventriculogram, Left;  Surgeon: Meagan Espinoza MD;  Location: [facility] CATH LAB;  Service: Cardiology;;       Social History     Tobacco Use    Smoking status: Former     Current packs/day: 0.00     Average packs/day: 1 pack/day for 46.4 years (46.4 ttl pk-yrs)     Types: Cigarettes, Vaping w/o nicotine     Start date:      Quit date: 2024     Years since quittin.4     Passive exposure: Past    Smokeless tobacco: Never    Tobacco comments:     On average, smokes 10 cigarettes per day.      Quit 24   Substance Use Topics    Alcohol use: Not Currently    Drug use: Yes     Types: Marijuana       Family History   Problem Relation Name Age of Onset    Alcohol abuse Mother      COPD Mother      Depression Mother      Drug abuse Mother      Alcohol abuse Father      Arthritis Father      Cancer Father      Heart disease Father      Hypertension Father      COPD Sister      Kidney failure Sister      Heart disease Brother      Hypertension Brother      Cancer Maternal Aunt      Cancer Maternal Uncle      Diabetes Maternal Uncle      Drug abuse Maternal Uncle      Cancer Paternal Aunt      Cancer Paternal Uncle      Arthritis Maternal Grandmother      Hypertension Maternal Grandmother      Breast cancer Maternal Grandmother      Arthritis Paternal Grandmother      Cancer Paternal Grandmother      Hypertension Paternal Grandfather         Review of Systems   Cardiovascular:  Positive for chest pain  and dyspnea on exertion. Negative for palpitations and syncope.   Genitourinary: Negative.    Neurological: Negative.        Current Outpatient Medications on File Prior to Visit   Medication Sig    ACCU-CHEK GUIDE GLUCOSE METER Misc USE AS DIRECTED    albuterol (PROVENTIL/VENTOLIN HFA) 90 mcg/actuation inhaler INHALE 2 TO 4 PUFFS BY MOUTH THREE TIMES DAILY AS NEEDED FOR WHEEZING    ARIPiprazole (ABILIFY) 10 MG Tab Take 1 tablet daily.    aspirin (ECOTRIN) 81 MG EC tablet Take 1 tablet (81 mg total) by mouth once daily.    benztropine (COGENTIN) 0.5 MG tablet Take 1 tablet (0.5 mg total) by mouth 2 (two) times daily. TAKE 1 TABLET BY MOUTH TWICE DAILY AS NEEDED FOR  DYSTONIA    blood sugar diagnostic Strp Inject 1 each into the skin 4 (four) times daily. Accu-Check Guide Test strips    butalbital-acetaminophen-caffeine -40 mg (FIORICET, ESGIC) -40 mg per tablet Take 1 tablet by mouth every 6 (six) hours as needed for Headaches. for pain.    desvenlafaxine succinate (PRISTIQ) 50 MG Tb24 Take 1 tablet (50 mg total) by mouth once daily.    diazePAM (VALIUM) 10 MG Tab TAKE 1/2 TO 1 (ONE-HALF TO ONE) TABLET BY MOUTH THREE TIMES DAILY AS NEEDED FOR ANXIETY    furosemide (LASIX) 40 MG tablet Take 1 tablet (40 mg total) by mouth once daily.    gabapentin (NEURONTIN) 600 MG tablet TAKE 1 TABLET BY MOUTH IN THE MORNING AND 1 AT AFTERNOON AND 2 AT BEDTIME    heparin sodium,porcine/PF (HEPARIN, PORCINE, PF,) 1,000 unit/mL Soln 1,000 Units.    insulin aspart, niacinamide, (FIASP FLEXTOUCH U-100 INSULIN) 100 unit/mL (3 mL) InPn Inject 10 Units into the skin 3 (three) times daily with meals.    insulin glargine U-300 conc (TOUJEO MAX U-300 SOLOSTAR) 300 unit/mL (3 mL) insulin pen INJECT 76 UNITS SUBCUTANEOUSLY ONCE DAILY    lancets Misc Inject 1 each into the skin 4 (four) times daily. Dispense preferred on insurance    metFORMIN (GLUCOPHAGE-XR) 750 MG ER 24hr tablet Take 1 tablet (750 mg total) by mouth 2 (two) times  daily with meals.    mirtazapine (REMERON) 15 MG tablet Take 1 tablet (15 mg total) by mouth every evening.    nicotine (NICODERM CQ) 14 mg/24 hr Place 1 patch onto the skin once daily.    nicotine polacrilex 2 MG Lozg Take 1 lozenge (2 mg total) by mouth as needed (10 per day). Dispense FRUIT please. DO NOT CHEW UP. Can take 1-2 per hour in place of a cigarette for breakthrough cravings in place of a cigarette. SMOKING CESSATION CARD Please fill with N [id number] SCT ID [id number]  BIN [id number] GROUP PRXSCT  - $0.00 CO-PAY. Best contact #[phone]    nitroGLYCERIN (NITROSTAT) 0.4 MG SL tablet Place 1 tablet (0.4 mg total) under the tongue every 5 (five) minutes as needed for Chest pain.    promethazine (PHENERGAN) 12.5 MG Tab Take 1 tablet (12.5 mg total) by mouth every 6 (six) hours as needed.    [DISCONTINUED] diltiaZEM (CARDIZEM CD) 120 MG Cp24 Take 1 capsule (120 mg total) by mouth once daily.    [DISCONTINUED] isosorbide mononitrate (IMDUR) 120 MG 24 hr tablet Take 1 tablet (120 mg total) by mouth once daily.    [DISCONTINUED] prasugreL (EFFIENT) 5 mg Tab Take 1 tablet (5 mg total) by mouth once daily.    [DISCONTINUED] pravastatin (PRAVACHOL) 40 MG tablet Take 1 tablet (40 mg total) by mouth every evening.    albuterol-ipratropium (DUO-NEB) 2.5 mg-0.5 mg/3 mL nebulizer solution Take 3 mLs by nebulization every 6 (six) hours as needed for Wheezing. Rescue    nicotine (NICODERM CQ) 21 mg/24 hr Place 1 patch onto the skin once daily. Please fill with N [id number] SCT ID [id number]  BIN [id number] GROUP PRXSCT  - 0.00 CO-PAY. Best contact #[phone]     No current facility-administered medications on file prior to visit.       Objective:   Objective:  Wt Readings from Last 3 Encounters:   11/05/24 90.3 kg (199 lb 1.2 oz)   09/05/24 87.2 kg (192 lb 5.6 oz)   09/03/24 88.5 kg (195 lb)     Temp Readings from Last 3 Encounters:   09/05/24 96.9 °F (36.1 °C) (Tympanic)   08/16/24 98 °F (36.7 °C) (Temporal)   05/30/24 97.7  °F (36.5 °C) (Temporal)     BP Readings from Last 3 Encounters:   11/05/24 102/65   09/05/24 132/74   09/03/24 109/72     Pulse Readings from Last 3 Encounters:   11/05/24 75   09/05/24 100   09/03/24 96       Physical Exam  Vitals reviewed.   Constitutional:       Appearance: She is well-developed.   Neck:      Vascular: No carotid bruit.   Cardiovascular:      Rate and Rhythm: Normal rate and regular rhythm.      Pulses: Intact distal pulses.      Heart sounds: Normal heart sounds. No murmur heard.  Pulmonary:      Breath sounds: Normal breath sounds.   Neurological:      Mental Status: She is oriented to person, place, and time.       This was a virtual visit.  Patient looks not in distress.  Wearing oxygen cannula.    Lab Results   Component Value Date    CHOL 165 09/03/2024    CHOL 191 10/09/2023    CHOL 197 11/02/2022     Lab Results   Component Value Date    HDL 50 09/03/2024    HDL 44 10/09/2023    HDL 44 11/02/2022     Lab Results   Component Value Date    LDLCALC 51.6 (L) 09/03/2024    LDLCALC 98.2 10/09/2023    LDLCALC 90.6 11/02/2022     Lab Results   Component Value Date    TRIG 317 (H) 09/03/2024    TRIG 244 (H) 10/09/2023    TRIG 312 (H) 11/02/2022     Lab Results   Component Value Date    CHOLHDL 30.3 09/03/2024    CHOLHDL 23.0 10/09/2023    CHOLHDL 22.3 11/02/2022       Chemistry        Component Value Date/Time     08/07/2024 1430    K 4.0 08/07/2024 1430    CL 99 08/07/2024 1430    CO2 29 08/07/2024 1430    BUN 16 08/07/2024 1430    CREATININE 1.0 08/07/2024 1430    GLU 67 (L) 08/07/2024 1430        Component Value Date/Time    CALCIUM 9.7 08/07/2024 1430    ALKPHOS 59 07/12/2024 0837    AST 33 07/12/2024 0837    ALT 43 07/12/2024 0837    BILITOT 0.3 07/12/2024 0837    ESTGFRAFRICA >60 07/31/2022 0700    EGFRNONAA >60 07/31/2022 0700          Lab Results   Component Value Date    TSH 3.836 03/14/2024     Lab Results   Component Value Date    INR 1.1 08/07/2024    INR 1.1 05/15/2024    INR  1.1 08/16/2020     Lab Results   Component Value Date    WBC 11.37 08/07/2024    HGB 15.6 08/07/2024    HCT 48.7 (H) 08/07/2024     (H) 08/07/2024     08/07/2024     BNP  @LABRCNTIP(BNP,BNPTRIAGEBLO)@  CrCl cannot be calculated (Patient's most recent lab result is older than the maximum 7 days allowed.).     Imaging:  ======  Results for orders placed during the hospital encounter of 07/24/22    Echo Saline Bubble? No    Interpretation Summary  · The left ventricle is normal in size with concentric hypertrophy and normal systolic function.  · The estimated ejection fraction is 60%.  · Indeterminate left ventricular diastolic function.  · Normal right ventricular size with normal right ventricular systolic function.  · Normal central venous pressure (3 mmHg).  · Trivial pericardial effusion.    No results found for this or any previous visit.    No results found for this or any previous visit.    Results for orders placed during the hospital encounter of 07/15/16    X-Ray Chest PA And Lateral    Narrative  2 view chest    Comparison: 06/23/2016    Findings: There is a curvilinear area of increased density in the suprahilar region on the left which is favored to represent a combination of vasculature and possibly some atelectasis with infiltrate thought less likely.  Diffuse chronic increased peribronchial markings are noted.  The heart is not enlarged.  IMPRESSION:      1.  As above  ______________________________________    Electronically signed by: LEXII TUCKER D.O.  Date:     07/15/16  Time:    09:26    No results found for this or any previous visit.    No valid procedures specified.    Diagnostic Results:  ECG: Reviewed  Results for orders placed during the hospital encounter of 03/14/24    Echo    Interpretation Summary    Left Ventricle: The left ventricle is normal in size. Normal wall thickness. There is concentric hypertrophy. There is normal systolic function with a visually estimated  ejection fraction of 60 - 65%. There is normal diastolic function.    Right Ventricle: Normal right ventricular cavity size. Wall thickness is normal. Right ventricle wall motion  is normal. Systolic function is normal.    Pulmonary Artery: The estimated pulmonary artery systolic pressure is 22 mmHg.    IVC/SVC: Normal venous pressure at 3 mmHg.    Results for orders placed during the hospital encounter of 02/14/22    Nuclear Stress - Cardiology Interpreted    Interpretation Summary    Normal myocardial perfusion scan. There is no evidence of myocardial ischemia or infarction.    There is mild apical thinning which is a normal variant.    The gated perfusion images showed an ejection fraction of 71% at rest. The gated perfusion images showed an ejection fraction of 71% post stress. Normal ejection fraction is greater than 59%.    The EKG portion of this study is negative for ischemia.    Interpretation Summary 4.2024  Show Result ComparisonThe patient was monitored for a total of 13d 21h, underlying rhythm is sinus.  The minimum heart rate was 52 bpm; the maximum 135 bpm; the average 83 bpm.  0 % of Atrial fibrillation/Atrial flutter with longest episode of 0 ms.  The total burden of AV Block present was 0 % [Complete Heart Block: 0 %; Advanced (High Grade):  0 %; 2nd Degree, Mobitz II: 0 %; 2nd Degree, Mobitz I: 0 %].  There were 0 pauses, the longest pause was 0 ms at --.  Total count of Ventricular Tachycardia (VT): 2 episode(s). Longest VT: 5 beats on Day 14 / 03:04:51  pm. Fastest VT: 127 bpm on  Day 9 / 08:44:49 am.  0 supraventricular episodes were found. Longest SVT Episode 0 s, Fastest SVT -- bpm  There were a total of 385 PVCs with 1 morphologies and 5 couplets. Overall PVC [place] at 0.03 %  There were a total of 0 Other Beats. There were 0 total number of paced beats.  There were a total of 93 PSVCs with 1 morphologies and 2 couplets. Overall PSVC [place] at  < 0.01 %  There is a total of 1 patient  events.     Results for orders placed during the hospital encounter of 03/14/24    Echo    Interpretation Summary    Left Ventricle: The left ventricle is normal in size. Normal wall thickness. There is concentric hypertrophy. There is normal systolic function with a visually estimated ejection fraction of 60 - 65%. There is normal diastolic function.    Right Ventricle: Normal right ventricular cavity size. Wall thickness is normal. Right ventricle wall motion  is normal. Systolic function is normal.    Pulmonary Artery: The estimated pulmonary artery systolic pressure is 22 mmHg.    IVC/SVC: Normal venous pressure at 3 mmHg.    Results for orders placed during the hospital encounter of 05/09/24    Nuclear Stress - Cardiology Interpreted    Interpretation Summary    Abnormal myocardial perfusion scan.    There is a moderate intensity, large sized, mostly reversible perfusion abnormality with some fixed areas in the anterior, apical, anterolateral and anteroapical wall(s).    Basal to mid anterolateral wall ischemia is present.    There are no other significant perfusion abnormalities.    The gated perfusion images showed an ejection fraction of 64% at rest. The gated perfusion images showed an ejection fraction of 67% post stress.    There is normal wall motion at rest and post stress.    The ECG portion of the study is negative for ischemia.    The patient reported no chest pain during the stress test.    There were no arrhythmias during stress.      Results for orders placed during the hospital encounter of 05/30/24    Cardiac catheterization    Conclusion    The Mid LAD lesion was 85% stenosed with 0% stenosis post-intervention. iFR 0.77    The pre-procedure left ventricular end diastolic pressure was 14.    Mid LAD lesion: A STENT SYNERGY XD [id number] stent was not successfully placed. Post dilated with 2.5 mm NC balloon    The estimated blood loss was none.    There was single vessel coronary artery disease.     The PCI was successful.    The filling pressures on the left were normal.    Coronary fistula across septal arteries    The procedure log was documented by Documenter: Nitza Vera RN and verified by Meagan Espinoza MD.    Date: 5/30/2024  Time: 12:14 PM    The ASCVD Risk score (Keila MARTIN, et al., 2019) failed to calculate for the following reasons:    Risk score cannot be calculated because patient has a medical history suggesting prior/existing ASCVD    Results for orders placed during the hospital encounter of 08/16/24    Cardiac catheterization    Conclusion    The ejection fraction was calculated to be 65%.    The LAD stent is patent, well apposed and expanded    The pre-procedure left ventricular end diastolic pressure was 23.    The post-procedure left ventricular end diastolic pressure was 26.    The estimated blood loss was none.    There was non-obstructive coronary artery disease..    There was diastolic dysfunction.    The filling pressures on the left were moderately elevated.    Coronary myocardial fistula however origin could not be determined, No apparent origin and no enlargement of a specific coronary, there is possible multiple origins including small d1 , and very numerous distal septals and small apical diagonals    The procedure log was documented by Documenter: Jami Muir RN and verified by Meagan Espinoza MD.    Date: 8/16/2024  Time: 8:20 AM    Interpretation Summary 2022  Show Result ComparisonThere is 0-19% right Internal Carotid Stenosis.  There is 0-19% left Internal Carotid Stenosis.  Assessment and Plan:   Ex-smoker    S/P coronary artery stent placement  -     diltiaZEM (CARDIZEM CD) 120 MG Cp24; Take 1 capsule (120 mg total) by mouth once daily.  Dispense: 90 capsule; Refill: 3  -     prasugreL (EFFIENT) 5 mg Tab; Take 1 tablet (5 mg total) by mouth once daily.  Dispense: 90 tablet; Refill: 3    Coronary artery disease involving native coronary artery of native heart,  unspecified whether angina present  -     isosorbide mononitrate (IMDUR) 120 MG 24 hr tablet; Take 1 tablet (120 mg total) by mouth once daily.  Dispense: 90 tablet; Refill: 3    Hyperlipidemia, unspecified hyperlipidemia type  -     pravastatin (PRAVACHOL) 40 MG tablet; Take 1 tablet (40 mg total) by mouth every evening.  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            SIMIN normal.  Encouraged to continue abstaining from smoking vaping  Reviewed all tests and above medical conditions with patient in detail and formulated treatment plan.  Risk factor modification discussed.   Cardiac low salt diet discussed.  Maintaining healthy weight and weight loss goals were discussed in clinic.  Continue with Effient, aspirin, pravastatin, Imdur and Cardizem.    Follow up in 6 months

## 2024-11-15 DIAGNOSIS — F41.9 ANXIETY: Primary | ICD-10-CM

## 2024-11-15 RX ORDER — DESVENLAFAXINE 50 MG/1
50 TABLET, FILM COATED, EXTENDED RELEASE ORAL DAILY
Qty: 90 TABLET | Refills: 0 | Status: SHIPPED | OUTPATIENT
Start: 2024-11-15

## 2024-11-15 RX ORDER — ARIPIPRAZOLE 10 MG/1
TABLET ORAL
Qty: 90 TABLET | Refills: 0 | Status: SHIPPED | OUTPATIENT
Start: 2024-11-15

## 2024-11-15 RX ORDER — BENZTROPINE MESYLATE 0.5 MG/1
0.5 TABLET ORAL 2 TIMES DAILY
Qty: 180 TABLET | Refills: 0 | Status: SHIPPED | OUTPATIENT
Start: 2024-11-15

## 2024-11-15 RX ORDER — DIAZEPAM 10 MG/1
TABLET ORAL
Qty: 90 TABLET | Refills: 1 | Status: SHIPPED | OUTPATIENT
Start: 2024-11-15

## 2024-11-15 RX ORDER — MIRTAZAPINE 15 MG/1
15 TABLET, FILM COATED ORAL NIGHTLY
Qty: 90 TABLET | Refills: 0 | Status: SHIPPED | OUTPATIENT
Start: 2024-11-15

## 2024-11-20 ENCOUNTER — IMMUNIZATION (OUTPATIENT)
Dept: FAMILY MEDICINE | Facility: CLINIC | Age: 59
End: 2024-11-20
Payer: MEDICARE

## 2024-11-20 ENCOUNTER — LAB VISIT (OUTPATIENT)
Dept: LAB | Facility: HOSPITAL | Age: 59
End: 2024-11-20
Attending: NURSE PRACTITIONER
Payer: MEDICARE

## 2024-11-20 ENCOUNTER — OFFICE VISIT (OUTPATIENT)
Dept: DIABETES | Facility: CLINIC | Age: 59
End: 2024-11-20
Payer: MEDICARE

## 2024-11-20 VITALS
BODY MASS INDEX: 34.61 KG/M2 | SYSTOLIC BLOOD PRESSURE: 87 MMHG | DIASTOLIC BLOOD PRESSURE: 59 MMHG | HEART RATE: 85 BPM | HEIGHT: 63 IN | WEIGHT: 195.31 LBS

## 2024-11-20 DIAGNOSIS — I10 ESSENTIAL HYPERTENSION: Chronic | ICD-10-CM

## 2024-11-20 DIAGNOSIS — E78.2 MIXED HYPERLIPIDEMIA: ICD-10-CM

## 2024-11-20 DIAGNOSIS — E11.42 DIABETIC POLYNEUROPATHY ASSOCIATED WITH TYPE 2 DIABETES MELLITUS: ICD-10-CM

## 2024-11-20 DIAGNOSIS — Z23 NEED FOR VACCINATION: Primary | ICD-10-CM

## 2024-11-20 DIAGNOSIS — E11.42 DIABETIC POLYNEUROPATHY ASSOCIATED WITH TYPE 2 DIABETES MELLITUS: Primary | ICD-10-CM

## 2024-11-20 LAB
ALBUMIN SERPL BCP-MCNC: 4.1 G/DL (ref 3.5–5.2)
ALP SERPL-CCNC: 62 U/L (ref 40–150)
ALT SERPL W/O P-5'-P-CCNC: 45 U/L (ref 10–44)
ANION GAP SERPL CALC-SCNC: 13 MMOL/L (ref 8–16)
AST SERPL-CCNC: 51 U/L (ref 10–40)
BILIRUB SERPL-MCNC: 0.5 MG/DL (ref 0.1–1)
BUN SERPL-MCNC: 15 MG/DL (ref 8–23)
CALCIUM SERPL-MCNC: 9.8 MG/DL (ref 8.7–10.5)
CHLORIDE SERPL-SCNC: 103 MMOL/L (ref 95–110)
CO2 SERPL-SCNC: 24 MMOL/L (ref 23–29)
CREAT SERPL-MCNC: 1 MG/DL (ref 0.5–1.4)
EST. GFR  (NO RACE VARIABLE): >60 ML/MIN/1.73 M^2
ESTIMATED AVG GLUCOSE: 137 MG/DL (ref 68–131)
GLUCOSE SERPL-MCNC: 114 MG/DL (ref 70–110)
GLUCOSE SERPL-MCNC: 145 MG/DL (ref 70–110)
HBA1C MFR BLD: 6.4 % (ref 4–5.6)
POTASSIUM SERPL-SCNC: 5.3 MMOL/L (ref 3.5–5.1)
PROT SERPL-MCNC: 7.7 G/DL (ref 6–8.4)
SODIUM SERPL-SCNC: 140 MMOL/L (ref 136–145)

## 2024-11-20 PROCEDURE — 1159F MED LIST DOCD IN RCRD: CPT | Mod: HCNC,CPTII,S$GLB, | Performed by: NURSE PRACTITIONER

## 2024-11-20 PROCEDURE — 80053 COMPREHEN METABOLIC PANEL: CPT | Mod: HCNC | Performed by: NURSE PRACTITIONER

## 2024-11-20 PROCEDURE — G0008 ADMIN INFLUENZA VIRUS VAC: HCPCS | Mod: HCNC,S$GLB,, | Performed by: FAMILY MEDICINE

## 2024-11-20 PROCEDURE — 83036 HEMOGLOBIN GLYCOSYLATED A1C: CPT | Mod: HCNC | Performed by: NURSE PRACTITIONER

## 2024-11-20 PROCEDURE — 3074F SYST BP LT 130 MM HG: CPT | Mod: HCNC,CPTII,S$GLB, | Performed by: NURSE PRACTITIONER

## 2024-11-20 PROCEDURE — 3060F POS MICROALBUMINURIA REV: CPT | Mod: HCNC,CPTII,S$GLB, | Performed by: NURSE PRACTITIONER

## 2024-11-20 PROCEDURE — 90656 IIV3 VACC NO PRSV 0.5 ML IM: CPT | Mod: HCNC,S$GLB,, | Performed by: FAMILY MEDICINE

## 2024-11-20 PROCEDURE — 36415 COLL VENOUS BLD VENIPUNCTURE: CPT | Mod: HCNC,PO | Performed by: NURSE PRACTITIONER

## 2024-11-20 PROCEDURE — 99214 OFFICE O/P EST MOD 30 MIN: CPT | Mod: HCNC,S$GLB,, | Performed by: NURSE PRACTITIONER

## 2024-11-20 PROCEDURE — 82962 GLUCOSE BLOOD TEST: CPT | Mod: HCNC,S$GLB,, | Performed by: NURSE PRACTITIONER

## 2024-11-20 PROCEDURE — 3066F NEPHROPATHY DOC TX: CPT | Mod: HCNC,CPTII,S$GLB, | Performed by: NURSE PRACTITIONER

## 2024-11-20 PROCEDURE — 3051F HG A1C>EQUAL 7.0%<8.0%: CPT | Mod: HCNC,CPTII,S$GLB, | Performed by: NURSE PRACTITIONER

## 2024-11-20 PROCEDURE — 3078F DIAST BP <80 MM HG: CPT | Mod: HCNC,CPTII,S$GLB, | Performed by: NURSE PRACTITIONER

## 2024-11-20 PROCEDURE — 3008F BODY MASS INDEX DOCD: CPT | Mod: HCNC,CPTII,S$GLB, | Performed by: NURSE PRACTITIONER

## 2024-11-20 PROCEDURE — 99999 PR PBB SHADOW E&M-EST. PATIENT-LVL III: CPT | Mod: PBBFAC,HCNC,, | Performed by: NURSE PRACTITIONER

## 2024-11-20 RX ORDER — METFORMIN HYDROCHLORIDE 750 MG/1
750 TABLET, EXTENDED RELEASE ORAL 2 TIMES DAILY WITH MEALS
Qty: 180 TABLET | Refills: 1 | Status: SHIPPED | OUTPATIENT
Start: 2024-11-20

## 2024-11-20 RX ORDER — INSULIN GLARGINE 300 [IU]/ML
80 INJECTION, SOLUTION SUBCUTANEOUS DAILY
Qty: 12 ML | Refills: 1 | Status: SHIPPED | OUTPATIENT
Start: 2024-11-20

## 2024-11-20 RX ORDER — INSULIN ASPART INJECTION 100 [IU]/ML
10 INJECTION, SOLUTION SUBCUTANEOUS
Qty: 15 ML | Refills: 6 | Status: SHIPPED | OUTPATIENT
Start: 2024-11-20 | End: 2025-11-20

## 2024-11-20 NOTE — PROGRESS NOTES
"PCP: Perez Casiano MD    Subjective:     Chief Complaint: Diabetes Follow Up    HISTORY OF PRESENT ILLNESS: 59 y.o.  female presenting, with , for diabetes follow up.     Patient has had Type II diabetes since 1985 and has the following complications: peripheral neuropathy, PVD, and CAD with heart cath in May s/p stent placement to LAD. Other pertinent conditions: hx of DKA in 2023, bipolar disorder and depression ( follows psych) and hypoglycemia-induced seizures. She has attended diabetes education in the past. No longer smoking or Vaping.    Past tried and failed medications:  No longer taking Jardiance due to cost and history of recurrent UTI w/ sepsis. GLP-1 therapy too expensive.     BG monitoring is performed, but patient forgot her meter today. Per recall, fasting BGs are 100 - 150 s, with occasional spikes in the 200 s. Has rare hypoglycemia, which she treats with OJ, crackers. Continues to eat healthier diet with smaller portions of more protein and vegetables. No routine exercise.    Blood glucose in clinic today: 145 mg/dl     Vitals:    11/20/24 0748   BP: (!) 87/59   BP Location: Right arm   Patient Position: Sitting   Pulse: 85   Weight: 88.6 kg (195 lb 5.2 oz)   Height: 5' 3" (1.6 m)     BMI 34.60    DM MEDICATIONS:    Toujeo 76 units daily;  Metformin 500 mg BID ac  Fiasp, 1:10 carb ratio + correction scale as needed at meals:   - 199: 1 unit  //   - 249: 2 units   //   - 299: 3 units   //  - 349: 4 units   //  BG Over 350: 5 units.    Labs Reviewed.       Lab Results   Component Value Date    CPEPTIDE 3.4 01/25/2017     Lab Results   Component Value Date    GLUTAMICACID 0.01 01/25/2017     Lab Results   Component Value Date    HUMANINSULIN 0.00 01/25/2017     Review of Systems   Constitutional:  Negative for appetite change and fatigue.   Eyes:  Negative for visual disturbance.   Gastrointestinal:  Negative for constipation, diarrhea, nausea and " vomiting.   Endocrine: Negative for polydipsia, polyphagia and polyuria.   Neurological:  Positive for numbness (bilateral feet).   Psychiatric/Behavioral:          History of Bipolar Disorder        Diabetes Management Status  Statin: Not taking - allergic  ACE/ARB: Not taking    Screening or Prevention Patient's value Goal Complete/Controlled?   HgA1C Testing and Control   Lab Results   Component Value Date    HGBA1C 7.4 (H) 07/12/2024    HGBA1C 6.6 (H) 02/21/2024    HGBA1C 12.0 (H) 10/09/2023      Annually/Less than 8% No     Lipid profile : 09/03/2024 Annually Yes     LDL control Lab Results   Component Value Date    LDLCALC 51.6 (L) 09/03/2024    Annually/Less than 100 mg/dl  Yes     Nephropathy screening Lab Results   Component Value Date    MICALBCREAT 135.0 (H) 07/12/2024     Lab Results   Component Value Date    PROTEINUA Negative 12/19/2023    Annually Yes     Blood pressure BP Readings from Last 1 Encounters:   11/20/24 (!) 87/59    Less than 140/90 Yes     Dilated retinal exam : 01/23/2024 Annually Yes    Foot exam   : 11/20/2024 Annually Yes       ACTIVITY LEVEL: Sedentary. Discussed activities, benefits, methods, and precautions.  MEAL PLANNING: Patient reports number of meals per day to be 2 and number of snacks per day to be 0.   Patient is encouraged to carb count and consume no more than 45 - 60 grams of carbohydrates in each meal, and 1800 k / gloria per day.      BLOOD GLUCOSE TESTING:  True Metrix  SOCIAL HISTORY:  .  No longer smoking.     Objective:      Physical Exam  Constitutional:       Appearance: She is well-developed. She is obese.   HENT:      Head: Normocephalic and atraumatic.   Eyes:      Pupils: Pupils are equal, round, and reactive to light.   Cardiovascular:      Rate and Rhythm: Normal rate and regular rhythm.      Pulses:           Dorsalis pedis pulses are 2+ on the right side and 2+ on the left side.   Pulmonary:      Effort: Pulmonary effort is normal.      Breath  sounds: Normal breath sounds.   Feet:      Right foot:      Protective Sensation: 6 sites tested.  6 sites sensed.      Skin integrity: No ulcer, callus or dry skin.      Toenail Condition: Right toenails are abnormally thick. Fungal disease present.     Left foot:      Protective Sensation: 6 sites tested.  6 sites sensed.      Skin integrity: No ulcer, callus or dry skin.      Toenail Condition: Left toenails are abnormally thick. Fungal disease present.  Skin:     General: Skin is warm and dry.      Findings: No rash.   Psychiatric:         Behavior: Behavior normal.         Thought Content: Thought content normal.         Judgment: Judgment normal.         Assessment / Plan:     1.) Diabetic polyneuropathy associated with type 2 diabetes mellitus  -  Continue Toujeo 72 units daily. Continue metformin  mg BID ac.  Continue Fiasp, using 1:10 carb ratio.  Her  counts carbohydrates, so he plans to assist her. Continue correction scale (as needed) 2-3 hours after meals if BG are elevated.   - 199: 1 unit  //   - 249: 2 units   //   - 299: 3 units   //  - 349: 4 units   //  BG Over 350: 5 units.  Repeat labs today:  A1C, CMP.    Orders:   -     POCT Glucose, Hand-Held Device   -     insulin aspart, niacinamide, (FIASP FLEXTOUCH U-100 INSULIN) 100 unit/mL (3 mL) InPn; Inject 10 Units into the skin 3 (three) times daily with meals.  Dispense: 5 Pen; Refill: 3  -     Hemoglobin A1C; Standing  -     Comprehensive Metabolic Panel; Standing  -     Microalbumin/Creatinine Ratio, Urine; Standing  -     metFORMIN (GLUCOPHAGE-XR) 750 MG ER 24hr tablet; Take 1 tablet (750 mg total) by mouth 2 (two) times daily with meals.  Dispense: 60 tablet; Refill: 3    2.) Essential hypertension - continue medication as prescribed.    3.) Dyslipidemia associated with type 2 diabetes mellitus - continue statin therapy.    Additional Plan Details:    1.) Continue monitoring blood sugar 3x daily, fasting and  ac dinner or at bedtime. Discussed ADA goal for fasting blood sugar, 80 - 130 mg/dL; pp blood sugars below 180 mg/dl. Also, discussed prevention of hypoglycemia and the need to adjust goals to higher levels if persistent hypoglycemia.  Reminded to bring BG records or meter to each visit for review.  2.) Return to clinic in 4 months for follow up. The patient was explained the above plan and given opportunity to ask questions.  She understands, chooses and consents to this plan and accepts all the risks, which include but are not limited to the risks mentioned above. She understands the alternative of having no testing, interventions or treatments at this time. She left content and without further questions.     Amelia Santana, SAMSON-C, [facility]    A total of 30 minutes was spent in face to face time, of which over 50 % was spent in counseling patient on disease process, complications, treatment, and side effects of medications.

## 2024-11-30 DIAGNOSIS — E11.42 DIABETIC POLYNEUROPATHY ASSOCIATED WITH TYPE 2 DIABETES MELLITUS: ICD-10-CM

## 2024-11-30 NOTE — TELEPHONE ENCOUNTER
No care due was identified.  [facility] Embedded Care Due Messages. Reference number: [id number].   11/30/2024 7:51:29 AM CST

## 2024-12-02 DIAGNOSIS — E11.42 DIABETIC POLYNEUROPATHY ASSOCIATED WITH TYPE 2 DIABETES MELLITUS: ICD-10-CM

## 2024-12-02 RX ORDER — GABAPENTIN 600 MG/1
TABLET ORAL
Qty: 360 TABLET | Refills: 0 | Status: SHIPPED | OUTPATIENT
Start: 2024-12-02

## 2024-12-02 NOTE — TELEPHONE ENCOUNTER
No care due was identified.  [facility] Embedded Care Due Messages. Reference number: [id number].   12/02/2024 2:21:26 PM CST

## 2024-12-03 NOTE — TELEPHONE ENCOUNTER
Refill Routing Note   Medication(s) are not appropriate for processing by [facility] for the following reason(s):        Outside of protocol    [facility] action(s):  Route               Appointments  past 12m or future 3m with PCP    Date Provider   Last Visit   9/5/2024 Perez Casiano MD   Next Visit   Visit date not found Perez Casiano MD   ED visits in past 90 days: 0        Note composed:10:31 AM 12/03/2024

## 2024-12-04 RX ORDER — GABAPENTIN 600 MG/1
TABLET ORAL
Qty: 360 TABLET | Refills: 0 | OUTPATIENT
Start: 2024-12-04

## 2024-12-05 DIAGNOSIS — R07.89 OTHER CHEST PAIN: ICD-10-CM

## 2024-12-05 NOTE — TELEPHONE ENCOUNTER
----- Message from Luigi sent at 12/5/2024  2:11 PM CST -----  Contact: Alexandra  Type:  RX Refill Request    Who Called:  Alexandra   Refill or New Rx: refill  RX Name and Strength:nitroGLYCERIN (NITROSTAT) 0.4 MG SL tablet  How is the patient currently taking it? (ex. 1XDay):2xday  Is this a 30 day or 90 day RX:90day  Preferred Pharmacy with phone number:   [facility] [address] [place] [address]  Phone: [phone] Fax: [phone]  Local or Mail Order: local  Ordering Provider:   Would the patient rather a call back or a response via [facility]?  Call back   Best Call Back Number:[phone]  Additional Information:     Thanks   Am

## 2024-12-06 DIAGNOSIS — E11.42 DIABETIC POLYNEUROPATHY ASSOCIATED WITH TYPE 2 DIABETES MELLITUS: ICD-10-CM

## 2024-12-06 RX ORDER — INSULIN GLARGINE 300 [IU]/ML
72 INJECTION, SOLUTION SUBCUTANEOUS DAILY
Qty: 12 ML | Refills: 1 | Status: SHIPPED | OUTPATIENT
Start: 2024-12-06

## 2024-12-07 RX ORDER — NITROGLYCERIN 0.4 MG/1
0.4 TABLET SUBLINGUAL EVERY 5 MIN PRN
Qty: 100 TABLET | Refills: 0 | Status: SHIPPED | OUTPATIENT
Start: 2024-12-07

## 2024-12-09 DIAGNOSIS — E11.42 DIABETIC POLYNEUROPATHY ASSOCIATED WITH TYPE 2 DIABETES MELLITUS: ICD-10-CM

## 2024-12-09 NOTE — TELEPHONE ENCOUNTER
No care due was identified.  Health [facility] Embedded Care Due Messages. Reference number: [id number].   12/09/2024 8:08:11 AM CST

## 2024-12-12 ENCOUNTER — LAB VISIT (OUTPATIENT)
Dept: LAB | Facility: HOSPITAL | Age: 59
End: 2024-12-12
Attending: NURSE PRACTITIONER
Payer: MEDICARE

## 2024-12-12 DIAGNOSIS — E11.42 DIABETIC POLYNEUROPATHY ASSOCIATED WITH TYPE 2 DIABETES MELLITUS: ICD-10-CM

## 2024-12-12 LAB
ALBUMIN SERPL BCP-MCNC: 3.8 G/DL (ref 3.5–5.2)
ALP SERPL-CCNC: 64 U/L (ref 40–150)
ALT SERPL W/O P-5'-P-CCNC: 31 U/L (ref 10–44)
ANION GAP SERPL CALC-SCNC: 11 MMOL/L (ref 8–16)
AST SERPL-CCNC: 29 U/L (ref 10–40)
BILIRUB SERPL-MCNC: 0.4 MG/DL (ref 0.1–1)
BUN SERPL-MCNC: 13 MG/DL (ref 8–23)
CALCIUM SERPL-MCNC: 9.2 MG/DL (ref 8.7–10.5)
CHLORIDE SERPL-SCNC: 99 MMOL/L (ref 95–110)
CO2 SERPL-SCNC: 29 MMOL/L (ref 23–29)
CREAT SERPL-MCNC: 0.9 MG/DL (ref 0.5–1.4)
EST. GFR  (NO RACE VARIABLE): >60 ML/MIN/1.73 M^2
GLUCOSE SERPL-MCNC: 81 MG/DL (ref 70–110)
POTASSIUM SERPL-SCNC: 4 MMOL/L (ref 3.5–5.1)
PROT SERPL-MCNC: 7.3 G/DL (ref 6–8.4)
SODIUM SERPL-SCNC: 139 MMOL/L (ref 136–145)

## 2024-12-12 PROCEDURE — 80053 COMPREHEN METABOLIC PANEL: CPT | Mod: HCNC | Performed by: NURSE PRACTITIONER

## 2024-12-12 PROCEDURE — 36415 COLL VENOUS BLD VENIPUNCTURE: CPT | Mod: HCNC,PO | Performed by: NURSE PRACTITIONER

## 2024-12-12 RX ORDER — GABAPENTIN 600 MG/1
TABLET ORAL
Qty: 360 TABLET | Refills: 0 | OUTPATIENT
Start: 2024-12-12

## 2024-12-18 ENCOUNTER — TELEPHONE (OUTPATIENT)
Dept: DIABETES | Facility: CLINIC | Age: 59
End: 2024-12-18
Payer: MEDICARE

## 2024-12-18 NOTE — TELEPHONE ENCOUNTER
----- Message from Amelia Santana, PhD, NP-C sent at 12/17/2024  5:46 PM CST -----  Please call patient and let her know that potassium has improved and is back in the normal range.  Thanks!    Amelia  ----- Message -----  From: Grady, EXTR[facility]AN[facility] Lab Interface  Sent: 12/12/2024   8:18 PM CST  To: Amelia Santana, PhD, NP-C

## 2024-12-18 NOTE — TELEPHONE ENCOUNTER
"I called and informed  that her potassium has improved and is back in normal range.        stated that the medication that  changed during her last visit the pharmacy is telling her that her insurance won't cover it. I asked  what medication did they tell her insurance wouldn't cover and she spelled out to me "glargine" I explained to  that I did not see where  changed her medications in her last chart note but that I would still inform the provider with this information. She stated that  wanted to take her off her Toujeo and put her on something else and that insurance wasn't going to cover it.   "

## 2024-12-27 DIAGNOSIS — R51.9 GENERALIZED HEADACHES: ICD-10-CM

## 2024-12-27 DIAGNOSIS — I67.1 CEREBRAL ANEURYSM: ICD-10-CM

## 2024-12-27 NOTE — TELEPHONE ENCOUNTER
No care due was identified.  Health [facility] Embedded Care Due Messages. Reference number: [id number].   12/27/2024 11:48:35 AM CST

## 2024-12-30 ENCOUNTER — TELEPHONE (OUTPATIENT)
Dept: PSYCHIATRY | Facility: CLINIC | Age: 59
End: 2024-12-30
Payer: MEDICARE

## 2024-12-30 NOTE — TELEPHONE ENCOUNTER
Called pt R/S B/O appointment from 1/2 to 2/13 virtual. Pt state that she will need Refill on her medication until the next appointment..

## 2024-12-31 RX ORDER — PROMETHAZINE HYDROCHLORIDE 12.5 MG/1
12.5 TABLET ORAL EVERY 6 HOURS PRN
Qty: 32 TABLET | Refills: 0 | Status: SHIPPED | OUTPATIENT
Start: 2024-12-31

## 2024-12-31 RX ORDER — BUTALBITAL, ACETAMINOPHEN AND CAFFEINE 50; 325; 40 MG/1; MG/1; MG/1
1 TABLET ORAL EVERY 6 HOURS PRN
Qty: 30 TABLET | Refills: 0 | Status: SHIPPED | OUTPATIENT
Start: 2024-12-31

## 2025-02-13 ENCOUNTER — OFFICE VISIT (OUTPATIENT)
Dept: PSYCHIATRY | Facility: CLINIC | Age: 60
End: 2025-02-13
Payer: MEDICARE

## 2025-02-13 DIAGNOSIS — G47.00 INSOMNIA, UNSPECIFIED TYPE: ICD-10-CM

## 2025-02-13 DIAGNOSIS — F41.9 ANXIETY: ICD-10-CM

## 2025-02-13 DIAGNOSIS — F31.9 BIPOLAR 1 DISORDER: Primary | ICD-10-CM

## 2025-02-13 RX ORDER — BENZTROPINE MESYLATE 0.5 MG/1
0.5 TABLET ORAL 2 TIMES DAILY PRN
Qty: 180 TABLET | Refills: 1 | Status: SHIPPED | OUTPATIENT
Start: 2025-02-13

## 2025-02-13 RX ORDER — LURASIDONE HYDROCHLORIDE 80 MG/1
80 TABLET, FILM COATED ORAL NIGHTLY
Qty: 90 TABLET | Refills: 1 | Status: SHIPPED | OUTPATIENT
Start: 2025-02-13 | End: 2026-02-13

## 2025-02-13 RX ORDER — CLONAZEPAM 1 MG/1
TABLET ORAL
Qty: 90 TABLET | Refills: 3 | Status: SHIPPED | OUTPATIENT
Start: 2025-02-13

## 2025-02-13 RX ORDER — DESVENLAFAXINE 50 MG/1
50 TABLET, FILM COATED, EXTENDED RELEASE ORAL DAILY
Qty: 90 TABLET | Refills: 1 | Status: SHIPPED | OUTPATIENT
Start: 2025-02-13
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
## 2025-02-18 ENCOUNTER — TELEPHONE (OUTPATIENT)
Dept: PSYCHIATRY | Facility: CLINIC | Age: 60
End: 2025-02-18
Payer: MEDICARE

## 2025-02-19 NOTE — TELEPHONE ENCOUNTER
----- Message from Molly sent at 2/18/2025  1:28 PM CST -----  Contact: Patient @ [phone] .[email or web address] Patient is calling for Medical Advice regarding:Patient is calling. Patient state after insurance pays for the lurasidone (LATUDA) 80 mg Tab tablet it is pricey. Patient would like a call backHow long has patient had these symptoms:Pharmacy name and phone#:Patient wants a call back or thru [facility]:call Comments:Please advise patient replies from provider may take up to 48 hours.

## 2025-02-19 NOTE — TELEPHONE ENCOUNTER
Lab Results   Component Value Date    HGBA1C 7.4 (H) 01/09/2020   obtain A1c.   SSI and adjust accordingly,  On prandial ,basal,and SSI at [facility] stime.    Increasing insulin for solumedrol    Well controlled now.  Will have to cut back insulin once steroids stopped     Elevated sugars this am.  SS. Hopefully can wean or cut back steroids soon     Now that steroids are being cut back- better glucose control. Will cut back on insulin          Ret pt call - she states that even after her insurance pays their part for the latuda her out of pocket is not affordable.  I told her I will message the doctor with this info for his review and response and we will get bact to her.

## 2025-02-24 DIAGNOSIS — E11.42 DIABETIC POLYNEUROPATHY ASSOCIATED WITH TYPE 2 DIABETES MELLITUS: ICD-10-CM

## 2025-02-24 NOTE — TELEPHONE ENCOUNTER
No care due was identified.  [facility] Embedded Care Due Messages. Reference number: [id number].   2/24/2025 10:16:16 AM CST

## 2025-02-24 NOTE — TELEPHONE ENCOUNTER
Refill Routing Note   Medication(s) are not appropriate for processing by [facility] for the following reason(s):        Outside of protocol    [facility] action(s):  Route               Appointments  past 12m or future 3m with PCP    Date Provider   Last Visit   9/5/2024 Perez Casiano MD   Next Visit   3/3/2025 Perez Casiano MD   ED visits in past 90 days: 0        Note composed:11:23 AM 02/24/2025

## 2025-02-24 NOTE — TELEPHONE ENCOUNTER
----- Message from [employer] sent at 2/24/2025 10:21 AM CST -----  Contact: MARGARET COSME [[id number]]  .Type:  RX Refill RequestWho Called: MARGARET COSME [[id number]]Refill or New Rx:RefillRX Name and Strength:gabapentin (NEURONTIN) 600 MG tabletIs this a 30 day or 90 day RX:30Preferred Pharmacy with phone number:.[facility] [address] [place] [address] - [address] [address]: [phone] Fax: [phone] or Mail Order:LocalOrdering Provider:Perez Lozadaould the patient rather a call back or a response via [facility]? CallThe [facility] Call Back Number:.[phone] Information: Patient states they will no have enough medication to last until her next appointment

## 2025-02-26 ENCOUNTER — TELEPHONE (OUTPATIENT)
Dept: OPHTHALMOLOGY | Facility: CLINIC | Age: 60
End: 2025-02-26
Payer: MEDICARE

## 2025-02-28 RX ORDER — GABAPENTIN 600 MG/1
TABLET ORAL
Qty: 360 TABLET | Refills: 0 | Status: SHIPPED | OUTPATIENT
Start: 2025-02-28

## 2025-03-03 ENCOUNTER — LAB VISIT (OUTPATIENT)
Dept: LAB | Facility: HOSPITAL | Age: 60
End: 2025-03-03
Attending: FAMILY MEDICINE
Payer: MEDICARE

## 2025-03-03 ENCOUNTER — OFFICE VISIT (OUTPATIENT)
Dept: FAMILY MEDICINE | Facility: CLINIC | Age: 60
End: 2025-03-03
Payer: MEDICARE

## 2025-03-03 VITALS
SYSTOLIC BLOOD PRESSURE: 120 MMHG | TEMPERATURE: 97 F | HEIGHT: 63 IN | DIASTOLIC BLOOD PRESSURE: 70 MMHG | RESPIRATION RATE: 16 BRPM | WEIGHT: 194.69 LBS | BODY MASS INDEX: 34.5 KG/M2 | HEART RATE: 88 BPM | OXYGEN SATURATION: 92 %

## 2025-03-03 DIAGNOSIS — I10 ESSENTIAL HYPERTENSION: ICD-10-CM

## 2025-03-03 DIAGNOSIS — I50.32 CHRONIC DIASTOLIC HEART FAILURE: ICD-10-CM

## 2025-03-03 DIAGNOSIS — Z79.4 TYPE 2 DIABETES MELLITUS WITH COMPLICATION, WITH LONG-TERM CURRENT USE OF INSULIN: Primary | Chronic | ICD-10-CM

## 2025-03-03 DIAGNOSIS — J44.89 ASTHMA WITH COPD: ICD-10-CM

## 2025-03-03 DIAGNOSIS — E11.8 TYPE 2 DIABETES MELLITUS WITH COMPLICATION, WITH LONG-TERM CURRENT USE OF INSULIN: Chronic | ICD-10-CM

## 2025-03-03 DIAGNOSIS — I25.118 CORONARY ARTERY DISEASE OF NATIVE ARTERY OF NATIVE HEART WITH STABLE ANGINA PECTORIS: ICD-10-CM

## 2025-03-03 DIAGNOSIS — E03.4 HYPOTHYROIDISM DUE TO ACQUIRED ATROPHY OF THYROID: ICD-10-CM

## 2025-03-03 DIAGNOSIS — F31.9 BIPOLAR AFFECTIVE DISORDER, REMISSION STATUS UNSPECIFIED: ICD-10-CM

## 2025-03-03 DIAGNOSIS — E11.8 TYPE 2 DIABETES MELLITUS WITH COMPLICATION, WITH LONG-TERM CURRENT USE OF INSULIN: Primary | Chronic | ICD-10-CM

## 2025-03-03 DIAGNOSIS — J96.10 CHRONIC RESPIRATORY FAILURE, UNSPECIFIED WHETHER WITH HYPOXIA OR HYPERCAPNIA: ICD-10-CM

## 2025-03-03 DIAGNOSIS — F17.200 SMOKING: ICD-10-CM

## 2025-03-03 DIAGNOSIS — Z87.891 PERSONAL HISTORY OF NICOTINE DEPENDENCE: ICD-10-CM

## 2025-03-03 DIAGNOSIS — E11.42 DIABETIC POLYNEUROPATHY ASSOCIATED WITH TYPE 2 DIABETES MELLITUS: ICD-10-CM

## 2025-03-03 DIAGNOSIS — Z79.4 TYPE 2 DIABETES MELLITUS WITH COMPLICATION, WITH LONG-TERM CURRENT USE OF INSULIN: Chronic | ICD-10-CM

## 2025-03-03 DIAGNOSIS — E66.01 MORBID (SEVERE) OBESITY DUE TO EXCESS CALORIES: ICD-10-CM

## 2025-03-03 LAB
ESTIMATED AVG GLUCOSE: 128 MG/DL (ref 68–131)
HBA1C MFR BLD: 6.1 % (ref 4–5.6)
T4 FREE SERPL-MCNC: 0.83 NG/DL (ref 0.71–1.51)
TSH SERPL DL<=0.005 MIU/L-ACNC: 6.17 UIU/ML (ref 0.4–4)

## 2025-03-03 PROCEDURE — 84439 ASSAY OF FREE THYROXINE: CPT | Mod: HCNC | Performed by: FAMILY MEDICINE

## 2025-03-03 PROCEDURE — 99999 PR PBB SHADOW E&M-EST. PATIENT-LVL V: CPT | Mod: PBBFAC,HCNC,, | Performed by: FAMILY MEDICINE

## 2025-03-03 PROCEDURE — 84443 ASSAY THYROID STIM HORMONE: CPT | Mod: HCNC | Performed by: FAMILY MEDICINE

## 2025-03-03 PROCEDURE — 83036 HEMOGLOBIN GLYCOSYLATED A1C: CPT | Mod: HCNC | Performed by: FAMILY MEDICINE

## 2025-03-03 RX ORDER — AZITHROMYCIN 250 MG/1
TABLET, FILM COATED ORAL
Qty: 6 TABLET | Refills: 0 | Status: SHIPPED | OUTPATIENT
Start: 2025-03-03 | End: 2025-03-08

## 2025-03-03 RX ORDER — FLUTICASONE PROPIONATE AND SALMETEROL 250; 50 UG/1; UG/1
1 POWDER RESPIRATORY (INHALATION) 2 TIMES DAILY
Qty: 1 EACH | Refills: 5 | Status: SHIPPED | OUTPATIENT
Start: 2025-03-03 | End: 2026-03-03

## 2025-03-03 NOTE — PROGRESS NOTES
"Subjective:       Patient ID: Alexandra Goins is a 59 y.o. female.    Chief Complaint: Annual Exam      HPI Comments:     Current Medications[1]      Last seen by me about 6 months ago.    Quit smoking last May.  Nevertheless I was surprised to find no maintenance inhaler on her med list.  She is not aware of 1.  Review of her pulmonology notes from over 2 years ago shows that she was placed on Advair.  Will restart this today.      Says she is doing well.  Her A1c is excellent at 6.4.    She has had a cold and cough for the last 2 weeks.  Bloody sputum at time but she is on blood thinners.  Sneezing, rhinorrhea and cough.  No shortness a breath or wheezing.  Also little dizzy and off balance at the same time.  Has a cane that she gets around with.  One recent fall when she slipped on liquid on the ground    Currently still taking Effient and aspirin.  Sees cardiology in a few months.      Review of Systems   Constitutional:  Negative for activity change, appetite change and fever.   HENT:  Positive for congestion, ear pain, rhinorrhea and sneezing. Negative for sore throat.    Respiratory:  Positive for cough. Negative for shortness of breath.    Cardiovascular:  Negative for chest pain.   Gastrointestinal:  Negative for abdominal pain, diarrhea and nausea.   Genitourinary:  Negative for difficulty urinating.   Musculoskeletal:  Negative for arthralgias and myalgias.   Neurological:  Positive for dizziness. Negative for headaches.       Objective:      Vitals:    03/03/25 0734   BP: 120/70   Pulse: 88   Resp: 16   Temp: 96.7 °F (35.9 °C)   TempSrc: Tympanic   SpO2: (!) 92%   Weight: 88.3 kg (194 lb 10.7 oz)   Height: 5' 3" (1.6 m)   PainSc: 0-No pain     Physical Exam  Vitals and nursing note reviewed.   Constitutional:       General: She is not in acute distress.     Appearance: She is well-developed. She is not ill-appearing or diaphoretic.   HENT:      Head: Normocephalic.      Right Ear: Tympanic membrane, ear " canal and external ear normal.      Left Ear: Tympanic membrane, ear canal and external ear normal.      Nose: Mucosal edema and rhinorrhea present.      Mouth/Throat:      Pharynx: Postnasal drip present. No oropharyngeal exudate or posterior oropharyngeal erythema.   Neck:      Thyroid: No thyromegaly.   Cardiovascular:      Rate and Rhythm: Normal rate and regular rhythm.      Heart sounds: Normal heart sounds. No murmur heard.  Pulmonary:      Effort: Pulmonary effort is normal.      Breath sounds: Normal breath sounds. No wheezing or rales.   Abdominal:      General: There is no distension.      Palpations: Abdomen is soft.   Musculoskeletal:      Cervical back: Neck supple.   Lymphadenopathy:      Cervical: No cervical adenopathy.   Skin:     General: Skin is warm and dry.   Neurological:      Mental Status: She is alert and oriented to person, place, and time.   Psychiatric:         Behavior: Behavior normal.         Thought Content: Thought content normal.         Judgment: Judgment normal.         Assessment:       1. Type 2 diabetes mellitus with complication, with long-term current use of insulin    2. Diabetic polyneuropathy associated with type 2 diabetes mellitus    3. Morbid (severe) obesity due to excess calories    4. Chronic diastolic heart failure    5. Asthma with COPD    6. Chronic respiratory failure, unspecified whether with hypoxia or hypercapnia    7. Coronary artery disease of native artery of native heart with stable angina pectoris    8. Essential hypertension    9. Bipolar affective disorder, remission status unspecified    10. Hypothyroidism due to acquired atrophy of thyroid    11. Smoking    12. Personal history of nicotine dependence        Plan:   Type 2 diabetes mellitus with complication, with long-term current use of insulin  Comments:  Followed closely by diabetes care.  A1c 6.4.  Rechecked today  Orders:  -     Hemoglobin A1C; Future; Expected date: 03/03/2025    Diabetic  polyneuropathy associated with type 2 diabetes mellitus  Comments:  On gabapentin    Morbid (severe) obesity due to excess calories  Comments:  No change in weight    Chronic diastolic heart failure  Comments:  Good fluid balance.  Compliant with medications    Asthma with COPD  Comments:  Z-Phillip for bronchitis.  Restart Advair.    Chronic respiratory failure, unspecified whether with hypoxia or hypercapnia  Comments:  Occasional albuterol use    Coronary artery disease of native artery of native heart with stable angina pectoris  Comments:  No chest pain.  On Effient and aspirin and statin    Essential hypertension  Comments:  Controlled.  Follow-up 6 months    Bipolar affective disorder, remission status unspecified  Comments:  Recent change in medications.  Doing well.  Followed closely by Psychiatry    Hypothyroidism due to acquired atrophy of thyroid  Comments:  Not on any medication.  TSH today  Orders:  -     TSH; Future; Expected date: 03/03/2025    Smoking  Comments:  Successfully quit almost a year.  Low-dose CT scan ordered  Orders:  -     CT Chest Lung Screening Low Dose; Future; Expected date: 03/03/2025    Personal history of nicotine dependence  Comments:  Quit last May.  Low-dose CT scan  Orders:  -     CT Chest Lung Screening Low Dose; Future; Expected date: 03/03/2025    Other orders  -     azithromycin (Z-PHILLIP) 250 MG tablet; Take 2 tablets by mouth on day 1; Take 1 tablet by mouth on days 2-5  Dispense: 6 tablet; Refill: 0  -     fluticasone-salmeterol diskus inhaler 250-50 mcg; Inhale 1 puff into the lungs 2 (two) times daily. Controller  Dispense: 1 each; Refill: 5                 [1]   Current Outpatient Medications:     ACCU-CHEK GUIDE GLUCOSE METER [facility], USE AS DIRECTED, Disp: 1 each, Rfl: 0    albuterol (PROVENTIL/VENTOLIN HFA) 90 mcg/actuation inhaler, INHALE 2 TO 4 PUFFS BY MOUTH THREE TIMES DAILY AS NEEDED FOR WHEEZING, Disp: 18 g, Rfl: 3    aspirin (ECOTRIN) 81 MG EC tablet, Take 1 tablet  (81 mg total) by mouth once daily., Disp: 360 tablet, Rfl: 0    benztropine (COGENTIN) 0.5 MG tablet, Take 1 tablet (0.5 mg total) by mouth 2 (two) times daily as needed (tremor or stiffness). TAKE 1 TABLET BY MOUTH TWICE DAILY AS NEEDED FOR  DYSTONIA, Disp: 180 tablet, Rfl: 1    blood sugar diagnostic Strp, Inject 1 each into the skin 4 (four) times daily. Accu-Check Guide Test strips, Disp: 150 strip, Rfl: 3    butalbital-acetaminophen-caffeine -40 mg (FIORICET, ESGIC) -40 mg per tablet, Take 1 tablet by mouth every 6 (six) hours as needed for Headaches. for pain., Disp: 30 tablet, Rfl: 0    clonazePAM (KLONOPIN) 1 MG tablet, Take 1 tablet three times daily as needed for anxiety., Disp: 90 tablet, Rfl: 3    desvenlafaxine succinate (PRISTIQ) 50 MG Tb24, Take 1 tablet (50 mg total) by mouth once daily., Disp: 90 tablet, Rfl: 1    diltiaZEM (CARDIZEM CD) 120 MG Cp24, Take 1 capsule (120 mg total) by mouth once daily., Disp: 90 capsule, Rfl: 3    furosemide (LASIX) 40 MG tablet, Take 1 tablet (40 mg total) by mouth once daily., Disp: 90 tablet, Rfl: 2    gabapentin (NEURONTIN) 600 MG tablet, TAKE 1 TABLET BY MOUTH IN THE MORNING AND 1 AT AFTERNOON AND 2 AT BEDTIME, Disp: 360 tablet, Rfl: 0    heparin sodium,porcine/PF (HEPARIN, PORCINE, PF,) 1,000 unit/mL Soln, 1,000 Units., Disp: , Rfl:     insulin aspart, niacinamide, (FIASP FLEXTOUCH U-100 INSULIN) 100 unit/mL (3 mL) InPn, Inject 10 Units into the skin 3 (three) times daily with meals., Disp: 15 mL, Rfl: 6    insulin glargine U-300 conc (TOUJEO MAX U-300 SOLOSTAR) 300 unit/mL (3 mL) insulin pen, Inject 72 Units into the skin once daily., Disp: 12 mL, Rfl: 1    isosorbide mononitrate (IMDUR) 120 MG 24 hr tablet, Take 1 tablet (120 mg total) by mouth once daily., Disp: 90 tablet, Rfl: 3    lancets Misc, Inject 1 each into the skin 4 (four) times daily. Dispense preferred on insurance, Disp: 150 each, Rfl: 6    lurasidone (LATUDA) 80 mg Tab tablet, Take 1  tablet (80 mg total) by mouth every evening., Disp: 90 tablet, Rfl: 1    metFORMIN (GLUCOPHAGE-XR) 750 MG ER 24hr tablet, Take 1 tablet (750 mg total) by mouth 2 (two) times daily with meals., Disp: 180 tablet, Rfl: 1    nicotine polacrilex 2 MG Lozg, Take 1 lozenge (2 mg total) by mouth as needed (10 per day). Dispense FRUIT please. DO NOT CHEW UP. Can take 1-2 per hour in place of a cigarette for breakthrough cravings in place of a cigarette. SMOKING CESSATION CARD Please fill with [facility] [id number] SCT ID [id number]  BIN [id number] GROUP PRXSCT  - $0.00 CO-PAY. Best contact #[phone], Disp: 108 lozenge, Rfl: 0    nitroGLYCERIN (NITROSTAT) 0.4 MG SL tablet, DISSOLVE ONE TABLET UNDER THE TONGUE EVERY 5 MINUTES AS NEEDED FOR CHEST PAIN.  DO NOT EXCEED A TOTAL OF 3 DOSES IN 15 MINUTES, Disp: 100 tablet, Rfl: 0    prasugreL (EFFIENT) 5 mg Tab, Take 1 tablet (5 mg total) by mouth once daily., Disp: 90 tablet, Rfl: 3    pravastatin (PRAVACHOL) 40 MG tablet, Take 1 tablet (40 mg total) by mouth every evening., Disp: 90 tablet, Rfl: 3    promethazine (PHENERGAN) 12.5 MG Tab, Take 1 tablet (12.5 mg total) by mouth every 6 (six) hours as needed., Disp: 32 tablet, Rfl: 0    suvorexant (BELSOMRA) 20 mg Tab, Take 1/2 to 1 capsule at bedtime as needed., Disp: 30 tablet, Rfl: 3    albuterol-ipratropium (DUO-NEB) 2.5 mg-0.5 mg/3 mL nebulizer solution, Take 3 mLs by nebulization every 6 (six) hours as needed for Wheezing. Rescue, Disp: 1 Box, Rfl: 0    azithromycin (Z-PHILLIP) 250 MG tablet, Take 2 tablets by mouth on day 1; Take 1 tablet by mouth on days 2-5, Disp: 6 tablet, Rfl: 0    fluticasone-salmeterol diskus inhaler 250-50 mcg, Inhale 1 puff into the lungs 2 (two) times daily. Controller, Disp: 1 each, Rfl: 5    nicotine (NICODERM CQ) 14 mg/24 hr, Place 1 patch onto the skin once daily., Disp: 28 patch, Rfl: 1

## 2025-03-19 DIAGNOSIS — E11.42 DIABETIC POLYNEUROPATHY ASSOCIATED WITH TYPE 2 DIABETES MELLITUS: ICD-10-CM

## 2025-03-19 RX ORDER — BLOOD SUGAR DIAGNOSTIC
STRIP MISCELLANEOUS
Qty: 150 EACH | Refills: 3 | Status: SHIPPED | OUTPATIENT
Start: 2025-03-19

## 2025-03-21 RX ORDER — FUROSEMIDE 40 MG/1
40 TABLET ORAL
Qty: 90 TABLET | Refills: 2 | Status: SHIPPED | OUTPATIENT
Start: 2025-03-21

## 2025-03-21 NOTE — TELEPHONE ENCOUNTER
Refill Decision Note   Alexandra Goins  is requesting a refill authorization.  Brief Assessment and Rationale for Refill:  Approve     Medication Therapy Plan:        Comments:     Note composed:8:24 AM 03/21/2025

## 2025-03-21 NOTE — TELEPHONE ENCOUNTER
No care due was identified.  [facility] Embedded Care Due Messages. Reference number: [id number].   3/21/2025 6:14:10 AM CDT

## 2025-03-26 ENCOUNTER — OFFICE VISIT (OUTPATIENT)
Dept: DIABETES | Facility: CLINIC | Age: 60
End: 2025-03-26
Payer: MEDICARE

## 2025-03-26 DIAGNOSIS — E11.42 DIABETIC POLYNEUROPATHY ASSOCIATED WITH TYPE 2 DIABETES MELLITUS: Primary | ICD-10-CM

## 2025-03-26 PROCEDURE — 98005 SYNCH AUDIO-VIDEO EST LOW 20: CPT | Mod: HCNC,95,, | Performed by: NURSE PRACTITIONER

## 2025-03-26 PROCEDURE — 3044F HG A1C LEVEL LT 7.0%: CPT | Mod: HCNC,CPTII,95, | Performed by: NURSE PRACTITIONER

## 2025-03-26 NOTE — PROGRESS NOTES
PCP: Perez Casiano MD    Subjective:     Chief Complaint: Diabetes Follow Up    HISTORY OF PRESENT ILLNESS: 59 y.o.  female presenting virtually, with , for diabetes follow up. She has attended diabetes education in the past.     Patient has had Type II diabetes since 1985 and has the following complications: peripheral neuropathy, PVD, and CAD with heart cath in 2024 s/p stent placement to LAD. Other pertinent conditions: hx of DKA in 2023, bipolar disorder and depression ( follows psych) and hypoglycemia-induced seizures.  No longer smoking or Vaping.    Past tried and failed medications:  No longer taking Jardiance due to cost and history of recurrent UTI w/ sepsis. GLP-1 therapy too expensive.     Blood glucose monitoring is performed.  Per patient, she continues to having fasting hypoglycemia 54 -90; pp 120 -180, depending on food intake. She treats hypoglycemia with OJ, crackers. She continues to eat healthier diet with smaller portions of more protein and vegetables. No routine exercise.    DM MEDICATIONS:    Toujeo 76 units daily;  Metformin 500 mg BID ac  Fiasp, 1:10 carb ratio + SSI as correction:   - 199: 1 unit  //   - 249: 2 units  //   - 299: 3 units  //  - 349: 4 units //  BG Over 350: 5 units.     Labs Reviewed.       Lab Results   Component Value Date    CPEPTIDE 3.4 01/25/2017     Lab Results   Component Value Date    GLUTAMICACID 0.01 01/25/2017     Lab Results   Component Value Date    HUMANINSULIN 0.00 01/25/2017     Review of Systems   Constitutional:  Positive for appetite change (decreased). Negative for fatigue.   Eyes:  Negative for visual disturbance.   Gastrointestinal:  Negative for constipation, diarrhea, nausea and vomiting.   Endocrine: Negative for polydipsia, polyphagia and polyuria.   Neurological:  Positive for numbness (bilateral feet).   Psychiatric/Behavioral:          History of Bipolar Disorder        Diabetes Management  Status  Statin: Not taking - allergic  ACE/ARB: Not taking    Screening or Prevention Patient's value Goal Complete/Controlled?   HgA1C Testing and Control   Lab Results   Component Value Date    HGBA1C 6.1 (H) 03/03/2025    HGBA1C 6.4 (H) 11/20/2024    HGBA1C 7.4 (H) 07/12/2024      Annually/Less than 8% No     Lipid profile : 09/03/2024 Annually Yes     LDL control Lab Results   Component Value Date    LDLCALC 51.6 (L) 09/03/2024    Annually/Less than 100 mg/dl  Yes     Nephropathy screening Lab Results   Component Value Date    MICALBCREAT 135.0 (H) 07/12/2024     Lab Results   Component Value Date    PROTEINUA Negative 12/19/2023    Annually Yes     Blood pressure BP Readings from Last 1 Encounters:   03/03/25 120/70    Less than 140/90 Yes     Dilated retinal exam : 04/01/2025 Annually Yes    Foot exam   : 03/26/2025 Annually Yes       ACTIVITY LEVEL: Sedentary. Discussed activities, benefits, methods, and precautions.  MEAL PLANNING: Patient reports number of meals per day to be 2 and number of snacks per day to be 0.   Patient is encouraged to carb count and consume no more than 45 - 60 grams of carbohydrates in each meal, and 1800 k / gloria per day.      BLOOD GLUCOSE TESTING:  True Metrix  SOCIAL HISTORY:  .  No longer smoking.     Objective:      Physical Exam  Constitutional:       Appearance: She is well-developed.   HENT:      Head: Normocephalic and atraumatic.   Eyes:      Pupils: Pupils are equal, round, and reactive to light.   Pulmonary:      Effort: Pulmonary effort is normal.   Psychiatric:         Behavior: Behavior normal.         Thought Content: Thought content normal.         Judgment: Judgment normal.         Assessment / Plan:     1.) Diabetic polyneuropathy associated with type 2 diabetes mellitus  -  Condition controlled.  Most recent A1C of  6.1 %. Due to hypoglycemia, advised to decrease Toujeo 60 units daily. Continue metformin  mg BID ac.  Continue Fiasp, using 1:10 carb  ratio.  Her  counts carbohydrates, so he plans to assist her. Continue correction scale (as needed) 2-3 hours after meals if BG are elevated.   - 199: 1 unit  //   - 249: 2 units   //   - 299: 3 units   //  - 349: 4 units   //  BG Over 350: 5 units.      Additional Plan Details:    1.) Continue monitoring blood sugar 3x daily, fasting and ac dinner or at bedtime. Discussed ADA goal for fasting blood sugar, 80 - 130 mg/dL; pp blood sugars below 180 mg/dl. Also, discussed prevention of hypoglycemia and the need to adjust goals to higher levels if persistent hypoglycemia.  Reminded to bring BG records or meter to each visit for review.  2.) Return to clinic in 4 months for follow up. The patient was explained the above plan and given opportunity to ask questions.  She understands, chooses and consents to this plan and accepts all the risks, which include but are not limited to the risks mentioned above. She understands the alternative of having no testing, interventions or treatments at this time. She left content and without further questions.     Amelia Santana, NP-C, [facility]  The patient location is: Home   The chief complaint leading to consultation is: Type 2 Diabetes    Visit type: audiovisual    Face to Face time with patient: 22 minutes  25 minutes of total time spent on the encounter, which includes face to face time and non-face to face time preparing to see the patient (eg, review of tests), Obtaining and/or reviewing separately obtained history, Documenting clinical information in the electronic or other health record, Independently interpreting results (not separately reported) and communicating results to the patient/family/caregiver, or Care coordination (not separately reported).     Each patient to whom he or she provides medical services by telemedicine is:  (1) informed of the relationship between the physician and patient and the respective role of any other  health care provider with respect to management of the patient; and (2) notified that he or she may decline to receive medical services by telemedicine and may withdraw from such care at any time.

## 2025-03-31 ENCOUNTER — HOSPITAL ENCOUNTER (OUTPATIENT)
Dept: RADIOLOGY | Facility: HOSPITAL | Age: 60
Discharge: HOME | End: 2025-03-31
Attending: FAMILY MEDICINE
Payer: MEDICARE

## 2025-03-31 DIAGNOSIS — Z87.891 PERSONAL HISTORY OF NICOTINE DEPENDENCE: ICD-10-CM

## 2025-03-31 PROCEDURE — 71271 CT THORAX LUNG CANCER SCR C-: CPT | Mod: TC,HCNC

## 2025-03-31 PROCEDURE — 71271 CT THORAX LUNG CANCER SCR C-: CPT | Mod: 26,HCNC,, | Performed by: RADIOLOGY

## 2025-04-01 ENCOUNTER — OFFICE VISIT (OUTPATIENT)
Dept: OPHTHALMOLOGY | Facility: CLINIC | Age: 60
End: 2025-04-01
Payer: MEDICARE

## 2025-04-01 DIAGNOSIS — E11.3293 MILD NONPROLIFERATIVE DIABETIC RETINOPATHY OF BOTH EYES WITHOUT MACULAR EDEMA ASSOCIATED WITH TYPE 2 DIABETES MELLITUS: Primary | ICD-10-CM

## 2025-04-01 DIAGNOSIS — H25.13 NUCLEAR SCLEROSIS OF BOTH EYES: ICD-10-CM

## 2025-04-01 PROCEDURE — 2022F DILAT RTA XM EVC RTNOPTHY: CPT | Mod: HCNC,CPTII,S$GLB, | Performed by: OPHTHALMOLOGY

## 2025-04-01 PROCEDURE — 99999 PR PBB SHADOW E&M-EST. PATIENT-LVL II: CPT | Mod: PBBFAC,HCNC,, | Performed by: OPHTHALMOLOGY

## 2025-04-01 PROCEDURE — 3044F HG A1C LEVEL LT 7.0%: CPT | Mod: HCNC,CPTII,S$GLB, | Performed by: OPHTHALMOLOGY

## 2025-04-01 PROCEDURE — 1160F RVW MEDS BY RX/DR IN RCRD: CPT | Mod: HCNC,CPTII,S$GLB, | Performed by: OPHTHALMOLOGY

## 2025-04-01 PROCEDURE — 1159F MED LIST DOCD IN RCRD: CPT | Mod: HCNC,CPTII,S$GLB, | Performed by: OPHTHALMOLOGY

## 2025-04-01 PROCEDURE — 92015 DETERMINE REFRACTIVE STATE: CPT | Mod: HCNC,S$GLB,, | Performed by: OPHTHALMOLOGY

## 2025-04-01 PROCEDURE — 92004 COMPRE OPH EXAM NEW PT 1/>: CPT | Mod: HCNC,S$GLB,, | Performed by: OPHTHALMOLOGY

## 2025-04-01 NOTE — PROGRESS NOTES
HPI     Diabetic Eye Exam     Additional comments: Pt states vision OU has decreased since last visit,   near>distance vision. Pt would like an updated glasses rx. A1C 6.1 BSL 78   insilin and metformin to control symptoms sees PCP  3mos             Comments    Alexandra Goins is 58 y.o. female  Corrected distance visual acuity was 20/25 in the right eye and 20/30 in   the left eye. Corrected near visual acuity was J2 in the right eye and J3   in the left eye.   Chief Complaint  Patient presents with  · Diabetic Eye Exam  · Hypertensive Eye Exam  · Eye Exam           HPI    NIDDM exam  No visual complaints  Last eye exam 11/29/2022 TRF.  Update glasses RX.  Lab Results       Component                Value               Date                       HGBA1C                   12.0 (H)            10/09/2023               Last edited by Misty Bernardo MA on 1/23/2024  9:40 AM.          Assessment  /  Plan  :  1. Diabetes mellitus type 2 without retinopathy   No Background Diabetic Retinopathy  Strict BG control, f/u w/ PCP, and annual DFE  Stressed importance of DM control to preserve vision     2. Diabetic cataract   Moderate cataracts OU. Cataracts are not significantly affecting   activities of daily living and therefore surgery is not indicated at this   time.   Will continue to monitor annually. Pt to call or RTC with any significant   change in vision prior to next visit.      3. Refractive errors   Dispense Final Rx for glasses.  RTC 1 year  Discussed above and answered questions.                     Last edited by Bill See on 4/1/2025  8:39 AM.            Assessment /Plan     For exam results, see Encounter Report.    Mild nonproliferative diabetic retinopathy of both eyes without macular edema associated with type 2 diabetes mellitus  Retinopathy seen on exam. Last A1c 6.1 . Strict BG control, f/u w/ PCP. Stressed importance of DM control to preserve vision.    Nuclear sclerosis of both eyes  Cataracts  are present but not visually significant. Will continue to monitor. Mrx provided.      Return to clinic in 1 year or sooner PRN

## 2025-04-03 ENCOUNTER — TELEPHONE (OUTPATIENT)
Dept: SMOKING CESSATION | Facility: CLINIC | Age: 60
End: 2025-04-03
Payer: MEDICARE

## 2025-04-07 DIAGNOSIS — E11.42 DIABETIC POLYNEUROPATHY ASSOCIATED WITH TYPE 2 DIABETES MELLITUS: ICD-10-CM

## 2025-04-07 RX ORDER — INSULIN GLARGINE 300 [IU]/ML
72 INJECTION, SOLUTION SUBCUTANEOUS DAILY
Qty: 12 ML | Refills: 1 | Status: SHIPPED | OUTPATIENT
Start: 2025-04-07

## 2025-05-06 ENCOUNTER — OFFICE VISIT (OUTPATIENT)
Dept: CARDIOLOGY | Facility: CLINIC | Age: 60
End: 2025-05-06
Payer: MEDICARE

## 2025-05-06 VITALS
HEART RATE: 85 BPM | HEIGHT: 63 IN | SYSTOLIC BLOOD PRESSURE: 105 MMHG | BODY MASS INDEX: 33.5 KG/M2 | DIASTOLIC BLOOD PRESSURE: 65 MMHG | WEIGHT: 189.06 LBS | RESPIRATION RATE: 16 BRPM | OXYGEN SATURATION: 96 %

## 2025-05-06 DIAGNOSIS — Z78.9 STATIN INTOLERANCE: ICD-10-CM

## 2025-05-06 DIAGNOSIS — Z86.73 HX OF ARTERIAL ISCHEMIC STROKE: ICD-10-CM

## 2025-05-06 DIAGNOSIS — I50.32 CHRONIC DIASTOLIC HEART FAILURE: ICD-10-CM

## 2025-05-06 DIAGNOSIS — Z79.4 TYPE 2 DIABETES MELLITUS WITH HYPERGLYCEMIA, WITH LONG-TERM CURRENT USE OF INSULIN: ICD-10-CM

## 2025-05-06 DIAGNOSIS — R07.89 OTHER CHEST PAIN: Primary | ICD-10-CM

## 2025-05-06 DIAGNOSIS — Z95.5 S/P CORONARY ARTERY STENT PLACEMENT: ICD-10-CM

## 2025-05-06 DIAGNOSIS — I25.41 CORONARY ARTERY FISTULA TO LEFT VENTRICLE: ICD-10-CM

## 2025-05-06 DIAGNOSIS — I20.9 ANGINA PECTORIS, UNSPECIFIED: ICD-10-CM

## 2025-05-06 DIAGNOSIS — E78.5 HYPERLIPIDEMIA, UNSPECIFIED HYPERLIPIDEMIA TYPE: ICD-10-CM

## 2025-05-06 DIAGNOSIS — I25.10 CORONARY ARTERY DISEASE INVOLVING NATIVE CORONARY ARTERY OF NATIVE HEART, UNSPECIFIED WHETHER ANGINA PRESENT: ICD-10-CM

## 2025-05-06 DIAGNOSIS — Z87.891 EX-SMOKER: ICD-10-CM

## 2025-05-06 DIAGNOSIS — E11.65 TYPE 2 DIABETES MELLITUS WITH HYPERGLYCEMIA, WITH LONG-TERM CURRENT USE OF INSULIN: ICD-10-CM

## 2025-05-06 DIAGNOSIS — I25.110 ATHEROSCLEROSIS OF NATIVE CORONARY ARTERY OF NATIVE HEART WITH UNSTABLE ANGINA PECTORIS: ICD-10-CM

## 2025-05-06 PROCEDURE — 99999 PR PBB SHADOW E&M-EST. PATIENT-LVL V: CPT | Mod: PBBFAC,HCNC,, | Performed by: INTERNAL MEDICINE

## 2025-05-06 NOTE — PROGRESS NOTES
Subjective:   Patient ID:  Alexandra Goins is a 59 y.o. female who presents for evaluation of No chief complaint on file.      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 She reports the fistula may contribute to chest pain, particularly with elevated BP or emotional stress. She required nitroglycerin for chest pain episodes prior to 2 weeks ago, reporting immediate relief with one dose. She denies any nitroglycerin use in the past 2 weeks.    RECENT FALLS:  She reports 2 falls 2 months ago - one after slipping on dog urine and another after getting out of bed too quickly.    RECENT NOSEBLEED:  She reports a nosebleed lasting 3-4 days, managed successfully at home with ice packing without requiring emergency care.    MEDICAL HISTORY:  She has a history of COPD but no longer requires inhaler use.    MEDICATIONS:  Current medications include ASA 81 mg, Effient 5mg, insulin, Metformin, and nitroglycerin (both sublingual and long-acting formulations).    SOCIAL HISTORY:  She is a former smoker with no current tobacco use.              Past Medical History:   Diagnosis Date    Acute cystitis without hematuria 11/11/2023    Acute ischemic stroke 09/17/2019    Acute respiratory failure with hypoxia 02/11/2021    Acute right-sided weakness 09/17/2019    Aneurysm     Anxiety     Arthritis     Asthma     Bipolar 1 disorder     Cerebral aneurysm, nonruptured 09/19/2019    Chest pain 01/24/2017    Chronic diastolic heart failure 05/23/2023    Coronary artery disease of native artery of native heart with stable angina pectoris 01/19/2022    Depression     Diabetes mellitus, type 2     BS 72 01/23/2024    Diverticular disease     GERD (gastroesophageal reflux disease)     Hearing loss  10/7/1998    Hemiplegia and hemiparesis following unspecified cerebrovascular disease affecting left dominant side 01/21/2020    Hyperlipemia     Hypertension     Hyponatremia 07/24/2022    Hypothyroidism     Migraine headache 12/8/1999    I have them often    Pneumonia     PVD (peripheral vascular disease) 04/28/2021    Renal manifestation of secondary diabetes mellitus     Right-sided back pain 06/29/2019    S/P admn tPA in diff fac w/n last 24 hr bef adm to crnt fac 09/17/2019    Seasonal allergies 08/15/78    I sneeze and post nasal drip    Severe obesity (BMI 35.0-39.9) with comorbidity 05/31/2022    Stroke     Trouble in sleeping        Past Surgical History:   Procedure Laterality Date    ANGIOGRAM, CORONARY, WITH LEFT HEART CATHETERIZATION N/A 05/30/2024    Procedure: Angiogram, Coronary, with Left Heart Cath;  Surgeon: Meagan Espinoza MD;  Location: [facility] CATH LAB;  Service: Cardiology;  Laterality: N/A;    ANGIOGRAM, CORONARY, WITH LEFT HEART CATHETERIZATION N/A 08/16/2024    Procedure: Angiogram, Coronary, with Left Heart Cath;  Surgeon: Meagan Espinoza MD;  Location: [facility] CATH LAB;  Service: Cardiology;  Laterality: N/A;    CEREBRAL ANGIOGRAM N/A 10/28/2019    Procedure: ANGIOGRAM-CEREBRAL;  [facility];  Location: [facility] OR [address] [place] [address];  Service: Radiology;  Laterality: N/A;  Rm 190/Aayush    CHOLECYSTECTOMY      COLON SURGERY      COLONOSCOPY N/A 01/12/2018    Procedure: COLONOSCOPY;  Surgeon: Roque Mahajan III, MD;  Location: [facility] ENDO;  Service: Endoscopy;  Laterality: N/A;    COLONOSCOPY N/A 10/13/2023    Procedure: COLONOSCOPY;  Surgeon: Thelma Chairez MD;  Location: [facility] ENDO;  Service: Endoscopy;  Laterality: N/A;    CORONARY STENT PLACEMENT Left 05/30/2024    Procedure: INSERTION, STENT, CORONARY ARTERY;  Surgeon: Meagan Espinoza MD;  Location: [facility] CATH LAB;  Service: Cardiology;  Laterality: Left;    DENTAL SURGERY  05/21/2018    removal of 8 top teeth     HYSTERECTOMY      PERCUTANEOUS TRANSLUMINAL BALLOON ANGIOPLASTY OF CORONARY ARTERY Left 2024    Procedure: Angioplasty-coronary;  Surgeon: Meagan Espinoza MD;  Location: [facility] CATH LAB;  Service: Cardiology;  Laterality: Left;    SHOULDER SURGERY      SMALL INTESTINE SURGERY  14years ago    TONSILLECTOMY      TUBAL LIGATION  1989    VENTRICULOGRAM, LEFT  2024    Procedure: Ventriculogram, Left;  Surgeon: Meagan Espinoza MD;  Location: [facility] CATH LAB;  Service: Cardiology;;       Social History     Tobacco Use    Smoking status: Former     Current packs/day: 0.00     Average packs/day: 1 pack/day for 46.4 years (46.4 ttl pk-yrs)     Types: Cigarettes     Start date:      Quit date: 2024     Years since quittin.9     Passive exposure: Past    Smokeless tobacco: Never    Tobacco comments:     Bad Habit   Substance Use Topics    Alcohol use: Not Currently     Alcohol/week: 1.0 standard drink of alcohol     Types: 1 Drinks containing 0.5 oz of alcohol per week     Comment: I may have 5 in a year    Drug use: Not Currently     Types: Marijuana       Family History   Problem Relation Name Age of Onset    Alcohol abuse Mother rAin amerso     COPD Mother Arin amerso     Depression Mother Arin amerso     Drug abuse Mother Arin amerso     Alcohol abuse Father Gurnus Jamie Nunez     Arthritis Father Gurnus Jamie Nunez     Cancer Father Gurnus Jamie Nunez     Heart disease Father Gurnus Jamie Nunez     Hypertension Father Gurnus Jamie Nunez     Depression Father Gurnus Jamie Nunez     COPD Sister Marce     Kidney failure Sister Marce     Heart disease Brother Bear     Hypertension Brother Bear     Cancer Maternal Aunt      Cancer Maternal Uncle Blackie     Diabetes Maternal Uncle Blackie     Drug abuse Maternal Uncle Blackie     Cancer Paternal Aunt Meghan     Cancer Paternal Uncle      Arthritis Maternal Grandmother Meliza     Hypertension Maternal Grandmother Meliza     Breast cancer  Maternal Grandmother Meliza     Arthritis Paternal Grandmother Meliza moore     Cancer Paternal Grandmother Meliza moore     Diabetes Paternal Grandmother Meliza moore     Hypertension Paternal Grandfather Don't know him        Review of Systems   Cardiovascular:  Positive for chest pain and dyspnea on exertion. Negative for palpitations and syncope.   Genitourinary: Negative.    Neurological: Negative.        Current Outpatient Medications on File Prior to Visit   Medication Sig    ACCU-CHEK GUIDE GLUCOSE METER Misc USE AS DIRECTED    albuterol (PROVENTIL/VENTOLIN HFA) 90 mcg/actuation inhaler INHALE 2 TO 4 PUFFS BY MOUTH THREE TIMES DAILY AS NEEDED FOR WHEEZING    aspirin (ECOTRIN) 81 MG EC tablet Take 1 tablet (81 mg total) by mouth once daily.    benztropine (COGENTIN) 0.5 MG tablet Take 1 tablet (0.5 mg total) by mouth 2 (two) times daily as needed (tremor or stiffness). TAKE 1 TABLET BY MOUTH TWICE DAILY AS NEEDED FOR  DYSTONIA    blood sugar diagnostic (ACCU-CHEK GUIDE TEST STRIPS) Strp USE 1 STRIP TO CHECK GLUCOSE 4 TIMES DAILY    butalbital-acetaminophen-caffeine -40 mg (FIORICET, ESGIC) -40 mg per tablet Take 1 tablet by mouth every 6 (six) hours as needed for Headaches. for pain.    clonazePAM (KLONOPIN) 1 MG tablet Take 1 tablet three times daily as needed for anxiety.    desvenlafaxine succinate (PRISTIQ) 50 MG Tb24 Take 1 tablet (50 mg total) by mouth once daily.    diltiaZEM (CARDIZEM CD) 120 MG Cp24 Take 1 capsule (120 mg total) by mouth once daily.    fluticasone-salmeterol diskus inhaler 250-50 mcg Inhale 1 puff into the lungs 2 (two) times daily. Controller    furosemide (LASIX) 40 MG tablet Take 1 tablet by mouth once daily    gabapentin (NEURONTIN) 600 MG tablet TAKE 1 TABLET BY MOUTH IN THE MORNING AND 1 AT AFTERNOON AND 2 AT BEDTIME    heparin sodium,porcine/PF (HEPARIN, PORCINE, PF,) 1,000 unit/mL Soln 1,000 Units.    insulin aspart, niacinamide, (FIASP  FLEXTOUCH U-100 INSULIN) 100 unit/mL (3 mL) InPn Inject 10 Units into the skin 3 (three) times daily with meals.    insulin glargine U-300 conc (TOUJEO MAX U-300 SOLOSTAR) 300 unit/mL (3 mL) insulin pen Inject 72 Units into the skin once daily.    isosorbide mononitrate (IMDUR) 120 MG 24 hr tablet Take 1 tablet (120 mg total) by mouth once daily.    lancets Misc Inject 1 each into the skin 4 (four) times daily. Dispense preferred on insurance    lurasidone (LATUDA) 80 mg Tab tablet Take 1 tablet (80 mg total) by mouth every evening.    metFORMIN (GLUCOPHAGE-XR) 750 MG ER 24hr tablet Take 1 tablet (750 mg total) by mouth 2 (two) times daily with meals.    nicotine polacrilex 2 MG Lozg Take 1 lozenge (2 mg total) by mouth as needed (10 per day). Dispense FRUIT please. DO NOT CHEW UP. Can take 1-2 per hour in place of a cigarette for breakthrough cravings in place of a cigarette. SMOKING CESSATION CARD Please fill with [facility] [id number] SCT ID [id number]  BIN [id number] GROUP PRXSCT  - $0.00 CO-PAY. Best contact #[phone]    nitroGLYCERIN (NITROSTAT) 0.4 MG SL tablet DISSOLVE ONE TABLET UNDER THE TONGUE EVERY 5 MINUTES AS NEEDED FOR CHEST PAIN.  DO NOT EXCEED A TOTAL OF 3 DOSES IN 15 MINUTES    pravastatin (PRAVACHOL) 40 MG tablet Take 1 tablet (40 mg total) by mouth every evening.    promethazine (PHENERGAN) 12.5 MG Tab Take 1 tablet (12.5 mg total) by mouth every 6 (six) hours as needed.    suvorexant (BELSOMRA) 20 mg Tab Take 1/2 to 1 capsule at bedtime as needed.    [DISCONTINUED] prasugreL (EFFIENT) 5 mg Tab Take 1 tablet (5 mg total) by mouth once daily.    albuterol-ipratropium (DUO-NEB) 2.5 mg-0.5 mg/3 mL nebulizer solution Take 3 mLs by nebulization every 6 (six) hours as needed for Wheezing. Rescue     No current facility-administered medications on file prior to visit.       Objective:   Objective:  Wt Readings from Last 3 Encounters:   05/06/25 85.7 kg (189 lb 0.7 oz)   03/03/25 88.3 kg (194 lb 10.7 oz)   11/20/24  88.6 kg (195 lb 5.2 oz)     Temp Readings from Last 3 Encounters:   03/03/25 96.7 °F (35.9 °C) (Tympanic)   09/05/24 96.9 °F (36.1 °C) (Tympanic)   08/16/24 98 °F (36.7 °C) (Temporal)     BP Readings from Last 3 Encounters:   05/06/25 105/65   03/03/25 120/70   11/20/24 (!) 87/59     Pulse Readings from Last 3 Encounters:   05/06/25 85   03/03/25 88   11/20/24 85       Physical Exam  Vitals reviewed.   Constitutional:       Appearance: She is well-developed.   Neck:      Vascular: No carotid bruit.   Cardiovascular:      Rate and Rhythm: Normal rate and regular rhythm.      Pulses: Intact distal pulses.      Heart sounds: Normal heart sounds. No murmur heard.  Pulmonary:      Breath sounds: Normal breath sounds.   Neurological:      Mental Status: She is oriented to person, place, and time.       This was a virtual visit.  Patient looks not in distress.  Wearing oxygen cannula.    Lab Results   Component Value Date    CHOL 165 09/03/2024    CHOL 191 10/09/2023    CHOL 197 11/02/2022     Lab Results   Component Value Date    HDL 50 09/03/2024    HDL 44 10/09/2023    HDL 44 11/02/2022     Lab Results   Component Value Date    LDLCALC 51.6 (L) 09/03/2024    LDLCALC 98.2 10/09/2023    LDLCALC 90.6 11/02/2022     Lab Results   Component Value Date    TRIG 317 (H) 09/03/2024    TRIG 244 (H) 10/09/2023    TRIG 312 (H) 11/02/2022     Lab Results   Component Value Date    CHOLHDL 30.3 09/03/2024    CHOLHDL 23.0 10/09/2023    CHOLHDL 22.3 11/02/2022       Chemistry        Component Value Date/Time     12/12/2024 1120    K 4.0 12/12/2024 1120    CL 99 12/12/2024 1120    CO2 29 12/12/2024 1120    BUN 13 12/12/2024 1120    CREATININE 0.9 12/12/2024 1120    GLU 81 12/12/2024 1120        Component Value Date/Time    CALCIUM 9.2 12/12/2024 1120    ALKPHOS 64 12/12/2024 1120    AST 29 12/12/2024 1120    ALT 31 12/12/2024 1120    BILITOT 0.4 12/12/2024 1120    ESTGFRAFRICA >60 07/31/2022 0700    EGFRNONAA >60 07/31/2022 0700           Lab Results   Component Value Date    TSH 6.172 (H) 03/03/2025     Lab Results   Component Value Date    INR 1.1 08/07/2024    INR 1.1 05/15/2024    INR 1.1 08/16/2020     Lab Results   Component Value Date    WBC 11.37 08/07/2024    HGB 15.6 08/07/2024    HCT 48.7 (H) 08/07/2024     (H) 08/07/2024     08/07/2024     BNP  @LABRCNTIP(BNP,BNPTRIAGEBLO)@  CrCl cannot be calculated (Patient's most recent lab result is older than the maximum 7 days allowed.).     Imaging:  ======  Results for orders placed during the hospital encounter of 07/24/22    Echo Saline Bubble? No    Interpretation Summary  · The left ventricle is normal in size with concentric hypertrophy and normal systolic function.  · The estimated ejection fraction is 60%.  · Indeterminate left ventricular diastolic function.  · Normal right ventricular size with normal right ventricular systolic function.  · Normal central venous pressure (3 mmHg).  · Trivial pericardial effusion.    No results found for this or any previous visit.    No results found for this or any previous visit.    Results for orders placed during the hospital encounter of 07/15/16    X-Ray Chest PA And Lateral    Narrative  2 view chest    Comparison: 06/23/2016    Findings: There is a curvilinear area of increased density in the suprahilar region on the left which is favored to represent a combination of vasculature and possibly some atelectasis with infiltrate thought less likely.  Diffuse chronic increased peribronchial markings are noted.  The heart is not enlarged.  IMPRESSION:      1.  As above  ______________________________________    Electronically signed by: LEXII TUCKER D.O.  Date:     07/15/16  Time:    09:26    No results found for this or any previous visit.    No valid procedures specified.    Diagnostic Results:  ECG: Reviewed  Results for orders placed during the hospital encounter of 03/14/24    Echo    Interpretation Summary    Left  Ventricle: The left ventricle is normal in size. Normal wall thickness. There is concentric hypertrophy. There is normal systolic function with a visually estimated ejection fraction of 60 - 65%. There is normal diastolic function.    Right Ventricle: Normal right ventricular cavity size. Wall thickness is normal. Right ventricle wall motion  is normal. Systolic function is normal.    Pulmonary Artery: The estimated pulmonary artery systolic pressure is 22 mmHg.    IVC/SVC: Normal venous pressure at 3 mmHg.    Results for orders placed during the hospital encounter of 02/14/22    Nuclear Stress - Cardiology Interpreted    Interpretation Summary    Normal myocardial perfusion scan. There is no evidence of myocardial ischemia or infarction.    There is mild apical thinning which is a normal variant.    The gated perfusion images showed an ejection fraction of 71% at rest. The gated perfusion images showed an ejection fraction of 71% post stress. Normal ejection fraction is greater than 59%.    The EKG portion of this study is negative for ischemia.    Interpretation Summary 4.2024  Show Result ComparisonThe patient was monitored for a total of 13d 21h, underlying rhythm is sinus.  The minimum heart rate was 52 bpm; the maximum 135 bpm; the average 83 bpm.  0 % of Atrial fibrillation/Atrial flutter with longest episode of 0 ms.  The total burden of AV Block present was 0 % [Complete Heart Block: 0 %; Advanced (High Grade):  0 %; 2nd Degree, Mobitz II: 0 %; 2nd Degree, Mobitz I: 0 %].  There were 0 pauses, the longest pause was 0 ms at --.  Total count of Ventricular Tachycardia (VT): 2 episode(s). Longest VT: 5 beats on Day 14 / 03:04:51  pm. Fastest VT: 127 bpm on  Day 9 / 08:44:49 am.  0 supraventricular episodes were found. Longest SVT Episode 0 s, Fastest SVT -- bpm  There were a total of 385 PVCs with 1 morphologies and 5 couplets. Overall PVC [place] at 0.03 %  There were a total of 0 Other Beats. There were 0  total number of paced beats.  There were a total of 93 PSVCs with 1 morphologies and 2 couplets. Overall PSVC [place] at  < 0.01 %  There is a total of 1 patient events.     Results for orders placed during the hospital encounter of 03/14/24    Echo    Interpretation Summary    Left Ventricle: The left ventricle is normal in size. Normal wall thickness. There is concentric hypertrophy. There is normal systolic function with a visually estimated ejection fraction of 60 - 65%. There is normal diastolic function.    Right Ventricle: Normal right ventricular cavity size. Wall thickness is normal. Right ventricle wall motion  is normal. Systolic function is normal.    Pulmonary Artery: The estimated pulmonary artery systolic pressure is 22 mmHg.    IVC/SVC: Normal venous pressure at 3 mmHg.    Results for orders placed during the hospital encounter of 05/09/24    Nuclear Stress - Cardiology Interpreted    Interpretation Summary    Abnormal myocardial perfusion scan.    There is a moderate intensity, large sized, mostly reversible perfusion abnormality with some fixed areas in the anterior, apical, anterolateral and anteroapical wall(s).    Basal to mid anterolateral wall ischemia is present.    There are no other significant perfusion abnormalities.    The gated perfusion images showed an ejection fraction of 64% at rest. The gated perfusion images showed an ejection fraction of 67% post stress.    There is normal wall motion at rest and post stress.    The ECG portion of the study is negative for ischemia.    The patient reported no chest pain during the stress test.    There were no arrhythmias during stress.      Results for orders placed during the hospital encounter of 05/30/24    Cardiac catheterization    Conclusion    The Mid LAD lesion was 85% stenosed with 0% stenosis post-intervention. iFR 0.77    The pre-procedure left ventricular end diastolic pressure was 14.    Mid LAD lesion: A STENT SYNERGY XD 2.5X28MM  stent was not successfully placed. Post dilated with 2.5 mm NC balloon    The estimated blood loss was none.    There was single vessel coronary artery disease.    The PCI was successful.    The filling pressures on the left were normal.    Coronary fistula across septal arteries    The procedure log was documented by Documenter: Nitza Vera RN and verified by Meagan Espinoza MD.    Date: 5/30/2024  Time: 12:14 PM    The ASCVD Risk score (Keila DK, et al., 2019) failed to calculate for the following reasons:    Risk score cannot be calculated because patient has a medical history suggesting prior/existing ASCVD    Results for orders placed during the hospital encounter of 08/16/24    Cardiac catheterization    Conclusion    The ejection fraction was calculated to be 65%.    The LAD stent is patent, well apposed and expanded    The pre-procedure left ventricular end diastolic pressure was 23.    The post-procedure left ventricular end diastolic pressure was 26.    The estimated blood loss was none.    There was non-obstructive coronary artery disease..    There was diastolic dysfunction.    The filling pressures on the left were moderately elevated.    Coronary myocardial fistula however origin could not be determined, No apparent origin and no enlargement of a specific coronary, there is possible multiple origins including small d1 , and very numerous distal septals and small apical diagonals    The procedure log was documented by Documenter: Jami Muir RN and verified by Meagan Espinoza MD.    Date: 8/16/2024  Time: 8:20 AM    Interpretation Summary 2022  Show Result ComparisonThere is 0-19% right Internal Carotid Stenosis.  There is 0-19% left Internal Carotid Stenosis.  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  And complaining of very easy bruising and nasal bleeding  A1C has significantly improved from 12.7 to 6.1.    COPD:  - Discussed that COPD can sometimes cause chest pain and shortness of breath and its potential contribution to chest discomfort.    CORONARY ARTERY FISTULA:  - Discussed presence of coronary artery fistula and its potential to cause chest discomfort, especially with elevated blood pressure or emotional stress.    CARDIOVASCULAR MANAGEMENT:  - Continued aspirin 81 mg daily.  - Continued nitroglycerin sublingual as needed for chest pain.  - Continued isosorbide 120 mg daily.  - Continued diltiazem 120 mg daily.  - Continued pravastatin 40 mg daily.    DIABETES MANAGEMENT:  - Continued insulin.  - Continued metformin.    FOLLOW-UP:  - Follow up in 6 months.  - Contact the office if any problems arise before next visit.           SIMIN normal.  Encouraged to continue abstaining from smoking vaping  Reviewed all tests and above medical conditions with patient in detail and formulated treatment plan.  Risk factor modification discussed.   Cardiac low salt diet discussed.  Maintaining healthy weight and weight loss goals were discussed in clinic.  Continue with aspirin, pravastatin, Imdur and Cardizem.    Follow up in 6 months

## 2025-05-07 ENCOUNTER — OFFICE VISIT (OUTPATIENT)
Dept: PSYCHIATRY | Facility: CLINIC | Age: 60
End: 2025-05-07
Payer: MEDICARE

## 2025-05-07 VITALS
WEIGHT: 191.13 LBS | DIASTOLIC BLOOD PRESSURE: 75 MMHG | BODY MASS INDEX: 33.86 KG/M2 | SYSTOLIC BLOOD PRESSURE: 132 MMHG | HEART RATE: 69 BPM

## 2025-05-07 DIAGNOSIS — F41.9 ANXIETY: ICD-10-CM

## 2025-05-07 DIAGNOSIS — G47.00 INSOMNIA, UNSPECIFIED TYPE: ICD-10-CM

## 2025-05-07 PROCEDURE — 3078F DIAST BP <80 MM HG: CPT | Mod: CPTII,HCNC,S$GLB, | Performed by: PSYCHIATRY & NEUROLOGY

## 2025-05-07 PROCEDURE — 3075F SYST BP GE 130 - 139MM HG: CPT | Mod: CPTII,HCNC,S$GLB, | Performed by: PSYCHIATRY & NEUROLOGY

## 2025-05-07 PROCEDURE — 3008F BODY MASS INDEX DOCD: CPT | Mod: CPTII,HCNC,S$GLB, | Performed by: PSYCHIATRY & NEUROLOGY

## 2025-05-07 PROCEDURE — 3044F HG A1C LEVEL LT 7.0%: CPT | Mod: CPTII,HCNC,S$GLB, | Performed by: PSYCHIATRY & NEUROLOGY

## 2025-05-07 PROCEDURE — 99214 OFFICE O/P EST MOD 30 MIN: CPT | Mod: HCNC,S$GLB,, | Performed by: PSYCHIATRY & NEUROLOGY

## 2025-05-07 PROCEDURE — 99999 PR PBB SHADOW E&M-EST. PATIENT-LVL II: CPT | Mod: PBBFAC,HCNC,, | Performed by: PSYCHIATRY & NEUROLOGY

## 2025-05-07 RX ORDER — BENZTROPINE MESYLATE 0.5 MG/1
0.5 TABLET ORAL 2 TIMES DAILY PRN
Qty: 180 TABLET | Refills: 1 | Status: SHIPPED | OUTPATIENT
Start: 2025-05-07

## 2025-05-07 RX ORDER — CLONAZEPAM 1 MG/1
TABLET ORAL
Qty: 90 TABLET | Refills: 1 | Status: SHIPPED | OUTPATIENT
Start: 2025-05-07

## 2025-05-07 RX ORDER — DESVENLAFAXINE 100 MG/1
100 TABLET, EXTENDED RELEASE ORAL DAILY
Qty: 90 TABLET | Refills: 3 | Status: SHIPPED | OUTPATIENT
Start: 2025-05-07 | End: 2026-05-07

## 2025-05-07 RX ORDER — LURASIDONE HYDROCHLORIDE 80 MG/1
80 TABLET, FILM COATED ORAL NIGHTLY
Qty: 90 TABLET | Refills: 1 | Status: SHIPPED | OUTPATIENT
Start: 2025-05-07 | End: 2026-05-07
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
## 2025-05-09 DIAGNOSIS — E11.42 DIABETIC POLYNEUROPATHY ASSOCIATED WITH TYPE 2 DIABETES MELLITUS: ICD-10-CM

## 2025-05-09 RX ORDER — METFORMIN HYDROCHLORIDE 750 MG/1
750 TABLET, EXTENDED RELEASE ORAL 2 TIMES DAILY WITH MEALS
Qty: 180 TABLET | Refills: 0 | OUTPATIENT
Start: 2025-05-09

## 2025-05-26 DIAGNOSIS — I67.1 CEREBRAL ANEURYSM: ICD-10-CM

## 2025-05-26 DIAGNOSIS — R51.9 GENERALIZED HEADACHES: ICD-10-CM

## 2025-05-26 DIAGNOSIS — E11.42 DIABETIC POLYNEUROPATHY ASSOCIATED WITH TYPE 2 DIABETES MELLITUS: ICD-10-CM

## 2025-05-26 RX ORDER — BUTALBITAL, ACETAMINOPHEN AND CAFFEINE 50; 325; 40 MG/1; MG/1; MG/1
1 TABLET ORAL EVERY 6 HOURS PRN
Qty: 30 TABLET | Refills: 0 | Status: SHIPPED | OUTPATIENT
Start: 2025-05-26

## 2025-05-26 RX ORDER — BUTALBITAL, ACETAMINOPHEN AND CAFFEINE 50; 325; 40 MG/1; MG/1; MG/1
1 TABLET ORAL EVERY 6 HOURS PRN
Qty: 30 TABLET | Refills: 0 | Status: SHIPPED | OUTPATIENT
Start: 2025-05-26 | End: 2025-05-26 | Stop reason: SDUPTHER

## 2025-05-26 RX ORDER — GABAPENTIN 600 MG/1
TABLET ORAL
Qty: 360 TABLET | Refills: 0 | Status: SHIPPED | OUTPATIENT
Start: 2025-05-26

## 2025-05-26 NOTE — TELEPHONE ENCOUNTER
No care due was identified.  [facility] Embedded Care Due Messages. Reference number: [id number].   5/26/2025 3:38:28 PM CDT

## 2025-05-26 NOTE — TELEPHONE ENCOUNTER
No care due was identified.  [facility] Embedded Care Due Messages. Reference number: [id number].   5/26/2025 9:19:25 AM CDT

## 2025-05-28 RX ORDER — METFORMIN HYDROCHLORIDE 750 MG/1
750 TABLET, EXTENDED RELEASE ORAL 2 TIMES DAILY WITH MEALS
Qty: 180 TABLET | Refills: 1 | Status: SHIPPED | OUTPATIENT
Start: 2025-05-28

## 2025-07-11 ENCOUNTER — HOSPITAL ENCOUNTER (EMERGENCY)
Facility: HOSPITAL | Age: 60
Discharge: HOME | End: 2025-07-11
Attending: EMERGENCY MEDICINE
Payer: MEDICARE

## 2025-07-11 VITALS
DIASTOLIC BLOOD PRESSURE: 75 MMHG | WEIGHT: 179.38 LBS | SYSTOLIC BLOOD PRESSURE: 176 MMHG | HEIGHT: 63 IN | BODY MASS INDEX: 31.79 KG/M2 | OXYGEN SATURATION: 95 % | RESPIRATION RATE: 20 BRPM | TEMPERATURE: 98 F | HEART RATE: 60 BPM

## 2025-07-11 DIAGNOSIS — R51.9 HEADACHE: ICD-10-CM

## 2025-07-11 DIAGNOSIS — G43.809 OTHER MIGRAINE WITHOUT STATUS MIGRAINOSUS, NOT INTRACTABLE: Primary | ICD-10-CM

## 2025-07-11 LAB
ABSOLUTE EOSINOPHIL (OHS): 0.23 K/UL
ABSOLUTE MONOCYTE (OHS): 0.51 K/UL (ref 0.3–1)
ABSOLUTE NEUTROPHIL COUNT (OHS): 5.32 K/UL (ref 1.8–7.7)
ALBUMIN SERPL BCP-MCNC: 4 G/DL (ref 3.5–5.2)
ALP SERPL-CCNC: 71 UNIT/L (ref 40–150)
ALT SERPL W/O P-5'-P-CCNC: 28 UNIT/L (ref 10–44)
ANION GAP (OHS): 9 MMOL/L (ref 8–16)
AST SERPL-CCNC: 28 UNIT/L (ref 11–45)
BASOPHILS # BLD AUTO: 0.08 K/UL
BASOPHILS NFR BLD AUTO: 0.9 %
BILIRUB SERPL-MCNC: 0.2 MG/DL (ref 0.1–1)
BNP SERPL-MCNC: 43 PG/ML (ref 0–99)
BUN SERPL-MCNC: 15 MG/DL (ref 8–23)
CALCIUM SERPL-MCNC: 9.3 MG/DL (ref 8.7–10.5)
CHLORIDE SERPL-SCNC: 102 MMOL/L (ref 95–110)
CO2 SERPL-SCNC: 25 MMOL/L (ref 23–29)
CREAT SERPL-MCNC: 0.8 MG/DL (ref 0.5–1.4)
ERYTHROCYTE [DISTWIDTH] IN BLOOD BY AUTOMATED COUNT: 13.2 % (ref 11.5–14.5)
GFR SERPLBLD CREATININE-BSD FMLA CKD-EPI: >60 ML/MIN/1.73/M2
GLUCOSE SERPL-MCNC: 101 MG/DL (ref 70–110)
HCT VFR BLD AUTO: 40.9 % (ref 37–48.5)
HCV AB SERPL QL IA: NEGATIVE
HGB BLD-MCNC: 13.6 GM/DL (ref 12–16)
HIV 1+2 AB+HIV1 P24 AG SERPL QL IA: NEGATIVE
IMM GRANULOCYTES # BLD AUTO: 0.03 K/UL (ref 0–0.04)
IMM GRANULOCYTES NFR BLD AUTO: 0.4 % (ref 0–0.5)
LYMPHOCYTES # BLD AUTO: 2.3 K/UL (ref 1–4.8)
MCH RBC QN AUTO: 30.7 PG (ref 27–31)
MCHC RBC AUTO-ENTMCNC: 33.3 G/DL (ref 32–36)
MCV RBC AUTO: 92 FL (ref 82–98)
NUCLEATED RBC (/100WBC) (OHS): 0 /100 WBC
PLATELET # BLD AUTO: 270 K/UL (ref 150–450)
PMV BLD AUTO: 9.6 FL (ref 9.2–12.9)
POCT GLUCOSE: 102 MG/DL (ref 70–110)
POCT GLUCOSE: 92 MG/DL (ref 70–110)
POTASSIUM SERPL-SCNC: 3.5 MMOL/L (ref 3.5–5.1)
PROT SERPL-MCNC: 7.6 GM/DL (ref 6–8.4)
RBC # BLD AUTO: 4.43 M/UL (ref 4–5.4)
RELATIVE EOSINOPHIL (OHS): 2.7 %
RELATIVE LYMPHOCYTE (OHS): 27.2 % (ref 18–48)
RELATIVE MONOCYTE (OHS): 6 % (ref 4–15)
RELATIVE NEUTROPHIL (OHS): 62.8 % (ref 38–73)
SODIUM SERPL-SCNC: 136 MMOL/L (ref 136–145)
TROPONIN I SERPL DL<=0.01 NG/ML-MCNC: <0.006 NG/ML
WBC # BLD AUTO: 8.47 K/UL (ref 3.9–12.7)

## 2025-07-11 PROCEDURE — 84484 ASSAY OF TROPONIN QUANT: CPT | Mod: HCNC | Performed by: EMERGENCY MEDICINE

## 2025-07-11 PROCEDURE — 96361 HYDRATE IV INFUSION ADD-ON: CPT | Mod: HCNC

## 2025-07-11 PROCEDURE — 83880 ASSAY OF NATRIURETIC PEPTIDE: CPT | Mod: HCNC | Performed by: EMERGENCY MEDICINE

## 2025-07-11 PROCEDURE — 85025 COMPLETE CBC W/AUTO DIFF WBC: CPT | Mod: HCNC | Performed by: EMERGENCY MEDICINE

## 2025-07-11 PROCEDURE — 63600175 PHARM REV CODE 636 W HCPCS: Mod: HCNC | Performed by: EMERGENCY MEDICINE

## 2025-07-11 PROCEDURE — 96376 TX/PRO/DX INJ SAME DRUG ADON: CPT | Mod: HCNC

## 2025-07-11 PROCEDURE — 96374 THER/PROPH/DIAG INJ IV PUSH: CPT | Mod: HCNC

## 2025-07-11 PROCEDURE — 87389 HIV-1 AG W/HIV-1&-2 AB AG IA: CPT | Mod: HCNC | Performed by: EMERGENCY MEDICINE

## 2025-07-11 PROCEDURE — 63600175 PHARM REV CODE 636 W HCPCS: Mod: JZ,TB,HCNC | Performed by: EMERGENCY MEDICINE

## 2025-07-11 PROCEDURE — 25000003 PHARM REV CODE 250: Mod: HCNC | Performed by: EMERGENCY MEDICINE

## 2025-07-11 PROCEDURE — 82962 GLUCOSE BLOOD TEST: CPT | Mod: HCNC

## 2025-07-11 PROCEDURE — 99285 EMERGENCY DEPT VISIT HI MDM: CPT | Mod: 25,HCNC

## 2025-07-11 PROCEDURE — 96372 THER/PROPH/DIAG INJ SC/IM: CPT | Performed by: EMERGENCY MEDICINE

## 2025-07-11 PROCEDURE — 84450 TRANSFERASE (AST) (SGOT): CPT | Mod: HCNC | Performed by: EMERGENCY MEDICINE

## 2025-07-11 PROCEDURE — 86803 HEPATITIS C AB TEST: CPT | Mod: HCNC | Performed by: EMERGENCY MEDICINE

## 2025-07-11 PROCEDURE — 96375 TX/PRO/DX INJ NEW DRUG ADDON: CPT | Mod: HCNC

## 2025-07-11 RX ORDER — BUTALBITAL, ACETAMINOPHEN AND CAFFEINE 50; 325; 40 MG/1; MG/1; MG/1
1 TABLET ORAL EVERY 6 HOURS PRN
Qty: 7 TABLET | Refills: 0 | Status: SHIPPED | OUTPATIENT
Start: 2025-07-11

## 2025-07-11 RX ORDER — METOCLOPRAMIDE HYDROCHLORIDE 5 MG/ML
10 INJECTION INTRAMUSCULAR; INTRAVENOUS
Status: COMPLETED | OUTPATIENT
Start: 2025-07-11 | End: 2025-07-11

## 2025-07-11 RX ORDER — SUMATRIPTAN 6 MG/.5ML
6 INJECTION, SOLUTION SUBCUTANEOUS
Status: COMPLETED | OUTPATIENT
Start: 2025-07-11 | End: 2025-07-11

## 2025-07-11 RX ORDER — KETOROLAC TROMETHAMINE 30 MG/ML
30 INJECTION, SOLUTION INTRAMUSCULAR; INTRAVENOUS
Status: COMPLETED | OUTPATIENT
Start: 2025-07-11 | End: 2025-07-11

## 2025-07-11 RX ORDER — PROCHLORPERAZINE EDISYLATE 5 MG/ML
5 INJECTION INTRAMUSCULAR; INTRAVENOUS
Status: COMPLETED | OUTPATIENT
Start: 2025-07-11 | End: 2025-07-11

## 2025-07-11 RX ORDER — HYDROMORPHONE HYDROCHLORIDE 1 MG/ML
1 INJECTION, SOLUTION INTRAMUSCULAR; INTRAVENOUS; SUBCUTANEOUS
Refills: 0 | Status: COMPLETED | OUTPATIENT
Start: 2025-07-11 | End: 2025-07-11

## 2025-07-11 RX ORDER — METOCLOPRAMIDE 10 MG/1
10 TABLET ORAL EVERY 8 HOURS PRN
Qty: 30 TABLET | Refills: 0 | Status: SHIPPED | OUTPATIENT
Start: 2025-07-11

## 2025-07-11 RX ORDER — DIPHENHYDRAMINE HYDROCHLORIDE 50 MG/ML
25 INJECTION, SOLUTION INTRAMUSCULAR; INTRAVENOUS
Status: COMPLETED | OUTPATIENT
Start: 2025-07-11 | End: 2025-07-11

## 2025-07-11 RX ADMIN — METOCLOPRAMIDE 10 MG: 5 INJECTION, SOLUTION INTRAMUSCULAR; INTRAVENOUS at 05:07

## 2025-07-11 RX ADMIN — HYDROMORPHONE HYDROCHLORIDE 1 MG: 1 INJECTION, SOLUTION INTRAMUSCULAR; INTRAVENOUS; SUBCUTANEOUS at 06:07

## 2025-07-11 RX ADMIN — DIPHENHYDRAMINE HYDROCHLORIDE 25 MG: 50 INJECTION, SOLUTION INTRAMUSCULAR; INTRAVENOUS at 05:07

## 2025-07-11 RX ADMIN — KETOROLAC TROMETHAMINE 30 MG: 30 INJECTION, SOLUTION INTRAMUSCULAR at 05:07

## 2025-07-11 RX ADMIN — METOCLOPRAMIDE 10 MG: 5 INJECTION, SOLUTION INTRAMUSCULAR; INTRAVENOUS at 06:07

## 2025-07-11 RX ADMIN — PROCHLORPERAZINE EDISYLATE 5 MG: 5 INJECTION INTRAMUSCULAR; INTRAVENOUS at 05:07

## 2025-07-11 RX ADMIN — SODIUM CHLORIDE 1000 ML: 9 INJECTION, SOLUTION INTRAVENOUS at 05:07

## 2025-07-11 RX ADMIN — SUMATRIPTAN 6 MG: 6 INJECTION, SOLUTION SUBCUTANEOUS at 06:07

## 2025-07-11 NOTE — ED PROVIDER NOTES
"SCRIBE #1 NOTE: I, Shasta Cummings am scribing for, and in the presence of, Nadeem Anderson Jr., MD. I have scribed the HPI, ROS, and PEx.    SCRIBE #2 NOTE: I, Lonnie Kebede, am scribing for, and in the presence of,  Fracisco Teixeira Jr., MD. I have scribed the remaining portions of the note not scribed by Scribe #1.      History     Chief Complaint   Patient presents with    Headache     Reports "migraine" x 6 days without relief from home medications. Endorses hx of migraines.      Review of patient's allergies indicates:   Allergen Reactions    Seroquel [quetiapine] Other (See Comments)     TC SEIZURES    Adhesive Blisters     Pt states can't tolerate paper tape    Adhesive tape-silicones     Levomenol analogues     Lipitor [atorvastatin]      Leg Cramps    Repatha pushtronex [evolocumab]      Feels sick    Zofran [ondansetron hcl] Hives and Other (See Comments)     headaches    Celestone [betamethasone] Hives, Itching and Rash    Levaquin [levofloxacin] Nausea Only    Piroxicam Diarrhea and Nausea Only         History of Present Illness     HPI    7/11/2025, 6:02 AM  History obtained from the patient and medical records      History of Present Illness: Alexandra Goins is a 59 y.o. female patient with a PMHx of depression, hypertension, anxiety, and migraine headache who presents to the Emergency Department for evaluation of a headache that began 3 days ago. Pt reports the headache starts in her eyes and radiates to the rest of her head. She denies any vision changes or dysuria. She reports light-sensitivity and sound-sensitivity. Prior Tx includes extra-strength Tylenol, phenergan, and Fioricet.  No further complaints or concerns at this time.       Arrival mode: Personal Transportation    PCP: Perez Casiano MD        Past Medical History:  Past Medical History:   Diagnosis Date    Acute cystitis without hematuria 11/11/2023    Acute ischemic stroke 09/17/2019    Acute respiratory failure with hypoxia 02/11/2021 "    Acute right-sided weakness 09/17/2019    Aneurysm     Anxiety     Arthritis     Asthma     Bipolar 1 disorder     Cerebral aneurysm, nonruptured 09/19/2019    Chest pain 01/24/2017    Chronic diastolic heart failure 05/23/2023    Coronary artery disease of native artery of native heart with stable angina pectoris 01/19/2022    Depression     Diabetes mellitus, type 2     BS 72 01/23/2024    Diverticular disease     GERD (gastroesophageal reflux disease)     Hearing loss 10/7/1998    Hemiplegia and hemiparesis following unspecified cerebrovascular disease affecting left dominant side 01/21/2020    Hyperlipemia     Hypertension     Hyponatremia 07/24/2022    Hypothyroidism     Migraine headache 12/8/1999    I have them often    Pneumonia     PVD (peripheral vascular disease) 04/28/2021    Renal manifestation of secondary diabetes mellitus     Right-sided back pain 06/29/2019    S/P admn tPA in diff fac w/n last 24 hr bef adm to crnt fac 09/17/2019    Seasonal allergies 08/15/78    I sneeze and post nasal drip    Severe obesity (BMI 35.0-39.9) with comorbidity 05/31/2022    Stroke     Trouble in sleeping        Past Surgical History:  Past Surgical History:   Procedure Laterality Date    ANGIOGRAM, CORONARY, WITH LEFT HEART CATHETERIZATION N/A 05/30/2024    Procedure: Angiogram, Coronary, with Left Heart Cath;  Surgeon: Meagan Espinoza MD;  Location: [facility] ([place] CATH LAB;  Service: Cardiology;  Laterality: N/A;    ANGIOGRAM, CORONARY, WITH LEFT HEART CATHETERIZATION N/A 08/16/2024    Procedure: Angiogram, Coronary, with Left Heart Cath;  Surgeon: Meagan Espinoza MD;  Location: [facility] CATH LAB;  Service: Cardiology;  Laterality: N/A;    CEREBRAL ANGIOGRAM N/A 10/28/2019    Procedure: ANGIOGRAM-CEREBRAL;  Surgeon: Dahiana Diagnostic Provider;  Location: [facility] OR [address] [place] [address];  Service: Radiology;  Laterality: N/A;  Rm 190/Aayush    CHOLECYSTECTOMY      COLON SURGERY      COLONOSCOPY N/A 01/12/2018    Procedure: COLONOSCOPY;   Surgeon: Roque Mahajan III, MD;  Location: [facility] ENDO;  Service: Endoscopy;  Laterality: N/A;    COLONOSCOPY N/A 10/13/2023    Procedure: COLONOSCOPY;  Surgeon: Thelma Chairez MD;  Location: [facility] ENDO;  Service: Endoscopy;  Laterality: N/A;    CORONARY STENT PLACEMENT Left 05/30/2024    Procedure: INSERTION, STENT, CORONARY ARTERY;  Surgeon: Meagan Espinoza MD;  Location: [facility] CATH LAB;  Service: Cardiology;  Laterality: Left;    DENTAL SURGERY  05/21/2018    removal of 8 top teeth    HYSTERECTOMY      PERCUTANEOUS TRANSLUMINAL BALLOON ANGIOPLASTY OF CORONARY ARTERY Left 05/30/2024    Procedure: Angioplasty-coronary;  Surgeon: Meagan Espinoza MD;  Location: [facility] CATH LAB;  Service: Cardiology;  Laterality: Left;    SHOULDER SURGERY      SMALL INTESTINE SURGERY  14years ago    TONSILLECTOMY      TUBAL LIGATION  1989    VENTRICULOGRAM, LEFT  08/16/2024    Procedure: Ventriculogram, Left;  Surgeon: Meagan Espinoza MD;  Location: [facility] CATH LAB;  Service: Cardiology;;         Family History:  Family History   Problem Relation Name Age of Onset    Alcohol abuse Mother Arin amerso     COPD Mother Arin amerso     Depression Mother Arin amerso     Drug abuse Mother Arin amerso     Alcohol abuse Father Gurnus Jamie Nunez     Arthritis Father Gurnus Jamie Nunez     Cancer Father Gurnus Jamie Nunez     Heart disease Father Gurnus Jamie Nunez     Hypertension Father Gurnus Jamie Nunez     Depression Father Gurnus Jamie Nunez     COPD Sister Marce     Kidney failure Sister Marce     Heart disease Brother Bear     Hypertension Brother Bear     Cancer Maternal Aunt      Cancer Maternal Uncle Blackie     Diabetes Maternal Uncle Blackie     Drug abuse Maternal Uncle Blackie     Cancer Paternal Aunt Meghan     Cancer Paternal Uncle      Arthritis Maternal Grandmother Meliza     Hypertension Maternal Grandmother Meliza     Breast cancer Maternal Grandmother Meliza     Arthritis Paternal Grandmother Meliza  oscar     Cancer Paternal Grandmother Meliza moore     Diabetes Paternal Grandmother Meliza moore     Hypertension Paternal Grandfather Don't know him        Social History:  Social History     Tobacco Use    Smoking status: Former     Current packs/day: 0.00     Average packs/day: 1 pack/day for 46.4 years (46.4 ttl pk-yrs)     Types: Cigarettes     Start date:      Quit date: 2024     Years since quittin.1     Passive exposure: Past    Smokeless tobacco: Never    Tobacco comments:     Bad Habit   Substance and Sexual Activity    Alcohol use: Not Currently     Alcohol/week: 1.0 standard drink of alcohol     Types: 1 Drinks containing 0.5 oz of alcohol per week     Comment: I may have 5 in a year    Drug use: Not Currently     Types: Marijuana    Sexual activity: Not Currently     Partners: Male     Comment: I am not going on anything        Review of Systems     Review of Systems   Eyes:  Positive for photophobia and pain. Negative for visual disturbance.   Genitourinary:  Negative for dysuria.   Neurological:  Positive for headaches.      Physical Exam     Initial Vitals [25 0403]   BP Pulse Resp Temp SpO2   133/62 78 18 98.1 °F (36.7 °C) 96 %      MAP       --          Physical Exam  Nursing Notes and Vital Signs Reviewed.  Constitutional: Patient is in no acute distress. Well-developed and well-nourished.  Head: Atraumatic. Normocephalic.  Eyes: PERRL. EOM intact. Conjunctivae are not pale. No scleral icterus.  ENT: Mucous membranes are moist. Oropharynx is clear and symmetric.    Neck: Supple. Full ROM. No lymphadenopathy.  Cardiovascular: Regular rate. Regular rhythm. Distal pulses are 2+ and symmetric. No murmurs, rubs, or gallops..  Pulmonary/Chest: No respiratory distress. Clear to auscultation bilaterally. No wheezing or rales.  Abdominal: Soft and non-distended. There is no tenderness.  No rebound, guarding, or rigidity.  Genitourinary: No CVA  "tenderness.  Musculoskeletal: Moves all extremities. No obvious deformities.  Skin: Warm and dry.  Neurological:  Alert, awake, and appropriate.  Normal speech.  No acute focal neurological deficits are appreciated.  Psychiatric: Normal affect. Good eye contact. Appropriate in content.     ED Course     Procedures  ED Vital Signs:  Vitals:    07/11/25 0403 07/11/25 0445 07/11/25 0530 07/11/25 0600   BP: 133/62 (!) 158/69 (!) 151/69 (!) 152/71   Pulse: 78 60 67 60   Resp: 18 20 19 19   Temp: 98.1 °F (36.7 °C)  98.1 °F (36.7 °C)    TempSrc: Oral  Oral    SpO2: 96% 97% 96% 96%   Weight: 81.4 kg (179 lb 6.4 oz)      Height: 5' 3" (1.6 m)       07/11/25 0630 07/11/25 0730   BP: (!) 167/77 (!) 176/75   Pulse: 69 60   Resp: 16 20   Temp:     TempSrc:     SpO2: 98% 95%   Weight:     Height:         Abnormal Lab Results:  Labs Reviewed   HEPATITIS C ANTIBODY - Normal       Result Value    Hep C Ab Interp Negative     HIV 1 / 2 ANTIBODY - Normal    HIV 1/2 Ag/Ab Negative     COMPREHENSIVE METABOLIC PANEL - Normal    Sodium 136      Potassium 3.5      Chloride 102      CO2 25      Glucose 101      BUN 15      Creatinine 0.8      Calcium 9.3      Protein Total 7.6      Albumin 4.0      Bilirubin Total 0.2      ALP 71      AST 28      ALT 28      Anion Gap 9      eGFR >60     TROPONIN I - Normal    Troponin-I <0.006     B-TYPE NATRIURETIC PEPTIDE - Normal    BNP 43     CBC WITH DIFFERENTIAL - Normal    WBC 8.47      RBC 4.43      HGB 13.6      HCT 40.9      MCV 92      MCH 30.7      MCHC 33.3      RDW 13.2      Platelet Count 270      MPV 9.6      Nucleated RBC 0      Neut % 62.8      Lymph % 27.2      Mono % 6.0      Eos % 2.7      Basophil % 0.9      Imm Grans % 0.4      Neut # 5.32      Lymph # 2.30      Mono # 0.51      Eos # 0.23      Baso # 0.08      Imm Grans # 0.03     CBC W/ AUTO DIFFERENTIAL    Narrative:     The following orders were created for panel order CBC auto differential.  Procedure                             "   Abnormality         Status                     ---------                               -----------         ------                     CBC with Differential[[id number]]       Normal              Final result                 Please view results for these tests on the individual orders.   HEP C VIRUS HOLD SPECIMEN   TROPONIN I   POCT GLUCOSE    POCT Glucose 102     POCT GLUCOSE MONITORING CONTINUOUS        All Lab Results:  Results for orders placed or performed during the hospital encounter of 07/11/25   Hepatitis C Antibody    Collection Time: 07/11/25  4:51 AM   Result Value Ref Range    Hep C Ab Interp Negative Negative   HIV 1/2 Ag/Ab (4th Gen)    Collection Time: 07/11/25  4:51 AM   Result Value Ref Range    HIV 1/2 Ag/Ab Negative Negative   POCT glucose    Collection Time: 07/11/25  5:43 AM   Result Value Ref Range    POCT Glucose 102 70 - 110 mg/dL   Comprehensive metabolic panel    Collection Time: 07/11/25  5:53 AM   Result Value Ref Range    Sodium 136 136 - 145 mmol/L    Potassium 3.5 3.5 - 5.1 mmol/L    Chloride 102 95 - 110 mmol/L    CO2 25 23 - 29 mmol/L    Glucose 101 70 - 110 mg/dL    BUN 15 8 - 23 mg/dL    Creatinine 0.8 0.5 - 1.4 mg/dL    Calcium 9.3 8.7 - 10.5 mg/dL    Protein Total 7.6 6.0 - 8.4 gm/dL    Albumin 4.0 3.5 - 5.2 g/dL    Bilirubin Total 0.2 0.1 - 1.0 mg/dL    ALP 71 40 - 150 unit/L    AST 28 11 - 45 unit/L    ALT 28 10 - 44 unit/L    Anion Gap 9 8 - 16 mmol/L    eGFR >60 >60 mL/min/1.73/m2   Troponin I #1    Collection Time: 07/11/25  5:53 AM   Result Value Ref Range    Troponin-I <0.006 <=0.026 ng/mL   BNP    Collection Time: 07/11/25  5:53 AM   Result Value Ref Range    BNP 43 0 - 99 pg/mL   CBC with Differential    Collection Time: 07/11/25  5:53 AM   Result Value Ref Range    WBC 8.47 3.90 - 12.70 K/uL    RBC 4.43 4.00 - 5.40 M/uL    HGB 13.6 12.0 - 16.0 gm/dL    HCT 40.9 37.0 - 48.5 %    MCV 92 82 - 98 fL    MCH 30.7 27.0 - 31.0 pg    MCHC 33.3 32.0 - 36.0 g/dL    RDW 13.2 11.5  - 14.5 %    Platelet Count 270 150 - 450 K/uL    MPV 9.6 9.2 - 12.9 fL    Nucleated RBC 0 <=0 /100 WBC    Neut % 62.8 38 - 73 %    Lymph % 27.2 18 - 48 %    Mono % 6.0 4 - 15 %    Eos % 2.7 <=8 %    Basophil % 0.9 <=1.9 %    Imm Grans % 0.4 0.0 - 0.5 %    Neut # 5.32 1.8 - 7.7 K/uL    Lymph # 2.30 1 - 4.8 K/uL    Mono # 0.51 0.3 - 1 K/uL    Eos # 0.23 <=0.5 K/uL    Baso # 0.08 <=0.2 K/uL    Imm Grans # 0.03 0.00 - 0.04 K/uL     *Note: Due to a large number of results and/or encounters for the requested time period, some results have not been displayed. A complete set of results can be found in Results Review.       Imaging Results:  Imaging Results              CT Head Without Contrast (Final result)  Result time 07/11/25 07:24:50      Final result by Nikos Cody MD (07/11/25 07:24:50)                   Impression:      1.  Negative for acute intracranial process. Negative for hemorrhage, or skull fracture.    2.  Age-appropriate cerebral atrophy noted.  Intracranial atherosclerotic disease noted.  Mild small vessel ischemic changes noted.    3.  Stable findings as noted above.    All CT scans at this facility are performed  using dose modulation techniques as appropriate to performed exam including the following:  automated exposure control; adjustment of mA and/or kV according to the patients size (this includes techniques or standardized protocols for targeted exams where dose is matched to indication/reason for exam: i.e. extremities or head);  iterative reconstruction technique.      Electronically signed by: Nikos Cody MD  Date:    07/11/2025  Time:    07:24               Narrative:    EXAMINATION:  CT HEAD WITHOUT CONTRAST    CLINICAL HISTORY:  Headache, unspecifiedHeadache, chronic, new features or increased frequency;Headache, new or worsening (Age >= 50y);    TECHNIQUE:  Axial images through the brain and posterior fossa were obtained without the use of IV contrast.  Sagittal and coronal  reconstructions are provided for review.    COMPARISON:  June 14, 2023    FINDINGS:  The ventricles are midline and the CSF spaces are normal for age.  The gray-white matter junction is well preserved. Negative for intracranial vascular abnormalities. Negative for mass, mass effect, cerebral edema, hemorrhage or abnormal fluid collections.  Intracranial atherosclerotic disease noted.  Small vessel ischemic changes noted.  Falx and tentorial calcifications again seen.    The skull and scalp are  intact.  Leftward nasal septal deviation.  Patchy ethmoid air cell disease.  Hypoplastic frontal sinuses.  The rest of the paranasal sinuses, mastoid air cells, middle ears and ear canals are clear. The globes are intact.                                       Type of Interpretation: Outside Written Report  [facility] Radiology Procedure Done:  CT Head Without Intravenous Contrast  Interpretation:  No acute intracranial abnormality.  Radiologist:  Meghan Truong MD  07/11/2025  06:12        No EKG was ordered.           The Emergency Provider reviewed the vital signs and test results, which are outlined above.     ED Discussion     6:00 AM: Dr. Anderson transfers care of patient to Dr. Teixeira pending lab results.    7:34 AM: Reassessed pt at this time. Discussed with patient and/or family/caretaker all pertinent ED information and results. Discussed pt dx and plan of tx. Gave the patient all f/u and return to the ED instructions. All questions and concerns were addressed at this time. Patient and/or family/caretaker expresses understanding of information and instructions, and is comfortable with plan to discharge. Pt is stable for discharge.     I discussed with patient and/or family/caretaker that evaluation in the ED does not suggest any emergent or life threatening medical conditions requiring immediate intervention beyond what was provided in the ED, and I believe patient is safe for discharge. Regardless, an unremarkable  evaluation in the ED does not preclude the development or presence of a serious or life threatening condition. As such, I instructed that the patient is to return immediately for any worsening or change in current symptoms.          Medical Decision Making  Differential diagnosis: Headache, migraine, sinusitis, tension headache, cluster headache    The patient is has a history of migraines.  She presents complaining of headache consistent with a prior migraines lasting 5 days and refractory to her home meds.  That has no fevers or chills.  Physical examination in his benign with no focal neurological findings nuchal rigidity or meningismus.  She is afebrile with a normal white count.  CMP was benign.  Troponin BNP were all normal.  Head CT was obtained showing no acute intracranial process in his headache was subsequently improved with medications provided in the ED. the patient is requesting discharge which is reasonable.  She is stable safe for this in my opinion.    Amount and/or Complexity of Data Reviewed  Labs: ordered. Decision-making details documented in ED Course.     Details: CBC CMP BNP and troponin all normal  Radiology: ordered. Decision-making details documented in ED Course.     Details: CT head is negative    Risk  OTC drugs.  Prescription drug management.  Parenteral controlled substances.  Decision regarding hospitalization.                  ED Medication(s):  Medications   prochlorperazine injection Soln 5 mg (5 mg Intravenous Given 7/11/25 0528)   sodium chloride 0.9% bolus 1,000 mL 1,000 mL (0 mLs Intravenous Stopped 7/11/25 0657)   metoclopramide injection 10 mg (10 mg Intravenous Given 7/11/25 0556)   ketorolac injection 30 mg (30 mg Intravenous Given 7/11/25 0556)   diphenhydrAMINE injection 25 mg (25 mg Intravenous Given 7/11/25 0556)   HYDROmorphone injection 1 mg (1 mg Intravenous Given 7/11/25 0630)   metoclopramide injection 10 mg (10 mg Intravenous Given 7/11/25 0630)   SUMAtriptan  succinate injection 6 mg (6 mg Subcutaneous Given 7/11/25 0630)       Discharge Medication List as of 7/11/2025  7:32 AM        START taking these medications    Details   !! butalbital-acetaminophen-caffeine -40 mg (FIORICET, ESGIC) -40 mg per tablet Take 1 tablet by mouth every 6 (six) hours as needed for Pain., Starting Fri 7/11/2025, Normal      metoclopramide HCl (REGLAN) 10 MG tablet Take 1 tablet (10 mg total) by mouth every 8 (eight) hours as needed., Starting Fri 7/11/2025, Normal       !! - Potential duplicate medications found. Please discuss with provider.           Follow-up Information       Perez Casiano MD.    Specialty: Family Medicine  Contact information:  [address] [place] [address] [id number] [phone]                                 Scribe Attestation:   Scribe #1: I performed the above scribed service and the documentation accurately describes the services I performed. I attest to the accuracy of the note.     Attending:   Physician Attestation Statement for Scribe #1: I, Nadeem Anderson Jr., MD, personally performed the services described in this documentation, as scribed by Shasta Cummings, in my presence, and it is both accurate and complete.       Scribe Attestation:   Scribe #2: I performed the above scribed service and the documentation accurately describes the services I performed. I attest to the accuracy of the note.    Attending Attestation:           Physician Attestation for Scribe:    Physician Attestation Statement for Scribe #2: I, Fracisco Teixeira Jr., MD, reviewed documentation, as scribed by Lonnie Kebede in my presence, and it is both accurate and complete. I also acknowledge and confirm the content of the note done by Shaunibe #1.           Clinical Impression       ICD-10-CM ICD-9-CM   1. Other migraine without status migrainosus, not intractable  G43.809 346.80   2. Headache  R51.9 784.0       Disposition:   Disposition: Discharged  Condition:  Fracisco Guzman Jr., MD  07/11/25 0885

## 2025-07-14 ENCOUNTER — TELEPHONE (OUTPATIENT)
Dept: FAMILY MEDICINE | Facility: CLINIC | Age: 60
End: 2025-07-14
Payer: MEDICARE

## 2025-07-14 ENCOUNTER — PATIENT OUTREACH (OUTPATIENT)
Facility: OTHER | Age: 60
End: 2025-07-14
Payer: MEDICARE

## 2025-07-14 ENCOUNTER — TELEPHONE (OUTPATIENT)
Dept: CARDIOLOGY | Facility: CLINIC | Age: 60
End: 2025-07-14
Payer: MEDICARE

## 2025-07-14 DIAGNOSIS — E78.5 HYPERLIPIDEMIA, UNSPECIFIED HYPERLIPIDEMIA TYPE: ICD-10-CM

## 2025-07-14 DIAGNOSIS — R51.9 GENERALIZED HEADACHES: ICD-10-CM

## 2025-07-14 DIAGNOSIS — I67.1 CEREBRAL ANEURYSM: ICD-10-CM

## 2025-07-14 LAB — HOLD SPECIMEN: NORMAL

## 2025-07-14 NOTE — TELEPHONE ENCOUNTER
Copied from CRM #[id number]. Topic: Medications - Medication Refill  >> Jul 14, 2025  7:17 AM Soraya wrote:  .Type:  RX Refill Request    Who Called: Alexandra  Refill or New Rx:refill  RX Name and Strength:pravastatin (PRAVACHOL) 40 MG tablet  How is the patient currently taking it? (ex. 1XDay):Route: Take 1 tablet (40 mg total) by mouth every evening  Is this a 30 day or 90 day RX:90  Preferred Pharmacy with phone number .  [address] [address]  Phone: [phone] Fax: [phone]  Local or Mail Order:local  Ordering Provider:Perez Casiano MD  Would the patient rather a call back or a response via [facility]? Call back  Best Call Back Number:[phone]  Additional Information: a refill is needed on butalbital-acetaminophen-caffeine -40 mg (FIORICET, ESGIC) -40 mg per tablet

## 2025-07-14 NOTE — PROGRESS NOTES
Patient was seen in the ED on 7/11/25. Phoned patient to assist with Post ED Discharge Navigation. Patient declined assistance scheduling a PCP follow-up with 7 days.  Nahed Miramontes

## 2025-07-14 NOTE — TELEPHONE ENCOUNTER
No care due was identified.  [facility] Embedded Care Due Messages. Reference number: [id number].   7/14/2025 7:54:35 AM CDT

## 2025-07-14 NOTE — TELEPHONE ENCOUNTER
Copied from CRM #[id number]. Topic: General Inquiry - Patient Advice  >> Jul 14, 2025 12:34 PM Alona wrote:  Type:  Needs Medical Advice    Who Called:  Alexandra   Symptoms (please be specific):    How long has patient had these symptoms:    Pharmacy name and phone #:    Would the patient rather a call back or a response via [facility]? call  Best Call Back Number:  [phone] (home)   Additional Information:  Alexandra need medical advice to know what medications she should be on.    Returned call to pt. She was asking which blood thinner was stopped at her last office visit. Per LOV pt d/c effient.   Notified pt of this information. All questions answered. Call cordially ended

## 2025-07-16 DIAGNOSIS — E11.9 TYPE 2 DIABETES MELLITUS WITHOUT COMPLICATION: ICD-10-CM

## 2025-07-16 RX ORDER — BUTALBITAL, ACETAMINOPHEN AND CAFFEINE 50; 325; 40 MG/1; MG/1; MG/1
1 TABLET ORAL EVERY 6 HOURS PRN
Qty: 30 TABLET | Refills: 0 | OUTPATIENT
Start: 2025-07-16

## 2025-07-16 RX ORDER — PRAVASTATIN SODIUM 40 MG/1
40 TABLET ORAL NIGHTLY
Qty: 90 TABLET | Refills: 3 | Status: SHIPPED | OUTPATIENT
Start: 2025-07-16 | End: 2026-07-16

## 2025-07-22 ENCOUNTER — PATIENT MESSAGE (OUTPATIENT)
Dept: FAMILY MEDICINE | Facility: CLINIC | Age: 60
End: 2025-07-22
Payer: MEDICARE

## 2025-07-22 DIAGNOSIS — I67.1 CEREBRAL ANEURYSM: ICD-10-CM

## 2025-07-22 DIAGNOSIS — R51.9 GENERALIZED HEADACHES: ICD-10-CM

## 2025-07-22 DIAGNOSIS — Z12.31 ENCOUNTER FOR SCREENING MAMMOGRAM FOR MALIGNANT NEOPLASM OF BREAST: ICD-10-CM

## 2025-07-22 DIAGNOSIS — Z12.39 ENCOUNTER FOR SCREENING FOR MALIGNANT NEOPLASM OF BREAST, UNSPECIFIED SCREENING MODALITY: Primary | ICD-10-CM

## 2025-07-22 RX ORDER — BUTALBITAL, ACETAMINOPHEN AND CAFFEINE 50; 325; 40 MG/1; MG/1; MG/1
1 TABLET ORAL EVERY 6 HOURS PRN
Qty: 30 TABLET | Refills: 0 | Status: SHIPPED | OUTPATIENT
Start: 2025-07-22

## 2025-07-22 NOTE — TELEPHONE ENCOUNTER
Copied from CRM #[id number]. Topic: Medications - Medication Refill  >> Jul 22, 2025  7:59 AM Yan wrote:  .Type: Patient Call Back        Who called:   patient     What is the request in detailcalled in due to her having bad migraines . Patient states she is about to go on vacation and really needs the butalbital-acetaminophen-caffeine -40 mg (FIORICET, ESGIC) -40 mg per tablet filled . Please call back     Can the clinic reply by [facility]?           Would the patient rather a call back or a response via [facility]?        Best call back number:.[phone]      I sent patient a message that refill request was sent to provider for approval. Once he reviews request he will advise

## 2025-07-22 NOTE — TELEPHONE ENCOUNTER
Care Due:                  Date            Visit Type   Department     Provider  --------------------------------------------------------------------------------                                EP -                              PRIMARY      [facility] INTERNAL  Last Visit: 03-      CARE ([facility])   MEDICINE       Perez Casiano                              EP -                              PRIMARY      [facility] INTERNAL  Next Visit: 09-      CARE ([facility])   LEILANI Casiano                                                            Last  Test          Frequency    Reason                     Performed    Due Date  --------------------------------------------------------------------------------    Lipid Panel.  12 months..  pravastatin..............  09- 08-    [facility] Embedded Care Due Messages. Reference number: [id number].   7/22/2025 8:28:50 AM CDT

## 2025-07-22 NOTE — TELEPHONE ENCOUNTER
Refill Routing Note   Medication(s) are not appropriate for processing by [facility] for the following reason(s):        Outside of protocol    [facility] action(s):  Route     Requires labs : Yes             Appointments  past 12m or future 3m with PCP    Date Provider   Last Visit   3/3/2025 Perez Casiano MD   Next Visit   9/3/2025 Perez Casiano MD   ED visits in past 90 days: 1        Note composed:9:01 AM 07/22/2025

## 2025-07-24 ENCOUNTER — LAB VISIT (OUTPATIENT)
Dept: LAB | Facility: HOSPITAL | Age: 60
End: 2025-07-24
Attending: FAMILY MEDICINE
Payer: MEDICARE

## 2025-07-24 DIAGNOSIS — R51.9 GENERALIZED HEADACHES: ICD-10-CM

## 2025-07-24 DIAGNOSIS — E11.9 TYPE 2 DIABETES MELLITUS WITHOUT COMPLICATION: ICD-10-CM

## 2025-07-24 DIAGNOSIS — I67.1 CEREBRAL ANEURYSM: ICD-10-CM

## 2025-07-24 LAB
ALBUMIN/CREAT UR: 45 UG/MG
CREAT UR-MCNC: 20 MG/DL (ref 15–325)
MICROALBUMIN UR-MCNC: 9 UG/ML (ref ?–5000)

## 2025-07-24 PROCEDURE — 82043 UR ALBUMIN QUANTITATIVE: CPT | Mod: HCNC

## 2025-07-25 RX ORDER — BUTALBITAL, ACETAMINOPHEN AND CAFFEINE 50; 325; 40 MG/1; MG/1; MG/1
1 TABLET ORAL EVERY 6 HOURS PRN
Qty: 30 TABLET | Refills: 0 | OUTPATIENT
Start: 2025-07-25

## 2025-07-27 DIAGNOSIS — E11.42 DIABETIC POLYNEUROPATHY ASSOCIATED WITH TYPE 2 DIABETES MELLITUS: ICD-10-CM

## 2025-07-29 RX ORDER — INSULIN GLARGINE 300 U/ML
INJECTION, SOLUTION SUBCUTANEOUS
Qty: 12 ML | Refills: 0 | Status: SHIPPED | OUTPATIENT
Start: 2025-07-29

## 2025-08-05 DIAGNOSIS — E11.42 DIABETIC POLYNEUROPATHY ASSOCIATED WITH TYPE 2 DIABETES MELLITUS: ICD-10-CM

## 2025-08-05 RX ORDER — BLOOD SUGAR DIAGNOSTIC
STRIP MISCELLANEOUS
Qty: 150 EACH | Refills: 3 | Status: SHIPPED | OUTPATIENT
Start: 2025-08-05

## 2025-08-08 DIAGNOSIS — F41.9 ANXIETY: ICD-10-CM

## 2025-08-08 RX ORDER — CLONAZEPAM 1 MG/1
1 TABLET ORAL
Qty: 90 TABLET | Refills: 0 | Status: SHIPPED | OUTPATIENT
Start: 2025-08-08

## 2025-08-08 RX ORDER — CLONAZEPAM 1 MG/1
TABLET ORAL
Qty: 90 TABLET | Refills: 0 | Status: SHIPPED | OUTPATIENT
Start: 2025-08-08

## 2025-08-11 ENCOUNTER — PATIENT OUTREACH (OUTPATIENT)
Dept: ADMINISTRATIVE | Facility: HOSPITAL | Age: 60
End: 2025-08-11
Payer: MEDICARE

## 2025-08-13 ENCOUNTER — LAB VISIT (OUTPATIENT)
Dept: LAB | Facility: HOSPITAL | Age: 60
End: 2025-08-13
Attending: NURSE PRACTITIONER
Payer: MEDICARE

## 2025-08-13 ENCOUNTER — OFFICE VISIT (OUTPATIENT)
Dept: DIABETES | Facility: CLINIC | Age: 60
End: 2025-08-13
Payer: MEDICARE

## 2025-08-13 VITALS
WEIGHT: 179.88 LBS | BODY MASS INDEX: 31.87 KG/M2 | HEART RATE: 77 BPM | DIASTOLIC BLOOD PRESSURE: 65 MMHG | SYSTOLIC BLOOD PRESSURE: 98 MMHG | HEIGHT: 63 IN

## 2025-08-13 DIAGNOSIS — E78.5 DYSLIPIDEMIA ASSOCIATED WITH TYPE 2 DIABETES MELLITUS: ICD-10-CM

## 2025-08-13 DIAGNOSIS — E11.69 DYSLIPIDEMIA ASSOCIATED WITH TYPE 2 DIABETES MELLITUS: ICD-10-CM

## 2025-08-13 DIAGNOSIS — E11.42 DIABETIC POLYNEUROPATHY ASSOCIATED WITH TYPE 2 DIABETES MELLITUS: ICD-10-CM

## 2025-08-13 DIAGNOSIS — E11.42 DIABETIC POLYNEUROPATHY ASSOCIATED WITH TYPE 2 DIABETES MELLITUS: Primary | ICD-10-CM

## 2025-08-13 DIAGNOSIS — I10 ESSENTIAL HYPERTENSION: Chronic | ICD-10-CM

## 2025-08-13 LAB
ALBUMIN SERPL BCP-MCNC: 4.2 G/DL (ref 3.5–5.2)
ALP SERPL-CCNC: 75 UNIT/L (ref 40–150)
ALT SERPL W/O P-5'-P-CCNC: 35 UNIT/L (ref 0–55)
ANION GAP (OHS): 10 MMOL/L (ref 8–16)
AST SERPL-CCNC: 31 UNIT/L (ref 0–50)
BILIRUB SERPL-MCNC: 0.2 MG/DL (ref 0.1–1)
BUN SERPL-MCNC: 15 MG/DL (ref 8–23)
CALCIUM SERPL-MCNC: 9.5 MG/DL (ref 8.7–10.5)
CHLORIDE SERPL-SCNC: 103 MMOL/L (ref 95–110)
CO2 SERPL-SCNC: 27 MMOL/L (ref 23–29)
CREAT SERPL-MCNC: 0.9 MG/DL (ref 0.5–1.4)
EAG (OHS): 117 MG/DL (ref 68–131)
GFR SERPLBLD CREATININE-BSD FMLA CKD-EPI: >60 ML/MIN/1.73/M2
GLUCOSE SERPL-MCNC: 54 MG/DL (ref 70–110)
GLUCOSE SERPL-MCNC: 57 MG/DL (ref 70–110)
GLUCOSE SERPL-MCNC: 74 MG/DL (ref 70–110)
HBA1C MFR BLD: 5.7 % (ref 4–5.6)
POTASSIUM SERPL-SCNC: 3.9 MMOL/L (ref 3.5–5.1)
PROT SERPL-MCNC: 7.6 GM/DL (ref 6–8.4)
SODIUM SERPL-SCNC: 140 MMOL/L (ref 136–145)

## 2025-08-13 PROCEDURE — 83036 HEMOGLOBIN GLYCOSYLATED A1C: CPT | Mod: HCNC

## 2025-08-13 PROCEDURE — 36415 COLL VENOUS BLD VENIPUNCTURE: CPT | Mod: HCNC,PO

## 2025-08-13 PROCEDURE — 80053 COMPREHEN METABOLIC PANEL: CPT | Mod: HCNC

## 2025-08-13 PROCEDURE — 99999 PR PBB SHADOW E&M-EST. PATIENT-LVL V: CPT | Mod: PBBFAC,HCNC,, | Performed by: NURSE PRACTITIONER

## 2025-08-13 RX ORDER — INSULIN GLARGINE 300 [IU]/ML
60 INJECTION, SOLUTION SUBCUTANEOUS DAILY
Qty: 2 PEN | Refills: 3 | Status: SHIPPED | OUTPATIENT
Start: 2025-08-13

## 2025-08-14 ENCOUNTER — OFFICE VISIT (OUTPATIENT)
Dept: PSYCHIATRY | Facility: CLINIC | Age: 60
End: 2025-08-14
Payer: MEDICARE

## 2025-08-14 DIAGNOSIS — F41.9 ANXIETY: ICD-10-CM

## 2025-08-14 DIAGNOSIS — F31.9 BIPOLAR 1 DISORDER: Primary | ICD-10-CM

## 2025-08-14 DIAGNOSIS — G47.00 INSOMNIA, UNSPECIFIED TYPE: ICD-10-CM

## 2025-08-14 PROCEDURE — 3066F NEPHROPATHY DOC TX: CPT | Mod: CPTII,HCNC,95, | Performed by: PSYCHIATRY & NEUROLOGY

## 2025-08-14 PROCEDURE — 3060F POS MICROALBUMINURIA REV: CPT | Mod: CPTII,HCNC,95, | Performed by: PSYCHIATRY & NEUROLOGY

## 2025-08-14 PROCEDURE — 3044F HG A1C LEVEL LT 7.0%: CPT | Mod: CPTII,HCNC,95, | Performed by: PSYCHIATRY & NEUROLOGY

## 2025-08-14 PROCEDURE — 98006 SYNCH AUDIO-VIDEO EST MOD 30: CPT | Mod: HCNC,95,, | Performed by: PSYCHIATRY & NEUROLOGY

## 2025-08-14 RX ORDER — ALPRAZOLAM 1 MG/1
TABLET ORAL
Qty: 120 TABLET | Refills: 3 | Status: SHIPPED | OUTPATIENT
Start: 2025-08-14

## 2025-08-14 RX ORDER — DESVENLAFAXINE SUCCINATE 100 MG/1
100 TABLET, EXTENDED RELEASE ORAL DAILY
Qty: 90 TABLET | Refills: 1 | Status: SHIPPED | OUTPATIENT
Start: 2025-08-14 | End: 2026-08-14

## 2025-08-14 RX ORDER — BENZTROPINE MESYLATE 0.5 MG/1
0.5 TABLET ORAL 2 TIMES DAILY PRN
Qty: 180 TABLET | Refills: 1 | Status: SHIPPED | OUTPATIENT
Start: 2025-08-14

## 2025-08-14 RX ORDER — LURASIDONE HYDROCHLORIDE 80 MG/1
80 TABLET, FILM COATED ORAL NIGHTLY
Qty: 90 TABLET | Refills: 1 | Status: SHIPPED | OUTPATIENT
Start: 2025-08-14 | End: 2026-08-14

## 2025-09-02 ENCOUNTER — HOSPITAL ENCOUNTER (OUTPATIENT)
Dept: RADIOLOGY | Facility: HOSPITAL | Age: 60
Discharge: HOME | End: 2025-09-02
Attending: FAMILY MEDICINE
Payer: MEDICARE

## 2025-09-02 DIAGNOSIS — Z12.39 ENCOUNTER FOR SCREENING FOR MALIGNANT NEOPLASM OF BREAST, UNSPECIFIED SCREENING MODALITY: ICD-10-CM

## 2025-09-02 DIAGNOSIS — Z12.31 ENCOUNTER FOR SCREENING MAMMOGRAM FOR MALIGNANT NEOPLASM OF BREAST: ICD-10-CM

## 2025-09-02 PROCEDURE — 77063 BREAST TOMOSYNTHESIS BI: CPT | Mod: TC,HCNC

## 2025-09-02 PROCEDURE — 77067 SCR MAMMO BI INCL CAD: CPT | Mod: 26,HCNC,, | Performed by: RADIOLOGY

## 2025-09-02 PROCEDURE — 77063 BREAST TOMOSYNTHESIS BI: CPT | Mod: 26,HCNC,, | Performed by: RADIOLOGY

## 2025-09-03 ENCOUNTER — OFFICE VISIT (OUTPATIENT)
Dept: FAMILY MEDICINE | Facility: CLINIC | Age: 60
End: 2025-09-03
Payer: MEDICARE

## 2025-09-03 VITALS
TEMPERATURE: 98 F | SYSTOLIC BLOOD PRESSURE: 100 MMHG | HEIGHT: 63 IN | BODY MASS INDEX: 31.43 KG/M2 | DIASTOLIC BLOOD PRESSURE: 58 MMHG | WEIGHT: 177.38 LBS | OXYGEN SATURATION: 95 % | HEART RATE: 92 BPM

## 2025-09-03 DIAGNOSIS — Z95.5 HISTORY OF CORONARY ANGIOPLASTY WITH INSERTION OF STENT: Primary | ICD-10-CM

## 2025-09-03 DIAGNOSIS — J96.10 CHRONIC RESPIRATORY FAILURE, UNSPECIFIED WHETHER WITH HYPOXIA OR HYPERCAPNIA: ICD-10-CM

## 2025-09-03 DIAGNOSIS — I25.118 CORONARY ARTERY DISEASE OF NATIVE ARTERY OF NATIVE HEART WITH STABLE ANGINA PECTORIS: ICD-10-CM

## 2025-09-03 DIAGNOSIS — E11.29 TYPE 2 DIABETES MELLITUS WITH DIABETIC MICROALBUMINURIA, WITH LONG-TERM CURRENT USE OF INSULIN: ICD-10-CM

## 2025-09-03 DIAGNOSIS — E66.01 MORBID (SEVERE) OBESITY DUE TO EXCESS CALORIES: ICD-10-CM

## 2025-09-03 DIAGNOSIS — E11.42 DIABETIC POLYNEUROPATHY ASSOCIATED WITH TYPE 2 DIABETES MELLITUS: ICD-10-CM

## 2025-09-03 DIAGNOSIS — E03.4 HYPOTHYROIDISM DUE TO ACQUIRED ATROPHY OF THYROID: ICD-10-CM

## 2025-09-03 DIAGNOSIS — Z79.4 TYPE 2 DIABETES MELLITUS WITH DIABETIC MICROALBUMINURIA, WITH LONG-TERM CURRENT USE OF INSULIN: ICD-10-CM

## 2025-09-03 DIAGNOSIS — I67.1 CEREBRAL ANEURYSM: ICD-10-CM

## 2025-09-03 DIAGNOSIS — E78.5 HYPERLIPIDEMIA, UNSPECIFIED HYPERLIPIDEMIA TYPE: ICD-10-CM

## 2025-09-03 DIAGNOSIS — R51.9 GENERALIZED HEADACHES: ICD-10-CM

## 2025-09-03 DIAGNOSIS — I73.9 PAD (PERIPHERAL ARTERY DISEASE): ICD-10-CM

## 2025-09-03 DIAGNOSIS — I10 ESSENTIAL HYPERTENSION: ICD-10-CM

## 2025-09-03 DIAGNOSIS — R80.9 TYPE 2 DIABETES MELLITUS WITH DIABETIC MICROALBUMINURIA, WITH LONG-TERM CURRENT USE OF INSULIN: ICD-10-CM

## 2025-09-03 PROCEDURE — 3044F HG A1C LEVEL LT 7.0%: CPT | Mod: CPTII,HCNC,S$GLB, | Performed by: FAMILY MEDICINE

## 2025-09-03 PROCEDURE — 3008F BODY MASS INDEX DOCD: CPT | Mod: CPTII,HCNC,S$GLB, | Performed by: FAMILY MEDICINE

## 2025-09-03 PROCEDURE — 3074F SYST BP LT 130 MM HG: CPT | Mod: CPTII,HCNC,S$GLB, | Performed by: FAMILY MEDICINE

## 2025-09-03 PROCEDURE — 99214 OFFICE O/P EST MOD 30 MIN: CPT | Mod: HCNC,S$GLB,, | Performed by: FAMILY MEDICINE

## 2025-09-03 PROCEDURE — 3066F NEPHROPATHY DOC TX: CPT | Mod: CPTII,HCNC,S$GLB, | Performed by: FAMILY MEDICINE

## 2025-09-03 PROCEDURE — 1159F MED LIST DOCD IN RCRD: CPT | Mod: CPTII,HCNC,S$GLB, | Performed by: FAMILY MEDICINE

## 2025-09-03 PROCEDURE — 3078F DIAST BP <80 MM HG: CPT | Mod: CPTII,HCNC,S$GLB, | Performed by: FAMILY MEDICINE

## 2025-09-03 PROCEDURE — 99999 PR PBB SHADOW E&M-EST. PATIENT-LVL V: CPT | Mod: PBBFAC,HCNC,, | Performed by: FAMILY MEDICINE

## 2025-09-03 PROCEDURE — G2211 COMPLEX E/M VISIT ADD ON: HCPCS | Mod: HCNC,S$GLB,, | Performed by: FAMILY MEDICINE

## 2025-09-03 PROCEDURE — 3060F POS MICROALBUMINURIA REV: CPT | Mod: CPTII,HCNC,S$GLB, | Performed by: FAMILY MEDICINE

## 2025-09-03 RX ORDER — GABAPENTIN 600 MG/1
TABLET ORAL
Qty: 360 TABLET | Refills: 0 | Status: SHIPPED | OUTPATIENT
Start: 2025-09-03

## 2025-09-03 RX ORDER — BUTALBITAL, ACETAMINOPHEN AND CAFFEINE 50; 325; 40 MG/1; MG/1; MG/1
1 TABLET ORAL EVERY 6 HOURS PRN
Qty: 30 TABLET | Refills: 1 | Status: SHIPPED | OUTPATIENT
Start: 2025-09-03

## 2025-09-03 RX ORDER — DESVENLAFAXINE 50 MG/1
100 TABLET, FILM COATED, EXTENDED RELEASE ORAL
COMMUNITY
Start: 2025-05-23

## (undated) DEVICE — CATH INFINITI MP JL3.5 JR4 5FR

## (undated) DEVICE — CATH EMERGE MR 20 X 2.50

## (undated) DEVICE — CATH NC EMERGE MR 2.5X12MM

## (undated) DEVICE — GLIDESHEATH SLENDER SS 5FR10CM

## (undated) DEVICE — KIT SYR REUSABLE

## (undated) DEVICE — OMNIPAQUE 300MG 150ML VIAL

## (undated) DEVICE — ANGIOTOUCH KIT

## (undated) DEVICE — KIT MANIFOLD LOW PRESS TUBING

## (undated) DEVICE — GUIDE LAUNCHER 5FR EBU 3.5

## (undated) DEVICE — NDL PERCUTANEOUS 21G 7CM VASC

## (undated) DEVICE — BAND TR COMP DEVICE REG 24CM

## (undated) DEVICE — GUIDEWIRE OMNI J TIP 185CM

## (undated) DEVICE — DRAPE ANGIO BRACH 38X44IN

## (undated) DEVICE — CATH JL3.5 5FR

## (undated) DEVICE — GUIDEWIRE ALLSTAR .014X190

## (undated) DEVICE — PACK HEART CATH BR

## (undated) DEVICE — WIRE GUIDE TEFLON 3CM .035 145

## (undated) DEVICE — CATH JR4 5FR

## (undated) DEVICE — WIRE X-SUP CHOICE PT .014X182

## (undated) DEVICE — GUIDE LAUNCHER 6FR EBU 3.5

## (undated) DEVICE — CATH PIG145 INFINITI 5X110CM